# Patient Record
Sex: FEMALE | Race: WHITE | NOT HISPANIC OR LATINO | Employment: OTHER | URBAN - METROPOLITAN AREA
[De-identification: names, ages, dates, MRNs, and addresses within clinical notes are randomized per-mention and may not be internally consistent; named-entity substitution may affect disease eponyms.]

---

## 2017-07-25 ENCOUNTER — ALLSCRIPTS OFFICE VISIT (OUTPATIENT)
Dept: OTHER | Facility: OTHER | Age: 62
End: 2017-07-25

## 2017-07-25 DIAGNOSIS — G89.4 CHRONIC PAIN SYNDROME: ICD-10-CM

## 2017-07-25 DIAGNOSIS — M79.605 PAIN OF LEFT LEG: ICD-10-CM

## 2017-07-25 DIAGNOSIS — M79.604 PAIN OF RIGHT LEG: ICD-10-CM

## 2017-07-25 DIAGNOSIS — M25.562 PAIN IN LEFT KNEE: ICD-10-CM

## 2017-07-25 DIAGNOSIS — F11.20 UNCOMPLICATED OPIOID DEPENDENCE (HCC): ICD-10-CM

## 2017-07-25 DIAGNOSIS — R26.9 ABNORMALITY OF GAIT AND MOBILITY: ICD-10-CM

## 2017-07-25 DIAGNOSIS — G63 POLYNEUROPATHY IN DISEASES CLASSIFIED ELSEWHERE (HCC): ICD-10-CM

## 2017-08-08 ENCOUNTER — GENERIC CONVERSION - ENCOUNTER (OUTPATIENT)
Dept: OTHER | Facility: OTHER | Age: 62
End: 2017-08-08

## 2017-08-11 ENCOUNTER — GENERIC CONVERSION - ENCOUNTER (OUTPATIENT)
Dept: OTHER | Facility: OTHER | Age: 62
End: 2017-08-11

## 2017-08-17 ENCOUNTER — GENERIC CONVERSION - ENCOUNTER (OUTPATIENT)
Dept: OTHER | Facility: OTHER | Age: 62
End: 2017-08-17

## 2017-09-19 ENCOUNTER — GENERIC CONVERSION - ENCOUNTER (OUTPATIENT)
Dept: OTHER | Facility: OTHER | Age: 62
End: 2017-09-19

## 2017-10-18 ENCOUNTER — GENERIC CONVERSION - ENCOUNTER (OUTPATIENT)
Dept: OTHER | Facility: OTHER | Age: 62
End: 2017-10-18

## 2017-10-18 ENCOUNTER — HOSPITAL ENCOUNTER (EMERGENCY)
Facility: HOSPITAL | Age: 62
Discharge: HOME | End: 2017-10-18
Attending: EMERGENCY MEDICINE | Admitting: EMERGENCY MEDICINE
Payer: MEDICARE

## 2017-10-18 VITALS
DIASTOLIC BLOOD PRESSURE: 71 MMHG | RESPIRATION RATE: 18 BRPM | WEIGHT: 218 LBS | OXYGEN SATURATION: 100 % | HEART RATE: 57 BPM | TEMPERATURE: 97.8 F | SYSTOLIC BLOOD PRESSURE: 174 MMHG

## 2017-10-18 DIAGNOSIS — M79.606 CHRONIC LEG PAIN: Primary | ICD-10-CM

## 2017-10-18 DIAGNOSIS — G89.29 CHRONIC LEG PAIN: Primary | ICD-10-CM

## 2017-10-18 PROCEDURE — 99283 EMERGENCY DEPT VISIT LOW MDM: CPT

## 2017-10-18 PROCEDURE — 96372 THER/PROPH/DIAG INJ SC/IM: CPT

## 2017-10-18 RX ORDER — BUDESONIDE AND FORMOTEROL FUMARATE DIHYDRATE 160; 4.5 UG/1; UG/1
2 AEROSOL RESPIRATORY (INHALATION) 2 TIMES DAILY
COMMUNITY
End: 2018-04-10

## 2017-10-18 RX ORDER — ALBUTEROL SULFATE 2.5 MG/3ML
2.5 SOLUTION RESPIRATORY (INHALATION) EVERY 6 HOURS PRN
COMMUNITY
End: 2018-05-16

## 2017-10-18 RX ORDER — ALBUTEROL SULFATE 0.63 MG/3ML
1 SOLUTION RESPIRATORY (INHALATION) EVERY 6 HOURS PRN
COMMUNITY
End: 2018-05-16

## 2017-10-18 RX ORDER — KETOROLAC TROMETHAMINE 30 MG/ML
30 INJECTION, SOLUTION INTRAMUSCULAR; INTRAVENOUS ONCE
Status: COMPLETED | OUTPATIENT
Start: 2017-10-18 | End: 2017-10-18

## 2017-10-18 RX ORDER — ATORVASTATIN CALCIUM 80 MG/1
80 TABLET, FILM COATED ORAL
COMMUNITY

## 2017-10-18 RX ORDER — LIDOCAINE 50 MG/G
2 PATCH TOPICAL ONCE
Status: DISCONTINUED | OUTPATIENT
Start: 2017-10-18 | End: 2017-10-18 | Stop reason: HOSPADM

## 2017-10-18 RX ORDER — CITALOPRAM 40 MG/1
40 TABLET ORAL DAILY
COMMUNITY
End: 2018-04-10

## 2017-10-18 RX ORDER — HYDROCORTISONE 0.5 %
CREAM (GRAM) TOPICAL
Qty: 30 G | Refills: 0 | Status: SHIPPED | OUTPATIENT
Start: 2017-10-18 | End: 2018-04-10

## 2017-10-18 RX ORDER — ASPIRIN 81 MG/1
81 TABLET ORAL DAILY
COMMUNITY
End: 2018-04-23 | Stop reason: HOSPADM

## 2017-10-18 RX ORDER — NAPROXEN 500 MG/1
500 TABLET ORAL 2 TIMES DAILY WITH MEALS
Qty: 30 TABLET | Refills: 0 | Status: SHIPPED | OUTPATIENT
Start: 2017-10-18 | End: 2018-04-10

## 2017-10-18 RX ADMIN — LIDOCAINE 2 PATCH: 50 PATCH CUTANEOUS at 02:45

## 2017-10-18 RX ADMIN — KETOROLAC TROMETHAMINE 30 MG: 30 INJECTION, SOLUTION INTRAMUSCULAR at 02:46

## 2017-10-18 NOTE — ED ATTENDING ATTESTATION
Lynn Marie DO, saw and evaluated the patient  I have discussed the patient with the resident/non-physician practitioner and agree with the resident's/non-physician practitioner's findings, Plan of Care, and MDM as documented in the resident's/non-physician practitioner's note, except where noted  All available labs and Radiology studies were reviewed  At this point I agree with the current assessment done in the Emergency Department  I have conducted an independent evaluation of this patient a history and physical is as follows:    64 yo female with known chronic b/l LE pain presents w/ c/o acute exacerbation of her chronic b/l leg pain for past 1 week  She says the pain begins behind her ankles and radiates up calves to her buttocks, feels like a sharp/stabbing pressure and has no alleviating or exacerbating factors  It waxes/wanes in intensity but is always there despite fentanyl patch, percocet, a prednisone taper staretd by PCP at visit 2 days ago for same and cymbalta startedby pain mgt this summer  Per pt cymbalta seemed to be working until 1 week ago when it flared again  Pt  Was seen at [facility] as well as her PCP in the last week for same complaints - had US neg for DVT and started on steroid taper by PCP  She is upset about being unable to get in to see pain mgmt until next week  Had a lumbar MRI in June for same which showed no evidence of cauda equina  She denies weakenss/numbness/tingling, bowel/bladder incontinence or saddle anesthesia  Have educated pt on inability to give narcotics for chronic pain  She is frustrated but understands need to call pain mgt to get sooner appt, need to continue all other meds incluing cymbalta and pred taper  Will give IM toradol here and lidoderm patches to post calves      Final diagnoses:   Chronic leg pain     Critical Care Time  CritCare Time

## 2017-10-18 NOTE — ED PROVIDER NOTES
History  Chief Complaint   Patient presents with    Leg Pain     Pt states "I have pain in both legs from ankle to butt  I was at [facility] over the weekend and they ruled out blood clots  I saw my PCP and he thinks I have arthritis, but I dont see my pain management dr until next  PCP started me on prednisone which has taking the swelling down, but hasnt touched the pain"     HPI  64 yo female pmh chronic leg pain presents w/ c/o chronic b/l leg pain symptoms  Pt  Says the pain begins behind her ankles and radiates up to her buttocks  Pain is constant feels like a sharp/stabbing pressure and has no a/e factors  It waxes/wanes in intensity  Pt  Says this feels the same as her chronic leg pain  She is on fentanyl patch, percocet, a prednisone taper, cymbalta  Pt  Was seen at [facility] as well as her PCP in the last week for same complaints  She is unable to get in to see pain mgmt until next week  She had a venous duplex b/l LE several days ago at [facility] that was negative for DVTs  Pt  Also had a recent back MRI which showed no evidence of cauda equina  Pt  Denies weakenss/numbness/tingling  No bowel/bladder incontinence  No saddle anesthesia  No f/ch/n/v/d/cp/sob  Imp/plan: 64 yo female presents w/ chronic b/l leg pain  She is on narcotics for her pain  We will give IM toradol, place lidoderm patch b/l legs, and refer the patient to her pain mgmt doc  We will write for bengay and naprosyn  Pt  Had recent chemistry at [facility] which showed normal GF and creatinine of 1 06  We will d/c  Return precuations discussed  Prior to Admission Medications   Prescriptions Last Dose Informant Patient Reported? Taking?    Cranberry 450 MG CAPS   Yes Yes   Sig: Take by mouth   albuterol (2 5 mg/3 mL) 0 083 % nebulizer solution   Yes Yes   Sig: Take 2 5 mg by nebulization every 6 (six) hours as needed for wheezing   albuterol (ACCUNEB) 0 63 MG/3ML nebulizer solution   Yes Yes   Sig: Take 1 ampule by nebulization every 6 (six) hours as needed for wheezing   aspirin (ECOTRIN LOW STRENGTH) 81 mg EC tablet   Yes Yes   Sig: Take 81 mg by mouth daily   atorvastatin (LIPITOR) 80 mg tablet   Yes Yes   Sig: Take 80 mg by mouth daily   budesonide-formoterol (SYMBICORT) 160-4 5 mcg/act inhaler   Yes Yes   Sig: Inhale 2 puffs 2 (two) times a day   citalopram (CeleXA) 40 mg tablet   Yes Yes   Sig: Take 40 mg by mouth daily   cyanocobalamin 1000 MCG tablet   Yes Yes   Sig: Take 100 mcg by mouth daily      Facility-Administered Medications: None       Past Medical History:   Diagnosis Date    Back complaints     Diabetes mellitus ([facility] [address] )     Gastroparesis     Hyperlipidemia     Hypertension     Sleep apnea        History reviewed  No pertinent surgical history  History reviewed  No pertinent family history  I have reviewed and agree with the history as documented  Social History   Substance Use Topics    Smoking status: Never Smoker    Smokeless tobacco: Never Used    Alcohol use No        Review of Systems   Constitutional: Negative for activity change, appetite change, chills and fever  HENT: Negative for sore throat, trouble swallowing and voice change  Eyes: Negative for photophobia  Respiratory: Negative for cough and shortness of breath  Cardiovascular: Negative for chest pain, palpitations and leg swelling  Gastrointestinal: Negative for abdominal pain, diarrhea, nausea and vomiting  Endocrine: Negative for polyuria  Genitourinary: Negative for dysuria, vaginal discharge and vaginal pain  Musculoskeletal: Positive for arthralgias and myalgias  Negative for back pain, gait problem, joint swelling, neck pain and neck stiffness  Skin: Negative for rash and wound  Allergic/Immunologic: Negative  Neurological: Negative for dizziness, tremors, seizures, syncope, facial asymmetry, speech difficulty, weakness, light-headedness, numbness and headaches  Hematological: Negative for adenopathy     Psychiatric/Behavioral: Negative for agitation  Physical Exam  ED Triage Vitals [10/18/17 0213]   Temperature Pulse Respirations Blood Pressure SpO2   97 8 °F (36 6 °C) 57 18 (!) 174/71 100 %      Temp src Heart Rate Source Patient Position - Orthostatic VS BP Location FiO2 (%)   -- -- -- -- --      Pain Score       Worst Possible Pain           Physical Exam   Constitutional: She is oriented to person, place, and time  She appears well-developed and well-nourished  No distress  HENT:   Head: Normocephalic and atraumatic  Right Ear: No hemotympanum  Left Ear: No hemotympanum  Nose: Nose normal  No nasal septal hematoma  Mouth/Throat: Uvula is midline, oropharynx is clear and moist and mucous membranes are normal  She does not have dentures  No oropharyngeal exudate  Eyes: Conjunctivae are normal  Right eye exhibits no nystagmus  Left eye exhibits no nystagmus  Neck: Trachea normal, normal range of motion, full passive range of motion without pain and phonation normal  Neck supple  No tracheal tenderness present  No tracheal deviation present  No c-spine tenderness   Cardiovascular: Normal rate, regular rhythm, normal heart sounds and intact distal pulses  Exam reveals no friction rub  No murmur heard  Pulmonary/Chest: Effort normal and breath sounds normal  She has no wheezes  She exhibits no tenderness  Abdominal: Soft  Bowel sounds are normal  There is no tenderness  There is no rebound and no guarding  Musculoskeletal: Normal range of motion  She exhibits no edema, tenderness or deformity  2+ DP/PT pulses     5/5 strength b/l    Negative SLR b/l  Normal gait    No varus/valgus stress b/l knees    Negative lachmann b/l   Neurological: She is alert and oriented to person, place, and time  She has normal strength  No cranial nerve deficit or sensory deficit  GCS eye subscore is 4  GCS verbal subscore is 5  GCS motor subscore is 6     Reflex Scores:       Patellar reflexes are 2+ on the right side and 2+ on the left side  Achilles reflexes are 2+ on the right side and 2+ on the left side  Skin: Skin is warm and dry  Capillary refill takes less than 2 seconds  No rash noted  She is not diaphoretic  No erythema  Psychiatric: She has a normal mood and affect  Nursing note and vitals reviewed  ED Medications  Medications   ketorolac (TORADOL) 30 mg/mL injection 30 mg (30 mg Intramuscular Given 10/18/17 0246)       Diagnostic Studies  Labs Reviewed - No data to display    No orders to display       Procedures  Procedures      Phone Consults  ED Phone Contact    ED Course  ED Course                                MDM  CritCare Time    Disposition  Final diagnoses:   Chronic leg pain     ED Disposition     ED Disposition Condition Comment    Discharge  Zohra Barriga discharge to home/self care      Condition at discharge: Good        Follow-up Information     Follow up With Specialties Details Why Contact Info Additional [address], DO Internal Medicine In 2 days  [address] [facility]  Charles Rader   [address]  [id number] [phone]       pain management  In 2 days       [address] [facility] Emergency Medicine  If symptoms worsen [address] [facility]  [phone] [place] [facility], [address], [place], [address]        Discharge Medication List as of 10/18/2017  2:42 AM      START taking these medications    Details   Menthol-Methyl Salicylate (TANK DORADO GREASELESS) 10-15 % greaseless cream Apply topically daily at bedtime, Starting Wed 10/18/2017, Print      naproxen (NAPROSYN) 500 mg tablet Take 1 tablet by mouth 2 (two) times a day with meals for 30 doses, Starting Wed 10/18/2017, Until Thu 11/2/2017, Print         CONTINUE these medications which have NOT CHANGED    Details   albuterol (2 5 mg/3 mL) 0 083 % nebulizer solution Take 2 5 mg by nebulization every 6 (six) hours as needed for wheezing, Historical Med albuterol (ACCUNEB) 0 63 MG/3ML nebulizer solution Take 1 ampule by nebulization every 6 (six) hours as needed for wheezing, Historical Med      aspirin (ECOTRIN LOW STRENGTH) 81 mg EC tablet Take 81 mg by mouth daily, Historical Med      atorvastatin (LIPITOR) 80 mg tablet Take 80 mg by mouth daily, Historical Med      budesonide-formoterol (SYMBICORT) 160-4 5 mcg/act inhaler Inhale 2 puffs 2 (two) times a day, Historical Med      citalopram (CeleXA) 40 mg tablet Take 40 mg by mouth daily, Historical Med      Cranberry 450 MG CAPS Take by mouth, Historical Med      cyanocobalamin 1000 MCG tablet Take 100 mcg by mouth daily, Historical Med           No discharge procedures on file  ED Provider  Attending physically available and evaluated Zohra Barriga I managed the patient along with the ED Attending      Electronically Signed by       Pura Valadez MD  Resident  10/18/17 2769

## 2017-10-18 NOTE — DISCHARGE INSTRUCTIONS
Leg Pain   WHAT YOU NEED TO KNOW:   Leg pain may be caused by a variety of health conditions  Your tests did not show any broken bones or blood clots  DISCHARGE INSTRUCTIONS:   Return to the emergency department if:   · You have a fever  · Your leg starts to swell  · Your leg pain gets worse  · You have numbness or tingling in your leg or toes  · You cannot put any weight on or move your leg  Contact your healthcare provider if:   · Your pain does not decrease, even after treatment  · You have questions or concerns about your condition or care  Medicines:   · NSAIDs , such as ibuprofen, help decrease swelling, pain, and fever  This medicine is available with or without a doctor's order  NSAIDs can cause stomach bleeding or kidney problems in certain people  If you take blood thinner medicine, always ask your healthcare provider if NSAIDs are safe for you  Always read the medicine label and follow directions  · Take your medicine as directed  Contact your healthcare provider if you think your medicine is not helping or if you have side effects  Tell him of her if you are allergic to any medicine  Keep a list of the medicines, vitamins, and herbs you take  Include the amounts, and when and why you take them  Bring the list or the pill bottles to follow-up visits  Carry your medicine list with you in case of an emergency  Follow up with your healthcare provider as directed: You may need more tests to find the cause of your leg pain  You may need to see an orthopedic specialist or a physical therapist  Write down your questions so you remember to ask them during your visits  Manage your leg pain:   · Rest  your injured leg so that it can heal  You may need an immobilizer, brace, or splint to limit the movement of your leg  You may need to avoid putting any weight on your leg for at least 48 hours  Return to normal activities as directed      · Ice  the injury for 20 minutes every 4 hours for up to 24 hours, or as directed  Use an ice pack, or put crushed ice in a plastic bag  Cover it with a towel to protect your skin  Ice helps prevent tissue damage and decreases swelling and pain  · Elevate  your injured leg above the level of your heart as often as you can  This will help decrease swelling and pain  If possible, prop your leg on pillows or blankets to keep the area elevated comfortably  · Use assistive devices as directed  You may need to use a cane or crutches  Assistive devices help decrease pain and pressure on your leg when you walk  Ask your healthcare provider for more information about assistive devices and how to use them correctly  · Maintain a healthy weight  Extra body weight can cause pressure and pain in your hip, knee, and ankle joints  Ask your healthcare provider how much you should weigh  Ask him to help you create a weight loss plan if you are overweight  © 2017 [address] [employer] Information is for End User's use only and may not be sold, redistributed or otherwise used for commercial purposes  All illustrations and images included in CareNotes® are the copyrighted property of [employer]  or [employer]  The above information is an  only  It is not intended as medical advice for individual conditions or treatments  Talk to your doctor, nurse or pharmacist before following any medical regimen to see if it is safe and effective for you

## 2017-10-27 ENCOUNTER — GENERIC CONVERSION - ENCOUNTER (OUTPATIENT)
Dept: OTHER | Facility: OTHER | Age: 62
End: 2017-10-27

## 2017-11-17 ENCOUNTER — ALLSCRIPTS OFFICE VISIT (OUTPATIENT)
Dept: OTHER | Facility: OTHER | Age: 62
End: 2017-11-17

## 2017-11-17 ENCOUNTER — APPOINTMENT (OUTPATIENT)
Dept: RADIOLOGY | Facility: CLINIC | Age: 62
End: 2017-11-17
Payer: MEDICARE

## 2017-11-17 DIAGNOSIS — M79.605 PAIN OF LEFT LEG: ICD-10-CM

## 2017-11-17 DIAGNOSIS — M54.6 PAIN IN THORACIC SPINE: ICD-10-CM

## 2017-11-17 DIAGNOSIS — M79.604 PAIN OF RIGHT LEG: ICD-10-CM

## 2017-11-17 DIAGNOSIS — G89.4 CHRONIC PAIN SYNDROME: ICD-10-CM

## 2017-11-17 DIAGNOSIS — M54.6 ACUTE BILATERAL THORACIC BACK PAIN: ICD-10-CM

## 2017-11-17 PROCEDURE — 72070 X-RAY EXAM THORAC SPINE 2VWS: CPT

## 2017-11-21 ENCOUNTER — GENERIC CONVERSION - ENCOUNTER (OUTPATIENT)
Dept: OTHER | Facility: OTHER | Age: 62
End: 2017-11-21

## 2017-11-21 NOTE — PROGRESS NOTES
Assessment  1  Chronic pain of both lower extremities (729 5,338 29) (M79 604,M79 605,G89 29)   2  Chronic pain of left knee (215 90,489 39) (M25 562,G89 29)   3  Polyneuropathy associated with underlying disease (357 4) (G63)   4  Gait disorder (781 2) (R26 9)   5  Pain syndrome, chronic (338 4) (G89 4)   6  Chronic myofascial pain (729 1,338 29) (M79 1,G89 29)   7  Chronic bilateral thoracic back pain (724 1,338 29) (M54 6,G89 29)    Plan   Chronic bilateral thoracic back pain    · XR SPINE THORACOLUMBAR 2 VIEW; Status:Canceled;    Perform:[facility]; VQA:[id number]; Ordered;bilateral thoracic back pain; Ordered By:Lars Sebastian;  Chronic bilateral thoracic back pain, Chronic pain of both lower extremities, Painsyndrome, chronic    · XR SPINE THORACIC 2 VIEW; Status:Active; Requested for:17Nov2017;    Perform:[facility]; JFK:[id number]; Ordered; For:Chronic bilateral thoracic back pain, Chronic pain of both lower extremities, Pain syndrome, chronic; Ordered By:Lars Sebastian;  Chronic myofascial pain    · TiZANidine HCl - 2 MG Oral Tablet; Take 1 PO QID PRN for pain   Rx By: Melody Jimenez; Dispense: 30 Days ; #:120 Tablet; Refill: 1;Chronic myofascial pain; KE = N; Verified Transmission to [address] [place] [address]; Last Updated By: System, [employer]; 11/17/2017 2:22:33 PM  Chronic pain of both lower extremities, Chronic pain of left knee, Polyneuropathyassociated with underlying disease    · DULoxetine HCl - 60 MG Oral Capsule Delayed Release Particles; Take 2 POQD   Rx By: Melody Jimenez; Dispense: 30 Days ; #:60 Capsule Delayed Release Particles;  Refill: 1;Chronic pain of both lower extremities, Chronic pain of left knee, Polyneuropathy associated with underlying disease; KE = N; Verified Transmission to [address] [place] [address]; Last Updated By: System, [employer]; 11/17/2017 2:22:33 PM  Chronic pain of left knee, Pain syndrome, chronic, Polyneuropathy associated withunderlying disease    · DULoxetine HCl - 30 MG Oral Capsule Delayed Release Particles   Rx By: Kerry Albarran; Dispense: 30 Days ; #:30 Capsule Delayed Release Particles; Refill: 0;Chronic pain of left knee, Pain syndrome, chronic, Polyneuropathy associated with underlying disease; KE = N; Sent To: [id number]  Unlinked    · Methocarbamol 750 MG Oral Tablet   Dispense: 0 Days ; #: Sufficient TABS; Refill: 0; KE = N; Record; Last Updated By: Kerry Albarran; 11/17/2017 2:21:13 PM  * MRI THORACIC SPINE WO CONTRAST; Status:Hold For - Scheduling; Requested for:17Nov2017;  Perform:[facility]; CKO:[id number]; Last Updated By:Zaira Valero; 11/17/2017 3:12:31 PM;Ordered; For:Chronic bilateral thoracic back pain, Chronic pain of both lower extremities; Ordered By:Lars Sebastian;  * MRI LUMBAR SPINE WO CONTRAST; Status:Hold For - Scheduling; Requested for:17Nov2017;  Perform:[facility]; ZPQ:[id number]; Last Updated By:Zaira Valero; 11/17/2017 3:12:32 PM;Ordered; For:Chronic bilateral thoracic back pain; Ordered By:Lars Sebastian; Follow-up visit in 2 months Evaluation and Treatment  Follow-up  Status: Hold For - Scheduling  Requested for: [id number] Ordered; For: Pain syndrome, chronic;  Ordered By: Kerry Albarran  Performed:   Due: 39XCE0367   Discussion/Summary    While the patient was in the office today, I did have a thorough conversation with the patient regarding her medication regimen treatment plan  I explained to the patient that at this point because her low back and lower extremity radicular symptoms are her biggest issues, I feel it is necessary to proceed with updated imaging of her thoracic spine with an x-ray and MRI and her lumbar spine with an updated MRI   I explained to the patient that once we receive the results of the MRIs, our office will give her a call to review results and discuss the next step in her treatment plan which may include a surgical consultation with Dr Lynda Rodrigez verses an office visit with Dr Henson Civil to discuss proceeding with the spinal cord stimulator trial  The patient was agreeable and verbalized an understanding   had a very thorough conversation with the patient regarding the spinal cord stimulator trial and possible permanent implantation  I explained that there is pre-authorization process, including the need to proceed with an education class and psychological evaluation, both of which must be completed, before we can consider proceeding with the spinal cord stimulator trial pre-authorization process with the insurance company  I spent a significant amount of time reviewing the basic theory of spinal cord stimulation as well as the hope that it would provide at least moderate improvement and improve the patient's pain and overall functions with regards to perfomring activities of daily living and leisure activities with as little pain as possible  I feel that with regards to the stimulator process, I answered the patient's questions to the best of my abilities and until the patient was thoroughly satisfied  While the patient was in the office today, a stimulator booklet was sent home for the patient and family to review  I encouraged that before any decisions regarding the stimulator were made that they also proceed with the stimulator education class and proceed from there with regards to proceeding with the psychological evaluation  I advised the patient that if they are interested in proceeding with the spinal cord stimulator trial they need to then proceed with the psychological evaluation so we can try to expedite the insurance pre-authorization process, which can take at least 4-6 weeks to obtain approval  The patient verbalized an understanding    regards to her medication regimen, I explained to the patient at this point time since she is not sure how helpful the fentanyl patch is, I would thoroughly encouraged her to discuss the fentanyl patch with her primary care provider and asked them to decrease her down to 25 mcg and then titrate her off of the fentanyl patch from there  I did explain to her that it would be reasonable in place of the patch, for now, to try 3-4 Percocet as needed a day and still overall that would be less medicine  The patient was agreeable and will discuss this with her primary care provider  From our standpoint, I continue to feel that there is a significant neuropathic component and I would like to increase the Cymbalta to 120 mg a day for the next 2 months and see how she does  I advised the patient that if they experience any side effects or issues with the changes in their medication regiment, they should give our office a call to discuss  I also advised the patient not to drive or operate machinery until they see how the changes in the medication regimen affects them  The patient was agreeable and verbalized an understanding  patient is to schedule a follow-up office visit in 2 months at that point time we will regroup with regards to the medication regimen and treatment plan  The patient was agreeable and verbalized an understanding  The patient has the current Goals: To current leaf find a medication regimen and treatment plan the provides at least moderate and stable relief of her pain symptoms and to proceed with the spinal cord stimulator pre authorization process and possible consultation with Dr Angela Friedman  The patent has the current Barriers: Chronic pain syndrome, gait dysfunction, and opioid dependence  Patient is able to Self-Care  The treatment plan was reviewed with the patient/guardian  The patient/guardian understands and agrees with the treatment plan   The patient was counseled regarding instructions for management,-- prognosis,-- patient and family education,-- impressions,-- risks and benefits of treatment options-- and-- importance of compliance with treatment  total time of encounter was 30 minutes  Chief Complaint    1   Pain  Chronic low back, leg and left knee pain, worsened  Diffuse neck and thoracic pain/spasms, worsened  History of Present Illness  The patient presents today for a follow-up office visit  She is currently being treated for her chronic pain symptoms, with the worst of her pain being her low back and lower extremity radicular symptoms and her chronic knee pain, all of which seems to have significantly worsened since her last office visit  The patient denies any recent trauma or injury as well as any signs or symptoms of cauda equina syndrome and is unsure what is causing her pain to get worse as it could be related to the weather changes  The patient is just significantly uncomfortable and reports that her medication regimen is providing minimal relief, although she does feel that initially the Cymbalta and then increase the Cymbalta was helpful, but does not seem to be as helpful now  The patient also reports she continues with the fentanyl patch in the p r n  Percocet as prescribed by her primary care provider, however, she ultimately feels that the fentanyl patch is no longer providing any relief and is wondering if she should even continue to stay on this medication  She does feel the Percocet helps when she takes a, however, she only tries to take it at nighttime because she is on the patch  The patient is very frustrated with the significant lack of overall relief and presents today to discuss her medication regimen and any other treatment plan options we could recommend  OLIVIA PONCE presents with complaints of constant episodes of severe bilateral neck, bilateral upper back, bilateral lower back, bilateral shoulder, bilateral buttock, bilateral hip, bilateral thigh, bilateral knee, bilateral lower leg and bilateral ankle pain, described as sharp and cramping  On a scale of 1 to 10, the patient rates the pain as 10  Symptoms are worsening        Review of Systems   Constitutional: no fever,-- no recent weight gain-- and-- no recent weight loss  Eyes: no double vision-- and-- no blurry vision  Cardiovascular: no chest pain,-- no palpitations-- and-- no lower extremity edema  Respiratory: no complaints of shortness of breath-- and-- no wheezing  Musculoskeletal: difficulty walking,-- joint stiffness-- and-- decreased range of motion, but-- no joint swelling,-- no limb swelling-- and-- no pain in extremity  Neurological: memory loss, but-- no dizziness,-- no difficulty swallowing,-- no loss of consciousness-- and-- no seizures  Gastrointestinal: nausea,-- vomiting,-- constipation-- and-- diarrhea  Genitourinary: no difficulty initiating urine stream,-- no genital pain-- and-- no frequent urination  Integumentary: no complaints of skin rash  Psychiatric: no depression  Endocrine: no excessive thirst,-- no adrenal disease,-- no hypothyroidism-- and-- no hyperthyroidism  Hematologic/Lymphatic: no tendency for easy bruising-- and-- no tendency for easy bleeding  Active Problems  1  Chronic pain of both lower extremities (729 5,338 29) (M79 604,M79 605,G89 29)   2  Chronic pain of left knee (116 70,156 28) (M25 562,G89 29)   3  Encounter for long-term use of opiate analgesic (V58 69) (Z79 891)   4  Gait disorder (781 2) (R26 9)   5  Pain syndrome, chronic (338 4) (G89 4)   6  Polyneuropathy associated with underlying disease (357 4) (G63)   7  Uncomplicated opioid dependence (304 00) (F11 20)    Past Medical History  1  History of angina (V12 59) (Z86 79)   2  History of anxiety disorder (V11 8) (Z86 59)   3  History of asthma (V12 69) (Z87 09)   4  History of depression (V11 8) (Z86 59)   5  History of diabetes mellitus (V12 29) (Z86 39)   6  History of gastroesophageal reflux (GERD) (V12 79) (Z87 19)   7  History of hypercholesterolemia (V12 29) (Z86 39)   8  History of hypertension (V12 59) (Z86 79)   9   History of kidney disease (V13 09) (A66 088)    The active problems and past medical history were reviewed and updated today  Surgical History  1  History of  Section   2  History of Knee Replacement   3  History of Neck Surgery   4  History of Rotator Cuff Repair    The surgical history was reviewed and updated today  Family History  Family History    1  Family history of diabetes mellitus (V18 0) (Z83 3)   2  Family history of heart disease (V17 49) (Z82 49)   3  Family history of hypertension (V17 49) (Z82 49)   4  Family history of neuropathy (V17 2) (Z82 0)    The family history was reviewed and updated today  Social History     ·    · No alcohol use   · Non-smoker (V49 89) (Z78 9)  The social history was reviewed and updated today  The social history was reviewed and is unchanged  Current Meds   1  Albuterol Sulfate (2 5 MG/3ML) 0 083% Inhalation Nebulization Solution Recorded   2  Aspir-81 81 MG Oral Tablet Delayed Release Recorded   3  Atorvastatin Calcium 80 MG Oral Tablet Recorded   4  Citalopram Hydrobromide 40 MG Oral Tablet Recorded   5  Cranberry CAPS Recorded   6  DULoxetine HCl - 30 MG Oral Capsule Delayed Release Particles; Take 1 PO QD with 60 mg dose to equal 90 mg per day; Therapy: [id number] to (Evaluate:2017)  Requested for: [id number]; Last Rx:[id number] Ordered   7  DULoxetine HCl - 60 MG Oral Capsule Delayed Release Particles; take 1 capsule by mouth daily; Therapy: [id number] to (Evaluate:2017)  Requested for: 2017; Last Rx:2017 Ordered   8  FentaNYL 50 MCG/HR Transdermal Patch 72 Hour; Therapy: (Recorded:[id number]) to Recorded   9  Flonase Allergy Relief 50 MCG/ACT Nasal Suspension Recorded   10  Furosemide 20 MG Oral Tablet Recorded   11  Linzess 145 MCG Oral Capsule Recorded   12  Losartan Potassium 50 MG Oral Tablet Recorded   13  Methocarbamol 750 MG Oral Tablet; Therapy: (Recorded:[id number]) to Recorded   14  NovoLOG 100 UNIT/ML Subcutaneous Solution Recorded   15  Pantoprazole Sodium 40 MG Oral Tablet Delayed Release Recorded   16  Percocet 5-325 MG Oral Tablet; Therapy: (Recorded:[id number]) to Recorded   17  Pramipexole Dihydrochloride 1 MG Oral Tablet Recorded   18  Stool Softener 100 MG Oral Tablet Recorded   19  Vitamin B12 TABS Recorded   20  Zofran TABS Recorded    The medication list was reviewed and updated today  Allergies    1  Azithromycin TABS   2  Gabapentin CAPS   3  Methotrexate TABS   4  Sucralfate TABS   5  Sulfamethoxazole-Trimethoprim TABS   6  Topiramate TABS    Physical Exam   Constitutional  General appearance: Well developed, well nourished, alert, in no distress, non-toxic and no overt pain behavior  Eyes  Sclera: anicteric  HEENT  Hearing grossly intact  Pulmonary  Respiratory effort: Even and unlabored  Cardiovascular  Examination of extremities: No edema or pitting edema present  Abdomen  Abdomen: Abnormal   The abdomen was rounded-- and-- obese  Psychiatric  Mood and affect: Mood and affect appropriate  Neurologic Motor Tone:   Cranial nerves: Cranial nerves II-XII grossly intact  -- Antalgic, painful, but steady gait with the use of a single cane  Musculoskeletal       Results/Data  Results Free Text Form [facility]:   Results    Other  Percocet- Last night 10/16/2017        Future Appointments    Date/Time Provider Specialty Site   01/18/2018 01:00 PM ROMAINE Vazquez [facility] 15       Signatures   Electronically signed by : Divine Rebollar ; Nov 20 2017  6:44AM EST                       (Author)    Electronically signed by : Joann Tanner DO; Nov 20 2017 10:35AM EST

## 2017-11-22 ENCOUNTER — GENERIC CONVERSION - ENCOUNTER (OUTPATIENT)
Dept: OTHER | Facility: OTHER | Age: 62
End: 2017-11-22

## 2017-11-27 ENCOUNTER — GENERIC CONVERSION - ENCOUNTER (OUTPATIENT)
Dept: OTHER | Facility: OTHER | Age: 62
End: 2017-11-27

## 2017-12-10 ENCOUNTER — HOSPITAL ENCOUNTER (OUTPATIENT)
Dept: MRI IMAGING | Facility: HOSPITAL | Age: 62
Discharge: HOME | End: 2017-12-10
Payer: MEDICARE

## 2017-12-10 DIAGNOSIS — M54.6 PAIN IN THORACIC SPINE: ICD-10-CM

## 2017-12-10 DIAGNOSIS — M79.605 PAIN OF LEFT LEG: ICD-10-CM

## 2017-12-10 DIAGNOSIS — M79.604 PAIN OF RIGHT LEG: ICD-10-CM

## 2017-12-10 PROCEDURE — 72146 MRI CHEST SPINE W/O DYE: CPT

## 2017-12-10 PROCEDURE — 72148 MRI LUMBAR SPINE W/O DYE: CPT

## 2017-12-12 ENCOUNTER — GENERIC CONVERSION - ENCOUNTER (OUTPATIENT)
Dept: OTHER | Facility: OTHER | Age: 62
End: 2017-12-12

## 2017-12-22 ENCOUNTER — ALLSCRIPTS OFFICE VISIT (OUTPATIENT)
Dept: OTHER | Facility: OTHER | Age: 62
End: 2017-12-22

## 2017-12-23 NOTE — PROGRESS NOTES
Assessment   1  Chronic pain of both lower extremities (729 5,338 29) (M79 604,M79 605,G89 29)   2  Pain syndrome, chronic (338 4) (G89 4)   3  Polyneuropathy associated with underlying disease (357 4) (G63)    Plan   Chronic pain of both lower extremities, Chronic pain of left knee, Polyneuropathy    associated with underlying disease    · DULoxetine HCl - 60 MG Oral Capsule Delayed Release Particles; Take 2 PO    QD   Rx By: [facility]; Dispense: 30 Days ; #:60 Capsule Delayed Release Particles; Refill: 1;For: Chronic pain of both lower extremities, Chronic pain of left knee, Polyneuropathy associated with underlying disease; KE = N; Verified Transmission to [address] [place] [address]; Last Updated By: System, [employer]; 12/22/2017 2:30:30 PM    Discussion/Summary      I believe that the patient may be a good candidate for spinal cord stimulation given the recalcitrant nature of her pain, with apparently no clear operative indications  had a long and detailed conversation with Tessa Irene and her  regarding the nature of spinal cord stimulator, the nature of the trial and reasonable expected outcomes  I did provide information for home review  She also understands that there would be a three to five day trial process and the lead would be placed under fluoroscopic guidance  During that time, I would track the medication usage and sleep to help make decisions regarding permanent implantation, if we got to that point  I am also setting her up for an education seminar regarding spinal cord stimulation  risks and benefits of the procedures were all discussed with the patient  I did obtain authorization from medical records to begin the insurance authorization process  also understands that she will need to undergo psychological testing, which is standard care prior to any implant, to rule out any significant psychological co-morbidities that would interfere with her benefiting from the technology      did renew her duloxetine after explaining the risks of the medication  document utilizing voice recognition software and errors may have occurred  The patient has the current Goals: Decreased pain medications increased function decreased pain  The patent has the current Barriers: Chronic pain syndrome  Patient is able to Self-Care  Educational resources provided: Information on spinal cord stimulation  Possible side effects of new medications were reviewed with the patient/guardian today  The treatment plan was reviewed with the patient/guardian  The patient/guardian understands and agrees with the treatment plan    The patient and patient's family was counseled regarding diagnostic results,-- instructions for management,-- risk factor reductions,-- prognosis,-- patient and family education,-- risks and benefits of treatment options-- and-- importance of compliance with treatment  total time of encounter was 25 minutes-- and-- 20 minutes was spent counseling  Self Referrals: Yes Psychiatrist      Chief Complaint   1  Pain    History of Present Illness   Last visit 11/17/17 unchanged since last visit   is here discuss spinal cord stimulation   personally reviewed the patient's past medical history, surgical history, allergies, current medications social history, family history and review of systems  intake form reviewed with the patient  OLIVIA PONCE presents with complaints of occasional episodes of bilateral knee, bilateral lower leg, bilateral ankle and bilateral foot pain, described as sharp, burning and cramping, radiating to the bilateral lower extremity  Symptoms are unchanged  Review of Systems        Constitutional: no fever,-- no recent weight gain-- and-- no recent weight loss  Eyes: no double vision-- and-- no blurry vision  Cardiovascular: no chest pain,-- no palpitations-- and-- no lower extremity edema  Respiratory: shortness of breath, but-- no wheezing  Musculoskeletal: difficulty walking,-- muscle weakness,-- joint stiffness,-- limb swelling -- and-- pain in extremity , but-- no joint swelling-- and-- no decreased range of motion  Neurological: memory loss, but-- no dizziness,-- no difficulty swallowing,-- no loss of consciousness-- and-- no seizures  Gastrointestinal: nausea-- and-- constipation, but-- no vomiting-- and-- no diarrhea  Genitourinary: no difficulty initiating urine stream,-- no genital pain-- and-- no frequent urination  Integumentary: no complaints of skin rash  Psychiatric: no depression  Endocrine: no excessive thirst,-- no adrenal disease,-- no hypothyroidism-- and-- no hyperthyroidism  Hematologic/Lymphatic: no tendency for easy bruising-- and-- no tendency for easy bleeding  ROS reviewed  Active Problems   1  Chronic bilateral thoracic back pain (724 1,338 29) (M54 6,G89 29)   2  Chronic myofascial pain (729 1,338 29) (M79 1,G89 29)   3  Chronic pain of both lower extremities (729 5,338 29) (M79 604,M79 605,G89 29)   4  Chronic pain of left knee (282 10,452 73) (M25 562,G89 29)   5  Encounter for long-term use of opiate analgesic (V58 69) (Z79 891)   6  Gait disorder (781 2) (R26 9)   7  Pain syndrome, chronic (338 4) (G89 4)   8  Polyneuropathy associated with underlying disease (357 4) (G63)   9  Uncomplicated opioid dependence (304 00) (F11 20)    Past Medical History   1  History of angina (V12 59) (Z86 79)   2  History of anxiety disorder (V11 8) (Z86 59)   3  History of asthma (V12 69) (Z87 09)   4  History of depression (V11 8) (Z86 59)   5  History of diabetes mellitus (V12 29) (Z86 39)   6  History of gastroesophageal reflux (GERD) (V12 79) (Z87 19)   7  History of hypercholesterolemia (V12 29) (Z86 39)   8  History of hypertension (V12 59) (Z86 79)   9  History of kidney disease (V13 09) (R24 160)     The active problems and past medical history were reviewed and updated today  Surgical History   1  History of  Section   2  History of Knee Replacement   3  History of Neck Surgery   4  History of Rotator Cuff Repair     The surgical history was reviewed and updated today  Family History   Family History    1  Family history of diabetes mellitus (V18 0) (Z83 3)   2  Family history of heart disease (V17 49) (Z82 49)   3  Family history of hypertension (V17 49) (Z82 49)   4  Family history of neuropathy (V17 2) (Z82 0)     The family history was reviewed and updated today  Social History    ·    · No alcohol use   · Non-smoker (V49 89) (Z78 9)  The social history was reviewed and updated today  Current Meds    1  Albuterol Sulfate (2 5 MG/3ML) 0 083% Inhalation Nebulization Solution Recorded   2  Aspir-81 81 MG Oral Tablet Delayed Release Recorded   3  Atorvastatin Calcium 80 MG Oral Tablet Recorded   4  Calcium CAPS; Therapy: (Rob Mc) to Recorded   5  Citalopram Hydrobromide 40 MG Oral Tablet Recorded   6  Cranberry CAPS Recorded   7  DULoxetine HCl - 60 MG Oral Capsule Delayed Release Particles; Take 2 PO QD; Therapy: 35WVE0616 to (Evaluate:54Fbz3013)  Requested for: 85XTN9598; Last     Rx:21Kex9034 Ordered   8  FentaNYL 50 MCG/HR Transdermal Patch 72 Hour; Therapy: (Recorded:96Poo8498) to Recorded   9  Furosemide 20 MG Oral Tablet Recorded   10  Linzess 145 MCG Oral Capsule Recorded   11  Losartan Potassium 50 MG Oral Tablet Recorded   12  NovoLOG 100 UNIT/ML Subcutaneous Solution Recorded   13  Oxybutynin Chloride TABS; Therapy: (Rob Mc) to Recorded   14  Pantoprazole Sodium 40 MG Oral Tablet Delayed Release Recorded   15  Percocet 5-325 MG Oral Tablet; Therapy: (Recorded:51Ahh9419) to Recorded   16  Pramipexole Dihydrochloride 1 MG Oral Tablet Recorded   17  Stool Softener 100 MG Oral Tablet Recorded   18  TiZANidine HCl - 2 MG Oral Tablet; Take 1 PO QID PRN for pain;       Therapy: 14YVN4317 to (PYRGLLXN:92IXH5477)  Requested for: 89USY4406; Last      Rx:17Nov2017 Ordered   19  Vitamin B12 TABS Recorded   20  Zofran TABS Recorded     The medication list was reviewed and updated today  Allergies   1  Azithromycin TABS   2  Gabapentin CAPS   3  Methotrexate TABS   4  Sucralfate TABS   5  Sulfamethoxazole-Trimethoprim TABS   6  Topiramate TABS    Vitals   Vital Signs    Recorded: 66Qdh4326 02:13PM   Temperature 98 1 F   Heart Rate 68   Systolic 673   Diastolic 80   Height 5 ft 3 in   Weight 221 lb    BMI Calculated 39 15   BSA Calculated 2 02   Pain Scale 2     Physical Exam        Constitutional      General appearance: Abnormal   overweight  Eyes      Sclera: anicteric      HEENT      Hearing grossly intact  Neck      Neck: Supple, symmetric, trachea midline, no masses  Pulmonary      Respiratory effort: Even and unlabored  Cardiovascular      Examination of extremities: No edema or pitting edema present  Skin      Skin and subcutaneous tissue: Normal without rashes or lesions, well hydrated  Psychiatric      Mood and affect: Mood and affect appropriate  Neurologic      Cranial nerves: Cranial nerves II-XII grossly intact  Musculoskeletal      Gait and station: Abnormal   Gait evaluation demonstrated slow, deliberate sit to stand transfer  Lumbar/Sacral Spine examination demonstrates  Difficulty going from sitting to standing sitting position healed scarring of the left knee and anterior left shin, deep tendon reflexes absent left patella and Achilles 1+ right patella and Achilles she does have weakness of the left knee extension and flexion decreased sensation left stocking-glove distribution  Results/Data   Results Free Text Form [facility]:    Results     I personally reviewed the films/images in the office today   * MRI THORACIC SPINE WO CONTRAST [id number] 01:36PM Yue Santiago Order Number: [id number]       - Patient Instructions: To schedule this appointment, please contact Central Scheduling at [phone]  Test Name Result Flag Reference   MRI THORACIC SPINE WO CONTRAST (Report)     MRI THORACIC SPINE WITHOUT CONTRAST           INDICATION: Left leg pain  COMPARISON: None  TECHNIQUE: Sagittal T1, sagittal T2, sagittal inversion recovery, axial T2, axial 2D MERGE  IMAGE QUALITY: Diagnostic  FINDINGS:           ALIGNMENT: Normal alignment of the thoracic spine  No compression fracture  No subluxation  No scoliosis  MARROW SIGNAL: Mild T12 anterior wedge compression deformity, chronic in the absence marrow edema  Incomplete evaluation of a C7 anterior wedge compression deformity, mild  Superior endplate Schmorl's node at T4  Scattered vertebral body hemangiomas, most prominent at T8            THORACIC CORD: Normal signal within the thoracic cord  PARAVERTEBRAL SOFT TISSUES: Normal                THORACIC DEGENERATIVE CHANGE: No disc herniation, canal stenosis or foraminal narrowing  No degenerative changes  IMPRESSION:           Chronic T12 anterior wedge compression deformity noted, mild  No significant central canal or foraminal encroachment throughout the thoracic spine  Workstation performed: [id number]           Signed by:      Chidi Clifford MD      12/11/17      * MRI LUMBAR SPINE WO CONTRAST [id number] 01:36PM [facility] Order Number: [id number]       - Patient Instructions: To schedule this appointment, please contact Central Scheduling at [phone]  Test Name Result Flag Reference   MRI LUMBAR SPINE WO CONTRAST (Report)     MRI LUMBAR SPINE WITHOUT CONTRAST           INDICATION: Back pain  COMPARISON: Plain film of the thoracic spine dated 11/12/2017  TECHNIQUE: Sagittal T1, sagittal T2, sagittal inversion recovery, axial T1 and axial T2, coronal T2  IMAGE QUALITY: Diagnostic           FINDINGS:           ALIGNMENT: Normal alignment of the lumbar spine  No compression fracture  No spondylolysis or spondylolisthesis  No scoliosis  MARROW SIGNAL: Scattered endplate Schmorl's nodes  Mild T12 vertebral compression deformity noted anteriorly, chronic in the absence of STIR signal abnormality  Partial sacralization of the right L5 vertebral body  DISTAL CORD AND CONUS: Normal size and signal within the distal cord and conus  The conus ends at the L1 level  PARASPINAL SOFT TISSUES: Paraspinal soft tissues are unremarkable  SACRUM: Normal signal within the sacrum  No evidence of insufficiency or stress fracture  LOWER THORACIC DISC SPACES: Loss of disc height at T12-L1 with minimal inferior endplate retropulsion of fracture fragments  No significant central canal or foraminal stenosis  LUMBAR DISC SPACES:              L1-L2: Normal            L2-L3: Mild disc bulge resulting in minimal central canal encroachment with the foramina remaining intact  L3-L4: Minimal disc bulge resulting in minimal central canal encroachment  The foramina remain intact  L4-L5: Right foraminal disc herniation, protrusion type resulting in mild to moderate right foraminal encroachment and possible impingement of the exiting right L4 nerve root  The left neural foramen is mildly narrowed secondary to facet arthropathy  There is also prominent right-sided facet arthropathy at this level  The central canal remains patent  L5-S1: Facet hypertrophy noted bilaterally without significant central canal or foraminal stenosis  IMPRESSION:      1  Chronic mild T12 anterior vertebral body wedge compression deformity  Vertebral body height is otherwise preserved        2  Mild to moderate spondylotic changes of the lower lumbar spine as detailed above with narrowing of the right L4-5 neural foramen and possible impingement of the exiting right L4 nerve root                  Workstation performed: [id number]           Signed by:      Segundo Olivas MD      12/11/17      Future Appointments      Date/Time Provider Specialty Site   01/18/2018 01:00 PM [facility] [address]     Signatures    Electronically signed by : Dax Baeza DO; Dec 22 2017  2:48PM EST                       (Author)

## 2017-12-27 ENCOUNTER — GENERIC CONVERSION - ENCOUNTER (OUTPATIENT)
Dept: OTHER | Facility: OTHER | Age: 62
End: 2017-12-27

## 2018-01-11 ENCOUNTER — GENERIC CONVERSION - ENCOUNTER (OUTPATIENT)
Dept: OTHER | Facility: OTHER | Age: 63
End: 2018-01-11

## 2018-01-12 NOTE — MISCELLANEOUS
Message   Recorded as Task   Date: 10/18/2017 08:25 AM, Created By: Marely Dunn   Task Name: Miscellaneous   Assigned To: [facility] [place] [facility],Team   Regarding Patient: OLIVIA PONCE, Status: Active   Comment:    Margo Hatfield - 18 Oct 2017 8:25 AM     TASK CREATED  pt calling and said that she was in er last night and said that she would like to come in to see Saadia Delgado today because of her pain in legs  please call pt back at [phone]   Joel Sanchez - 18 Oct 2017 9:44 AM     TASK EDITED  s/w pt, c/oIncreased pain in waist and down posterior legs, b/l to feet since sunday  Pt c/o difficulty walking  States that over the weekend, pt went to 2 urgent care centers and [facility] - Ruled out blood clots  Fu w/ PCP who advised pain is r/t arthritis in back  Pt states she went to [facility] ED last night - advised to fu w/ [facility]  Pt confirmed:  duloxetine 60 mg 1 tab po hs per [facility]  Fentanyl 50 mcg q 3 days per pcp  robaxin 750 mg 1 tab po qid per PCP  Percocet 5/325 ~ 1 tab per day per PCP  Pt states that this regimen was working well, w/ no se's until last weekend  No pt has no relief  Is requesting to see DG at any office today  Advised pt, will d/w DG and cb to advise  Pt verbalized understanding and appreciation  Lars Sebastian - 18 Oct 2017 9:49 AM     TASK REPLIED TO: Previously Assigned To Lars Sebastian  We can try a prednisone taper if no one has tried that at this point  I also would like her to increase her Cymbalta to 90 mg per day  I will escribed an order for the 30 mg dose to be added to her 60 mg dose to equal 90 a day to see if that is more helpful as well  Let me know how she would like to proceed  Thank you  Chai Mendez - 18 Oct 2017 10:34 AM     TASK EDITED  Late entry: Message left w/ ans svc 10/18/2017 @ 0110  Pt calling  Dr Axel Franco?: Yes  Msg: "In severea pain both legs"    (Message is not Delivered)   Chia Mendez - 18 Oct 2017 10:38 AM     TASK EDITED  s/w pt, advised of above   Per pt, her pcp started a prednisone taper on Monday w/ no relief - swelling in legs has gone down, no pain relief  Blood sugars are very high  Advised pt, increase iin cymbalta may cause drowsiness, do not drive or operate machinery until you are familiar with how this med will affect you  C/b if se's or questions arise  Will d/w DG - no relief w/ prednisone and cb  pt verbalized understanding and appreciation  Chai Mendez - 18 Oct 2017 10:40 AM     TASK EDITED   is aware of emergency call w/ no Lars [place] - 18 Oct 2017 10:50 AM     TASK REPLIED TO: Previously Assigned To Lars Sebastian  I escribed the Cymbalta to her pharmacy so she is to take a 30 mg and 60 mg to equal 90 mg daily  We will not prescribed the prednisone since she is on one without relief and her blood sugars are elevated  She can continue the rest of her meds as prescribed by her PCP  Chai Mendez - 18 Oct 2017 11:07 AM     TASK EDITED  s/w pt, advised of above  pt verbalized understanding and appreciation  Will cb if questions / concerns arise  Active Problems    1  Chronic pain of both lower extremities (729 5,338 29) (M79 604,M79 605,G89 29)   2  Chronic pain of left knee (258 58,008 36) (M25 562,G89 29)   3  Encounter for long-term use of opiate analgesic (V58 69) (Z79 891)   4  Gait disorder (781 2) (R26 9)   5  Pain syndrome, chronic (338 4) (G89 4)   6  Polyneuropathy associated with underlying disease (357 4) (G63)   7  Uncomplicated opioid dependence (304 00) (F11 20)    Current Meds   1  Albuterol Sulfate (2 5 MG/3ML) 0 083% Inhalation Nebulization Solution Recorded   2  Aspir-81 81 MG Oral Tablet Delayed Release Recorded   3  Atorvastatin Calcium 80 MG Oral Tablet Recorded   4  Citalopram Hydrobromide 40 MG Oral Tablet Recorded   5  Cranberry CAPS Recorded   6  DULoxetine HCl - 30 MG Oral Capsule Delayed Release Particles;  Take 1 PO QD with 60   mg dose to equal 90 mg per day; Therapy: 61HPN8251 to (Evaluate:17Nov2017)  Requested for: 87JNB1812; Last   Rx:18Oct2017 Ordered   7  DULoxetine HCl - 60 MG Oral Capsule Delayed Release Particles; take 1 capsule by   mouth daily; Therapy: 93PUW0595 to (Evaluate:15Nov2017)  Requested for: 17Aug2017; Last   Rx:17Aug2017 Ordered   8  FentaNYL 50 MCG/HR Transdermal Patch 72 Hour; Therapy: (Recorded:52Aud2151) to Recorded   9  Flonase Allergy Relief 50 MCG/ACT Nasal Suspension (Fluticasone Propionate)   Recorded   10  Furosemide 20 MG Oral Tablet Recorded   11  Linzess 145 MCG Oral Capsule Recorded   12  Losartan Potassium 50 MG Oral Tablet Recorded   13  Methocarbamol 750 MG Oral Tablet; Therapy: (Recorded:71Ucu2747) to Recorded   14  NovoLOG 100 UNIT/ML Subcutaneous Solution Recorded   15  Pantoprazole Sodium 40 MG Oral Tablet Delayed Release Recorded   16  Percocet 5-325 MG Oral Tablet (Oxycodone-Acetaminophen); Therapy: (Recorded:98Nhj8264) to Recorded   17  Pramipexole Dihydrochloride 1 MG Oral Tablet Recorded   18  Stool Softener 100 MG Oral Tablet Recorded   19  Vitamin B12 TABS Recorded   20  Zofran TABS (Ondansetron HCl) Recorded    Allergies    1  Azithromycin TABS   2  Gabapentin CAPS   3  Methotrexate TABS   4  Sucralfate TABS   5  Sulfamethoxazole-Trimethoprim TABS   6   Topiramate TABS    Signatures   Electronically signed by : Michelle Peña, ; Oct 18 2017 11:08AM EST                       (Author)

## 2018-01-13 NOTE — MISCELLANEOUS
Message   Recorded as Task   Date: 11/22/2017 02:48 PM, Created By: Yudith Mark   Task Name: Miscellaneous   Assigned To: [facility] [place] [facility],Team   Regarding Patient: OLIVIA PONCE, Status: Active   Comment:    Yudith Mark - 22 Nov 2017 2:48 PM     TASK CREATED  Pt called stating that she had an x-ray last week and was wondering if we got the results back yet  Pt would like a call back at [phone]  Chai Mendez - 22 Nov 2017 3:46 PM     TASK EDITED  *** FYI ***  s/w pt, [facility]  Advised pt, no report is available at this time  Will fu and cb when info is available - anticipate a cb on Monday  Pt verbalized understandign and appreciation  Pleasant Corti in radiology  confirmed x-ray was collected - not read at this time  Will fu  Lars Sebastian - 22 Nov 2017 3:47 PM     TASK REPLIED TO: Previously Assigned To Lars Sebastian  Provider aware and agree  Thank you  Active Problems    1  Chronic bilateral thoracic back pain (724 1,338 29) (M54 6,G89 29)   2  Chronic myofascial pain (729 1,338 29) (M79 1,G89 29)   3  Chronic pain of both lower extremities (729 5,338 29) (M79 604,M79 605,G89 29)   4  Chronic pain of left knee (239 87,002 93) (M25 562,G89 29)   5  Encounter for long-term use of opiate analgesic (V58 69) (Z79 891)   6  Gait disorder (781 2) (R26 9)   7  Pain syndrome, chronic (338 4) (G89 4)   8  Polyneuropathy associated with underlying disease (357 4) (G63)   9  Uncomplicated opioid dependence (304 00) (F11 20)    Current Meds   1  Albuterol Sulfate (2 5 MG/3ML) 0 083% Inhalation Nebulization Solution Recorded   2  Aspir-81 81 MG Oral Tablet Delayed Release Recorded   3  Atorvastatin Calcium 80 MG Oral Tablet Recorded   4  Citalopram Hydrobromide 40 MG Oral Tablet Recorded   5  Cranberry CAPS Recorded   6  DULoxetine HCl - 60 MG Oral Capsule Delayed Release Particles; Take 2 PO QD;    Therapy: 70DDI1377 to (OHYMIGTX:04EGU7573)  Requested for: 70TSH7510; Last   Rx:17Nov2017 Ordered   7  FentaNYL 50 MCG/HR Transdermal Patch 72 Hour; Therapy: (Recorded:12Oon2888) to Recorded   8  Flonase Allergy Relief 50 MCG/ACT Nasal Suspension (Fluticasone Propionate)   Recorded   9  Furosemide 20 MG Oral Tablet Recorded   10  Linzess 145 MCG Oral Capsule Recorded   11  Losartan Potassium 50 MG Oral Tablet Recorded   12  NovoLOG 100 UNIT/ML Subcutaneous Solution Recorded   13  Pantoprazole Sodium 40 MG Oral Tablet Delayed Release Recorded   14  Percocet 5-325 MG Oral Tablet (Oxycodone-Acetaminophen); Therapy: (Recorded:47Mvx6158) to Recorded   15  Pramipexole Dihydrochloride 1 MG Oral Tablet Recorded   16  Stool Softener 100 MG Oral Tablet Recorded   17  TiZANidine HCl - 2 MG Oral Tablet; Take 1 PO QID PRN for pain; Therapy: 99FAF5330 to (Evaluate:82Fds5852)  Requested for: 82IBQ9144; Last    Rx:17Nov2017 Ordered   18  Vitamin B12 TABS Recorded   19  Zofran TABS (Ondansetron HCl) Recorded    Allergies    1  Azithromycin TABS   2  Gabapentin CAPS   3  Methotrexate TABS   4  Sucralfate TABS   5  Sulfamethoxazole-Trimethoprim TABS   6   Topiramate TABS    Signatures   Electronically signed by : Nataliia Heaton, ; Nov 22 2017  3:50PM EST                       (Author)

## 2018-01-14 VITALS
BODY MASS INDEX: 38.27 KG/M2 | SYSTOLIC BLOOD PRESSURE: 136 MMHG | HEART RATE: 80 BPM | DIASTOLIC BLOOD PRESSURE: 78 MMHG | WEIGHT: 216 LBS | HEIGHT: 63 IN | TEMPERATURE: 98 F

## 2018-01-15 NOTE — MISCELLANEOUS
Message   Recorded as Task   Date: 08/08/2017 10:32 AM, Created By: Elia Dubin   Task Name: Miscellaneous   Assigned To: [facility] [place] [facility]   Regarding Patient: OLIVIA PONCE, Status: Active   CommentHilliard Perks - 08 Aug 2017 10:32 AM     TASK CREATED  Lars had put pt on cymbalta 30 mg and wanted her to let you know how it was working  she says its ok she doesnt know if you would want to up the dosage  any changes to her meds please call her at [phone]   Chai Mendez - 08 Aug 2017 1:24 PM     TASK EDITED  s/w pt, confirmed cymbalta 30 mg 1 tab po qhs x 10 days  per pt, + relief w/ no se's  Questioned increase to 60 mg  Advised pt, will d/w DG and cb to advise  Pt verbalized understanding and appreciation  Lars Sebastian - 08 Aug 2017 3:29 PM     TASK REPLIED TO: Previously Assigned To Lars Sebastian  For the next week I want her to continue with Cymbalta 30 mg, 1 PO HS  Then after 1 week increase it to 2 PO HS  How is she on her supply on Cymbalta? She should call us back if she has any side effects  Chai Mendez - 08 Aug 2017 3:35 PM     TASK EDITED  s/w pt, advised of above  pt verbalized understanding  Will cb when she is ~ 1 week of mediation on hand  Sooner if se's or questions arise  Active Problems    1  Chronic pain of both lower extremities (729 5,338 29) (M79 604,M79 605,G89 29)   2  Chronic pain of left knee (099 55,796 47) (M25 562,G89 29)   3  Encounter for long-term use of opiate analgesic (V58 69) (Z79 891)   4  Gait disorder (781 2) (R26 9)   5  Pain syndrome, chronic (338 4) (G89 4)   6  Polyneuropathy associated with underlying disease (357 4) (G63)   7  Uncomplicated opioid dependence (304 00) (F11 20)    Current Meds   1  Albuterol Sulfate (2 5 MG/3ML) 0 083% Inhalation Nebulization Solution Recorded   2  Aspir-81 81 MG Oral Tablet Delayed Release Recorded   3  Atorvastatin Calcium 80 MG Oral Tablet Recorded   4   Citalopram Hydrobromide 40 MG Oral Tablet Recorded 5  Cranberry CAPS Recorded   6  DULoxetine HCl - 30 MG Oral Capsule Delayed Release Particles; Take 1 pill daily at   bed time x 10 days, then call with an update to discuss further titrating to 60 mg slowly; Last Rx:[id number] Ordered   7  FentaNYL 50 MCG/HR Transdermal Patch 72 Hour; Therapy: (Recorded:25Jul2017) to Recorded   8  Flonase Allergy Relief 50 MCG/ACT Nasal Suspension (Fluticasone Propionate)   Recorded   9  Furosemide 20 MG Oral Tablet Recorded   10  Linzess 145 MCG Oral Capsule Recorded   11  Losartan Potassium 50 MG Oral Tablet Recorded   12  Methocarbamol 750 MG Oral Tablet; Therapy: (Recorded:65Zwq0099) to Recorded   13  NovoLOG 100 UNIT/ML Subcutaneous Solution Recorded   14  Pantoprazole Sodium 40 MG Oral Tablet Delayed Release Recorded   15  Percocet 5-325 MG Oral Tablet (Oxycodone-Acetaminophen); Therapy: (Recorded:85Xnv5093) to Recorded   16  Pramipexole Dihydrochloride 1 MG Oral Tablet Recorded   17  Stool Softener 100 MG Oral Tablet Recorded   18  Vitamin B12 TABS Recorded   19  Zofran TABS (Ondansetron HCl) Recorded    Allergies    1  Azithromycin TABS   2  Gabapentin CAPS   3  Methotrexate TABS   4  Sucralfate TABS   5  Sulfamethoxazole-Trimethoprim TABS   6   Topiramate TABS    Signatures   Electronically signed by : Lidia Bray, ; Aug  8 2017  3:36PM EST                       (Author)

## 2018-01-18 ENCOUNTER — ALLSCRIPTS OFFICE VISIT (OUTPATIENT)
Dept: OTHER | Facility: OTHER | Age: 63
End: 2018-01-18

## 2018-01-18 NOTE — MISCELLANEOUS
Message   Recorded as Task   Date: 11/27/2017 09:48 AM, Created By: Digna Thompson   Task Name: Miscellaneous   Assigned To: [facility] [place] [facility]   Regarding Patient: OLIVIA PONCE, Status: Active   Comment:    Digna Thompson - 27 Nov 2017 9:48 AM     TASK CREATED  pt waiting for x ray results so she can schedule an mri cb# [phone]   Chai Mendez - 27 Nov 2017 12:08 PM     TASK REASSIGNED: Previously Assigned To YANELY goode,Team   Lars Sebastian - 27 Nov 2017 12:22 PM     TASK REPLIED TO: Previously Assigned To Lars Sebastian  Please advise her that her thoracic spine x-rays showed mod deg changes/OA without any fractues  She should proceed with the thoracic spine MRI as we discussed at her OV  We will call once we have those results  Thank you  Chai Mendez - 27 Nov 2017 2:38 PM     TASK EDITED  s/w pt, advised of above  pt verbalized understanding  Will schedule MRI and await results  Pt states she will cb if questions or concerns arise  Active Problems    1  Chronic bilateral thoracic back pain (724 1,338 29) (M54 6,G89 29)   2  Chronic myofascial pain (729 1,338 29) (M79 1,G89 29)   3  Chronic pain of both lower extremities (729 5,338 29) (M79 604,M79 605,G89 29)   4  Chronic pain of left knee (231 67,965 51) (M25 562,G89 29)   5  Encounter for long-term use of opiate analgesic (V58 69) (Z79 891)   6  Gait disorder (781 2) (R26 9)   7  Pain syndrome, chronic (338 4) (G89 4)   8  Polyneuropathy associated with underlying disease (357 4) (G63)   9  Uncomplicated opioid dependence (304 00) (F11 20)    Current Meds   1  Albuterol Sulfate (2 5 MG/3ML) 0 083% Inhalation Nebulization Solution Recorded   2  Aspir-81 81 MG Oral Tablet Delayed Release Recorded   3  Atorvastatin Calcium 80 MG Oral Tablet Recorded   4  Citalopram Hydrobromide 40 MG Oral Tablet Recorded   5  Cranberry CAPS Recorded   6  DULoxetine HCl - 60 MG Oral Capsule Delayed Release Particles; Take 2 PO QD;    Therapy: 78OCV6179 to (Evaluate:16Jan2018)  Requested for: 76PZR5552; Last   Rx:17Nov2017 Ordered   7  FentaNYL 50 MCG/HR Transdermal Patch 72 Hour; Therapy: (Recorded:09Oxg8876) to Recorded   8  Flonase Allergy Relief 50 MCG/ACT Nasal Suspension (Fluticasone Propionate)   Recorded   9  Furosemide 20 MG Oral Tablet Recorded   10  Linzess 145 MCG Oral Capsule Recorded   11  Losartan Potassium 50 MG Oral Tablet Recorded   12  NovoLOG 100 UNIT/ML Subcutaneous Solution Recorded   13  Pantoprazole Sodium 40 MG Oral Tablet Delayed Release Recorded   14  Percocet 5-325 MG Oral Tablet (Oxycodone-Acetaminophen); Therapy: (Recorded:18Vhx6744) to Recorded   15  Pramipexole Dihydrochloride 1 MG Oral Tablet Recorded   16  Stool Softener 100 MG Oral Tablet Recorded   17  TiZANidine HCl - 2 MG Oral Tablet; Take 1 PO QID PRN for pain; Therapy: 86ASL0918 to (Evaluate:16Jan2018)  Requested for: 49JGZ7758; Last    Rx:17Nov2017 Ordered   18  Vitamin B12 TABS Recorded   19  Zofran TABS (Ondansetron HCl) Recorded    Allergies    1  Azithromycin TABS   2  Gabapentin CAPS   3  Methotrexate TABS   4  Sucralfate TABS   5  Sulfamethoxazole-Trimethoprim TABS   6   Topiramate TABS    Signatures   Electronically signed by : Carmen Saldaña, ; Nov 27 2017  2:38PM EST                       (Author)

## 2018-01-20 NOTE — PROGRESS NOTES
Assessment   1  Chronic bilateral thoracic back pain (724 1,338 29) (M54 6,G89 29)   2  Chronic pain of both lower extremities (729 5,338 29) (M79 604,M79 605,G89 29)   3  Chronic pain of left knee (203 36,319 88) (M25 562,G89 29)   4  Chronic myofascial pain (729 1,338 29) (M79 1,G89 29)   5  Polyneuropathy associated with underlying disease (357 4) (G63)   6  Pain syndrome, chronic (338 4) (G89 4)    Plan    Chronic myofascial pain    · TiZANidine HCl - 2 MG Oral Tablet; Take 1 PO QID PRN for pain   Rx By: Isaac Quezada; Dispense: 90 Days ; #:360 Tablet; Refill: 0;For: Chronic myofascial pain; KE = N; Verified Transmission to [facility]; Last Updated By: System, [employer]; 1/18/2018 1:54:27 PM  Chronic pain of both lower extremities, Chronic pain of left knee, Polyneuropathy    associated with underlying disease    · DULoxetine HCl - 60 MG Oral Capsule Delayed Release Particles; Take 2 PO    QD   Rx By: Isaac Quezada; Dispense: 90 Days ; #:180 Capsule Delayed Release Particles; Refill: 0;For: Chronic pain of both lower extremities, Chronic pain of left knee, Polyneuropathy associated with underlying disease; KE = N; Verified Transmission to [facility]; Last Updated By: System, [employer]; 1/18/2018 1:54:27 PM  Unlinked    · Methocarbamol 750 MG Oral Tablet   Dispense: 0 Days ; #: Sufficient TABS; Refill: 0; KE = N; Record; Last Updated By: Isaac Quezada; 1/18/2018 1:53:54 PM      Follow-up visit in 3 months Evaluation and Treatment  Follow-up  Status: Hold For - Scheduling  Requested for: 03ATM7738     Ordered; For: Pain syndrome, chronic;  Ordered By: Isaac Quezada  Performed:   Due: 99UOS6328       Discussion/Summary      While the patient and her  were in the office today, I did have a thorough conversation with him regarding her medication regimen and treatment plan   I explained the patient that if she would experience any significant arm or chest pain and have a significantly high blood pressure, I urged her to go to the nearest emergency room immediately and/or see if she can be immediately seen by her primary care provider  I did review that significantly high blood pressure and pain could signify the beginnings of a CVA or MI  The patient was agreeable and verbalized an understanding  regards to the spinal cord stimulator trial, I explained to the patient as soon as we receive authorization or do not, our office will be in touch with her and we will proceed from there as appropriate  The patient was agreeable and verbalized an understanding  regards to her medication regimen, I explained to the patient and her  that she is on many medications that can make her tired, however, she is taking 2 muscle relaxers, this could definitely explain and excessive tiredness  At this point I wanted her to go home and make sure that she is no longer taking the methocarbamol and she would just be taking the tizanidine as prescribed  For now, we will continue the Cymbalta as prescribed and see how she does  The patient can continue her fentanyl patch and Percocet as prescribed by her primary care provider  The patient was agreeable and verbalized an understanding  patient is to schedule a follow-up office visit in 3 months and at that point time we will regroup with regards to their medication regimen and treatment plan  The patient was agreeable and verbalized an understanding  The patient has the current Goals: To continue with at least a current level of pain relief and hopefully proceed with the spinal cord stimulator trial in the near future  The patent has the current Barriers: Chronic pain syndrome and opioid dependence  Gait dysfunction  Patient is able to Self-Care  The treatment plan was reviewed with the patient/guardian   The patient/guardian understands and agrees with the treatment plan    The patient and patient's family was counseled regarding instructions for management,-- risk factor reductions,-- prognosis,-- patient and family education,-- impressions,-- risks and benefits of treatment options-- and-- importance of compliance with treatment  total time of encounter was 25 minutes  Chief Complaint   1  Pain  Chronic low back, leg and left knee pain, improved/stable  Diffuse neck and thoracic pain/spasms, improved/stable  History of Present Illness   The patient presents today for a follow-up office visit  She is currently being treated for her chronic pain symptoms, with the worst of her pain being her low back and lower extremity radicular symptoms and her chronic knee pain, all of which seems to have slightly improved and remained stable since her last office visit  The patient feels that it must be related to the increase in the Cymbalta as she is now taking 120 mg a day  The patient reports that her overall medication regimen is providing approximately 75% relief of her pain symptoms, without any significant side effects or issues except for the fact that she does feel more lethargic than normal, however, there is some confusion as to whether not she is also taking methocarbamol and tizanidine at the same time  The patient was on clear that she should stop the methocarbamol and start the tizanidine we prescribed as we discussed  The patient also reports there has been 1 instance where her blood pressure was high, 160/120 and she did have right arm pain and headache, however, once the pain went away her blood pressure went back down to normal  She reports that she did not follow up with the emergency room or her primary care provider because the pain subsided  At this point time the patient is anxious see awaiting approval to go ahead with the spinal cord stimulator trial as since her last office visit she has completed all the pre authorization requirements and is just waiting to hear back from her insurance   The patient and her  present today to discuss her medication regimen and treatment plan  OLIVIA PONCE presents with complaints of occasional episodes of mild bilateral upper back, bilateral buttock, bilateral knee, bilateral lower leg, bilateral ankle and bilateral foot pain, described as cramping, radiating to the lower back, bilateral buttock and bilateral lower extremity  On a scale of 1 to 10, the patient rates the pain as 2  Symptoms are improving  Review of Systems        Constitutional: no fever,-- no recent weight gain-- and-- no recent weight loss  Eyes: no double vision-- and-- no blurry vision  Cardiovascular: no chest pain,-- no palpitations-- and-- no lower extremity edema  Respiratory: no complaints of shortness of breath-- and-- no wheezing  Musculoskeletal: difficulty walking,-- muscle weakness,-- joint stiffness,-- limb swelling -- and-- decreased range of motion, but-- no joint swelling-- and-- no pain in extremity  Neurological: memory loss, but-- no dizziness,-- no difficulty swallowing,-- no loss of consciousness-- and-- no seizures  Gastrointestinal: nausea-- and-- constipation, but-- no vomiting-- and-- no diarrhea  Genitourinary: no difficulty initiating urine stream,-- no genital pain-- and-- no frequent urination  Integumentary: no complaints of skin rash  Psychiatric: no depression  Endocrine: no excessive thirst,-- no adrenal disease,-- no hypothyroidism-- and-- no hyperthyroidism  Hematologic/Lymphatic: no tendency for easy bruising-- and-- no tendency for easy bleeding  Active Problems   1  Chronic bilateral thoracic back pain (724 1,338 29) (M54 6,G89 29)   2  Chronic myofascial pain (729 1,338 29) (M79 1,G89 29)   3  Chronic pain of both lower extremities (729 5,338 29) (M79 604,M79 605,G89 29)   4  Chronic pain of left knee (946 81,520 66) (M25 562,G89 29)   5   Encounter for long-term use of opiate analgesic (V58 69) (Z79 891)   6  Gait disorder (781 2) (R26 9)   7  Pain syndrome, chronic (338 4) (G89 4)   8  Polyneuropathy associated with underlying disease (357 4) (G63)   9  Uncomplicated opioid dependence (304 00) (F11 20)    Past Medical History   1  History of angina (V12 59) (Z86 79)   2  History of anxiety disorder (V11 8) (Z86 59)   3  History of asthma (V12 69) (Z87 09)   4  History of depression (V11 8) (Z86 59)   5  History of diabetes mellitus (V12 29) (Z86 39)   6  History of gastroesophageal reflux (GERD) (V12 79) (Z87 19)   7  History of hypercholesterolemia (V12 29) (Z86 39)   8  History of hypertension (V12 59) (Z86 79)   9  History of kidney disease (V13 09) (B29 176)     The active problems and past medical history were reviewed and updated today  Surgical History   1  History of  Section   2  History of Knee Replacement   3  History of Neck Surgery   4  History of Rotator Cuff Repair     The surgical history was reviewed and updated today  Family History   Family History    1  Family history of diabetes mellitus (V18 0) (Z83 3)   2  Family history of heart disease (V17 49) (Z82 49)   3  Family history of hypertension (V17 49) (Z82 49)   4  Family history of neuropathy (V17 2) (Z82 0)     The family history was reviewed and updated today  Social History    ·    · No alcohol use   · Non-smoker (V49 89) (Z78 9)  The social history was reviewed and updated today  The social history was reviewed and is unchanged  Current Meds    1  Albuterol Sulfate (2 5 MG/3ML) 0 083% Inhalation Nebulization Solution Recorded   2  Aspir-81 81 MG Oral Tablet Delayed Release Recorded   3  Atorvastatin Calcium 80 MG Oral Tablet Recorded   4  Calcium CAPS; Therapy: (Antonio Hamilton) to Recorded   5  Citalopram Hydrobromide 40 MG Oral Tablet Recorded   6  Cranberry CAPS Recorded   7  DULoxetine HCl - 60 MG Oral Capsule Delayed Release Particles; Take 2 PO QD;      Therapy: 99HTY7625 to (Evaluate:17Tyf2154)  Requested for: 81Ecq1355; Last     Rx:50Bxs1135 Ordered   8  FentaNYL 50 MCG/HR Transdermal Patch 72 Hour; Therapy: (Recorded:63Xmd8719) to Recorded   9  Furosemide 20 MG Oral Tablet Recorded   10  Linzess 145 MCG Oral Capsule Recorded   11  Losartan Potassium 50 MG Oral Tablet Recorded   12  Methocarbamol 750 MG Oral Tablet; Therapy: (Recorded:18Jan2018) to Recorded   13  NovoLOG 100 UNIT/ML Subcutaneous Solution Recorded   14  Oxybutynin Chloride TABS; Therapy: (Deya Kiana) to Recorded   15  Pantoprazole Sodium 40 MG Oral Tablet Delayed Release Recorded   16  Percocet 5-325 MG Oral Tablet; Therapy: (Recorded:72Wul9259) to Recorded   17  Pramipexole Dihydrochloride 1 MG Oral Tablet Recorded   18  Stool Softener 100 MG Oral Tablet Recorded   19  TiZANidine HCl - 2 MG Oral Tablet; Take 1 PO QID PRN for pain; Therapy: 34YKV0056 to (Evaluate:16Jan2018)  Requested for: 16BCG4173; Last      Rx:17Nov2017 Ordered   20  Vitamin B12 TABS Recorded   21  Zofran TABS Recorded     The medication list was reviewed and updated today  Allergies   1  Azithromycin TABS   2  Gabapentin CAPS   3  Methotrexate TABS   4  Sucralfate TABS   5  Sulfamethoxazole-Trimethoprim TABS   6  Topiramate TABS    Vitals   Vital Signs    Recorded: 66YQY2277 01:26PM   Temperature 97 8 F   Heart Rate 72   Systolic 580   Diastolic 78   Height 5 ft 3 in   Weight 221 lb    BMI Calculated 39 15   BSA Calculated 2 02   Pain Scale 2     Physical Exam        Constitutional      General appearance: Well developed, well nourished, alert, in no distress, non-toxic and no overt pain behavior  Eyes      Sclera: anicteric      HEENT      Hearing grossly intact  Pulmonary      Respiratory effort: Even and unlabored  Cardiovascular      Examination of extremities: No edema or pitting edema present  Abdomen      Abdomen: Abnormal   The abdomen was rounded-- and-- obese  Psychiatric      Mood and affect: Mood and affect appropriate  Neurologic Motor Tone:       Cranial nerves: Cranial nerves II-XII grossly intact  -- Antalgic, painful, but steady gait with the use of a single cane        Musculoskeletal       Signatures    Electronically signed by : ROMAINE Ricketts; Jan 19 2018  6:47AM EST                       (Author)     Electronically signed by : Preeti Tong DO; Jan 19 2018  9:03AM EST

## 2018-01-22 VITALS
HEIGHT: 63 IN | TEMPERATURE: 98.3 F | BODY MASS INDEX: 38.27 KG/M2 | DIASTOLIC BLOOD PRESSURE: 76 MMHG | SYSTOLIC BLOOD PRESSURE: 150 MMHG | WEIGHT: 216 LBS | HEART RATE: 74 BPM

## 2018-01-22 VITALS
WEIGHT: 221 LBS | HEIGHT: 63 IN | HEART RATE: 72 BPM | DIASTOLIC BLOOD PRESSURE: 78 MMHG | SYSTOLIC BLOOD PRESSURE: 138 MMHG | TEMPERATURE: 97.8 F | BODY MASS INDEX: 39.16 KG/M2

## 2018-01-23 VITALS
WEIGHT: 221 LBS | HEIGHT: 63 IN | BODY MASS INDEX: 39.16 KG/M2 | SYSTOLIC BLOOD PRESSURE: 138 MMHG | DIASTOLIC BLOOD PRESSURE: 80 MMHG | HEART RATE: 68 BPM | TEMPERATURE: 98.1 F

## 2018-01-23 NOTE — MISCELLANEOUS
Message   Recorded as Task   Date: 12/12/2017 06:38 AM, Created By: Brenton Metz   Task Name: Follow Up   Assigned To: [facility] [place] [facility]   Regarding Patient: OLIVIA PONCE, Status: Active   CommentScarmen [facility] - 12 Dec 2017 6:38 AM     TASK CREATED  Please call the patient and advise her that her thoracic spine MRI was generally normal with mild/normal deg changes, but no disc herniations or stenosis noted  Her lumbar spine showed mild to mod deg changes/OA, with a disc herniation to the Right at L4-5 causing right L4 nerve root compression  At this point one option would be to schedule an LESI directed towards the right with Dr Henrietta Simon to see if that helps or she can schedule an OV with Dr Henrietta Simon to be evaluated as a SCStim candidate and meet Dr Henrietta Simon  Thank you  Chai Mendez - 12 Dec 2017 10:03 AM     TASK EDITED  s/w pt, advised of above  Pt verbalized understanding  States that she is in the process of scs  States she has had the mri and psych eval  is coming in soon for the education  Pt states she has not met SL  **Scheduled 30 min ov w/ SL on 12/22 to discuss  Advised pt, will make DG and SL aware and cb if there is any change in the plan  pt verbalized understanding and appreciation  Lars Sebastian - 12 Dec 2017 11:15 AM     TASK REPLIED TO: Previously Assigned To Lars Sebastian  Provider aware and agree  THank you  Active Problems    1  Chronic bilateral thoracic back pain (724 1,338 29) (M54 6,G89 29)   2  Chronic myofascial pain (729 1,338 29) (M79 1,G89 29)   3  Chronic pain of both lower extremities (729 5,338 29) (M79 604,M79 605,G89 29)   4  Chronic pain of left knee (283 82,748 26) (M25 562,G89 29)   5  Encounter for long-term use of opiate analgesic (V58 69) (Z79 891)   6  Gait disorder (781 2) (R26 9)   7  Pain syndrome, chronic (338 4) (G89 4)   8  Polyneuropathy associated with underlying disease (357 4) (G63)   9   Uncomplicated opioid dependence (304 00) (F11 20)    Current Meds   1  Albuterol Sulfate (2 5 MG/3ML) 0 083% Inhalation Nebulization Solution Recorded   2  Aspir-81 81 MG Oral Tablet Delayed Release Recorded   3  Atorvastatin Calcium 80 MG Oral Tablet Recorded   4  Citalopram Hydrobromide 40 MG Oral Tablet Recorded   5  Cranberry CAPS Recorded   6  DULoxetine HCl - 60 MG Oral Capsule Delayed Release Particles; Take 2 PO QD; Therapy: 82MNO9055 to (Evaluate:85Rgx4421)  Requested for: 43PLM3353; Last   Rx:81Fxn6639 Ordered   7  FentaNYL 50 MCG/HR Transdermal Patch 72 Hour; Therapy: (Recorded:20Dpj4203) to Recorded   8  Flonase Allergy Relief 50 MCG/ACT Nasal Suspension (Fluticasone Propionate)   Recorded   9  Furosemide 20 MG Oral Tablet Recorded   10  Linzess 145 MCG Oral Capsule Recorded   11  LORazepam 1 MG Oral Tablet; Take 1 po 1 hour prior to procedure, then take one upon    arrival at office  May repeat; Therapy: 02SOB0283 to (Evaluate:53Gyz5661); Last Rx:40Jqd3589 Ordered   12  Losartan Potassium 50 MG Oral Tablet Recorded   13  NovoLOG 100 UNIT/ML Subcutaneous Solution Recorded   14  Pantoprazole Sodium 40 MG Oral Tablet Delayed Release Recorded   15  Percocet 5-325 MG Oral Tablet (Oxycodone-Acetaminophen); Therapy: (Recorded:37God8146) to Recorded   16  Pramipexole Dihydrochloride 1 MG Oral Tablet Recorded   17  Stool Softener 100 MG Oral Tablet Recorded   18  TiZANidine HCl - 2 MG Oral Tablet; Take 1 PO QID PRN for pain; Therapy: 61ZNA5026 to (Evaluate:16Jan2018)  Requested for: 48TSM0509; Last    Rx:42Die0550 Ordered   19  Vitamin B12 TABS Recorded   20  Zofran TABS (Ondansetron HCl) Recorded    Allergies    1  Azithromycin TABS   2  Gabapentin CAPS   3  Methotrexate TABS   4  Sucralfate TABS   5  Sulfamethoxazole-Trimethoprim TABS   6   Topiramate TABS    Signatures   Electronically signed by : Uzma Inman, ; Dec 12 2017 11:16AM EST                       (Author)

## 2018-01-23 NOTE — MISCELLANEOUS
Message   Recorded as Task   Date: 12/08/2017 11:23 AM, Created By: Tammie Ballard   Task Name: Miscellaneous   Assigned To: [facility] stim,Team   Regarding Patient: OLIVIA PONCE, Status: In Progress   CommentGlstas Daily - 08 Dec 2017 11:23 AM     TASK CREATED  patient is calling stating she is having an mri and is asking for something to relax her prior to  cb- [id number] or    Pham Allen - 08 Dec 2017 11:26 AM     TASK EDITED  DG to advise  thanks   Lars Sebastian - 08 Dec 2017 11:29 AM     TASK REPLIED TO: Previously Assigned To Lars Sebastian  Can call in Lorazepam 1 mg, 1 PO 1 hour or 45 mins prior to the procedure and then immediately repeat upon arrival for the MRI  However, the patient will need someone to drive her to and from the MRI with this medication for safety reasons  Thank you  Madiha Cherry - 08 Dec 2017 1:02 PM     TASK EDITED  Called in rx above to [facility]  Called pt  to inform her of instructions  Pt  verbalized understanding of instructions  **FYI**  Pt  also needs to reschedule her STIM class  Pt  has psych eval and will have her psychiatrist fax over her notes  Aidee Peralta - 11 Dec 2017 8:25 AM     TASK EDITED  Spoke with pt and r/s education to 12/14/@ 1400 in the [place] office, address provided to pt  Active Problems    1  Chronic bilateral thoracic back pain (724 1,338 29) (M54 6,G89 29)   2  Chronic myofascial pain (729 1,338 29) (M79 1,G89 29)   3  Chronic pain of both lower extremities (729 5,338 29) (M79 604,M79 605,G89 29)   4  Chronic pain of left knee (770 32,810 15) (M25 562,G89 29)   5  Encounter for long-term use of opiate analgesic (V58 69) (Z79 891)   6  Gait disorder (781 2) (R26 9)   7  Pain syndrome, chronic (338 4) (G89 4)   8  Polyneuropathy associated with underlying disease (357 4) (G63)   9  Uncomplicated opioid dependence (304 00) (F11 20)    Current Meds   1   Albuterol Sulfate (2 5 MG/3ML) 0 083% Inhalation Nebulization Solution Recorded   2  Aspir-81 81 MG Oral Tablet Delayed Release Recorded   3  Atorvastatin Calcium 80 MG Oral Tablet Recorded   4  Calcium CAPS; Therapy: (Rachelle Damion) to Recorded   5  Citalopram Hydrobromide 40 MG Oral Tablet Recorded   6  Cranberry CAPS Recorded   7  DULoxetine HCl - 60 MG Oral Capsule Delayed Release Particles; Take 2 PO QD; Therapy: [id number] to (Evaluate:[id number])  Requested for: 48Bks4112; Last   Rx:[id number] Ordered   8  FentaNYL 50 MCG/HR Transdermal Patch 72 Hour; Therapy: (Recorded:83Mox2107) to Recorded   9  Furosemide 20 MG Oral Tablet Recorded   10  Linzess 145 MCG Oral Capsule Recorded   11  Losartan Potassium 50 MG Oral Tablet Recorded   12  NovoLOG 100 UNIT/ML Subcutaneous Solution Recorded   13  Oxybutynin Chloride TABS; Therapy: (Rachelle Damion) to Recorded   14  Pantoprazole Sodium 40 MG Oral Tablet Delayed Release Recorded   15  Percocet 5-325 MG Oral Tablet (Oxycodone-Acetaminophen); Therapy: (Recorded:86Jzd1843) to Recorded   16  Pramipexole Dihydrochloride 1 MG Oral Tablet Recorded   17  Stool Softener 100 MG Oral Tablet Recorded   18  TiZANidine HCl - 2 MG Oral Tablet; Take 1 PO QID PRN for pain; Therapy: [id number] to (Evaluate:80Iqf1213)  Requested for: 97PAK3003; Last    Rx:[id number] Ordered   19  Vitamin B12 TABS Recorded   20  Zofran TABS (Ondansetron HCl) Recorded    Allergies    1  Azithromycin TABS   2  Gabapentin CAPS   3  Methotrexate TABS   4  Sucralfate TABS   5  Sulfamethoxazole-Trimethoprim TABS   6   Topiramate TABS    Signatures   Electronically signed by : Yazmin Olvera, ; Dec 27 2017  2:19PM EST                       (Author)

## 2018-01-24 ENCOUNTER — DOCUMENTATION (OUTPATIENT)
Dept: PAIN MEDICINE | Facility: CLINIC | Age: 63
End: 2018-01-24

## 2018-01-24 NOTE — PROGRESS NOTES
Pt is scheduled for [employer] SCS Trial as follows:  2/19/18 @0815 for consents  2/26/18 arriving @ 1000 for 1100 trial, [facility] procedure suite  3/2/18 @ 1330 w/ Lars for lead removal  ASA by Dr Keyla Adam  No PCN allergy  Email sent to [facility] to inform them of trial dates

## 2018-01-30 ENCOUNTER — TELEPHONE (OUTPATIENT)
Dept: PAIN MEDICINE | Facility: CLINIC | Age: 63
End: 2018-01-30

## 2018-01-30 DIAGNOSIS — G89.4 CHRONIC PAIN SYNDROME: ICD-10-CM

## 2018-01-30 DIAGNOSIS — M79.605 BILATERAL LEG PAIN: Primary | ICD-10-CM

## 2018-01-30 DIAGNOSIS — M79.604 BILATERAL LEG PAIN: Primary | ICD-10-CM

## 2018-01-30 DIAGNOSIS — Z98.890 STATUS POST SURGERY: ICD-10-CM

## 2018-01-30 RX ORDER — CEPHALEXIN 500 MG/1
500 CAPSULE ORAL EVERY 6 HOURS SCHEDULED
Qty: 28 CAPSULE | Refills: 0 | Status: CANCELLED | OUTPATIENT
Start: 2018-02-26 | End: 2018-03-05

## 2018-01-30 NOTE — TELEPHONE ENCOUNTER
Request to hold ASA has been faxed to Dr Devin Aceves for 2/19/18 SOVS sent via email to [facility], copies sent for scanning  Email sent to [facility] to inform them of trial dates

## 2018-02-14 ENCOUNTER — OFFICE VISIT (OUTPATIENT)
Dept: PAIN MEDICINE | Facility: CLINIC | Age: 63
End: 2018-02-14
Payer: MEDICARE

## 2018-02-14 VITALS
WEIGHT: 218 LBS | DIASTOLIC BLOOD PRESSURE: 64 MMHG | TEMPERATURE: 98.3 F | BODY MASS INDEX: 38.62 KG/M2 | SYSTOLIC BLOOD PRESSURE: 118 MMHG | HEIGHT: 63 IN

## 2018-02-14 DIAGNOSIS — G89.4 CHRONIC PAIN SYNDROME: Primary | ICD-10-CM

## 2018-02-14 DIAGNOSIS — M54.16 LUMBAR RADICULOPATHY: ICD-10-CM

## 2018-02-14 PROBLEM — G89.29 CHRONIC PAIN OF BOTH LOWER EXTREMITIES: Status: ACTIVE | Noted: 2017-07-25

## 2018-02-14 PROBLEM — G89.29 CHRONIC BILATERAL THORACIC BACK PAIN: Status: ACTIVE | Noted: 2017-11-17

## 2018-02-14 PROBLEM — M25.562 CHRONIC PAIN OF LEFT KNEE: Status: ACTIVE | Noted: 2017-07-25

## 2018-02-14 PROBLEM — R26.9 GAIT DISORDER: Status: ACTIVE | Noted: 2017-07-25

## 2018-02-14 PROBLEM — M54.6 CHRONIC BILATERAL THORACIC BACK PAIN: Status: ACTIVE | Noted: 2017-11-17

## 2018-02-14 PROBLEM — G89.29 CHRONIC PAIN OF LEFT KNEE: Status: ACTIVE | Noted: 2017-07-25

## 2018-02-14 PROBLEM — M79.605 CHRONIC PAIN OF BOTH LOWER EXTREMITIES: Status: ACTIVE | Noted: 2017-07-25

## 2018-02-14 PROBLEM — M79.18 CHRONIC MYOFASCIAL PAIN: Status: ACTIVE | Noted: 2017-11-17

## 2018-02-14 PROBLEM — G89.29 CHRONIC MYOFASCIAL PAIN: Status: ACTIVE | Noted: 2017-11-17

## 2018-02-14 PROBLEM — G89.29 CHRONIC PAIN OF RIGHT KNEE: Status: ACTIVE | Noted: 2017-06-06

## 2018-02-14 PROBLEM — M79.605 LEFT LEG PAIN: Status: ACTIVE | Noted: 2017-02-22

## 2018-02-14 PROBLEM — M25.561 CHRONIC PAIN OF RIGHT KNEE: Status: ACTIVE | Noted: 2017-06-06

## 2018-02-14 PROBLEM — I10 ESSENTIAL HYPERTENSION: Status: ACTIVE | Noted: 2017-06-29

## 2018-02-14 PROBLEM — L03.116 LEFT LEG CELLULITIS: Status: ACTIVE | Noted: 2017-02-28

## 2018-02-14 PROBLEM — G57.11 MERALGIA PARAESTHETICA, RIGHT: Status: ACTIVE | Noted: 2017-04-21

## 2018-02-14 PROBLEM — R26.81 GAIT INSTABILITY: Status: ACTIVE | Noted: 2017-07-21

## 2018-02-14 PROBLEM — M79.604 CHRONIC PAIN OF BOTH LOWER EXTREMITIES: Status: ACTIVE | Noted: 2017-07-25

## 2018-02-14 PROCEDURE — 99214 OFFICE O/P EST MOD 30 MIN: CPT | Performed by: ANESTHESIOLOGY

## 2018-02-14 RX ORDER — OXYCODONE HYDROCHLORIDE AND ACETAMINOPHEN 5; 325 MG/1; MG/1
TABLET ORAL
COMMUNITY
End: 2018-08-08 | Stop reason: SDUPTHER

## 2018-02-14 RX ORDER — FENTANYL 50 UG/H
1 PATCH TRANSDERMAL
COMMUNITY
End: 2018-08-08

## 2018-02-14 RX ORDER — LORAZEPAM 1 MG/1
TABLET ORAL
Refills: 0 | COMMUNITY
Start: 2017-12-08 | End: 2018-04-10

## 2018-02-14 RX ORDER — ASPIRIN 81 MG
100 TABLET, DELAYED RELEASE (ENTERIC COATED) ORAL
COMMUNITY
End: 2021-08-04 | Stop reason: CLARIF

## 2018-02-14 RX ORDER — TIZANIDINE 2 MG/1
TABLET ORAL
COMMUNITY
Start: 2017-11-17 | End: 2018-02-28

## 2018-02-14 RX ORDER — LOSARTAN POTASSIUM 50 MG/1
TABLET ORAL
COMMUNITY
End: 2018-04-10

## 2018-02-14 RX ORDER — PANTOPRAZOLE SODIUM 40 MG/1
40 TABLET, DELAYED RELEASE ORAL 2 TIMES DAILY
COMMUNITY
End: 2020-08-17 | Stop reason: SDUPTHER

## 2018-02-14 RX ORDER — METHOCARBAMOL 750 MG/1
TABLET, FILM COATED ORAL
Refills: 5 | COMMUNITY
Start: 2017-12-23 | End: 2018-03-15 | Stop reason: ALTCHOICE

## 2018-02-14 RX ORDER — LEVOTHYROXINE SODIUM 0.07 MG/1
75 TABLET ORAL
COMMUNITY
Start: 2018-01-09 | End: 2021-04-30

## 2018-02-14 RX ORDER — ONDANSETRON HYDROCHLORIDE 8 MG/1
8 TABLET, FILM COATED ORAL EVERY 8 HOURS PRN
COMMUNITY

## 2018-02-14 RX ORDER — MELATONIN
2000 2 TIMES DAILY
Refills: 5 | Status: ON HOLD | COMMUNITY
Start: 2017-11-27 | End: 2021-05-08

## 2018-02-14 RX ORDER — RANITIDINE 150 MG/1
150 TABLET ORAL
COMMUNITY
Start: 2018-01-09 | End: 2020-02-25 | Stop reason: CLARIF

## 2018-02-14 RX ORDER — FUROSEMIDE 20 MG/1
60 TABLET ORAL 2 TIMES DAILY
Status: ON HOLD | COMMUNITY
End: 2021-05-08

## 2018-02-14 RX ORDER — PRAMIPEXOLE DIHYDROCHLORIDE 1 MG/1
1 TABLET ORAL 2 TIMES DAILY
COMMUNITY

## 2018-02-14 RX ORDER — LOSARTAN POTASSIUM 100 MG/1
50 TABLET ORAL EVERY EVENING
COMMUNITY
Start: 2018-01-15 | End: 2022-01-11

## 2018-02-14 RX ORDER — DULOXETIN HYDROCHLORIDE 60 MG/1
CAPSULE, DELAYED RELEASE ORAL DAILY
COMMUNITY
Start: 2017-08-17 | End: 2018-04-13 | Stop reason: SDUPTHER

## 2018-02-14 RX ORDER — LINACLOTIDE 145 UG/1
145 CAPSULE, GELATIN COATED ORAL DAILY
COMMUNITY
End: 2018-07-06

## 2018-02-14 NOTE — H&P
Assessment:  1  Chronic pain syndrome    2  Lumbar radiculopathy        Plan:  I discussed with the patient that at this point we will proceed with the spinal cord stimulator trial as scheduled  While the patient was in the office today we did thoroughly review the spinal cord stimulator trial process and discussed what to expect before, during, and after the trial  The pre-procedure instructions were thoroughly reviewed and a copy was sent home with the patient  The patient was given prescriptions for pre-procedure blood work today with the instructions to have them completed at least one week prior to the trial  The patient was given ample time to ask questions in regards to the spinal cord stimulator trial and the patient's questions were answered until they were thoroughly satisfied  Complete risks and benefits including bleeding, infection, tissue reaction, nerve injury and allergic reaction were discussed  The approach was demonstrated using models  Verbal and written consent was obtained  My impressions and treatment recommendations were discussed in detail with the patient who verbalized understanding and had no further questions  Discharge instructions were provided  I personally saw and examined the patient and I agree with the above discussed plan of care  No orders of the defined types were placed in this encounter      New Medications Ordered This Visit   Medications    cholecalciferol (VITAMIN D3) 1,000 units tablet     Sig: Take 2,000 Units by mouth daily     Refill:  5    DULoxetine (CYMBALTA) 60 mg delayed release capsule     Sig: Take by mouth daily    fentaNYL (DURAGESIC) 50 mcg/hr     Sig: Place on the skin    furosemide (LASIX) 20 mg tablet     Sig: Take by mouth    NOVOLOG 100 UNIT/ML injection     Sig: INJECT 120 UNITS DAILY VIA INSULIN PUMP E 11 65     Refill:  3    levothyroxine 75 mcg tablet     Sig: Take by mouth    Linaclotide (LINZESS) 145 MCG CAPS     Sig: Take by mouth    LORazepam (ATIVAN) 1 mg tablet     Sig: TAKE ONE TABLET BY MOUTH 45 MINUTES TO 1 HOUR PRIOR TO PROCEDURE, THEN REPEAT UPON ARRIVALTO MRI     Refill:  0    losartan (COZAAR) 100 MG tablet     Sig: Take by mouth    losartan (COZAAR) 50 mg tablet     Sig: Take by mouth    methocarbamol (ROBAXIN) 750 mg tablet     Sig: TAKE ONE TABLET BY MOUTH FOUR TIMES A DAY AS NEEDED FOR MUSCLE SPASMS     Refill:  5    oxybutynin (DITROPAN) 5 mg tablet     Sig: Take by mouth    oxyCODONE-acetaminophen (PERCOCET) 5-325 mg per tablet     Sig: Take by mouth    pantoprazole (PROTONIX) 40 mg tablet     Sig: Take by mouth    pramipexole (MIRAPEX) 1 mg tablet     Sig: Take by mouth    ranitidine (ZANTAC) 150 mg tablet     Sig: Take by mouth    tiotropium (SPIRIVA HANDIHALER) 18 mcg inhalation capsule     Sig: Place 18 mcg into inhaler and inhale    Docusate Sodium 100 MG capsule     Sig: Take by mouth    tiZANidine (ZANAFLEX) 2 mg tablet     Sig: Take by mouth    Cyanocobalamin (VITAMIN B-12 PO)     Sig: Take by mouth    ondansetron (ZOFRAN) 8 mg tablet     Sig: Take by mouth    Cranberry 450 MG TABS     Si mg once daily in the AM     glucagon (GLUCAGEN) 1 mg injection     Sig: Inject under the skin as needed if passed out from low sugar       History of Present Illness:    Susie Ruiz is a 65 y o  female who prevents after education psychological exam to review spinal cord stimulator trial, the risks and any questions she may have prior to undergoing a spinal cord stimulator trial   I have personally reviewed and/or updated the patient's past medical history, past surgical history, family history, social history, current medications, allergies, and vital signs today  Review of Systems:    Review of Systems   Constitutional: Negative for fever and unexpected weight change  HENT: Negative for trouble swallowing  Eyes: Negative for visual disturbance     Respiratory: Negative for shortness of breath and wheezing  Cardiovascular: Negative for chest pain and palpitations  Gastrointestinal: Positive for constipation and nausea  Negative for diarrhea and vomiting  Endocrine: Negative for cold intolerance, heat intolerance and polydipsia  Genitourinary: Negative for difficulty urinating and frequency  Musculoskeletal: Positive for gait problem and joint swelling (joint stiffness )  Negative for arthralgias and myalgias  Skin: Negative for rash  Neurological: Positive for weakness  Negative for dizziness, seizures, syncope and headaches  Hematological: Does not bruise/bleed easily  Psychiatric/Behavioral: Negative for dysphoric mood  All other systems reviewed and are negative  Patient Active Problem List   Diagnosis    Anemia of chronic disease    Anxiety and depression    B-complex deficiency    Chronic bilateral thoracic back pain    Chronic myofascial pain    Chronic pain of both lower extremities    Chronic pain of left knee    Chronic pain of right knee    Essential hypertension    Gait disorder    Gait instability    Gastroparesis    GERD (gastroesophageal reflux disease)    Sarcoidosis    Insomnia due to medical condition    Insulin pump status    Chronic pain disorder    Diabetes mellitus ([facility] )    Irritable colon    Left leg cellulitis    Left leg pain    Lymphadenopathy    Malaise and fatigue    Meralgia paraesthetica, right       Past Medical History:   Diagnosis Date    Back complaints     Diabetes mellitus ([facility] )     Gastroparesis     Hyperlipidemia     Hypertension     Sleep apnea        No past surgical history on file  No family history on file  Social History     Occupational History    Not on file       Social History Main Topics    Smoking status: Never Smoker    Smokeless tobacco: Never Used    Alcohol use No    Drug use: No    Sexual activity: Not on file       Current Outpatient Prescriptions on File Prior to Visit   Medication Sig  aspirin (ECOTRIN LOW STRENGTH) 81 mg EC tablet Take 81 mg by mouth daily    atorvastatin (LIPITOR) 80 mg tablet Take 80 mg by mouth daily    Cranberry 450 MG CAPS Take by mouth    albuterol (2 5 mg/3 mL) 0 083 % nebulizer solution Take 2 5 mg by nebulization every 6 (six) hours as needed for wheezing    albuterol (ACCUNEB) 0 63 MG/3ML nebulizer solution Take 1 ampule by nebulization every 6 (six) hours as needed for wheezing    budesonide-formoterol (SYMBICORT) 160-4 5 mcg/act inhaler Inhale 2 puffs 2 (two) times a day    citalopram (CeleXA) 40 mg tablet Take 40 mg by mouth daily    cyanocobalamin 1000 MCG tablet Take 100 mcg by mouth daily    Menthol-Methyl Salicylate (TANK DORADO GREASELESS) 10-15 % greaseless cream Apply topically daily at bedtime    naproxen (NAPROSYN) 500 mg tablet Take 1 tablet by mouth 2 (two) times a day with meals for 30 doses     No current facility-administered medications on file prior to visit  Allergies   Allergen Reactions    Azithromycin     Bactrim [Sulfamethoxazole-Trimethoprim]     Methotrexate     Neurontin [Gabapentin]     Sucralfate     Topamax [Topiramate]        Physical Exam:    /64   Temp 98 3 °F (36 8 °C) (Oral)   Ht 5' 3" (1 6 m)   Wt 98 9 kg (218 lb)   BMI 38 62 kg/m²      Constitutional: normal, well developed, well nourished, alert, in no distress and non-toxic and no overt pain behavior    Morbidly obese  Eyes: anicteric  HEENT: grossly intact  Neck: supple, symmetric, trachea midline and no masses   Pulmonary:even and unlabored  Cardiovascular:No edema or pitting edema present  Skin:Normal without rashes or lesions and well hydrated  Psychiatric:Mood and affect appropriate  Neurologic:Cranial Nerves II-XII grossly intact  Musculoskeletal:normal and antalgic

## 2018-02-14 NOTE — PROGRESS NOTES
Assessment:  1  Chronic pain syndrome    2  Lumbar radiculopathy        Plan:  I discussed with the patient that at this point we will proceed with the spinal cord stimulator trial as scheduled  While the patient was in the office today we did thoroughly review the spinal cord stimulator trial process and discussed what to expect before, during, and after the trial  The pre-procedure instructions were thoroughly reviewed and a copy was sent home with the patient  The patient was given prescriptions for pre-procedure blood work today with the instructions to have them completed at least one week prior to the trial  The patient was given ample time to ask questions in regards to the spinal cord stimulator trial and the patient's questions were answered until they were thoroughly satisfied  Complete risks and benefits including bleeding, infection, tissue reaction, nerve injury and allergic reaction were discussed  The approach was demonstrated using models  Verbal and written consent was obtained  My impressions and treatment recommendations were discussed in detail with the patient who verbalized understanding and had no further questions  Discharge instructions were provided  I personally saw and examined the patient and I agree with the above discussed plan of care  No orders of the defined types were placed in this encounter      New Medications Ordered This Visit   Medications    cholecalciferol (VITAMIN D3) 1,000 units tablet     Sig: Take 2,000 Units by mouth daily     Refill:  5    DULoxetine (CYMBALTA) 60 mg delayed release capsule     Sig: Take by mouth daily    fentaNYL (DURAGESIC) 50 mcg/hr     Sig: Place on the skin    furosemide (LASIX) 20 mg tablet     Sig: Take by mouth    NOVOLOG 100 UNIT/ML injection     Sig: INJECT 120 UNITS DAILY VIA INSULIN PUMP E 11 65     Refill:  3    levothyroxine 75 mcg tablet     Sig: Take by mouth    Linaclotide (LINZESS) 145 MCG CAPS     Sig: Take by mouth    LORazepam (ATIVAN) 1 mg tablet     Sig: TAKE ONE TABLET BY MOUTH 45 MINUTES TO 1 HOUR PRIOR TO PROCEDURE, THEN REPEAT UPON ARRIVALTO MRI     Refill:  0    losartan (COZAAR) 100 MG tablet     Sig: Take by mouth    losartan (COZAAR) 50 mg tablet     Sig: Take by mouth    methocarbamol (ROBAXIN) 750 mg tablet     Sig: TAKE ONE TABLET BY MOUTH FOUR TIMES A DAY AS NEEDED FOR MUSCLE SPASMS     Refill:  5    oxybutynin (DITROPAN) 5 mg tablet     Sig: Take by mouth    oxyCODONE-acetaminophen (PERCOCET) 5-325 mg per tablet     Sig: Take by mouth    pantoprazole (PROTONIX) 40 mg tablet     Sig: Take by mouth    pramipexole (MIRAPEX) 1 mg tablet     Sig: Take by mouth    ranitidine (ZANTAC) 150 mg tablet     Sig: Take by mouth    tiotropium (SPIRIVA HANDIHALER) 18 mcg inhalation capsule     Sig: Place 18 mcg into inhaler and inhale    Docusate Sodium 100 MG capsule     Sig: Take by mouth    tiZANidine (ZANAFLEX) 2 mg tablet     Sig: Take by mouth    Cyanocobalamin (VITAMIN B-12 PO)     Sig: Take by mouth    ondansetron (ZOFRAN) 8 mg tablet     Sig: Take by mouth    Cranberry 450 MG TABS     Si mg once daily in the AM     glucagon (GLUCAGEN) 1 mg injection     Sig: Inject under the skin as needed if passed out from low sugar       History of Present Illness:    Nila Putnam is a 65 y o  female who prevents after education psychological exam to review spinal cord stimulator trial, the risks and any questions she may have prior to undergoing a spinal cord stimulator trial   I have personally reviewed and/or updated the patient's past medical history, past surgical history, family history, social history, current medications, allergies, and vital signs today  Review of Systems:    Review of Systems   Constitutional: Negative for fever and unexpected weight change  HENT: Negative for trouble swallowing  Eyes: Negative for visual disturbance     Respiratory: Negative for shortness of breath and wheezing  Cardiovascular: Negative for chest pain and palpitations  Gastrointestinal: Positive for constipation and nausea  Negative for diarrhea and vomiting  Endocrine: Negative for cold intolerance, heat intolerance and polydipsia  Genitourinary: Negative for difficulty urinating and frequency  Musculoskeletal: Positive for gait problem and joint swelling (joint stiffness )  Negative for arthralgias and myalgias  Skin: Negative for rash  Neurological: Positive for weakness  Negative for dizziness, seizures, syncope and headaches  Hematological: Does not bruise/bleed easily  Psychiatric/Behavioral: Negative for dysphoric mood  All other systems reviewed and are negative  Patient Active Problem List   Diagnosis    Anemia of chronic disease    Anxiety and depression    B-complex deficiency    Chronic bilateral thoracic back pain    Chronic myofascial pain    Chronic pain of both lower extremities    Chronic pain of left knee    Chronic pain of right knee    Essential hypertension    Gait disorder    Gait instability    Gastroparesis    GERD (gastroesophageal reflux disease)    Sarcoidosis    Insomnia due to medical condition    Insulin pump status    Chronic pain disorder    Diabetes mellitus ([facility] )    Irritable colon    Left leg cellulitis    Left leg pain    Lymphadenopathy    Malaise and fatigue    Meralgia paraesthetica, right       Past Medical History:   Diagnosis Date    Back complaints     Diabetes mellitus ([facility] )     Gastroparesis     Hyperlipidemia     Hypertension     Sleep apnea        No past surgical history on file  No family history on file  Social History     Occupational History    Not on file       Social History Main Topics    Smoking status: Never Smoker    Smokeless tobacco: Never Used    Alcohol use No    Drug use: No    Sexual activity: Not on file       Current Outpatient Prescriptions on File Prior to Visit   Medication Sig  aspirin (ECOTRIN LOW STRENGTH) 81 mg EC tablet Take 81 mg by mouth daily    atorvastatin (LIPITOR) 80 mg tablet Take 80 mg by mouth daily    Cranberry 450 MG CAPS Take by mouth    albuterol (2 5 mg/3 mL) 0 083 % nebulizer solution Take 2 5 mg by nebulization every 6 (six) hours as needed for wheezing    albuterol (ACCUNEB) 0 63 MG/3ML nebulizer solution Take 1 ampule by nebulization every 6 (six) hours as needed for wheezing    budesonide-formoterol (SYMBICORT) 160-4 5 mcg/act inhaler Inhale 2 puffs 2 (two) times a day    citalopram (CeleXA) 40 mg tablet Take 40 mg by mouth daily    cyanocobalamin 1000 MCG tablet Take 100 mcg by mouth daily    Menthol-Methyl Salicylate (TANK DORADO GREASELESS) 10-15 % greaseless cream Apply topically daily at bedtime    naproxen (NAPROSYN) 500 mg tablet Take 1 tablet by mouth 2 (two) times a day with meals for 30 doses     No current facility-administered medications on file prior to visit  Allergies   Allergen Reactions    Azithromycin     Bactrim [Sulfamethoxazole-Trimethoprim]     Methotrexate     Neurontin [Gabapentin]     Sucralfate     Topamax [Topiramate]        Physical Exam:    /64   Temp 98 3 °F (36 8 °C) (Oral)   Ht 5' 3" (1 6 m)   Wt 98 9 kg (218 lb)   BMI 38 62 kg/m²     Constitutional: normal, well developed, well nourished, alert, in no distress and non-toxic and no overt pain behavior    Morbidly obese  Eyes: anicteric  HEENT: grossly intact  Neck: supple, symmetric, trachea midline and no masses   Pulmonary:even and unlabored  Cardiovascular:No edema or pitting edema present  Skin:Normal without rashes or lesions and well hydrated  Psychiatric:Mood and affect appropriate  Neurologic:Cranial Nerves II-XII grossly intact  Musculoskeletal:normal and antalgic

## 2018-02-26 ENCOUNTER — APPOINTMENT (OUTPATIENT)
Dept: RADIOLOGY | Facility: CLINIC | Age: 63
End: 2018-02-26
Payer: MEDICARE

## 2018-02-26 ENCOUNTER — TELEPHONE (OUTPATIENT)
Dept: RADIOLOGY | Facility: CLINIC | Age: 63
End: 2018-02-26

## 2018-02-26 ENCOUNTER — HOSPITAL ENCOUNTER (OUTPATIENT)
Dept: RADIOLOGY | Facility: CLINIC | Age: 63
Discharge: HOME | End: 2018-02-26
Admitting: ANESTHESIOLOGY
Payer: MEDICARE

## 2018-02-26 VITALS
TEMPERATURE: 98.5 F | HEART RATE: 78 BPM | OXYGEN SATURATION: 99 % | DIASTOLIC BLOOD PRESSURE: 85 MMHG | SYSTOLIC BLOOD PRESSURE: 156 MMHG | RESPIRATION RATE: 20 BRPM

## 2018-02-26 DIAGNOSIS — M79.605 LOWER EXTREMITY PAIN, BILATERAL: ICD-10-CM

## 2018-02-26 DIAGNOSIS — M79.604 LOWER EXTREMITY PAIN, BILATERAL: ICD-10-CM

## 2018-02-26 DIAGNOSIS — G89.4 CHRONIC PAIN SYNDROME: Primary | ICD-10-CM

## 2018-02-26 DIAGNOSIS — G89.4 CHRONIC PAIN SYNDROME: ICD-10-CM

## 2018-02-26 PROCEDURE — C1713 ANCHOR/SCREW BN/BN,TIS/BN: HCPCS

## 2018-02-26 PROCEDURE — 96374 THER/PROPH/DIAG INJ IV PUSH: CPT

## 2018-02-26 PROCEDURE — 72072 X-RAY EXAM THORAC SPINE 3VWS: CPT

## 2018-02-26 PROCEDURE — 95972 ALYS CPLX SP/PN NPGT W/PRGRM: CPT | Performed by: ANESTHESIOLOGY

## 2018-02-26 PROCEDURE — 63650 IMPLANT NEUROELECTRODES: CPT | Performed by: ANESTHESIOLOGY

## 2018-02-26 PROCEDURE — C1897 LEAD, NEUROSTIM TEST KIT: HCPCS

## 2018-02-26 RX ORDER — CEPHALEXIN 250 MG/1
500 CAPSULE ORAL EVERY 6 HOURS SCHEDULED
Status: SHIPPED | OUTPATIENT
Start: 2018-02-26 | End: 2018-03-05

## 2018-02-26 RX ORDER — TIZANIDINE 2 MG/1
TABLET ORAL
COMMUNITY
Start: 2018-02-22 | End: 2018-03-15 | Stop reason: ALTCHOICE

## 2018-02-26 RX ADMIN — CEFAZOLIN SODIUM 2000 MG: 2 SOLUTION INTRAVENOUS at 10:45

## 2018-02-26 RX ADMIN — LIDOCAINE HYDROCHLORIDE 15 ML: 10; .005 INJECTION, SOLUTION EPIDURAL; INFILTRATION; INTRACAUDAL; PERINEURAL at 11:36

## 2018-02-26 NOTE — DISCHARGE INSTRUCTIONS
[facility]: SPINAL CORD STIMULATION/PERIPHERAL NERVE STIMULATION TRIAL/ PERMANENT IMPLANT DISCHARGE INSTRUCTIONS    ACTIVITY RESTRICTIONS DURING TRIAL PERIOD  · Do not drive with the SCS "on"  · Do not bend or twist at the waist   · Do not lift anything that weighs over 5 pounds  · When you lie down, "log roll" (keep spine aligned) to change positions  Do not sleep on your stomach  · Limit stair climbing and strenuous activity  CARE OF THE INJECTION SITE  · CHECK THE DRESSING DAILY  It should intact  · Notify [facility] if you have any of the following: redness, drainage, swelling, chills or fever over 100°F   · Do not change dressing, only reinforce edges if necessary  · Keep the dressing dry  Do not shower, (sponge baths only) until evaluated in office  SPECIAL INSTRUCTIONS  · Take your temperature daily  · Follow-up for lead removal scheduled for ***  · The  box is expensive  DO NOT drop or get wet  · Do not pull on the cable or leads  Do not disconnect the cable and lead  · Do activities that normally cause you to have pain and try different  settings  · Obtain post procedure x-ray shortly after procedure if ordered by physician  MEDICATIONS  · Continue to take all routine medications  · Our office may have instructed you to hold some medications  · You may resume ___Do not resume Aspirin until after the SCS lead is removed on 3/2/18 to arrive at 1:15____________________________________________  · Take antiobiotic as prescribed:_Keflex 500 mg tablets  Take 2 capsules every 6 hours_________________________________  If you have any problems specifically related to your procedure, please call our office at [phone]  Problems not related to your procedure should be directed at your primary care physician  Contact the company representative with any questions regarding settings or operation of the external  box

## 2018-02-26 NOTE — MISCELLANEOUS
Message   Recorded as Task   Date: 12/08/2017 01:45 PM, Created By: Justyna Cabello   Task Name: Miscellaneous   Assigned To: [facility] stim,Team   Regarding Patient: OLIVIA PONCE, Status: In Progress   Comment:    Lili Kaiser - 08 Dec 2017 1:45 PM     TASK CREATED  Pt called stating that she needs a refill on Duloxetine  States that she currently takes 60 mg  and 30 mg  to equal 90  Wants to know if she can get rx for 90 mg, instead of needing two different prescriptions  Pt currently has enough to last until Tuesday  Pt uses [facility] pharmacy (on file)  Pt can be reached at [phone]  Pham Allen - 08 Dec 2017 2:58 PM     TASK EDITED  Looks like last ordered 11/17 c/ 1 refill  DG to advise  Lars Durand - 08 Dec 2017 3:24 PM     TASK REPLIED TO: Previously Assigned To Lars Sebastian  Please advise her that at her last OV we discussed increasing her cymbalta to 120 mg, which would be for 2, 60 mg pills per day as the highest dose it comes in is 60 mg  I did he scribe a script with a refill to make sure she has enough to get her to her OV  Thank you  Pham Allen - 08 Dec 2017 3:30 PM     TASK EDITED  Pt  is appreciative and aware  She also was inquirying to see if she could use her psychiatrist instead of one that is on the list for the SCS trial  I did not know the answer to that  Does she have to use one that is on the list? please call pt  thanks   Aidee Peralta - 11 Dec 2017 8:24 AM     TASK EDITED  Spoke with pt and advised her that yes she could use her own psych just make sure to give them the script for eval    Margo Hatfield - 27 Dec 2017 9:50 AM     TASK EDITED  pt is calling and saying on her script for the psych eval the insurance is wrong on it  please call pt back at [phone]   Aidee Peralta - 27 Dec 2017 2:21 PM     TASK EDITED  Attempt to reach pt, LM for return call  Active Problems    1  Chronic bilateral thoracic back pain (724 1,338 29) (M54 6,G89 29)   2   Chronic myofascial pain (729 1,338 29) (M79 1,G89 29)   3  Chronic pain of both lower extremities (729 5,338 29) (M79 604,M79 605,G89 29)   4  Chronic pain of left knee (780 43,601 46) (M25 562,G89 29)   5  Encounter for long-term use of opiate analgesic (V58 69) (Z79 891)   6  Gait disorder (781 2) (R26 9)   7  Pain syndrome, chronic (338 4) (G89 4)   8  Polyneuropathy associated with underlying disease (357 4) (G63)   9  Uncomplicated opioid dependence (304 00) (F11 20)    Current Meds   1  Albuterol Sulfate (2 5 MG/3ML) 0 083% Inhalation Nebulization Solution Recorded   2  Aspir-81 81 MG Oral Tablet Delayed Release Recorded   3  Atorvastatin Calcium 80 MG Oral Tablet Recorded   4  Calcium CAPS; Therapy: (Antonio Hamilton) to Recorded   5  Citalopram Hydrobromide 40 MG Oral Tablet Recorded   6  Cranberry CAPS Recorded   7  DULoxetine HCl - 60 MG Oral Capsule Delayed Release Particles; Take 2 PO QD; Therapy: [id number] to (Evaluate:[id number])  Requested for: [id number]; Last   Rx:[id number] Ordered   8  FentaNYL 50 MCG/HR Transdermal Patch 72 Hour; Therapy: (Recorded:36Vvv4868) to Recorded   9  Furosemide 20 MG Oral Tablet Recorded   10  Linzess 145 MCG Oral Capsule Recorded   11  Losartan Potassium 50 MG Oral Tablet Recorded   12  NovoLOG 100 UNIT/ML Subcutaneous Solution Recorded   13  Oxybutynin Chloride TABS; Therapy: (Antonio Hamilton) to Recorded   14  Pantoprazole Sodium 40 MG Oral Tablet Delayed Release Recorded   15  Percocet 5-325 MG Oral Tablet (Oxycodone-Acetaminophen); Therapy: (Recorded:[id number]) to Recorded   16  Pramipexole Dihydrochloride 1 MG Oral Tablet Recorded   17  Stool Softener 100 MG Oral Tablet Recorded   18  TiZANidine HCl - 2 MG Oral Tablet; Take 1 PO QID PRN for pain; Therapy: [id number] to (Evaluate:61Onr9216)  Requested for: [id number]; Last    Rx:[id number] Ordered   19  Vitamin B12 TABS Recorded   20  Zofran TABS (Ondansetron HCl) Recorded    Allergies    1   Azithromycin TABS   2  Gabapentin CAPS   3  Methotrexate TABS   4  Sucralfate TABS   5  Sulfamethoxazole-Trimethoprim TABS   6   Topiramate TABS    Signatures   Electronically signed by : García Seay, ; Jan 11 2018 11:40AM EST                       (Author)

## 2018-02-26 NOTE — H&P
Assessment:  1  Chronic pain syndrome    2  Lumbar radiculopathy          Plan:  I discussed with the patient that at this point we will proceed with the spinal cord stimulator trial as scheduled  While the patient was in the office today we did thoroughly review the spinal cord stimulator trial process and discussed what to expect before, during, and after the trial  The pre-procedure instructions were thoroughly reviewed and a copy was sent home with the patient  The patient was given prescriptions for pre-procedure blood work today with the instructions to have them completed at least one week prior to the trial  The patient was given ample time to ask questions in regards to the spinal cord stimulator trial and the patient's questions were answered until they were thoroughly satisfied  Complete risks and benefits including bleeding, infection, tissue reaction, nerve injury and allergic reaction were discussed  The approach was demonstrated using models  Verbal and written consent was obtained      My impressions and treatment recommendations were discussed in detail with the patient who verbalized understanding and had no further questions  Discharge instructions were provided   I personally saw and examined the patient and I agree with the above discussed plan of care      No orders of the defined types were placed in this encounter           New Medications Ordered This Visit   Medications    cholecalciferol (VITAMIN D3) 1,000 units tablet       Sig: Take 2,000 Units by mouth daily       Refill:  5    DULoxetine (CYMBALTA) 60 mg delayed release capsule       Sig: Take by mouth daily    fentaNYL (DURAGESIC) 50 mcg/hr       Sig: Place on the skin    furosemide (LASIX) 20 mg tablet       Sig: Take by mouth    NOVOLOG 100 UNIT/ML injection       Sig: INJECT 120 UNITS DAILY VIA INSULIN PUMP E 11 65       Refill:  3    levothyroxine 75 mcg tablet       Sig: Take by mouth    Linaclotide (LINZESS) 145 MCG CAPS     Sig: Take by mouth    LORazepam (ATIVAN) 1 mg tablet       Sig: TAKE ONE TABLET BY MOUTH 45 MINUTES TO 1 HOUR PRIOR TO PROCEDURE, THEN REPEAT UPON ARRIVALTO MRI       Refill:  0    losartan (COZAAR) 100 MG tablet       Sig: Take by mouth    losartan (COZAAR) 50 mg tablet       Sig: Take by mouth    methocarbamol (ROBAXIN) 750 mg tablet       Sig: TAKE ONE TABLET BY MOUTH FOUR TIMES A DAY AS NEEDED FOR MUSCLE SPASMS       Refill:  5    oxybutynin (DITROPAN) 5 mg tablet       Sig: Take by mouth    oxyCODONE-acetaminophen (PERCOCET) 5-325 mg per tablet       Sig: Take by mouth    pantoprazole (PROTONIX) 40 mg tablet       Sig: Take by mouth    pramipexole (MIRAPEX) 1 mg tablet       Sig: Take by mouth    ranitidine (ZANTAC) 150 mg tablet       Sig: Take by mouth    tiotropium (SPIRIVA HANDIHALER) 18 mcg inhalation capsule       Sig: Place 18 mcg into inhaler and inhale    Docusate Sodium 100 MG capsule       Sig: Take by mouth    tiZANidine (ZANAFLEX) 2 mg tablet       Sig: Take by mouth    Cyanocobalamin (VITAMIN B-12 PO)       Sig: Take by mouth    ondansetron (ZOFRAN) 8 mg tablet       Sig: Take by mouth    Cranberry 450 MG TABS       Si mg once daily in the AM     glucagon (GLUCAGEN) 1 mg injection       Sig: Inject under the skin as needed if passed out from low sugar         History of Present Illness:    Young Starch is a 65 y o  female who prevents after education psychological exam to review spinal cord stimulator trial, the risks and any questions she may have prior to undergoing a spinal cord stimulator trial   I have personally reviewed and/or updated the patient's past medical history, past surgical history, family history, social history, current medications, allergies, and vital signs today       Review of Systems:     Review of Systems   Constitutional: Negative for fever and unexpected weight change  HENT: Negative for trouble swallowing      Eyes: Negative for visual disturbance  Respiratory: Negative for shortness of breath and wheezing  Cardiovascular: Negative for chest pain and palpitations  Gastrointestinal: Positive for constipation and nausea  Negative for diarrhea and vomiting  Endocrine: Negative for cold intolerance, heat intolerance and polydipsia  Genitourinary: Negative for difficulty urinating and frequency  Musculoskeletal: Positive for gait problem and joint swelling (joint stiffness )  Negative for arthralgias and myalgias  Skin: Negative for rash  Neurological: Positive for weakness  Negative for dizziness, seizures, syncope and headaches  Hematological: Does not bruise/bleed easily  Psychiatric/Behavioral: Negative for dysphoric mood     All other systems reviewed and are negative             Patient Active Problem List   Diagnosis    Anemia of chronic disease    Anxiety and depression    B-complex deficiency    Chronic bilateral thoracic back pain    Chronic myofascial pain    Chronic pain of both lower extremities    Chronic pain of left knee    Chronic pain of right knee    Essential hypertension    Gait disorder    Gait instability    Gastroparesis    GERD (gastroesophageal reflux disease)    Sarcoidosis    Insomnia due to medical condition    Insulin pump status    Chronic pain disorder    Diabetes mellitus (HCC)    Irritable colon    Left leg cellulitis    Left leg pain    Lymphadenopathy    Malaise and fatigue    Meralgia paraesthetica, right              Past Medical History:   Diagnosis Date    Back complaints      Diabetes mellitus (HCC)      Gastroparesis      Hyperlipidemia      Hypertension      Sleep apnea           No past surgical history on file      No family history on file      Social History      Occupational History    Not on file            Social History Main Topics    Smoking status: Never Smoker    Smokeless tobacco: Never Used    Alcohol use No    Drug use: No    Sexual activity: Not on file              Current Outpatient Prescriptions on File Prior to Visit   Medication Sig    aspirin (ECOTRIN LOW STRENGTH) 81 mg EC tablet Take 81 mg by mouth daily    atorvastatin (LIPITOR) 80 mg tablet Take 80 mg by mouth daily    Cranberry 450 MG CAPS Take by mouth    albuterol (2 5 mg/3 mL) 0 083 % nebulizer solution Take 2 5 mg by nebulization every 6 (six) hours as needed for wheezing    albuterol (ACCUNEB) 0 63 MG/3ML nebulizer solution Take 1 ampule by nebulization every 6 (six) hours as needed for wheezing    budesonide-formoterol (SYMBICORT) 160-4 5 mcg/act inhaler Inhale 2 puffs 2 (two) times a day    citalopram (CeleXA) 40 mg tablet Take 40 mg by mouth daily    cyanocobalamin 1000 MCG tablet Take 100 mcg by mouth daily    Menthol-Methyl Salicylate (TANK DORADO GREASELESS) 10-15 % greaseless cream Apply topically daily at bedtime    naproxen (NAPROSYN) 500 mg tablet Take 1 tablet by mouth 2 (two) times a day with meals for 30 doses      No current facility-administered medications on file prior to visit                Allergies   Allergen Reactions    Azithromycin      Bactrim [Sulfamethoxazole-Trimethoprim]      Methotrexate      Neurontin [Gabapentin]      Sucralfate      Topamax [Topiramate]           Physical Exam:     /64   Temp 98 3 °F (36 8 °C) (Oral)   Ht 5' 3" (1 6 m)   Wt 98 9 kg (218 lb)   BMI 38 62 kg/m²       Constitutional: normal, well developed, well nourished, alert, in no distress and non-toxic and no overt pain behavior    Morbidly obese  Eyes: anicteric  HEENT: grossly intact  Neck: supple, symmetric, trachea midline and no masses   Pulmonary:even and unlabored  Cardiovascular:No edema or pitting edema present  Skin:Normal without rashes or lesions and well hydrated  Psychiatric:Mood and affect appropriate  Neurologic:Cranial Nerves II-XII grossly intact      ASA:III

## 2018-02-27 NOTE — TELEPHONE ENCOUNTER
S/w the patient this am and she stated she had a fever last night of 100 3  She did start her Antibx  She did not check her temp  This am as of yet  She woke up with a lot of pain in her left thigh  It is pretty crampy  She is going to try to f/u morris/ Vadim Betts in regards to that  She does feel overall that she is getting adequate pain relief  Her dressing is intact, no drainage note  She will notify us if she continues with a fever and SL to advise otherwise   Thanks

## 2018-02-27 NOTE — TELEPHONE ENCOUNTER
S/w pt, states she had a slight fever this morning, 99 8  No fever or chills since  Pt continues w/ abx  Advised pt to cb if any s/s infection present; redness, drainage, swelling, warmth, tender to touch at the procedure site or fever 100+  Continue w/ abx as prescribed  C/b w/ questions or concerns  Pt verbalized understanding and appreciation

## 2018-03-02 ENCOUNTER — OFFICE VISIT (OUTPATIENT)
Dept: PAIN MEDICINE | Facility: CLINIC | Age: 63
End: 2018-03-02

## 2018-03-02 DIAGNOSIS — M79.18 CHRONIC MYOFASCIAL PAIN: ICD-10-CM

## 2018-03-02 DIAGNOSIS — G89.29 CHRONIC BILATERAL THORACIC BACK PAIN: ICD-10-CM

## 2018-03-02 DIAGNOSIS — M79.604 CHRONIC PAIN OF BOTH LOWER EXTREMITIES: Primary | ICD-10-CM

## 2018-03-02 DIAGNOSIS — G89.4 PAIN SYNDROME, CHRONIC: ICD-10-CM

## 2018-03-02 DIAGNOSIS — G89.29 CHRONIC MYOFASCIAL PAIN: ICD-10-CM

## 2018-03-02 DIAGNOSIS — M79.604 PAIN IN BOTH LOWER EXTREMITIES: ICD-10-CM

## 2018-03-02 DIAGNOSIS — G89.29 CHRONIC PAIN OF BOTH LOWER EXTREMITIES: Primary | ICD-10-CM

## 2018-03-02 DIAGNOSIS — M79.605 PAIN IN BOTH LOWER EXTREMITIES: ICD-10-CM

## 2018-03-02 DIAGNOSIS — M54.6 CHRONIC BILATERAL THORACIC BACK PAIN: ICD-10-CM

## 2018-03-02 DIAGNOSIS — M79.605 CHRONIC PAIN OF BOTH LOWER EXTREMITIES: Primary | ICD-10-CM

## 2018-03-02 PROBLEM — J45.41 MODERATE PERSISTENT ASTHMA WITH ACUTE EXACERBATION: Status: ACTIVE | Noted: 2017-01-06

## 2018-03-02 PROBLEM — E11.29 MICROALBUMINURIA DUE TO TYPE 2 DIABETES MELLITUS (HCC): Status: ACTIVE | Noted: 2017-03-24

## 2018-03-02 PROBLEM — Z78.0 POST-MENOPAUSAL: Status: ACTIVE | Noted: 2017-10-11

## 2018-03-02 PROBLEM — E21.0 PRIMARY HYPERPARATHYROIDISM (HCC): Status: ACTIVE | Noted: 2017-10-11

## 2018-03-02 PROBLEM — R80.9 MICROALBUMINURIA DUE TO TYPE 2 DIABETES MELLITUS (HCC): Status: ACTIVE | Noted: 2017-03-24

## 2018-03-02 PROBLEM — E55.9 VITAMIN D DEFICIENCY: Status: ACTIVE | Noted: 2017-11-03

## 2018-03-02 PROBLEM — F11.20 UNCOMPLICATED OPIOID DEPENDENCE (HCC): Status: ACTIVE | Noted: 2017-07-25

## 2018-03-02 PROBLEM — E78.2 MIXED HYPERLIPIDEMIA: Status: ACTIVE | Noted: 2017-06-29

## 2018-03-02 PROBLEM — Z79.4 TYPE 2 DIABETES MELLITUS WITHOUT COMPLICATION, WITH LONG-TERM CURRENT USE OF INSULIN (HCC): Status: ACTIVE | Noted: 2018-01-11

## 2018-03-02 PROBLEM — M79.10 MYALGIA: Status: ACTIVE | Noted: 2017-09-08

## 2018-03-02 PROBLEM — K59.02 OUTLET DYSFUNCTION CONSTIPATION: Status: ACTIVE | Noted: 2017-09-08

## 2018-03-02 PROBLEM — G63 POLYNEUROPATHY ASSOCIATED WITH UNDERLYING DISEASE (HCC): Status: ACTIVE | Noted: 2017-07-25

## 2018-03-02 PROBLEM — E11.9 TYPE 2 DIABETES MELLITUS WITHOUT COMPLICATION, WITH LONG-TERM CURRENT USE OF INSULIN (HCC): Status: ACTIVE | Noted: 2018-01-11

## 2018-03-02 PROCEDURE — 99024 POSTOP FOLLOW-UP VISIT: CPT | Performed by: NURSE PRACTITIONER

## 2018-03-05 ENCOUNTER — TELEPHONE (OUTPATIENT)
Dept: PAIN MEDICINE | Facility: CLINIC | Age: 63
End: 2018-03-05
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
## 2018-03-05 NOTE — TELEPHONE ENCOUNTER
Can you please call the patient to give her DR Bullock's information as we are referring her to him to discuss the permanent SCStim implant  Thank you

## 2018-03-09 ENCOUNTER — OFFICE VISIT (OUTPATIENT)
Dept: NEUROSURGERY | Facility: MEDICAL CENTER | Age: 63
End: 2018-03-09
Payer: MEDICARE

## 2018-03-09 VITALS
DIASTOLIC BLOOD PRESSURE: 107 MMHG | HEIGHT: 63 IN | HEART RATE: 85 BPM | TEMPERATURE: 98.8 F | BODY MASS INDEX: 39.12 KG/M2 | RESPIRATION RATE: 20 BRPM | WEIGHT: 220.8 LBS | SYSTOLIC BLOOD PRESSURE: 199 MMHG

## 2018-03-09 DIAGNOSIS — G89.29 CHRONIC BILATERAL THORACIC BACK PAIN: ICD-10-CM

## 2018-03-09 DIAGNOSIS — G89.29 CHRONIC PAIN OF BOTH LOWER EXTREMITIES: ICD-10-CM

## 2018-03-09 DIAGNOSIS — G89.29 CHRONIC MYOFASCIAL PAIN: ICD-10-CM

## 2018-03-09 DIAGNOSIS — M54.6 CHRONIC BILATERAL THORACIC BACK PAIN: ICD-10-CM

## 2018-03-09 DIAGNOSIS — G89.4 PAIN SYNDROME, CHRONIC: ICD-10-CM

## 2018-03-09 DIAGNOSIS — M79.18 CHRONIC MYOFASCIAL PAIN: ICD-10-CM

## 2018-03-09 DIAGNOSIS — M79.604 CHRONIC PAIN OF BOTH LOWER EXTREMITIES: ICD-10-CM

## 2018-03-09 DIAGNOSIS — G63 POLYNEUROPATHY ASSOCIATED WITH UNDERLYING DISEASE (HCC): Primary | ICD-10-CM

## 2018-03-09 DIAGNOSIS — M79.605 CHRONIC PAIN OF BOTH LOWER EXTREMITIES: ICD-10-CM

## 2018-03-09 PROBLEM — G90.522 COMPLEX REGIONAL PAIN SYNDROME TYPE 1 OF LEFT LOWER EXTREMITY: Status: ACTIVE | Noted: 2018-03-09

## 2018-03-09 PROCEDURE — 99205 OFFICE O/P NEW HI 60 MIN: CPT | Performed by: NEUROLOGICAL SURGERY

## 2018-03-09 RX ORDER — CHLORHEXIDINE GLUCONATE 0.12 MG/ML
15 RINSE ORAL EVERY 12 HOURS SCHEDULED
Status: CANCELLED | OUTPATIENT
Start: 2018-03-09

## 2018-03-09 NOTE — PROGRESS NOTES
Office Note - Neurosurgery   Nikocucoefren Lafleur 65 y o  female MRN: [id number]      Assessment:    Patient is stable  Symptoms, as detailed in HPI, continue to significantly impact of patient's quality of life in daily activities  After carefully considering presentation, investigations, functional status and co-morbidities, the risk/benefit profile of surgical intervention is favorable  73-year-old woman with chronic lower back and bilateral leg pain which is likely multifactorial, but there is clearly an element of CRPS in the left leg following multiple knee operations and revisions in addition to likely peripheral neuropathy related to her diabetes  Over the years she has tried a number of nonsurgical pain management strategies with limited improvement in her symptoms  She recently underwent spinal cord stimulator trial and had significant improvement, per the patient 99%, in her leg pain and lower back pain  She is a candidate for placement of thoracic spinal cord stimulator electrode via laminotomy and insertion of left buttock IPG  The goal of surgery is to improve chronic pain but not necessarily completely resolved pain  Weakness and numbness are unlikely to improve  The risks of surgery were reviewed in detail  1   Risk of general anesthetic with possible cardiac and respiratory complication  Risk of infection and bleeding/transfusion  2   Risk of neurological injury with new pain, weakness or numbness, paralysis, difficulties with bowel bladder function  Risk of CSF leak  3   Risk of device malfunction, and failure of treatment  Need for revision surgery  Patient provided verbal consent to surgical procedure and signed consent form: Yes    History, physical examination and diagnostic tests were reviewed and questions answered  Diagnosis, care plan and treatment options were discussed   The patient and spouse/SO understand instructions and will follow up as directed  Plan:    Follow-up:   Surgery    Problem List Items Addressed This Visit        Nervous and Auditory    Polyneuropathy associated with underlying disease ([facility] [address] ) - Primary    Relevant Orders    Case request operating room: Insertion of thoracic spinal cord stimulator electrode via laminotomy and placement of left buttock IPG (Completed)    Ambulatory referral to Family Practice    Ambulatory referral to Endocrinology       Other    Chronic bilateral thoracic back pain    Chronic myofascial pain    Chronic pain of both lower extremities    Relevant Orders    Case request operating room: Insertion of thoracic spinal cord stimulator electrode via laminotomy and placement of left buttock IPG (Completed)    Ambulatory referral to Family Practice    Ambulatory referral to Endocrinology    Pain syndrome, chronic    Relevant Orders    Case request operating room: Insertion of thoracic spinal cord stimulator electrode via laminotomy and placement of left buttock IPG (Completed)    Ambulatory referral to Family Practice    Ambulatory referral to Endocrinology          Subjective/Objective     Chief Complaint     lower back pain and bilateral leg pain  HPI      61-year-old woman with history of chronic lower back pain and bilateral leg pain  Past medical history significant for insulin-dependent diabetes and peripheral neuropathy  She also had numerous left knee surgeries and revisions including requirement for skin grafts and muscle grafts  She describes chronic lower back pain and bilateral leg pain from midthigh below both knees and into the feet  More so on the left than on the right  On the left leg she has skin changes with some welling, redness and sensitivity to light touch  She feels that her legs are somewhat weak  She denies any difficulties with bowel bladder function or changing perineal sensation        Over the years she has tried numerous injections and pain medications without significant improvement in her symptoms  She underwent [employer] spinal cord stimulator trial and had 99% improvement in her lower back and bilateral leg pain  She no longer took Percocet for that weak but remained on her fentanyl patch and muscle relaxers  Both she and her  felt that she could stand and walk more comfortably for longer  Her symptoms recurred once the stimulator was removed  She presents today to discuss implantation of a permanent  system  ROS    Review of Systems   HENT: Negative  Eyes: Negative  Respiratory: Negative  Cardiovascular: Negative  Gastrointestinal: Positive for constipation  Endocrine: Negative  Genitourinary: Positive for frequency and urgency  Leakage   Musculoskeletal: Positive for back pain (across lower back radiates into bilateral buttocks, hips, and legs)  Skin: Negative  Allergic/Immunologic: Negative  Neurological: Positive for weakness (bilateral legs) and numbness (bilateral legs)  Negative for dizziness, seizures, syncope and headaches  Hematological: Negative  Psychiatric/Behavioral: Positive for sleep disturbance (due to pain)  Negative for confusion  Family History    Family History   Problem Relation Age of Onset    Diabetes Family     Heart disease Family     Hypertension Family     Neuropathy Family     No Known Problems Mother     Heart disease Father     Diabetes Father     No Known Problems Maternal Grandmother     No Known Problems Maternal Grandfather     No Known Problems Paternal Grandmother     No Known Problems Paternal Grandfather        Social History    Social History     Social History    Marital status: /Civil Union     Spouse name: N/A    Number of children: N/A    Years of education: N/A     Occupational History    Not on file       Social History Main Topics    Smoking status: Never Smoker    Smokeless tobacco: Never Used    Alcohol use No    Drug use: No    Sexual activity: Not on file     Other Topics Concern    Not on file     Social History Narrative    No narrative on file       Past Medical History    Past Medical History:   Diagnosis Date    Anxiety     Asthma     Back complaints     Chronic bilateral thoracic back pain     Chronic myofascial pain     Chronic pain of both lower extremities     Chronic pain of left knee     Chronic pain syndrome     Depression     Diabetes mellitus ([facility] [address] )     Gait disorder     Gastroparesis     History of angina     Hyperlipidemia     Hypertension     Kidney disease     [facility]     Sleep apnea        Surgical History    Past Surgical History:   Procedure Laterality Date     SECTION      NECK SURGERY      REPLACEMENT TOTAL KNEE      ROTATOR CUFF REPAIR         Medications      Current Outpatient Prescriptions:     albuterol (ACCUNEB) 0 63 MG/3ML nebulizer solution, Take 1 ampule by nebulization every 6 (six) hours as needed for wheezing, Disp: , Rfl:     aspirin (ECOTRIN LOW STRENGTH) 81 mg EC tablet, Take 81 mg by mouth daily, Disp: , Rfl:     atorvastatin (LIPITOR) 80 mg tablet, Take 80 mg by mouth daily, Disp: , Rfl:     cholecalciferol (VITAMIN D3) 1,000 units tablet, Take 2,000 Units by mouth daily, Disp: , Rfl: 5    Cranberry 450 MG CAPS, Take by mouth, Disp: , Rfl:     Cyanocobalamin (VITAMIN B-12 PO), Take by mouth, Disp: , Rfl:     Docusate Sodium 100 MG capsule, Take by mouth, Disp: , Rfl:     DULoxetine (CYMBALTA) 60 mg delayed release capsule, Take by mouth daily, Disp: , Rfl:     fentaNYL (DURAGESIC) 50 mcg/hr, Place on the skin, Disp: , Rfl:     furosemide (LASIX) 20 mg tablet, Take by mouth, Disp: , Rfl:     glucagon (GLUCAGEN) 1 mg injection, Inject under the skin as needed if passed out from low sugar, Disp: , Rfl:     levothyroxine 75 mcg tablet, Take by mouth, Disp: , Rfl:     Linaclotide (LINZESS) 145 MCG CAPS, Take by mouth, Disp: , Rfl:     losartan (COZAAR) 50 mg tablet, Take by mouth, Disp: , Rfl:     methocarbamol (ROBAXIN) 750 mg tablet, TAKE ONE TABLET BY MOUTH FOUR TIMES A DAY AS NEEDED FOR MUSCLE SPASMS, Disp: , Rfl: 5    NOVOLOG 100 UNIT/ML injection, INJECT 120 UNITS DAILY VIA INSULIN PUMP E 11 65, Disp: , Rfl: 3    ondansetron (ZOFRAN) 8 mg tablet, Take by mouth, Disp: , Rfl:     oxybutynin (DITROPAN) 5 mg tablet, Take by mouth, Disp: , Rfl:     oxyCODONE-acetaminophen (PERCOCET) 5-325 mg per tablet, Take by mouth, Disp: , Rfl:     pantoprazole (PROTONIX) 40 mg tablet, Take by mouth, Disp: , Rfl:     pramipexole (MIRAPEX) 1 mg tablet, Take by mouth, Disp: , Rfl:     ranitidine (ZANTAC) 150 mg tablet, Take by mouth, Disp: , Rfl:     tiZANidine (ZANAFLEX) 2 mg tablet, Take one tablet 4 times a day as needed for pain, Disp: , Rfl:     albuterol (2 5 mg/3 mL) 0 083 % nebulizer solution, Take 2 5 mg by nebulization every 6 (six) hours as needed for wheezing, Disp: , Rfl:     budesonide-formoterol (SYMBICORT) 160-4 5 mcg/act inhaler, Inhale 2 puffs 2 (two) times a day, Disp: , Rfl:     citalopram (CeleXA) 40 mg tablet, Take 40 mg by mouth daily, Disp: , Rfl:     Cranberry 450 MG TABS, 4200 mg once daily in the AM , Disp: , Rfl:     cyanocobalamin 1000 MCG tablet, Take 100 mcg by mouth daily, Disp: , Rfl:     LORazepam (ATIVAN) 1 mg tablet, TAKE ONE TABLET BY MOUTH 45 MINUTES TO 1 HOUR PRIOR TO PROCEDURE, THEN REPEAT UPON ARRIVALTO MRI, Disp: , Rfl: 0    losartan (COZAAR) 100 MG tablet, Take by mouth, Disp: , Rfl:     Menthol-Methyl Salicylate (TANK DORADO GREASELESS) 10-15 % greaseless cream, Apply topically daily at bedtime, Disp: 30 g, Rfl: 0    naproxen (NAPROSYN) 500 mg tablet, Take 1 tablet by mouth 2 (two) times a day with meals for 30 doses, Disp: 30 tablet, Rfl: 0    tiotropium (SPIRIVA HANDIHALER) 18 mcg inhalation capsule, Place 18 mcg into inhaler and inhale, Disp: , Rfl:     Allergies    Allergies Allergen Reactions    Azithromycin     Bactrim [Sulfamethoxazole-Trimethoprim]     Methotrexate     Neurontin [Gabapentin]     Sucralfate     Topamax [Topiramate]        The following portions of the patient's history were reviewed and updated as appropriate: allergies, current medications, past family history, past medical history, past social history, past surgical history and problem list     Investigations    I personally reviewed the MRI results with the patient:     MRI of the thoracic spine without contrast dated December 10th, 2017  Mi wedge compression deformity at T12  Degenerative changes throughout the thoracic spine with minimal stenosis  Spinal cord is normal in signal     Physical Exam    Vitals:  Blood pressure (!) 199/107, pulse 85, temperature 98 8 °F (37 1 °C), resp  rate 20, height 5' 3" (1 6 m), weight 100 kg (220 lb 12 8 oz)  ,Body mass index is 39 11 kg/m²  Physical Exam   Constitutional: She is oriented to person, place, and time  She appears well-developed and well-nourished  Cardiovascular: Normal rate, regular rhythm and normal heart sounds  Pulmonary/Chest: Effort normal and breath sounds normal    Musculoskeletal:        Thoracic back: Normal         Lumbar back: She exhibits decreased range of motion  Neurological: She is oriented to person, place, and time  Gait normal    Reflex Scores:       Patellar reflexes are 1+ on the right side and 1+ on the left side  Achilles reflexes are 0 on the right side and 0 on the left side  Skin:     Reddish shiny skin over left shin and foot with loss of hair  Some discoloration of right ankle  Neurologic Exam     Mental Status   Oriented to person, place, and time  Motor Exam   Muscle bulk: normal  5/5 power in lower extremities  Sensory Exam     Diminished light touch and pinprick sensation in a stocking distribution more so on the left than on the right    Dysesthesias to light touch over left shin with temporal in special summation       Gait, Coordination, and Reflexes     Gait  Gait: normal    Reflexes   Right patellar: 1+  Left patellar: 1+  Right achilles: 0  Left achilles: 0  Right ankle clonus: absent  Left ankle clonus: absent

## 2018-03-09 NOTE — LETTER
March 9, 2018     [address]  [address] [facility]  [address] [address] [address]    Patient: Dave Lafleur   YOB: 1955   Date of Visit: 3/9/2018       Dear Dr Brittany Christine:    Thank you for referring Lacie Baeza to me for evaluation  Below are my notes for this consultation  If you have questions, please do not hesitate to call me  I look forward to following your patient along with you  Sincerely,        Dorcas Jara MD        CC: Les Cisneros MD  3/9/2018 12:42 PM  Sign at close encounter  Office Note - Neurosurgery   Dave Lafleur 65 y o  female MRN: [id number]      Assessment:    Patient is stable  Symptoms, as detailed in HPI, continue to significantly impact of patient's quality of life in daily activities  After carefully considering presentation, investigations, functional status and co-morbidities, the risk/benefit profile of surgical intervention is favorable  81-year-old woman with chronic lower back and bilateral leg pain which is likely multifactorial, but there is clearly an element of CRPS in the left leg following multiple knee operations and revisions in addition to likely peripheral neuropathy related to her diabetes  Over the years she has tried a number of nonsurgical pain management strategies with limited improvement in her symptoms  She recently underwent spinal cord stimulator trial and had significant improvement, per the patient 99%, in her leg pain and lower back pain  She is a candidate for placement of thoracic spinal cord stimulator electrode via laminotomy and insertion of left buttock IPG  The goal of surgery is to improve chronic pain but not necessarily completely resolved pain  Weakness and numbness are unlikely to improve  The risks of surgery were reviewed in detail  1   Risk of general anesthetic with possible cardiac and respiratory complication  Risk of infection and bleeding/transfusion    2   Risk of neurological injury with new pain, weakness or numbness, paralysis, difficulties with bowel bladder function  Risk of CSF leak  3   Risk of device malfunction, and failure of treatment  Need for revision surgery  Patient provided verbal consent to surgical procedure and signed consent form: Yes    History, physical examination and diagnostic tests were reviewed and questions answered  Diagnosis, care plan and treatment options were discussed  The patient and spouse/SO understand instructions and will follow up as directed  Plan:    Follow-up:   Surgery    Problem List Items Addressed This Visit        Nervous and Auditory    Polyneuropathy associated with underlying disease ([facility] [address] ) - Primary    Relevant Orders    Case request operating room: Insertion of thoracic spinal cord stimulator electrode via laminotomy and placement of left buttock IPG (Completed)    Ambulatory referral to Family Practice    Ambulatory referral to Endocrinology       Other    Chronic bilateral thoracic back pain    Chronic myofascial pain    Chronic pain of both lower extremities    Relevant Orders    Case request operating room: Insertion of thoracic spinal cord stimulator electrode via laminotomy and placement of left buttock IPG (Completed)    Ambulatory referral to Family Practice    Ambulatory referral to Endocrinology    Pain syndrome, chronic    Relevant Orders    Case request operating room: Insertion of thoracic spinal cord stimulator electrode via laminotomy and placement of left buttock IPG (Completed)    Ambulatory referral to Family Practice    Ambulatory referral to Endocrinology          Subjective/Objective     Chief Complaint     lower back pain and bilateral leg pain  HPI      32-year-old woman with history of chronic lower back pain and bilateral leg pain  Past medical history significant for insulin-dependent diabetes and peripheral neuropathy    She also had numerous left knee surgeries and revisions including requirement for skin grafts and muscle grafts  She describes chronic lower back pain and bilateral leg pain from midthigh below both knees and into the feet  More so on the left than on the right  On the left leg she has skin changes with some welling, redness and sensitivity to light touch  She feels that her legs are somewhat weak  She denies any difficulties with bowel bladder function or changing perineal sensation  Over the years she has tried numerous injections and pain medications without significant improvement in her symptoms  She underwent [employer] spinal cord stimulator trial and had 99% improvement in her lower back and bilateral leg pain  She no longer took Percocet for that weak but remained on her fentanyl patch and muscle relaxers  Both she and her  felt that she could stand and walk more comfortably for longer  Her symptoms recurred once the stimulator was removed  She presents today to discuss implantation of a permanent  system  ROS    Review of Systems   HENT: Negative  Eyes: Negative  Respiratory: Negative  Cardiovascular: Negative  Gastrointestinal: Positive for constipation  Endocrine: Negative  Genitourinary: Positive for frequency and urgency  Leakage   Musculoskeletal: Positive for back pain (across lower back radiates into bilateral buttocks, hips, and legs)  Skin: Negative  Allergic/Immunologic: Negative  Neurological: Positive for weakness (bilateral legs) and numbness (bilateral legs)  Negative for dizziness, seizures, syncope and headaches  Hematological: Negative  Psychiatric/Behavioral: Positive for sleep disturbance (due to pain)  Negative for confusion         Family History    Family History   Problem Relation Age of Onset    Diabetes Family     Heart disease Family     Hypertension Family     Neuropathy Family     No Known Problems Mother     Heart disease Father     Diabetes Father     No Known Problems Maternal Grandmother     No Known Problems Maternal Grandfather     No Known Problems Paternal Grandmother     No Known Problems Paternal Grandfather        Social History    Social History     Social History    Marital status: /Civil Union     Spouse name: N/A    Number of children: N/A    Years of education: N/A     Occupational History    Not on file       Social History Main Topics    Smoking status: Never Smoker    Smokeless tobacco: Never Used    Alcohol use No    Drug use: No    Sexual activity: Not on file     Other Topics Concern    Not on file     Social History Narrative    No narrative on file       Past Medical History    Past Medical History:   Diagnosis Date    Anxiety     Asthma     Back complaints     Chronic bilateral thoracic back pain     Chronic myofascial pain     Chronic pain of both lower extremities     Chronic pain of left knee     Chronic pain syndrome     Depression     Diabetes mellitus ([facility] [address] )     Gait disorder     Gastroparesis     History of angina     Hyperlipidemia     Hypertension     Kidney disease     Polyneuropathy associated with underlying disease ([facility] )     Sleep apnea        Surgical History    Past Surgical History:   Procedure Laterality Date     SECTION      NECK SURGERY      REPLACEMENT TOTAL KNEE      ROTATOR CUFF REPAIR         Medications      Current Outpatient Prescriptions:     albuterol (ACCUNEB) 0 63 MG/3ML nebulizer solution, Take 1 ampule by nebulization every 6 (six) hours as needed for wheezing, Disp: , Rfl:     aspirin (ECOTRIN LOW STRENGTH) 81 mg EC tablet, Take 81 mg by mouth daily, Disp: , Rfl:     atorvastatin (LIPITOR) 80 mg tablet, Take 80 mg by mouth daily, Disp: , Rfl:     cholecalciferol (VITAMIN D3) 1,000 units tablet, Take 2,000 Units by mouth daily, Disp: , Rfl: 5    Cranberry 450 MG CAPS, Take by mouth, Disp: , Rfl:     Cyanocobalamin (VITAMIN B-12 PO), Take by mouth, Disp: , Rfl:    Docusate Sodium 100 MG capsule, Take by mouth, Disp: , Rfl:     DULoxetine (CYMBALTA) 60 mg delayed release capsule, Take by mouth daily, Disp: , Rfl:     fentaNYL (DURAGESIC) 50 mcg/hr, Place on the skin, Disp: , Rfl:     furosemide (LASIX) 20 mg tablet, Take by mouth, Disp: , Rfl:     glucagon (GLUCAGEN) 1 mg injection, Inject under the skin as needed if passed out from low sugar, Disp: , Rfl:     levothyroxine 75 mcg tablet, Take by mouth, Disp: , Rfl:     Linaclotide (LINZESS) 145 MCG CAPS, Take by mouth, Disp: , Rfl:     losartan (COZAAR) 50 mg tablet, Take by mouth, Disp: , Rfl:     methocarbamol (ROBAXIN) 750 mg tablet, TAKE ONE TABLET BY MOUTH FOUR TIMES A DAY AS NEEDED FOR MUSCLE SPASMS, Disp: , Rfl: 5    NOVOLOG 100 UNIT/ML injection, INJECT 120 UNITS DAILY VIA INSULIN PUMP E 11 65, Disp: , Rfl: 3    ondansetron (ZOFRAN) 8 mg tablet, Take by mouth, Disp: , Rfl:     oxybutynin (DITROPAN) 5 mg tablet, Take by mouth, Disp: , Rfl:     oxyCODONE-acetaminophen (PERCOCET) 5-325 mg per tablet, Take by mouth, Disp: , Rfl:     pantoprazole (PROTONIX) 40 mg tablet, Take by mouth, Disp: , Rfl:     pramipexole (MIRAPEX) 1 mg tablet, Take by mouth, Disp: , Rfl:     ranitidine (ZANTAC) 150 mg tablet, Take by mouth, Disp: , Rfl:     tiZANidine (ZANAFLEX) 2 mg tablet, Take one tablet 4 times a day as needed for pain, Disp: , Rfl:     albuterol (2 5 mg/3 mL) 0 083 % nebulizer solution, Take 2 5 mg by nebulization every 6 (six) hours as needed for wheezing, Disp: , Rfl:     budesonide-formoterol (SYMBICORT) 160-4 5 mcg/act inhaler, Inhale 2 puffs 2 (two) times a day, Disp: , Rfl:     citalopram (CeleXA) 40 mg tablet, Take 40 mg by mouth daily, Disp: , Rfl:     Cranberry 450 MG TABS, 4200 mg once daily in the AM , Disp: , Rfl:     cyanocobalamin 1000 MCG tablet, Take 100 mcg by mouth daily, Disp: , Rfl:     LORazepam (ATIVAN) 1 mg tablet, TAKE ONE TABLET BY MOUTH 45 MINUTES TO 1 HOUR PRIOR TO PROCEDURE, THEN REPEAT UPON ARRIVALTO MRI, Disp: , Rfl: 0    losartan (COZAAR) 100 MG tablet, Take by mouth, Disp: , Rfl:     Menthol-Methyl Salicylate (TANK DORADO GREASELESS) 10-15 % greaseless cream, Apply topically daily at bedtime, Disp: 30 g, Rfl: 0    naproxen (NAPROSYN) 500 mg tablet, Take 1 tablet by mouth 2 (two) times a day with meals for 30 doses, Disp: 30 tablet, Rfl: 0    tiotropium (SPIRIVA HANDIHALER) 18 mcg inhalation capsule, Place 18 mcg into inhaler and inhale, Disp: , Rfl:     Allergies    Allergies   Allergen Reactions    Azithromycin     Bactrim [Sulfamethoxazole-Trimethoprim]     Methotrexate     Neurontin [Gabapentin]     Sucralfate     Topamax [Topiramate]        The following portions of the patient's history were reviewed and updated as appropriate: allergies, current medications, past family history, past medical history, past social history, past surgical history and problem list     Investigations    I personally reviewed the MRI results with the patient:     MRI of the thoracic spine without contrast dated December 10th, 2017  Mi wedge compression deformity at T12  Degenerative changes throughout the thoracic spine with minimal stenosis  Spinal cord is normal in signal     Physical Exam    Vitals:  Blood pressure (!) 199/107, pulse 85, temperature 98 8 °F (37 1 °C), resp  rate 20, height 5' 3" (1 6 m), weight 100 kg (220 lb 12 8 oz)  ,Body mass index is 39 11 kg/m²  Physical Exam   Constitutional: She is oriented to person, place, and time  She appears well-developed and well-nourished  Cardiovascular: Normal rate, regular rhythm and normal heart sounds  Pulmonary/Chest: Effort normal and breath sounds normal    Musculoskeletal:        Thoracic back: Normal         Lumbar back: She exhibits decreased range of motion  Neurological: She is oriented to person, place, and time  Gait normal    Reflex Scores:       Patellar reflexes are 1+ on the right side and 1+ on the left side  Achilles reflexes are 0 on the right side and 0 on the left side  Skin:     Reddish shiny skin over left shin and foot with loss of hair  Some discoloration of right ankle  Neurologic Exam     Mental Status   Oriented to person, place, and time  Motor Exam   Muscle bulk: normal  5/5 power in lower extremities  Sensory Exam     Diminished light touch and pinprick sensation in a stocking distribution more so on the left than on the right  Dysesthesias to light touch over left shin with temporal in special summation       Gait, Coordination, and Reflexes     Gait  Gait: normal    Reflexes   Right patellar: 1+  Left patellar: 1+  Right achilles: 0  Left achilles: 0  Right ankle clonus: absent  Left ankle clonus: absent

## 2018-03-15 ENCOUNTER — TELEPHONE (OUTPATIENT)
Dept: PAIN MEDICINE | Facility: MEDICAL CENTER | Age: 63
End: 2018-03-15

## 2018-03-15 DIAGNOSIS — R25.2 SPASM: Primary | ICD-10-CM

## 2018-03-15 RX ORDER — BACLOFEN 10 MG/1
10 TABLET ORAL 3 TIMES DAILY
Qty: 90 TABLET | Refills: 0 | Status: SHIPPED | OUTPATIENT
Start: 2018-03-15 | End: 2018-04-19

## 2018-03-15 NOTE — TELEPHONE ENCOUNTER
Pt called stating that she is in a lot of pain and would like to speak with a nurse  Call was disconnected  Please call pt at [phone]

## 2018-03-15 NOTE — TELEPHONE ENCOUNTER
Pt states she has increased low - mid back pain s/p scs trial  Pt c/o strong spasms, and she cannot move  Pt states the spasms come on every few minutes  Confirmed methocarbimol 750 mg qid w/ no relief  Reviewed pain medications: Fentanyl: 50 mcg q 72h, percocet 5/325 mg 1 tab po qd - helps to "take the edge off" but does not want to "over do it"  Denied OTC pain medications; NSAIDS / tylenol  Pt states her greatest complaint is spasm  Advised pt, will d/w Dr Recardo Simmonds and cb to advise  Ok to use rest, ice / heat prn

## 2018-03-15 NOTE — TELEPHONE ENCOUNTER
Baclofen order sent to Dr Maximilian Thomas for review and approval      S/w pt, advised of above  Do not drive or operate machinery until you are familiar with this medication  Cb if se's or questions arise  Pt verbalized understanding and appreciation

## 2018-03-19 NOTE — TELEPHONE ENCOUNTER
Pt left message on [facility] voicemail stating that she was returning our call  She can be reached at [phone]

## 2018-03-19 NOTE — TELEPHONE ENCOUNTER
Pt called stating that she cannot handle taking the Baclofen  It knocked her out and she slept all weekend  Asking if there is something else she could try instead  Pt can be reached at [phone]

## 2018-03-20 NOTE — TELEPHONE ENCOUNTER
S/w pt, states she used baclofen over the weekend, 1 pill po tid as prescribed w/ + relief of spasm, and severe drowsiness, "couldn't function"  Pt states she resumed to methocarbimol which is not as effective for spasms  Pt questioned baclofen at hs and methocarbimol during the day  Advised pt, will d/w Dr Kvng Burgos and cb to advise

## 2018-03-20 NOTE — TELEPHONE ENCOUNTER
S/w pt, advised of above  Do not drive or operate machinery until you are familiar with how this change will affect you  Pt verbalized understandign and appreciation

## 2018-04-03 ENCOUNTER — APPOINTMENT (OUTPATIENT)
Dept: LAB | Facility: HOSPITAL | Age: 63
End: 2018-04-03
Attending: NEUROLOGICAL SURGERY
Payer: MEDICARE

## 2018-04-03 ENCOUNTER — HOSPITAL ENCOUNTER (OUTPATIENT)
Dept: NON INVASIVE DIAGNOSTICS | Facility: HOSPITAL | Age: 63
Discharge: HOME | End: 2018-04-03
Attending: NEUROLOGICAL SURGERY
Payer: MEDICARE

## 2018-04-03 ENCOUNTER — HOSPITAL ENCOUNTER (OUTPATIENT)
Dept: RADIOLOGY | Facility: HOSPITAL | Age: 63
Discharge: HOME | End: 2018-04-03
Attending: NEUROLOGICAL SURGERY
Payer: MEDICARE

## 2018-04-03 DIAGNOSIS — G89.4 CHRONIC PAIN SYNDROME: ICD-10-CM

## 2018-04-03 DIAGNOSIS — M79.605 CHRONIC PAIN OF LOWER EXTREMITY, BILATERAL: ICD-10-CM

## 2018-04-03 DIAGNOSIS — Z01.818 PRE-PROCEDURAL EXAMINATION: ICD-10-CM

## 2018-04-03 DIAGNOSIS — G89.29 CHRONIC PAIN OF LOWER EXTREMITY, BILATERAL: ICD-10-CM

## 2018-04-03 DIAGNOSIS — M79.604 CHRONIC PAIN OF LOWER EXTREMITY, BILATERAL: ICD-10-CM

## 2018-04-03 DIAGNOSIS — G63 POLYNEUROPATHY IN OTHER DISEASES CLASSIFIED ELSEWHERE (HCC): ICD-10-CM

## 2018-04-03 LAB
ALBUMIN SERPL BCP-MCNC: 3.5 G/DL (ref 3.5–5)
ALP SERPL-CCNC: 133 U/L (ref 46–116)
ALT SERPL W P-5'-P-CCNC: 27 U/L (ref 12–78)
ANION GAP SERPL CALCULATED.3IONS-SCNC: 8 MMOL/L (ref 4–13)
APTT PPP: 31 SECONDS (ref 23–35)
AST SERPL W P-5'-P-CCNC: 23 U/L (ref 5–45)
BASOPHILS # BLD AUTO: 0.03 THOUSANDS/ΜL (ref 0–0.1)
BASOPHILS NFR BLD AUTO: 1 % (ref 0–1)
BILIRUB SERPL-MCNC: 0.42 MG/DL (ref 0.2–1)
BUN SERPL-MCNC: 15 MG/DL (ref 5–25)
CALCIUM SERPL-MCNC: 9.9 MG/DL (ref 8.3–10.1)
CHLORIDE SERPL-SCNC: 102 MMOL/L (ref 100–108)
CO2 SERPL-SCNC: 30 MMOL/L (ref 21–32)
CREAT SERPL-MCNC: 0.96 MG/DL (ref 0.6–1.3)
EOSINOPHIL # BLD AUTO: 0.15 THOUSAND/ΜL (ref 0–0.61)
EOSINOPHIL NFR BLD AUTO: 3 % (ref 0–6)
ERYTHROCYTE [DISTWIDTH] IN BLOOD BY AUTOMATED COUNT: 16 % (ref 11.6–15.1)
GFR SERPL CREATININE-BSD FRML MDRD: 64 ML/MIN/1.73SQ M
GLUCOSE SERPL-MCNC: 243 MG/DL (ref 65–140)
HCT VFR BLD AUTO: 34.4 % (ref 34.8–46.1)
HGB BLD-MCNC: 10.6 G/DL (ref 11.5–15.4)
INR PPP: 1.06 (ref 0.86–1.16)
LYMPHOCYTES # BLD AUTO: 1.25 THOUSANDS/ΜL (ref 0.6–4.47)
LYMPHOCYTES NFR BLD AUTO: 23 % (ref 14–44)
MCH RBC QN AUTO: 26.7 PG (ref 26.8–34.3)
MCHC RBC AUTO-ENTMCNC: 30.8 G/DL (ref 31.4–37.4)
MCV RBC AUTO: 87 FL (ref 82–98)
MONOCYTES # BLD AUTO: 0.48 THOUSAND/ΜL (ref 0.17–1.22)
MONOCYTES NFR BLD AUTO: 9 % (ref 4–12)
NEUTROPHILS # BLD AUTO: 3.59 THOUSANDS/ΜL (ref 1.85–7.62)
NEUTS SEG NFR BLD AUTO: 64 % (ref 43–75)
NRBC BLD AUTO-RTO: 0 /100 WBCS
PLATELET # BLD AUTO: 199 THOUSANDS/UL (ref 149–390)
PMV BLD AUTO: 9.5 FL (ref 8.9–12.7)
POTASSIUM SERPL-SCNC: 4.5 MMOL/L (ref 3.5–5.3)
PROT SERPL-MCNC: 7.4 G/DL (ref 6.4–8.2)
PROTHROMBIN TIME: 13.8 SECONDS (ref 12.1–14.4)
RBC # BLD AUTO: 3.97 MILLION/UL (ref 3.81–5.12)
SODIUM SERPL-SCNC: 140 MMOL/L (ref 136–145)
WBC # BLD AUTO: 5.5 THOUSAND/UL (ref 4.31–10.16)

## 2018-04-03 PROCEDURE — 86901 BLOOD TYPING SEROLOGIC RH(D): CPT | Performed by: NEUROLOGICAL SURGERY

## 2018-04-03 PROCEDURE — 71046 X-RAY EXAM CHEST 2 VIEWS: CPT

## 2018-04-03 PROCEDURE — 36415 COLL VENOUS BLD VENIPUNCTURE: CPT

## 2018-04-03 PROCEDURE — 85025 COMPLETE CBC W/AUTO DIFF WBC: CPT

## 2018-04-03 PROCEDURE — 81003 URINALYSIS AUTO W/O SCOPE: CPT | Performed by: NEUROLOGICAL SURGERY

## 2018-04-03 PROCEDURE — 93005 ELECTROCARDIOGRAM TRACING: CPT | Performed by: NEUROLOGICAL SURGERY

## 2018-04-03 PROCEDURE — 85610 PROTHROMBIN TIME: CPT

## 2018-04-03 PROCEDURE — 83036 HEMOGLOBIN GLYCOSYLATED A1C: CPT | Performed by: NEUROLOGICAL SURGERY

## 2018-04-03 PROCEDURE — 86900 BLOOD TYPING SEROLOGIC ABO: CPT | Performed by: NEUROLOGICAL SURGERY

## 2018-04-03 PROCEDURE — 86850 RBC ANTIBODY SCREEN: CPT | Performed by: NEUROLOGICAL SURGERY

## 2018-04-03 PROCEDURE — 80053 COMPREHEN METABOLIC PANEL: CPT

## 2018-04-03 PROCEDURE — 85730 THROMBOPLASTIN TIME PARTIAL: CPT

## 2018-04-04 LAB
ATRIAL RATE: 72 BPM
P AXIS: 56 DEGREES
PR INTERVAL: 218 MS
QRS AXIS: -71 DEGREES
QRSD INTERVAL: 148 MS
QT INTERVAL: 422 MS
QTC INTERVAL: 462 MS
T WAVE AXIS: 3 DEGREES
VENTRICULAR RATE: 72 BPM

## 2018-04-04 PROCEDURE — 93010 ELECTROCARDIOGRAM REPORT: CPT | Performed by: INTERNAL MEDICINE

## 2018-04-10 RX ORDER — TIZANIDINE HYDROCHLORIDE 2 MG/1
2 CAPSULE, GELATIN COATED ORAL 3 TIMES DAILY
COMMUNITY
End: 2018-04-13 | Stop reason: SDUPTHER

## 2018-04-10 RX ORDER — METHOCARBAMOL 750 MG/1
500 TABLET, FILM COATED ORAL EVERY 6 HOURS PRN
COMMUNITY
End: 2018-04-13

## 2018-04-10 NOTE — PRE-PROCEDURE INSTRUCTIONS
Pre-Surgery Instructions:   Medication Instructions    albuterol (2 5 mg/3 mL) 0 083 % nebulizer solution epic    albuterol (ACCUNEB) 0 63 MG/3ML nebulizer solution epic    aspirin (ECOTRIN LOW STRENGTH) 81 mg EC tablet Patient was instructed by Physician and understands   atorvastatin (LIPITOR) 80 mg tablet epic    baclofen 10 mg tablet epic    cholecalciferol (VITAMIN D3) 1,000 units tablet Instructed patient per Anesthesia Guidelines   Cranberry 450 MG CAPS Instructed patient per Anesthesia Guidelines   Cyanocobalamin (VITAMIN B-12 PO) epic    Docusate Sodium 100 MG capsule epic    DULoxetine (CYMBALTA) 60 mg delayed release capsule epic    fentaNYL (DURAGESIC) 50 mcg/hr epic    furosemide (LASIX) 20 mg tablet epic    glucagon (GLUCAGEN) 1 mg injection epic    levothyroxine 75 mcg tablet epic    Linaclotide (LINZESS) 145 MCG CAPS epic    losartan (COZAAR) 100 MG tablet epic    methocarbamol (ROBAXIN) 750 mg tablet epic    NOVOLOG 100 UNIT/ML injection Patient was instructed to contact Physician for medication instruction   ondansetron (ZOFRAN) 8 mg tablet epic    oxybutynin (DITROPAN) 5 mg tablet epic    oxyCODONE-acetaminophen (PERCOCET) 5-325 mg per tablet epic    pantoprazole (PROTONIX) 40 mg tablet epic    pramipexole (MIRAPEX) 1 mg tablet epic    ranitidine (ZANTAC) 150 mg tablet epic    TiZANidine (ZANAFLEX) 2 MG capsule epic   Antagonists Med Class     Continue to take this medication on your normal schedule  If this is an oral medication and you take it in the morning, then you may take this medicine with a sip of water  ACE/ARB Med Class     Do not take this medication the day before and the morning of the day of surgery/procedure  Diuretic Med Class     Continue this medication up to the evening before surgery/procedure, but do not take the morning of the day of surgery  Acetaminophen Med Class     Continue to take this medication on your normal schedule    If this is an oral medication and you take it in the morning, then you may take this medicine with a sip of water  Alpha-2 adrenergic agonist Med Class     Continue to take this medication on your normal schedule  If this is an oral medication and you take it in the morning, then you may take this medicine with a sip of water  Antidepressant Med Class     Continue to take this medication on your normal schedule  If this is an oral medication and you take it in the morning, then you may take this medicine with a sip of water  Antiparkinsons Med Class     Continue to take this medication on your normal schedule  If this is an oral medication and you take it in the morning, then you may take this medicine with a sip of water  ASA Med Class: Aspirin     Should be discontinued at least one week prior to planned operation, unless specifically stated otherwise by surgical service  Your Surgeon may have patient stop taking aspirin up to a week before surgery if having intracranial, middle ear, posterior eye, spine surgery or prostate surgery  [Patients taking aspirin for coronary stents should be reviewed by an anesthesiologist in the optimization clinic  Please do not discontinue aspirin in patients with coronary stents unless given specific permission to do so by the cardiologist who prescribed medication ]   If your surgeon approves please continue to take this medication on your normal schedule  You may take this medication on the morning of your surgery with a sip of water  H2 Blocker Med Class     Continue to take this medication on your normal schedule  If this is an oral medication and you take it in the morning, then you may take this medicine with a sip of water  Herbal Med Class     Stop taking this herbal medications at least one week prior to surgery/procedure  Inhalational Med Class     Continue to take these inhaler medications on your normal schedule up to and including the day of surgery     Insulin Med Class     Day of Procedure     Do not take ANY diabetic medication the day of your procedure unless otherwise instructed by the Physician who manages your diabetic medications  Continue to check your blood sugars      If you have an insulin pump then consult with your Endocrinologist for instructions  If you cannot see your Endocrinologist then set your insulin pump on day of procedure to your basal rate only  Please bring your insulin pump supplies to the hospital if being admitted      Day Prior to Procedure     1  Procedure that does not require a bowel prep     Pre-Mixed Insulin (Intermediate Acting:  Humalog 75/25, Humulin 70/30, Novolog 70/30, Regular Insulin)              o Take ½ your regular dose the evening prior to procedure  Rapid/Fast Acting Insulin/Long Acting Insulin (Humalog U200, NovoLog, Apidra, Lantus, Levemir, Tresiba, Toujeo)              o Take your FULL regular dose the day before procedure      Oral Diabetic Medications including Glipizide/Glimepiride/Glucotrol(sulfonylurea) and SGLT2 Inhibitors canagliflozin/emagliflozin (Invokana, Jardiance)              o Take your regular dose the evening prior to procedure with dinner      2    Colonoscopy or Procedure that requires a bowel prep (clear liquid diet day prior)     Pre-Mixed Insulin (Intermediate Acting:  Humalog 75/25, Humulin 70/30, Novolog 70/30, Regular Insulin)              o Take ½ your regular dose the evening prior to procedure      Rapid/Fast Acting Insulin (Humalog U200, NovoLog, Apidra)              o Take ½ your regular dose the evening prior to procedure      Long Acting Insulin (Lantus, Levemir, Centeno Patee)              o Take your FULL regular dose the day before procedure      Oral Glipizide/Glimepiride/Glucotrol  (sulfonylurea)              o Take ½ your regular dose the evening prior to procedure      Oral Diabetic Medications NOT Glipizide/Glimepiride/Glucotrol              o Take your regular dose the evening prior to procedure with dinner      Opioid Med Class     Continue to take this medication on your normal schedule  If this is an oral medication and you take it in the morning, then you may take this medicine with a sip of water  Statin Med Class     Continue to take this medication on your normal schedule  If this is an oral medication and you take it in the morning, then you may take this medicine with a sip of water  Stool Softener Med Class     Continue to take this medication on your normal schedule  If this is an oral medication and you take it in the morning, then you may take this medicine with a sip of water  Thyroxine Med Class     Continue to take this medication on your normal schedule  If this is an oral medication and you take it in the morning, then you may take this medicine with a sip of water  Urinary antispasmodics Med Class     Continue this medication up to the evening before surgery/procedure, but do not take the morning of the day of surgery    Pre procedure instructions given,verbalizes understanding

## 2018-04-12 RX ORDER — PREDNISONE 20 MG/1
TABLET ORAL
COMMUNITY
Start: 2018-03-03 | End: 2018-04-13

## 2018-04-12 RX ORDER — HYDROCODONE BITARTRATE AND ACETAMINOPHEN 10; 325 MG/1; MG/1
TABLET ORAL
Refills: 0 | COMMUNITY
Start: 2018-03-22 | End: 2018-07-06

## 2018-04-13 ENCOUNTER — OFFICE VISIT (OUTPATIENT)
Dept: PAIN MEDICINE | Facility: CLINIC | Age: 63
End: 2018-04-13
Payer: MEDICARE

## 2018-04-13 VITALS
BODY MASS INDEX: 40.22 KG/M2 | HEIGHT: 63 IN | DIASTOLIC BLOOD PRESSURE: 74 MMHG | HEART RATE: 78 BPM | WEIGHT: 227 LBS | TEMPERATURE: 98.1 F | SYSTOLIC BLOOD PRESSURE: 130 MMHG

## 2018-04-13 DIAGNOSIS — M79.18 MYOFASCIAL PAIN SYNDROME: ICD-10-CM

## 2018-04-13 DIAGNOSIS — G89.4 PAIN SYNDROME, CHRONIC: ICD-10-CM

## 2018-04-13 DIAGNOSIS — G90.522 COMPLEX REGIONAL PAIN SYNDROME TYPE 1 OF LEFT LOWER EXTREMITY: ICD-10-CM

## 2018-04-13 DIAGNOSIS — M54.6 CHRONIC BILATERAL THORACIC BACK PAIN: Primary | ICD-10-CM

## 2018-04-13 DIAGNOSIS — R26.81 GAIT INSTABILITY: ICD-10-CM

## 2018-04-13 DIAGNOSIS — M25.561 CHRONIC PAIN OF RIGHT KNEE: ICD-10-CM

## 2018-04-13 DIAGNOSIS — R26.9 GAIT DISORDER: ICD-10-CM

## 2018-04-13 DIAGNOSIS — M79.604 PAIN IN BOTH LOWER EXTREMITIES: ICD-10-CM

## 2018-04-13 DIAGNOSIS — G89.29 CHRONIC PAIN OF LEFT KNEE: ICD-10-CM

## 2018-04-13 DIAGNOSIS — G89.29 CHRONIC PAIN OF RIGHT KNEE: ICD-10-CM

## 2018-04-13 DIAGNOSIS — G89.29 CHRONIC BILATERAL THORACIC BACK PAIN: Primary | ICD-10-CM

## 2018-04-13 DIAGNOSIS — M25.562 CHRONIC PAIN OF LEFT KNEE: ICD-10-CM

## 2018-04-13 DIAGNOSIS — M79.605 PAIN IN BOTH LOWER EXTREMITIES: ICD-10-CM

## 2018-04-13 DIAGNOSIS — G63 POLYNEUROPATHY ASSOCIATED WITH UNDERLYING DISEASE (HCC): ICD-10-CM

## 2018-04-13 PROBLEM — E03.9 PRIMARY HYPOTHYROIDISM: Status: ACTIVE | Noted: 2017-06-29

## 2018-04-13 PROCEDURE — 99213 OFFICE O/P EST LOW 20 MIN: CPT | Performed by: NURSE PRACTITIONER

## 2018-04-13 RX ORDER — DULOXETIN HYDROCHLORIDE 60 MG/1
CAPSULE, DELAYED RELEASE ORAL
Qty: 180 CAPSULE | Refills: 0 | Status: SHIPPED | OUTPATIENT
Start: 2018-04-13 | End: 2018-07-06 | Stop reason: SDUPTHER

## 2018-04-13 RX ORDER — TIZANIDINE HYDROCHLORIDE 2 MG/1
2 CAPSULE, GELATIN COATED ORAL 4 TIMES DAILY
Qty: 360 CAPSULE | Refills: 0 | Status: SHIPPED | OUTPATIENT
Start: 2018-04-13 | End: 2018-05-30

## 2018-04-13 NOTE — PROGRESS NOTES
Assessment:  1  Chronic bilateral thoracic back pain    2  Complex regional pain syndrome type 1 of left lower extremity    3  Chronic pain of left knee    4  Chronic pain of right knee    5  Gait disorder    6  Gait instability    7  Pain in both lower extremities    8  Pain syndrome, chronic    9  Polyneuropathy associated with underlying disease ([address] )    10  Myofascial pain syndrome        Plan:  While the patient was in the office today, I discussed with the patient that at this point time she is to proceed with the spinal cord stimulator implantation as scheduled later on this month with Dr Claris Ormond  I did thoroughly review with the patient that the permanent implantation is definitely different experience when compared to the trial and that she needs to remember it is a surgery and that there will be increased pain temporarily  I also reminded her that the spinal cord stimulator is an evolving process and that it may take the next year to year and a half to feel like she is maximizing it to its fullest potential of managing her pain, not curing it  The patient was agreeable and verbalized an understanding  At this point time we will continue with the tizanidine and Cymbalta as prescribed, however, our future goal to try to decrease her medications as much as possible  She can continue to take her fentanyl patch and Percocet as prescribed by her primary care provider  The patient was agreeable and verbalized an understanding  The patient will follow-up in 12 weeks for medication prescription refill and reevaluation  The patient was advised to contact the office should their symptoms worsen in the interim  The patient was agreeable and verbalized an understanding  History of Present Illness:     The patient is a 65 y o  female last seen on 1/18/18 who presents for a follow up office visit in regards to chronic pain secondary to complex regional pain syndrome the lower extremities and diabetic neuropathy  The patient currently reports that although her pain symptoms have worsened since her spinal cord stimulator trial, she knows that probably will not improve for another few months  She is scheduled for her permanent spinal cord stimulator implantation on April 23, 2018 with Dr David Forde  She is looking for to proceeding with the stimulator implantation and just having better days overall  Current pain medications includes:  Tizanidine 2 mg q i d     The and Cymbalta 120 mg daily patient reports that this regimen is providing minimal pain relief  The patient is reporting no side effects from this pain medication regimen  I have personally reviewed and/or updated the patient's past medical history, past surgical history, family history, social history, current medications, allergies, and vital signs today  Review of Systems:    Review of Systems   Respiratory: Negative for shortness of breath  Cardiovascular: Negative for chest pain  Gastrointestinal: Positive for constipation  Negative for diarrhea, nausea and vomiting  Musculoskeletal: Positive for gait problem and joint swelling (joint stiffness)  Negative for arthralgias and myalgias  Skin: Negative for rash  Neurological: Negative for dizziness, seizures and weakness  All other systems reviewed and are negative          Past Medical History:   Diagnosis Date    Anxiety     Asthma     controlled    Back complaints     [facility] )     nose    Chronic bilateral thoracic back pain     Chronic myofascial pain     Chronic pain of both lower extremities     Chronic pain of left knee     Chronic pain syndrome     CPAP (continuous positive airway pressure) dependence     Depression     Diabetes mellitus (HCC)     Gait disorder     Gastroparesis     History of angina     Hyperlipidemia     Hypertension     Insulin pump in place     Kidney disease     Polyneuropathy associated with underlying disease ([facility] )  Sleep apnea        Past Surgical History:   Procedure Laterality Date    BREAST SURGERY      BREAST SURGERY      reduction     SECTION      HERNIA REPAIR      HYSTERECTOMY      JOINT REPLACEMENT Left     NECK SURGERY      REPLACEMENT TOTAL KNEE      ROTATOR CUFF REPAIR      TONSILLECTOMY      WISDOM TOOTH EXTRACTION         Family History   Problem Relation Age of Onset    Diabetes Family     Heart disease Family     Hypertension Family     Neuropathy Family     No Known Problems Mother     Heart disease Father     Diabetes Father     No Known Problems Maternal Grandmother     No Known Problems Maternal Grandfather     No Known Problems Paternal Grandmother     No Known Problems Paternal Grandfather        Social History     Occupational History    Not on file       Social History Main Topics    Smoking status: Never Smoker    Smokeless tobacco: Never Used    Alcohol use No    Drug use: No    Sexual activity: Yes         Current Outpatient Prescriptions:     albuterol (2 5 mg/3 mL) 0 083 % nebulizer solution, Take 2 5 mg by nebulization every 6 (six) hours as needed for wheezing, Disp: , Rfl:     albuterol (ACCUNEB) 0 63 MG/3ML nebulizer solution, Take 1 ampule by nebulization every 6 (six) hours as needed for wheezing, Disp: , Rfl:     aspirin (ECOTRIN LOW STRENGTH) 81 mg EC tablet, Take 81 mg by mouth daily, Disp: , Rfl:     atorvastatin (LIPITOR) 80 mg tablet, Take 80 mg by mouth daily, Disp: , Rfl:     baclofen 10 mg tablet, Take 1 tablet (10 mg total) by mouth 3 (three) times a day for 90 days, Disp: 90 tablet, Rfl: 0    cholecalciferol (VITAMIN D3) 1,000 units tablet, Take 2,000 Units by mouth daily, Disp: , Rfl: 5    Cranberry 450 MG CAPS, Take by mouth, Disp: , Rfl:     Cyanocobalamin (VITAMIN B-12 PO), Take by mouth, Disp: , Rfl:     Docusate Sodium 100 MG capsule, Take by mouth, Disp: , Rfl:     DULoxetine (CYMBALTA) 60 mg delayed release capsule, Take by mouth daily, Disp: , Rfl:     fentaNYL (DURAGESIC) 50 mcg/hr, Place on the skin, Disp: , Rfl:     furosemide (LASIX) 20 mg tablet, Take by mouth, Disp: , Rfl:     glucagon (GLUCAGEN) 1 mg injection, Inject under the skin as needed if passed out from low sugar, Disp: , Rfl:     HYDROcodone-acetaminophen (NORCO)  mg per tablet, TAKE ONE TABLET BY MOUTH EVERY 6 HOURS AS NEEDED FOR MODERATE PAIN  MAX DAILY AMOUNT 4 TABLETS, Disp: , Rfl: 0    levothyroxine 75 mcg tablet, Take by mouth, Disp: , Rfl:     Linaclotide (LINZESS) 145 MCG CAPS, Take by mouth, Disp: , Rfl:     losartan (COZAAR) 100 MG tablet, Take by mouth, Disp: , Rfl:     methocarbamol (ROBAXIN) 750 mg tablet, Take 500 mg by mouth every 6 (six) hours as needed for muscle spasms, Disp: , Rfl:     NOVOLOG 100 UNIT/ML injection, INJECT 120 UNITS DAILY VIA INSULIN PUMP E 11 65, Disp: , Rfl: 3    ondansetron (ZOFRAN) 8 mg tablet, Take by mouth, Disp: , Rfl:     oxybutynin (DITROPAN) 5 mg tablet, Take by mouth, Disp: , Rfl:     oxyCODONE-acetaminophen (PERCOCET) 5-325 mg per tablet, Take by mouth, Disp: , Rfl:     pantoprazole (PROTONIX) 40 mg tablet, Take by mouth, Disp: , Rfl:     pramipexole (MIRAPEX) 1 mg tablet, Take by mouth, Disp: , Rfl:     predniSONE 20 mg tablet, , Disp: , Rfl:     ranitidine (ZANTAC) 150 mg tablet, Take by mouth, Disp: , Rfl:     TiZANidine (ZANAFLEX) 2 MG capsule, Take 2 mg by mouth 3 (three) times a day, Disp: , Rfl:     Allergies   Allergen Reactions    Bactrim [Sulfamethoxazole-Trimethoprim] Hives    Sucralfate      Facial swelling    Topamax [Topiramate]      disorentation    Azithromycin      itching    Methotrexate      nausea    Neurontin [Gabapentin]      Severe sedation/hypotension       Physical Exam:    There were no vitals taken for this visit  Constitutional:normal, well developed, well nourished, alert, in no distress and non-toxic and no overt pain behavior   and overweight  Eyes:anicteric  HEENT:grossly intact  Neck:supple, symmetric, trachea midline and no masses   Pulmonary:even and unlabored  Cardiovascular:No edema or pitting edema present  Skin:Normal without rashes or lesions and well hydrated  Psychiatric:Mood and affect appropriate  Neurologic:Cranial Nerves II-XII grossly intact  Musculoskeletal:Antalgic, but steady gait with the assist of 1 or a walker  Imaging  No orders to display         No orders of the defined types were placed in this encounter

## 2018-04-18 ENCOUNTER — TELEPHONE (OUTPATIENT)
Dept: PAIN MEDICINE | Facility: MEDICAL CENTER | Age: 63
End: 2018-04-18

## 2018-04-18 NOTE — TELEPHONE ENCOUNTER
Pt called stating that she would like to speak with a nurse regarding Baclofen  States that it is knocking her out and she cannot function properly  Asking if she can stop taking that and go back to taking the Methocarbamol  Pt can be reached at [phone]

## 2018-04-18 NOTE — TELEPHONE ENCOUNTER
S/w pt, started baclofen on 3/15 for increased spasms after scs trial  Per pt, severe drowsiness w/ baclofen  Questioning if she can resume methocarbimol 1 tab po qhs  Pt states she has #26 on hand  SCS perm surgery is 4/23  Advised pt per DG - ok to resume methocarbimol as discussed  Cb if questions or concerns arise  Pt verbalized understanding and appreciation

## 2018-04-20 ENCOUNTER — DOCUMENTATION (OUTPATIENT)
Dept: NEUROSURGERY | Facility: CLINIC | Age: 63
End: 2018-04-20

## 2018-04-22 ENCOUNTER — ANESTHESIA EVENT (OUTPATIENT)
Dept: PERIOP | Facility: HOSPITAL | Age: 63
End: 2018-04-22
Payer: MEDICARE

## 2018-04-22 PROBLEM — M79.604 CHRONIC PAIN OF BOTH LOWER EXTREMITIES: Chronic | Status: ACTIVE | Noted: 2017-07-25

## 2018-04-22 PROBLEM — G89.4 PAIN SYNDROME, CHRONIC: Chronic | Status: ACTIVE | Noted: 2017-02-21

## 2018-04-22 PROBLEM — G63 POLYNEUROPATHY ASSOCIATED WITH UNDERLYING DISEASE (HCC): Chronic | Status: ACTIVE | Noted: 2017-07-25

## 2018-04-22 PROBLEM — G89.29 CHRONIC PAIN OF BOTH LOWER EXTREMITIES: Chronic | Status: ACTIVE | Noted: 2017-07-25

## 2018-04-22 PROBLEM — G90.522 COMPLEX REGIONAL PAIN SYNDROME TYPE 1 OF LEFT LOWER EXTREMITY: Chronic | Status: ACTIVE | Noted: 2018-03-09

## 2018-04-22 PROBLEM — M79.605 CHRONIC PAIN OF BOTH LOWER EXTREMITIES: Chronic | Status: ACTIVE | Noted: 2017-07-25

## 2018-04-23 ENCOUNTER — APPOINTMENT (OUTPATIENT)
Dept: RADIOLOGY | Facility: HOSPITAL | Age: 63
End: 2018-04-23
Payer: MEDICARE

## 2018-04-23 ENCOUNTER — HOSPITAL ENCOUNTER (OUTPATIENT)
Facility: HOSPITAL | Age: 63
Setting detail: OUTPATIENT SURGERY
Discharge: HOME | End: 2018-04-23
Attending: NEUROLOGICAL SURGERY | Admitting: NEUROLOGICAL SURGERY
Payer: MEDICARE

## 2018-04-23 ENCOUNTER — ANESTHESIA (OUTPATIENT)
Dept: PERIOP | Facility: HOSPITAL | Age: 63
End: 2018-04-23
Payer: MEDICARE

## 2018-04-23 VITALS
OXYGEN SATURATION: 94 % | TEMPERATURE: 99 F | HEIGHT: 63 IN | SYSTOLIC BLOOD PRESSURE: 132 MMHG | DIASTOLIC BLOOD PRESSURE: 58 MMHG | BODY MASS INDEX: 38.98 KG/M2 | HEART RATE: 72 BPM | WEIGHT: 220 LBS | RESPIRATION RATE: 18 BRPM

## 2018-04-23 DIAGNOSIS — M79.605 CHRONIC PAIN OF BOTH LOWER EXTREMITIES: Primary | Chronic | ICD-10-CM

## 2018-04-23 DIAGNOSIS — G89.29 CHRONIC PAIN OF BOTH LOWER EXTREMITIES: Primary | Chronic | ICD-10-CM

## 2018-04-23 DIAGNOSIS — M79.604 CHRONIC PAIN OF BOTH LOWER EXTREMITIES: Primary | Chronic | ICD-10-CM

## 2018-04-23 LAB
ABO GROUP BLD: NORMAL
BLD GP AB SCN SERPL QL: NEGATIVE
GLUCOSE SERPL-MCNC: 198 MG/DL (ref 65–140)
GLUCOSE SERPL-MCNC: 203 MG/DL (ref 65–140)
RH BLD: POSITIVE
SPECIMEN EXPIRATION DATE: NORMAL

## 2018-04-23 PROCEDURE — 63685 INS/RPLC SPI NPG/RCVR POCKET: CPT | Performed by: PHYSICIAN ASSISTANT

## 2018-04-23 PROCEDURE — 82803 BLOOD GASES ANY COMBINATION: CPT

## 2018-04-23 PROCEDURE — 82947 ASSAY GLUCOSE BLOOD QUANT: CPT

## 2018-04-23 PROCEDURE — 72080 X-RAY EXAM THORACOLMB 2/> VW: CPT

## 2018-04-23 PROCEDURE — 85014 HEMATOCRIT: CPT

## 2018-04-23 PROCEDURE — 63655 IMPLANT NEUROELECTRODES: CPT | Performed by: NEUROLOGICAL SURGERY

## 2018-04-23 PROCEDURE — 84132 ASSAY OF SERUM POTASSIUM: CPT

## 2018-04-23 PROCEDURE — C1778 LEAD, NEUROSTIMULATOR: HCPCS | Performed by: NEUROLOGICAL SURGERY

## 2018-04-23 PROCEDURE — 86901 BLOOD TYPING SEROLOGIC RH(D): CPT | Performed by: NEUROLOGICAL SURGERY

## 2018-04-23 PROCEDURE — 63655 IMPLANT NEUROELECTRODES: CPT | Performed by: PHYSICIAN ASSISTANT

## 2018-04-23 PROCEDURE — 86900 BLOOD TYPING SEROLOGIC ABO: CPT | Performed by: NEUROLOGICAL SURGERY

## 2018-04-23 PROCEDURE — 82948 REAGENT STRIP/BLOOD GLUCOSE: CPT

## 2018-04-23 PROCEDURE — C1767 GENERATOR, NEURO NON-RECHARG: HCPCS | Performed by: NEUROLOGICAL SURGERY

## 2018-04-23 PROCEDURE — 82330 ASSAY OF CALCIUM: CPT

## 2018-04-23 PROCEDURE — C1787 PATIENT PROGR, NEUROSTIM: HCPCS | Performed by: NEUROLOGICAL SURGERY

## 2018-04-23 PROCEDURE — 84295 ASSAY OF SERUM SODIUM: CPT

## 2018-04-23 PROCEDURE — 86850 RBC ANTIBODY SCREEN: CPT | Performed by: NEUROLOGICAL SURGERY

## 2018-04-23 PROCEDURE — 63685 INS/RPLC SPI NPG/RCVR POCKET: CPT | Performed by: NEUROLOGICAL SURGERY

## 2018-04-23 DEVICE — IPG PROCLAIM XR5: Type: IMPLANTABLE DEVICE | Status: FUNCTIONAL

## 2018-04-23 DEVICE — IMPLANTABLE DEVICE: Type: IMPLANTABLE DEVICE | Site: SPINE THORACIC | Status: FUNCTIONAL

## 2018-04-23 RX ORDER — CEPHALEXIN 500 MG/1
500 CAPSULE ORAL EVERY 6 HOURS SCHEDULED
Qty: 20 CAPSULE | Refills: 0 | Status: SHIPPED | OUTPATIENT
Start: 2018-04-23 | End: 2018-04-28

## 2018-04-23 RX ORDER — SCOLOPAMINE TRANSDERMAL SYSTEM 1 MG/1
1 PATCH, EXTENDED RELEASE TRANSDERMAL ONCE
Status: COMPLETED | OUTPATIENT
Start: 2018-04-23 | End: 2018-04-23

## 2018-04-23 RX ORDER — SODIUM CHLORIDE 9 MG/ML
INJECTION, SOLUTION INTRAVENOUS CONTINUOUS PRN
Status: DISCONTINUED | OUTPATIENT
Start: 2018-04-23 | End: 2018-04-23 | Stop reason: SURG

## 2018-04-23 RX ORDER — FENTANYL CITRATE/PF 50 MCG/ML
50 SYRINGE (ML) INJECTION
Status: COMPLETED | OUTPATIENT
Start: 2018-04-23 | End: 2018-04-23

## 2018-04-23 RX ORDER — SODIUM CHLORIDE, SODIUM LACTATE, POTASSIUM CHLORIDE, CALCIUM CHLORIDE 600; 310; 30; 20 MG/100ML; MG/100ML; MG/100ML; MG/100ML
50 INJECTION, SOLUTION INTRAVENOUS CONTINUOUS
Status: DISCONTINUED | OUTPATIENT
Start: 2018-04-23 | End: 2018-04-23 | Stop reason: HOSPADM

## 2018-04-23 RX ORDER — BUPIVACAINE HYDROCHLORIDE AND EPINEPHRINE 5; 5 MG/ML; UG/ML
INJECTION, SOLUTION EPIDURAL; INTRACAUDAL; PERINEURAL AS NEEDED
Status: DISCONTINUED | OUTPATIENT
Start: 2018-04-23 | End: 2018-04-23 | Stop reason: HOSPADM

## 2018-04-23 RX ORDER — LIDOCAINE HYDROCHLORIDE 10 MG/ML
INJECTION, SOLUTION INFILTRATION; PERINEURAL AS NEEDED
Status: DISCONTINUED | OUTPATIENT
Start: 2018-04-23 | End: 2018-04-23 | Stop reason: SURG

## 2018-04-23 RX ORDER — SODIUM CHLORIDE, SODIUM LACTATE, POTASSIUM CHLORIDE, CALCIUM CHLORIDE 600; 310; 30; 20 MG/100ML; MG/100ML; MG/100ML; MG/100ML
20 INJECTION, SOLUTION INTRAVENOUS CONTINUOUS
Status: DISCONTINUED | OUTPATIENT
Start: 2018-04-23 | End: 2018-04-23 | Stop reason: HOSPADM

## 2018-04-23 RX ORDER — LABETALOL HYDROCHLORIDE 5 MG/ML
INJECTION, SOLUTION INTRAVENOUS AS NEEDED
Status: DISCONTINUED | OUTPATIENT
Start: 2018-04-23 | End: 2018-04-23 | Stop reason: SURG

## 2018-04-23 RX ORDER — PROPOFOL 10 MG/ML
INJECTION, EMULSION INTRAVENOUS AS NEEDED
Status: DISCONTINUED | OUTPATIENT
Start: 2018-04-23 | End: 2018-04-23 | Stop reason: SURG

## 2018-04-23 RX ORDER — OXYCODONE HYDROCHLORIDE AND ACETAMINOPHEN 5; 325 MG/1; MG/1
2 TABLET ORAL EVERY 4 HOURS PRN
Status: DISCONTINUED | OUTPATIENT
Start: 2018-04-23 | End: 2018-04-23 | Stop reason: HOSPADM

## 2018-04-23 RX ORDER — CHLORHEXIDINE GLUCONATE 0.12 MG/ML
15 RINSE ORAL EVERY 12 HOURS SCHEDULED
Status: DISCONTINUED | OUTPATIENT
Start: 2018-04-23 | End: 2018-04-23 | Stop reason: HOSPADM

## 2018-04-23 RX ORDER — ONDANSETRON 2 MG/ML
4 INJECTION INTRAMUSCULAR; INTRAVENOUS ONCE AS NEEDED
Status: DISCONTINUED | OUTPATIENT
Start: 2018-04-23 | End: 2018-04-23 | Stop reason: HOSPADM

## 2018-04-23 RX ORDER — MORPHINE SULFATE 2 MG/ML
2 INJECTION, SOLUTION INTRAMUSCULAR; INTRAVENOUS
Status: DISCONTINUED | OUTPATIENT
Start: 2018-04-23 | End: 2018-04-23 | Stop reason: HOSPADM

## 2018-04-23 RX ORDER — EPHEDRINE SULFATE 50 MG/ML
INJECTION, SOLUTION INTRAVENOUS AS NEEDED
Status: DISCONTINUED | OUTPATIENT
Start: 2018-04-23 | End: 2018-04-23 | Stop reason: SURG

## 2018-04-23 RX ORDER — FENTANYL CITRATE 50 UG/ML
INJECTION, SOLUTION INTRAMUSCULAR; INTRAVENOUS AS NEEDED
Status: DISCONTINUED | OUTPATIENT
Start: 2018-04-23 | End: 2018-04-23 | Stop reason: SURG

## 2018-04-23 RX ORDER — PROMETHAZINE HYDROCHLORIDE 25 MG/ML
12.5 INJECTION, SOLUTION INTRAMUSCULAR; INTRAVENOUS ONCE AS NEEDED
Status: DISCONTINUED | OUTPATIENT
Start: 2018-04-23 | End: 2018-04-23 | Stop reason: HOSPADM

## 2018-04-23 RX ORDER — PROPOFOL 10 MG/ML
INJECTION, EMULSION INTRAVENOUS CONTINUOUS PRN
Status: DISCONTINUED | OUTPATIENT
Start: 2018-04-23 | End: 2018-04-23 | Stop reason: SURG

## 2018-04-23 RX ORDER — SCOLOPAMINE TRANSDERMAL SYSTEM 1 MG/1
1 PATCH, EXTENDED RELEASE TRANSDERMAL
Status: DISCONTINUED | OUTPATIENT
Start: 2018-04-23 | End: 2018-04-23

## 2018-04-23 RX ORDER — MIDAZOLAM HYDROCHLORIDE 1 MG/ML
INJECTION INTRAMUSCULAR; INTRAVENOUS AS NEEDED
Status: DISCONTINUED | OUTPATIENT
Start: 2018-04-23 | End: 2018-04-23 | Stop reason: SURG

## 2018-04-23 RX ORDER — ALBUTEROL SULFATE 2.5 MG/3ML
2.5 SOLUTION RESPIRATORY (INHALATION) ONCE AS NEEDED
Status: DISCONTINUED | OUTPATIENT
Start: 2018-04-23 | End: 2018-04-23 | Stop reason: HOSPADM

## 2018-04-23 RX ORDER — MAGNESIUM HYDROXIDE 1200 MG/15ML
LIQUID ORAL AS NEEDED
Status: DISCONTINUED | OUTPATIENT
Start: 2018-04-23 | End: 2018-04-23 | Stop reason: HOSPADM

## 2018-04-23 RX ORDER — ONDANSETRON 2 MG/ML
4 INJECTION INTRAMUSCULAR; INTRAVENOUS EVERY 6 HOURS PRN
Status: DISCONTINUED | OUTPATIENT
Start: 2018-04-23 | End: 2018-04-23 | Stop reason: HOSPADM

## 2018-04-23 RX ORDER — SODIUM CHLORIDE 9 MG/ML
100 INJECTION, SOLUTION INTRAVENOUS CONTINUOUS
Status: DISCONTINUED | OUTPATIENT
Start: 2018-04-23 | End: 2018-04-23 | Stop reason: HOSPADM

## 2018-04-23 RX ORDER — ALBUMIN, HUMAN INJ 5% 5 %
SOLUTION INTRAVENOUS CONTINUOUS PRN
Status: DISCONTINUED | OUTPATIENT
Start: 2018-04-23 | End: 2018-04-23 | Stop reason: SURG

## 2018-04-23 RX ORDER — METOCLOPRAMIDE HYDROCHLORIDE 5 MG/ML
10 INJECTION INTRAMUSCULAR; INTRAVENOUS ONCE AS NEEDED
Status: DISCONTINUED | OUTPATIENT
Start: 2018-04-23 | End: 2018-04-23 | Stop reason: HOSPADM

## 2018-04-23 RX ORDER — ONDANSETRON 2 MG/ML
INJECTION INTRAMUSCULAR; INTRAVENOUS AS NEEDED
Status: DISCONTINUED | OUTPATIENT
Start: 2018-04-23 | End: 2018-04-23 | Stop reason: SURG

## 2018-04-23 RX ORDER — METHOCARBAMOL 500 MG/1
500 TABLET, FILM COATED ORAL EVERY 6 HOURS PRN
Status: DISCONTINUED | OUTPATIENT
Start: 2018-04-23 | End: 2018-04-23 | Stop reason: HOSPADM

## 2018-04-23 RX ORDER — LIDOCAINE HYDROCHLORIDE AND EPINEPHRINE 10; 10 MG/ML; UG/ML
INJECTION, SOLUTION INFILTRATION; PERINEURAL AS NEEDED
Status: DISCONTINUED | OUTPATIENT
Start: 2018-04-23 | End: 2018-04-23 | Stop reason: HOSPADM

## 2018-04-23 RX ORDER — SUCCINYLCHOLINE CHLORIDE 20 MG/ML
INJECTION INTRAMUSCULAR; INTRAVENOUS AS NEEDED
Status: DISCONTINUED | OUTPATIENT
Start: 2018-04-23 | End: 2018-04-23 | Stop reason: SURG

## 2018-04-23 RX ADMIN — FENTANYL CITRATE 50 MCG: 50 INJECTION, SOLUTION INTRAMUSCULAR; INTRAVENOUS at 10:38

## 2018-04-23 RX ADMIN — EPHEDRINE SULFATE 5 MG: 50 INJECTION, SOLUTION INTRAMUSCULAR; INTRAVENOUS; SUBCUTANEOUS at 08:17

## 2018-04-23 RX ADMIN — EPHEDRINE SULFATE 15 MG: 50 INJECTION, SOLUTION INTRAMUSCULAR; INTRAVENOUS; SUBCUTANEOUS at 08:20

## 2018-04-23 RX ADMIN — VASOPRESSIN 0.5 UNITS: 20 INJECTION, SOLUTION INTRAMUSCULAR; SUBCUTANEOUS at 08:58

## 2018-04-23 RX ADMIN — EPHEDRINE SULFATE 5 MG: 50 INJECTION, SOLUTION INTRAMUSCULAR; INTRAVENOUS; SUBCUTANEOUS at 08:00

## 2018-04-23 RX ADMIN — PROPOFOL 100 MCG/KG/MIN: 10 INJECTION, EMULSION INTRAVENOUS at 08:00

## 2018-04-23 RX ADMIN — ONDANSETRON 4 MG: 2 INJECTION INTRAMUSCULAR; INTRAVENOUS at 09:45

## 2018-04-23 RX ADMIN — MIDAZOLAM 2 MG: 1 INJECTION INTRAMUSCULAR; INTRAVENOUS at 07:26

## 2018-04-23 RX ADMIN — VASOPRESSIN 0.5 UNITS: 20 INJECTION, SOLUTION INTRAMUSCULAR; SUBCUTANEOUS at 09:00

## 2018-04-23 RX ADMIN — ALBUMIN HUMAN: 0.05 INJECTION, SOLUTION INTRAVENOUS at 08:27

## 2018-04-23 RX ADMIN — SCOPALAMINE 1 PATCH: 1 PATCH, EXTENDED RELEASE TRANSDERMAL at 07:31

## 2018-04-23 RX ADMIN — FENTANYL CITRATE 50 MCG: 50 INJECTION, SOLUTION INTRAMUSCULAR; INTRAVENOUS at 10:29

## 2018-04-23 RX ADMIN — CHLORHEXIDINE GLUCONATE 15 ML: 1.2 RINSE ORAL at 06:16

## 2018-04-23 RX ADMIN — PROPOFOL 200 MG: 10 INJECTION, EMULSION INTRAVENOUS at 07:40

## 2018-04-23 RX ADMIN — SODIUM CHLORIDE: 0.9 INJECTION, SOLUTION INTRAVENOUS at 07:45

## 2018-04-23 RX ADMIN — EPHEDRINE SULFATE 5 MG: 50 INJECTION, SOLUTION INTRAMUSCULAR; INTRAVENOUS; SUBCUTANEOUS at 08:35

## 2018-04-23 RX ADMIN — SUCCINYLCHOLINE CHLORIDE 100 MG: 20 INJECTION, SOLUTION INTRAMUSCULAR; INTRAVENOUS at 07:40

## 2018-04-23 RX ADMIN — ALBUMIN HUMAN: 0.05 INJECTION, SOLUTION INTRAVENOUS at 09:20

## 2018-04-23 RX ADMIN — OXYCODONE HYDROCHLORIDE AND ACETAMINOPHEN 2 TABLET: 5; 325 TABLET ORAL at 13:06

## 2018-04-23 RX ADMIN — Medication 0.1 MCG/KG/MIN: at 08:00

## 2018-04-23 RX ADMIN — LABETALOL HYDROCHLORIDE 5 MG: 5 INJECTION, SOLUTION INTRAVENOUS at 07:44

## 2018-04-23 RX ADMIN — LIDOCAINE HYDROCHLORIDE 100 MG: 10 INJECTION, SOLUTION INFILTRATION; PERINEURAL at 07:40

## 2018-04-23 RX ADMIN — EPHEDRINE SULFATE 10 MG: 50 INJECTION, SOLUTION INTRAMUSCULAR; INTRAVENOUS; SUBCUTANEOUS at 08:33

## 2018-04-23 RX ADMIN — EPHEDRINE SULFATE 10 MG: 50 INJECTION, SOLUTION INTRAMUSCULAR; INTRAVENOUS; SUBCUTANEOUS at 08:25

## 2018-04-23 RX ADMIN — VASOPRESSIN 0.5 UNITS: 20 INJECTION, SOLUTION INTRAMUSCULAR; SUBCUTANEOUS at 08:52

## 2018-04-23 RX ADMIN — FENTANYL CITRATE 50 MCG: 50 INJECTION, SOLUTION INTRAMUSCULAR; INTRAVENOUS at 10:49

## 2018-04-23 RX ADMIN — SODIUM CHLORIDE, SODIUM LACTATE, POTASSIUM CHLORIDE, AND CALCIUM CHLORIDE: .6; .31; .03; .02 INJECTION, SOLUTION INTRAVENOUS at 06:41

## 2018-04-23 RX ADMIN — VASOPRESSIN 2 UNITS: 20 INJECTION, SOLUTION INTRAMUSCULAR; SUBCUTANEOUS at 09:16

## 2018-04-23 RX ADMIN — FENTANYL CITRATE 50 MCG: 50 INJECTION, SOLUTION INTRAMUSCULAR; INTRAVENOUS at 11:00

## 2018-04-23 RX ADMIN — Medication 2000 MG: at 08:07

## 2018-04-23 RX ADMIN — FENTANYL CITRATE 50 MCG: 50 INJECTION, SOLUTION INTRAMUSCULAR; INTRAVENOUS at 07:40

## 2018-04-23 RX ADMIN — Medication 0.2 MCG/KG/HR: at 08:00

## 2018-04-23 RX ADMIN — FENTANYL CITRATE 50 MCG: 50 INJECTION, SOLUTION INTRAMUSCULAR; INTRAVENOUS at 09:45

## 2018-04-23 NOTE — INTERVAL H&P NOTE
H&P reviewed  After examining the patient I find no changes in the patients condition since the H&P had been written  Patient personally seen and examined  No significant change in symptoms or physical examination compared to my previous  office note  Risks, benefits alternatives to insertion of thoracic spinal cord stimulator electrode via laminotomy and placement of left buttock IPG have been reviewed  All questions have been answered  Postoperative instructions have been reviewed  Patient would like to proceed with surgery    Postoperative pain medication to be managed by pain specialist

## 2018-04-23 NOTE — ANESTHESIA POSTPROCEDURE EVALUATION
Post-Op Assessment Note      CV Status:  Stable    Mental Status:  Alert and awake    Hydration Status:  Stable    PONV Controlled:  Controlled    Airway Patency:  Patent    Post Op Vitals Reviewed: Yes          Staff: AnesthesiologistTEJ           BP (P) 127/53 (04/23/18 1120)    Temp (P) 98 1 °F (36 7 °C) (04/23/18 1120)    Pulse (P) 83 (04/23/18 1120)   Resp (P) 18 (04/23/18 1120)    SpO2 (P) 100 % (04/23/18 1120)

## 2018-04-23 NOTE — DISCHARGE INSTRUCTIONS
Spinal Cord Stimulator Discharge Instructions    Activity:    1  Do not lift more than 20 pounds for 2 weeks  2  Avoid bending, lifting and twisting for 2 weeks  Surgical incision care:    1  Keep dressings in place for 3 days  2  Keep incisions dry for 3 days  3  May allow clean water to flow over incisions after 3 days  4  Do not immerse the incisions in water for 4 weeks  5  After 3 days, incisions may be left open to air, but should remain clean  6  Do not apply any creams or ointments to the incision, unless otherwise instructed by [facility]  7  Contact office if increasing redness, drainage, pain or swelling around the incisions  8  Complete upright xray of thoracic and lumbar spine prior to 6 week post operative appointment  Postoperative medication:    1  [employer] will not provide pain medication for the first 2 weeks after surgery as coordinated with your pain specialist    2  If [employer] is providing pain medication, please contact office for questions regarding dosage and modifications  3  If [facility] is not providing pain medication postoperatively, then contact pain specialist for additional instructions and prescriptions  4  No antiplatelet or anticoagulation medication for 2 weeks after surgery, unless otherwise instructed  Please contact [facility] if you have any questions about the effects of any of your medications on blood clotting  5  Do not operate heavy machinery or vehicles while taking sedating medications  *Note that it may take some time for the stimulator to improve chronic pain symptoms  Adjustment of the stimulator program can begin 2 weeks after placement  Please contact the stimulator representative if you have any questions regarding programing

## 2018-04-23 NOTE — POST OP PROGRESS NOTES
PostOperative Progress Note - Ino Antoine 65 y o  female MRN: [id number]  Unit/Bed#: OR POOL Encounter: [id number]    Assessment: 1  Pain syndrome, chronic [G89 4]  2  Polyneuropathy associated with underlying disease ([address] ) Mike Butler  3  Chronic pain of both lower extremities [M79 604, M79 605, G89 29]  4  Lumbar radiculopathy      Subjective/Objective   Chief Complaint: Ino Antoine is S/P  insertion of thoracic spinal cord stimulator electrode via T9-T10 laminotomy and placement of left buttock IPG   Patient denies any post operative pain at this time  Denies nausea, vomiting, SOB, and chest pain  /70   Pulse 73   Temp 97 6 °F (36 4 °C)   Resp 20   Ht 5' 3" (1 6 m)   Wt 99 8 kg (220 lb)   SpO2 96%   BMI 38 97 kg/m² ,Body mass index is 38 97 kg/m²  Intake/Output Summary (Last 24 hours) at 04/23/18 1030  Last data filed at 04/23/18 0943   Gross per 24 hour   Intake              500 ml   Output              600 ml   Net             -100 ml       Invasive Devices     Peripheral Intravenous Line            Peripheral IV 04/23/18 Left Wrist less than 1 day    Peripheral IV 04/23/18 Right Forearm less than 1 day                Physical Exam:   General: in no apparent distress  Alert and oriented  Sitting upright in bed  alert   Neuro:  Patient is able to move both legs without difficulty and lower extremity sensation is intact bilaterally      Pieter Underwood PA-C

## 2018-04-23 NOTE — POST OP PROGRESS NOTES
Brief progress note    Patient complaining of pulling pain across the mid back area as expected  Waiting for pain medication at this time  She also complains of slight nausea but no vomiting  She did tolerate eating lunch and has voided  She denies leg pain, fever, chills, shortness of breath, and chest pain  On examination patient is alert and oriented, in no acute distress  Vital signs are stable  Chest:  Clear to auscultation throughout  Heart:  Regular rate and rhythm  Neuro:  Sensation to lower extremities intact bilaterally  Patient is able to move legs without difficulty or pain  Assessment  1   Status post insertion of thoracic spinal cord stimulator electrode via T9-T10 laminotomy and placement of left buttock IPG  2  Chronic pain syndrome  3  Lumbar radiculopathy  4  Chronic pain of both lower extremities  5  Polyneuropathy    Plan:   Will discharge to home  Follow-up in office in 2 weeks  Discharge instructions given both verbally and written    Bella Gonzalez PA-C

## 2018-04-23 NOTE — PERIOPERATIVE NURSING NOTE
[employer] representatves at bedside, device turned on and set     Ipod and white box given to  by reps

## 2018-04-23 NOTE — PERIOPERATIVE NURSING NOTE
Colletta Skipper and SRNA into see patient, Fentanyl patch not present where it was previously placed

## 2018-04-23 NOTE — OP NOTE
OPERATIVE REPORT  PATIENT NAME: Deneen Martinez    :  1955  MRN: [id number]  Pt Location: [facility] OR ROOM 17    SURGERY DATE: 2018    Surgeon(s) and Role:     * Dora Lanza MD - Primary     * Bella Gonzalez PA-C - Assisting    No qualified resident was available  JULIO was present for the procedure, and provided essential assistance with proper exposure, retraction, hemostasis, placement of electrode and IPG, and wound closure, which was necessary secondary to the complex nature of this case  Preop Diagnosis:  Pain syndrome, chronic [G89 4]  Polyneuropathy associated with underlying disease (HCC) [G63]  Chronic pain of both lower extremities [M79 604, M79 605, G89 29]  Lumbar radiculopathy    Post-Op Diagnosis Codes:     * Pain syndrome, chronic [G89 4]     * Polyneuropathy associated with underlying disease (HCC) [G63]     * Chronic pain of both lower extremities [M79 604, M79 605, G89 29]  Lumbar radiculopathy     Procedures:  1  Insertion of thoracic spinal cord stimulator electrode via T9-T10 laminotomy and placement of left buttock IPG ([facility] [id number]; [employer] [id number])    Specimen(s):  * No specimens in log *    Estimated Blood Loss:   600 mL    Drains:       Anesthesia Type:   General    Operative Indications:  Pain syndrome, chronic [G89 4]  Polyneuropathy associated with underlying disease (HCC) [G63]  Chronic pain of both lower extremities [M79 604, M79 605, G89 29]  Lumbar radiculopathy    Operative Findings:  Placement of tripole thoracic spinal cord stimulator with intraoperative electrophysiological bilateral lower extremity coverage  Complications:   None    Procedure and Technique:  Clinical Note:    The goals and alternatives to the procedure described above were discussed with patient  Surgery is intended to decompress neural structures and hopefully improve radicular pain  Weakness, numbness and back pain are less likely to improve      The risks of surgery were described in detail  1  Risk of general anesthetic, with possible cardiac and respiratory complication  There is risk of infection and bleeding  2  Risk of neurological injury with new pain, weakness or numbness or difficulties with bowel and bladder function  The risk of CSF leak was described  3  Risk of system malfunction and failure of treatment  Need for revision surgery  Once all questions were answered to their satisfaction, they asked to proceed with surgery  Operating Room Note    The patient was brought to the operating room and placed under general anesthetic  Once all appropriate lines were in position, the patient was carefully turned in the prone position onto a Alonso frame  Care was taken to ensure that all pressure points were padded and the neck was in a neutral position  AP and lateral fluoroscopy were used to identify midline of the thoracic spine and T9-T10 level  An incision was also planned over the left buttock  The skin was then prepped and draped in usual sterile fashion One percent lidocaine with 100,000 of epinephrine was used to infiltrate the soft tissue  A full surgical timeout was undertaken identifying the site, side and level of surgery  The patient received preoperative antibiotics  10 blade was used to incise the skin at the thoracic incision  Monopolar cautery was used to dissect down along the spinous processes and lamina  Bipolar cautery and Aquamantys were used for hemostasis  The surgical site was irrigated with 50% dilute peroxide solution  The T 90° level was confirmed with fluoroscopy  The interspinous ligament was removed as was the base of the spinous processes  The underlying ligamentum flavum and lamina was undermined with curved curette and removed with Kerrison punch  The underlying epidural fat was identified and a Anupama  was used to dissect along the dorsal aspect of the dura      A 5 column lead could not be easily passed in the epidural space  A 3 column lead was then advanced under fluoroscopy to cover the T8-T9 interspace  Electrophysiological monitoring remained stable  The lead was repositioned as appropriate to identify physiological midline  The lead was then was then secured in position to the spinous process with a silk suture, and a strain relief loop was created by securing the lead within the paraspinal musculature and fascia  The surgical site was irrigated copiously with irrigation  FloSeal was used for hemostasis  Ten blade was used to incise the skin over the planned buttock incision  Monopolar cautery was used to dissect through the subcutaneous tissue above the fascia  Blunt and sharp dissection was used for further creation of a pocket  The electrode was then passed from the thoracic incision with a tunneler to the buttock incision  The distal portion of the electrode was connected to the generator  Excess lead and generator was then placed in the pocket and secured in position with silk suture  The system was then interrogated and was noted to be working within normal limits and impedances  Both incisions were irrigated with antibiotic irrigation  Bipolar cautery was used for further hemostasis  At the thoracic incision, Vicryl suture was used to approximate the fascia  Inverted Vicryl suture was used to approximate the subcutaneous tissues at both incisions  Running 4 0 Monocryl was then used to close both incisions  0 5% Marcaine with 1/100,000 of epinephrine was used to infiltrate the soft tissue  A Miplex was applied to both incisions  The count was correct at the end of the case and there were no complications  Electrophysiological monitoring remained stable  The patient was carefully transferred onto the operating gurney and extubated  The patient was transferred to the PACU where they were noted to be hemodynamically stable and neurologically unchanged   The results of surgery were discussed with patient and spouse  In the postoperative period, the patient underwent electronic analysis and complex programming of the spinal cord stimulator system with the spinal cord stimulator representatives      I was present for the entire procedure    Patient Disposition:  PACU     SIGNATURE: Gracie Sneed MD  DATE: April 23, 2018  TIME: 10:08 AM

## 2018-04-23 NOTE — ANESTHESIA PREPROCEDURE EVALUATION
Review of Systems/Medical History  Patient summary reviewed  Chart reviewed  History of anesthetic complications PONV    Cardiovascular  EKG reviewed, Hyperlipidemia, Hypertension ,   Comment: Sinus rhythm pacs  with 1st degree A-V block with junctional escape complexes  Left anterior fascicular block  Right bundle branch block  Abnormal ECG  When compared with ECG of 03-APR-2018 14:23, (unconfirmed)  pacs are now present  Confirmed by ANDRZEJ Ryder , Ellery Galeazzi ([phone]) on 4/4/2018 11:01:06 AM    Specimen Collected: 04/03/18 14:24  Last Resulted: 04/04/18 11:01      ,  Pulmonary  Asthma: , Sleep apnea ,        GI/Hepatic    GERD ,             Endo/Other  Diabetes , History of thyroid disease , hypothyroidism,      GYN       Hematology  Anemia ,     Musculoskeletal       Neurology   Psychology   Anxiety, Depression ,              Physical Exam    Airway    Mallampati score: II  TM Distance: >3 FB  Neck ROM: full     Dental   No notable dental hx     Cardiovascular  Cardiovascular exam normal    Pulmonary  Pulmonary exam normal Breath sounds clear to auscultation,     Other Findings        Anesthesia Plan  ASA Score- 3     Anesthesia Type-   Additional Monitors:   Airway Plan: ETT  Plan Factors- Patient instructed to abstain from smoking on day of procedure       Induction- intravenous  Postoperative Plan- Plan for postoperative opioid use  Planned trial extubation    Informed Consent- Anesthetic plan and risks discussed with patient  I personally reviewed this patient with the CRNA  Discussed and agreed on the Anesthesia Plan with the CRNA             Lab Results   Component Value Date    WBC 5 50 04/03/2018    HGB 10 6 (L) 04/03/2018    HCT 34 4 (L) 04/03/2018    MCV 87 04/03/2018     04/03/2018     Lab Results   Component Value Date    GLUCOSE 243 (H) 04/03/2018    CALCIUM 9 9 04/03/2018     04/03/2018    K 4 5 04/03/2018    CO2 30 04/03/2018     04/03/2018    BUN 15 04/03/2018 CREATININE 0 96 04/03/2018     Lab Results   Component Value Date    INR 1 06 04/03/2018    PROTIME 13 8 04/03/2018     Lab Results   Component Value Date    PTT 31 04/03/2018     Type and Screen:  B      I, Dr Giselle Bernard, the attending physician, have personally seen and evaluated the patient prior to anesthetic care  I have reviewed the pre-anesthetic record, and other medical records if appropriate to the anesthetic care  If a CRNA is involved in the case, I have reviewed the CRNA assessment, if present, and agree  The patient is in a suitable condition to proceed with my formulated anesthetic plan

## 2018-04-24 ENCOUNTER — TELEPHONE (OUTPATIENT)
Dept: PAIN MEDICINE | Facility: MEDICAL CENTER | Age: 63
End: 2018-04-24

## 2018-04-24 ENCOUNTER — TELEPHONE (OUTPATIENT)
Dept: NEUROSURGERY | Facility: CLINIC | Age: 63
End: 2018-04-24

## 2018-04-24 LAB
BASE EXCESS BLDA CALC-SCNC: -2 MMOL/L (ref -2–3)
CA-I BLD-SCNC: 1.35 MMOL/L (ref 1.12–1.32)
GLUCOSE SERPL-MCNC: 197 MG/DL (ref 65–140)
HCO3 BLDA-SCNC: 25.3 MMOL/L (ref 24–30)
HCT VFR BLD CALC: 26 % (ref 34.8–46.1)
HGB BLDA-MCNC: 8.8 G/DL (ref 11.5–15.4)
PCO2 BLD: 27 MMOL/L (ref 21–32)
PCO2 BLD: 53.5 MM HG (ref 42–50)
PH BLD: 7.28 [PH] (ref 7.3–7.4)
PO2 BLD: 50 MM HG (ref 35–45)
POTASSIUM BLD-SCNC: 4.3 MMOL/L (ref 3.5–5.3)
SAO2 % BLD FROM PO2: 79 % (ref 95–98)
SODIUM BLD-SCNC: 141 MMOL/L (ref 136–145)
SPECIMEN SOURCE: ABNORMAL

## 2018-04-24 NOTE — TELEPHONE ENCOUNTER
[facility]- patient called requesting to get a c/b from a nurse  Stated she had surgery yesterday and have some questions   C/b [phone]

## 2018-04-24 NOTE — TELEPHONE ENCOUNTER
S/w pt, states she is having increased pain s/t scs surgery w/ Dr Jessy Manning yesterday  Pt questioned if she can take advil inbetween her percocet  Advised pt to fu w/ dr Jessy Manning re: advil  Pt noted that she was sitting with her legs elevated yesterday and noted fluid draining from her leg  Advised pt to make Dr Jessy Manning aware  Pt verbalized understanding and appreciation

## 2018-04-24 NOTE — TELEPHONE ENCOUNTER
Pt called to inquire if she could take Aleve or Advil in between her Percocet  Advised her not to take these medications for the first two weeks post-op and to discuss at her 2 week f/u  She stated an understanding  She also mentioned weeping from her legs, but has a call to her PCP regadring this  She was encouraged to call back with any future concerns

## 2018-04-26 ENCOUNTER — TELEPHONE (OUTPATIENT)
Dept: NEUROSURGERY | Facility: CLINIC | Age: 63
End: 2018-04-26

## 2018-04-26 NOTE — TELEPHONE ENCOUNTER
Pt is s/p SCS placement on 4/23  She reports she is taking Percocet, but it is not effective  Reviewing her meds, she is also using 50 mcg Fentanyl Patch prescribed by [facility]  Suggested she call them to do any medication adjustments  She was in agreement

## 2018-04-27 ENCOUNTER — TELEPHONE (OUTPATIENT)
Dept: NEUROSURGERY | Facility: CLINIC | Age: 63
End: 2018-04-27

## 2018-04-27 NOTE — TELEPHONE ENCOUNTER
Patient LM on nurse line requesting a call back regarding her post-op pain  Attempted to reach patient with no answer  LMOM for patient to call back at her convenience

## 2018-04-27 NOTE — TELEPHONE ENCOUNTER
Patient returned call to the nurse line  Wanted to know if it was normal to be experiencing pain this far out from surgery  Explained that given her surgery was done on 4/23/2018 it is normal for her to still be experiencing some post surgical pain  Explained that in some cases it can take up to 2 weeks to begin feeling relief depending on her healing process  Noted that she call yesterday in regards to pain that she was advised to call [facility] if she felt her pain meds needed to be adjusted  Patient was appreciative of the reassurance  Will call back with any other questions or concerns

## 2018-05-07 ENCOUNTER — CLINICAL SUPPORT (OUTPATIENT)
Dept: NEUROSURGERY | Facility: CLINIC | Age: 63
End: 2018-05-07

## 2018-05-07 VITALS
TEMPERATURE: 98.9 F | HEIGHT: 63 IN | WEIGHT: 220 LBS | BODY MASS INDEX: 38.98 KG/M2 | SYSTOLIC BLOOD PRESSURE: 132 MMHG | RESPIRATION RATE: 16 BRPM | DIASTOLIC BLOOD PRESSURE: 78 MMHG

## 2018-05-07 DIAGNOSIS — Z98.890 STATUS POST SURGERY: Primary | ICD-10-CM

## 2018-05-07 PROCEDURE — 99024 POSTOP FOLLOW-UP VISIT: CPT

## 2018-05-07 NOTE — PROGRESS NOTES
Post-Op Visit-Neurosurgery    Valeriy Davidson 65 y o  female MRN: [id number]     Vitals:    05/07/18 0958   BP: 132/78   Resp: 16   Temp: 98 9 °F (37 2 °C)   Weight: 99 8 kg (220 lb)   Height: 5' 3" (1 6 m)       Chief Complaint  Patient presents post: Insertion of thoracic spinal cord stimulator electrode via T9-T10 laminotomy and placement of left buttock IPG  History of Present Illness  Patient presents for 2 week POV for incision check  She reports that she is doing well overall and denies any incisional issues or fevers  Pain in her legs had initially resolved almost completely after surgery, but patient reports that she "might have over-did it" this weekend and has some pain in her b/l legs  She is going to meet with Sy Velasco from [address] [place] [address] at today's visit for programming  Patient states that her doctor changed her norco back to percocet because it was not helping with her pain  Assessment  Wound Exam: Incisions well approximated  Mild erythema surrounding left buttock incision  No edema or drainage present  Location: Midline back and left buttock  Complications: None  Incisions LINDSEY  Discussion/Summary  Reviewed incision care with patient including daily observation for s/s infection including: increased erythema, edema, drainage, dehiscence of incision or fever >101  Advised to continue cleansing area with mild soap and water and pat dry  Not to apply any lotions, creams, or ointments, & not to submerge in any water  She is to maintain lifting restrictions and limit bending/twisting at the waist until cleared by the surgeon  Verified date/time/location of upcoming POV and reminded her to complete x-rays prior to his visit on 6/5/18  She is to call the office with any further questions or concerns, or if any incisional issues or fevers would arise

## 2018-05-16 ENCOUNTER — OFFICE VISIT (OUTPATIENT)
Dept: NEUROSURGERY | Facility: CLINIC | Age: 63
End: 2018-05-16

## 2018-05-16 VITALS
HEIGHT: 63 IN | SYSTOLIC BLOOD PRESSURE: 142 MMHG | DIASTOLIC BLOOD PRESSURE: 70 MMHG | HEART RATE: 78 BPM | WEIGHT: 226 LBS | BODY MASS INDEX: 40.04 KG/M2 | TEMPERATURE: 98.9 F

## 2018-05-16 DIAGNOSIS — L53.9 ERYTHEMA: ICD-10-CM

## 2018-05-16 DIAGNOSIS — Z48.89 AFTERCARE FOLLOWING SURGERY: Primary | ICD-10-CM

## 2018-05-16 PROCEDURE — 99024 POSTOP FOLLOW-UP VISIT: CPT | Performed by: PHYSICIAN ASSISTANT

## 2018-05-16 RX ORDER — CEPHALEXIN 500 MG/1
500 CAPSULE ORAL EVERY 6 HOURS SCHEDULED
Qty: 28 CAPSULE | Refills: 0 | Status: SHIPPED | OUTPATIENT
Start: 2018-05-16 | End: 2018-05-24 | Stop reason: SDUPTHER

## 2018-05-16 NOTE — LETTER
May 17, 2018     Ajay Kirby DO  [address] [address]    Patient: Aditya Rand   YOB: 1955   Date of Visit: 5/16/2018       Dear Dr Nicole Henry:    Thank you for referring Delaine Bosworth to me for evaluation  Below are my notes for this consultation  If you have questions, please do not hesitate to call me  I look forward to following your patient along with you  Sincerely,        Tanya Aguilar PA-C        CC: DO Lars Sheehan CRNP Napolean Lively, PA-C  5/17/2018  3:30 AM  Sign at close encounter  Assessment/Plan:       Diagnoses and all orders for this visit:    Aftercare following surgery  -     cephalexin (KEFLEX) 500 mg capsule; Take 1 capsule (500 mg total) by mouth every 6 (six) hours for 7 days    Erythema  -     cephalexin (KEFLEX) 500 mg capsule; Take 1 capsule (500 mg total) by mouth every 6 (six) hours for 7 days          Discussion / Summary: This is a 65 y o  female who presents for incision check pursuant to patient phone call to office  She is s/p insertion of thoracic spinal cord stimulator electrode via T9-T10 laminotomy and placement of left buttock IPG ([facility]) -- preformed by Dr Toño Steele on 4/23/18  Patient reports  noticed brownish yellowish drainage of left buttock incision last night when applying routine dressing to left buttock incision at bedtime  She reports her cloths tends to rub on her left buttock incision  There appears to be some excoriation of lateral aspect of left buttock incision and erythema of incision which appears consistent with likely rubbing of incision  There is granulation tissue present lateral pole of left buttock incision  The dressing (5 cm x 3 5 cm) she had over lateral aspect of left buttock incision which she reports was placed last night had brownish serosanguinous dried discharge on about half of the dressing    No drainage elicited from left buttock incision at time of appointment  Discussed patient's case and reviewed incision picture with Dr David Forde  No overt presence of incision infection  As precaution recommend course of Keflex  Recommend she maintain optional blood sugar control  Explained poor sugar control can lean to poor healing of incision and increase risk for development of infection  Incision care discussed with patient  She is to keep her incisions clean and dry  Patient may shower with soap and water  Patient is to pat incision dry with clean towel after showering  Patient is to refrain from placing ointments, oils, or lotions on surgical incisions  Suggest cover left buttock incision with non-adhesive Telfa Dressing or bandaid and change at least daily  The was provided with Telfa dressing  Recommend she avoid wearing cloths that would rub on surgical incision  Patient is to refrain from submerging surgical incision (ie  no baths, no swimming) until re-evaluated and cleared otherwise by neurosurgeon  No driving if taking narcotic pain medication or muscle relaxer    Patient is to refrain from strenuous activity, refrain from bending / twisting back,  and limit lifting, pulling, pushing to no more than 10 lbs  Patient reports she will be meeting with [facility] representative later this week for additional SCS programming  Patient to continue follow up with her established pain management specialist for continued pain management care  Patient advised to notify our office or present to ER if experience incision concern (swelling, opening / gapping, increased redness or drainage), fevers/chills, worsening pain, sensory / motor change, or other neurological change  Patient expressed understanding and agreement       ________________________________________________________________________________  Subjective:      Patient ID: Anjali Guerrero is a 65 y o  female who presents for incision check       She is s/p insertion of thoracic spinal cord stimulator electrode via T9-T10 laminotomy and placement of left buttock IPG ([facility]) -- preformed by Dr Andres Flores on 4/23/18  She has history of chronic pain syndrome, CRPS, chronic low back pain, bilateral leg pain  HPI  She was last seen in office for routine post-op incision check on 5/7/18  She presents today for incision check pursuant to patient phone call to office  Patient reports  noticed brownish yellowish drainage of left buttock incision last night when applying routine dressing to left buttock incision at bedtime to avoid rubbing on bedsheets  She reports her cloths tends to rub on her left buttock incision  She reports tenderness of left buttock incision  No issues with thoracic incision  She denies fevers or chills  She reports spinal cord stim has provide approximately 20% relief her chronic leg pain  She has not noticed any significant relief her chronic low back pain with use of the spinal cord stim  She reports she is scheduled for additional spinal cord stim programming this week with a Sp[facility] Cord [facility] representative  She denies numbness, tingling, weakness of her lower extremities  Ambulates independent  Reports history of left knee replacement  She denies bladder or bowel dysfunction  She reports taking Cymbalta 60 mg 2 q h s , fentanyl patch 50 micrograms/hour change Q 3 days, tizanidine 2 mg 1 tablet q i d , Norco 10/325mg 1 tablet Q 6 hr for moderate pain or Percocet 5/325mg 1 tablet q day p r n  Severe pain  Reports her blood sugars average about 150 although notes was 136 this AM and was 300 yesterday  The following portions of the patient's history were reviewed and updated as appropriate: allergies, current medications, past family history, past medical history, past social history and past surgical history  Review of Systems   Constitutional: Negative for chills and fever     Gastrointestinal:        Denies bowel dysfunction   Genitourinary: Negative for difficulty urinating  Musculoskeletal:        Chronic back and leg pains   Neurological: Negative for weakness and numbness  Objective:      /70 (BP Location: Right arm, Patient Position: Sitting, Cuff Size: Standard)   Pulse 78   Temp 98 9 °F (37 2 °C) (Tympanic)   Ht 5' 3" (1 6 m)   Wt 103 kg (226 lb)   BMI 40 03 kg/m²           Physical Exam      PE:   General:  AAO x 3  GCS 15     Speech clear and fluent   NAD  Follows commands briskly  Neurological exam reveals:  CN's II-XII  intact  Sensation:  Sensation to pinprick diminished anteromedial left shin - chronic baseline (extensive scars left lower leg)  Sensation to pinprick intact otherwise of b/l lower extremities  Sensation to pinprick intact b/l upper extremities and torso  Musculoskeletal exam reveals: Motor:  Shoulder abduction 5/5 bilaterally  Elbow flexion/extension 5/5 bilaterally  Wrist flexion/extension 5/5 bilaterally  Finger  / abduction 5/5 bilaterally  Hip flexion/abduction/adduction 5/5 bilaterally  Knee flexion/extension 5/5 bilaterally  Ankle DF/PF 5/5 bilaterally  Great toe DF 5/5 bilaterally  Ambulates independent  Incisions:  Thoracic incision is clean, dry, and intact -- no erythema, no ecchymosis, no dehiscence, no drainage, no swelling, no warmth, no induration  Left buttock incision -- see picture below  There is erythema of incision  There is moist granulation tissue present lateral third of incision  She has tenderness on palpation around this region  No drainage elicited from incision on palpation around incision region  No fluctuance  No dehiscence  No warmth  No induration     There is presence of Monocryl suture along the incision line    Left buttock incision:

## 2018-05-16 NOTE — PROGRESS NOTES
Assessment/Plan:       Diagnoses and all orders for this visit:    Aftercare following surgery  -     cephalexin (KEFLEX) 500 mg capsule; Take 1 capsule (500 mg total) by mouth every 6 (six) hours for 7 days    Erythema  -     cephalexin (KEFLEX) 500 mg capsule; Take 1 capsule (500 mg total) by mouth every 6 (six) hours for 7 days          Discussion / Summary: This is a 65 y o  female who presents for incision check pursuant to patient phone call to office  She is s/p insertion of thoracic spinal cord stimulator electrode via T9-T10 laminotomy and placement of left buttock IPG ([facility]) -- preformed by Dr Ventura Holguin on 4/23/18  Patient reports  noticed brownish yellowish drainage of left buttock incision last night when applying routine dressing to left buttock incision at bedtime  She reports her cloths tends to rub on her left buttock incision  There appears to be some excoriation of lateral aspect of left buttock incision and erythema of incision which appears consistent with likely rubbing of incision  There is granulation tissue present lateral pole of left buttock incision  The dressing (5 cm x 3 5 cm) she had over lateral aspect of left buttock incision which she reports was placed last night had brownish serosanguinous dried discharge on about half of the dressing  No drainage elicited from left buttock incision at time of appointment  Discussed patient's case and reviewed incision picture with Dr Ventura Holguin  No overt presence of incision infection  As precaution recommend course of Keflex  Recommend she maintain optional blood sugar control  Explained poor sugar control can lean to poor healing of incision and increase risk for development of infection  Incision care discussed with patient  She is to keep her incisions clean and dry  Patient may shower with soap and water  Patient is to pat incision dry with clean towel after showering     Patient is to refrain from placing ointments, oils, or lotions on surgical incisions  Suggest cover left buttock incision with non-adhesive Telfa Dressing or bandaid and change at least daily  The was provided with Telfa dressing  Recommend she avoid wearing cloths that would rub on surgical incision  Patient is to refrain from submerging surgical incision (ie  no baths, no swimming) until re-evaluated and cleared otherwise by neurosurgeon  No driving if taking narcotic pain medication or muscle relaxer    Patient is to refrain from strenuous activity, refrain from bending / twisting back,  and limit lifting, pulling, pushing to no more than 10 lbs  Patient reports she will be meeting with [facility] representative later this week for additional SCS programming  Patient to continue follow up with her established pain management specialist for continued pain management care  Patient advised to notify our office or present to ER if experience incision concern (swelling, opening / gapping, increased redness or drainage), fevers/chills, worsening pain, sensory / motor change, or other neurological change  Patient expressed understanding and agreement       ________________________________________________________________________________  Subjective:      Patient ID: Trupti Hicks is a 65 y o  female who presents for incision check  She is s/p insertion of thoracic spinal cord stimulator electrode via T9-T10 laminotomy and placement of left buttock IPG ([facility]) -- preformed by Dr Ventura Holguin on 4/23/18  She has history of chronic pain syndrome, CRPS, chronic low back pain, bilateral leg pain  HPI  She was last seen in office for routine post-op incision check on 5/7/18  She presents today for incision check pursuant to patient phone call to office   Patient reports  noticed brownish yellowish drainage of left buttock incision last night when applying routine dressing to left buttock incision at bedtime to avoid rubbing on bedsheets  She reports her cloths tends to rub on her left buttock incision  She reports tenderness of left buttock incision  No issues with thoracic incision  She denies fevers or chills  She reports spinal cord stim has provide approximately 20% relief her chronic leg pain  She has not noticed any significant relief her chronic low back pain with use of the spinal cord stim  She reports she is scheduled for additional spinal cord stim programming this week with a Sp[facility] Cord Stimulator representative  She denies numbness, tingling, weakness of her lower extremities  Ambulates independent  Reports history of left knee replacement  She denies bladder or bowel dysfunction  She reports taking Cymbalta 60 mg 2 q h s , fentanyl patch 50 micrograms/hour change Q 3 days, tizanidine 2 mg 1 tablet q i d , Norco 10/325mg 1 tablet Q 6 hr for moderate pain or Percocet 5/325mg 1 tablet q day p r n  Severe pain  Reports her blood sugars average about 150 although notes was 136 this AM and was 300 yesterday  The following portions of the patient's history were reviewed and updated as appropriate: allergies, current medications, past family history, past medical history, past social history and past surgical history  Review of Systems   Constitutional: Negative for chills and fever  Gastrointestinal:        Denies bowel dysfunction   Genitourinary: Negative for difficulty urinating  Musculoskeletal:        Chronic back and leg pains   Neurological: Negative for weakness and numbness  Objective:      /70 (BP Location: Right arm, Patient Position: Sitting, Cuff Size: Standard)   Pulse 78   Temp 98 9 °F (37 2 °C) (Tympanic)   Ht 5' 3" (1 6 m)   Wt 103 kg (226 lb)   BMI 40 03 kg/m²          Physical Exam      PE:   General:  AAO x 3  GCS 15     Speech clear and fluent   NAD  Follows commands briskly        Neurological exam reveals:  CN's II-XII intact  Sensation:  Sensation to pinprick diminished anteromedial left shin - chronic baseline (extensive scars left lower leg)  Sensation to pinprick intact otherwise of b/l lower extremities  Sensation to pinprick intact b/l upper extremities and torso  Musculoskeletal exam reveals: Motor:  Shoulder abduction 5/5 bilaterally  Elbow flexion/extension 5/5 bilaterally  Wrist flexion/extension 5/5 bilaterally  Finger  / abduction 5/5 bilaterally  Hip flexion/abduction/adduction 5/5 bilaterally  Knee flexion/extension 5/5 bilaterally  Ankle DF/PF 5/5 bilaterally  Great toe DF 5/5 bilaterally  Ambulates independent  Incisions:  Thoracic incision is clean, dry, and intact -- no erythema, no ecchymosis, no dehiscence, no drainage, no swelling, no warmth, no induration  Left buttock incision -- see picture below  There is erythema of incision  There is moist granulation tissue present lateral third of incision  She has tenderness on palpation around this region  No drainage elicited from incision on palpation around incision region  No fluctuance  No dehiscence  No warmth  No induration     There is presence of Monocryl suture along the incision line    Left buttock incision:

## 2018-05-16 NOTE — PATIENT INSTRUCTIONS
Recommend maintain optional blood sugar control  Keep incisions clean and dry  May shower with soap and water  Pat incision dry with clean towel after showering  Refrain from placing ointments, oils, or lotions on surgical incisions  Suggest cover left buttock incision with non-adhesive Telfa Dressing or bandaid and change at least daily  Avoid wearing cloths that would rub on surgical incisions  Do not submerge surgical incisions (ie  no baths, no swimming) until re-evaluated and cleared otherwise by neurosurgeon  No driving if taking narcotic pain medication or muscle relaxer    Refrain from strenuous activity, refrain from bending / twisting back,  and limit lifting, pulling, pushing to no more than 10 lbs  Notify our office or present to ER if experience incision concern (swelling, opening / gapping, increased redness or drainage), fevers/chills, worsening pain, sensory / motor change, or other neurological change

## 2018-05-24 ENCOUNTER — OFFICE VISIT (OUTPATIENT)
Dept: NEUROSURGERY | Facility: CLINIC | Age: 63
End: 2018-05-24

## 2018-05-24 VITALS
HEART RATE: 64 BPM | TEMPERATURE: 98.8 F | SYSTOLIC BLOOD PRESSURE: 130 MMHG | HEIGHT: 63 IN | DIASTOLIC BLOOD PRESSURE: 70 MMHG | BODY MASS INDEX: 39.44 KG/M2 | WEIGHT: 222.6 LBS

## 2018-05-24 DIAGNOSIS — L53.9 ERYTHEMA: ICD-10-CM

## 2018-05-24 DIAGNOSIS — Z48.89 AFTERCARE FOLLOWING SURGERY: Primary | ICD-10-CM

## 2018-05-24 PROCEDURE — 99024 POSTOP FOLLOW-UP VISIT: CPT | Performed by: PHYSICIAN ASSISTANT

## 2018-05-24 RX ORDER — CEPHALEXIN 500 MG/1
500 CAPSULE ORAL EVERY 6 HOURS SCHEDULED
Qty: 28 CAPSULE | Refills: 0 | Status: SHIPPED | OUTPATIENT
Start: 2018-05-24 | End: 2018-05-31

## 2018-05-24 NOTE — LETTER
May 24, 2018     Ely Her DO  [address]  [address] [address]    Patient: Sofiya Medina   YOB: 1955   Date of Visit: 5/24/2018       Dear Dr Ana Rosa Hairr:    Thank you for referring Ольга Torres to me for evaluation  Below are my notes for this consultation  If you have questions, please do not hesitate to call me  I look forward to following your patient along with you  Sincerely,        Sonia Rios PA-C        CC: DO Sonia Fraga PA-C  5/24/2018  9:41 PM  Sign at close encounter  Assessment/Plan:       Diagnoses and all orders for this visit:    Aftercare following surgery  -     cephalexin (KEFLEX) 500 mg capsule; Take 1 capsule (500 mg total) by mouth every 6 (six) hours for 7 days    Erythema  -     cephalexin (KEFLEX) 500 mg capsule; Take 1 capsule (500 mg total) by mouth every 6 (six) hours for 7 days        Discussion / Summary: This is a 65 y o  female who presents for left buttock incision follow up incision check  She is s/p insertion of thoracic SCS electrode via T9-T10 laminectomy and placement of left buttock IPG on 4/23/18  Her left buttock incision is dry  There is scabbing present of left buttock incision  Erythema of left buttock incision has diminished in interval since office visit approximately 1 week ago  Discussed patient's case and reviewed incision picture with Dr Ozzy Chan  She is provided refill for Keflex which she is to complete after she finishes the course of Keflex as prescribed at last visit  Follow up incision check in 1 week is advised for continued monitoring of incision  She is encouraged to maintain optimal blood sugar control to help optimize healing  Incision care was reviewed with Ms Barriga  She is to keep her incision clean and dry  Patient may shower with soap and water  Patient is to pat incision dry with clean towel after showering     Patient is to refrain from submerging surgical incision (ie  no baths, no swimming) until re-evaluated and cleared otherwise by neurosurgeon at post-op visit  Patient is to refrain from placing ointments, oils, or lotions on surgical incisions  She may leave incision open to air  Recommend she not wear any tight fitting cloths or cloths that would rub on surgical incisions  Advised no driving if taking narcotic pain medication or muscle relaxer  She is to refrain from strenuous activity or bending / twisting back  Recommend she limit lifting, pulling, pushing to no more than 10 lbs until patient is cleared by neurosurgeon at post-op visit  Patient reports she met with SCS representative today for SCS programming  Patient encouraged to contact the SCS representative if she desires additional SCS programming in future  Patient advised to notify our office or present to Emergency Room if experience incision concern (swelling, opening / gapping, increased redness, or drainge), fevers/chills, worsening pain, sensory / motor change, or other neurological change  Patient expressed understanding and agreement       __________________________________________________________________________________  Subjective:      Patient ID: Deneen Martinez is a 65 y o  female who presents for left buttock incision follow up incision check  She is s/p insertion of thoracic SCS electrode via T9-T10 laminectomy and placement of left buttock IPG on 4/23/18  HPI    Ms Barriga is accompanied with her mother at office visit today  Patient reports taking Keflex as prescribed at last office visit  She believes she has about 3 tabs of Keflex left to finish prescribed course of treatment  She denies fevers or chills  She reported a small amount of brownish drainage from left buttock incision this past Sunday but denies any further drainage from left buttock incision since Sunday     Redness of left buttock incision has diminished in interval since last visit     Ms Ryan Esteves reports her SCS is on and operational   Patient reports she met with the SCS representative today for additional SCS programming  She denies low back pain currently although mentions she experienced low back pain about 2 days ago  She reports waxing / waning pains in her distal legs  She initially reported no pain in legs today but later in exam did mention some cramping feeling of legs  She denies numbness, tinging, or weakness of her lower extremities  She ambulates independent  She has history of left knee replacement  She denies bladder or bowel dysfunction  She reports taking Cymbalta 60mg 2 tab po q HS,  Fentanyl patch 50mcg/hr changed q 3 days, Tizanidine 2mg 1 tab QID, Norco 10/325mg 1 tab q 6 hrs for moderate pain or Percocet 5/325mg 1 tab daily prn severe pain  Ms Ryan Esteves reports blood sugar has been ranging between   She reports follow up with Endocrinology next week  The following portions of the patient's history were reviewed and updated as appropriate: allergies, past family history, past medical history, past social history and past surgical history  Review of Systems   Constitutional: Negative for fever  Respiratory: Negative for shortness of breath  Gastrointestinal:        Denies bowel dysfunction    Genitourinary: Negative for difficulty urinating  Musculoskeletal: Negative for gait problem  See HPI   Neurological: Negative for weakness and numbness  Psychiatric/Behavioral: Negative for agitation  Objective:      /70 (BP Location: Left arm, Patient Position: Sitting, Cuff Size: Standard)   Pulse 64   Temp 98 8 °F (37 1 °C) (Tympanic)   Ht 5' 3" (1 6 m)   Wt 101 kg (222 lb 9 6 oz)   BMI 39 43 kg/m²           Physical Exam      PE:   General:  AAO x 3  GCS 15     Speech clear and fluent  NAD  Follows commands briskly  Neurological exam reveals:  CN's II-XII grossly intact      Sensation:  Sensation  to light touch intact b/l UEs, torso, and b/l LEs  Musculoskeletal exam reveals: Motor Exam:  Shoulder abduction 5/5 bilaterally  Elbow flexion/extension 5/5 bilaterally  Wrist flexion/extension 5/5 bilaterally  Finger  5/5 bilaterally  Hip flexion/abduction/adduction 5/5 bilaterally  Knee flexion/extension 5/5 bilaterally  Ankle DF/PF 5/5 bilaterally  Great toe DF 5/5 bilaterally  Gait: Independent  NA/NS  Incisions:    Thoracic incision is clean,dry, and intact -- no erythema, no ecchymosis, no dehiscence, no drainage, no swelling, no warmth, no induration  Left buttock incision -- see picture below  Erythema has diminished since last visit  There is dry scab present lateral aspect of incision  She has tenderness on palpation near lateral aspect of incision  No drainage elicited from incision on palpation  No fluctuance  No dehiscence  No warmth  No induration

## 2018-05-24 NOTE — PROGRESS NOTES
Assessment/Plan:       Diagnoses and all orders for this visit:    Aftercare following surgery  -     cephalexin (KEFLEX) 500 mg capsule; Take 1 capsule (500 mg total) by mouth every 6 (six) hours for 7 days    Erythema  -     cephalexin (KEFLEX) 500 mg capsule; Take 1 capsule (500 mg total) by mouth every 6 (six) hours for 7 days        Discussion / Summary: This is a 65 y o  female who presents for left buttock incision follow up incision check  She is s/p insertion of thoracic SCS electrode via T9-T10 laminectomy and placement of left buttock IPG on 4/23/18  Her left buttock incision is dry  There is scabbing present of left buttock incision  Erythema of left buttock incision has diminished in interval since office visit approximately 1 week ago  Discussed patient's case and reviewed incision picture with Dr Elana Cain  She is provided refill for Keflex which she is to complete after she finishes the course of Keflex as prescribed at last visit  Follow up incision check in 1 week is advised for continued monitoring of incision  She is encouraged to maintain optimal blood sugar control to help optimize healing  Incision care was reviewed with Ms Barriga  She is to keep her incision clean and dry  Patient may shower with soap and water  Patient is to pat incision dry with clean towel after showering  Patient is to refrain from submerging surgical incision (ie  no baths, no swimming) until re-evaluated and cleared otherwise by neurosurgeon at post-op visit  Patient is to refrain from placing ointments, oils, or lotions on surgical incisions  She may leave incision open to air  Recommend she not wear any tight fitting cloths or cloths that would rub on surgical incisions  Advised no driving if taking narcotic pain medication or muscle relaxer  She is to refrain from strenuous activity or bending / twisting back    Recommend she limit lifting, pulling, pushing to no more than 10 lbs until patient is cleared by neurosurgeon at post-op visit  Patient reports she met with SCS representative today for SCS programming  Patient encouraged to contact the SCS representative if she desires additional SCS programming in future  Patient advised to notify our office or present to Emergency Room if experience incision concern (swelling, opening / gapping, increased redness, or drainge), fevers/chills, worsening pain, sensory / motor change, or other neurological change  Patient expressed understanding and agreement       __________________________________________________________________________________  Subjective:      Patient ID: Lakshmi Diane is a 65 y o  female who presents for left buttock incision follow up incision check  She is s/p insertion of thoracic SCS electrode via T9-T10 laminectomy and placement of left buttock IPG on 4/23/18  HPI    Ms Barriga is accompanied with her mother at office visit today  Patient reports taking Keflex as prescribed at last office visit  She believes she has about 3 tabs of Keflex left to finish prescribed course of treatment  She denies fevers or chills  She reported a small amount of brownish drainage from left buttock incision this past Sunday but denies any further drainage from left buttock incision since Sunday  Redness of left buttock incision has diminished in interval since last visit  Ms Carson Gonzalez reports her SCS is on and operational   Patient reports she met with the SCS representative today for additional SCS programming  She denies low back pain currently although mentions she experienced low back pain about 2 days ago  She reports waxing / waning pains in her distal legs  She initially reported no pain in legs today but later in exam did mention some cramping feeling of legs  She denies numbness, tinging, or weakness of her lower extremities  She ambulates independent  She has history of left knee replacement    She denies bladder or bowel dysfunction  She reports taking Cymbalta 60mg 2 tab po q HS,  Fentanyl patch 50mcg/hr changed q 3 days, Tizanidine 2mg 1 tab QID, Norco 10/325mg 1 tab q 6 hrs for moderate pain or Percocet 5/325mg 1 tab daily prn severe pain  Ms Evangelista Ayon reports blood sugar has been ranging between   She reports follow up with Endocrinology next week  The following portions of the patient's history were reviewed and updated as appropriate: allergies, past family history, past medical history, past social history and past surgical history  Review of Systems   Constitutional: Negative for fever  Respiratory: Negative for shortness of breath  Gastrointestinal:        Denies bowel dysfunction    Genitourinary: Negative for difficulty urinating  Musculoskeletal: Negative for gait problem  See HPI   Neurological: Negative for weakness and numbness  Psychiatric/Behavioral: Negative for agitation  Objective:      /70 (BP Location: Left arm, Patient Position: Sitting, Cuff Size: Standard)   Pulse 64   Temp 98 8 °F (37 1 °C) (Tympanic)   Ht 5' 3" (1 6 m)   Wt 101 kg (222 lb 9 6 oz)   BMI 39 43 kg/m²          Physical Exam      PE:   General:  AAO x 3  GCS 15     Speech clear and fluent  NAD  Follows commands briskly  Neurological exam reveals:  CN's II-XII grossly intact  Sensation:  Sensation  to light touch intact b/l UEs, torso, and b/l LEs  Musculoskeletal exam reveals: Motor Exam:  Shoulder abduction 5/5 bilaterally  Elbow flexion/extension 5/5 bilaterally  Wrist flexion/extension 5/5 bilaterally  Finger  5/5 bilaterally  Hip flexion/abduction/adduction 5/5 bilaterally  Knee flexion/extension 5/5 bilaterally  Ankle DF/PF 5/5 bilaterally  Great toe DF 5/5 bilaterally  Gait: Independent  NA/NS        Incisions:    Thoracic incision is clean,dry, and intact -- no erythema, no ecchymosis, no dehiscence, no drainage, no swelling, no warmth, no induration  Left buttock incision -- see picture below  Erythema has diminished since last visit  There is dry scab present lateral aspect of incision  She has tenderness on palpation near lateral aspect of incision  No drainage elicited from incision on palpation  No fluctuance  No dehiscence  No warmth  No induration

## 2018-05-24 NOTE — PATIENT INSTRUCTIONS
Recommend you maintain optimal blood sugar control  Keep incisions clean and dry  May shower with soap and water  Pat incisions dry with clean towel after showering  Refrain from submerging surgical incisions (ie  no baths, no swimming, no hot tubs) until re-evaluated and cleared otherwise by neurosurgeon at post-op visit  Refrain from placing ointments, oils, or lotions on surgical incisions  May leave incisions open to air  Do not wear any tight fitting cloths or cloths that would rub on surgical incisions  No driving if taking narcotic pain medication or muscle relaxer  Refrain from strenuous activity  Refrain from bending / twisting back  Limit lifting, pulling, pushing to no more than 10 lbs until cleared by neurosurgeon at post-op visit  Contact your Spinal Cord Stimulator Representative if you desire any additional SCS programming in future  Call our office or present to Emergency Room if experience incision concern (swelling, opening / gapping, increased redness, or drainge), fevers/chills, worsening pain, sensory / motor change, or other neurological change

## 2018-05-30 ENCOUNTER — TELEPHONE (OUTPATIENT)
Dept: PAIN MEDICINE | Facility: CLINIC | Age: 63
End: 2018-05-30

## 2018-05-30 DIAGNOSIS — M79.18 MYOFASCIAL PAIN SYNDROME: ICD-10-CM

## 2018-05-30 RX ORDER — TIZANIDINE 4 MG/1
4 TABLET ORAL 3 TIMES DAILY
Qty: 90 TABLET | Refills: 1 | Status: SHIPPED | OUTPATIENT
Start: 2018-05-30 | End: 2018-07-06 | Stop reason: SDUPTHER

## 2018-05-30 NOTE — TELEPHONE ENCOUNTER
Continue Cymbalta as prescribed  Increase the Tizanidine to 4 mg TID  I did e-scribed a new script with a refill to get her to her next OV with me  She should call us if there are any issues  Thank you

## 2018-05-30 NOTE — TELEPHONE ENCOUNTER
Pt called requesting to speak to a nurse  Pt wants to have her medication changed   Please call [phone]

## 2018-05-30 NOTE — TELEPHONE ENCOUNTER
s/w pt, states she has increased spasms in b/l lower legs x this week  Pt states the spasms are increasing in strength and are ongoing  Pt states she cannot tolerate sitting or standing  Per pt, continues w/ Cymbalta 60 mg bid and tizanidine 2mg qid w/ insufficient relief, no se's  Pt confirmed that she had tried baclofen for spasms in the past - could not tolerate drowsiness  Pt states she is continuing to work with [address] cannot be increased any more at this point s/t swelling s/p permanant scs on 4/23  Advised pt, will d/w DG and cb to advise  Pt verbalized understanding and appreciation

## 2018-05-30 NOTE — TELEPHONE ENCOUNTER
s/w pt, advised of above  Advised pt, ok to take tizanidine 2 mg pills, 2 pills po tid because pt stated that she has a large amt of tizanidine 2 mg on hand  Advised pt, do not drive or operate machinery until you are familiar with how this medication will affect you  Fu as scheduled on 7/6/2018 at 1000 w/ DG  Pt verbalized understanding and appreciation  Will cb if questions or concerns arise

## 2018-05-31 ENCOUNTER — OFFICE VISIT (OUTPATIENT)
Dept: NEUROSURGERY | Facility: CLINIC | Age: 63
End: 2018-05-31

## 2018-05-31 VITALS
HEIGHT: 63 IN | BODY MASS INDEX: 39.74 KG/M2 | HEART RATE: 81 BPM | SYSTOLIC BLOOD PRESSURE: 122 MMHG | TEMPERATURE: 99.4 F | WEIGHT: 224.3 LBS | DIASTOLIC BLOOD PRESSURE: 70 MMHG

## 2018-05-31 DIAGNOSIS — Z48.89 AFTERCARE FOLLOWING SURGERY: Primary | ICD-10-CM

## 2018-05-31 PROCEDURE — 99024 POSTOP FOLLOW-UP VISIT: CPT | Performed by: PHYSICIAN ASSISTANT

## 2018-05-31 RX ORDER — BUPROPION HYDROCHLORIDE 150 MG/1
150 TABLET ORAL DAILY
COMMUNITY
End: 2018-07-06

## 2018-05-31 RX ORDER — TROSPIUM CHLORIDE 20 MG/1
40 TABLET, FILM COATED ORAL 2 TIMES DAILY
COMMUNITY
End: 2018-07-06

## 2018-05-31 RX ORDER — DOXYCYCLINE 100 MG/1
100 TABLET ORAL 2 TIMES DAILY
COMMUNITY
End: 2018-07-06

## 2018-05-31 NOTE — LETTER
May 31, 2018     Mik Ibrahim DO  [address]  [address] [place] [address] [address] [address]    Patient: Beryle Pon   YOB: 1955   Date of Visit: 5/31/2018       Dear Dr Tita Carlisle:    Thank you for referring Leslie Walsh to me for evaluation  Below are my notes for this consultation  If you have questions, please do not hesitate to call me  I look forward to following your patient along with you  Sincerely,        Susan Stewart PA-C        CC: DO Susan Santillan PA-C  5/31/2018 11:48 AM  Sign at close encounter  Assessment/Plan:    Aftercare following surgery        Discussion / Summary: This is a 65 y o  female who presents for 1 week follow up incision check of left buttock incision  She is s/p insertion of thoracic spinal cord stim electrode via T9-T10 laminectomy and placement of left buttock IPG on 4/23/2018  Patient's case and left buttock incision were discussed with Dr Deepika Ross  He advised no further antibiotics necessary from a neurosurgical standpoint      (Of note -- she is currently on course of Doxycycline for cellulitis of distal left lower leg (distal shin)-- following with PCP who has ordered CT of left leg)  Surgical incision care and post-op restrictions were reviewed with Ms Barriga  She is to keep incisions clean and dry  Patient may shower with soap and water  Patient is to pat incision dry with clean towel after showering  She may leave incisions open to air  Advised not to submerge surgical incisions (ie  no baths, no swimming) until re-evaluated and cleared otherwise by neurosurgeon at next post-op visit  Advised do not place ointments, oils, or lotions on surgical incisions  No driving if taking narcotic pain medication or muscle relaxer  She is to refrain from strenuous activity  Recommend she refrain from bending / twisting back    Recommend she limit lifting, pulling, and pushing to no more than 10 lbs until cleared by neurosurgeon at next post-op visit  She is to follow up as scheduled on 6/8/18 with Dr Santhosh Buck  Patient advised to notify our office or present to ER if experience incision concern (redness, swelling, drainage, opening/gapping), fevers/chills, worsening pain, sensory / motor change, or other neurological change  Patient expressed understanding and agreement       _____________________________________________________________________________________________  Subjective:      Patient ID: Stefan Rahman is a 65 y o  female who presents for 1 week follow up incision check of left buttock incision  She is s/p insertion of thoracic spinal cord stim electrode via T9-T10 laminectomy and placement of left buttock IPG on 4/23/2018 (Dr Santhosh Buck)  HPI     Patient denies any issues with her left buttock or thoracic surgical incisions  She denies drainage, pain, or swelling of surgical incisions  She has not noticed redness of surgical incisions  She denies fevers or chills  She reports chronic low back pain and pain in legs distal to knees  She reports no appreciable relief of chronic pain with use of SCS at this juncture  She reports she has been working with the [employer] weekly for programming adjustments of her SCS  She denies numbness, tinging, or weakness of her lower extremities  She ambulates independent  She has history of left knee surgeries  Patient reports this past weekend she noticed an opening in skin with clear/yellowish drainage of her left lower shin  She presented to [facility] ER [facility] for evaluation of such -- diagnosed with cellulitis of left lower extremity and was changed from keflex to doxycycline 100mg PO BID x 7 days  She followed up with her Primary Care Provider (Dr Sai Browne) whom ordered CT of LLE -- which patient report she will be going for today at [facility]      The following portions of the patient's history were reviewed and updated as appropriate: allergies, current medications, past family history, past medical history, past social history and past surgical history  Review of Systems   Constitutional: Negative  Respiratory: Negative for shortness of breath  Musculoskeletal: Positive for back pain  Skin:        See HPI   Neurological: Negative for weakness and numbness  Psychiatric/Behavioral: Negative for agitation  Objective:      /70 (BP Location: Right arm, Patient Position: Sitting, Cuff Size: Standard)   Pulse 81   Temp 99 4 °F (37 4 °C) (Tympanic)   Ht 5' 3" (1 6 m)   Wt 102 kg (224 lb 4 8 oz)   BMI 39 73 kg/m²           Physical Exam      PE:   General:  AAO x 3  GCS 15     Speech clear and fluent  NAD  Follows commands briskly  Neurological exam reveals:  CN's II-XII grossly intact  Sensation:  Sensation  to light touch intact bilateral UE's, torso, and b/l LEs       Musculoskeletal exam reveals: Motor:  Hip flexion/abduction/adduction 5/5 bilaterally  Knee flexion/extension 5/5 bilaterally  Ankle DF/PF 5/5 bilaterally  Ambulates independent  No LOB  Incisions:    Thoracic incision is clean, dry, and intact -- no erythema, no ecchymosis, no dehiscence, no drainage, no swelling, no warmth, no induration  Left buttock incision -- see picture below  There is dry scab present of lateral aspect of incision with some underlying firmness  No tenderness on palpation of incisional region  No drainage present and no drainage elicited from incision on palpation  No fluctuance  No dehiscence  No warmth

## 2018-05-31 NOTE — PROGRESS NOTES
Assessment/Plan:    Aftercare following surgery        Discussion / Summary: This is a 65 y o  female who presents for 1 week follow up incision check of left buttock incision  She is s/p insertion of thoracic spinal cord stim electrode via T9-T10 laminectomy and placement of left buttock IPG on 4/23/2018  Patient's case and left buttock incision were discussed with Dr Elana Cain  He advised no further antibiotics necessary from a neurosurgical standpoint      (Of note -- she is currently on course of Doxycycline for cellulitis of distal left lower leg (distal shin)-- following with PCP who has ordered CT of left leg)  Surgical incision care and post-op restrictions were reviewed with Ms Barriga  She is to keep incisions clean and dry  Patient may shower with soap and water  Patient is to pat incision dry with clean towel after showering  She may leave incisions open to air  Advised not to submerge surgical incisions (ie  no baths, no swimming) until re-evaluated and cleared otherwise by neurosurgeon at next post-op visit  Advised do not place ointments, oils, or lotions on surgical incisions  No driving if taking narcotic pain medication or muscle relaxer  She is to refrain from strenuous activity  Recommend she refrain from bending / twisting back  Recommend she limit lifting, pulling, and pushing to no more than 10 lbs until cleared by neurosurgeon at next post-op visit  She is to follow up as scheduled on 6/8/18 with Dr Elana Cain  Patient advised to notify our office or present to ER if experience incision concern (redness, swelling, drainage, opening/gapping), fevers/chills, worsening pain, sensory / motor change, or other neurological change      Patient expressed understanding and agreement       _____________________________________________________________________________________________  Subjective:      Patient ID: Sienna Gutiérrez is a 65 y o  female who presents for 1 week follow up incision check of left buttock incision  She is s/p insertion of thoracic spinal cord stim electrode via T9-T10 laminectomy and placement of left buttock IPG on 4/23/2018 (Dr Sonny Pedraza)  HPI     Patient denies any issues with her left buttock or thoracic surgical incisions  She denies drainage, pain, or swelling of surgical incisions  She has not noticed redness of surgical incisions  She denies fevers or chills  She reports chronic low back pain and pain in legs distal to knees  She reports no appreciable relief of chronic pain with use of SCS at this juncture  She reports she has been working with the [employer] weekly for programming adjustments of her SCS  She denies numbness, tinging, or weakness of her lower extremities  She ambulates independent  She has history of left knee surgeries  Patient reports this past weekend she noticed an opening in skin with clear/yellowish drainage of her left lower shin  She presented to [facility] ER 5/28/18 for evaluation of such -- diagnosed with cellulitis of left lower extremity and was changed from keflex to doxycycline 100mg PO BID x 7 days  She followed up with her Primary Care Provider (Dr Krzysztof Palmer) whom ordered CT of LLE -- which patient report she will be going for today at [facility]  The following portions of the patient's history were reviewed and updated as appropriate: allergies, current medications, past family history, past medical history, past social history and past surgical history  Review of Systems   Constitutional: Negative  Respiratory: Negative for shortness of breath  Musculoskeletal: Positive for back pain  Skin:        See HPI   Neurological: Negative for weakness and numbness  Psychiatric/Behavioral: Negative for agitation           Objective:      /70 (BP Location: Right arm, Patient Position: Sitting, Cuff Size: Standard)   Pulse 81   Temp 99 4 °F (37 4 °C) (Tympanic)   Ht 5' 3" (1 6 m)   Wt 102 kg (224 lb 4 8 oz)   BMI 39 73 kg/m²          Physical Exam      PE:   General:  AAO x 3  GCS 15     Speech clear and fluent  NAD  Follows commands briskly  Neurological exam reveals:  CN's II-XII grossly intact  Sensation:  Sensation  to light touch intact bilateral UE's, torso, and b/l LEs       Musculoskeletal exam reveals: Motor:  Hip flexion/abduction/adduction 5/5 bilaterally  Knee flexion/extension 5/5 bilaterally  Ankle DF/PF 5/5 bilaterally  Ambulates independent  No LOB  Incisions:    Thoracic incision is clean, dry, and intact -- no erythema, no ecchymosis, no dehiscence, no drainage, no swelling, no warmth, no induration  Left buttock incision -- see picture below  There is dry scab present of lateral aspect of incision with some underlying firmness  No tenderness on palpation of incisional region  No drainage present and no drainage elicited from incision on palpation  No fluctuance  No dehiscence  No warmth

## 2018-05-31 NOTE — PATIENT INSTRUCTIONS
Keep surgical incisions clean and dry  May shower with soap and water  Pat incisions dry with clean towel after showering  May leave incisions open to air  Do not submerge surgical incisions (ie  no baths, no swimming) until re-evaluated and cleared otherwise by neurosurgeon at next post-op visit  Do not place ointments, oils, or lotions on surgical incisions  No driving if taking narcotic pain medication or muscle relaxer  Refrain from strenuous activity  Avoid bending / twisting back  Limit lifting, pulling, and pushing to no more than 10 lbs until cleared by neurosurgeon  Patient advised to notify our office or present to Emergency Room if experience incision concern (redness, swelling, drainage, opening/gapping), fevers/chills, worsening pain, sensory / motor change, or other neurological change

## 2018-06-05 ENCOUNTER — APPOINTMENT (OUTPATIENT)
Dept: RADIOLOGY | Facility: MEDICAL CENTER | Age: 63
End: 2018-06-05
Payer: MEDICARE

## 2018-06-05 DIAGNOSIS — Z98.890 STATUS POST SURGERY: ICD-10-CM

## 2018-06-05 PROCEDURE — 72070 X-RAY EXAM THORAC SPINE 2VWS: CPT

## 2018-06-08 ENCOUNTER — OFFICE VISIT (OUTPATIENT)
Dept: NEUROSURGERY | Facility: MEDICAL CENTER | Age: 63
End: 2018-06-08
Payer: MEDICARE

## 2018-06-08 VITALS
WEIGHT: 223.8 LBS | HEIGHT: 63 IN | RESPIRATION RATE: 16 BRPM | TEMPERATURE: 98.5 F | DIASTOLIC BLOOD PRESSURE: 72 MMHG | HEART RATE: 82 BPM | BODY MASS INDEX: 39.65 KG/M2 | SYSTOLIC BLOOD PRESSURE: 138 MMHG

## 2018-06-08 DIAGNOSIS — Z96.89 S/P INSERTION OF SPINAL CORD STIMULATOR: Primary | ICD-10-CM

## 2018-06-08 DIAGNOSIS — Z45.42 ENCOUNTER FOR FITTING AND ADJUSTMENT OF SPINAL CORD STIMULATOR: ICD-10-CM

## 2018-06-08 PROCEDURE — 95972 ALYS CPLX SP/PN NPGT W/PRGRM: CPT | Performed by: NEUROLOGICAL SURGERY

## 2018-06-08 PROCEDURE — 99024 POSTOP FOLLOW-UP VISIT: CPT | Performed by: NEUROLOGICAL SURGERY

## 2018-06-08 NOTE — PROGRESS NOTES
Office Note - Neurosurgery   Valeriy Davidson 65 y o  female MRN: [id number]      Assessment:    Patient is gradually improving  81-year-old woman post insertion of [employer] spinal cord stimulator system  Her incisions are well-healed  She has noticed improvement in her leg pain but less so in her lower back  While in the office today, she underwent complex reprogramming of the spinal cord stimulator system to obtain better coverage of her lower back and leg pain  This was undertaken under my direct supervision by the spinal cord stimulator representative  I encouraged her to gradually increase her activity level  I will see her again in 6 months time for ongoing clinical follow-up  She will contact my office should any concerns arise in the interim  History, physical examination and diagnostic tests were reviewed and questions answered  Diagnosis, care plan and treatment options were discussed  The patient understand instructions and will follow up as directed  Plan:    Follow-up: in 6 month(s)    Problem List Items Addressed This Visit        Other    Encounter for fitting and adjustment of spinal cord stimulator    S/P insertion of spinal cord stimulator - Primary          Subjective/Objective     Chief Complaint      Lower back pain  HPI      81-year-old woman post insertion of Faith Burst DR   Spinal cord stimulator system  She has had approximately 65% improvement in her bilateral leg pain since placement  She does not have good coverage of her lower back yet  She also helps to have further improvement in the muscle spasms in both calves  She denies any new pain, weakness or numbness in her legs or difficulties with bowel bladder function  She has noticed some open wounds with serosanguineous drainage from her left shin area  She was placed on antibiotics by her PCP and apparently CT scan of the left lower extremity was unremarkable  Overall she seems pleased with her progress  She did have a somewhat prolonged course of postoperative antibiotics for concerns over superficial infection over left buttock incision  However she denies any drainage from the left buttock incision redness and improving   Sensitivity  ROS    Review of Systems   HENT: Negative  Eyes: Negative  Respiratory: Negative  Cardiovascular: Negative  Gastrointestinal: Negative  Endocrine: Negative  Genitourinary: Negative  Musculoskeletal: Positive for back pain (across lower back, bilateral legs spasms)  Skin: Negative  Allergic/Immunologic: Negative  Neurological: Positive for weakness (bilateral legs) and numbness (little bit of tingling in bilateral legs)  Negative for dizziness, seizures, syncope and headaches  Hematological: Negative  Psychiatric/Behavioral: Negative  Family History    Family History   Problem Relation Age of Onset    Diabetes Family     Heart disease Family     Hypertension Family     Neuropathy Family     No Known Problems Mother     Heart disease Father     Diabetes Father     No Known Problems Maternal Grandmother     No Known Problems Maternal Grandfather     No Known Problems Paternal Grandmother     No Known Problems Paternal Grandfather        Social History    Social History     Social History    Marital status: /Civil Union     Spouse name: N/A    Number of children: N/A    Years of education: N/A     Occupational History    Not on file       Social History Main Topics    Smoking status: Never Smoker    Smokeless tobacco: Never Used    Alcohol use No    Drug use: No    Sexual activity: Yes     Other Topics Concern    Not on file     Social History Narrative    No narrative on file       Past Medical History    Past Medical History:   Diagnosis Date    Anxiety     Asthma     controlled    Back complaints     Cancer ([facility] [address] [facility]     nose    Cellulitis of left lower leg     Chronic bilateral thoracic back pain     Chronic myofascial pain     Chronic pain of both lower extremities     Chronic pain of left knee     Chronic pain syndrome     CPAP (continuous positive airway pressure) dependence     Depression     Diabetes mellitus ([facility] [address] [facility]     Gait disorder     Gastroparesis     History of angina     Hyperlipidemia     Hypertension     Insulin pump in place     Kidney disease     Polyneuropathy associated with underlying disease ([facility] [address] )     S/P insertion of spinal cord stimulator 2018    Sleep apnea        Surgical History    Past Surgical History:   Procedure Laterality Date    BREAST SURGERY      BREAST SURGERY      reduction     SECTION      HERNIA REPAIR      HYSTERECTOMY      JOINT REPLACEMENT Left     NECK SURGERY      MD SURG IMPLNT [address] Left 2018    Procedure:  Insertion of thoracic spinal cord stimulator electrode via laminotomy and placement of left buttock IPG;  Surgeon: Tricia Randhawa MD;  [facility];  Service: Neurosurgery    REPLACEMENT TOTAL KNEE      ROTATOR CUFF REPAIR      TONSILLECTOMY      WISDOM TOOTH EXTRACTION         Medications      Current Outpatient Prescriptions:     atorvastatin (LIPITOR) 80 mg tablet, Take 80 mg by mouth daily, Disp: , Rfl:     buPROPion (WELLBUTRIN XL) 150 mg 24 hr tablet, Take 150 mg by mouth daily, Disp: , Rfl:     cholecalciferol (VITAMIN D3) 1,000 units tablet, Take 2,000 Units by mouth daily, Disp: , Rfl: 5    Cranberry 450 MG CAPS, Take by mouth, Disp: , Rfl:     Cyanocobalamin (VITAMIN B-12 PO), Take by mouth, Disp: , Rfl:     Docusate Sodium 100 MG capsule, Take by mouth, Disp: , Rfl:     doxycycline (ADOXA) 100 MG tablet, Take 100 mg by mouth 2 (two) times a day, Disp: , Rfl:     DULoxetine (CYMBALTA) 60 mg delayed release capsule, 2 PO HS (Patient taking differently: 60 mg 2 (two) times a day 2 PO HS ), Disp: 180 capsule, Rfl: 0    fentaNYL (DURAGESIC) 50 mcg/hr, Place 1 patch on the skin every 3 (three) days  , Disp: , Rfl:     furosemide (LASIX) 20 mg tablet, Take by mouth daily  , Disp: , Rfl:     glucagon (GLUCAGEN) 1 mg injection, Inject under the skin as needed if passed out from low sugar, Disp: , Rfl:     HYDROcodone-acetaminophen (NORCO)  mg per tablet, TAKE ONE TABLET BY MOUTH EVERY 6 HOURS AS NEEDED FOR MODERATE PAIN   MAX DAILY AMOUNT 4 TABLETS, Disp: , Rfl: 0    levothyroxine 75 mcg tablet, Take by mouth daily in the early morning  , Disp: , Rfl:     Linaclotide (LINZESS) 145 MCG CAPS, Take 145 mcg by mouth daily  , Disp: , Rfl:     losartan (COZAAR) 100 MG tablet, Take 100 mg by mouth daily  , Disp: , Rfl:     NOVOLOG 100 UNIT/ML injection, INJECT 120 UNITS DAILY VIA INSULIN PUMP E 11 65, Disp: , Rfl: 3    ondansetron (ZOFRAN) 8 mg tablet, Take by mouth, Disp: , Rfl:     oxybutynin (DITROPAN) 5 mg tablet, Take 10 mg by mouth daily  , Disp: , Rfl:     oxyCODONE-acetaminophen (PERCOCET) 5-325 mg per tablet, Take by mouth For Severe Pain , Disp: , Rfl:     pantoprazole (PROTONIX) 40 mg tablet, Take 40 mg by mouth 2 (two) times a day  , Disp: , Rfl:     pramipexole (MIRAPEX) 1 mg tablet, Take 1 mg by mouth 2 (two) times a day  , Disp: , Rfl:     ranitidine (ZANTAC) 150 mg tablet, Take 150 mg by mouth daily  , Disp: , Rfl:     tiZANidine (ZANAFLEX) 4 mg tablet, Take 1 tablet (4 mg total) by mouth 3 (three) times a day, Disp: 90 tablet, Rfl: 1    trospium chloride (SANCTURA) 20 mg tablet, Take 40 mg by mouth 2 (two) times a day, Disp: , Rfl:     Allergies    Allergies   Allergen Reactions    Bactrim [Sulfamethoxazole-Trimethoprim] Hives    Sucralfate      Facial swelling    Topamax [Topiramate]      disorentation    Azithromycin      itching    Baclofen      "That knocks me out "    Methotrexate      nausea    Neurontin [Gabapentin]      Other reaction(s): Hypertension, Other (See Comments)  Altered mental status  Severe sedation/hypotension       The following portions of the patient's history were reviewed and updated as appropriate: allergies, current medications, past family history, past medical history, past social history, past surgical history and problem list     Investigations    I personally reviewed the XRAY results with the patient:     Plain film of thoracic spine dated June 5th, 2018  Stable appearance of spinal cord stimulator electrode compared to intraoperative imaging  Physical Exam    Vitals:  Blood pressure 138/72, pulse 82, temperature 98 5 °F (36 9 °C), resp  rate 16, height 5' 3" (1 6 m), weight 102 kg (223 lb 12 8 oz)  ,Body mass index is 39 64 kg/m²  Physical Exam   Constitutional: She appears well-developed and well-nourished  Musculoskeletal:   Thoracic incision well healed  Skin:   Chronic skin changes in left lower extremity  Left buttock incision well healed, small area of scabbing  Psychiatric: She has a normal mood and affect  Her behavior is normal  Judgment and thought content normal    Vitals reviewed  Neurologic Exam     Mental Status   Walks with a mildly wide based gait

## 2018-06-08 NOTE — LETTER
June 8, 2018     Ioana Navarro DO  [address] [facility]  [address]  [id number]    Patient: Anjali Guerrero   YOB: 1955   Date of Visit: 6/8/2018       Dear Dr Melo Smaller: Thank you for referring Luciano Shantanu to me for evaluation  Below are my notes for this consultation  If you have questions, please do not hesitate to call me  I look forward to following your patient along with you  Sincerely,        [place] Dubin, MD        CC: Nerissa Mcgrath, Ramon Stahl MD  6/8/2018  2:02 PM  Sign at close encounter  Office Note - Neurosurgery   Anjali Guerrero 65 y o  female MRN: [id number]      Assessment:    Patient is gradually improving  71-year-old woman post insertion of [employer] spinal cord stimulator system  Her incisions are well-healed  She has noticed improvement in her leg pain but less so in her lower back  While in the office today, she underwent complex reprogramming of the spinal cord stimulator system to obtain better coverage of her lower back and leg pain  This was undertaken under my direct supervision by the spinal cord stimulator representative  I encouraged her to gradually increase her activity level  I will see her again in 6 months time for ongoing clinical follow-up  She will contact my office should any concerns arise in the interim  History, physical examination and diagnostic tests were reviewed and questions answered  Diagnosis, care plan and treatment options were discussed  The patient understand instructions and will follow up as directed  Plan:    Follow-up: in 6 month(s)    Problem List Items Addressed This Visit        Other    Encounter for fitting and adjustment of spinal cord stimulator    S/P insertion of spinal cord stimulator - Primary          Subjective/Objective     Chief Complaint      Lower back pain  HPI      71-year-old woman post insertion of Faith Burst DR   Spinal cord stimulator system    She has had approximately 65% improvement in her bilateral leg pain since placement  She does not have good coverage of her lower back yet  She also helps to have further improvement in the muscle spasms in both calves  She denies any new pain, weakness or numbness in her legs or difficulties with bowel bladder function  She has noticed some open wounds with serosanguineous drainage from her left shin area  She was placed on antibiotics by her PCP and apparently CT scan of the left lower extremity was unremarkable  Overall she seems pleased with her progress  She did have a somewhat prolonged course of postoperative antibiotics for concerns over superficial infection over left buttock incision  However she denies any drainage from the left buttock incision redness and improving   Sensitivity  ROS    Review of Systems   HENT: Negative  Eyes: Negative  Respiratory: Negative  Cardiovascular: Negative  Gastrointestinal: Negative  Endocrine: Negative  Genitourinary: Negative  Musculoskeletal: Positive for back pain (across lower back, bilateral legs spasms)  Skin: Negative  Allergic/Immunologic: Negative  Neurological: Positive for weakness (bilateral legs) and numbness (little bit of tingling in bilateral legs)  Negative for dizziness, seizures, syncope and headaches  Hematological: Negative  Psychiatric/Behavioral: Negative          Family History    Family History   Problem Relation Age of Onset    Diabetes Family     Heart disease Family     Hypertension Family     Neuropathy Family     No Known Problems Mother     Heart disease Father     Diabetes Father     No Known Problems Maternal Grandmother     No Known Problems Maternal Grandfather     No Known Problems Paternal Grandmother     No Known Problems Paternal Grandfather        Social History    Social History     Social History    Marital status: /Civil Union     Spouse name: N/A    Number of children: N/A    Years of education: N/A     Occupational History    Not on file  Social History Main Topics    Smoking status: Never Smoker    Smokeless tobacco: Never Used    Alcohol use No    Drug use: No    Sexual activity: Yes     Other Topics Concern    Not on file     Social History Narrative    No narrative on file       Past Medical History    Past Medical History:   Diagnosis Date    Anxiety     Asthma     controlled    Back complaints     Cancer ([facility] )     nose    Cellulitis of left lower leg     Chronic bilateral thoracic back pain     Chronic myofascial pain     Chronic pain of both lower extremities     Chronic pain of left knee     Chronic pain syndrome     CPAP (continuous positive airway pressure) dependence     Depression     Diabetes mellitus ([facility]     Gait disorder     Gastroparesis     History of angina     Hyperlipidemia     Hypertension     Insulin pump in place     Kidney disease     Polyneuropathy associated with underlying disease ([place] [address] )     S/P insertion of spinal cord stimulator 2018    Sleep apnea        Surgical History    Past Surgical History:   Procedure Laterality Date    BREAST SURGERY      BREAST SURGERY      reduction     SECTION      HERNIA REPAIR      HYSTERECTOMY      JOINT REPLACEMENT Left     NECK SURGERY      [facility] SURG IMPLNT [address] Left 2018    Procedure:  Insertion of thoracic spinal cord stimulator electrode via laminotomy and placement of left buttock IPG;  Surgeon: Laura Wilkerson MD;  [facility];  Service: Neurosurgery    REPLACEMENT TOTAL KNEE      ROTATOR CUFF REPAIR      TONSILLECTOMY      WISDOM TOOTH EXTRACTION         Medications      Current Outpatient Prescriptions:     atorvastatin (LIPITOR) 80 mg tablet, Take 80 mg by mouth daily, Disp: , Rfl:     buPROPion (WELLBUTRIN XL) 150 mg 24 hr tablet, Take 150 mg by mouth daily, Disp: , Rfl:     cholecalciferol (VITAMIN D3) 1,000 units tablet, Take 2,000 Units by mouth daily, Disp: , Rfl: 5    Cranberry 450 MG CAPS, Take by mouth, Disp: , Rfl:     Cyanocobalamin (VITAMIN B-12 PO), Take by mouth, Disp: , Rfl:     Docusate Sodium 100 MG capsule, Take by mouth, Disp: , Rfl:     doxycycline (ADOXA) 100 MG tablet, Take 100 mg by mouth 2 (two) times a day, Disp: , Rfl:     DULoxetine (CYMBALTA) 60 mg delayed release capsule, 2 PO HS (Patient taking differently: 60 mg 2 (two) times a day 2 PO HS ), Disp: 180 capsule, Rfl: 0    fentaNYL (DURAGESIC) 50 mcg/hr, Place 1 patch on the skin every 3 (three) days  , Disp: , Rfl:     furosemide (LASIX) 20 mg tablet, Take by mouth daily  , Disp: , Rfl:     glucagon (GLUCAGEN) 1 mg injection, Inject under the skin as needed if passed out from low sugar, Disp: , Rfl:     HYDROcodone-acetaminophen (NORCO)  mg per tablet, TAKE ONE TABLET BY MOUTH EVERY 6 HOURS AS NEEDED FOR MODERATE PAIN   MAX DAILY AMOUNT 4 TABLETS, Disp: , Rfl: 0    levothyroxine 75 mcg tablet, Take by mouth daily in the early morning  , Disp: , Rfl:     Linaclotide (LINZESS) 145 MCG CAPS, Take 145 mcg by mouth daily  , Disp: , Rfl:     losartan (COZAAR) 100 MG tablet, Take 100 mg by mouth daily  , Disp: , Rfl:     NOVOLOG 100 UNIT/ML injection, INJECT 120 UNITS DAILY VIA INSULIN PUMP E 11 65, Disp: , Rfl: 3    ondansetron (ZOFRAN) 8 mg tablet, Take by mouth, Disp: , Rfl:     oxybutynin (DITROPAN) 5 mg tablet, Take 10 mg by mouth daily  , Disp: , Rfl:     oxyCODONE-acetaminophen (PERCOCET) 5-325 mg per tablet, Take by mouth For Severe Pain , Disp: , Rfl:     pantoprazole (PROTONIX) 40 mg tablet, Take 40 mg by mouth 2 (two) times a day  , Disp: , Rfl:     pramipexole (MIRAPEX) 1 mg tablet, Take 1 mg by mouth 2 (two) times a day  , Disp: , Rfl:     ranitidine (ZANTAC) 150 mg tablet, Take 150 mg by mouth daily  , Disp: , Rfl:     tiZANidine (ZANAFLEX) 4 mg tablet, Take 1 tablet (4 mg total) by mouth 3 (three) times a day, Disp: 90 tablet, Rfl: 1   trospium chloride (SANCTURA) 20 mg tablet, Take 40 mg by mouth 2 (two) times a day, Disp: , Rfl:     Allergies    Allergies   Allergen Reactions    Bactrim [Sulfamethoxazole-Trimethoprim] Hives    Sucralfate      Facial swelling    Topamax [Topiramate]      disorentation    Azithromycin      itching    Baclofen      "That knocks me out "    Methotrexate      nausea    Neurontin [Gabapentin]      Other reaction(s): Hypertension, Other (See Comments)  Altered mental status  Severe sedation/hypotension       The following portions of the patient's history were reviewed and updated as appropriate: allergies, current medications, past family history, past medical history, past social history, past surgical history and problem list     Investigations    I personally reviewed the XRAY results with the patient:     Plain film of thoracic spine dated June 5th, 2018  Stable appearance of spinal cord stimulator electrode compared to intraoperative imaging  Physical Exam    Vitals:  Blood pressure 138/72, pulse 82, temperature 98 5 °F (36 9 °C), resp  rate 16, height 5' 3" (1 6 m), weight 102 kg (223 lb 12 8 oz)  ,Body mass index is 39 64 kg/m²  Physical Exam   Constitutional: She appears well-developed and well-nourished  Musculoskeletal:   Thoracic incision well healed  Skin:   Chronic skin changes in left lower extremity  Left buttock incision well healed, small area of scabbing  Psychiatric: She has a normal mood and affect  Her behavior is normal  Judgment and thought content normal    Vitals reviewed  Neurologic Exam     Mental Status   Walks with a mildly wide based gait

## 2018-06-12 ENCOUNTER — TELEPHONE (OUTPATIENT)
Dept: PAIN MEDICINE | Facility: MEDICAL CENTER | Age: 63
End: 2018-06-12

## 2018-06-12 NOTE — TELEPHONE ENCOUNTER
Pt called stating that she was seen in the ER last night and they instructed her to follow up with her pain management doctor today  Pt states she is having a lot of pain in her leg and is not sure what to do  Pt is not scheduled to see Lars until 7/6  She is asking for a call back at [phone]

## 2018-06-12 NOTE — TELEPHONE ENCOUNTER
s/w pt, states she went to [facility] ED last night for increased pain in low back and b/l legs, L greater than R  Pt states she was given something via IV and robaxin while she was there and advised to fu w/ pain management  Per pt, she had a fabulous week last week - little to no pain  Saw dr Kathryn Salguero on Friday for fu  Pt stated taht her pain started on Sunday w/ no obvious cause  S/w Rangel Fish yesterday re: programming options w/ no relief  Pt states she is using advil for her pain and continues w/ robaxin for spasm as prescribed by this office  Advised pt, will d/w DG and cb to advise  Pt verbalized understanding and appreciation

## 2018-06-12 NOTE — TELEPHONE ENCOUNTER
She may need to call Yoel Rivera and meet to see if there are any re-programming options and remind her that the SCStim is an evolving process and she is still going to have good days and bad days and hopefully as she heals and learns how to use the stim, the relief will be more constant and at least manageable  At this point she should continue her medications as prescribed and concentrate on utilizing the SCStim  Thank you

## 2018-06-25 ENCOUNTER — TELEPHONE (OUTPATIENT)
Dept: NEUROSURGERY | Facility: CLINIC | Age: 63
End: 2018-06-25

## 2018-06-25 NOTE — TELEPHONE ENCOUNTER
Pt reports her battery for the SCS is sticking out at the one corner and report this was discussed at a f/u with Dr Von Osgood  She would like to come in to discuss a revision    Appt offered and accepted

## 2018-06-28 ENCOUNTER — TELEPHONE (OUTPATIENT)
Dept: OBGYN CLINIC | Facility: HOSPITAL | Age: 63
End: 2018-06-28

## 2018-06-28 NOTE — TELEPHONE ENCOUNTER
S/w pt, states she is taking tizanidine 2 pills tid w/ severe drowsiness  Pt states she cut back to 1 pill po tid which is not helping with her pain  Pt states she saw Kathy of [facility] today for scs adjustments  Aslo went to urgent care this am and was given a rx for valium  Pt questioned if that is ok to take  Advised pt, valium will not interefere with an opioid contract as we are not prescribing controlled substances  Ok to continue tizanidine as prescribed, 1 pill po tid  Would not recommend valium w/ tizanidine due to drowsiness - s/w pcp  Advised pt, allow tonight / time for scs adjustments w/ Kathy and cb w/ an update tomorrow  Pt verbalized understanding and Confirmed 7/6/2018 ov w/ DG       Await pt's cb on 6/29/2018

## 2018-06-28 NOTE — TELEPHONE ENCOUNTER
Call from patient   Call back # [phone]  Kash Mcclure     Patient is calling stating she is in pain, would like a call back  She is experiencing leg pain

## 2018-07-02 NOTE — TELEPHONE ENCOUNTER
S/w pt, states she has + improvement of her pain symptoms w/ scs adjustment and medication as prescribed  Pt states she is taking tizanidine, 1 pill po tid w/ + relief and no se's  Pt states she did not / will not take valium as prescribed by urgent care  Advised pt to cb if questions or concerns arise   Pt verbalized understanding and confirmed 7/6/2018 ov

## 2018-07-06 ENCOUNTER — OFFICE VISIT (OUTPATIENT)
Dept: PAIN MEDICINE | Facility: CLINIC | Age: 63
End: 2018-07-06
Payer: MEDICARE

## 2018-07-06 VITALS
SYSTOLIC BLOOD PRESSURE: 130 MMHG | HEART RATE: 80 BPM | DIASTOLIC BLOOD PRESSURE: 74 MMHG | BODY MASS INDEX: 39.51 KG/M2 | HEIGHT: 63 IN | WEIGHT: 223 LBS | TEMPERATURE: 98.5 F

## 2018-07-06 DIAGNOSIS — R26.81 GAIT INSTABILITY: ICD-10-CM

## 2018-07-06 DIAGNOSIS — G89.29 CHRONIC PAIN OF RIGHT KNEE: ICD-10-CM

## 2018-07-06 DIAGNOSIS — G89.29 CHRONIC PAIN OF BOTH LOWER EXTREMITIES: Primary | Chronic | ICD-10-CM

## 2018-07-06 DIAGNOSIS — M79.18 MYOFASCIAL PAIN SYNDROME: ICD-10-CM

## 2018-07-06 DIAGNOSIS — G89.4 PAIN SYNDROME, CHRONIC: Chronic | ICD-10-CM

## 2018-07-06 DIAGNOSIS — G89.29 CHRONIC SCAPULAR PAIN: ICD-10-CM

## 2018-07-06 DIAGNOSIS — M25.561 CHRONIC PAIN OF RIGHT KNEE: ICD-10-CM

## 2018-07-06 DIAGNOSIS — M79.604 PAIN IN BOTH LOWER EXTREMITIES: ICD-10-CM

## 2018-07-06 DIAGNOSIS — M54.41 CHRONIC BILATERAL LOW BACK PAIN WITH BILATERAL SCIATICA: ICD-10-CM

## 2018-07-06 DIAGNOSIS — M79.604 CHRONIC PAIN OF BOTH LOWER EXTREMITIES: Primary | Chronic | ICD-10-CM

## 2018-07-06 DIAGNOSIS — G90.522 COMPLEX REGIONAL PAIN SYNDROME TYPE 1 OF LEFT LOWER EXTREMITY: Chronic | ICD-10-CM

## 2018-07-06 DIAGNOSIS — G89.29 CHRONIC PAIN OF LEFT KNEE: ICD-10-CM

## 2018-07-06 DIAGNOSIS — G89.29 CHRONIC BILATERAL LOW BACK PAIN WITH BILATERAL SCIATICA: ICD-10-CM

## 2018-07-06 DIAGNOSIS — M54.42 CHRONIC BILATERAL LOW BACK PAIN WITH BILATERAL SCIATICA: ICD-10-CM

## 2018-07-06 DIAGNOSIS — M54.6 CHRONIC BILATERAL THORACIC BACK PAIN: ICD-10-CM

## 2018-07-06 DIAGNOSIS — G89.29 CHRONIC BILATERAL THORACIC BACK PAIN: ICD-10-CM

## 2018-07-06 DIAGNOSIS — M79.605 PAIN IN BOTH LOWER EXTREMITIES: ICD-10-CM

## 2018-07-06 DIAGNOSIS — R26.9 GAIT DISORDER: ICD-10-CM

## 2018-07-06 DIAGNOSIS — M89.8X1 CHRONIC SCAPULAR PAIN: ICD-10-CM

## 2018-07-06 DIAGNOSIS — M79.605 CHRONIC PAIN OF BOTH LOWER EXTREMITIES: Primary | Chronic | ICD-10-CM

## 2018-07-06 DIAGNOSIS — M25.562 CHRONIC PAIN OF LEFT KNEE: ICD-10-CM

## 2018-07-06 DIAGNOSIS — M47.817 LUMBOSACRAL SPONDYLOSIS WITHOUT MYELOPATHY: ICD-10-CM

## 2018-07-06 DIAGNOSIS — G63 POLYNEUROPATHY ASSOCIATED WITH UNDERLYING DISEASE (HCC): ICD-10-CM

## 2018-07-06 PROBLEM — I25.10 CORONARY ARTERY DISEASE INVOLVING NATIVE CORONARY ARTERY: Status: ACTIVE | Noted: 2018-07-03

## 2018-07-06 PROBLEM — M54.50 LOW BACK PAIN: Status: ACTIVE | Noted: 2018-07-06

## 2018-07-06 PROBLEM — G45.9 TIA (TRANSIENT ISCHEMIC ATTACK): Status: ACTIVE | Noted: 2018-07-03

## 2018-07-06 PROBLEM — D50.9 MICROCYTIC ANEMIA: Status: ACTIVE | Noted: 2018-07-04

## 2018-07-06 PROBLEM — M81.0 AGE RELATED OSTEOPOROSIS: Status: ACTIVE | Noted: 2018-07-06

## 2018-07-06 PROBLEM — I50.32 CHRONIC DIASTOLIC CHF (CONGESTIVE HEART FAILURE) (HCC): Status: ACTIVE | Noted: 2018-07-03

## 2018-07-06 PROBLEM — S22.009A CLOSED FRACTURE OF THORACIC VERTEBRA (HCC): Status: ACTIVE | Noted: 2018-07-06

## 2018-07-06 PROBLEM — I82.409 DEEP VENOUS THROMBOSIS (HCC): Status: ACTIVE | Noted: 2018-07-06

## 2018-07-06 PROBLEM — M17.10 OSTEOARTHRITIS OF KNEE: Status: ACTIVE | Noted: 2018-07-06

## 2018-07-06 PROBLEM — M54.16 LUMBAR RADICULOPATHY: Status: ACTIVE | Noted: 2018-04-16

## 2018-07-06 PROBLEM — M19.91 LOCALIZED, PRIMARY OSTEOARTHRITIS: Status: ACTIVE | Noted: 2018-07-06

## 2018-07-06 PROBLEM — M54.14 THORACIC RADICULOPATHY: Status: ACTIVE | Noted: 2018-04-16

## 2018-07-06 PROBLEM — M17.9 OSTEOARTHRITIS OF KNEE: Status: ACTIVE | Noted: 2018-07-06

## 2018-07-06 PROCEDURE — 99214 OFFICE O/P EST MOD 30 MIN: CPT | Performed by: NURSE PRACTITIONER

## 2018-07-06 RX ORDER — DULOXETIN HYDROCHLORIDE 60 MG/1
CAPSULE, DELAYED RELEASE ORAL
Qty: 180 CAPSULE | Refills: 0 | Status: SHIPPED | OUTPATIENT
Start: 2018-07-06 | End: 2018-09-14 | Stop reason: SDUPTHER

## 2018-07-06 RX ORDER — TIZANIDINE 4 MG/1
4 TABLET ORAL 3 TIMES DAILY
Qty: 270 TABLET | Refills: 0 | Status: SHIPPED | OUTPATIENT
Start: 2018-07-06 | End: 2018-09-14 | Stop reason: SDUPTHER

## 2018-07-06 RX ORDER — DOXYCYCLINE 100 MG/1
100 TABLET ORAL 2 TIMES DAILY
COMMUNITY
End: 2018-09-14

## 2018-07-06 RX ORDER — ASPIRIN 81 MG/1
81 TABLET ORAL DAILY
COMMUNITY
End: 2018-08-13 | Stop reason: CLARIF

## 2018-07-06 RX ORDER — METHOCARBAMOL 750 MG/1
500 TABLET, FILM COATED ORAL EVERY 6 HOURS PRN
COMMUNITY
End: 2018-09-14 | Stop reason: SDUPTHER

## 2018-07-06 NOTE — PROGRESS NOTES
Assessment:  1  Chronic pain of both lower extremities    2  Chronic bilateral thoracic back pain    3  Complex regional pain syndrome type 1 of left lower extremity    4  Gait disorder    5  Gait instability    6  Chronic bilateral low back pain with bilateral sciatica    7  Pain in both lower extremities    8  Pain syndrome, chronic    9  Lumbosacral spondylosis without myelopathy    10  Chronic pain of left knee    11  Chronic pain of right knee    12  Polyneuropathy associated with underlying disease ([address] )    13  Myofascial pain syndrome    14  Chronic scapular pain        Plan:  While the patient was in the office today, I did have a thorough conversation with the patient regarding her medication regimen and treatment plan  I explained to the patient that unfortunately the overall goal of the spinal cord stimulator is not to erraticate all of her pain and that it is a tool to try to provide more manageable and consistent relief, however, there are still going to be days were pain may be severe and on manageable, no matter what we do with the stimulator  However, explained her that hopefully over time and with continued reprogramming we will get the stimulator to a point where manages her pain relatively consistently  I explained to the patient that our goal is to manage her pain at a 50% improvement, at least 50% of the time, on the least amount of medications, with the least amount of side effects  At this point, we will continue to work with the [facility] team for stimulator reprogramming and try to utilize the stimulator to its maximum potential for relief in the future  The patient was agreeable and verbalized an understanding           With regards to her medication regimen, I explained to the patient that at this point time I agree that it is in her best interest to titrate down on her opioid medications, however, I did explain to her that has to be done slowly and steadily over time as I do feel that she is experiencing hyper analgesia  I explained to the patient that before our office could even consider taking over prescribing her medications, we would have to proceed with a baseline drug screen today and once we have the results of that drug screen we could then discuss it at her next office visit in 3 weeks as she has enough medications for at least that long  However, I made it very clear that if I do agree to take over prescribing her opioid medications in the future, because of the dosage she is on, we will have no choice but to decrease the medications and a slow and steady fashion  For now she is to continue taking her fentanyl patch and Percocet as prescribed by her primary care provider  The patient was agreeable and verbalized an understanding  [place] Prescription Drug Monitoring Program report was reviewed and was appropriate      While the patient was in the office today, I discussed with the patient that as per our medication management office protocol, we do not prescribe any opioid medications before we obtain a baseline drug screen from every patient  The patient was agreeable and a baseline drug screen was collected today  We will discuss the official results at the patient's next office visit  A urine drug screen was collected at today's office visit as part of our medication management protocol  The point of care testing results were appropriate for what was being prescribed  The specimen will be sent for confirmatory testing  The drug screen is medically necessary because the patient is either dependent on opioid medication or is being considered for opioid medication therapy and the results could impact ongoing or future treatment  The drug screen is to evaluate for the presences or absence of prescribed, non-prescribed, and/or illicit drugs/substances  There are risks associated with opioid medications, including dependence, addiction and tolerance   The patient understands and agrees to use these medications only as prescribed  Potential side effects of the medications include, but are not limited to, constipation, drowsiness, addiction, impaired judgment and risk of fatal overdose if not taken as prescribed  The patient was warned against driving while taking sedation medications  Sharing medications is a felony  At this point in time, the patient is showing no signs of addiction, abuse, diversion or suicidal ideation  The patient will follow-up in 3 weeks for medication prescription refill and reevaluation  The patient was advised to contact the office should their symptoms worsen in the interim  The patient was agreeable and verbalized an understanding  DIRE SCORE: 15: The patient may be a candidate for long-term opioid analgesia  History of Present Illness: The patient is a 65 y o  female last seen on 4/13/18 who presents for a follow up office visit in regards to chronic pain secondary to complex regional pain syndrome, polyneuropathy, and lumbar spondylosis with stenosis  The patient currently reports that since her last office visit she feels that her leg pain and neuropathic symptoms have definitely improved as a result of the spinal cord stimulator, however, she reports that she is still noting pain and seemed to be under the impression that the stimulator should be providing relief all the time and that she should not have days were her pain is severe or on manageable  The patient reports that she has discussed with her primary care provider that she would like to start trying to titrate down on the fentanyl patch and Percocet, however, she reports that, according to her, he stated that he would just continue to prescribe the way it is for now and if she wanted to try to come off of the medication, she should follow up with our office and discuss that with us    The patient states that she is eager to at least titrate off of the fentanyl patch, if not down and off of all opioid medications in the future if possible  The patient presents today to discuss her medication regimen and treatment plan  Current pain medications includes:  Cymbalta 60 mg 2 pills at bedtime and tizanidine 4 mg t i d  However, the patient is also taking or being prescribed the fentanyl patch 50 mcg, applying 1 patch every 72 hours and p r n  Percocet 5/325 every 8 hours as needed for breakthrough pain by her primary care provider  The patient reports that this regimen is providing 80% pain relief  The patient is reporting no side effects from this pain medication regimen  I have personally reviewed and/or updated the patient's past medical history, past surgical history, family history, social history, current medications, allergies, and vital signs today  Review of Systems:    Review of Systems   Respiratory: Positive for shortness of breath  Cardiovascular: Negative for chest pain  Gastrointestinal: Negative for constipation, diarrhea, nausea and vomiting  Musculoskeletal: Positive for gait problem and joint swelling (joint stiffness)  Negative for arthralgias and myalgias  Skin: Negative for rash  Neurological: Positive for weakness  Negative for dizziness and seizures  All other systems reviewed and are negative          Past Medical History:   Diagnosis Date    Anxiety     Asthma     controlled    Back complaints     Cancer ([facility] [address] )     nose    Cellulitis of left lower leg     Chronic bilateral thoracic back pain     Chronic myofascial pain     Chronic pain of both lower extremities     Chronic pain of left knee     Chronic pain syndrome     CPAP (continuous positive airway pressure) dependence     Depression     Diabetes mellitus (HCC)     Gait disorder     Gastroparesis     History of angina     Hyperlipidemia     Hypertension     Insulin pump in place     Kidney disease     Polyneuropathy associated with underlying disease ([facility] [address] )     S/P insertion of spinal cord stimulator 2018    Sleep apnea        Past Surgical History:   Procedure Laterality Date    BREAST SURGERY      BREAST SURGERY      reduction     SECTION      HERNIA REPAIR      HYSTERECTOMY      JOINT REPLACEMENT Left     NECK SURGERY      OH SURG IMPLNT [facility] Left 2018    Procedure: Insertion of thoracic spinal cord stimulator electrode via laminotomy and placement of left buttock IPG;  Surgeon: Bryson Hernandez MD;  Location: [facility];  Service: Neurosurgery    REPLACEMENT TOTAL KNEE      ROTATOR CUFF REPAIR      TONSILLECTOMY      WISDOM TOOTH EXTRACTION         Family History   Problem Relation Age of Onset    Diabetes Family     Heart disease Family     Hypertension Family     Neuropathy Family     No Known Problems Mother     Heart disease Father     Diabetes Father     No Known Problems Maternal Grandmother     No Known Problems Maternal Grandfather     No Known Problems Paternal Grandmother     No Known Problems Paternal Grandfather        Social History     Occupational History    Not on file       Social History Main Topics    Smoking status: Never Smoker    Smokeless tobacco: Never Used    Alcohol use No    Drug use: No    Sexual activity: Yes         Current Outpatient Prescriptions:     atorvastatin (LIPITOR) 80 mg tablet, Take 80 mg by mouth daily, Disp: , Rfl:     buPROPion (WELLBUTRIN XL) 150 mg 24 hr tablet, Take 150 mg by mouth daily, Disp: , Rfl:     cholecalciferol (VITAMIN D3) 1,000 units tablet, Take 2,000 Units by mouth daily, Disp: , Rfl: 5    Cranberry 450 MG CAPS, Take by mouth, Disp: , Rfl:     Cyanocobalamin (VITAMIN B-12 PO), Take by mouth, Disp: , Rfl:     Docusate Sodium 100 MG capsule, Take by mouth, Disp: , Rfl:     doxycycline (ADOXA) 100 MG tablet, Take 100 mg by mouth 2 (two) times a day, Disp: , Rfl:     DULoxetine (CYMBALTA) 60 mg delayed release capsule, 2 PO HS (Patient taking differently: 60 mg 2 (two) times a day 2 PO HS ), Disp: 180 capsule, Rfl: 0    fentaNYL (DURAGESIC) 50 mcg/hr, Place 1 patch on the skin every 3 (three) days  , Disp: , Rfl:     furosemide (LASIX) 20 mg tablet, Take by mouth daily  , Disp: , Rfl:     glucagon (GLUCAGEN) 1 mg injection, Inject under the skin as needed if passed out from low sugar, Disp: , Rfl:     HYDROcodone-acetaminophen (NORCO)  mg per tablet, TAKE ONE TABLET BY MOUTH EVERY 6 HOURS AS NEEDED FOR MODERATE PAIN   MAX DAILY AMOUNT 4 TABLETS, Disp: , Rfl: 0    levothyroxine 75 mcg tablet, Take by mouth daily in the early morning  , Disp: , Rfl:     Linaclotide (LINZESS) 145 MCG CAPS, Take 145 mcg by mouth daily  , Disp: , Rfl:     losartan (COZAAR) 100 MG tablet, Take 100 mg by mouth daily  , Disp: , Rfl:     NOVOLOG 100 UNIT/ML injection, INJECT 120 UNITS DAILY VIA INSULIN PUMP E 11 65, Disp: , Rfl: 3    ondansetron (ZOFRAN) 8 mg tablet, Take by mouth, Disp: , Rfl:     oxybutynin (DITROPAN) 5 mg tablet, Take 10 mg by mouth daily  , Disp: , Rfl:     oxyCODONE-acetaminophen (PERCOCET) 5-325 mg per tablet, Take by mouth For Severe Pain , Disp: , Rfl:     pantoprazole (PROTONIX) 40 mg tablet, Take 40 mg by mouth 2 (two) times a day  , Disp: , Rfl:     pramipexole (MIRAPEX) 1 mg tablet, Take 1 mg by mouth 2 (two) times a day  , Disp: , Rfl:     ranitidine (ZANTAC) 150 mg tablet, Take 150 mg by mouth daily  , Disp: , Rfl:     tiZANidine (ZANAFLEX) 4 mg tablet, Take 1 tablet (4 mg total) by mouth 3 (three) times a day, Disp: 90 tablet, Rfl: 1    trospium chloride (SANCTURA) 20 mg tablet, Take 40 mg by mouth 2 (two) times a day, Disp: , Rfl:     Allergies   Allergen Reactions    Bactrim [Sulfamethoxazole-Trimethoprim] Hives    Sucralfate      Facial swelling    Topamax [Topiramate]      disorentation    Azithromycin      itching    Baclofen      "That knocks me out "    Methotrexate      nausea    Neurontin [Gabapentin]      Other reaction(s): Hypertension, Other (See Comments)  Altered mental status  Severe sedation/hypotension       Physical Exam:    There were no vitals taken for this visit  Constitutional:normal, well developed, well nourished, alert, in no distress and non-toxic and no overt pain behavior  and overweight  Eyes:anicteric  HEENT:grossly intact  Neck:supple, symmetric, trachea midline and no masses   Pulmonary:even and unlabored  Cardiovascular:No edema or pitting edema present  Skin:Normal without rashes or lesions and well hydrated  Psychiatric:Mood and affect appropriate  Neurologic:Cranial Nerves II-XII grossly intact  Musculoskeletal:Slightly antalgic, but steady gait without the use of any assistive devices  Imaging  No orders to display         No orders of the defined types were placed in this encounter

## 2018-07-11 ENCOUNTER — TELEPHONE (OUTPATIENT)
Dept: PAIN MEDICINE | Facility: MEDICAL CENTER | Age: 63
End: 2018-07-11

## 2018-07-13 NOTE — TELEPHONE ENCOUNTER
sw pt, states she is having difficulty contacting dr Nikki William  Provided phone #, [phone]  Pt verbalized understanding and appreciation

## 2018-07-30 ENCOUNTER — TELEPHONE (OUTPATIENT)
Dept: PAIN MEDICINE | Facility: MEDICAL CENTER | Age: 63
End: 2018-07-30

## 2018-07-30 NOTE — TELEPHONE ENCOUNTER
Pt called to cx appt for today with DG 2 10:45a   Pt states she is in a lot of pain and has to drive 40 mins to appt and just cannot do that today   Pt will c/b to r/s

## 2018-08-01 ENCOUNTER — TELEPHONE (OUTPATIENT)
Dept: NEUROLOGY | Facility: CLINIC | Age: 63
End: 2018-08-01

## 2018-08-01 ENCOUNTER — OFFICE VISIT (OUTPATIENT)
Dept: NEUROSURGERY | Facility: CLINIC | Age: 63
End: 2018-08-01
Payer: MEDICARE

## 2018-08-01 VITALS
BODY MASS INDEX: 39.23 KG/M2 | RESPIRATION RATE: 20 BRPM | HEIGHT: 63 IN | WEIGHT: 221.4 LBS | TEMPERATURE: 98.8 F | SYSTOLIC BLOOD PRESSURE: 176 MMHG | HEART RATE: 100 BPM | DIASTOLIC BLOOD PRESSURE: 97 MMHG

## 2018-08-01 DIAGNOSIS — G45.9 TIA ON MEDICATION: ICD-10-CM

## 2018-08-01 DIAGNOSIS — Z45.42 ENCOUNTER FOR FITTING AND ADJUSTMENT OF SPINAL CORD STIMULATOR: Primary | ICD-10-CM

## 2018-08-01 DIAGNOSIS — T85.192A MALFUNCTION OF SPINAL CORD STIMULATOR, INITIAL ENCOUNTER (HCC): ICD-10-CM

## 2018-08-01 PROCEDURE — 95972 ALYS CPLX SP/PN NPGT W/PRGRM: CPT | Performed by: NEUROLOGICAL SURGERY

## 2018-08-01 PROCEDURE — 99215 OFFICE O/P EST HI 40 MIN: CPT | Performed by: NEUROLOGICAL SURGERY

## 2018-08-01 RX ORDER — CHLORHEXIDINE GLUCONATE 0.12 MG/ML
15 RINSE ORAL ONCE
Status: CANCELLED | OUTPATIENT
Start: 2018-08-01 | End: 2018-08-01

## 2018-08-01 RX ORDER — CHLORHEXIDINE GLUCONATE 0.12 MG/ML
15 RINSE ORAL EVERY 12 HOURS SCHEDULED
Status: CANCELLED | OUTPATIENT
Start: 2018-08-01 | End: 2019-08-01

## 2018-08-01 NOTE — LETTER
August 1, 2018     David Hedrick DO  [address] [facility]  [address]  [id number]    Patient: Leon Jenkins   YOB: 1955   Date of Visit: 8/1/2018       Dear Dr Diaz Dire: Thank you for referring Fred Reno to me for evaluation  Below are my notes for this consultation  If you have questions, please do not hesitate to call me  I look forward to following your patient along with you  Sincerely,        Xin Melgar MD        CC: No Recipients  Xin Melgar MD  8/1/2018  4:38 PM  Sign at close encounter  Office Note - Neurosurgery   Leon Jenkins 59 y o  female MRN: [id number]      Assessment:    Patient is stable  29-year-old woman postInsertion of thoracic spinal cord stimulator system  While in the office today, she underwent complex reprogramming of the spinal cord stimulator system to better cover her right hip and leg pain  This was undertaken under my direct supervision by the [employer] representative  She has localized tenderness over the right IPG site  There is some prominence to the outer aspect of the incision and it seems as if her IPG has flipped into a horizontal position  We discussed the risks, benefits and alternatives to reopening of left buttock incision for repositioning of the IPG  The goal of surgery would be to reduce the tenderness around the incision and prevent wound breakdown  The risks include infection and damaging the system resulting in failure of treatment  The surgery would be undertaken under IV anesthesia  Of note, apparently the patient recently presented the hospital with TIA  She will be referred to stroke neurology for further assessment as it does not sound as if there has been any change to her antiplatelet regimen following these events  It is likely that the TIA would delay the surgery for approximately 1-2 months      Expected postoperative course, including activity restrictions, expected pain and postoperative medication were reviewed  Patient provided verbal consent to surgical procedure and signed consent form: Yes    History, physical examination and diagnostic tests were reviewed and questions answered  Diagnosis, care plan and treatment options were discussed  The patient and mother understand instructions and will follow up as directed  Plan:    Follow-up:   Surgery    Problem List Items Addressed This Visit        Cardiovascular and Mediastinum    TIA on medication    Relevant Orders    Ambulatory referral to Neurology       Other    Encounter for fitting and adjustment of spinal cord stimulator - Primary    Relevant Orders    Case request operating room: Reopening of left buttock incision for repositioning of implantable pulse generator (Completed)      Other Visit Diagnoses     Malfunction of spinal cord stimulator, initial encounter [facility])        Relevant Orders    Case request operating room: Reopening of left buttock incision for repositioning of implantable pulse generator (Completed)          Subjective/Objective     Chief Complaint    Left buttock incision pain  HPI    62-year-old woman approximately 2 months post insertion of [employer] spinal cord stimulator system  She has improvement in pain in the lower back and left leg but hopes to have further improvement in the right leg and will undergo complex reprogramming of the system during this visit  The swelling in her left buttock has improved significantly and there is no drainage or tenderness  However she has noticed in recent weeks that the outer aspect of the incision is quite prominent and that the battery is palpable underneath this  This is different than her last visit  She denies any fever, chills or sweats  This area is quite tender and she is unable to lie on her back secondary to this pain  She wonders if the IPG can be repositioned  ROS    Review of Systems   Constitutional: Positive for fatigue  HENT: Negative  Eyes: Negative  Respiratory: Negative  Cardiovascular: Negative  Gastrointestinal: Negative  Endocrine: Negative  Genitourinary: Negative  Musculoskeletal: Positive for back pain (right side lower back radiates into rigth hip down right leg, pain due to battery sticking out)  Skin: Negative  Allergic/Immunologic: Negative  Neurological: Positive for weakness (right leg)  Negative for dizziness, seizures, syncope, numbness and headaches  Hematological: Does not bruise/bleed easily (patient on ASA 81)  Psychiatric/Behavioral: Positive for sleep disturbance (pain due to battery )  Negative for confusion  Family History    Family History   Problem Relation Age of Onset    Diabetes Family     Heart disease Family     Hypertension Family     Neuropathy Family     No Known Problems Mother     Heart disease Father     Diabetes Father     No Known Problems Maternal Grandmother     No Known Problems Maternal Grandfather     No Known Problems Paternal Grandmother     No Known Problems Paternal Grandfather        Social History    Social History     Social History    Marital status: /Civil Union     Spouse name: N/A    Number of children: N/A    Years of education: N/A     Occupational History    Not on file       Social History Main Topics    Smoking status: Never Smoker    Smokeless tobacco: Never Used    Alcohol use No    Drug use: No    Sexual activity: Yes     Other Topics Concern    Not on file     Social History Narrative    No narrative on file       Past Medical History    Past Medical History:   Diagnosis Date    Anxiety     Asthma     controlled    Back complaints     Cancer ([facility] [address] )     nose    Cellulitis of left lower leg     Chronic bilateral thoracic back pain     Chronic myofascial pain     Chronic pain of both lower extremities     Chronic pain of left knee     Chronic pain syndrome     CPAP (continuous positive airway pressure) dependence     Depression     Diabetes mellitus ([facility] [address] )     Gait disorder     Gastroparesis     History of angina     Hyperlipidemia     Hypertension     Insulin pump in place     Kidney disease     Polyneuropathy associated with underlying disease ([facility] [address] )     S/P insertion of spinal cord stimulator 2018    Sleep apnea     TIA (transient ischemic attack)        Surgical History    Past Surgical History:   Procedure Laterality Date    BREAST SURGERY      BREAST SURGERY      reduction     SECTION      HERNIA REPAIR      HYSTERECTOMY      JOINT REPLACEMENT Left     NECK SURGERY      [place] SURG IMPLNT [address] Left 2018    Procedure:  Insertion of thoracic spinal cord stimulator electrode via laminotomy and placement of left buttock IPG;  Surgeon: Tricia Randhawa MD;  [facility];  Service: Neurosurgery    REPLACEMENT TOTAL KNEE      ROTATOR CUFF REPAIR      TONSILLECTOMY      WISDOM TOOTH EXTRACTION         Medications      Current Outpatient Prescriptions:     aspirin (ECOTRIN LOW STRENGTH) 81 mg EC tablet, Take 81 mg by mouth daily, Disp: , Rfl:     atorvastatin (LIPITOR) 80 mg tablet, Take 80 mg by mouth daily, Disp: , Rfl:     cholecalciferol (VITAMIN D3) 1,000 units tablet, Take 2,000 Units by mouth daily, Disp: , Rfl: 5    Cranberry 450 MG CAPS, Take by mouth, Disp: , Rfl:     Cyanocobalamin (VITAMIN B-12 PO), Take by mouth, Disp: , Rfl:     Docusate Sodium 100 MG capsule, Take by mouth, Disp: , Rfl:     doxycycline (ADOXA) 100 MG tablet, Take 100 mg by mouth 2 (two) times a day, Disp: , Rfl:     DULoxetine (CYMBALTA) 60 mg delayed release capsule, Take 2 PO QD, Disp: 180 capsule, Rfl: 0    fentaNYL (DURAGESIC) 50 mcg/hr, Place 1 patch on the skin every 3 (three) days  , Disp: , Rfl:     furosemide (LASIX) 20 mg tablet, Take by mouth daily  , Disp: , Rfl:     glucagon (GLUCAGEN) 1 mg injection, Inject under the skin as needed if passed out from low sugar, Disp: , Rfl:     levothyroxine 75 mcg tablet, Take by mouth daily in the early morning  , Disp: , Rfl:     Linaclotide (LINZESS) 145 MCG CAPS, Take by mouth, Disp: , Rfl:     losartan (COZAAR) 100 MG tablet, Take 100 mg by mouth daily  , Disp: , Rfl:     magnesium oxide (MAG-OX) 400 mg, Take 400 mg by mouth 2 (two) times a day, Disp: , Rfl:     methocarbamol (ROBAXIN) 750 mg tablet, Take 500 mg by mouth every 6 (six) hours as needed for muscle spasms, Disp: , Rfl:     NOVOLOG 100 UNIT/ML injection, INJECT 120 UNITS DAILY VIA INSULIN PUMP E 11 65, Disp: , Rfl: 3    ondansetron (ZOFRAN) 8 mg tablet, Take by mouth, Disp: , Rfl:     oxybutynin (DITROPAN) 5 mg tablet, Take 10 mg by mouth daily  , Disp: , Rfl:     oxyCODONE-acetaminophen (PERCOCET) 5-325 mg per tablet, Take by mouth For Severe Pain , Disp: , Rfl:     pantoprazole (PROTONIX) 40 mg tablet, Take 40 mg by mouth 2 (two) times a day  , Disp: , Rfl:     pramipexole (MIRAPEX) 1 mg tablet, Take 1 mg by mouth 2 (two) times a day  , Disp: , Rfl:     ranitidine (ZANTAC) 150 mg tablet, Take 150 mg by mouth daily  , Disp: , Rfl:     tiZANidine (ZANAFLEX) 4 mg tablet, Take 1 tablet (4 mg total) by mouth 3 (three) times a day, Disp: 270 tablet, Rfl: 0    Allergies    Allergies   Allergen Reactions    Bactrim [Sulfamethoxazole-Trimethoprim] Hives    Sucralfate      Facial swelling    Topamax [Topiramate]      disorentation    Azithromycin      itching    Baclofen      "That knocks me out "    Bupropion Other (See Comments)     Too tired    Methotrexate      nausea    Neurontin [Gabapentin]      Other reaction(s): Hypertension, Other (See Comments)  Altered mental status  Severe sedation/hypotension       The following portions of the patient's history were reviewed and updated as appropriate: allergies, current medications, past family history, past medical history, past social history, past surgical history and problem list     Physical Exam    Vitals:  Blood pressure (!) 176/97, pulse 100, temperature 98 8 °F (37 1 °C), resp  rate 20, height 5' 3" (1 6 m), weight 100 kg (221 lb 6 4 oz)  ,Body mass index is 39 22 kg/m²  Physical Exam   Constitutional: She appears well-nourished  No distress  Cardiovascular: Normal rate and regular rhythm  Pulmonary/Chest: Effort normal and breath sounds normal    Neurological:   Full power in lower extremities  Walks with a normal gait  Skin:   Left buttock incision well healed  Battery is palpable underneath the lateral aspect of the left incision  The battery seems to have flipped into a horizontal position       Neurologic Exam

## 2018-08-01 NOTE — TELEPHONE ENCOUNTER
Scheduled appt 8/13/18 with Dr Roderick Mcneil in [facility], due to ASAP appt per Dr Ozzy Chan  Mailed NP paperwork to pt's home

## 2018-08-01 NOTE — PROGRESS NOTES
Office Note - Neurosurgery   Jenny Geller 59 y o  female MRN: [id number]      Assessment:    Patient is stable  21-year-old woman postInsertion of thoracic spinal cord stimulator system  While in the office today, she underwent complex reprogramming of the spinal cord stimulator system to better cover her right hip and leg pain  This was undertaken under my direct supervision by the [employer] representative  She has localized tenderness over the right IPG site  There is some prominence to the outer aspect of the incision and it seems as if her IPG has flipped into a horizontal position  We discussed the risks, benefits and alternatives to reopening of left buttock incision for repositioning of the IPG  The goal of surgery would be to reduce the tenderness around the incision and prevent wound breakdown  The risks include infection and damaging the system resulting in failure of treatment  The surgery would be undertaken under IV anesthesia  Of note, apparently the patient recently presented the hospital with TIA  She will be referred to stroke neurology for further assessment as it does not sound as if there has been any change to her antiplatelet regimen following these events  It is likely that the TIA would delay the surgery for approximately 1-2 months  Expected postoperative course, including activity restrictions, expected pain and postoperative medication were reviewed  Patient provided verbal consent to surgical procedure and signed consent form: Yes    History, physical examination and diagnostic tests were reviewed and questions answered  Diagnosis, care plan and treatment options were discussed  The patient and mother understand instructions and will follow up as directed      Plan:    Follow-up:   Surgery    Problem List Items Addressed This Visit        Cardiovascular and Mediastinum    TIA on medication    Relevant Orders    Ambulatory referral to Neurology       Other Encounter for fitting and adjustment of spinal cord stimulator - Primary    Relevant Orders    Case request operating room: Reopening of left buttock incision for repositioning of implantable pulse generator (Completed)      Other Visit Diagnoses     Malfunction of spinal cord stimulator, initial encounter [facility])        Relevant Orders    Case request operating room: Reopening of left buttock incision for repositioning of implantable pulse generator (Completed)          Subjective/Objective     Chief Complaint    Left buttock incision pain  HPI    58-year-old woman approximately 2 months post insertion of [employer] spinal cord stimulator system  She has improvement in pain in the lower back and left leg but hopes to have further improvement in the right leg and will undergo complex reprogramming of the system during this visit  The swelling in her left buttock has improved significantly and there is no drainage or tenderness  However she has noticed in recent weeks that the outer aspect of the incision is quite prominent and that the battery is palpable underneath this  This is different than her last visit  She denies any fever, chills or sweats  This area is quite tender and she is unable to lie on her back secondary to this pain  She wonders if the IPG can be repositioned  ROS    Review of Systems   Constitutional: Positive for fatigue  HENT: Negative  Eyes: Negative  Respiratory: Negative  Cardiovascular: Negative  Gastrointestinal: Negative  Endocrine: Negative  Genitourinary: Negative  Musculoskeletal: Positive for back pain (right side lower back radiates into rigth hip down right leg, pain due to battery sticking out)  Skin: Negative  Allergic/Immunologic: Negative  Neurological: Positive for weakness (right leg)  Negative for dizziness, seizures, syncope, numbness and headaches  Hematological: Does not bruise/bleed easily (patient on ASA 81)  Psychiatric/Behavioral: Positive for sleep disturbance (pain due to battery )  Negative for confusion  Family History    Family History   Problem Relation Age of Onset    Diabetes Family     Heart disease Family     Hypertension Family     Neuropathy Family     No Known Problems Mother     Heart disease Father     Diabetes Father     No Known Problems Maternal Grandmother     No Known Problems Maternal Grandfather     No Known Problems Paternal Grandmother     No Known Problems Paternal Grandfather        Social History    Social History     Social History    Marital status: /Civil Union     Spouse name: N/A    Number of children: N/A    Years of education: N/A     Occupational History    Not on file       Social History Main Topics    Smoking status: Never Smoker    Smokeless tobacco: Never Used    Alcohol use No    Drug use: No    Sexual activity: Yes     Other Topics Concern    Not on file     Social History Narrative    No narrative on file       Past Medical History    Past Medical History:   Diagnosis Date    Anxiety     Asthma     controlled    Back complaints     Cancer ([place] [address] )     nose    Cellulitis of left lower leg     Chronic bilateral thoracic back pain     Chronic myofascial pain     Chronic pain of both lower extremities     Chronic pain of left knee     Chronic pain syndrome     CPAP (continuous positive airway pressure) dependence     Depression     Diabetes mellitus ([facility] )     Gait disorder     Gastroparesis     History of angina     Hyperlipidemia     Hypertension     Insulin pump in place     Kidney disease     Polyneuropathy associated with underlying disease ([facility] )     S/P insertion of spinal cord stimulator 2018    Sleep apnea     TIA (transient ischemic attack)        Surgical History    Past Surgical History:   Procedure Laterality Date    BREAST SURGERY      BREAST SURGERY      reduction     SECTION      HERNIA REPAIR  HYSTERECTOMY      JOINT REPLACEMENT Left     NECK SURGERY      TX SURG IMPLNT [address] Left 4/23/2018    Procedure:  Insertion of thoracic spinal cord stimulator electrode via laminotomy and placement of left buttock IPG;  Surgeon: Gerber Beltran MD;  [facility]: [facility];  Service: Neurosurgery    REPLACEMENT TOTAL KNEE      ROTATOR CUFF REPAIR      TONSILLECTOMY      WISDOM TOOTH EXTRACTION         Medications      Current Outpatient Prescriptions:     aspirin (ECOTRIN LOW STRENGTH) 81 mg EC tablet, Take 81 mg by mouth daily, Disp: , Rfl:     atorvastatin (LIPITOR) 80 mg tablet, Take 80 mg by mouth daily, Disp: , Rfl:     cholecalciferol (VITAMIN D3) 1,000 units tablet, Take 2,000 Units by mouth daily, Disp: , Rfl: 5    Cranberry 450 MG CAPS, Take by mouth, Disp: , Rfl:     Cyanocobalamin (VITAMIN B-12 PO), Take by mouth, Disp: , Rfl:     Docusate Sodium 100 MG capsule, Take by mouth, Disp: , Rfl:     doxycycline (ADOXA) 100 MG tablet, Take 100 mg by mouth 2 (two) times a day, Disp: , Rfl:     DULoxetine (CYMBALTA) 60 mg delayed release capsule, Take 2 PO QD, Disp: 180 capsule, Rfl: 0    fentaNYL (DURAGESIC) 50 mcg/hr, Place 1 patch on the skin every 3 (three) days  , Disp: , Rfl:     furosemide (LASIX) 20 mg tablet, Take by mouth daily  , Disp: , Rfl:     glucagon (GLUCAGEN) 1 mg injection, Inject under the skin as needed if passed out from low sugar, Disp: , Rfl:     levothyroxine 75 mcg tablet, Take by mouth daily in the early morning  , Disp: , Rfl:     Linaclotide (LINZESS) 145 MCG CAPS, Take by mouth, Disp: , Rfl:     losartan (COZAAR) 100 MG tablet, Take 100 mg by mouth daily  , Disp: , Rfl:     magnesium oxide (MAG-OX) 400 mg, Take 400 mg by mouth 2 (two) times a day, Disp: , Rfl:     methocarbamol (ROBAXIN) 750 mg tablet, Take 500 mg by mouth every 6 (six) hours as needed for muscle spasms, Disp: , Rfl:     NOVOLOG 100 UNIT/ML injection, INJECT 120 UNITS DAILY VIA INSULIN PUMP E 11 65, Disp: , Rfl: 3    ondansetron (ZOFRAN) 8 mg tablet, Take by mouth, Disp: , Rfl:     oxybutynin (DITROPAN) 5 mg tablet, Take 10 mg by mouth daily  , Disp: , Rfl:     oxyCODONE-acetaminophen (PERCOCET) 5-325 mg per tablet, Take by mouth For Severe Pain , Disp: , Rfl:     pantoprazole (PROTONIX) 40 mg tablet, Take 40 mg by mouth 2 (two) times a day  , Disp: , Rfl:     pramipexole (MIRAPEX) 1 mg tablet, Take 1 mg by mouth 2 (two) times a day  , Disp: , Rfl:     ranitidine (ZANTAC) 150 mg tablet, Take 150 mg by mouth daily  , Disp: , Rfl:     tiZANidine (ZANAFLEX) 4 mg tablet, Take 1 tablet (4 mg total) by mouth 3 (three) times a day, Disp: 270 tablet, Rfl: 0    Allergies    Allergies   Allergen Reactions    Bactrim [Sulfamethoxazole-Trimethoprim] Hives    Sucralfate      Facial swelling    Topamax [Topiramate]      disorentation    Azithromycin      itching    Baclofen      "That knocks me out "    Bupropion Other (See Comments)     Too tired    Methotrexate      nausea    Neurontin [Gabapentin]      Other reaction(s): Hypertension, Other (See Comments)  Altered mental status  Severe sedation/hypotension       The following portions of the patient's history were reviewed and updated as appropriate: allergies, current medications, past family history, past medical history, past social history, past surgical history and problem list     Physical Exam    Vitals:  Blood pressure (!) 176/97, pulse 100, temperature 98 8 °F (37 1 °C), resp  rate 20, height 5' 3" (1 6 m), weight 100 kg (221 lb 6 4 oz)  ,Body mass index is 39 22 kg/m²  Physical Exam   Constitutional: She appears well-nourished  No distress  Cardiovascular: Normal rate and regular rhythm  Pulmonary/Chest: Effort normal and breath sounds normal    Neurological:   Full power in lower extremities  Walks with a normal gait  Skin:   Left buttock incision well healed    Battery is palpable underneath the lateral aspect of the left incision  The battery seems to have flipped into a horizontal position       Neurologic Exam

## 2018-08-02 PROBLEM — T85.192A MALFUNCTION OF SPINAL CORD STIMULATOR (HCC): Status: ACTIVE | Noted: 2018-08-02

## 2018-08-08 ENCOUNTER — OFFICE VISIT (OUTPATIENT)
Dept: PAIN MEDICINE | Facility: CLINIC | Age: 63
End: 2018-08-08
Payer: MEDICARE

## 2018-08-08 VITALS
HEIGHT: 63 IN | DIASTOLIC BLOOD PRESSURE: 68 MMHG | SYSTOLIC BLOOD PRESSURE: 138 MMHG | WEIGHT: 220 LBS | BODY MASS INDEX: 38.98 KG/M2 | HEART RATE: 60 BPM

## 2018-08-08 DIAGNOSIS — M79.18 CHRONIC MYOFASCIAL PAIN: ICD-10-CM

## 2018-08-08 DIAGNOSIS — M54.41 CHRONIC BILATERAL LOW BACK PAIN WITH BILATERAL SCIATICA: ICD-10-CM

## 2018-08-08 DIAGNOSIS — M79.605 CHRONIC PAIN OF BOTH LOWER EXTREMITIES: Chronic | ICD-10-CM

## 2018-08-08 DIAGNOSIS — M25.561 CHRONIC PAIN OF RIGHT KNEE: ICD-10-CM

## 2018-08-08 DIAGNOSIS — M54.6 CHRONIC BILATERAL THORACIC BACK PAIN: Primary | ICD-10-CM

## 2018-08-08 DIAGNOSIS — G89.29 CHRONIC BILATERAL LOW BACK PAIN WITH BILATERAL SCIATICA: ICD-10-CM

## 2018-08-08 DIAGNOSIS — G90.522 COMPLEX REGIONAL PAIN SYNDROME TYPE 1 OF LEFT LOWER EXTREMITY: Chronic | ICD-10-CM

## 2018-08-08 DIAGNOSIS — G89.29 CHRONIC SCAPULAR PAIN: ICD-10-CM

## 2018-08-08 DIAGNOSIS — G89.29 CHRONIC PAIN OF LEFT KNEE: ICD-10-CM

## 2018-08-08 DIAGNOSIS — R26.81 GAIT INSTABILITY: ICD-10-CM

## 2018-08-08 DIAGNOSIS — M89.8X1 CHRONIC SCAPULAR PAIN: ICD-10-CM

## 2018-08-08 DIAGNOSIS — G89.29 CHRONIC PAIN OF RIGHT KNEE: ICD-10-CM

## 2018-08-08 DIAGNOSIS — M25.562 CHRONIC PAIN OF LEFT KNEE: ICD-10-CM

## 2018-08-08 DIAGNOSIS — G89.29 CHRONIC BILATERAL THORACIC BACK PAIN: Primary | ICD-10-CM

## 2018-08-08 DIAGNOSIS — G89.4 PAIN SYNDROME, CHRONIC: Chronic | ICD-10-CM

## 2018-08-08 DIAGNOSIS — M54.42 CHRONIC BILATERAL LOW BACK PAIN WITH BILATERAL SCIATICA: ICD-10-CM

## 2018-08-08 DIAGNOSIS — G89.29 CHRONIC PAIN OF BOTH LOWER EXTREMITIES: Chronic | ICD-10-CM

## 2018-08-08 DIAGNOSIS — M79.604 CHRONIC PAIN OF BOTH LOWER EXTREMITIES: Chronic | ICD-10-CM

## 2018-08-08 DIAGNOSIS — G89.29 CHRONIC MYOFASCIAL PAIN: ICD-10-CM

## 2018-08-08 PROCEDURE — 99214 OFFICE O/P EST MOD 30 MIN: CPT | Performed by: NURSE PRACTITIONER

## 2018-08-08 RX ORDER — FENTANYL 37.5 UG/H
PATCH, EXTENDED RELEASE TRANSDERMAL
Qty: 10 PATCH | Refills: 0 | Status: SHIPPED | OUTPATIENT
Start: 2018-08-08 | End: 2018-10-25 | Stop reason: SDUPTHER

## 2018-08-08 RX ORDER — OXYCODONE HYDROCHLORIDE AND ACETAMINOPHEN 5; 325 MG/1; MG/1
TABLET ORAL
Qty: 60 TABLET | Refills: 0 | Status: SHIPPED | OUTPATIENT
Start: 2018-08-08 | End: 2018-09-14 | Stop reason: SDUPTHER

## 2018-08-08 RX ORDER — AMITRIPTYLINE HYDROCHLORIDE 10 MG/1
10 TABLET, FILM COATED ORAL
COMMUNITY
Start: 2018-08-01 | End: 2018-12-06 | Stop reason: DRUGHIGH

## 2018-08-08 RX ORDER — DILTIAZEM HYDROCHLORIDE 60 MG/1
2 TABLET, FILM COATED ORAL 2 TIMES DAILY
COMMUNITY
Start: 2018-08-01 | End: 2021-04-30

## 2018-08-08 NOTE — PROGRESS NOTES
Assessment:  1  Chronic bilateral thoracic back pain    2  Chronic scapular pain    3  Complex regional pain syndrome type 1 of left lower extremity    4  Chronic pain of both lower extremities    5  Chronic pain of left knee    6  Chronic pain of right knee    7  Chronic myofascial pain    8  Gait instability    9  Chronic bilateral low back pain with bilateral sciatica    10  Pain syndrome, chronic        Plan:  While the patient was in the office today, I did have a thorough conversation with the patient and explained to her that since her baseline drug screen was appropriate and she is willing to lower office to titrate down on her opioid medications to start off with, I would feel comfortable taking over prescribing her pain medications as long as the understanding is that we will continue to slowly and steadily titrate her down on the opioid medications as far as possible, if not eventually off of them altogether  The patient was agreeable and verbalized an understanding  While the patient was in the office today an opioid contract was thoroughly reviewed and signed by the patient  The patient was given adequate time to ask questions in regards to the contract and a signed copy was sent home for her records  I discussed with the patient that at this point time she is to finish out the fentanyl 50 mcg patches that she has and we are going to decrease the fentanyl down to the 37 5 mcg dosage at her next refill and I did send a prescription with a do not fill date for the fentanyl patch as of August 27, 2018  We will continue with the p r n  Percocet as prescribed, however, explained to her that with the decreasing the fentanyl patch if she does find she needs to use the Percocet twice a day that is quite reasonable because we are taking away 12 5 mcg of fentanyl  She is to continue with her Cymbalta and tizanidine as prescribed and did not need any refills today   I advised the patient that if they experience any side effects or issues with the changes in their medication regiment, they should give our office a call to discuss  I also advised the patient not to drive or operate machinery until they see how the changes in the medication regimen affects them  The patient was agreeable and verbalized an understanding  She is to continue to use her spinal cord stimulator to help with the back and lower extremity pain symptoms  At this point she is scheduled for an IPG site revision because the battery has flipped they are going to move it to put into a more secure area in October at this point time  However, the patient understands that if her appointment or surgery is moved up before her next office visit with our office she is to call our office so we can come up with a postoperative pain management plan  The patient was agreeable and verbalized an understanding  [place] Prescription Drug Monitoring Program report was reviewed and was appropriate      The patient's opioid scripts were sent to their pharmacy electronically and was given a 2 month supply of prescriptions with a Do Not Fill date(s) of September 5, 2018  There are risks associated with opioid medications, including dependence, addiction and tolerance  The patient understands and agrees to use these medications only as prescribed  Potential side effects of the medications include, but are not limited to, constipation, drowsiness, addiction, impaired judgment and risk of fatal overdose if not taken as prescribed  The patient was warned against driving while taking sedation medications  Sharing medications is a felony  At this point in time, the patient is showing no signs of addiction, abuse, diversion or suicidal ideation  The patient will follow-up in 6 weeks for medication prescription refill and reevaluation  The patient was advised to contact the office should their symptoms worsen in the interim   The patient was agreeable and verbalized an understanding  History of Present Illness: The patient is a 59 y o  female last seen on 7/6/18 who presents for a follow up office visit in regards to chronic pain secondary to chronic regional pain syndrome as well as thoracic and lumbar spondylosis with stenosis  The patient currently reports that since her last office visit with her medication regimen and spinal cord stimulator she is noting moderate stable relief and presents today to discuss the results of her baseline drug screen and for the consideration of our office to take over prescribing her opioid medications so that way we can help to start titrating her down and weaning her off of opioid medications  The patient last filled her fentanyl patches on July 30, 2018 and her p r n  Percocet on July 2, 2018 and she tries to only take the Percocet 1 pill a day and very occasionally 2 a day  Current pain medications includes:  Fentanyl patch 50 mcg applying 1 patch every 72 hr and Percocet 5/3251 p o  b i d  p r n  for pain  The patient reports that this regimen is providing 50% pain relief  The patient is reporting sleepiness, dizziness, constipation from this pain medication regimen  Pain Contract Signed: 8/8/18  Last Urine Drug Screen: 7/6/18    I have personally reviewed and/or updated the patient's past medical history, past surgical history, family history, social history, current medications, allergies, and vital signs today  Review of Systems:    Review of Systems   Respiratory: Positive for shortness of breath  Cardiovascular: Positive for chest pain  Gastrointestinal: Positive for constipation and nausea  Negative for diarrhea and vomiting  Musculoskeletal: Positive for gait problem and joint swelling (joint stiffness)  Negative for arthralgias and myalgias  Skin: Positive for rash  Neurological: Positive for dizziness and weakness  Negative for seizures     All other systems reviewed and are negative  Past Medical History:   Diagnosis Date    Anxiety     Asthma     controlled    Back complaints     Cancer [facility] )     nose    Cellulitis of left lower leg     Chronic bilateral thoracic back pain     Chronic myofascial pain     Chronic pain of both lower extremities     Chronic pain of left knee     Chronic pain syndrome     CPAP (continuous positive airway pressure) dependence     Depression     [facility] )     Gait disorder     Gastroparesis     History of angina     Hyperlipidemia     Hypertension     Insulin pump in place     Kidney disease     Polyneuropathy associated with underlying disease ([facility] )     S/P insertion of spinal cord stimulator 2018    Sleep apnea     TIA (transient ischemic attack)        Past Surgical History:   Procedure Laterality Date    BREAST SURGERY      BREAST SURGERY      reduction     SECTION      HERNIA REPAIR      HYSTERECTOMY      JOINT REPLACEMENT Left     NECK SURGERY      [place] SURG IMPLNT Nanda Echevarria Left 2018    Procedure: Insertion of thoracic spinal cord stimulator electrode via laminotomy and placement of left buttock IPG;  Surgeon: Gerber Beltran MD;  Location: [facility];  Service: Neurosurgery    REPLACEMENT TOTAL KNEE      ROTATOR CUFF REPAIR      TONSILLECTOMY      WISDOM TOOTH EXTRACTION         Family History   Problem Relation Age of Onset    Diabetes Family     Heart disease Family     Hypertension Family     Neuropathy Family     No Known Problems Mother     Heart disease Father     Diabetes Father     No Known Problems Maternal Grandmother     No Known Problems Maternal Grandfather     No Known Problems Paternal Grandmother     No Known Problems Paternal Grandfather        Social History     Occupational History    Not on file       Social History Main Topics    Smoking status: Never Smoker    Smokeless tobacco: Never Used    Alcohol use No    Drug use: No    Sexual activity: Yes         Current Outpatient Prescriptions:     aspirin (ECOTRIN LOW STRENGTH) 81 mg EC tablet, Take 81 mg by mouth daily, Disp: , Rfl:     atorvastatin (LIPITOR) 80 mg tablet, Take 80 mg by mouth daily, Disp: , Rfl:     cholecalciferol (VITAMIN D3) 1,000 units tablet, Take 2,000 Units by mouth daily, Disp: , Rfl: 5    Cranberry 450 MG CAPS, Take by mouth, Disp: , Rfl:     Cyanocobalamin (VITAMIN B-12 PO), Take by mouth, Disp: , Rfl:     Docusate Sodium 100 MG capsule, Take by mouth, Disp: , Rfl:     doxycycline (ADOXA) 100 MG tablet, Take 100 mg by mouth 2 (two) times a day, Disp: , Rfl:     DULoxetine (CYMBALTA) 60 mg delayed release capsule, Take 2 PO QD, Disp: 180 capsule, Rfl: 0    fentaNYL (DURAGESIC) 50 mcg/hr, Place 1 patch on the skin every 3 (three) days  , Disp: , Rfl:     furosemide (LASIX) 20 mg tablet, Take by mouth daily  , Disp: , Rfl:     glucagon (GLUCAGEN) 1 mg injection, Inject under the skin as needed if passed out from low sugar, Disp: , Rfl:     levothyroxine 75 mcg tablet, Take by mouth daily in the early morning  , Disp: , Rfl:     Linaclotide (LINZESS) 145 MCG CAPS, Take by mouth, Disp: , Rfl:     losartan (COZAAR) 100 MG tablet, Take 100 mg by mouth daily  , Disp: , Rfl:     magnesium oxide (MAG-OX) 400 mg, Take 400 mg by mouth 2 (two) times a day, Disp: , Rfl:     methocarbamol (ROBAXIN) 750 mg tablet, Take 500 mg by mouth every 6 (six) hours as needed for muscle spasms, Disp: , Rfl:     NOVOLOG 100 UNIT/ML injection, INJECT 120 UNITS DAILY VIA INSULIN PUMP E 11 65, Disp: , Rfl: 3    ondansetron (ZOFRAN) 8 mg tablet, Take by mouth, Disp: , Rfl:     oxybutynin (DITROPAN) 5 mg tablet, Take 10 mg by mouth daily  , Disp: , Rfl:     oxyCODONE-acetaminophen (PERCOCET) 5-325 mg per tablet, Take by mouth For Severe Pain , Disp: , Rfl:     pantoprazole (PROTONIX) 40 mg tablet, Take 40 mg by mouth 2 (two) times a day  , Disp: , Rfl:     pramipexole (MIRAPEX) 1 mg tablet, Take 1 mg by mouth 2 (two) times a day  , Disp: , Rfl:     ranitidine (ZANTAC) 150 mg tablet, Take 150 mg by mouth daily  , Disp: , Rfl:     tiZANidine (ZANAFLEX) 4 mg tablet, Take 1 tablet (4 mg total) by mouth 3 (three) times a day, Disp: 270 tablet, Rfl: 0    Allergies   Allergen Reactions    Bactrim [Sulfamethoxazole-Trimethoprim] Hives    Sucralfate      Facial swelling    Topamax [Topiramate]      disorentation    Azithromycin      itching    Baclofen      "That knocks me out "    Bupropion Other (See Comments)     Too tired    Methotrexate      nausea    Neurontin [Gabapentin]      Other reaction(s): Hypertension, Other (See Comments)  Altered mental status  Severe sedation/hypotension       Physical Exam:    There were no vitals taken for this visit  Constitutional:normal, well developed, well nourished, alert, in no distress and non-toxic and no overt pain behavior  and overweight  Eyes:anicteric  HEENT:grossly intact  Neck:supple, symmetric, trachea midline and no masses   Pulmonary:even and unlabored  Cardiovascular:No edema or pitting edema present  Skin:Normal without rashes or lesions and well hydrated  Psychiatric:Mood and affect appropriate  Neurologic:Cranial Nerves II-XII grossly intact  Musculoskeletal:Antalgic, painful, but steady gait with the use of a rolling walker  Imaging  No orders to display         No orders of the defined types were placed in this encounter

## 2018-08-13 ENCOUNTER — OFFICE VISIT (OUTPATIENT)
Dept: NEUROLOGY | Facility: CLINIC | Age: 63
End: 2018-08-13
Payer: MEDICARE

## 2018-08-13 VITALS
WEIGHT: 219 LBS | SYSTOLIC BLOOD PRESSURE: 150 MMHG | DIASTOLIC BLOOD PRESSURE: 88 MMHG | HEART RATE: 93 BPM | BODY MASS INDEX: 38.79 KG/M2

## 2018-08-13 DIAGNOSIS — I10 ESSENTIAL HYPERTENSION: Primary | ICD-10-CM

## 2018-08-13 DIAGNOSIS — G45.9 TIA ON MEDICATION: ICD-10-CM

## 2018-08-13 PROCEDURE — 99205 OFFICE O/P NEW HI 60 MIN: CPT | Performed by: PSYCHIATRY & NEUROLOGY

## 2018-08-13 RX ORDER — CLOPIDOGREL BISULFATE 75 MG/1
75 TABLET ORAL DAILY
Qty: 30 TABLET | Refills: 4 | Status: SHIPPED | OUTPATIENT
Start: 2018-08-13 | End: 2020-11-17

## 2018-08-13 NOTE — PROGRESS NOTES
Patient ID: Zenia Aguilar is a 59 y o  female  Assessment/Plan: This is a 58 y/o Female who is here as a new patient for evaluation of TIA  She had her episode at [facility], and I reviewed the hospital records on Care everywhere  Neurologic examination did not show any evidence of any motor/sensory deficits  She has not had any more episodes of stroke  She does have vascular risk factors, and she is wanting to get clearance for battery replacement of her nerve stimulator  Problem List Items Addressed This Visit     Essential hypertension - Primary    TIA on medication    Relevant Medications    clopidogrel (PLAVIX) 75 mg tablet         PLAN:  -For secondary stroke prevention, will continue with combination of plavix and atorvastatin today, discontinue aspirin today, plavix sent to pharmacy  -patient can get the battery replaced after 8 weeks since the TIA episode, so I advise after sept 24th should be ok  Continue  with BP control, goal < 130/80, her BP is elevated today but she did not take any medications yet  Continue to monitor DM and appropriate Euglycemic control  Defer to PCP regarding DM and BP management  Patient not smoking  I would like to follow up in 3 months, I would be happy to see her sooner if the need arises  Patient/Guardian was advised to the call the office if they have any questions and concerns in the meantime  Patient/Guardian does understand that if they have any new stroke like symptoms such as facial droop on one side, weakness/paralysis on either side, speech trouble, numbness on one side, balance issues, any vision changes, or any new headache, to call 9-1-1 immediately or to proceed to the nearest ER immediately  Subjective:    HPI    This is a 58 y/o Female who is here with past medical history of CAD, CHF, type 2 DM, Chronic pain syndrome s/p lumbar nerve stimulator, hypertension, HLD, STU with CPAP    She is here to be cleared for change in battery for her nerve stimulator  She presented to [facility] with compliant of difficulty speaking on 7/24/18  She says that she woke up she was trying to get attention and nothing would come out of her mouth, and then 5 minutes later, it resolved and then it came back again and then she went to the hospital  She had CTA head and neck was negative for any acute findings - no stenosis identified  No medication changes were done  Patient has been taking baby aspirin for several years and does not know why she is taking it  Her medications were not switched while she was in the hospital   She was not admitted there, discharged after workup  Patient is here today for evaluation  Patient wanting clearance for battery replacement for nerve stimulator  She had the stimulator placed in June 2018  She is scheduled to get it done in October of this year  No new stroke like symptoms  The following portions of the patient's history were reviewed and updated as appropriate:   She  has a past medical history of Anxiety; Asthma; Back complaints; Cancer ([address] ); Cellulitis of left lower leg; Chronic bilateral thoracic back pain; Chronic myofascial pain; Chronic pain of both lower extremities; Chronic pain of left knee; Chronic pain syndrome; CPAP (continuous positive airway pressure) dependence; Depression; Diabetes mellitus ([address] ); Gait disorder; Gastroparesis; History of angina; Hyperlipidemia; Hypertension; Insulin pump in place; Kidney disease; Polyneuropathy associated with underlying disease ([address] ); S/P insertion of spinal cord stimulator (6/8/2018); Sleep apnea; and TIA (transient ischemic attack)    She   Patient Active Problem List    Diagnosis Date Noted    Malfunction of spinal cord stimulator ([address] ) 08/02/2018    Closed fracture of thoracic vertebra ([address] ) 07/06/2018    Deep venous thrombosis ([address] ) 07/06/2018    Localized, primary osteoarthritis 07/06/2018    Low back pain 07/06/2018    Lumbosacral spondylosis without myelopathy 07/06/2018    Osteoarthritis of knee 07/06/2018    Age related osteoporosis 07/06/2018    Chronic scapular pain 07/06/2018    Microcytic anemia 07/04/2018    Chronic diastolic CHF (congestive heart failure) ([address] ) 07/03/2018    Coronary artery disease involving native coronary artery 07/03/2018    TIA on medication 07/03/2018    Encounter for fitting and adjustment of spinal cord stimulator 06/08/2018    S/P insertion of spinal cord stimulator 06/08/2018    Lumbar radiculopathy 04/16/2018    Thoracic radiculopathy 04/16/2018    Complex regional pain syndrome type 1 of left lower extremity 03/09/2018    Type 2 diabetes mellitus without complication, with long-term current use of insulin ([address] ) 01/11/2018    Chronic bilateral thoracic back pain 11/17/2017    Chronic myofascial pain 11/17/2017    Vitamin D deficiency 11/03/2017    Post-menopausal 10/11/2017    Primary hyperparathyroidism ([address] ) 10/11/2017    Myalgia 09/08/2017    Outlet dysfunction constipation 09/08/2017    Chronic pain of both lower extremities 07/25/2017    Chronic pain of left knee 07/25/2017    Gait disorder 07/25/2017    Polyneuropathy associated with underlying disease ([address] ) 65/72/0425    Uncomplicated opioid dependence ([address] ) 07/25/2017    Gait instability 07/21/2017    Essential hypertension 06/29/2017    Mixed hyperlipidemia 06/29/2017    Primary hypothyroidism 06/29/2017    Chronic pain of right knee 06/06/2017    Meralgia paraesthetica, right 04/21/2017    Microalbuminuria due to type 2 diabetes mellitus ([address] ) 03/24/2017    Left leg cellulitis 02/28/2017    Pain in both lower extremities 02/22/2017    Pain syndrome, chronic 02/21/2017    Moderate persistent asthma with acute exacerbation 01/06/2017    Morbid obesity with BMI of 40 0-44 9, adult ([address] ) 09/29/2016    Obstructive sleep apnea 08/16/2016    Anemia of chronic disease 07/27/2016    Anxiety and depression 07/27/2016   Deana Camacho and fatigue 2016    Lymphadenopathy 2016    Normocytic anemia 10/04/2015    Insomnia due to medical condition 2015    Type 2 diabetes mellitus with microalbuminuria, with long-term current use of insulin ([facility] ) 2015    Gastroparesis 2015    Moderate persistent asthma without complication     GERD (gastroesophageal reflux disease) 2015    Mixed urge and stress incontinence 2014    Other B-complex deficiencies 2013    Insulin pump status 2012    Sarcoidosis 2009    Irritable colon 2009     She  has a past surgical history that includes  section; Replacement total knee; Neck surgery; Rotator cuff repair; Joint replacement (Left); Hernia repair; Hysterectomy; Tonsillectomy; [place] tooth extraction; Breast surgery; Breast surgery; and pr surg implnt neuroelect,epidural (Left, 2018)  Her family history includes Diabetes in her family and father; Heart disease in her family and father; Hypertension in her family; Neuropathy in her family; No Known Problems in her maternal grandfather, maternal grandmother, mother, paternal grandfather, and paternal grandmother  She  reports that she has never smoked  She has never used smokeless tobacco  She reports that she does not drink alcohol or use drugs    Current Outpatient Prescriptions   Medication Sig Dispense Refill    amitriptyline (ELAVIL) 10 mg tablet       atorvastatin (LIPITOR) 80 mg tablet Take 80 mg by mouth daily      cholecalciferol (VITAMIN D3) 1,000 units tablet Take 2,000 Units by mouth daily  5    Cranberry 450 MG CAPS Take by mouth      Cyanocobalamin (VITAMIN B-12 PO) Take by mouth      Docusate Sodium 100 MG capsule Take by mouth      doxycycline (ADOXA) 100 MG tablet Take 100 mg by mouth 2 (two) times a day      DULoxetine (CYMBALTA) 60 mg delayed release capsule Take 2 PO  capsule 0    FentaNYL 37 5 MCG/HR PT72 Apply 1 patch TD every 72 hours as directed  DO NOT FILL UNTIL: 8/27/18 10 patch 0    furosemide (LASIX) 20 mg tablet Take by mouth daily        glucagon (GLUCAGEN) 1 mg injection Inject under the skin as needed if passed out from low sugar      levothyroxine 75 mcg tablet Take by mouth daily in the early morning        Linaclotide (LINZESS) 145 MCG CAPS Take by mouth      losartan (COZAAR) 100 MG tablet Take 100 mg by mouth daily        magnesium oxide (MAG-OX) 400 mg Take 400 mg by mouth 2 (two) times a day      methocarbamol (ROBAXIN) 750 mg tablet Take 500 mg by mouth every 6 (six) hours as needed for muscle spasms      NOVOLOG 100 UNIT/ML injection INJECT 120 UNITS DAILY VIA INSULIN PUMP E 11 65  3    ondansetron (ZOFRAN) 8 mg tablet Take by mouth      oxybutynin (DITROPAN) 5 mg tablet Take 10 mg by mouth daily        oxyCODONE-acetaminophen (PERCOCET) 5-325 mg per tablet Take 1 PO BID PRN for break through pain  60 tablet 0    pantoprazole (PROTONIX) 40 mg tablet Take 40 mg by mouth 2 (two) times a day        pramipexole (MIRAPEX) 1 mg tablet Take 1 mg by mouth 2 (two) times a day        ranitidine (ZANTAC) 150 mg tablet Take 150 mg by mouth daily        SYMBICORT 80-4 5 MCG/ACT inhaler       tiZANidine (ZANAFLEX) 4 mg tablet Take 1 tablet (4 mg total) by mouth 3 (three) times a day 270 tablet 0    VENTOLIN  (90 Base) MCG/ACT inhaler       clopidogrel (PLAVIX) 75 mg tablet Take 1 tablet (75 mg total) by mouth daily 30 tablet 4     No current facility-administered medications for this visit        Current Outpatient Prescriptions on File Prior to Visit   Medication Sig    amitriptyline (ELAVIL) 10 mg tablet     atorvastatin (LIPITOR) 80 mg tablet Take 80 mg by mouth daily    cholecalciferol (VITAMIN D3) 1,000 units tablet Take 2,000 Units by mouth daily    Cranberry 450 MG CAPS Take by mouth    Cyanocobalamin (VITAMIN B-12 PO) Take by mouth    Docusate Sodium 100 MG capsule Take by mouth    doxycycline (ADOXA) 100 MG tablet Take 100 mg by mouth 2 (two) times a day    DULoxetine (CYMBALTA) 60 mg delayed release capsule Take 2 PO QD    FentaNYL 37 5 MCG/HR PT72 Apply 1 patch TD every 72 hours as directed  DO NOT FILL UNTIL: 8/27/18    furosemide (LASIX) 20 mg tablet Take by mouth daily      glucagon (GLUCAGEN) 1 mg injection Inject under the skin as needed if passed out from low sugar    levothyroxine 75 mcg tablet Take by mouth daily in the early morning      Linaclotide (LINZESS) 145 MCG CAPS Take by mouth    losartan (COZAAR) 100 MG tablet Take 100 mg by mouth daily      magnesium oxide (MAG-OX) 400 mg Take 400 mg by mouth 2 (two) times a day    methocarbamol (ROBAXIN) 750 mg tablet Take 500 mg by mouth every 6 (six) hours as needed for muscle spasms    NOVOLOG 100 UNIT/ML injection INJECT 120 UNITS DAILY VIA INSULIN PUMP E 11 65    ondansetron (ZOFRAN) 8 mg tablet Take by mouth    oxybutynin (DITROPAN) 5 mg tablet Take 10 mg by mouth daily      oxyCODONE-acetaminophen (PERCOCET) 5-325 mg per tablet Take 1 PO BID PRN for break through pain   pantoprazole (PROTONIX) 40 mg tablet Take 40 mg by mouth 2 (two) times a day      pramipexole (MIRAPEX) 1 mg tablet Take 1 mg by mouth 2 (two) times a day      ranitidine (ZANTAC) 150 mg tablet Take 150 mg by mouth daily      SYMBICORT 80-4 5 MCG/ACT inhaler     tiZANidine (ZANAFLEX) 4 mg tablet Take 1 tablet (4 mg total) by mouth 3 (three) times a day    VENTOLIN  (90 Base) MCG/ACT inhaler     [DISCONTINUED] aspirin (ECOTRIN LOW STRENGTH) 81 mg EC tablet Take 81 mg by mouth daily     No current facility-administered medications on file prior to visit  She is allergic to bactrim [sulfamethoxazole-trimethoprim]; sucralfate; topamax [topiramate]; azithromycin; baclofen; bupropion; methotrexate; and neurontin [gabapentin]            Objective:    Blood pressure 150/88, pulse 93, weight 99 3 kg (219 lb)      Physical Exam  General: Patient is not in any acute distress  HEENT: normocephalic, atraumatic  Neck: supple  Heart: regular heart rate and rhythm, no murmurs, rubs and or gallops  Chest: Clear to auscultation bilaterally  Abdomen: soft and non-tender  Skin: no lesions  Musculosketal: no bony abnormalities  Neurologic Examination:   Mental status: alert, awake, and following commands  Speech: Speech is fluent without any dysarthria, no aphasia noted  Cranial Nerves: Pupils equal round reactive to light and extraocular movements intact  Visual fields full to confrontation  Facial sensation intact to soft touch in V1, V2 and V3 distributions  Face symmetric  Tongue midline, able to move side to side  Palate elevation symmetric uvula midline, no deviation noted  Shoulder shrug strong  Motor: Strength 5/5 in all 4 extremities  No drift  Normal rapid alternating movements  Normal tone  Sensory: Sensation intact to soft touch, vibration and pinprick in all 4 extremities  Cerebellar: Finger-to-nose intact, normal heel to shin  Reflexes: 2+ in all 4 extremities, downgoing toes bilaterally  Gait: Normal gait, tandem walking, normal arm to swing  Neurological Exam      ROS:  I reviewed the ROS   Review of Systems   Constitutional: Negative  Negative for appetite change and fever  HENT: Negative  Negative for hearing loss, tinnitus, trouble swallowing and voice change  Eyes: Negative  Negative for photophobia and pain  Respiratory: Negative  Negative for shortness of breath  Cardiovascular: Negative  Negative for palpitations  Gastrointestinal: Negative  Negative for nausea and vomiting  Endocrine: Negative  Negative for cold intolerance and heat intolerance  Genitourinary: Negative  Negative for dysuria, frequency and urgency  Musculoskeletal: Negative  Negative for myalgias and neck pain  Skin: Negative  Negative for rash  Neurological: Negative    Negative for dizziness, tremors, seizures, syncope, facial asymmetry, speech difficulty, weakness, light-headedness, numbness and headaches  Hematological: Negative  Does not bruise/bleed easily  Psychiatric/Behavioral: Negative  Negative for confusion, hallucinations and sleep disturbance

## 2018-09-10 ENCOUNTER — HOSPITAL ENCOUNTER (OUTPATIENT)
Dept: NON INVASIVE DIAGNOSTICS | Facility: HOSPITAL | Age: 63
Discharge: HOME | End: 2018-09-10
Attending: NEUROLOGICAL SURGERY
Payer: MEDICARE

## 2018-09-10 ENCOUNTER — APPOINTMENT (OUTPATIENT)
Dept: LAB | Facility: HOSPITAL | Age: 63
End: 2018-09-10
Attending: NEUROLOGICAL SURGERY
Payer: MEDICARE

## 2018-09-10 DIAGNOSIS — Z45.42 ENCOUNTER FOR FITTING AND ADJUSTMENT OF SPINAL CORD STIMULATOR: ICD-10-CM

## 2018-09-10 DIAGNOSIS — T85.192A MALFUNCTION OF SPINAL CORD STIMULATOR, INITIAL ENCOUNTER (HCC): ICD-10-CM

## 2018-09-10 DIAGNOSIS — Z01.818 PRE-PROCEDURAL EXAMINATION: ICD-10-CM

## 2018-09-10 LAB
ALBUMIN SERPL BCP-MCNC: 3.5 G/DL (ref 3.5–5)
ALP SERPL-CCNC: 143 U/L (ref 46–116)
ALT SERPL W P-5'-P-CCNC: 38 U/L (ref 12–78)
ANION GAP SERPL CALCULATED.3IONS-SCNC: 8 MMOL/L (ref 4–13)
APTT PPP: 33 SECONDS (ref 24–36)
AST SERPL W P-5'-P-CCNC: 25 U/L (ref 5–45)
BASOPHILS # BLD AUTO: 0.02 THOUSANDS/ΜL (ref 0–0.1)
BASOPHILS NFR BLD AUTO: 0 % (ref 0–1)
BILIRUB SERPL-MCNC: 0.53 MG/DL (ref 0.2–1)
BUN SERPL-MCNC: 16 MG/DL (ref 5–25)
CALCIUM SERPL-MCNC: 10.1 MG/DL (ref 8.3–10.1)
CHLORIDE SERPL-SCNC: 106 MMOL/L (ref 100–108)
CO2 SERPL-SCNC: 27 MMOL/L (ref 21–32)
CREAT SERPL-MCNC: 1.1 MG/DL (ref 0.6–1.3)
EOSINOPHIL # BLD AUTO: 0.13 THOUSAND/ΜL (ref 0–0.61)
EOSINOPHIL NFR BLD AUTO: 2 % (ref 0–6)
ERYTHROCYTE [DISTWIDTH] IN BLOOD BY AUTOMATED COUNT: 17.9 % (ref 11.6–15.1)
GFR SERPL CREATININE-BSD FRML MDRD: 54 ML/MIN/1.73SQ M
GLUCOSE SERPL-MCNC: 246 MG/DL (ref 65–140)
HCT VFR BLD AUTO: 32.3 % (ref 34.8–46.1)
HGB BLD-MCNC: 9.2 G/DL (ref 11.5–15.4)
IMM GRANULOCYTES # BLD AUTO: 0.04 THOUSAND/UL (ref 0–0.2)
IMM GRANULOCYTES NFR BLD AUTO: 1 % (ref 0–2)
INR PPP: 1.11 (ref 0.86–1.17)
LYMPHOCYTES # BLD AUTO: 1.15 THOUSANDS/ΜL (ref 0.6–4.47)
LYMPHOCYTES NFR BLD AUTO: 21 % (ref 14–44)
MCH RBC QN AUTO: 23.1 PG (ref 26.8–34.3)
MCHC RBC AUTO-ENTMCNC: 28.5 G/DL (ref 31.4–37.4)
MCV RBC AUTO: 81 FL (ref 82–98)
MONOCYTES # BLD AUTO: 0.52 THOUSAND/ΜL (ref 0.17–1.22)
MONOCYTES NFR BLD AUTO: 10 % (ref 4–12)
NEUTROPHILS # BLD AUTO: 3.6 THOUSANDS/ΜL (ref 1.85–7.62)
NEUTS SEG NFR BLD AUTO: 66 % (ref 43–75)
NRBC BLD AUTO-RTO: 0 /100 WBCS
PLATELET # BLD AUTO: 218 THOUSANDS/UL (ref 149–390)
PMV BLD AUTO: 9.4 FL (ref 8.9–12.7)
POTASSIUM SERPL-SCNC: 4.3 MMOL/L (ref 3.5–5.3)
PROT SERPL-MCNC: 7.6 G/DL (ref 6.4–8.2)
PROTHROMBIN TIME: 14.4 SECONDS (ref 11.8–14.2)
RBC # BLD AUTO: 3.98 MILLION/UL (ref 3.81–5.12)
SODIUM SERPL-SCNC: 141 MMOL/L (ref 136–145)
WBC # BLD AUTO: 5.46 THOUSAND/UL (ref 4.31–10.16)

## 2018-09-10 PROCEDURE — 93005 ELECTROCARDIOGRAM TRACING: CPT | Performed by: NEUROLOGICAL SURGERY

## 2018-09-10 PROCEDURE — 85610 PROTHROMBIN TIME: CPT

## 2018-09-10 PROCEDURE — 85025 COMPLETE CBC W/AUTO DIFF WBC: CPT

## 2018-09-10 PROCEDURE — 85730 THROMBOPLASTIN TIME PARTIAL: CPT

## 2018-09-10 PROCEDURE — 36415 COLL VENOUS BLD VENIPUNCTURE: CPT

## 2018-09-10 PROCEDURE — 83036 HEMOGLOBIN GLYCOSYLATED A1C: CPT

## 2018-09-10 PROCEDURE — 80053 COMPREHEN METABOLIC PANEL: CPT

## 2018-09-11 LAB
EST. AVERAGE GLUCOSE BLD GHB EST-MCNC: 209 MG/DL
HBA1C MFR BLD: 8.9 % (ref 4.2–6.3)

## 2018-09-12 LAB
ATRIAL RATE: 75 BPM
P AXIS: 60 DEGREES
PR INTERVAL: 236 MS
QRS AXIS: -71 DEGREES
QRSD INTERVAL: 152 MS
QT INTERVAL: 418 MS
QTC INTERVAL: 466 MS
T WAVE AXIS: 12 DEGREES
VENTRICULAR RATE: 75 BPM

## 2018-09-12 PROCEDURE — 93010 ELECTROCARDIOGRAM REPORT: CPT | Performed by: NEUROLOGICAL SURGERY

## 2018-09-14 ENCOUNTER — OFFICE VISIT (OUTPATIENT)
Dept: PAIN MEDICINE | Facility: CLINIC | Age: 63
End: 2018-09-14
Payer: MEDICARE

## 2018-09-14 VITALS
WEIGHT: 228 LBS | BODY MASS INDEX: 40.4 KG/M2 | HEART RATE: 80 BPM | SYSTOLIC BLOOD PRESSURE: 142 MMHG | DIASTOLIC BLOOD PRESSURE: 88 MMHG | HEIGHT: 63 IN

## 2018-09-14 DIAGNOSIS — G63 POLYNEUROPATHY ASSOCIATED WITH UNDERLYING DISEASE (HCC): Chronic | ICD-10-CM

## 2018-09-14 DIAGNOSIS — G89.29 CHRONIC BILATERAL THORACIC BACK PAIN: Primary | ICD-10-CM

## 2018-09-14 DIAGNOSIS — M79.604 CHRONIC PAIN OF BOTH LOWER EXTREMITIES: Chronic | ICD-10-CM

## 2018-09-14 DIAGNOSIS — G89.4 PAIN SYNDROME, CHRONIC: Chronic | ICD-10-CM

## 2018-09-14 DIAGNOSIS — M54.42 CHRONIC BILATERAL LOW BACK PAIN WITH BILATERAL SCIATICA: ICD-10-CM

## 2018-09-14 DIAGNOSIS — Z96.89 S/P INSERTION OF SPINAL CORD STIMULATOR: ICD-10-CM

## 2018-09-14 DIAGNOSIS — M79.18 MYOFASCIAL PAIN SYNDROME: ICD-10-CM

## 2018-09-14 DIAGNOSIS — M25.561 CHRONIC PAIN OF RIGHT KNEE: ICD-10-CM

## 2018-09-14 DIAGNOSIS — M25.562 CHRONIC PAIN OF LEFT KNEE: ICD-10-CM

## 2018-09-14 DIAGNOSIS — G89.29 CHRONIC SCAPULAR PAIN: ICD-10-CM

## 2018-09-14 DIAGNOSIS — M89.8X1 CHRONIC SCAPULAR PAIN: ICD-10-CM

## 2018-09-14 DIAGNOSIS — G89.29 CHRONIC PAIN OF RIGHT KNEE: ICD-10-CM

## 2018-09-14 DIAGNOSIS — G89.29 CHRONIC BILATERAL LOW BACK PAIN WITH BILATERAL SCIATICA: ICD-10-CM

## 2018-09-14 DIAGNOSIS — R26.81 GAIT INSTABILITY: ICD-10-CM

## 2018-09-14 DIAGNOSIS — M54.16 LUMBAR RADICULOPATHY: ICD-10-CM

## 2018-09-14 DIAGNOSIS — M47.817 LUMBOSACRAL SPONDYLOSIS WITHOUT MYELOPATHY: ICD-10-CM

## 2018-09-14 DIAGNOSIS — G89.29 CHRONIC PAIN OF BOTH LOWER EXTREMITIES: Chronic | ICD-10-CM

## 2018-09-14 DIAGNOSIS — M54.6 CHRONIC BILATERAL THORACIC BACK PAIN: Primary | ICD-10-CM

## 2018-09-14 DIAGNOSIS — G90.522 COMPLEX REGIONAL PAIN SYNDROME TYPE 1 OF LEFT LOWER EXTREMITY: Chronic | ICD-10-CM

## 2018-09-14 DIAGNOSIS — G89.29 CHRONIC PAIN OF LEFT KNEE: ICD-10-CM

## 2018-09-14 DIAGNOSIS — M79.18 CHRONIC MYOFASCIAL PAIN: ICD-10-CM

## 2018-09-14 DIAGNOSIS — G89.29 CHRONIC MYOFASCIAL PAIN: ICD-10-CM

## 2018-09-14 DIAGNOSIS — M79.605 CHRONIC PAIN OF BOTH LOWER EXTREMITIES: Chronic | ICD-10-CM

## 2018-09-14 DIAGNOSIS — M54.14 THORACIC RADICULOPATHY: ICD-10-CM

## 2018-09-14 DIAGNOSIS — M54.41 CHRONIC BILATERAL LOW BACK PAIN WITH BILATERAL SCIATICA: ICD-10-CM

## 2018-09-14 PROCEDURE — 99214 OFFICE O/P EST MOD 30 MIN: CPT | Performed by: NURSE PRACTITIONER

## 2018-09-14 PROCEDURE — 95971 ALYS SMPL SP/PN NPGT W/PRGRM: CPT | Performed by: NURSE PRACTITIONER

## 2018-09-14 RX ORDER — DULOXETIN HYDROCHLORIDE 60 MG/1
CAPSULE, DELAYED RELEASE ORAL
Qty: 180 CAPSULE | Refills: 0 | Status: SHIPPED | OUTPATIENT
Start: 2018-09-14 | End: 2018-11-19 | Stop reason: SDUPTHER

## 2018-09-14 RX ORDER — OXYCODONE HYDROCHLORIDE AND ACETAMINOPHEN 5; 325 MG/1; MG/1
TABLET ORAL
Qty: 55 TABLET | Refills: 0 | Status: SHIPPED | OUTPATIENT
Start: 2018-09-14 | End: 2018-11-19 | Stop reason: SDUPTHER

## 2018-09-14 RX ORDER — METHOCARBAMOL 750 MG/1
TABLET, FILM COATED ORAL
Qty: 90 TABLET | Refills: 0 | Status: SHIPPED | OUTPATIENT
Start: 2018-09-14 | End: 2018-11-19

## 2018-09-14 RX ORDER — TIZANIDINE 4 MG/1
4 TABLET ORAL 3 TIMES DAILY
Qty: 270 TABLET | Refills: 0 | Status: SHIPPED | OUTPATIENT
Start: 2018-09-14 | End: 2018-11-19 | Stop reason: SDUPTHER

## 2018-09-14 RX ORDER — OXYCODONE HYDROCHLORIDE AND ACETAMINOPHEN 5; 325 MG/1; MG/1
TABLET ORAL
Qty: 75 TABLET | Refills: 0 | Status: SHIPPED | OUTPATIENT
Start: 2018-09-14 | End: 2018-09-14 | Stop reason: SDUPTHER

## 2018-09-14 RX ORDER — OXYCODONE HYDROCHLORIDE AND ACETAMINOPHEN 5; 325 MG/1; MG/1
TABLET ORAL
Qty: 60 TABLET | Refills: 0 | Status: SHIPPED | OUTPATIENT
Start: 2018-09-14 | End: 2018-09-14 | Stop reason: SDUPTHER

## 2018-09-14 NOTE — PRE-PROCEDURE INSTRUCTIONS
Pre-Surgery Instructions:   Medication Instructions    amitriptyline (ELAVIL) 10 mg tablet Instructed patient per Anesthesia Guidelines   atorvastatin (LIPITOR) 80 mg tablet Instructed patient per Anesthesia Guidelines   cholecalciferol (VITAMIN D3) 1,000 units tablet Instructed patient per Anesthesia Guidelines   clopidogrel (PLAVIX) 75 mg tablet Patient was instructed by Physician and understands   CRANBERRY PO Instructed patient per Anesthesia Guidelines   Cyanocobalamin (VITAMIN B-12 PO) Instructed patient per Anesthesia Guidelines   Docusate Sodium 100 MG capsule Instructed patient per Anesthesia Guidelines   DULoxetine (CYMBALTA) 60 mg delayed release capsule Instructed patient per Anesthesia Guidelines   FentaNYL 37 5 MCG/HR PT72 Instructed patient per Anesthesia Guidelines   furosemide (LASIX) 20 mg tablet Instructed patient per Anesthesia Guidelines   glucagon (GLUCAGEN) 1 mg injection Instructed patient per Anesthesia Guidelines   levothyroxine 75 mcg tablet Instructed patient per Anesthesia Guidelines   Linaclotide (LINZESS) 145 MCG CAPS Instructed patient per Anesthesia Guidelines   losartan (COZAAR) 100 MG tablet Instructed patient per Anesthesia Guidelines   methocarbamol (ROBAXIN) 750 mg tablet Instructed patient per Anesthesia Guidelines   NOVOLOG 100 UNIT/ML injection Patient was instructed to contact Physician for medication instruction   ondansetron (ZOFRAN) 8 mg tablet Instructed patient per Anesthesia Guidelines   OXYBUTYNIN CHLORIDE PO Instructed patient per Anesthesia Guidelines   oxyCODONE-acetaminophen (PERCOCET) 5-325 mg per tablet Instructed patient per Anesthesia Guidelines   pantoprazole (PROTONIX) 40 mg tablet Instructed patient per Anesthesia Guidelines   pramipexole (MIRAPEX) 1 mg tablet Instructed patient per Anesthesia Guidelines   ranitidine (ZANTAC) 150 mg tablet Instructed patient per Anesthesia Guidelines      SYMBICORT 80-4 5 MCG/ACT inhaler Instructed patient per Anesthesia Guidelines   tiZANidine (ZANAFLEX) 4 mg tablet Instructed patient per Anesthesia Guidelines   VENTOLIN  (90 Base) MCG/ACT inhaler Instructed patient per Anesthesia Guidelines      Pre op and showering instructions reviewed-Patient has hibiclens-instructed to bring

## 2018-09-14 NOTE — PROGRESS NOTES
Assessment:  1  Chronic bilateral thoracic back pain    2  Chronic pain of both lower extremities    3  Chronic myofascial pain    4  Chronic scapular pain    5  Complex regional pain syndrome type 1 of left lower extremity    6  Gait instability    7  Chronic bilateral low back pain with bilateral sciatica    8  Pain syndrome, chronic    9  S/P insertion of spinal cord stimulator    10  Lumbar radiculopathy    11  Polyneuropathy associated with underlying disease ([address] )    12  Thoracic radiculopathy    13  Lumbosacral spondylosis without myelopathy    14  Myofascial pain syndrome    15  Chronic pain of left knee    16  Chronic pain of right knee        Plan:  While the patient was in the office today, I did have a thorough conversation with the patient regarding her medication regimen treatment plan  I explained to the patient at this point time with her lower extremity edema and color changes, I feel would be in her best interest to follow up with her primary care provider as she may have the beginning stages of CHF and her Lasix medication may need to be adjusted, but ultimately she needs further evaluation from her family physician  The patient was agreeable and verbalized an understanding  With regards to her spinal cord stimulator, I advised the patient that at this point we will proceed with the spinal cord stimulator reprogramming today as hopefully we can regain some relief and better control of the lower extremity radicular symptoms and burning  However, it is not common for the upper extremities to be affected by a Thoracic lead stimulator and if it is it is only typically if the stimulation is too strong  While the patient was in the office today, simple spinal cord stimulator reprogramming was completed by the [address] [place] [address] representative  under my direct supervision  Programs, leads, battery, and overall functioning of the stimulator device was evaluated during the re-programming session today   We will continue to evaluate the patient's progress with the stimulation changes made today a follow up with re-programming needs/sessions in the future as needed  With regards to her medication regimen, I explained to the patient at this point time she should finish out her 50 mcg fentanyl patches and then start to 37 5 mcg dosage  She is to call at least 1 week before she is out of the fentanyl patches and then we can send a refill at that time  For now she should continue with the p r n  Percocet as prescribed, however, for after the IPG site revision, she can increase the Percocet to 3 times a day as needed for 2 weeks and then is to go back down to the b i d  dosing  I did temporarily increase the current prescription to 75 pills to accommodate for the increased  She is to continue the tizanidine, methocarbamol, and Cymbalta as prescribed  The patient was agreeable and verbalized an understanding  [place] Prescription Drug Monitoring Program report was reviewed and was appropriate      The patient did not have their opioid medications available for confirmation or counting while in the office today  I reviewed with the patient that as per the opioid contract, they are to bring in the last filled prescription for all of their opioid medications, with what they have left to every office visit  I advised the patient that if they would continue to not bring in their prescriptions as discussed, we may not be able to continue prescribing opioid medications in the future  The patient was agreeable and verbalized an understanding  The patient's opioid scripts were sent to their pharmacy electronically and was given a 3 month supply of prescriptions with a Do Not Fill date(s) of October 12, 2018 and November 9, 2018  Please note that I did not send any prescription for Fentanyl today as she is to call when she only has a week left          There are risks associated with opioid medications, including dependence, addiction and tolerance  The patient understands and agrees to use these medications only as prescribed  Potential side effects of the medications include, but are not limited to, constipation, drowsiness, addiction, impaired judgment and risk of fatal overdose if not taken as prescribed  The patient was warned against driving while taking sedation medications  Sharing medications is a felony  At this point in time, the patient is showing no signs of addiction, abuse, diversion or suicidal ideation  The patient will follow-up in 10 weeks for medication prescription refill and reevaluation  The patient was advised to contact the office should their symptoms worsen in the interim  The patient was agreeable and verbalized an understanding  History of Present Illness: The patient is a 59 y o  female last seen on 8/8/18 who presents for a follow up office visit in regards to chronic pain secondary to lumbar spondylosis with chronic regional pain syndrome of the lower extremities and peripheral neuropathy  The patient currently reports that since her last office visit her pain symptoms have worsened, as she states that when she has her stimulator on she has numbness in her hands  However, she currently has her stimulator off and still has the numbness and vibration is in her hands  She also reports she is having increased swelling of her lower extremities and has gained 5 lb in the last week or less  The patient reports that she is scheduled for the 1st week in October to have her IPG site revised with Dr Kathryn Salguero  She is hopeful that with some stimulator reprogramming today she will note some relief as she has not yet started the decreasing the fentanyl patch as she has 5 more patches of the 50 mcg dosage but does have a full 10 patches of the 37 5 mcg dosage at home    She also reports that she has approximately 30 pills of Percocet left at home as she only uses them as needed, but did not bring her medications today  The patient presents today to discuss her medication regimen treatment plan  Current pain medications includes:   Fentanyl patch 50 mcg applying 1 patch every 72 hours, Percocet 5/3251 p o  b i d  p r n  for pain, methocarbamol 750 mg at bedtime, tizanidine 4 mg t i d  during the day, and Cymbalta 60 mg b i d  The patient reports that this regimen is providing 20-30% pain relief  The patient is reporting no side effects from this pain medication regimen  Pain Contract Signed: 8/8/18  Last Urine Drug Screen: 7/6/18    I have personally reviewed and/or updated the patient's past medical history, past surgical history, family history, social history, current medications, allergies, and vital signs today  Review of Systems:    Review of Systems   Respiratory: Positive for shortness of breath  Cardiovascular: Negative for chest pain  Gastrointestinal: Positive for constipation, diarrhea and nausea  Negative for vomiting  Musculoskeletal: Positive for joint swelling (joint stiffness)  Negative for arthralgias, gait problem and myalgias  Skin: Negative for rash  Neurological: Positive for dizziness and weakness  Negative for seizures  All other systems reviewed and are negative          Past Medical History:   Diagnosis Date    Anxiety     Asthma     controlled    Back complaints     [facility]     nose    Cellulitis of left lower leg     Chronic bilateral thoracic back pain     Chronic myofascial pain     Chronic pain of both lower extremities     Chronic pain of left knee     Chronic pain syndrome     CPAP (continuous positive airway pressure) dependence     Depression     Diabetes mellitus (HCC)     Gait disorder     Gastroparesis     History of angina     Hyperlipidemia     Hypertension     Insulin pump in place     Kidney disease     Polyneuropathy associated with underlying disease [facility] )     S/P insertion of spinal cord stimulator 2018    Sleep apnea     TIA (transient ischemic attack)        Past Surgical History:   Procedure Laterality Date    BREAST SURGERY      BREAST SURGERY      reduction     SECTION      HERNIA REPAIR      HYSTERECTOMY      JOINT REPLACEMENT Left     NECK SURGERY      ND SURG IMPLNT [address] Left 2018    Procedure: Insertion of thoracic spinal cord stimulator electrode via laminotomy and placement of left buttock IPG;  Surgeon: Kari Hollis MD;  [facility];  Service: Neurosurgery    REPLACEMENT TOTAL KNEE      ROTATOR CUFF REPAIR      TONSILLECTOMY      WISDOM TOOTH EXTRACTION         Family History   Problem Relation Age of Onset    Diabetes Family     Heart disease Family     Hypertension Family     Neuropathy Family     No Known Problems Mother     Heart disease Father     Diabetes Father     No Known Problems Maternal Grandmother     No Known Problems Maternal Grandfather     No Known Problems Paternal Grandmother     No Known Problems Paternal Grandfather        Social History     Occupational History    Not on file       Social History Main Topics    Smoking status: Never Smoker    Smokeless tobacco: Never Used    Alcohol use No    Drug use: No    Sexual activity: Yes         Current Outpatient Prescriptions:     amitriptyline (ELAVIL) 10 mg tablet, , Disp: , Rfl:     atorvastatin (LIPITOR) 80 mg tablet, Take 80 mg by mouth daily, Disp: , Rfl:     cholecalciferol (VITAMIN D3) 1,000 units tablet, Take 2,000 Units by mouth daily, Disp: , Rfl: 5    clopidogrel (PLAVIX) 75 mg tablet, Take 1 tablet (75 mg total) by mouth daily, Disp: 30 tablet, Rfl: 4    Cranberry 450 MG CAPS, Take by mouth, Disp: , Rfl:     Cyanocobalamin (VITAMIN B-12 PO), Take by mouth, Disp: , Rfl:     Docusate Sodium 100 MG capsule, Take by mouth, Disp: , Rfl:     doxycycline (ADOXA) 100 MG tablet, Take 100 mg by mouth 2 (two) times a day, Disp: , Rfl:    DULoxetine (CYMBALTA) 60 mg delayed release capsule, Take 2 PO QD, Disp: 180 capsule, Rfl: 0    FentaNYL 37 5 MCG/HR PT72, Apply 1 patch TD every 72 hours as directed   DO NOT FILL UNTIL: 8/27/18, Disp: 10 patch, Rfl: 0    furosemide (LASIX) 20 mg tablet, Take by mouth daily  , Disp: , Rfl:     glucagon (GLUCAGEN) 1 mg injection, Inject under the skin as needed if passed out from low sugar, Disp: , Rfl:     levothyroxine 75 mcg tablet, Take by mouth daily in the early morning  , Disp: , Rfl:     Linaclotide (LINZESS) 145 MCG CAPS, Take by mouth, Disp: , Rfl:     losartan (COZAAR) 100 MG tablet, Take 100 mg by mouth daily  , Disp: , Rfl:     magnesium oxide (MAG-OX) 400 mg, Take 400 mg by mouth 2 (two) times a day, Disp: , Rfl:     methocarbamol (ROBAXIN) 750 mg tablet, Take 500 mg by mouth every 6 (six) hours as needed for muscle spasms, Disp: , Rfl:     NOVOLOG 100 UNIT/ML injection, INJECT 120 UNITS DAILY VIA INSULIN PUMP E 11 65, Disp: , Rfl: 3    ondansetron (ZOFRAN) 8 mg tablet, Take by mouth, Disp: , Rfl:     oxybutynin (DITROPAN) 5 mg tablet, Take 10 mg by mouth daily  , Disp: , Rfl:     oxyCODONE-acetaminophen (PERCOCET) 5-325 mg per tablet, Take 1 PO BID PRN for break through pain , Disp: 60 tablet, Rfl: 0    pantoprazole (PROTONIX) 40 mg tablet, Take 40 mg by mouth 2 (two) times a day  , Disp: , Rfl:     pramipexole (MIRAPEX) 1 mg tablet, Take 1 mg by mouth 2 (two) times a day  , Disp: , Rfl:     ranitidine (ZANTAC) 150 mg tablet, Take 150 mg by mouth daily  , Disp: , Rfl:     SYMBICORT 80-4 5 MCG/ACT inhaler, , Disp: , Rfl:     tiZANidine (ZANAFLEX) 4 mg tablet, Take 1 tablet (4 mg total) by mouth 3 (three) times a day, Disp: 270 tablet, Rfl: 0    VENTOLIN  (90 Base) MCG/ACT inhaler, , Disp: , Rfl:     Allergies   Allergen Reactions    Bactrim [Sulfamethoxazole-Trimethoprim] Hives    Sucralfate      Facial swelling    Topamax [Topiramate]      disorentation    Azithromycin itching    Baclofen      "That knocks me out "    Bupropion Other (See Comments)     Too tired    Methotrexate      nausea    Neurontin [Gabapentin]      Other reaction(s): Hypertension, Other (See Comments)  Altered mental status  Severe sedation/hypotension       Physical Exam:    There were no vitals taken for this visit  Constitutional:normal, well developed, well nourished, alert, in no distress and non-toxic and no overt pain behavior  and overweight  Eyes:anicteric  HEENT:grossly intact  Neck:supple, symmetric, trachea midline and no masses   Pulmonary:even and unlabored  Cardiovascular:Plus one nonpitting and painful edema of the bilateral lower extremities  Skin: The bilateral lower extremities her ashy and dark in color with the edema is noted  Psychiatric:Mood and affect appropriate  Neurologic:Cranial Nerves II-XII grossly intact  Musculoskeletal:Slightly antalgic, but steady gait without the use of any assistive devices  Imaging  No orders to display         No orders of the defined types were placed in this encounter

## 2018-09-19 ENCOUNTER — TELEPHONE (OUTPATIENT)
Dept: NEUROLOGY | Facility: CLINIC | Age: 63
End: 2018-09-19

## 2018-09-19 NOTE — TELEPHONE ENCOUNTER
Received a fax from [facility]  Pt is scheduled for endoscopy on 10/16/18  Pt is currently taking plavix  They are asking for a 1-3 day hold for this procedure  Form emailed to you, please print and have dr Virginie Garcia complete and sign and please fax back

## 2018-10-02 ENCOUNTER — DOCUMENTATION (OUTPATIENT)
Dept: NEUROSURGERY | Facility: CLINIC | Age: 63
End: 2018-10-02

## 2018-10-02 ENCOUNTER — TELEPHONE (OUTPATIENT)
Dept: NEUROSURGERY | Facility: CLINIC | Age: 63
End: 2018-10-02

## 2018-10-02 ENCOUNTER — ANESTHESIA EVENT (OUTPATIENT)
Dept: PERIOP | Facility: HOSPITAL | Age: 63
End: 2018-10-02
Payer: MEDICARE

## 2018-10-02 NOTE — TELEPHONE ENCOUNTER
Spoke with Carline Muniz, that is scheduled for surgery tomorrow, 10/03/18   Verified allergies and went over pre-op instructions, including bathing and NPO status  Patient currently denies taking any blood thinning medications  Answered any questions she may have at this time  Patient was appreciative for the call

## 2018-10-03 ENCOUNTER — HOSPITAL ENCOUNTER (OUTPATIENT)
Facility: HOSPITAL | Age: 63
Setting detail: OUTPATIENT SURGERY
Discharge: HOME | End: 2018-10-03
Attending: NEUROLOGICAL SURGERY | Admitting: NEUROLOGICAL SURGERY
Payer: MEDICARE

## 2018-10-03 ENCOUNTER — ANESTHESIA (OUTPATIENT)
Dept: PERIOP | Facility: HOSPITAL | Age: 63
End: 2018-10-03
Payer: MEDICARE

## 2018-10-03 VITALS
RESPIRATION RATE: 16 BRPM | TEMPERATURE: 97.3 F | WEIGHT: 219 LBS | SYSTOLIC BLOOD PRESSURE: 146 MMHG | HEIGHT: 63 IN | HEART RATE: 82 BPM | BODY MASS INDEX: 38.8 KG/M2 | DIASTOLIC BLOOD PRESSURE: 76 MMHG | OXYGEN SATURATION: 96 %

## 2018-10-03 DIAGNOSIS — M79.18 CHRONIC MYOFASCIAL PAIN: ICD-10-CM

## 2018-10-03 DIAGNOSIS — G89.29 CHRONIC MYOFASCIAL PAIN: ICD-10-CM

## 2018-10-03 DIAGNOSIS — T85.192D MALFUNCTION OF SPINAL CORD STIMULATOR, SUBSEQUENT ENCOUNTER: Primary | ICD-10-CM

## 2018-10-03 DIAGNOSIS — Z45.42 ENCOUNTER FOR FITTING AND ADJUSTMENT OF SPINAL CORD STIMULATOR: ICD-10-CM

## 2018-10-03 LAB
GLUCOSE SERPL-MCNC: 191 MG/DL (ref 65–140)
GLUCOSE SERPL-MCNC: 202 MG/DL (ref 65–140)

## 2018-10-03 PROCEDURE — 82948 REAGENT STRIP/BLOOD GLUCOSE: CPT

## 2018-10-03 PROCEDURE — 63688 REV/RMV IMP SP NPG/R DTCH CN: CPT | Performed by: NEUROLOGICAL SURGERY

## 2018-10-03 RX ORDER — ONDANSETRON 2 MG/ML
4 INJECTION INTRAMUSCULAR; INTRAVENOUS EVERY 6 HOURS PRN
Status: DISCONTINUED | OUTPATIENT
Start: 2018-10-03 | End: 2018-10-03 | Stop reason: HOSPADM

## 2018-10-03 RX ORDER — OXYCODONE HYDROCHLORIDE AND ACETAMINOPHEN 5; 325 MG/1; MG/1
1 TABLET ORAL EVERY 4 HOURS PRN
Status: DISCONTINUED | OUTPATIENT
Start: 2018-10-03 | End: 2018-10-03 | Stop reason: HOSPADM

## 2018-10-03 RX ORDER — SODIUM CHLORIDE 9 MG/ML
100 INJECTION, SOLUTION INTRAVENOUS CONTINUOUS
Status: DISCONTINUED | OUTPATIENT
Start: 2018-10-03 | End: 2018-10-03 | Stop reason: HOSPADM

## 2018-10-03 RX ORDER — CEPHALEXIN 500 MG/1
500 CAPSULE ORAL EVERY 6 HOURS SCHEDULED
Qty: 20 CAPSULE | Refills: 0 | Status: SHIPPED | OUTPATIENT
Start: 2018-10-03 | End: 2018-10-08

## 2018-10-03 RX ORDER — CHLORHEXIDINE GLUCONATE 0.12 MG/ML
15 RINSE ORAL ONCE
Status: COMPLETED | OUTPATIENT
Start: 2018-10-03 | End: 2018-10-03

## 2018-10-03 RX ORDER — SODIUM CHLORIDE 9 MG/ML
INJECTION, SOLUTION INTRAVENOUS CONTINUOUS PRN
Status: DISCONTINUED | OUTPATIENT
Start: 2018-10-03 | End: 2018-10-03 | Stop reason: SURG

## 2018-10-03 RX ORDER — ONDANSETRON 2 MG/ML
4 INJECTION INTRAMUSCULAR; INTRAVENOUS ONCE AS NEEDED
Status: DISCONTINUED | OUTPATIENT
Start: 2018-10-03 | End: 2018-10-03 | Stop reason: HOSPADM

## 2018-10-03 RX ORDER — CHLORHEXIDINE GLUCONATE 0.12 MG/ML
15 RINSE ORAL EVERY 12 HOURS SCHEDULED
Status: DISCONTINUED | OUTPATIENT
Start: 2018-10-03 | End: 2018-10-03 | Stop reason: HOSPADM

## 2018-10-03 RX ORDER — FENTANYL CITRATE 50 UG/ML
INJECTION, SOLUTION INTRAMUSCULAR; INTRAVENOUS AS NEEDED
Status: DISCONTINUED | OUTPATIENT
Start: 2018-10-03 | End: 2018-10-03 | Stop reason: SURG

## 2018-10-03 RX ORDER — LIDOCAINE HYDROCHLORIDE 10 MG/ML
INJECTION, SOLUTION EPIDURAL; INFILTRATION; INTRACAUDAL; PERINEURAL AS NEEDED
Status: DISCONTINUED | OUTPATIENT
Start: 2018-10-03 | End: 2018-10-03 | Stop reason: SURG

## 2018-10-03 RX ORDER — MIDAZOLAM HYDROCHLORIDE 1 MG/ML
INJECTION INTRAMUSCULAR; INTRAVENOUS AS NEEDED
Status: DISCONTINUED | OUTPATIENT
Start: 2018-10-03 | End: 2018-10-03 | Stop reason: SURG

## 2018-10-03 RX ORDER — LIDOCAINE HYDROCHLORIDE AND EPINEPHRINE 10; 10 MG/ML; UG/ML
INJECTION, SOLUTION INFILTRATION; PERINEURAL AS NEEDED
Status: DISCONTINUED | OUTPATIENT
Start: 2018-10-03 | End: 2018-10-03 | Stop reason: HOSPADM

## 2018-10-03 RX ORDER — ONDANSETRON 2 MG/ML
INJECTION INTRAMUSCULAR; INTRAVENOUS AS NEEDED
Status: DISCONTINUED | OUTPATIENT
Start: 2018-10-03 | End: 2018-10-03 | Stop reason: SURG

## 2018-10-03 RX ORDER — PROPOFOL 10 MG/ML
INJECTION, EMULSION INTRAVENOUS AS NEEDED
Status: DISCONTINUED | OUTPATIENT
Start: 2018-10-03 | End: 2018-10-03 | Stop reason: SURG

## 2018-10-03 RX ORDER — BUPIVACAINE HYDROCHLORIDE AND EPINEPHRINE 5; 5 MG/ML; UG/ML
INJECTION, SOLUTION PERINEURAL AS NEEDED
Status: DISCONTINUED | OUTPATIENT
Start: 2018-10-03 | End: 2018-10-03 | Stop reason: HOSPADM

## 2018-10-03 RX ORDER — MORPHINE SULFATE 4 MG/ML
1 INJECTION, SOLUTION INTRAMUSCULAR; INTRAVENOUS
Status: DISCONTINUED | OUTPATIENT
Start: 2018-10-03 | End: 2018-10-03 | Stop reason: HOSPADM

## 2018-10-03 RX ADMIN — CEFAZOLIN SODIUM 2000 MG: 2 SOLUTION INTRAVENOUS at 07:50

## 2018-10-03 RX ADMIN — PROPOFOL 20 MG: 10 INJECTION, EMULSION INTRAVENOUS at 08:03

## 2018-10-03 RX ADMIN — ONDANSETRON 4 MG: 2 INJECTION INTRAMUSCULAR; INTRAVENOUS at 08:25

## 2018-10-03 RX ADMIN — FENTANYL CITRATE 25 MCG: 50 INJECTION INTRAMUSCULAR; INTRAVENOUS at 07:57

## 2018-10-03 RX ADMIN — PROPOFOL 20 MG: 10 INJECTION, EMULSION INTRAVENOUS at 08:02

## 2018-10-03 RX ADMIN — SODIUM CHLORIDE: 0.9 INJECTION, SOLUTION INTRAVENOUS at 07:35

## 2018-10-03 RX ADMIN — OXYCODONE AND ACETAMINOPHEN 1 TABLET: 5; 325 TABLET ORAL at 09:13

## 2018-10-03 RX ADMIN — LIDOCAINE HYDROCHLORIDE 50 MG: 10 INJECTION, SOLUTION EPIDURAL; INFILTRATION; INTRACAUDAL; PERINEURAL at 07:51

## 2018-10-03 RX ADMIN — FENTANYL CITRATE 25 MCG: 50 INJECTION INTRAMUSCULAR; INTRAVENOUS at 08:02

## 2018-10-03 RX ADMIN — MIDAZOLAM HYDROCHLORIDE 2 MG: 1 INJECTION, SOLUTION INTRAMUSCULAR; INTRAVENOUS at 07:45

## 2018-10-03 RX ADMIN — PROPOFOL 20 MG: 10 INJECTION, EMULSION INTRAVENOUS at 08:11

## 2018-10-03 RX ADMIN — PROPOFOL 20 MG: 10 INJECTION, EMULSION INTRAVENOUS at 08:10

## 2018-10-03 RX ADMIN — PROPOFOL 20 MG: 10 INJECTION, EMULSION INTRAVENOUS at 08:07

## 2018-10-03 RX ADMIN — CHLORHEXIDINE GLUCONATE 15 ML: 1.2 RINSE ORAL at 07:18

## 2018-10-03 RX ADMIN — PROPOFOL 20 MG: 10 INJECTION, EMULSION INTRAVENOUS at 07:59

## 2018-10-03 RX ADMIN — FENTANYL CITRATE 50 MCG: 50 INJECTION INTRAMUSCULAR; INTRAVENOUS at 07:49

## 2018-10-03 RX ADMIN — PROPOFOL 20 MG: 10 INJECTION, EMULSION INTRAVENOUS at 07:55

## 2018-10-03 RX ADMIN — PROPOFOL 20 MG: 10 INJECTION, EMULSION INTRAVENOUS at 08:16

## 2018-10-03 RX ADMIN — PROPOFOL 40 MG: 10 INJECTION, EMULSION INTRAVENOUS at 07:51

## 2018-10-03 RX ADMIN — PROPOFOL 20 MG: 10 INJECTION, EMULSION INTRAVENOUS at 08:18

## 2018-10-03 RX ADMIN — PROPOFOL 20 MG: 10 INJECTION, EMULSION INTRAVENOUS at 08:12

## 2018-10-03 RX ADMIN — PROPOFOL 20 MG: 10 INJECTION, EMULSION INTRAVENOUS at 08:13

## 2018-10-03 NOTE — ANESTHESIA POSTPROCEDURE EVALUATION
Post-Op Assessment Note      CV Status:  Stable    Mental Status:  Alert and awake    Hydration Status:  Euvolemic and stable    PONV Controlled:  Controlled    Airway Patency:  Patent    Post Op Vitals Reviewed: Yes          Staff: Anesthesiologist           BP  168/75   Temp   97 0   Pulse  81   Resp   12   SpO2 96

## 2018-10-03 NOTE — ANESTHESIA PREPROCEDURE EVALUATION
Review of Systems/Medical History  Patient summary reviewed    History of anesthetic complications PONV    Cardiovascular  Hyperlipidemia, Hypertension controlled,    Pulmonary  Asthma , well controlled/ stable , Sleep apnea ,        GI/Hepatic    GERD well controlled,             Endo/Other  Diabetes well controlled type 2 Insulin, History of thyroid disease , hypothyroidism,      GYN       Hematology  Anemia ,     Musculoskeletal    Arthritis     Neurology    TIA,    Psychology   Anxiety, Depression ,              Physical Exam    Airway    Mallampati score: II  TM Distance: >3 FB  Neck ROM: full     Dental   No notable dental hx     Cardiovascular  Rhythm: regular, Rate: normal,     Pulmonary  Breath sounds clear to auscultation,     Other Findings        Anesthesia Plan  ASA Score- 3     Anesthesia Type- IV sedation with anesthesia with ASA Monitors  Additional Monitors:   Airway Plan:         Plan Factors-  Patient did not smoke on day of surgery  Induction- intravenous  Postoperative Plan-     Informed Consent- Anesthetic plan and risks discussed with patient  I personally reviewed this patient with the CRNA  Discussed and agreed on the Anesthesia Plan with the CRNA  Alyssa Mathis

## 2018-10-03 NOTE — OP NOTE
OPERATIVE REPORT  PATIENT NAME: Len Boss    :  1955  MRN: [id number]  Pt Location: [facility] OR ROOM 01    SURGERY DATE: 10/3/2018    Surgeon(s) and Role:     * Karlene Han MD - Primary  Court JULIO Butler student - Assist    No qualified resident was available  JULIO was present for the procedure, and provided essential assistance with proper exposure, retraction, hemostasis and wound closure, which was necessary secondary to the complex nature of this case  Preop Diagnosis:  Malfunction of spinal cord stimulator, initial encounter ([facility] ) [T85 192A]  Encounter for fitting and adjustment of spinal cord stimulator [Z45 42]    Post-Op Diagnosis Codes:     * Malfunction of spinal cord stimulator, initial encounter ([facility] ) [T85 192A]     * Encounter for fitting and adjustment of spinal cord stimulator [Z45 42]    Procedure:  1  Reopening of left buttock incision for repositioning of [employer] implantable pulse generator for spinal cord stimulator  Specimen(s):  * No specimens in log *    Estimated Blood Loss:   Minimal    Drains:       Anesthesia Type:   IV Sedation with Anesthesia    Operative Indications:  Malfunction of spinal cord stimulator, initial encounter ([facility] ) [T85 192A]  Encounter for fitting and adjustment of spinal cord stimulator [Z45 42]    Operative Findings:  IPG displaced superiorly  Repositioned deep to previous pocket  Complications:   None    Procedure and Technique:  Clinical Note:    The goals and alternatives to the procedure described above were discussed with patient  Surgery is intended to reposition the IPG so it is in a more comfortable position  The risks of surgery were described in detail  1  Risk of IV anesthetic  There is risk of infection and bleeding  2  Risk of system malfunction and failure of treatment  Need for revision surgery  Once all questions were answered to their satisfaction, they asked to proceed with surgery      Operating Room Note    The patient was brought to the operating room and placed under IV anesthetic  Once all appropriate lines were in position, the patient was carefully turned onto her right side with left side up onto the operating room gurney  Care was taken to ensure that all pressure points were padded and the neck was in a neutral position  The left buttock incision was identified and marked  A full surgical timeout was undertaken identifying the site, side and level of surgery  The patient received preoperative antibiotics  One percent lidocaine with 100,000 of epinephrine was used to infiltrate the soft tissue  Ten blade was used to incise the skin over the planned buttock incision  Blunt and sharp dissection was used to expose the IPG which had migrated superiorly and laterally  The IPG was removed from the pocket and the silk sutures were removed as well  A new pocket was developed underneath the previous pocket both superiorly and inferiorly  The surgical site was irrigated with antibiotic irrigation and 50% dilute hydrogen peroxide  Excess lead and generator was then placed in the pocket and secured in position with silk suture  The system was then interrogated and was noted to be working within normal limits and impedances  The incision was irrigated with antibiotic irrigation  Bipolar cautery was used for hemostasis  Inverted Vicryl suture was used to approximate the subcutaneous tissues including the previous pocket  Running 4 0 Monocryl was then used to approximate the skin edges  0 5% Marcaine with 1/100,000 of epinephrine was used to infiltrate the soft tissue  The IPG was noted to be lying much flatter and there was no further prominence underneath the incision  A Miplex was applied to the incision  The count was correct at the end of the case and there were no complications   The patient remained on the operating gurney and was transferred to the PACU where they were noted to be hemodynamically stable and neurologically unchanged  The results of surgery were discussed with patient and family  In the postoperative period, the patient underwent electronic analysis and complex programming of the spinal cord stimulator system with the spinal cord stimulator representatives      I was present for the entire procedure    Patient Disposition:  PACU     SIGNATURE: Vanna Hammonds MD  DATE: October 3, 2018  TIME: 8:46 AM

## 2018-10-03 NOTE — INTERVAL H&P NOTE
H&P reviewed  After examining the patient I find no changes in the patients condition since the H&P had been written  Patient personally seen and examined  Neurological examination unchanged compared to last office/progress note, with the following exceptions:    None  Patient continues to have tenderness over left IPG site  Patient has obtain medical clearance and clearance from neurologist to proceed with surgery  Post operative instructions and medications have been reviewed with the patient and family  Assessment and Plan:    All questions have been answered to the patient and family satisfaction  Plan to proceed with reopening of left buttock incision for repositioning of IPG  They are in agreement with proceeding

## 2018-10-03 NOTE — DISCHARGE INSTRUCTIONS
Spinal Cord Stimulator Discharge Instructions    Activity:    1  Do not lift more than 20 pounds for 2 weeks  2  Avoid bending, lifting and twisting for 2 weeks  Surgical incision care:    1  Keep dressings in place for 3 days  2  Keep incisions dry for 3 days  3  May allow clean water to flow over incisions after 3 days  4  Do not immerse the incisions in water for 4 weeks  5  After 3 days, incisions may be left open to air, but should remain clean  6  Do not apply any creams or ointments to the incision, unless otherwise instructed by [facility]  7  Contact office if increasing redness, drainage, pain or swelling around the incisions  8  Complete upright xray of thoracic and lumbar spine prior to 6 week post operative appointment  Postoperative medication:    1  [employer] will provide pain medication for the first 2 weeks after surgery as coordinated with your pain specialist    2  If [employer] is providing pain medication, please contact office for questions regarding dosage and modifications  3  If [facility] is not providing pain medication postoperatively, then contact pain specialist for additional instructions and prescriptions  4  Do not operate heavy machinery or vehicles while taking sedating medications  *Please contact the stimulator representative if you have any questions regarding programing

## 2018-10-04 ENCOUNTER — TELEPHONE (OUTPATIENT)
Dept: NEUROSURGERY | Facility: CLINIC | Age: 63
End: 2018-10-04

## 2018-10-04 NOTE — TELEPHONE ENCOUNTER
Pt is s/p IPG removal with new pocket and replacement of IPG  She reports she is not having pain but a burning sensation that worsens depending on position, for example if she leans against it  Explained that she did just have surgery yesterday,and her old pocket was opened for battery removal and new pocket create  She was advised to be cautious of what is causing increased discomfort, and she should call the office or present to the ED should the symptom worsen    She was in agreement

## 2018-10-05 NOTE — TELEPHONE ENCOUNTER
Reviewed response with pt who stated an understanding  She is aware to contact the office or present to the ED with s/s of infection which she currently denies

## 2018-10-05 NOTE — TELEPHONE ENCOUNTER
Pt called back today reporting inability to sleep because of the burning sensation  Please advise if concerning or suggestions    Thank You

## 2018-10-05 NOTE — TELEPHONE ENCOUNTER
No additional suggestions for the burning pain  She can discuss this with her pain specialist   As long as there are no signs or symptoms of infection, including drainage swelling or redness, then expect this to improve over time

## 2018-10-11 ENCOUNTER — TELEPHONE (OUTPATIENT)
Dept: OBGYN CLINIC | Facility: HOSPITAL | Age: 63
End: 2018-10-11

## 2018-10-11 NOTE — TELEPHONE ENCOUNTER
S/w pt, c/o increased cramps and spasms since her surgery last tuesday  Pt confirmed:  Tizanidine 4mg, 1 tab po tid #270  / 0 on 9/14 w/ no relief  methocarbimol 750 mg 1 tab po qhs #90 / 0 on 9/14 - waking with cramps / spasms during the night  Pt states she takes this at bedtime because it makes her go to sleep  Cymbalta 60 mg 2 tabs po hs # 180 / 0 on 9/14 w/ no se's  Pt stated that she turned her stimulator down today as per ann abdi w/ no improvement  Advised pt, will d/w DG and cb to advise  Pt verbalized understanding and appreciaiton       Next ov 11/19

## 2018-10-11 NOTE — TELEPHONE ENCOUNTER
Patient is calling requesting what you would suggest for leg cramps and spasms in both legs  Patient wanted to see if the doctor could suggest anything instead of coming in to the office       Grazyna Moncada pt

## 2018-10-11 NOTE — TELEPHONE ENCOUNTER
Advise her to give the stim changes and the cramps more time because she is on a maximum amount of muscle relaxers and it may just take time, rest, heat, ice, and her continuance of her current medication regimen  She is to call us next week if her symptoms haven't improved  Thank you

## 2018-10-18 ENCOUNTER — CLINICAL SUPPORT (OUTPATIENT)
Dept: NEUROSURGERY | Facility: CLINIC | Age: 63
End: 2018-10-18

## 2018-10-18 VITALS — SYSTOLIC BLOOD PRESSURE: 170 MMHG | TEMPERATURE: 98.9 F | DIASTOLIC BLOOD PRESSURE: 78 MMHG

## 2018-10-18 DIAGNOSIS — Z98.890 POST-OPERATIVE STATE: Primary | ICD-10-CM

## 2018-10-18 PROCEDURE — 99024 POSTOP FOLLOW-UP VISIT: CPT

## 2018-10-18 NOTE — PROGRESS NOTES
Post-Op Visit-Neurosurgery    Ke Nunes 59 y o  female MRN: [id number]    Chief Complaint  Patient presents post: BUTTOCK RE-OPENING INCISION FOR REPOSITIONING OF IMPLANTABLE PULSE GENERATOR (Left Spine Thoracic)     History of Present Illness  Patient presents for 2 week POV for incision check  Arrived unaccompanied and ambulated well without assistive devise  Reports 8/10 anterior legs and is currently taking percocet as prescribed by pain management  Denies incisional discomfort however said she has a burning sensation at the battery pack site which has improved since the surgery  Requesting clearance to use the hot tub while on vacation in 3 weeks  Assessment  Wound Exam: Bandage noted to be in place this was removed to visualize the surgical site  Incision is clean dry and in tact, well approximated without, heat, swelling, or drainage  Mild redness noted at the suture line which appears to be associated with dissolvable sutures  Procedure  Staple/suture observation   location: L upper buttock   Procedure Note: dissolvable sutures observed  Complications: None  Discussion/Summary  Cleaned incisions with NSS and gauze and covered loosely for comfort  Advised she may leave it LINDSEY at this time however if she would like can cover loosely with gauze for comfort  Advised that usually post op patients are not to submerge in water for at least 4 weeks after the surgery  Explained that she should be ok to use the hot tub in three weeks however suggested she reconsider given the possible bacteria growth in the water  Reviewed incision care with patient including daily observation for s/s infection including: increased erythema, edema, drainage, dehiscence of incision or fever >101  Should these be observed, she understands that she is to call and/or return immediately for reassessment    Advised patient to continue cleansing area with mild soap and water and pat dry  Not to apply any lotions, creams, or ointments, & not to submerge in any water for two more weeks  She is to maintain activity restrictions until cleared by the surgeon  Verified date/time/location of upcoming POV and reminded her to complete x-rays prior to her visit on 12/14/2018 (appt delayed due to patients vacation)   She is to call the office with any further questions or concerns, or if any incisional issues or fevers would arise

## 2018-10-25 ENCOUNTER — TELEPHONE (OUTPATIENT)
Dept: PAIN MEDICINE | Facility: MEDICAL CENTER | Age: 63
End: 2018-10-25

## 2018-10-25 DIAGNOSIS — M25.562 CHRONIC PAIN OF LEFT KNEE: ICD-10-CM

## 2018-10-25 DIAGNOSIS — G89.29 CHRONIC SCAPULAR PAIN: ICD-10-CM

## 2018-10-25 DIAGNOSIS — M89.8X1 CHRONIC SCAPULAR PAIN: ICD-10-CM

## 2018-10-25 DIAGNOSIS — G90.522 COMPLEX REGIONAL PAIN SYNDROME TYPE 1 OF LEFT LOWER EXTREMITY: Chronic | ICD-10-CM

## 2018-10-25 DIAGNOSIS — M54.6 CHRONIC BILATERAL THORACIC BACK PAIN: ICD-10-CM

## 2018-10-25 DIAGNOSIS — G89.29 CHRONIC PAIN OF RIGHT KNEE: ICD-10-CM

## 2018-10-25 DIAGNOSIS — M54.41 CHRONIC BILATERAL LOW BACK PAIN WITH BILATERAL SCIATICA: ICD-10-CM

## 2018-10-25 DIAGNOSIS — M54.42 CHRONIC BILATERAL LOW BACK PAIN WITH BILATERAL SCIATICA: ICD-10-CM

## 2018-10-25 DIAGNOSIS — G89.29 CHRONIC PAIN OF LEFT KNEE: ICD-10-CM

## 2018-10-25 DIAGNOSIS — G89.29 CHRONIC BILATERAL THORACIC BACK PAIN: ICD-10-CM

## 2018-10-25 DIAGNOSIS — G89.4 PAIN SYNDROME, CHRONIC: Chronic | ICD-10-CM

## 2018-10-25 DIAGNOSIS — M25.561 CHRONIC PAIN OF RIGHT KNEE: ICD-10-CM

## 2018-10-25 DIAGNOSIS — G89.29 CHRONIC BILATERAL LOW BACK PAIN WITH BILATERAL SCIATICA: ICD-10-CM

## 2018-10-25 RX ORDER — FENTANYL 37.5 UG/H
PATCH, EXTENDED RELEASE TRANSDERMAL
Qty: 10 PATCH | Refills: 0 | Status: SHIPPED | OUTPATIENT
Start: 2018-10-25 | End: 2018-11-19 | Stop reason: SDUPTHER

## 2018-10-25 NOTE — TELEPHONE ENCOUNTER
Patient left message at [facility] on 10/25/18 at 1109 am, has questions on medications    Call back number for patient is [phone]

## 2018-10-25 NOTE — TELEPHONE ENCOUNTER
S/w pt, states she has 2 days of fentanyl left on hand  Pt states she does constant pain, but notes "some relief" w/ 37 5 mcg q 48h / no se's  Pt stated that she does not want to resume 50 mcg patches  Confirmed w/ [facility], no rx's on file  Advised pt, will d/w DG  Anticipate a rx will be sent to the pharmacy for pu later today  If a prior Dean Emmanuel is required, please let this office know and be aware, a determination may take up to 72 hrs  Pt verbalized understanding and appreciation

## 2018-10-25 NOTE — TELEPHONE ENCOUNTER
I escribed a script  She should continue her meds as ordered and we will follow up at her OV as scheduled next month  Thank you

## 2018-10-29 ENCOUNTER — TRANSCRIBE ORDERS (OUTPATIENT)
Dept: SLEEP CENTER | Facility: CLINIC | Age: 63
End: 2018-10-29

## 2018-10-29 DIAGNOSIS — G47.19 EXCESSIVE DAYTIME SLEEPINESS: Primary | ICD-10-CM

## 2018-10-29 DIAGNOSIS — G47.11 IDIOPATHIC HYPERSOMNIA: ICD-10-CM

## 2018-10-30 ENCOUNTER — TELEPHONE (OUTPATIENT)
Dept: SLEEP CENTER | Facility: CLINIC | Age: 63
End: 2018-10-30

## 2018-10-30 NOTE — TELEPHONE ENCOUNTER
I spoke with pt, advised that since she is not a patient of our practice I am not able to give her any recommendations  She did schedule CON to see Dr Lisbeth Stratton on 12/6 at 56 in the [place] office  Directions given  Pt concerned that she is falling asleep at any time and she cannot go back to her pulmonologist as he is not taking good care of her  I advised pt to follow up with her PCP until she is seen by our office

## 2018-10-30 NOTE — TELEPHONE ENCOUNTER
----- Message from Ricardo Wilcox sent at 10/30/2018 10:50 AM EDT -----  Regarding: questions  Contact: [phone]  Would like to speak to a nurse about her sleep problems and what she needs to do

## 2018-11-06 ENCOUNTER — TELEPHONE (OUTPATIENT)
Dept: SLEEP CENTER | Facility: CLINIC | Age: 63
End: 2018-11-06

## 2018-11-06 NOTE — TELEPHONE ENCOUNTER
Patient called asking if she should have a sleep study done first prior to coming to her 12/06 appt with Dr Lorenzo Arteaga  Per conversation with Renée Zuniga RN it is at her provider's discretion if she needs to have a study done first prior to her appt  Patient was advised and states will comply with plan

## 2018-11-19 ENCOUNTER — OFFICE VISIT (OUTPATIENT)
Dept: PAIN MEDICINE | Facility: CLINIC | Age: 63
End: 2018-11-19
Payer: MEDICARE

## 2018-11-19 VITALS — WEIGHT: 221 LBS | BODY MASS INDEX: 39.16 KG/M2 | HEIGHT: 63 IN

## 2018-11-19 DIAGNOSIS — G63 POLYNEUROPATHY ASSOCIATED WITH UNDERLYING DISEASE (HCC): Chronic | ICD-10-CM

## 2018-11-19 DIAGNOSIS — M54.6 CHRONIC BILATERAL THORACIC BACK PAIN: ICD-10-CM

## 2018-11-19 DIAGNOSIS — M54.41 CHRONIC BILATERAL LOW BACK PAIN WITH BILATERAL SCIATICA: ICD-10-CM

## 2018-11-19 DIAGNOSIS — M54.42 CHRONIC BILATERAL LOW BACK PAIN WITH BILATERAL SCIATICA: ICD-10-CM

## 2018-11-19 DIAGNOSIS — M25.562 CHRONIC PAIN OF LEFT KNEE: ICD-10-CM

## 2018-11-19 DIAGNOSIS — G90.522 COMPLEX REGIONAL PAIN SYNDROME TYPE 1 OF LEFT LOWER EXTREMITY: Chronic | ICD-10-CM

## 2018-11-19 DIAGNOSIS — M25.561 CHRONIC PAIN OF RIGHT KNEE: ICD-10-CM

## 2018-11-19 DIAGNOSIS — M89.8X1 CHRONIC SCAPULAR PAIN: ICD-10-CM

## 2018-11-19 DIAGNOSIS — G89.29 CHRONIC PAIN OF LEFT KNEE: ICD-10-CM

## 2018-11-19 DIAGNOSIS — M79.604 CHRONIC PAIN OF BOTH LOWER EXTREMITIES: Chronic | ICD-10-CM

## 2018-11-19 DIAGNOSIS — F11.20 UNCOMPLICATED OPIOID DEPENDENCE (HCC): Primary | ICD-10-CM

## 2018-11-19 DIAGNOSIS — G89.4 PAIN SYNDROME, CHRONIC: Chronic | ICD-10-CM

## 2018-11-19 DIAGNOSIS — G89.29 CHRONIC BILATERAL LOW BACK PAIN WITH BILATERAL SCIATICA: ICD-10-CM

## 2018-11-19 DIAGNOSIS — G89.29 CHRONIC PAIN OF RIGHT KNEE: ICD-10-CM

## 2018-11-19 DIAGNOSIS — G89.29 CHRONIC BILATERAL THORACIC BACK PAIN: ICD-10-CM

## 2018-11-19 DIAGNOSIS — G89.29 CHRONIC PAIN OF BOTH LOWER EXTREMITIES: Chronic | ICD-10-CM

## 2018-11-19 DIAGNOSIS — M79.18 MYOFASCIAL PAIN SYNDROME: ICD-10-CM

## 2018-11-19 DIAGNOSIS — M79.605 CHRONIC PAIN OF BOTH LOWER EXTREMITIES: Chronic | ICD-10-CM

## 2018-11-19 DIAGNOSIS — G89.29 CHRONIC SCAPULAR PAIN: ICD-10-CM

## 2018-11-19 PROBLEM — R06.00 DYSPNEA ON EXERTION: Status: ACTIVE | Noted: 2018-09-17

## 2018-11-19 PROBLEM — R06.09 DYSPNEA ON EXERTION: Status: ACTIVE | Noted: 2018-09-17

## 2018-11-19 PROBLEM — I45.4 BBB (BUNDLE BRANCH BLOCK): Status: ACTIVE | Noted: 2018-09-18

## 2018-11-19 PROBLEM — G47.10 HYPERSOMNIA: Status: ACTIVE | Noted: 2018-10-19

## 2018-11-19 PROCEDURE — 95971 ALYS SMPL SP/PN NPGT W/PRGRM: CPT | Performed by: NURSE PRACTITIONER

## 2018-11-19 PROCEDURE — 99214 OFFICE O/P EST MOD 30 MIN: CPT | Performed by: NURSE PRACTITIONER

## 2018-11-19 RX ORDER — OXYCODONE HYDROCHLORIDE AND ACETAMINOPHEN 5; 325 MG/1; MG/1
TABLET ORAL
Qty: 55 TABLET | Refills: 0 | Status: SHIPPED | OUTPATIENT
Start: 2018-11-19 | End: 2018-11-20 | Stop reason: SDUPTHER

## 2018-11-19 RX ORDER — DULOXETIN HYDROCHLORIDE 60 MG/1
CAPSULE, DELAYED RELEASE ORAL
Qty: 180 CAPSULE | Refills: 0 | Status: SHIPPED | OUTPATIENT
Start: 2018-11-19 | End: 2019-01-16 | Stop reason: SDUPTHER

## 2018-11-19 RX ORDER — TIZANIDINE 4 MG/1
TABLET ORAL
Qty: 450 TABLET | Refills: 0 | Status: SHIPPED | OUTPATIENT
Start: 2018-11-19 | End: 2019-01-16 | Stop reason: SDUPTHER

## 2018-11-19 RX ORDER — FENTANYL 37.5 UG/H
PATCH, EXTENDED RELEASE TRANSDERMAL
Qty: 10 PATCH | Refills: 0 | Status: SHIPPED | OUTPATIENT
Start: 2018-11-19 | End: 2018-11-20 | Stop reason: SDUPTHER

## 2018-11-19 RX ORDER — NALOXONE HYDROCHLORIDE 4 MG/.1ML
SPRAY NASAL
Qty: 2 EACH | Refills: 0 | Status: SHIPPED | OUTPATIENT
Start: 2018-11-19 | End: 2020-11-17

## 2018-11-19 NOTE — PROGRESS NOTES
Assessment:  1  Uncomplicated opioid dependence ([facility] [address] )    2  Chronic bilateral thoracic back pain    3  Chronic scapular pain    4  Complex regional pain syndrome type 1 of left lower extremity    5  Chronic pain of left knee    6  Chronic pain of right knee    7  Chronic bilateral low back pain with bilateral sciatica    8  Pain syndrome, chronic    9  Polyneuropathy associated with underlying disease ([facility] [address] )    10  Myofascial pain syndrome    11  Chronic pain of both lower extremities        Plan:  While the patient was in the office today, I did have a thorough conversation with the patient regarding her medication regimen and treatment plan  I explained the patient that at this point I feel is in her best interest to proceed with the spinal cord stimulator reprogramming today with the [address] [place] [address] team with the hope that they will be able to recapture relief of her leg pain and continue to help manage her pain so we can continue to slowly and steadily titrate her down on her opioid medications  While the patient was in the office today, simple spinal cord stimulator reprogramming was completed by the [address] [place] [address] representative  under my direct supervision  Programs, leads, battery, and overall functioning of the stimulator device was evaluated during the re-programming session today  We will continue to evaluate the patient's progress with the stimulation changes made today a follow up with re-programming needs/sessions in the future as needed  With regards to her medication regimen, I explained to the patient that at this point since we just decrease her fentanyl patch, for in the next 2 months we will continue with her current medication regimen as prescribed  However, at her next office visit we would be looking to further titrate down on the fentanyl patch to the 25 mcg dosage  In the meantime we will continue the fentanyl patch and p r n  Percocet as prescribed    However, to try to help with the myofascial pain since the patient does not feel the methocarbamol is providing any relief we will have her discontinue the methocarbamol and increase her tizanidine to t i d  dosing during the day and 1-2 pills at bedtime as needed  I advised the patient that if they experience any side effects or issues with the changes in their medication regiment, they should give our office a call to discuss  I also advised the patient not to drive or operate machinery until they see how the changes in the medication regimen affects them  The patient was agreeable and verbalized an understanding  [place] Prescription Drug Monitoring Program report was reviewed and was appropriate      The patient did not have their opioid medications available for confirmation or counting while in the office today  I reviewed with the patient that as per the opioid contract, they are to bring in the last filled prescription for all of their opioid medications, with what they have left to every office visit  I advised the patient that if they would continue to not bring in their prescriptions as discussed, we may not be able to continue prescribing opioid medications in the future  The patient was agreeable and verbalized an understanding  The patient's opioid scripts were sent to their pharmacy electronically and was given a 2 month supply of prescriptions with a Do Not Fill date(s) of November 23, 2018 and December 21, 2018 for the fentanyl patches and December 7, 2018 and January 4, 2019 for the Percocet  There are risks associated with opioid medications, including dependence, addiction and tolerance  The patient understands and agrees to use these medications only as prescribed  Potential side effects of the medications include, but are not limited to, constipation, drowsiness, addiction, impaired judgment and risk of fatal overdose if not taken as prescribed   The patient was warned against driving while taking sedation medications  Sharing medications is a felony  At this point in time, the patient is showing no signs of addiction, abuse, diversion or suicidal ideation  An oral drug screen swab was collected at today's office visit  The swab will be sent for confirmatory testing  The drug screen is medically necessary because the patient is either dependent on opioid medication or is being considered for opioid medication therapy and the results could impact ongoing or future treatment  The drug screen is to evaluate for the presences or absence of prescribed, non-prescribed, and/or illicit drugs/substances  While the patient was in the office today, I discussed with the patient that at this point they have been identified as a patient who is at significant risk for respiratory depression and/or even death because they are being, appropriately prescribed,  a dosage of opioid pain medication that is equal to or greater than 40 milligram equivalents of Morphine Sulfate  I, again, reviewed the dangers of being on an opioid and at this point since we feel it is appropriate that they continue the opioid medication at this level, for now, we will continue the dosage as prescribed  However, we feel that it is best and safe practice to prescribe narcan nasal spray to be used if the patient is experiencing respiratory depression as a result of suspected intentional or unintentional opioid over dose  I reviewed with the patient how to use the nasal spray and to make sure that they educated a family member on how to use it and that no matter what emergency personnel must be notified as the Narcan nasal spray is only meant to give the patient more time to get to the nearest emergency room for a more thorough evaluation  The patient was agreeable and verbalized an understanding   A signed copy of the patient education sheet reviewing the risks and reasons for prescribing this medication is available in the patient's chart and a copy was also sent home with the patient  The patient will follow-up in 8 weeks for medication prescription refill and reevaluation  The patient was advised to contact the office should their symptoms worsen in the interim  The patient was agreeable and verbalized an understanding  History of Present Illness: The patient is a 59 y o  female last seen on 9/14/18 who presents for a follow up office visit in regards to chronic pain secondary to complex regional pain syndrome of the lower extremities and polyneuropathy  The patient currently reports that this point she does feel the stimulator is helpful, however, she is having some worsening right posterior knee and leg pain and is hoping that with some stimulator reprogramming today that pain can be better captured as she does feel her pain has slightly worsened since her last office visit as we did decrease her fentanyl patch down to the 37 5 mcg dosage  However, she feels that overall the pain is stable it is the worsening spasms that are her biggest issue and does not feel that the methocarbamol is helping at bedtime any more but does feel the tizanidine during the day is more helpful  The patient presents today to discuss her medication regimen and treatment plan  Current pain medications includes:  Fentanyl patch 37 5 mcg changing 1 patch transdermally every 72 hours, methocarbamol 750 mg at bedtime, tizanidine 4 mg t i d  during the day, Cymbalta 60 mg b i d , and Percocet 5/3251 p  o  b i d  p r n  for pain dispense number 55 pills for 30 days  The patient reports that this regimen is providing moderate pain relief  The patient is reporting no side effects from this pain medication regimen      Pain Contract Signed: July 2018  Last Urine Drug Screen: 11/19/18    I have personally reviewed and/or updated the patient's past medical history, past surgical history, family history, social history, current medications, allergies, and vital signs today        Review of Systems:    Review of Systems   Respiratory: Positive for shortness of breath  Cardiovascular: Negative for chest pain  Gastrointestinal: Positive for constipation  Negative for diarrhea, nausea and vomiting  Musculoskeletal: Positive for gait problem  Negative for arthralgias, joint swelling (joint stiffness) and myalgias  Skin: Negative for rash  Neurological: Positive for dizziness and weakness  Negative for seizures  All other systems reviewed and are negative  Past Medical History:   Diagnosis Date    Anxiety     Asthma     controlled    Back complaints     Cancer ([facility] )     nose    Cellulitis of left lower leg     Chronic bilateral thoracic back pain     Chronic myofascial pain     Chronic pain of both lower extremities     Chronic pain of left knee     Chronic pain syndrome     CPAP (continuous positive airway pressure) dependence     Depression     Diabetes mellitus ([facility] )     Disease of thyroid gland     Gait disorder     Gastroparesis     GERD (gastroesophageal reflux disease)     History of angina     History of transfusion     Many years ago    Hyperlipidemia     Hypertension     Insulin pump in place     Kidney disease     Polyneuropathy associated with underlying disease ([facility] )     PONV (postoperative nausea and vomiting)     S/P insertion of spinal cord stimulator 2018    Sarcoidosis     Sleep apnea     TIA (transient ischemic attack)        Past Surgical History:   Procedure Laterality Date    BREAST SURGERY      BREAST SURGERY      reduction     SECTION      HERNIA REPAIR      HYSTERECTOMY      JOINT REPLACEMENT Left     NECK SURGERY      AR SURG IMPLNT [address] Left 2018    Procedure:  Insertion of thoracic spinal cord stimulator electrode via laminotomy and placement of left buttock IPG;  Surgeon: Marybeth Black MD;  [facility];  Service: Neurosurgery    REPLACEMENT TOTAL KNEE      ROTATOR CUFF REPAIR      SPINAL STIMULATOR PLACEMENT Left 10/3/2018    Procedure: BUTTOCK RE-OPENING INCISION FOR REPOSITIONING OF IMPLANTABLE PULSE GENERATOR;  Surgeon: Chito Martin MD;  [facility];  Service: Neurosurgery    TONSILLECTOMY      WISDOM TOOTH EXTRACTION         Family History   Problem Relation Age of Onset    Diabetes Family     Heart disease Family     Hypertension Family     Neuropathy Family     No Known Problems Mother     Heart disease Father     Diabetes Father     No Known Problems Maternal Grandmother     No Known Problems Maternal Grandfather     No Known Problems Paternal Grandmother     No Known Problems Paternal Grandfather        Social History     Occupational History    Not on file       Social History Main Topics    Smoking status: Never Smoker    Smokeless tobacco: Never Used    Alcohol use No    Drug use: No    Sexual activity: Yes         Current Outpatient Prescriptions:     amitriptyline (ELAVIL) 10 mg tablet, Take 10 mg by mouth daily at bedtime  , Disp: , Rfl:     atorvastatin (LIPITOR) 80 mg tablet, Take 80 mg by mouth daily at bedtime  , Disp: , Rfl:     cholecalciferol (VITAMIN D3) 1,000 units tablet, Take 2,000 Units by mouth daily with lunch  , Disp: , Rfl: 5    clopidogrel (PLAVIX) 75 mg tablet, Take 1 tablet (75 mg total) by mouth daily (Patient taking differently: Take 75 mg by mouth daily at bedtime  ), Disp: 30 tablet, Rfl: 4    CRANBERRY PO, Take 1 capsule by mouth daily with lunch  , Disp: , Rfl:     Cyanocobalamin (VITAMIN B-12 PO), Take 1 capsule by mouth daily with lunch  , Disp: , Rfl:     Docusate Sodium 100 MG capsule, Take 100 mg by mouth daily with lunch  , Disp: , Rfl:     DULoxetine (CYMBALTA) 60 mg delayed release capsule, Take 2 PO QD (Patient taking differently: Take 120 mg by mouth daily at bedtime Takes 120 mg at Bedtime ), Disp: 180 capsule, Rfl: 0    FentaNYL 37 5 MCG/HR PT72, Apply 1 patch TD every 72 hours as directed , Disp: 10 patch, Rfl: 0    furosemide (LASIX) 20 mg tablet, Take 20 mg by mouth daily in the early morning  , Disp: , Rfl:     glucagon (GLUCAGEN) 1 mg injection, Inject under the skin as needed if passed out from low sugar, Disp: , Rfl:     levothyroxine 75 mcg tablet, Take 75 mcg by mouth daily in the early morning  , Disp: , Rfl:     Linaclotide (LINZESS) 145 MCG CAPS, Take 1 capsule by mouth daily in the early morning  , Disp: , Rfl:     losartan (COZAAR) 100 MG tablet, Take 100 mg by mouth daily in the early morning  , Disp: , Rfl:     methocarbamol (ROBAXIN) 750 mg tablet, Take 1 PO HS  (Patient taking differently: Take 750 mg by mouth daily at bedtime Take 1 PO HS  ), Disp: 90 tablet, Rfl: 0    NOVOLOG 100 UNIT/ML injection, INJECT 120 UNITS DAILY VIA INSULIN PUMP E 11 65, Disp: , Rfl: 3    ondansetron (ZOFRAN) 8 mg tablet, Take 8 mg by mouth every 8 (eight) hours as needed  , Disp: , Rfl:     OXYBUTYNIN CHLORIDE PO, Take 10 mg by mouth daily in the early morning  , Disp: , Rfl:     oxyCODONE-acetaminophen (PERCOCET) 5-325 mg per tablet, Take 1 PO BID PRN FOR PAIN  DO NOT FILL UNTIL: 11/9/18 (Patient taking differently: Take 1 tablet by mouth every 6 (six) hours as needed Take 1 PO BID PRN FOR PAIN   DO NOT FILL UNTIL: 11/9/18 ), Disp: 55 tablet, Rfl: 0    pantoprazole (PROTONIX) 40 mg tablet, Take 40 mg by mouth 2 (two) times a day  , Disp: , Rfl:     pramipexole (MIRAPEX) 1 mg tablet, Take 1 mg by mouth 2 (two) times a day  , Disp: , Rfl:     ranitidine (ZANTAC) 150 mg tablet, Take 150 mg by mouth daily at bedtime  , Disp: , Rfl:     SYMBICORT 80-4 5 MCG/ACT inhaler, Inhale 2 puffs 2 (two) times a day  , Disp: , Rfl:     tiZANidine (ZANAFLEX) 4 mg tablet, Take 1 tablet (4 mg total) by mouth 3 (three) times a day, Disp: 270 tablet, Rfl: 0    VENTOLIN  (90 Base) MCG/ACT inhaler, 2 puffs every 4 (four) hours as needed  , Disp: , Rfl:     Allergies   Allergen Reactions    Bactrim [Sulfamethoxazole-Trimethoprim] Hives    Sucralfate      Facial swelling    Topamax [Topiramate]      disorentation    Azithromycin Itching    Baclofen      "That knocks me out "    Bupropion Fatigue    Methotrexate Nausea Only    Neurontin [Gabapentin] Hallucinations and Hypertension       Physical Exam:    There were no vitals taken for this visit  Constitutional:normal, well developed, well nourished, alert, in no distress and non-toxic and no overt pain behavior  and overweight  Eyes:anicteric  HEENT:grossly intact  Neck:supple, symmetric, trachea midline and no masses   Pulmonary:even and unlabored  Cardiovascular:No edema or pitting edema present  Skin:Normal without rashes or lesions and well hydrated  Psychiatric:Mood and affect appropriate  Neurologic:Cranial Nerves II-XII grossly intact  Musculoskeletal:Slightly antalgic, but steady gait with the use of a single cane  Imaging  No orders to display         No orders of the defined types were placed in this encounter

## 2018-11-20 RX ORDER — OXYCODONE HYDROCHLORIDE AND ACETAMINOPHEN 5; 325 MG/1; MG/1
TABLET ORAL
Qty: 55 TABLET | Refills: 0 | Status: SHIPPED | OUTPATIENT
Start: 2018-11-20 | End: 2019-01-16 | Stop reason: SDUPTHER

## 2018-11-20 RX ORDER — FENTANYL 37.5 UG/H
PATCH, EXTENDED RELEASE TRANSDERMAL
Qty: 10 PATCH | Refills: 0 | Status: SHIPPED | OUTPATIENT
Start: 2018-11-20 | End: 2019-01-16 | Stop reason: SDUPTHER

## 2018-11-28 ENCOUNTER — DOCUMENTATION (OUTPATIENT)
Dept: PULMONOLOGY | Facility: CLINIC | Age: 63
End: 2018-11-28

## 2018-12-06 ENCOUNTER — OFFICE VISIT (OUTPATIENT)
Dept: SLEEP CENTER | Facility: CLINIC | Age: 63
End: 2018-12-06
Payer: MEDICARE

## 2018-12-06 VITALS
HEIGHT: 63 IN | DIASTOLIC BLOOD PRESSURE: 72 MMHG | WEIGHT: 221 LBS | SYSTOLIC BLOOD PRESSURE: 140 MMHG | BODY MASS INDEX: 39.16 KG/M2

## 2018-12-06 DIAGNOSIS — G47.01 INSOMNIA DUE TO MEDICAL CONDITION: Primary | ICD-10-CM

## 2018-12-06 DIAGNOSIS — G47.33 OSA (OBSTRUCTIVE SLEEP APNEA): ICD-10-CM

## 2018-12-06 PROCEDURE — 99204 OFFICE O/P NEW MOD 45 MIN: CPT | Performed by: INTERNAL MEDICINE

## 2018-12-06 RX ORDER — ARMODAFINIL 150 MG/1
TABLET ORAL
Qty: 30 TABLET | Refills: 2 | Status: SHIPPED | OUTPATIENT
Start: 2018-12-06 | End: 2019-01-16

## 2018-12-06 RX ORDER — AMITRIPTYLINE HYDROCHLORIDE 50 MG/1
TABLET, FILM COATED ORAL
Qty: 60 TABLET | Refills: 2 | Status: SHIPPED | OUTPATIENT
Start: 2018-12-06 | End: 2021-05-11 | Stop reason: HOSPADM

## 2018-12-06 NOTE — PROGRESS NOTES
Consultation - 9 [facility] : 1955  MRN: [id number]      Assessment:  The patient has severe obstructive sleep apnea, but cannot benefit from her CPAP, since she has severe sleep initiation and sleep maintenance insomnia, due to chronic pain  She is not meeting compliance criteria for her insurance  She uses her CPAP nightly, but sleeps for too short of a period  Plan:  I will work on finding an appropriate hypnotic to improve her sleep quality and to allow her to benefit from CPAP  She is extremely anxious about her severe daytime sleepiness, including while driving  I will start her on armodafinil until her sleep quality improves  I underscored the importance of compliance with CPAP  Follow up: Three months    History of Present Illness:   59 y  o female with a history of severe obstructive sleep apnea (AHI = 30 9) diagnosed in 2016  She underwent subsequent CPAP titration to a best pressure of 11 cm  Her sleepiness has worsened over the last year in association with worsening pain  She has a nerve stimulator placed, which is not yet programmed  She has been on a variety of medications including OxyContin, fentanyl patch and gabapentin  She cannot sleep due to the chronic pain  Review of her compliance data demonstrates that she is using her CPAP nightly, but cannot sleep more than two or 3 hr  As a result, she is experiencing excessive daytime sleepiness, feeling drowsy sometimes while walking and speaking with someone in conversation  She is extremely concerned about driving, which she needs to do to take her grandchild from one place to another  She tried melatonin to help improve her sleep quality, but it was ineffective  She was placed on amitriptyline 10 mg, which does not seem to be effective  She is also on duloxetine for unclear reason      Review of Systems:        Genitourinary need to urinate more than twice a night   Cardiology ankle/leg swelling Gastrointestinal frequent heartburn/acid reflux   Neurology frequent headaches, awaken with headache, need to move extremities, muscle weakness, numbness/tingling of an extremity, forgetfulness, poor concentration or confusion, , difficulty with memory, loss of consciousness/blacking out and balance problems   Constitutional fatigue, excessive sweating at night and weight change   Integumentary rash or dry skin and itching   Psychiatry anxiety and depression   Musculoskeletal joint pain, muscle aches, back pain, legs twitching/jerking and leg cramps   Pulmonary shortness of breath with activity, chest tightness, wheezing, frequent cough, snoring and difficulty breathing when lying flat    ENT throat clearing and ringing in ears   Endocrine none   Hematological none           Historical Information    Past Medical History:  Asthma, GERD, chronic back pain, severe STU (AHI = 30 9), periodic limb movement disorder which improved with CPAP, hypertension, hypothyroidism    Past Surgical History:  The patient has had several surgeries on her left knee, which remains a source of chronic pain    Family History: non-contributory      Sleep Schedule: unremarkable    Snoring:    Yes      Witnessed Apnea:    Yes    Medications/Allergies:    Current Outpatient Prescriptions:     atorvastatin (LIPITOR) 80 mg tablet, Take 80 mg by mouth daily at bedtime  , Disp: , Rfl:     cholecalciferol (VITAMIN D3) 1,000 units tablet, Take 2,000 Units by mouth daily with lunch  , Disp: , Rfl: 5    clopidogrel (PLAVIX) 75 mg tablet, Take 1 tablet (75 mg total) by mouth daily (Patient taking differently: Take 75 mg by mouth daily at bedtime  ), Disp: 30 tablet, Rfl: 4    CRANBERRY PO, Take 1 capsule by mouth daily with lunch  , Disp: , Rfl:     Cyanocobalamin (VITAMIN B-12 PO), Take 1 capsule by mouth daily with lunch  , Disp: , Rfl:     Docusate Sodium 100 MG capsule, Take 100 mg by mouth daily with lunch  , Disp: , Rfl:     DULoxetine (CYMBALTA) 60 mg delayed release capsule, Take 2 PO QD, Disp: 180 capsule, Rfl: 0    FentaNYL 37 5 MCG/HR PT72, Apply 1 patch TD every 72 hours as directed  DO NOT FILL UNTIL: 12/21/18, Disp: 10 patch, Rfl: 0    furosemide (LASIX) 20 mg tablet, Take 20 mg by mouth daily in the early morning  , Disp: , Rfl:     levothyroxine 75 mcg tablet, Take 75 mcg by mouth daily in the early morning  , Disp: , Rfl:     Linaclotide (LINZESS) 145 MCG CAPS, Take 1 capsule by mouth daily in the early morning  , Disp: , Rfl:     losartan (COZAAR) 100 MG tablet, Take 100 mg by mouth daily in the early morning  , Disp: , Rfl:     magnesium citrate solution, Take 296 mL by mouth once, Disp: , Rfl:     methylnaltrexone (RELISTOR) 12 mg/0 6 mL subcutaneous injection, Inject under the skin once, Disp: , Rfl:     Naloxone HCl (NARCAN) 4 MG/0 1ML LIQD, Sprays into 1 nostril for suspected opioid overdose  May repeat , Disp: 2 each, Rfl: 0    NOVOLOG 100 UNIT/ML injection, INJECT 120 UNITS DAILY VIA INSULIN PUMP E 11 65, Disp: , Rfl: 3    ondansetron (ZOFRAN) 8 mg tablet, Take 8 mg by mouth every 8 (eight) hours as needed  , Disp: , Rfl:     OXYBUTYNIN CHLORIDE PO, Take 10 mg by mouth daily in the early morning  , Disp: , Rfl:     oxyCODONE-acetaminophen (PERCOCET) 5-325 mg per tablet, Take 1 PO BID PRN FOR PAIN  DO NOT FILL UNTIL: 1/4/19, Disp: 55 tablet, Rfl: 0    pantoprazole (PROTONIX) 40 mg tablet, Take 40 mg by mouth 2 (two) times a day  , Disp: , Rfl:     pramipexole (MIRAPEX) 1 mg tablet, Take 1 mg by mouth 2 (two) times a day  , Disp: , Rfl:     ranitidine (ZANTAC) 150 mg tablet, Take 150 mg by mouth daily at bedtime  , Disp: , Rfl:     SYMBICORT 80-4 5 MCG/ACT inhaler, Inhale 2 puffs 2 (two) times a day  , Disp: , Rfl:     tiZANidine (ZANAFLEX) 4 mg tablet, Take 1 PO TID during the day and 1-2 PO HS for spasms  , Disp: 450 tablet, Rfl: 0    VENTOLIN  (90 Base) MCG/ACT inhaler, 2 puffs every 4 (four) hours as needed  , Disp: , Rfl:     amitriptyline (ELAVIL) 50 mg tablet, Take one tablet p o  at bedtime for two weeks  If insomnia persists, increase to two tablets at bedtime, Disp: 60 tablet, Rfl: 2    Armodafinil 150 MG tablet, 1/2 to 1 p o  Q a m , Disp: 30 tablet, Rfl: 2    glucagon (GLUCAGEN) 1 mg injection, Inject under the skin as needed if passed out from low sugar, Disp: , Rfl:         No notes on file                  Objective:    Vital Signs:   Vitals:    12/06/18 1000   BP: 140/72   Weight: 100 kg (221 lb)   Height: 5' 3" (1 6 m)     Neck Circumference: 45      [place] Sleepiness Scale: Total score: 21    Physical Exam:    General: Alert, appropriate, cooperative, overweight    Head: NC/AT, mild retrognathia    Nose: No septal deviation, nares partially obstructed, mucosa normal    Throat: Airway lumen narrowed, tongue base thickened, no tonsils visualized    Neck: Normal, no thyromegaly or lymphadenopathy, no JVD    Heart: RR, normal S1 and S2, no murmurs    Chest: Clear bilaterally    Extremity: No clubbing, cyanosis, no edema    Skin: Warm, dry    Neuro: No motor abnormalities, cranial nerves appear intact    Sleep Study Results:   Severe STU with AHI = 30 9  There is a mild degree of hypoxia  PAP Pressure: CPAP: 11 cm  DME Provider: [employer]    Counseling / Coordination of Care  Total clinic time spent today 35 minutes  A description of the counseling / coordination of care: We discussed the pathophysiology of obstructive sleep apnea as well as the possible treatment options  We also discussed the rationale for positive airway pressure therapy              Board Certified Sleep Specialist

## 2018-12-07 ENCOUNTER — TELEPHONE (OUTPATIENT)
Dept: SLEEP CENTER | Facility: CLINIC | Age: 63
End: 2018-12-07

## 2018-12-10 NOTE — TELEPHONE ENCOUNTER
Received notification that Armodafinil 150 mg is approved until 12/7/2020  I spoke with [facility] and script did go thru, they have the med coming in today      Pt aware

## 2019-01-03 NOTE — TELEPHONE ENCOUNTER
Notified by [facility] [employer]  Armodafinil not a formulary medication in 2019   New pre authorization started refrence # [id number]

## 2019-01-04 NOTE — TELEPHONE ENCOUNTER
[employer] denied Armodafinil it is a non - formulary drug  Per [employer] Modafinil is preferred   Yariel Andrews please advise

## 2019-01-06 DIAGNOSIS — G47.10 HYPERSOMNIA: Primary | ICD-10-CM

## 2019-01-06 RX ORDER — MODAFINIL 200 MG/1
TABLET ORAL
Qty: 30 TABLET | Refills: 1 | Status: SHIPPED | OUTPATIENT
Start: 2019-01-06 | End: 2019-02-27 | Stop reason: SDUPTHER

## 2019-01-07 ENCOUNTER — TELEPHONE (OUTPATIENT)
Dept: SLEEP CENTER | Facility: CLINIC | Age: 64
End: 2019-01-07

## 2019-01-07 NOTE — TELEPHONE ENCOUNTER
Received notification:  Modafinil Approved, Coverage Starts on: 1/7/2019 12:00:00 AM, Coverage Ends on: 1/6/2021 12:00:00 AM      [facility] pharmacy aware

## 2019-01-16 ENCOUNTER — OFFICE VISIT (OUTPATIENT)
Dept: PAIN MEDICINE | Facility: CLINIC | Age: 64
End: 2019-01-16
Payer: MEDICARE

## 2019-01-16 VITALS
SYSTOLIC BLOOD PRESSURE: 130 MMHG | HEART RATE: 83 BPM | HEIGHT: 63 IN | DIASTOLIC BLOOD PRESSURE: 78 MMHG | WEIGHT: 220 LBS | BODY MASS INDEX: 38.98 KG/M2

## 2019-01-16 DIAGNOSIS — M79.604 CHRONIC PAIN OF BOTH LOWER EXTREMITIES: Chronic | ICD-10-CM

## 2019-01-16 DIAGNOSIS — G89.29 CHRONIC BILATERAL THORACIC BACK PAIN: ICD-10-CM

## 2019-01-16 DIAGNOSIS — M25.561 CHRONIC PAIN OF RIGHT KNEE: ICD-10-CM

## 2019-01-16 DIAGNOSIS — R26.81 GAIT INSTABILITY: ICD-10-CM

## 2019-01-16 DIAGNOSIS — M79.605 PAIN IN BOTH LOWER EXTREMITIES: ICD-10-CM

## 2019-01-16 DIAGNOSIS — M54.6 CHRONIC BILATERAL THORACIC BACK PAIN: ICD-10-CM

## 2019-01-16 DIAGNOSIS — G89.29 CHRONIC SCAPULAR PAIN: ICD-10-CM

## 2019-01-16 DIAGNOSIS — M79.605 CHRONIC PAIN OF BOTH LOWER EXTREMITIES: Chronic | ICD-10-CM

## 2019-01-16 DIAGNOSIS — G89.4 PAIN SYNDROME, CHRONIC: Chronic | ICD-10-CM

## 2019-01-16 DIAGNOSIS — G89.29 CHRONIC PAIN OF LEFT KNEE: ICD-10-CM

## 2019-01-16 DIAGNOSIS — M25.562 CHRONIC PAIN OF LEFT KNEE: ICD-10-CM

## 2019-01-16 DIAGNOSIS — G57.11 MERALGIA PARAESTHETICA, RIGHT: ICD-10-CM

## 2019-01-16 DIAGNOSIS — M54.42 CHRONIC BILATERAL LOW BACK PAIN WITH BILATERAL SCIATICA: ICD-10-CM

## 2019-01-16 DIAGNOSIS — M79.604 PAIN IN BOTH LOWER EXTREMITIES: ICD-10-CM

## 2019-01-16 DIAGNOSIS — M89.8X1 CHRONIC SCAPULAR PAIN: ICD-10-CM

## 2019-01-16 DIAGNOSIS — G90.522 COMPLEX REGIONAL PAIN SYNDROME TYPE 1 OF LEFT LOWER EXTREMITY: Primary | Chronic | ICD-10-CM

## 2019-01-16 DIAGNOSIS — G89.29 CHRONIC PAIN OF BOTH LOWER EXTREMITIES: Chronic | ICD-10-CM

## 2019-01-16 DIAGNOSIS — G89.29 CHRONIC BILATERAL LOW BACK PAIN WITH BILATERAL SCIATICA: ICD-10-CM

## 2019-01-16 DIAGNOSIS — G89.29 CHRONIC PAIN OF RIGHT KNEE: ICD-10-CM

## 2019-01-16 DIAGNOSIS — M79.18 MYOFASCIAL PAIN SYNDROME: ICD-10-CM

## 2019-01-16 DIAGNOSIS — F11.20 UNCOMPLICATED OPIOID DEPENDENCE (HCC): ICD-10-CM

## 2019-01-16 DIAGNOSIS — R26.9 GAIT DISORDER: ICD-10-CM

## 2019-01-16 DIAGNOSIS — M54.41 CHRONIC BILATERAL LOW BACK PAIN WITH BILATERAL SCIATICA: ICD-10-CM

## 2019-01-16 DIAGNOSIS — G63 POLYNEUROPATHY ASSOCIATED WITH UNDERLYING DISEASE (HCC): Chronic | ICD-10-CM

## 2019-01-16 PROBLEM — S52.501A CLOSED FRACTURE OF RIGHT DISTAL RADIUS: Status: ACTIVE | Noted: 2019-01-09

## 2019-01-16 PROBLEM — E11.65 UNCONTROLLED TYPE 2 DIABETES MELLITUS WITH HYPERGLYCEMIA (HCC): Status: ACTIVE | Noted: 2018-12-31

## 2019-01-16 PROCEDURE — 99214 OFFICE O/P EST MOD 30 MIN: CPT | Performed by: NURSE PRACTITIONER

## 2019-01-16 RX ORDER — TIZANIDINE 4 MG/1
TABLET ORAL
Qty: 450 TABLET | Refills: 0 | Status: SHIPPED | OUTPATIENT
Start: 2019-01-16 | End: 2019-06-27 | Stop reason: SDUPTHER

## 2019-01-16 RX ORDER — OXYCODONE HYDROCHLORIDE AND ACETAMINOPHEN 5; 325 MG/1; MG/1
TABLET ORAL
Qty: 55 TABLET | Refills: 0 | Status: SHIPPED | OUTPATIENT
Start: 2019-01-16 | End: 2019-01-17 | Stop reason: SDUPTHER

## 2019-01-16 RX ORDER — FENTANYL 37.5 UG/H
PATCH, EXTENDED RELEASE TRANSDERMAL
Qty: 10 PATCH | Refills: 0 | Status: SHIPPED | OUTPATIENT
Start: 2019-01-16 | End: 2019-01-17 | Stop reason: SDUPTHER

## 2019-01-16 RX ORDER — DULOXETIN HYDROCHLORIDE 60 MG/1
CAPSULE, DELAYED RELEASE ORAL
Qty: 180 CAPSULE | Refills: 0 | Status: SHIPPED | OUTPATIENT
Start: 2019-01-16 | End: 2019-11-25 | Stop reason: SDUPTHER

## 2019-01-16 NOTE — PROGRESS NOTES
Assessment:  1  Complex regional pain syndrome type 1 of left lower extremity    2  Chronic bilateral low back pain with bilateral sciatica    3  Meralgia paraesthetica, right    4  Chronic bilateral thoracic back pain    5  Chronic pain of both lower extremities    6  Chronic pain of left knee    7  Chronic pain of right knee    8  Chronic scapular pain    9  Gait disorder    10  Gait instability    11  Pain in both lower extremities    12  Pain syndrome, chronic    13  Uncomplicated opioid dependence ([facility] [address] )    14  Myofascial pain syndrome    15  Polyneuropathy associated with underlying disease ([facility] )        Plan:  While the patient was in the office today, I did have a thorough conversation with the patient regarding her medication regimen and treatment plan  I explained to the patient that at this point time we would like her to continue to use the spinal cord stimulator and follow-up with the [address] [place] [address] team for any reprogramming needs in the future  I did advise the patient that I would make sure that they were at her next office visit in 3 months  The patient was agreeable and verbalized an understanding  With regards to her medication regimen, I explained to the patient that at this point since we have recently decrease her medications and she is stable, for the next 2 months we will continue the medication regimen as prescribed  However, for the 3rd month we are going to decrease the fentanyl patch down to the 25 mcg dosage and temporarily increase the Percocet to t i d  dosing dispense number 80 pills for 30 days  We will continue the Cymbalta and tizanidine as prescribed  Then at her next office visit our plan would be to continue to slowly titrate the p r n  Percocet back down to the b i d  dosing over time  I advised the patient that if they experience any side effects or issues with the changes in their medication regiment, they should give our office a call to discuss   I also advised the patient not to drive or operate machinery until they see how the changes in the medication regimen affects them  The patient was agreeable and verbalized an understanding  [place] Prescription Drug Monitoring Program report was reviewed and was appropriate     The patient was selected for a random pill count at todays office visit  The medication was available for the count and the amount present was found to be appropriate according to the date(s) filled and the medication prescription instructions noted on the prescription container  The medication was returned to the patient and the documentation form can be found in the patient's chart  The patient's opioid scripts were sent to their pharmacy electronically and was given a 3 month supply of prescriptions with a Do Not Fill date(s) of February 1, 2019, March 1, 2019, and March 29, 2019 for the [facility] and February 13, 2019 and March 13, 2019 for the fentanyl patches  There are risks associated with opioid medications, including dependence, addiction and tolerance  The patient understands and agrees to use these medications only as prescribed  Potential side effects of the medications include, but are not limited to, constipation, drowsiness, addiction, impaired judgment and risk of fatal overdose if not taken as prescribed  The patient was warned against driving while taking sedation medications  Sharing medications is a felony  At this point in time, the patient is showing no signs of addiction, abuse, diversion or suicidal ideation  An oral drug screen swab was collected at today's office visit  The swab will be sent for confirmatory testing  The drug screen is medically necessary because the patient is either dependent on opioid medication or is being considered for opioid medication therapy and the results could impact ongoing or future treatment   The drug screen is to evaluate for the presences or absence of prescribed, non-prescribed, and/or illicit drugs/substances  The patient will follow-up in 12 weeks for medication prescription refill and reevaluation  The patient was advised to contact the office should their symptoms worsen in the interim  The patient was agreeable and verbalized an understanding  History of Present Illness: The patient is a 59 y o  female last seen on 11/19/18 who presents for a follow up office visit in regards to chronic pain secondary to chronic regional pain syndrome of the left lower extremity as well as bilateral lower extremity neuropathy  The patient currently reports that since her last office visit she does feel that with the changes to her spinal cord stimulator and reprogramming there has been some improvement in her pain symptoms, despite the fact that we have continued to slowly and steadily titrate down on her opioid medications  It is still the patient's goal to try to eventually titrate completely off of opioid medications if possible  However, she understands that has to be a slow and steady process and feels that she is currently relatively comfortable and is insure she is ready right now to decrease the medication but would like to discuss  Current pain medications includes:  Fentanyl patches 37 5 mcg applying 1 patch transdermally every 72 hr, Percocet 5/3251 p o  b i d  p r n  for pain dispense number 55 pills for 30 days, Cymbalta 120 mg daily, and tizanidine 4 mg t i d  during the day and 2 pills at bedtime  The patient reports that this regimen is providing 90% pain relief  The patient is reporting no side effects from this pain medication regimen  Pain Contract Signed: 7/20/18  Last Urine Drug Screen: 1/16/19    I have personally reviewed and/or updated the patient's past medical history, past surgical history, family history, social history, current medications, allergies, and vital signs today         Review of Systems:    Review of Systems   Respiratory: Positive for shortness of breath  Cardiovascular: Negative for chest pain  Gastrointestinal: Positive for constipation and nausea  Negative for diarrhea and vomiting  Musculoskeletal: Positive for gait problem and joint swelling (joint stiffness)  Negative for arthralgias and myalgias  Skin: Negative for rash  Neurological: Positive for dizziness and weakness  Negative for seizures  All other systems reviewed and are negative  Past Medical History:   Diagnosis Date    Anxiety     Asthma     controlled    Back complaints     Cancer ([place] [address] )     nose    Cellulitis of left lower leg     Chronic bilateral thoracic back pain     Chronic myofascial pain     Chronic pain of both lower extremities     Chronic pain of left knee     Chronic pain syndrome     CPAP (continuous positive airway pressure) dependence     Depression     Diabetes mellitus ([facility] )     Disease of thyroid gland     Gait disorder     Gastroparesis     GERD (gastroesophageal reflux disease)     History of angina     History of transfusion     Many years ago    Hyperlipidemia     Hypertension     Insulin pump in place     Kidney disease     Polyneuropathy associated with underlying disease ([place] [address] )     PONV (postoperative nausea and vomiting)     S/P insertion of spinal cord stimulator 2018    Sarcoidosis     Sleep apnea     TIA (transient ischemic attack)        Past Surgical History:   Procedure Laterality Date    BREAST SURGERY      BREAST SURGERY      reduction     SECTION      HERNIA REPAIR      HYSTERECTOMY      JOINT REPLACEMENT Left     NECK SURGERY      RI SURG IMPLNT [address] Left 2018    Procedure:  Insertion of thoracic spinal cord stimulator electrode via laminotomy and placement of left buttock IPG;  Surgeon: Ania Vincent MD;  [facility];  Service: Neurosurgery    REPLACEMENT TOTAL KNEE      ROTATOR CUFF REPAIR      SPINAL STIMULATOR PLACEMENT Left 10/3/2018    Procedure: BUTTOCK RE-OPENING INCISION FOR REPOSITIONING OF IMPLANTABLE PULSE GENERATOR;  Surgeon: Ania Vincent MD;  [facility];  Service: Neurosurgery    TONSILLECTOMY      WISDOM TOOTH EXTRACTION         Family History   Problem Relation Age of Onset    Diabetes Family     Heart disease Family     Hypertension Family     Neuropathy Family     No Known Problems Mother     Heart disease Father     Diabetes Father     No Known Problems Maternal Grandmother     No Known Problems Maternal Grandfather     No Known Problems Paternal Grandmother     No Known Problems Paternal Grandfather        Social History     Occupational History    Not on file  Social History Main Topics    Smoking status: Never Smoker    Smokeless tobacco: Never Used    Alcohol use No    Drug use: No    Sexual activity: Yes         Current Outpatient Prescriptions:     albuterol (ACCUNEB) 0 63 MG/3ML nebulizer solution, Inhale 0 63 mg every 6 (six) hours as needed, Disp: , Rfl:     amitriptyline (ELAVIL) 50 mg tablet, Take one tablet p o  at bedtime for two weeks    If insomnia persists, increase to two tablets at bedtime, Disp: 60 tablet, Rfl: 2    atorvastatin (LIPITOR) 80 mg tablet, Take 80 mg by mouth daily at bedtime  , Disp: , Rfl:     cholecalciferol (VITAMIN D3) 1,000 units tablet, Take 2,000 Units by mouth daily with lunch  , Disp: , Rfl: 5    clopidogrel (PLAVIX) 75 mg tablet, Take 1 tablet (75 mg total) by mouth daily (Patient taking differently: Take 75 mg by mouth daily at bedtime  ), Disp: 30 tablet, Rfl: 4    CRANBERRY PO, Take 1 capsule by mouth daily with lunch  , Disp: , Rfl:     Cyanocobalamin (VITAMIN B-12 PO), Take 1 capsule by mouth daily with lunch  , Disp: , Rfl:     Docusate Sodium 100 MG capsule, Take 100 mg by mouth daily with lunch  , Disp: , Rfl:     DULoxetine (CYMBALTA) 60 mg delayed release capsule, Take 2 PO QD, Disp: 180 capsule, Rfl: 0    FentaNYL 37 5 MCG/HR PT72, Apply 1 patch TD every 72 hours as directed , Disp: 10 patch, Rfl: 0    fluticasone-salmeterol (ADVAIR) 100-50 mcg/dose inhaler, Inhale 2 puffs, Disp: , Rfl:     furosemide (LASIX) 20 mg tablet, Take 20 mg by mouth daily in the early morning  , Disp: , Rfl:     glucagon (GLUCAGEN) 1 mg injection, Inject under the skin as needed if passed out from low sugar, Disp: , Rfl:     glucagon 1 MG injection, Glucagon Emergency Kit (human-recomb) 1 mg solution for injection, Disp: , Rfl:     glucose blood test strip, Testing six times daily  E11 65 on insulin pump, Disp: , Rfl:     hyoscyamine (ANASPAZ,LEVSIN) 0 125 MG tablet, , Disp: , Rfl:     levothyroxine 75 mcg tablet, Take 75 mcg by mouth daily in the early morning  , Disp: , Rfl:     Linaclotide (LINZESS) 145 MCG CAPS, Take 1 capsule by mouth daily in the early morning  , Disp: , Rfl:     losartan (COZAAR) 100 MG tablet, Take 100 mg by mouth daily in the early morning  , Disp: , Rfl:     magnesium citrate solution, Take 296 mL by mouth once, Disp: , Rfl:     magnesium oxide (MAG-OX) 400 mg tablet, Take 400 mg by mouth, Disp: , Rfl:     modafinil (PROVIGIL) 200 MG tablet, Take 1/2 to 1 tablet every morning as needed  , Disp: 30 tablet, Rfl: 1    NOVOLOG 100 UNIT/ML injection, INJECT 120 UNITS DAILY VIA INSULIN PUMP E 11 65, Disp: , Rfl: 3    ondansetron (ZOFRAN) 8 mg tablet, Take 8 mg by mouth every 8 (eight) hours as needed  , Disp: , Rfl:     OXYBUTYNIN CHLORIDE PO, Take 10 mg by mouth daily in the early morning  , Disp: , Rfl:     oxyCODONE-acetaminophen (PERCOCET) 5-325 mg per tablet, Take 1 PO BID PRN FOR PAIN   DO NOT FILL UNTIL: 2/1/19, Disp: 55 tablet, Rfl: 0    pantoprazole (PROTONIX) 40 mg tablet, Take 40 mg by mouth 2 (two) times a day  , Disp: , Rfl:     pramipexole (MIRAPEX) 1 mg tablet, Take 1 mg by mouth 2 (two) times a day  , Disp: , Rfl:     ranitidine (ZANTAC) 150 mg tablet, Take 150 mg by mouth daily at bedtime  , Disp: , Rfl:   SYMBICORT 80-4 5 MCG/ACT inhaler, Inhale 2 puffs 2 (two) times a day  , Disp: , Rfl:     tiZANidine (ZANAFLEX) 4 mg tablet, Take 1 PO TID during the day and 1-2 PO HS for spasms  , Disp: 450 tablet, Rfl: 0    VENTOLIN  (90 Base) MCG/ACT inhaler, 2 puffs every 4 (four) hours as needed  , Disp: , Rfl:     Naloxone HCl (NARCAN) 4 MG/0 1ML LIQD, Sprays into 1 nostril for suspected opioid overdose  May repeat , Disp: 2 each, Rfl: 0    Allergies   Allergen Reactions    Bactrim [Sulfamethoxazole-Trimethoprim] Hives    Sucralfate      Facial swelling    Topamax [Topiramate]      disorentation    Azithromycin Itching    Lyrica [Pregabalin] Confusion    Baclofen      "That knocks me out "    Bupropion Fatigue    Methotrexate Nausea Only    Neurontin [Gabapentin] Hallucinations and Hypertension       Physical Exam:    /78   Pulse 83   Ht 5' 3" (1 6 m)   Wt 99 8 kg (220 lb)   BMI 38 97 kg/m²     Constitutional:normal, well developed, well nourished, alert, in no distress and non-toxic and no overt pain behavior  and overweight  Eyes:anicteric  HEENT:grossly intact  Neck:supple, symmetric, trachea midline and no masses   Pulmonary:even and unlabored  Cardiovascular:No edema or pitting edema present  Skin:Normal without rashes or lesions and well hydrated  Psychiatric:Mood and affect appropriate  Neurologic:Cranial Nerves II-XII grossly intact  Musculoskeletal:Slightly antalgic, but steady gait with the use of a single cane  Imaging  No orders to display         No orders of the defined types were placed in this encounter

## 2019-01-17 RX ORDER — FENTANYL 37.5 UG/H
PATCH, EXTENDED RELEASE TRANSDERMAL
Qty: 10 PATCH | Refills: 0 | Status: SHIPPED | OUTPATIENT
Start: 2019-01-17 | End: 2019-02-25 | Stop reason: SDUPTHER

## 2019-01-17 RX ORDER — OXYCODONE HYDROCHLORIDE AND ACETAMINOPHEN 5; 325 MG/1; MG/1
TABLET ORAL
Qty: 50 TABLET | Refills: 0 | Status: SHIPPED | OUTPATIENT
Start: 2019-01-17 | End: 2019-01-17 | Stop reason: SDUPTHER

## 2019-01-17 RX ORDER — FENTANYL 25 UG/H
PATCH TRANSDERMAL
Qty: 10 PATCH | Refills: 0 | Status: SHIPPED | OUTPATIENT
Start: 2019-01-17 | End: 2019-02-25 | Stop reason: SDUPTHER

## 2019-01-17 RX ORDER — OXYCODONE HYDROCHLORIDE AND ACETAMINOPHEN 5; 325 MG/1; MG/1
TABLET ORAL
Qty: 85 TABLET | Refills: 0 | Status: SHIPPED | OUTPATIENT
Start: 2019-01-17 | End: 2019-06-27 | Stop reason: SDUPTHER

## 2019-01-25 ENCOUNTER — OFFICE VISIT (OUTPATIENT)
Dept: NEUROSURGERY | Facility: MEDICAL CENTER | Age: 64
End: 2019-01-25
Payer: MEDICARE

## 2019-01-25 VITALS
HEIGHT: 63 IN | BODY MASS INDEX: 38.55 KG/M2 | WEIGHT: 217.6 LBS | TEMPERATURE: 100.1 F | HEART RATE: 89 BPM | SYSTOLIC BLOOD PRESSURE: 186 MMHG | RESPIRATION RATE: 18 BRPM | DIASTOLIC BLOOD PRESSURE: 94 MMHG

## 2019-01-25 DIAGNOSIS — Z96.89 S/P INSERTION OF SPINAL CORD STIMULATOR: Primary | ICD-10-CM

## 2019-01-25 PROBLEM — T85.192A MALFUNCTION OF SPINAL CORD STIMULATOR (HCC): Status: RESOLVED | Noted: 2018-08-02 | Resolved: 2019-01-25

## 2019-01-25 PROCEDURE — 99212 OFFICE O/P EST SF 10 MIN: CPT | Performed by: NEUROLOGICAL SURGERY

## 2019-01-25 NOTE — PROGRESS NOTES
Office Note - Neurosurgery   Bryan Taylor 59 y o  female MRN: [id number]      Assessment:    Patient is gradually improving  72-year-old woman post insertion of Abbott spinal cord stimulator system and revision of left buttock IPG positioning  She denies any significant pain over the IPG  Her incision is well healed  She will continue to work with the [employer] with respect to programming of the spinal cord stimulator system to improved coverage of the lower extremities  She will follow up through this office on a p r n  Basis  History, physical examination and diagnostic tests were reviewed and questions answered  Diagnosis, care plan and treatment options were discussed  The patient understand instructions and will follow up as directed  Plan:    Follow-up: prn    Problem List Items Addressed This Visit        Other    S/P insertion of spinal cord stimulator - Primary          Subjective/Objective     Chief Complaint    None  HPI    Patient notes complete resolution of the tenderness over the left IPG site after repositioning in the OR  She continues to have good coverage of the lower back and right lower extremity but hopes to have improved coverage in the left leg  ROS  LINWOOD personally reviewed  Review of Systems   Constitutional: Positive for fatigue  HENT: Negative  Eyes: Negative  Respiratory: Negative  Cardiovascular: Negative  Gastrointestinal: Negative  Endocrine: Negative  Genitourinary: Negative  Musculoskeletal: Negative for back pain (left leg )  Skin: Negative  Allergic/Immunologic: Negative  Neurological: Positive for numbness (left leg )  Negative for dizziness, seizures, syncope, weakness and headaches  Hematological: Bruises/bleeds easily (patient on ASA 81)  Psychiatric/Behavioral: Negative for confusion and sleep disturbance         Family History    Family History   Problem Relation Age of Onset    Diabetes Family     Heart disease Family     Hypertension Family     Neuropathy Family     No Known Problems Mother     Heart disease Father     Diabetes Father     No Known Problems Maternal Grandmother     No Known Problems Maternal Grandfather     No Known Problems Paternal Grandmother     No Known Problems Paternal Grandfather        Social History    Social History     Social History    Marital status: /Civil Union     Spouse name: N/A    Number of children: N/A    Years of education: N/A     Occupational History    Not on file       Social History Main Topics    Smoking status: Never Smoker    Smokeless tobacco: Never Used    Alcohol use No    Drug use: No    Sexual activity: Yes     Other Topics Concern    Not on file     Social History Narrative    No narrative on file       Past Medical History    Past Medical History:   Diagnosis Date    Anxiety     Asthma     controlled    Back complaints     Cancer ([place] [address] )     nose    Cellulitis of left lower leg     Chronic bilateral thoracic back pain     Chronic myofascial pain     Chronic pain of both lower extremities     Chronic pain of left knee     Chronic pain syndrome     CPAP (continuous positive airway pressure) dependence     Depression     Diabetes mellitus ([facility] )     Disease of thyroid gland     Gait disorder     Gastroparesis     GERD (gastroesophageal reflux disease)     History of angina     History of transfusion     Many years ago    Hyperlipidemia     Hypertension     Insulin pump in place     Kidney disease     Polyneuropathy associated with underlying disease ([facility] )     PONV (postoperative nausea and vomiting)     S/P insertion of spinal cord stimulator 2018    Sarcoidosis     Sleep apnea     TIA (transient ischemic attack)        Surgical History    Past Surgical History:   Procedure Laterality Date    BREAST SURGERY      BREAST SURGERY      reduction     SECTION      HERNIA REPAIR      HYSTERECTOMY  JOINT REPLACEMENT Left     NECK SURGERY      CT SURG IMPLNT [address] Left 4/23/2018    Procedure: Insertion of thoracic spinal cord stimulator electrode via laminotomy and placement of left buttock IPG;  Surgeon: Baldo Galvan MD;  [facility];  Service: Neurosurgery    REPLACEMENT TOTAL KNEE      ROTATOR CUFF REPAIR      SPINAL STIMULATOR PLACEMENT Left 10/3/2018    Procedure: BUTTOCK RE-OPENING INCISION FOR REPOSITIONING OF IMPLANTABLE PULSE GENERATOR;  Surgeon: Baldo Galvan MD;  [facility];  Service: Neurosurgery    TONSILLECTOMY      WISDOM TOOTH EXTRACTION         Medications      Current Outpatient Prescriptions:     albuterol (ACCUNEB) 0 63 MG/3ML nebulizer solution, Inhale 0 63 mg every 6 (six) hours as needed, Disp: , Rfl:     amitriptyline (ELAVIL) 50 mg tablet, Take one tablet p o  at bedtime for two weeks  If insomnia persists, increase to two tablets at bedtime, Disp: 60 tablet, Rfl: 2    atorvastatin (LIPITOR) 80 mg tablet, Take 80 mg by mouth daily at bedtime  , Disp: , Rfl:     cholecalciferol (VITAMIN D3) 1,000 units tablet, Take 2,000 Units by mouth daily with lunch  , Disp: , Rfl: 5    clopidogrel (PLAVIX) 75 mg tablet, Take 1 tablet (75 mg total) by mouth daily (Patient taking differently: Take 75 mg by mouth daily at bedtime  ), Disp: 30 tablet, Rfl: 4    CRANBERRY PO, Take 1 capsule by mouth daily with lunch  , Disp: , Rfl:     Cyanocobalamin (VITAMIN B-12 PO), Take 1 capsule by mouth daily with lunch  , Disp: , Rfl:     Docusate Sodium 100 MG capsule, Take 100 mg by mouth daily with lunch  , Disp: , Rfl:     DULoxetine (CYMBALTA) 60 mg delayed release capsule, Take 2 PO QD, Disp: 180 capsule, Rfl: 0    fentaNYL (DURAGESIC) 25 mcg/hr, Apply 1 patch TD every 72 hours  DO NOT FILL UNTIL: 3/13/19, Disp: 10 patch, Rfl: 0    FentaNYL 37 5 MCG/HR PT72, Apply 1 patch TD every 72 hours as directed   DO NOT FILL UNTIL: 2/13/19, Disp: 10 patch, Rfl: 0    fluticasone-salmeterol (ADVAIR) 100-50 mcg/dose inhaler, Inhale 2 puffs, Disp: , Rfl:     furosemide (LASIX) 20 mg tablet, Take 20 mg by mouth daily in the early morning  , Disp: , Rfl:     glucagon 1 MG injection, Glucagon Emergency Kit (human-recomb) 1 mg solution for injection, Disp: , Rfl:     glucose blood test strip, Testing six times daily  E11 65 on insulin pump, Disp: , Rfl:     levothyroxine 75 mcg tablet, Take 75 mcg by mouth daily in the early morning  , Disp: , Rfl:     Linaclotide (LINZESS) 145 MCG CAPS, Take 1 capsule by mouth daily in the early morning  , Disp: , Rfl:     losartan (COZAAR) 100 MG tablet, Take 100 mg by mouth daily in the early morning  , Disp: , Rfl:     magnesium oxide (MAG-OX) 400 mg tablet, Take 400 mg by mouth, Disp: , Rfl:     modafinil (PROVIGIL) 200 MG tablet, Take 1/2 to 1 tablet every morning as needed  , Disp: 30 tablet, Rfl: 1    Naloxone HCl (NARCAN) 4 MG/0 1ML LIQD, Sprays into 1 nostril for suspected opioid overdose  May repeat , Disp: 2 each, Rfl: 0    NOVOLOG 100 UNIT/ML injection, INJECT 120 UNITS DAILY VIA INSULIN PUMP E 11 65, Disp: , Rfl: 3    ondansetron (ZOFRAN) 8 mg tablet, Take 8 mg by mouth every 8 (eight) hours as needed  , Disp: , Rfl:     OXYBUTYNIN CHLORIDE PO, Take 10 mg by mouth daily in the early morning  , Disp: , Rfl:     oxyCODONE-acetaminophen (PERCOCET) 5-325 mg per tablet, Take 1 PO TID PRN FOR PAIN  DO NOT FILL UNTIL: 3/29/19, Disp: 85 tablet, Rfl: 0    pantoprazole (PROTONIX) 40 mg tablet, Take 40 mg by mouth 2 (two) times a day  , Disp: , Rfl:     pramipexole (MIRAPEX) 1 mg tablet, Take 1 mg by mouth 2 (two) times a day  , Disp: , Rfl:     ranitidine (ZANTAC) 150 mg tablet, Take 150 mg by mouth daily at bedtime  , Disp: , Rfl:     SYMBICORT 80-4 5 MCG/ACT inhaler, Inhale 2 puffs 2 (two) times a day  , Disp: , Rfl:     tiZANidine (ZANAFLEX) 4 mg tablet, Take 1 PO TID during the day and 1-2 PO HS for spasms  , Disp: 450 tablet, Rfl: 0    VENTOLIN  (90 Base) MCG/ACT inhaler, 2 puffs every 4 (four) hours as needed  , Disp: , Rfl:     Allergies    Allergies   Allergen Reactions    Bactrim [Sulfamethoxazole-Trimethoprim] Hives    Sucralfate      Facial swelling    Topamax [Topiramate]      disorentation    Azithromycin Itching    Lyrica [Pregabalin] Confusion    Baclofen      "That knocks me out "    Bupropion Fatigue    Methotrexate Nausea Only    Neurontin [Gabapentin] Hallucinations and Hypertension       The following portions of the patient's history were reviewed and updated as appropriate: allergies, current medications, past family history, past medical history, past social history, past surgical history and problem list     Physical Exam    Vitals:  Blood pressure (!) 186/94, pulse 89, temperature 100 1 °F (37 8 °C), resp  rate 18, height 5' 3" (1 6 m), weight 98 7 kg (217 lb 9 6 oz)  ,Body mass index is 38 55 kg/m²  Physical Exam   Constitutional: She appears well-developed and well-nourished  No distress  Skin: Skin is warm and dry  Left buttock incision well healed  No tenderness to palpation issue  No redness or swelling  Psychiatric: She has a normal mood and affect  Her behavior is normal    Vitals reviewed      Neurologic Exam

## 2019-01-25 NOTE — LETTER
January 25, 2019     Eugenedoyle Martínez DO  [address] [facility] [facility]  [place] [address] [address]  [id number]    Patient: Mary Rinaldi   YOB: 1955   Date of Visit: 1/25/2019       Dear Dr Zonia Penn: Thank you for referring Leon Winston to me for evaluation  Below are my notes for this consultation  If you have questions, please do not hesitate to call me  I look forward to following your patient along with you  Sincerely,        Kilo Manriquez MD        CC: No Recipients  Kilo Manriquez MD  1/25/2019 11:13 AM  Sign at close encounter  Office Note - Neurosurgery   Mary Rinaldi 59 y o  female MRN: [id number]      Assessment:    Patient is gradually improving  18-year-old woman post insertion of Abbott spinal cord stimulator system and revision of left buttock IPG positioning  She denies any significant pain over the IPG  Her incision is well healed  She will continue to work with the [employer] with respect to programming of the spinal cord stimulator system to improved coverage of the lower extremities  She will follow up through this office on a p r n  Basis  History, physical examination and diagnostic tests were reviewed and questions answered  Diagnosis, care plan and treatment options were discussed  The patient understand instructions and will follow up as directed  Plan:    Follow-up: prn    Problem List Items Addressed This Visit        Other    S/P insertion of spinal cord stimulator - Primary          Subjective/Objective     Chief Complaint    None  HPI    Patient notes complete resolution of the tenderness over the left IPG site after repositioning in the OR  She continues to have good coverage of the lower back and right lower extremity but hopes to have improved coverage in the left leg  LINWOOD PALUMBO personally reviewed  Review of Systems   Constitutional: Positive for fatigue  HENT: Negative  Eyes: Negative  Respiratory: Negative  Cardiovascular: Negative  Gastrointestinal: Negative  Endocrine: Negative  Genitourinary: Negative  Musculoskeletal: Negative for back pain (left leg )  Skin: Negative  Allergic/Immunologic: Negative  Neurological: Positive for numbness (left leg )  Negative for dizziness, seizures, syncope, weakness and headaches  Hematological: Bruises/bleeds easily (patient on ASA 81)  Psychiatric/Behavioral: Negative for confusion and sleep disturbance  Family History    Family History   Problem Relation Age of Onset    Diabetes Family     Heart disease Family     Hypertension Family     Neuropathy Family     No Known Problems Mother     Heart disease Father     Diabetes Father     No Known Problems Maternal Grandmother     No Known Problems Maternal Grandfather     No Known Problems Paternal Grandmother     No Known Problems Paternal Grandfather        Social History    Social History     Social History    Marital status: /Civil Union     Spouse name: N/A    Number of children: N/A    Years of education: N/A     Occupational History    Not on file       Social History Main Topics    Smoking status: Never Smoker    Smokeless tobacco: Never Used    Alcohol use No    Drug use: No    Sexual activity: Yes     Other Topics Concern    Not on file     Social History Narrative    No narrative on file       Past Medical History    Past Medical History:   Diagnosis Date    Anxiety     Asthma     controlled    Back complaints     Cancer ([facility] )     nose    Cellulitis of left lower leg     Chronic bilateral thoracic back pain     Chronic myofascial pain     Chronic pain of both lower extremities     Chronic pain of left knee     Chronic pain syndrome     CPAP (continuous positive airway pressure) dependence     Depression     [facility] mellitus [facility] )     Disease of thyroid gland     Gait disorder     Gastroparesis     GERD (gastroesophageal reflux disease)     History of angina     History of transfusion     Many years ago    Hyperlipidemia     Hypertension     Insulin pump in place     Kidney disease     Polyneuropathy associated with underlying disease ([facility] )     PONV (postoperative nausea and vomiting)     S/P insertion of spinal cord stimulator 2018    Sarcoidosis     Sleep apnea     TIA (transient ischemic attack)        Surgical History    Past Surgical History:   Procedure Laterality Date    BREAST SURGERY      BREAST SURGERY      reduction     SECTION      HERNIA REPAIR      HYSTERECTOMY      JOINT REPLACEMENT Left     NECK SURGERY      ND SURG IMPLNT [address] Left 2018    Procedure: Insertion of thoracic spinal cord stimulator electrode via laminotomy and placement of left buttock IPG;  Surgeon: Ino Stuart MD;  [facility];  Service: Neurosurgery    REPLACEMENT TOTAL KNEE      ROTATOR CUFF REPAIR      SPINAL STIMULATOR PLACEMENT Left 10/3/2018    Procedure: BUTTOCK RE-OPENING INCISION FOR REPOSITIONING OF IMPLANTABLE PULSE GENERATOR;  Surgeon: Ino Stuart MD;  [facility];  Service: Neurosurgery    TONSILLECTOMY      WISDOM TOOTH EXTRACTION         Medications      Current Outpatient Prescriptions:     albuterol (ACCUNEB) 0 63 MG/3ML nebulizer solution, Inhale 0 63 mg every 6 (six) hours as needed, Disp: , Rfl:     amitriptyline (ELAVIL) 50 mg tablet, Take one tablet p o  at bedtime for two weeks    If insomnia persists, increase to two tablets at bedtime, Disp: 60 tablet, Rfl: 2    atorvastatin (LIPITOR) 80 mg tablet, Take 80 mg by mouth daily at bedtime  , Disp: , Rfl:     cholecalciferol (VITAMIN D3) 1,000 units tablet, Take 2,000 Units by mouth daily with lunch  , Disp: , Rfl: 5    clopidogrel (PLAVIX) 75 mg tablet, Take 1 tablet (75 mg total) by mouth daily (Patient taking differently: Take 75 mg by mouth daily at bedtime  ), Disp: 30 tablet, Rfl: 4    CRANBERRY PO, Take 1 capsule by mouth daily with lunch  , Disp: , Rfl:     Cyanocobalamin (VITAMIN B-12 PO), Take 1 capsule by mouth daily with lunch  , Disp: , Rfl:     Docusate Sodium 100 MG capsule, Take 100 mg by mouth daily with lunch  , Disp: , Rfl:     DULoxetine (CYMBALTA) 60 mg delayed release capsule, Take 2 PO QD, Disp: 180 capsule, Rfl: 0    fentaNYL (DURAGESIC) 25 mcg/hr, Apply 1 patch TD every 72 hours  DO NOT FILL UNTIL: 3/13/19, Disp: 10 patch, Rfl: 0    FentaNYL 37 5 MCG/HR PT72, Apply 1 patch TD every 72 hours as directed  DO NOT FILL UNTIL: 2/13/19, Disp: 10 patch, Rfl: 0    fluticasone-salmeterol (ADVAIR) 100-50 mcg/dose inhaler, Inhale 2 puffs, Disp: , Rfl:     furosemide (LASIX) 20 mg tablet, Take 20 mg by mouth daily in the early morning  , Disp: , Rfl:     glucagon 1 MG injection, Glucagon Emergency Kit (human-recomb) 1 mg solution for injection, Disp: , Rfl:     glucose blood test strip, Testing six times daily  E11 65 on insulin pump, Disp: , Rfl:     levothyroxine 75 mcg tablet, Take 75 mcg by mouth daily in the early morning  , Disp: , Rfl:     Linaclotide (LINZESS) 145 MCG CAPS, Take 1 capsule by mouth daily in the early morning  , Disp: , Rfl:     losartan (COZAAR) 100 MG tablet, Take 100 mg by mouth daily in the early morning  , Disp: , Rfl:     magnesium oxide (MAG-OX) 400 mg tablet, Take 400 mg by mouth, Disp: , Rfl:     modafinil (PROVIGIL) 200 MG tablet, Take 1/2 to 1 tablet every morning as needed  , Disp: 30 tablet, Rfl: 1    Naloxone HCl (NARCAN) 4 MG/0 1ML LIQD, Sprays into 1 nostril for suspected opioid overdose   May repeat , Disp: 2 each, Rfl: 0    NOVOLOG 100 UNIT/ML injection, INJECT 120 UNITS DAILY VIA INSULIN PUMP E 11 65, Disp: , Rfl: 3    ondansetron (ZOFRAN) 8 mg tablet, Take 8 mg by mouth every 8 (eight) hours as needed  , Disp: , Rfl:     OXYBUTYNIN CHLORIDE PO, Take 10 mg by mouth daily in the early morning  , Disp: , Rfl:     oxyCODONE-acetaminophen (PERCOCET) 5-325 mg per tablet, Take 1 PO TID PRN FOR PAIN  DO NOT FILL UNTIL: 3/29/19, Disp: 85 tablet, Rfl: 0    pantoprazole (PROTONIX) 40 mg tablet, Take 40 mg by mouth 2 (two) times a day  , Disp: , Rfl:     pramipexole (MIRAPEX) 1 mg tablet, Take 1 mg by mouth 2 (two) times a day  , Disp: , Rfl:     ranitidine (ZANTAC) 150 mg tablet, Take 150 mg by mouth daily at bedtime  , Disp: , Rfl:     SYMBICORT 80-4 5 MCG/ACT inhaler, Inhale 2 puffs 2 (two) times a day  , Disp: , Rfl:     tiZANidine (ZANAFLEX) 4 mg tablet, Take 1 PO TID during the day and 1-2 PO HS for spasms  , Disp: 450 tablet, Rfl: 0    VENTOLIN  (90 Base) MCG/ACT inhaler, 2 puffs every 4 (four) hours as needed  , Disp: , Rfl:     Allergies    Allergies   Allergen Reactions    Bactrim [Sulfamethoxazole-Trimethoprim] Hives    Sucralfate      Facial swelling    Topamax [Topiramate]      disorentation    Azithromycin Itching    Lyrica [Pregabalin] Confusion    Baclofen      "That knocks me out "    Bupropion Fatigue    Methotrexate Nausea Only    Neurontin [Gabapentin] Hallucinations and Hypertension       The following portions of the patient's history were reviewed and updated as appropriate: allergies, current medications, past family history, past medical history, past social history, past surgical history and problem list     Physical Exam    Vitals:  Blood pressure (!) 186/94, pulse 89, temperature 100 1 °F (37 8 °C), resp  rate 18, height 5' 3" (1 6 m), weight 98 7 kg (217 lb 9 6 oz)  ,Body mass index is 38 55 kg/m²  Physical Exam   Constitutional: She appears well-developed and well-nourished  No distress  Skin: Skin is warm and dry  Left buttock incision well healed  No tenderness to palpation issue  No redness or swelling  Psychiatric: She has a normal mood and affect  Her behavior is normal    Vitals reviewed      Neurologic Exam

## 2019-01-28 ENCOUNTER — TELEPHONE (OUTPATIENT)
Dept: NEUROLOGY | Facility: CLINIC | Age: 64
End: 2019-01-28

## 2019-01-28 NOTE — TELEPHONE ENCOUNTER
Patient called stating she is scheduling an EGD with [facility]  She states they are asking for clearance to hold her plavix before they will schedule her but she is hoping to schedule ASAP  She states she had same procedure in October 2018 (form available in media 9/19/18)    Call placed to [facility] and spoke with Raul Gayle who states she will fax us the request for anticoagulation hold form at this time and will call patient back to schedule  Patient last seen in office 8/2018  Please advise if you are willing to complete form which designates hold time forPlavix or if patient needs to be seen in office      Roland Lemus requests call back either way at P: [phone]

## 2019-01-28 NOTE — TELEPHONE ENCOUNTER
Sachin Durand called back with the fax number for the letter, asking if it can be held 5 days prior to the procedure  Ok to generate this letter? Fax: [facility]

## 2019-01-28 NOTE — TELEPHONE ENCOUNTER
Sedrick Aguero from [facility] gastro called and states she is not able to fax any form bec their database does not show Dr Lexie Gitelman or Dr Divya Robles  She is requesting a letter that states that pt can hold plavix prior to EGD  She is requesting this letter be faxed to [phone]

## 2019-01-28 NOTE — TELEPHONE ENCOUNTER
That's fine, I can fill it out  When we get the form it should just be scanned to me as I am not in the office and that way I can get it back to them

## 2019-02-22 ENCOUNTER — TELEPHONE (OUTPATIENT)
Dept: OBGYN CLINIC | Facility: MEDICAL CENTER | Age: 64
End: 2019-02-22

## 2019-02-22 DIAGNOSIS — G89.29 CHRONIC BILATERAL THORACIC BACK PAIN: ICD-10-CM

## 2019-02-22 DIAGNOSIS — M54.42 CHRONIC BILATERAL LOW BACK PAIN WITH BILATERAL SCIATICA: ICD-10-CM

## 2019-02-22 DIAGNOSIS — G89.29 CHRONIC SCAPULAR PAIN: ICD-10-CM

## 2019-02-22 DIAGNOSIS — M89.8X1 CHRONIC SCAPULAR PAIN: ICD-10-CM

## 2019-02-22 DIAGNOSIS — G90.522 COMPLEX REGIONAL PAIN SYNDROME TYPE 1 OF LEFT LOWER EXTREMITY: Chronic | ICD-10-CM

## 2019-02-22 DIAGNOSIS — G89.29 CHRONIC BILATERAL LOW BACK PAIN WITH BILATERAL SCIATICA: ICD-10-CM

## 2019-02-22 DIAGNOSIS — M25.561 CHRONIC PAIN OF RIGHT KNEE: ICD-10-CM

## 2019-02-22 DIAGNOSIS — M25.562 CHRONIC PAIN OF LEFT KNEE: ICD-10-CM

## 2019-02-22 DIAGNOSIS — G89.4 PAIN SYNDROME, CHRONIC: Chronic | ICD-10-CM

## 2019-02-22 DIAGNOSIS — M54.6 CHRONIC BILATERAL THORACIC BACK PAIN: ICD-10-CM

## 2019-02-22 DIAGNOSIS — G89.29 CHRONIC PAIN OF LEFT KNEE: ICD-10-CM

## 2019-02-22 DIAGNOSIS — G89.29 CHRONIC PAIN OF RIGHT KNEE: ICD-10-CM

## 2019-02-22 DIAGNOSIS — M54.41 CHRONIC BILATERAL LOW BACK PAIN WITH BILATERAL SCIATICA: ICD-10-CM

## 2019-02-22 NOTE — TELEPHONE ENCOUNTER
S/w pt, questioned if she would like to tx the rest of her rx's to [facility]  Pt stated that with the good rx card, fentanyl is $443 at [facility] vs $140 at [facility]  Per pt, lindsey has her rx for fentanyl ready for pu  Pt stated that she has enough fentanyl for next week  Advised pt, it would be beneficial to review all of her medications through [facility] and cb on monday with a list of what she would like to send to [facility] so that all rx's to be cancelled and re sent can be addressed together / minimize confusion  Pt verbalized understanding and appreciation  Will cb on monday with an update

## 2019-02-22 NOTE — TELEPHONE ENCOUNTER
Pt called stating she would like her fentanyl patches to be sent to [facility] in [place] because they are cheaper [facility] for her     [facility] [phone]    Pt can be reached at [phone]

## 2019-02-25 ENCOUNTER — TELEPHONE (OUTPATIENT)
Dept: NEUROLOGY | Facility: CLINIC | Age: 64
End: 2019-02-25

## 2019-02-25 ENCOUNTER — TELEPHONE (OUTPATIENT)
Dept: PAIN MEDICINE | Facility: CLINIC | Age: 64
End: 2019-02-25

## 2019-02-25 RX ORDER — FENTANYL 37.5 UG/H
PATCH, EXTENDED RELEASE TRANSDERMAL
Qty: 10 PATCH | Refills: 0 | Status: SHIPPED | OUTPATIENT
Start: 2019-02-25 | End: 2019-02-25

## 2019-02-25 RX ORDER — FENTANYL 25 UG/H
PATCH TRANSDERMAL
Qty: 10 PATCH | Refills: 0 | Status: SHIPPED | OUTPATIENT
Start: 2019-02-25 | End: 2019-04-10

## 2019-02-25 NOTE — TELEPHONE ENCOUNTER
lacie from [facility] called and states that pt's egd is now scheduled for 2/27  pt is on plavix  this would only be held for 4 days instead of 5   is this ok?  see encounter form 1/28/18  she will send us a letter  she is faxing to back line      [phone] option 3 ask for lacie

## 2019-02-25 NOTE — TELEPHONE ENCOUNTER
S/w [facility] pharmacy  Cancelled rx for fentanyl 37 5 mcg ready now and furture dated rx for fentanyl 25 mcg  Pharmacist confirmed       Pt aware

## 2019-02-25 NOTE — TELEPHONE ENCOUNTER
So there is a script at [facility] for the Fentanyl patch 37 5 dosage to fill now, but you will have to call [facility] to have them cancel script there so they can fill the script at [facility]      There was also a script for the Fentanyl patch 25 mcg patches sent to [facility] with a DNFill Date of: 3/25/19  Thank you

## 2019-02-25 NOTE — TELEPHONE ENCOUNTER
Signed form received form dr Dustin Peralta   Form faxed to [phone]  [facility] for Patricio Jacobs making her aware that signed form was faxed to her

## 2019-02-25 NOTE — TELEPHONE ENCOUNTER
S/w pt, confirmed fentanyl is the only medication she would like to [facility] to [facility]  Per pt, duloxetine and tizanidine are both cheaper through [facility]  Advised pt, will d/w DG and cb to advise  Pt verbalized understanding and appreciation

## 2019-02-25 NOTE — TELEPHONE ENCOUNTER
S/w pt, advised rx has been sent to [facility]  This office will cx rx's at [facility]  Pt verbalized understanding and appreciation

## 2019-02-27 DIAGNOSIS — G47.10 HYPERSOMNIA: ICD-10-CM

## 2019-02-27 RX ORDER — MODAFINIL 200 MG/1
TABLET ORAL
Qty: 90 TABLET | Refills: 0 | Status: SHIPPED | OUTPATIENT
Start: 2019-02-27 | End: 2019-02-28 | Stop reason: SDUPTHER

## 2019-02-28 DIAGNOSIS — G47.10 HYPERSOMNIA: ICD-10-CM

## 2019-02-28 RX ORDER — MODAFINIL 200 MG/1
TABLET ORAL
Qty: 90 TABLET | Refills: 0 | Status: SHIPPED | OUTPATIENT
Start: 2019-02-28 | End: 2019-06-07 | Stop reason: SDUPTHER

## 2019-02-28 NOTE — TELEPHONE ENCOUNTER
Patient reported that [facility] [place]   her refill of modafinil 200 mg tablets dispense 90  She didn't know why  Advised I would call [facility]  Bennie reports that patient canceled her RX  I advised the patient that [facility] reports she [place] her [place]ill  Patient now requesting refill go [facility] pharmacy on [facility] pharmacist inactivated Modafinil order so patient can have it filled at [facility]

## 2019-03-06 ENCOUNTER — OFFICE VISIT (OUTPATIENT)
Dept: SLEEP CENTER | Facility: CLINIC | Age: 64
End: 2019-03-06
Payer: MEDICARE

## 2019-03-06 VITALS
WEIGHT: 222 LBS | DIASTOLIC BLOOD PRESSURE: 74 MMHG | HEIGHT: 63 IN | SYSTOLIC BLOOD PRESSURE: 168 MMHG | BODY MASS INDEX: 39.34 KG/M2

## 2019-03-06 DIAGNOSIS — G47.33 OSA (OBSTRUCTIVE SLEEP APNEA): Primary | ICD-10-CM

## 2019-03-06 PROCEDURE — 99214 OFFICE O/P EST MOD 30 MIN: CPT | Performed by: INTERNAL MEDICINE

## 2019-03-06 NOTE — PROGRESS NOTES
Progress Note - Sleep Center   Michelle Holt FVO:[birth year] MRN: [id number]      Reason for Visit:  59 y  o female here for PAP compliance check    Assessment:  Having difficulty with new PAP device  Sleep quality is unimproved  Compliance data show utilization for less than 70% of nights, for greater than or equal to 4 hours per night  Try Wisp    Plan:  Improve compliance  Continue modafinil  Continue amitriptyline for insomnia    Follow up: One year    History of Present Illness:  History of STU on PAP therapy  Difficulty with compliance        Review of Systems      Genitourinary need to urinate more than twice a night   Cardiology ankle/leg swelling   Gastrointestinal frequent heartburn/acid reflux   Neurology need to move extremities, muscle weakness, numbness/tingling of an extremity, forgetfulness, difficulty with memory, loss of consciousness/blacking out and balance problems   Constitutional claustrophobia and fatigue   Integumentary rash or dry skin and itching   Psychiatry anxiety, depression, aggressiveness or irritability and mood change   Musculoskeletal joint pain, muscle aches, legs twitching/jerking and leg cramps   Pulmonary shortness of breath with activity, wheezing and snoring   ENT throat clearing   Endocrine excessive thirst   Hematological none           I have reviewed and updated the review of systems as necessary    Historical Information    Past Medical History:   Past Medical History:   Diagnosis Date    Anxiety     Asthma     controlled    Back complaints     [facility]     nose    Cellulitis of left lower leg     Chronic bilateral thoracic back pain     Chronic myofascial pain     Chronic pain of both lower extremities     Chronic pain of left knee     Chronic pain syndrome     CPAP (continuous positive airway pressure) dependence     Depression     Diabetes mellitus ([facility]     Disease of thyroid gland     Gait disorder     Gastroparesis     GERD (gastroesophageal reflux disease)     History of angina     History of transfusion     Many years ago    Hyperlipidemia     Hypertension     Insulin pump in place     Kidney disease     Polyneuropathy associated with underlying disease ([facility]     PONV (postoperative nausea and vomiting)     S/P insertion of spinal cord stimulator 2018    Sarcoidosis     Sleep apnea     TIA (transient ischemic attack)          Past Surgical History:   Past Surgical History:   Procedure Laterality Date    BREAST SURGERY      BREAST SURGERY      reduction     SECTION      HERNIA REPAIR      HYSTERECTOMY      JOINT REPLACEMENT Left     NECK SURGERY      MI SURG IMPLNT Ygnacio Riedel Left 2018    Procedure: Insertion of thoracic spinal cord stimulator electrode via laminotomy and placement of left buttock IPG;  Surgeon: Jaime Andersen MD;  [facility];  Service: Neurosurgery    REPLACEMENT TOTAL KNEE      ROTATOR CUFF REPAIR      SPINAL STIMULATOR PLACEMENT Left 10/3/2018    Procedure: BUTTOCK RE-OPENING INCISION FOR REPOSITIONING OF IMPLANTABLE PULSE GENERATOR;  Surgeon: Jaime Andersen MD;  [facility];  Service: Neurosurgery    TONSILLECTOMY      WISDOM TOOTH EXTRACTION               Family History:   Family History   Problem Relation Age of Onset    Diabetes Family     Heart disease Family     Hypertension Family     Neuropathy Family     No Known Problems Mother     Heart disease Father     Diabetes Father     No Known Problems Maternal Grandmother     No Known Problems Maternal Grandfather     No Known Problems Paternal Grandmother     No Known Problems Paternal Grandfather        Medications/Allergies:      Current Outpatient Medications:     albuterol (ACCUNEB) 0 63 MG/3ML nebulizer solution, Inhale 0 63 mg every 6 (six) hours as needed, Disp: , Rfl:     amitriptyline (ELAVIL) 50 mg tablet, Take one tablet p o  at bedtime for two weeks    If insomnia persists, increase to two tablets at bedtime, Disp: 60 tablet, Rfl: 2    atorvastatin (LIPITOR) 80 mg tablet, Take 80 mg by mouth daily at bedtime  , Disp: , Rfl:     cholecalciferol (VITAMIN D3) 1,000 units tablet, Take 2,000 Units by mouth daily with lunch  , Disp: , Rfl: 5    clopidogrel (PLAVIX) 75 mg tablet, Take 1 tablet (75 mg total) by mouth daily (Patient taking differently: Take 75 mg by mouth daily at bedtime  ), Disp: 30 tablet, Rfl: 4    CRANBERRY PO, Take 1 capsule by mouth daily with lunch  , Disp: , Rfl:     Cyanocobalamin (VITAMIN B-12 PO), Take 1 capsule by mouth daily with lunch  , Disp: , Rfl:     Docusate Sodium 100 MG capsule, Take 100 mg by mouth daily with lunch  , Disp: , Rfl:     DULoxetine (CYMBALTA) 60 mg delayed release capsule, Take 2 PO QD, Disp: 180 capsule, Rfl: 0    fentaNYL (DURAGESIC) 25 mcg/hr, Apply 1 patch TD every 72 hours  DO NOT FILL UNTIL: 3/25/19, Disp: 10 patch, Rfl: 0    fluticasone-salmeterol (ADVAIR) 100-50 mcg/dose inhaler, Inhale 2 puffs, Disp: , Rfl:     furosemide (LASIX) 20 mg tablet, Take 20 mg by mouth daily in the early morning  , Disp: , Rfl:     glucagon 1 MG injection, Glucagon Emergency Kit (human-recomb) 1 mg solution for injection, Disp: , Rfl:     glucose blood test strip, Testing six times daily  E11 65 on insulin pump, Disp: , Rfl:     levothyroxine 75 mcg tablet, Take 75 mcg by mouth daily in the early morning  , Disp: , Rfl:     Linaclotide (LINZESS) 145 MCG CAPS, Take 1 capsule by mouth daily in the early morning  , Disp: , Rfl:     losartan (COZAAR) 100 MG tablet, Take 100 mg by mouth daily in the early morning  , Disp: , Rfl:     magnesium oxide (MAG-OX) 400 mg tablet, Take 400 mg by mouth, Disp: , Rfl:     modafinil (PROVIGIL) 200 MG tablet, Take 1/2 to 1 tablet every morning as needed  , Disp: 90 tablet, Rfl: 0    Naloxone HCl (NARCAN) 4 MG/0 1ML LIQD, Sprays into 1 nostril for suspected opioid overdose   May repeat , Disp: 2 each, Rfl: 0    NOVOLOG 100 UNIT/ML injection, INJECT 120 UNITS DAILY VIA INSULIN PUMP E 11 65, Disp: , Rfl: 3    ondansetron (ZOFRAN) 8 mg tablet, Take 8 mg by mouth every 8 (eight) hours as needed  , Disp: , Rfl:     OXYBUTYNIN CHLORIDE PO, Take 10 mg by mouth daily in the early morning  , Disp: , Rfl:     oxyCODONE-acetaminophen (PERCOCET) 5-325 mg per tablet, Take 1 PO TID PRN FOR PAIN  DO NOT FILL UNTIL: 3/29/19, Disp: 85 tablet, Rfl: 0    pantoprazole (PROTONIX) 40 mg tablet, Take 40 mg by mouth 2 (two) times a day  , Disp: , Rfl:     pramipexole (MIRAPEX) 1 mg tablet, Take 1 mg by mouth 2 (two) times a day  , Disp: , Rfl:     ranitidine (ZANTAC) 150 mg tablet, Take 150 mg by mouth daily at bedtime  , Disp: , Rfl:     SYMBICORT 80-4 5 MCG/ACT inhaler, Inhale 2 puffs 2 (two) times a day  , Disp: , Rfl:     tiZANidine (ZANAFLEX) 4 mg tablet, Take 1 PO TID during the day and 1-2 PO HS for spasms  , Disp: 450 tablet, Rfl: 0    VENTOLIN  (90 Base) MCG/ACT inhaler, 2 puffs every 4 (four) hours as needed  , Disp: , Rfl:       Objective    Vital Signs:   Vitals:    03/06/19 1400   BP: 168/74     [place] Sleepiness Scale: Total score: 17        Physical Exam:    General: Alert, appropriate, cooperative, overweight    Head: NC/AT    Skin: Warm, dry    Neuro: No motor abnormalities, cranial nerves appear intact    Extremity: No clubbing, cyanosis    PAP setting:   APAP 9 to 11 cm  DME Provider: [employer]  Test results:  AHI = 30 9    Counseling / Coordination of Care  Total clinic time spent today 25 minutes  A description of the counseling / coordination of care: Discussed means to improve compliance  ANDRZEJ Weinstein    Board Certified Sleep Specialist

## 2019-03-14 ENCOUNTER — TELEPHONE (OUTPATIENT)
Dept: SLEEP CENTER | Facility: CLINIC | Age: 64
End: 2019-03-14

## 2019-03-14 DIAGNOSIS — G47.33 OSA (OBSTRUCTIVE SLEEP APNEA): Primary | ICD-10-CM

## 2019-03-14 NOTE — TELEPHONE ENCOUNTER
PT called in, she wants supplies for her Wisp  I will request Rx and then send to resupply at [facility] to have them contact her

## 2019-04-10 ENCOUNTER — OFFICE VISIT (OUTPATIENT)
Dept: PAIN MEDICINE | Facility: CLINIC | Age: 64
End: 2019-04-10
Payer: MEDICARE

## 2019-04-10 ENCOUNTER — TELEPHONE (OUTPATIENT)
Dept: PAIN MEDICINE | Facility: CLINIC | Age: 64
End: 2019-04-10

## 2019-04-10 VITALS
HEIGHT: 63 IN | SYSTOLIC BLOOD PRESSURE: 148 MMHG | WEIGHT: 219 LBS | HEART RATE: 88 BPM | DIASTOLIC BLOOD PRESSURE: 76 MMHG | BODY MASS INDEX: 38.8 KG/M2

## 2019-04-10 DIAGNOSIS — G89.29 CHRONIC MYOFASCIAL PAIN: ICD-10-CM

## 2019-04-10 DIAGNOSIS — M79.605 CHRONIC PAIN OF BOTH LOWER EXTREMITIES: Chronic | ICD-10-CM

## 2019-04-10 DIAGNOSIS — M79.604 CHRONIC PAIN OF BOTH LOWER EXTREMITIES: Chronic | ICD-10-CM

## 2019-04-10 DIAGNOSIS — G89.29 CHRONIC PAIN OF LEFT KNEE: ICD-10-CM

## 2019-04-10 DIAGNOSIS — M54.42 CHRONIC BILATERAL LOW BACK PAIN WITH BILATERAL SCIATICA: ICD-10-CM

## 2019-04-10 DIAGNOSIS — M25.561 CHRONIC PAIN OF RIGHT KNEE: ICD-10-CM

## 2019-04-10 DIAGNOSIS — G89.29 CHRONIC PAIN OF RIGHT KNEE: ICD-10-CM

## 2019-04-10 DIAGNOSIS — G89.29 CHRONIC BILATERAL LOW BACK PAIN WITH BILATERAL SCIATICA: ICD-10-CM

## 2019-04-10 DIAGNOSIS — M54.41 CHRONIC BILATERAL LOW BACK PAIN WITH BILATERAL SCIATICA: ICD-10-CM

## 2019-04-10 DIAGNOSIS — M79.18 MYOFASCIAL PAIN SYNDROME: ICD-10-CM

## 2019-04-10 DIAGNOSIS — G89.29 CHRONIC BILATERAL THORACIC BACK PAIN: ICD-10-CM

## 2019-04-10 DIAGNOSIS — M54.6 CHRONIC BILATERAL THORACIC BACK PAIN: ICD-10-CM

## 2019-04-10 DIAGNOSIS — M25.562 CHRONIC PAIN OF LEFT KNEE: ICD-10-CM

## 2019-04-10 DIAGNOSIS — M79.18 CHRONIC MYOFASCIAL PAIN: ICD-10-CM

## 2019-04-10 DIAGNOSIS — G63 POLYNEUROPATHY ASSOCIATED WITH UNDERLYING DISEASE (HCC): Chronic | ICD-10-CM

## 2019-04-10 DIAGNOSIS — M89.8X1 CHRONIC SCAPULAR PAIN: ICD-10-CM

## 2019-04-10 DIAGNOSIS — G89.29 CHRONIC PAIN OF BOTH LOWER EXTREMITIES: Chronic | ICD-10-CM

## 2019-04-10 DIAGNOSIS — G89.29 CHRONIC SCAPULAR PAIN: ICD-10-CM

## 2019-04-10 DIAGNOSIS — R26.81 GAIT INSTABILITY: ICD-10-CM

## 2019-04-10 DIAGNOSIS — G90.522 COMPLEX REGIONAL PAIN SYNDROME TYPE 1 OF LEFT LOWER EXTREMITY: Primary | Chronic | ICD-10-CM

## 2019-04-10 DIAGNOSIS — G89.4 PAIN SYNDROME, CHRONIC: Chronic | ICD-10-CM

## 2019-04-10 DIAGNOSIS — G90.522 COMPLEX REGIONAL PAIN SYNDROME TYPE 1 OF LEFT LOWER EXTREMITY: Chronic | ICD-10-CM

## 2019-04-10 DIAGNOSIS — Z96.89 S/P INSERTION OF SPINAL CORD STIMULATOR: ICD-10-CM

## 2019-04-10 PROCEDURE — 99214 OFFICE O/P EST MOD 30 MIN: CPT | Performed by: NURSE PRACTITIONER

## 2019-04-10 RX ORDER — FENTANYL 37.5 UG/H
PATCH, EXTENDED RELEASE TRANSDERMAL
Qty: 10 PATCH | Refills: 0 | Status: SHIPPED | OUTPATIENT
Start: 2019-04-10 | End: 2019-04-11 | Stop reason: SDUPTHER

## 2019-04-10 RX ORDER — PREDNISONE 10 MG/1
TABLET ORAL
Qty: 21 TABLET | Refills: 0 | Status: SHIPPED | OUTPATIENT
Start: 2019-04-10 | End: 2019-04-10 | Stop reason: SDUPTHER

## 2019-04-10 RX ORDER — PREDNISONE 10 MG/1
TABLET ORAL
Qty: 21 TABLET | Refills: 0 | Status: SHIPPED | OUTPATIENT
Start: 2019-04-10 | End: 2019-04-27 | Stop reason: ALTCHOICE

## 2019-04-11 RX ORDER — FENTANYL 37.5 UG/H
PATCH, EXTENDED RELEASE TRANSDERMAL
Qty: 10 PATCH | Refills: 0 | Status: SHIPPED | OUTPATIENT
Start: 2019-04-11 | End: 2019-06-27 | Stop reason: SDUPTHER

## 2019-04-11 RX ORDER — FENTANYL 37.5 UG/H
PATCH, EXTENDED RELEASE TRANSDERMAL
Qty: 10 PATCH | Refills: 0 | Status: SHIPPED | OUTPATIENT
Start: 2019-04-11 | End: 2019-04-11 | Stop reason: SDUPTHER

## 2019-04-27 ENCOUNTER — OFFICE VISIT (OUTPATIENT)
Dept: URGENT CARE | Facility: CLINIC | Age: 64
End: 2019-04-27
Payer: MEDICARE

## 2019-04-27 VITALS
BODY MASS INDEX: 37.21 KG/M2 | WEIGHT: 210 LBS | TEMPERATURE: 98.4 F | SYSTOLIC BLOOD PRESSURE: 176 MMHG | OXYGEN SATURATION: 98 % | HEART RATE: 87 BPM | HEIGHT: 63 IN | DIASTOLIC BLOOD PRESSURE: 75 MMHG | RESPIRATION RATE: 18 BRPM

## 2019-04-27 DIAGNOSIS — R30.0 DYSURIA: Primary | ICD-10-CM

## 2019-04-27 LAB
SL AMB  POCT GLUCOSE, UA: NORMAL
SL AMB LEUKOCYTE ESTERASE,UA: NORMAL
SL AMB POCT BILIRUBIN,UA: NORMAL
SL AMB POCT BLOOD,UA: NORMAL
SL AMB POCT CLARITY,UA: CLEAR
SL AMB POCT COLOR,UA: NORMAL
SL AMB POCT KETONES,UA: NORMAL
SL AMB POCT NITRITE,UA: NORMAL
SL AMB POCT PH,UA: 6.5
SL AMB POCT SPECIFIC GRAVITY,UA: 1.01
SL AMB POCT URINE PROTEIN: NORMAL
SL AMB POCT UROBILINOGEN: NORMAL

## 2019-04-27 PROCEDURE — 99213 OFFICE O/P EST LOW 20 MIN: CPT | Performed by: EMERGENCY MEDICINE

## 2019-04-27 PROCEDURE — 87086 URINE CULTURE/COLONY COUNT: CPT | Performed by: EMERGENCY MEDICINE

## 2019-04-27 PROCEDURE — G0463 HOSPITAL OUTPT CLINIC VISIT: HCPCS | Performed by: EMERGENCY MEDICINE

## 2019-04-28 LAB — BACTERIA UR CULT: NORMAL

## 2019-05-21 ENCOUNTER — TELEPHONE (OUTPATIENT)
Dept: PAIN MEDICINE | Facility: MEDICAL CENTER | Age: 64
End: 2019-05-21

## 2019-05-25 ENCOUNTER — TELEPHONE (OUTPATIENT)
Dept: PAIN MEDICINE | Facility: CLINIC | Age: 64
End: 2019-05-25

## 2019-06-05 ENCOUNTER — TELEPHONE (OUTPATIENT)
Dept: PAIN MEDICINE | Facility: CLINIC | Age: 64
End: 2019-06-05

## 2019-06-07 DIAGNOSIS — G47.10 HYPERSOMNIA: ICD-10-CM

## 2019-06-10 RX ORDER — MODAFINIL 200 MG/1
TABLET ORAL
Qty: 30 TABLET | Refills: 0 | Status: SHIPPED | OUTPATIENT
Start: 2019-06-10 | End: 2019-07-03 | Stop reason: SDUPTHER

## 2019-06-26 ENCOUNTER — TELEPHONE (OUTPATIENT)
Dept: PAIN MEDICINE | Facility: MEDICAL CENTER | Age: 64
End: 2019-06-26

## 2019-06-26 NOTE — TELEPHONE ENCOUNTER
Pt called stating she has increased pain  [facility]  Pt would like to see Dr sooner than the 7/2/2019 appt that is scheduled      If possible to get her in sooner, please call @ [phone]

## 2019-06-27 ENCOUNTER — OFFICE VISIT (OUTPATIENT)
Dept: PAIN MEDICINE | Facility: CLINIC | Age: 64
End: 2019-06-27
Payer: MEDICARE

## 2019-06-27 VITALS
HEART RATE: 89 BPM | BODY MASS INDEX: 37.2 KG/M2 | SYSTOLIC BLOOD PRESSURE: 148 MMHG | HEIGHT: 63 IN | DIASTOLIC BLOOD PRESSURE: 90 MMHG

## 2019-06-27 DIAGNOSIS — M79.605 PAIN IN BOTH LOWER EXTREMITIES: ICD-10-CM

## 2019-06-27 DIAGNOSIS — M79.18 CHRONIC MYOFASCIAL PAIN: ICD-10-CM

## 2019-06-27 DIAGNOSIS — M54.41 CHRONIC BILATERAL LOW BACK PAIN WITH BILATERAL SCIATICA: ICD-10-CM

## 2019-06-27 DIAGNOSIS — M79.604 CHRONIC PAIN OF BOTH LOWER EXTREMITIES: Chronic | ICD-10-CM

## 2019-06-27 DIAGNOSIS — M54.16 LUMBAR RADICULOPATHY: ICD-10-CM

## 2019-06-27 DIAGNOSIS — G89.29 CHRONIC PAIN OF RIGHT KNEE: ICD-10-CM

## 2019-06-27 DIAGNOSIS — M54.6 CHRONIC BILATERAL THORACIC BACK PAIN: ICD-10-CM

## 2019-06-27 DIAGNOSIS — M25.561 CHRONIC PAIN OF RIGHT KNEE: ICD-10-CM

## 2019-06-27 DIAGNOSIS — M79.604 PAIN IN BOTH LOWER EXTREMITIES: ICD-10-CM

## 2019-06-27 DIAGNOSIS — M47.817 LUMBOSACRAL SPONDYLOSIS WITHOUT MYELOPATHY: ICD-10-CM

## 2019-06-27 DIAGNOSIS — M89.8X1 CHRONIC SCAPULAR PAIN: ICD-10-CM

## 2019-06-27 DIAGNOSIS — G89.29 CHRONIC SCAPULAR PAIN: ICD-10-CM

## 2019-06-27 DIAGNOSIS — G89.29 CHRONIC BILATERAL THORACIC BACK PAIN: ICD-10-CM

## 2019-06-27 DIAGNOSIS — G89.29 CHRONIC PAIN OF LEFT KNEE: ICD-10-CM

## 2019-06-27 DIAGNOSIS — G57.11 MERALGIA PARAESTHETICA, RIGHT: ICD-10-CM

## 2019-06-27 DIAGNOSIS — G89.29 CHRONIC PAIN OF BOTH LOWER EXTREMITIES: Chronic | ICD-10-CM

## 2019-06-27 DIAGNOSIS — M79.18 MYOFASCIAL PAIN SYNDROME: ICD-10-CM

## 2019-06-27 DIAGNOSIS — G89.4 PAIN SYNDROME, CHRONIC: Primary | Chronic | ICD-10-CM

## 2019-06-27 DIAGNOSIS — G89.29 CHRONIC MYOFASCIAL PAIN: ICD-10-CM

## 2019-06-27 DIAGNOSIS — M25.562 CHRONIC PAIN OF LEFT KNEE: ICD-10-CM

## 2019-06-27 DIAGNOSIS — M79.605 CHRONIC PAIN OF BOTH LOWER EXTREMITIES: Chronic | ICD-10-CM

## 2019-06-27 DIAGNOSIS — G90.522 COMPLEX REGIONAL PAIN SYNDROME TYPE 1 OF LEFT LOWER EXTREMITY: Chronic | ICD-10-CM

## 2019-06-27 DIAGNOSIS — G89.29 CHRONIC BILATERAL LOW BACK PAIN WITH BILATERAL SCIATICA: ICD-10-CM

## 2019-06-27 DIAGNOSIS — M54.42 CHRONIC BILATERAL LOW BACK PAIN WITH BILATERAL SCIATICA: ICD-10-CM

## 2019-06-27 PROCEDURE — 99214 OFFICE O/P EST MOD 30 MIN: CPT | Performed by: NURSE PRACTITIONER

## 2019-06-27 RX ORDER — TIZANIDINE 4 MG/1
TABLET ORAL
Qty: 450 TABLET | Refills: 0 | Status: SHIPPED | OUTPATIENT
Start: 2019-06-27 | End: 2020-02-17 | Stop reason: SDUPTHER

## 2019-06-27 RX ORDER — OXYCODONE HYDROCHLORIDE AND ACETAMINOPHEN 5; 325 MG/1; MG/1
TABLET ORAL
Qty: 120 TABLET | Refills: 0 | Status: SHIPPED | OUTPATIENT
Start: 2019-06-27 | End: 2020-02-17 | Stop reason: SDUPTHER

## 2019-06-27 RX ORDER — FENTANYL 37.5 UG/H
PATCH, EXTENDED RELEASE TRANSDERMAL
Qty: 10 PATCH | Refills: 0 | Status: SHIPPED | OUTPATIENT
Start: 2019-06-27 | End: 2019-06-28 | Stop reason: SDUPTHER

## 2019-06-27 RX ORDER — PREDNISONE 1 MG/1
TABLET ORAL
Qty: 21 TABLET | Refills: 0 | Status: SHIPPED | OUTPATIENT
Start: 2019-06-27 | End: 2019-11-25

## 2019-06-28 RX ORDER — FENTANYL 37.5 UG/H
PATCH, EXTENDED RELEASE TRANSDERMAL
Qty: 10 PATCH | Refills: 0 | Status: SHIPPED | OUTPATIENT
Start: 2019-06-28 | End: 2019-06-28 | Stop reason: SDUPTHER

## 2019-06-28 RX ORDER — FENTANYL 37.5 UG/H
PATCH, EXTENDED RELEASE TRANSDERMAL
Qty: 10 PATCH | Refills: 0 | Status: SHIPPED | OUTPATIENT
Start: 2019-06-28 | End: 2019-07-08

## 2019-07-03 DIAGNOSIS — G47.10 HYPERSOMNIA: ICD-10-CM

## 2019-07-07 RX ORDER — MODAFINIL 200 MG/1
TABLET ORAL
Qty: 30 TABLET | Refills: 0 | Status: SHIPPED | OUTPATIENT
Start: 2019-07-07 | End: 2019-07-10 | Stop reason: SDUPTHER

## 2019-07-08 ENCOUNTER — TELEPHONE (OUTPATIENT)
Dept: PAIN MEDICINE | Facility: MEDICAL CENTER | Age: 64
End: 2019-07-08

## 2019-07-08 DIAGNOSIS — G89.4 PAIN SYNDROME, CHRONIC: Primary | Chronic | ICD-10-CM

## 2019-07-08 RX ORDER — FENTANYL 25 UG/H
PATCH TRANSDERMAL
Qty: 10 PATCH | Refills: 0 | Status: SHIPPED | OUTPATIENT
Start: 2019-07-08 | End: 2019-07-08 | Stop reason: SDUPTHER

## 2019-07-08 RX ORDER — FENTANYL 25 UG/H
PATCH TRANSDERMAL
Qty: 10 PATCH | Refills: 0 | Status: SHIPPED | OUTPATIENT
Start: 2019-07-08 | End: 2019-09-19

## 2019-07-08 NOTE — TELEPHONE ENCOUNTER
Please call and cancel all of the Fentanyl 37 5 mcg patches as I sent a script for Fentanyl 25 mcg Q72 to get her to her next OV  Thank you

## 2019-07-08 NOTE — TELEPHONE ENCOUNTER
Pt called stating that the fentanyl 37 5 Mcg  Co-pay is $166 and the fentanyl 25 mcg is coming at $15  Pt would like to know if she can switch back to the @5 mcg because they are cheaper and more affordable       Pt can be reached at [phone]

## 2019-07-10 DIAGNOSIS — G47.10 HYPERSOMNIA: ICD-10-CM

## 2019-07-10 RX ORDER — MODAFINIL 200 MG/1
TABLET ORAL
Qty: 90 TABLET | Refills: 1 | Status: SHIPPED | OUTPATIENT
Start: 2019-07-10 | End: 2019-08-15 | Stop reason: SDUPTHER

## 2019-07-15 ENCOUNTER — TELEPHONE (OUTPATIENT)
Dept: PAIN MEDICINE | Facility: CLINIC | Age: 64
End: 2019-07-15

## 2019-07-15 NOTE — TELEPHONE ENCOUNTER
JAY patient states that she has been off prednisone for 2 weeks and the pain in her lower back has returned  Patient states that both legs are hurting too  Patient has a SCS, ask patient if the SCS is still turned on  Patient said the SCS is still on  Ask patient if she has contacted [facility] ([address]) [place] to discuss the current settings  Patient said she has that on her "to do list"  Encourage her to call [address]  Patient said she is currently going to another doctor's appointment  Advised patient that this RN will make DG aware that patient is requesting another course of prednisone, but normally they do not like to continue with prednisone because it can weaken the immune system  Please advise

## 2019-07-15 NOTE — TELEPHONE ENCOUNTER
Correct, it is too soon for Prednisone  She needs to make it a priority to f/u with [facility] and try to use the stim to manage her pain

## 2019-07-15 NOTE — TELEPHONE ENCOUNTER
Patient is calling to report that she has been off of prednisone for 2 weeks now and the pain in her lower back has returned  She is wondering if DG would put her back on this Rx?

## 2019-07-30 ENCOUNTER — TELEPHONE (OUTPATIENT)
Dept: PAIN MEDICINE | Facility: CLINIC | Age: 64
End: 2019-07-30

## 2019-07-30 NOTE — TELEPHONE ENCOUNTER
S/w patient, has been admitted into the hospital 7/21/19 [facility], "they are giving her opioids that DG does not have her taking"      Please call pt [phone]

## 2019-07-30 NOTE — TELEPHONE ENCOUNTER
S/w pt, stated that she is in the hospital for poss pacemaker placement  Pt is concerned because she is getting fentanyl  Concerned re: opioid contract  Advised pt, while she is in the hospital, this office cannot order or manage her medications  Ok to take medications as ordered by the doctors in the hospital  Fu w/ SPA after dc to review medication changes moving forward  Pt verbalized understanding and appreciation

## 2019-08-15 DIAGNOSIS — G47.10 HYPERSOMNIA: ICD-10-CM

## 2019-08-15 RX ORDER — MODAFINIL 200 MG/1
TABLET ORAL
Qty: 90 TABLET | Refills: 1 | Status: SHIPPED | OUTPATIENT
Start: 2019-08-15 | End: 2020-02-18 | Stop reason: SDUPTHER

## 2019-08-15 NOTE — TELEPHONE ENCOUNTER
Tessa Maria Victoria reports that she only has 4 tablets  of her Modafinil tablets left and her pharmacy told her she needs a new prior authorization  Will start in Covermymeds

## 2019-08-15 NOTE — TELEPHONE ENCOUNTER
After reviewing chart patient has approval for Modafinil 200 mg through 1/6/2021    Notified the patient

## 2019-08-27 DIAGNOSIS — M79.18 MYOFASCIAL PAIN SYNDROME: ICD-10-CM

## 2019-08-27 RX ORDER — TIZANIDINE 4 MG/1
TABLET ORAL
Qty: 450 TABLET | Refills: 0 | OUTPATIENT
Start: 2019-08-27

## 2019-08-29 ENCOUNTER — TELEPHONE (OUTPATIENT)
Dept: PAIN MEDICINE | Facility: CLINIC | Age: 64
End: 2019-08-29

## 2019-08-29 NOTE — TELEPHONE ENCOUNTER
Pt called and requested to speak w/ DG regarding her low back pain  An orthopedic doctor at [facility] recommended she should get an injection  Pt said she had a CT scan done and she has the disc to show DG   Call back# [phone]

## 2019-08-30 NOTE — TELEPHONE ENCOUNTER
I would recommend she meet with [facility] for re-programming to see if they can provide more improvement and just continue her medications as prescribed  Thank you

## 2019-08-30 NOTE — TELEPHONE ENCOUNTER
S/w pt, advised of above  Per pt, she met with [facility] last week  Per ortho, no further input until the ct is complete  Advised pt, will make dg aware  Anticipate that he will agree with the above  Fu as scheduled on 9/19, cb if there is any change or question  Pt verbalized understanding and appreciation

## 2019-08-30 NOTE — TELEPHONE ENCOUNTER
S/w pt, stated that she went into the hospital with stomach pain, dc w/ pacemaker  Pain presented x 3 weeks ago in Low back at the belt line on the R side and pain in b/l anterior LE pain which she is managing w/ her stimulator  Pt stated that she saw an orthopedic dr Aliyah No ordered a CT - scheduled for tuesday  Pt states she is leaving for [place] next week  Questioning if there is anything this office can do to help her with her pain  Advised pt, will d/w DG and cb to advise

## 2019-09-19 ENCOUNTER — OFFICE VISIT (OUTPATIENT)
Dept: PAIN MEDICINE | Facility: CLINIC | Age: 64
End: 2019-09-19
Payer: MEDICARE

## 2019-09-19 VITALS
HEIGHT: 63 IN | BODY MASS INDEX: 38.27 KG/M2 | WEIGHT: 216 LBS | HEART RATE: 88 BPM | SYSTOLIC BLOOD PRESSURE: 120 MMHG | DIASTOLIC BLOOD PRESSURE: 72 MMHG

## 2019-09-19 DIAGNOSIS — Z96.89 S/P INSERTION OF SPINAL CORD STIMULATOR: ICD-10-CM

## 2019-09-19 DIAGNOSIS — G89.29 CHRONIC BILATERAL LOW BACK PAIN WITH BILATERAL SCIATICA: ICD-10-CM

## 2019-09-19 DIAGNOSIS — M54.41 CHRONIC BILATERAL LOW BACK PAIN WITH BILATERAL SCIATICA: ICD-10-CM

## 2019-09-19 DIAGNOSIS — G89.4 PAIN SYNDROME, CHRONIC: Primary | Chronic | ICD-10-CM

## 2019-09-19 DIAGNOSIS — M79.604 CHRONIC PAIN OF BOTH LOWER EXTREMITIES: Chronic | ICD-10-CM

## 2019-09-19 DIAGNOSIS — G63 POLYNEUROPATHY ASSOCIATED WITH UNDERLYING DISEASE (HCC): Chronic | ICD-10-CM

## 2019-09-19 DIAGNOSIS — M54.14 THORACIC RADICULOPATHY: ICD-10-CM

## 2019-09-19 DIAGNOSIS — G89.29 CHRONIC PAIN OF BOTH LOWER EXTREMITIES: Chronic | ICD-10-CM

## 2019-09-19 DIAGNOSIS — G90.522 COMPLEX REGIONAL PAIN SYNDROME TYPE 1 OF LEFT LOWER EXTREMITY: Chronic | ICD-10-CM

## 2019-09-19 DIAGNOSIS — M47.817 LUMBOSACRAL SPONDYLOSIS WITHOUT MYELOPATHY: ICD-10-CM

## 2019-09-19 DIAGNOSIS — M79.605 CHRONIC PAIN OF BOTH LOWER EXTREMITIES: Chronic | ICD-10-CM

## 2019-09-19 DIAGNOSIS — M79.18 MYOFASCIAL PAIN SYNDROME: ICD-10-CM

## 2019-09-19 DIAGNOSIS — G89.29 CHRONIC BILATERAL THORACIC BACK PAIN: ICD-10-CM

## 2019-09-19 DIAGNOSIS — M54.42 CHRONIC BILATERAL LOW BACK PAIN WITH BILATERAL SCIATICA: ICD-10-CM

## 2019-09-19 DIAGNOSIS — M54.6 CHRONIC BILATERAL THORACIC BACK PAIN: ICD-10-CM

## 2019-09-19 DIAGNOSIS — M54.16 LUMBAR RADICULOPATHY: ICD-10-CM

## 2019-09-19 PROCEDURE — 99214 OFFICE O/P EST MOD 30 MIN: CPT | Performed by: NURSE PRACTITIONER

## 2019-09-19 RX ORDER — FENTANYL 12 UG/H
PATCH TRANSDERMAL
Qty: 10 PATCH | Refills: 0 | Status: SHIPPED | OUTPATIENT
Start: 2019-09-19 | End: 2019-09-20 | Stop reason: SDUPTHER

## 2019-09-19 NOTE — PROGRESS NOTES
Assessment:  1  Pain syndrome, chronic    2  Complex regional pain syndrome type 1 of left lower extremity    3  Chronic bilateral low back pain with bilateral sciatica    4  Chronic bilateral thoracic back pain    5  Chronic pain of both lower extremities    6  Lumbar radiculopathy    7  Thoracic radiculopathy    8  Lumbosacral spondylosis without myelopathy    9  S/P insertion of spinal cord stimulator    10  Polyneuropathy associated with underlying disease ([address] )    11  Myofascial pain syndrome        Plan:  While the patient was in the office today, I did have a thorough conversation with the patient regarding her chronic pain syndrome, symptoms, and medication regimen  I explained to the patient that she should continue to use her spinal cord stimulator and follow-up with the [address] [place] [address] team as needed for any reprogramming needs in the future  The patient was agreeable and verbalized an understanding  With regards to her medication regimen, I explained to the patient that if she feels she is ready to decrease the fentanyl patch again, I feel it is reasonable appropriate to decrease her dosage down to the 12 mcg dosage and continue the Cymbalta, fentanyl patch, tizanidine and p r n  Percocet as prescribed  I discussed with the patient that it would be reasonable and understanding if she needed to rely on the Percocet a little more frequently as we are decreasing the patch by 50%, however, we did not send a prescription for the Percocet at today's visit as she did have a decent amount on hand, but I did explain to the patient that if between now and her next office visit she would need a refill the Percocet before her office visit, she should call our office  However, the patient want to try to see if she could still make what she has last until her next visit  It is her long-term goal to try to at least get off the fentanyl patch if possible    I advised the patient that if they experience any side effects or issues with the changes in their medication regiment, they should give our office a call to discuss  I also advised the patient not to drive or operate machinery until they see how the changes in the medication regimen affects them  The patient was agreeable and verbalized an understanding  [place] Prescription Drug Monitoring Program report was reviewed and was appropriate     The patient was not picked for a pill count today, but she did bring her medications as required  The patient's opioid scripts were sent to their pharmacy electronically and was given a 2 month supply of prescriptions with a Do Not Fill date(s) of October 17, 2019  There are risks associated with opioid medications, including dependence, addiction and tolerance  The patient understands and agrees to use these medications only as prescribed  Potential side effects of the medications include, but are not limited to, constipation, drowsiness, addiction, impaired judgment and risk of fatal overdose if not taken as prescribed  The patient was warned against driving while taking sedation medications  Sharing medications is a felony  At this point in time, the patient is showing no signs of addiction, abuse, diversion or suicidal ideation  An oral drug screen swab was collected at today's office visit  The swab will be sent for confirmatory testing  The drug screen is medically necessary because the patient is either dependent on opioid medication or is being considered for opioid medication therapy and the results could impact ongoing or future treatment  The drug screen is to evaluate for the presences or absence of prescribed, non-prescribed, and/or illicit drugs/substances  The patient will follow-up in 9 weeks for medication prescription refill and reevaluation  The patient was advised to contact the office should their symptoms worsen in the interim   The patient was agreeable and verbalized an understanding  History of Present Illness: The patient is a 62 y o  female last seen on 6/27/19 who presents for a follow up office visit in regards to chronic pain syndrome secondary to complex regional pain syndrome of the left lower extremity as well as lumbar spondylosis and stenosis with radiculopathy  The patient currently reports that since her last office visit her pain symptoms have slightly worsened, as she was hospitalized in July and she went to the hospital for severe gastrointestinal pain and nausea and after a few days in patient she was about to be discharged when she had a cardiac event where her pulse bottomed out into the 20s and her blood pressure was significantly elevated and eventually needed to have a pacemaker placed emergently  The patient reports she actually feels much better since she has the pacemaker and is not as exhausted as she feels that that could of been some of her issue along  However, she feels her back pain has been worse since her hospitalization but does feel that it is improving and she continues to try to titrate down on her medications and would like to discuss the possibility of decreasing the fentanyl patch today as she continues to use p r n  Percocet on a sparing as-needed basis as the last prescription she filled was in June  The patient presents today to discuss her medication regimen and treatment plan  Current pain medications includes:  Cymbalta 120 mg daily, fentanyl patch 25 mcg applying 1 patch transdermally every 72 hours, Percocet 5/3251 p o  q i d  p r n  for pain, and tizanidine 4 mg t i d  during the day and 1 and half pills as needed at bedtime  The patient reports that this regimen is providing 80% pain relief  The patient is reporting no side effects from this pain medication regimen      Pain Contract Signed: 7/20/18  Last Urine Drug Screen: 6/27/19    I have personally reviewed and/or updated the patient's past medical history, past surgical history, family history, social history, current medications, allergies, and vital signs today  Review of Systems:    Review of Systems   Respiratory: Negative for shortness of breath  Cardiovascular: Negative for chest pain  Gastrointestinal: Positive for constipation  Negative for diarrhea, nausea and vomiting  Musculoskeletal: Positive for gait problem (Difficulty walking ) and joint swelling (joint stiffness)  Negative for arthralgias and myalgias  Skin: Negative for rash  Neurological: Positive for weakness  Negative for dizziness and seizures  All other systems reviewed and are negative  Past Medical History:   Diagnosis Date    Anxiety     Asthma     controlled    Back complaints     Cancer ([address] )     nose    Cellulitis of left lower leg     Chronic bilateral thoracic back pain     Chronic myofascial pain     Chronic pain of both lower extremities     Chronic pain of left knee     Chronic pain syndrome     CPAP (continuous positive airway pressure) dependence     Depression     Diabetes mellitus ([address] )     Disease of thyroid gland     Gait disorder     Gastroparesis     GERD (gastroesophageal reflux disease)     History of angina     History of transfusion     Many years ago    Hyperlipidemia     Hypertension     Insulin pump in place     Kidney disease     Polyneuropathy associated with underlying disease ([place] [id number] )     PONV (postoperative nausea and vomiting)     S/P insertion of spinal cord stimulator 2018    Sarcoidosis     Sleep apnea     TIA (transient ischemic attack)        Past Surgical History:   Procedure Laterality Date    BREAST SURGERY      BREAST SURGERY      reduction    CARDIAC PACEMAKER PLACEMENT       SECTION      HERNIA REPAIR      HYSTERECTOMY      JOINT REPLACEMENT Left     NECK SURGERY      AL SURG IMPLNT Clista Anu Left 2018    Procedure:  Insertion of thoracic spinal cord stimulator electrode via laminotomy and placement of left buttock IPG;  Surgeon: Marshall Peterson MD;  [facility]: [facility];  Service: Neurosurgery    REPLACEMENT TOTAL KNEE      ROTATOR CUFF REPAIR      SPINAL STIMULATOR PLACEMENT Left 10/3/2018    Procedure: BUTTOCK RE-OPENING INCISION FOR REPOSITIONING OF IMPLANTABLE PULSE GENERATOR;  Surgeon: Marshall Peterson MD;  [facility];  Service: Neurosurgery    TONSILLECTOMY      WISDOM TOOTH EXTRACTION         Family History   Problem Relation Age of Onset    Diabetes Family     Heart disease Family     Hypertension Family     Neuropathy Family     No Known Problems Mother     Heart disease Father     Diabetes Father     No Known Problems Maternal Grandmother     No Known Problems Maternal Grandfather     No Known Problems Paternal Grandmother     No Known Problems Paternal Grandfather        Social History     Occupational History    Not on file   Tobacco Use    Smoking status: Never Smoker    Smokeless tobacco: Never Used   Substance and Sexual Activity    Alcohol use: No    Drug use: No    Sexual activity: Yes         Current Outpatient Medications:     amitriptyline (ELAVIL) 50 mg tablet, Take one tablet p o  at bedtime for two weeks    If insomnia persists, increase to two tablets at bedtime, Disp: 60 tablet, Rfl: 2    atorvastatin (LIPITOR) 80 mg tablet, Take 80 mg by mouth daily at bedtime  , Disp: , Rfl:     cholecalciferol (VITAMIN D3) 1,000 units tablet, Take 2,000 Units by mouth daily with lunch  , Disp: , Rfl: 5    clopidogrel (PLAVIX) 75 mg tablet, Take 1 tablet (75 mg total) by mouth daily (Patient taking differently: Take 75 mg by mouth daily at bedtime  ), Disp: 30 tablet, Rfl: 4    CRANBERRY PO, Take 1 capsule by mouth daily with lunch  , Disp: , Rfl:     Cyanocobalamin (VITAMIN B-12 PO), Take 1 capsule by mouth daily with lunch  , Disp: , Rfl:     Docusate Sodium 100 MG capsule, Take 100 mg by mouth daily with lunch  , Disp: , Rfl:    DULoxetine (CYMBALTA) 60 mg delayed release capsule, Take 2 PO QD, Disp: 180 capsule, Rfl: 0    fluticasone-salmeterol (ADVAIR) 100-50 mcg/dose inhaler, Inhale 2 puffs, Disp: , Rfl:     furosemide (LASIX) 20 mg tablet, Take 20 mg by mouth daily in the early morning  , Disp: , Rfl:     glucagon 1 MG injection, Glucagon Emergency Kit (human-recomb) 1 mg solution for injection, Disp: , Rfl:     glucose blood test strip, Testing six times daily  E11 65 on insulin pump, Disp: , Rfl:     levothyroxine 75 mcg tablet, Take 75 mcg by mouth daily in the early morning  , Disp: , Rfl:     Linaclotide (LINZESS) 145 MCG CAPS, Take 1 capsule by mouth daily in the early morning  , Disp: , Rfl:     losartan (COZAAR) 100 MG tablet, Take 100 mg by mouth daily in the early morning  , Disp: , Rfl:     magnesium oxide (MAG-OX) 400 mg tablet, Take 400 mg by mouth, Disp: , Rfl:     modafinil (PROVIGIL) 200 MG tablet, Take 1/2 to 1 tablet every morning as needed  , Disp: 90 tablet, Rfl: 1    Naloxone HCl (NARCAN) 4 MG/0 1ML LIQD, Sprays into 1 nostril for suspected opioid overdose   May repeat , Disp: 2 each, Rfl: 0    NOVOLOG 100 UNIT/ML injection, INJECT 120 UNITS DAILY VIA INSULIN PUMP E 11 65, Disp: , Rfl: 3    ondansetron (ZOFRAN) 8 mg tablet, Take 8 mg by mouth every 8 (eight) hours as needed  , Disp: , Rfl:     OXYBUTYNIN CHLORIDE PO, Take 10 mg by mouth daily in the early morning  , Disp: , Rfl:     oxyCODONE-acetaminophen (PERCOCET) 5-325 mg per tablet, Take 1 PO QID PRN FOR PAIN , Disp: 120 tablet, Rfl: 0    pantoprazole (PROTONIX) 40 mg tablet, Take 40 mg by mouth 2 (two) times a day  , Disp: , Rfl:     pramipexole (MIRAPEX) 1 mg tablet, Take 1 mg by mouth 2 (two) times a day  , Disp: , Rfl:     predniSONE 5 mg tablet, Take 2 PO TID on the first day and then decrease by 1 pill daily until it is gone , Disp: 21 tablet, Rfl: 0    ranitidine (ZANTAC) 150 mg tablet, Take 150 mg by mouth daily at bedtime  , Disp: , Rfl:     SYMBICORT 80-4 5 MCG/ACT inhaler, Inhale 2 puffs 2 (two) times a day  , Disp: , Rfl:     tiZANidine (ZANAFLEX) 4 mg tablet, Take 1 PO TID during the day and 1-2 PO HS for spasms  , Disp: 450 tablet, Rfl: 0    VENTOLIN  (90 Base) MCG/ACT inhaler, 2 puffs every 4 (four) hours as needed  , Disp: , Rfl:     fentaNYL (DURAGESIC) 12 mcg/hr TD 72 hr patch, Apply 1 patch TD every 72 hours for pain , Disp: 10 patch, Rfl: 0    Allergies   Allergen Reactions    Bactrim [Sulfamethoxazole-Trimethoprim] Hives    Sucralfate Hives     Facial swelling    Topamax [Topiramate]      disorentation    Azithromycin Itching    Pregabalin Confusion and Other (See Comments)     altered mental status    Norvasc [Amlodipine] Swelling    Baclofen      "That knocks me out "    Bupropion Fatigue    Methotrexate Nausea Only    Neurontin [Gabapentin] Hallucinations and Hypertension       Physical Exam:    /72   Pulse 88   Ht 5' 3" (1 6 m)   Wt 98 kg (216 lb)   BMI 38 26 kg/m²     Constitutional:normal, well developed, well nourished, alert, in no distress and non-toxic and no overt pain behavior  and overweight  Eyes:anicteric  HEENT:grossly intact  Neck:supple, symmetric, trachea midline and no masses   Pulmonary:even and unlabored  Cardiovascular:No edema or pitting edema present  Skin:Normal without rashes or lesions and well hydrated  Psychiatric:Mood and affect appropriate  Neurologic:Cranial Nerves II-XII grossly intact  Musculoskeletal:The patient's gait is slightly antalgic, painful, but steady with the use of a single cane  Imaging  No orders to display         No orders of the defined types were placed in this encounter

## 2019-09-19 NOTE — PATIENT INSTRUCTIONS
Fentanyl Fill Dates: Now & 10/17/19    Opioid Pain Management   AMBULATORY CARE:   An opioid  is a type of medicine used to treat pain  Examples of opioids are oxycodone, morphine, fentanyl, or codeine  Take opioid medicines as directed, for the condition it is prescribed:  Common problems that may occur when you do not take opioid medicines as directed include the following:  · Health problems  may occur  You may have trouble breathing  You may also develop liver or kidney damage, or stomach bleeding  Any of these health problems can become life-threatening  · Opioid dependence  means your body needs the opioid medicine to keep it from going through withdrawal      · Opioid tolerance  means the opioid does not control pain as well as it used to  You need higher doses of the opioid to get pain relief  · Opioid addiction  means you are not able to control the use of the opioid medicine  You use it when you do not have pain  You crave the opioid medicine  Call 911 or have someone call 911 for any of the following:   · You are breathing slower than normal, or you have trouble breathing  · You cannot be awakened  · You have a seizure  Seek care immediately if:   · Your heart is beating slower than usual     · Your heart feels like it is jumping or fluttering  · You are so sleepy that you cannot stay awake  · You have severe muscle pain or weakness  · You see or hear things that are not real   Contact your healthcare provider if:   · You are too dizzy to stand up  · Your pain gets worse or you have new pain  · You cannot do your usual activities because of side effects from the opioid  · You are constipated or have abdominal pain  · You have questions or concerns about your condition or care  Opioid safety measures:   · Take your medicine as directed  Ask if you need more information on how to take your medicine correctly  Follow up with your healthcare provider regularly   You may need to have your dose adjusted  Do not use opioid medicine if you are pregnant or breastfeeding  · Give your healthcare provider a list of all your medicines  Include any over-the-counter medicines, vitamins, and herbs  It can be dangerous to take opioids with certain other medicines, such as antihistamines  · Keep opioid medicine in a safe place  Store your opioid medicine in a locked cabinet to keep it away from children and others  · Do not drink alcohol while you use opioids  Alcohol use with an opioid medicine can make you sleepy and slow your breathing rate  You may stop breathing completely  · Do not drive or operate heavy machinery after you take opioid medicine  Opioid medicine can make you drowsy and make it hard to concentrate  You may injure yourself or others if you drive or operate heavy machinery while taking your medicine  · Drink fluids and eat high-fiber foods  Fluids and fiber will help prevent constipation  Ask your healthcare provider what fluids are right for you and how much you should drink  Also ask for a list of foods that contain fiber  Follow up with your healthcare provider as directed: You may need to have your dose adjusted  You may be referred to a pain specialist  Write down your questions so you remember to ask them during your visits  © 2017 [address]  Information is for End User's use only and may not be sold, redistributed or otherwise used for commercial purposes  All illustrations and images included in CareNotes® are the copyrighted property of [employer]  or [employer]  The above information is an  only  It is not intended as medical advice for individual conditions or treatments  Talk to your doctor, nurse or pharmacist before following any medical regimen to see if it is safe and effective for you

## 2019-09-20 RX ORDER — FENTANYL 12 UG/H
PATCH TRANSDERMAL
Qty: 10 PATCH | Refills: 0 | Status: SHIPPED | OUTPATIENT
Start: 2019-09-20 | End: 2019-10-14 | Stop reason: SDUPTHER

## 2019-10-14 DIAGNOSIS — M54.42 CHRONIC BILATERAL LOW BACK PAIN WITH BILATERAL SCIATICA: ICD-10-CM

## 2019-10-14 DIAGNOSIS — G89.4 PAIN SYNDROME, CHRONIC: Chronic | ICD-10-CM

## 2019-10-14 DIAGNOSIS — M79.605 CHRONIC PAIN OF BOTH LOWER EXTREMITIES: Chronic | ICD-10-CM

## 2019-10-14 DIAGNOSIS — M54.41 CHRONIC BILATERAL LOW BACK PAIN WITH BILATERAL SCIATICA: ICD-10-CM

## 2019-10-14 DIAGNOSIS — M79.604 CHRONIC PAIN OF BOTH LOWER EXTREMITIES: Chronic | ICD-10-CM

## 2019-10-14 DIAGNOSIS — G89.29 CHRONIC PAIN OF BOTH LOWER EXTREMITIES: Chronic | ICD-10-CM

## 2019-10-14 DIAGNOSIS — G89.29 CHRONIC BILATERAL LOW BACK PAIN WITH BILATERAL SCIATICA: ICD-10-CM

## 2019-10-14 DIAGNOSIS — M54.6 CHRONIC BILATERAL THORACIC BACK PAIN: ICD-10-CM

## 2019-10-14 DIAGNOSIS — G89.29 CHRONIC BILATERAL THORACIC BACK PAIN: ICD-10-CM

## 2019-10-14 RX ORDER — FENTANYL 12 UG/H
PATCH TRANSDERMAL
Qty: 10 PATCH | Refills: 0 | Status: SHIPPED | OUTPATIENT
Start: 2019-10-14 | End: 2019-11-25 | Stop reason: SDUPTHER

## 2019-11-05 ENCOUNTER — TELEPHONE (OUTPATIENT)
Dept: NEUROSURGERY | Facility: CLINIC | Age: 64
End: 2019-11-05

## 2019-11-05 NOTE — TELEPHONE ENCOUNTER
Patient Arlette Davis requesting call back regarding symptoms  Message was choppy but sounded as if she has been having increased falls  Will call patient back to obtain more info

## 2019-11-05 NOTE — TELEPHONE ENCOUNTER
Patient returned my call and stated that she was instructed to follow up with Neurology and asked if that's who we were  I informed her that we are neurosx not neurology and provided her with the telephone # for neurology  She was appreciative

## 2019-11-25 ENCOUNTER — OFFICE VISIT (OUTPATIENT)
Dept: PAIN MEDICINE | Facility: CLINIC | Age: 64
End: 2019-11-25
Payer: MEDICARE

## 2019-11-25 VITALS
HEIGHT: 63 IN | DIASTOLIC BLOOD PRESSURE: 84 MMHG | WEIGHT: 220 LBS | SYSTOLIC BLOOD PRESSURE: 140 MMHG | BODY MASS INDEX: 38.98 KG/M2 | HEART RATE: 91 BPM

## 2019-11-25 DIAGNOSIS — G90.522 COMPLEX REGIONAL PAIN SYNDROME TYPE 1 OF LEFT LOWER EXTREMITY: Chronic | ICD-10-CM

## 2019-11-25 DIAGNOSIS — M54.16 LUMBAR RADICULOPATHY: ICD-10-CM

## 2019-11-25 DIAGNOSIS — M79.18 CHRONIC MYOFASCIAL PAIN: ICD-10-CM

## 2019-11-25 DIAGNOSIS — G89.29 CHRONIC PAIN OF BOTH LOWER EXTREMITIES: Chronic | ICD-10-CM

## 2019-11-25 DIAGNOSIS — M47.817 LUMBOSACRAL SPONDYLOSIS WITHOUT MYELOPATHY: ICD-10-CM

## 2019-11-25 DIAGNOSIS — M79.605 CHRONIC PAIN OF BOTH LOWER EXTREMITIES: Chronic | ICD-10-CM

## 2019-11-25 DIAGNOSIS — G89.29 CHRONIC MYOFASCIAL PAIN: ICD-10-CM

## 2019-11-25 DIAGNOSIS — G89.4 PAIN SYNDROME, CHRONIC: Primary | Chronic | ICD-10-CM

## 2019-11-25 DIAGNOSIS — M54.42 CHRONIC BILATERAL LOW BACK PAIN WITH BILATERAL SCIATICA: ICD-10-CM

## 2019-11-25 DIAGNOSIS — M54.14 THORACIC RADICULOPATHY: ICD-10-CM

## 2019-11-25 DIAGNOSIS — M79.604 CHRONIC PAIN OF BOTH LOWER EXTREMITIES: Chronic | ICD-10-CM

## 2019-11-25 DIAGNOSIS — M54.6 CHRONIC BILATERAL THORACIC BACK PAIN: ICD-10-CM

## 2019-11-25 DIAGNOSIS — G63 POLYNEUROPATHY ASSOCIATED WITH UNDERLYING DISEASE (HCC): Chronic | ICD-10-CM

## 2019-11-25 DIAGNOSIS — M79.18 MYOFASCIAL PAIN SYNDROME: ICD-10-CM

## 2019-11-25 DIAGNOSIS — M54.41 CHRONIC BILATERAL LOW BACK PAIN WITH BILATERAL SCIATICA: ICD-10-CM

## 2019-11-25 DIAGNOSIS — G89.29 CHRONIC BILATERAL LOW BACK PAIN WITH BILATERAL SCIATICA: ICD-10-CM

## 2019-11-25 DIAGNOSIS — G89.29 CHRONIC BILATERAL THORACIC BACK PAIN: ICD-10-CM

## 2019-11-25 PROCEDURE — 99214 OFFICE O/P EST MOD 30 MIN: CPT | Performed by: NURSE PRACTITIONER

## 2019-11-25 RX ORDER — FENTANYL 12 UG/H
PATCH TRANSDERMAL
Qty: 10 PATCH | Refills: 0 | Status: SHIPPED | OUTPATIENT
Start: 2019-11-25 | End: 2019-11-26 | Stop reason: SDUPTHER

## 2019-11-25 RX ORDER — DULOXETIN HYDROCHLORIDE 60 MG/1
CAPSULE, DELAYED RELEASE ORAL
Qty: 180 CAPSULE | Refills: 0 | Status: SHIPPED | OUTPATIENT
Start: 2019-11-25 | End: 2020-02-17 | Stop reason: SDUPTHER

## 2019-11-25 RX ORDER — TROSPIUM CHLORIDE 20 MG/1
20 TABLET, FILM COATED ORAL 2 TIMES DAILY
COMMUNITY
Start: 2019-09-30 | End: 2020-02-20 | Stop reason: ALTCHOICE

## 2019-11-25 NOTE — PROGRESS NOTES
Assessment:  1  Pain syndrome, chronic    2  Chronic bilateral thoracic back pain    3  Chronic bilateral low back pain with bilateral sciatica    4  Chronic pain of both lower extremities    5  Thoracic radiculopathy    6  Lumbar radiculopathy    7  Complex regional pain syndrome type 1 of left lower extremity    8  Chronic myofascial pain    9  Lumbosacral spondylosis without myelopathy    10  Polyneuropathy associated with underlying disease ([address] )    11  Myofascial pain syndrome        Plan:    While the patient was in the office today, I did have a thorough conversation with the patient regarding her chronic pain syndrome, symptoms, and medication regimen  I did explain to the patient that it is quite reasonable to expect that her pain symptoms could have fluctuated as we did decrease the fentanyl patch by 50% and may take the next few months for by to get use to the decreased medication level  I explained the patient at this point since she is still noting moderate overall relief, I feel would be in her best interest for now to continue the fentanyl patch, tizanidine, and p r n  Percocet as prescribed  However, please note that I did not send a prescription for the p r n  Percocet because of what she had on hand and we were confident that most likely it would last her until her next office visit in 3 months  Our goal at her next office visit would be to discuss the possibility of titrating her off of the fentanyl patch altogether if possible  The patient was agreeable and verbalized an understanding  [place] Prescription Drug Monitoring Program report was reviewed and was appropriate     The patient was not picked for a pill count today, but she did bring her medications as required  The patient's opioid scripts were sent to their pharmacy electronically and was given a 3 month supply of prescriptions with a Do Not Fill date(s) of December 23, 2019 and January 20, 2020          There are risks associated with opioid medications, including dependence, addiction and tolerance  The patient understands and agrees to use these medications only as prescribed  Potential side effects of the medications include, but are not limited to, constipation, drowsiness, addiction, impaired judgment and risk of fatal overdose if not taken as prescribed  The patient was warned against driving while taking sedation medications  Sharing medications is a felony  At this point in time, the patient is showing no signs of addiction, abuse, diversion or suicidal ideation  The patient will follow-up in 12 weeks for medication prescription refill and reevaluation  The patient was advised to contact the office should their symptoms worsen in the interim  The patient was agreeable and verbalized an understanding  History of Present Illness: The patient is a 62 y o  female last seen on 9/19/19 who presents for a follow up office visit in regards to Chronic pain syndrome secondary to  Complex regional pain syndrome of the left lower extremity with lumbar spondylosis with lumbar and thoracic radiculopathy and peripheral polyneuropathy  The patient currently reports  That since her last office visit she does feel her pain symptoms have slightly worsened as she continues to try to work with her spinal cord stimulator and has followed up with the [facility] medical team since her last office visit and feels that the reprogramming seems to help her pain for a few days and then it goes back to the baseline  However, she does feel that her current pain flare-up could be related to the fact that we did just decrease her fentanyl patch from the 25 mcg patch down to the 12 mcg patch  Overall she still feels she is able to manage her pain with her current medication regimen and is still using the p r n   Percocet on a very sparing as-needed basis as evidence by the fact that she still has at least 3/4 of her prescription from a script she filled in August   She presents today to discuss her medication regimen and treatment plan as it is still her goal to try to get off of the fentanyl patch altogether  Current pain medications includes:    Fentanyl patch 12 mcg applying 1 patch transdermally every 72 hours tizanidine 4 mg q i d , and Percocet 5/325 Q i d  P r n  For pain  The patient reports that this regimen is providing 50% pain relief  The patient is reporting no side effects from this pain medication regimen  Pain Contract Signed: 7/20/18  Last Urine Drug Screen: 6/27/19    I have personally reviewed and/or updated the patient's past medical history, past surgical history, family history, social history, current medications, allergies, and vital signs today  Review of Systems:    Review of Systems   Respiratory: Negative for shortness of breath  Cardiovascular: Negative for chest pain  Gastrointestinal: Positive for constipation, diarrhea, nausea and vomiting  Musculoskeletal: Positive for gait problem  Negative for arthralgias, joint swelling (joint stiffness) and myalgias  Skin: Negative for rash  Neurological: Negative for dizziness, seizures and weakness  All other systems reviewed and are negative          Past Medical History:   Diagnosis Date    Anxiety     Asthma     controlled    Back complaints     Cancer ([facility] )     nose    Cellulitis of left lower leg     Chronic bilateral thoracic back pain     Chronic myofascial pain     Chronic pain of both lower extremities     Chronic pain of left knee     Chronic pain syndrome     CPAP (continuous positive airway pressure) dependence     Depression     Diabetes mellitus ([address]     Disease of thyroid gland     Gait disorder     Gastroparesis     GERD (gastroesophageal reflux disease)     History of angina     History of transfusion     Many years ago    Hyperlipidemia     Hypertension     Insulin pump in place     Kidney disease     Polyneuropathy associated with underlying disease [facility]     PONV (postoperative nausea and vomiting)     S/P insertion of spinal cord stimulator 2018    Sarcoidosis     Sleep apnea     TIA (transient ischemic attack)        Past Surgical History:   Procedure Laterality Date    BREAST SURGERY      BREAST SURGERY      reduction    CARDIAC PACEMAKER PLACEMENT       SECTION      HERNIA REPAIR      HYSTERECTOMY      JOINT REPLACEMENT Left     NECK SURGERY      [place] SURG IMPLNT [address] Left 2018    Procedure: Insertion of thoracic spinal cord stimulator electrode via laminotomy and placement of left buttock IPG;  Surgeon: Tung Manning MD;  [facility];  Service: Neurosurgery    REPLACEMENT TOTAL KNEE      ROTATOR CUFF REPAIR      SPINAL STIMULATOR PLACEMENT Left 10/3/2018    Procedure: BUTTOCK RE-OPENING INCISION FOR REPOSITIONING OF IMPLANTABLE PULSE GENERATOR;  Surgeon: Tung Manning MD;  [facility];  Service: Neurosurgery    TONSILLECTOMY      WISDOM TOOTH EXTRACTION         Family History   Problem Relation Age of Onset    Diabetes Family     Heart disease Family     Hypertension Family     Neuropathy Family     No Known Problems Mother     Heart disease Father     Diabetes Father     No Known Problems Maternal Grandmother     No Known Problems Maternal Grandfather     No Known Problems Paternal Grandmother     No Known Problems Paternal Grandfather        Social History     Occupational History    Not on file   Tobacco Use    Smoking status: Never Smoker    Smokeless tobacco: Never Used   Substance and Sexual Activity    Alcohol use: No    Drug use: No    Sexual activity: Yes         Current Outpatient Medications:     amitriptyline (ELAVIL) 50 mg tablet, Take one tablet p o  at bedtime for two weeks    If insomnia persists, increase to two tablets at bedtime, Disp: 60 tablet, Rfl: 2    atorvastatin (LIPITOR) 80 mg tablet, Take 80 mg by mouth daily at bedtime  , Disp: , Rfl:     cholecalciferol (VITAMIN D3) 1,000 units tablet, Take 2,000 Units by mouth daily with lunch  , Disp: , Rfl: 5    clopidogrel (PLAVIX) 75 mg tablet, Take 1 tablet (75 mg total) by mouth daily (Patient taking differently: Take 75 mg by mouth daily at bedtime  ), Disp: 30 tablet, Rfl: 4    CRANBERRY PO, Take 1 capsule by mouth daily with lunch  , Disp: , Rfl:     Cyanocobalamin (VITAMIN B-12 PO), Take 1 capsule by mouth daily with lunch  , Disp: , Rfl:     DULoxetine (CYMBALTA) 60 mg delayed release capsule, Take 2 PO QD, Disp: 180 capsule, Rfl: 0    fentaNYL (DURAGESIC) 12 mcg/hr TD 72 hr patch, Apply 1 patch TD every 72 hours for pain , Disp: 10 patch, Rfl: 0    fluticasone-salmeterol (ADVAIR) 100-50 mcg/dose inhaler, Inhale 2 puffs, Disp: , Rfl:     furosemide (LASIX) 20 mg tablet, Take 20 mg by mouth daily in the early morning  , Disp: , Rfl:     glucagon 1 MG injection, Glucagon Emergency Kit (human-recomb) 1 mg solution for injection, Disp: , Rfl:     levothyroxine 75 mcg tablet, Take 75 mcg by mouth daily in the early morning  , Disp: , Rfl:     Linaclotide (LINZESS) 145 MCG CAPS, Take 1 capsule by mouth daily in the early morning  , Disp: , Rfl:     magnesium oxide (MAG-OX) 400 mg tablet, Take 400 mg by mouth, Disp: , Rfl:     modafinil (PROVIGIL) 200 MG tablet, Take 1/2 to 1 tablet every morning as needed  , Disp: 90 tablet, Rfl: 1    NOVOLOG 100 UNIT/ML injection, INJECT 120 UNITS DAILY VIA INSULIN PUMP E 11 65, Disp: , Rfl: 3    ondansetron (ZOFRAN) 8 mg tablet, Take 8 mg by mouth every 8 (eight) hours as needed  , Disp: , Rfl:     OXYBUTYNIN CHLORIDE PO, Take 10 mg by mouth daily in the early morning  , Disp: , Rfl:     oxyCODONE-acetaminophen (PERCOCET) 5-325 mg per tablet, Take 1 PO QID PRN FOR PAIN , Disp: 120 tablet, Rfl: 0    pantoprazole (PROTONIX) 40 mg tablet, Take 40 mg by mouth 2 (two) times a day  , Disp: , Rfl:     pramipexole (MIRAPEX) 1 mg tablet, Take 1 mg by mouth 2 (two) times a day  , Disp: , Rfl:     ranitidine (ZANTAC) 150 mg tablet, Take 150 mg by mouth daily at bedtime  , Disp: , Rfl:     SYMBICORT 80-4 5 MCG/ACT inhaler, Inhale 2 puffs 2 (two) times a day  , Disp: , Rfl:     tiZANidine (ZANAFLEX) 4 mg tablet, Take 1 PO TID during the day and 1-2 PO HS for spasms  , Disp: 450 tablet, Rfl: 0    trospium chloride (SANCTURA) 20 mg tablet, Take 20 mg by mouth 2 (two) times a day, Disp: , Rfl:     VENTOLIN  (90 Base) MCG/ACT inhaler, 2 puffs every 4 (four) hours as needed  , Disp: , Rfl:     Docusate Sodium 100 MG capsule, Take 100 mg by mouth daily with lunch  , Disp: , Rfl:     glucose blood test strip, Testing six times daily  E11 65 on insulin pump, Disp: , Rfl:     losartan (COZAAR) 100 MG tablet, Take 100 mg by mouth daily in the early morning  , Disp: , Rfl:     Naloxone HCl (NARCAN) 4 MG/0 1ML LIQD, Sprays into 1 nostril for suspected opioid overdose  May repeat , Disp: 2 each, Rfl: 0    Allergies   Allergen Reactions    Bactrim [Sulfamethoxazole-Trimethoprim] Hives    Sucralfate Hives     Facial swelling    Topamax [Topiramate]      disorentation    Azithromycin Itching    Pregabalin Confusion and Other (See Comments)     altered mental status    Norvasc [Amlodipine] Swelling    Baclofen      "That knocks me out "    Bupropion Fatigue    Methotrexate Nausea Only    Neurontin [Gabapentin] Hallucinations and Hypertension       Physical Exam:    /84 (BP Location: Left arm, Patient Position: Sitting, Cuff Size: Standard)   Pulse 91   Ht 5' 3" (1 6 m)   Wt 99 8 kg (220 lb)   BMI 38 97 kg/m²     Constitutional:normal, well developed, well nourished, alert, in no distress and non-toxic and no overt pain behavior   and overweight  Eyes:anicteric  HEENT:grossly intact  Neck:supple, symmetric, trachea midline and no masses   Pulmonary:even and unlabored  Cardiovascular:No edema or pitting edema present  Skin:Normal without rashes or lesions and well hydrated  Psychiatric:Mood and affect appropriate  Neurologic:Cranial Nerves II-XII grossly intact  Musculoskeletal: the patient's gait is slightly antalgic, painful, but steady with the use of a single cane  Imaging  No orders to display         No orders of the defined types were placed in this encounter

## 2019-11-26 RX ORDER — FENTANYL 12 UG/H
PATCH TRANSDERMAL
Qty: 10 PATCH | Refills: 0 | Status: SHIPPED | OUTPATIENT
Start: 2019-11-26 | End: 2019-11-26 | Stop reason: SDUPTHER

## 2019-11-26 RX ORDER — FENTANYL 12 UG/H
PATCH TRANSDERMAL
Qty: 10 PATCH | Refills: 0 | Status: SHIPPED | OUTPATIENT
Start: 2019-11-26 | End: 2020-02-17

## 2020-01-03 ENCOUNTER — TELEPHONE (OUTPATIENT)
Dept: PAIN MEDICINE | Facility: CLINIC | Age: 65
End: 2020-01-03

## 2020-01-03 NOTE — TELEPHONE ENCOUNTER
Pt left message on VM stating that she would like to talk to someone about her spasms in her legs       Call back number is [phone]

## 2020-01-03 NOTE — TELEPHONE ENCOUNTER
S/w pt, c/o increased spasms in her legs in the evening - worst from 4 - 10ish  Pt stated that this may be r/t physical therapy  Questioning an increase in tizanidine  Reviewed pt's rx, questioned 1 pill po tid during the day, 1 - 2 pill at hs  Pt stated that she is taking much less than that - 1 am, 2 at hs  Advised pt, ok to take q 4 hrs for example: 7 am, 11 am, 3 pm, 7 pm, 11 pm totaling 5 pills / day or if she does not need the AM dose, ok to take 2 pills at hs  Confirmed pt's rx is for #450 which is 5 pills / day for 90 days  Advised pt, this medication causes drowsiness  Do not drive or operate machinery until you are familiar with how this may affect you  Pt verbalized understanding  Will try this rx and cb if questions / concerns arise

## 2020-02-12 ENCOUNTER — TELEPHONE (OUTPATIENT)
Dept: PAIN MEDICINE | Facility: CLINIC | Age: 65
End: 2020-02-12

## 2020-02-17 ENCOUNTER — OFFICE VISIT (OUTPATIENT)
Dept: PAIN MEDICINE | Facility: CLINIC | Age: 65
End: 2020-02-17
Payer: MEDICARE

## 2020-02-17 VITALS
BODY MASS INDEX: 38.97 KG/M2 | DIASTOLIC BLOOD PRESSURE: 84 MMHG | SYSTOLIC BLOOD PRESSURE: 140 MMHG | HEIGHT: 63 IN | HEART RATE: 71 BPM

## 2020-02-17 DIAGNOSIS — G89.29 CHRONIC MYOFASCIAL PAIN: ICD-10-CM

## 2020-02-17 DIAGNOSIS — G57.11 MERALGIA PARAESTHETICA, RIGHT: ICD-10-CM

## 2020-02-17 DIAGNOSIS — M54.42 CHRONIC BILATERAL LOW BACK PAIN WITH BILATERAL SCIATICA: ICD-10-CM

## 2020-02-17 DIAGNOSIS — M54.41 CHRONIC BILATERAL LOW BACK PAIN WITH BILATERAL SCIATICA: ICD-10-CM

## 2020-02-17 DIAGNOSIS — G89.29 CHRONIC BILATERAL THORACIC BACK PAIN: ICD-10-CM

## 2020-02-17 DIAGNOSIS — M25.562 CHRONIC PAIN OF LEFT KNEE: ICD-10-CM

## 2020-02-17 DIAGNOSIS — G89.4 PAIN SYNDROME, CHRONIC: Primary | Chronic | ICD-10-CM

## 2020-02-17 DIAGNOSIS — G89.29 CHRONIC PAIN OF BOTH LOWER EXTREMITIES: Chronic | ICD-10-CM

## 2020-02-17 DIAGNOSIS — G90.522 COMPLEX REGIONAL PAIN SYNDROME TYPE 1 OF LEFT LOWER EXTREMITY: Chronic | ICD-10-CM

## 2020-02-17 DIAGNOSIS — M79.604 CHRONIC PAIN OF BOTH LOWER EXTREMITIES: Chronic | ICD-10-CM

## 2020-02-17 DIAGNOSIS — G89.29 CHRONIC PAIN OF RIGHT KNEE: ICD-10-CM

## 2020-02-17 DIAGNOSIS — M79.18 MYOFASCIAL PAIN SYNDROME: ICD-10-CM

## 2020-02-17 DIAGNOSIS — G63 POLYNEUROPATHY ASSOCIATED WITH UNDERLYING DISEASE (HCC): Chronic | ICD-10-CM

## 2020-02-17 DIAGNOSIS — Z79.891 ENCOUNTER FOR LONG-TERM OPIATE ANALGESIC USE: ICD-10-CM

## 2020-02-17 DIAGNOSIS — M54.16 LUMBAR RADICULOPATHY: ICD-10-CM

## 2020-02-17 DIAGNOSIS — M25.561 CHRONIC PAIN OF RIGHT KNEE: ICD-10-CM

## 2020-02-17 DIAGNOSIS — M47.817 LUMBOSACRAL SPONDYLOSIS WITHOUT MYELOPATHY: ICD-10-CM

## 2020-02-17 DIAGNOSIS — G89.29 CHRONIC SCAPULAR PAIN: ICD-10-CM

## 2020-02-17 DIAGNOSIS — F11.20 UNCOMPLICATED OPIOID DEPENDENCE (HCC): ICD-10-CM

## 2020-02-17 DIAGNOSIS — G89.29 CHRONIC BILATERAL LOW BACK PAIN WITH BILATERAL SCIATICA: ICD-10-CM

## 2020-02-17 DIAGNOSIS — M54.6 CHRONIC BILATERAL THORACIC BACK PAIN: ICD-10-CM

## 2020-02-17 DIAGNOSIS — M79.18 CHRONIC MYOFASCIAL PAIN: ICD-10-CM

## 2020-02-17 DIAGNOSIS — G89.29 CHRONIC PAIN OF LEFT KNEE: ICD-10-CM

## 2020-02-17 DIAGNOSIS — M79.605 CHRONIC PAIN OF BOTH LOWER EXTREMITIES: Chronic | ICD-10-CM

## 2020-02-17 DIAGNOSIS — M54.14 THORACIC RADICULOPATHY: ICD-10-CM

## 2020-02-17 DIAGNOSIS — M89.8X1 CHRONIC SCAPULAR PAIN: ICD-10-CM

## 2020-02-17 PROCEDURE — 99214 OFFICE O/P EST MOD 30 MIN: CPT | Performed by: NURSE PRACTITIONER

## 2020-02-17 RX ORDER — TIZANIDINE 4 MG/1
TABLET ORAL
Qty: 450 TABLET | Refills: 0 | Status: SHIPPED | OUTPATIENT
Start: 2020-02-17 | End: 2020-06-23

## 2020-02-17 RX ORDER — OXYCODONE HYDROCHLORIDE AND ACETAMINOPHEN 5; 325 MG/1; MG/1
TABLET ORAL
Qty: 75 TABLET | Refills: 0 | Status: SHIPPED | OUTPATIENT
Start: 2020-02-17 | End: 2020-02-18 | Stop reason: SDUPTHER

## 2020-02-17 RX ORDER — DULOXETIN HYDROCHLORIDE 60 MG/1
CAPSULE, DELAYED RELEASE ORAL
Qty: 180 CAPSULE | Refills: 0 | Status: SHIPPED | OUTPATIENT
Start: 2020-02-17 | End: 2020-05-12 | Stop reason: SDUPTHER

## 2020-02-17 NOTE — PATIENT INSTRUCTIONS
Percocet Fill Dates: Today, 3/16/20, 4/13/20    Opioid Pain Management   AMBULATORY CARE:   An opioid  is a type of medicine used to treat pain  Examples of opioids are oxycodone, morphine, fentanyl, or codeine  Take opioid medicines as directed, for the condition it is prescribed:  Common problems that may occur when you do not take opioid medicines as directed include the following:  · Health problems  may occur  You may have trouble breathing  You may also develop liver or kidney damage, or stomach bleeding  Any of these health problems can become life-threatening  · Opioid dependence  means your body needs the opioid medicine to keep it from going through withdrawal      · Opioid tolerance  means the opioid does not control pain as well as it used to  You need higher doses of the opioid to get pain relief  · Opioid addiction  means you are not able to control the use of the opioid medicine  You use it when you do not have pain  You crave the opioid medicine  Call 911 or have someone call 911 for any of the following:   · You are breathing slower than normal, or you have trouble breathing  · You cannot be awakened  · You have a seizure  Seek care immediately if:   · Your heart is beating slower than usual     · Your heart feels like it is jumping or fluttering  · You are so sleepy that you cannot stay awake  · You have severe muscle pain or weakness  · You see or hear things that are not real   Contact your healthcare provider if:   · You are too dizzy to stand up  · Your pain gets worse or you have new pain  · You cannot do your usual activities because of side effects from the opioid  · You are constipated or have abdominal pain  · You have questions or concerns about your condition or care  Opioid safety measures:   · Take your medicine as directed  Ask if you need more information on how to take your medicine correctly   Follow up with your healthcare provider regularly  You may need to have your dose adjusted  Do not use opioid medicine if you are pregnant or breastfeeding  · Give your healthcare provider a list of all your medicines  Include any over-the-counter medicines, vitamins, and herbs  It can be dangerous to take opioids with certain other medicines, such as antihistamines  · Keep opioid medicine in a safe place  Store your opioid medicine in a locked cabinet to keep it away from children and others  · Do not drink alcohol while you use opioids  Alcohol use with an opioid medicine can make you sleepy and slow your breathing rate  You may stop breathing completely  · Do not drive or operate heavy machinery after you take opioid medicine  Opioid medicine can make you drowsy and make it hard to concentrate  You may injure yourself or others if you drive or operate heavy machinery while taking your medicine  · Drink fluids and eat high-fiber foods  Fluids and fiber will help prevent constipation  Ask your healthcare provider what fluids are right for you and how much you should drink  Also ask for a list of foods that contain fiber  Follow up with your healthcare provider as directed: You may need to have your dose adjusted  You may be referred to a pain specialist  Write down your questions so you remember to ask them during your visits  © 2017 [address] [employer] Information is for End User's use only and may not be sold, redistributed or otherwise used for commercial purposes  All illustrations and images included in CareNotes® are the copyrighted property of [employer]  or [employer]  The above information is an  only  It is not intended as medical advice for individual conditions or treatments  Talk to your doctor, nurse or pharmacist before following any medical regimen to see if it is safe and effective for you

## 2020-02-17 NOTE — PROGRESS NOTES
Assessment:  1  Pain syndrome, chronic    2  Chronic bilateral low back pain with bilateral sciatica    3  Chronic bilateral thoracic back pain    4  Chronic scapular pain    5  Chronic pain of both lower extremities    6  Chronic pain of left knee    7  Chronic pain of right knee    8  Chronic myofascial pain    9  Lumbar radiculopathy    10  Thoracic radiculopathy    11  Complex regional pain syndrome type 1 of left lower extremity    12  Meralgia paraesthetica, right    13  Lumbosacral spondylosis without myelopathy    14  Myofascial pain syndrome    15  Polyneuropathy associated with underlying disease ([facility] )    16  Uncomplicated opioid dependence ([facility] )    17  Encounter for long-term opiate analgesic use        Plan:  While the patient was in the office today, I did have a thorough conversation with the patient regarding their chronic pain syndrome, symptoms, medication regimen, and treatment plan  I explained to the patient and her mother that I feel it is important that she continue to utilize the spinal cord stimulator and continue to try to learn how to increase or decrease the stimulation according to her pain levels  I encouraged the patient to follow up with the [address] [place] [id number] represent it is in the next 2-3 weeks for reprogramming and also to encourage her to ask them to teacher more about how to adjust the stimulator settings  I explained to the patient that the whole point of the stimulator is to give her control over trying to manage her pain and allow her to continue to titrate down on her medications  The patient was agreeable and verbalized an understanding  At this point time, with regards to her medication regimen, I discussed with the patient and her mother that I feel if she would like to try to discontinue the fentanyl patch as she is now on the lowest dosage, we can do so at this time  However, temporarily I will increase the Percocet 5/325 to t i d  Dosing p r n   For pain dispense number 75 pills with the instructions to take 3 pills daily for 2 weeks and then for the 2nd 2 weeks 2 pills daily  For the 2nd month we will continue the b i d  Dosing and supply 60 pills for the month, however, for the 3rd month we will decrease the total amount down to 45 pills per 30 days as our goal to see what is the least amount of opioids we can manage her pain on  For now, we will continue the Cymbalta and tizanidine as prescribed  I advised the patient that if they experience any side effects or issues with the changes in their medication regiment, they should give our office a call to discuss  I also advised the patient not to drive or operate machinery until they see how the changes in the medication regimen affects them  The patient was agreeable and verbalized an understanding  [place] Prescription Drug Monitoring Program report was reviewed and was appropriate     The patient was selected for a random pill count at todays office visit  The medication was available for the count and the amount present was found to be appropriate according to the date(s) filled and the medication prescription instructions noted on the prescription container  The medication was returned to the patient and the documentation form can be found in the patient's chart  The patient's opioid scripts were sent to their pharmacy electronically and was given a 3 month supply of prescriptions with a Do Not Fill date(s) of March 16, 2020 and April 13, 2020  There are risks associated with opioid medications, including dependence, addiction and tolerance  The patient understands and agrees to use these medications only as prescribed  Potential side effects of the medications include, but are not limited to, constipation, drowsiness, addiction, impaired judgment and risk of fatal overdose if not taken as prescribed  The patient was warned against driving while taking sedation medications  Sharing medications is a felony  At this point in time, the patient is showing no signs of addiction, abuse, diversion or suicidal ideation  An oral drug screen swab was collected at today's office visit  The swab will be sent for confirmatory testing  The drug screen is medically necessary because the patient is either dependent on opioid medication or is being considered for opioid medication therapy and the results could impact ongoing or future treatment  The drug screen is to evaluate for the presences or absence of prescribed, non-prescribed, and/or illicit drugs/substances  The patient will follow-up in 12 weeks for medication prescription refill and reevaluation  The patient was advised to contact the office should their symptoms worsen in the interim  The patient was agreeable and verbalized an understanding  History of Present Illness: The patient is a 62 y o  female last seen on 11/25/19 who presents for a follow up office visit in regards to chronic pain syndrome secondary to complex regional pain syndrome the left lower extremity, lumbar spondylosis, with radiculopathy, diffuse joint pain, and diffuse myofascial/fibromyalgia pain  The patient currently reports that overall she does feel her pain is slightly worse as we are trying to titrate her down on her opioid medications and continue to utilize her spinal cord stimulator to better control her baseline pain, allowing her to use the least amount of opioid medications possible  The patient reports that she would like to discuss the possibility of discontinuing the fentanyl patch and then trying to manage on Lower amounts of Percocet, if not eventually also getting off the Percocet if possible  She presents today to discuss her medication regimen and treatment plan  The patient was agreeable and verbalized an understanding           Current pain medications includes:  Fentanyl patch 12 micro g applying 1 patch transdermally Q 72 hours, Cymbalta 120 mg daily, tizanidine 20 mg daily, and Percocet 5/3251 p o  B i d  P r n  for pain dispense number 45 pills per 30 days  The patient reports that this regimen is providing 30% pain relief  The patient is reporting no side effects from this pain medication regimen  Pain Contract Signed: 4/10/19  Last Urine Drug Screen: 2/17/2020    I have personally reviewed and/or updated the patient's past medical history, past surgical history, family history, social history, current medications, allergies, and vital signs today  Review of Systems:    Review of Systems   Respiratory: Negative for shortness of breath  Cardiovascular: Negative for chest pain  Gastrointestinal: Positive for constipation, diarrhea and nausea  Negative for vomiting  Musculoskeletal: Positive for gait problem and joint swelling (joint stiffness)  Negative for arthralgias and myalgias  Skin: Negative for rash  Neurological: Positive for dizziness and weakness  Negative for seizures  All other systems reviewed and are negative          Past Medical History:   Diagnosis Date    Anxiety     Asthma     controlled    Back complaints     Cancer ([facility] )     nose    Cellulitis of left lower leg     Chronic bilateral thoracic back pain     Chronic myofascial pain     Chronic pain of both lower extremities     Chronic pain of left knee     Chronic pain syndrome     CPAP (continuous positive airway pressure) dependence     Depression     [facility]     Disease of thyroid gland     Gait disorder     Gastroparesis     GERD (gastroesophageal reflux disease)     History of angina     History of transfusion     Many years ago    Hyperlipidemia     Hypertension     Insulin pump in place     Kidney disease     Polyneuropathy associated with underlying disease ([facility] )     PONV (postoperative nausea and vomiting)     S/P insertion of spinal cord stimulator 6/8/2018    Sarcoidosis     Sleep apnea     TIA (transient ischemic attack)        Past Surgical History:   Procedure Laterality Date    BREAST SURGERY      BREAST SURGERY      reduction    CARDIAC PACEMAKER PLACEMENT       SECTION      HERNIA REPAIR      HYSTERECTOMY      JOINT REPLACEMENT Left     NECK SURGERY      PA SURG OPALNT Tip Peña Left 2018    Procedure: Insertion of thoracic spinal cord stimulator electrode via laminotomy and placement of left buttock IPG;  Surgeon: Nicola Ann MD;  [facility];  Service: Neurosurgery    REPLACEMENT TOTAL KNEE      ROTATOR CUFF REPAIR      SPINAL STIMULATOR PLACEMENT Left 10/3/2018    Procedure: BUTTOCK RE-OPENING INCISION FOR REPOSITIONING OF IMPLANTABLE PULSE GENERATOR;  Surgeon: Nicola Ann MD;  [facility];  Service: Neurosurgery    TONSILLECTOMY      WISDOM TOOTH EXTRACTION         Family History   Problem Relation Age of Onset    Diabetes Family     Heart disease Family     Hypertension Family     Neuropathy Family     No Known Problems Mother     Heart disease Father     Diabetes Father     No Known Problems Maternal Grandmother     No Known Problems Maternal Grandfather     No Known Problems Paternal Grandmother     No Known Problems Paternal Grandfather        Social History     Occupational History    Not on file   Tobacco Use    Smoking status: Never Smoker    Smokeless tobacco: Never Used   Substance and Sexual Activity    Alcohol use: No    Drug use: No    Sexual activity: Yes         Current Outpatient Medications:     amitriptyline (ELAVIL) 50 mg tablet, Take one tablet p o  at bedtime for two weeks    If insomnia persists, increase to two tablets at bedtime, Disp: 60 tablet, Rfl: 2    atorvastatin (LIPITOR) 80 mg tablet, Take 80 mg by mouth daily at bedtime  , Disp: , Rfl:     cholecalciferol (VITAMIN D3) 1,000 units tablet, Take 2,000 Units by mouth daily with lunch  , Disp: , Rfl: 5    clopidogrel (PLAVIX) 75 mg tablet, Take 1 tablet (75 mg total) by mouth daily (Patient taking differently: Take 75 mg by mouth daily at bedtime  ), Disp: 30 tablet, Rfl: 4    CRANBERRY PO, Take 1 capsule by mouth daily with lunch  , Disp: , Rfl:     Cyanocobalamin (VITAMIN B-12 PO), Take 1 capsule by mouth daily with lunch  , Disp: , Rfl:     Docusate Sodium 100 MG capsule, Take 100 mg by mouth daily with lunch  , Disp: , Rfl:     DULoxetine (CYMBALTA) 60 mg delayed release capsule, Take 2 PO QD, Disp: 180 capsule, Rfl: 0    fluticasone-salmeterol (ADVAIR) 100-50 mcg/dose inhaler, Inhale 2 puffs, Disp: , Rfl:     furosemide (LASIX) 20 mg tablet, Take 20 mg by mouth daily in the early morning  , Disp: , Rfl:     glucagon 1 MG injection, Glucagon Emergency Kit (human-recomb) 1 mg solution for injection, Disp: , Rfl:     levothyroxine 75 mcg tablet, Take 75 mcg by mouth daily in the early morning  , Disp: , Rfl:     Linaclotide (LINZESS) 145 MCG CAPS, Take 1 capsule by mouth daily in the early morning  , Disp: , Rfl:     losartan (COZAAR) 100 MG tablet, Take 100 mg by mouth daily in the early morning  , Disp: , Rfl:     magnesium oxide (MAG-OX) 400 mg tablet, Take 400 mg by mouth, Disp: , Rfl:     modafinil (PROVIGIL) 200 MG tablet, Take 1/2 to 1 tablet every morning as needed  , Disp: 90 tablet, Rfl: 1    NOVOLOG 100 UNIT/ML injection, INJECT 120 UNITS DAILY VIA INSULIN PUMP E 11 65, Disp: , Rfl: 3    ondansetron (ZOFRAN) 8 mg tablet, Take 8 mg by mouth every 8 (eight) hours as needed  , Disp: , Rfl:     OXYBUTYNIN CHLORIDE PO, Take 10 mg by mouth daily in the early morning  , Disp: , Rfl:     oxyCODONE-acetaminophen (Percocet) 5-325 mg per tablet, Take 1 PO TID for pain x 2 weeks, then 1 PO BID PRN , Disp: 75 tablet, Rfl: 0    pantoprazole (PROTONIX) 40 mg tablet, Take 40 mg by mouth 2 (two) times a day  , Disp: , Rfl:     pramipexole (MIRAPEX) 1 mg tablet, Take 1 mg by mouth 2 (two) times a day  , Disp: , Rfl:     ranitidine (ZANTAC) 150 mg tablet, Take 150 mg by mouth daily at bedtime  , Disp: , Rfl:     SYMBICORT 80-4 5 MCG/ACT inhaler, Inhale 2 puffs 2 (two) times a day  , Disp: , Rfl:     tiZANidine (ZANAFLEX) 4 mg tablet, Take 1 PO TID during the day and 1-2 PO HS for spasms  , Disp: 450 tablet, Rfl: 0    trospium chloride (SANCTURA) 20 mg tablet, Take 20 mg by mouth 2 (two) times a day, Disp: , Rfl:     VENTOLIN  (90 Base) MCG/ACT inhaler, 2 puffs every 4 (four) hours as needed  , Disp: , Rfl:     glucose blood test strip, Testing six times daily  E11 65 on insulin pump, Disp: , Rfl:     Naloxone HCl (NARCAN) 4 MG/0 1ML LIQD, Sprays into 1 nostril for suspected opioid overdose  May repeat , Disp: 2 each, Rfl: 0    Allergies   Allergen Reactions    Bactrim [Sulfamethoxazole-Trimethoprim] Hives    Sucralfate Hives     Facial swelling    Topamax [Topiramate]      disorentation    Azithromycin Itching    Pregabalin Confusion and Other (See Comments)     altered mental status    Norvasc [Amlodipine] Swelling    Baclofen      "That knocks me out "    Bupropion Fatigue    Methotrexate Nausea Only    Neurontin [Gabapentin] Hallucinations and Hypertension       Physical Exam:    /84 (BP Location: Left arm, Patient Position: Sitting, Cuff Size: Standard)   Pulse 71   Ht 5' 3" (1 6 m)   BMI 38 97 kg/m²     Constitutional:normal, well developed, well nourished, alert, in no distress and non-toxic and no overt pain behavior  and overweight  Eyes:anicteric  HEENT:grossly intact  Neck:supple, symmetric, trachea midline and no masses   Pulmonary:even and unlabored  Cardiovascular:No edema or pitting edema present  Skin:Normal without rashes or lesions and well hydrated  Psychiatric:Mood and affect appropriate  Neurologic:Cranial Nerves II-XII grossly intact  Musculoskeletal:The patient's gait is slightly antalgic, painful, but steady with the use of a single cane        Imaging  No orders to display         Orders Placed This Encounter   Procedures    MM OF_Alprazolam Definitive    MM OF_Amphetamine Definitive Test    MM OF_Atomoxetine Definitive Test    MM OF_Buprenorphine Definitive Test    MM OF_Clonazepam Definitive    MM OF_Cocaine Definitive Test    MM OF_Codeine Definitive    MM OF_Diazepam Definitive    MM OF_Fentanyl Definitive Test    MM OF_Heroin Definitive Test    MM OF_Hydrocodone Definitive    MM OF_Hydromorphone Definitive    MM OF_Lorazepam Definitive    MM OF_MDMA Definitive Test    MM OF_Meperidine Definitive Test    MM OF_Methadone Definitive Test    MM OF_Methylphenidate Definitive Test    MM OF_Morphine Definitive    MM OF_Oxazepam Definitive    MM OF_Oxycodone Definitive Test - Oral Fluid    MM OF_Oxymorphone Definitive Test    MM OF_Phencyclidine Definitive Test    MM OF_Temazepam Definitive    MM OF_THC Definitive Test    MM OF_Tramadol Definitive Test    MM ALL_Prescribed Meds and Special Instructions

## 2020-02-18 DIAGNOSIS — G47.10 HYPERSOMNIA: ICD-10-CM

## 2020-02-18 RX ORDER — OXYCODONE HYDROCHLORIDE AND ACETAMINOPHEN 5; 325 MG/1; MG/1
TABLET ORAL
Qty: 60 TABLET | Refills: 0 | Status: SHIPPED | OUTPATIENT
Start: 2020-02-18 | End: 2020-02-18 | Stop reason: SDUPTHER

## 2020-02-18 RX ORDER — OXYCODONE HYDROCHLORIDE AND ACETAMINOPHEN 5; 325 MG/1; MG/1
TABLET ORAL
Qty: 45 TABLET | Refills: 0 | Status: SHIPPED | OUTPATIENT
Start: 2020-02-18 | End: 2020-05-12 | Stop reason: SDUPTHER

## 2020-02-18 NOTE — PROGRESS NOTES
2/20/2020    [facility]  1955  [id number]        Assessment  -Urinary incontinence    Discussion/Plan  Bree Antunez is a 62 y o  female who presents in consultation     -Urine dip in office today shows no evidence of microscopic hematuria or infection  However, we will send for formal urinalysis with microscopic with reflex  Will call patient with any significant findings  I recommend discontinuing trospium and she states medication is ineffective, and trialing Myrbetriq  There is mention of this medication in Care everywhere, but unsure of reason for discontinuation  Reviewed administration instructions and potential side effects  Patient is requesting only 1 week of medication be sent to her pharmacy and she typically develops adverse effects from medication  If she does not develop any side effects she will call our office for a refill  We also discussed dietary modifications and encouraged patient to avoid bladder irritants  She will return in 6 weeks to re-evaluate her urinary pattern  If symptoms persist we will consider pelvic floor physical therapy and discussed tertiary management   -All questions answered, patients agree with plan     History of Present Illness  62 y o  female who presents in consultation for urinary incontinence  She reports worsening episodes of urinary urgency, frequency, and urge incontinence over the last 3 months  Patient denies any previous urologic history, however after looking into Care everywhere, patient is following with Urogynecology at [facility]  She states she has never tried any other medications, however she is currently on trospium and previous notes mention oxybutynin and Myrbetriq  Patient cannot recall that history  She states she wears 1 sanitary pad daily for protection  Her primary symptoms occur during the daytime  She denies any episodes of stress incontinence  No prior urologic history, surgical intervention, or instrumentation    Patient does states she drinks iced tea and primarily hydrate with water  No gross hematuria or dysuria  Review of Systems  Review of Systems   Constitutional: Negative  HENT: Negative  Respiratory: Negative  Cardiovascular: Negative  Gastrointestinal: Negative  Genitourinary: Positive for frequency and urgency  Negative for decreased urine volume, difficulty urinating, dysuria, flank pain and hematuria  Musculoskeletal: Negative  Skin: Negative  Neurological: Negative  Psychiatric/Behavioral: Negative  Past Medical History  Past Medical History:   Diagnosis Date    Anxiety     Asthma     controlled    Back complaints     Cancer ([place] [address] )     nose    Cellulitis of left lower leg     Chronic bilateral thoracic back pain     Chronic myofascial pain     Chronic pain of both lower extremities     Chronic pain of left knee     Chronic pain syndrome     CPAP (continuous positive airway pressure) dependence     Depression     Diabetes mellitus ([place] [address] )     Disease of thyroid gland     Gait disorder     Gastroparesis     GERD (gastroesophageal reflux disease)     History of angina     History of transfusion     Many years ago    Hyperlipidemia     Hypertension     Insulin pump in place     Kidney disease     Polyneuropathy associated with underlying disease ([place] [address] )     PONV (postoperative nausea and vomiting)     S/P insertion of spinal cord stimulator 2018    Sarcoidosis     Sleep apnea     TIA (transient ischemic attack)        Past Social History  Past Surgical History:   Procedure Laterality Date    BREAST SURGERY      BREAST SURGERY      reduction    CARDIAC PACEMAKER PLACEMENT       SECTION      HERNIA REPAIR      HYSTERECTOMY      JOINT REPLACEMENT Left     NECK SURGERY      FL SURG IMPLNT [address] Left 2018    Procedure:  Insertion of thoracic spinal cord stimulator electrode via laminotomy and placement of left buttock IPG;  Surgeon: Delia Andrade MD;  [facility];  Service: Neurosurgery    REPLACEMENT TOTAL KNEE      ROTATOR CUFF REPAIR      SPINAL STIMULATOR PLACEMENT Left 10/3/2018    Procedure: BUTTOCK RE-OPENING INCISION FOR REPOSITIONING OF IMPLANTABLE PULSE GENERATOR;  Surgeon: Delia Andrade MD;  [facility];  Service: Neurosurgery    TONSILLECTOMY      WISDOM TOOTH EXTRACTION         Past Family History  Family History   Problem Relation Age of Onset    Diabetes Family     Heart disease Family     Hypertension Family     Neuropathy Family     No Known Problems Mother     Heart disease Father     Diabetes Father     No Known Problems Maternal Grandmother     No Known Problems Maternal Grandfather     No Known Problems Paternal Grandmother     No Known Problems Paternal Grandfather        Past Social history  Social History     Socioeconomic History    Marital status: /Civil Union     Spouse name: Not on file    Number of children: Not on file    Years of education: Not on file    Highest education level: Not on file   Occupational History    Not on file   Social Needs    Financial resource strain: Not on file    Food insecurity:     Worry: Not on file     Inability: Not on file    Transportation needs:     Medical: Not on file     Non-medical: Not on file   Tobacco Use    Smoking status: Never Smoker    Smokeless tobacco: Never Used   Substance and Sexual Activity    Alcohol use: No    Drug use: No    Sexual activity: Yes   Lifestyle    Physical activity:     Days per week: Not on file     Minutes per session: Not on file    Stress: Not on file   Relationships    Social connections:     Talks on phone: Not on file     Gets together: Not on file     Attends Sikhism service: Not on file     Active member of club or organization: Not on file     Attends meetings of clubs or organizations: Not on file     Relationship status: Not on file    Intimate partner violence:     Fear of current or ex partner: Not on file     Emotionally abused: Not on file     Physically abused: Not on file     Forced sexual activity: Not on file   Other Topics Concern    Not on file   Social History Narrative    Not on file       Current Medications  Current Outpatient Medications   Medication Sig Dispense Refill    amitriptyline (ELAVIL) 50 mg tablet Take one tablet p o  at bedtime for two weeks  If insomnia persists, increase to two tablets at bedtime 60 tablet 2    atorvastatin (LIPITOR) 80 mg tablet Take 80 mg by mouth daily at bedtime        cholecalciferol (VITAMIN D3) 1,000 units tablet Take 2,000 Units by mouth daily with lunch    5    clopidogrel (PLAVIX) 75 mg tablet Take 1 tablet (75 mg total) by mouth daily (Patient taking differently: Take 75 mg by mouth daily at bedtime  ) 30 tablet 4    CRANBERRY PO Take 1 capsule by mouth daily with lunch        Cyanocobalamin (VITAMIN B-12 PO) Take 1 capsule by mouth daily with lunch        Docusate Sodium 100 MG capsule Take 100 mg by mouth daily with lunch        DULoxetine (CYMBALTA) 60 mg delayed release capsule Take 2 PO  capsule 0    fluticasone-salmeterol (ADVAIR) 100-50 mcg/dose inhaler Inhale 2 puffs      furosemide (LASIX) 20 mg tablet Take 20 mg by mouth daily in the early morning        glucagon 1 MG injection Glucagon Emergency Kit (human-recomb) 1 mg solution for injection      glucose blood test strip Testing six times daily  E11 65 on insulin pump      levothyroxine 75 mcg tablet Take 75 mcg by mouth daily in the early morning        Linaclotide (LINZESS) 145 MCG CAPS Take 1 capsule by mouth daily in the early morning        losartan (COZAAR) 100 MG tablet Take 100 mg by mouth daily in the early morning        magnesium oxide (MAG-OX) 400 mg tablet Take 400 mg by mouth      modafinil (PROVIGIL) 200 MG tablet Take 1/2 to 1 tablet every morning as needed   90 tablet 1    Naloxone HCl (NARCAN) 4 MG/0 1ML LIQD Sprays into 1 nostril for suspected opioid overdose  May repeat  2 each 0    NOVOLOG 100 UNIT/ML injection INJECT 120 UNITS DAILY VIA INSULIN PUMP E 11 65  3    ondansetron (ZOFRAN) 8 mg tablet Take 8 mg by mouth every 8 (eight) hours as needed        OXYBUTYNIN CHLORIDE PO Take 10 mg by mouth daily in the early morning        oxyCODONE-acetaminophen (Percocet) 5-325 mg per tablet Take 1 PO BID PRN for pain  DO NOT FILL UNTIL: 4/13/20 45 tablet 0    pantoprazole (PROTONIX) 40 mg tablet Take 40 mg by mouth 2 (two) times a day        pramipexole (MIRAPEX) 1 mg tablet Take 1 mg by mouth 2 (two) times a day        ranitidine (ZANTAC) 150 mg tablet Take 150 mg by mouth daily at bedtime        SYMBICORT 80-4 5 MCG/ACT inhaler Inhale 2 puffs 2 (two) times a day        tiZANidine (ZANAFLEX) 4 mg tablet Take 1 PO TID during the day and 1-2 PO HS for spasms  450 tablet 0    trospium chloride (SANCTURA) 20 mg tablet Take 20 mg by mouth 2 (two) times a day      VENTOLIN  (90 Base) MCG/ACT inhaler 2 puffs every 4 (four) hours as needed         No current facility-administered medications for this visit  Allergies  Allergies   Allergen Reactions    Bactrim [Sulfamethoxazole-Trimethoprim] Hives    Sucralfate Hives     Facial swelling    Topamax [Topiramate]      disorentation    Azithromycin Itching    Pregabalin Confusion and Other (See Comments)     altered mental status    Norvasc [Amlodipine] Swelling    Baclofen      "That knocks me out "    Bupropion Fatigue    Methotrexate Nausea Only    Neurontin [Gabapentin] Hallucinations and Hypertension       Past medical history, social history, family history, medications and allergies were reviewed  Vitals  There were no vitals filed for this visit  Physical Exam  Physical Exam   Constitutional: She is oriented to person, place, and time  She appears well-developed and well-nourished  HENT:   Head: Normocephalic     Eyes: Pupils are equal, round, and reactive to light  Neck: Normal range of motion  Cardiovascular: Normal rate and regular rhythm  Pulmonary/Chest: Effort normal    Abdominal: Soft  Normal appearance  She exhibits no distension  There is no tenderness  There is no CVA tenderness  Genitourinary:   Genitourinary Comments: No suprapubic discomfort or distention   Musculoskeletal: Normal range of motion  Ambulates with cane   Neurological: She is alert and oriented to person, place, and time  Skin: Skin is warm and dry  Psychiatric: She has a normal mood and affect  Her behavior is normal  Judgment and thought content normal        Results    I have personally reviewed all pertinent lab results and reviewed with patient  Lab Results   Component Value Date    GLUCOSE 197 (H) 04/23/2018    CALCIUM 10 1 09/10/2018    K 4 3 09/10/2018    CO2 27 09/10/2018     09/10/2018    BUN 16 09/10/2018    CREATININE 1 10 09/10/2018     Lab Results   Component Value Date    WBC 5 46 09/10/2018    HGB 9 2 (L) 09/10/2018    HCT 32 3 (L) 09/10/2018    MCV 81 (L) 09/10/2018     09/10/2018     No results found for this or any previous visit (from the past 1 hour(s))

## 2020-02-18 NOTE — TELEPHONE ENCOUNTER
----- Message from Gee Barriga sent at 2/17/2020  9:19 PM EST -----  Regarding: Visit Follow-Up Question  Contact: [phone]  Please send a script for modafanil 200mg to [facility] on [address] [facility] , in [place]    Any questions, please call [phone]  Thank you

## 2020-02-18 NOTE — TELEPHONE ENCOUNTER
Left message for the patient to call back and schedule follow up appointment      Patient last seen on 3/6/2020

## 2020-02-20 ENCOUNTER — OFFICE VISIT (OUTPATIENT)
Dept: UROLOGY | Facility: AMBULATORY SURGERY CENTER | Age: 65
End: 2020-02-20
Payer: MEDICARE

## 2020-02-20 VITALS
SYSTOLIC BLOOD PRESSURE: 186 MMHG | HEIGHT: 63 IN | BODY MASS INDEX: 39.41 KG/M2 | DIASTOLIC BLOOD PRESSURE: 84 MMHG | HEART RATE: 91 BPM | WEIGHT: 222.4 LBS

## 2020-02-20 DIAGNOSIS — M79.18 MYOFASCIAL PAIN SYNDROME: ICD-10-CM

## 2020-02-20 DIAGNOSIS — N32.81 OAB (OVERACTIVE BLADDER): ICD-10-CM

## 2020-02-20 DIAGNOSIS — R32 URINARY INCONTINENCE, UNSPECIFIED TYPE: Primary | ICD-10-CM

## 2020-02-20 LAB
BACTERIA UR QL AUTO: ABNORMAL /HPF
BILIRUB UR QL STRIP: NEGATIVE
CLARITY UR: CLEAR
COLOR UR: ABNORMAL
GLUCOSE UR STRIP-MCNC: ABNORMAL MG/DL
HGB UR QL STRIP.AUTO: NEGATIVE
KETONES UR STRIP-MCNC: NEGATIVE MG/DL
LEUKOCYTE ESTERASE UR QL STRIP: ABNORMAL
NITRITE UR QL STRIP: NEGATIVE
NON-SQ EPI CELLS URNS QL MICRO: ABNORMAL /HPF
PH UR STRIP.AUTO: 6 [PH]
PROT UR STRIP-MCNC: NEGATIVE MG/DL
RBC #/AREA URNS AUTO: ABNORMAL /HPF
SL AMB  POCT GLUCOSE, UA: 2000
SL AMB LEUKOCYTE ESTERASE,UA: NORMAL
SL AMB POCT BILIRUBIN,UA: NORMAL
SL AMB POCT BLOOD,UA: NORMAL
SL AMB POCT CLARITY,UA: CLEAR
SL AMB POCT COLOR,UA: NORMAL
SL AMB POCT KETONES,UA: NORMAL
SL AMB POCT NITRITE,UA: NORMAL
SL AMB POCT PH,UA: 6
SL AMB POCT SPECIFIC GRAVITY,UA: 1.01
SL AMB POCT URINE PROTEIN: NORMAL
SL AMB POCT UROBILINOGEN: 0.2
SP GR UR STRIP.AUTO: 1.03 (ref 1–1.03)
UROBILINOGEN UR QL STRIP.AUTO: 1 E.U./DL
WBC #/AREA URNS AUTO: ABNORMAL /HPF

## 2020-02-20 PROCEDURE — 99204 OFFICE O/P NEW MOD 45 MIN: CPT | Performed by: NURSE PRACTITIONER

## 2020-02-20 PROCEDURE — 81001 URINALYSIS AUTO W/SCOPE: CPT | Performed by: NURSE PRACTITIONER

## 2020-02-20 PROCEDURE — 81002 URINALYSIS NONAUTO W/O SCOPE: CPT | Performed by: NURSE PRACTITIONER

## 2020-02-20 PROCEDURE — 87086 URINE CULTURE/COLONY COUNT: CPT | Performed by: NURSE PRACTITIONER

## 2020-02-20 RX ORDER — FAMOTIDINE 20 MG/1
TABLET, FILM COATED ORAL
COMMUNITY
Start: 2019-12-17 | End: 2021-05-05 | Stop reason: HOSPADM

## 2020-02-20 RX ORDER — RABEPRAZOLE SODIUM 20 MG/1
TABLET, DELAYED RELEASE ORAL
COMMUNITY
Start: 2019-12-17 | End: 2020-02-25 | Stop reason: CLARIF

## 2020-02-20 RX ORDER — HYDRALAZINE HYDROCHLORIDE 10 MG/1
TABLET, FILM COATED ORAL
Status: ON HOLD | COMMUNITY
Start: 2020-01-03 | End: 2021-05-08

## 2020-02-20 RX ORDER — MODAFINIL 200 MG/1
TABLET ORAL
Qty: 30 TABLET | Refills: 0 | Status: SHIPPED | OUTPATIENT
Start: 2020-02-20 | End: 2020-02-28 | Stop reason: ALTCHOICE

## 2020-02-20 NOTE — PATIENT INSTRUCTIONS
BLADDER HEALTH    WHAT IS CONSIDERED NORMAL?  The average bladder can hold about 2 cups of urine before it needs to be emptied   The normal range of voiding urine is 6 to 8 times during a 24 hour period  As we get older, our bladder capacity can get smaller and we may need to pass urine more frequently but usually not more than every 2 hours   Urine should flow easily without discomfort in a good, steady stream until the bladder is empty  No pushing or straining is necessary to empty the bladder   An urge is a signal that you feel as the bladder stretches to fill with urine  Urges can be felt even if the bladder is not full  Urges are not commands to go to the toilet, merely a signal and can be controlled  WHAT ARE GOOD BLADDER HABITS?  Take your time when emptying your bladder  Dont strain or push to empty your bladder  Make sure you empty your bladder completely each time you pass urine  Do not rush the process   Consistently ignoring the urge to go (waiting more than 4 hours between toileting) or urinating too infrequently may be convenient but not healthy for your bladder   Avoid going to the toilet just in case or more often than every 2 hours  It is usually not necessary to go when you feel the first urge  Try to go only when your bladder is full  Urgency and frequency of urination can be improved by retraining the bladder and spacing your fluid intake throughout the day  Practice good toilet habits  Dont let your bladder control your life  TIPS TO MAINTAIN GOOD BLADDER HABITS   Maintain a good fluid intake  Depending on your body size and environment, drink 6 -8 cups (8 oz each) of fluid per day unless otherwise advised by your doctor  Not enough fluid creates a foul odor and dark color of the urine     Limit the amount of caffeine (coffee, cola, chocolate or tea) and citrus foods that you consume as these foods can be associated with increased sensation of urinary urgency and frequency   Limit the amount of alcohol you drink  Alcohol increases urine production and makes it difficult for the brain to coordinate bladder control   Avoid constipation by maintaining a balanced diet of dietary fiber   Cigarette smoking is also irritating to the bladder surface and is associated with bladder cancer  In addition, the coughing associated with smoking may lead to increased incontinent episodes because of the increased pressure  HOW DIET CAN AFFECT YOUR BLADDER  Although there is no particular "diet" that can cure bladder control, there are certain dietary suggestions you can use to help control the problem  There are 2 points to consider when evaluating how your habits and diet may affect your bladder:    1  Foods and fluids can irritate the bladder  Some foods and beverages are thought to contribute to bladder leakage and irritability  However their effect on the bladder is not completely understood and you may want to see if eliminating one or all of these items improves your bladder control  If you are unable to give them up completely, it is recommended that you use the following items in moderation:   Acidic beverages and foods (orange juice, grapefruit juice, lemonade etc)   Alcoholic beverages   Vinegar   Coffee (regular and decaf)   Tea (regular and decaf)   Caffeinated beverages   Carbonated beverages          2  Drinking enough and the right kinds of fluids  Many people with bladder control issues decrease their intake of liquids in hope that they will need to urinate less frequently or have less urinary leakage   You should not restrict fluids to control your bladder  While a decrease in liquid intake does result in a decrease in the volume of urine, the smaller amount of urine may be more highly concentrated         Highly concentrated, dark yellow urine is irritating to the bladder surface and may actually cause you to go to the bathroom more frequently   It also encourages the growth of bacteria, which may lead to infections resulting in incontinence   Substitutions for Bladder Irritants: water is always the best beverage choice    Grape and apple juice are thirst quenchers are good selections and are not as irritating to the bladder   o Low acid fruits:  Pears, apricots, papaya, watermelon  o For coffee drinkers: KAVA®, Postum®, Cynthia®, Kaffree Eleanor®  o For tea drinkers:  non-citrus or herbal and sun brewed tea

## 2020-02-21 ENCOUNTER — TELEPHONE (OUTPATIENT)
Dept: UROLOGY | Facility: HOSPITAL | Age: 65
End: 2020-02-21

## 2020-02-21 DIAGNOSIS — N32.81 OAB (OVERACTIVE BLADDER): Primary | ICD-10-CM

## 2020-02-21 RX ORDER — TOLTERODINE 4 MG/1
4 CAPSULE, EXTENDED RELEASE ORAL DAILY
Qty: 7 CAPSULE | Refills: 11 | Status: SHIPPED | OUTPATIENT
Start: 2020-02-21 | End: 2020-02-24

## 2020-02-21 NOTE — TELEPHONE ENCOUNTER
----- Message from Gee Barriga sent at 2/20/2020  8:48 PM EST -----  Regarding: Visit Follow-Up Question  Contact: [phone]  The medication you prescribed for me at my recent visit of 2/20 is way to expensive  The cost of the 7 pills you prescribed for me costs $98   A month supply would be about $400 using the good rx and my insurance is even more  There is no way I can afford this medication  Perhaps there is another medication I could try? Please let me know  Thank you

## 2020-02-21 NOTE — TELEPHONE ENCOUNTER
Called and LM for Guadalupe that a new presection was sent for 7 days she is to call if she wants a refill

## 2020-02-21 NOTE — TELEPHONE ENCOUNTER
Will send script for Detrol to her pharmacy  Only 7 tabs sent per her request at office visit  Patient should call if she would like refill  Advise patient that it can take up to 6 weeks for medication to become effective

## 2020-02-21 NOTE — TELEPHONE ENCOUNTER
Patient was seen yesterday  Trospium was discontinued as ineffective  She cannot afford the Myrbetrii prescribed  Please advise on a new medication

## 2020-02-22 LAB — BACTERIA UR CULT: NORMAL

## 2020-02-24 DIAGNOSIS — N32.81 OAB (OVERACTIVE BLADDER): Primary | ICD-10-CM

## 2020-02-24 RX ORDER — FESOTERODINE FUMARATE 4 MG/1
4 TABLET, EXTENDED RELEASE ORAL DAILY
Qty: 30 EACH | Refills: 6 | Status: SHIPPED | OUTPATIENT
Start: 2020-02-24 | End: 2020-06-23

## 2020-02-24 NOTE — TELEPHONE ENCOUNTER
Will route to provider to review and advise on alternative  At this point, patient has called and stated that both Myrbetriq and Detrol are cost prohibitive

## 2020-02-24 NOTE — TELEPHONE ENCOUNTER
Patient called back to say the medication Detrol was not affordable    She would like to know if there is something else less costly [phone]

## 2020-02-24 NOTE — TELEPHONE ENCOUNTER
I have sent a prescription of Yfn Thornton to the patient's preferred pharmacy  If this is also cost prohibitive, I would recommend having the patient call her insurance company to determine which medications of the anticholinergics are most cost effective per her coverage plan

## 2020-02-25 ENCOUNTER — CONSULT (OUTPATIENT)
Dept: GASTROENTEROLOGY | Facility: CLINIC | Age: 65
End: 2020-02-25
Payer: MEDICARE

## 2020-02-25 ENCOUNTER — OFFICE VISIT (OUTPATIENT)
Dept: SLEEP CENTER | Facility: CLINIC | Age: 65
End: 2020-02-25
Payer: MEDICARE

## 2020-02-25 VITALS
HEIGHT: 63 IN | WEIGHT: 220 LBS | BODY MASS INDEX: 38.98 KG/M2 | DIASTOLIC BLOOD PRESSURE: 80 MMHG | HEART RATE: 99 BPM | SYSTOLIC BLOOD PRESSURE: 138 MMHG | TEMPERATURE: 97.7 F

## 2020-02-25 VITALS
BODY MASS INDEX: 38.98 KG/M2 | HEIGHT: 63 IN | SYSTOLIC BLOOD PRESSURE: 134 MMHG | HEART RATE: 88 BPM | DIASTOLIC BLOOD PRESSURE: 68 MMHG | WEIGHT: 220 LBS

## 2020-02-25 DIAGNOSIS — G47.10 HYPERSOMNIA: ICD-10-CM

## 2020-02-25 DIAGNOSIS — G47.33 OSA (OBSTRUCTIVE SLEEP APNEA): Primary | ICD-10-CM

## 2020-02-25 DIAGNOSIS — K21.9 GASTROESOPHAGEAL REFLUX DISEASE, ESOPHAGITIS PRESENCE NOT SPECIFIED: ICD-10-CM

## 2020-02-25 DIAGNOSIS — K31.84 GASTROPARESIS: Primary | ICD-10-CM

## 2020-02-25 LAB
AMPHET SAL QL CFM: NEGATIVE NG/ML
BUPRENORPHINE SAL QL SCN: NEGATIVE NG/ML
CARBOXYTHC SAL QL CFM: NEGATIVE NG/ML
CCP IGG SERPL-ACNC: NEGATIVE
COCAINE SAL QL CFM: NEGATIVE NG/ML
CODEINE SAL QL CFM: NEGATIVE NG/ML
EDDP SAL QL CFM: NEGATIVE NG/ML
HYDROCODONE SAL QL CFM: NEGATIVE NG/ML
HYDROCODONE SAL QL CFM: NEGATIVE NG/ML
HYDROMORPHONE SAL QL CFM: NEGATIVE NG/ML
LEUKEMIA MARKERS BLD-IMP: NEGATIVE NG/ML
M TB TUBERC IGNF/MITOGEN IGNF CONTROL: NEGATIVE NG/ML
METHADONE SAL QL CFM: NEGATIVE NG/ML
MORPHINE SAL QL CFM: NEGATIVE NG/ML
MORPHINE SAL QL CFM: NEGATIVE NG/ML
NOROXYCODONE SAL CFM-MCNC: 2.85 NG/ML
OXYCODONE SAL CFM-MCNC: 16.22 NG/ML
OXYMORPHONE SAL QL CFM: NEGATIVE NG/ML
OXYMORPHONE SAL QL CFM: NEGATIVE NG/ML
PCP SAL QL CFM: NEGATIVE NG/ML
RESULT ALL_PRESCRIBED MEDS AND SPECIAL INSTRUCTIONS: NORMAL
SL AMB 6-MAM (HEROIN METABOLITE) QUANTIFICATION: NEGATIVE NG/ML
SL AMB ALPRAZOLAM QUANTIFICATION: NEGATIVE NG/ML
SL AMB ATOMOXETINE METABOLITE QUANTIFICATION: NEGATIVE
SL AMB ATOMOXETINE QUANTIFICATION: NEGATIVE
SL AMB CLONAZEPAM QUANTIFICATION: NEGATIVE NG/ML
SL AMB DIAZEPAM QUANTIFICATION: NEGATIVE NG/ML
SL AMB FENTANYL QUANTIFICATION: ABNORMAL NG/ML
SL AMB MDMA QUANTIFICATION: NEGATIVE NG/ML
SL AMB N-DESMETHYL-TRAMADOL QUANTIFICATION SALIVA: NEGATIVE NG/ML
SL AMB NORBUPRENORPHINE QUANTIFICATION: NEGATIVE NG/ML
SL AMB NORDIAZEPAM QUANTIFICATION: NEGATIVE NG/ML
SL AMB NORFENTANYL QUANTIFICATION: ABNORMAL NG/ML
SL AMB NORHYDROCODONE QUANTIFICATION: NEGATIVE NG/ML
SL AMB NORHYDROCODONE QUANTIFICATION: NEGATIVE NG/ML
SL AMB NORMEPERIDINE QUANTIFICATION: NEGATIVE NG/ML
SL AMB O-DESMETHYL-TRAMADOL QUANTIFICATION: NEGATIVE NG/ML
SL AMB OXAZEPAM QUANTIFICATION: NEGATIVE NG/ML
SL AMB RITALINIC ACID QUANTIFICATION: NEGATIVE
SL AMB TEMAZEPAM QUANTIFICATION: NEGATIVE NG/ML
SL AMB TEMAZEPAM QUANTIFICATION: NEGATIVE NG/ML
SL AMB TRAMADOL QUANTIFICATION: NEGATIVE NG/ML
SQUAMOUS #/AREA URNS HPF: NEGATIVE NG/ML

## 2020-02-25 PROCEDURE — 99204 OFFICE O/P NEW MOD 45 MIN: CPT | Performed by: INTERNAL MEDICINE

## 2020-02-25 PROCEDURE — 99214 OFFICE O/P EST MOD 30 MIN: CPT | Performed by: INTERNAL MEDICINE

## 2020-02-25 NOTE — PROGRESS NOTES
[address] [facility] - Outpatient Consultation  Simone Hammond 62 y o  female MRN: [id number]  Encounter: [id number]          ASSESSMENT AND PLAN:  Ms Ashleigh Johnson was seen today for gastroparesis, GERD and IBS  Diagnoses and all orders for this visit:    Gastroparesis: Ms Ashleigh Johnson reports she was evaluated for gastroparesis at [facility]  These records are not currently available We will request her prior records from [facility], as she has been followed there for several years, and from [facility], if needed  Gastroesophageal reflux disease, esophagitis presence not specified: She complains of frequent epigastric burning pain and daily nausea  She is taking pantoprazole 40 mg bid and ranitidine  I advised that she discontinue rantidine due to FDA regulations  She will continue pantoprazole 40 mg bid and we will reevaluate after we receive her records from [facility]  Alternating diarrhea and constipation: She complains of alternating constipation and diarrhea with urgency, with associated abdominal pain  Her biggest concern is diarrhea as the urgency interferes with daily activities  I recommended she discontinue Linzess due to her diarrhea and urgency  She states she had a colonoscopy last year at [facility]  We will request her prior records from [facility], [facility] and Dr Jagdish Bruno and re-evaluate her treatment plan once we have these  Follow-up in 1 month   ______________________________________________________________________    HPI:  Simone Hammond is a 62 y o  female with chronic diastolic CHF, BBB, TIA currently on Plavix, DM2 on insulin pump for 15 yrs, microalbuminuria due to DM2, essential HTN, mixed HLD,  primary hyperparathyroidism, hypothyroidism, STU, asthma, sarcoidosis, lymphadenopathy, chronic pain syndrome on percocet and hx of DVT and closed fracture of thoracic vertebra and s/p spinal cord stimulator in the office today for evaluation and management of IBS, GERD and gastroparesis       She has been seen at [facility] for several years but would like to establish care with a new gastroenterologist  She complains of abdominal pain and constipation for several days, followed by diarrhea up to 11 times per day  She wakes up nauseous every day  She also complains of epigastric burning pain  She is currently taking Linzess, pantoprazole 40 mg bid and ranitidine  She was prescribed a "powder," possibly cholestyramine, which she does not take  She denies other new medications  Her biggest concern is diarrhea as the urgency interferes with daily activities  She states she had a colonoscopy at [facility] last year  She also had GES at [facility]\""  The records are not currently available  She does not smoke  She denies FHx of colon cancer  REVIEW OF SYSTEMS:    CONSTITUTIONAL: Denies any fever, chills, rigors, and weight loss  HEENT: No earache or tinnitus  Denies hearing loss or visual disturbances  CARDIOVASCULAR: No chest pain or palpitations  RESPIRATORY: Denies any cough, hemoptysis, shortness of breath or dyspnea on exertion  GASTROINTESTINAL: As noted in the History of Present Illness  GENITOURINARY: No problems with urination  Denies any hematuria or dysuria  NEUROLOGIC: No dizziness or vertigo, denies headaches  MUSCULOSKELETAL: Denies any muscle or joint pain  SKIN: Denies skin rashes or itching  ENDOCRINE: Denies excessive thirst  Denies intolerance to heat or cold  PSYCHOSOCIAL: Denies depression or anxiety  Denies any recent memory loss         Historical Information   Past Medical History:   Diagnosis Date    Anxiety     Asthma     controlled    Back complaints     Cancer ([facility] )     nose    Cellulitis of left lower leg     Chronic bilateral thoracic back pain     Chronic myofascial pain     Chronic pain of both lower extremities     Chronic pain of left knee     Chronic pain syndrome     CPAP (continuous positive airway pressure) dependence     Depression     Diabetes mellitus ([facility] )     Disease of thyroid gland     Gait disorder     Gastroparesis     GERD (gastroesophageal reflux disease)     History of angina     History of transfusion     Many years ago    Hyperlipidemia     Hypertension     Insulin pump in place     Kidney disease     Polyneuropathy associated with underlying disease ([address] )     PONV (postoperative nausea and vomiting)     S/P insertion of spinal cord stimulator 2018    Sarcoidosis     Sleep apnea     TIA (transient ischemic attack)      Past Surgical History:   Procedure Laterality Date    BREAST SURGERY      BREAST SURGERY      reduction    CARDIAC PACEMAKER PLACEMENT       SECTION      HERNIA REPAIR      HYSTERECTOMY      JOINT REPLACEMENT Left     NECK SURGERY      [place] SURG IMPLNT [address] Left 2018    Procedure:  Insertion of thoracic spinal cord stimulator electrode via laminotomy and placement of left buttock IPG;  Surgeon: Lolly Canales MD;  [facility];  Service: Neurosurgery    REPLACEMENT TOTAL KNEE      ROTATOR CUFF REPAIR      SPINAL STIMULATOR PLACEMENT Left 10/3/2018    Procedure: BUTTOCK RE-OPENING INCISION FOR REPOSITIONING OF IMPLANTABLE PULSE GENERATOR;  Surgeon: Lolly Canales MD;  [facility];  Service: Neurosurgery    TONSILLECTOMY      WISDOM TOOTH EXTRACTION       Social History   Social History     Substance and Sexual Activity   Alcohol Use No     Social History     Substance and Sexual Activity   Drug Use No     Social History     Tobacco Use   Smoking Status Never Smoker   Smokeless Tobacco Never Used     Family History   Problem Relation Age of Onset    Diabetes Family     Heart disease Family     Hypertension Family     Neuropathy Family     No Known Problems Mother     Heart disease Father     Diabetes Father     No Known Problems Maternal Grandmother     No Known Problems Maternal Grandfather     No Known Problems Paternal Grandmother     No Known Problems Paternal Grandfather        Meds/Allergies       Current Outpatient Medications:     atorvastatin (LIPITOR) 80 mg tablet    cholecalciferol (VITAMIN D3) 1,000 units tablet    clopidogrel (PLAVIX) 75 mg tablet    CRANBERRY PO    Cyanocobalamin (VITAMIN B-12 PO)    Docusate Sodium 100 MG capsule    DULoxetine (CYMBALTA) 60 mg delayed release capsule    famotidine (PEPCID) 20 mg tablet    Fesoterodine Fumarate ER 4 MG TB24    fluticasone-salmeterol (ADVAIR) 100-50 mcg/dose inhaler    furosemide (LASIX) 20 mg tablet    glucagon 1 MG injection    glucose blood test strip    hydrALAZINE (APRESOLINE) 10 mg tablet    levothyroxine 75 mcg tablet    losartan (COZAAR) 100 MG tablet    magnesium oxide (MAG-OX) 400 mg tablet    modafinil (PROVIGIL) 200 MG tablet    Naloxone HCl (NARCAN) 4 MG/0 1ML LIQD    NOVOLOG 100 UNIT/ML injection    ondansetron (ZOFRAN) 8 mg tablet    oxyCODONE-acetaminophen (Percocet) 5-325 mg per tablet    pantoprazole (PROTONIX) 40 mg tablet    pramipexole (MIRAPEX) 1 mg tablet    SYMBICORT 80-4 5 MCG/ACT inhaler    tiZANidine (ZANAFLEX) 4 mg tablet    VENTOLIN  (90 Base) MCG/ACT inhaler    amitriptyline (ELAVIL) 50 mg tablet    Allergies   Allergen Reactions    Bactrim [Sulfamethoxazole-Trimethoprim] Hives    Sucralfate Hives     Facial swelling    Topamax [Topiramate]      disorentation    Azithromycin Itching    Pregabalin Confusion and Other (See Comments)     altered mental status    Norvasc [Amlodipine] Swelling    Baclofen      "That knocks me out "    Bupropion Fatigue    Methotrexate Nausea Only    Neurontin [Gabapentin] Hallucinations and Hypertension           Objective     Blood pressure 138/80, pulse 99, temperature 97 7 °F (36 5 °C), temperature source Tympanic, height 5' 3" (1 6 m), weight 99 8 kg (220 lb)  Body mass index is 38 97 kg/m²          PHYSICAL EXAM:      General Appearance:   Alert, cooperative, no distress   HEENT:   Normocephalic, atraumatic, anicteric      Neck:  Supple, symmetrical, trachea midline   Lungs:   Clear to auscultation bilaterally; no rales, rhonchi or wheezing; respirations unlabored    Heart[de-identified]   Regular rate and rhythm; no murmur, rub, or gallop  Abdomen:   Soft, non-tender, non-distended; normal bowel sounds; no masses, no organomegaly    Genitalia:   Deferred    Rectal:   Deferred    Extremities:  No cyanosis, clubbing or edema    Pulses:  2+ and symmetric    Skin:  No jaundice, rashes, or lesions    Lymph nodes:  No palpable cervical lymphadenopathy        Lab Results:     On 2/13/2020, her Hgb and Hct were a little low at 11 1 and 33/6, respectively  Her sed rate was elevated at 42 and CRP was elevated at 26 4  No visits with results within 1 Day(s) from this visit  Latest known visit with results is:   Office Visit on 02/20/2020   Component Date Value    LEUKOCYTE ESTERASE,UA 02/20/2020 -     NITRITE,UA 02/20/2020 -     SL AMB POCT UROBILINOGEN 02/20/2020 0 2     POCT URINE PROTEIN 02/20/2020 -      PH,UA 02/20/2020 6 0     BLOOD,UA 02/20/2020 -     SPECIFIC GRAVITY,UA 02/20/2020 1 015     KETONES,UA 02/20/2020 -     BILIRUBIN,UA 02/20/2020 -     GLUCOSE, UA 02/20/2020 2,000      COLOR,UA 02/20/2020 alee     CLARITY,UA 02/20/2020 clear     Clarity, UA 02/20/2020 Clear     Color, UA 02/20/2020 Dk Yellow     Specific Gravity, UA 02/20/2020 1 032*    pH, UA 02/20/2020 6 0     Glucose, UA 02/20/2020 >=1000 (1%)*    Ketones, UA 02/20/2020 Negative     Blood, UA 02/20/2020 Negative     Protein, UA 02/20/2020 Negative     Nitrite, UA 02/20/2020 Negative     Bilirubin, UA 02/20/2020 Negative     Urobilinogen, UA 02/20/2020 1 0     Leukocytes, UA 02/20/2020 Elevated glucose may cause decreased leukocyte values   See urine microscopic for [facility] result/*    WBC, UA 02/20/2020 4-10*    RBC, UA 02/20/2020 None Seen     Bacteria, UA 02/20/2020 Occasional     Epithelial Cells 02/20/2020 Moderate*    Urine Culture 02/20/2020 50,000-59,000 cfu/ml           Radiology Results:   No results found  Attestation:   By signing my name below, Brandon Garcia, attest that this documentation has been prepared under the direction and in the presence of VINH Jimenez  Electronically Signed: Michelle Bai  2/25/2020       I, Erik Nicolas, personally performed the services described in this documentation  All medical record entries made by the lioibe were at my direction and in my presence  I have reviewed the chart and discharge instructions and agree that the record reflects my personal performance and is accurate and complete   VINH Jimenez  2/25/2020

## 2020-02-25 NOTE — PATIENT INSTRUCTIONS
Stop taking ranitidine (Zantac) and Linzess  Release of Health information signed by pt and faxed to [facility] so we may obtain her records

## 2020-02-27 ENCOUNTER — TELEPHONE (OUTPATIENT)
Dept: SLEEP CENTER | Facility: CLINIC | Age: 65
End: 2020-02-27

## 2020-02-27 NOTE — TELEPHONE ENCOUNTER
Patient called, states Dr Mark Anthony Hansen recommend [facility] and was advised to  samples  States she called her insurance company and they do not cover [facility]  States she looked at [facility] and it will cost over $600      Asking if there is something else/    Please advise

## 2020-02-28 ENCOUNTER — TELEPHONE (OUTPATIENT)
Dept: PAIN MEDICINE | Facility: CLINIC | Age: 65
End: 2020-02-28

## 2020-02-28 DIAGNOSIS — G47.10 HYPERSOMNIA: Primary | ICD-10-CM

## 2020-02-28 RX ORDER — ARMODAFINIL 150 MG/1
150 TABLET ORAL DAILY
Qty: 30 TABLET | Refills: 2 | Status: SHIPPED | OUTPATIENT
Start: 2020-02-28 | End: 2020-03-24 | Stop reason: SDUPTHER

## 2020-02-28 NOTE — TELEPHONE ENCOUNTER
S/w pt, confirmed pain since yesterday  Pt stated that she has adjusted her scs herself  Feels like the scs may be making her pain worse  Advised pt, since she feels the stimulator is possibly increasing her pain - would recommend contacting camryn  Pt verbaized understanding   Will fu w abbott now

## 2020-02-28 NOTE — TELEPHONE ENCOUNTER
Patient called stating that she is experiencing severe bilateral leg pain & leg muscle spasms which began yesterday  Patient currently has a spinal stimulator & its not helping her  She states she feels its in fact aggravating her condition  She would like to speak to Jillian Roy or his nurses about her status   Please advise, tio    Call back# [phone]

## 2020-03-02 ENCOUNTER — TELEPHONE (OUTPATIENT)
Dept: GASTROENTEROLOGY | Facility: AMBULARY SURGERY CENTER | Age: 65
End: 2020-03-02

## 2020-03-02 NOTE — TELEPHONE ENCOUNTER
----- Message from Sherren Rolling, MA sent at 3/2/2020  8:48 AM EST -----  Regarding: FW: Non-Urgent Medical Question  Contact: [phone]      ----- Message -----  From: Argelia Madera  Sent: 3/1/2020  10:24 PM EST  To: [facility]  Subject: Non-Urgent Medical Question                      During my recent visit with you it was suggested I discontinue Linzess  I am hoping you were able to obtain my medical records and come up with another  medication  I am having alot of bloating accompanied with pain and nausea, constipation  Is there anythiing you can advise for me  My next scheduled appt is not until April      Ty

## 2020-03-02 NOTE — TELEPHONE ENCOUNTER
Dr Bakari Patel patient- Hx IBS, gerd, gastroparesis, constipation, diarrhea, abdominal pain, chronic pain -percocet    Patient c/o bloating, abdominal pain, nausea and constipation  Patient states she is constipated/ bloated since discontinuing Linzess after 2/25/20 office visit  Took dulcolax 2 doses and fleet enema on Saturday- effective for BM  Nausea after eating- Zofran effective for relief  Recommended she start miralax 1-2 cap daily to maintain normal bowel regimen /continue on docusate 100 mg daily as prescribed  Gas x for increased gas/ bloating  Increase fluids/exercise  Zofran as needed for nausea  Call back if miralax not effective to discuss alternative

## 2020-03-03 ENCOUNTER — TELEPHONE (OUTPATIENT)
Dept: SLEEP CENTER | Facility: CLINIC | Age: 65
End: 2020-03-03

## 2020-03-03 NOTE — TELEPHONE ENCOUNTER
S/w pt, stated that she followed up with scs rep and she is having + improvement in her pain symptoms  Advised pt to cb if questions / concerns arise  Pt verbalized understanding and appreciation

## 2020-03-04 NOTE — TELEPHONE ENCOUNTER
Received Armodafinil 150 mg tablets  approval from [facility]   Good through 12/31/2020  Pharmacy will notify the patient

## 2020-03-17 RX ORDER — OXYBUTYNIN CHLORIDE 10 MG/1
10 TABLET, EXTENDED RELEASE ORAL
Qty: 30 TABLET | Refills: 3 | Status: SHIPPED | OUTPATIENT
Start: 2020-03-17 | End: 2020-07-02 | Stop reason: DRUGHIGH

## 2020-03-17 NOTE — TELEPHONE ENCOUNTER
Oxybutynin sent to her [facility]  Unable to cancel Jonathan Public in patient's medical record at this time

## 2020-03-17 NOTE — TELEPHONE ENCOUNTER
Patient is requesting medication oxybutynin to be called into pharmacy  Patient states she still has frequency and burning  Patient states Tabatha Minor is also to expensive    Patient goes to [facility] in [place] on [address]

## 2020-03-24 DIAGNOSIS — G47.10 HYPERSOMNIA: ICD-10-CM

## 2020-03-24 RX ORDER — ARMODAFINIL 150 MG/1
150 TABLET ORAL DAILY
Qty: 90 TABLET | Refills: 0 | Status: SHIPPED | OUTPATIENT
Start: 2020-03-24 | End: 2020-06-24

## 2020-03-30 ENCOUNTER — TELEPHONE (OUTPATIENT)
Dept: UROLOGY | Facility: AMBULATORY SURGERY CENTER | Age: 65
End: 2020-03-30

## 2020-03-30 ENCOUNTER — APPOINTMENT (OUTPATIENT)
Dept: LAB | Facility: CLINIC | Age: 65
End: 2020-03-30
Payer: MEDICARE

## 2020-03-30 DIAGNOSIS — R35.0 URINARY FREQUENCY: ICD-10-CM

## 2020-03-30 DIAGNOSIS — R35.0 URINARY FREQUENCY: Primary | ICD-10-CM

## 2020-03-30 LAB
BACTERIA UR QL AUTO: ABNORMAL /HPF
BILIRUB UR QL STRIP: NEGATIVE
CLARITY UR: ABNORMAL
COLOR UR: YELLOW
GLUCOSE UR STRIP-MCNC: ABNORMAL MG/DL
HGB UR QL STRIP.AUTO: NEGATIVE
HYALINE CASTS #/AREA URNS LPF: ABNORMAL /LPF
KETONES UR STRIP-MCNC: NEGATIVE MG/DL
LEUKOCYTE ESTERASE UR QL STRIP: ABNORMAL
NITRITE UR QL STRIP: POSITIVE
NON-SQ EPI CELLS URNS QL MICRO: ABNORMAL /HPF
PH UR STRIP.AUTO: 6.5 [PH]
PROT UR STRIP-MCNC: NEGATIVE MG/DL
RBC #/AREA URNS AUTO: ABNORMAL /HPF
SP GR UR STRIP.AUTO: 1.02 (ref 1–1.03)
UROBILINOGEN UR QL STRIP.AUTO: 0.2 E.U./DL
WBC #/AREA URNS AUTO: ABNORMAL /HPF

## 2020-03-30 PROCEDURE — 87077 CULTURE AEROBIC IDENTIFY: CPT

## 2020-03-30 PROCEDURE — 87086 URINE CULTURE/COLONY COUNT: CPT

## 2020-03-30 PROCEDURE — 81001 URINALYSIS AUTO W/SCOPE: CPT

## 2020-03-30 PROCEDURE — 87181 SC STD AGAR DILUTION PER AGT: CPT

## 2020-03-30 PROCEDURE — 87186 SC STD MICRODIL/AGAR DIL: CPT

## 2020-03-30 NOTE — TELEPHONE ENCOUNTER
Patient called our office b/c Lab is stating they do not have orders   Order are in system told patient I can fax to lab if they are giving her a problem    Gave direct line patient will call back with problems

## 2020-03-30 NOTE — TELEPHONE ENCOUNTER
Last urine culture from 2/20/2020 was negative for infection  Patient can drop off another urine specimen to rule out infection  We will discuss her symptoms at appointment tomorrow  Order in Yosvany

## 2020-03-30 NOTE — TELEPHONE ENCOUNTER
Called patient and advised patient of Kevin Cam recommendations  Patient will go to [facility] lab located on [facility] to leave specimen  I  Also checked list of labs to see which labs were still open  Patient verbalized understanding on what she was going to do  Order is in

## 2020-03-30 NOTE — TELEPHONE ENCOUNTER
Patient of [place] office, history of urinary incontinence  Patient was last seen 2/20/2020 by Amari Cosme  She had been taking Trospium, but it was inffective and therefor discontinued  Patient was ordered to try Myrbetriq but that was cost prohibitive  She was then ordered Detrol and then Willie Cower  All canceled due to cost  Finally patient was ordered for oxybutynin  Patient contacted office via my chart message, with complaints of worsening incontinence and "very nasty odor" to urine  Call placed to patient to further discuss her symptoms, assess if she is hydrating well with water, avoiding bladder irritants, taking medication as prescribed  Left message per communication consent form to call office back  Office number provided on voicemail  -- Message from Gee Barriga sent at 3/29/2020  8:03 PM EDT -----  Regarding: Visit Follow-Up Question  Contact: [phone]  since my last visit, my urinary incontinence has   worsen  I am urinating about 14 times a day and very seldom do I make it to the bathroom  I have worn briefs but they only become saturated  Additionally, my urine has a very nasty odor  Please advise what I should do  Thank you

## 2020-03-30 NOTE — TELEPHONE ENCOUNTER
Patient called into office to return call, left 3 voicemails  Call returned to patient, she has been experiencing worsening urgency, frequency and leaking  Patient states that she is wearing briefs and going through approximately three per day  She denies fever, chills, burning with urination, hematuria  Patient states she is drinking about 48 ounces of water per day  She also states that she does drink a lot of iced tea  I advised patient to increase water intake, avoid bladder irritants  Patient states that she continues to take the oxybutynin as ordered  Will route to provider to advise  Patient's appointment that was originally scheduled for 4/3 was rescheduled to a virtual visit for tomorrow, 3/31  If provider has any recommendations today for patient, will follow up with her via telephone today  Patient verbalized understanding and agrees with plan

## 2020-03-31 ENCOUNTER — TELEMEDICINE (OUTPATIENT)
Dept: UROLOGY | Facility: AMBULATORY SURGERY CENTER | Age: 65
End: 2020-03-31
Payer: MEDICARE

## 2020-03-31 DIAGNOSIS — N39.41 URGE INCONTINENCE OF URINE: ICD-10-CM

## 2020-03-31 DIAGNOSIS — N32.81 OAB (OVERACTIVE BLADDER): ICD-10-CM

## 2020-03-31 DIAGNOSIS — N39.0 URINARY TRACT INFECTION WITHOUT HEMATURIA, SITE UNSPECIFIED: Primary | ICD-10-CM

## 2020-03-31 PROCEDURE — 99214 OFFICE O/P EST MOD 30 MIN: CPT | Performed by: NURSE PRACTITIONER

## 2020-03-31 RX ORDER — CEPHALEXIN 500 MG/1
500 CAPSULE ORAL EVERY 12 HOURS SCHEDULED
Qty: 10 CAPSULE | Refills: 0 | Status: SHIPPED | OUTPATIENT
Start: 2020-03-31 | End: 2020-04-02 | Stop reason: ALTCHOICE

## 2020-03-31 NOTE — PROGRESS NOTES
Virtual Regular Visit    Problem List Items Addressed This Visit     None      Visit Diagnoses     Urinary tract infection without hematuria, site unspecified    -  Primary    Relevant Medications    cephalexin (KEFLEX) 500 mg capsule    OAB (overactive bladder)        Urge incontinence of urine                   Reason for visit is  Urinary tract infection  Overactive bladder  Urinary incontinence    Encounter provider ROMAINE Davis    Provider located at [facility] [address] [facility]      Recent Visits  Date Type Provider Dept   03/30/20 Telephone Jemal Sheldon, [address] [facility]   03/30/20 Telephone Kathy Floyd RN [facility]   Showing recent visits within past 7 days and meeting all other requirements     Today's Visits  Date Type Provider Dept   03/31/20 1590 ROMAINE Monterroso [facility]   Showing today's visits and meeting all other requirements     Future Appointments  No visits were found meeting these conditions  Showing future appointments within next 150 days and meeting all other requirements        The patient was identified by name and date of birth  Esha Bowser was informed that this is a telemedicine visit and that the visit is being conducted through [employer] and patient was informed that this is not a secure, HIPAA-complaint platform  she agrees to proceed     My office door was closed  No one else was in the room  She acknowledged consent and understanding of privacy and security of the video platform  The patient has agreed to participate and understands they can discontinue the visit at any time  Patient is aware this is a billable service  Subjective  Esha Bowser is a 62 y o  female with a history of UTI, OAB, and urinary incontinence    At last office visit, patient was seen in consultation for persistent urinary frequency, urgency, and incontinence  She had been prescribed Myrbetriq, but due to insurance coverage this was switched to oxybutynin  Patient had previously tried trospium, without any improvement  She has been taking for 10 days, but has not noticed any change  Patient called office with reports of foul smelling urine and worsening incontinence  She denies any gross hematuria or dysuria  Preliminary urine culture from 3/30/2020 was positive for infection  She admits to only drinking one glass of milk today  Plan  We reviewed the results of her recent urine culture which was positive for infection  However, final sensitivity remains pending  Due to report of symptoms and results prescription for Keflex was electronically sent to her pharmacy  Patient is noted to have an extensive allergy list   Advised patient that someone from office will call if she requires a different antibiotic  Recommend continuing oxybutynin 10mg daily  Informed patient that it can take up to 6 weeks to see positive effects of medication  He current UTI is also contributing to her worsening urinary symptoms  Encouraged patient to increase her water intake  She will follow up in 3 months for reassessment  If symptoms persist, we will discuss tertiary management and referral to pelvic floor physical therapy  All questions answered, patient verbalized understanding               Past Medical History:   Diagnosis Date    Anxiety     Asthma     controlled    Back complaints     Cancer ([facility] )     nose    Cellulitis of left lower leg     Chronic bilateral thoracic back pain     Chronic myofascial pain     Chronic pain of both lower extremities     Chronic pain of left knee     Chronic pain syndrome     CPAP (continuous positive airway pressure) dependence     Depression     Diabetes mellitus ([facility] )     Disease of thyroid gland     Gait disorder     Gastroparesis     GERD (gastroesophageal reflux disease)     History of angina     History of transfusion     Many years ago    Hyperlipidemia     Hypertension     Insulin pump in place     Kidney disease     Polyneuropathy associated with underlying disease ([facility] )     PONV (postoperative nausea and vomiting)     S/P insertion of spinal cord stimulator 2018    Sarcoidosis     Sleep apnea     TIA (transient ischemic attack)        Past Surgical History:   Procedure Laterality Date    BREAST SURGERY      BREAST SURGERY      reduction    CARDIAC PACEMAKER PLACEMENT       SECTION      HERNIA REPAIR      HYSTERECTOMY      JOINT REPLACEMENT Left     NECK SURGERY      [facility]    Procedure: Insertion of thoracic spinal cord stimulator electrode via laminotomy and placement of left buttock IPG;  Surgeon: Merlyn Olszewski, MD;  [facility];  Service: Neurosurgery    REPLACEMENT TOTAL KNEE      ROTATOR CUFF REPAIR      SPINAL STIMULATOR PLACEMENT Left 10/3/2018    Procedure: BUTTOCK RE-OPENING INCISION FOR REPOSITIONING OF IMPLANTABLE PULSE GENERATOR;  Surgeon: Merlyn Olszewski, MD;  Location[facility];  Service: Neurosurgery    TONSILLECTOMY      WISDOM TOOTH EXTRACTION         Current Outpatient Medications   Medication Sig Dispense Refill    amitriptyline (ELAVIL) 50 mg tablet Take one tablet p o  at bedtime for two weeks    If insomnia persists, increase to two tablets at bedtime 60 tablet 2    Armodafinil 150 MG tablet Take 1 tablet (150 mg total) by mouth daily 90 tablet 0    atorvastatin (LIPITOR) 80 mg tablet Take 80 mg by mouth daily at bedtime        cephalexin (KEFLEX) 500 mg capsule Take 1 capsule (500 mg total) by mouth every 12 (twelve) hours for 5 days 10 capsule 0    cholecalciferol (VITAMIN D3) 1,000 units tablet Take 2,000 Units by mouth daily with lunch    5    clopidogrel (PLAVIX) 75 mg tablet Take 1 tablet (75 mg total) by mouth daily (Patient taking differently: Take 75 mg by mouth daily at bedtime  ) 30 tablet 4    CRANBERRY PO Take 1 capsule by mouth daily with lunch        Cyanocobalamin (VITAMIN B-12 PO) Take 1 capsule by mouth daily with lunch        Docusate Sodium 100 MG capsule Take 100 mg by mouth daily with lunch        DULoxetine (CYMBALTA) 60 mg delayed release capsule Take 2 PO  capsule 0    famotidine (PEPCID) 20 mg tablet       Fesoterodine Fumarate ER 4 MG TB24 Take 1 tablet (4 mg total) by mouth daily 30 each 6    fluticasone-salmeterol (ADVAIR) 100-50 mcg/dose inhaler Inhale 2 puffs      furosemide (LASIX) 20 mg tablet Take 20 mg by mouth daily in the early morning        glucagon 1 MG injection Glucagon Emergency Kit (human-recomb) 1 mg solution for injection      glucose blood test strip Testing six times daily  E11 65 on insulin pump      hydrALAZINE (APRESOLINE) 10 mg tablet       levothyroxine 75 mcg tablet Take 75 mcg by mouth daily in the early morning        losartan (COZAAR) 100 MG tablet Take 100 mg by mouth daily in the early morning        magnesium oxide (MAG-OX) 400 mg tablet Take 400 mg by mouth      Naloxone HCl (NARCAN) 4 MG/0 1ML LIQD Sprays into 1 nostril for suspected opioid overdose  May repeat  2 each 0    NOVOLOG 100 UNIT/ML injection INJECT 120 UNITS DAILY VIA INSULIN PUMP E 11 65  3    ondansetron (ZOFRAN) 8 mg tablet Take 8 mg by mouth every 8 (eight) hours as needed        oxybutynin (DITROPAN-XL) 10 MG 24 hr tablet Take 1 tablet (10 mg total) by mouth daily at bedtime 30 tablet 3    oxyCODONE-acetaminophen (Percocet) 5-325 mg per tablet Take 1 PO BID PRN for pain  DO NOT FILL UNTIL: 4/13/20 45 tablet 0    pantoprazole (PROTONIX) 40 mg tablet Take 40 mg by mouth 2 (two) times a day        pramipexole (MIRAPEX) 1 mg tablet Take 1 mg by mouth 2 (two) times a day        SYMBICORT 80-4 5 MCG/ACT inhaler Inhale 2 puffs 2 (two) times a day        tiZANidine (ZANAFLEX) 4 mg tablet Take 1 PO TID during the day and 1-2 PO HS for spasms   450 tablet 0  VENTOLIN  (90 Base) MCG/ACT inhaler 2 puffs every 4 (four) hours as needed         No current facility-administered medications for this visit  Allergies   Allergen Reactions    Bactrim [Sulfamethoxazole-Trimethoprim] Hives    Sucralfate Hives     Facial swelling    Topamax [Topiramate]      disorentation    Azithromycin Itching    Pregabalin Confusion and Other (See Comments)     altered mental status    Norvasc [Amlodipine] Swelling    Baclofen      "That knocks me out "    Bupropion Fatigue    Methotrexate Nausea Only    Neurontin [Gabapentin] Hallucinations and Hypertension       Review of Systems   Constitutional: Negative  HENT: Negative  Respiratory: Negative  Cardiovascular: Negative  Gastrointestinal: Negative  Genitourinary: Positive for frequency and urgency  Negative for decreased urine volume, difficulty urinating, dysuria, flank pain and hematuria  Musculoskeletal: Negative  Skin: Negative  Neurological: Negative  Psychiatric/Behavioral: Negative  Physical Exam was not performed as this was a virtual visit    I spent 12 minutes with the patient during this visit

## 2020-04-02 ENCOUNTER — TELEPHONE (OUTPATIENT)
Dept: UROLOGY | Facility: AMBULATORY SURGERY CENTER | Age: 65
End: 2020-04-02

## 2020-04-02 DIAGNOSIS — N39.0 URINARY TRACT INFECTION WITHOUT HEMATURIA, SITE UNSPECIFIED: Primary | ICD-10-CM

## 2020-04-02 RX ORDER — CIPROFLOXACIN 500 MG/1
500 TABLET, FILM COATED ORAL EVERY 12 HOURS SCHEDULED
Qty: 10 TABLET | Refills: 0 | Status: SHIPPED | OUTPATIENT
Start: 2020-04-02 | End: 2020-04-03 | Stop reason: SINTOL

## 2020-04-03 LAB
BACTERIA UR CULT: ABNORMAL
BACTERIA UR CULT: ABNORMAL

## 2020-04-03 RX ORDER — NITROFURANTOIN 25; 75 MG/1; MG/1
100 CAPSULE ORAL 2 TIMES DAILY
Qty: 14 CAPSULE | Refills: 0 | Status: SHIPPED | OUTPATIENT
Start: 2020-04-03 | End: 2020-04-10

## 2020-04-10 ENCOUNTER — TELEPHONE (OUTPATIENT)
Dept: GASTROENTEROLOGY | Facility: AMBULARY SURGERY CENTER | Age: 65
End: 2020-04-10

## 2020-04-10 ENCOUNTER — TELEPHONE (OUTPATIENT)
Dept: NEUROLOGY | Facility: CLINIC | Age: 65
End: 2020-04-10

## 2020-04-10 DIAGNOSIS — K59.03 DRUG-INDUCED CONSTIPATION: Primary | ICD-10-CM

## 2020-04-17 ENCOUNTER — TELEMEDICINE (OUTPATIENT)
Dept: NEUROLOGY | Facility: CLINIC | Age: 65
End: 2020-04-17
Payer: MEDICARE

## 2020-04-17 DIAGNOSIS — I10 ESSENTIAL HYPERTENSION: ICD-10-CM

## 2020-04-17 DIAGNOSIS — I65.23 BILATERAL CAROTID ARTERY STENOSIS: ICD-10-CM

## 2020-04-17 DIAGNOSIS — M79.601 RIGHT ARM PAIN: ICD-10-CM

## 2020-04-17 DIAGNOSIS — R80.9 TYPE 2 DIABETES MELLITUS WITH MICROALBUMINURIA, WITH LONG-TERM CURRENT USE OF INSULIN (HCC): ICD-10-CM

## 2020-04-17 DIAGNOSIS — G47.33 OBSTRUCTIVE SLEEP APNEA: ICD-10-CM

## 2020-04-17 DIAGNOSIS — Z86.73 HISTORY OF TIA (TRANSIENT ISCHEMIC ATTACK): Primary | ICD-10-CM

## 2020-04-17 DIAGNOSIS — E11.29 TYPE 2 DIABETES MELLITUS WITH MICROALBUMINURIA, WITH LONG-TERM CURRENT USE OF INSULIN (HCC): ICD-10-CM

## 2020-04-17 DIAGNOSIS — Z79.4 TYPE 2 DIABETES MELLITUS WITH MICROALBUMINURIA, WITH LONG-TERM CURRENT USE OF INSULIN (HCC): ICD-10-CM

## 2020-04-17 DIAGNOSIS — E78.5 HYPERLIPIDEMIA ASSOCIATED WITH TYPE 2 DIABETES MELLITUS (HCC): ICD-10-CM

## 2020-04-17 DIAGNOSIS — E11.69 HYPERLIPIDEMIA ASSOCIATED WITH TYPE 2 DIABETES MELLITUS (HCC): ICD-10-CM

## 2020-04-17 DIAGNOSIS — E78.2 MIXED HYPERLIPIDEMIA: ICD-10-CM

## 2020-04-17 DIAGNOSIS — R23.1 PALLOR: ICD-10-CM

## 2020-04-17 PROCEDURE — 99215 OFFICE O/P EST HI 40 MIN: CPT | Performed by: PSYCHIATRY & NEUROLOGY

## 2020-04-17 RX ORDER — RABEPRAZOLE SODIUM 20 MG/1
TABLET, DELAYED RELEASE ORAL
COMMUNITY
Start: 2020-03-28 | End: 2020-06-29 | Stop reason: CLARIF

## 2020-04-24 NOTE — TELEPHONE ENCOUNTER
Medication: lisinopril (ZESTRIL) 5 MG tablet      Last refill: 1/16/20   Quantity: 90   Refills: 1/16/20    LOV: 2/28/20  Next OV: 7/16/20           Rx'd Nuvigil 150 mg daily

## 2020-04-27 ENCOUNTER — TELEMEDICINE (OUTPATIENT)
Dept: GASTROENTEROLOGY | Facility: CLINIC | Age: 65
End: 2020-04-27
Payer: MEDICARE

## 2020-04-27 VITALS — WEIGHT: 220 LBS | HEIGHT: 63 IN | BODY MASS INDEX: 38.98 KG/M2

## 2020-04-27 DIAGNOSIS — K31.84 GASTROPARESIS: ICD-10-CM

## 2020-04-27 DIAGNOSIS — K58.1 IRRITABLE BOWEL SYNDROME WITH CONSTIPATION: Primary | ICD-10-CM

## 2020-04-27 DIAGNOSIS — K21.9 GASTROESOPHAGEAL REFLUX DISEASE, ESOPHAGITIS PRESENCE NOT SPECIFIED: ICD-10-CM

## 2020-04-27 DIAGNOSIS — Z12.11 COLON CANCER SCREENING: ICD-10-CM

## 2020-04-27 PROCEDURE — 99214 OFFICE O/P EST MOD 30 MIN: CPT | Performed by: INTERNAL MEDICINE

## 2020-04-27 RX ORDER — FERROUS SULFATE TAB EC 324 MG (65 MG FE EQUIVALENT) 324 (65 FE) MG
65 TABLET DELAYED RESPONSE ORAL
COMMUNITY
End: 2022-07-12

## 2020-04-28 ENCOUNTER — TELEPHONE (OUTPATIENT)
Dept: GASTROENTEROLOGY | Facility: AMBULARY SURGERY CENTER | Age: 65
End: 2020-04-28

## 2020-04-29 DIAGNOSIS — K31.84 GASTROPARESIS: Primary | ICD-10-CM

## 2020-04-29 DIAGNOSIS — K59.03 DRUG-INDUCED CONSTIPATION: ICD-10-CM

## 2020-04-29 DIAGNOSIS — K59.01 CONSTIPATION DUE TO SLOW TRANSIT: ICD-10-CM

## 2020-05-05 ENCOUNTER — TELEPHONE (OUTPATIENT)
Dept: GASTROENTEROLOGY | Facility: CLINIC | Age: 65
End: 2020-05-05

## 2020-05-12 ENCOUNTER — TELEMEDICINE (OUTPATIENT)
Dept: PAIN MEDICINE | Facility: CLINIC | Age: 65
End: 2020-05-12
Payer: MEDICARE

## 2020-05-12 DIAGNOSIS — M54.41 CHRONIC BILATERAL LOW BACK PAIN WITH BILATERAL SCIATICA: ICD-10-CM

## 2020-05-12 DIAGNOSIS — G89.29 CHRONIC PAIN OF LEFT KNEE: ICD-10-CM

## 2020-05-12 DIAGNOSIS — M25.561 CHRONIC PAIN OF RIGHT KNEE: ICD-10-CM

## 2020-05-12 DIAGNOSIS — G89.29 CHRONIC PAIN OF RIGHT KNEE: ICD-10-CM

## 2020-05-12 DIAGNOSIS — G89.4 PAIN SYNDROME, CHRONIC: Chronic | ICD-10-CM

## 2020-05-12 DIAGNOSIS — G89.29 CHRONIC SCAPULAR PAIN: ICD-10-CM

## 2020-05-12 DIAGNOSIS — G63 POLYNEUROPATHY ASSOCIATED WITH UNDERLYING DISEASE (HCC): Chronic | ICD-10-CM

## 2020-05-12 DIAGNOSIS — M79.604 CHRONIC PAIN OF BOTH LOWER EXTREMITIES: Chronic | ICD-10-CM

## 2020-05-12 DIAGNOSIS — G89.29 CHRONIC BILATERAL LOW BACK PAIN WITH BILATERAL SCIATICA: ICD-10-CM

## 2020-05-12 DIAGNOSIS — G89.29 CHRONIC BILATERAL THORACIC BACK PAIN: ICD-10-CM

## 2020-05-12 DIAGNOSIS — M25.562 CHRONIC PAIN OF LEFT KNEE: ICD-10-CM

## 2020-05-12 DIAGNOSIS — M79.18 MYOFASCIAL PAIN SYNDROME: ICD-10-CM

## 2020-05-12 DIAGNOSIS — M79.605 CHRONIC PAIN OF BOTH LOWER EXTREMITIES: Chronic | ICD-10-CM

## 2020-05-12 DIAGNOSIS — M89.8X1 CHRONIC SCAPULAR PAIN: ICD-10-CM

## 2020-05-12 DIAGNOSIS — M54.6 CHRONIC BILATERAL THORACIC BACK PAIN: ICD-10-CM

## 2020-05-12 DIAGNOSIS — M54.42 CHRONIC BILATERAL LOW BACK PAIN WITH BILATERAL SCIATICA: ICD-10-CM

## 2020-05-12 DIAGNOSIS — G90.522 COMPLEX REGIONAL PAIN SYNDROME TYPE 1 OF LEFT LOWER EXTREMITY: Chronic | ICD-10-CM

## 2020-05-12 DIAGNOSIS — G89.29 CHRONIC PAIN OF BOTH LOWER EXTREMITIES: Chronic | ICD-10-CM

## 2020-05-12 PROCEDURE — 99443 PR PHYS/QHP TELEPHONE EVALUATION 21-30 MIN: CPT | Performed by: ANESTHESIOLOGY

## 2020-05-12 RX ORDER — DULOXETIN HYDROCHLORIDE 60 MG/1
CAPSULE, DELAYED RELEASE ORAL
Qty: 180 CAPSULE | Refills: 1 | Status: SHIPPED | OUTPATIENT
Start: 2020-05-12 | End: 2021-03-09

## 2020-05-12 RX ORDER — OXYCODONE HYDROCHLORIDE AND ACETAMINOPHEN 5; 325 MG/1; MG/1
TABLET ORAL
Qty: 45 TABLET | Refills: 0 | Status: SHIPPED | OUTPATIENT
Start: 2020-05-12 | End: 2020-05-12 | Stop reason: SDUPTHER

## 2020-05-12 RX ORDER — OXYCODONE HYDROCHLORIDE AND ACETAMINOPHEN 5; 325 MG/1; MG/1
TABLET ORAL
Qty: 45 TABLET | Refills: 0 | Status: SHIPPED | OUTPATIENT
Start: 2020-05-12 | End: 2020-06-23 | Stop reason: SDUPTHER

## 2020-05-13 ENCOUNTER — HOSPITAL ENCOUNTER (OUTPATIENT)
Dept: NON INVASIVE DIAGNOSTICS | Facility: CLINIC | Age: 65
Discharge: HOME | End: 2020-05-13
Payer: MEDICARE

## 2020-05-13 DIAGNOSIS — R23.1 PALLOR: ICD-10-CM

## 2020-05-13 DIAGNOSIS — M79.601 RIGHT ARM PAIN: ICD-10-CM

## 2020-05-13 DIAGNOSIS — I65.23 BILATERAL CAROTID ARTERY STENOSIS: ICD-10-CM

## 2020-05-13 PROCEDURE — 93880 EXTRACRANIAL BILAT STUDY: CPT

## 2020-05-13 PROCEDURE — 93880 EXTRACRANIAL BILAT STUDY: CPT | Performed by: SURGERY

## 2020-05-13 PROCEDURE — 93931 UPPER EXTREMITY STUDY: CPT

## 2020-05-13 PROCEDURE — 93931 UPPER EXTREMITY STUDY: CPT | Performed by: SURGERY

## 2020-05-14 ENCOUNTER — TELEPHONE (OUTPATIENT)
Dept: GASTROENTEROLOGY | Facility: CLINIC | Age: 65
End: 2020-05-14

## 2020-05-15 ENCOUNTER — TELEPHONE (OUTPATIENT)
Dept: NEUROLOGY | Facility: CLINIC | Age: 65
End: 2020-05-15

## 2020-06-12 ENCOUNTER — TELEPHONE (OUTPATIENT)
Dept: GASTROENTEROLOGY | Facility: AMBULARY SURGERY CENTER | Age: 65
End: 2020-06-12

## 2020-06-12 ENCOUNTER — APPOINTMENT (EMERGENCY)
Dept: CT IMAGING | Facility: HOSPITAL | Age: 65
End: 2020-06-12
Payer: MEDICARE

## 2020-06-12 ENCOUNTER — HOSPITAL ENCOUNTER (EMERGENCY)
Facility: HOSPITAL | Age: 65
Discharge: HOME | End: 2020-06-12
Attending: EMERGENCY MEDICINE | Admitting: EMERGENCY MEDICINE
Payer: MEDICARE

## 2020-06-12 VITALS
TEMPERATURE: 98.8 F | OXYGEN SATURATION: 96 % | BODY MASS INDEX: 39.17 KG/M2 | HEART RATE: 82 BPM | DIASTOLIC BLOOD PRESSURE: 75 MMHG | RESPIRATION RATE: 16 BRPM | WEIGHT: 221.12 LBS | SYSTOLIC BLOOD PRESSURE: 156 MMHG

## 2020-06-12 DIAGNOSIS — K64.9 HEMORRHOIDS: ICD-10-CM

## 2020-06-12 DIAGNOSIS — R10.30 LOWER ABDOMINAL PAIN: ICD-10-CM

## 2020-06-12 DIAGNOSIS — E83.52 HYPERCALCEMIA: ICD-10-CM

## 2020-06-12 DIAGNOSIS — K62.5 RECTAL BLEEDING: Primary | ICD-10-CM

## 2020-06-12 DIAGNOSIS — K62.5 RECTAL BLEED: Primary | ICD-10-CM

## 2020-06-12 LAB
ALBUMIN SERPL BCP-MCNC: 3.8 G/DL (ref 3.5–5)
ALP SERPL-CCNC: 118 U/L (ref 46–116)
ALT SERPL W P-5'-P-CCNC: 24 U/L (ref 12–78)
ANION GAP SERPL CALCULATED.3IONS-SCNC: 2 MMOL/L (ref 4–13)
APTT PPP: 28 SECONDS (ref 23–37)
AST SERPL W P-5'-P-CCNC: 21 U/L (ref 5–45)
BASOPHILS # BLD AUTO: 0.03 THOUSANDS/ΜL (ref 0–0.1)
BASOPHILS NFR BLD AUTO: 1 % (ref 0–1)
BILIRUB SERPL-MCNC: 0.45 MG/DL (ref 0.2–1)
BUN SERPL-MCNC: 22 MG/DL (ref 5–25)
CALCIUM SERPL-MCNC: 10.6 MG/DL (ref 8.3–10.1)
CHLORIDE SERPL-SCNC: 105 MMOL/L (ref 100–108)
CO2 SERPL-SCNC: 31 MMOL/L (ref 21–32)
CREAT SERPL-MCNC: 1.26 MG/DL (ref 0.6–1.3)
EOSINOPHIL # BLD AUTO: 0.06 THOUSAND/ΜL (ref 0–0.61)
EOSINOPHIL NFR BLD AUTO: 1 % (ref 0–6)
ERYTHROCYTE [DISTWIDTH] IN BLOOD BY AUTOMATED COUNT: 13.8 % (ref 11.6–15.1)
GFR SERPL CREATININE-BSD FRML MDRD: 45 ML/MIN/1.73SQ M
GLUCOSE SERPL-MCNC: 218 MG/DL (ref 65–140)
HCT VFR BLD AUTO: 36.5 % (ref 34.8–46.1)
HGB BLD-MCNC: 11.2 G/DL (ref 11.5–15.4)
IMM GRANULOCYTES # BLD AUTO: 0.05 THOUSAND/UL (ref 0–0.2)
IMM GRANULOCYTES NFR BLD AUTO: 1 % (ref 0–2)
INR PPP: 1.02 (ref 0.84–1.19)
LIPASE SERPL-CCNC: 103 U/L (ref 73–393)
LYMPHOCYTES # BLD AUTO: 1.03 THOUSANDS/ΜL (ref 0.6–4.47)
LYMPHOCYTES NFR BLD AUTO: 20 % (ref 14–44)
MCH RBC QN AUTO: 28.3 PG (ref 26.8–34.3)
MCHC RBC AUTO-ENTMCNC: 30.7 G/DL (ref 31.4–37.4)
MCV RBC AUTO: 92 FL (ref 82–98)
MONOCYTES # BLD AUTO: 0.46 THOUSAND/ΜL (ref 0.17–1.22)
MONOCYTES NFR BLD AUTO: 9 % (ref 4–12)
NEUTROPHILS # BLD AUTO: 3.55 THOUSANDS/ΜL (ref 1.85–7.62)
NEUTS SEG NFR BLD AUTO: 68 % (ref 43–75)
NRBC BLD AUTO-RTO: 0 /100 WBCS
PLATELET # BLD AUTO: 206 THOUSANDS/UL (ref 149–390)
PMV BLD AUTO: 9.1 FL (ref 8.9–12.7)
POTASSIUM SERPL-SCNC: 4.7 MMOL/L (ref 3.5–5.3)
PROT SERPL-MCNC: 7.8 G/DL (ref 6.4–8.2)
PROTHROMBIN TIME: 13.5 SECONDS (ref 11.6–14.5)
RBC # BLD AUTO: 3.96 MILLION/UL (ref 3.81–5.12)
SODIUM SERPL-SCNC: 138 MMOL/L (ref 136–145)
WBC # BLD AUTO: 5.18 THOUSAND/UL (ref 4.31–10.16)

## 2020-06-12 PROCEDURE — 80053 COMPREHEN METABOLIC PANEL: CPT | Performed by: EMERGENCY MEDICINE

## 2020-06-12 PROCEDURE — 99285 EMERGENCY DEPT VISIT HI MDM: CPT

## 2020-06-12 PROCEDURE — 74177 CT ABD & PELVIS W/CONTRAST: CPT

## 2020-06-12 PROCEDURE — 99285 EMERGENCY DEPT VISIT HI MDM: CPT | Performed by: EMERGENCY MEDICINE

## 2020-06-12 PROCEDURE — 85730 THROMBOPLASTIN TIME PARTIAL: CPT | Performed by: EMERGENCY MEDICINE

## 2020-06-12 PROCEDURE — 85610 PROTHROMBIN TIME: CPT | Performed by: EMERGENCY MEDICINE

## 2020-06-12 PROCEDURE — 36415 COLL VENOUS BLD VENIPUNCTURE: CPT | Performed by: EMERGENCY MEDICINE

## 2020-06-12 PROCEDURE — 96361 HYDRATE IV INFUSION ADD-ON: CPT

## 2020-06-12 PROCEDURE — 83690 ASSAY OF LIPASE: CPT | Performed by: EMERGENCY MEDICINE

## 2020-06-12 PROCEDURE — 96374 THER/PROPH/DIAG INJ IV PUSH: CPT

## 2020-06-12 PROCEDURE — 85025 COMPLETE CBC W/AUTO DIFF WBC: CPT | Performed by: EMERGENCY MEDICINE

## 2020-06-12 RX ORDER — TROSPIUM CHLORIDE 20 MG/1
20 TABLET, FILM COATED ORAL 2 TIMES DAILY
COMMUNITY
End: 2020-07-02 | Stop reason: ALTCHOICE

## 2020-06-12 RX ORDER — MORPHINE SULFATE 4 MG/ML
4 INJECTION, SOLUTION INTRAMUSCULAR; INTRAVENOUS ONCE
Status: COMPLETED | OUTPATIENT
Start: 2020-06-12 | End: 2020-06-12

## 2020-06-12 RX ADMIN — IOHEXOL 100 ML: 350 INJECTION, SOLUTION INTRAVENOUS at 21:26

## 2020-06-12 RX ADMIN — MORPHINE SULFATE 4 MG: 4 INJECTION INTRAVENOUS at 20:36

## 2020-06-12 RX ADMIN — SODIUM CHLORIDE 1000 ML: 0.9 INJECTION, SOLUTION INTRAVENOUS at 20:36

## 2020-06-12 RX ADMIN — SODIUM CHLORIDE 500 ML: 0.9 INJECTION, SOLUTION INTRAVENOUS at 21:42

## 2020-06-23 ENCOUNTER — OFFICE VISIT (OUTPATIENT)
Dept: PAIN MEDICINE | Facility: CLINIC | Age: 65
End: 2020-06-23
Payer: MEDICARE

## 2020-06-23 VITALS
HEART RATE: 94 BPM | HEIGHT: 63 IN | DIASTOLIC BLOOD PRESSURE: 92 MMHG | SYSTOLIC BLOOD PRESSURE: 140 MMHG | WEIGHT: 222 LBS | BODY MASS INDEX: 39.34 KG/M2 | TEMPERATURE: 98 F

## 2020-06-23 DIAGNOSIS — M54.41 CHRONIC BILATERAL LOW BACK PAIN WITH BILATERAL SCIATICA: ICD-10-CM

## 2020-06-23 DIAGNOSIS — M47.817 LUMBOSACRAL SPONDYLOSIS WITHOUT MYELOPATHY: ICD-10-CM

## 2020-06-23 DIAGNOSIS — M79.604 CHRONIC PAIN OF BOTH LOWER EXTREMITIES: Chronic | ICD-10-CM

## 2020-06-23 DIAGNOSIS — G89.29 CHRONIC PAIN OF RIGHT KNEE: ICD-10-CM

## 2020-06-23 DIAGNOSIS — M25.562 CHRONIC PAIN OF LEFT KNEE: ICD-10-CM

## 2020-06-23 DIAGNOSIS — M54.42 CHRONIC BILATERAL LOW BACK PAIN WITH BILATERAL SCIATICA: ICD-10-CM

## 2020-06-23 DIAGNOSIS — M89.8X1 CHRONIC SCAPULAR PAIN: ICD-10-CM

## 2020-06-23 DIAGNOSIS — M25.561 CHRONIC PAIN OF RIGHT KNEE: ICD-10-CM

## 2020-06-23 DIAGNOSIS — G89.29 CHRONIC MYOFASCIAL PAIN: ICD-10-CM

## 2020-06-23 DIAGNOSIS — G89.4 PAIN SYNDROME, CHRONIC: Primary | Chronic | ICD-10-CM

## 2020-06-23 DIAGNOSIS — M79.605 CHRONIC PAIN OF BOTH LOWER EXTREMITIES: Chronic | ICD-10-CM

## 2020-06-23 DIAGNOSIS — M54.14 THORACIC RADICULOPATHY: ICD-10-CM

## 2020-06-23 DIAGNOSIS — M79.18 CHRONIC MYOFASCIAL PAIN: ICD-10-CM

## 2020-06-23 DIAGNOSIS — G90.522 COMPLEX REGIONAL PAIN SYNDROME TYPE 1 OF LEFT LOWER EXTREMITY: Chronic | ICD-10-CM

## 2020-06-23 DIAGNOSIS — M54.6 CHRONIC BILATERAL THORACIC BACK PAIN: ICD-10-CM

## 2020-06-23 DIAGNOSIS — G89.29 CHRONIC BILATERAL LOW BACK PAIN WITH BILATERAL SCIATICA: ICD-10-CM

## 2020-06-23 DIAGNOSIS — G89.29 CHRONIC PAIN OF LEFT KNEE: ICD-10-CM

## 2020-06-23 DIAGNOSIS — M54.16 LUMBAR RADICULOPATHY: ICD-10-CM

## 2020-06-23 DIAGNOSIS — R26.9 GAIT DISORDER: ICD-10-CM

## 2020-06-23 DIAGNOSIS — G89.29 CHRONIC PAIN OF BOTH LOWER EXTREMITIES: Chronic | ICD-10-CM

## 2020-06-23 DIAGNOSIS — G89.29 CHRONIC SCAPULAR PAIN: ICD-10-CM

## 2020-06-23 DIAGNOSIS — G89.29 CHRONIC BILATERAL THORACIC BACK PAIN: ICD-10-CM

## 2020-06-23 PROCEDURE — 99214 OFFICE O/P EST MOD 30 MIN: CPT | Performed by: NURSE PRACTITIONER

## 2020-06-23 RX ORDER — OXYCODONE HYDROCHLORIDE AND ACETAMINOPHEN 5; 325 MG/1; MG/1
TABLET ORAL
Qty: 45 TABLET | Refills: 0 | Status: SHIPPED | OUTPATIENT
Start: 2020-06-23 | End: 2020-06-24 | Stop reason: SDUPTHER

## 2020-06-23 RX ORDER — METHOCARBAMOL 750 MG/1
TABLET, FILM COATED ORAL
Qty: 120 TABLET | Refills: 0 | Status: SHIPPED | OUTPATIENT
Start: 2020-06-23 | End: 2020-07-07

## 2020-06-24 ENCOUNTER — OFFICE VISIT (OUTPATIENT)
Dept: SLEEP CENTER | Facility: CLINIC | Age: 65
End: 2020-06-24
Payer: MEDICARE

## 2020-06-24 VITALS
DIASTOLIC BLOOD PRESSURE: 70 MMHG | BODY MASS INDEX: 39.34 KG/M2 | SYSTOLIC BLOOD PRESSURE: 116 MMHG | WEIGHT: 222 LBS | HEART RATE: 88 BPM | HEIGHT: 63 IN

## 2020-06-24 DIAGNOSIS — G47.10 HYPERSOMNIA: ICD-10-CM

## 2020-06-24 PROCEDURE — 99213 OFFICE O/P EST LOW 20 MIN: CPT | Performed by: INTERNAL MEDICINE

## 2020-06-24 RX ORDER — ARMODAFINIL 150 MG/1
150 TABLET ORAL DAILY
Qty: 30 TABLET | Refills: 0 | Status: SHIPPED | OUTPATIENT
Start: 2020-06-24 | End: 2020-07-20 | Stop reason: SDUPTHER

## 2020-06-24 RX ORDER — OXYCODONE HYDROCHLORIDE AND ACETAMINOPHEN 5; 325 MG/1; MG/1
TABLET ORAL
Qty: 45 TABLET | Refills: 0 | Status: SHIPPED | OUTPATIENT
Start: 2020-06-24 | End: 2020-11-12 | Stop reason: ALTCHOICE

## 2020-06-29 ENCOUNTER — OFFICE VISIT (OUTPATIENT)
Dept: GASTROENTEROLOGY | Facility: CLINIC | Age: 65
End: 2020-06-29
Payer: MEDICARE

## 2020-06-29 VITALS
SYSTOLIC BLOOD PRESSURE: 130 MMHG | BODY MASS INDEX: 39.87 KG/M2 | HEART RATE: 68 BPM | HEIGHT: 63 IN | WEIGHT: 225 LBS | DIASTOLIC BLOOD PRESSURE: 80 MMHG | TEMPERATURE: 97.1 F

## 2020-06-29 DIAGNOSIS — Z12.11 COLON CANCER SCREENING: ICD-10-CM

## 2020-06-29 DIAGNOSIS — E66.01 MORBID OBESITY WITH BMI OF 40.0-44.9, ADULT (HCC): ICD-10-CM

## 2020-06-29 DIAGNOSIS — K21.9 GASTROESOPHAGEAL REFLUX DISEASE, ESOPHAGITIS PRESENCE NOT SPECIFIED: ICD-10-CM

## 2020-06-29 DIAGNOSIS — K31.84 GASTROPARESIS: ICD-10-CM

## 2020-06-29 DIAGNOSIS — K58.2 IRRITABLE BOWEL SYNDROME WITH BOTH CONSTIPATION AND DIARRHEA: Primary | ICD-10-CM

## 2020-06-29 DIAGNOSIS — R13.10 DYSPHAGIA, UNSPECIFIED TYPE: ICD-10-CM

## 2020-06-29 DIAGNOSIS — Z11.59 NEED FOR HEPATITIS C SCREENING TEST: ICD-10-CM

## 2020-06-29 PROCEDURE — 99214 OFFICE O/P EST MOD 30 MIN: CPT | Performed by: INTERNAL MEDICINE

## 2020-06-29 RX ORDER — FENOFIBRATE 48 MG/1
48 TABLET, COATED ORAL DAILY
COMMUNITY
Start: 2020-06-24 | End: 2020-06-29 | Stop reason: SDUPTHER

## 2020-06-29 RX ORDER — FENOFIBRATE 48 MG/1
TABLET, COATED ORAL
Status: ON HOLD | COMMUNITY
Start: 2020-06-24 | End: 2021-05-08

## 2020-06-29 RX ORDER — POLYETHYLENE GLYCOL 3350 17 G/17G
17 POWDER, FOR SOLUTION ORAL DAILY
Qty: 30 EACH | Refills: 5 | Status: SHIPPED | OUTPATIENT
Start: 2020-06-29 | End: 2021-03-09

## 2020-07-02 ENCOUNTER — TELEMEDICINE (OUTPATIENT)
Dept: UROLOGY | Facility: AMBULATORY SURGERY CENTER | Age: 65
End: 2020-07-02
Payer: MEDICARE

## 2020-07-02 ENCOUNTER — TELEPHONE (OUTPATIENT)
Dept: PAIN MEDICINE | Facility: CLINIC | Age: 65
End: 2020-07-02

## 2020-07-02 DIAGNOSIS — M79.10 MYALGIA: ICD-10-CM

## 2020-07-02 DIAGNOSIS — M79.18 CHRONIC MYOFASCIAL PAIN: ICD-10-CM

## 2020-07-02 DIAGNOSIS — N32.81 OAB (OVERACTIVE BLADDER): Primary | ICD-10-CM

## 2020-07-02 DIAGNOSIS — N39.0 RECURRENT UTI: ICD-10-CM

## 2020-07-02 DIAGNOSIS — G89.4 PAIN SYNDROME, CHRONIC: Primary | Chronic | ICD-10-CM

## 2020-07-02 DIAGNOSIS — G89.29 CHRONIC MYOFASCIAL PAIN: ICD-10-CM

## 2020-07-02 PROCEDURE — 99213 OFFICE O/P EST LOW 20 MIN: CPT | Performed by: NURSE PRACTITIONER

## 2020-07-02 RX ORDER — OXYBUTYNIN CHLORIDE 15 MG/1
15 TABLET, EXTENDED RELEASE ORAL DAILY
Qty: 30 TABLET | Refills: 3 | Status: SHIPPED | OUTPATIENT
Start: 2020-07-02 | End: 2022-01-11

## 2020-07-02 NOTE — TELEPHONE ENCOUNTER
S/w pt, confirmed methocarbamol qid since 6/23 w/ no improvement  Pt stated that she wakes up achey and cant move  Pt denies any se's  Advised pt, will d/w DG and cb to advise

## 2020-07-02 NOTE — PROGRESS NOTES
Virtual Regular Visit      Assessment/Plan:    Problem List Items Addressed This Visit     None      Visit Diagnoses     OAB (overactive bladder)    -  Primary    Relevant Medications    oxybutynin (DITROPAN XL) 15 MG 24 hr tablet    Recurrent UTI                   Reason for visit is   Chief Complaint   Patient presents with    Virtual Regular Visit        Encounter provider ROMAINE Bateman    Provider located at [place] [address]  [address] [place] [address]      Recent Visits  No visits were found meeting these conditions  Showing recent visits within past 7 days and meeting all other requirements     Today's Visits  Date Type Provider Dept   07/02/20 9564 ROMAINE Monterroso [facility]   Showing today's visits and meeting all other requirements     Future Appointments  No visits were found meeting these conditions  Showing future appointments within next 150 days and meeting all other requirements        The patient was identified by name and date of birth  Candy Hutchins was informed that this is a telemedicine visit and that the visit is being conducted through [employer] and patient was informed that this is not a secure, HIPAA-complaint platform  She agrees to proceed     My office door was closed  No one else was in the room  She acknowledged consent and understanding of privacy and security of the video platform  The patient has agreed to participate and understands they can discontinue the visit at any time  Patient is aware this is a billable service  Subjective  Candy Hutchins is a 62 y o  female with a history of OAB, and recurrent UTIs  Patient known to our office  She reports increased episodes of daytime urinary frequency  Patient had initially been prescribed Myrbetriq but due to insurance coverage this was switched to oxybutynin  She has also tried trospium without any improvement     She continues to take oxybutynin 10 mg daily  Patient denies any gross hematuria or dysuria  Her last urinary tract infection was in March 2020 for E coli ESBL organism  Plan  1  OAB- due to her worsening episodes of urinary frequency, we discussed increasing oxybutynin to 15 mg daily  Prescription was electronically sent to her pharmacy  If medication is ineffective, we will consider trialing a different anticholinergic or adding Myrbetriq for dual medication therapy  Previously, her insurance did not cover Myrbetriq, but she has since tried alternatives listed on her insurance formulary  Review dietary and behavioral modifications  2  Recurrent UTIs- patient is asymptomatic at this time, and last urinary tract infection was in March  We will continue to treat on as-needed basis    Follow-up in 4 months to evaluate her urinary pattern    All questions answered, patient verbalized understanding, and instructed to call with any issues    Past Medical History:   Diagnosis Date    Anxiety     Asthma     controlled    Back complaints     Cancer ([facility] [address] )     nose    Cellulitis of left lower leg     Chronic bilateral thoracic back pain     Chronic myofascial pain     Chronic pain of both lower extremities     Chronic pain of left knee     Chronic pain syndrome     CPAP (continuous positive airway pressure) dependence     Depression     Diabetes mellitus ([address] )     Disease of thyroid gland     Gait disorder     Gastroparesis     GERD (gastroesophageal reflux disease)     History of angina     History of transfusion     Many years ago    Hyperlipidemia     Hypertension     Insulin pump in place     Kidney disease     Polyneuropathy associated with underlying disease ([address] )     PONV (postoperative nausea and vomiting)     S/P insertion of spinal cord stimulator 6/8/2018    Sarcoidosis     Sleep apnea     TIA (transient ischemic attack)        Past Surgical History:   Procedure Laterality Date    BREAST SURGERY      BREAST SURGERY      reduction    CARDIAC PACEMAKER PLACEMENT       SECTION      HERNIA REPAIR      HYSTERECTOMY      JOINT REPLACEMENT Left     NECK SURGERY      [facility] [address] Left 2018    Procedure: Insertion of thoracic spinal cord stimulator electrode via laminotomy and placement of left buttock IPG;  Surgeon: Che Gutierrez MD;  [facility];  Service: Neurosurgery    REPLACEMENT TOTAL KNEE      ROTATOR CUFF REPAIR      SPINAL STIMULATOR PLACEMENT Left 10/3/2018    Procedure: BUTTOCK RE-OPENING INCISION FOR REPOSITIONING OF IMPLANTABLE PULSE GENERATOR;  Surgeon: Che Gutierrez MD;  [facility];  Service: Neurosurgery    TONSILLECTOMY      WISDOM TOOTH EXTRACTION         Current Outpatient Medications   Medication Sig Dispense Refill    amitriptyline (ELAVIL) 50 mg tablet Take one tablet p o  at bedtime for two weeks    If insomnia persists, increase to two tablets at bedtime 60 tablet 2    Armodafinil 150 MG tablet Take 1 tablet (150 mg total) by mouth daily 30 tablet 0    atorvastatin (LIPITOR) 80 mg tablet Take 80 mg by mouth daily at bedtime        cholecalciferol (VITAMIN D3) 1,000 units tablet Take 2,000 Units by mouth daily with lunch    5    clopidogrel (PLAVIX) 75 mg tablet Take 1 tablet (75 mg total) by mouth daily (Patient taking differently: Take 75 mg by mouth daily at bedtime  ) 30 tablet 4    CRANBERRY PO Take 1 capsule by mouth daily with lunch        Cyanocobalamin (VITAMIN B-12 PO) Take 1 capsule by mouth daily with lunch        Docusate Sodium 100 MG capsule Take 100 mg by mouth daily with lunch        DULoxetine (CYMBALTA) 60 mg delayed release capsule Take 2 PO  capsule 1    famotidine (PEPCID) 20 mg tablet       fenofibrate (TRICOR) 48 mg tablet       ferrous sulfate 324 (65 Fe) mg Take 65 mg by mouth      fluticasone-salmeterol (ADVAIR) 100-50 mcg/dose inhaler Inhale 2 puffs      furosemide (LASIX) 20 mg tablet Take 60 mg by mouth 2 (two) times a day       glucagon 1 MG injection Glucagon Emergency Kit (human-recomb) 1 mg solution for injection      glucose blood test strip Testing six times daily  E11 65 on insulin pump      hydrALAZINE (APRESOLINE) 10 mg tablet       insulin aspart (NovoLOG) 100 units/mL injection Inject  130 units daily via insulin pump  E11 65      levothyroxine 75 mcg tablet Take 75 mcg by mouth daily in the early morning        losartan (COZAAR) 100 MG tablet Take 100 mg by mouth daily in the early morning        lubiprostone (AMITIZA) 24 mcg capsule Take 1 capsule (24 mcg total) by mouth 2 (two) times a day with meals 48 capsule 0    magnesium oxide (MAG-OX) 400 mg tablet Take 500 mg by mouth       methocarbamol (ROBAXIN) 750 mg tablet Take 1 PO QID PRN for pain  120 tablet 0    Naloxone HCl (NARCAN) 4 MG/0 1ML LIQD Sprays into 1 nostril for suspected opioid overdose  May repeat  2 each 0    NOVOLOG 100 UNIT/ML injection INJECT 120 UNITS DAILY VIA INSULIN PUMP E 11 65  3    ondansetron (ZOFRAN) 8 mg tablet Take 8 mg by mouth every 8 (eight) hours as needed        oxybutynin (DITROPAN XL) 15 MG 24 hr tablet Take 1 tablet (15 mg total) by mouth daily 30 tablet 3    oxyCODONE-acetaminophen (Percocet) 5-325 mg per tablet Take 1 PO BID PRN for pain  DO NOT FILL UNTIL: 8/7/20 45 tablet 0    pantoprazole (PROTONIX) 40 mg tablet Take 40 mg by mouth 2 (two) times a day        polyethylene glycol (MIRALAX) 17 g packet Take 17 g by mouth daily 30 each 5    pramipexole (MIRAPEX) 1 mg tablet Take 1 mg by mouth 2 (two) times a day        SYMBICORT 80-4 5 MCG/ACT inhaler Inhale 2 puffs 2 (two) times a day        VENTOLIN  (90 Base) MCG/ACT inhaler 2 puffs every 4 (four) hours as needed         No current facility-administered medications for this visit           Allergies   Allergen Reactions    Bactrim [Sulfamethoxazole-Trimethoprim] Hives    Sucralfate Hives     Facial swelling    Topamax [Topiramate]      disorentation    Azithromycin Itching    Pregabalin Confusion and Other (See Comments)     altered mental status    Ciprofloxacin Drowsiness     Other reaction(s): Other (See Comments)  "drowsiness"    Norvasc [Amlodipine] Swelling    Baclofen      "That knocks me out "    Bupropion Fatigue    Methotrexate Nausea Only    Neurontin [Gabapentin] Hallucinations and Hypertension       Review of Systems   Constitutional: Negative  HENT: Negative  Respiratory: Negative  Cardiovascular: Negative  Gastrointestinal: Negative  Genitourinary: Positive for frequency  Negative for decreased urine volume, difficulty urinating, dysuria, flank pain, hematuria and urgency  Musculoskeletal: Negative  Skin: Negative  Neurological: Negative  Psychiatric/Behavioral: Negative  Video Exam    There were no vitals filed for this visit  Physical Exam   Constitutional: She is oriented to person, place, and time  She appears well-developed and well-nourished  Pulmonary/Chest: Effort normal    Neurological: She is alert and oriented to person, place, and time  Psychiatric: She has a normal mood and affect  Her behavior is normal  Judgment and thought content normal         As a result of this visit, I have not referred the patient for further respiratory evaluation  I spent 15 minutes with patient today in which greater than 50% of the time was spent in counseling/coordination of care regarding plan of care      Denise Jensen acknowledges that she has consented to an online visit or consultation  She understands that the online visit is based solely on information provided by her, and that, in the absence of a face-to-face physical evaluation by the physician, the diagnosis she receives is both limited and provisional in terms of accuracy and completeness   This is not intended to replace a full medical face-to-face evaluation by the physician  Lesley Cronin understands and accepts these terms

## 2020-07-02 NOTE — TELEPHONE ENCOUNTER
Patient   [address] [facility]  D    Patient is calling in stating that methocarbamol (ROBAXIN) 750 mg, is not working for her  She is still having pain and spasms  She would like should different please  Please send script to pharmacy on file

## 2020-07-02 NOTE — TELEPHONE ENCOUNTER
Advise her to give the methocarbamol a few more days and if by Monday she doesn't feel it is helping, to call our office back  Thank you

## 2020-07-06 NOTE — TELEPHONE ENCOUNTER
S/w pt, confirmed robaxin 1 am, 1 pm 2 hs with no relief  Pt stated that her pain is actually worse  Advised pt, will d/w DG and cb to advise  Pt verbalized understanding and appreciation

## 2020-07-06 NOTE — TELEPHONE ENCOUNTER
Pt called the Robaxin is still nor working for her  She states she even feels worse since she has been taking it  Please call pt back sometime today      Pt can be reached at [phone]

## 2020-07-06 NOTE — TELEPHONE ENCOUNTER
Pt called and stated the new medication is just putting her to sleep  And not giving her any relief,   Please advise      Pt can be reached [phone]

## 2020-07-07 RX ORDER — METAXALONE 800 MG/1
TABLET ORAL
Qty: 90 TABLET | Refills: 1 | Status: SHIPPED | OUTPATIENT
Start: 2020-07-07 | End: 2020-11-12 | Stop reason: ALTCHOICE

## 2020-07-07 NOTE — TELEPHONE ENCOUNTER
S/w pt, advised of above  Pt verbalized understanding and appreciation  Will cb if questions / concerns arise

## 2020-07-07 NOTE — TELEPHONE ENCOUNTER
At this point have her stop the methocarbamol and we can try metaxalone in its place  I sent a script for Metaxalone 800 mg, 1 PO TID PRN for pain and spasms to her local pharmacy  She is not to drive or operate machinery until she sees how it affects her and should call our office with any side effects or issues  Thank you

## 2020-07-13 ENCOUNTER — TELEPHONE (OUTPATIENT)
Dept: GASTROENTEROLOGY | Facility: AMBULARY SURGERY CENTER | Age: 65
End: 2020-07-13

## 2020-07-13 NOTE — TELEPHONE ENCOUNTER
Patients GI provider:  Dr Sameer Bright    Number to return call: [phone]    Reason for call: Pt calling requesting to speak with a nurse regarding hep C    Scheduled procedure/appointment date if applicable: Apt/procedure 8/14/20

## 2020-07-13 NOTE — TELEPHONE ENCOUNTER
Patient is aware of hepatitis c screening blood work  She will complete at [facility]

## 2020-07-17 ENCOUNTER — TRANSCRIBE ORDERS (OUTPATIENT)
Dept: ADMINISTRATIVE | Facility: HOSPITAL | Age: 65
End: 2020-07-17

## 2020-07-17 ENCOUNTER — APPOINTMENT (OUTPATIENT)
Dept: LAB | Facility: MEDICAL CENTER | Age: 65
End: 2020-07-17
Attending: INTERNAL MEDICINE
Payer: MEDICARE

## 2020-07-17 DIAGNOSIS — M25.50 ARTHRALGIA, UNSPECIFIED JOINT: Primary | ICD-10-CM

## 2020-07-17 DIAGNOSIS — Z11.59 NEED FOR HEPATITIS C SCREENING TEST: ICD-10-CM

## 2020-07-17 DIAGNOSIS — M25.50 ARTHRALGIA, UNSPECIFIED JOINT: ICD-10-CM

## 2020-07-17 LAB
ALBUMIN SERPL BCP-MCNC: 3.9 G/DL (ref 3.5–5)
ALP SERPL-CCNC: 87 U/L (ref 46–116)
ALT SERPL W P-5'-P-CCNC: 22 U/L (ref 12–78)
AST SERPL W P-5'-P-CCNC: 19 U/L (ref 5–45)
BILIRUB DIRECT SERPL-MCNC: 0.16 MG/DL (ref 0–0.2)
BILIRUB SERPL-MCNC: 0.53 MG/DL (ref 0.2–1)
C3 SERPL-MCNC: 144 MG/DL (ref 90–180)
C4 SERPL-MCNC: 28 MG/DL (ref 10–40)
CK MB SERPL-MCNC: 20.5 NG/ML (ref 0–5)
CK MB SERPL-MCNC: 5.1 % (ref 0–2.5)
CK SERPL-CCNC: 401 U/L (ref 26–192)
CRP SERPL QL: 16.1 MG/L
ERYTHROCYTE [SEDIMENTATION RATE] IN BLOOD: 31 MM/HOUR (ref 0–20)
PROT SERPL-MCNC: 7.3 G/DL (ref 6.4–8.2)

## 2020-07-17 PROCEDURE — 80076 HEPATIC FUNCTION PANEL: CPT

## 2020-07-17 PROCEDURE — 86200 CCP ANTIBODY: CPT

## 2020-07-17 PROCEDURE — 86803 HEPATITIS C AB TEST: CPT

## 2020-07-17 PROCEDURE — 82553 CREATINE MB FRACTION: CPT

## 2020-07-17 PROCEDURE — 86160 COMPLEMENT ANTIGEN: CPT

## 2020-07-17 PROCEDURE — 36415 COLL VENOUS BLD VENIPUNCTURE: CPT

## 2020-07-17 PROCEDURE — 84165 PROTEIN E-PHORESIS SERUM: CPT

## 2020-07-17 PROCEDURE — 82085 ASSAY OF ALDOLASE: CPT

## 2020-07-17 PROCEDURE — 86038 ANTINUCLEAR ANTIBODIES: CPT

## 2020-07-17 PROCEDURE — 82550 ASSAY OF CK (CPK): CPT

## 2020-07-17 PROCEDURE — 86140 C-REACTIVE PROTEIN: CPT

## 2020-07-17 PROCEDURE — 84165 PROTEIN E-PHORESIS SERUM: CPT | Performed by: PATHOLOGY

## 2020-07-17 PROCEDURE — 86430 RHEUMATOID FACTOR TEST QUAL: CPT

## 2020-07-17 PROCEDURE — 85652 RBC SED RATE AUTOMATED: CPT

## 2020-07-18 LAB — HCV AB SER QL: NORMAL

## 2020-07-20 DIAGNOSIS — G47.10 HYPERSOMNIA: ICD-10-CM

## 2020-07-20 LAB
ALBUMIN SERPL ELPH-MCNC: 4.29 G/DL (ref 3.5–5)
ALBUMIN SERPL ELPH-MCNC: 59.6 % (ref 52–65)
ALDOLASE SERPL-CCNC: 7.7 U/L (ref 3.3–10.3)
ALPHA1 GLOB SERPL ELPH-MCNC: 0.34 G/DL (ref 0.1–0.4)
ALPHA1 GLOB SERPL ELPH-MCNC: 4.7 % (ref 2.5–5)
ALPHA2 GLOB SERPL ELPH-MCNC: 0.71 G/DL (ref 0.4–1.2)
ALPHA2 GLOB SERPL ELPH-MCNC: 9.9 % (ref 7–13)
BETA GLOB ABNORMAL SERPL ELPH-MCNC: 0.49 G/DL (ref 0.4–0.8)
BETA1 GLOB SERPL ELPH-MCNC: 6.8 % (ref 5–13)
BETA2 GLOB SERPL ELPH-MCNC: 4 % (ref 2–8)
BETA2+GAMMA GLOB SERPL ELPH-MCNC: 0.29 G/DL (ref 0.2–0.5)
CCP IGA+IGG SERPL IA-ACNC: 23 UNITS (ref 0–19)
GAMMA GLOB ABNORMAL SERPL ELPH-MCNC: 1.08 G/DL (ref 0.5–1.6)
GAMMA GLOB SERPL ELPH-MCNC: 15 % (ref 12–22)
IGG/ALB SER: 1.48 {RATIO} (ref 1.1–1.8)
PROT PATTERN SERPL ELPH-IMP: NORMAL
PROT SERPL-MCNC: 7.2 G/DL (ref 6.4–8.2)
RHEUMATOID FACT SER QL LA: NEGATIVE
RYE IGE QN: NEGATIVE

## 2020-07-21 RX ORDER — ARMODAFINIL 150 MG/1
150 TABLET ORAL DAILY
Qty: 90 TABLET | Refills: 1 | Status: SHIPPED | OUTPATIENT
Start: 2020-07-21 | End: 2021-02-09

## 2020-07-28 ENCOUNTER — TELEPHONE (OUTPATIENT)
Dept: GASTROENTEROLOGY | Facility: CLINIC | Age: 65
End: 2020-07-28

## 2020-07-28 DIAGNOSIS — K58.2 IRRITABLE BOWEL SYNDROME WITH BOTH CONSTIPATION AND DIARRHEA: Primary | ICD-10-CM

## 2020-07-28 DIAGNOSIS — R19.7 DIARRHEA OF PRESUMED INFECTIOUS ORIGIN: Primary | ICD-10-CM

## 2020-07-28 RX ORDER — DICYCLOMINE HYDROCHLORIDE 10 MG/1
10 CAPSULE ORAL 4 TIMES DAILY PRN
Qty: 60 CAPSULE | Refills: 2 | Status: SHIPPED | OUTPATIENT
Start: 2020-07-28

## 2020-07-28 NOTE — TELEPHONE ENCOUNTER
I called Keya Ruiz - she complains of frequent semi-solid stools and sharp crampy abdominal pain for the past 24 hours  She had 14 stools yesterday and 5 today  The abdominal pain is generalized  She denies fever, chills, nausea, vomiting, diarrhea, constipation, blood in the stool  I sent Rx for Bentyl - she can take this before meals  She should continue to monitor her BMs and increase hydration  I do not feel stool studies are necessary at this time, but if she would develop diarrhea we can order them  I encouraged her to contact our office with an update later this week

## 2020-07-31 NOTE — TELEPHONE ENCOUNTER
Dr Marte Captain patient hx IBS C & D, GERD, gastroparesis, morbid obesity, dysphagia     Patient calls today stating that she had diarrhea 5 times today  She states she found "two inch segments of tapeworm in the stool that she collected in a jar"  Denies fever, chills, N/V, pain  Please advise

## 2020-08-02 ENCOUNTER — APPOINTMENT (OUTPATIENT)
Dept: LAB | Facility: HOSPITAL | Age: 65
End: 2020-08-02
Payer: MEDICARE

## 2020-08-02 DIAGNOSIS — R19.7 DIARRHEA OF PRESUMED INFECTIOUS ORIGIN: ICD-10-CM

## 2020-08-02 PROCEDURE — 87209 SMEAR COMPLEX STAIN: CPT

## 2020-08-02 PROCEDURE — 87177 OVA AND PARASITES SMEARS: CPT

## 2020-08-02 PROCEDURE — 87505 NFCT AGENT DETECTION GI: CPT | Performed by: PHYSICIAN ASSISTANT

## 2020-08-03 ENCOUNTER — TELEPHONE (OUTPATIENT)
Dept: PAIN MEDICINE | Facility: MEDICAL CENTER | Age: 65
End: 2020-08-03

## 2020-08-03 LAB
C DIFF TOX GENS STL QL NAA+PROBE: NEGATIVE
CAMPYLOBACTER DNA SPEC NAA+PROBE: NORMAL
SALMONELLA DNA SPEC QL NAA+PROBE: NORMAL
SHIGA TOXIN STX GENE SPEC NAA+PROBE: NORMAL
SHIGELLA DNA SPEC QL NAA+PROBE: NORMAL

## 2020-08-03 NOTE — TELEPHONE ENCOUNTER
Pt called stating that she just finished up testing and would like DG to take a look   Pt states it was an EGD or EMG she wasn't sure     Pt can be reached at [phone]

## 2020-08-03 NOTE — TELEPHONE ENCOUNTER
sw pt, stated that she had an emg done at [facility] and was advised to contact pain management to fu  Pt could not recall the dr who reviewed the results or the dr who ordered the emg  Advised pt, will make DG aware and cb to advise  Pt verbalized understanding and appreciation  Note 8/25/2020 fu ov

## 2020-08-03 NOTE — TELEPHONE ENCOUNTER
I would have her make sure it was an EMG and then get the results faxed to us for review and we will go from there  Thank you

## 2020-08-06 ENCOUNTER — TELEPHONE (OUTPATIENT)
Dept: GASTROENTEROLOGY | Facility: AMBULARY SURGERY CENTER | Age: 65
End: 2020-08-06

## 2020-08-06 LAB — O+P STL CONC: NORMAL

## 2020-08-06 NOTE — TELEPHONE ENCOUNTER
Yes, so the EMG states that she has peripheral neuropathy, most likely relate to her CRPS and diabetes  At this point, there is not much else we can do, but what we are already doing  Thank you

## 2020-08-06 NOTE — TELEPHONE ENCOUNTER
S/w pt, advised of above  Pt verbalized understanding and appreciation   Will fu w/ [facility] rep re: adjustment

## 2020-08-07 ENCOUNTER — TELEPHONE (OUTPATIENT)
Dept: GASTROENTEROLOGY | Facility: CLINIC | Age: 65
End: 2020-08-07

## 2020-08-07 NOTE — TELEPHONE ENCOUNTER
Patient aware ok to hold plavix 5 days prior to procedure  Laure Madera from [address] office will fax office note regarding med hold

## 2020-08-07 NOTE — TELEPHONE ENCOUNTER
----- Message from Gabriela Diaz PA-C sent at 8/6/2020  4:57 PM EDT -----  Please let pt know: Her stool tested negative for eggs and parasites  If she is still seeing worms in her stool, we should order repeat parasite testing

## 2020-08-10 ENCOUNTER — TELEPHONE (OUTPATIENT)
Dept: GASTROENTEROLOGY | Facility: AMBULARY SURGERY CENTER | Age: 65
End: 2020-08-10

## 2020-08-10 DIAGNOSIS — R19.7 DIARRHEA, UNSPECIFIED TYPE: Primary | ICD-10-CM

## 2020-08-10 NOTE — TELEPHONE ENCOUNTER
Left detailed message with provider recommendations-O&P stool testing ordered  Follow up with infectious disease if seeing worms in stool/ O&P negative

## 2020-08-10 NOTE — TELEPHONE ENCOUNTER
Patients GI provider:  Dr Franck Rose    Number to return call: [phone]    Reason for call: Pt calling requesting to speak with Dr Alton Velasco nurse regarding a script for stool sample       Scheduled procedure/appointment date if applicable: Apt/procedure 8/14/20

## 2020-08-10 NOTE — TELEPHONE ENCOUNTER
Please see attached encounter    Patient called back to request retesting her stool because she can see worms in her stool  8/2/20 O&P, enteric bacterial panel and c diff were negative  Reports months of alternating from 1-7 formed to watery BMs/ no blood or mucous daily  She is taking Amitiza 24 mcg daily  Recommended holding dose if diarrhea present  Please advise

## 2020-08-10 NOTE — TELEPHONE ENCOUNTER
If it was negative 8 days ago, it is unlikely to be positive now but I have re-entered an ova and parasite test  If she is seeing worms in her stool, I would consider referral to infectious disease if repeat O&P is negative

## 2020-08-13 ENCOUNTER — ANESTHESIA EVENT (OUTPATIENT)
Dept: GASTROENTEROLOGY | Facility: HOSPITAL | Age: 65
End: 2020-08-13

## 2020-08-14 ENCOUNTER — ANESTHESIA (OUTPATIENT)
Dept: GASTROENTEROLOGY | Facility: HOSPITAL | Age: 65
End: 2020-08-14

## 2020-08-14 ENCOUNTER — TELEPHONE (OUTPATIENT)
Dept: GASTROENTEROLOGY | Facility: AMBULARY SURGERY CENTER | Age: 65
End: 2020-08-14

## 2020-08-14 ENCOUNTER — HOSPITAL ENCOUNTER (OUTPATIENT)
Dept: GASTROENTEROLOGY | Facility: HOSPITAL | Age: 65
Setting detail: OUTPATIENT SURGERY
Discharge: HOME | End: 2020-08-14
Attending: INTERNAL MEDICINE
Payer: MEDICARE

## 2020-08-14 VITALS
WEIGHT: 217 LBS | RESPIRATION RATE: 98 BRPM | SYSTOLIC BLOOD PRESSURE: 125 MMHG | HEIGHT: 63 IN | DIASTOLIC BLOOD PRESSURE: 50 MMHG | BODY MASS INDEX: 38.45 KG/M2 | HEART RATE: 75 BPM | TEMPERATURE: 99 F | OXYGEN SATURATION: 98 %

## 2020-08-14 DIAGNOSIS — R13.10 DYSPHAGIA, UNSPECIFIED TYPE: ICD-10-CM

## 2020-08-14 PROCEDURE — 88305 TISSUE EXAM BY PATHOLOGIST: CPT | Performed by: PATHOLOGY

## 2020-08-14 PROCEDURE — 43239 EGD BIOPSY SINGLE/MULTIPLE: CPT | Performed by: INTERNAL MEDICINE

## 2020-08-14 RX ORDER — PROPOFOL 10 MG/ML
INJECTION, EMULSION INTRAVENOUS CONTINUOUS PRN
Status: DISCONTINUED | OUTPATIENT
Start: 2020-08-14 | End: 2020-08-14

## 2020-08-14 RX ORDER — LIDOCAINE HYDROCHLORIDE 10 MG/ML
INJECTION, SOLUTION EPIDURAL; INFILTRATION; INTRACAUDAL; PERINEURAL AS NEEDED
Status: DISCONTINUED | OUTPATIENT
Start: 2020-08-14 | End: 2020-08-14

## 2020-08-14 RX ORDER — PROPOFOL 10 MG/ML
INJECTION, EMULSION INTRAVENOUS AS NEEDED
Status: DISCONTINUED | OUTPATIENT
Start: 2020-08-14 | End: 2020-08-14

## 2020-08-14 RX ORDER — SODIUM CHLORIDE 9 MG/ML
INJECTION, SOLUTION INTRAVENOUS CONTINUOUS PRN
Status: DISCONTINUED | OUTPATIENT
Start: 2020-08-14 | End: 2020-08-14

## 2020-08-14 RX ADMIN — PROPOFOL 40 MG: 10 INJECTION, EMULSION INTRAVENOUS at 11:13

## 2020-08-14 RX ADMIN — PROPOFOL 20 MG: 10 INJECTION, EMULSION INTRAVENOUS at 11:21

## 2020-08-14 RX ADMIN — SODIUM CHLORIDE: 0.9 INJECTION, SOLUTION INTRAVENOUS at 11:00

## 2020-08-14 RX ADMIN — LIDOCAINE HYDROCHLORIDE 50 MG: 10 INJECTION, SOLUTION EPIDURAL; INFILTRATION; INTRACAUDAL; PERINEURAL at 11:10

## 2020-08-14 RX ADMIN — PROPOFOL 120 MG: 10 INJECTION, EMULSION INTRAVENOUS at 11:10

## 2020-08-14 RX ADMIN — PROPOFOL 150 MCG/KG/MIN: 10 INJECTION, EMULSION INTRAVENOUS at 11:11

## 2020-08-14 NOTE — ANESTHESIA PREPROCEDURE EVALUATION
Procedure:  EGD    Relevant Problems   CARDIO   (+) BBB (bundle branch block)   (+) Chronic scapular pain   (+) Coronary artery disease involving native coronary artery   (+) Deep venous thrombosis (HCC)   (+) Dyspnea on exertion   (+) Essential hypertension   (+) Mixed hyperlipidemia      ENDO   (+) Primary hyperparathyroidism (HCC)   (+) Primary hypothyroidism   (+) Type 2 diabetes mellitus with microalbuminuria, with long-term current use of insulin (HCC)   (+) Type 2 diabetes mellitus without complication, with long-term current use of insulin (HCC)      GI/HEPATIC   (+) GERD (gastroesophageal reflux disease)      /RENAL   (+) Microalbuminuria due to type 2 diabetes mellitus (HCC)      HEMATOLOGY   (+) Anemia of chronic disease   (+) Microcytic anemia   (+) Normocytic anemia      MUSCULOSKELETAL   (+) Chronic bilateral thoracic back pain   (+) Localized, primary osteoarthritis   (+) Low back pain   (+) Lumbosacral spondylosis without myelopathy   (+) Osteoarthritis of knee      NEURO/PSYCH   (+) Anxiety and depression   (+) Chronic bilateral thoracic back pain   (+) Complex regional pain syndrome type 1 of left lower extremity   (+) History of TIA (transient ischemic attack)   (+) Pain syndrome, chronic      PULMONARY   (+) Dyspnea on exertion   (+) Moderate persistent asthma with acute exacerbation   (+) Moderate persistent asthma without complication   (+) Obstructive sleep apnea      Recent Results (from the past 22315 hour(s))   EKG 12 lead   Result Value    Ventricular Rate 75    Atrial Rate 75    DE Interval 236    QRSD Interval 152    QT Interval 418    QTC Interval 466    P Axis 60    QRS Axis -71    T Wave Axis 12    Narrative    Sinus rhythm with 1st degree A-V block  Left axis deviation  Right bundle branch block  Abnormal ECG  When compared with ECG of 03-APR-2018 14:24,  Sinus rhythm is no longer with junctional escape complexes  Confirmed by Edmond Boyer ([id number]) on 9/12/2018 2:36:56 PM     No orders to display     Recent Results (from the past 672 hour(s))   Hepatitis C antibody    Collection Time: 07/17/20  1:16 PM   Result Value Ref Range    Hepatitis C Ab Non-reactive Non-reactive   CK (with reflex to MB)    Collection Time: 07/17/20  1:16 PM   Result Value Ref Range    Total  (H) 26 - 192 U/L   Aldolase    Collection Time: 07/17/20  1:16 PM   Result Value Ref Range    Aldolase 7 7 3 3 - 10 3 U/L   TAM Screen w/ Reflex to Titer/Pattern    Collection Time: 07/17/20  1:16 PM   Result Value Ref Range    TAM Negative Negative   C3 complement    Collection Time: 07/17/20  1:16 PM   Result Value Ref Range    C3 Complement 144 0 90 0 - 180 0 mg/dL   C4 complement    Collection Time: 07/17/20  1:16 PM   Result Value Ref Range    C4, COMPLEMENT 28 0 10 0 - 02 4 mg/dL   Cyclic citrul peptide antibody, IgG    Collection Time: 07/17/20  1:16 PM   Result Value Ref Range    Cyclic Citrullin Peptide Ab 23 (H) 0 - 19 units   RF Screen w/ Reflex to Titer    Collection Time: 07/17/20  1:16 PM   Result Value Ref Range    Rheumatoid Factor Negative Negative   C-reactive protein    Collection Time: 07/17/20  1:16 PM   Result Value Ref Range    CRP 16 1 (H) <3 0 mg/L   Sedimentation rate, automated    Collection Time: 07/17/20  1:16 PM   Result Value Ref Range    Sed Rate 31 (H) 0 - 20 mm/hour   Protein electrophoresis, serum    Collection Time: 07/17/20  1:16 PM   Result Value Ref Range    A/G Ratio 1 48 1 10 - 1 80    Albumin Electrophoresis 59 6 52 0 - 65 0 %    Albumin CONC 4 29 3 50 - 5 00 g/dl    Alpha 1 4 7 2 5 - 5 0 %    ALPHA 1 CONC 0 34 0 10 - 0 40 g/dL    Alpha 2 9 9 7 0 - 13 0 %    ALPHA 2 CONC 0 71 0 40 - 1 20 g/dL    Beta-1 6 8 5 0 - 13 0 %    BETA 1 CONC 0 49 0 40 - 0 80 g/dL    Beta-2 4 0 2 0 - 8 0 %    BETA 2 CONC 0 29 0 20 - 0 50 g/dL    Gamma Globulin 15 0 12 0 - 22 0 %    GAMMA CONC 1 08 0 50 - 1 60 g/dL    Total Protein 7 2 6 4 - 8 2 g/dL    SPEP Interpretation       No monoclonal bands noted  Reviewed by: Vasu Donaldson MD ([id number]) **Electronic Signature**   Hepatic function panel    Collection Time: 07/17/20  1:16 PM   Result Value Ref Range    Total Bilirubin 0 53 0 20 - 1 00 mg/dL    Bilirubin, Direct 0 16 0 00 - 0 20 mg/dL    Alkaline Phosphatase 87 46 - 116 U/L    AST 19 5 - 45 U/L    ALT 22 12 - 78 U/L    Total Protein 7 3 6 4 - 8 2 g/dL    Albumin 3 9 3 5 - 5 0 g/dL   CKMB    Collection Time: 07/17/20  1:16 PM   Result Value Ref Range    CK-MB Index 5 1 (H) 0 0 - 2 5 %    CK-MB 20 5 (H) 0 0 - 5 0 ng/mL   Clostridium difficile toxin by PCR with EIA    Collection Time: 08/02/20 12:13 PM    Specimen: Per Rectum; Stool   Result Value Ref Range    C difficile toxin by PCR Negative Negative   Stool Enteric Bacterial Panel by PCR    Collection Time: 08/02/20 12:13 PM    Specimen: Stool   Result Value Ref Range    Salmonella sp PCR None Detected None Detected    Shigella sp/Enteroinvasive E  coli (EIEC) PCR None Detected None Detected    Campylobacter sp (jejuni and coli) PCR None Detected None Detected    Shiga toxin 1/Shiga toxin 2 genes PCR None Detected None Detected   Ova and parasite examination    Collection Time: 08/02/20 12:13 PM    Specimen: Stool   Result Value Ref Range    Ova + Parasite Exam       No ova, cysts, or parasites seen     One negative specimen does not rule out the possibility of a  parasitic infection  No complications documented  Physical Exam    Airway    Mallampati score: III  TM Distance: >3 FB  Neck ROM: full     Dental   No notable dental hx     Cardiovascular  Rhythm: regular, Rate: normal, No murmur,     Pulmonary  Breath sounds clear to auscultation,     Other Findings        Anesthesia Plan  ASA Score- 3     Anesthesia Type- IV sedation with anesthesia with ASA Monitors           Additional Monitors:   Airway Plan:           Plan Factors-    Induction-     Postoperative Plan-     Informed Consent- Anesthetic plan and risks discussed with patient and spouse  I personally reviewed this patient with the CRNA  Discussed and agreed on the Anesthesia Plan with the CRNA  Manoj Dillard

## 2020-08-14 NOTE — H&P
History and Physical -  [facility]  Tameka Velazquez 60 y o  female MRN: [id number]                  HPI: Tameka Velazquez is a 60y o  year old female who presents for EGD for evaluation of uncontrolled GERD symptoms on twice daily PPI  Last EGD 2020 by Dr Katlyn Pena which showed mild presbyesophagus and bile in the stomach otherwise normal   She also reports dysphagia to solids and liquids as well as abdominal pain and bloating  REVIEW OF SYSTEMS: Per the HPI, and otherwise unremarkable  Historical Information   Past Medical History:   Diagnosis Date    Anxiety     Asthma     controlled    Back complaints     Cancer ([facility] )     nose    Cellulitis of left lower leg     Chronic bilateral thoracic back pain     Chronic myofascial pain     Chronic pain of both lower extremities     Chronic pain of left knee     Chronic pain syndrome     CPAP (continuous positive airway pressure) dependence     Depression     Diabetes mellitus ([facility] )     Disease of thyroid gland     Gait disorder     Gastroparesis     GERD (gastroesophageal reflux disease)     History of angina     History of transfusion     Many years ago    Hyperlipidemia     Hypertension     Insulin pump in place     Kidney disease     Polyneuropathy associated with underlying disease ([place] [id number] )     PONV (postoperative nausea and vomiting)     S/P insertion of spinal cord stimulator 2018    Sarcoidosis     Sleep apnea     TIA (transient ischemic attack)      Past Surgical History:   Procedure Laterality Date    BREAST SURGERY      BREAST SURGERY      reduction    CARDIAC PACEMAKER PLACEMENT       SECTION      HERNIA REPAIR      HYSTERECTOMY      JOINT REPLACEMENT Left     NECK SURGERY      [place] SURG IMPLNT [facility] R [facility] Left 2018    Procedure:  Insertion of thoracic spinal cord stimulator electrode via laminotomy and placement of left buttock IPG;  Surgeon: Oscar Cope MD;  [facility]: [facility]; Service: Neurosurgery    REPLACEMENT TOTAL KNEE      ROTATOR CUFF REPAIR      SPINAL STIMULATOR PLACEMENT Left 10/3/2018    Procedure: BUTTOCK RE-OPENING INCISION FOR REPOSITIONING OF IMPLANTABLE PULSE GENERATOR;  Surgeon: Ania Vincent MD;  Location[facility];  Service: Neurosurgery    TONSILLECTOMY      WISDOM TOOTH EXTRACTION       Social History   Social History     Substance and Sexual Activity   Alcohol Use No     Social History     Substance and Sexual Activity   Drug Use No     Social History     Tobacco Use   Smoking Status Never Smoker   Smokeless Tobacco Never Used     Family History   Problem Relation Age of Onset    Diabetes Family     Heart disease Family     Hypertension Family     Neuropathy Family     No Known Problems Mother     Heart disease Father     Diabetes Father     No Known Problems Maternal Grandmother     No Known Problems Maternal Grandfather     No Known Problems Paternal Grandmother     No Known Problems Paternal Grandfather        Meds/Allergies     (Not in a hospital admission)      Allergies   Allergen Reactions    Bactrim [Sulfamethoxazole-Trimethoprim] Hives    Sucralfate Hives     Facial swelling    Topamax [Topiramate]      disorentation    Azithromycin Itching    Pregabalin Confusion and Other (See Comments)     altered mental status    Ciprofloxacin Drowsiness     Other reaction(s): Other (See Comments)  "drowsiness"    Norvasc [Amlodipine] Swelling    Baclofen      "That knocks me out "    Bupropion Fatigue    Methotrexate Nausea Only    Neurontin [Gabapentin] Hallucinations and Hypertension       Objective     Blood pressure 147/64, pulse 86, temperature 99 °F (37 2 °C), temperature source Tympanic, resp  rate 18, height 5' 3" (1 6 m), weight 98 4 kg (217 lb), SpO2 96 %        PHYSICAL EXAM    Gen: NAD  CV: RRR  CHEST: Clear  ABD: soft, NT/ND  EXT: no edema      ASSESSMENT/PLAN:  This is a 60y o  year old female here for EGD for evaluation of recalcitrant GERD      PLAN:   Procedure:  EGD

## 2020-08-14 NOTE — TELEPHONE ENCOUNTER
Patients GI provider:  Dr Lyla Villalobos    Number to return call: (  [phone]    Reason for call: Pt calling with questions about her PPI, had egd done today    Scheduled procedure/appointment date if applicable: Apt/procedure NA

## 2020-08-14 NOTE — ANESTHESIA POSTPROCEDURE EVALUATION
Post-Op Assessment Note    CV Status:  Stable    Pain management: adequate     Mental Status:  Alert and awake   Hydration Status:  Euvolemic   PONV Controlled:  Controlled   Airway Patency:  Patent      Post Op Vitals Reviewed: Yes      Staff: Anesthesiologist, CRNA         No complications documented      /64 (08/14/20 1131)    Temp      Pulse 83 (08/14/20 1131)   Resp 18 (08/14/20 1131)    SpO2 98 % (08/14/20 1131)

## 2020-08-17 DIAGNOSIS — K21.9 GASTROESOPHAGEAL REFLUX DISEASE, ESOPHAGITIS PRESENCE NOT SPECIFIED: Primary | ICD-10-CM

## 2020-08-17 RX ORDER — PANTOPRAZOLE SODIUM 40 MG/1
40 TABLET, DELAYED RELEASE ORAL 2 TIMES DAILY
Qty: 60 TABLET | Refills: 3 | Status: SHIPPED | OUTPATIENT
Start: 2020-08-17 | End: 2020-09-17 | Stop reason: CLARIF

## 2020-08-17 NOTE — TELEPHONE ENCOUNTER
Dr Chinyere Edwards patient had EGD 8/14/20-biopsies rule out eosinophilic esophagitis/h pylori/celiac disease , gastroparesis  Continue on PPI twice daily  Taking Famotidine / no script sent for PPI

## 2020-08-17 NOTE — TELEPHONE ENCOUNTER
According to Dr Barbara Morrow note she was taking PPI BID but hadn't been filled since 2018  Given she is symptomatic would restart BID  I sent this to her pharmacy  Bx from EGD still pending     Tia Ruffin

## 2020-08-25 ENCOUNTER — OFFICE VISIT (OUTPATIENT)
Dept: PAIN MEDICINE | Facility: CLINIC | Age: 65
End: 2020-08-25
Payer: MEDICARE

## 2020-08-25 VITALS
SYSTOLIC BLOOD PRESSURE: 136 MMHG | WEIGHT: 222 LBS | TEMPERATURE: 98.7 F | BODY MASS INDEX: 39.34 KG/M2 | HEIGHT: 63 IN | DIASTOLIC BLOOD PRESSURE: 68 MMHG | HEART RATE: 60 BPM

## 2020-08-25 DIAGNOSIS — G89.29 CHRONIC MYOFASCIAL PAIN: ICD-10-CM

## 2020-08-25 DIAGNOSIS — Z96.89 S/P INSERTION OF SPINAL CORD STIMULATOR: ICD-10-CM

## 2020-08-25 DIAGNOSIS — M54.16 LUMBAR RADICULOPATHY: ICD-10-CM

## 2020-08-25 DIAGNOSIS — M54.42 CHRONIC BILATERAL LOW BACK PAIN WITH BILATERAL SCIATICA: ICD-10-CM

## 2020-08-25 DIAGNOSIS — G89.29 CHRONIC PAIN OF BOTH LOWER EXTREMITIES: Chronic | ICD-10-CM

## 2020-08-25 DIAGNOSIS — M54.14 THORACIC RADICULOPATHY: ICD-10-CM

## 2020-08-25 DIAGNOSIS — G89.29 CHRONIC SCAPULAR PAIN: ICD-10-CM

## 2020-08-25 DIAGNOSIS — G90.522 COMPLEX REGIONAL PAIN SYNDROME TYPE 1 OF LEFT LOWER EXTREMITY: Chronic | ICD-10-CM

## 2020-08-25 DIAGNOSIS — M89.8X1 CHRONIC SCAPULAR PAIN: ICD-10-CM

## 2020-08-25 DIAGNOSIS — G89.29 CHRONIC BILATERAL THORACIC BACK PAIN: ICD-10-CM

## 2020-08-25 DIAGNOSIS — M54.41 CHRONIC BILATERAL LOW BACK PAIN WITH BILATERAL SCIATICA: ICD-10-CM

## 2020-08-25 DIAGNOSIS — R26.81 GAIT INSTABILITY: ICD-10-CM

## 2020-08-25 DIAGNOSIS — M54.6 CHRONIC BILATERAL THORACIC BACK PAIN: ICD-10-CM

## 2020-08-25 DIAGNOSIS — G89.29 CHRONIC BILATERAL LOW BACK PAIN WITH BILATERAL SCIATICA: ICD-10-CM

## 2020-08-25 DIAGNOSIS — M25.562 CHRONIC PAIN OF LEFT KNEE: ICD-10-CM

## 2020-08-25 DIAGNOSIS — M79.18 CHRONIC MYOFASCIAL PAIN: ICD-10-CM

## 2020-08-25 DIAGNOSIS — M79.604 CHRONIC PAIN OF BOTH LOWER EXTREMITIES: Chronic | ICD-10-CM

## 2020-08-25 DIAGNOSIS — G89.4 PAIN SYNDROME, CHRONIC: Primary | Chronic | ICD-10-CM

## 2020-08-25 DIAGNOSIS — G89.29 CHRONIC PAIN OF RIGHT KNEE: ICD-10-CM

## 2020-08-25 DIAGNOSIS — G89.29 CHRONIC PAIN OF LEFT KNEE: ICD-10-CM

## 2020-08-25 DIAGNOSIS — M47.817 LUMBOSACRAL SPONDYLOSIS WITHOUT MYELOPATHY: ICD-10-CM

## 2020-08-25 DIAGNOSIS — R26.9 GAIT DISORDER: ICD-10-CM

## 2020-08-25 DIAGNOSIS — M25.561 CHRONIC PAIN OF RIGHT KNEE: ICD-10-CM

## 2020-08-25 DIAGNOSIS — M79.605 CHRONIC PAIN OF BOTH LOWER EXTREMITIES: Chronic | ICD-10-CM

## 2020-08-25 PROCEDURE — 99213 OFFICE O/P EST LOW 20 MIN: CPT | Performed by: NURSE PRACTITIONER

## 2020-08-25 RX ORDER — HYDROXYCHLOROQUINE SULFATE 200 MG/1
400 TABLET, FILM COATED ORAL DAILY
COMMUNITY
Start: 2020-07-17 | End: 2020-11-12 | Stop reason: ALTCHOICE

## 2020-08-25 RX ORDER — TIZANIDINE HYDROCHLORIDE 4 MG/1
4 CAPSULE, GELATIN COATED ORAL 3 TIMES DAILY
COMMUNITY
End: 2020-11-12 | Stop reason: SDUPTHER

## 2020-08-25 RX ORDER — RABEPRAZOLE SODIUM 20 MG/1
TABLET, DELAYED RELEASE ORAL
COMMUNITY
Start: 2020-08-07 | End: 2020-09-17 | Stop reason: CLARIF

## 2020-08-25 RX ORDER — PREDNISONE 10 MG/1
TABLET ORAL
COMMUNITY
Start: 2020-08-17 | End: 2020-11-12 | Stop reason: ALTCHOICE

## 2020-08-25 NOTE — PROGRESS NOTES
Assessment:  1  Pain syndrome, chronic    2  Chronic bilateral low back pain with bilateral sciatica    3  Chronic pain of both lower extremities    4  Chronic bilateral thoracic back pain    5  Chronic scapular pain    6  Chronic pain of left knee    7  Chronic pain of right knee    8  Lumbar radiculopathy    9  Thoracic radiculopathy    10  Complex regional pain syndrome type 1 of left lower extremity    11  Chronic myofascial pain    12  Gait disorder    13  Gait instability    14  Lumbosacral spondylosis without myelopathy    15  S/P insertion of spinal cord stimulator        Plan:  While the patient was in the office today, I did have a thorough conversation with the patient regarding their chronic pain syndrome, symptoms, medication regimen, and treatment plan  I encouraged the patient continue to use her spinal cord stimulator and to proceed with stimulator reprogramming today with the [address] [place] [address] team   The patient was agreeable and verbalized an understanding  With regards to the medical marijuana, explained the patient that once she starts the medical marijuana products, she is to discontinue the Percocet and see how she does  At her next office visit we would have to make the decision as to whether not to continue the Percocet and abstain from the medical marijuana or continue the medical marijuana and release her from the opioid contract  However, the patient did not feel that she needed a refill the Percocet today with what she had on hand and we will see how she does over the next few months trying the medical marijuana products  In the meantime she should continue the tizanidine and Cymbalta as prescribed and did not need refills today  The patient was not picked for a pill count today, but she did bring her medications as required  There are risks associated with opioid medications, including dependence, addiction and tolerance   The patient understands and agrees to use these medications only as prescribed  Potential side effects of the medications include, but are not limited to, constipation, drowsiness, addiction, impaired judgment and risk of fatal overdose if not taken as prescribed  The patient was warned against driving while taking sedation medications  Sharing medications is a felony  At this point in time, the patient is showing no signs of addiction, abuse, diversion or suicidal ideation  The patient will follow-up in 12 weeks for medication prescription refill and reevaluation  The patient was advised to contact the office should their symptoms worsen in the interim  The patient was agreeable and verbalized an understanding  History of Present Illness: The patient is a 60 y o  female last seen on 6/23/2020 who presents for a follow up office visit in regards to chronic pain syndrome secondary to complex regional pain syndrome of the left lower extremity with thoracic radiculopathy, gait dysfunction, as well as chronic bilateral knee pain and low back pain with radiculopathy and neuropathy  The patient currently reports that since her last office visit she feels her pain symptoms have somewhat improved as she did just complete a titrating dose of oral prednisone which was helpful as well  Since her last office visit she discontinued the previously prescribed metaxalone as she did not find it helpful and is back on tizanidine and continues to typically only use the Percocet at bedtime  She does report that since her last office visit she did proceed with a consultation for the medical marijuana Program and has been deemed a candidate  She reports that she just received her card, however, has not yet scheduled an appointment with a dispensary  She does have a full prescription of Percocet on hand and reports having another full prescription at home    She is eager to try the medical marijuana to see if it is helpful, especially with sleep at nighttime and her pain 1st thing in the morning  Current pain medications includes:  Percocet 5/325, 1 p o  B i d  P r n  for pain dispense number 45 pills per 30 days, tizanidine 4 mg q i d  P r n  for pain and spasm, and Cymbalta 60 mg b i d  The patient reports that this regimen is providing 30% pain relief  The patient is reporting no side effects from this pain medication regimen  Pain Contract Signed: 6/23/2020  Last Urine Drug Screen: 2/17/2020    I have personally reviewed and/or updated the patient's past medical history, past surgical history, family history, social history, current medications, allergies, and vital signs today  Review of Systems:    Review of Systems   Respiratory: Negative for shortness of breath  Cardiovascular: Negative for chest pain  Gastrointestinal: Negative for constipation, diarrhea, nausea and vomiting  Musculoskeletal: Positive for gait problem and joint swelling (joint stiffness)  Negative for arthralgias and myalgias  Skin: Negative for rash  Neurological: Positive for weakness  Negative for dizziness and seizures  All other systems reviewed and are negative          Past Medical History:   Diagnosis Date    Anxiety     Asthma     controlled    Back complaints     Cancer ([facility] )     nose    Cellulitis of left lower leg     Chronic bilateral thoracic back pain     Chronic myofascial pain     Chronic pain of both lower extremities     Chronic pain of left knee     Chronic pain syndrome     CPAP (continuous positive airway pressure) dependence     Depression     Diabetes mellitus ([facility] )     Disease of thyroid gland     Gait disorder     Gastroparesis     GERD (gastroesophageal reflux disease)     History of angina     History of transfusion     Many years ago    Hyperlipidemia     Hypertension     Insulin pump in place     Kidney disease     Polyneuropathy associated with underlying disease ([facility] )     PONV (postoperative nausea and vomiting)     S/P insertion of spinal cord stimulator 2018    Sarcoidosis     Sleep apnea     TIA (transient ischemic attack)        Past Surgical History:   Procedure Laterality Date    BREAST SURGERY      BREAST SURGERY      reduction    CARDIAC PACEMAKER PLACEMENT       SECTION      HERNIA REPAIR      HYSTERECTOMY      JOINT REPLACEMENT Left     NECK SURGERY      [place] SURG IMPLNT [place] Left 2018    Procedure: Insertion of thoracic spinal cord stimulator electrode via laminotomy and placement of left buttock IPG;  Surgeon: Janette Martel MD;  [facility];  Service: Neurosurgery    REPLACEMENT TOTAL KNEE      ROTATOR CUFF REPAIR      SPINAL STIMULATOR PLACEMENT Left 10/3/2018    Procedure: BUTTOCK RE-OPENING INCISION FOR REPOSITIONING OF IMPLANTABLE PULSE GENERATOR;  Surgeon: Janette Martel MD;  [facility];  Service: Neurosurgery    TONSILLECTOMY      WISDOM TOOTH EXTRACTION         Family History   Problem Relation Age of Onset    Diabetes Family     Heart disease Family     Hypertension Family     Neuropathy Family     No Known Problems Mother     Heart disease Father     Diabetes Father     No Known Problems Maternal Grandmother     No Known Problems Maternal Grandfather     No Known Problems Paternal Grandmother     No Known Problems Paternal Grandfather        Social History     Occupational History    Not on file   Tobacco Use    Smoking status: Never Smoker    Smokeless tobacco: Never Used   Substance and Sexual Activity    Alcohol use: No    Drug use: No    Sexual activity: Yes         Current Outpatient Medications:     amitriptyline (ELAVIL) 50 mg tablet, Take one tablet p o  at bedtime for two weeks    If insomnia persists, increase to two tablets at bedtime, Disp: 60 tablet, Rfl: 2    atorvastatin (LIPITOR) 80 mg tablet, Take 80 mg by mouth daily at bedtime  , Disp: , Rfl:     cholecalciferol (VITAMIN D3) 1,000 units tablet, Take 2,000 Units by mouth daily with lunch  , Disp: , Rfl: 5    clopidogrel (PLAVIX) 75 mg tablet, Take 1 tablet (75 mg total) by mouth daily (Patient taking differently: Take 75 mg by mouth daily at bedtime  ), Disp: 30 tablet, Rfl: 4    CRANBERRY PO, Take 1 capsule by mouth daily with lunch  , Disp: , Rfl:     Cyanocobalamin (VITAMIN B-12 PO), Take 1 capsule by mouth daily with lunch  , Disp: , Rfl:     dicyclomine (BENTYL) 10 mg capsule, Take 1 capsule (10 mg total) by mouth 4 (four) times a day as needed (abdominal pain), Disp: 60 capsule, Rfl: 2    Docusate Sodium 100 MG capsule, Take 100 mg by mouth daily with lunch  , Disp: , Rfl:     DULoxetine (CYMBALTA) 60 mg delayed release capsule, Take 2 PO QD, Disp: 180 capsule, Rfl: 1    famotidine (PEPCID) 20 mg tablet, , Disp: , Rfl:     fenofibrate (TRICOR) 48 mg tablet, , Disp: , Rfl:     ferrous sulfate 324 (65 Fe) mg, Take 65 mg by mouth, Disp: , Rfl:     fluticasone-salmeterol (ADVAIR) 100-50 mcg/dose inhaler, Inhale 2 puffs, Disp: , Rfl:     furosemide (LASIX) 20 mg tablet, Take 60 mg by mouth 2 (two) times a day , Disp: , Rfl:     glucagon 1 MG injection, Glucagon Emergency Kit (human-recomb) 1 mg solution for injection, Disp: , Rfl:     hydrALAZINE (APRESOLINE) 10 mg tablet, , Disp: , Rfl:     hydroxychloroquine (PLAQUENIL) 200 mg tablet, Take 400 mg by mouth daily, Disp: , Rfl:     insulin aspart (NovoLOG) 100 units/mL injection, Inject  130 units daily via insulin pump   E11 65, Disp: , Rfl:     levothyroxine 75 mcg tablet, Take 75 mcg by mouth daily in the early morning  , Disp: , Rfl:     losartan (COZAAR) 100 MG tablet, Take 100 mg by mouth daily in the early morning  , Disp: , Rfl:     lubiprostone (AMITIZA) 24 mcg capsule, Take 1 capsule (24 mcg total) by mouth 2 (two) times a day with meals, Disp: 48 capsule, Rfl: 0    magnesium oxide (MAG-OX) 400 mg tablet, Take 500 mg by mouth , Disp: , Rfl:     NOVOLOG 100 UNIT/ML injection, INJECT 120 UNITS DAILY VIA INSULIN PUMP E 11 65, Disp: , Rfl: 3    ondansetron (ZOFRAN) 8 mg tablet, Take 8 mg by mouth every 8 (eight) hours as needed  , Disp: , Rfl:     oxybutynin (DITROPAN XL) 15 MG 24 hr tablet, Take 1 tablet (15 mg total) by mouth daily, Disp: 30 tablet, Rfl: 3    oxyCODONE-acetaminophen (Percocet) 5-325 mg per tablet, Take 1 PO BID PRN for pain  DO NOT FILL UNTIL: 8/7/20, Disp: 45 tablet, Rfl: 0    pantoprazole (PROTONIX) 40 mg tablet, Take 1 tablet (40 mg total) by mouth 2 (two) times a day, Disp: 60 tablet, Rfl: 3    polyethylene glycol (MIRALAX) 17 g packet, Take 17 g by mouth daily, Disp: 30 each, Rfl: 5    pramipexole (MIRAPEX) 1 mg tablet, Take 1 mg by mouth 2 (two) times a day  , Disp: , Rfl:     predniSONE 10 mg tablet, TAKE 5 TABLETS FOR 3 DAYS, 4 TABLETS FOR 3 DAYS, 3 TABLETS FOR 3 DAYS, 2 TABLETS FOR 3 DAYS THEN 1 TABLET FOR 3 DAYS, Disp: , Rfl:     RABEprazole (ACIPHEX) 20 MG tablet, , Disp: , Rfl:     SYMBICORT 80-4 5 MCG/ACT inhaler, Inhale 2 puffs 2 (two) times a day  , Disp: , Rfl:     TiZANidine (ZANAFLEX) 4 MG capsule, Take 4 mg by mouth 3 (three) times a day, Disp: , Rfl:     VENTOLIN  (90 Base) MCG/ACT inhaler, 2 puffs every 4 (four) hours as needed  , Disp: , Rfl:     Armodafinil 150 MG tablet, Take 1 tablet (150 mg total) by mouth daily, Disp: 90 tablet, Rfl: 1    glucose blood test strip, Testing six times daily  E11 65 on insulin pump, Disp: , Rfl:     metaxalone (SKELAXIN) 800 mg tablet, Take 1 PO TID PRN for pain and spasms  (Patient not taking: Reported on 8/25/2020), Disp: 90 tablet, Rfl: 1    Naloxone HCl (NARCAN) 4 MG/0 1ML LIQD, Sprays into 1 nostril for suspected opioid overdose   May repeat , Disp: 2 each, Rfl: 0    Allergies   Allergen Reactions    Bactrim [Sulfamethoxazole-Trimethoprim] Hives    Sucralfate Hives     Facial swelling    Topamax [Topiramate]      disorentation    Azithromycin Itching    Pregabalin Confusion and Other (See Comments) altered mental status    Ciprofloxacin Drowsiness     Other reaction(s): Other (See Comments)  "drowsiness"    Norvasc [Amlodipine] Swelling    Baclofen      "That knocks me out "    Bupropion Fatigue    Methotrexate Nausea Only    Neurontin [Gabapentin] Hallucinations and Hypertension       Physical Exam:    /68 (BP Location: Left arm, Patient Position: Sitting, Cuff Size: Standard)   Pulse 60   Temp 98 7 °F (37 1 °C)   Ht 5' 3" (1 6 m)   Wt 101 kg (222 lb)   BMI 39 33 kg/m²     Constitutional:normal, well developed, well nourished, alert, in no distress and non-toxic and no overt pain behavior  and overweight  Eyes:anicteric  HEENT:grossly intact  Neck:supple, symmetric, trachea midline and no masses   Pulmonary:even and unlabored  Cardiovascular:No edema or pitting edema present  Skin:Normal without rashes or lesions and well hydrated  Psychiatric:Mood and affect appropriate  Neurologic:Cranial Nerves II-XII grossly intact  Musculoskeletal:The patient's gait is slightly antalgic, painful, but steady with the use of a single cane  Imaging  No orders to display         No orders of the defined types were placed in this encounter

## 2020-08-25 NOTE — PATIENT INSTRUCTIONS
Opioid Pain Management   AMBULATORY CARE:   An opioid  is a type of medicine used to treat pain  Examples of opioids are oxycodone, morphine, fentanyl, or codeine  Take opioid medicines as directed, for the condition it is prescribed:  Common problems that may occur when you do not take opioid medicines as directed include the following:  · Health problems  may occur  You may have trouble breathing  You may also develop liver or kidney damage, or stomach bleeding  Any of these health problems can become life-threatening  · Opioid dependence  means your body needs the opioid medicine to keep it from going through withdrawal      · Opioid tolerance  means the opioid does not control pain as well as it used to  You need higher doses of the opioid to get pain relief  · Opioid addiction  means you are not able to control the use of the opioid medicine  You use it when you do not have pain  You crave the opioid medicine  Call 911 or have someone call 911 for any of the following:   · You are breathing slower than normal, or you have trouble breathing  · You cannot be awakened  · You have a seizure  Seek care immediately if:   · Your heart is beating slower than usual     · Your heart feels like it is jumping or fluttering  · You are so sleepy that you cannot stay awake  · You have severe muscle pain or weakness  · You see or hear things that are not real   Contact your healthcare provider if:   · You are too dizzy to stand up  · Your pain gets worse or you have new pain  · You cannot do your usual activities because of side effects from the opioid  · You are constipated or have abdominal pain  · You have questions or concerns about your condition or care  Opioid safety measures:   · Take your medicine as directed  Ask if you need more information on how to take your medicine correctly  Follow up with your healthcare provider regularly  You may need to have your dose adjusted   Do not use opioid medicine if you are pregnant or breastfeeding  · Give your healthcare provider a list of all your medicines  Include any over-the-counter medicines, vitamins, and herbs  It can be dangerous to take opioids with certain other medicines, such as antihistamines  · Keep opioid medicine in a safe place  Store your opioid medicine in a locked cabinet to keep it away from children and others  · Do not drink alcohol while you use opioids  Alcohol use with an opioid medicine can make you sleepy and slow your breathing rate  You may stop breathing completely  · Do not drive or operate heavy machinery after you take opioid medicine  Opioid medicine can make you drowsy and make it hard to concentrate  You may injure yourself or others if you drive or operate heavy machinery while taking your medicine  · Drink fluids and eat high-fiber foods  Fluids and fiber will help prevent constipation  Ask your healthcare provider what fluids are right for you and how much you should drink  Also ask for a list of foods that contain fiber  Follow up with your healthcare provider as directed: You may need to have your dose adjusted  You may be referred to a pain specialist  Write down your questions so you remember to ask them during your visits  © 2017 [employer] Information is for End User's use only and may not be sold, redistributed or otherwise used for commercial purposes  All illustrations and images included in CareNotes® are the copyrighted property of [employer]  or [employer]  The above information is an  only  It is not intended as medical advice for individual conditions or treatments  Talk to your doctor, nurse or pharmacist before following any medical regimen to see if it is safe and effective for you

## 2020-09-01 ENCOUNTER — TELEPHONE (OUTPATIENT)
Dept: GASTROENTEROLOGY | Facility: AMBULARY SURGERY CENTER | Age: 65
End: 2020-09-01

## 2020-09-01 NOTE — TELEPHONE ENCOUNTER
Dr Sy Conway patient Hx- asthma, IBS, anxiety, diabetes, gastroparesis      Patient called yesterday to report symptoms  She was transferred to voice mail/ left message  She was contacted regarding a scheduled office visit tomorrow and is calling to question why  Seen and treated at [facility] express care- cough  Reports persistent cough since 8/14/20 EGD  Biopsies consistent with esophagitis- PPI twice daily/ Protonix sent to pharmacy  Discuss further at office visit

## 2020-09-02 ENCOUNTER — OFFICE VISIT (OUTPATIENT)
Dept: GASTROENTEROLOGY | Facility: CLINIC | Age: 65
End: 2020-09-02
Payer: MEDICARE

## 2020-09-02 VITALS
BODY MASS INDEX: 39.27 KG/M2 | TEMPERATURE: 98.7 F | DIASTOLIC BLOOD PRESSURE: 58 MMHG | WEIGHT: 221.6 LBS | HEART RATE: 75 BPM | SYSTOLIC BLOOD PRESSURE: 128 MMHG | HEIGHT: 63 IN

## 2020-09-02 DIAGNOSIS — K58.1 IRRITABLE BOWEL SYNDROME WITH CONSTIPATION: ICD-10-CM

## 2020-09-02 DIAGNOSIS — K31.84 GASTROPARESIS: ICD-10-CM

## 2020-09-02 DIAGNOSIS — R05.9 COUGH: Primary | ICD-10-CM

## 2020-09-02 DIAGNOSIS — K21.00 GASTROESOPHAGEAL REFLUX DISEASE WITH ESOPHAGITIS: ICD-10-CM

## 2020-09-02 PROCEDURE — 99214 OFFICE O/P EST MOD 30 MIN: CPT | Performed by: PHYSICIAN ASSISTANT

## 2020-09-02 NOTE — LETTER
September 2, 2020     Yazmin Pinto DO  [address] [facility]  [address]  [id number]    Patient: Ke Nunes   YOB: 1955   Date of Visit: 9/2/2020       Dear Dr Akash Jay: Thank you for referring Ramon Ramírez to me for evaluation  Below are my notes for this consultation  If you have questions, please do not hesitate to call me  I look forward to following your patient along with you  Sincerely,        Orly Alvarado PA-C        CC: No Recipients  Orly Alvarado PA-C  9/2/2020  1:48 PM  Sign when Signing Visit  [facility] - Outpatient Follow-up Note  Ke Nunes 60 y o  female MRN: [id number]  Encounter: [id number]          ASSESSMENT AND PLAN:      1  Cough  She reports frequent, dry non-productive cough for the past 3-4 weeks  She has no associated symptoms  She had 2 normal chest x-rays  She was seen at [place] [address] ED and diagnosed with bronchitis  She has been using inhalers, nebulizers, and taking steroids without improvement  She is on maximum acid suppressive therapy  It is doubtful her cough is acid reflux related  Given her underlying sarcoidosis and asthma, I am concerned about primary lung issue  She will be seeing her PCP on Friday  If no other cause is identified, I explained we can do 24 esophageal pH testing to determine if she is getting adequate acid suppression  2  Gastroesophageal reflux disease with esophagitis  Recent EGD showed normal esophagus but biopsies confirmed esophagitis  Continue Protonix 40 mg twice daily and Pepcid at bedtime  Continue dietary and lifestyle modification to prevent reflux  3  Gastroparesis  Stable  Continue small frequent meals and low fat diet  4  Irritable bowel syndrome with constipation  Stable  Continue Amitiza twice daily and MiraLAX as needed      Follow-up in a few months  ______________________________________________________________________    SUBJECTIVE:  13-year-old female with obesity, sarcoidosis, asthma, hypothroidism, CHF, diabetes mellitus, GERD, gastroparesis, IBS presenting for evaluation of cough  She had an EGD 8/14 for evaluation of GERD which showed possible non-obstructing Schatzki's ring in the distal esophagus, retained liquid in the stomach, otherwise normal appearing stomach and duodenum  Biopsies consistent with esophagitis but no evidence of H pylori or celiac disease  For approximately the past 1 month, she complains of intractable, dry non productive cough  She coughs when she tries to speak, when she is eating, or drinking  She is couging frequently during the office visit today  Since her EGD, she has been taking max acid suppression with Protonix 40 mg twice daily and Pepcid 20 mg daily  She denies ear pain/pressure, nasal drainage, sinus pressure, sore throat  She denies fever, chills, shortness of breath, and chest pain  She denies heartburn, nausea, vomiting, abdominal pain  She has some difficulty swallowing solid food if not cut into small pieces but this has been going on for many years  She is having regular bowel movements and takes Amitiza and MiraLAX  REVIEW OF SYSTEMS IS OTHERWISE NEGATIVE        Historical Information   Past Medical History:   Diagnosis Date    Anxiety     Asthma     controlled    Back complaints     Cancer ([facility] )     nose    Cellulitis of left lower leg     Chronic bilateral thoracic back pain     Chronic myofascial pain     Chronic pain of both lower extremities     Chronic pain of left knee     Chronic pain syndrome     CPAP (continuous positive airway pressure) dependence     Depression     Diabetes mellitus ([facility]     Disease of thyroid gland     Gait disorder     Gastroparesis     GERD (gastroesophageal reflux disease)     History of angina     History of transfusion     Many years ago    Hyperlipidemia     Hypertension     Insulin pump in place     Kidney disease     Polyneuropathy associated with underlying disease ([address] )     PONV (postoperative nausea and vomiting)     S/P insertion of spinal cord stimulator 2018    Sarcoidosis     Sleep apnea     TIA (transient ischemic attack)      Past Surgical History:   Procedure Laterality Date    BREAST SURGERY      BREAST SURGERY      reduction    CARDIAC PACEMAKER PLACEMENT       SECTION      COLONOSCOPY      HERNIA REPAIR      HYSTERECTOMY      JOINT REPLACEMENT Left     NECK SURGERY      [place] SURG IMPLNT [address] Left 2018    Procedure:  Insertion of thoracic spinal cord stimulator electrode via laminotomy and placement of left buttock IPG;  Surgeon: Jeanne Manning MD;  [facility];  Service: Neurosurgery    REPLACEMENT TOTAL KNEE      ROTATOR CUFF REPAIR      SPINAL STIMULATOR PLACEMENT Left 10/3/2018    Procedure: BUTTOCK RE-OPENING INCISION FOR REPOSITIONING OF IMPLANTABLE PULSE GENERATOR;  Surgeon: Jeanne Manning MD;  [facility];  Service: Neurosurgery    TONSILLECTOMY      UPPER GASTROINTESTINAL ENDOSCOPY      WISDOM TOOTH EXTRACTION       Social History   Social History     Substance and Sexual Activity   Alcohol Use No     Social History     Substance and Sexual Activity   Drug Use No     Social History     Tobacco Use   Smoking Status Never Smoker   Smokeless Tobacco Never Used     Family History   Problem Relation Age of Onset    Diabetes Family     Heart disease Family     Hypertension Family     Neuropathy Family     No Known Problems Mother     Heart disease Father     Diabetes Father     No Known Problems Maternal Grandmother     No Known Problems Maternal Grandfather     No Known Problems Paternal Grandmother     No Known Problems Paternal Grandfather        Meds/Allergies       Current Outpatient Medications:     amitriptyline (ELAVIL) 50 mg tablet    atorvastatin (LIPITOR) 80 mg tablet    cholecalciferol (VITAMIN D3) 1,000 units tablet    clopidogrel (PLAVIX) 75 mg tablet    CRANBERRY PO    Cyanocobalamin (VITAMIN B-12 PO)    Docusate Sodium 100 MG capsule    DULoxetine (CYMBALTA) 60 mg delayed release capsule    famotidine (PEPCID) 20 mg tablet    fenofibrate (TRICOR) 48 mg tablet    ferrous sulfate 324 (65 Fe) mg    fluticasone-salmeterol (ADVAIR) 100-50 mcg/dose inhaler    furosemide (LASIX) 20 mg tablet    glucagon 1 MG injection    glucose blood test strip    hydrALAZINE (APRESOLINE) 10 mg tablet    levothyroxine 75 mcg tablet    losartan (COZAAR) 100 MG tablet    lubiprostone (AMITIZA) 24 mcg capsule    magnesium oxide (MAG-OX) 400 mg tablet    Naloxone HCl (NARCAN) 4 MG/0 1ML LIQD    NOVOLOG 100 UNIT/ML injection    ondansetron (ZOFRAN) 8 mg tablet    oxybutynin (DITROPAN XL) 15 MG 24 hr tablet    oxyCODONE-acetaminophen (Percocet) 5-325 mg per tablet    pantoprazole (PROTONIX) 40 mg tablet    polyethylene glycol (MIRALAX) 17 g packet    pramipexole (MIRAPEX) 1 mg tablet    predniSONE 10 mg tablet    SYMBICORT 80-4 5 MCG/ACT inhaler    TiZANidine (ZANAFLEX) 4 MG capsule    VENTOLIN  (90 Base) MCG/ACT inhaler    Armodafinil 150 MG tablet    dicyclomine (BENTYL) 10 mg capsule    hydroxychloroquine (PLAQUENIL) 200 mg tablet    insulin aspart (NovoLOG) 100 units/mL injection    metaxalone (SKELAXIN) 800 mg tablet    RABEprazole (ACIPHEX) 20 MG tablet    Allergies   Allergen Reactions    Bactrim [Sulfamethoxazole-Trimethoprim] Hives    Sucralfate Hives     Facial swelling    Topamax [Topiramate]      disorentation    Azithromycin Itching    Pregabalin Confusion and Other (See Comments)     altered mental status    Ciprofloxacin Drowsiness     Other reaction(s):  Other (See Comments)  "drowsiness"    Norvasc [Amlodipine] Swelling    Baclofen      "That knocks me out "    Bupropion Fatigue    Methotrexate Nausea Only    Neurontin [Gabapentin] Hallucinations and Hypertension           Objective     Blood pressure 128/58, pulse 75, temperature 98 7 °F (37 1 °C), temperature source Tympanic, height 5' 3" (1 6 m), weight 101 kg (221 lb 9 6 oz)  Body mass index is 39 25 kg/m²  PHYSICAL EXAM:      General Appearance:   Alert, cooperative, no distress   HEENT:   Normocephalic, atraumatic, anicteric      Neck:  Supple, symmetrical, trachea midline   Lungs:   Clear to auscultation bilaterally; no rales, rhonchi or wheezing; respirations unlabored    Heart[de-identified]   Regular rate and rhythm; no murmur, rub, or gallop  Abdomen:   Soft, non-tender, non-distended; normal bowel sounds; no masses, no organomegaly    Genitalia:   Deferred    Rectal:   Deferred    Extremities:  No cyanosis, clubbing or edema    Pulses:  2+ and symmetric    Skin:  No jaundice, rashes, or lesions    Lymph nodes:  No palpable cervical lymphadenopathy        Lab Results:   No visits with results within 1 Day(s) from this visit     Latest known visit with results is:   Hospital Outpatient Visit on 08/14/2020   Component Date Value    Case Report 08/14/2020                      Value:Surgical Pathology Report                         Case: [id number]                                   Authorizing Provider:  Haley Pulido MD            Collected:           08/14/2020 1117              Ordering Location:     [address] [place] [address]      Received:            08/14/2020 [address] Endoscopy                                                           Pathologist:           Abbe Feliciano MD                                                         Specimens:   A) - Duodenum                                                                                       B) - Stomach                                                                                        C) - Esophagus, distal esophagus @35cm                                                              D) - Esophagus, proximal esophagus                                                         Final Diagnosis 08/14/2020                      Value: This result contains rich text formatting which cannot be displayed here   Additional Information 08/14/2020                      Value: This result contains rich text formatting which cannot be displayed here  Carrie Luna Gross Description 08/14/2020                      Value: This result contains rich text formatting which cannot be displayed here  Radiology Results:   No results found  Answers for HPI/ROS submitted by the patient on 9/2/2020   Abdominal pain  Chronicity: recurrent  Onset: more than 1 month ago  Onset quality: sudden  Frequency: every several days  Episode duration: 2 hours  Progression since onset: waxing and waning  Pain location: generalized abdominal region  Pain - numeric: 8/10  Pain quality: cramping  Radiates to: does not radiate  anorexia: No  arthralgias: Yes  belching: Yes  constipation: Yes  diarrhea: Yes  dysuria: No  fever: No  flatus: Yes  frequency: Yes  headaches: Yes  hematochezia: Yes  melena: Yes  myalgias: Yes  nausea:  No  weight loss: No  vomiting: No  Aggravated by: nothing  Relieved by: belching, bowel movements, passing flatus  Diagnostic workup: GI consult, upper endoscopy

## 2020-09-02 NOTE — PROGRESS NOTES
[facility] - Outpatient Follow-up Note  Pete Barraza 60 y o  female MRN: [id number]  Encounter: [id number]          ASSESSMENT AND PLAN:      1  Cough  She reports frequent, dry non-productive cough for the past 3-4 weeks  She has no associated symptoms  She had 2 normal chest x-rays  She was seen at [place] [address] ED and diagnosed with bronchitis  She has been using inhalers, nebulizers, and taking steroids without improvement  She is on maximum acid suppressive therapy  It is doubtful her cough is acid reflux related  Given her underlying sarcoidosis and asthma, I am concerned about primary lung issue  She will be seeing her PCP on Friday  If no other cause is identified, I explained we can do 24 esophageal pH testing to determine if she is getting adequate acid suppression  2  Gastroesophageal reflux disease with esophagitis  Recent EGD showed normal esophagus but biopsies confirmed esophagitis  Continue Protonix 40 mg twice daily and Pepcid at bedtime  Continue dietary and lifestyle modification to prevent reflux  3  Gastroparesis  Stable  Continue small frequent meals and low fat diet  4  Irritable bowel syndrome with constipation  Stable  Continue Amitiza twice daily and MiraLAX as needed  Follow-up in a few months  ______________________________________________________________________    SUBJECTIVE:  28-year-old female with obesity, sarcoidosis, asthma, hypothroidism, CHF, diabetes mellitus, GERD, gastroparesis, IBS presenting for evaluation of cough  She had an EGD 8/14 for evaluation of GERD which showed possible non-obstructing Schatzki's ring in the distal esophagus, retained liquid in the stomach, otherwise normal appearing stomach and duodenum  Biopsies consistent with esophagitis but no evidence of H pylori or celiac disease  For approximately the past 1 month, she complains of intractable, dry non productive cough   She coughs when she tries to speak, when she is eating, or drinking  She is couging frequently during the office visit today  Since her EGD, she has been taking max acid suppression with Protonix 40 mg twice daily and Pepcid 20 mg daily  She denies ear pain/pressure, nasal drainage, sinus pressure, sore throat  She denies fever, chills, shortness of breath, and chest pain  She denies heartburn, nausea, vomiting, abdominal pain  She has some difficulty swallowing solid food if not cut into small pieces but this has been going on for many years  She is having regular bowel movements and takes Amitiza and MiraLAX  REVIEW OF SYSTEMS IS OTHERWISE NEGATIVE  Historical Information   Past Medical History:   Diagnosis Date    Anxiety     Asthma     controlled    Back complaints     Cancer ([facility] )     nose    Cellulitis of left lower leg     Chronic bilateral thoracic back pain     Chronic myofascial pain     Chronic pain of both lower extremities     Chronic pain of left knee     Chronic pain syndrome     CPAP (continuous positive airway pressure) dependence     Depression     Diabetes mellitus ([facility] )     Disease of thyroid gland     Gait disorder     Gastroparesis     GERD (gastroesophageal reflux disease)     History of angina     History of transfusion     Many years ago    Hyperlipidemia     Hypertension     Insulin pump in place     Kidney disease     Polyneuropathy associated with underlying disease ([facility] )     PONV (postoperative nausea and vomiting)     S/P insertion of spinal cord stimulator 2018    Sarcoidosis     Sleep apnea     TIA (transient ischemic attack)      Past Surgical History:   Procedure Laterality Date    BREAST SURGERY      BREAST SURGERY      reduction    CARDIAC PACEMAKER PLACEMENT       SECTION      COLONOSCOPY      HERNIA REPAIR      HYSTERECTOMY      JOINT REPLACEMENT Left     NECK SURGERY      [place] SURG IMPLNT [facility] Left 2018    Procedure:  Insertion of thoracic spinal cord stimulator electrode via laminotomy and placement of left buttock IPG;  Surgeon: Vivi Oakley MD;  [facility];  Service: Neurosurgery    REPLACEMENT TOTAL KNEE      ROTATOR CUFF REPAIR      SPINAL STIMULATOR PLACEMENT Left 10/3/2018    Procedure: BUTTOCK RE-OPENING INCISION FOR REPOSITIONING OF IMPLANTABLE PULSE GENERATOR;  Surgeon: Vivi Oakley MD;  [facility];  Service: Neurosurgery    TONSILLECTOMY      UPPER GASTROINTESTINAL ENDOSCOPY      WISDOM TOOTH EXTRACTION       Social History   Social History     Substance and Sexual Activity   Alcohol Use No     Social History     Substance and Sexual Activity   Drug Use No     Social History     Tobacco Use   Smoking Status Never Smoker   Smokeless Tobacco Never Used     Family History   Problem Relation Age of Onset    Diabetes Family     Heart disease Family     Hypertension Family     Neuropathy Family     No Known Problems Mother     Heart disease Father     Diabetes Father     No Known Problems Maternal Grandmother     No Known Problems Maternal Grandfather     No Known Problems Paternal Grandmother     No Known Problems Paternal Grandfather        Meds/Allergies       Current Outpatient Medications:     amitriptyline (ELAVIL) 50 mg tablet    atorvastatin (LIPITOR) 80 mg tablet    cholecalciferol (VITAMIN D3) 1,000 units tablet    clopidogrel (PLAVIX) 75 mg tablet    CRANBERRY PO    Cyanocobalamin (VITAMIN B-12 PO)    Docusate Sodium 100 MG capsule    DULoxetine (CYMBALTA) 60 mg delayed release capsule    famotidine (PEPCID) 20 mg tablet    fenofibrate (TRICOR) 48 mg tablet    ferrous sulfate 324 (65 Fe) mg    fluticasone-salmeterol (ADVAIR) 100-50 mcg/dose inhaler    furosemide (LASIX) 20 mg tablet    glucagon 1 MG injection    glucose blood test strip    hydrALAZINE (APRESOLINE) 10 mg tablet    levothyroxine 75 mcg tablet    losartan (COZAAR) 100 MG tablet    lubiprostone (AMITIZA) 24 mcg capsule    magnesium oxide (MAG-OX) 400 mg tablet    Naloxone HCl (NARCAN) 4 MG/0 1ML LIQD    NOVOLOG 100 UNIT/ML injection    ondansetron (ZOFRAN) 8 mg tablet    oxybutynin (DITROPAN XL) 15 MG 24 hr tablet    oxyCODONE-acetaminophen (Percocet) 5-325 mg per tablet    pantoprazole (PROTONIX) 40 mg tablet    polyethylene glycol (MIRALAX) 17 g packet    pramipexole (MIRAPEX) 1 mg tablet    predniSONE 10 mg tablet    SYMBICORT 80-4 5 MCG/ACT inhaler    TiZANidine (ZANAFLEX) 4 MG capsule    VENTOLIN  (90 Base) MCG/ACT inhaler    Armodafinil 150 MG tablet    dicyclomine (BENTYL) 10 mg capsule    hydroxychloroquine (PLAQUENIL) 200 mg tablet    insulin aspart (NovoLOG) 100 units/mL injection    metaxalone (SKELAXIN) 800 mg tablet    RABEprazole (ACIPHEX) 20 MG tablet    Allergies   Allergen Reactions    Bactrim [Sulfamethoxazole-Trimethoprim] Hives    Sucralfate Hives     Facial swelling    Topamax [Topiramate]      disorentation    Azithromycin Itching    Pregabalin Confusion and Other (See Comments)     altered mental status    Ciprofloxacin Drowsiness     Other reaction(s): Other (See Comments)  "drowsiness"    Norvasc [Amlodipine] Swelling    Baclofen      "That knocks me out "    Bupropion Fatigue    Methotrexate Nausea Only    Neurontin [Gabapentin] Hallucinations and Hypertension           Objective     Blood pressure 128/58, pulse 75, temperature 98 7 °F (37 1 °C), temperature source Tympanic, height 5' 3" (1 6 m), weight 101 kg (221 lb 9 6 oz)  Body mass index is 39 25 kg/m²  PHYSICAL EXAM:      General Appearance:   Alert, cooperative, no distress   HEENT:   Normocephalic, atraumatic, anicteric      Neck:  Supple, symmetrical, trachea midline   Lungs:   Clear to auscultation bilaterally; no rales, rhonchi or wheezing; respirations unlabored    Heart[de-identified]   Regular rate and rhythm; no murmur, rub, or gallop     Abdomen:   Soft, non-tender, non-distended; normal bowel sounds; no masses, no organomegaly    Genitalia:   Deferred    Rectal:   Deferred    Extremities:  No cyanosis, clubbing or edema    Pulses:  2+ and symmetric    Skin:  No jaundice, rashes, or lesions    Lymph nodes:  No palpable cervical lymphadenopathy        Lab Results:   No visits with results within 1 Day(s) from this visit  Latest known visit with results is:   Hospital Outpatient Visit on 08/14/2020   Component Date Value    Case Report 08/14/2020                      Value:Surgical Pathology Report                         Case: [id number]                                   Authorizing Provider:  Kerry Sanders MD            Collected:           08/14/2020 1117              Ordering Location:     [address] [place] [address]      Received:            08/14/2020 [address] [place] [address] Endoscopy                                                           Pathologist:           Ml Malloy MD                                                         Specimens:   A) - Duodenum                                                                                       B) - Stomach                                                                                        C) - Esophagus, distal esophagus @35cm                                                              D) - Esophagus, proximal esophagus                                                         Final Diagnosis 08/14/2020                      Value: This result contains rich text formatting which cannot be displayed here   Additional Information 08/14/2020                      Value: This result contains rich text formatting which cannot be displayed here  Vicki Dillon Gross Description 08/14/2020                      Value: This result contains rich text formatting which cannot be displayed here  Radiology Results:   No results found    Answers for HPI/ROS submitted by the patient on 9/2/2020   Abdominal pain  Chronicity: recurrent  Onset: more than 1 month ago  Onset quality: sudden  Frequency: every several days  Episode duration: 2 hours  Progression since onset: waxing and waning  Pain location: generalized abdominal region  Pain - numeric: 8/10  Pain quality: cramping  Radiates to: does not radiate  anorexia: No  arthralgias: Yes  belching: Yes  constipation: Yes  diarrhea: Yes  dysuria: No  fever: No  flatus: Yes  frequency: Yes  headaches: Yes  hematochezia: Yes  melena: Yes  myalgias: Yes  nausea:  No  weight loss: No  vomiting: No  Aggravated by: nothing  Relieved by: belching, bowel movements, passing flatus  Diagnostic workup: GI consult, upper endoscopy

## 2020-09-10 ENCOUNTER — TELEPHONE (OUTPATIENT)
Dept: GASTROENTEROLOGY | Facility: AMBULARY SURGERY CENTER | Age: 65
End: 2020-09-10

## 2020-09-10 NOTE — TELEPHONE ENCOUNTER
Patients GI provider:  Dr Be Erazo    Number to return call: [phone]    Reason for call: Dr Bulmaro Wen called requesting to speak with Baby Pro regarding a mutual patient with intractable cough       Scheduled procedure/appointment date if applicable: Apt/procedure 12/8/20

## 2020-09-10 NOTE — TELEPHONE ENCOUNTER
Spoke to Dr Vita Woody the patients pulmonologist, he states the cough is not asthmatic or an allergic cough he believes that the cough is esophageal related  He is requesting a call from Dr Daniel Garcia upon return to office next week

## 2020-09-14 ENCOUNTER — TELEPHONE (OUTPATIENT)
Dept: UROLOGY | Facility: MEDICAL CENTER | Age: 65
End: 2020-09-14

## 2020-09-14 DIAGNOSIS — N32.81 OAB (OVERACTIVE BLADDER): Primary | ICD-10-CM

## 2020-09-14 DIAGNOSIS — N39.0 URINARY TRACT INFECTION WITHOUT HEMATURIA, SITE UNSPECIFIED: ICD-10-CM

## 2020-09-14 DIAGNOSIS — N39.0 FREQUENT UTI: ICD-10-CM

## 2020-09-14 NOTE — TELEPHONE ENCOUNTER
Patient of [facility] office  History of OAB, and recurrent UTIs  Was last seen in July via virtual visit with Uli Kinney  Patient had initially been prescribed Myrbetriq but due to insurance coverage this was switched to oxybutynin  She has also tried trospium without any improvement   She continues to take oxybutynin 10 mg daily  Patient denies any gross hematuria or dysuria  Per last office note "If medication is ineffective, we will consider trialing a different anticholinergic or adding Myrbetriq for dual medication therapy "   Patient does have an upcoming appointment in early November with provider  Returned call to patient, she states she is having increased issues with urge and incontinence  Patient states that when she needs to go she has no control and is incontinent  She also states that she has a cough right now and that she loses urine every time she coughs  She denies any hematuria, dysuria or other UTI symptoms at the time  Advised will route her concerns to provider and call back with plan

## 2020-09-14 NOTE — TELEPHONE ENCOUNTER
Patient of Savi Waldrop (Dr Nabila Brice) seen at [facility] to advise that she is experiencing incontinence and has needed to wear adults diapers      Patient can be reached at [phone]

## 2020-09-14 NOTE — TELEPHONE ENCOUNTER
Recommend obtaining urine testing to rule out infection  If results are negative, we can either try a different anticholinergic or add Myrbetriq to oxybutynin  Unsure of insurance coverage, may need prior auth

## 2020-09-14 NOTE — TELEPHONE ENCOUNTER
Called  Guadalupe justin and LIOR that orders were placed to her to give urine samples  She can go to any [facility]'s facility to she if she as an infection   If the results come back negative and can try adding another medication

## 2020-09-15 ENCOUNTER — TRANSCRIBE ORDERS (OUTPATIENT)
Dept: LAB | Facility: CLINIC | Age: 65
End: 2020-09-15

## 2020-09-15 NOTE — TELEPHONE ENCOUNTER
Spoke to Dr Kendall Jacob  Symptoms seem related to GERD  Office staff- please call pt to get her in for a follow up appt, can overbook if pt want's to be seen soon  If her cough is better with what Dr Kendall Jacob recommended then it doesn't need to be a rush but I am happy to see her if the cough is not getting better        Thanks so much,  Zurdo Salazar

## 2020-09-17 ENCOUNTER — TELEMEDICINE (OUTPATIENT)
Dept: GASTROENTEROLOGY | Facility: CLINIC | Age: 65
End: 2020-09-17
Payer: MEDICARE

## 2020-09-17 ENCOUNTER — APPOINTMENT (OUTPATIENT)
Dept: LAB | Facility: CLINIC | Age: 65
End: 2020-09-17
Payer: MEDICARE

## 2020-09-17 ENCOUNTER — TELEPHONE (OUTPATIENT)
Dept: GASTROENTEROLOGY | Facility: CLINIC | Age: 65
End: 2020-09-17

## 2020-09-17 VITALS — HEIGHT: 63 IN | WEIGHT: 221 LBS | BODY MASS INDEX: 39.16 KG/M2

## 2020-09-17 DIAGNOSIS — K21.00 GASTROESOPHAGEAL REFLUX DISEASE WITH ESOPHAGITIS: Primary | ICD-10-CM

## 2020-09-17 LAB
BACTERIA UR QL AUTO: ABNORMAL /HPF
BILIRUB UR QL STRIP: NEGATIVE
CLARITY UR: CLEAR
COLOR UR: ABNORMAL
GLUCOSE UR STRIP-MCNC: ABNORMAL MG/DL
HGB UR QL STRIP.AUTO: NEGATIVE
HYALINE CASTS #/AREA URNS LPF: ABNORMAL /LPF
KETONES UR STRIP-MCNC: NEGATIVE MG/DL
LEUKOCYTE ESTERASE UR QL STRIP: ABNORMAL
NITRITE UR QL STRIP: NEGATIVE
NON-SQ EPI CELLS URNS QL MICRO: ABNORMAL /HPF
PH UR STRIP.AUTO: 6 [PH]
PROT UR STRIP-MCNC: NEGATIVE MG/DL
RBC #/AREA URNS AUTO: ABNORMAL /HPF
SP GR UR STRIP.AUTO: 1.02 (ref 1–1.03)
UROBILINOGEN UR QL STRIP.AUTO: 0.2 E.U./DL
WBC #/AREA URNS AUTO: ABNORMAL /HPF

## 2020-09-17 PROCEDURE — 99213 OFFICE O/P EST LOW 20 MIN: CPT | Performed by: INTERNAL MEDICINE

## 2020-09-17 PROCEDURE — 81001 URINALYSIS AUTO W/SCOPE: CPT

## 2020-09-17 RX ORDER — ESOMEPRAZOLE MAGNESIUM 40 MG/1
40 CAPSULE, DELAYED RELEASE ORAL
Qty: 180 CAPSULE | Refills: 3 | Status: SHIPPED | OUTPATIENT
Start: 2020-09-17 | End: 2021-08-04 | Stop reason: SDUPTHER

## 2020-09-17 NOTE — TELEPHONE ENCOUNTER
I called and spoke with pt , I mailed out [facility] handout, pt has appt 12-8-20    I called [facility] pt has appt for upper gi series    9-18-20 10 am  Arrive 15 min prior  Nothing to eat/drink after midnight  Patient to go to [address] [place] [address], entrance b, go to 1st floor to radiology dept

## 2020-09-17 NOTE — TELEPHONE ENCOUNTER
----- Message from Isaiah Chatman DO sent at 9/17/2020  4:05 PM EDT -----  -nexium 40 mg bid, I called in to her giant  -please help schedule upper gi series  -follow up 2 months  -please mail her gerd handout

## 2020-09-17 NOTE — PROGRESS NOTES
Virtual Regular Visit      Assessment/Plan:  72-year-old female here for follow-up  1  GERD, dysphagia, excessive coughing  -she is very symptomatic with GERD and cough despite high-dose PPI, I will change her PPI and hope that this improved  -we also will consider esophageal motility in the differential, I did UE biopsies during her EGD which were negative for eosinophilic esophagitis  -will start with upper GI series, and if symptoms continue despite changing her PPI then will also do a 24 hour pH and esophageal manometry to assess for increased acid exposure and or a motility problem  -instructed patient that she can continue to take famotidine at bedtime which was just started by her pulmonologist as well as using viscous lidocaine as needed which was started by her PCP  -will give ease omeprazole 40 mg b i d  a try, will DC pantoprazole b i d  Follow-up in a few months time     Reason for visit is   Chief Complaint   Patient presents with    other     excessive coughing    Virtual Regular Visit      Encounter provider Senait Novoa DO    Provider located at [address] [place] [address] 1  [address] [place] [address]  [phone]    Recent Visits  No visits were found meeting these conditions  Showing recent visits within past 7 days and meeting all other requirements     Today's Visits  Date Type Provider Dept   09/17/20 [address] [facility],[address], DO [facility] [address] [facility]   Showing today's visits and meeting all other requirements     Future Appointments  No visits were found meeting these conditions  Showing future appointments within next 150 days and meeting all other requirements        The patient was identified by name and date of birth  Raúl Askew was informed that this is a telemedicine visit and that the visit is being conducted through [employer]  My office door was closed   No one else was in the room  She acknowledged consent and understanding of privacy and security of the video platform  The patient has agreed to participate and understands they can discontinue the visit at any time  Patient is aware this is a billable service  Subjective  Mary Rinaldi is a 60 y o  female here for follow up  She reports that since her EGD has been suffering from excessive coughing  I spoke to the pt's pulmonogist earlier this week  Currently taking protonix 40 mg twice daily as well as famotidine 40 mg at bedtime  Despite all of these medications she is not better  The cough wakes her up from sleep  Happens all day  Also with a lot of heartburn symptoms as well  Also tried rabeprazole which didn't help her symptoms either  She also complains of intermittent solid food dysphagia  Denies weight loss            Past Medical History:   Diagnosis Date    Anxiety     Asthma     controlled    Back complaints     [facility]     nose    Cellulitis of left lower leg     Chronic bilateral thoracic back pain     Chronic myofascial pain     Chronic pain of both lower extremities     Chronic pain of left knee     Chronic pain syndrome     CPAP (continuous positive airway pressure) dependence     Depression     Diabetes mellitus ([facility] )     Disease of thyroid gland     Gait disorder     Gastroparesis     GERD (gastroesophageal reflux disease)     History of angina     History of transfusion     Many years ago    Hyperlipidemia     Hypertension     Insulin pump in place     Kidney disease     Polyneuropathy associated with underlying disease ([address] )     PONV (postoperative nausea and vomiting)     S/P insertion of spinal cord stimulator 2018    Sarcoidosis     Sleep apnea     TIA (transient ischemic attack)        Past Surgical History:   Procedure Laterality Date    BREAST SURGERY      BREAST SURGERY      reduction    CARDIAC PACEMAKER PLACEMENT       SECTION      COLONOSCOPY      HERNIA REPAIR      HYSTERECTOMY      JOINT REPLACEMENT Left     NECK SURGERY      [place] SURG IMPLNT [address] Left 4/23/2018    Procedure: Insertion of thoracic spinal cord stimulator electrode via laminotomy and placement of left buttock IPG;  Surgeon: Che Gutierrez MD;  [facility];  Service: Neurosurgery    REPLACEMENT TOTAL KNEE      ROTATOR CUFF REPAIR      SPINAL STIMULATOR PLACEMENT Left 10/3/2018    Procedure: BUTTOCK RE-OPENING INCISION FOR REPOSITIONING OF IMPLANTABLE PULSE GENERATOR;  Surgeon: Che Gutierrez MD;  [facility];  Service: Neurosurgery    TONSILLECTOMY      UPPER GASTROINTESTINAL ENDOSCOPY      WISDOM TOOTH EXTRACTION         Current Outpatient Medications   Medication Sig Dispense Refill    amitriptyline (ELAVIL) 50 mg tablet Take one tablet p o  at bedtime for two weeks    If insomnia persists, increase to two tablets at bedtime 60 tablet 2    atorvastatin (LIPITOR) 80 mg tablet Take 80 mg by mouth daily at bedtime        cholecalciferol (VITAMIN D3) 1,000 units tablet Take 2,000 Units by mouth daily with lunch    5    clopidogrel (PLAVIX) 75 mg tablet Take 1 tablet (75 mg total) by mouth daily (Patient taking differently: Take 75 mg by mouth daily at bedtime  ) 30 tablet 4    CRANBERRY PO Take 1 capsule by mouth daily with lunch        Cyanocobalamin (VITAMIN B-12 PO) Take 1 capsule by mouth daily with lunch        dicyclomine (BENTYL) 10 mg capsule Take 1 capsule (10 mg total) by mouth 4 (four) times a day as needed (abdominal pain) 60 capsule 2    Docusate Sodium 100 MG capsule Take 100 mg by mouth daily with lunch        DULoxetine (CYMBALTA) 60 mg delayed release capsule Take 2 PO  capsule 1    famotidine (PEPCID) 20 mg tablet       fenofibrate (TRICOR) 48 mg tablet       ferrous sulfate 324 (65 Fe) mg Take 65 mg by mouth      fluticasone-salmeterol (ADVAIR) 100-50 mcg/dose inhaler Inhale 2 puffs      furosemide (LASIX) 20 mg tablet Take 60 mg by mouth 2 (two) times a day       glucagon 1 MG injection Glucagon Emergency Kit (human-recomb) 1 mg solution for injection      glucose blood test strip Testing six times daily  E11 65 on insulin pump      hydrALAZINE (APRESOLINE) 10 mg tablet       levothyroxine 75 mcg tablet Take 75 mcg by mouth daily in the early morning        losartan (COZAAR) 100 MG tablet Take 100 mg by mouth daily in the early morning        lubiprostone (AMITIZA) 24 mcg capsule Take 1 capsule (24 mcg total) by mouth 2 (two) times a day with meals 48 capsule 0    magnesium oxide (MAG-OX) 400 mg tablet Take 500 mg by mouth       Naloxone HCl (NARCAN) 4 MG/0 1ML LIQD Sprays into 1 nostril for suspected opioid overdose  May repeat  2 each 0    NOVOLOG 100 UNIT/ML injection INJECT 120 UNITS DAILY VIA INSULIN PUMP E 11 65  3    ondansetron (ZOFRAN) 8 mg tablet Take 8 mg by mouth every 8 (eight) hours as needed        oxybutynin (DITROPAN XL) 15 MG 24 hr tablet Take 1 tablet (15 mg total) by mouth daily 30 tablet 3    oxyCODONE-acetaminophen (Percocet) 5-325 mg per tablet Take 1 PO BID PRN for pain   DO NOT FILL UNTIL: 8/7/20 45 tablet 0    pantoprazole (PROTONIX) 40 mg tablet Take 1 tablet (40 mg total) by mouth 2 (two) times a day 60 tablet 3    polyethylene glycol (MIRALAX) 17 g packet Take 17 g by mouth daily 30 each 5    pramipexole (MIRAPEX) 1 mg tablet Take 1 mg by mouth 2 (two) times a day        predniSONE 10 mg tablet TAKE 5 TABLETS FOR 3 DAYS, 4 TABLETS FOR 3 DAYS, 3 TABLETS FOR 3 DAYS, 2 TABLETS FOR 3 DAYS THEN 1 TABLET FOR 3 DAYS      RABEprazole (ACIPHEX) 20 MG tablet       SYMBICORT 80-4 5 MCG/ACT inhaler Inhale 2 puffs 2 (two) times a day        TiZANidine (ZANAFLEX) 4 MG capsule Take 4 mg by mouth 3 (three) times a day      VENTOLIN  (90 Base) MCG/ACT inhaler 2 puffs every 4 (four) hours as needed        Armodafinil 150 MG tablet Take 1 tablet (150 mg total) by mouth daily 90 tablet 1    hydroxychloroquine (PLAQUENIL) 200 mg tablet Take 400 mg by mouth daily      insulin aspart (NovoLOG) 100 units/mL injection Inject  130 units daily via insulin pump  E11 65      metaxalone (SKELAXIN) 800 mg tablet Take 1 PO TID PRN for pain and spasms  (Patient not taking: Reported on 9/17/2020) 90 tablet 1     No current facility-administered medications for this visit  Allergies   Allergen Reactions    Bactrim [Sulfamethoxazole-Trimethoprim] Hives    Sucralfate Hives     Facial swelling    Topamax [Topiramate]      disorentation    Azithromycin Itching    Pregabalin Confusion and Other (See Comments)     altered mental status    Ciprofloxacin Drowsiness     Other reaction(s): Other (See Comments)  "drowsiness"    Norvasc [Amlodipine] Swelling    Baclofen      "That knocks me out "    Bupropion Fatigue    Methotrexate Nausea Only    Neurontin [Gabapentin] Hallucinations and Hypertension       REVIEW OF SYSTEMS:    CONSTITUTIONAL: Denies any fever, chills, rigors, and weight loss  HEENT: No earache or tinnitus  Denies hearing loss or visual disturbances  CARDIOVASCULAR: No chest pain or palpitations  RESPIRATORY: Denies any cough, hemoptysis, shortness of breath or dyspnea on exertion  GASTROINTESTINAL: As noted in the History of Present Illness  GENITOURINARY: No problems with urination  Denies any hematuria or dysuria  NEUROLOGIC: No dizziness or vertigo, denies headaches  MUSCULOSKELETAL: Denies any muscle or joint pain  SKIN: Denies skin rashes or itching  ENDOCRINE: Denies excessive thirst  Denies intolerance to heat or cold  PSYCHOSOCIAL: Denies depression or anxiety  Denies any recent memory loss  PHYSICAL EXAMINATION:    General Appearance:   Alert, cooperative, no distress   HEENT:  Normocephalic, atraumatic, anicteric  Lungs:   Equal chest rise and unlabored breathing, normal effort, no coughing  Cardiovascular:   No visualized JVD  Abdomen:   No abdominal distension  Skin:   No jaundice  Musculoskeletal:   Normal range of motion visualized  Psych:  Normal affect and normal insight  Neuro:  Alert and appropriate  Vitals:    09/17/20 1225   Weight: 100 kg (221 lb)   Height: 5' 3" (1 6 m)     I spent 11 minutes directly with the patient during this visit    VIRTUAL VISIT [address] acknowledges that she has consented to an online visit or consultation  She understands that the online visit is based solely on information provided by her, and that, in the absence of a face-to-face physical evaluation by the physician, the diagnosis she receives is both limited and provisional in terms of accuracy and completeness  This is not intended to replace a full medical face-to-face evaluation by the physician  Kosta Malin understands and accepts these terms

## 2020-09-18 ENCOUNTER — TRANSCRIBE ORDERS (OUTPATIENT)
Dept: RADIOLOGY | Facility: HOSPITAL | Age: 65
End: 2020-09-18

## 2020-09-18 ENCOUNTER — HOSPITAL ENCOUNTER (OUTPATIENT)
Dept: RADIOLOGY | Facility: HOSPITAL | Age: 65
Discharge: HOME | End: 2020-09-18
Attending: INTERNAL MEDICINE
Payer: MEDICARE

## 2020-09-18 DIAGNOSIS — K21.00 GASTROESOPHAGEAL REFLUX DISEASE WITH ESOPHAGITIS: ICD-10-CM

## 2020-09-18 PROCEDURE — 74240 X-RAY XM UPR GI TRC 1CNTRST: CPT

## 2020-09-18 RX ORDER — NITROFURANTOIN 25; 75 MG/1; MG/1
100 CAPSULE ORAL 2 TIMES DAILY
Qty: 14 CAPSULE | Refills: 0 | Status: SHIPPED | OUTPATIENT
Start: 2020-09-18 | End: 2020-09-25

## 2020-09-18 NOTE — TELEPHONE ENCOUNTER
Dr Tomeka Curtis,      I returned pts call, we went over results, patient would like manometry study ordered asap, patient states she has had cough already 1 month and its overwhelming    Please advise Thank You

## 2020-09-18 NOTE — TELEPHONE ENCOUNTER
She should stop her esomeprazole for one week prior to her manometry/24 hour pH study      Thanks,   Janell Ragsdale

## 2020-09-18 NOTE — TELEPHONE ENCOUNTER
Please inform patient results of recent urine culture were positive for infection  Will send prescription for Macrobid to who her pharmacy based on previous culture  Advise patient that someone from office will call if she requires a different antibiotic

## 2020-09-18 NOTE — TELEPHONE ENCOUNTER
Call placed to patient, advised per providerf recent urine culture was positive for infection  Andres Antonio was sent to her pharmacy based on previous culture  Advised patient that someone from office will call if she requires a different antibiotic  Patient verbalized understanding and agrees with plan

## 2020-09-25 ENCOUNTER — TELEPHONE (OUTPATIENT)
Dept: PAIN MEDICINE | Facility: CLINIC | Age: 65
End: 2020-09-25

## 2020-09-25 NOTE — TELEPHONE ENCOUNTER
Pt says she is on medical marijuana for leg pain  Pt says she has percocet and wondering if she can take them together? She wants to take a percocet because the medical marijuana is not helping   Ph# [phone]

## 2020-09-25 NOTE — TELEPHONE ENCOUNTER
Advised pt, per 8/25 ov note and confirmed w/ Dr Melva Lau, no percocet w/ marijuana  Pt verbalized understanding       SL aware

## 2020-10-02 ENCOUNTER — TELEPHONE (OUTPATIENT)
Dept: GASTROENTEROLOGY | Facility: CLINIC | Age: 65
End: 2020-10-02

## 2020-10-02 ENCOUNTER — TELEPHONE (OUTPATIENT)
Dept: PAIN MEDICINE | Facility: CLINIC | Age: 65
End: 2020-10-02

## 2020-10-21 ENCOUNTER — TELEPHONE (OUTPATIENT)
Dept: SLEEP CENTER | Facility: CLINIC | Age: 65
End: 2020-10-21

## 2020-10-26 ENCOUNTER — TELEPHONE (OUTPATIENT)
Dept: GASTROENTEROLOGY | Facility: CLINIC | Age: 65
End: 2020-10-26

## 2020-10-28 DIAGNOSIS — R19.7 ACUTE DIARRHEA: Primary | ICD-10-CM

## 2020-10-29 ENCOUNTER — LAB (OUTPATIENT)
Dept: LAB | Facility: CLINIC | Age: 65
End: 2020-10-29
Payer: MEDICARE

## 2020-10-29 DIAGNOSIS — R19.7 ACUTE DIARRHEA: ICD-10-CM

## 2020-10-29 DIAGNOSIS — R19.7 DIARRHEA, UNSPECIFIED TYPE: ICD-10-CM

## 2020-10-29 PROCEDURE — 87209 SMEAR COMPLEX STAIN: CPT

## 2020-10-29 PROCEDURE — 87493 C DIFF AMPLIFIED PROBE: CPT

## 2020-10-29 PROCEDURE — 87177 OVA AND PARASITES SMEARS: CPT

## 2020-10-30 LAB
C DIFF TOX B TCDB STL QL NAA+PROBE: NEGATIVE
O+P STL CONC: NORMAL

## 2020-11-02 ENCOUNTER — OFFICE VISIT (OUTPATIENT)
Dept: UROLOGY | Facility: AMBULATORY SURGERY CENTER | Age: 65
End: 2020-11-02
Payer: MEDICARE

## 2020-11-02 VITALS
HEART RATE: 82 BPM | DIASTOLIC BLOOD PRESSURE: 78 MMHG | WEIGHT: 217 LBS | TEMPERATURE: 97.3 F | SYSTOLIC BLOOD PRESSURE: 148 MMHG | HEIGHT: 63 IN | BODY MASS INDEX: 38.45 KG/M2

## 2020-11-02 DIAGNOSIS — Z87.440 HISTORY OF UTI: ICD-10-CM

## 2020-11-02 DIAGNOSIS — N32.81 OAB (OVERACTIVE BLADDER): Primary | ICD-10-CM

## 2020-11-02 DIAGNOSIS — N39.46 MIXED STRESS AND URGE URINARY INCONTINENCE: ICD-10-CM

## 2020-11-02 PROCEDURE — 99214 OFFICE O/P EST MOD 30 MIN: CPT | Performed by: NURSE PRACTITIONER

## 2020-11-02 RX ORDER — BENZONATATE 100 MG/1
100 CAPSULE ORAL 3 TIMES DAILY PRN
COMMUNITY
Start: 2020-10-16 | End: 2021-01-28 | Stop reason: HOSPADM

## 2020-11-04 RX ORDER — TIZANIDINE 4 MG/1
TABLET ORAL
Qty: 450 TABLET | OUTPATIENT
Start: 2020-11-04

## 2020-11-12 ENCOUNTER — TELEPHONE (OUTPATIENT)
Dept: PAIN MEDICINE | Facility: CLINIC | Age: 65
End: 2020-11-12

## 2020-11-12 DIAGNOSIS — R25.2 SPASM: Primary | ICD-10-CM

## 2020-11-12 RX ORDER — TIZANIDINE HYDROCHLORIDE 4 MG/1
4 CAPSULE, GELATIN COATED ORAL 3 TIMES DAILY
Qty: 120 CAPSULE | Refills: 1 | Status: SHIPPED | OUTPATIENT
Start: 2020-11-12 | End: 2020-11-17 | Stop reason: SDUPTHER

## 2020-11-17 ENCOUNTER — OFFICE VISIT (OUTPATIENT)
Dept: PAIN MEDICINE | Facility: CLINIC | Age: 65
End: 2020-11-17
Payer: MEDICARE

## 2020-11-17 VITALS
HEART RATE: 63 BPM | SYSTOLIC BLOOD PRESSURE: 128 MMHG | HEIGHT: 63 IN | BODY MASS INDEX: 38.44 KG/M2 | DIASTOLIC BLOOD PRESSURE: 68 MMHG | TEMPERATURE: 98.6 F

## 2020-11-17 DIAGNOSIS — M79.604 CHRONIC PAIN OF BOTH LOWER EXTREMITIES: Chronic | ICD-10-CM

## 2020-11-17 DIAGNOSIS — M54.16 LUMBAR RADICULOPATHY: ICD-10-CM

## 2020-11-17 DIAGNOSIS — G89.29 CHRONIC PAIN OF BOTH LOWER EXTREMITIES: Chronic | ICD-10-CM

## 2020-11-17 DIAGNOSIS — M25.561 CHRONIC PAIN OF RIGHT KNEE: ICD-10-CM

## 2020-11-17 DIAGNOSIS — G89.4 PAIN SYNDROME, CHRONIC: Primary | Chronic | ICD-10-CM

## 2020-11-17 DIAGNOSIS — G63 POLYNEUROPATHY ASSOCIATED WITH UNDERLYING DISEASE (HCC): Chronic | ICD-10-CM

## 2020-11-17 DIAGNOSIS — G89.29 CHRONIC MYOFASCIAL PAIN: ICD-10-CM

## 2020-11-17 DIAGNOSIS — G89.29 CHRONIC BILATERAL LOW BACK PAIN WITHOUT SCIATICA: ICD-10-CM

## 2020-11-17 DIAGNOSIS — R26.9 GAIT DISORDER: ICD-10-CM

## 2020-11-17 DIAGNOSIS — M79.605 CHRONIC PAIN OF BOTH LOWER EXTREMITIES: Chronic | ICD-10-CM

## 2020-11-17 DIAGNOSIS — G89.29 CHRONIC SCAPULAR PAIN: ICD-10-CM

## 2020-11-17 DIAGNOSIS — G89.29 CHRONIC BILATERAL THORACIC BACK PAIN: ICD-10-CM

## 2020-11-17 DIAGNOSIS — M54.50 CHRONIC BILATERAL LOW BACK PAIN WITHOUT SCIATICA: ICD-10-CM

## 2020-11-17 DIAGNOSIS — M54.6 CHRONIC BILATERAL THORACIC BACK PAIN: ICD-10-CM

## 2020-11-17 DIAGNOSIS — M25.562 CHRONIC PAIN OF LEFT KNEE: ICD-10-CM

## 2020-11-17 DIAGNOSIS — M47.817 LUMBOSACRAL SPONDYLOSIS WITHOUT MYELOPATHY: ICD-10-CM

## 2020-11-17 DIAGNOSIS — R26.81 GAIT INSTABILITY: ICD-10-CM

## 2020-11-17 DIAGNOSIS — M54.14 THORACIC RADICULOPATHY: ICD-10-CM

## 2020-11-17 DIAGNOSIS — G89.29 CHRONIC PAIN OF RIGHT KNEE: ICD-10-CM

## 2020-11-17 DIAGNOSIS — M89.8X1 CHRONIC SCAPULAR PAIN: ICD-10-CM

## 2020-11-17 DIAGNOSIS — G90.522 COMPLEX REGIONAL PAIN SYNDROME TYPE 1 OF LEFT LOWER EXTREMITY: Chronic | ICD-10-CM

## 2020-11-17 DIAGNOSIS — G89.29 CHRONIC PAIN OF LEFT KNEE: ICD-10-CM

## 2020-11-17 DIAGNOSIS — M79.18 CHRONIC MYOFASCIAL PAIN: ICD-10-CM

## 2020-11-17 PROBLEM — F11.20 UNCOMPLICATED OPIOID DEPENDENCE (HCC): Status: RESOLVED | Noted: 2017-07-25 | Resolved: 2020-11-17

## 2020-11-17 PROBLEM — G57.11 MERALGIA PARAESTHETICA, RIGHT: Status: RESOLVED | Noted: 2017-04-21 | Resolved: 2020-11-17

## 2020-11-17 PROCEDURE — 99214 OFFICE O/P EST MOD 30 MIN: CPT | Performed by: NURSE PRACTITIONER

## 2020-11-17 RX ORDER — TIZANIDINE HYDROCHLORIDE 4 MG/1
4 CAPSULE, GELATIN COATED ORAL 3 TIMES DAILY
Qty: 120 CAPSULE | Refills: 0 | Status: SHIPPED | OUTPATIENT
Start: 2020-11-17 | End: 2020-11-18 | Stop reason: SDUPTHER

## 2020-11-17 RX ORDER — TORSEMIDE 20 MG/1
20 TABLET ORAL 2 TIMES DAILY
COMMUNITY
End: 2021-05-11 | Stop reason: HOSPADM

## 2020-11-17 RX ORDER — ASPIRIN 81 MG/1
TABLET ORAL EVERY EVENING
COMMUNITY

## 2020-11-17 RX ORDER — DULOXETIN HYDROCHLORIDE 60 MG/1
CAPSULE, DELAYED RELEASE ORAL
Qty: 180 CAPSULE | Refills: 1 | Status: CANCELLED | OUTPATIENT
Start: 2020-11-17

## 2020-11-18 RX ORDER — TIZANIDINE HYDROCHLORIDE 4 MG/1
4 CAPSULE, GELATIN COATED ORAL 3 TIMES DAILY
Qty: 270 CAPSULE | Refills: 0 | Status: SHIPPED | OUTPATIENT
Start: 2020-11-18 | End: 2020-12-01

## 2020-12-01 DIAGNOSIS — G89.29 CHRONIC BILATERAL LOW BACK PAIN WITHOUT SCIATICA: ICD-10-CM

## 2020-12-01 DIAGNOSIS — G89.29 CHRONIC SCAPULAR PAIN: ICD-10-CM

## 2020-12-01 DIAGNOSIS — G89.29 CHRONIC BILATERAL THORACIC BACK PAIN: ICD-10-CM

## 2020-12-01 DIAGNOSIS — M54.6 CHRONIC BILATERAL THORACIC BACK PAIN: ICD-10-CM

## 2020-12-01 DIAGNOSIS — G89.4 PAIN SYNDROME, CHRONIC: Chronic | ICD-10-CM

## 2020-12-01 DIAGNOSIS — M89.8X1 CHRONIC SCAPULAR PAIN: ICD-10-CM

## 2020-12-01 DIAGNOSIS — M79.18 CHRONIC MYOFASCIAL PAIN: ICD-10-CM

## 2020-12-01 DIAGNOSIS — G89.29 CHRONIC MYOFASCIAL PAIN: ICD-10-CM

## 2020-12-01 DIAGNOSIS — M54.50 CHRONIC BILATERAL LOW BACK PAIN WITHOUT SCIATICA: ICD-10-CM

## 2020-12-01 RX ORDER — TIZANIDINE 4 MG/1
TABLET ORAL
Qty: 270 TABLET | Refills: 0 | Status: SHIPPED | OUTPATIENT
Start: 2020-12-01 | End: 2021-03-09 | Stop reason: SDUPTHER

## 2020-12-08 ENCOUNTER — TELEPHONE (OUTPATIENT)
Dept: GASTROENTEROLOGY | Facility: CLINIC | Age: 65
End: 2020-12-08

## 2020-12-29 ENCOUNTER — TELEPHONE (OUTPATIENT)
Dept: PAIN MEDICINE | Facility: CLINIC | Age: 65
End: 2020-12-29

## 2021-01-21 ENCOUNTER — HOSPITAL ENCOUNTER (OUTPATIENT)
Dept: GASTROENTEROLOGY | Facility: HOSPITAL | Age: 66
Discharge: HOME | End: 2021-01-21
Attending: INTERNAL MEDICINE
Payer: MEDICARE

## 2021-01-21 VITALS
SYSTOLIC BLOOD PRESSURE: 130 MMHG | HEART RATE: 80 BPM | RESPIRATION RATE: 16 BRPM | DIASTOLIC BLOOD PRESSURE: 62 MMHG | TEMPERATURE: 98.8 F | OXYGEN SATURATION: 96 %

## 2021-01-21 DIAGNOSIS — K21.00 GASTROESOPHAGEAL REFLUX DISEASE WITH ESOPHAGITIS: ICD-10-CM

## 2021-01-21 PROCEDURE — 91038 ESOPH IMPED FUNCT TEST > 1HR: CPT

## 2021-01-21 PROCEDURE — 91020 GASTRIC MOTILITY STUDIES: CPT

## 2021-01-21 NOTE — PERIOPERATIVE NURSING NOTE
Patient brought in the room and educated on procedure  Lidocaine 2% topical solution inserted via nostrils  Manometry catheter inserted via right nostril and secured  10 liquid swallows, 10 viscous swallow 1 Rapid drink challenge with 200cc water  and 1 rapid swallow performed  Patient Tolerated procedure  Catheter removed intact   PH probe inserted via left nostril and secured  Zephr recorder teachback performed and patient verbalized understanding  Patient instructed to return next day to have probe remove  Discharge instructions given and patient ambulated out of room in stable condition

## 2021-01-22 PROCEDURE — 91010 ESOPHAGUS MOTILITY STUDY: CPT | Performed by: INTERNAL MEDICINE

## 2021-01-22 PROCEDURE — 91038 ESOPH IMPED FUNCT TEST > 1HR: CPT | Performed by: INTERNAL MEDICINE

## 2021-01-24 ENCOUNTER — TELEPHONE (OUTPATIENT)
Dept: OTHER | Facility: OTHER | Age: 66
End: 2021-01-24

## 2021-01-24 ENCOUNTER — TELEPHONE (OUTPATIENT)
Dept: GASTROENTEROLOGY | Facility: CLINIC | Age: 66
End: 2021-01-24

## 2021-01-24 NOTE — TELEPHONE ENCOUNTER
Chief Complaint   Patient presents with   • Follow-Up       HISTORY OF PRESENT ILLNESS: Patient is a 52 y.o. female established patient here today for the following concerns:    1. Positive JETT (antinuclear antibody)  2. Chronic granulomatous disease (HCC)  Polly is here today for follow up.  She has hx of CGD -[id number] on genetic testing.   X-linked carriers -- The X-linked carrier state for [id number] is not entirely silent. In affected women, lyonization (ie, the inactivation of one or the other X chromosome in every cell) leads to two populations of phagocytes: one with normal respiratory burst function and the other with impaired respiratory burst activity [90]. Therefore, X-linked CGD carriers display a characteristic mosaic pattern on respiratory burst testing of individual peripheral blood cells seen microscopically (nitroblue tetrazolium [NBT] test) or by flow cytometry (dihydrorhodamine 123 [DHR] test). (See 'Neutrophil function tests' below.)  As few as 20 percent of cells having normal respiratory burst activity is sufficient to prevent most severe bacterial and fungal infections. Thus, most female carriers of X-linked [id number] CGD mutations are not compromised in their ability to handle infectious challenges. However, carriers with less than 20 percent of normal oxidase activity due to skewed X-chromosome lyonization may present with the phenotype of mild to severe CGD [91-94]. In addition, progressive skewing of X-chromosome inactivation with age in previously healthy carriers of [id number] null mutations can lead to late-onset manifestations of CGD [95]. Females may have other manifestations of heterozygous carriage of X-linked CGD mutations, including discoid lupus erythematosus, aphthous ulcers, chorioretinal lesions, and photosensitivity [96,97].    Update:  Polly reports that she has been having swelling of the face and feet.  She is up 10 lbs from last visit.  She is still working with worker's  Paged by patient  She is having cough and reflux  These are chronic symptoms but unrelenting  She is taking Nexium and ranitidine  PH manometry shows weekly alkaline reflux, impaired impedance and type 2 achalasia  Discussed with Dr Dagmar Collins and our plan was to have the patient be NPO for a few hours so that she is able to clear her esophagus from any previously taken water or food and then maybe take sips of water overnight  If she is unable to keep her secretions down (which is not the case currently) then she will need to come in urgently as she might have a food impaction in that case  Otherwise she might need management of achalasia to correct her symptoms  I discussed the plan with the patient  She is agreeable with the plan  comp on a shoulder injury from work.  She has noted continued photosensitivity and malar rash.  She has also noted a mottling rash on her legs intermittently.  She still gets swelling and stiffness of the hands, without erythema, but has limited her ability to  at times.  She has been referred to rheumatology and has pending consultation.  It has been requested that she get a full lupus comprehensive panel.  Repeat JETT titer was 1:80.       Past Medical, Social, and Family history reviewed and updated in EPIC    Allergies:Iodine and Tape    Current Outpatient Prescriptions   Medication Sig Dispense Refill   • BELSOMRA 15 MG Tab TAKE 1 TABLET BY MOUTH AT BEDTIME AS NEEDED FOR INSOMNIA 30 Tab 2   • diclofenac EC (VOLTAREN) 75 MG Tablet Delayed Response TAKE 1 TABLET BY MOUTH TWICE DAILY 180 Tab 0   • duloxetine (CYMBALTA) 60 MG Cap DR Particles delayed-release capsule Take 1 Cap by mouth every day. 90 Cap 3   • valacyclovir (VALTREX) 1 GM Tab TAKE 1 TABLET BY MOUTH EVERY MORNING 90 Tab 0   • BELVIQ 10 MG Tab Take 1 Tab by mouth 2 Times a Day. 60 Tab 1   • SYNTHROID 125 MCG Tab Take 1 Tab by mouth Every morning on an empty stomach. 90 Tab 3     No current facility-administered medications for this visit.         ROS:  Review of Systems   Constitutional: Negative for fever, chills, weight loss and malaise/fatigue.   HENT: Negative for ear pain, nosebleeds, congestion, sore throat and neck pain.    Eyes: Negative for blurred vision.   Respiratory: Negative for cough, sputum production, shortness of breath and wheezing.    Cardiovascular: Negative for chest pain, palpitations,  and leg swelling.   Gastrointestinal: Negative for heartburn, nausea, vomiting, diarrhea and abdominal pain.   Genitourinary: Negative for dysuria, urgency and frequency.   Musculoskeletal: Negative for myalgias, back pain and joint pain.   Skin: Negative for rash and itching.   Neurological: Negative for dizziness, tingling, tremors, sensory  "change, focal weakness and headaches.   Endo/Heme/Allergies: Does not bruise/bleed easily.   Psychiatric/Behavioral: Negative for depression, anxiety, suicidal ideas, insomnia and memory loss.      Exam:  Blood pressure 138/80, pulse 76, temperature 35.7 °C (96.3 °F), resp. rate 18, height 1.626 m (5' 4.02\"), weight 139.254 kg (307 lb), SpO2 97 %.    General:  Well nourished, well developed in NAD  Head is grossly normal.  Neck: Supple without JVD   Pulmonary:  Normal effort.   Cardiovascular: Regular rate  Extremities: no clubbing, cyanosis, or pedal edema, facial swelling noted  Psych: affect appropriate      Please note that this dictation was created using voice recognition software. I have made every reasonable attempt to correct obvious errors, but I expect that there are errors of grammar and possibly content that I did not discover before finalizing the note.    Assessment/Plan:  1. Positive JETT (antinuclear antibody)  - LUPUS COMPREHENSIVE PANEL    2. Chronic granulomatous disease (HCC)  Discouraged patient from obtaining tatoo as she is prone to skin infections.     Follow up in 1 month.           "

## 2021-01-24 NOTE — TELEPHONE ENCOUNTER
"I have a dry cough and bad acid reflux  She would like some medicine to help with it  I have been on Predisone and that helped, but I'm done with that and have nothing else to take "     "I still haven't heard back yet  Can someone call me?"    Reached back out to Dr Cassia Fuentes  He says he will call family

## 2021-01-25 ENCOUNTER — TELEPHONE (OUTPATIENT)
Dept: GASTROENTEROLOGY | Facility: CLINIC | Age: 66
End: 2021-01-25

## 2021-01-25 ENCOUNTER — TELEMEDICINE (OUTPATIENT)
Dept: GASTROENTEROLOGY | Facility: CLINIC | Age: 66
End: 2021-01-25
Payer: MEDICARE

## 2021-01-25 VITALS — HEIGHT: 63 IN | BODY MASS INDEX: 37.74 KG/M2 | WEIGHT: 213 LBS

## 2021-01-25 DIAGNOSIS — K21.9 GASTROESOPHAGEAL REFLUX DISEASE, UNSPECIFIED WHETHER ESOPHAGITIS PRESENT: ICD-10-CM

## 2021-01-25 DIAGNOSIS — K22.0 ACHALASIA OF ESOPHAGUS: Primary | ICD-10-CM

## 2021-01-25 PROCEDURE — 99214 OFFICE O/P EST MOD 30 MIN: CPT | Performed by: INTERNAL MEDICINE

## 2021-01-25 RX ORDER — FAMOTIDINE 40 MG/1
40 TABLET, FILM COATED ORAL DAILY
Qty: 30 TABLET | Refills: 0 | Status: SHIPPED | OUTPATIENT
Start: 2021-01-25 | End: 2021-08-04 | Stop reason: CLARIF

## 2021-01-25 RX ORDER — FAMOTIDINE 40 MG/1
40 TABLET, FILM COATED ORAL DAILY
Qty: 90 TABLET | Refills: 3 | Status: SHIPPED | OUTPATIENT
Start: 2021-01-25 | End: 2021-03-09

## 2021-01-25 NOTE — PROGRESS NOTES
Virtual Regular Visit      Assessment/Plan: 60year old female here for follow up  She has multiple medical problems and GERD and chronic cough  Symptoms not made better by multiple different medications which prompted upper GI series and manometry/ph  Upper GI series unremarkable but manometry showed increased IRP and Type 11 Achalasia  1  Achalasia of esophagus    -new diagnosis of Type II Achalasia made on manometry; will refer to thoracics     2  Gastroesophageal reflux disease, unspecified whether esophagitis present  -likely due to achalasia;  Continue supportive treatment which hasn't really been helpful        Follow up in 3 months time             Reason for visit is   Chief Complaint   Patient presents with    Virtual Regular Visit     RESULTS    Virtual Regular Visit        Encounter provider Shanice Gunter DO    Provider located at [address] [place] [address] [address] [address]  [phone]      Recent Visits  No visits were found meeting these conditions  Showing recent visits within past 7 days and meeting all other requirements     Today's Visits  Date Type Provider Dept   01/25/21 [address] [place] [facility],[address], DO [facility] [facility]   Showing today's visits and meeting all other requirements     Future Appointments  No visits were found meeting these conditions  Showing future appointments within next 150 days and meeting all other requirements        The patient was identified by name and date of birth  Nunu Certain was informed that this is a telemedicine visit and that the visit is being conducted through telephone  My office door was closed  No one else was in the room  She acknowledged consent and understanding of privacy and security of the video platform  The patient has agreed to participate and understands they can discontinue the visit at any time      Patient is aware this is a billable service  Subjective  Marquis Reyna is a 60 y o  female here for follow up  Still symptomatic despite multiple different medication regimens  Just completed manometry and 24 hour ph         Past Medical History:   Diagnosis Date    Anxiety     Asthma     controlled    Back complaints     Cancer ([facility] )     nose    Cellulitis of left lower leg     Chronic bilateral thoracic back pain     Chronic myofascial pain     Chronic pain of both lower extremities     Chronic pain of left knee     Chronic pain syndrome     CPAP (continuous positive airway pressure) dependence     Depression     Diabetes mellitus ([facility] )     Disease of thyroid gland     Gait disorder     Gastroparesis     GERD (gastroesophageal reflux disease)     History of angina     History of transfusion     Many years ago    Hyperlipidemia     Hypertension     Insulin pump in place     Kidney disease     Polyneuropathy associated with underlying disease ([facility] )     PONV (postoperative nausea and vomiting)     S/P insertion of spinal cord stimulator 2018    Sarcoidosis     Sleep apnea     TIA (transient ischemic attack)        Past Surgical History:   Procedure Laterality Date    BREAST SURGERY      BREAST SURGERY      reduction    CARDIAC PACEMAKER PLACEMENT       SECTION      COLONOSCOPY      HERNIA REPAIR      HYSTERECTOMY      JOINT REPLACEMENT Left     NECK SURGERY      [place] SURG IMPLNT Isaiah Ores Left 2018    Procedure:  Insertion of thoracic spinal cord stimulator electrode via laminotomy and placement of left buttock IPG;  Surgeon: Yash Nichole MD;  [facility];  Service: Neurosurgery    REPLACEMENT TOTAL KNEE      ROTATOR CUFF REPAIR      SPINAL STIMULATOR PLACEMENT Left 10/3/2018    Procedure: BUTTOCK RE-OPENING INCISION FOR REPOSITIONING OF IMPLANTABLE PULSE GENERATOR;  Surgeon: Yash Nichole MD;  [facility];  Service: Neurosurgery    TONSILLECTOMY      UPPER GASTROINTESTINAL ENDOSCOPY      WISDOM TOOTH EXTRACTION         Current Outpatient Medications   Medication Sig Dispense Refill    amitriptyline (ELAVIL) 50 mg tablet Take one tablet p o  at bedtime for two weeks    If insomnia persists, increase to two tablets at bedtime 60 tablet 2    Armodafinil 150 MG tablet Take 1 tablet (150 mg total) by mouth daily 90 tablet 1    aspirin (ECOTRIN LOW STRENGTH) 81 mg EC tablet Take 81 mg by mouth daily      atorvastatin (LIPITOR) 80 mg tablet Take 80 mg by mouth daily at bedtime        benzonatate (TESSALON PERLES) 100 mg capsule Take 100 mg by mouth 3 (three) times a day as needed      calcitriol (ROCALTROL) 0 5 MCG capsule TAKE ONE CAPSULE BY MOUTH TWO TIMES A DAY FOR 7 DAYS      calcium carbonate-vitamin D (OSCAL-D) 500 mg-200 units per tablet TAKE TWO TABLETS BY MOUTH THREE TIMES A DAY WITH MEALS      cholecalciferol (VITAMIN D3) 1,000 units tablet Take 2,000 Units by mouth daily with lunch    5    CRANBERRY PO Take 1 capsule by mouth daily with lunch        Cyanocobalamin (VITAMIN B-12 PO) Take 1 capsule by mouth daily with lunch        cyclobenzaprine (FLEXERIL) 5 mg tablet       dicyclomine (BENTYL) 10 mg capsule Take 1 capsule (10 mg total) by mouth 4 (four) times a day as needed (abdominal pain) 60 capsule 2    Docusate Sodium 100 MG capsule Take 100 mg by mouth daily with lunch        DULoxetine (CYMBALTA) 60 mg delayed release capsule Take 2 PO QD (Patient not taking: Reported on 12/8/2020) 180 capsule 1    esomeprazole (NexIUM) 40 MG capsule Take 1 capsule (40 mg total) by mouth 2 (two) times a day before meals 180 capsule 3    famotidine (PEPCID) 20 mg tablet       fenofibrate (TRICOR) 48 mg tablet       ferrous sulfate 324 (65 Fe) mg Take 65 mg by mouth      fluticasone-salmeterol (ADVAIR) 100-50 mcg/dose inhaler Inhale 2 puffs      furosemide (LASIX) 20 mg tablet Take 60 mg by mouth 2 (two) times a day       glucagon 1 MG injection Glucagon Emergency Kit (human-recomb) 1 mg solution for injection      glucose blood test strip Testing six times daily  E11 65 on insulin pump      hydrALAZINE (APRESOLINE) 10 mg tablet       hydroxychloroquine (PLAQUENIL) 200 mg tablet       levothyroxine 75 mcg tablet Take 75 mcg by mouth daily in the early morning        losartan (COZAAR) 100 MG tablet Take 100 mg by mouth daily in the early morning        lubiprostone (AMITIZA) 24 mcg capsule Take 1 capsule (24 mcg total) by mouth 2 (two) times a day with meals 48 capsule 0    magnesium oxide (MAG-OX) 400 mg tablet Take 500 mg by mouth       NOVOLOG 100 UNIT/ML injection INJECT 120 UNITS DAILY VIA INSULIN PUMP E 11 65  3    ondansetron (ZOFRAN) 8 mg tablet Take 8 mg by mouth every 8 (eight) hours as needed        oxybutynin (DITROPAN XL) 15 MG 24 hr tablet Take 1 tablet (15 mg total) by mouth daily 30 tablet 3    polyethylene glycol (MIRALAX) 17 g packet Take 17 g by mouth daily 30 each 5    pramipexole (MIRAPEX) 1 mg tablet Take 1 mg by mouth 2 (two) times a day        SYMBICORT 80-4 5 MCG/ACT inhaler Inhale 2 puffs 2 (two) times a day        tiZANidine (ZANAFLEX) 4 mg tablet Take 1 PO TID for pain/spasms  270 tablet 0    torsemide (DEMADEX) 20 mg tablet Take 20 mg by mouth daily      VENTOLIN  (90 Base) MCG/ACT inhaler 2 puffs every 4 (four) hours as needed         No current facility-administered medications for this visit  Allergies   Allergen Reactions    Bactrim [Sulfamethoxazole-Trimethoprim] Hives    Sucralfate Hives     Facial swelling    Topamax [Topiramate]      disorentation    Azithromycin Itching    Pregabalin Confusion and Other (See Comments)     altered mental status    Ciprofloxacin Drowsiness     Other reaction(s):  Other (See Comments)  "drowsiness"    Norvasc [Amlodipine] Swelling    Baclofen      "That knocks me out "    Bupropion Fatigue    Methotrexate Nausea Only    Neurontin [Gabapentin] Hallucinations and Hypertension       Review of Systems    Video Exam    Vitals:    01/25/21 1431   Weight: 96 6 kg (213 lb)   Height: 5' 3" (1 6 m)       Physical Exam     I spent 10 minutes directly with the patient during this visit      VIRTUAL VISIT [address] acknowledges that she has consented to an online visit or consultation  She understands that the online visit is based solely on information provided by her, and that, in the absence of a face-to-face physical evaluation by the physician, the diagnosis she receives is both limited and provisional in terms of accuracy and completeness  This is not intended to replace a full medical face-to-face evaluation by the physician  River Leon understands and accepts these terms

## 2021-01-25 NOTE — TELEPHONE ENCOUNTER
----- Message from Burgess Damián DO sent at 1/25/2021  4:12 PM EST -----  Kathy-   Please help get pt an appt with Dr Dariana Phillips for new diagnosis of achalasia  Ashish Dee the patient is very symptomatic        Thanks so much,  Jeannette Petty

## 2021-01-25 NOTE — TELEPHONE ENCOUNTER
I CALLED DR MORRIS'S OFFICE [phone] says office open until 5pm I was sent to on call    I will try again tomorrow

## 2021-01-25 NOTE — TELEPHONE ENCOUNTER
Thanks so much  She is scheduled to see me virtually this afternoon so will discuss new diagnosis of Achalasia with her today

## 2021-01-26 ENCOUNTER — TELEPHONE (OUTPATIENT)
Dept: GYNECOLOGIC ONCOLOGY | Facility: CLINIC | Age: 66
End: 2021-01-26

## 2021-01-26 NOTE — TELEPHONE ENCOUNTER
I called Dr Signa Jeans office spoke with Frank Gorman, who did call intake, and will send task to Thoracic Team who will call patient to make appt  I was advised I couldn't make appt on pts behalf

## 2021-01-26 NOTE — TELEPHONE ENCOUNTER
NEW PATIENT INTAKE   REFERRED TO WHICH SURGEON? Dr Harmony Engle NAME   Dr Kvng Matthew from [address] [place] [address] OFFICE PHONE #   [phone]   REASON FOR REFERRAL/CONSULT     Achalasia of esophagus   DID PATIENT HAVE TESTING COMPLETED? September 2020   FACILITY TESTING PERFORMED  (EX  [facility], [facility], [facility])   [facility]   IS INSURANCE REFERRAL NEEDED?       BEST NUMBER TO REACH PATIENT   [phone]   COMMENTS:

## 2021-01-28 ENCOUNTER — OFFICE VISIT (OUTPATIENT)
Dept: CARDIAC SURGERY | Facility: CLINIC | Age: 66
End: 2021-01-28
Payer: MEDICARE

## 2021-01-28 VITALS
HEART RATE: 88 BPM | DIASTOLIC BLOOD PRESSURE: 76 MMHG | SYSTOLIC BLOOD PRESSURE: 140 MMHG | OXYGEN SATURATION: 95 % | BODY MASS INDEX: 39.1 KG/M2 | WEIGHT: 220.68 LBS | TEMPERATURE: 97.9 F | HEIGHT: 63 IN

## 2021-01-28 DIAGNOSIS — K22.0 ACHALASIA: Primary | ICD-10-CM

## 2021-01-28 DIAGNOSIS — K22.0 ACHALASIA OF ESOPHAGUS: ICD-10-CM

## 2021-01-28 PROBLEM — M79.10 MYALGIA: Status: RESOLVED | Noted: 2017-09-08 | Resolved: 2021-01-28

## 2021-01-28 PROBLEM — L03.116 LEFT LEG CELLULITIS: Status: RESOLVED | Noted: 2017-02-28 | Resolved: 2021-01-28

## 2021-01-28 PROBLEM — S52.501A CLOSED FRACTURE OF RIGHT DISTAL RADIUS: Status: RESOLVED | Noted: 2019-01-09 | Resolved: 2021-01-28

## 2021-01-28 PROBLEM — R26.81 GAIT INSTABILITY: Status: RESOLVED | Noted: 2017-07-21 | Resolved: 2021-01-28

## 2021-01-28 PROBLEM — E11.29 MICROALBUMINURIA DUE TO TYPE 2 DIABETES MELLITUS (HCC): Status: RESOLVED | Noted: 2017-03-24 | Resolved: 2021-01-28

## 2021-01-28 PROBLEM — M79.605 CHRONIC PAIN OF BOTH LOWER EXTREMITIES: Chronic | Status: RESOLVED | Noted: 2017-07-25 | Resolved: 2021-01-28

## 2021-01-28 PROBLEM — G89.29 CHRONIC PAIN OF BOTH LOWER EXTREMITIES: Chronic | Status: RESOLVED | Noted: 2017-07-25 | Resolved: 2021-01-28

## 2021-01-28 PROBLEM — R80.9 MICROALBUMINURIA DUE TO TYPE 2 DIABETES MELLITUS (HCC): Status: RESOLVED | Noted: 2017-03-24 | Resolved: 2021-01-28

## 2021-01-28 PROBLEM — M79.604 CHRONIC PAIN OF BOTH LOWER EXTREMITIES: Chronic | Status: RESOLVED | Noted: 2017-07-25 | Resolved: 2021-01-28

## 2021-01-28 PROBLEM — M54.50 LOW BACK PAIN: Status: RESOLVED | Noted: 2018-07-06 | Resolved: 2021-01-28

## 2021-01-28 PROBLEM — R06.00 DYSPNEA ON EXERTION: Status: RESOLVED | Noted: 2018-09-17 | Resolved: 2021-01-28

## 2021-01-28 PROBLEM — R06.09 DYSPNEA ON EXERTION: Status: RESOLVED | Noted: 2018-09-17 | Resolved: 2021-01-28

## 2021-01-28 PROCEDURE — 99205 OFFICE O/P NEW HI 60 MIN: CPT | Performed by: THORACIC SURGERY (CARDIOTHORACIC VASCULAR SURGERY)

## 2021-01-28 NOTE — ASSESSMENT & PLAN NOTE
Ms Jenny Hernandez presents for evaluation of recently diagnosed type II achalasia by esophageal manometry  She has a chronic cough which is likely a result as stasis of food contents in her esophagus given her esophageal aperistalsis and hypercontractile lower esophageal sphincter  This may explain her symptom correlation of cough with reflux by 24hr pH  Treatment for achalasia involves a heller myotomy  Myotomy could potentially worsen reflux, but her reflux measured by the pH probe may actually just be stasis of food bolus and liquid  As a first step, Dr Gilford Adams is recommending botox injection of LES to see if this helps as well as to help confirm the diagnosis of achalasia  Patient is in agreement, consent obtained  Will proceed 2/3  Will just need EKG prior to procedure  After this, patient will consider referral to bariatric surgery for myotomy in conjunction with weight loss surgery

## 2021-01-28 NOTE — PROGRESS NOTES
Thoracic Consult  Assessment/Plan:    Achalasia  Ms Barriga presents for evaluation of recently diagnosed type II achalasia by esophageal manometry  She has a chronic cough which is likely a result as stasis of food contents in her esophagus given her esophageal aperistalsis and hypercontractile lower esophageal sphincter  This may explain her symptom correlation of cough with reflux by 24hr pH  Treatment for achalasia involves a heller myotomy  Myotomy could potentially worsen reflux, but her reflux measured by the pH probe may actually just be stasis of food bolus and liquid  As a first step, Dr Sawyer Herrera is recommending botox injection of LES to see if this helps as well as to help confirm the diagnosis of achalasia  Patient is in agreement, consent obtained  Will proceed 2/3  Will just need EKG prior to procedure  After this, patient will consider referral to bariatric surgery for myotomy in conjunction with weight loss surgery  Diagnoses and all orders for this visit:    Achalasia  -     EKG 12 lead; Future    Achalasia of esophagus  -     Ambulatory referral to Thoracic Surgery  -     Case request operating room: ESOPHAGOGASTRODUODENOSCOPY (EGD), BOTOX INJECTION OF LES; Standing  -     Case request operating room: ESOPHAGOGASTRODUODENOSCOPY (EGD), BOTOX INJECTION OF LES    Other orders  -     sertraline (ZOLOFT) 50 mg tablet; Take 50 mg by mouth daily  -     Diet NPO; Sips with meds; Standing  -     Void on call to OR; Standing  -     Insert peripheral IV; Standing  -     Place sequential compression device; Standing          Thoracic History   Diagnosis: Achalasia   Procedures/Surgeries:    Pathology:    Adjuvant Therapy:       Subjective:    Patient ID: River Leon is a 60 y o  female  HPI   Ms Willey Goldmann is a 60year old female referred to our office by Dr Lee Connor for evaluation of achalasia and reflux    EGD 8/14/20 revealed Z-line 39cm from incisors, non-obstructing ring at 35cm, possible gastroparesis  Biopsies of the proximal esophagus were consistent with mild acute esophagitis, and chronic inflammation of the distal esophagus  UGI 9/18/20 showed slight decrease in esophageal motility  Manometry and 24h pH testing showed 10/10 swallows with pan-esophageal contraction, mean DCI 1468  Median IRP is 27  There was lack of clearance of liquid for every swallow  Her Demeester score was 0 8; however, she completed the test on PPI  She had 201 episodes of reflux total, and symptom correlation was significant with cough, 85%index, 100% probability  On discussion, she takes Nexium 40mg BID and Pepcid 40mg at night  Since August, after an EGD, she developed a dry choking cough daily, at night, with meals  Before that, she had gone for EGD with dilations for one year prior  She has had heartburn and regurgitation for few years taking medication  She has shortness of breath related to the cough  Otherwise none on exertion  No chest pain, weight loss  No appetite  She reports dysphagia, ok with liquids  She reports chills, no fevers  She has a PMH of TIA 2017, HTN, CHF, HLD, IDDM Type II, STU on CPAP, RA on Plaquenil, Asthma, pulmonary sarcoidosis, skin cancer on nose, chronic pain no chronic opioid use, uses medical marijuana, pacemaker  No history of MI  Lifetime nonsmoker  Ventral hernia repair in 2002, unknown if mesh used  And open hysterectomy         The following portions of the patient's history were reviewed and updated as appropriate: allergies, current medications, past family history, past medical history, past social history, past surgical history and problem list     Past Medical History:   Diagnosis Date    Anxiety     Asthma     controlled    Back complaints     [facility]     nose    Cellulitis of left lower leg     Chronic bilateral thoracic back pain     Chronic myofascial pain     Chronic pain of both lower extremities     Chronic pain of left knee     Chronic pain syndrome     CPAP (continuous positive airway pressure) dependence     Depression     Diabetes mellitus [facility]     Disease of thyroid gland     Gait disorder     Gastroparesis     GERD (gastroesophageal reflux disease)     History of angina     History of transfusion     Many years ago    Hyperlipidemia     Hypertension     Insulin pump in place     Kidney disease     Polyneuropathy associated with underlying disease ([facility]     PONV (postoperative nausea and vomiting)     S/P insertion of spinal cord stimulator 2018    Sarcoidosis     Sleep apnea     TIA (transient ischemic attack)       Past Surgical History:   Procedure Laterality Date    BREAST SURGERY      BREAST SURGERY      reduction    CARDIAC PACEMAKER PLACEMENT       SECTION      COLONOSCOPY      HERNIA REPAIR      HYSTERECTOMY      JOINT REPLACEMENT Left     NECK SURGERY      [place] SURG IMPLNT Sindy Perking Left 2018    Procedure:  Insertion of thoracic spinal cord stimulator electrode via laminotomy and placement of left buttock IPG;  Surgeon: Lolly Canales MD;  [facility];  Service: Neurosurgery    REPLACEMENT TOTAL KNEE      ROTATOR CUFF REPAIR      SPINAL STIMULATOR PLACEMENT Left 10/3/2018    Procedure: BUTTOCK RE-OPENING INCISION FOR REPOSITIONING OF IMPLANTABLE PULSE GENERATOR;  Surgeon: Lolly Canales MD;  [facility];  Service: Neurosurgery    TONSILLECTOMY      UPPER GASTROINTESTINAL ENDOSCOPY      WISDOM TOOTH EXTRACTION        Family History   Problem Relation Age of Onset    Diabetes Family     Heart disease Family     Hypertension Family     Neuropathy Family     No Known Problems Mother     Heart disease Father     Diabetes Father     No Known Problems Maternal Grandmother     No Known Problems Maternal Grandfather     No Known Problems Paternal Grandmother     No Known Problems Paternal Grandfather       Social History     Socioeconomic History    Marital status: /Civil Union     Spouse name: Not on file    Number of children: Not on file    Years of education: Not on file    Highest education level: Not on file   Occupational History    Not on file   Social Needs    Financial resource strain: Not on file    Food insecurity     Worry: Not on file     Inability: Not on file    Transportation needs     Medical: Not on file     Non-medical: Not on file   Tobacco Use    Smoking status: Never Smoker    Smokeless tobacco: Never Used   Substance and Sexual Activity    Alcohol use: No    Drug use: No    Sexual activity: Yes   Lifestyle    Physical activity     Days per week: Not on file     Minutes per session: Not on file    Stress: Not on file   Relationships    Social connections     Talks on phone: Not on file     Gets together: Not on file     Attends Jainism service: Not on file     Active member of club or organization: Not on file     Attends meetings of clubs or organizations: Not on file     Relationship status: Not on file    Intimate partner violence     Fear of current or ex partner: Not on file     Emotionally abused: Not on file     Physically abused: Not on file     Forced sexual activity: Not on file   Other Topics Concern    Not on file   Social History Narrative    Not on file      Current Outpatient Medications   Medication Instructions    amitriptyline (ELAVIL) 50 mg tablet Take one tablet p o  at bedtime for two weeks    If insomnia persists, increase to two tablets at bedtime    Armodafinil 150 mg, Oral, Daily    aspirin (ECOTRIN LOW STRENGTH) 81 mg, Oral, Daily    atorvastatin (LIPITOR) 80 mg, Oral, Daily at bedtime    calcitriol (ROCALTROL) 0 5 MCG capsule TAKE ONE CAPSULE BY MOUTH TWO TIMES A DAY FOR 7 DAYS    calcium carbonate-vitamin D (OSCAL-D) 500 mg-200 units per tablet TAKE TWO TABLETS BY MOUTH THREE TIMES A DAY WITH MEALS    cholecalciferol (VITAMIN D3) 2,000 Units, Oral, Daily with lunch    CRANBERRY PO 1 capsule, Oral, Daily with lunch    Cyanocobalamin (VITAMIN B-12 PO) 1 capsule, Oral, Daily with lunch    cyclobenzaprine (FLEXERIL) 5 mg tablet No dose, route, or frequency recorded   dicyclomine (BENTYL) 10 mg, Oral, 4 times daily PRN    Docusate Sodium 100 mg, Oral, Daily with lunch    DULoxetine (CYMBALTA) 60 mg delayed release capsule Take 2 PO QD    esomeprazole (NEXIUM) 40 mg, Oral, 2 times daily before meals    famotidine (PEPCID) 20 mg tablet No dose, route, or frequency recorded   famotidine (PEPCID) 40 mg, Oral, Daily    famotidine (PEPCID) 40 mg, Oral, Daily    fenofibrate (TRICOR) 48 mg tablet No dose, route, or frequency recorded   ferrous sulfate 65 mg, Oral    fluticasone-salmeterol (ADVAIR) 100-50 mcg/dose inhaler 2 puffs, Inhalation    furosemide (LASIX) 60 mg, Oral, 2 times daily    glucagon 1 MG injection Glucagon Emergency Kit (human-recomb) 1 mg solution for injection    glucose blood test strip Testing six times daily  E11 65 on insulin pump    hydrALAZINE (APRESOLINE) 10 mg tablet No dose, route, or frequency recorded   hydroxychloroquine (PLAQUENIL) 200 mg tablet No dose, route, or frequency recorded   levothyroxine 75 mcg, Oral, Daily (early morning)    losartan (COZAAR) 100 mg, Oral, Daily (early morning)    lubiprostone (AMITIZA) 24 mcg, Oral, 2 times daily with meals    magnesium oxide (MAG-OX) 500 mg, Oral    NOVOLOG 100 UNIT/ML injection INJECT 120 UNITS DAILY VIA INSULIN PUMP E 11 65    ondansetron (ZOFRAN) 8 mg, Oral, Every 8 hours PRN    oxybutynin (DITROPAN XL) 15 mg, Oral, Daily    polyethylene glycol (MIRALAX) 17 g, Oral, Daily    pramipexole (MIRAPEX) 1 mg, Oral, 2 times daily    sertraline (ZOLOFT) 50 mg, Oral, Daily    SYMBICORT 80-4 5 MCG/ACT inhaler 2 puffs, Inhalation, 2 times daily    tiZANidine (ZANAFLEX) 4 mg tablet Take 1 PO TID for pain/spasms      torsemide (DEMADEX) 20 mg, Oral, Daily    VENTOLIN  (90 Base) MCG/ACT inhaler 2 puffs, Every 4 hours PRN     Allergies   Allergen Reactions    Bactrim [Sulfamethoxazole-Trimethoprim] Hives    Sucralfate Hives     Facial swelling    Topamax [Topiramate]      disorentation    Azithromycin Itching    Pregabalin Confusion and Other (See Comments)     altered mental status    Ciprofloxacin Drowsiness     Other reaction(s): Other (See Comments)  "drowsiness"    Norvasc [Amlodipine] Swelling    Baclofen      "That knocks me out "    Bupropion Fatigue    Methotrexate Nausea Only    Neurontin [Gabapentin] Hallucinations and Hypertension         Review of Systems   Constitutional: Positive for appetite change and chills  Negative for activity change, diaphoresis, fatigue, fever and unexpected weight change  HENT: Positive for trouble swallowing  Respiratory: Positive for cough and shortness of breath  Negative for chest tightness  Cardiovascular: Negative for chest pain  Gastrointestinal: Negative for abdominal pain, nausea and vomiting  Endocrine:        +hypothyroid   Musculoskeletal:        +chronic pain and RA   Skin: Negative  Neurological: Positive for weakness (uses walker)  Hematological: Negative for adenopathy  Does not bruise/bleed easily  Psychiatric/Behavioral: Negative  Objective:   Physical Exam  Constitutional:       Appearance: She is obese  Comments: BMI 39   HENT:      Head: Normocephalic and atraumatic  Eyes:      General: No scleral icterus  Conjunctiva/sclera: Conjunctivae normal    Neck:      Musculoskeletal: Neck supple  Cardiovascular:      Rate and Rhythm: Normal rate and regular rhythm  Pulmonary:      Effort: Pulmonary effort is normal  No respiratory distress  Breath sounds: Normal breath sounds  Comments: Coughing with deep inspiration  Abdominal:      General: Bowel sounds are normal  There is no distension  Palpations: Abdomen is soft        Comments: Ventral hernia repair scar epigastric region  Hysterectomy scar   Lymphadenopathy:      Cervical: No cervical adenopathy  Skin:     General: Skin is warm and dry  Neurological:      General: No focal deficit present  Mental Status: She is alert and oriented to person, place, and time  Psychiatric:         Mood and Affect: Mood normal          Behavior: Behavior normal          Thought Content:  Thought content normal          Judgment: Judgment normal      /76   Pulse 88   Temp 97 9 °F (36 6 °C) (Temporal)   Ht 5' 3" (1 6 m)   Wt 100 kg (220 lb 10 9 oz)   SpO2 95%   BMI 39 09 kg/m²     N

## 2021-01-29 ENCOUNTER — HOSPITAL ENCOUNTER (OUTPATIENT)
Dept: NON INVASIVE DIAGNOSTICS | Facility: HOSPITAL | Age: 66
Discharge: HOME | End: 2021-01-29
Payer: MEDICARE

## 2021-01-29 ENCOUNTER — TELEPHONE (OUTPATIENT)
Dept: CARDIAC SURGERY | Facility: CLINIC | Age: 66
End: 2021-01-29

## 2021-01-29 DIAGNOSIS — K22.0 ACHALASIA: ICD-10-CM

## 2021-01-29 LAB
ATRIAL RATE: 94 BPM
P AXIS: 36 DEGREES
PR INTERVAL: 200 MS
QRS AXIS: 92 DEGREES
QRSD INTERVAL: 114 MS
QT INTERVAL: 390 MS
QTC INTERVAL: 487 MS
T WAVE AXIS: 33 DEGREES
VENTRICULAR RATE: 94 BPM

## 2021-01-29 PROCEDURE — 93010 ELECTROCARDIOGRAM REPORT: CPT | Performed by: INTERNAL MEDICINE

## 2021-01-29 PROCEDURE — 93005 ELECTROCARDIOGRAM TRACING: CPT

## 2021-01-29 NOTE — TELEPHONE ENCOUNTER
Spoke with patient ,she is having significant cough with some sputum production: clear/yellow in nature  This is chronic for her and was seen yesterday in our office  She is set up for an EGD / botox next Wednesday and we are unable to move her surgery up  She can try Delsym, although not sure if this will provide any relief  She was instructed to be seen in the ED if she develops worsening shortness of breath over the weekend  She is in agreement with the plan  Dr Cherri Duarte is aware

## 2021-02-08 DIAGNOSIS — G47.10 HYPERSOMNIA: ICD-10-CM

## 2021-02-08 NOTE — TELEPHONE ENCOUNTER
Patient called she is out of medication    She needs RX to go to local pharmacy and to mail order     RXs for the Armodafinil 150 mg set up  Please sign if appropriate

## 2021-02-09 ENCOUNTER — TELEPHONE (OUTPATIENT)
Dept: SLEEP CENTER | Facility: CLINIC | Age: 66
End: 2021-02-09

## 2021-02-09 DIAGNOSIS — G47.10 HYPERSOMNIA: ICD-10-CM

## 2021-02-09 RX ORDER — ARMODAFINIL 150 MG/1
150 TABLET ORAL DAILY
Qty: 30 TABLET | Refills: 0 | Status: SHIPPED | OUTPATIENT
Start: 2021-02-09 | End: 2021-03-01 | Stop reason: SDUPTHER

## 2021-02-09 RX ORDER — ARMODAFINIL 150 MG/1
150 TABLET ORAL DAILY
Qty: 30 TABLET | Refills: 0 | Status: CANCELLED | OUTPATIENT
Start: 2021-02-09

## 2021-02-09 RX ORDER — ARMODAFINIL 150 MG/1
150 TABLET ORAL DAILY
Qty: 90 TABLET | Refills: 1 | Status: CANCELLED | OUTPATIENT
Start: 2021-02-09 | End: 2021-03-11

## 2021-02-09 NOTE — TELEPHONE ENCOUNTER
Waiting for [employer]  forms to be faxed to complete for prior authorization  Armodafinil 150 MG tablet

## 2021-02-10 NOTE — TELEPHONE ENCOUNTER
Spoke with patient   She has [employer] insurance    ID- [id number]  GRP -[id number]  DOROTHY Huggins [facility] [id number]    She will check with the pharmacy to run the RX with the correct insurance and call back if she needs prior authorization

## 2021-02-10 NOTE — TELEPHONE ENCOUNTER
Left message for the patient  [place] from [facility] [place] by this insurance  Call back message left for patient to call back with her correct insurance

## 2021-02-12 NOTE — TELEPHONE ENCOUNTER
Received form [employer] notice of approval from medicare prescription drug for Modafinil though [employer] approval from 1/1/2021 to 12/31/2021    Pharmacy notified

## 2021-02-15 DIAGNOSIS — K22.0 ACHALASIA OF ESOPHAGUS: ICD-10-CM

## 2021-02-15 NOTE — TELEPHONE ENCOUNTER
GI Physician: Dr Christie Christianson for Medication: famotidine    Dose: 40 mg    Quantity: 90 days supply    Pharmacy and Location: [facility]

## 2021-02-16 RX ORDER — FAMOTIDINE 40 MG/1
40 TABLET, FILM COATED ORAL DAILY
Qty: 90 TABLET | Refills: 3 | OUTPATIENT
Start: 2021-02-16

## 2021-02-16 NOTE — TELEPHONE ENCOUNTER
Verbal order was completed as requested  Please sign script  I can not complete in my bin    Thank you

## 2021-02-19 ENCOUNTER — TELEPHONE (OUTPATIENT)
Dept: SURGICAL ONCOLOGY | Facility: CLINIC | Age: 66
End: 2021-02-19

## 2021-02-19 ENCOUNTER — TELEPHONE (OUTPATIENT)
Dept: CARDIAC SURGERY | Facility: CLINIC | Age: 66
End: 2021-02-19

## 2021-02-19 NOTE — TELEPHONE ENCOUNTER
Pt would like to reschedule surgery that had been cx due to hospitalization, please call pt at [phone], Thank you

## 2021-02-19 NOTE — TELEPHONE ENCOUNTER
Patient called in to cancel her apt with Dr Brittaney Mohan  Patient states she needs to be cleared by her doctors first and will call us back at a later date to reschedule

## 2021-02-23 ENCOUNTER — TELEPHONE (OUTPATIENT)
Dept: HEMATOLOGY ONCOLOGY | Facility: CLINIC | Age: 66
End: 2021-02-23

## 2021-03-01 DIAGNOSIS — G47.10 HYPERSOMNIA: ICD-10-CM

## 2021-03-01 NOTE — TELEPHONE ENCOUNTER
----- Message from Gee Barriga sent at 3/1/2021  6:36 AM EST -----  Regarding: Prescription Question  Contact: [phone]  i need a prescription refill for armodafinil 150mg tabs,  please send to [facility] (mail order) for a 90 day supply  thank you

## 2021-03-02 NOTE — TELEPHONE ENCOUNTER
Spoke to Keila Moreno at [facility]  States prescriptions can be sent electronically and also called in by physicligia Hansen, please try to send Rx again electronically  If unable,  Rx can be called in at [phone]

## 2021-03-03 NOTE — TELEPHONE ENCOUNTER
Dr Gordon Veras, it appears that info for [employer] is listed  I am not sure why it will not go through  I will contact patient to have her select an alternative pharmacy  Left message for patient that [employer] does not accept electronic prescriptions  Will need to call back with name of alternative pharmacy to send prescription for armodafinil

## 2021-03-04 RX ORDER — ARMODAFINIL 150 MG/1
150 TABLET ORAL DAILY
Qty: 90 TABLET | Refills: 0 | Status: SHIPPED | OUTPATIENT
Start: 2021-03-04 | End: 2021-06-24 | Stop reason: SDUPTHER

## 2021-03-04 NOTE — TELEPHONE ENCOUNTER
Dr Mayra Canas please refill to [facility] pharmacy     This is correct pharmacy thanks

## 2021-03-09 ENCOUNTER — OFFICE VISIT (OUTPATIENT)
Dept: PAIN MEDICINE | Facility: CLINIC | Age: 66
End: 2021-03-09
Payer: MEDICARE

## 2021-03-09 VITALS
HEIGHT: 63 IN | HEART RATE: 76 BPM | TEMPERATURE: 98.7 F | WEIGHT: 210 LBS | SYSTOLIC BLOOD PRESSURE: 128 MMHG | DIASTOLIC BLOOD PRESSURE: 72 MMHG | BODY MASS INDEX: 37.21 KG/M2

## 2021-03-09 DIAGNOSIS — Z96.89 S/P INSERTION OF SPINAL CORD STIMULATOR: ICD-10-CM

## 2021-03-09 DIAGNOSIS — M79.18 CHRONIC MYOFASCIAL PAIN: ICD-10-CM

## 2021-03-09 DIAGNOSIS — M54.6 CHRONIC BILATERAL THORACIC BACK PAIN: ICD-10-CM

## 2021-03-09 DIAGNOSIS — G89.29 CHRONIC SCAPULAR PAIN: ICD-10-CM

## 2021-03-09 DIAGNOSIS — G89.29 CHRONIC PAIN OF RIGHT KNEE: ICD-10-CM

## 2021-03-09 DIAGNOSIS — M47.817 LUMBOSACRAL SPONDYLOSIS WITHOUT MYELOPATHY: ICD-10-CM

## 2021-03-09 DIAGNOSIS — M89.8X1 CHRONIC SCAPULAR PAIN: ICD-10-CM

## 2021-03-09 DIAGNOSIS — G89.29 CHRONIC PAIN OF LEFT KNEE: ICD-10-CM

## 2021-03-09 DIAGNOSIS — M54.50 CHRONIC BILATERAL LOW BACK PAIN WITHOUT SCIATICA: ICD-10-CM

## 2021-03-09 DIAGNOSIS — G89.29 CHRONIC BILATERAL LOW BACK PAIN WITHOUT SCIATICA: ICD-10-CM

## 2021-03-09 DIAGNOSIS — R26.9 GAIT DISORDER: ICD-10-CM

## 2021-03-09 DIAGNOSIS — G89.29 CHRONIC MYOFASCIAL PAIN: ICD-10-CM

## 2021-03-09 DIAGNOSIS — M25.561 CHRONIC PAIN OF RIGHT KNEE: ICD-10-CM

## 2021-03-09 DIAGNOSIS — G89.4 PAIN SYNDROME, CHRONIC: Primary | Chronic | ICD-10-CM

## 2021-03-09 DIAGNOSIS — G63 POLYNEUROPATHY ASSOCIATED WITH UNDERLYING DISEASE (HCC): Chronic | ICD-10-CM

## 2021-03-09 DIAGNOSIS — G89.29 CHRONIC BILATERAL THORACIC BACK PAIN: ICD-10-CM

## 2021-03-09 DIAGNOSIS — M54.16 LUMBAR RADICULOPATHY: ICD-10-CM

## 2021-03-09 DIAGNOSIS — M25.562 CHRONIC PAIN OF LEFT KNEE: ICD-10-CM

## 2021-03-09 DIAGNOSIS — G90.522 COMPLEX REGIONAL PAIN SYNDROME TYPE 1 OF LEFT LOWER EXTREMITY: Chronic | ICD-10-CM

## 2021-03-09 PROCEDURE — 99214 OFFICE O/P EST MOD 30 MIN: CPT | Performed by: NURSE PRACTITIONER

## 2021-03-09 RX ORDER — TIZANIDINE 4 MG/1
TABLET ORAL
Qty: 270 TABLET | Refills: 0 | Status: SHIPPED | OUTPATIENT
Start: 2021-03-09 | End: 2021-03-09 | Stop reason: SDUPTHER

## 2021-03-09 RX ORDER — TIZANIDINE 4 MG/1
TABLET ORAL
Qty: 270 TABLET | Refills: 1 | Status: SHIPPED | OUTPATIENT
Start: 2021-03-09 | End: 2021-04-12

## 2021-03-09 RX ORDER — DOXYCYCLINE 100 MG/1
TABLET ORAL
COMMUNITY
Start: 2021-01-28 | End: 2021-04-30

## 2021-03-09 RX ORDER — CARVEDILOL 3.12 MG/1
6.25 TABLET ORAL 2 TIMES DAILY WITH MEALS
COMMUNITY
Start: 2021-02-05 | End: 2022-02-05

## 2021-03-09 RX ORDER — AMOXICILLIN 500 MG/1
CAPSULE ORAL
COMMUNITY
Start: 2021-02-05 | End: 2021-04-30

## 2021-03-09 NOTE — PROGRESS NOTES
Assessment:  1  Pain syndrome, chronic    2  Chronic bilateral low back pain without sciatica    3  Chronic bilateral thoracic back pain    4  Chronic scapular pain    5  Chronic myofascial pain    6  Lumbar radiculopathy    7  Complex regional pain syndrome type 1 of left lower extremity    8  Polyneuropathy associated with underlying disease ([address] )    9  Chronic pain of left knee    10  Chronic pain of right knee    11  Gait disorder    12  S/P insertion of spinal cord stimulator    13  Lumbosacral spondylosis without myelopathy        Plan:   While the patient was in the office today, I did have a thorough conversation with the patient and her husbandregarding their chronic pain syndrome, symptoms, medication regimen, and treatment plan  I explained to the patient at this point I am not sure what else can be done for her pain as she has tried and failed almost every other possible medication and treatment plan without any, long-lasting or sustainable relief  Previously the patient was on higher doses of opioids in the past as well as multiple neuropathic medications again with very minimal relief or no significant difference in her pain than what she is currently experiencing right now  I explained to the patient at the very least we could proceed with an updated CT scan of the thoracic and lumbar spine without contrast to evaluate for any other underlying etiology that could be causing her pain or symptoms and to just double-checked that the stimulator lead has not migrated the us decreasing her ability to recapture pain relief as she does feel initially the stimulator was more helpful  I advised the patient that once we have the results of the CT scan, our office will give her a call to review the results and any other treatment plan recommendations, if there are any  The patient was agreeable and verbalized an understanding             With regards to her medication regimen, I explained to the patient that we will continue the tizanidine as prescribed as per her request, however, we have tried and failed every other neuropathic medication, muscle relaxer, and as she is currently on medical marijuana opioids are not appropriate, especially since she has tried and failed even high-dose opioids without any significant or substantial relief, that I do not feel opioids are in her best long-term interest especially with some of her other comorbidities  I encouraged the patient to continue to try the medical marijuana and to go back to the dispensary and let them know what she is experiencing with the medication and to see if there is anything else or other products she can try to see if she can find a regimen that would be helpful  The patient was agreeable and verbalized an understanding  The patient will follow-up in 4 months for medication prescription refill and reevaluation  The patient was advised to contact the office should their symptoms worsen in the interim  The patient was agreeable and verbalized an understanding  History of Present Illness: The patient is a 60 y o  female last seen on 11/17/2020, who presents for a follow up office visit in regards to Chronic pain syndrome secondary to Complex regional pain syndrome of the lower extremity with lumbar spondylosis and radiculopathy as well as diffuse myofascial pain with gait dysfunction  The patient currently reports  that since her last office visit despite continuous attends and reprogramming with the stimulator, medical marijuana and further testing to make sure that there are no blood clots her vascular issues she continues with the worsening leg pain, radicular symptoms, numbness, and weakness  The patient presents today for medication follow-up and stimulator reprogramming  The patient reports that she does not get any relief from any of her medications or tools for her pain      Current pain medications includes:    Tizanidine t i d  4 mg p r n  for pain  The patient reports that this regimen is providing  Minimal to no pain relief  The patient is reporting no side effects from this pain medication regimen  Pain Contract Signed: 6/23/2020  Last Urine Drug Screen: 2/17/2020    I have personally reviewed and/or updated the patient's past medical history, past surgical history, family history, social history, current medications, allergies, and vital signs today  Review of Systems:    Review of Systems   Respiratory: Negative for shortness of breath  Cardiovascular: Negative for chest pain  Gastrointestinal: Negative for constipation, diarrhea, nausea and vomiting  Musculoskeletal: Negative for arthralgias, gait problem, joint swelling and myalgias  Skin: Negative for rash  Neurological: Negative for dizziness, seizures and weakness  All other systems reviewed and are negative          Past Medical History:   Diagnosis Date    Anxiety     Asthma     controlled    Back complaints     [facility]     nose    Cellulitis of left lower leg     Chronic bilateral thoracic back pain     Chronic myofascial pain     Chronic pain of both lower extremities     Chronic pain of left knee     Chronic pain syndrome     CPAP (continuous positive airway pressure) dependence     Depression     Diabetes mellitus ([facility] )     Disease of thyroid gland     Gait disorder     Gastroparesis     GERD (gastroesophageal reflux disease)     History of angina     History of transfusion     Many years ago    Hyperlipidemia     Hypertension     Insulin pump in place     Kidney disease     Polyneuropathy associated with underlying disease ([facility]     PONV (postoperative nausea and vomiting)     S/P insertion of spinal cord stimulator 6/8/2018    Sarcoidosis     Sleep apnea     TIA (transient ischemic attack)        Past Surgical History:   Procedure Laterality Date    BREAST SURGERY      BREAST SURGERY      reduction    CARDIAC PACEMAKER PLACEMENT       SECTION      COLONOSCOPY      HERNIA REPAIR      HYSTERECTOMY      JOINT REPLACEMENT Left     NECK SURGERY      UT SURG IMPLNT Edith Lorenz Left 2018    Procedure: Insertion of thoracic spinal cord stimulator electrode via laminotomy and placement of left buttock IPG;  Surgeon: Trav Nur MD;  [facility];  Service: Neurosurgery    REPLACEMENT TOTAL KNEE      ROTATOR CUFF REPAIR      SPINAL STIMULATOR PLACEMENT Left 10/3/2018    Procedure: BUTTOCK RE-OPENING INCISION FOR REPOSITIONING OF IMPLANTABLE PULSE GENERATOR;  Surgeon: Trav Nur MD;  [facility];  Service: Neurosurgery    TONSILLECTOMY      UPPER GASTROINTESTINAL ENDOSCOPY      WISDOM TOOTH EXTRACTION         Family History   Problem Relation Age of Onset    Diabetes Family     Heart disease Family     Hypertension Family     Neuropathy Family     No Known Problems Mother     Heart disease Father     Diabetes Father     No Known Problems Maternal Grandmother     No Known Problems Maternal Grandfather     No Known Problems Paternal Grandmother     No Known Problems Paternal Grandfather        Social History     Occupational History    Not on file   Tobacco Use    Smoking status: Never Smoker    Smokeless tobacco: Never Used   Substance and Sexual Activity    Alcohol use: No    Drug use: Yes     Types: Marijuana     Comment: Medical Marijuana    Sexual activity: Yes         Current Outpatient Medications:     amitriptyline (ELAVIL) 50 mg tablet, Take one tablet p o  at bedtime for two weeks    If insomnia persists, increase to two tablets at bedtime, Disp: 60 tablet, Rfl: 2    amoxicillin (AMOXIL) 500 mg capsule, TAKE 1 CAPSULE BY MOUTH 3 TIMES A DAY FOR 4 DAYS , Disp: , Rfl:     Armodafinil 150 MG tablet, Take 1 tablet (150 mg total) by mouth daily, Disp: 90 tablet, Rfl: 0    aspirin (ECOTRIN LOW STRENGTH) 81 mg EC tablet, Take 81 mg by mouth daily, Disp: , Rfl:     atorvastatin (LIPITOR) 80 mg tablet, Take 80 mg by mouth daily at bedtime  , Disp: , Rfl:     calcium carbonate-vitamin D (OSCAL-D) 500 mg-200 units per tablet, TAKE TWO TABLETS BY MOUTH THREE TIMES A DAY WITH MEALS, Disp: , Rfl:     carvedilol (COREG) 3 125 mg tablet, Take 3 125 mg by mouth, Disp: , Rfl:     CRANBERRY PO, Take 1 capsule by mouth daily with lunch  , Disp: , Rfl:     Cyanocobalamin (VITAMIN B-12 PO), Take 1 capsule by mouth daily with lunch  , Disp: , Rfl:     dicyclomine (BENTYL) 10 mg capsule, Take 1 capsule (10 mg total) by mouth 4 (four) times a day as needed (abdominal pain), Disp: 60 capsule, Rfl: 2    Docusate Sodium 100 MG capsule, Take 100 mg by mouth daily with lunch  , Disp: , Rfl:     doxycycline (ADOXA) 100 MG tablet, TAKE 1 TABLET BY MOUTH TWICE A DAY FOR 10 DAYS, Disp: , Rfl:     esomeprazole (NexIUM) 40 MG capsule, Take 1 capsule (40 mg total) by mouth 2 (two) times a day before meals, Disp: 180 capsule, Rfl: 3    famotidine (PEPCID) 40 MG tablet, Take 1 tablet (40 mg total) by mouth daily, Disp: 30 tablet, Rfl: 0    ferrous sulfate 324 (65 Fe) mg, Take 65 mg by mouth, Disp: , Rfl:     glucagon 1 MG injection, Glucagon Emergency Kit (human-recomb) 1 mg solution for injection, Disp: , Rfl:     hydroxychloroquine (PLAQUENIL) 200 mg tablet, , Disp: , Rfl:     levothyroxine 75 mcg tablet, Take 75 mcg by mouth daily in the early morning  , Disp: , Rfl:     losartan (COZAAR) 100 MG tablet, Take 50 mg by mouth daily in the early morning , Disp: , Rfl:     lubiprostone (AMITIZA) 24 mcg capsule, Take 1 capsule (24 mcg total) by mouth 2 (two) times a day with meals, Disp: 48 capsule, Rfl: 0    NOVOLOG 100 UNIT/ML injection, INJECT 120 UNITS DAILY VIA INSULIN PUMP E 11 65, Disp: , Rfl: 3    ondansetron (ZOFRAN) 8 mg tablet, Take 8 mg by mouth every 8 (eight) hours as needed  , Disp: , Rfl:     oxybutynin (DITROPAN XL) 15 MG 24 hr tablet, Take 1 tablet (15 mg total) by mouth daily, Disp: 30 tablet, Rfl: 3    Potassium Gluconate 595 MG CAPS, Take by mouth, Disp: , Rfl:     pramipexole (MIRAPEX) 1 mg tablet, Take 1 mg by mouth 2 (two) times a day  , Disp: , Rfl:     sertraline (ZOLOFT) 50 mg tablet, Take 50 mg by mouth daily, Disp: , Rfl:     tiZANidine (ZANAFLEX) 4 mg tablet, Take 1 PO TID for pain/spasms  , Disp: 270 tablet, Rfl: 1    torsemide (DEMADEX) 20 mg tablet, Take 20 mg by mouth daily, Disp: , Rfl:     VENTOLIN  (90 Base) MCG/ACT inhaler, 2 puffs every 4 (four) hours as needed  , Disp: , Rfl:     calcitriol (ROCALTROL) 0 5 MCG capsule, TAKE ONE CAPSULE BY MOUTH TWO TIMES A DAY FOR 7 DAYS, Disp: , Rfl:     cholecalciferol (VITAMIN D3) 1,000 units tablet, Take 2,000 Units by mouth daily with lunch  , Disp: , Rfl: 5    famotidine (PEPCID) 20 mg tablet, , Disp: , Rfl:     fenofibrate (TRICOR) 48 mg tablet, , Disp: , Rfl:     fluticasone-salmeterol (ADVAIR) 100-50 mcg/dose inhaler, Inhale 2 puffs, Disp: , Rfl:     furosemide (LASIX) 20 mg tablet, Take 60 mg by mouth 2 (two) times a day , Disp: , Rfl:     glucose blood test strip, Testing six times daily  E11 65 on insulin pump, Disp: , Rfl:     hydrALAZINE (APRESOLINE) 10 mg tablet, , Disp: , Rfl:     magnesium oxide (MAG-OX) 400 mg tablet, Take 500 mg by mouth , Disp: , Rfl:     SYMBICORT 80-4 5 MCG/ACT inhaler, Inhale 2 puffs 2 (two) times a day  , Disp: , Rfl:     Allergies   Allergen Reactions    Bactrim [Sulfamethoxazole-Trimethoprim] Hives    Sucralfate Hives     Facial swelling    Topamax [Topiramate]      disorentation    Azithromycin Itching    Pregabalin Confusion and Other (See Comments)     altered mental status    Ciprofloxacin Drowsiness     Other reaction(s):  Other (See Comments)  "drowsiness"    Norvasc [Amlodipine] Swelling    Baclofen      "That knocks me out "    Bupropion Fatigue    Methotrexate Nausea Only    Neurontin [Gabapentin] Hallucinations and Hypertension Physical Exam:    /72 (BP Location: Left arm, Patient Position: Sitting, Cuff Size: Standard)   Pulse 76   Temp 98 7 °F (37 1 °C)   Ht 5' 3" (1 6 m)   Wt 95 3 kg (210 lb)   BMI 37 20 kg/m²     Constitutional:normal, well developed, well nourished, alert, in no distress and non-toxic and no overt pain behavior  and overweight  Eyes:anicteric  HEENT:grossly intact  Neck:supple, symmetric, trachea midline and no masses   Pulmonary:even and unlabored  Cardiovascular:No edema or pitting edema present  Skin:Normal without rashes or lesions and well hydrated  Psychiatric:Mood and affect appropriate  Neurologic:Cranial Nerves II-XII grossly intact  Musculoskeletal:The patient's gait is antalgic, painful, but steady with the use of a single cane        Imaging  CT spine thoracic & lumbar wo contrast    (Results Pending)         Orders Placed This Encounter   Procedures    CT spine thoracic & lumbar wo contrast

## 2021-03-16 DIAGNOSIS — K21.9 GASTROESOPHAGEAL REFLUX DISEASE, UNSPECIFIED WHETHER ESOPHAGITIS PRESENT: Primary | ICD-10-CM

## 2021-03-16 NOTE — TELEPHONE ENCOUNTER
Patient called the office with questions about the Pepcid 40 Mg tablet  She states she had received a letter from the Pharmacy that they were not to fill the prescription and the patient was confused as to why since she has been taking it  If someone can please follow up with patient   Thanks

## 2021-03-17 ENCOUNTER — TELEPHONE (OUTPATIENT)
Dept: PAIN MEDICINE | Facility: CLINIC | Age: 66
End: 2021-03-17

## 2021-03-17 RX ORDER — FAMOTIDINE 40 MG/1
40 TABLET, FILM COATED ORAL DAILY
Qty: 90 TABLET | Refills: 3 | OUTPATIENT
Start: 2021-03-17

## 2021-03-17 NOTE — TELEPHONE ENCOUNTER
Patient clarified that pain management dc'd famotidine in error  Patient would like resent to [facility]

## 2021-03-17 NOTE — TELEPHONE ENCOUNTER
Patient would like to speak with nurse regarding famotidine (PEPCID) 40 MG tablet     This medication was prescribed by different physician, patient states we cancelled and we ordered again?      Please advise,    Thank you    [phone]

## 2021-03-17 NOTE — TELEPHONE ENCOUNTER
prescriber response form scanned in media providing consent by pain management to fill tizanidine despite possible increased concentrations while taking famotidine  Pharmacist said they will need script for refill of famotidine  Last ofice visit Jan 25, 90-day supply sent to [facility] however Luz Maria Jacobs discontinued medicine  I asked patient to clarify with pain management Varinder Orozco), if okay continue, we will reorder

## 2021-03-17 NOTE — TELEPHONE ENCOUNTER
S/w pt, stated that Dr Jacqueline Otoole - the ordering doctor for famotidine, questioned why DG cancelled the rx for famatodine on 3/9  Advised pt, it appears that famatodine 40 mg was ordered 2x on 1/25  At the pt's 3/9 fu w/ DG, it appears the pt's medications were reviewed and the duplicate rx was removed - leaving 1 rx for famatodine in the pt's chart  Pt verbalized understanding and appreciation  Will make Dr Jacqueline Otoole aware

## 2021-03-18 ENCOUNTER — HOSPITAL ENCOUNTER (OUTPATIENT)
Dept: CT IMAGING | Facility: HOSPITAL | Age: 66
Discharge: HOME | End: 2021-03-18
Payer: MEDICARE

## 2021-03-18 DIAGNOSIS — M47.817 LUMBOSACRAL SPONDYLOSIS WITHOUT MYELOPATHY: ICD-10-CM

## 2021-03-18 DIAGNOSIS — M54.50 CHRONIC BILATERAL LOW BACK PAIN WITHOUT SCIATICA: ICD-10-CM

## 2021-03-18 DIAGNOSIS — G90.522 COMPLEX REGIONAL PAIN SYNDROME TYPE 1 OF LEFT LOWER EXTREMITY: Chronic | ICD-10-CM

## 2021-03-18 DIAGNOSIS — Z96.89 S/P INSERTION OF SPINAL CORD STIMULATOR: ICD-10-CM

## 2021-03-18 DIAGNOSIS — G89.29 CHRONIC BILATERAL LOW BACK PAIN WITHOUT SCIATICA: ICD-10-CM

## 2021-03-18 DIAGNOSIS — G89.4 PAIN SYNDROME, CHRONIC: Chronic | ICD-10-CM

## 2021-03-18 DIAGNOSIS — G89.29 CHRONIC BILATERAL THORACIC BACK PAIN: ICD-10-CM

## 2021-03-18 DIAGNOSIS — M54.6 CHRONIC BILATERAL THORACIC BACK PAIN: ICD-10-CM

## 2021-03-18 DIAGNOSIS — M54.16 LUMBAR RADICULOPATHY: ICD-10-CM

## 2021-03-18 PROCEDURE — 72131 CT LUMBAR SPINE W/O DYE: CPT

## 2021-03-18 PROCEDURE — G1004 CDSM NDSC: HCPCS

## 2021-03-18 PROCEDURE — 72128 CT CHEST SPINE W/O DYE: CPT

## 2021-03-22 ENCOUNTER — TELEPHONE (OUTPATIENT)
Dept: PAIN MEDICINE | Facility: CLINIC | Age: 66
End: 2021-03-22

## 2021-03-23 ENCOUNTER — IMMUNIZATIONS (OUTPATIENT)
Dept: FAMILY MEDICINE CLINIC | Facility: HOSPITAL | Age: 66
End: 2021-03-23

## 2021-03-23 ENCOUNTER — TELEPHONE (OUTPATIENT)
Dept: PAIN MEDICINE | Facility: CLINIC | Age: 66
End: 2021-03-23

## 2021-03-23 DIAGNOSIS — Z23 ENCOUNTER FOR IMMUNIZATION: Primary | ICD-10-CM

## 2021-03-23 PROCEDURE — 0011A SARS-COV-2 / COVID-19 MRNA VACCINE (MODERNA) 100 MCG: CPT

## 2021-03-23 PROCEDURE — 91301 SARS-COV-2 / COVID-19 MRNA VACCINE (MODERNA) 100 MCG: CPT

## 2021-03-23 NOTE — TELEPHONE ENCOUNTER
Can you please call the patient and advise her that her CT scan of the thoracic and lumbar spine showed mild to mode, but stable deg changes/OA without any sig new or worsening etiology or stenosis  It appears that her SCStim lead is intact and has not migrated or moved  I would recommend f/u with Neurosurgery for re-evaluation, continue to work with [employer] and f/u with [facility] for stim re-programing  Thank you

## 2021-03-23 NOTE — TELEPHONE ENCOUNTER
Provider agree that she may want to follow up with Dr Silas Almanza to make sure that there is not any other cause for her pain or anything else that can be done  Unfortunately, at this point, she may be out of options

## 2021-03-23 NOTE — TELEPHONE ENCOUNTER
S/w pt and advised of DG's notation  Pt verbalized understanding and asked who she is supposed to see at Neurosurgery  Pt states she cannot remember who placed her permanent SCS  Pt was advised that RN will check w/ DG to confirm which neurosurgeon he wants her to see  Pt appreciative  -RN noted Dr Socrates Acosta placed permanent SCS for pt  Should pt return to see Dr Socrates Acosta, and is a new referral needed? Please advise   Thank you -

## 2021-03-24 ENCOUNTER — TELEPHONE (OUTPATIENT)
Dept: SURGICAL ONCOLOGY | Facility: CLINIC | Age: 66
End: 2021-03-24

## 2021-03-24 NOTE — TELEPHONE ENCOUNTER
Patient called to see if she can R/S her procedure on her esophagus to be done by Dr Divya Pires  Please call her at [phone]

## 2021-03-25 ENCOUNTER — TELEPHONE (OUTPATIENT)
Dept: SURGICAL ONCOLOGY | Facility: CLINIC | Age: 66
End: 2021-03-25

## 2021-03-25 NOTE — TELEPHONE ENCOUNTER
Favian Preston called to schedule surgery with Dr Divya Pires  Her best call back number is [phone]

## 2021-04-01 ENCOUNTER — OFFICE VISIT (OUTPATIENT)
Dept: CARDIAC SURGERY | Facility: CLINIC | Age: 66
End: 2021-04-01
Payer: MEDICARE

## 2021-04-01 VITALS
WEIGHT: 203.48 LBS | DIASTOLIC BLOOD PRESSURE: 63 MMHG | BODY MASS INDEX: 36.05 KG/M2 | HEART RATE: 84 BPM | HEIGHT: 63 IN | OXYGEN SATURATION: 96 % | SYSTOLIC BLOOD PRESSURE: 134 MMHG | RESPIRATION RATE: 16 BRPM | TEMPERATURE: 99.4 F

## 2021-04-01 DIAGNOSIS — N28.9 KIDNEY DISEASE DUE TO SECONDARY DIABETES MELLITUS (HCC): ICD-10-CM

## 2021-04-01 DIAGNOSIS — K22.0 ACHALASIA: Primary | ICD-10-CM

## 2021-04-01 DIAGNOSIS — E13.29 KIDNEY DISEASE DUE TO SECONDARY DIABETES MELLITUS (HCC): ICD-10-CM

## 2021-04-01 PROCEDURE — 99214 OFFICE O/P EST MOD 30 MIN: CPT | Performed by: PHYSICIAN ASSISTANT

## 2021-04-01 RX ORDER — ACETAMINOPHEN 325 MG/1
975 TABLET ORAL ONCE
Status: CANCELLED | OUTPATIENT
Start: 2021-04-01 | End: 2021-04-01

## 2021-04-01 RX ORDER — CEFAZOLIN SODIUM 2 G/50ML
2000 SOLUTION INTRAVENOUS ONCE
Status: CANCELLED | OUTPATIENT
Start: 2021-04-01 | End: 2021-04-01

## 2021-04-01 NOTE — ASSESSMENT & PLAN NOTE
We had a discussion with Mrs Barriga regarding her achalasia symptoms  Her treatment options at this point include an EGD with Botox injection or a heller myotomy with partial fundoplication  The patient would like to proceed with surgery and therefore the procedure, possible risks, and post operative course were explained and all questions were answered  She will undergo some blood work prior to the procedure

## 2021-04-01 NOTE — PROGRESS NOTES
Thoracic Follow-Up  Assessment/Plan:    Achalasia  We had a discussion with Mrs Barriga regarding her achalasia symptoms  Her treatment options at this point include an EGD with Botox injection or a heller myotomy with partial fundoplication  The patient would like to proceed with surgery and therefore the procedure, possible risks, and post operative course were explained and all questions were answered  She will undergo some blood work prior to the procedure  Diagnoses and all orders for this visit:    Achalasia  -     PAT Covid Screening; Future  -     Type and screen; Future  -     APTT; Future  -     Protime-INR; Future  -     Case request operating room: LAPAROSCOPIC HELLER MYOTOMY AND PARTIAL FUNDOPLICATION: robotic assisted, ESOPHAGOGASTRODUODENOSCOPY (EGD); Standing  -     Case request operating room: LAPAROSCOPIC HELLER MYOTOMY AND PARTIAL FUNDOPLICATION: robotic assisted, ESOPHAGOGASTRODUODENOSCOPY (EGD)    Kidney disease due to secondary diabetes mellitus ([facility] [address] )   -     APTT; Future  -     Protime-INR; Future    Other orders  -     Diet NPO; Sips with meds; Standing  -     Void on call to OR; Standing  -     Insert peripheral IV; Standing  -     Place sequential compression device; Standing  -     acetaminophen (TYLENOL) tablet 975 mg  -     ceFAZolin (ANCEF) IVPB (premix in dextrose) 2,000 mg 50 mL             Thoracic History       Diagnosis: Achalasia      Patient ID: Inge Parker is a 60 y o  female  HPI      Ms Nighat Major is a 61 yo female who was evaluated in our office as a referral by Dr Clifford Mandujano for achalasia and reflux  EGD from 8/14/20 revealed z line 29 cm from incisors, non-obstructing ring at 35 cm, possible gastroparesis  Biopsies were consistent with mild acute esophagitis and chronic inflammation  UGI from 9/18/20 revealed a slight decrease in esophageal motility  Manometry and 24h pH testing showed 10/10 swallows with pan-esophageal contraction, mean DCI 1468  Median IRP is 27  There was lack of clearance of liquid for every swallow  Her Demeester score was 0 8; however, she completed the test on PPI  She had 201 episodes of reflux total, and symptom correlation was significant with cough, 85%index, 100% probability  Dr Ger Guzman recommended a botox injection of LES as a first step, which was scheduled for 2/3/21  Unfortunately, she was admitted to [facility] on 1/31/21 for acute respiratory failure with hypoxia  She canceled her surgery and stated she needed clearance by her doctors, first  She called at the end of march to reschedule surgery and Dr Ger Guzman thought she would proceed with a heller myotomy, instead of a botox injection first      She returns today after obtaining clearance by her PCP and cardiologist  She was seen by Dr Audrey Reilly at [facility] and has been cleared from a cardiology standpoint  She was cleared by her PCP on 3/22/21 to undergo a surgical procedure  On discussion, she is choking on food and regurgitation of liquids, but not solids, along with early satiety  She has lost approximately 13 lbs since December          The following portions of the patient's history were reviewed and updated as appropriate: allergies, current medications, past family history, past medical history, past social history, past surgical history and problem list       Past Medical History:   Diagnosis Date    Anxiety     Asthma     controlled    Back complaints     [facility]     nose    Cellulitis of left lower leg     Chronic bilateral thoracic back pain     Chronic myofascial pain     Chronic pain of both lower extremities     Chronic pain of left knee     Chronic pain syndrome     CPAP (continuous positive airway pressure) dependence     Depression     Diabetes mellitus ([facility] [address] )     Disease of thyroid gland     Gait disorder     Gastroparesis     GERD (gastroesophageal reflux disease)     History of angina     History of transfusion     Many years ago    Hyperlipidemia     Hypertension     Insulin pump in place     Kidney disease     Polyneuropathy associated with underlying disease ([facility] [address] )     PONV (postoperative nausea and vomiting)     S/P insertion of spinal cord stimulator 2018    Sarcoidosis     Sleep apnea     TIA (transient ischemic attack)      Past Surgical History:   Procedure Laterality Date    BREAST SURGERY      BREAST SURGERY      reduction    CARDIAC PACEMAKER PLACEMENT       SECTION      COLONOSCOPY      HERNIA REPAIR      HYSTERECTOMY      JOINT REPLACEMENT Left     NECK SURGERY      MN SURG IMPLNT [place] Jasen Left 2018    Procedure: Insertion of thoracic spinal cord stimulator electrode via laminotomy and placement of left buttock IPG;  Surgeon: Griselda Blake MD;  [facility];  Service: Neurosurgery    REPLACEMENT TOTAL KNEE      ROTATOR CUFF REPAIR      SPINAL STIMULATOR PLACEMENT Left 10/3/2018    Procedure: BUTTOCK RE-OPENING INCISION FOR REPOSITIONING OF IMPLANTABLE PULSE GENERATOR;  Surgeon: Griselda Blake MD;  [facility];  Service: Neurosurgery    TONSILLECTOMY      UPPER GASTROINTESTINAL ENDOSCOPY      WISDOM TOOTH EXTRACTION       Family History   Problem Relation Age of Onset    Diabetes Family     Heart disease Family     Hypertension Family     Neuropathy Family     No Known Problems Mother     Heart disease Father     Diabetes Father     No Known Problems Maternal Grandmother     No Known Problems Maternal Grandfather     No Known Problems Paternal Grandmother     No Known Problems Paternal Grandfather      Social History     Social History Narrative    Not on file        Review of Systems   Constitutional: Positive for appetite change  HENT: Positive for trouble swallowing and voice change  Negative for sore throat  Respiratory: Positive for choking  Negative for cough and shortness of breath  Cardiovascular: Negative for chest pain     Gastrointestinal: Positive for abdominal pain, nausea and vomiting  Musculoskeletal: Positive for arthralgias and gait problem  Negative for back pain  Neurological: Negative for dizziness and headaches  Hematological: Negative for adenopathy  Psychiatric/Behavioral: Negative for agitation, behavioral problems and confusion  All other systems reviewed and are negative  Objective:   Physical Exam  Vitals signs reviewed  Constitutional:       General: She is not in acute distress  Appearance: Normal appearance  HENT:      Head: Normocephalic and atraumatic  Mouth/Throat:      Comments: Masked   Eyes:      General: No scleral icterus  Extraocular Movements: Extraocular movements intact  Comments: glasses   Cardiovascular:      Rate and Rhythm: Normal rate and regular rhythm  Heart sounds: Normal heart sounds  No murmur  Pulmonary:      Effort: Pulmonary effort is normal  No respiratory distress  Breath sounds: Normal breath sounds  Abdominal:      General: Bowel sounds are normal  There is distension  Palpations: Abdomen is soft  Musculoskeletal:      Comments: Uses a walker for balance and knee pain  Skin:     General: Skin is warm and dry  Neurological:      Mental Status: She is alert and oriented to person, place, and time  Motor: No weakness  Psychiatric:         Mood and Affect: Mood normal          Behavior: Behavior normal          Thought Content:  Thought content normal      /63   Pulse 84   Temp 99 4 °F (37 4 °C) (Tympanic)   Resp 16   Ht 5' 3" (1 6 m)   Wt 92 3 kg (203 lb 7 8 oz)   SpO2 96%   BMI 36 05 kg/m²

## 2021-04-12 ENCOUNTER — TELEPHONE (OUTPATIENT)
Dept: PAIN MEDICINE | Facility: CLINIC | Age: 66
End: 2021-04-12

## 2021-04-12 DIAGNOSIS — G89.29 CHRONIC MYOFASCIAL PAIN: ICD-10-CM

## 2021-04-12 DIAGNOSIS — G89.4 PAIN SYNDROME, CHRONIC: Primary | Chronic | ICD-10-CM

## 2021-04-12 DIAGNOSIS — M79.18 CHRONIC MYOFASCIAL PAIN: ICD-10-CM

## 2021-04-12 RX ORDER — CYCLOBENZAPRINE HCL 10 MG
10 TABLET ORAL 3 TIMES DAILY PRN
Qty: 90 TABLET | Refills: 0 | Status: ON HOLD | OUTPATIENT
Start: 2021-04-12 | End: 2021-05-08

## 2021-04-12 NOTE — TELEPHONE ENCOUNTER
She is to stop the Tizanidine and start Cyclobenzaprine 10 mg TID PRN for pain/spasms  Hopefully that helps  Thank you

## 2021-04-12 NOTE — TELEPHONE ENCOUNTER
Pt states she is in a lot of pain and is having spasms since last week  She wants to know if there is a stronger or different muscle relaxer she can take?  Pls advise

## 2021-04-20 ENCOUNTER — OFFICE VISIT (OUTPATIENT)
Dept: GASTROENTEROLOGY | Facility: CLINIC | Age: 66
End: 2021-04-20
Payer: MEDICARE

## 2021-04-20 VITALS
TEMPERATURE: 98.3 F | WEIGHT: 205 LBS | HEIGHT: 62 IN | SYSTOLIC BLOOD PRESSURE: 148 MMHG | BODY MASS INDEX: 37.73 KG/M2 | DIASTOLIC BLOOD PRESSURE: 80 MMHG

## 2021-04-20 DIAGNOSIS — K58.1 IRRITABLE BOWEL SYNDROME WITH CONSTIPATION: ICD-10-CM

## 2021-04-20 DIAGNOSIS — K21.00 GASTROESOPHAGEAL REFLUX DISEASE WITH ESOPHAGITIS WITHOUT HEMORRHAGE: ICD-10-CM

## 2021-04-20 DIAGNOSIS — K22.0 ACHALASIA: Primary | ICD-10-CM

## 2021-04-20 PROCEDURE — 99214 OFFICE O/P EST MOD 30 MIN: CPT | Performed by: PHYSICIAN ASSISTANT

## 2021-04-20 NOTE — PATIENT INSTRUCTIONS
Continue Amitiza twice daily  On the days you have frequent diarrhea, please hold the 2nd dose  You can resume Amitiza the next day if you are feeling better  You can add MiraLax daily to help prevent constipation, this is safe to take with Amitiza  You can adjust the dose as needed  Do not exceed 32 oz of water daily  Recommend increasing fiber in diet           High Fiber Diet   AMBULATORY CARE:   A high-fiber diet  includes foods that have a high amount of fiber  Fiber is the part of fruits, vegetables, and grains that is not broken down by your body  Fiber keeps your bowel movements regular  Fiber can also help lower your cholesterol level, control blood sugar in people with diabetes, and relieve constipation  Fiber can also help you control your weight because it helps you feel full faster  Most adults should eat 25 to 35 grams of fiber each day  Talk to your dietitian or healthcare provider about the amount of fiber you need  Good sources of fiber:       · Foods with at least 4 grams of fiber per serving:      ? ? to ½ cup of high-fiber cereal (check the nutrition label on the box)    ? ½ cup of blackberries or raspberries    ? 4 dried prunes    ? 1 cooked artichoke    ? ½ cup of cooked legumes, such as lentils, or red, kidney, and rod beans    · Foods with 1 to 3 grams of fiber per serving:      ? 1 slice of whole-wheat, pumpernickel, or rye bread    ? ½ cup of cooked brown rice    ? 4 whole-wheat crackers    ? 1 cup of oatmeal    ? ½ cup of cereal with 1 to 3 grams of fiber per serving (check the nutrition label on the box)    ? 1 small piece of fruit, such as an apple, banana, pear, kiwi, or orange    ? 3 dates    ? ½ cup of canned apricots, fruit cocktail, peaches, or pears    ? ½ cup of raw or cooked vegetables, such as carrots, cauliflower, cabbage, spinach, squash, or corn  Ways that you can increase fiber in your diet:   · Choose brown or wild rice instead of white rice       · Use whole wheat flour in recipes instead of white or all-purpose flour  · Add beans and peas to casseroles or soups  · Choose fresh fruit and vegetables with peels or skins on instead of juices  Other diet guidelines to follow:   · Add fiber to your diet slowly  You may have abdominal discomfort, bloating, and gas if you add fiber to your diet too quickly  · Drink plenty of liquids as you add fiber to your diet  You may have nausea or develop constipation if you do not drink enough water  Ask how much liquid to drink each day and which liquids are best for you  © Copyright [facility] [address] Information is for End User's use only and may not be sold, redistributed or otherwise used for commercial purposes  All illustrations and images included in CareNotes® are the copyrighted property of A D A M , Inc  or [facility] [employer]  The above information is an  only  It is not intended as medical advice for individual conditions or treatments  Talk to your doctor, nurse or pharmacist before following any medical regimen to see if it is safe and effective for you

## 2021-04-20 NOTE — LETTER
April 20, 2021     Bertram Hargrove DO  [address] [facility]  [address]  [id number]    Patient: Etienne Casarez   YOB: 1955   Date of Visit: 4/20/2021       Dear Dr Kranthi Bryan: Thank you for referring Gabriellacharlotte Humphreys to me for evaluation  Below are my notes for this consultation  If you have questions, please do not hesitate to call me  I look forward to following your patient along with you  Sincerely,        Gabriela Diaz PA-C        CC: No Recipients  Gabriela Diaz PA-C  4/20/2021  2:57 PM  Sign when Signing Visit  [facility] - Outpatient Follow-up Note  Etienne Casarez 60 y o  female MRN: [id number]  Encounter: [id number]          ASSESSMENT AND PLAN:      1  Achalasia  She was recently diagnosed with type 2 achalasia  She was seen by thoracic surgery and is scheduled for Heller myotomy with Nissen fundoplication next month  Since she is able to tolerate liquids, I recommended full liquid diet including shakes, smoothies, pudding, cream soups, etc  She can also drink Ensure or boost supplements for nutrition  2  Gastroesophageal reflux disease with esophagitis without hemorrhage  Stable  She had EGD in August 2020 which showed normal esophagus although biopsies were positive for chronic inflammation  She had 24 hour esophageal pH testing done on Nexium which only showed significant episodes of weakly alkaline reflux  Continue Nexium 40 mg twice daily before meals  Continue Pepcid 40 mg daily  Hopefully we will be able to wean her off acid reducing medication in the near future  3  Irritable bowel syndrome with constipation  Stable  Continue Amitiza twice daily  I discussed adding MiraLax daily for prevention of constipation  She can adjust dose as necessary for optimal bowel movements  She should increase water in her diet, although she is on a 32 oz fluid restriction  She should also increase fiber in diet as well   I gave her handout on high-fiber foods  Follow-up in 3 months with Dr Tomeka Curtis  ______________________________________________________________________    SUBJECTIVE:  61-year-old female with history of type 2 diabetes mellitus, congestive heart failure, CAD, asthma, obstructive sleep apnea presenting for follow-up of achalasia, GERD, IBS  She continues to report solid food dysphagia  She is able to tolerate liquids  She reports heartburn after meals  She is taking Nexium 40 mg twice daily on an empty stomach  She also takes Pepcid 40 mg daily  She denies nausea and vomiting  She does have bloating and constipation issues  She takes Amitiza twice daily  She has bowel movements every 3-4 days  Sometimes she has multiple episodes of loose stools  When she has not moved her bowels in a few days, she often uses enemas  REVIEW OF SYSTEMS IS OTHERWISE NEGATIVE        Historical Information   Past Medical History:   Diagnosis Date    Anxiety     Asthma     controlled    Back complaints     Cancer ([facility] )     nose    Cellulitis of left lower leg     Chronic bilateral thoracic back pain     Chronic myofascial pain     Chronic pain of both lower extremities     Chronic pain of left knee     Chronic pain syndrome     CPAP (continuous positive airway pressure) dependence     Depression     [facility]     Disease of thyroid gland     Gait disorder     Gastroparesis     GERD (gastroesophageal reflux disease)     History of angina     History of transfusion     Many years ago    Hyperlipidemia     Hypertension     Insulin pump in place     Kidney disease     [facility] with [facility]     PONV (postoperative nausea and vomiting)     S/P insertion of spinal cord stimulator 6/8/2018    Sarcoidosis     Sleep apnea     TIA (transient ischemic attack)      Past Surgical History:   Procedure Laterality Date    BREAST SURGERY      BREAST SURGERY      reduction    CARDIAC PACEMAKER PLACEMENT       SECTION      COLONOSCOPY      HERNIA REPAIR      HYSTERECTOMY      JOINT REPLACEMENT Left     NECK SURGERY      CA SURG IMPLNT [address] Left 2018    Procedure:  Insertion of thoracic spinal cord stimulator electrode via laminotomy and placement of left buttock IPG;  Surgeon: Dorcas Jara MD;  [facility];  Service: Neurosurgery    REPLACEMENT TOTAL KNEE      ROTATOR CUFF REPAIR      SPINAL STIMULATOR PLACEMENT Left 10/3/2018    Procedure: BUTTOCK RE-OPENING INCISION FOR REPOSITIONING OF IMPLANTABLE PULSE GENERATOR;  Surgeon: Dorcas Jara MD;  [facility];  Service: Neurosurgery    TONSILLECTOMY      UPPER GASTROINTESTINAL ENDOSCOPY      WISDOM TOOTH EXTRACTION       Social History   Social History     Substance and Sexual Activity   Alcohol Use No     Social History     Substance and Sexual Activity   Drug Use Yes    Types: Marijuana    Comment: Medical Marijuana     Social History     Tobacco Use   Smoking Status Never Smoker   Smokeless Tobacco Never Used     Family History   Problem Relation Age of Onset    Diabetes Family     Heart disease Family     Hypertension Family     Neuropathy Family     No Known Problems Mother     Heart disease Father     Diabetes Father     No Known Problems Maternal Grandmother     No Known Problems Maternal Grandfather     No Known Problems Paternal Grandmother     No Known Problems Paternal Grandfather        Meds/Allergies       Current Outpatient Medications:     amitriptyline (ELAVIL) 50 mg tablet    amoxicillin (AMOXIL) 500 mg capsule    Armodafinil 150 MG tablet    aspirin (ECOTRIN LOW STRENGTH) 81 mg EC tablet    atorvastatin (LIPITOR) 80 mg tablet    calcitriol (ROCALTROL) 0 5 MCG capsule    calcium carbonate-vitamin D (OSCAL-D) 500 mg-200 units per tablet    carvedilol (COREG) 3 125 mg tablet    CRANBERRY PO    Cyanocobalamin (VITAMIN B-12 PO)    dicyclomine (BENTYL) 10 mg capsule    Docusate Sodium 100 MG capsule    doxycycline (ADOXA) 100 MG tablet    esomeprazole (NexIUM) 40 MG capsule    famotidine (PEPCID) 20 mg tablet    famotidine (PEPCID) 40 MG tablet    ferrous sulfate 324 (65 Fe) mg    fluticasone-salmeterol (ADVAIR) 100-50 mcg/dose inhaler    glucagon 1 MG injection    glucose blood test strip    hydroxychloroquine (PLAQUENIL) 200 mg tablet    levothyroxine 75 mcg tablet    losartan (COZAAR) 100 MG tablet    lubiprostone (AMITIZA) 24 mcg capsule    NOVOLOG 100 UNIT/ML injection    ondansetron (ZOFRAN) 8 mg tablet    oxybutynin (DITROPAN XL) 15 MG 24 hr tablet    Potassium Gluconate 595 MG CAPS    pramipexole (MIRAPEX) 1 mg tablet    sertraline (ZOLOFT) 50 mg tablet    SYMBICORT 80-4 5 MCG/ACT inhaler    torsemide (DEMADEX) 20 mg tablet    VENTOLIN  (90 Base) MCG/ACT inhaler    cholecalciferol (VITAMIN D3) 1,000 units tablet    cyclobenzaprine (FLEXERIL) 10 mg tablet    fenofibrate (TRICOR) 48 mg tablet    furosemide (LASIX) 20 mg tablet    hydrALAZINE (APRESOLINE) 10 mg tablet    magnesium oxide (MAG-OX) 400 mg tablet    Allergies   Allergen Reactions    Bactrim [Sulfamethoxazole-Trimethoprim] Hives    Sucralfate Hives     Facial swelling    Topamax [Topiramate]      disorentation    Azithromycin Itching    Pregabalin Confusion and Other (See Comments)     altered mental status    Ciprofloxacin Drowsiness     Other reaction(s): Other (See Comments)  "drowsiness"    Norvasc [Amlodipine] Swelling    Baclofen      "That knocks me out "    Bupropion Fatigue    Methotrexate Nausea Only    Neurontin [Gabapentin] Hallucinations and Hypertension           Objective     Blood pressure 148/80, temperature 98 3 °F (36 8 °C), temperature source Tympanic, height 5' 2" (1 575 m), weight 93 kg (205 lb)  Body mass index is 37 49 kg/m²        PHYSICAL EXAM:      General Appearance:   Alert, cooperative, no distress   HEENT:   Normocephalic, atraumatic, anicteric      Neck:  Supple, symmetrical, trachea midline   Lungs:   Clear to auscultation bilaterally; no rales, rhonchi or wheezing; respirations unlabored    Heart[de-identified]   Regular rate and rhythm; no murmur, rub, or gallop  Abdomen:   Soft, non-tender, non-distended; normal bowel sounds; no masses, no organomegaly    Genitalia:   Deferred    Rectal:   Deferred    Extremities:  No cyanosis, clubbing or edema    Pulses:  2+ and symmetric    Skin:  No jaundice, rashes, or lesions    Lymph nodes:  No palpable cervical lymphadenopathy        Lab Results:   No visits with results within 1 Day(s) from this visit  Latest known visit with results is:   Hospital Outpatient Visit on 01/29/2021   Component Date Value    Ventricular Rate 01/29/2021 94     Atrial Rate 01/29/2021 94     RI Interval 01/29/2021 200     QRSD Interval 01/29/2021 114     QT Interval 01/29/2021 390     QTC Interval 01/29/2021 487     P Axis 01/29/2021 36     QRS Axis 01/29/2021 92     T Wave Axis 01/29/2021 33          Radiology Results:   No results found

## 2021-04-20 NOTE — PROGRESS NOTES
[facility] - Outpatient Follow-up Note  Dave Lafleur 60 y o  female MRN: [id number]  Encounter: [id number]          ASSESSMENT AND PLAN:      1  Achalasia  She was recently diagnosed with type 2 achalasia  She was seen by thoracic surgery and is scheduled for Heller myotomy with Nissen fundoplication next month  Since she is able to tolerate liquids, I recommended full liquid diet including shakes, smoothies, pudding, cream soups, etc  She can also drink Ensure or boost supplements for nutrition  2  Gastroesophageal reflux disease with esophagitis without hemorrhage  Stable  She had EGD in August 2020 which showed normal esophagus although biopsies were positive for chronic inflammation  She had 24 hour esophageal pH testing done on Nexium which only showed significant episodes of weakly alkaline reflux  Continue Nexium 40 mg twice daily before meals  Continue Pepcid 40 mg daily  Hopefully we will be able to wean her off acid reducing medication in the near future  3  Irritable bowel syndrome with constipation  Stable  Continue Amitiza twice daily  I discussed adding MiraLax daily for prevention of constipation  She can adjust dose as necessary for optimal bowel movements  She should increase water in her diet, although she is on a 32 oz fluid restriction  She should also increase fiber in diet as well  I gave her handout on high-fiber foods  Follow-up in 3 months with Dr Jase Solis  ______________________________________________________________________    SUBJECTIVE:  61-year-old female with history of type 2 diabetes mellitus, congestive heart failure, CAD, asthma, obstructive sleep apnea presenting for follow-up of achalasia, GERD, IBS  She continues to report solid food dysphagia  She is able to tolerate liquids  She reports heartburn after meals  She is taking Nexium 40 mg twice daily on an empty stomach  She also takes Pepcid 40 mg daily    She denies nausea and vomiting  She does have bloating and constipation issues  She takes Amitiza twice daily  She has bowel movements every 3-4 days  Sometimes she has multiple episodes of loose stools  When she has not moved her bowels in a few days, she often uses enemas  REVIEW OF SYSTEMS IS OTHERWISE NEGATIVE  Historical Information   Past Medical History:   Diagnosis Date    Anxiety     Asthma     controlled    Back complaints     Cancer ([place] [address] )     nose    Cellulitis of left lower leg     Chronic bilateral thoracic back pain     Chronic myofascial pain     Chronic pain of both lower extremities     Chronic pain of left knee     Chronic pain syndrome     CPAP (continuous positive airway pressure) dependence     Depression     Diabetes mellitus ([facility] )     Disease of thyroid gland     Gait disorder     Gastroparesis     GERD (gastroesophageal reflux disease)     History of angina     History of transfusion     Many years ago    Hyperlipidemia     Hypertension     Insulin pump in place     Kidney disease     Polyneuropathy associated with underlying disease ([place] [address] )     PONV (postoperative nausea and vomiting)     S/P insertion of spinal cord stimulator 2018    Sarcoidosis     Sleep apnea     TIA (transient ischemic attack)      Past Surgical History:   Procedure Laterality Date    BREAST SURGERY      BREAST SURGERY      reduction    CARDIAC PACEMAKER PLACEMENT       SECTION      COLONOSCOPY      HERNIA REPAIR      HYSTERECTOMY      JOINT REPLACEMENT Left     NECK SURGERY      AL SURG IMPLNT Clayton [place] Left 2018    Procedure:  Insertion of thoracic spinal cord stimulator electrode via laminotomy and placement of left buttock IPG;  Surgeon: Marleny Noe MD;  [facility];  Service: Neurosurgery    REPLACEMENT TOTAL KNEE      ROTATOR CUFF REPAIR      SPINAL STIMULATOR PLACEMENT Left 10/3/2018    Procedure: BUTTOCK RE-OPENING INCISION FOR REPOSITIONING OF IMPLANTABLE PULSE GENERATOR;  Surgeon: Marybeth Balck MD;  [facility];  Service: Neurosurgery    TONSILLECTOMY      UPPER GASTROINTESTINAL ENDOSCOPY      WISDOM TOOTH EXTRACTION       Social History   Social History     Substance and Sexual Activity   Alcohol Use No     Social History     Substance and Sexual Activity   Drug Use Yes    Types: Marijuana    Comment: Medical Marijuana     Social History     Tobacco Use   Smoking Status Never Smoker   Smokeless Tobacco Never Used     Family History   Problem Relation Age of Onset    Diabetes Family     Heart disease Family     Hypertension Family     Neuropathy Family     No Known Problems Mother     Heart disease Father     Diabetes Father     No Known Problems Maternal Grandmother     No Known Problems Maternal Grandfather     No Known Problems Paternal Grandmother     No Known Problems Paternal Grandfather        Meds/Allergies       Current Outpatient Medications:     amitriptyline (ELAVIL) 50 mg tablet    amoxicillin (AMOXIL) 500 mg capsule    Armodafinil 150 MG tablet    aspirin (ECOTRIN LOW STRENGTH) 81 mg EC tablet    atorvastatin (LIPITOR) 80 mg tablet    calcitriol (ROCALTROL) 0 5 MCG capsule    calcium carbonate-vitamin D (OSCAL-D) 500 mg-200 units per tablet    carvedilol (COREG) 3 125 mg tablet    CRANBERRY PO    Cyanocobalamin (VITAMIN B-12 PO)    dicyclomine (BENTYL) 10 mg capsule    Docusate Sodium 100 MG capsule    doxycycline (ADOXA) 100 MG tablet    esomeprazole (NexIUM) 40 MG capsule    famotidine (PEPCID) 20 mg tablet    famotidine (PEPCID) 40 MG tablet    ferrous sulfate 324 (65 Fe) mg    fluticasone-salmeterol (ADVAIR) 100-50 mcg/dose inhaler    glucagon 1 MG injection    glucose blood test strip    hydroxychloroquine (PLAQUENIL) 200 mg tablet    levothyroxine 75 mcg tablet    losartan (COZAAR) 100 MG tablet    lubiprostone (AMITIZA) 24 mcg capsule    NOVOLOG 100 UNIT/ML injection    ondansetron (ZOFRAN) 8 mg tablet    oxybutynin (DITROPAN XL) 15 MG 24 hr tablet    Potassium Gluconate 595 MG CAPS    pramipexole (MIRAPEX) 1 mg tablet    sertraline (ZOLOFT) 50 mg tablet    SYMBICORT 80-4 5 MCG/ACT inhaler    torsemide (DEMADEX) 20 mg tablet    VENTOLIN  (90 Base) MCG/ACT inhaler    cholecalciferol (VITAMIN D3) 1,000 units tablet    cyclobenzaprine (FLEXERIL) 10 mg tablet    fenofibrate (TRICOR) 48 mg tablet    furosemide (LASIX) 20 mg tablet    hydrALAZINE (APRESOLINE) 10 mg tablet    magnesium oxide (MAG-OX) 400 mg tablet    Allergies   Allergen Reactions    Bactrim [Sulfamethoxazole-Trimethoprim] Hives    Sucralfate Hives     Facial swelling    Topamax [Topiramate]      disorentation    Azithromycin Itching    Pregabalin Confusion and Other (See Comments)     altered mental status    Ciprofloxacin Drowsiness     Other reaction(s): Other (See Comments)  "drowsiness"    Norvasc [Amlodipine] Swelling    Baclofen      "That knocks me out "    Bupropion Fatigue    Methotrexate Nausea Only    Neurontin [Gabapentin] Hallucinations and Hypertension           Objective     Blood pressure 148/80, temperature 98 3 °F (36 8 °C), temperature source Tympanic, height 5' 2" (1 575 m), weight 93 kg (205 lb)  Body mass index is 37 49 kg/m²  PHYSICAL EXAM:      General Appearance:   Alert, cooperative, no distress   HEENT:   Normocephalic, atraumatic, anicteric      Neck:  Supple, symmetrical, trachea midline   Lungs:   Clear to auscultation bilaterally; no rales, rhonchi or wheezing; respirations unlabored    Heart[de-identified]   Regular rate and rhythm; no murmur, rub, or gallop     Abdomen:   Soft, non-tender, non-distended; normal bowel sounds; no masses, no organomegaly    Genitalia:   Deferred    Rectal:   Deferred    Extremities:  No cyanosis, clubbing or edema    Pulses:  2+ and symmetric    Skin:  No jaundice, rashes, or lesions    Lymph nodes:  No palpable cervical lymphadenopathy        Lab Results:   No visits with results within 1 Day(s) from this visit  Latest known visit with results is:   Hospital Outpatient Visit on 01/29/2021   Component Date Value    Ventricular Rate 01/29/2021 94     Atrial Rate 01/29/2021 94     ND Interval 01/29/2021 200     QRSD Interval 01/29/2021 114     QT Interval 01/29/2021 390     QTC Interval 01/29/2021 487     P Axis 01/29/2021 36     QRS Axis 01/29/2021 92     T Wave Axis 01/29/2021 33          Radiology Results:   No results found

## 2021-04-22 ENCOUNTER — IMMUNIZATIONS (OUTPATIENT)
Dept: FAMILY MEDICINE CLINIC | Facility: HOSPITAL | Age: 66
End: 2021-04-22

## 2021-04-22 DIAGNOSIS — Z23 ENCOUNTER FOR IMMUNIZATION: Primary | ICD-10-CM

## 2021-04-22 PROCEDURE — 91301 SARS-COV-2 / COVID-19 MRNA VACCINE (MODERNA) 100 MCG: CPT

## 2021-04-22 PROCEDURE — 0012A SARS-COV-2 / COVID-19 MRNA VACCINE (MODERNA) 100 MCG: CPT

## 2021-04-27 ENCOUNTER — APPOINTMENT (OUTPATIENT)
Dept: LAB | Facility: CLINIC | Age: 66
End: 2021-04-27
Payer: MEDICARE

## 2021-04-27 ENCOUNTER — APPOINTMENT (OUTPATIENT)
Dept: LAB | Facility: HOSPITAL | Age: 66
End: 2021-04-27
Payer: MEDICARE

## 2021-04-27 DIAGNOSIS — N28.9 KIDNEY DISEASE DUE TO SECONDARY DIABETES MELLITUS (HCC): ICD-10-CM

## 2021-04-27 DIAGNOSIS — E13.29 KIDNEY DISEASE DUE TO SECONDARY DIABETES MELLITUS (HCC): ICD-10-CM

## 2021-04-27 DIAGNOSIS — K22.0 ACHALASIA: ICD-10-CM

## 2021-04-27 LAB
ABO GROUP BLD: NORMAL
APTT PPP: 35 SECONDS (ref 23–37)
BLD GP AB SCN SERPL QL: NEGATIVE
INR PPP: 1.15 (ref 0.84–1.19)
PROTHROMBIN TIME: 14.7 SECONDS (ref 11.6–14.5)
RH BLD: POSITIVE
SPECIMEN EXPIRATION DATE: NORMAL

## 2021-04-27 PROCEDURE — 86850 RBC ANTIBODY SCREEN: CPT

## 2021-04-27 PROCEDURE — 85730 THROMBOPLASTIN TIME PARTIAL: CPT

## 2021-04-27 PROCEDURE — U0003 INFECTIOUS AGENT DETECTION BY NUCLEIC ACID (DNA OR RNA); SEVERE ACUTE RESPIRATORY SYNDROME CORONAVIRUS 2 (SARS-COV-2) (CORONAVIRUS DISEASE [COVID-19]), AMPLIFIED PROBE TECHNIQUE, MAKING USE OF HIGH THROUGHPUT TECHNOLOGIES AS DESCRIBED BY CMS-2020-01-R: HCPCS | Performed by: PHYSICIAN ASSISTANT

## 2021-04-27 PROCEDURE — 86901 BLOOD TYPING SEROLOGIC RH(D): CPT

## 2021-04-27 PROCEDURE — 86900 BLOOD TYPING SEROLOGIC ABO: CPT

## 2021-04-27 PROCEDURE — 36415 COLL VENOUS BLD VENIPUNCTURE: CPT

## 2021-04-27 PROCEDURE — 85610 PROTHROMBIN TIME: CPT

## 2021-04-27 PROCEDURE — U0005 INFEC AGEN DETEC AMPLI PROBE: HCPCS | Performed by: PHYSICIAN ASSISTANT

## 2021-04-28 LAB — SARS-COV-2 RNA RESP QL NAA+PROBE: NEGATIVE

## 2021-04-29 ENCOUNTER — TELEMEDICINE (OUTPATIENT)
Dept: CARDIAC SURGERY | Facility: CLINIC | Age: 66
End: 2021-04-29
Payer: MEDICARE

## 2021-04-29 DIAGNOSIS — K22.0 ACHALASIA: Primary | ICD-10-CM

## 2021-04-29 DIAGNOSIS — N18.30 ACUTE RENAL FAILURE SUPERIMPOSED ON STAGE 3 CHRONIC KIDNEY DISEASE, UNSPECIFIED ACUTE RENAL FAILURE TYPE, UNSPECIFIED WHETHER STAGE 3A OR 3B CKD (HCC): Primary | ICD-10-CM

## 2021-04-29 DIAGNOSIS — R79.9 ABNORMAL FINDING OF BLOOD CHEMISTRY, UNSPECIFIED: ICD-10-CM

## 2021-04-29 DIAGNOSIS — N17.9 ACUTE RENAL FAILURE SUPERIMPOSED ON STAGE 3 CHRONIC KIDNEY DISEASE, UNSPECIFIED ACUTE RENAL FAILURE TYPE, UNSPECIFIED WHETHER STAGE 3A OR 3B CKD (HCC): Primary | ICD-10-CM

## 2021-04-29 PROCEDURE — 99213 OFFICE O/P EST LOW 20 MIN: CPT | Performed by: THORACIC SURGERY (CARDIOTHORACIC VASCULAR SURGERY)

## 2021-04-29 NOTE — PROGRESS NOTES
Virtual Regular Visit      Assessment/Plan:    Problem List Items Addressed This Visit        Digestive    Achalasia - Primary     Pete Barraza is a 60 y o  female   Who is known to me  She is being worked up for her achalasia  She will be undergoing a robotic Heller myotomy on May 3rd  Today during the visit, I answered any additional questions that she had  She is scheduled to proceed on May 3rd  Consent was previously obtained at her last visit for a robotic Heller myotomy with Kareem fundoplication  Yair Jacinto MD  Thoracic Surgery  (Available on [employer])  Office: [phone]           Relevant Orders    HEMOGLOBIN A1C W/ EAG ESTIMATION      Other Visit Diagnoses     Abnormal finding of blood chemistry, unspecified         Relevant Orders    HEMOGLOBIN A1C W/ EAG ESTIMATION               Reason for visit is   Chief Complaint   Patient presents with    Virtual Regular Visit        Encounter provider Neil Koehler MD    Provider located at [address] [place] [id number] [phone]      Recent Visits  No visits were found meeting these conditions  Showing recent visits within past 7 days and meeting all other requirements     Today's Visits  Date Type Provider Dept   04/29/21 Jeana Pierre MD [facility]   Showing today's visits and meeting all other requirements     Future Appointments  No visits were found meeting these conditions  Showing future appointments within next 150 days and meeting all other requirements        The patient was identified by name and date of birth  Pete Barraza was informed that this is a telemedicine visit and that the visit is being conducted through [address] [place] [address] and patient was informed that this is not a secure, HIPAA-compliant platform  She agrees to proceed     My office door was closed  No one else was in the room    She acknowledged consent and understanding of privacy and security of the video platform  The patient has agreed to participate and understands they can discontinue the visit at any time  Patient is aware this is a billable service  Subjective  Eliu Luna is a 60 y o  female   Who is known to me  She is being worked up for her achalasia  She will be undergoing a robotic Heller myotomy on May 3rd  She presents to the visit today for an updated H& P  Today during the visit, she states that she is doing well  She feels that her regurgitation and emesis have worsened in the time since I have last seen her  Otherwise, she is stable  She has not had any additional admissions to the hospital for her congestive heart failure  She denies any new symptoms of shortness of breath or chest pain  Hardy CHICAS     Past Medical History:   Diagnosis Date    Anxiety     Asthma     controlled    Back complaints     Cancer ([facility] )     nose    Cellulitis of left lower leg     Chronic bilateral thoracic back pain     Chronic myofascial pain     Chronic pain of both lower extremities     Chronic pain of left knee     Chronic pain syndrome     CPAP (continuous positive airway pressure) dependence     Depression     Diabetes mellitus ([address] )     Disease of thyroid gland     Gait disorder     Gastroparesis     GERD (gastroesophageal reflux disease)     History of angina     History of transfusion     Many years ago    Hyperlipidemia     Hypertension     Insulin pump in place     Kidney disease     Polyneuropathy associated with underlying disease ([address] )     PONV (postoperative nausea and vomiting)     S/P insertion of spinal cord stimulator 2018    Sarcoidosis     Sleep apnea     TIA (transient ischemic attack)        Past Surgical History:   Procedure Laterality Date    BREAST SURGERY      BREAST SURGERY      reduction    CARDIAC PACEMAKER PLACEMENT       SECTION      COLONOSCOPY      HERNIA REPAIR      HYSTERECTOMY      JOINT REPLACEMENT Left     NECK SURGERY      GA SURG IMPLNT Christiano Richards Left 4/23/2018    Procedure: Insertion of thoracic spinal cord stimulator electrode via laminotomy and placement of left buttock IPG;  Surgeon: Nyasia Regalado MD;  [facility];  Service: Neurosurgery    REPLACEMENT TOTAL KNEE      ROTATOR CUFF REPAIR      SPINAL STIMULATOR PLACEMENT Left 10/3/2018    Procedure: BUTTOCK RE-OPENING INCISION FOR REPOSITIONING OF IMPLANTABLE PULSE GENERATOR;  Surgeon: Nyasia Regalado MD;  [facility];  Service: Neurosurgery    TONSILLECTOMY      UPPER GASTROINTESTINAL ENDOSCOPY      WISDOM TOOTH EXTRACTION         Current Outpatient Medications   Medication Sig Dispense Refill    amitriptyline (ELAVIL) 50 mg tablet Take one tablet p o  at bedtime for two weeks  If insomnia persists, increase to two tablets at bedtime 60 tablet 2    amoxicillin (AMOXIL) 500 mg capsule TAKE 1 CAPSULE BY MOUTH 3 TIMES A DAY FOR 4 DAYS        Armodafinil 150 MG tablet Take 1 tablet (150 mg total) by mouth daily 90 tablet 0    aspirin (ECOTRIN LOW STRENGTH) 81 mg EC tablet Take 81 mg by mouth daily      atorvastatin (LIPITOR) 80 mg tablet Take 80 mg by mouth daily at bedtime        calcitriol (ROCALTROL) 0 5 MCG capsule TAKE ONE CAPSULE BY MOUTH TWO TIMES A DAY FOR 7 DAYS      calcium carbonate-vitamin D (OSCAL-D) 500 mg-200 units per tablet TAKE TWO TABLETS BY MOUTH THREE TIMES A DAY WITH MEALS      carvedilol (COREG) 3 125 mg tablet Take 3 125 mg by mouth      cholecalciferol (VITAMIN D3) 1,000 units tablet Take 2,000 Units by mouth daily with lunch    5    CRANBERRY PO Take 1 capsule by mouth daily with lunch        Cyanocobalamin (VITAMIN B-12 PO) Take 1 capsule by mouth daily with lunch        cyclobenzaprine (FLEXERIL) 10 mg tablet Take 1 tablet (10 mg total) by mouth 3 (three) times a day as needed for muscle spasms 90 tablet 0    dicyclomine (BENTYL) 10 mg capsule Take 1 capsule (10 mg total) by mouth 4 (four) times a day as needed (abdominal pain) 60 capsule 2    Docusate Sodium 100 MG capsule Take 100 mg by mouth daily with lunch        doxycycline (ADOXA) 100 MG tablet TAKE 1 TABLET BY MOUTH TWICE A DAY FOR 10 DAYS      esomeprazole (NexIUM) 40 MG capsule Take 1 capsule (40 mg total) by mouth 2 (two) times a day before meals 180 capsule 3    famotidine (PEPCID) 20 mg tablet       famotidine (PEPCID) 40 MG tablet Take 1 tablet (40 mg total) by mouth daily 30 tablet 0    fenofibrate (TRICOR) 48 mg tablet       ferrous sulfate 324 (65 Fe) mg Take 65 mg by mouth      fluticasone-salmeterol (ADVAIR) 100-50 mcg/dose inhaler Inhale 2 puffs      furosemide (LASIX) 20 mg tablet Take 60 mg by mouth 2 (two) times a day       glucagon 1 MG injection Glucagon Emergency Kit (human-recomb) 1 mg solution for injection      glucose blood test strip Testing six times daily  E11 65 on insulin pump      hydrALAZINE (APRESOLINE) 10 mg tablet       hydroxychloroquine (PLAQUENIL) 200 mg tablet       levothyroxine 75 mcg tablet Take 75 mcg by mouth daily in the early morning        losartan (COZAAR) 100 MG tablet Take 50 mg by mouth daily in the early morning       lubiprostone (AMITIZA) 24 mcg capsule Take 1 capsule (24 mcg total) by mouth 2 (two) times a day with meals 48 capsule 0    magnesium oxide (MAG-OX) 400 mg tablet Take 500 mg by mouth       NOVOLOG 100 UNIT/ML injection INJECT 120 UNITS DAILY VIA INSULIN PUMP E 11 65  3    ondansetron (ZOFRAN) 8 mg tablet Take 8 mg by mouth every 8 (eight) hours as needed        oxybutynin (DITROPAN XL) 15 MG 24 hr tablet Take 1 tablet (15 mg total) by mouth daily 30 tablet 3    Potassium Gluconate 595 MG CAPS Take by mouth      pramipexole (MIRAPEX) 1 mg tablet Take 1 mg by mouth 2 (two) times a day        sertraline (ZOLOFT) 50 mg tablet Take 50 mg by mouth daily      SYMBICORT 80-4 5 MCG/ACT inhaler Inhale 2 puffs 2 (two) times a day        torsemide (DEMADEX) 20 mg tablet Take 20 mg by mouth daily      VENTOLIN  (90 Base) MCG/ACT inhaler 2 puffs every 4 (four) hours as needed         No current facility-administered medications for this visit  Allergies   Allergen Reactions    Bactrim [Sulfamethoxazole-Trimethoprim] Hives    Sucralfate Hives     Facial swelling    Topamax [Topiramate]      disorentation    Azithromycin Itching    Pregabalin Confusion and Other (See Comments)     altered mental status    Ciprofloxacin Drowsiness     Other reaction(s): Other (See Comments)  "drowsiness"    Norvasc [Amlodipine] Swelling    Baclofen      "That knocks me out "    Bupropion Fatigue    Methotrexate Nausea Only    Neurontin [Gabapentin] Hallucinations and Hypertension       Review of Systems   Constitutional: Negative for chills, fatigue, fever and unexpected weight change  HENT: Negative  Eyes: Negative  Negative for visual disturbance  Respiratory: Negative for cough, shortness of breath and stridor  Cardiovascular: Negative for chest pain  Gastrointestinal: Positive for nausea and vomiting  Endocrine: Negative  Genitourinary: Negative  Musculoskeletal: Negative  Skin: Negative  Neurological: Negative for dizziness, light-headedness and headaches  Hematological: Negative for adenopathy  Psychiatric/Behavioral: Negative  Video Exam    There were no vitals filed for this visit  Physical Exam  Constitutional:       General: She is not in acute distress  Appearance: She is well-developed  She is not diaphoretic  HENT:      Head: Normocephalic and atraumatic  Nose: Nose normal    Eyes:      General: No scleral icterus  Conjunctiva/sclera: Conjunctivae normal    Neck:      Musculoskeletal: Normal range of motion  Pulmonary:      Effort: Pulmonary effort is normal  No respiratory distress  Breath sounds: No stridor     Skin:     Coloration: Skin is not pale  Findings: No erythema or rash  Neurological:      Mental Status: She is alert and oriented to person, place, and time  Psychiatric:         Behavior: Behavior normal          Thought Content: Thought content normal          Judgment: Judgment normal           I spent 15 minutes directly with the patient during this visit      Cornelmonae Jensen acknowledges that she has consented to an online visit or consultation  She understands that the online visit is based solely on information provided by her, and that, in the absence of a face-to-face physical evaluation by the physician, the diagnosis she receives is both limited and provisional in terms of accuracy and completeness  This is not intended to replace a full medical face-to-face evaluation by the physician  Stephanie Velasquez understands and accepts these terms

## 2021-04-29 NOTE — ASSESSMENT & PLAN NOTE
Nila Putnam is a 60 y o  female   Who is known to me  She is being worked up for her achalasia  She will be undergoing a robotic Heller myotomy on May 3rd  Today during the visit, I answered any additional questions that she had  She is scheduled to proceed on May 3rd  Consent was previously obtained at her last visit for a robotic Heller myotomy with Kareem fundoplication      Breann Winn MD  Thoracic Surgery  (Available on [employer])  Office: [phone]

## 2021-04-29 NOTE — H&P (VIEW-ONLY)
Virtual Regular Visit      Assessment/Plan:    Problem List Items Addressed This Visit        Digestive    Achalasia - Primary     Ancelmo Mahoney is a 60 y o  female   Who is known to me  She is being worked up for her achalasia  She will be undergoing a robotic Heller myotomy on May 3rd  Today during the visit, I answered any additional questions that she had  She is scheduled to proceed on May 3rd  Consent was previously obtained at her last visit for a robotic Heller myotomy with Kareem fundoplication  Compa Hammonds MD  Thoracic Surgery  (Available on [employer] Text)  Office: [phone]           Relevant Orders    HEMOGLOBIN A1C W/ EAG ESTIMATION      Other Visit Diagnoses     Abnormal finding of blood chemistry, unspecified         Relevant Orders    HEMOGLOBIN A1C W/ EAG ESTIMATION               Reason for visit is   Chief Complaint   Patient presents with    Virtual Regular Visit        Encounter provider Jason Ann MD    Provider located at [address] [place] [id number] [phone]      Recent Visits  No visits were found meeting these conditions  Showing recent visits within past 7 days and meeting all other requirements     Today's Visits  Date Type Provider Dept   04/29/21 Jeana Pierre MD [facility]   Showing today's visits and meeting all other requirements     Future Appointments  No visits were found meeting these conditions  Showing future appointments within next 150 days and meeting all other requirements        The patient was identified by name and date of birth  Ancelmo Mahoney was informed that this is a telemedicine visit and that the visit is being conducted through [address] [place] [address] and patient was informed that this is not a secure, HIPAA-compliant platform  She agrees to proceed     My office door was closed  No one else was in the room    She acknowledged consent and understanding of privacy and security of the video platform  The patient has agreed to participate and understands they can discontinue the visit at any time  Patient is aware this is a billable service  Subjective  Nina Jessica is a 60 y o  female   Who is known to me  She is being worked up for her achalasia  She will be undergoing a robotic Heller myotomy on May 3rd  She presents to the visit today for an updated H& P  Today during the visit, she states that she is doing well  She feels that her regurgitation and emesis have worsened in the time since I have last seen her  Otherwise, she is stable  She has not had any additional admissions to the hospital for her congestive heart failure  She denies any new symptoms of shortness of breath or chest pain  [facility]     Past Medical History:   Diagnosis Date    Anxiety     Asthma     controlled    Back complaints     Cancer [facility] [facility]     nose    Cellulitis of left lower leg     Chronic bilateral thoracic back pain     Chronic myofascial pain     Chronic pain of both lower extremities     Chronic pain of left knee     Chronic pain syndrome     CPAP (continuous positive airway pressure) dependence     Depression     Diabetes mellitus ([address] )     Disease of thyroid gland     Gait disorder     Gastroparesis     GERD (gastroesophageal reflux disease)     History of angina     History of transfusion     Many years ago    Hyperlipidemia     Hypertension     Insulin pump in place     Kidney disease     Polyneuropathy associated with underlying disease ([address] )     PONV (postoperative nausea and vomiting)     S/P insertion of spinal cord stimulator 2018    Sarcoidosis     Sleep apnea     TIA (transient ischemic attack)        Past Surgical History:   Procedure Laterality Date    BREAST SURGERY      BREAST SURGERY      reduction    CARDIAC PACEMAKER PLACEMENT       SECTION      COLONOSCOPY      HERNIA REPAIR      HYSTERECTOMY      JOINT REPLACEMENT Left     NECK SURGERY      [place] SURG IM[address]NT [address] Left 4/23/2018    Procedure: Insertion of thoracic spinal cord stimulator electrode via laminotomy and placement of left buttock IPG;  Surgeon: Carmina Madsen MD;  [facility];  Service: Neurosurgery    REPLACEMENT TOTAL KNEE      ROTATOR CUFF REPAIR      SPINAL STIMULATOR PLACEMENT Left 10/3/2018    Procedure: BUTTOCK RE-OPENING INCISION FOR REPOSITIONING OF IMPLANTABLE PULSE GENERATOR;  Surgeon: Carmina Madsen MD;  [facility];  Service: Neurosurgery    TONSILLECTOMY      UPPER GASTROINTESTINAL ENDOSCOPY      WISDOM TOOTH EXTRACTION         Current Outpatient Medications   Medication Sig Dispense Refill    amitriptyline (ELAVIL) 50 mg tablet Take one tablet p o  at bedtime for two weeks  If insomnia persists, increase to two tablets at bedtime 60 tablet 2    amoxicillin (AMOXIL) 500 mg capsule TAKE 1 CAPSULE BY MOUTH 3 TIMES A DAY FOR 4 DAYS        Armodafinil 150 MG tablet Take 1 tablet (150 mg total) by mouth daily 90 tablet 0    aspirin (ECOTRIN LOW STRENGTH) 81 mg EC tablet Take 81 mg by mouth daily      atorvastatin (LIPITOR) 80 mg tablet Take 80 mg by mouth daily at bedtime        calcitriol (ROCALTROL) 0 5 MCG capsule TAKE ONE CAPSULE BY MOUTH TWO TIMES A DAY FOR 7 DAYS      calcium carbonate-vitamin D (OSCAL-D) 500 mg-200 units per tablet TAKE TWO TABLETS BY MOUTH THREE TIMES A DAY WITH MEALS      carvedilol (COREG) 3 125 mg tablet Take 3 125 mg by mouth      cholecalciferol (VITAMIN D3) 1,000 units tablet Take 2,000 Units by mouth daily with lunch    5    CRANBERRY PO Take 1 capsule by mouth daily with lunch        Cyanocobalamin (VITAMIN B-12 PO) Take 1 capsule by mouth daily with lunch        cyclobenzaprine (FLEXERIL) 10 mg tablet Take 1 tablet (10 mg total) by mouth 3 (three) times a day as needed for muscle spasms 90 tablet 0    dicyclomine (BENTYL) 10 mg capsule Take 1 capsule (10 mg total) by mouth 4 (four) times a day as needed (abdominal pain) 60 capsule 2    Docusate Sodium 100 MG capsule Take 100 mg by mouth daily with lunch        doxycycline (ADOXA) 100 MG tablet TAKE 1 TABLET BY MOUTH TWICE A DAY FOR 10 DAYS      esomeprazole (NexIUM) 40 MG capsule Take 1 capsule (40 mg total) by mouth 2 (two) times a day before meals 180 capsule 3    famotidine (PEPCID) 20 mg tablet       famotidine (PEPCID) 40 MG tablet Take 1 tablet (40 mg total) by mouth daily 30 tablet 0    fenofibrate (TRICOR) 48 mg tablet       ferrous sulfate 324 (65 Fe) mg Take 65 mg by mouth      fluticasone-salmeterol (ADVAIR) 100-50 mcg/dose inhaler Inhale 2 puffs      furosemide (LASIX) 20 mg tablet Take 60 mg by mouth 2 (two) times a day       glucagon 1 MG injection Glucagon Emergency Kit (human-recomb) 1 mg solution for injection      glucose blood test strip Testing six times daily  E11 65 on insulin pump      hydrALAZINE (APRESOLINE) 10 mg tablet       hydroxychloroquine (PLAQUENIL) 200 mg tablet       levothyroxine 75 mcg tablet Take 75 mcg by mouth daily in the early morning        losartan (COZAAR) 100 MG tablet Take 50 mg by mouth daily in the early morning       lubiprostone (AMITIZA) 24 mcg capsule Take 1 capsule (24 mcg total) by mouth 2 (two) times a day with meals 48 capsule 0    magnesium oxide (MAG-OX) 400 mg tablet Take 500 mg by mouth       NOVOLOG 100 UNIT/ML injection INJECT 120 UNITS DAILY VIA INSULIN PUMP E 11 65  3    ondansetron (ZOFRAN) 8 mg tablet Take 8 mg by mouth every 8 (eight) hours as needed        oxybutynin (DITROPAN XL) 15 MG 24 hr tablet Take 1 tablet (15 mg total) by mouth daily 30 tablet 3    Potassium Gluconate 595 MG CAPS Take by mouth      pramipexole (MIRAPEX) 1 mg tablet Take 1 mg by mouth 2 (two) times a day        sertraline (ZOLOFT) 50 mg tablet Take 50 mg by mouth daily      SYMBICORT 80-4 5 MCG/ACT inhaler Inhale 2 puffs 2 (two) times a day        torsemide (DEMADEX) 20 mg tablet Take 20 mg by mouth daily      VENTOLIN  (90 Base) MCG/ACT inhaler 2 puffs every 4 (four) hours as needed         No current facility-administered medications for this visit  Allergies   Allergen Reactions    Bactrim [Sulfamethoxazole-Trimethoprim] Hives    Sucralfate Hives     Facial swelling    Topamax [Topiramate]      disorentation    Azithromycin Itching    Pregabalin Confusion and Other (See Comments)     altered mental status    Ciprofloxacin Drowsiness     Other reaction(s): Other (See Comments)  "drowsiness"    Norvasc [Amlodipine] Swelling    Baclofen      "That knocks me out "    Bupropion Fatigue    Methotrexate Nausea Only    Neurontin [Gabapentin] Hallucinations and Hypertension       Review of Systems   Constitutional: Negative for chills, fatigue, fever and unexpected weight change  HENT: Negative  Eyes: Negative  Negative for visual disturbance  Respiratory: Negative for cough, shortness of breath and stridor  Cardiovascular: Negative for chest pain  Gastrointestinal: Positive for nausea and vomiting  Endocrine: Negative  Genitourinary: Negative  Musculoskeletal: Negative  Skin: Negative  Neurological: Negative for dizziness, light-headedness and headaches  Hematological: Negative for adenopathy  Psychiatric/Behavioral: Negative  Video Exam    There were no vitals filed for this visit  Physical Exam  Constitutional:       General: She is not in acute distress  Appearance: She is well-developed  She is not diaphoretic  HENT:      Head: Normocephalic and atraumatic  Nose: Nose normal    Eyes:      General: No scleral icterus  Conjunctiva/sclera: Conjunctivae normal    Neck:      Musculoskeletal: Normal range of motion  Pulmonary:      Effort: Pulmonary effort is normal  No respiratory distress  Breath sounds: No stridor     Skin:     Coloration: Skin is not pale  Findings: No erythema or rash  Neurological:      Mental Status: She is alert and oriented to person, place, and time  Psychiatric:         Behavior: Behavior normal          Thought Content: Thought content normal          Judgment: Judgment normal           I spent 15 minutes directly with the patient during this visit      Denise Jensen acknowledges that she has consented to an online visit or consultation  She understands that the online visit is based solely on information provided by her, and that, in the absence of a face-to-face physical evaluation by the physician, the diagnosis she receives is both limited and provisional in terms of accuracy and completeness  This is not intended to replace a full medical face-to-face evaluation by the physician  Rodrigo Caruso understands and accepts these terms

## 2021-04-30 RX ORDER — MELOXICAM 15 MG/1
15 TABLET ORAL DAILY
COMMUNITY
End: 2021-05-11 | Stop reason: HOSPADM

## 2021-04-30 RX ORDER — SEMAGLUTIDE 1.34 MG/ML
INJECTION, SOLUTION SUBCUTANEOUS
COMMUNITY
End: 2021-08-04 | Stop reason: CLARIF

## 2021-04-30 RX ORDER — LEVOTHYROXINE SODIUM 88 UG/1
88 TABLET ORAL DAILY
COMMUNITY
End: 2022-01-11

## 2021-04-30 RX ORDER — OXYCODONE HYDROCHLORIDE AND ACETAMINOPHEN 5; 325 MG/1; MG/1
1 TABLET ORAL EVERY 4 HOURS PRN
COMMUNITY
End: 2021-08-04 | Stop reason: CLARIF

## 2021-04-30 NOTE — PRE-PROCEDURE INSTRUCTIONS
Pre-Surgery Instructions:   Medication Instructions    amitriptyline (ELAVIL) 50 mg tablet Instructed patient per Anesthesia Guidelines   Armodafinil 150 MG tablet Instructed patient per Anesthesia Guidelines   aspirin (ECOTRIN LOW STRENGTH) 81 mg EC tablet Patient was instructed by Physician and understands  pt reports surgeon told her not to hold aspirin    atorvastatin (LIPITOR) 80 mg tablet Instructed patient per Anesthesia Guidelines   calcium carbonate-vitamin D (OSCAL-D) 500 mg-200 units per tablet Instructed patient per Anesthesia Guidelines   carvedilol (COREG) 3 125 mg tablet Instructed patient per Anesthesia Guidelines   cholecalciferol (VITAMIN D3) 1,000 units tablet Instructed patient per Anesthesia Guidelines   COVID-19 mRNA Virus Vaccine (MODERNA COVID-19 VACCINE IM) Instructed patient per Anesthesia Guidelines   CRANBERRY PO Instructed patient per Anesthesia Guidelines   Cyanocobalamin (VITAMIN B-12 PO) Instructed patient per Anesthesia Guidelines   dicyclomine (BENTYL) 10 mg capsule Instructed patient per Anesthesia Guidelines   Docusate Sodium 100 MG capsule Instructed patient per Anesthesia Guidelines   esomeprazole (NexIUM) 40 MG capsule Instructed patient per Anesthesia Guidelines   famotidine (PEPCID) 40 MG tablet Instructed patient per Anesthesia Guidelines   ferrous sulfate 324 (65 Fe) mg Instructed patient per Anesthesia Guidelines   fluticasone-salmeterol (ADVAIR) 100-50 mcg/dose inhaler Instructed patient per Anesthesia Guidelines   glucagon 1 MG injection Instructed patient per Anesthesia Guidelines   hydroxychloroquine (PLAQUENIL) 200 mg tablet Instructed patient per Anesthesia Guidelines   levothyroxine (Synthroid) 88 mcg tablet Instructed patient per Anesthesia Guidelines   losartan (COZAAR) 100 MG tablet Instructed patient per Anesthesia Guidelines      lubiprostone (AMITIZA) 24 mcg capsule Instructed patient per Anesthesia Guidelines   meloxicam (Mobic) 15 mg tablet Instructed patient per Anesthesia Guidelines   NOVOLOG 100 UNIT/ML injection Instructed patient per Anesthesia Guidelines   oxybutynin (DITROPAN XL) 15 MG 24 hr tablet Instructed patient per Anesthesia Guidelines   Potassium Gluconate 595 MG CAPS Instructed patient per Anesthesia Guidelines   pramipexole (MIRAPEX) 1 mg tablet Instructed patient per Anesthesia Guidelines   Semaglutide,0 25 or 0 5MG/DOS, (Ozempic, 0 25 or 0 5 MG/DOSE,) 2 MG/1 5ML SOPN Instructed patient per Anesthesia Guidelines   sertraline (ZOLOFT) 50 mg tablet Instructed patient per Anesthesia Guidelines   torsemide (DEMADEX) 20 mg tablet Instructed patient per Anesthesia Guidelines   VENTOLIN  (90 Base) MCG/ACT inhaler Instructed patient per Anesthesia Guidelines  Patient  instructed *to take*baby aspirin,levothyroxine,carvedilol,nexium,zoloft,pramipexole with a small amt of water the morning of surgery and wixela IN;if needed glucagon and albuterol IN  Patient  instructed on use of chlorhexidine soap per hospital protocol

## 2021-04-30 NOTE — PRE-PROCEDURE INSTRUCTIONS
Pre-Surgery Instructions:   Medication Instructions    amitriptyline (ELAVIL) 50 mg tablet Instructed patient per Anesthesia Guidelines   Armodafinil 150 MG tablet Instructed patient per Anesthesia Guidelines   aspirin (ECOTRIN LOW STRENGTH) 81 mg EC tablet Patient was instructed by Physician and understands   atorvastatin (LIPITOR) 80 mg tablet Instructed patient per Anesthesia Guidelines   calcium carbonate-vitamin D (OSCAL-D) 500 mg-200 units per tablet Instructed patient per Anesthesia Guidelines   carvedilol (COREG) 3 125 mg tablet Instructed patient per Anesthesia Guidelines   cholecalciferol (VITAMIN D3) 1,000 units tablet Instructed patient per Anesthesia Guidelines   COVID-19 mRNA Virus Vaccine (MODERNA COVID-19 VACCINE IM) Instructed patient per Anesthesia Guidelines   CRANBERRY PO Instructed patient per Anesthesia Guidelines   Cyanocobalamin (VITAMIN B-12 PO) Instructed patient per Anesthesia Guidelines   dicyclomine (BENTYL) 10 mg capsule Instructed patient per Anesthesia Guidelines   Docusate Sodium 100 MG capsule Instructed patient per Anesthesia Guidelines   esomeprazole (NexIUM) 40 MG capsule Instructed patient per Anesthesia Guidelines   famotidine (PEPCID) 40 MG tablet Instructed patient per Anesthesia Guidelines   ferrous sulfate 324 (65 Fe) mg Instructed patient per Anesthesia Guidelines   fluticasone-salmeterol (ADVAIR) 100-50 mcg/dose inhaler Instructed patient per Anesthesia Guidelines   glucagon 1 MG injection Instructed patient per Anesthesia Guidelines   hydroxychloroquine (PLAQUENIL) 200 mg tablet Instructed patient per Anesthesia Guidelines   levothyroxine (Synthroid) 88 mcg tablet Instructed patient per Anesthesia Guidelines   losartan (COZAAR) 100 MG tablet Instructed patient per Anesthesia Guidelines   lubiprostone (AMITIZA) 24 mcg capsule Instructed patient per Anesthesia Guidelines      NOVOLOG 100 UNIT/ML injection Patient was instructed by Physician and understands   oxybutynin (DITROPAN XL) 15 MG 24 hr tablet Instructed patient per Anesthesia Guidelines   oxyCODONE-acetaminophen (PERCOCET) 5-325 mg per tablet Instructed patient per Anesthesia Guidelines   Potassium Gluconate 595 MG CAPS Instructed patient per Anesthesia Guidelines   pramipexole (MIRAPEX) 1 mg tablet Instructed patient per Anesthesia Guidelines   Semaglutide,0 25 or 0 5MG/DOS, (Ozempic, 0 25 or 0 5 MG/DOSE,) 2 MG/1 5ML SOPN Instructed patient per Anesthesia Guidelines   sertraline (ZOLOFT) 50 mg tablet Instructed patient per Anesthesia Guidelines   torsemide (DEMADEX) 20 mg tablet Instructed patient per Anesthesia Guidelines   VENTOLIN  (90 Base) MCG/ACT inhaler Instructed patient per Anesthesia Guidelines  Patient   instructed *to take levothyroxine,carvedilol,nexium,zoloft,pramipexole and baby aspirin**with a small amt of water the morning of surgery and wixela IN; if needed albuterol IN,glucagon, percocet; Patient / instructed on use of chlorhexidine soap per hospital protocol  Emily Foster

## 2021-05-02 ENCOUNTER — ANESTHESIA EVENT (OUTPATIENT)
Dept: PERIOP | Facility: HOSPITAL | Age: 66
DRG: 327 | End: 2021-05-02
Payer: MEDICARE

## 2021-05-03 ENCOUNTER — ANESTHESIA (OUTPATIENT)
Dept: PERIOP | Facility: HOSPITAL | Age: 66
DRG: 327 | End: 2021-05-03
Payer: MEDICARE

## 2021-05-03 ENCOUNTER — APPOINTMENT (INPATIENT)
Dept: RADIOLOGY | Facility: HOSPITAL | Age: 66
DRG: 327 | End: 2021-05-03
Payer: MEDICARE

## 2021-05-03 ENCOUNTER — HOSPITAL ENCOUNTER (INPATIENT)
Facility: HOSPITAL | Age: 66
LOS: 2 days | Discharge: HOME | DRG: 327 | End: 2021-05-05
Attending: THORACIC SURGERY (CARDIOTHORACIC VASCULAR SURGERY) | Admitting: THORACIC SURGERY (CARDIOTHORACIC VASCULAR SURGERY)
Payer: MEDICARE

## 2021-05-03 DIAGNOSIS — K22.0 ACHALASIA: Primary | ICD-10-CM

## 2021-05-03 DIAGNOSIS — Z96.41 INSULIN PUMP STATUS: ICD-10-CM

## 2021-05-03 PROBLEM — Z98.890 PONV (POSTOPERATIVE NAUSEA AND VOMITING): Status: ACTIVE | Noted: 2021-05-03

## 2021-05-03 PROBLEM — R11.2 PONV (POSTOPERATIVE NAUSEA AND VOMITING): Status: ACTIVE | Noted: 2021-05-03

## 2021-05-03 PROBLEM — Z79.899 MEDICAL MARIJUANA USE: Status: ACTIVE | Noted: 2021-05-03

## 2021-05-03 PROBLEM — Z95.0 PACEMAKER: Status: ACTIVE | Noted: 2021-05-03

## 2021-05-03 PROBLEM — E66.9 CLASS 2 OBESITY IN ADULT: Status: ACTIVE | Noted: 2021-05-03

## 2021-05-03 PROBLEM — E66.812 CLASS 2 OBESITY IN ADULT: Status: ACTIVE | Noted: 2021-05-03

## 2021-05-03 LAB
ANION GAP SERPL CALCULATED.3IONS-SCNC: 7 MMOL/L (ref 4–13)
BUN SERPL-MCNC: 35 MG/DL (ref 5–25)
CALCIUM SERPL-MCNC: 8.7 MG/DL (ref 8.3–10.1)
CHLORIDE SERPL-SCNC: 103 MMOL/L (ref 100–108)
CO2 SERPL-SCNC: 28 MMOL/L (ref 21–32)
CREAT SERPL-MCNC: 1.81 MG/DL (ref 0.6–1.3)
ERYTHROCYTE [DISTWIDTH] IN BLOOD BY AUTOMATED COUNT: 14.6 % (ref 11.6–15.1)
GFR SERPL CREATININE-BSD FRML MDRD: 29 ML/MIN/1.73SQ M
GLUCOSE SERPL-MCNC: 180 MG/DL (ref 65–140)
GLUCOSE SERPL-MCNC: 186 MG/DL (ref 65–140)
GLUCOSE SERPL-MCNC: 223 MG/DL (ref 65–140)
GLUCOSE SERPL-MCNC: 229 MG/DL (ref 65–140)
GLUCOSE SERPL-MCNC: 267 MG/DL (ref 65–140)
GLUCOSE SERPL-MCNC: 272 MG/DL (ref 65–140)
GLUCOSE SERPL-MCNC: 282 MG/DL (ref 65–140)
GLUCOSE SERPL-MCNC: 290 MG/DL (ref 65–140)
HCT VFR BLD AUTO: 30.5 % (ref 34.8–46.1)
HGB BLD-MCNC: 9.4 G/DL (ref 11.5–15.4)
MAGNESIUM SERPL-MCNC: 2.4 MG/DL (ref 1.6–2.6)
MCH RBC QN AUTO: 27 PG (ref 26.8–34.3)
MCHC RBC AUTO-ENTMCNC: 30.8 G/DL (ref 31.4–37.4)
MCV RBC AUTO: 88 FL (ref 82–98)
PLATELET # BLD AUTO: 214 THOUSANDS/UL (ref 149–390)
PMV BLD AUTO: 9 FL (ref 8.9–12.7)
POTASSIUM SERPL-SCNC: 4.6 MMOL/L (ref 3.5–5.3)
RBC # BLD AUTO: 3.48 MILLION/UL (ref 3.81–5.12)
SODIUM SERPL-SCNC: 138 MMOL/L (ref 136–145)
WBC # BLD AUTO: 8.39 THOUSAND/UL (ref 4.31–10.16)

## 2021-05-03 PROCEDURE — 0DNW4ZZ RELEASE PERITONEUM, PERCUTANEOUS ENDOSCOPIC APPROACH: ICD-10-PCS | Performed by: THORACIC SURGERY (CARDIOTHORACIC VASCULAR SURGERY)

## 2021-05-03 PROCEDURE — 83735 ASSAY OF MAGNESIUM: CPT | Performed by: PHYSICIAN ASSISTANT

## 2021-05-03 PROCEDURE — NC001 PR NO CHARGE: Performed by: THORACIC SURGERY (CARDIOTHORACIC VASCULAR SURGERY)

## 2021-05-03 PROCEDURE — G9197 ORDER FOR CEPH: HCPCS | Performed by: THORACIC SURGERY (CARDIOTHORACIC VASCULAR SURGERY)

## 2021-05-03 PROCEDURE — 82948 REAGENT STRIP/BLOOD GLUCOSE: CPT

## 2021-05-03 PROCEDURE — 0D844ZZ DIVISION OF ESOPHAGOGASTRIC JUNCTION, PERCUTANEOUS ENDOSCOPIC APPROACH: ICD-10-PCS | Performed by: THORACIC SURGERY (CARDIOTHORACIC VASCULAR SURGERY)

## 2021-05-03 PROCEDURE — 43279 LAP MYOTOMY HELLER: CPT | Performed by: THORACIC SURGERY (CARDIOTHORACIC VASCULAR SURGERY)

## 2021-05-03 PROCEDURE — 71045 X-RAY EXAM CHEST 1 VIEW: CPT

## 2021-05-03 PROCEDURE — 94760 N-INVAS EAR/PLS OXIMETRY 1: CPT

## 2021-05-03 PROCEDURE — 85027 COMPLETE CBC AUTOMATED: CPT | Performed by: PHYSICIAN ASSISTANT

## 2021-05-03 PROCEDURE — 0DV44ZZ RESTRICTION OF ESOPHAGOGASTRIC JUNCTION, PERCUTANEOUS ENDOSCOPIC APPROACH: ICD-10-PCS | Performed by: THORACIC SURGERY (CARDIOTHORACIC VASCULAR SURGERY)

## 2021-05-03 PROCEDURE — 80048 BASIC METABOLIC PNL TOTAL CA: CPT | Performed by: PHYSICIAN ASSISTANT

## 2021-05-03 RX ORDER — PROPOFOL 10 MG/ML
INJECTION, EMULSION INTRAVENOUS AS NEEDED
Status: DISCONTINUED | OUTPATIENT
Start: 2021-05-03 | End: 2021-05-03

## 2021-05-03 RX ORDER — DOCUSATE SODIUM 100 MG/1
100 CAPSULE, LIQUID FILLED ORAL 2 TIMES DAILY
Status: DISCONTINUED | OUTPATIENT
Start: 2021-05-03 | End: 2021-05-05 | Stop reason: HOSPADM

## 2021-05-03 RX ORDER — OXYCODONE HCL 5 MG/5 ML
10 SOLUTION, ORAL ORAL EVERY 4 HOURS PRN
Status: DISCONTINUED | OUTPATIENT
Start: 2021-05-03 | End: 2021-05-05 | Stop reason: HOSPADM

## 2021-05-03 RX ORDER — POLYETHYLENE GLYCOL 3350 17 G/17G
17 POWDER, FOR SOLUTION ORAL DAILY PRN
Status: DISCONTINUED | OUTPATIENT
Start: 2021-05-03 | End: 2021-05-05 | Stop reason: HOSPADM

## 2021-05-03 RX ORDER — FLUTICASONE FUROATE AND VILANTEROL 100; 25 UG/1; UG/1
1 POWDER RESPIRATORY (INHALATION)
Status: DISCONTINUED | OUTPATIENT
Start: 2021-05-04 | End: 2021-05-05 | Stop reason: HOSPADM

## 2021-05-03 RX ORDER — ACETAMINOPHEN 160 MG/5ML
650 SUSPENSION, ORAL (FINAL DOSE FORM) ORAL EVERY 6 HOURS SCHEDULED
Status: DISCONTINUED | OUTPATIENT
Start: 2021-05-03 | End: 2021-05-05 | Stop reason: HOSPADM

## 2021-05-03 RX ORDER — DEXAMETHASONE SODIUM PHOSPHATE 10 MG/ML
INJECTION, SOLUTION INTRAMUSCULAR; INTRAVENOUS AS NEEDED
Status: DISCONTINUED | OUTPATIENT
Start: 2021-05-03 | End: 2021-05-03

## 2021-05-03 RX ORDER — LIDOCAINE HYDROCHLORIDE 10 MG/ML
INJECTION, SOLUTION EPIDURAL; INFILTRATION; INTRACAUDAL; PERINEURAL AS NEEDED
Status: DISCONTINUED | OUTPATIENT
Start: 2021-05-03 | End: 2021-05-03

## 2021-05-03 RX ORDER — OXYCODONE HCL 5 MG/5 ML
5 SOLUTION, ORAL ORAL EVERY 4 HOURS PRN
Status: DISCONTINUED | OUTPATIENT
Start: 2021-05-03 | End: 2021-05-05 | Stop reason: HOSPADM

## 2021-05-03 RX ORDER — ROCURONIUM BROMIDE 10 MG/ML
INJECTION, SOLUTION INTRAVENOUS AS NEEDED
Status: DISCONTINUED | OUTPATIENT
Start: 2021-05-03 | End: 2021-05-03

## 2021-05-03 RX ORDER — CEFAZOLIN SODIUM 2 G/50ML
2000 SOLUTION INTRAVENOUS ONCE
Status: COMPLETED | OUTPATIENT
Start: 2021-05-03 | End: 2021-05-03

## 2021-05-03 RX ORDER — LIDOCAINE HYDROCHLORIDE 10 MG/ML
0.5 INJECTION, SOLUTION EPIDURAL; INFILTRATION; INTRACAUDAL; PERINEURAL ONCE AS NEEDED
Status: DISCONTINUED | OUTPATIENT
Start: 2021-05-03 | End: 2021-05-03 | Stop reason: HOSPADM

## 2021-05-03 RX ORDER — HYDROMORPHONE HCL/PF 1 MG/ML
0.5 SYRINGE (ML) INJECTION
Status: DISCONTINUED | OUTPATIENT
Start: 2021-05-03 | End: 2021-05-03 | Stop reason: HOSPADM

## 2021-05-03 RX ORDER — HYDROXYCHLOROQUINE SULFATE 200 MG/1
400 TABLET, FILM COATED ORAL EVERY EVENING
Status: DISCONTINUED | OUTPATIENT
Start: 2021-05-04 | End: 2021-05-05 | Stop reason: HOSPADM

## 2021-05-03 RX ORDER — MAGNESIUM HYDROXIDE 1200 MG/15ML
LIQUID ORAL AS NEEDED
Status: DISCONTINUED | OUTPATIENT
Start: 2021-05-03 | End: 2021-05-03 | Stop reason: HOSPADM

## 2021-05-03 RX ORDER — AMITRIPTYLINE HYDROCHLORIDE 25 MG/1
25 TABLET, FILM COATED ORAL
Status: DISCONTINUED | OUTPATIENT
Start: 2021-05-04 | End: 2021-05-05 | Stop reason: HOSPADM

## 2021-05-03 RX ORDER — METOPROLOL TARTRATE 5 MG/5ML
5 INJECTION INTRAVENOUS EVERY 6 HOURS SCHEDULED
Status: DISCONTINUED | OUTPATIENT
Start: 2021-05-03 | End: 2021-05-05 | Stop reason: HOSPADM

## 2021-05-03 RX ORDER — LEVOTHYROXINE SODIUM 88 UG/1
88 TABLET ORAL
Status: DISCONTINUED | OUTPATIENT
Start: 2021-05-04 | End: 2021-05-05 | Stop reason: HOSPADM

## 2021-05-03 RX ORDER — SODIUM CHLORIDE, SODIUM LACTATE, POTASSIUM CHLORIDE, CALCIUM CHLORIDE 600; 310; 30; 20 MG/100ML; MG/100ML; MG/100ML; MG/100ML
60 INJECTION, SOLUTION INTRAVENOUS CONTINUOUS
Status: DISCONTINUED | OUTPATIENT
Start: 2021-05-03 | End: 2021-05-04

## 2021-05-03 RX ORDER — ACETAMINOPHEN 325 MG/1
975 TABLET ORAL ONCE
Status: COMPLETED | OUTPATIENT
Start: 2021-05-03 | End: 2021-05-03

## 2021-05-03 RX ORDER — HYDROMORPHONE HCL/PF 1 MG/ML
0.5 SYRINGE (ML) INJECTION EVERY 4 HOURS PRN
Status: DISCONTINUED | OUTPATIENT
Start: 2021-05-03 | End: 2021-05-05 | Stop reason: HOSPADM

## 2021-05-03 RX ORDER — PANTOPRAZOLE SODIUM 40 MG/1
40 INJECTION, POWDER, FOR SOLUTION INTRAVENOUS DAILY
Status: DISCONTINUED | OUTPATIENT
Start: 2021-05-04 | End: 2021-05-05 | Stop reason: SDUPTHER

## 2021-05-03 RX ORDER — SODIUM CHLORIDE, SODIUM LACTATE, POTASSIUM CHLORIDE, CALCIUM CHLORIDE 600; 310; 30; 20 MG/100ML; MG/100ML; MG/100ML; MG/100ML
125 INJECTION, SOLUTION INTRAVENOUS CONTINUOUS
Status: DISCONTINUED | OUTPATIENT
Start: 2021-05-03 | End: 2021-05-03

## 2021-05-03 RX ORDER — EPHEDRINE SULFATE 50 MG/ML
INJECTION INTRAVENOUS AS NEEDED
Status: DISCONTINUED | OUTPATIENT
Start: 2021-05-03 | End: 2021-05-03

## 2021-05-03 RX ORDER — BUPIVACAINE HYDROCHLORIDE 2.5 MG/ML
INJECTION, SOLUTION EPIDURAL; INFILTRATION; INTRACAUDAL AS NEEDED
Status: DISCONTINUED | OUTPATIENT
Start: 2021-05-03 | End: 2021-05-03 | Stop reason: HOSPADM

## 2021-05-03 RX ORDER — SENNOSIDES 8.6 MG
1 TABLET ORAL DAILY
Status: DISCONTINUED | OUTPATIENT
Start: 2021-05-04 | End: 2021-05-05 | Stop reason: HOSPADM

## 2021-05-03 RX ORDER — FENTANYL CITRATE 50 UG/ML
INJECTION, SOLUTION INTRAMUSCULAR; INTRAVENOUS AS NEEDED
Status: DISCONTINUED | OUTPATIENT
Start: 2021-05-03 | End: 2021-05-03

## 2021-05-03 RX ORDER — ONDANSETRON 2 MG/ML
INJECTION INTRAMUSCULAR; INTRAVENOUS AS NEEDED
Status: DISCONTINUED | OUTPATIENT
Start: 2021-05-03 | End: 2021-05-03

## 2021-05-03 RX ORDER — SODIUM CHLORIDE 9 MG/ML
INJECTION, SOLUTION INTRAVENOUS CONTINUOUS PRN
Status: DISCONTINUED | OUTPATIENT
Start: 2021-05-03 | End: 2021-05-03

## 2021-05-03 RX ORDER — ONDANSETRON 2 MG/ML
4 INJECTION INTRAMUSCULAR; INTRAVENOUS EVERY 6 HOURS PRN
Status: DISCONTINUED | OUTPATIENT
Start: 2021-05-03 | End: 2021-05-05 | Stop reason: HOSPADM

## 2021-05-03 RX ORDER — PRAMIPEXOLE DIHYDROCHLORIDE 1 MG/1
1 TABLET ORAL 2 TIMES DAILY
Status: DISCONTINUED | OUTPATIENT
Start: 2021-05-03 | End: 2021-05-05 | Stop reason: HOSPADM

## 2021-05-03 RX ORDER — SODIUM CHLORIDE, SODIUM LACTATE, POTASSIUM CHLORIDE, CALCIUM CHLORIDE 600; 310; 30; 20 MG/100ML; MG/100ML; MG/100ML; MG/100ML
50 INJECTION, SOLUTION INTRAVENOUS CONTINUOUS
Status: DISCONTINUED | OUTPATIENT
Start: 2021-05-03 | End: 2021-05-04

## 2021-05-03 RX ORDER — ASPIRIN 81 MG/1
81 TABLET ORAL EVERY EVENING
Status: DISCONTINUED | OUTPATIENT
Start: 2021-05-03 | End: 2021-05-05 | Stop reason: HOSPADM

## 2021-05-03 RX ORDER — MIDAZOLAM HYDROCHLORIDE 2 MG/2ML
INJECTION, SOLUTION INTRAMUSCULAR; INTRAVENOUS AS NEEDED
Status: DISCONTINUED | OUTPATIENT
Start: 2021-05-03 | End: 2021-05-03

## 2021-05-03 RX ADMIN — PHENYLEPHRINE HYDROCHLORIDE 100 MCG/MIN: 10 INJECTION INTRAVENOUS at 11:39

## 2021-05-03 RX ADMIN — PHENYLEPHRINE HYDROCHLORIDE 100 MCG: 10 INJECTION INTRAVENOUS at 11:39

## 2021-05-03 RX ADMIN — FENTANYL CITRATE 50 MCG: 50 INJECTION INTRAMUSCULAR; INTRAVENOUS at 14:59

## 2021-05-03 RX ADMIN — PROPOFOL 200 MG: 10 INJECTION, EMULSION INTRAVENOUS at 11:20

## 2021-05-03 RX ADMIN — ONDANSETRON 4 MG: 2 INJECTION INTRAMUSCULAR; INTRAVENOUS at 13:52

## 2021-05-03 RX ADMIN — SODIUM CHLORIDE, SODIUM LACTATE, POTASSIUM CHLORIDE, AND CALCIUM CHLORIDE 60 ML/HR: .6; .31; .03; .02 INJECTION, SOLUTION INTRAVENOUS at 15:31

## 2021-05-03 RX ADMIN — OXYCODONE HYDROCHLORIDE 10 MG: 5 SOLUTION ORAL at 23:32

## 2021-05-03 RX ADMIN — PRAMIPEXOLE DIHYDROCHLORIDE 1 MG: 1 TABLET ORAL at 18:44

## 2021-05-03 RX ADMIN — DEXAMETHASONE SODIUM PHOSPHATE 5 MG: 10 INJECTION, SOLUTION INTRAMUSCULAR; INTRAVENOUS at 14:04

## 2021-05-03 RX ADMIN — SODIUM CHLORIDE: 9 INJECTION, SOLUTION INTRAVENOUS at 11:24

## 2021-05-03 RX ADMIN — SUGAMMADEX 200 MG: 100 INJECTION, SOLUTION INTRAVENOUS at 14:54

## 2021-05-03 RX ADMIN — CEFAZOLIN SODIUM 2000 MG: 2 SOLUTION INTRAVENOUS at 11:24

## 2021-05-03 RX ADMIN — HYDROMORPHONE HYDROCHLORIDE 0.5 MG: 1 INJECTION, SOLUTION INTRAMUSCULAR; INTRAVENOUS; SUBCUTANEOUS at 16:02

## 2021-05-03 RX ADMIN — INSULIN LISPRO 2 UNITS: 100 INJECTION, SOLUTION INTRAVENOUS; SUBCUTANEOUS at 23:46

## 2021-05-03 RX ADMIN — INSULIN HUMAN 5 UNITS: 100 INJECTION, SOLUTION PARENTERAL at 15:37

## 2021-05-03 RX ADMIN — INSULIN LISPRO 4 UNITS: 100 INJECTION, SOLUTION INTRAVENOUS; SUBCUTANEOUS at 18:42

## 2021-05-03 RX ADMIN — ROCURONIUM BROMIDE 30 MG: 50 INJECTION, SOLUTION INTRAVENOUS at 12:46

## 2021-05-03 RX ADMIN — FENTANYL CITRATE 50 MCG: 50 INJECTION INTRAMUSCULAR; INTRAVENOUS at 11:20

## 2021-05-03 RX ADMIN — MIDAZOLAM 2 MG: 1 INJECTION INTRAMUSCULAR; INTRAVENOUS at 11:06

## 2021-05-03 RX ADMIN — METOROPROLOL TARTRATE 5 MG: 5 INJECTION, SOLUTION INTRAVENOUS at 18:43

## 2021-05-03 RX ADMIN — ROCURONIUM BROMIDE 50 MG: 50 INJECTION, SOLUTION INTRAVENOUS at 11:21

## 2021-05-03 RX ADMIN — ASPIRIN 81 MG: 81 TABLET, COATED ORAL at 18:43

## 2021-05-03 RX ADMIN — LIDOCAINE HYDROCHLORIDE 50 MG: 10 INJECTION, SOLUTION EPIDURAL; INFILTRATION; INTRACAUDAL; PERINEURAL at 11:20

## 2021-05-03 RX ADMIN — ACETAMINOPHEN 650 MG: 650 SUSPENSION ORAL at 23:32

## 2021-05-03 RX ADMIN — ACETAMINOPHEN 975 MG: 325 TABLET, FILM COATED ORAL at 10:42

## 2021-05-03 RX ADMIN — EPHEDRINE SULFATE 10 MG: 50 INJECTION, SOLUTION INTRAVENOUS at 11:38

## 2021-05-03 RX ADMIN — SODIUM CHLORIDE, SODIUM LACTATE, POTASSIUM CHLORIDE, AND CALCIUM CHLORIDE: .6; .31; .03; .02 INJECTION, SOLUTION INTRAVENOUS at 10:59

## 2021-05-03 RX ADMIN — EPHEDRINE SULFATE 10 MG: 50 INJECTION, SOLUTION INTRAVENOUS at 11:41

## 2021-05-03 RX ADMIN — PHENYLEPHRINE HYDROCHLORIDE 200 MCG: 10 INJECTION INTRAVENOUS at 11:41

## 2021-05-03 RX ADMIN — DOCUSATE SODIUM 100 MG: 100 CAPSULE ORAL at 18:44

## 2021-05-03 RX ADMIN — ACETAMINOPHEN 650 MG: 650 SUSPENSION ORAL at 18:43

## 2021-05-03 RX ADMIN — OXYBUTYNIN 15 MG: 10 TABLET, FILM COATED, EXTENDED RELEASE ORAL at 18:43

## 2021-05-03 RX ADMIN — METOROPROLOL TARTRATE 5 MG: 5 INJECTION, SOLUTION INTRAVENOUS at 23:38

## 2021-05-03 RX ADMIN — SODIUM CHLORIDE 0.5 UNITS/HR: 9 INJECTION, SOLUTION INTRAVENOUS at 11:59

## 2021-05-03 RX ADMIN — HYDROMORPHONE HYDROCHLORIDE 0.5 MG: 1 INJECTION, SOLUTION INTRAMUSCULAR; INTRAVENOUS; SUBCUTANEOUS at 16:15

## 2021-05-03 RX ADMIN — ROCURONIUM BROMIDE 20 MG: 50 INJECTION, SOLUTION INTRAVENOUS at 12:58

## 2021-05-03 NOTE — INTERVAL H&P NOTE
H&P reviewed  After examining the patient I find no changes in the patients condition since the H&P had been written  There were no vitals filed for this visit  Ms  Glenn Baeza is known to me   She is ready for her robotic Heller myotomy and Kareem fundoplication    CV: heart sounds normal, no M/G/R  Lungs: CTA b/l no rales or crackles    Carla Luna MD  Thoracic Surgery  (Available on [employer] Text)  Office: [phone]

## 2021-05-03 NOTE — RESPIRATORY THERAPY NOTE
RT Protocol Note  Nick Singh 60 y o  female MRN: [id number]  Unit/Bed#: [facility] 419-01 Encounter: [id number]    Assessment    Principal Problem:    Achalasia  Active Problems:    PONV (postoperative nausea and vomiting)    Class 2 obesity in adult    Pacemaker    Medical marijuana use      Home Pulmonary Medications:  done  Home Devices/Therapy: BiPAP/CPAP(CPAP HS)    Past Medical History:   Diagnosis Date    Anxiety     Asthma     controlled    Back complaints     [facility]     nose    Cellulitis of left lower leg     "not now"    CHF (congestive heart failure) ([facility] [address] ) 02/2021    Chronic bilateral thoracic back pain     Chronic kidney disease     sees nephrologist regularly" stage 3"    Chronic myofascial pain     Chronic pain of both lower extremities     Chronic pain of left knee     "both knees"    Chronic pain syndrome     bilat legs and knees/neuropathy pain    CPAP (continuous positive airway pressure) dependence     Depression     Diabetes mellitus ([facility] [address] )     Disease of thyroid gland     Does use hearing aid     bilat and will wear DOS    Gait disorder     Gastroparesis     GERD (gastroesophageal reflux disease)     History of angina     History of MRSA infection     History of transfusion     Many years ago    Hyperlipidemia     Hypertension     Hypoglycemic reaction     "occas low blood sugar"    Insulin pump in place     pt reports saw endocrinologist 4/28 and will bring copy of instructions DOS    Kidney disease     Pacemaker 2019    Polyneuropathy associated with underlying disease [facility]     PONV (postoperative nausea and vomiting)     Risk for falls     S/P insertion of spinal cord stimulator 6/8/2018    Sarcoidosis     Shortness of breath     "exertion and not new"    Sleep apnea     TIA (transient ischemic attack)     Use of cane as ambulatory aid     Uses walker     Wears dentures     permanent upper/and some missing teeth/partial lower     Social History Socioeconomic History    Marital status: /Civil Union     Spouse name: None    Number of children: None    Years of education: None    Highest education level: None   Occupational History    None   Social Needs    Financial resource strain: None    Food insecurity     Worry: None     Inability: None    Transportation needs     Medical: None     Non-medical: None   Tobacco Use    Smoking status: Never Smoker    Smokeless tobacco: Never Used   Substance and Sexual Activity    Alcohol use: No    Drug use: Yes     Frequency: 8 0 times per week     Types: Marijuana     Comment: Medical Marijuana/vape pen    Sexual activity: Yes   Lifestyle    Physical activity     Days per week: None     Minutes per session: None    Stress: None   Relationships    Social connections     Talks on phone: None     Gets together: None     Attends Yazidi service: None     Active member of club or organization: None     Attends meetings of clubs or organizations: None     Relationship status: None    Intimate partner violence     Fear of current or ex partner: None     Emotionally abused: None     Physically abused: None     Forced sexual activity: None   Other Topics Concern    None   Social History Narrative    None       Subjective         Objective    Physical Exam:   Assessment Type: (P) Assess only  General Appearance: (P) Alert, Awake  Respiratory Pattern: (P) Normal  Chest Assessment: (P) Chest expansion symmetrical  Bilateral Breath Sounds: (P) Clear, Diminished  O2 Device: (P) nc    Vitals:  Blood pressure 137/63, pulse 67, temperature 98 °F (36 7 °C), temperature source Oral, resp  rate 16, height 5' 2" (1 575 m), weight 89 4 kg (197 lb), SpO2 97 %  Imaging and other studies: I have personally reviewed pertinent reports  O2 Device: (P) nc     Plan       Airway Clearance Plan: Incentive Spirometer     Resp Comments: (P) M D   got back about the CPAP order    Pt did bring her own cpap but he does not want her to use it tonight because of the surgery

## 2021-05-03 NOTE — ANESTHESIA PREPROCEDURE EVALUATION
Procedure:  ESOPHAGOGASTRODUODENOSCOPY (EGD) (N/A Esophagus)  FUNDOPLICATION NISSEN LAPAROSCOPIC W/ ROBOTICSFUNDOPLICATION; Heller myotomy (N/A Abdomen)    Relevant Problems   ANESTHESIA   (+) PONV (postoperative nausea and vomiting)      CARDIO   (+) BBB (bundle branch block)   (+) Chronic scapular pain   (+) Coronary artery disease involving native coronary artery   (+) Deep venous thrombosis (HCC)   (+) Essential hypertension   (+) Mixed hyperlipidemia   (+) Pacemaker      ENDO   (+) Primary hyperparathyroidism (HCC)   (+) Primary hypothyroidism   (+) Type 2 diabetes mellitus with microalbuminuria, with long-term current use of insulin (HCC)   (+) Type 2 diabetes mellitus without complication, with long-term current use of insulin (HCC)      GI/HEPATIC   (+) GERD (gastroesophageal reflux disease)      HEMATOLOGY   (+) Anemia of chronic disease   (+) Microcytic anemia   (+) Normocytic anemia      MUSCULOSKELETAL   (+) Chronic bilateral thoracic back pain   (+) Localized, primary osteoarthritis   (+) Lumbosacral spondylosis without myelopathy   (+) Osteoarthritis of knee      NEURO/PSYCH   (+) Anxiety and depression   (+) Chronic bilateral thoracic back pain   (+) Complex regional pain syndrome type 1 of left lower extremity   (+) History of TIA (transient ischemic attack)   (+) Pain syndrome, chronic      PULMONARY   (+) Moderate persistent asthma with acute exacerbation   (+) Moderate persistent asthma without complication   (+) Obstructive sleep apnea      Other   (+) Achalasia   (+) Chronic myofascial pain   (+) Class 2 obesity in adult   (+) Gait disorder   (+) Gastroparesis   (+) Insulin pump status   (+) Lumbar radiculopathy   (+) Medical marijuana use   (+) Polyneuropathy associated with underlying disease (HCC)   (+) S/P insertion of spinal cord stimulator   (+) Thoracic radiculopathy        Physical Exam    Airway    Mallampati score: II  TM Distance: >3 FB  Neck ROM: full     Dental Cardiovascular      Pulmonary      Other Findings  Some missing teeth      Anesthesia Plan  ASA Score- 3     Anesthesia Type- general with ASA Monitors  Additional Monitors:   Airway Plan: ETT  Plan Factors-    Chart reviewed  EKG reviewed  Existing labs reviewed  Patient summary reviewed  Patient is a current smoker (Vapes medical marijuana)  Patient did not smoke on day of surgery  Induction- intravenous  Postoperative Plan- Plan for postoperative opioid use  Informed Consent- Anesthetic plan and risks discussed with patient  I personally reviewed this patient with the CRNA  Discussed and agreed on the Anesthesia Plan with the CRNA  Timothy Gtz

## 2021-05-03 NOTE — OP NOTE
OPERATIVE REPORT  PATIENT NAME: Candy Hutchins    :  1955  MRN: [id number]  Pt Location:  OR ROOM 14    SURGERY DATE: 5/3/2021    Surgeon(s) and Role:     * Cecil Rendon MD - Primary     * Joey Cox - Assisting     * Nydia Mcmahon MD - Assisting    Preop Diagnosis:  Achalasia [K22 0]    Post-Op Diagnosis Codes:     * Achalasia [K22 0]    Procedure(s) (LRB):  ESOPHAGOGASTRODUODENOSCOPY (EGD) (N/A)  ROBOTIC HELLER MYOTOMY WITH KAREEM FUNDIPLICATION  (N/A)  laparoscopic LYSIS ADHESIONS (N/A)    Specimen(s):  * No specimens in log *    Estimated Blood Loss:   70 mL    Drains:  Urethral Catheter Non-latex 16 Fr  (Active)   Number of days: 0       Anesthesia Type:   General    Operative Indications:  Achalasia [K22 0]    Operative Findings:  Adhesions 2/2 to previous surgery  Taken down  7cm total myotomy with Kareem fundoplication    Complications:   None    Procedure and Technique:  Procedure and Technique:  INDICATION:  Candy Hutchins is a 60 y o  female who presented with dysphagia, vomiting after meals and chronic cough/aspiration  She underwent an EGD and UGI which were consistent with achalasia  Manometry confirmed this  It was recommended that the patient undergo a Heller myotomy for symptom relief  The risks and benefits of the procedure were explained to the patient and he elected to proceed  Consent was obtained after all of his questions were answered  OPERATION IN DETAIL:    The patient was correctly identified by name and medical record number in the holding area and brought to the operative suite, where she was placed supine on the operative table  After satisfactory induction of general endotracheal anesthesia, the patient was appropriately positioned, prepped and draped  A time-out was performed confirming patient and procedure  An 8 mm incision was made at Mccann's point, 2 finger breadths below the left costal margin in the midclavicular line   The Veress needle was inserted through the abdominal wall and into the abdominal cavity using the water drop technique  Next the abdomen was then insufflated without issue  An 8 mm incision was then made and a robotic trocar was placed  The 30 degree robotic camera was inserted and the abdomen was thoroughly inspected  The patient was noted to have a significant amount of abdominal wall adhesions  These were taken down with a combination of sharp and hot dissection  This was approximately a 30 minute adhesiolysis  There was no sign Veress needle or trocar injury  All other ports were placed under direct visualization using the robotic camera  3mL of 0 25% Marcaine was used to anesthetize the peritoneum for all additional port placements  Next a left upper quadrant 8mm lateral robotic port was placed  An robotic 8mm port was placed supraumbilical   An 8mm RUQ robotic port was placed just lateral to the falciform, at the same level as the LUQ port  A RLQ 12mm assistant port was placed between the camera and the RUQ robotic port  Finally a 5 mm incision was made just to the left of the xiphoid process  A Jose Cruz liver retractor was placed through here and secured to the bedside with a sterile painting  The patient was then placed in full reverse [place]  The [employer] robot was docked at this time  A tip-up fenestrated grasper, caudiere grasper and [place] bipolar were inserted and tested  I un-scrubbed at this time and went to the robotic console  The gastric fundus was grasped and gently retracted caudally  The pars flaccida was taken down and then the phrenoesophageal membrane was dissected  The phrenoesophageal fat pad was dissected and removed using the bipolar  The mediastinum was entered bluntly by gentle traction upon the lateral aspects of the hiatus anterior to the esophagus  Once the esophagus was dissected free from the anterior hiatus, attention was turned to the greater curve and mobilization    The fundus was adequately floppy and the greater curve did not need to be mobilized in order to perform a fundoplication  Attention was turned to the myotomy  At this time, an EGD was performed to confirm where the [place] was  The anterior fibers of the esophagus were then taken using a [place] bipolar  Both the longitudinal and circular muscle fibers were taken, exposing intact submucosa  Dissection was carried out approximately 5cm onto the esophagus and 2-3cm distal to the GE junction  After the myotomy was complete, a repeat EGD was performed to confirm that the lower esophageal sphincter was open  The lower esophageal sphincter opened easily upon gentle insufflation, confirming an adequate myotomy  Attention was then turned to creating a Kareem wrap  The fundus was grasped and pulled anteriorly over the gastroesophageal junction  The fundus was secured in place using #2-0 Ethibond sutures  Five total sutures were placed from fundus to laquita to cut edge of myotomy  At this time the procedure was complete  The liver retractor was taken down under direct visualization  All ports were removed under direct visualization  The 12mm incision was closed with a suture passer and 0 vicryl  The remaining port sites were closed with #4-0 Monocryl  The wounds were dressed with Dermabond      I was present for the entire procedure    Patient Disposition:  PACU  and extubated and stable    SIGNATURE: Per Vu MD  DATE: May 3, 2021  TIME: 3:00 PM

## 2021-05-03 NOTE — ANESTHESIA POSTPROCEDURE EVALUATION
Post-Op Assessment Note    CV Status:  Stable  Pain Score: 0    Pain management: adequate     Mental Status:  Alert and awake   Hydration Status:  Euvolemic   PONV Controlled:  Controlled   Airway Patency:  Patent      Post Op Vitals Reviewed: Yes      Staff: CRNA         No complications documented      BP   149/50   Temp 98 5 °F (36 9 °C) (05/03/21 1514)    Pulse  75   Resp   16   SpO2   100%

## 2021-05-03 NOTE — RESPIRATORY THERAPY NOTE
RT Protocol Note  Carline Muniz 60 y o  female MRN: [id number]  Unit/Bed#: [facility] 419-01 Encounter: [id number]    Assessment    Principal Problem:    Achalasia  Active Problems:    PONV (postoperative nausea and vomiting)    Class 2 obesity in adult    Pacemaker    Medical marijuana use      Home Pulmonary Medications:    Home Devices/Therapy: BiPAP/CPAP(CPAP HS)    Past Medical History:   Diagnosis Date    Anxiety     Asthma     controlled    Back complaints     Cancer ([facility] )     nose    Cellulitis of left lower leg     "not now"    CHF [facility]congest[facility] failure) ([facility] ) 02/2021    Chronic bilateral thoracic back pain     Chronic kidney disease     sees nephrologist regularly" stage 3"    Chronic myofascial pain     Chronic pain of both lower extremities     Chronic pain of left knee     "both knees"    Chronic pain syndrome     bilat legs and knees/neuropathy pain    CPAP (continuous positive airway pressure) dependence     Depression     Diabetes mellitus ([facility] )     Disease of thyroid gland     Does use hearing aid     bilat and will wear DOS    Gait disorder     Gastroparesis     GERD (gastroesophageal reflux disease)     History of angina     History of MRSA infection     History of transfusion     Many years ago    Hyperlipidemia     Hypertension     Hypoglycemic reaction     "occas low blood sugar"    Insulin pump in place     pt reports saw endocrinologist 4/28 and will bring copy of instructions DOS    Kidney disease     Pacemaker 2019    Polyneuropathy associated with underlying disease ([facility] )     PONV (postoperative nausea and vomiting)     Risk for falls     S/P insertion of spinal cord stimulator 6/8/2018    Sarcoidosis     Shortness of breath     "exertion and not new"    Sleep apnea     TIA (transient ischemic attack)     Use of cane as ambulatory aid     Uses walker     Wears dentures     permanent upper/and some missing teeth/partial lower     Social History Socioeconomic History    Marital status: /Civil Union     Spouse name: None    Number of children: None    Years of education: None    Highest education level: None   Occupational History    None   Social Needs    Financial resource strain: None    Food insecurity     Worry: None     Inability: None    Transportation needs     Medical: None     Non-medical: None   Tobacco Use    Smoking status: Never Smoker    Smokeless tobacco: Never Used   Substance and Sexual Activity    Alcohol use: No    Drug use: Yes     Frequency: 8 0 times per week     Types: Marijuana     Comment: Medical Marijuana/vape pen    Sexual activity: Yes   Lifestyle    Physical activity     Days per week: None     Minutes per session: None    Stress: None   Relationships    Social connections     Talks on phone: None     Gets together: None     Attends Yazidi service: None     Active member of club or organization: None     Attends meetings of clubs or organizations: None     Relationship status: None    Intimate partner violence     Fear of current or ex partner: None     Emotionally abused: None     Physically abused: None     Forced sexual activity: None   Other Topics Concern    None   Social History Narrative    None       Subjective         Objective    Physical Exam:   Assessment Type: Assess only  General Appearance: Alert, Awake  Respiratory Pattern: Normal  Chest Assessment: Chest expansion symmetrical  Bilateral Breath Sounds: Clear, Diminished    Vitals:  Blood pressure 137/63, pulse 67, temperature 98 °F (36 7 °C), temperature source Oral, resp  rate 16, height 5' 2" (1 575 m), weight 89 4 kg (197 lb), SpO2 97 %  Imaging and other studies: I have personally reviewed pertinent reports  Plan       Airway Clearance Plan: Incentive Spirometer     Resp Comments: Pt assessed at this time S/P EGD  Pt in no acute resp distress, SpO2 WNL on 1L   Pt stated the she wears CPAP HS and has her personal machine from home  Will message MD for order  IS instructed at this time  Pt understands use and frequency  Will continue to monitor per ACP

## 2021-05-04 ENCOUNTER — APPOINTMENT (INPATIENT)
Dept: RADIOLOGY | Facility: HOSPITAL | Age: 66
DRG: 327 | End: 2021-05-04
Payer: MEDICARE

## 2021-05-04 LAB
ANION GAP SERPL CALCULATED.3IONS-SCNC: 6 MMOL/L (ref 4–13)
BUN SERPL-MCNC: 36 MG/DL (ref 5–25)
CALCIUM SERPL-MCNC: 8.5 MG/DL (ref 8.3–10.1)
CHLORIDE SERPL-SCNC: 107 MMOL/L (ref 100–108)
CO2 SERPL-SCNC: 27 MMOL/L (ref 21–32)
CREAT SERPL-MCNC: 1.67 MG/DL (ref 0.6–1.3)
ERYTHROCYTE [DISTWIDTH] IN BLOOD BY AUTOMATED COUNT: 14.5 % (ref 11.6–15.1)
GFR SERPL CREATININE-BSD FRML MDRD: 32 ML/MIN/1.73SQ M
GLUCOSE SERPL-MCNC: 131 MG/DL (ref 65–140)
GLUCOSE SERPL-MCNC: 132 MG/DL (ref 65–140)
GLUCOSE SERPL-MCNC: 155 MG/DL (ref 65–140)
GLUCOSE SERPL-MCNC: 166 MG/DL (ref 65–140)
GLUCOSE SERPL-MCNC: 265 MG/DL (ref 65–140)
HCT VFR BLD AUTO: 27.2 % (ref 34.8–46.1)
HGB BLD-MCNC: 8.1 G/DL (ref 11.5–15.4)
MAGNESIUM SERPL-MCNC: 2.8 MG/DL (ref 1.6–2.6)
MCH RBC QN AUTO: 26.7 PG (ref 26.8–34.3)
MCHC RBC AUTO-ENTMCNC: 29.8 G/DL (ref 31.4–37.4)
MCV RBC AUTO: 90 FL (ref 82–98)
PLATELET # BLD AUTO: 144 THOUSANDS/UL (ref 149–390)
PMV BLD AUTO: 9.1 FL (ref 8.9–12.7)
POTASSIUM SERPL-SCNC: 5 MMOL/L (ref 3.5–5.3)
RBC # BLD AUTO: 3.03 MILLION/UL (ref 3.81–5.12)
SODIUM SERPL-SCNC: 140 MMOL/L (ref 136–145)
WBC # BLD AUTO: 6.28 THOUSAND/UL (ref 4.31–10.16)

## 2021-05-04 PROCEDURE — 74220 X-RAY XM ESOPHAGUS 1CNTRST: CPT

## 2021-05-04 PROCEDURE — 99024 POSTOP FOLLOW-UP VISIT: CPT | Performed by: THORACIC SURGERY (CARDIOTHORACIC VASCULAR SURGERY)

## 2021-05-04 PROCEDURE — C9113 INJ PANTOPRAZOLE SODIUM, VIA: HCPCS | Performed by: PHYSICIAN ASSISTANT

## 2021-05-04 PROCEDURE — 85027 COMPLETE CBC AUTOMATED: CPT | Performed by: THORACIC SURGERY (CARDIOTHORACIC VASCULAR SURGERY)

## 2021-05-04 PROCEDURE — 82948 REAGENT STRIP/BLOOD GLUCOSE: CPT

## 2021-05-04 PROCEDURE — 80048 BASIC METABOLIC PNL TOTAL CA: CPT | Performed by: THORACIC SURGERY (CARDIOTHORACIC VASCULAR SURGERY)

## 2021-05-04 PROCEDURE — 99222 1ST HOSP IP/OBS MODERATE 55: CPT | Performed by: INTERNAL MEDICINE

## 2021-05-04 PROCEDURE — 83735 ASSAY OF MAGNESIUM: CPT | Performed by: THORACIC SURGERY (CARDIOTHORACIC VASCULAR SURGERY)

## 2021-05-04 RX ADMIN — ACETAMINOPHEN 650 MG: 650 SUSPENSION ORAL at 17:15

## 2021-05-04 RX ADMIN — ACETAMINOPHEN 650 MG: 650 SUSPENSION ORAL at 06:46

## 2021-05-04 RX ADMIN — SENNOSIDES 8.6 MG: 8.6 TABLET, FILM COATED ORAL at 09:47

## 2021-05-04 RX ADMIN — HYDROXYCHLOROQUINE SULFATE 400 MG: 200 TABLET, FILM COATED ORAL at 17:17

## 2021-05-04 RX ADMIN — ASPIRIN 81 MG: 81 TABLET, COATED ORAL at 17:13

## 2021-05-04 RX ADMIN — OXYBUTYNIN 15 MG: 10 TABLET, FILM COATED, EXTENDED RELEASE ORAL at 09:47

## 2021-05-04 RX ADMIN — ENOXAPARIN SODIUM 40 MG: 40 INJECTION SUBCUTANEOUS at 09:50

## 2021-05-04 RX ADMIN — METOROPROLOL TARTRATE 5 MG: 5 INJECTION, SOLUTION INTRAVENOUS at 06:47

## 2021-05-04 RX ADMIN — ACETAMINOPHEN 650 MG: 650 SUSPENSION ORAL at 11:55

## 2021-05-04 RX ADMIN — OXYCODONE HYDROCHLORIDE 5 MG: 5 SOLUTION ORAL at 13:50

## 2021-05-04 RX ADMIN — PRAMIPEXOLE DIHYDROCHLORIDE 1 MG: 1 TABLET ORAL at 17:17

## 2021-05-04 RX ADMIN — FUROSEMIDE 60 MG: 40 TABLET ORAL at 17:14

## 2021-05-04 RX ADMIN — SODIUM CHLORIDE, SODIUM LACTATE, POTASSIUM CHLORIDE, AND CALCIUM CHLORIDE 60 ML/HR: .6; .31; .03; .02 INJECTION, SOLUTION INTRAVENOUS at 06:50

## 2021-05-04 RX ADMIN — SERTRALINE 50 MG: 50 TABLET, FILM COATED ORAL at 09:47

## 2021-05-04 RX ADMIN — METOROPROLOL TARTRATE 5 MG: 5 INJECTION, SOLUTION INTRAVENOUS at 17:19

## 2021-05-04 RX ADMIN — METOROPROLOL TARTRATE 5 MG: 5 INJECTION, SOLUTION INTRAVENOUS at 11:55

## 2021-05-04 RX ADMIN — LEVOTHYROXINE SODIUM 88 MCG: 88 TABLET ORAL at 06:46

## 2021-05-04 RX ADMIN — FUROSEMIDE 60 MG: 40 TABLET ORAL at 09:48

## 2021-05-04 RX ADMIN — PANTOPRAZOLE SODIUM 40 MG: 40 INJECTION, POWDER, FOR SOLUTION INTRAVENOUS at 09:49

## 2021-05-04 RX ADMIN — OXYCODONE HYDROCHLORIDE 5 MG: 5 SOLUTION ORAL at 08:37

## 2021-05-04 RX ADMIN — OXYCODONE HYDROCHLORIDE 5 MG: 5 SOLUTION ORAL at 21:55

## 2021-05-04 RX ADMIN — PRAMIPEXOLE DIHYDROCHLORIDE 1 MG: 1 TABLET ORAL at 08:38

## 2021-05-04 RX ADMIN — AMITRIPTYLINE HYDROCHLORIDE 25 MG: 25 TABLET, FILM COATED ORAL at 21:54

## 2021-05-04 RX ADMIN — FLUTICASONE FUROATE AND VILANTEROL TRIFENATATE 1 PUFF: 100; 25 POWDER RESPIRATORY (INHALATION) at 09:50

## 2021-05-04 NOTE — CONSULTS
Endocrinology Consultation - Pepe Silva 60 y o  female MRN: [id number]    Unit/Bed#: [facility] 419-01 Encounter: [id number]      Assessment/Plan   Type 2 diabetes mellitus  Patient diagnosed with type 2 diabetes mellitus in 2005,   Last HBA1C from 1/4/2021 is 6 9     Outpatient -- insulin pump, novolog via  [employer] 630 G    Pump settings   Total units using in 24 hours  =  38 5 units   Basal rate  12:00 am - 6 am  =1 65u/hr                     6:00 am  -  7pm   =1 70u/hr                      7:00pm   -  12am = 1 30u/hr     Bolus/carb ratio - 6 all the time   Insulin sensitivity factor =  15  Target   =   100-120            Ozempic 0 25 mg every Friday  Glucose monitoring through Dexcom at home  Complications-peripheral neuropathy, no retinopathy, CKD stage 3,Gastroperesis ,TIA   Patient is status post status post robotic Heller myotomy with Kareem fundoplication for achalasia with EGD and lysis of adhesions on 05/03  Patient is postop day 1  Diet- on Surgical full liquid diet  Was initially  on insulin infusion, now on insulin sliding scale algorithm 3 q 6 hours  Plan   Will start patient back on her insulin pump with temp basal settings 50 %  Continue full liquid diet as per surgery team   Discontinue Insulin sliding scale    Check finger sticks QID  Monitor closely         Hypothyroidism-continue Synthroid 88 mcg daily,TSH within range     Achalasia -status post robotic Heller myotomy with Kareem fundoplication for achalasia with EGD and lysis of adhesions on 05/03  History of Present Illness   HPI: Pepe Silva is a 60y o  year old female with past medical history of type 2 diabetes mellitus,on insulin pump , hypothyroidism, class 2 obesity(BMI 36) admitted to [address] [place] [address] for elective robotic Heller myotomy and Kareem fundoplication, Achalasia    Patient is status post status post robotic Heller myotomy with Kareem fundoplication for achalasia with EGD and lysis of adhesions on 05/03  Endocrinology service has been consulted for the management of insulin pump  Inpatient consult to Endocrinology     Performed by  Néstor Hassan MD     Authorized by Peter Duffy MD              Review of Systems   Constitutional: Positive for fatigue  HENT: Positive for mouth sores  Negative for congestion and postnasal drip  Eyes: Negative for redness, itching and visual disturbance  Respiratory: Negative for chest tightness and shortness of breath  Cardiovascular: Negative for chest pain and palpitations  Gastrointestinal: Positive for abdominal pain, constipation, nausea and vomiting  Endocrine: Positive for cold intolerance, polydipsia and polyphagia  Genitourinary: Negative for dysuria and vaginal bleeding  Musculoskeletal: Positive for arthralgias  Negative for myalgias  Neurological: Negative for dizziness and light-headedness  Psychiatric/Behavioral: Negative for agitation  The patient is not nervous/anxious          Historical Information   Past Medical History:   Diagnosis Date    Anxiety     Asthma     controlled    Back complaints     [facility] )     nose    Cellulitis of left lower leg     "not now"    CHF [facility]congestive heart failure) ([facility] ) 02/2021    Chronic bilateral thoracic back pain     Chronic kidney disease     sees nephrologist regularly" stage 3"    Chronic myofascial pain     Chronic pain of both lower extremities     Chronic pain of left knee     "both knees"    Chronic pain syndrome     bilat legs and knees/neuropathy pain    CPAP (continuous positive airway pressure) dependence     Depression     [facility] mellitus [facility] )     Disease of thyroid gland     Does use hearing aid     bilat and will wear DOS    Gait disorder     Gastroparesis     GERD (gastroesophageal reflux disease)     History of angina     History of MRSA infection     History of transfusion     Many years ago    Hyperlipidemia     Hypertension     Hypoglycemic reaction     "occas low blood sugar"    Insulin pump in place     pt reports saw endocrinologist  and will bring copy of instructions DOS    Kidney disease     Pacemaker 2019    [facility]neuropathy [facility] with underlying disease [facility] )     PONV (postoperative nausea and vomiting)     Risk for falls     S/P insertion of spinal cord stimulator 2018    Sarcoidosis     Shortness of breath     "exertion and not new"    Sleep apnea     TIA (transient ischemic attack)     Use of cane as ambulatory aid     Uses walker     Wears dentures     permanent upper/and some missing teeth/partial lower     Past Surgical History:   Procedure Laterality Date    ABDOMINAL ADHESION SURGERY N/A 5/3/2021    Procedure: laparoscopic LYSIS ADHESIONS;  Surgeon: Azam Parr MD;  [facility];  Service: Thoracic    BREAST SURGERY      BREAST SURGERY      reduction    CARDIAC PACEMAKER PLACEMENT       SECTION      COLONOSCOPY      DILATION AND CURETTAGE OF UTERUS      "D&E"    HERNIA REPAIR      HYSTERECTOMY      JOINT REPLACEMENT Left     NECK SURGERY      fused 4 discs with 4 screws implanted    NISSEN FUNDOPLICATION LAPAROSCOPIC WITH ROBOTICS N/A 5/3/2021    Procedure: ROBOTIC [address] ;  Surgeon: Azam Parr MD;  [facility];  Service: Thoracic    AK ESOPHAGOGASTRODUODENOSCOPY TRANSORAL DIAGNOSTIC N/A 5/3/2021    Procedure: ESOPHAGOGASTRODUODENOSCOPY (EGD); Surgeon: Azam Parr MD;  [facility];  Service: Thoracic    AK SURG IMPLNT [address] Left 2018    Procedure:  Insertion of thoracic spinal cord stimulator electrode via laminotomy and placement of left buttock IPG;  Surgeon: Chito Martin MD;  [facility];  Service: Neurosurgery    REPLACEMENT TOTAL KNEE      ROTATOR CUFF REPAIR      SPINAL STIMULATOR PLACEMENT Left 10/3/2018    Procedure: BUTTOCK RE-OPENING INCISION FOR REPOSITIONING OF IMPLANTABLE PULSE GENERATOR;  Surgeon: Jennifer Floyd MD;  [facility];  Service: Neurosurgery    TONSILLECTOMY      UPPER GASTROINTESTINAL ENDOSCOPY      WISDOM TOOTH EXTRACTION       Social History   Social History     Substance and Sexual Activity   Alcohol Use No     Social History     Substance and Sexual Activity   Drug Use Yes    Frequency: 8 0 times per week    Types: Marijuana    Comment: Medical Marijuana/vape pen     Social History     Tobacco Use   Smoking Status Never Smoker   Smokeless Tobacco Never Used     E-Cigarette/Vaping    E-Cigarette Use Never User      E-Cigarette/Vaping Substances    Nicotine No     THC No     CBD No     Flavoring No     Other No     Unknown No       Family History: non-contributory    Meds/Allergies   all current active meds have been reviewed  Allergies   Allergen Reactions    Bactrim [Sulfamethoxazole-Trimethoprim] Hives    Sucralfate Hives     Facial swelling    Topamax [Topiramate]      disorentation    Azithromycin Itching    Pregabalin Confusion and Other (See Comments)     altered mental status    Ciprofloxacin Drowsiness     Other reaction(s): Other (See Comments)  "drowsiness"    Norvasc [Amlodipine] Swelling    Baclofen      "That knocks me out "    Bupropion Fatigue    Methotrexate Nausea Only    Neurontin [Gabapentin] Hallucinations and Hypertension       Objective   Vitals: Blood pressure 144/68, pulse 60, temperature 98 3 °F (36 8 °C), resp  rate 20, height 5' 2" (1 575 m), weight 90 4 kg (199 lb 4 7 oz), SpO2 95 %      Intake/Output Summary (Last 24 hours) at 5/4/2021 1215  Last data filed at 5/4/2021 3390  Gross per 24 hour   Intake 2028 ml   Output 870 ml   Net 1158 ml     Invasive Devices     Peripheral Intravenous Line            Peripheral IV 05/03/21 Left Hand 1 day    Peripheral IV 05/03/21 Right Hand 1 day          Drain            Urethral Catheter Non-latex 16 Fr  1 day                Physical Exam  Vitals signs and nursing note reviewed  Constitutional:       General: She is not in acute distress  Appearance: She is well-developed  HENT:      Head: Normocephalic and atraumatic  Mouth/Throat:      Mouth: Mucous membranes are moist    Eyes:      Conjunctiva/sclera: Conjunctivae normal    Neck:      Musculoskeletal: Neck supple  Cardiovascular:      Rate and Rhythm: Normal rate and regular rhythm  Heart sounds: No murmur  Pulmonary:      Effort: Pulmonary effort is normal  No respiratory distress  Breath sounds: Normal breath sounds  Abdominal:      Palpations: Abdomen is soft  Tenderness: There is abdominal tenderness  Musculoskeletal:      Right lower leg: No edema  Left lower leg: No edema  Skin:     General: Skin is warm and dry  Neurological:      General: No focal deficit present  Mental Status: She is alert and oriented to person, place, and time  Mental status is at baseline  Lab Results: I have personally reviewed pertinent reports  Imaging Studies: I have personally reviewed pertinent reports  EKG, Pathology, and Other Studies: I have personally reviewed pertinent reports  Code Status: Level 1 - Full Code  Advance Directive and Living Will:      Power of :    POLST:      Counseling / Coordination of Care  Total floor / unit time spent today 30 minutes  Greater than 50% of total time was spent with the patient and / or family counseling and / or coordination of care   A description of the counseling / coordination of care:

## 2021-05-04 NOTE — PROGRESS NOTES
Progress Note - Thoracic Surgery   Tameka Velazquez 60 y o  female MRN: [id number]  Unit/Bed#: [facility] 419-01 Encounter: [id number]    Assessment:  80-year-old female status post robotic Heller myotomy with Kareem fundoplication for achalasia on 05/03    Plan:  -Diet: NPO  -Gentle IV  -I/Os  -DVT ppx  -Control Pain/Nausea   -OOB as tolerated, PT/OT  -Incentive Spirometry        Subjective/Objective       Subjective:   Patient seen and examined at bedside  Pain controlled  Denies nausea, vomiting, fever, chills  Positive flatus, positive bowel movement          Objective:     Vitals: Blood pressure 137/65, pulse 60, temperature 98 2 °F (36 8 °C), temperature source Oral, resp  rate 16, height 5' 2" (1 575 m), weight 89 4 kg (197 lb), SpO2 96 %  ,Body mass index is 36 03 kg/m²  I/O       05/02 0701 - 05/03 0700 05/03 0701 - 05/04 0700    I V  (mL/kg)  1584 (17 7)    Total Intake(mL/kg)  1584 (17 7)    Urine (mL/kg/hr)  800    Blood  70    Total Output  870    Net  +714                Physical Exam:  General: NAD  Head: normocephalic, atraumatic  CV: Pulse regular  Lungs: no conversational dyspnea  Abdomen: soft, non distended, non tender, no guarding or rebound, incision c/d/i  Extremities: IBRAHIM, motor sensory intact  Neuro: awake, alert, answers questions appropriately      Lab, Imaging and other studies: I have personally reviewed pertinent reports      VTE Pharmacologic Prophylaxis: Enoxaparin (Lovenox)  VTE Mechanical Prophylaxis: sequential compression device

## 2021-05-04 NOTE — PROGRESS NOTES
Progress Note - Thoracic Surgery   Candy Hutchins 60 y o  female MRN: [id number]  Unit/Bed#: [facility] 419-01 Encounter: [id number]    Assessment:  43-year-old female status post robotic Heller myotomy with Kareem fundoplication for achalasia with EGD and lysis of adhesions on 05/03  On 1 L oxygen,     Plan:  -Diet: NPO  -likely upper GI contrast swallow study this am  -F/u labs, consult Nephrology if creatinine rises today  -Dc villareal catheter  -Gentle IVF  -I/Os  -DVT ppx  -Control Pain/Nausea   -OOB as tolerated, PT/OT  -Incentive Spirometry        Subjective/Objective       Subjective:   Patient seen and examined at bedside  Pain controlled  Denies nausea, vomiting, fever, chills  Positive flatus, positive bowel movement          Objective:     Vitals: Blood pressure 130/62, pulse 62, temperature 98 6 °F (37 °C), temperature source Oral, resp  rate 16, height 5' 2" (1 575 m), weight 89 4 kg (197 lb), SpO2 94 %  ,Body mass index is 36 03 kg/m²  I/O       05/02 0701 - 05/03 0700 05/03 0701 - 05/04 0700    I V  (mL/kg)  1584 (17 7)    Total Intake(mL/kg)  1584 (17 7)    Urine (mL/kg/hr)  800    Blood  70    Total Output  870    Net  +714                Physical Exam:   GENERAL APPEARANCE: in no acute distress  NEURO: stable  HEENT: normocephalic, atraumatic  CV: regular rate and rhythm, no tachycardia,   LUNGS: clear to auscultation bilaterally, on 1 L oxygen  GI: soft, nontender, nondistended, incisions clean  : no abnormality detected  MSK: no abnormality detected   SKIN: no abnormality detected        Lab, Imaging and other studies: I have personally reviewed pertinent reports      VTE Pharmacologic Prophylaxis: Enoxaparin (Lovenox)  VTE Mechanical Prophylaxis: sequential compression device

## 2021-05-05 VITALS
WEIGHT: 198.85 LBS | HEIGHT: 62 IN | TEMPERATURE: 98.5 F | DIASTOLIC BLOOD PRESSURE: 69 MMHG | BODY MASS INDEX: 36.59 KG/M2 | SYSTOLIC BLOOD PRESSURE: 139 MMHG | OXYGEN SATURATION: 95 % | HEART RATE: 62 BPM | RESPIRATION RATE: 18 BRPM

## 2021-05-05 LAB
ANION GAP SERPL CALCULATED.3IONS-SCNC: 6 MMOL/L (ref 4–13)
BUN SERPL-MCNC: 29 MG/DL (ref 5–25)
CALCIUM SERPL-MCNC: 7.8 MG/DL (ref 8.3–10.1)
CHLORIDE SERPL-SCNC: 108 MMOL/L (ref 100–108)
CO2 SERPL-SCNC: 27 MMOL/L (ref 21–32)
CREAT SERPL-MCNC: 1.15 MG/DL (ref 0.6–1.3)
GFR SERPL CREATININE-BSD FRML MDRD: 50 ML/MIN/1.73SQ M
GLUCOSE SERPL-MCNC: 58 MG/DL (ref 65–140)
GLUCOSE SERPL-MCNC: 95 MG/DL (ref 65–140)
POTASSIUM SERPL-SCNC: 4.2 MMOL/L (ref 3.5–5.3)
SODIUM SERPL-SCNC: 141 MMOL/L (ref 136–145)

## 2021-05-05 PROCEDURE — 80048 BASIC METABOLIC PNL TOTAL CA: CPT | Performed by: SURGERY

## 2021-05-05 PROCEDURE — 82948 REAGENT STRIP/BLOOD GLUCOSE: CPT

## 2021-05-05 PROCEDURE — 99024 POSTOP FOLLOW-UP VISIT: CPT | Performed by: THORACIC SURGERY (CARDIOTHORACIC VASCULAR SURGERY)

## 2021-05-05 RX ORDER — OXYCODONE HCL 5 MG/5 ML
5 SOLUTION, ORAL ORAL EVERY 4 HOURS PRN
Qty: 250 ML | Refills: 0 | Status: SHIPPED | OUTPATIENT
Start: 2021-05-05 | End: 2021-05-15

## 2021-05-05 RX ORDER — POLYETHYLENE GLYCOL 3350 17 G/17G
17 POWDER, FOR SOLUTION ORAL DAILY PRN
Qty: 15 EACH | Refills: 0 | Status: SHIPPED | OUTPATIENT
Start: 2021-05-05 | End: 2021-11-10

## 2021-05-05 RX ORDER — PANTOPRAZOLE SODIUM 40 MG/1
40 TABLET, DELAYED RELEASE ORAL
Status: DISCONTINUED | OUTPATIENT
Start: 2021-05-05 | End: 2021-05-05 | Stop reason: HOSPADM

## 2021-05-05 RX ORDER — ACETAMINOPHEN 160 MG/5ML
650 SUSPENSION, ORAL (FINAL DOSE FORM) ORAL EVERY 6 HOURS SCHEDULED
Qty: 568.4 ML | Refills: 0 | Status: SHIPPED | OUTPATIENT
Start: 2021-05-05 | End: 2021-05-12

## 2021-05-05 RX ORDER — SENNOSIDES 8.6 MG
8.6 TABLET ORAL DAILY
Qty: 15 TABLET | Refills: 0 | Status: SHIPPED | OUTPATIENT
Start: 2021-05-05 | End: 2021-08-04 | Stop reason: CLARIF

## 2021-05-05 RX ORDER — PANTOPRAZOLE SODIUM 40 MG/1
40 TABLET, DELAYED RELEASE ORAL DAILY
Qty: 15 TABLET | Refills: 0 | Status: SHIPPED | OUTPATIENT
Start: 2021-05-05 | End: 2021-05-25 | Stop reason: SDUPTHER

## 2021-05-05 RX ADMIN — SERTRALINE 50 MG: 50 TABLET, FILM COATED ORAL at 08:11

## 2021-05-05 RX ADMIN — FUROSEMIDE 60 MG: 40 TABLET ORAL at 08:10

## 2021-05-05 RX ADMIN — PANTOPRAZOLE SODIUM 40 MG: 40 TABLET, DELAYED RELEASE ORAL at 08:12

## 2021-05-05 RX ADMIN — OXYBUTYNIN 15 MG: 10 TABLET, FILM COATED, EXTENDED RELEASE ORAL at 08:10

## 2021-05-05 RX ADMIN — ACETAMINOPHEN 650 MG: 650 SUSPENSION ORAL at 05:52

## 2021-05-05 RX ADMIN — SENNOSIDES 8.6 MG: 8.6 TABLET, FILM COATED ORAL at 08:11

## 2021-05-05 RX ADMIN — ACETAMINOPHEN 650 MG: 650 SUSPENSION ORAL at 00:54

## 2021-05-05 RX ADMIN — METOROPROLOL TARTRATE 5 MG: 5 INJECTION, SOLUTION INTRAVENOUS at 00:55

## 2021-05-05 RX ADMIN — METOROPROLOL TARTRATE 5 MG: 5 INJECTION, SOLUTION INTRAVENOUS at 05:53

## 2021-05-05 RX ADMIN — FLUTICASONE FUROATE AND VILANTEROL TRIFENATATE 1 PUFF: 100; 25 POWDER RESPIRATORY (INHALATION) at 08:12

## 2021-05-05 RX ADMIN — ENOXAPARIN SODIUM 40 MG: 40 INJECTION SUBCUTANEOUS at 08:11

## 2021-05-05 RX ADMIN — LEVOTHYROXINE SODIUM 88 MCG: 88 TABLET ORAL at 05:52

## 2021-05-05 RX ADMIN — PRAMIPEXOLE DIHYDROCHLORIDE 1 MG: 1 TABLET ORAL at 08:12

## 2021-05-05 NOTE — DISCHARGE SUMMARY
Discharge Summary - Thoracic Surgery   Josephine Singh 60 y o  female MRN: [id number]  Unit/Bed#: [facility] 419-01 Encounter: [id number]    Admission Date:   Admission Orders (From admission, onward)     Ordered        05/03/21 1521  Inpatient Admission  Once                      Discharge Date: 5/5/21    Admitting Diagnosis: Achalasia [K22 0]    Discharge Diagnosis: Achalasia cardia    Resolved Problems  Date Reviewed: 5/3/2021    None          Attending: Flor Hansen Physician(s): none    Procedures Performed: No orders of the defined types were placed in this encounter  Pathology: n/a    Hospital Course: The patient underwent robotic assisted Heller myotomy with Kareem fundoplication for the treatment of achalasia cardia  Postoperatively, she was admitted to the surgical floor and was noted to be recovering well  On POD1, she underwent upper GI contrast swallow study, which ruled out a leak  She was started on full liquids, which she tolerated well  She was voiding urine well after Little was removed  Creatinine was downtrending and she was seen by Endocrinology for insulin pump recommendations  She was discharged home on POD 2 in good condition with instructions to stay on full liquid diet for 4 to 5 days and then advance to soft diet for 2 to 3 weeks  Condition at Discharge: good   Physical Exam:   GENERAL APPEARANCE: in no acute distress  NEURO: stable  HEENT: normocephalic, atraumatic  CV: regular rate and rhythm, no tachycardia,   LUNGS: clear to auscultation bilaterally  GI: soft, nontender, nondistended, incisions clean  : no abnormality detected  MSK: no abnormality detected   SKIN: no abnormality detected      Discharge instructions/Information to patient and family:   See after visit summary for information provided to patient and family  Provisions for Follow-Up Care:  See after visit summary for information related to follow-up care and any pertinent home health orders  Disposition: See After Visit Summary for discharge disposition information  Planned Readmission: No    Discharge Statement   I spent 30 minutes discharging the patient  This time was spent on the day of discharge  I had direct contact with the patient on the day of discharge  Additional documentation is required if more than 30 minutes were spent on discharge  Discharge Medications:  See after visit summary for reconciled discharge medications provided to patient and family

## 2021-05-05 NOTE — PROGRESS NOTES
Progress Note - Thoracic Surgery   Pepe Silva 60 y o  female MRN: [id number]  Unit/Bed#: [facility] 419-01 Encounter: [id number]    Assessment:  28-year-old female status post robotic Heller myotomy with Kareem fundoplication for achalasia with EGD and lysis of adhesions on 05/03  On room air,     Plan:  -Diet: Full liquids  -F/u labs  -I/Os  -DVT ppx  -Control Pain/Nausea   -OOB as tolerated, PT/OT  -Incentive Spirometry  -Disposition planning        Subjective/Objective       Subjective:   Patient seen and examined at bedside  Pain controlled  Denies nausea, vomiting, fever, chills  Positive flatus, positive bowel movement          Objective:     Vitals: Blood pressure 131/62, pulse 61, temperature 98 1 °F (36 7 °C), resp  rate 16, height 5' 2" (1 575 m), weight 90 2 kg (198 lb 13 7 oz), SpO2 93 %  ,Body mass index is 36 37 kg/m²  I/O       05/02 0701 - 05/03 0700 05/03 0701 - 05/04 0700    I V  (mL/kg)  1584 (17 7)    Total Intake(mL/kg)  1584 (17 7)    Urine (mL/kg/hr)  800    Blood  70    Total Output  870    Net  +714              Physical Exam:   GENERAL APPEARANCE: in no acute distress  NEURO: stable  HEENT: normocephalic, atraumatic  CV: regular rate and rhythm, no tachycardia,   LUNGS: clear to auscultation bilaterally on room air  GI: soft, nontender, nondistended, incisions clean  : no abnormality detected  MSK: no abnormality detected   SKIN: no abnormality detected        Lab, Imaging and other studies: I have personally reviewed pertinent reports      VTE Pharmacologic Prophylaxis: Enoxaparin (Lovenox)  VTE Mechanical Prophylaxis: sequential compression device

## 2021-05-05 NOTE — PROGRESS NOTES
The pantoprazole has / have been converted to Oral per [facility] IV-to-PO Auto-Conversion Protocol for Adults as approved by the Pharmacy and Therapeutics Committee  The patient met all eligible criteria:  3 Age = 26years old   2) Received at least one dose of the IV form   3) Receiving at least one other scheduled oral/enteral medication   4) Tolerating an oral/enteral diet   and did not have any exclusions:   1) Critical care patient   2) Active GI bleed (IF assessing H2RAs or PPIs)   3) Continuous tube feeding (IF assessing cipro, doxycycline, levofloxacin, minocycline, rifampin, or voriconazole)   4) Receiving PO vancomycin (IF assessing metronidazole)   5) Persistent nausea and/or vomiting   6) Ileus or gastrointestinal obstruction   7) Tee/nasogastric tube set for continuous suction   8) Specific order not to automatically convert to PO (in the order's comments or if discussed in the most recent Infectious Disease or primary team's progress notes)     Thanks  Ash Graham, Pharmacist

## 2021-05-05 NOTE — DISCHARGE INSTRUCTIONS
No alcohol or carbonated drinks for the first 4 weeks  Eat small, frequent meals  We recommend 6 meals a day (every 2-3 hours)  Take small bites, chew food well before swallowing  Avoid foods that cause stomach gas and distention: corn, beans, broccoli, peas, onions, cabbage, cauliflower, and lentils  Avoid breads, tough meats, tomato and citrus based foods, fried, spicy, nuts, seeds, and raw vegetables  Sit upright during meals and for 4 hours following every meal    Continue to take your anti-reflux medications as prescribed  Gently wash incisions with soap and water  Do not apply any creams/lotions/ or ointments  Do not soak incisions  You may shower  Maintain Full liquid diet for 4-5 days following discharge  Then, advance to soft foods as tolerated until your follow up appointment in 2 to 3 weeks  Please keep incision clean and dry  Do not remove glue  Do not soak incision  No swimming or tub baths for at least 3 weeks or until cleared in office for the same  Please take tylenol for pain and oxycodone for severe pain  Do not drive or operate machinery while on narcotics  Please follow up with your surgeon in clinic as per your appointment  Please call the clinic for an appointment if you do not already have one  Please call your surgeon and come to the ER if you have severe pain, fever, bleeding or discharge from the incision or any new or worsening symptoms  Please do not do any strenuous activity, no lifting more than 10 pounds and no overhead lifting or push ups or strenuous activity for at least 4 weeks and until cleared to do so in office  Full Liquid Diet   WHAT YOU NEED TO KNOW:   A full liquid diet includes only liquids and foods that are liquid at room temperature  You may need to follow this diet if you have trouble chewing or swallowing  You may also need to follow this diet if you have a severe intestinal illness or had surgery on your intestine   Your healthcare provider will tell you how long to follow this diet and when to start solid foods  DISCHARGE INSTRUCTIONS:   Liquids and foods to include:   · Fruit juices or pureed fruit without seeds    · Vegetable juices or pureed vegetables in broth     · Broth or strained cream soups    · Refined and strained cooked cereals thinned with milk or half-and-half creamer    · Flavored gelatin without chunks of fruit    · Plain ice cream, milk shakes, sherbet, or popsicles    · Yogurt, pudding, or soft custard    · Milk or liquid nutrition supplements    · Coffee, tea, sodas, water, or sports drinks    · Butter, margarine, or cream    Other things you should know about a full liquid diet:   · You may need nutrition supplements  if you will be on this diet for more than 5 to 7 days  The full liquid diet does not provide all the nutrients, vitamins, minerals, or calories that your body needs  · You may need other diet changes  if you have another medical condition such as diabetes, high blood pressure, or heart disease  For example, you may need to choose sugar-free, low-sodium, or low-fat foods as part of your full liquid diet  · Choose lactose-free liquids if you are lactose intolerant  Examples include acidophilus milk, lactose-free milk, soy milk, or lactose-free liquid nutrition supplements  © Copyright [address] Information is for End User's use only and may not be sold, redistributed or otherwise used for commercial purposes  All illustrations and images included in CareNotes® are the copyrighted property of A D A M , Inc  or [address] [place] [address]  The above information is an  only  It is not intended as medical advice for individual conditions or treatments  Talk to your doctor, nurse or pharmacist before following any medical regimen to see if it is safe and effective for you  Soft Diet   WHAT YOU NEED TO KNOW:   A soft diet is made up of foods that are soft and easy to chew and swallow   These foods may be chopped, ground, mashed, pureed, and moist  You may need to follow this diet if you have had certain types of surgery, such as head, neck, or stomach surgery  You may also need to follow this diet if you have problems with your teeth or mouth that make it hard for you to chew or swallow food  Your dietitian will tell you how to follow this diet and what consistency of liquids you may have  DISCHARGE INSTRUCTIONS:   How to prepare soft food:   · Cut food into small pieces that are ½ inch or smaller in size because they are easier to swallow  · Use chicken broth, beef broth, gravy, or sauces to cook or moisten meats and vegetables  Cook vegetables until they are soft enough to be mashed with a fork  · Use a  to grind or puree foods to make them easier to chew and swallow  · Use fruit juice to blend fruit  · Strain soups that have pieces of meat or vegetables that are larger than ½ inch  Foods you can include:   · Breads, cereals, rice, and pasta:      ? Breads, muffins, pancakes, or waffles moistened with syrup, jelly, margarine or butter    ? Moist dry or cooked cereal    ? Macaroni, pasta, noodles, or rice    ? Saltine crackers moistened in soup or other liquid    · Fruits and vegetables:      ? Applesauce or canned fruit without seeds or skin    ? Cooked fruits or ripe, soft peeled fruits, such as bananas, peaches, or melon    ? Soft, well-cooked vegetables without seeds or skin    · Meat and other protein sources:      ? Poached, scrambled, or cooked eggs    ? Moist, tender meat, fish, or poultry that is ground or chopped into small pieces    ? Soups with small soft pieces of vegetables and meat    ? Tofu or well-cooked, slightly mashed, moist legumes, such as baked beans    · Dairy:      ? Cheese (in sauces or melted in other dishes), cottage cheese, or ricotta cheese    ? Milk or milk drinks, milkshakes    ?  Ice cream, sherbet, or frozen yogurt without fruit or nuts    ? Yogurt (plain or with soft fruits)    · Desserts:      ? Gelatin dessert with soft canned fruit, pudding, or custard    ? Fruit cobbler with soft breading or crumb mixture (no seeds or nuts), or fruit pie with soft bottom crust only    ? Soft, moist cake or cookie that has been moistened in milk, coffee, or other liquid    Foods to avoid:  Avoid any foods that are hard for you to chew or swallow, such as the following:  · Starches:      ? Dry bread, toast, crackers, and cereal    ? Cereal, cake, and breads with coconut, dried fruit, nuts, and other seeds    ? Corn, potato, and tortilla chips and taco shells    ? Breads with tough crusts, such as bagels, [place] bread, and sourdough bread    ? Popcorn    · Vegetables:      ? Corn and peas    ? Raw, hard vegetables that cannot be mashed easily, such as carrots, broccoli, cauliflower, and celery    ? Crisp fried vegetables, such as potatoes    · Fruits:      ? Raw, crisp fruits, such as apples and pears and dried fruit    ? Stringy fruits, such as pineapple and marco a    ? Cooked fruit with skin and seeds    · Dairy, meats, and protein foods:      ? Yogurt or ice cream with coconut, nuts, and granola    ? Dry meats (beef jerky) and tough meats (such as gore, sausage, hot dogs, and bratwurst)    ? Casseroles with large chunks of meat    ? Peanut butter (creamy and crunchy)    © Copyright [employer] Information is for End User's use only and may not be sold, redistributed or otherwise used for commercial purposes  All illustrations and images included in CareNotes® are the copyrighted property of A D A M [employer] Inc  or Sid Prater  The above information is an  only  It is not intended as medical advice for individual conditions or treatments  Talk to your doctor, nurse or pharmacist before following any medical regimen to see if it is safe and effective for you

## 2021-05-05 NOTE — PLAN OF CARE
Problem: Prexisting or High Potential for Compromised Skin Integrity  Goal: Skin integrity is maintained or improved  Description: INTERVENTIONS:  - Identify patients at risk for skin breakdown  - Assess and monitor skin integrity  - Assess and monitor nutrition and hydration status  - Monitor labs   - Assess for incontinence   - Turn and reposition patient  - Assist with mobility/ambulation  - Relieve pressure over bony prominences  - Avoid friction and shearing  - Provide appropriate hygiene as needed including keeping skin clean and dry  - Evaluate need for skin moisturizer/barrier cream  - Collaborate with interdisciplinary team   - Patient/family teaching  - Consider wound care consult   Outcome: Adequate for Discharge     Problem: Potential for Falls  Goal: Patient will remain free of falls  Description: INTERVENTIONS:  - Assess patient frequently for physical needs  -  Identify cognitive and physical deficits and behaviors that affect risk of falls  -  [place] fall precautions as indicated by assessment   - Educate patient/family on patient safety including physical limitations  - Instruct patient to call for assistance with activity based on assessment  - Modify environment to reduce risk of injury  - Consider OT/PT consult to assist with strengthening/mobility  Outcome: Adequate for Discharge     Problem: Nutrition/Hydration-ADULT  Goal: Nutrient/Hydration intake appropriate for improving, restoring or maintaining nutritional needs  Description: Monitor and assess patient's nutrition/hydration status for malnutrition  Collaborate with interdisciplinary team and initiate plan and interventions as ordered  Monitor patient's weight and dietary intake as ordered or per policy  Utilize nutrition screening tool and intervene as necessary  Determine patient's food preferences and provide high-protein, high-caloric foods as appropriate       INTERVENTIONS:  - Monitor oral intake, urinary output, labs, and treatment plans  - Assess nutrition and hydration status and recommend course of action  - Evaluate amount of meals eaten  - Assist patient with eating if necessary   - Allow adequate time for meals  - Recommend/ encourage appropriate diets, oral nutritional supplements, and vitamin/mineral supplements  - Order, calculate, and assess calorie counts as needed  - Recommend, monitor, and adjust tube feedings and TPN/PPN based on assessed needs  - Assess need for intravenous fluids  - Provide specific nutrition/hydration education as appropriate  - Include patient/family/caregiver in decisions related to nutrition  Outcome: Adequate for Discharge

## 2021-05-05 NOTE — QUICK NOTE
The patient is here with Chronic diastolic CHF in the setting of hx of CHF evidenced by documentation in 4/22/2021 office visit with EF 55% treated with home meds of torsemide, metoprolol, atorvastatin, I/O, and daily weights

## 2021-05-08 ENCOUNTER — TELEPHONE (OUTPATIENT)
Dept: OTHER | Facility: OTHER | Age: 66
End: 2021-05-08

## 2021-05-08 ENCOUNTER — HOSPITAL ENCOUNTER (INPATIENT)
Facility: HOSPITAL | Age: 66
LOS: 1 days | Discharge: HOME | DRG: 683 | End: 2021-05-09
Attending: EMERGENCY MEDICINE
Payer: MEDICARE

## 2021-05-08 DIAGNOSIS — N17.9 AKI (ACUTE KIDNEY INJURY) (HCC): ICD-10-CM

## 2021-05-08 DIAGNOSIS — R10.84 GENERALIZED ABDOMINAL PAIN: ICD-10-CM

## 2021-05-08 DIAGNOSIS — R33.9 URINARY RETENTION: Primary | ICD-10-CM

## 2021-05-08 PROBLEM — R33.8 ACUTE URINARY RETENTION: Status: ACTIVE | Noted: 2021-05-08

## 2021-05-08 LAB
AMORPH URATE CRY URNS QL MICRO: ABNORMAL /HPF
ANION GAP SERPL CALCULATED.3IONS-SCNC: 10 MMOL/L (ref 4–13)
BACTERIA UR QL AUTO: ABNORMAL /HPF
BASOPHILS # BLD AUTO: 0.03 THOUSANDS/ΜL (ref 0–0.1)
BASOPHILS NFR BLD AUTO: 0 % (ref 0–1)
BILIRUB UR QL STRIP: ABNORMAL
BUN SERPL-MCNC: 41 MG/DL (ref 5–25)
CALCIUM SERPL-MCNC: 8.1 MG/DL (ref 8.3–10.1)
CHLORIDE SERPL-SCNC: 100 MMOL/L (ref 100–108)
CLARITY UR: ABNORMAL
CO2 SERPL-SCNC: 27 MMOL/L (ref 21–32)
COLOR UR: ABNORMAL
CREAT SERPL-MCNC: 2.55 MG/DL (ref 0.6–1.3)
EOSINOPHIL # BLD AUTO: 0.07 THOUSAND/ΜL (ref 0–0.61)
EOSINOPHIL NFR BLD AUTO: 1 % (ref 0–6)
ERYTHROCYTE [DISTWIDTH] IN BLOOD BY AUTOMATED COUNT: 15 % (ref 11.6–15.1)
GFR SERPL CREATININE-BSD FRML MDRD: 19 ML/MIN/1.73SQ M
GLUCOSE SERPL-MCNC: 100 MG/DL (ref 65–140)
GLUCOSE SERPL-MCNC: 135 MG/DL (ref 65–140)
GLUCOSE UR STRIP-MCNC: NEGATIVE MG/DL
HCT VFR BLD AUTO: 26 % (ref 34.8–46.1)
HGB BLD-MCNC: 8 G/DL (ref 11.5–15.4)
HGB UR QL STRIP.AUTO: NEGATIVE
HYALINE CASTS #/AREA URNS LPF: ABNORMAL /LPF
IMM GRANULOCYTES # BLD AUTO: 0.07 THOUSAND/UL (ref 0–0.2)
IMM GRANULOCYTES NFR BLD AUTO: 1 % (ref 0–2)
KETONES UR STRIP-MCNC: NEGATIVE MG/DL
LEUKOCYTE ESTERASE UR QL STRIP: ABNORMAL
LYMPHOCYTES # BLD AUTO: 0.62 THOUSANDS/ΜL (ref 0.6–4.47)
LYMPHOCYTES NFR BLD AUTO: 8 % (ref 14–44)
MCH RBC QN AUTO: 27.5 PG (ref 26.8–34.3)
MCHC RBC AUTO-ENTMCNC: 30.8 G/DL (ref 31.4–37.4)
MCV RBC AUTO: 89 FL (ref 82–98)
MONOCYTES # BLD AUTO: 0.58 THOUSAND/ΜL (ref 0.17–1.22)
MONOCYTES NFR BLD AUTO: 8 % (ref 4–12)
NEUTROPHILS # BLD AUTO: 5.97 THOUSANDS/ΜL (ref 1.85–7.62)
NEUTS SEG NFR BLD AUTO: 82 % (ref 43–75)
NITRITE UR QL STRIP: NEGATIVE
NON-SQ EPI CELLS URNS QL MICRO: ABNORMAL /HPF
NRBC BLD AUTO-RTO: 0 /100 WBCS
PH UR STRIP.AUTO: 5 [PH] (ref 4.5–8)
PLATELET # BLD AUTO: 100 THOUSANDS/UL (ref 149–390)
PMV BLD AUTO: 10.3 FL (ref 8.9–12.7)
POTASSIUM SERPL-SCNC: 4 MMOL/L (ref 3.5–5.3)
PROT UR STRIP-MCNC: ABNORMAL MG/DL
RBC # BLD AUTO: 2.91 MILLION/UL (ref 3.81–5.12)
RBC #/AREA URNS AUTO: ABNORMAL /HPF
SODIUM SERPL-SCNC: 137 MMOL/L (ref 136–145)
SP GR UR STRIP.AUTO: 1.02 (ref 1–1.03)
UROBILINOGEN UR QL STRIP.AUTO: 0.2 E.U./DL
WBC # BLD AUTO: 7.34 THOUSAND/UL (ref 4.31–10.16)
WBC #/AREA URNS AUTO: ABNORMAL /HPF

## 2021-05-08 PROCEDURE — 51798 US URINE CAPACITY MEASURE: CPT

## 2021-05-08 PROCEDURE — 85025 COMPLETE CBC W/AUTO DIFF WBC: CPT | Performed by: EMERGENCY MEDICINE

## 2021-05-08 PROCEDURE — 36415 COLL VENOUS BLD VENIPUNCTURE: CPT | Performed by: EMERGENCY MEDICINE

## 2021-05-08 PROCEDURE — 99223 1ST HOSP IP/OBS HIGH 75: CPT | Performed by: INTERNAL MEDICINE

## 2021-05-08 PROCEDURE — 80048 BASIC METABOLIC PNL TOTAL CA: CPT | Performed by: EMERGENCY MEDICINE

## 2021-05-08 PROCEDURE — 99284 EMERGENCY DEPT VISIT MOD MDM: CPT | Performed by: EMERGENCY MEDICINE

## 2021-05-08 PROCEDURE — 81001 URINALYSIS AUTO W/SCOPE: CPT

## 2021-05-08 PROCEDURE — 99223 1ST HOSP IP/OBS HIGH 75: CPT | Performed by: STUDENT IN AN ORGANIZED HEALTH CARE EDUCATION/TRAINING PROGRAM

## 2021-05-08 PROCEDURE — 82948 REAGENT STRIP/BLOOD GLUCOSE: CPT

## 2021-05-08 PROCEDURE — 99284 EMERGENCY DEPT VISIT MOD MDM: CPT

## 2021-05-08 PROCEDURE — 1124F ACP DISCUSS-NO DSCNMKR DOCD: CPT | Performed by: EMERGENCY MEDICINE

## 2021-05-08 RX ORDER — FAMOTIDINE 20 MG/1
10 TABLET, FILM COATED ORAL
Status: DISCONTINUED | OUTPATIENT
Start: 2021-05-08 | End: 2021-05-11 | Stop reason: HOSPADM

## 2021-05-08 RX ORDER — FAMOTIDINE 20 MG/1
40 TABLET, FILM COATED ORAL
Status: DISCONTINUED | OUTPATIENT
Start: 2021-05-08 | End: 2021-05-08 | Stop reason: DRUGHIGH

## 2021-05-08 RX ORDER — MODAFINIL 100 MG/1
100 TABLET ORAL DAILY
Status: DISCONTINUED | OUTPATIENT
Start: 2021-05-09 | End: 2021-05-11 | Stop reason: HOSPADM

## 2021-05-08 RX ORDER — ALBUTEROL SULFATE 90 UG/1
2 AEROSOL, METERED RESPIRATORY (INHALATION) EVERY 4 HOURS PRN
Status: DISCONTINUED | OUTPATIENT
Start: 2021-05-08 | End: 2021-05-11 | Stop reason: HOSPADM

## 2021-05-08 RX ORDER — ONDANSETRON 2 MG/ML
4 INJECTION INTRAMUSCULAR; INTRAVENOUS EVERY 6 HOURS PRN
Status: DISCONTINUED | OUTPATIENT
Start: 2021-05-08 | End: 2021-05-11 | Stop reason: HOSPADM

## 2021-05-08 RX ORDER — DOCUSATE SODIUM 100 MG/1
100 CAPSULE, LIQUID FILLED ORAL DAILY
Status: DISCONTINUED | OUTPATIENT
Start: 2021-05-09 | End: 2021-05-10

## 2021-05-08 RX ORDER — FLUTICASONE FUROATE AND VILANTEROL 100; 25 UG/1; UG/1
1 POWDER RESPIRATORY (INHALATION)
Status: DISCONTINUED | OUTPATIENT
Start: 2021-05-09 | End: 2021-05-11 | Stop reason: HOSPADM

## 2021-05-08 RX ORDER — PRAMIPEXOLE DIHYDROCHLORIDE 0.5 MG/1
0.5 TABLET ORAL 2 TIMES DAILY
Status: DISCONTINUED | OUTPATIENT
Start: 2021-05-08 | End: 2021-05-11 | Stop reason: HOSPADM

## 2021-05-08 RX ORDER — ASPIRIN 81 MG/1
81 TABLET ORAL EVERY EVENING
Status: DISCONTINUED | OUTPATIENT
Start: 2021-05-08 | End: 2021-05-11 | Stop reason: HOSPADM

## 2021-05-08 RX ORDER — HYDROXYCHLOROQUINE SULFATE 200 MG/1
400 TABLET, FILM COATED ORAL EVERY EVENING
Status: DISCONTINUED | OUTPATIENT
Start: 2021-05-08 | End: 2021-05-11 | Stop reason: HOSPADM

## 2021-05-08 RX ORDER — OXYCODONE HCL 5 MG/5 ML
5 SOLUTION, ORAL ORAL EVERY 4 HOURS PRN
Status: DISCONTINUED | OUTPATIENT
Start: 2021-05-08 | End: 2021-05-11 | Stop reason: HOSPADM

## 2021-05-08 RX ORDER — CARVEDILOL 3.12 MG/1
6.25 TABLET ORAL 2 TIMES DAILY WITH MEALS
Status: DISCONTINUED | OUTPATIENT
Start: 2021-05-08 | End: 2021-05-11 | Stop reason: HOSPADM

## 2021-05-08 RX ORDER — ATORVASTATIN CALCIUM 40 MG/1
80 TABLET, FILM COATED ORAL
Status: DISCONTINUED | OUTPATIENT
Start: 2021-05-08 | End: 2021-05-11 | Stop reason: HOSPADM

## 2021-05-08 RX ORDER — AMITRIPTYLINE HYDROCHLORIDE 25 MG/1
12.5 TABLET, FILM COATED ORAL
Status: DISCONTINUED | OUTPATIENT
Start: 2021-05-08 | End: 2021-05-11 | Stop reason: HOSPADM

## 2021-05-08 RX ORDER — FERROUS SULFATE 325(65) MG
325 TABLET ORAL
Status: DISCONTINUED | OUTPATIENT
Start: 2021-05-09 | End: 2021-05-11 | Stop reason: HOSPADM

## 2021-05-08 RX ORDER — B-COMPLEX WITH VITAMIN C
1 TABLET ORAL
Status: DISCONTINUED | OUTPATIENT
Start: 2021-05-09 | End: 2021-05-11 | Stop reason: HOSPADM

## 2021-05-08 RX ORDER — HEPARIN SODIUM 5000 [USP'U]/ML
5000 INJECTION, SOLUTION INTRAVENOUS; SUBCUTANEOUS EVERY 8 HOURS SCHEDULED
Status: DISCONTINUED | OUTPATIENT
Start: 2021-05-08 | End: 2021-05-11

## 2021-05-08 RX ORDER — SODIUM CHLORIDE 9 MG/ML
75 INJECTION, SOLUTION INTRAVENOUS CONTINUOUS
Status: DISCONTINUED | OUTPATIENT
Start: 2021-05-08 | End: 2021-05-10

## 2021-05-08 RX ORDER — PANTOPRAZOLE SODIUM 40 MG/1
40 TABLET, DELAYED RELEASE ORAL DAILY
Status: DISCONTINUED | OUTPATIENT
Start: 2021-05-09 | End: 2021-05-11 | Stop reason: HOSPADM

## 2021-05-08 RX ORDER — POLYETHYLENE GLYCOL 3350 17 G/17G
17 POWDER, FOR SOLUTION ORAL DAILY PRN
Status: DISCONTINUED | OUTPATIENT
Start: 2021-05-08 | End: 2021-05-11 | Stop reason: HOSPADM

## 2021-05-08 RX ADMIN — ATORVASTATIN CALCIUM 80 MG: 40 TABLET, FILM COATED ORAL at 22:03

## 2021-05-08 RX ADMIN — FAMOTIDINE 10 MG: 20 TABLET ORAL at 22:09

## 2021-05-08 RX ADMIN — HYDROXYCHLOROQUINE SULFATE 400 MG: 200 TABLET, FILM COATED ORAL at 18:06

## 2021-05-08 RX ADMIN — PRAMIPEXOLE DIHYDROCHLORIDE 0.5 MG: 0.5 TABLET ORAL at 18:05

## 2021-05-08 RX ADMIN — SODIUM CHLORIDE 1000 ML: 0.9 INJECTION, SOLUTION INTRAVENOUS at 14:49

## 2021-05-08 RX ADMIN — HEPARIN SODIUM 5000 UNITS: 5000 INJECTION INTRAVENOUS; SUBCUTANEOUS at 22:03

## 2021-05-08 RX ADMIN — AMITRIPTYLINE HYDROCHLORIDE 12.5 MG: 25 TABLET, FILM COATED ORAL at 22:03

## 2021-05-08 RX ADMIN — SODIUM CHLORIDE 75 ML/HR: 0.9 INJECTION, SOLUTION INTRAVENOUS at 18:06

## 2021-05-08 RX ADMIN — ASPIRIN 81 MG: 81 TABLET ORAL at 18:06

## 2021-05-08 NOTE — CONSULTS
[address] Barriga 60 y o  female MRN: [id number]  Unit/Bed#: E2 -01 Encounter: [id number]    ASSESSMENT and PLAN:  1  Acute kidney injury (POA):    · Admission creatinine of 2 55 on May 8, 2021  · Etiology suspected to be multifactorial from acute urinary retention, possible prerenal azotemia, complicated by losartan and meloxicam use  · At this time, would continue with intravenous fluids  · Hold Losartan, Torsemide  · Avoid NSAIDs  · Continue to monitor PVR  2  Chronic kidney disease, stage III:    · Baseline creatinine around 1 3 to 1 6 based on Care Everywhere  · No prior renal follow-up  3  Urinary retention:    · Currently with spontaneous voiding  · Monitor PVR with urinary retention protocol  4  Hypertension:    · BP is controlled  · Hold losartan and torsemide  5  Achalasia s/p myotomy and fundoplication: on clear liquid diet  SUMMARY OF RECOMMENDATIONS:  · Urinary retention protocol  · Continue NS at 75 cc/hr  · Continue to hold Losartan and Torsemide  · Avoid NSAIDs  The above plan was discussed with SLIM  Previous records were personally reviewed by me to obtain a baseline creatinine  HISTORY OF PRESENT ILLNESS:  Requesting Physician: Kaden Baltazar MD  Reason for Consult: JULIANNE Camarillo is a 60 y o  female who was admitted to [facility] on 5/8/21 after presenting with low urine output  A renal consultation is requested today for assistance in the management of JULIANNE  Diane Garcia has a history of HTN, DM, CKD, achalasia s/p myotomy and fundoplication on 3/5/78  Her baseline creatinine is around 1 3 to maybe around 1 6  She was recently admitted to [facility] between May 3 and May 5, 2021 for elective surgery (myotomy and fundoplication)  During that admission, her creatinine during the 1st hospital day was 1 81 and was down to 1 15 at the time of discharge    She had a Little catheter during the hospital stay which was removed at the time of discharge  About 2 days after discharge, she noted a decrease in her urine output  Due to such, she came to [facility] and blood work revealed a creatinine of 2 55  She was felt to have acute urinary retention  Fortunately, she voided spontaneously without requiring a Little catheter  We are now being consulted for assistance with management of JULIANNE  She denied any fever, chills, chest pain, shortness of breath, nausea, vomiting, abdominal pain, diarrhea  She was maintained on a liquid diet after surgery      PAST MEDICAL HISTORY:  Past Medical History:   Diagnosis Date    Anxiety     Asthma     controlled    Back complaints     Cancer ([address] )     nose    Cellulitis of left lower leg     "not now"    CHF (congestive heart failure) [facility] [address] ) 02/2021    Chronic bilateral thoracic back pain     Chronic kidney disease     sees nephrologist regularly" stage 3"    Chronic myofascial pain     Chronic pain of both lower extremities     Chronic pain of left knee     "both knees"    Chronic pain syndrome     bilat legs and knees/neuropathy pain    CPAP (continuous positive airway pressure) dependence     Depression     Diabetes mellitus ([address] )     Disease of thyroid gland     Does use hearing aid     bilat and will wear DOS    Gait disorder     Gastroparesis     GERD (gastroesophageal reflux disease)     History of angina     History of MRSA infection     History of transfusion     Many years ago    Hyperlipidemia     Hypertension     Hypoglycemic reaction     "occas low blood sugar"    Insulin pump in place     pt reports saw endocrinologist 4/28 and will bring copy of instructions DOS    Kidney disease     Pacemaker 2019    Polyneuropathy associated with underlying disease ([facility] [address] )     PONV (postoperative nausea and vomiting)     Risk for falls     S/P insertion of spinal cord stimulator 6/8/2018    Sarcoidosis     Shortness of breath "exertion and not new"    Sleep apnea     TIA (transient ischemic attack)     Use of cane as ambulatory aid     Uses walker     Wears dentures     permanent upper/and some missing teeth/partial lower     PAST SURGICAL HISTORY:  Past Surgical History:   Procedure Laterality Date    ABDOMINAL ADHESION SURGERY N/A 5/3/2021    Procedure: laparoscopic LYSIS ADHESIONS;  Surgeon: Jeramy Sapp MD;  [facility];  Service: Thoracic    BREAST SURGERY      BREAST SURGERY      reduction    CARDIAC PACEMAKER PLACEMENT       SECTION      COLONOSCOPY      DILATION AND CURETTAGE OF UTERUS      "D&E"    HERNIA REPAIR      HYSTERECTOMY      JOINT REPLACEMENT Left     NECK SURGERY      fused 4 discs with 4 screws implanted    NISSEN FUNDOPLICATION LAPAROSCOPIC WITH ROBOTICS N/A 5/3/2021    Procedure: ROBOTIC HELLER MYOTOMY WITH [address] ;  Surgeon: Jeramy Sapp MD;  [facility];  Service: Thoracic    AL ESOPHAGOGASTRODUODENOSCOPY TRANSORAL DIAGNOSTIC N/A 5/3/2021    Procedure: ESOPHAGOGASTRODUODENOSCOPY (EGD); Surgeon: Jeramy Sapp MD;  [facility];  Service: Thoracic    AL SURG IMPLNT [address] Left 2018    Procedure:  Insertion of thoracic spinal cord stimulator electrode via laminotomy and placement of left buttock IPG;  Surgeon: Dorcas Jara MD;  [facility];  Service: Neurosurgery    REPLACEMENT TOTAL KNEE      ROTATOR CUFF REPAIR      SPINAL STIMULATOR PLACEMENT Left 10/3/2018    Procedure: BUTTOCK RE-OPENING INCISION FOR REPOSITIONING OF IMPLANTABLE PULSE GENERATOR;  Surgeon: Dorcas Jara MD;  [facility];  Service: Neurosurgery    TONSILLECTOMY      UPPER GASTROINTESTINAL ENDOSCOPY      WISDOM TOOTH EXTRACTION       ALLERGIES:  Allergies   Allergen Reactions    Bactrim [Sulfamethoxazole-Trimethoprim] Hives    Sucralfate Hives     Facial swelling    Topamax [Topiramate]      disorentation    Azithromycin Itching    Pregabalin Confusion and Other (See Comments)     altered mental status    Ciprofloxacin Drowsiness     Other reaction(s):  Other (See Comments)  "drowsiness"    Norvasc [Amlodipine] Swelling    Baclofen      "That knocks me out "    Bupropion Fatigue    Methotrexate Nausea Only    Neurontin [Gabapentin] Hallucinations and Hypertension     SOCIAL HISTORY:  Social History     Substance and Sexual Activity   Alcohol Use Never    Frequency: Never     Social History     Substance and Sexual Activity   Drug Use Yes    Frequency: 8 0 times per week    Types: Marijuana    Comment: Medical Marijuana/vape pen     Social History     Tobacco Use   Smoking Status Never Smoker   Smokeless Tobacco Never Used     FAMILY HISTORY:  Family History   Problem Relation Age of Onset    Diabetes Family     Heart disease Family     Hypertension Family     Neuropathy Family     No Known Problems Mother     Heart disease Father     Diabetes Father     No Known Problems Maternal Grandmother     No Known Problems Maternal Grandfather     No Known Problems Paternal Grandmother     No Known Problems Paternal Grandfather      MEDICATIONS:    Current Facility-Administered Medications:     albuterol (PROVENTIL HFA,VENTOLIN HFA) inhaler 2 puff, 2 puff, Inhalation, Q4H PRN, Wyatt Goldmann, MD    amitriptyline (ELAVIL) tablet 12 5 mg, 12 5 mg, Oral, HS, Wyatt Goldmann, MD    aspirin (ECOTRIN LOW STRENGTH) EC tablet 81 mg, 81 mg, Oral, QPM, Wyatt Goldmann, MD, 81 mg at 05/08/21 1806    atorvastatin (LIPITOR) tablet 80 mg, 80 mg, Oral, HS, Wyatt Goldmann, MD [place]  [START ON 5/9/2021] calcium carbonate-vitamin D (OSCAL-D) 500 mg-200 units per tablet 1 tablet, 1 tablet, Oral, Daily With Breakfast, Wyatt Goldmann, MD    carvedilol (COREG) tablet 6 25 mg, 6 25 mg, Oral, BID With Meals, Wyatt Goldmann, MD [place]  [START ON 5/9/2021] docusate sodium (COLACE) capsule 100 mg, 100 mg, Oral, Daily, Wyatt Goldmann, MD    famotidine (PEPCID) tablet 10 mg, 10 mg, Oral, HS, Ila Umanzor MD  [facility]  [START ON 5/9/2021] ferrous sulfate tablet 325 mg, 325 mg, Oral, Daily With Breakfast, Ila Umanzor MD  [facility]  [START ON 5/9/2021] fluticasone-vilanterol (BREO ELLIPTA) 100-25 mcg/inh inhaler 1 puff, 1 puff, Inhalation, Daily, Ila Umanzor MD    heparin (porcine) subcutaneous injection 5,000 Units, 5,000 Units, Subcutaneous, Q8H [facility] **AND** Platelet count, , , Once, Ila Umanzor MD  [facility]  hydroxychloroquine (PLAQUENIL) tablet 400 mg, 400 mg, Oral, QPM, Ila Umanzor MD, 400 mg at 05/08/21 1806    insulin lispro (HumaLOG) 100 units/mL subcutaneous injection 1-5 Units, 1-5 Units, Subcutaneous, TID AC **AND** Fingerstick Glucose (POCT), , , TID AC, Ila Umanzor MD    insulin lispro (HumaLOG) FOR PUMP REFILLS 1,000 Units, 10 mL, Subcutaneous Insulin Pump, Daily PRN, Ila Umanzor MD  [facility]  [START ON 5/9/2021] modafinil (PROVIGIL) tablet 100 mg, 100 mg, Oral, Daily, Ila Umanzor MD    ondansetron [facility]) injection 4 mg, 4 mg, Intravenous, Q6H PRN, Ila Umanzor MD    oxyCODONE (ROXICODONE) oral solution 5 mg, 5 mg, Oral, Q4H PRN, Ila Umanzor MD  [facility]  [START ON 5/9/2021] pantoprazole (PROTONIX) EC tablet 40 mg, 40 mg, Oral, Daily, Ila Umanzor MD    PATIENT MAINTAINED INSULIN PUMP 1 each, 1 each, Subcutaneous, Q8H, Ila Umanzor MD    polyethylene glycol (MIRALAX) packet 17 g, 17 g, Oral, Daily PRN, Ila Umanzor MD    pramipexole (MIRAPEX) tablet 0 5 mg, 0 5 mg, Oral, BID, Ila Umanzor MD, 0 5 mg at 05/08/21 1805    [START ON 5/9/2021] sertraline (ZOLOFT) tablet 50 mg, 50 mg, Oral, Daily, Ila Umanzor MD    sodium chloride 0 9 % infusion, 75 mL/hr, Intravenous, Continuous, Ila Umanzor MD, Last Rate: 75 mL/hr at 05/08/21 1806, 75 mL/hr at 05/08/21 1806    REVIEW OF SYSTEMS:  All the systems were reviewed and were negative except as documented on the HPI      PHYSICAL EXAM:  Current Weight: Weight - Scale: 90 4 kg (199 lb 4 7 oz)  First Weight: Weight - Scale: 90 4 kg (199 lb 4 7 oz)  Vitals:    05/08/21 1107 05/08/21 1138 05/08/21 1307 05/08/21 1441   BP: 127/56 125/58 125/60 128/58   BP Location: Left arm Left arm Left arm Left arm   Pulse: 72 65 64 65   Resp: 18 18 18 16   Temp: 97 8 °F (36 6 °C)      TempSrc: Oral      SpO2: 100% 100% 100% 99%   Weight: 90 4 kg (199 lb 4 7 oz)          Intake/Output Summary (Last 24 hours) at 5/8/2021 1816  Last data filed at 5/8/2021 1756  Gross per 24 hour   Intake --   Output 150 ml   Net -150 ml     Physical Exam  General: conscious, coherent, cooperative, not in distress  Skin: warm, dry, good turgor  Eyes: pink conjunctivae, no scleral icterus  ENT: moist lips and mucous membranes  Neck: supple, no JVD  Chest/Lungs: clear breath sounds  CVS: distinct heart sounds, normal rate, regular rhythm, no rub  Abdomen: soft, non-tender, non-distended, normoactive bowel sounds  Extremities: no edema  : no villareal catheter  Neuro: awake, alert, oriented to time, place and person  Psych: appropriate affect  Lab Results:   Results from last 7 days   Lab Units 05/08/21  1159 05/05/21  0435 05/04/21  0526 05/03/21  1534   WBC Thousand/uL 7 34  --  6 28 8 39   HEMOGLOBIN g/dL 8 0*  --  8 1* 9 4*   HEMATOCRIT % 26 0*  --  27 2* 30 5*   PLATELETS Thousands/uL 100*  --  144* 214   POTASSIUM mmol/L 4 0 4 2 5 0 4 6   CHLORIDE mmol/L 100 108 107 103   CO2 mmol/L 27 27 27 28   BUN mg/dL 41* 29* 36* 35*   CREATININE mg/dL 2 55* 1 15 1 67* 1 81*   CALCIUM mg/dL 8 1* 7 8* 8 5 8 7   MAGNESIUM mg/dL  --   --  2 8* 2 4     Other Studies: none

## 2021-05-08 NOTE — ASSESSMENT & PLAN NOTE
Continue pepcid and protonix  Recent surgery for achalasia  Clear liquid diet, can advance to soft tomorrow

## 2021-05-08 NOTE — ASSESSMENT & PLAN NOTE
Presented with acute urinary retention  On multiple medications that can cause urinary retention  Recent surgery with addition of opiods  UA shows mod bacteria, but no symptoms of UTI, fevers, dysuria  Decrease amitripyline to 12 5, hold flexeril, decrease pramipexole  Consult nephrology, appreciate assistance with meds and IVfs  Urinary retention protocol  Straight cath if needed

## 2021-05-08 NOTE — ASSESSMENT & PLAN NOTE
Lab Results   Component Value Date    HGBA1C 6 9 (H) 01/04/2021     Accuchecks, SSI  Use insulin pump      No results for input(s): POCGLU in the last 72 hours      Blood Sugar Average: Last 72 hrs:

## 2021-05-08 NOTE — ED NOTES
Discussed need for villareal catheter insertion with provider, due to patient being able to spontaneously urinate, villareal catheter is not indicated at this time  Will hold        Mushtaq Fernandez RN  05/08/21 4756

## 2021-05-08 NOTE — H&P
[facility]  H&P- Kimberley Sheikh 1955, 60 y o  female MRN: [id number]  Unit/Bed#: E2 -01 Encounter: [id number]  Primary Care Provider: Delmy Aguilar DO   Date and time admitted to hospital: 5/8/2021 11:04 AM    * Acute urinary retention  Assessment & Plan  Presented with acute urinary retention  On multiple medications that can cause urinary retention  Recent surgery with addition of opiods  UA shows mod bacteria, but no symptoms of UTI, fevers, dysuria  Decrease amitripyline to 12 5, hold flexeril, decrease pramipexole  Consult nephrology, appreciate assistance with meds and IVfs  Urinary retention protocol  Straight cath if needed    JULIANNE (acute kidney injury) ([facility] [address] )  Assessment & Plan  Creatinine of 2 55 on presentation  Likely obstructive etiology given unable to urinate due to medications  Kidney US pending  As above    Moderate persistent asthma with acute exacerbation  Assessment & Plan  Continue advair    Type 2 diabetes mellitus with microalbuminuria, with long-term current use of insulin ([facility])  Assessment & Plan  Lab Results   Component Value Date    HGBA1C 6 9 (H) 01/04/2021     Accuchecks, SSI  Use insulin pump      No results for input(s): POCGLU in the last 72 hours  Blood Sugar Average: Last 72 hrs:      GERD (gastroesophageal reflux disease)  Assessment & Plan  Continue pepcid and protonix  Recent surgery for achalasia  Clear liquid diet, can advance to soft tomorrow    Essential hypertension  Assessment & Plan  Continue coreg, BP currently WNL    VTE Prophylaxis: Heparin  / sequential compression device   Code Status: Full Code  POLST: There is no POLST form on file for this patient (pre-hospital)  Discussion with family: patient    Anticipated Length of Stay:  Patient will be admitted on an Inpatient basis with an anticipated length of stay o 2 midnights     Justification for Hospital Stay: JULIANNE, urinary retention    Total Time for Visit, including Counseling / Coordination of Care: 45 minutes  Greater than 50% of this total time spent on direct patient counseling and coordination of care  Chief Complaint:   Unable to urinate    History of Present Illness:    Eliu Luna is a 60 y o  female who presents with acute urinary retention  Past medical history of recent achalasia status post myotomy with fundoplication on 74/27, diabetes on insulin pump, hypertension, sarcoid, and CKD  Presented with acute urinary retention  Has had difficulty urinating since yesterday  On multiple medications that can cause acute urinary retention  Recently discharged on 05/03 from [facility] after myotomy for achlasia and prescribed opioids  Discussed decreasing some of them and patient agreeable  Denies any fevers, chills, dysuria, nausea, vomiting, CP or SOB  Review of Systems:    Review of Systems   Constitutional: Negative for activity change, appetite change, chills and fever  HENT: Negative for congestion, facial swelling, sinus pain and sore throat  Respiratory: Negative for cough, shortness of breath and wheezing  Cardiovascular: Negative for chest pain, palpitations and leg swelling  Gastrointestinal: Negative for abdominal distention, abdominal pain, constipation, diarrhea, nausea and vomiting  Endocrine: Negative for cold intolerance, heat intolerance, polydipsia, polyphagia and polyuria  Genitourinary: Positive for decreased urine volume, flank pain and pelvic pain  Negative for dysuria and urgency  Skin: Negative for color change and pallor  Neurological: Negative for dizziness, syncope, weakness, light-headedness and headaches  Psychiatric/Behavioral: Negative for agitation, confusion and dysphoric mood         Past Medical and Surgical History:     Past Medical History:   Diagnosis Date    Anxiety     Asthma     controlled    Back complaints     Cancer ([facility] [address] )     nose    Cellulitis of left lower leg     "not now"    CHF [facility]congestive heart failure[facility] 2021    Chronic bilateral thoracic back pain     Chronic kidney disease     sees nephrologist regularly" stage 3"    Chronic myofascial pain     Chronic pain of both lower extremities     Chronic pain of left knee     "both knees"    Chronic pain syndrome     bilat legs and knees/neuropathy pain    CPAP (continuous positive airway pressure) dependence     Depression     Diabetes mellitus ([address] )     Disease of thyroid gland     Does use hearing aid     bilat and will wear DOS    Gait disorder     Gastroparesis     GERD (gastroesophageal reflux disease)     History of angina     History of MRSA infection     History of transfusion     Many years ago    Hyperlipidemia     Hypertension     Hypoglycemic reaction     "occas low blood sugar"    Insulin pump in place     pt reports saw endocrinologist  and will bring copy of instructions DOS    Kidney disease     Pacemaker     Polyneuropathy associated with underlying disease ([address] )     PONV (postoperative nausea and vomiting)     Risk for falls     S/P insertion of spinal cord stimulator 2018    Sarcoidosis     Shortness of breath     "exertion and not new"    Sleep apnea     TIA (transient ischemic attack)     Use of cane as ambulatory aid     Uses walker     Wears dentures     permanent upper/and some missing teeth/partial lower       Past Surgical History:   Procedure Laterality Date    ABDOMINAL ADHESION SURGERY N/A 5/3/2021    Procedure: laparoscopic LYSIS ADHESIONS;  Surgeon: Anders Magana MD;  [facility];  Service: Thoracic    BREAST SURGERY      BREAST SURGERY      reduction    CARDIAC PACEMAKER PLACEMENT       SECTION      COLONOSCOPY      DILATION AND CURETTAGE OF UTERUS      "D&E"    HERNIA REPAIR      HYSTERECTOMY      JOINT REPLACEMENT Left     NECK SURGERY      fused 4 discs with 4 screws implanted    NISSEN FUNDOPLICATION LAPAROSCOPIC WITH ROBOTICS N/A 5/3/2021    Procedure: ROBOTIC [address] ;  Surgeon: Oseas Wetzel MD;  [facility];  Service: Thoracic    SC ESOPHAGOGASTRODUODENOSCOPY TRANSORAL DIAGNOSTIC N/A 5/3/2021    Procedure: ESOPHAGOGASTRODUODENOSCOPY (EGD); Surgeon: Oseas Wetzel MD;  [facility];  Service: Thoracic    SC SURG IMPLNT [address] Left 4/23/2018    Procedure: Insertion of thoracic spinal cord stimulator electrode via laminotomy and placement of left buttock IPG;  Surgeon: Blanka Green MD;  [facility];  Service: Neurosurgery    REPLACEMENT TOTAL KNEE      ROTATOR CUFF REPAIR      SPINAL STIMULATOR PLACEMENT Left 10/3/2018    Procedure: BUTTOCK RE-OPENING INCISION FOR REPOSITIONING OF IMPLANTABLE PULSE GENERATOR;  Surgeon: Blanka Green MD;  [facility];  Service: Neurosurgery    TONSILLECTOMY      UPPER GASTROINTESTINAL ENDOSCOPY      WISDOM TOOTH EXTRACTION         Meds/Allergies:    Prior to Admission medications    Medication Sig Start Date End Date Taking? Authorizing Provider   acetaminophen (TYLENOL) 160 mg/5 mL suspension Take 20 3 mL (650 mg total) by mouth every 6 (six) hours for 7 days 5/5/21 5/12/21 Yes Alexis Kimball MD   amitriptyline (ELAVIL) 50 mg tablet Take one tablet p o  at bedtime for two weeks  If insomnia persists, increase to two tablets at bedtime  Patient taking differently: 25 mg Take one tablet p o  at bedtime for two weeks    If insomnia persists, increase to two tablets at bedtime 12/6/18  Yes Jacinda Lau MD   Armodafinil 150 MG tablet Take 1 tablet (150 mg total) by mouth daily 3/4/21 6/2/21 Yes Jacinda Lau MD   aspirin (ECOTRIN LOW STRENGTH) 81 mg EC tablet Take by mouth every evening    Yes Historical Provider, MD   atorvastatin (LIPITOR) 80 mg tablet Take 80 mg by mouth daily at bedtime     Yes Historical Provider, MD   calcium carbonate-vitamin D (OSCAL-D) 500 mg-200 units per tablet TAKE TWO TABLETS BY MOUTH THREE TIMES A DAY WITH MEALS 11/30/20  Yes Historical Provider, MD   carvedilol (COREG) 3 125 mg tablet Take 6 25 mg by mouth 2 (two) times a day with meals  2/5/21 2/5/22 Yes Historical Provider, MD   CRANBERRY PO Take 1 capsule by mouth every evening    Yes Historical Provider, MD   Cyanocobalamin (VITAMIN B-12 PO) Take 1 capsule by mouth every evening    Yes Historical Provider, MD   Docusate Sodium 100 MG capsule Take 100 mg by mouth daily with lunch     Yes Historical Provider, MD   esomeprazole (NexIUM) 40 MG capsule Take 1 capsule (40 mg total) by mouth 2 (two) times a day before meals 9/17/20  Yes Karon LemonDO   famotidine (PEPCID) 40 MG tablet Take 1 tablet (40 mg total) by mouth daily  Patient taking differently: Take 40 mg by mouth daily at bedtime  1/25/21  Yes Karon Cordero DO   ferrous sulfate 324 (65 Fe) mg Take 65 mg by mouth 2 (two) times a day before meals    Yes Historical Provider, MD   fluticasone-salmeterol (ADVAIR) 100-50 mcg/dose inhaler Inhale 2 puffs 2 (two) times a day    Yes Historical Provider, MD   hydroxychloroquine (PLAQUENIL) 200 mg tablet 400 mg every evening  12/7/20  Yes Historical Provider, MD   levothyroxine (Synthroid) 88 mcg tablet Take 88 mcg by mouth daily   Yes Historical Provider, MD   losartan (COZAAR) 100 MG tablet Take 50 mg by mouth every evening  1/15/18  Yes Historical Provider, MD   lubiprostone (AMITIZA) 24 mcg capsule Take 1 capsule (24 mcg total) by mouth 2 (two) times a day with meals 5/12/20  Yes Glenn Elliott PA-C   meloxicam (Mobic) 15 mg tablet Take 15 mg by mouth daily   Yes Historical Provider, MD   NOVOLOG 100 UNIT/ML injection Pt reports unsure of total dose a day/did see her endocrinologist ROSA Langford  Pt has pump on upper left abdomen cut basaL RATE IN HALF 1/2/18  Yes Historical Provider, MD   ondansetron (ZOFRAN) 8 mg tablet Take 8 mg by mouth every 8 (eight) hours as needed     Yes Historical Provider, MD   oxybutynin (DITROPAN XL) 15 MG 24 hr tablet Take 1 tablet (15 mg total) by mouth daily 7/2/20  Yes ROMAINE Willson   oxyCODONE (ROXICODONE) 5 mg/5 mL solution Take 5 mL (5 mg total) by mouth every 4 (four) hours as needed for severe pain for up to 10 daysMax Daily Amount: 30 mg 5/5/21 5/15/21 Yes Ramón Ying PA-C   pantoprazole (PROTONIX) 40 mg tablet Take 1 tablet (40 mg total) by mouth daily 5/5/21  Yes Tello Stockton MD   polyethylene glycol (MIRALAX) 17 g packet Take 17 g by mouth daily as needed (constipation) for up to 15 days 5/5/21 5/20/21 Yes Tello Stockton MD   Potassium Gluconate 595 MG CAPS Take by mouth 2 (two) times a day    Yes Historical Provider, MD   pramipexole (MIRAPEX) 1 mg tablet Take 1 mg by mouth 2 (two) times a day     Yes Historical Provider, MD   Semaglutide,0 25 or 0 5MG/DOS, (Ozempic, 0 25 or 0 5 MG/DOSE,) 2 MG/1 5ML SOPN Inject under the skin ozempic weekly on Fridays   Yes Historical Provider, MD   senna (SENOKOT) 8 6 mg Take 1 tablet (8 6 mg total) by mouth daily 5/5/21  Yes Tello Stockton MD   sertraline (ZOLOFT) 50 mg tablet Take 50 mg by mouth daily   Yes Historical Provider, MD   torsemide (DEMADEX) 20 mg tablet Take 20 mg by mouth 2 (two) times a day    Yes Historical Provider, MD   COVIDGarret19 mRNA Virus Vaccine (MODERNA COVID-19 VACCINE IM) Inject into a muscle 2nd dose received 4/22/21    Historical Provider, MD   dicyclomine (BENTYL) 10 mg capsule Take 1 capsule (10 mg total) by mouth 4 (four) times a day as needed (abdominal pain) 7/28/20   Juliana Manuel PA-C   glucagon 1 MG injection Glucagon Emergency Kit (human-recomb) 1 mg solution for injection    Historical Provider, MD   glucose blood test strip Testing six times daily  E11 65 on insulin pump 9/17/18   Historical Provider, MD   oxyCODONE-acetaminophen (PERCOCET) 5-325 mg per tablet Take 1 tablet by mouth every 4 (four) hours as needed for moderate pain    Historical Provider, MD RIVAS  (90 Base) MCG/ACT inhaler 2 puffs every 4 (four) hours as needed   8/1/18   Historical Provider, MD   cholecalciferol (VITAMIN D3) 1,000 units tablet Take 2,000 Units by mouth 2 (two) times a day  11/27/17 5/8/21  Historical Provider, MD   cyclobenzaprine (FLEXERIL) 10 mg tablet Take 1 tablet (10 mg total) by mouth 3 (three) times a day as needed for muscle spasms 4/12/21 5/8/21  ROMAINE Shaffer   fenofibrate (TRICOR) 48 mg tablet  6/24/20 5/8/21  Historical Provider, MD   furosemide (LASIX) 20 mg tablet Take 60 mg by mouth 2 (two) times a day   5/8/21  Historical Provider, MD   hydrALAZINE (APRESOLINE) 10 mg tablet  1/3/20 5/8/21  Historical Provider, MD   magnesium oxide (MAG-OX) 400 mg tablet Take 500 mg by mouth   5/8/21  Historical Provider, MD     I have reviewed home medications with patient personally  Allergies: Allergies   Allergen Reactions    Bactrim [Sulfamethoxazole-Trimethoprim] Hives    Sucralfate Hives     Facial swelling    Topamax [Topiramate]      disorentation    Azithromycin Itching    Pregabalin Confusion and Other (See Comments)     altered mental status    Ciprofloxacin Drowsiness     Other reaction(s):  Other (See Comments)  "drowsiness"    Norvasc [Amlodipine] Swelling    Baclofen      "That knocks me out "    Bupropion Fatigue    Methotrexate Nausea Only    Neurontin [Gabapentin] Hallucinations and Hypertension       Social History:     Marital Status: /Civil Union   Occupation: Retired  Patient Pre-hospital Living Situation: Home with [facility]  Patient Pre-hospital Level of Mobility: Ambulatory  Patient Pre-hospital Diet Restrictions: Liquid diet  Substance Use History:   Social History     Substance and Sexual Activity   Alcohol Use Never    Frequency: Never     Social History     Tobacco Use   Smoking Status Never Smoker   Smokeless Tobacco Never Used     Social History     Substance and Sexual Activity   Drug Use Yes    Frequency: 8 0 times per week    Types: Marijuana    Comment: Medical Marijuana/vape pen       Family History:    Family History   Problem Relation Age of Onset    Diabetes Family     Heart disease Family     Hypertension Family     Neuropathy Family     No Known Problems Mother     Heart disease Father     Diabetes Father     No Known Problems Maternal Grandmother     No Known Problems Maternal Grandfather     No Known Problems Paternal Grandmother     No Known Problems Paternal Grandfather        Physical Exam:     Vitals:   Blood Pressure: 128/58 (05/08/21 1441)  Pulse: 65 (05/08/21 1441)  Temperature: 97 8 °F (36 6 °C) (05/08/21 1107)  Temp Source: Oral (05/08/21 1107)  Respirations: 16 (05/08/21 1441)  Weight - Scale: 90 4 kg (199 lb 4 7 oz) (05/08/21 1107)  SpO2: 99 % (05/08/21 1441)    Physical Exam  Vitals signs and nursing note reviewed  Constitutional:       Appearance: She is obese  HENT:      Head: Normocephalic and atraumatic  Eyes:      Conjunctiva/sclera: Conjunctivae normal       Pupils: Pupils are equal, round, and reactive to light  Cardiovascular:      Rate and Rhythm: Normal rate and regular rhythm  Heart sounds: Normal heart sounds  Pulmonary:      Breath sounds: Normal breath sounds  No wheezing or rhonchi  Abdominal:      General: Bowel sounds are normal  There is no distension  Palpations: Abdomen is soft  Tenderness: There is no abdominal tenderness  Musculoskeletal:         General: No swelling  Right lower leg: No edema  Left lower leg: No edema  Skin:     General: Skin is warm and dry  Neurological:      General: No focal deficit present  Mental Status: She is alert  Mental status is at baseline  Additional Data:     Lab Results: I have personally reviewed pertinent reports        Results from last 7 days   Lab Units 05/08/21  1159   WBC Thousand/uL 7 34   HEMOGLOBIN g/dL 8 0*   HEMATOCRIT % 26 0*   PLATELETS Thousands/uL 100*   NEUTROS PCT % 82*   LYMPHS PCT % 8*   MONOS PCT % 8 EOS PCT % 1     Results from last 7 days   Lab Units 05/08/21  1159   SODIUM mmol/L 137   POTASSIUM mmol/L 4 0   CHLORIDE mmol/L 100   CO2 mmol/L 27   BUN mg/dL 41*   CREATININE mg/dL 2 55*   ANION GAP mmol/L 10   CALCIUM mg/dL 8 1*   GLUCOSE RANDOM mg/dL 135         Results from last 7 days   Lab Units 05/05/21  0546 05/04/21  2033 05/04/21  1730 05/04/21  1218 05/04/21  0610 05/03/21  2345 05/03/21  1753 05/03/21  1524 05/03/21  1405 05/03/21  1254 05/03/21  1157 05/03/21  1023   POC GLUCOSE mg/dl 95 166* 265* 155* 132 223* 290* 267* 272* 229* 180* 186*               Imaging: I have personally reviewed pertinent reports  No orders to display       EKG, Pathology, and Other Studies Reviewed on Admission:   · EKG: None    Allscripts / Epic Records Reviewed: Yes     ** Please Note: This note has been constructed using a voice recognition system   **

## 2021-05-08 NOTE — PLAN OF CARE
Problem: PAIN - ADULT  Goal: Verbalizes/displays adequate comfort level or baseline comfort level  Description: Interventions:  - Encourage patient to monitor pain and request assistance  - Assess pain using appropriate pain scale  - Administer analgesics based on type and severity of pain and evaluate response  - Implement non-pharmacological measures as appropriate and evaluate response  - Consider cultural and social influences on pain and pain management  - Notify physician/advanced practitioner if interventions unsuccessful or patient reports new pain  Outcome: Progressing     Problem: INFECTION - ADULT  Goal: Absence or prevention of progression during hospitalization  Description: INTERVENTIONS:  - Assess and monitor for signs and symptoms of infection  - Monitor lab/diagnostic results  - Monitor all insertion sites, i e  indwelling lines, tubes, and drains  - Monitor endotracheal if appropriate and nasal secretions for changes in amount and color  - [place] appropriate cooling/warming therapies per order  - Administer medications as ordered  - Instruct and encourage patient and family to use good hand hygiene technique  - Identify and instruct in appropriate isolation precautions for identified infection/condition  Outcome: Progressing  Goal: Absence of fever/infection during neutropenic period  Description: INTERVENTIONS:  - Monitor WBC    Outcome: Progressing     Problem: SAFETY ADULT  Goal: Patient will remain free of falls  Description: INTERVENTIONS:  - Assess patient frequently for physical needs  -  Identify cognitive and physical deficits and behaviors that affect risk of falls    -  [place] fall precautions as indicated by assessment   - Educate patient/family on patient safety including physical limitations  - Instruct patient to call for assistance with activity based on assessment  - Modify environment to reduce risk of injury  - Consider OT/PT consult to assist with strengthening/mobility  Outcome: Progressing  Goal: Maintain or return to baseline ADL function  Description: INTERVENTIONS:  -  Assess patient's ability to carry out ADLs; assess patient's baseline for ADL function and identify physical deficits which impact ability to perform ADLs (bathing, care of mouth/teeth, toileting, grooming, dressing, etc )  - Assess/evaluate cause of self-care deficits   - Assess range of motion  - Assess patient's mobility; develop plan if impaired  - Assess patient's need for assistive devices and provide as appropriate  - Encourage maximum independence but intervene and supervise when necessary  - Involve family in performance of ADLs  - Assess for home care needs following discharge   - Consider OT consult to assist with ADL evaluation and planning for discharge  - Provide patient education as appropriate  Outcome: Progressing  Goal: Maintain or return mobility status to optimal level  Description: INTERVENTIONS:  - Assess patient's baseline mobility status (ambulation, transfers, stairs, etc )    - Identify cognitive and physical deficits and behaviors that affect mobility  - Identify mobility aids required to assist with transfers and/or ambulation (gait belt, sit-to-stand, lift, walker, cane, etc )  - [place] fall precautions as indicated by assessment  - Record patient progress and toleration of activity level on Mobility SBAR; progress patient to next Phase/Stage  - Instruct patient to call for assistance with activity based on assessment  - Consider rehabilitation consult to assist with strengthening/weightbearing, etc   Outcome: Progressing     Problem: DISCHARGE PLANNING  Goal: Discharge to home or other facility with appropriate resources  Description: INTERVENTIONS:  - Identify barriers to discharge w/patient and caregiver  - Arrange for needed discharge resources and transportation as appropriate  - Identify discharge learning needs (meds, wound care, etc )  - Arrange for interpretive services to assist at discharge as needed  - Refer to Case Management Department for coordinating discharge planning if the patient needs post-hospital services based on physician/advanced practitioner order or complex needs related to functional status, cognitive ability, or social support system  Outcome: Progressing     Problem: Knowledge Deficit  Goal: Patient/family/caregiver demonstrates understanding of disease process, treatment plan, medications, and discharge instructions  Description: Complete learning assessment and assess knowledge base  Interventions:  - Provide teaching at level of understanding  - Provide teaching via preferred learning methods  Outcome: Progressing     Problem: Nutrition/Hydration-ADULT  Goal: Nutrient/Hydration intake appropriate for improving, restoring or maintaining nutritional needs  Description: Monitor and assess patient's nutrition/hydration status for malnutrition  Collaborate with interdisciplinary team and initiate plan and interventions as ordered  Monitor patient's weight and dietary intake as ordered or per policy  Utilize nutrition screening tool and intervene as necessary  Determine patient's food preferences and provide high-protein, high-caloric foods as appropriate       INTERVENTIONS:  - Monitor oral intake, urinary output, labs, and treatment plans  - Assess nutrition and hydration status and recommend course of action  - Evaluate amount of meals eaten  - Assist patient with eating if necessary   - Allow adequate time for meals  - Recommend/ encourage appropriate diets, oral nutritional supplements, and vitamin/mineral supplements  - Order, calculate, and assess calorie counts as needed  - Recommend, monitor, and adjust tube feedings and TPN/PPN based on assessed needs  - Assess need for intravenous fluids  - Provide specific nutrition/hydration education as appropriate  - Include patient/family/caregiver in decisions related to nutrition  Outcome: Progressing     Problem: Potential for Falls  Goal: Patient will remain free of falls  Description: INTERVENTIONS:  - Assess patient frequently for physical needs  -  Identify cognitive and physical deficits and behaviors that affect risk of falls    -  [place] fall precautions as indicated by assessment   - Educate patient/family on patient safety including physical limitations  - Instruct patient to call for assistance with activity based on assessment  - Modify environment to reduce risk of injury  - Consider OT/PT consult to assist with strengthening/mobility  Outcome: Progressing     Problem: COPING  Goal: Pt/Family able to verbalize concerns and demonstrate effective coping strategies  Description: INTERVENTIONS:  - Assist patient/family to identify coping skills, available support systems and cultural and spiritual values  - Provide emotional support, including active listening and acknowledgement of concerns of patient and caregivers  - Reduce environmental stimuli, as able  - Provide patient education  - Assess for spiritual pain/suffering and initiate spiritual care, including notification of Pastoral Care or jean based community as needed  - Assess effectiveness of coping strategies  Outcome: Progressing

## 2021-05-08 NOTE — ED NOTES
Report called to Fouzia Joseph RN  Aware that patient has own insulin pump and glucometer  Insulin pump has been removed and will await order from [facility] that patient is able to maintain own insulin  Patient is also aware of plan and in agreement        Caroline Jones RN  05/08/21 [id number]

## 2021-05-08 NOTE — ED PROVIDER NOTES
History  Chief Complaint   Patient presents with    Difficulty Urinating     Pt reports recent procedure to open up her espophagus  Urinary catheter placed for procedure  Catheter removed after procedure with no difficulties  Pt reports that since last night she has not been able to urinate  Pt was able to urinate small amt this afternoon but feels fullness and unrelieved pressure  No fevers     This is a 18-year-old female with a history of achalasia status post myotomy with Kareem fundoplication with thoracic surgery on 5/3/21, diabetes, hypertension, hyperlipidemia, chronic kidney disease, CHF who presents with difficulty urinating  Patient states that she last fully emptied her bladder yesterday morning  States that she urinated a little bit last night and this morning  However, does not feel that she fully emptied her bladder  Patient states that she feels the urge to urinate, but is unable to urinate  No issues with urinary retention in the past   Denies fever/chills, nausea/vomiting, lightheadedness/dizziness, numbness/weakness, headache, change in vision, URI symptoms, neck pain, chest pain, palpitations, shortness of breath, cough, back pain, flank pain, diarrhea, hematochezia, melena, dysuria, hematuria, abnormal vaginal discharge/bleeding  12:44 PM patient able to urinate and does feel better  Will hold off on villareal at this time  Postvoid residual was about 150 cc  However, the patient does have an JULIANNE  Will give IV fluids and admit  Prior to Admission Medications   Prescriptions Last Dose Informant Patient Reported? Taking?    Armodafinil 150 MG tablet  Self No No   Sig: Take 1 tablet (150 mg total) by mouth daily   COVID-19 mRNA Virus Vaccine (MODERNA COVID-19 VACCINE IM)   Yes No   Sig: Inject into a muscle 2nd dose received 4/22/21   CRANBERRY PO  Self Yes No   Sig: Take 1 capsule by mouth every evening    Cyanocobalamin (VITAMIN B-12 PO)  Self Yes No   Sig: Take 1 capsule by mouth every evening    Docusate Sodium 100 MG capsule  Self Yes No   Sig: Take 100 mg by mouth daily with lunch     NOVOLOG 100 UNIT/ML injection  Self Yes No   Sig: Pt reports unsure of total dose a day/did see her endocrinologist Dr Jaylene Huston,  [place]  Pt has pump on upper left abdomen cut basaL RATE IN HALF   Potassium Gluconate 595 MG CAPS  Self Yes No   Sig: Take by mouth 2 (two) times a day    Semaglutide,0 25 or 0 5MG/DOS, (Ozempic, 0 25 or 0 5 MG/DOSE,) 2 MG/1 5ML SOPN   Yes No   Sig: Inject under the skin ozempic weekly on    VENTOLIN  (90 Base) MCG/ACT inhaler  Self Yes No   Si puffs every 4 (four) hours as needed     acetaminophen (TYLENOL) 160 mg/5 mL suspension   No No   Sig: Take 20 3 mL (650 mg total) by mouth every 6 (six) hours for 7 days   amitriptyline (ELAVIL) 50 mg tablet  Self No No   Sig: Take one tablet p o  at bedtime for two weeks  If insomnia persists, increase to two tablets at bedtime   Patient taking differently: 25 mg Take one tablet p o  at bedtime for two weeks    If insomnia persists, increase to two tablets at bedtime   aspirin (ECOTRIN LOW STRENGTH) 81 mg EC tablet  Self Yes No   Sig: Take by mouth every evening    atorvastatin (LIPITOR) 80 mg tablet  Self Yes No   Sig: Take 80 mg by mouth daily at bedtime     calcium carbonate-vitamin D (OSCAL-D) 500 mg-200 units per tablet  Self Yes No   Sig: TAKE TWO TABLETS BY MOUTH THREE TIMES A DAY WITH MEALS   carvedilol (COREG) 3 125 mg tablet  Self Yes No   Sig: Take 6 25 mg by mouth 2 (two) times a day with meals    cholecalciferol (VITAMIN D3) 1,000 units tablet  Self Yes No   Sig: Take 2,000 Units by mouth 2 (two) times a day    cyclobenzaprine (FLEXERIL) 10 mg tablet  Self No No   Sig: Take 1 tablet (10 mg total) by mouth 3 (three) times a day as needed for muscle spasms   dicyclomine (BENTYL) 10 mg capsule  Self No No   Sig: Take 1 capsule (10 mg total) by mouth 4 (four) times a day as needed (abdominal pain)   esomeprazole (NexIUM) 40 MG capsule  Self No No   Sig: Take 1 capsule (40 mg total) by mouth 2 (two) times a day before meals   famotidine (PEPCID) 40 MG tablet  Self No No   Sig: Take 1 tablet (40 mg total) by mouth daily   Patient taking differently: Take 40 mg by mouth daily at bedtime    fenofibrate (TRICOR) 48 mg tablet  Self Yes No   ferrous sulfate 324 (65 Fe) mg  Self Yes No   Sig: Take 65 mg by mouth 2 (two) times a day before meals    fluticasone-salmeterol (ADVAIR) 100-50 mcg/dose inhaler  Self Yes No   Sig: Inhale 2 puffs 2 (two) times a day    furosemide (LASIX) 20 mg tablet  Self Yes No   Sig: Take 60 mg by mouth 2 (two) times a day    glucagon 1 MG injection  Self Yes No   Sig: Glucagon Emergency Kit (human-recomb) 1 mg solution for injection   glucose blood test strip  Self Yes No   Sig: Testing six times daily  E11 65 on insulin pump   hydrALAZINE (APRESOLINE) 10 mg tablet  Self Yes No   hydroxychloroquine (PLAQUENIL) 200 mg tablet  Self Yes No   Si mg every evening    levothyroxine (Synthroid) 88 mcg tablet   Yes No   Sig: Take 88 mcg by mouth daily   losartan (COZAAR) 100 MG tablet  Self Yes No   Sig: Take 50 mg by mouth every evening    lubiprostone (AMITIZA) 24 mcg capsule  Self No No   Sig: Take 1 capsule (24 mcg total) by mouth 2 (two) times a day with meals   magnesium oxide (MAG-OX) 400 mg tablet  Self Yes No   Sig: Take 500 mg by mouth    meloxicam (Mobic) 15 mg tablet   Yes No   Sig: Take 15 mg by mouth daily   ondansetron (ZOFRAN) 8 mg tablet  Self Yes No   Sig: Take 8 mg by mouth every 8 (eight) hours as needed     oxyCODONE (ROXICODONE) 5 mg/5 mL solution   No No   Sig: Take 5 mL (5 mg total) by mouth every 4 (four) hours as needed for severe pain for up to 10 daysMax Daily Amount: 30 mg   oxyCODONE-acetaminophen (PERCOCET) 5-325 mg per tablet   Yes No   Sig: Take 1 tablet by mouth every 4 (four) hours as needed for moderate pain   oxybutynin (DITROPAN XL) 15 MG 24 hr tablet  Self No No   Sig: Take 1 tablet (15 mg total) by mouth daily   pantoprazole (PROTONIX) 40 mg tablet   No No   Sig: Take 1 tablet (40 mg total) by mouth daily   polyethylene glycol (MIRALAX) 17 g packet   No No   Sig: Take 17 g by mouth daily as needed (constipation) for up to 15 days   pramipexole (MIRAPEX) 1 mg tablet  Self Yes No   Sig: Take 1 mg by mouth 2 (two) times a day     senna (SENOKOT) 8 6 mg   No No   Sig: Take 1 tablet (8 6 mg total) by mouth daily   sertraline (ZOLOFT) 50 mg tablet  Self Yes No   Sig: Take 50 mg by mouth daily   torsemide (DEMADEX) 20 mg tablet  Self Yes No   Sig: Take 20 mg by mouth 2 (two) times a day       Facility-Administered Medications: None       Past Medical History:   Diagnosis Date    Anxiety     Asthma     controlled    Back complaints     Cancer ([facility] )     nose    Cellulitis of left lower leg     "not now"    CHF (congestive heart failure) (HCC) 02/2021    Chronic bilateral thoracic back pain     Chronic kidney disease     sees nephrologist regularly" stage 3"    Chronic myofascial pain     Chronic pain of both lower extremities     Chronic pain of left knee     "both knees"    Chronic pain syndrome     bilat legs and knees/neuropathy pain    CPAP (continuous positive airway pressure) dependence     Depression     Diabetes mellitus (HCC)     Disease of thyroid gland     Does use hearing aid     bilat and will wear DOS    Gait disorder     Gastroparesis     GERD (gastroesophageal reflux disease)     History of angina     History of MRSA infection     History of transfusion     Many years ago    Hyperlipidemia     Hypertension     Hypoglycemic reaction     "occas low blood sugar"    Insulin pump in place     pt reports saw endocrinologist 4/28 and will bring copy of instructions DOS    Kidney disease     Pacemaker 2019    Polyneuropathy associated with underlying disease ([facility] )     PONV (postoperative nausea and vomiting)     Risk for falls     S/P insertion of spinal cord stimulator 2018    Sarcoidosis     Shortness of breath     "exertion and not new"    Sleep apnea     TIA (transient ischemic attack)     Use of cane as ambulatory aid     Uses walker     Wears dentures     permanent upper/and some missing teeth/partial lower       Past Surgical History:   Procedure Laterality Date    ABDOMINAL ADHESION SURGERY N/A 5/3/2021    Procedure: laparoscopic LYSIS ADHESIONS;  Surgeon: Dhaval Daniels MD;  [facility];  Service: Thoracic    BREAST SURGERY      BREAST SURGERY      reduction    CARDIAC PACEMAKER PLACEMENT       SECTION      COLONOSCOPY      DILATION AND CURETTAGE OF UTERUS      "D&E"    HERNIA REPAIR      HYSTERECTOMY      JOINT REPLACEMENT Left     NECK SURGERY      fused 4 discs with 4 screws implanted    NISSEN FUNDOPLICATION LAPAROSCOPIC WITH ROBOTICS N/A 5/3/2021    Procedure: ROBOTIC HELLER MYOTOMY WITH [address] ;  Surgeon: Dhaval Daniels MD;  [facility];  Service: Thoracic    OK ESOPHAGOGASTRODUODENOSCOPY TRANSORAL DIAGNOSTIC N/A 5/3/2021    Procedure: ESOPHAGOGASTRODUODENOSCOPY (EGD); Surgeon: Dhaval Daniels MD;  [facility];  Service: Thoracic    OK SURG IMPLNT [address] Left 2018    Procedure:  Insertion of thoracic spinal cord stimulator electrode via laminotomy and placement of left buttock IPG;  Surgeon: Patrick Stevens MD;  [facility];  Service: Neurosurgery    REPLACEMENT TOTAL KNEE      ROTATOR CUFF REPAIR      SPINAL STIMULATOR PLACEMENT Left 10/3/2018    Procedure: BUTTOCK RE-OPENING INCISION FOR REPOSITIONING OF IMPLANTABLE PULSE GENERATOR;  Surgeon: Patrick Stevens MD;  [facility];  Service: Neurosurgery    TONSILLECTOMY      UPPER GASTROINTESTINAL ENDOSCOPY      WISDOM TOOTH EXTRACTION         Family History   Problem Relation Age of Onset    Diabetes Family     Heart disease Family     Hypertension Family     Neuropathy Family     No Known Problems Mother     Heart disease Father     Diabetes Father     No Known Problems Maternal Grandmother     No Known Problems Maternal Grandfather     No Known Problems Paternal Grandmother     No Known Problems Paternal Grandfather      I have reviewed and agree with the history as documented  E-Cigarette/Vaping    E-Cigarette Use Never User      E-Cigarette/Vaping Substances    Nicotine No     THC No     CBD No     Flavoring No     Other No     Unknown No      Social History     Tobacco Use    Smoking status: Never Smoker    Smokeless tobacco: Never Used   Substance Use Topics    Alcohol use: Never     Frequency: Never    Drug use: Yes     Frequency: 8 0 times per week     Types: Marijuana     Comment: Medical Marijuana/vape pen       Review of Systems   Constitutional: Negative for chills and fever  HENT: Negative for rhinorrhea, sore throat and trouble swallowing  Eyes: Negative for photophobia and visual disturbance  Respiratory: Negative for cough, chest tightness and shortness of breath  Cardiovascular: Negative for chest pain, palpitations and leg swelling  Gastrointestinal: Positive for abdominal pain  Negative for blood in stool, diarrhea, nausea and vomiting  Endocrine: Negative for polyuria  Genitourinary: Positive for difficulty urinating  Negative for dysuria, flank pain, hematuria, vaginal bleeding and vaginal discharge  Musculoskeletal: Negative for back pain and neck pain  Skin: Negative for color change and rash  Allergic/Immunologic: Negative for immunocompromised state  Neurological: Negative for dizziness, weakness, light-headedness, numbness and headaches  All other systems reviewed and are negative  Physical Exam  Physical Exam  Constitutional:       General: She is not in acute distress  Appearance: Normal appearance  She is well-developed  HENT:      Mouth/Throat:      Pharynx: Uvula midline     Eyes: Conjunctiva/sclera: Conjunctivae normal       Pupils: Pupils are equal, round, and reactive to light  Neck:      Thyroid: No thyroid mass or thyromegaly  Trachea: Trachea normal    Cardiovascular:      Rate and Rhythm: Normal rate and regular rhythm  Pulses: Normal pulses  Heart sounds: Normal heart sounds  No murmur  Pulmonary:      Effort: Pulmonary effort is normal       Breath sounds: Normal breath sounds  Abdominal:      General: Bowel sounds are normal       Palpations: Abdomen is soft  Tenderness: There is abdominal tenderness in the suprapubic area  There is no guarding or rebound  Comments: Laparoscopic surgical sites appear clean, dry, intact  Palpable bladder  Skin:     General: Skin is warm and dry  Neurological:      Mental Status: She is alert  Psychiatric:         Speech: Speech normal          Behavior: Behavior normal  Behavior is cooperative  Thought Content:  Thought content normal          Vital Signs  ED Triage Vitals [05/08/21 1107]   Temperature Pulse Respirations Blood Pressure SpO2   97 8 °F (36 6 °C) 72 18 127/56 100 %      Temp Source Heart Rate Source Patient Position - Orthostatic VS BP Location FiO2 (%)   Oral Monitor Sitting Left arm --      Pain Score       4           Vitals:    05/08/21 1107 05/08/21 1138 05/08/21 1307   BP: 127/56 125/58 125/60   Pulse: 72 65 64   Patient Position - Orthostatic VS: Sitting Lying Sitting         Visual Acuity      ED Medications  Medications   sodium chloride 0 9 % bolus 1,000 mL (has no administration in time range)       Diagnostic Studies  Results Reviewed     Procedure Component Value Units Date/Time    Urine Microscopic [[id number]]  (Abnormal) Collected: 05/08/21 1202    Lab Status: Final result Specimen: Urine, Clean Catch Updated: 05/08/21 1305     RBC, UA None Seen /hpf      WBC, UA 4-10 /hpf      Epithelial Cells Moderate /hpf      Bacteria, UA Innumerable /hpf      Hyaline Casts, UA 2-4 /lpf AMORPH URATES Occasional /hpf     CBC and differential [[id number]]  (Abnormal) Collected: 05/08/21 1159    Lab Status: Final result Specimen: Blood from Arm, Left Updated: 05/08/21 1218     WBC 7 34 Thousand/uL      RBC 2 91 Million/uL      Hemoglobin 8 0 g/dL      Hematocrit 26 0 %      MCV 89 fL      MCH 27 5 pg      MCHC 30 8 g/dL      RDW 15 0 %      MPV 10 3 fL      Platelets 909 Thousands/uL      nRBC 0 /100 WBCs      Neutrophils Relative 82 %      Immat GRANS % 1 %      Lymphocytes Relative 8 %      Monocytes Relative 8 %      Eosinophils Relative 1 %      Basophils Relative 0 %      Neutrophils Absolute 5 97 Thousands/µL      Immature Grans Absolute 0 07 Thousand/uL      Lymphocytes Absolute 0 62 Thousands/µL      Monocytes Absolute 0 58 Thousand/µL      Eosinophils Absolute 0 07 Thousand/µL      Basophils Absolute 0 03 Thousands/µL     Basic metabolic panel [[id number]]  (Abnormal) Collected: 05/08/21 1159    Lab Status: Final result Specimen: Blood from Arm, Left Updated: 05/08/21 1213     Sodium 137 mmol/L      Potassium 4 0 mmol/L      Chloride 100 mmol/L      CO2 27 mmol/L      ANION GAP 10 mmol/L      BUN 41 mg/dL      Creatinine 2 55 mg/dL      Glucose 135 mg/dL      Calcium 8 1 mg/dL      eGFR 19 ml/min/1 73sq m     Narrative:      [place] guidelines for Chronic Kidney Disease (CKD):     Stage 1 with normal or high GFR (GFR > 90 mL/min/1 73 square meters)    Stage 2 Mild CKD (GFR = 60-89 mL/min/1 73 square meters)    Stage 3A Moderate CKD (GFR = 45-59 mL/min/1 73 square meters)    Stage 3B Moderate CKD (GFR = 30-44 mL/min/1 73 square meters)    Stage 4 Severe CKD (GFR = 15-29 mL/min/1 73 square meters)    Stage 5 End Stage CKD (GFR <15 mL/min/1 73 square meters)  Note: GFR calculation is accurate only with a steady state creatinine    Urine Macroscopic, POC [[id number]]  (Abnormal) Collected: 05/08/21 1202    Lab Status: Final result Specimen: Urine Updated: 05/08/21 1203     Color, UA Manju     Clarity, UA Cloudy     pH, UA 5 0     Leukocytes, UA Small     Nitrite, UA Negative     Protein, UA 30 (1+) mg/dl      Glucose, UA Negative mg/dl      Ketones, UA Negative mg/dl      Urobilinogen, UA 0 2 E U /dl      Bilirubin, UA Interference- unable to analyze     Blood, UA Negative     Specific Gravity, UA 1 025    Narrative:      CLINITEK RESULT                 No orders to display              Procedures  Procedures         ED Course  ED Course as of May 08 1328   Sat May 08, 2021   1219 Stable anemia   Hemoglobin(!): 8 0                                           MDM  Number of Diagnoses or Management Options  Diagnosis management comments: Bladder scan with Little placement  Urinalysis and basic labs  Follow up with Urology  Disposition  Final diagnoses:   Urinary retention   JULIANNE (acute kidney injury) ([facility] [address] )     Time reflects when diagnosis was documented in both MDM as applicable and the Disposition within this note     Time User Action Codes Description Comment    5/8/2021  1:27 PM Deepa Cardona Add [R33 9] Urinary retention     5/8/2021  1:27 PM Mey CHEEK Add [N17 9] JULIANNE (acute kidney injury) [facility])       ED Disposition     ED Disposition Condition Date/Time Comment    Admit Stable Sat May 8, 2021  1:27 PM         Follow-up Information    None         Patient's Medications   Discharge Prescriptions    No medications on file     No discharge procedures on file      PDMP Review       Value Time User    PDMP Reviewed  Yes 8/25/2020  9:04 AM Divine Packer           ED Provider  Electronically Signed by           Antoni Casillas MD  05/08/21 9797

## 2021-05-08 NOTE — ASSESSMENT & PLAN NOTE
Creatinine of 2 55 on presentation  Likely obstructive etiology given unable to urinate due to medications  Kidney US pending  Hold torsemide  As above

## 2021-05-09 ENCOUNTER — APPOINTMENT (INPATIENT)
Dept: ULTRASOUND IMAGING | Facility: HOSPITAL | Age: 66
DRG: 683 | End: 2021-05-09
Payer: MEDICARE

## 2021-05-09 LAB
ANION GAP SERPL CALCULATED.3IONS-SCNC: 13 MMOL/L (ref 4–13)
BUN SERPL-MCNC: 43 MG/DL (ref 5–25)
CALCIUM SERPL-MCNC: 8.3 MG/DL (ref 8.3–10.1)
CHLORIDE SERPL-SCNC: 103 MMOL/L (ref 100–108)
CO2 SERPL-SCNC: 25 MMOL/L (ref 21–32)
CREAT SERPL-MCNC: 2.1 MG/DL (ref 0.6–1.3)
ERYTHROCYTE [DISTWIDTH] IN BLOOD BY AUTOMATED COUNT: 15.1 % (ref 11.6–15.1)
GFR SERPL CREATININE-BSD FRML MDRD: 24 ML/MIN/1.73SQ M
GLUCOSE SERPL-MCNC: 105 MG/DL (ref 65–140)
GLUCOSE SERPL-MCNC: 110 MG/DL (ref 65–140)
GLUCOSE SERPL-MCNC: 122 MG/DL (ref 65–140)
GLUCOSE SERPL-MCNC: 73 MG/DL (ref 65–140)
HCT VFR BLD AUTO: 23.9 % (ref 34.8–46.1)
HCT VFR BLD AUTO: 24.3 % (ref 34.8–46.1)
HGB BLD-MCNC: 7.2 G/DL (ref 11.5–15.4)
HGB BLD-MCNC: 7.3 G/DL (ref 11.5–15.4)
MCH RBC QN AUTO: 26.8 PG (ref 26.8–34.3)
MCHC RBC AUTO-ENTMCNC: 30 G/DL (ref 31.4–37.4)
MCV RBC AUTO: 89 FL (ref 82–98)
PLATELET # BLD AUTO: 128 THOUSANDS/UL (ref 149–390)
PMV BLD AUTO: 10.9 FL (ref 8.9–12.7)
POTASSIUM SERPL-SCNC: 4.4 MMOL/L (ref 3.5–5.3)
RBC # BLD AUTO: 2.72 MILLION/UL (ref 3.81–5.12)
SODIUM SERPL-SCNC: 141 MMOL/L (ref 136–145)
WBC # BLD AUTO: 6.59 THOUSAND/UL (ref 4.31–10.16)

## 2021-05-09 PROCEDURE — 85018 HEMOGLOBIN: CPT | Performed by: PHYSICIAN ASSISTANT

## 2021-05-09 PROCEDURE — 85027 COMPLETE CBC AUTOMATED: CPT | Performed by: STUDENT IN AN ORGANIZED HEALTH CARE EDUCATION/TRAINING PROGRAM

## 2021-05-09 PROCEDURE — 82948 REAGENT STRIP/BLOOD GLUCOSE: CPT

## 2021-05-09 PROCEDURE — 85014 HEMATOCRIT: CPT | Performed by: PHYSICIAN ASSISTANT

## 2021-05-09 PROCEDURE — 99232 SBSQ HOSP IP/OBS MODERATE 35: CPT | Performed by: INTERNAL MEDICINE

## 2021-05-09 PROCEDURE — 80048 BASIC METABOLIC PNL TOTAL CA: CPT | Performed by: STUDENT IN AN ORGANIZED HEALTH CARE EDUCATION/TRAINING PROGRAM

## 2021-05-09 PROCEDURE — 76770 US EXAM ABDO BACK WALL COMP: CPT

## 2021-05-09 PROCEDURE — 99232 SBSQ HOSP IP/OBS MODERATE 35: CPT | Performed by: PHYSICIAN ASSISTANT

## 2021-05-09 RX ORDER — DEXTROSE MONOHYDRATE 25 G/50ML
25 INJECTION, SOLUTION INTRAVENOUS ONCE
Status: COMPLETED | OUTPATIENT
Start: 2021-05-09 | End: 2021-05-09

## 2021-05-09 RX ADMIN — Medication 1 TABLET: at 08:08

## 2021-05-09 RX ADMIN — CARVEDILOL 6.25 MG: 3.12 TABLET, FILM COATED ORAL at 08:08

## 2021-05-09 RX ADMIN — MODAFINIL 100 MG: 100 TABLET ORAL at 08:16

## 2021-05-09 RX ADMIN — HEPARIN SODIUM 5000 UNITS: 5000 INJECTION INTRAVENOUS; SUBCUTANEOUS at 21:24

## 2021-05-09 RX ADMIN — AMITRIPTYLINE HYDROCHLORIDE 12.5 MG: 25 TABLET, FILM COATED ORAL at 21:24

## 2021-05-09 RX ADMIN — HEPARIN SODIUM 5000 UNITS: 5000 INJECTION INTRAVENOUS; SUBCUTANEOUS at 05:44

## 2021-05-09 RX ADMIN — CARVEDILOL 6.25 MG: 3.12 TABLET, FILM COATED ORAL at 15:58

## 2021-05-09 RX ADMIN — HYDROXYCHLOROQUINE SULFATE 400 MG: 200 TABLET, FILM COATED ORAL at 17:56

## 2021-05-09 RX ADMIN — HEPARIN SODIUM 5000 UNITS: 5000 INJECTION INTRAVENOUS; SUBCUTANEOUS at 13:47

## 2021-05-09 RX ADMIN — PRAMIPEXOLE DIHYDROCHLORIDE 0.5 MG: 0.5 TABLET ORAL at 17:57

## 2021-05-09 RX ADMIN — PRAMIPEXOLE DIHYDROCHLORIDE 0.5 MG: 0.5 TABLET ORAL at 08:09

## 2021-05-09 RX ADMIN — SODIUM CHLORIDE 75 ML/HR: 0.9 INJECTION, SOLUTION INTRAVENOUS at 15:17

## 2021-05-09 RX ADMIN — PANTOPRAZOLE SODIUM 40 MG: 40 TABLET, DELAYED RELEASE ORAL at 06:05

## 2021-05-09 RX ADMIN — SERTRALINE HYDROCHLORIDE 50 MG: 50 TABLET ORAL at 08:08

## 2021-05-09 RX ADMIN — OXYCODONE HYDROCHLORIDE 5 MG: 5 SOLUTION ORAL at 19:35

## 2021-05-09 RX ADMIN — FAMOTIDINE 10 MG: 20 TABLET ORAL at 21:24

## 2021-05-09 RX ADMIN — ASPIRIN 81 MG: 81 TABLET ORAL at 17:56

## 2021-05-09 RX ADMIN — ATORVASTATIN CALCIUM 80 MG: 40 TABLET, FILM COATED ORAL at 21:24

## 2021-05-09 RX ADMIN — DOCUSATE SODIUM 100 MG: 100 CAPSULE ORAL at 08:08

## 2021-05-09 RX ADMIN — FERROUS SULFATE TAB 325 MG (65 MG ELEMENTAL FE) 325 MG: 325 (65 FE) TAB at 08:08

## 2021-05-09 RX ADMIN — DEXTROSE MONOHYDRATE 25 ML: 25 INJECTION, SOLUTION INTRAVENOUS at 06:01

## 2021-05-09 RX ADMIN — SODIUM CHLORIDE 75 ML/HR: 0.9 INJECTION, SOLUTION INTRAVENOUS at 02:44

## 2021-05-09 RX ADMIN — POLYETHYLENE GLYCOL 3350 17 G: 17 POWDER, FOR SOLUTION ORAL at 15:19

## 2021-05-09 RX ADMIN — FLUTICASONE FUROATE AND VILANTEROL TRIFENATATE 1 PUFF: 100; 25 POWDER RESPIRATORY (INHALATION) at 08:19

## 2021-05-09 NOTE — ASSESSMENT & PLAN NOTE
Creatinine of 2 55 on presentation  Likely obstructive etiology given unable to urinate due to medications  Creatinine improved to 2 10 with IV fluids  Kidney US pending  Continue IV fluids  Hold torsemide  As above

## 2021-05-09 NOTE — ASSESSMENT & PLAN NOTE
Presented with acute urinary retention  On multiple medications that can cause urinary retention  Recent surgery with addition of opiods  UA shows mod bacteria, but no symptoms of UTI, fevers, dysuria  Decreased amitripyline to 12 5, hold flexeril, decreased pramipexole  Nephrology consulted, appreciate assistance with meds and IVfs  Patient is voiding today, discussed with nursing the importance of bladder scans  Urinary retention protocol  Straight cath if needed

## 2021-05-09 NOTE — PROGRESS NOTES
[address] [facility]  Progress Note - Lenmargarita Boss 1955, 60 y o  female MRN: [id number]  Unit/Bed#: E2 [place]-[facility] Encounter: [id number]  Primary Care Provider: Nicole Cornejo DO   Date and time admitted to hospital: 5/8/2021 11:04 AM    * Acute urinary retention  Assessment & Plan  Presented with acute urinary retention  On multiple medications that can cause urinary retention  Recent surgery with addition of opiods  UA shows mod bacteria, but no symptoms of UTI, fevers, dysuria  Decreased amitripyline to 12 5, hold flexeril, decreased pramipexole  Nephrology consulted, appreciate assistance with meds and IVfs  Patient is voiding today, discussed with nursing the importance of bladder scans  Urinary retention protocol  Straight cath if needed    JULIANNE (acute kidney injury) ([facility] [address] )  Assessment & Plan  Creatinine of 2 55 on presentation  Likely obstructive etiology given unable to urinate due to medications  Creatinine improved to 2 10 with IV fluids  Kidney US pending  Continue IV fluids  Hold torsemide  As above    Anemia of chronic disease  Assessment & Plan  Hemoglobin of 8 0 on admission, noted to be 9 4 on 5/3  Hgb of 7 3 today, patient with recent surgery  Repeat H&H at 1300  Transfuse if hemoglobin is less than 7 g/dL    Moderate persistent asthma with acute exacerbation  Assessment & Plan  Continue advair    Achalasia  Assessment & Plan  S/p myotomy with fundoplication on 2/6/82  Patient on clear liquid diet on admission  Per patient, she is to start soft diet today- will change       Type 2 diabetes mellitus with microalbuminuria, with long-term current use of insulin [facility])  Assessment & Plan  Lab Results   Component Value Date    HGBA1C 6 9 (H) 01/04/2021     Accuchecks, SSI  Use insulin pump      Recent Labs     05/08/21  1711 05/09/21  0637 05/09/21  1142   POCGLU 100 105 122       Blood Sugar Average: Last 72 hrs:  (P) 109    Essential hypertension  Assessment & Plan  Continue coreg, BP currently WNL  Hold losartan and torsemide in the setting of JULIANNE    GERD (gastroesophageal reflux disease)  Assessment & Plan  Continue pepcid and protonix  Recent surgery for achalasia  Clear liquid diet on admission, advanced to soft today  VTE Pharmacologic Prophylaxis:   Pharmacologic: Heparin  Mechanical VTE Prophylaxis in Place: Yes    Patient Centered Rounds: I have performed bedside rounds with nursing staff today  Discussions with Specialists or Other Care Team Provider: nursing, nephrology    Education and Discussions with Family / Patient: patient    Time Spent for Care: 20 minutes  More than 50% of total time spent on counseling and coordination of care as described above  Current Length of Stay: 1 day(s)    Current Patient Status: Inpatient   Certification Statement: The patient will continue to require additional inpatient hospital stay due to continued need for IV fluids, monitoring of labs, monitoring urinary output    Discharge Plan: TBD    Code Status: Level 1 - Full Code      Subjective: The patient was seen and examined  The patient states she has been voiding today  She admits to abdominal pain at surgical sites from surgery on 5/3/21  Objective:     Vitals:   Temp (24hrs), Av 5 °F (36 4 °C), Min:97 °F (36 1 °C), Max:97 8 °F (36 6 °C)    Temp:  [97 °F (36 1 °C)-97 8 °F (36 6 °C)] 97 8 °F (36 6 °C)  HR:  [63-66] 66  Resp:  [16-18] 18  BP: (121-140)/(50-67) 140/67  SpO2:  [95 %-100 %] 100 %  Body mass index is 36 45 kg/m²  Input and Output Summary (last 24 hours): Intake/Output Summary (Last 24 hours) at 2021 1311  Last data filed at 2021 1240  Gross per 24 hour   Intake 220 ml   Output 1100 ml   Net -880 ml       Physical Exam:     Physical Exam  Vitals signs and nursing note reviewed  Constitutional:       General: She is awake  Appearance: She is morbidly obese  Cardiovascular:      Rate and Rhythm: Normal rate and regular rhythm  Pulmonary:      Effort: Pulmonary effort is normal       Breath sounds: Normal breath sounds  No wheezing, rhonchi or rales  Abdominal:      General: Bowel sounds are normal       Palpations: Abdomen is soft  Tenderness: There is abdominal tenderness (at surgical sites)  There is no guarding or rebound  Skin:     General: Skin is warm and dry  Neurological:      General: No focal deficit present  Mental Status: She is alert and oriented to person, place, and time  Psychiatric:         Attention and Perception: Attention normal          Mood and Affect: Mood normal          Speech: Speech normal          Behavior: Behavior is cooperative  Additional Data:     Labs:    Results from last 7 days   Lab Units 05/09/21  0509 05/08/21  1159   WBC Thousand/uL 6 59 7 34   HEMOGLOBIN g/dL 7 3* 8 0*   HEMATOCRIT % 24 3* 26 0*   PLATELETS Thousands/uL 128* 100*   NEUTROS PCT %  --  82*   LYMPHS PCT %  --  8*   MONOS PCT %  --  8   EOS PCT %  --  1     Results from last 7 days   Lab Units 05/09/21  0509   SODIUM mmol/L 141   POTASSIUM mmol/L 4 4   CHLORIDE mmol/L 103   CO2 mmol/L 25   BUN mg/dL 43*   CREATININE mg/dL 2 10*   ANION GAP mmol/L 13   CALCIUM mg/dL 8 3   GLUCOSE RANDOM mg/dL 73         Results from last 7 days   Lab Units 05/09/21  1142 05/09/21  0637 05/08/21  1711 05/05/21  0546 05/04/21  2033 05/04/21  1730 05/04/21  1218 05/04/21  0610 05/03/21  2345 05/03/21  1753 05/03/21  1524 05/03/21  1405   POC GLUCOSE mg/dl 122 105 100 95 166* 265* 155* 132 223* 290* 267* 272*                   * I Have Reviewed All Lab Data Listed Above  * Additional Pertinent Lab Tests Reviewed:  All Labs Within Last 24 Hours Reviewed    Imaging:    Imaging Reports Reviewed Today Include: none  Imaging Personally Reviewed by Myself Includes:  none    Recent Cultures (last 7 days):           Last 24 Hours Medication List:   Current Facility-Administered Medications   Medication Dose Route Frequency Provider Last Rate    albuterol  2 puff Inhalation Q4H PRN Edwin Conley MD      amitriptyline  12 5 mg Oral HS Edwin Conley MD      aspirin  81 mg Oral QPM Edwin Conley MD      atorvastatin  80 mg Oral HS Edwin Conley MD      calcium carbonate-vitamin D  1 tablet Oral Daily With Breakfast Edwin Conley MD      carvedilol  6 25 mg Oral BID With Meals Edwin Conley MD      docusate sodium  100 mg Oral Daily Edwin Conley MD      famotidine  10 mg Oral HS Edwin Conley MD      ferrous sulfate  325 mg Oral Daily With Breakfast Edwin Conley MD      fluticasone-vilanterol  1 puff Inhalation Daily Edwin Conley MD      heparin (porcine)  5,000 Units Subcutaneous [facility] Edwin Conley MD      hydroxychloroquine  400 mg Oral QPM Edwin Conley MD      insulin lispro  1-5 Units Subcutaneous TID [facility] Edwin Conley MD      insulin lispro  10 mL Subcutaneous Insulin Pump Daily PRN Edwin Conley MD      modafinil  100 mg Oral Daily Edwin Conley MD      ondansetron  4 mg Intravenous Q6H PRN Edwin Conley MD      oxyCODONE  5 mg Oral Q4H PRN Edwin Conley MD      pantoprazole  40 mg Oral Daily Edwin Conley MD      patient maintained insulin pump  1 each Subcutaneous Q8H Edwin Conley MD      polyethylene glycol  17 g Oral Daily PRN Edwin Conley MD      pramipexole  0 5 mg Oral BID Edwin Conley MD      sertraline  50 mg Oral Daily Edwin Conley MD      sodium chloride  75 mL/hr Intravenous Continuous Edwin Conley MD 75 mL/hr (05/09/21 0244)        Today, Patient Was Seen By: Henrietta Gutiérrez PA-C    ** Please Note: Dictation voice to text software may have been used in the creation of this document   **

## 2021-05-09 NOTE — PLAN OF CARE
Problem: PAIN - ADULT  Goal: Verbalizes/displays adequate comfort level or baseline comfort level  Description: Interventions:  - Encourage patient to monitor pain and request assistance  - Assess pain using appropriate pain scale  - Administer analgesics based on type and severity of pain and evaluate response  - Implement non-pharmacological measures as appropriate and evaluate response  - Consider cultural and social influences on pain and pain management  - Notify physician/advanced practitioner if interventions unsuccessful or patient reports new pain  5/9/2021 0753 by Elias Gimenez RN  Outcome: Progressing  5/8/2021 1812 by Elias Gimenez RN  Outcome: Progressing     Problem: INFECTION - ADULT  Goal: Absence or prevention of progression during hospitalization  Description: INTERVENTIONS:  - Assess and monitor for signs and symptoms of infection  - Monitor lab/diagnostic results  - Monitor all insertion sites, i e  indwelling lines, tubes, and drains  - Monitor endotracheal if appropriate and nasal secretions for changes in amount and color  - [place] appropriate cooling/warming therapies per order  - Administer medications as ordered  - Instruct and encourage patient and family to use good hand hygiene technique  - Identify and instruct in appropriate isolation precautions for identified infection/condition  5/9/2021 0753 by Elias Gimenez RN  Outcome: Progressing  5/8/2021 1812 by Elias Gimenez RN  Outcome: Progressing  Goal: Absence of fever/infection during neutropenic period  Description: INTERVENTIONS:  - Monitor WBC    5/9/2021 0753 by Elias Gimenez RN  Outcome: Progressing  5/8/2021 1812 by Elias Gimenez RN  Outcome: Progressing     Problem: SAFETY ADULT  Goal: Patient will remain free of falls  Description: INTERVENTIONS:  - Assess patient frequently for physical needs  -  Identify cognitive and physical deficits and behaviors that affect risk of falls    -  [place] fall precautions as indicated by assessment   - Educate patient/family on patient safety including physical limitations  - Instruct patient to call for assistance with activity based on assessment  - Modify environment to reduce risk of injury  - Consider OT/PT consult to assist with strengthening/mobility  5/9/2021 0753 by Chandana Ricardo RN  Outcome: Progressing  5/8/2021 1812 by Chandana Ricardo RN  Outcome: Progressing  Goal: Maintain or return to baseline ADL function  Description: INTERVENTIONS:  -  Assess patient's ability to carry out ADLs; assess patient's baseline for ADL function and identify physical deficits which impact ability to perform ADLs (bathing, care of mouth/teeth, toileting, grooming, dressing, etc )  - Assess/evaluate cause of self-care deficits   - Assess range of motion  - Assess patient's mobility; develop plan if impaired  - Assess patient's need for assistive devices and provide as appropriate  - Encourage maximum independence but intervene and supervise when necessary  - Involve family in performance of ADLs  - Assess for home care needs following discharge   - Consider OT consult to assist with ADL evaluation and planning for discharge  - Provide patient education as appropriate  5/9/2021 0753 by Chandana Ricardo RN  Outcome: Progressing  5/8/2021 1812 by Chandana Ricardo RN  Outcome: Progressing  Goal: Maintain or return mobility status to optimal level  Description: INTERVENTIONS:  - Assess patient's baseline mobility status (ambulation, transfers, stairs, etc )    - Identify cognitive and physical deficits and behaviors that affect mobility  - Identify mobility aids required to assist with transfers and/or ambulation (gait belt, sit-to-stand, lift, walker, cane, etc )  - [place] fall precautions as indicated by assessment  - Record patient progress and toleration of activity level on Mobility SBAR; progress patient to next Phase/Stage  - Instruct patient to call for assistance with activity based on assessment  - Consider rehabilitation consult to assist with strengthening/weightbearing, etc   5/9/2021 0753 by Pratibha Curtis RN  Outcome: Progressing  5/8/2021 1812 by Pratibha Curtis RN  Outcome: Progressing     Problem: DISCHARGE PLANNING  Goal: Discharge to home or other facility with appropriate resources  Description: INTERVENTIONS:  - Identify barriers to discharge w/patient and caregiver  - Arrange for needed discharge resources and transportation as appropriate  - Identify discharge learning needs (meds, wound care, etc )  - Arrange for interpretive services to assist at discharge as needed  - Refer to Case Management Department for coordinating discharge planning if the patient needs post-hospital services based on physician/advanced practitioner order or complex needs related to functional status, cognitive ability, or social support system  5/9/2021 0753 by Pratibha Curtis RN  Outcome: Progressing  5/8/2021 1812 by Pratibha Curtis RN  Outcome: Progressing     Problem: Knowledge Deficit  Goal: Patient/family/caregiver demonstrates understanding of disease process, treatment plan, medications, and discharge instructions  Description: Complete learning assessment and assess knowledge base  Interventions:  - Provide teaching at level of understanding  - Provide teaching via preferred learning methods  5/9/2021 0753 by Pratibha Curtis RN  Outcome: Progressing  5/8/2021 1812 by Pratibha Curtis RN  Outcome: Progressing     Problem: Nutrition/Hydration-ADULT  Goal: Nutrient/Hydration intake appropriate for improving, restoring or maintaining nutritional needs  Description: Monitor and assess patient's nutrition/hydration status for malnutrition  Collaborate with interdisciplinary team and initiate plan and interventions as ordered  Monitor patient's weight and dietary intake as ordered or per policy  Utilize nutrition screening tool and intervene as necessary   Determine patient's food preferences and provide high-protein, high-caloric foods as appropriate  INTERVENTIONS:  - Monitor oral intake, urinary output, labs, and treatment plans  - Assess nutrition and hydration status and recommend course of action  - Evaluate amount of meals eaten  - Assist patient with eating if necessary   - Allow adequate time for meals  - Recommend/ encourage appropriate diets, oral nutritional supplements, and vitamin/mineral supplements  - Order, calculate, and assess calorie counts as needed  - Recommend, monitor, and adjust tube feedings and TPN/PPN based on assessed needs  - Assess need for intravenous fluids  - Provide specific nutrition/hydration education as appropriate  - Include patient/family/caregiver in decisions related to nutrition  5/9/2021 0753 by Pratibha Curtis RN  Outcome: Progressing  5/8/2021 1812 by Pratibha Curtis RN  Outcome: Progressing     Problem: Potential for Falls  Goal: Patient will remain free of falls  Description: INTERVENTIONS:  - Assess patient frequently for physical needs  -  Identify cognitive and physical deficits and behaviors that affect risk of falls    -  [place] fall precautions as indicated by assessment   - Educate patient/family on patient safety including physical limitations  - Instruct patient to call for assistance with activity based on assessment  - Modify environment to reduce risk of injury  - Consider OT/PT consult to assist with strengthening/mobility  5/9/2021 0753 by Pratibha Curtis RN  Outcome: Progressing  5/8/2021 1812 by Pratibha Curtis RN  Outcome: Progressing     Problem: COPING  Goal: Pt/Family able to verbalize concerns and demonstrate effective coping strategies  Description: INTERVENTIONS:  - Assist patient/family to identify coping skills, available support systems and cultural and spiritual values  - Provide emotional support, including active listening and acknowledgement of concerns of patient and caregivers  - Reduce environmental stimuli, as able  - Provide patient education  - Assess for spiritual pain/suffering and initiate spiritual care, including notification of Pastoral Care or jean based community as needed  - Assess effectiveness of coping strategies  5/9/2021 0753 by Emile Tan RN  Outcome: Progressing  5/8/2021 1812 by Emile Tan RN  Outcome: Progressing Finasteride Counseling:  I discussed with the patient the risks of use of finasteride including but not limited to decreased libido, decreased ejaculate volume, gynecomastia, and depression. Women should not handle medication.  All of the patient's questions and concerns were addressed. Finasteride Male Counseling: Finasteride Counseling:  I discussed with the patient the risks of use of finasteride including but not limited to decreased libido, decreased ejaculate volume, gynecomastia, and depression. Women should not handle medication.  All of the patient's questions and concerns were addressed.

## 2021-05-09 NOTE — ASSESSMENT & PLAN NOTE
Lab Results   Component Value Date    HGBA1C 6 9 (H) 01/04/2021     Accuchecks, SSI  Use insulin pump      Recent Labs     05/08/21  1711 05/09/21  0637 05/09/21  1142   POCGLU 100 105 122       Blood Sugar Average: Last 72 hrs:  (P) 109

## 2021-05-09 NOTE — ASSESSMENT & PLAN NOTE
S/p myotomy with fundoplication on 6/6/34  Patient on clear liquid diet on admission  Per patient, she is to start soft diet today- will change

## 2021-05-09 NOTE — PROGRESS NOTES
Pt has insulin pump that she maintains independently  Pt stated her BS is low at 51  Pt is on clear liquid diet  Juice was administered and RN contacted provider for IV dextrose order

## 2021-05-09 NOTE — PROGRESS NOTES
Progress Note - Nephrology   Kimberley Sheikh 60 y o  female MRN: [id number]  Unit/Bed#: E2 -01 Encounter: [id number]    26-year-old female with a history of hypertension, Chronic Kidney Disease III, diabetes, achalasia status post myotomy and fundoplication on 73/62/7361 who presents to [facility] see on 05/08/2021 with complaints of low urinary output  Nephrology consulted managing for acute kidney injury    ASSESSMENT and PLAN:  Acute kidney injury (POA):  Etiology: Suspect urinary retention, prerenal azotemia component along with loss of autoregulatory system with the use of losartan and meloxicam  Assessment:   After review of medical records through [address] [place] [address] it appears that the patient has a baseline Creatinine of 1 3-1 6   Patient was admitted with a creatinine of 2 55 on 05/08/2021   Patient's creatinine today is at 2 10   Currently on IV fluids normal saline at 75 cc an hour   I/O -650 mL-Unknown accuracy   No Little catheter   Losartan and torsemide-remains on hold   Avoid NSAIDs-avoid meloxicam   Last  mL   Acid base and lytes stable    Clinically, patient is not uremic and there is no acute indication for renal replacement therapy (dialysis)  Workup:   Urinalysis with micro reports:  Small leukocytes, 1+ protein, 4-10 WBCs, 2-4 hyaline casts   Renal ultrasound reports:  Obtained this morning-results pending  Plan:   Avoid nephrotoxins, adjust meds to appropriate GFR   Optimize hemodynamic status to avoid delay in renal recovery   Continue IV fluids for now at 75 cc an hour   Await renal ultrasound report   Renal function slowly improving will continue to trend   Continue urinary retention protocol   Check BMP in a m      Chronic kidney disease IIIA :    Etiology:  Suspect secondary to hypertension, diabetes, possible arterial nephrosclerosis history of TIA  Assessment and Plan:   After review of medical records through [facility] and Care everywhere it appears that the patient has a baseline Creatinine of 1 3-1 6 dating back to 2018   No prior renal follow-up   Will need follow-up on discharge    Blood pressure/Hypertension:  Assessment and Plan:   Home medications include: Losartan 50 mg daily, torsemide 20 mg 2 times daily,   Current medications include:  Coreg 6 25 mg 2 times daily   Continue to hold losartan and torsemide at this time   BP acceptable   Maximize hemodynamics to maintain MAP >65   Avoid hypotension or fluctuations in blood pressure   Will continue to trend    H&H/anemia:  Likely Chronic Kidney Disease and recent surgery  Assessment and Plan:   Current hemoglobin 7 3   Per primary team   Transfuse if hemoglobin less than 7 0    Electrolytes/acid-base:  Assessment and Plan:  · Within normal limits  · Will continue to trend and treat accordingly    Other medical Issues:  Achalasia: Status post myotomy and fundoplication  · Currently on a clear liquid diet  · per primary team      SUBJECTIVE:  Patient seen and examined at bedside  Patient overall feeling okay having some abdominal bloating and gas  Denies chest pain or shortness of breath    Reports making urine without problems    OBJECTIVE:  Current Weight: Weight - Scale: 90 4 kg (199 lb 4 7 oz)  Vitals:    05/08/21 1441 05/08/21 1650 05/08/21 2357 05/09/21 0700   BP: 128/58 136/50 121/64 140/67   BP Location: Left arm Left arm Right arm Left arm   Pulse: 65 63 64 66   Resp: 16 16 18 18   Temp:  (!) 97 °F (36 1 °C) 97 8 °F (36 6 °C) 97 8 °F (36 6 °C)   TempSrc:  Temporal Temporal Temporal   SpO2: 99% 100% 95% 100%   Weight:           Intake/Output Summary (Last 24 hours) at 5/9/2021 1055  Last data filed at 5/9/2021 1049  Gross per 24 hour   Intake --   Output 850 ml   Net -850 ml     General:  No acute distress, cooperative, lying in bed  Skin:  Warm and dry without rash  ENMT:  Mucous membranes moist, sclera anicteric  Neck:  Supple without JVD  Respiratory:  Essentially clear on auscultation without crackles, rhonchi, wheezes  Cardiac:  Regular rate and rhythm without rub, or murmur  GI:  Soft, nontender, no distention, active bowel sounds  Extremities:  No significant edema noted bilaterally  Neuro:  Alert oriented and awake  Psych:  Appropriate affect    Medications:    Current Facility-Administered Medications:     albuterol (PROVENTIL HFA,VENTOLIN HFA) inhaler 2 puff, 2 puff, Inhalation, Q4H PRN, Johan Sandoval MD    amitriptyline (ELAVIL) tablet 12 5 mg, 12 5 mg, Oral, HS, Johan Sandoval MD, 12 5 mg at 05/08/21 2203    aspirin (ECOTRIN LOW STRENGTH) EC tablet 81 mg, 81 mg, Oral, QPM, Johan Sandoval MD, 81 mg at 05/08/21 1806    atorvastatin (LIPITOR) tablet 80 mg, 80 mg, Oral, HS, Johan Sandoval MD, 80 mg at 05/08/21 2203    calcium carbonate-vitamin D (OSCAL-D) 500 mg-200 units per tablet 1 tablet, 1 tablet, Oral, Daily With Breakfast, Johan Sandoval MD, 1 tablet at 05/09/21 0808    carvedilol (COREG) tablet 6 25 mg, 6 25 mg, Oral, BID With Meals, Johan Sandoval MD, 6 25 mg at 05/09/21 0808    docusate sodium (COLACE) capsule 100 mg, 100 mg, Oral, Daily, Johan Sandoval MD, 100 mg at 05/09/21 0808    famotidine (PEPCID) tablet 10 mg, 10 mg, Oral, HS, Johan Sandoval MD, 10 mg at 05/08/21 2209    ferrous sulfate tablet 325 mg, 325 mg, Oral, Daily With Breakfast, Johan Sandoval MD, 325 mg at 05/09/21 0808    fluticasone-vilanterol (BREO ELLIPTA) 100-25 mcg/inh inhaler 1 puff, 1 puff, Inhalation, Daily, Johan Sandoval MD, 1 puff at 05/09/21 0819    heparin (porcine) subcutaneous injection 5,000 Units, 5,000 Units, Subcutaneous, Q8H [address], 5,000 Units at 05/09/21 0544 **AND** Platelet count, , , Once, MD Quiana Anderson  hydroxychloroquine (PLAQUENIL) tablet 400 mg, 400 mg, Oral, QPM, Johan Sandoval MD, 400 mg at 05/08/21 1806    insulin lispro (HumaLOG) 100 units/mL subcutaneous injection 1-5 Units, 1-5 Units, Subcutaneous, TID AC **AND** Fingerstick Glucose (POCT), , , TID AC, MD Quiana Anderson insulin lispro (HumaLOG) FOR PUMP REFILLS 1,000 Units, 10 mL, Subcutaneous Insulin Pump, Daily PRN, Devora Colón MD    modafinil (PROVIGIL) tablet 100 mg, 100 mg, Oral, Daily, Devora Colón MD, 100 mg at 05/09/21 0816    ondansetron (ZOFRAN) injection 4 mg, 4 mg, Intravenous, Q6H PRN, Devora Colón MD    oxyCODONE (ROXICODONE) oral solution 5 mg, 5 mg, Oral, Q4H PRN, Devora Colón MD    pantoprazole (PROTONIX) EC tablet 40 mg, 40 mg, Oral, Daily, Devora Colón MD, 40 mg at 05/09/21 0605    PATIENT MAINTAINED INSULIN PUMP 1 each, 1 each, Subcutaneous, Q8H, Devora Colón MD, 1 each at 05/09/21 3324    polyethylene glycol (MIRALAX) packet 17 g, 17 g, Oral, Daily PRN, Devora Colón MD    pramipexole (MIRAPEX) tablet 0 5 mg, 0 5 mg, Oral, BID, Devora Colón MD, 0 5 mg at 05/09/21 0809    sertraline (ZOLOFT) tablet 50 mg, 50 mg, Oral, Daily, Devora Colón MD, 50 mg at 05/09/21 0808    sodium chloride 0 9 % infusion, 75 mL/hr, Intravenous, Continuous, Devora Colón MD, Last Rate: 75 mL/hr at 05/09/21 0244, 75 mL/hr at 05/09/21 0244    Laboratory Results:  Results from last 7 days   Lab Units 05/09/21  0509 05/08/21  1159 05/05/21  0435 05/04/21  0526 05/03/21  1534   WBC Thousand/uL 6 59 7 34  --  6 28 8 39   HEMOGLOBIN g/dL 7 3* 8 0*  --  8 1* 9 4*   HEMATOCRIT % 24 3* 26 0*  --  27 2* 30 5*   PLATELETS Thousands/uL 128* 100*  --  144* 214   SODIUM mmol/L 141 137 141 140 138   POTASSIUM mmol/L 4 4 4 0 4 2 5 0 4 6   CHLORIDE mmol/L 103 100 108 107 103   CO2 mmol/L 25 27 27 27 28   BUN mg/dL 43* 41* 29* 36* 35*   CREATININE mg/dL 2 10* 2 55* 1 15 1 67* 1 81*   CALCIUM mg/dL 8 3 8 1* 7 8* 8 5 8 7   MAGNESIUM mg/dL  --   --   --  2 8* 2 4

## 2021-05-09 NOTE — ASSESSMENT & PLAN NOTE
Hemoglobin of 8 0 on admission, noted to be 9 4 on 5/3  Hgb of 7 3 today, patient with recent surgery     Repeat H&H at 1300  Transfuse if hemoglobin is less than 7 g/dL

## 2021-05-09 NOTE — ASSESSMENT & PLAN NOTE
Continue pepcid and protonix  Recent surgery for achalasia  Clear liquid diet on admission, advanced to soft today

## 2021-05-10 LAB
ANION GAP SERPL CALCULATED.3IONS-SCNC: 13 MMOL/L (ref 4–13)
BASOPHILS # BLD AUTO: 0.02 THOUSANDS/ΜL (ref 0–0.1)
BASOPHILS NFR BLD AUTO: 0 % (ref 0–1)
BUN SERPL-MCNC: 40 MG/DL (ref 5–25)
CALCIUM SERPL-MCNC: 8.3 MG/DL (ref 8.3–10.1)
CHLORIDE SERPL-SCNC: 102 MMOL/L (ref 100–108)
CO2 SERPL-SCNC: 23 MMOL/L (ref 21–32)
CREAT SERPL-MCNC: 1.63 MG/DL (ref 0.6–1.3)
EOSINOPHIL # BLD AUTO: 0.08 THOUSAND/ΜL (ref 0–0.61)
EOSINOPHIL NFR BLD AUTO: 1 % (ref 0–6)
ERYTHROCYTE [DISTWIDTH] IN BLOOD BY AUTOMATED COUNT: 14.8 % (ref 11.6–15.1)
GFR SERPL CREATININE-BSD FRML MDRD: 33 ML/MIN/1.73SQ M
GLUCOSE SERPL-MCNC: 101 MG/DL (ref 65–140)
GLUCOSE SERPL-MCNC: 121 MG/DL (ref 65–140)
GLUCOSE SERPL-MCNC: 123 MG/DL (ref 65–140)
GLUCOSE SERPL-MCNC: 67 MG/DL (ref 65–140)
GLUCOSE SERPL-MCNC: 95 MG/DL (ref 65–140)
GLUCOSE SERPL-MCNC: 99 MG/DL (ref 65–140)
HCT VFR BLD AUTO: 26.1 % (ref 34.8–46.1)
HGB BLD-MCNC: 7.8 G/DL (ref 11.5–15.4)
IMM GRANULOCYTES # BLD AUTO: 0.05 THOUSAND/UL (ref 0–0.2)
IMM GRANULOCYTES NFR BLD AUTO: 1 % (ref 0–2)
LYMPHOCYTES # BLD AUTO: 0.88 THOUSANDS/ΜL (ref 0.6–4.47)
LYMPHOCYTES NFR BLD AUTO: 16 % (ref 14–44)
MCH RBC QN AUTO: 27.4 PG (ref 26.8–34.3)
MCHC RBC AUTO-ENTMCNC: 29.9 G/DL (ref 31.4–37.4)
MCV RBC AUTO: 92 FL (ref 82–98)
MONOCYTES # BLD AUTO: 0.6 THOUSAND/ΜL (ref 0.17–1.22)
MONOCYTES NFR BLD AUTO: 11 % (ref 4–12)
NEUTROPHILS # BLD AUTO: 3.9 THOUSANDS/ΜL (ref 1.85–7.62)
NEUTS SEG NFR BLD AUTO: 71 % (ref 43–75)
NRBC BLD AUTO-RTO: 0 /100 WBCS
PLATELET # BLD AUTO: 146 THOUSANDS/UL (ref 149–390)
PMV BLD AUTO: 10.9 FL (ref 8.9–12.7)
POTASSIUM SERPL-SCNC: 4.3 MMOL/L (ref 3.5–5.3)
RBC # BLD AUTO: 2.85 MILLION/UL (ref 3.81–5.12)
SODIUM SERPL-SCNC: 138 MMOL/L (ref 136–145)
WBC # BLD AUTO: 5.53 THOUSAND/UL (ref 4.31–10.16)

## 2021-05-10 PROCEDURE — 82948 REAGENT STRIP/BLOOD GLUCOSE: CPT

## 2021-05-10 PROCEDURE — 99222 1ST HOSP IP/OBS MODERATE 55: CPT | Performed by: INTERNAL MEDICINE

## 2021-05-10 PROCEDURE — 80048 BASIC METABOLIC PNL TOTAL CA: CPT | Performed by: NURSE PRACTITIONER

## 2021-05-10 PROCEDURE — 99232 SBSQ HOSP IP/OBS MODERATE 35: CPT | Performed by: INTERNAL MEDICINE

## 2021-05-10 PROCEDURE — 99232 SBSQ HOSP IP/OBS MODERATE 35: CPT | Performed by: PHYSICIAN ASSISTANT

## 2021-05-10 PROCEDURE — 85025 COMPLETE CBC W/AUTO DIFF WBC: CPT | Performed by: PHYSICIAN ASSISTANT

## 2021-05-10 RX ORDER — DICYCLOMINE HYDROCHLORIDE 10 MG/1
10 CAPSULE ORAL 4 TIMES DAILY PRN
Status: DISCONTINUED | OUTPATIENT
Start: 2021-05-10 | End: 2021-05-11 | Stop reason: HOSPADM

## 2021-05-10 RX ORDER — PANTOPRAZOLE SODIUM 40 MG/1
40 TABLET, DELAYED RELEASE ORAL
Status: DISCONTINUED | OUTPATIENT
Start: 2021-05-11 | End: 2021-05-10 | Stop reason: SDUPTHER

## 2021-05-10 RX ORDER — ACETAMINOPHEN 325 MG/1
650 TABLET ORAL EVERY 6 HOURS PRN
Status: DISCONTINUED | OUTPATIENT
Start: 2021-05-10 | End: 2021-05-11 | Stop reason: HOSPADM

## 2021-05-10 RX ORDER — POTASSIUM CHLORIDE 20MEQ/15ML
20 LIQUID (ML) ORAL DAILY
Status: DISCONTINUED | OUTPATIENT
Start: 2021-05-11 | End: 2021-05-11 | Stop reason: HOSPADM

## 2021-05-10 RX ORDER — LOSARTAN POTASSIUM 50 MG/1
50 TABLET ORAL EVERY EVENING
Status: DISCONTINUED | OUTPATIENT
Start: 2021-05-10 | End: 2021-05-11 | Stop reason: HOSPADM

## 2021-05-10 RX ORDER — LEVOTHYROXINE SODIUM 88 UG/1
88 TABLET ORAL
Status: DISCONTINUED | OUTPATIENT
Start: 2021-05-11 | End: 2021-05-11 | Stop reason: HOSPADM

## 2021-05-10 RX ORDER — DOCUSATE SODIUM 100 MG/1
100 CAPSULE, LIQUID FILLED ORAL 2 TIMES DAILY
Status: DISCONTINUED | OUTPATIENT
Start: 2021-05-10 | End: 2021-05-11 | Stop reason: HOSPADM

## 2021-05-10 RX ADMIN — LOSARTAN POTASSIUM 50 MG: 50 TABLET, FILM COATED ORAL at 21:54

## 2021-05-10 RX ADMIN — VITAM B12 100 MCG: 100 TAB at 21:55

## 2021-05-10 RX ADMIN — PRAMIPEXOLE DIHYDROCHLORIDE 0.5 MG: 0.5 TABLET ORAL at 17:19

## 2021-05-10 RX ADMIN — PANTOPRAZOLE SODIUM 40 MG: 40 TABLET, DELAYED RELEASE ORAL at 05:17

## 2021-05-10 RX ADMIN — ASPIRIN 81 MG: 81 TABLET ORAL at 17:18

## 2021-05-10 RX ADMIN — OXYCODONE HYDROCHLORIDE 5 MG: 5 SOLUTION ORAL at 21:51

## 2021-05-10 RX ADMIN — Medication 1 TABLET: at 08:22

## 2021-05-10 RX ADMIN — ONDANSETRON 4 MG: 2 INJECTION INTRAMUSCULAR; INTRAVENOUS at 11:23

## 2021-05-10 RX ADMIN — CARVEDILOL 6.25 MG: 3.12 TABLET, FILM COATED ORAL at 08:22

## 2021-05-10 RX ADMIN — DOCUSATE SODIUM 100 MG: 100 CAPSULE ORAL at 08:22

## 2021-05-10 RX ADMIN — FLUTICASONE FUROATE AND VILANTEROL TRIFENATATE 1 PUFF: 100; 25 POWDER RESPIRATORY (INHALATION) at 08:23

## 2021-05-10 RX ADMIN — ONDANSETRON 4 MG: 2 INJECTION INTRAMUSCULAR; INTRAVENOUS at 21:59

## 2021-05-10 RX ADMIN — HEPARIN SODIUM 5000 UNITS: 5000 INJECTION INTRAVENOUS; SUBCUTANEOUS at 21:53

## 2021-05-10 RX ADMIN — POLYETHYLENE GLYCOL 3350 17 G: 17 POWDER, FOR SOLUTION ORAL at 11:23

## 2021-05-10 RX ADMIN — PRAMIPEXOLE DIHYDROCHLORIDE 0.5 MG: 0.5 TABLET ORAL at 08:24

## 2021-05-10 RX ADMIN — MODAFINIL 100 MG: 100 TABLET ORAL at 08:29

## 2021-05-10 RX ADMIN — SERTRALINE HYDROCHLORIDE 50 MG: 50 TABLET ORAL at 08:22

## 2021-05-10 RX ADMIN — FERROUS SULFATE TAB 325 MG (65 MG ELEMENTAL FE) 325 MG: 325 (65 FE) TAB at 08:22

## 2021-05-10 RX ADMIN — CARVEDILOL 6.25 MG: 3.12 TABLET, FILM COATED ORAL at 17:25

## 2021-05-10 RX ADMIN — DOCUSATE SODIUM 100 MG: 100 CAPSULE ORAL at 17:18

## 2021-05-10 RX ADMIN — HEPARIN SODIUM 5000 UNITS: 5000 INJECTION INTRAVENOUS; SUBCUTANEOUS at 05:17

## 2021-05-10 RX ADMIN — FAMOTIDINE 10 MG: 20 TABLET ORAL at 21:54

## 2021-05-10 RX ADMIN — HYDROXYCHLOROQUINE SULFATE 400 MG: 200 TABLET, FILM COATED ORAL at 17:19

## 2021-05-10 RX ADMIN — HEPARIN SODIUM 5000 UNITS: 5000 INJECTION INTRAVENOUS; SUBCUTANEOUS at 14:58

## 2021-05-10 RX ADMIN — AMITRIPTYLINE HYDROCHLORIDE 12.5 MG: 25 TABLET, FILM COATED ORAL at 21:55

## 2021-05-10 RX ADMIN — ATORVASTATIN CALCIUM 80 MG: 40 TABLET, FILM COATED ORAL at 21:53

## 2021-05-10 NOTE — CONSULTS
Consultation -  [facility]  Neeraj Brand 60 y o  female MRN: [id number]  Unit/Bed#: E2 -01 Encounter: [id number]        Inpatient consult to gastroenterology  Consult performed by: Demarco Wang PA-C  Consult ordered by: Jillian Ribeiro PA-C          Reason for Consult / Principal Problem:     1  Generalized abdominal pain    ASSESSMENT AND PLAN:      58-year-old female with a history of achalasia status post myotomy with Kareem fundoplication with thoracic surgery on 5/3/21, diabetes, hypertension, hyperlipidemia, chronic kidney disease, CHF who presented with difficulty urinating 5/8  GI has now been consulted secondary to generalized abdominal pain and abdominal distention  1  Abdominal pain  2  Abdominal distention  The patient is s/p EGD, heller myotomy with kareem fundoplication, and lysis of adhesions 5/3/21 for achalasia  Since surgery she has been having urinary difficulty but she has been voiding in small frequent amounts, PVRs minimal  She also has constipation and starting today generalized abdominal pain  She has known IBS-C and states her pain is the same as the pain she gets with her IBS  No alarm symptoms  She did have a bowel movement yesterday after administration of an enema,  - start colace BID   - start miralax 17g daily titrated up to 17g BID if needed  - OOB and ambulating  - proper hydration  - I do not believe her pain is secondary to post op complications but rather IBS-C however can obtain barium swallow and consider surgical consult given she is only POD 7    ______________________________________________________________________    HPI: 58-year-old female with a history of achalasia, diabetes, hypertension, hyperlipidemia, chronic kidney disease, and CHF who presented with difficulty urinating 5/8  GI has now been consulted secondary to generalized abdominal pain and abdominal distention  The pain began today and is generalized   Is the same pain she gets with her IBS-C and she is currently constipated  She is currently on Amitiza  She states that she will have a bowel movement approximately every 4-7 days  The patient states that she has also been having trouble emptying her bladder  She is going in small frequent amounts  Postvoid residuals have been minimal   She has not required straight catheterization or Little catheterization  This began post op as the patient is s/p EGD, heller myotomy with nina fundoplication, and lysis of adhesions 5/3/21  REVIEW OF SYSTEMS:    CONSTITUTIONAL: Denies any fever, chills, rigors, and weight loss  HEENT: No earache or tinnitus  Denies hearing loss or visual disturbances  CARDIOVASCULAR: No chest pain or palpitations  RESPIRATORY: Denies any cough, hemoptysis, shortness of breath or dyspnea on exertion  GASTROINTESTINAL: As noted in the History of Present Illness  GENITOURINARY: No problems with urination  Denies any hematuria or dysuria  NEUROLOGIC: No dizziness or vertigo, denies headaches  MUSCULOSKELETAL: Denies any muscle or joint pain  SKIN: Denies skin rashes or itching  ENDOCRINE: Denies excessive thirst  Denies intolerance to heat or cold  PSYCHOSOCIAL: Denies depression or anxiety  Denies any recent memory loss         Historical Information   Past Medical History:   Diagnosis Date    Anxiety     Asthma     controlled    Back complaints     [facility]     nose    Cellulitis of left lower leg     "not now"    CHF (congestive heart failure) [facility] ) 02/2021    Chronic bilateral thoracic back pain     Chronic kidney disease     sees nephrologist regularly" stage 3"    Chronic myofascial pain     Chronic pain of both lower extremities     Chronic pain of left knee     "both knees"    Chronic pain syndrome     bilat legs and knees/neuropathy pain    CPAP (continuous positive airway pressure) dependence     Depression     Diabetes mellitus ([facility] )     Disease of thyroid gland     Does use hearing aid     bilat and will wear DOS    Gait disorder     Gastroparesis     GERD (gastroesophageal reflux disease)     History of angina     History of MRSA infection     History of transfusion     Many years ago    Hyperlipidemia     Hypertension     Hypoglycemic reaction     "occas low blood sugar"    Insulin pump in place     pt reports saw endocrinologist  and will bring copy of instructions DOS    Kidney disease     Pacemaker     Polyneuropathy associated with underlying disease ([facility] )     PONV (postoperative nausea and vomiting)     Risk for falls     S/P insertion of spinal cord stimulator 2018    Sarcoidosis     Shortness of breath     "exertion and not new"    Sleep apnea     TIA (transient ischemic attack)     Use of cane as ambulatory aid     Uses walker     Wears dentures     permanent upper/and some missing teeth/partial lower     Past Surgical History:   Procedure Laterality Date    ABDOMINAL ADHESION SURGERY N/A 5/3/2021    Procedure: laparoscopic LYSIS ADHESIONS;  Surgeon: Jason Ann MD;  [facility];  Service: Thoracic    BREAST SURGERY      BREAST SURGERY      reduction    CARDIAC PACEMAKER PLACEMENT       SECTION      COLONOSCOPY      DILATION AND CURETTAGE OF UTERUS      "D&E"    HERNIA REPAIR      HYSTERECTOMY      JOINT REPLACEMENT Left     NECK SURGERY      fused 4 discs with 4 screws implanted    NISSEN FUNDOPLICATION LAPAROSCOPIC WITH ROBOTICS N/A 5/3/2021    Procedure: ROBOTIC [address] ;  Surgeon: Jason Ann MD;  [facility];  Service: Thoracic    [place] ESOPHAGOGASTRODUODENOSCOPY TRANSORAL DIAGNOSTIC N/A 5/3/2021    Procedure: ESOPHAGOGASTRODUODENOSCOPY (EGD); Surgeon: Jason Ann MD;  [facility];  Service: Thoracic    [place] SURG IMPLNT [address] Left 2018    Procedure:  Insertion of thoracic spinal cord stimulator electrode via laminotomy and placement of left buttock IPG;  Surgeon: Tk Valle MD;  [facility];  Service: Neurosurgery    REPLACEMENT TOTAL KNEE      ROTATOR CUFF REPAIR      SPINAL STIMULATOR PLACEMENT Left 10/3/2018    Procedure: BUTTOCK RE-OPENING INCISION FOR REPOSITIONING OF IMPLANTABLE PULSE GENERATOR;  Surgeon: Tk Valle MD;  [facility];  Service: Neurosurgery    TONSILLECTOMY      UPPER GASTROINTESTINAL ENDOSCOPY      WISDOM TOOTH EXTRACTION       Social History   Social History     Substance and Sexual Activity   Alcohol Use Never    Frequency: Never     Social History     Substance and Sexual Activity   Drug Use Yes    Frequency: 8 0 times per week    Types: Marijuana    Comment: Medical Marijuana/vape pen     Social History     Tobacco Use   Smoking Status Never Smoker   Smokeless Tobacco Never Used     Family History   Problem Relation Age of Onset    Diabetes Family     Heart disease Family     Hypertension Family     Neuropathy Family     No Known Problems Mother     Heart disease Father     Diabetes Father     No Known Problems Maternal Grandmother     No Known Problems Maternal Grandfather     No Known Problems Paternal Grandmother     No Known Problems Paternal Grandfather        Meds/Allergies     Medications Prior to Admission   Medication    acetaminophen (TYLENOL) 160 mg/5 mL suspension    amitriptyline (ELAVIL) 50 mg tablet    Armodafinil 150 MG tablet    aspirin (ECOTRIN LOW STRENGTH) 81 mg EC tablet    atorvastatin (LIPITOR) 80 mg tablet    calcium carbonate-vitamin D (OSCAL-D) 500 mg-200 units per tablet    carvedilol (COREG) 3 125 mg tablet    CRANBERRY PO    Cyanocobalamin (VITAMIN B-12 PO)    Docusate Sodium 100 MG capsule    esomeprazole (NexIUM) 40 MG capsule    famotidine (PEPCID) 40 MG tablet    ferrous sulfate 324 (65 Fe) mg    fluticasone-salmeterol (ADVAIR) 100-50 mcg/dose inhaler    hydroxychloroquine (PLAQUENIL) 200 mg tablet    levothyroxine (Synthroid) 88 mcg tablet    losartan (COZAAR) 100 MG tablet    lubiprostone (AMITIZA) 24 mcg capsule    meloxicam (Mobic) 15 mg tablet    NOVOLOG 100 UNIT/ML injection    ondansetron (ZOFRAN) 8 mg tablet    oxybutynin (DITROPAN XL) 15 MG 24 hr tablet    oxyCODONE (ROXICODONE) 5 mg/5 mL solution    pantoprazole (PROTONIX) 40 mg tablet    polyethylene glycol (MIRALAX) 17 g packet    Potassium Gluconate 595 MG CAPS    pramipexole (MIRAPEX) 1 mg tablet    Semaglutide,0 25 or 0 5MG/DOS, (Ozempic, 0 25 or 0 5 MG/DOSE,) 2 MG/1 5ML SOPN    senna (SENOKOT) 8 6 mg    sertraline (ZOLOFT) 50 mg tablet    torsemide (DEMADEX) 20 mg tablet    COVID-19 mRNA Virus Vaccine (MODERNA COVID-19 VACCINE IM)    dicyclomine (BENTYL) 10 mg capsule    glucagon 1 MG injection    glucose blood test strip    oxyCODONE-acetaminophen (PERCOCET) 5-325 mg per tablet    VENTOLIN  (90 Base) MCG/ACT inhaler     Current Facility-Administered Medications   Medication Dose Route Frequency    acetaminophen (TYLENOL) tablet 650 mg  650 mg Oral Q6H PRN    albuterol (PROVENTIL HFA,VENTOLIN HFA) inhaler 2 puff  2 puff Inhalation Q4H PRN    amitriptyline (ELAVIL) tablet 12 5 mg  12 5 mg Oral HS    aspirin (ECOTRIN LOW STRENGTH) EC tablet 81 mg  81 mg Oral QPM    atorvastatin (LIPITOR) tablet 80 mg  80 mg Oral HS    calcium carbonate-vitamin D (OSCAL-D) 500 mg-200 units per tablet 1 tablet  1 tablet Oral Daily With Breakfast    carvedilol (COREG) tablet 6 25 mg  6 25 mg Oral BID With Meals    docusate sodium (COLACE) capsule 100 mg  100 mg Oral BID    famotidine (PEPCID) tablet 10 mg  10 mg Oral HS    ferrous sulfate tablet 325 mg  325 mg Oral Daily With Breakfast    fluticasone-vilanterol (BREO ELLIPTA) 100-25 mcg/inh inhaler 1 puff  1 puff Inhalation Daily    heparin (porcine) subcutaneous injection 5,000 Units  5,000 Units Subcutaneous Q8H [address]    hydroxychloroquine (PLAQUENIL) tablet 400 mg  400 mg Oral QPM    insulin lispro (HumaLOG) 100 units/mL subcutaneous injection 1-5 Units  1-5 Units Subcutaneous TID AC    insulin lispro (HumaLOG) FOR PUMP REFILLS 1,000 Units  10 mL Subcutaneous Insulin Pump Daily PRN    modafinil (PROVIGIL) tablet 100 mg  100 mg Oral Daily    ondansetron (ZOFRAN) injection 4 mg  4 mg Intravenous Q6H PRN    oxyCODONE (ROXICODONE) oral solution 5 mg  5 mg Oral Q4H PRN    pantoprazole (PROTONIX) EC tablet 40 mg  40 mg Oral Daily    PATIENT MAINTAINED INSULIN PUMP 1 each  1 each Subcutaneous Q8H    polyethylene glycol (MIRALAX) packet 17 g  17 g Oral Daily PRN    pramipexole (MIRAPEX) tablet 0 5 mg  0 5 mg Oral BID    sertraline (ZOLOFT) tablet 50 mg  50 mg Oral Daily       Allergies   Allergen Reactions    Bactrim [Sulfamethoxazole-Trimethoprim] Hives    Sucralfate Hives     Facial swelling    Topamax [Topiramate]      disorentation    Azithromycin Itching    Pregabalin Confusion and Other (See Comments)     altered mental status    Ciprofloxacin Drowsiness     Other reaction(s): Other (See Comments)  "drowsiness"    Norvasc [Amlodipine] Swelling    Baclofen      "That knocks me out "    Bupropion Fatigue    Methotrexate Nausea Only    Neurontin [Gabapentin] Hallucinations and Hypertension           Objective     Blood pressure 147/87, pulse 61, temperature (!) 97 1 °F (36 2 °C), temperature source Temporal, resp  rate 18, weight 90 4 kg (199 lb 4 7 oz), SpO2 100 %  Body mass index is 36 45 kg/m²        Intake/Output Summary (Last 24 hours) at 5/10/2021 1417  Last data filed at 5/10/2021 1047  Gross per 24 hour   Intake 2910 ml   Output 750 ml   Net 2160 ml         PHYSICAL EXAM:      General Appearance:   Alert, cooperative, no distress   HEENT:   Normocephalic, atraumatic, anicteric      Neck:  Supple, symmetrical, trachea midline   Lungs:   Clear to auscultation bilaterally; no rales, rhonchi or wheezing; respirations unlabored    Heart[de-identified]   Regular rate and rhythm; no murmur, rub, or gallop     Abdomen:   Soft, non-tender, non-distended; normal bowel sounds; no masses, no organomegaly    Genitalia:   Deferred    Rectal:   Deferred    Extremities:  No cyanosis, clubbing or edema    Pulses:  2+ and symmetric all extremities    Skin:  No jaundice, rashes, or lesions    Lymph nodes:  No palpable cervical lymphadenopathy        Lab Results:   Admission on 05/08/2021   Component Date Value    WBC 05/08/2021 7 34     RBC 05/08/2021 2 91*    Hemoglobin 05/08/2021 8 0*    Hematocrit 05/08/2021 26 0*    MCV 05/08/2021 89     MCH 05/08/2021 27 5     MCHC 05/08/2021 30 8*    RDW 05/08/2021 15 0     MPV 05/08/2021 10 3     Platelets 03/12/3078 100*    nRBC 05/08/2021 0     Neutrophils Relative 05/08/2021 82*    Immat GRANS % 05/08/2021 1     Lymphocytes Relative 05/08/2021 8*    Monocytes Relative 05/08/2021 8     Eosinophils Relative 05/08/2021 1     Basophils Relative 05/08/2021 0     Neutrophils Absolute 05/08/2021 5 97     Immature Grans Absolute 05/08/2021 0 07     Lymphocytes Absolute 05/08/2021 0 62     Monocytes Absolute 05/08/2021 0 58     Eosinophils Absolute 05/08/2021 0 07     Basophils Absolute 05/08/2021 0 03     Sodium 05/08/2021 137     Potassium 05/08/2021 4 0     Chloride 05/08/2021 100     CO2 05/08/2021 27     ANION GAP 05/08/2021 10     BUN 05/08/2021 41*    Creatinine 05/08/2021 2 55*    Glucose 05/08/2021 135     Calcium 05/08/2021 8 1*    eGFR 05/08/2021 19     Color, UA 05/08/2021 Manju     Clarity, UA 05/08/2021 Cloudy     pH, UA 05/08/2021 5 0     Leukocytes, UA 05/08/2021 Small*    Nitrite, UA 05/08/2021 Negative     Protein, UA 05/08/2021 30 (1+)*    Glucose, UA 05/08/2021 Negative     Ketones, UA 05/08/2021 Negative     Urobilinogen, UA 05/08/2021 0 2     Bilirubin, UA 05/08/2021 Interference- unable to analyze*    Blood, UA 05/08/2021 Negative     Specific Gravity, UA 05/08/2021 1 025     RBC, UA 05/08/2021 None Seen  WBC, UA 05/08/2021 4-10*    Epithelial Cells 05/08/2021 Moderate*    Bacteria, UA 05/08/2021 Innumerable*    Hyaline Casts, UA 05/08/2021 2-4*    AMORPH URATES 05/08/2021 Occasional     POC Glucose 05/08/2021 100     Sodium 05/09/2021 141     Potassium 05/09/2021 4 4     Chloride 05/09/2021 103     CO2 05/09/2021 25     ANION GAP 05/09/2021 13     BUN 05/09/2021 43*    Creatinine 05/09/2021 2 10*    Glucose 05/09/2021 73     Calcium 05/09/2021 8 3     eGFR 05/09/2021 24     WBC 05/09/2021 6 59     RBC 05/09/2021 2 72*    Hemoglobin 05/09/2021 7 3*    Hematocrit 05/09/2021 24 3*    MCV 05/09/2021 89     MCH 05/09/2021 26 8     MCHC 05/09/2021 30 0*    RDW 05/09/2021 15 1     Platelets 31/00/2724 128*    MPV 05/09/2021 10 9     POC Glucose 05/09/2021 105     Hemoglobin 05/09/2021 7 2*    Hematocrit 05/09/2021 23 9*    POC Glucose 05/09/2021 122     POC Glucose 05/09/2021 110     Sodium 05/10/2021 138     Potassium 05/10/2021 4 3     Chloride 05/10/2021 102     CO2 05/10/2021 23     ANION GAP 05/10/2021 13     BUN 05/10/2021 40*    Creatinine 05/10/2021 1 63*    Glucose 05/10/2021 95     Calcium 05/10/2021 8 3     eGFR 05/10/2021 33     WBC 05/10/2021 5 53     RBC 05/10/2021 2 85*    Hemoglobin 05/10/2021 7 8*    Hematocrit 05/10/2021 26 1*    MCV 05/10/2021 92     MCH 05/10/2021 27 4     MCHC 05/10/2021 29 9*    RDW 05/10/2021 14 8     MPV 05/10/2021 10 9     Platelets 98/46/8055 146*    nRBC 05/10/2021 0     Neutrophils Relative 05/10/2021 71     Immat GRANS % 05/10/2021 1     Lymphocytes Relative 05/10/2021 16     Monocytes Relative 05/10/2021 11     Eosinophils Relative 05/10/2021 1     Basophils Relative 05/10/2021 0     Neutrophils Absolute 05/10/2021 3 90     Immature Grans Absolute 05/10/2021 0 05     Lymphocytes Absolute 05/10/2021 0 88     Monocytes Absolute 05/10/2021 0 60     Eosinophils Absolute 05/10/2021 0 08     Basophils Absolute 05/10/2021 0 02     POC Glucose 05/10/2021 101     POC Glucose 05/10/2021 99     POC Glucose 05/10/2021 121        Imaging Studies: I have personally reviewed pertinent imaging studies

## 2021-05-10 NOTE — PROGRESS NOTES
NEPHROLOGY PROGRESS NOTE   Rizwana Flores 60 y o  female MRN: [id number]  Unit/Bed#: E2 -01 Encounter: [id number]  Reason for Consult: JULIANNE (POA)    PLAN:   -acute kidney injury on CKD 3, creatinine currently at baseline  Will discontinue IV fluids  Arb and diuretic remain on hold  Continue to check bladder scan PVR is with urinary retention protocol   -acute urinary retention, suspected medication related, UA negative for signs of infection, amitriptyline and pramipexole decreased  Flexeril remains on hold  Continue to monitor bladder scans  -hypertension, blood pressure overall acceptable  Arb and diuretic remain on hold  Continues on Coreg for now   -achalasia, status post myotomy and fundoplication, further care per primary team   Advancing diet   -anemia, expected acute blood loss status post surgery 5/3  Continue to monitor and trend H&H  Continues on oral iron  ASSESSMENT/PLAN:  JULIANNE (POA on CKD III: multifactorial with acute urinary retention, pre-renal azotemia as well as medication related on ARB, NSAID and diuretic    -presents with creatinine of 2 5   -baseline creatinine 1 3-1 6 since 2018    -follows with [facility]    -renal function improved with fluids  -holding ARB, diuretic, and further NSAID's    -checking bladder scan's PVR  -UA: small leuks, +1 protein, 4-10 WBC's, 24 hyalaline casts  -renal US: normal    -continue to avoid nephrotoxins, hypotension, or IV contrast    -I/O  Urinary retention:  Suspected medication related  -urinalysis negative for signs of infection   -amitriptyline decreased, Flexeril on hold, pramipexole decreased  -monitoring bladder scans PVR with urinary retention protocol  Hypertension: overall acceptable    -goal SBP <140/90 mmHg    -losartan and torsemide remain on hold  -OP medications: Losartan 50 mg po daily, torsemide 20 mg BID  -currently on Coreg 6 25 mg BID     -avoid hypotension or high fluctuations in BP    -recommend holding parameters on ant-hypertensive's for SBP <130 mmHg  Achalasia S/P myotomy and fundoplication:   -advancing diet  -further care per primary team      Anemia:  With recent surgery on 05/03   -continues on oral iron    -continue to monitor and transfuse as needed  Other: diabetes, GERD, asthma  Disposition:  Okay to discharge from Renal once medically cleared  SUBJECTIVE:  The patient is sitting up in her bed  She denies any chest pain or shortness of breath  She denies nausea, vomiting, diarrhea  She denies any issues with urination  She states that she is tolerating her current diet  She still does not have much of an appetite but is drinking fluids      OBJECTIVE:  Current Weight: Weight - Scale: 90 4 kg (199 lb 4 7 oz)  Vitals:    05/09/21 0700 05/09/21 1500 05/09/21 2300 05/10/21 0700   BP: 140/67 141/61 135/68 146/70   BP Location: Left arm Right arm  Right arm   Pulse: 66 62 60 58   Resp: 18 18 20 18   Temp: 97 8 °F (36 6 °C) (!) 97 °F (36 1 °C) (!) 97 °F (36 1 °C) (!) 97 1 °F (36 2 °C)   TempSrc: Temporal Temporal Temporal Temporal   SpO2: 100% 100% 98% 100%   Weight:           Intake/Output Summary (Last 24 hours) at 5/10/2021 4737  Last data filed at 5/9/2021 1613  Gross per 24 hour   Intake 100 ml   Output 750 ml   Net -650 ml     General: NAD  Skin: warm, dry, intact, no rash, bilateral lower extremity PVD discoloration  HEENT: Moist mucous membranes, sclera anicteric, normocephalic, atraumatic  Neck: No apparent JVD appreciated  Chest: lung sounds clear B/L, on O2   CVS:Regular rate and rhythm, no murmer   Abdomen: Soft, round, non-tender, +BS  Extremities:  Trace B/L LE edema present  Neuro: alert and oriented  Psych: appropriate mood and affect     Medications:    Current Facility-Administered Medications:     albuterol (PROVENTIL HFA,VENTOLIN HFA) inhaler 2 puff, 2 puff, Inhalation, Q4H PRN, Anca Stauffer MD    amitriptyline (ELAVIL) tablet 12 5 mg, 12 5 mg, Oral, HS, Ivana Aguila MD, 12 5 mg at 05/09/21 2124    aspirin (ECOTRIN LOW STRENGTH) EC tablet 81 mg, 81 mg, Oral, QPM, Ivana Aguila MD, 81 mg at 05/09/21 1756    atorvastatin (LIPITOR) tablet 80 mg, 80 mg, Oral, HS, Ivana Aguila MD, 80 mg at 05/09/21 2124    calcium carbonate-vitamin D (OSCAL-D) 500 mg-200 units per tablet 1 tablet, 1 tablet, Oral, Daily With Breakfast, Ivana Aguila MD, 1 tablet at 05/09/21 0808    carvedilol (COREG) tablet 6 25 mg, 6 25 mg, Oral, BID With Meals, Ivana Aguila MD, 6 25 mg at 05/09/21 1558    docusate sodium (COLACE) capsule 100 mg, 100 mg, Oral, Daily, Ivana Aguila MD, 100 mg at 05/09/21 0808    famotidine (PEPCID) tablet 10 mg, 10 mg, Oral, HS, Ivana Aguila MD, 10 mg at 05/09/21 2124    ferrous sulfate tablet 325 mg, 325 mg, Oral, Daily With Breakfast, Ivana Aguila MD, 325 mg at 05/09/21 0808    fluticasone-vilanterol (BREO ELLIPTA) 100-25 mcg/inh inhaler 1 puff, 1 puff, Inhalation, Daily, Ivana Aguila MD, 1 puff at 05/09/21 0819    heparin (porcine) subcutaneous injection 5,000 Units, 5,000 Units, Subcutaneous, Q8H [address], 5,000 Units at 05/10/21 0517 **AND** Platelet count, , , Once, MD Mack Nagel Cofeng  hydroxychloroquine (PLAQUENIL) tablet 400 mg, 400 mg, Oral, QPM, Ivana Aguila MD, 400 mg at 05/09/21 1756    insulin lispro (HumaLOG) 100 units/mL subcutaneous injection 1-5 Units, 1-5 Units, Subcutaneous, TID AC **AND** Fingerstick Glucose (POCT), , , TID AC, Ivana Aguila MD    insulin lispro (HumaLOG) FOR PUMP REFILLS 1,000 Units, 10 mL, Subcutaneous Insulin Pump, Daily PRN, Ivana Aguila MD    modafinil (PROVIGIL) tablet 100 mg, 100 mg, Oral, Daily, Ivana Aguila MD, 100 mg at 05/09/21 0816    ondansetron (ZOFRAN) injection 4 mg, 4 mg, Intravenous, Q6H PRN, Ivana Aguila MD    oxyCODONE (ROXICODONE) oral solution 5 mg, 5 mg, Oral, Q4H PRN, Ivana Aguila MD, 5 mg at 05/09/21 1935    pantoprazole (PROTONIX) EC tablet 40 mg, 40 mg, Oral, Daily, Ivana Aguila MD, 40 mg at 05/10/21 6934    PATIENT MAINTAINED INSULIN PUMP 1 each, 1 each, Subcutaneous, Q8H, Edmar Beck MD, 1 each at 05/09/21 0821    polyethylene glycol (MIRALAX) packet 17 g, 17 g, Oral, Daily PRN, Edmar Beck MD, 17 g at 05/09/21 1519    pramipexole (MIRAPEX) tablet 0 5 mg, 0 5 mg, Oral, BID, Edmar Beck MD, 0 5 mg at 05/09/21 1757    sertraline (ZOLOFT) tablet 50 mg, 50 mg, Oral, Daily, Edmar Beck MD, 50 mg at 05/09/21 0808    sodium chloride 0 9 % infusion, 75 mL/hr, Intravenous, Continuous, Edmar Beck MD, Last Rate: 75 mL/hr at 05/09/21 1517, 75 mL/hr at 05/09/21 1517    Laboratory Results:  Results from last 7 days   Lab Units 05/10/21  0440 05/09/21  1254 05/09/21  0509 05/08/21  1159  05/04/21  0526 05/03/21  1534   WBC Thousand/uL 5 53  --  6 59 7 34  --  6 28 8 39   HEMOGLOBIN g/dL 7 8* 7 2* 7 3* 8 0*  --  8 1* 9 4*   HEMATOCRIT % 26 1* 23 9* 24 3* 26 0*  --  27 2* 30 5*   PLATELETS Thousands/uL 146*  --  128* 100*  --  144* 214   SODIUM mmol/L 138  --  141 137   < > 140 138   POTASSIUM mmol/L 4 3  --  4 4 4 0   < > 5 0 4 6   CHLORIDE mmol/L 102  --  103 100   < > 107 103   CO2 mmol/L 23  --  25 27   < > 27 28   BUN mg/dL 40*  --  43* 41*   < > 36* 35*   CREATININE mg/dL 1 63*  --  2 10* 2 55*   < > 1 67* 1 81*   CALCIUM mg/dL 8 3  --  8 3 8 1*   < > 8 5 8 7   MAGNESIUM mg/dL  --   --   --   --   --  2 8* 2 4    < > = values in this interval not displayed

## 2021-05-10 NOTE — ASSESSMENT & PLAN NOTE
S/p myotomy with fundoplication on 6/8/76  Patient on clear liquid diet on admission, advanced to soft diet yesterday with reports of increased abdominal pain, distention, constipation  GI consult is pending

## 2021-05-10 NOTE — ASSESSMENT & PLAN NOTE
Lab Results   Component Value Date    HGBA1C 6 9 (H) 01/04/2021     Accuchecks, SSI  Use insulin pump      Recent Labs     05/09/21  1142 05/09/21  1554 05/10/21  0456 05/10/21  0557   POCGLU 122 110 101 99       Blood Sugar Average: Last 72 hrs:  (P) 746 [id number]

## 2021-05-10 NOTE — ASSESSMENT & PLAN NOTE
Creatinine of 2 55 on presentation, today's creatinine has improved to 1 63  Likely obstructive etiology given unable to urinate due to medications  Improved with IVF hydration and adjustment of medications, as noted above  Kidney US - unremarkable  Holding torsemide, losartan  Appreciate nephrology input

## 2021-05-10 NOTE — ASSESSMENT & PLAN NOTE
Continue pepcid and protonix  Recent surgery for achalasia  Clear liquid diet on admission, advanced to soft yesterday  Patient reports minimal food intake and ongoing abdominal pain and distention, constipation  Will consult GI given recent surgery

## 2021-05-10 NOTE — PROGRESS NOTES
[facility]  Progress Note - Nila Putnam 1955, 60 y o  female MRN: [id number]  Unit/Bed#: E2 -01 Encounter: [id number]  Primary Care Provider: Sophie Baron DO   Date and time admitted to hospital: 5/8/2021 11:04 AM    * Acute urinary retention  Assessment & Plan  Presented with acute urinary retention  On multiple medications that can cause urinary retention  Recent surgery with addition of opiods  UA shows mod bacteria, but no symptoms of UTI, fevers, dysuria  No leukocytosis or fever  Decreased amitripyline to 12 5, holding flexeril, decreased pramipexole  Nephrology consulted, appreciate assistance with meds and IVfs  Will STOP IV fluids today and recheck BMP tomorrow  PVR is appropriate - recorded at <100mL today  Continue Urinary retention protocol      JULIANNE (acute kidney injury) ([facility] [address] )  Assessment & Plan  Creatinine of 2 55 on presentation, today's creatinine has improved to 1 63  Likely obstructive etiology given unable to urinate due to medications  Improved with IVF hydration and adjustment of medications, as noted above  Kidney US - unremarkable  Holding torsemide, losartan  Appreciate nephrology input  Achalasia  Assessment & Plan  S/p myotomy with fundoplication on 8/4/78  Patient on clear liquid diet on admission, advanced to soft diet yesterday with reports of increased abdominal pain, distention, constipation  GI consult is pending  GERD (gastroesophageal reflux disease)  Assessment & Plan  Continue pepcid and protonix  Recent surgery for achalasia  Clear liquid diet on admission, advanced to soft yesterday  Patient reports minimal food intake and ongoing abdominal pain and distention, constipation  Will consult GI given recent surgery      Moderate persistent asthma with acute exacerbation  Assessment & Plan  Continue advair    Type 2 diabetes mellitus with microalbuminuria, with long-term current use of insulin [facility])  Assessment & Plan  Lab Results   Component Value Date    HGBA1C 6 9 (H) 2021     Accuchecks, SSI  Use insulin pump      Recent Labs     21  1142 21  1554 05/10/21  0456 05/10/21  0557   POCGLU 122 110 101 99       Blood Sugar Average: Last 72 hrs:  (P) 709 [id number]    Essential hypertension  Assessment & Plan  Continue coreg, BP currently WNL  Holding losartan and torsemide in the setting of JULIANNE    Anemia of chronic disease  Assessment & Plan  Hemoglobin of 8 0 on admission, noted to be 9 4 on 5/3  Hgb of 7 3 yesterday, today 7 8, stable, possibly dilutional   Transfuse if hemoglobin is less than 7 g/dL        VTE Pharmacologic Prophylaxis:   Pharmacologic: Heparin  Mechanical VTE Prophylaxis in Place: Yes    Patient Centered Rounds: I have performed bedside rounds with nursing staff today  Time Spent for Care: 30 minutes  More than 50% of total time spent on counseling and coordination of care as described above  Current Length of Stay: 2 day(s)    Current Patient Status: Inpatient   Certification Statement: The patient will continue to require additional inpatient hospital stay due to need for close monitoring of labs, GI consult  Discharge Plan / Estimated Discharge Date: TBD      Code Status: Level 1 - Full Code      Subjective:   Patient notes increasing abdominal pain, distention, constipation  Limited PO intake  Notes recent myotomy for achalasia  Enema yesterday not effective at relieving symptoms  Objective:     Vitals:   Temp (24hrs), Av °F (36 1 °C), Min:97 °F (36 1 °C), Max:97 1 °F (36 2 °C)    Temp:  [97 °F (36 1 °C)-97 1 °F (36 2 °C)] 97 1 °F (36 2 °C)  HR:  [58-62] 61  Resp:  [18-20] 18  BP: (135-147)/(61-87) 147/87  SpO2:  [98 %-100 %] 100 %  Body mass index is 36 45 kg/m²  Input and Output Summary (last 24 hours):        Intake/Output Summary (Last 24 hours) at 5/10/2021 1149  Last data filed at 5/10/2021 1047  Gross per 24 hour   Intake 3010 ml   Output 1000 ml   Net 2010 ml       Physical Exam:     Physical Exam  Vitals signs and nursing note reviewed  Constitutional:       Appearance: Normal appearance  She is ill-appearing  HENT:      Head: Normocephalic  Mouth/Throat:      Mouth: Mucous membranes are dry  Cardiovascular:      Rate and Rhythm: Normal rate and regular rhythm  Pulmonary:      Effort: Pulmonary effort is normal       Breath sounds: Normal breath sounds  Abdominal:      General: There is distension  Palpations: Abdomen is soft  Tenderness: There is abdominal tenderness (generalized)  Comments: Bowel sounds hypoactive, but present  Musculoskeletal:      Right lower leg: No edema  Left lower leg: No edema  Skin:     General: Skin is warm and dry  Coloration: Skin is pale  Additional Data:     Labs:    Results from last 7 days   Lab Units 05/10/21  0440   WBC Thousand/uL 5 53   HEMOGLOBIN g/dL 7 8*   HEMATOCRIT % 26 1*   PLATELETS Thousands/uL 146*   NEUTROS PCT % 71   LYMPHS PCT % 16   MONOS PCT % 11   EOS PCT % 1     Results from last 7 days   Lab Units 05/10/21  0440   POTASSIUM mmol/L 4 3   CHLORIDE mmol/L 102   CO2 mmol/L 23   BUN mg/dL 40*   CREATININE mg/dL 1 63*   CALCIUM mg/dL 8 3           * I Have Reviewed All Lab Data Listed Above  * Additional Pertinent Lab Tests Reviewed: All Labs Within Last 24 Hours Reviewed    Imaging:    Imaging Reports Reviewed Today Include: no new imaging to review          Recent Cultures (last 7 days):           Last 24 Hours Medication List:   Current Facility-Administered Medications   Medication Dose Route Frequency Provider Last Rate    albuterol  2 puff Inhalation Q4H PRN Tiffanie Waldrop MD      amitriptyline  12 5 mg Oral HS Tiffanie Waldrop MD      aspirin  81 mg Oral QPM Tiffanie Waldrop MD      atorvastatin  80 mg Oral HS Tiffanie Waldrop MD      calcium carbonate-vitamin D  1 tablet Oral Daily With Breakfast Tiffanie Waldrop MD      carvedilol  6 25 mg Oral BID With Meals Davon Nguyễn MD      docusate sodium  100 mg Oral BID Alicia Lama PA-C      famotidine  10 mg Oral HS Davon Nguyễn MD      ferrous sulfate  325 mg Oral Daily With Breakfast Davon Nguyễn MD      fluticasone-vilanterol  1 puff Inhalation Daily Davon Nguyễn MD      heparin (porcine)  5,000 Units Subcutaneous [facility] Davon Nguyễn MD      hydroxychloroquine  400 mg Oral QPM Davon Nguyễn MD      insulin lispro  1-5 Units Subcutaneous TID [facility] Davon Nguyễn MD      insulin lispro  10 mL Subcutaneous Insulin Pump Daily PRN Davon Nguyễn MD      modafinil  100 mg Oral Daily Davon Nguyễn MD      ondansetron  4 mg Intravenous Q6H PRN Davon Nguyễn MD      oxyCODONE  5 mg Oral Q4H PRN Alicia Lama PA-C      pantoprazole  40 mg Oral Daily Davon Nguyễn MD      patient maintained insulin pump  1 each Subcutaneous Q8H Davon Nguyễn MD      polyethylene glycol  17 g Oral Daily PRN Davon Nguyễn MD      pramipexole  0 5 mg Oral BID Davon Nguyễn MD      sertraline  50 mg Oral Daily Davon Nguyễn MD          Today, Patient Was Seen By: Alicia Lama PA-C    ** Please Note: Dragon 360 Dictation voice to text software may have been used in the creation of this document   **

## 2021-05-10 NOTE — ASSESSMENT & PLAN NOTE
Hemoglobin of 8 0 on admission, noted to be 9 4 on 5/3  Hgb of 7 3 yesterday, today 7 8, stable, possibly dilutional   Transfuse if hemoglobin is less than 7 g/dL

## 2021-05-10 NOTE — ASSESSMENT & PLAN NOTE
Presented with acute urinary retention  On multiple medications that can cause urinary retention  Recent surgery with addition of opiods  UA shows mod bacteria, but no symptoms of UTI, fevers, dysuria  No leukocytosis or fever  Decreased amitripyline to 12 5, holding flexeril, decreased pramipexole  Nephrology consulted, appreciate assistance with meds and IVfs  Will STOP IV fluids today and recheck BMP tomorrow  PVR is appropriate - recorded at <100mL today    Continue Urinary retention protocol

## 2021-05-10 NOTE — PLAN OF CARE
Problem: PAIN - ADULT  Goal: Verbalizes/displays adequate comfort level or baseline comfort level  Description: Interventions:  - Encourage patient to monitor pain and request assistance  - Assess pain using appropriate pain scale  - Administer analgesics based on type and severity of pain and evaluate response  - Implement non-pharmacological measures as appropriate and evaluate response  - Consider cultural and social influences on pain and pain management  - Notify physician/advanced practitioner if interventions unsuccessful or patient reports new pain  Outcome: Progressing     Problem: INFECTION - ADULT  Goal: Absence or prevention of progression during hospitalization  Description: INTERVENTIONS:  - Assess and monitor for signs and symptoms of infection  - Monitor lab/diagnostic results  - Monitor all insertion sites, i e  indwelling lines, tubes, and drains  - Monitor endotracheal if appropriate and nasal secretions for changes in amount and color  - [place] appropriate cooling/warming therapies per order  - Administer medications as ordered  - Instruct and encourage patient and family to use good hand hygiene technique  - Identify and instruct in appropriate isolation precautions for identified infection/condition  Outcome: Progressing     Problem: INFECTION - ADULT  Goal: Absence of fever/infection during neutropenic period  Description: INTERVENTIONS:  - Monitor WBC    Outcome: Progressing     Problem: SAFETY ADULT  Goal: Patient will remain free of falls  Description: INTERVENTIONS:  - Assess patient frequently for physical needs  -  Identify cognitive and physical deficits and behaviors that affect risk of falls    -  [place] fall precautions as indicated by assessment   - Educate patient/family on patient safety including physical limitations  - Instruct patient to call for assistance with activity based on assessment  - Modify environment to reduce risk of injury  - Consider OT/PT consult to assist with strengthening/mobility  Outcome: Progressing     Problem: SAFETY ADULT  Goal: Maintain or return to baseline ADL function  Description: INTERVENTIONS:  -  Assess patient's ability to carry out ADLs; assess patient's baseline for ADL function and identify physical deficits which impact ability to perform ADLs (bathing, care of mouth/teeth, toileting, grooming, dressing, etc )  - Assess/evaluate cause of self-care deficits   - Assess range of motion  - Assess patient's mobility; develop plan if impaired  - Assess patient's need for assistive devices and provide as appropriate  - Encourage maximum independence but intervene and supervise when necessary  - Involve family in performance of ADLs  - Assess for home care needs following discharge   - Consider OT consult to assist with ADL evaluation and planning for discharge  - Provide patient education as appropriate  Outcome: Progressing     Problem: SAFETY ADULT  Goal: Maintain or return mobility status to optimal level  Description: INTERVENTIONS:  - Assess patient's baseline mobility status (ambulation, transfers, stairs, etc )    - Identify cognitive and physical deficits and behaviors that affect mobility  - Identify mobility aids required to assist with transfers and/or ambulation (gait belt, sit-to-stand, lift, walker, cane, etc )  - [place] fall precautions as indicated by assessment  - Record patient progress and toleration of activity level on Mobility SBAR; progress patient to next Phase/Stage  - Instruct patient to call for assistance with activity based on assessment  - Consider rehabilitation consult to assist with strengthening/weightbearing, etc   Outcome: Progressing     Problem: Knowledge Deficit  Goal: Patient/family/caregiver demonstrates understanding of disease process, treatment plan, medications, and discharge instructions  Description: Complete learning assessment and assess knowledge base    Interventions:  - Provide teaching at level of understanding  - Provide teaching via preferred learning methods  Outcome: Progressing     Problem: Nutrition/Hydration-ADULT  Goal: Nutrient/Hydration intake appropriate for improving, restoring or maintaining nutritional needs  Description: Monitor and assess patient's nutrition/hydration status for malnutrition  Collaborate with interdisciplinary team and initiate plan and interventions as ordered  Monitor patient's weight and dietary intake as ordered or per policy  Utilize nutrition screening tool and intervene as necessary  Determine patient's food preferences and provide high-protein, high-caloric foods as appropriate  INTERVENTIONS:  - Monitor oral intake, urinary output, labs, and treatment plans  - Assess nutrition and hydration status and recommend course of action  - Evaluate amount of meals eaten  - Assist patient with eating if necessary   - Allow adequate time for meals  - Recommend/ encourage appropriate diets, oral nutritional supplements, and vitamin/mineral supplements  - Order, calculate, and assess calorie counts as needed  - Recommend, monitor, and adjust tube feedings and TPN/PPN based on assessed needs  - Assess need for intravenous fluids  - Provide specific nutrition/hydration education as appropriate  - Include patient/family/caregiver in decisions related to nutrition  Outcome: Progressing     Problem: Potential for Falls  Goal: Patient will remain free of falls  Description: INTERVENTIONS:  - Assess patient frequently for physical needs  -  Identify cognitive and physical deficits and behaviors that affect risk of falls    -  [place] fall precautions as indicated by assessment   - Educate patient/family on patient safety including physical limitations  - Instruct patient to call for assistance with activity based on assessment  - Modify environment to reduce risk of injury  - Consider OT/PT consult to assist with strengthening/mobility  Outcome: Progressing     Problem: COPING  Goal: Pt/Family able to verbalize concerns and demonstrate effective coping strategies  Description: INTERVENTIONS:  - Assist patient/family to identify coping skills, available support systems and cultural and spiritual values  - Provide emotional support, including active listening and acknowledgement of concerns of patient and caregivers  - Reduce environmental stimuli, as able  - Provide patient education  - Assess for spiritual pain/suffering and initiate spiritual care, including notification of Pastoral Care or jean based community as needed  - Assess effectiveness of coping strategies  Outcome: Progressing

## 2021-05-11 ENCOUNTER — TELEPHONE (OUTPATIENT)
Dept: OTHER | Facility: OTHER | Age: 66
End: 2021-05-11

## 2021-05-11 ENCOUNTER — APPOINTMENT (INPATIENT)
Dept: RADIOLOGY | Facility: HOSPITAL | Age: 66
DRG: 683 | End: 2021-05-11
Payer: MEDICARE

## 2021-05-11 VITALS
SYSTOLIC BLOOD PRESSURE: 126 MMHG | BODY MASS INDEX: 36.67 KG/M2 | TEMPERATURE: 98.4 F | HEART RATE: 59 BPM | WEIGHT: 199.3 LBS | DIASTOLIC BLOOD PRESSURE: 67 MMHG | HEIGHT: 62 IN | OXYGEN SATURATION: 99 % | RESPIRATION RATE: 18 BRPM

## 2021-05-11 PROBLEM — R10.13 EPIGASTRIC PAIN: Status: ACTIVE | Noted: 2021-05-11

## 2021-05-11 LAB
ANION GAP SERPL CALCULATED.3IONS-SCNC: 10 MMOL/L (ref 4–13)
BASOPHILS # BLD AUTO: 0.02 THOUSANDS/ΜL (ref 0–0.1)
BASOPHILS NFR BLD AUTO: 0 % (ref 0–1)
BUN SERPL-MCNC: 35 MG/DL (ref 5–25)
CALCIUM SERPL-MCNC: 8.3 MG/DL (ref 8.3–10.1)
CHLORIDE SERPL-SCNC: 102 MMOL/L (ref 100–108)
CO2 SERPL-SCNC: 25 MMOL/L (ref 21–32)
CREAT SERPL-MCNC: 1.52 MG/DL (ref 0.6–1.3)
EOSINOPHIL # BLD AUTO: 0.07 THOUSAND/ΜL (ref 0–0.61)
EOSINOPHIL NFR BLD AUTO: 1 % (ref 0–6)
ERYTHROCYTE [DISTWIDTH] IN BLOOD BY AUTOMATED COUNT: 15 % (ref 11.6–15.1)
FERRITIN SERPL-MCNC: 364 NG/ML (ref 8–388)
GFR SERPL CREATININE-BSD FRML MDRD: 36 ML/MIN/1.73SQ M
GLUCOSE SERPL-MCNC: 110 MG/DL (ref 65–140)
GLUCOSE SERPL-MCNC: 119 MG/DL (ref 65–140)
GLUCOSE SERPL-MCNC: 144 MG/DL (ref 65–140)
GLUCOSE SERPL-MCNC: 180 MG/DL (ref 65–140)
GLUCOSE SERPL-MCNC: 61 MG/DL (ref 65–140)
GLUCOSE SERPL-MCNC: 63 MG/DL (ref 65–140)
GLUCOSE SERPL-MCNC: 81 MG/DL (ref 65–140)
HCT VFR BLD AUTO: 24.1 % (ref 34.8–46.1)
HGB BLD-MCNC: 7.2 G/DL (ref 11.5–15.4)
IMM GRANULOCYTES # BLD AUTO: 0.05 THOUSAND/UL (ref 0–0.2)
IMM GRANULOCYTES NFR BLD AUTO: 1 % (ref 0–2)
IRON SATN MFR SERPL: 11 %
IRON SERPL-MCNC: 21 UG/DL (ref 50–170)
LYMPHOCYTES # BLD AUTO: 0.65 THOUSANDS/ΜL (ref 0.6–4.47)
LYMPHOCYTES NFR BLD AUTO: 12 % (ref 14–44)
MCH RBC QN AUTO: 27 PG (ref 26.8–34.3)
MCHC RBC AUTO-ENTMCNC: 29.9 G/DL (ref 31.4–37.4)
MCV RBC AUTO: 90 FL (ref 82–98)
MONOCYTES # BLD AUTO: 0.68 THOUSAND/ΜL (ref 0.17–1.22)
MONOCYTES NFR BLD AUTO: 13 % (ref 4–12)
NEUTROPHILS # BLD AUTO: 3.76 THOUSANDS/ΜL (ref 1.85–7.62)
NEUTS SEG NFR BLD AUTO: 73 % (ref 43–75)
NRBC BLD AUTO-RTO: 0 /100 WBCS
PLATELET # BLD AUTO: 167 THOUSANDS/UL (ref 149–390)
PMV BLD AUTO: 10.1 FL (ref 8.9–12.7)
POTASSIUM SERPL-SCNC: 4.4 MMOL/L (ref 3.5–5.3)
RBC # BLD AUTO: 2.67 MILLION/UL (ref 3.81–5.12)
SODIUM SERPL-SCNC: 137 MMOL/L (ref 136–145)
TIBC SERPL-MCNC: 197 UG/DL (ref 250–450)
WBC # BLD AUTO: 5.23 THOUSAND/UL (ref 4.31–10.16)

## 2021-05-11 PROCEDURE — 83550 IRON BINDING TEST: CPT | Performed by: PHYSICIAN ASSISTANT

## 2021-05-11 PROCEDURE — 82948 REAGENT STRIP/BLOOD GLUCOSE: CPT

## 2021-05-11 PROCEDURE — 99231 SBSQ HOSP IP/OBS SF/LOW 25: CPT | Performed by: INTERNAL MEDICINE

## 2021-05-11 PROCEDURE — 99239 HOSP IP/OBS DSCHRG MGMT >30: CPT | Performed by: PHYSICIAN ASSISTANT

## 2021-05-11 PROCEDURE — 80048 BASIC METABOLIC PNL TOTAL CA: CPT | Performed by: PHYSICIAN ASSISTANT

## 2021-05-11 PROCEDURE — 99232 SBSQ HOSP IP/OBS MODERATE 35: CPT | Performed by: INTERNAL MEDICINE

## 2021-05-11 PROCEDURE — 85025 COMPLETE CBC W/AUTO DIFF WBC: CPT | Performed by: PHYSICIAN ASSISTANT

## 2021-05-11 PROCEDURE — 99232 SBSQ HOSP IP/OBS MODERATE 35: CPT | Performed by: PHYSICIAN ASSISTANT

## 2021-05-11 PROCEDURE — 83540 ASSAY OF IRON: CPT | Performed by: PHYSICIAN ASSISTANT

## 2021-05-11 PROCEDURE — 74220 X-RAY XM ESOPHAGUS 1CNTRST: CPT

## 2021-05-11 PROCEDURE — 82728 ASSAY OF FERRITIN: CPT | Performed by: PHYSICIAN ASSISTANT

## 2021-05-11 PROCEDURE — 74022 RADEX COMPL AQT ABD SERIES: CPT

## 2021-05-11 RX ORDER — DEXTROSE MONOHYDRATE 25 G/50ML
INJECTION, SOLUTION INTRAVENOUS
Status: COMPLETED
Start: 2021-05-11 | End: 2021-05-11

## 2021-05-11 RX ORDER — PRAMIPEXOLE DIHYDROCHLORIDE 0.5 MG/1
0.5 TABLET ORAL 2 TIMES DAILY
Qty: 60 TABLET | Refills: 0 | Status: SHIPPED | OUTPATIENT
Start: 2021-05-12 | End: 2022-07-08

## 2021-05-11 RX ORDER — AMITRIPTYLINE HYDROCHLORIDE 25 MG/1
12.5 TABLET, FILM COATED ORAL
Qty: 30 TABLET | Refills: 0 | Status: SHIPPED | OUTPATIENT
Start: 2021-05-11

## 2021-05-11 RX ADMIN — VITAM B12 100 MCG: 100 TAB at 17:00

## 2021-05-11 RX ADMIN — SERTRALINE HYDROCHLORIDE 50 MG: 50 TABLET ORAL at 09:54

## 2021-05-11 RX ADMIN — OXYBUTYNIN 15 MG: 10 TABLET, FILM COATED, EXTENDED RELEASE ORAL at 09:54

## 2021-05-11 RX ADMIN — DOCUSATE SODIUM 100 MG: 100 CAPSULE ORAL at 17:00

## 2021-05-11 RX ADMIN — PRAMIPEXOLE DIHYDROCHLORIDE 0.5 MG: 0.5 TABLET ORAL at 17:01

## 2021-05-11 RX ADMIN — MODAFINIL 100 MG: 100 TABLET ORAL at 10:03

## 2021-05-11 RX ADMIN — ASPIRIN 81 MG: 81 TABLET ORAL at 17:00

## 2021-05-11 RX ADMIN — Medication 1 TABLET: at 09:53

## 2021-05-11 RX ADMIN — CARVEDILOL 6.25 MG: 3.12 TABLET, FILM COATED ORAL at 09:54

## 2021-05-11 RX ADMIN — HYDROXYCHLOROQUINE SULFATE 400 MG: 200 TABLET, FILM COATED ORAL at 17:00

## 2021-05-11 RX ADMIN — DICYCLOMINE HYDROCHLORIDE 10 MG: 10 CAPSULE ORAL at 02:06

## 2021-05-11 RX ADMIN — DOCUSATE SODIUM 100 MG: 100 CAPSULE ORAL at 09:54

## 2021-05-11 RX ADMIN — DEXTROSE MONOHYDRATE 50 ML: 500 INJECTION PARENTERAL at 08:18

## 2021-05-11 RX ADMIN — LOSARTAN POTASSIUM 50 MG: 50 TABLET, FILM COATED ORAL at 17:00

## 2021-05-11 RX ADMIN — FLUTICASONE FUROATE AND VILANTEROL TRIFENATATE 1 PUFF: 100; 25 POWDER RESPIRATORY (INHALATION) at 09:54

## 2021-05-11 RX ADMIN — POTASSIUM CHLORIDE 20 MEQ: 20 SOLUTION ORAL at 09:55

## 2021-05-11 RX ADMIN — CARVEDILOL 6.25 MG: 3.12 TABLET, FILM COATED ORAL at 16:59

## 2021-05-11 RX ADMIN — LEVOTHYROXINE SODIUM 88 MCG: 88 TABLET ORAL at 05:32

## 2021-05-11 RX ADMIN — PANTOPRAZOLE SODIUM 40 MG: 40 TABLET, DELAYED RELEASE ORAL at 05:32

## 2021-05-11 RX ADMIN — HEPARIN SODIUM 5000 UNITS: 5000 INJECTION INTRAVENOUS; SUBCUTANEOUS at 05:33

## 2021-05-11 RX ADMIN — PRAMIPEXOLE DIHYDROCHLORIDE 0.5 MG: 0.5 TABLET ORAL at 09:55

## 2021-05-11 RX ADMIN — IOHEXOL 25 ML: 350 INJECTION, SOLUTION INTRAVENOUS at 11:10

## 2021-05-11 RX ADMIN — FERROUS SULFATE TAB 325 MG (65 MG ELEMENTAL FE) 325 MG: 325 (65 FE) TAB at 09:54

## 2021-05-11 NOTE — ASSESSMENT & PLAN NOTE
Hemoglobin of 8 0 on admission, noted to be 9 4 on 5/3  Hgb of 7 8 yesterday  Now 7 2 today  Suspect this is dilutional in the setting of IVF hydration for JULIANNE, which has now been discontinued  iron panel - showing iron saturation 11, TIBC 197, iron 21, ferritin 364   Takes PO iron supplements  Continue to follow hemoglobin closely after discharge

## 2021-05-11 NOTE — PROGRESS NOTES
[address] [facility]  Progress Note - Lesley Cronin 1955, 60 y o  female MRN: [id number]  Unit/Bed#: E2 -01 Encounter: [id number]  Primary Care Provider: Mazin Portillo DO   Date and time admitted to hospital: 5/8/2021 11:04 AM    * Acute urinary retention  Assessment & Plan  Presented with acute urinary retention, now resolved  On multiple medications that can cause urinary retention  Recent surgery with addition of opiods  UA shows mod bacteria, but no symptoms of UTI, fevers, dysuria  No leukocytosis or fever  Decreased amitripyline to 12 5, holding flexeril, decreased pramipexole  Nephrology consulted, appreciate assistance with meds and IVfs  IV fluids discontinued on 05/10/2021  PVR is appropriate - recorded at <100mL on 05/10/2021  Continue Urinary retention protocol      Epigastric pain  Assessment & Plan  Ongoing epigastric pain unrelieved with enema x2, although patient notes that she feels she is still constipated  Will obtain obstruction series today  Appreciate GI consult, for esophagram today given recent myotomy for achalasia    JULIANNE (acute kidney injury) ([facility] [address] )  Assessment & Plan  Creatinine of 2 55 on presentation   today's creatinine continues to improve, creatinine is 1 52 today  Likely obstructive etiology given reports of urinary retention  Improved with IVF hydration and adjustment of medications, as noted above  Kidney US - unremarkable  Holding torsemide, losartan  Appreciate nephrology input  Achalasia  Assessment & Plan  S/p myotomy with fundoplication on 6/7/13  Patient on clear liquid diet on admission, advanced to soft diet on 05/10/2021 with reports of increased abdominal pain, distention, constipation  This is ongoing today as well  GI consult appreciated    Will obtain esophagram today    GERD (gastroesophageal reflux disease)  Assessment & Plan  Continue pepcid and protonix  Recent surgery for achalasia  Clear liquid diet on admission, advanced to soft 05/10/2021  Patient reports minimal food intake and ongoing abdominal pain and distention, constipation  Will obtain obstruction series  Appreciate GI consult - for esophagram today  Moderate persistent asthma with acute exacerbation  Assessment & Plan  Continue advair    Type 2 diabetes mellitus with microalbuminuria, with long-term current use of insulin [facility])  Assessment & Plan  Lab Results   Component Value Date    HGBA1C 6 9 (H) 2021     Accuchecks, SSI  Use insulin pump      Recent Labs     21  0416 21  0532 21  0707 21  0815   POCGLU 61* 110 81 63*       Blood Sugar Average: Last 72 hrs:  (P) 79 [id number]    Essential hypertension  Assessment & Plan  Continue coreg, BP currently WNL  Holding losartan and torsemide in the setting of JULIANNE    Anemia of chronic disease  Assessment & Plan  Hemoglobin of 8 0 on admission, noted to be 9 4 on 5/3  Hgb of 7 8 yesterday  Now 7 2 today  Will heme test stool  Continue to follow hemoglobin closely and plan to transfuse if hemoglobin is less than 7 g/dL        VTE Pharmacologic Prophylaxis:   Pharmacologic: holding heparin for now given anemia  Mechanical VTE Prophylaxis in Place: Yes    Patient Centered Rounds: I have performed bedside rounds with nursing staff today  Time Spent for Care: 30 minutes  More than 50% of total time spent on counseling and coordination of care as described above  Current Length of Stay: 3 day(s)    Current Patient Status: Inpatient   Certification Statement: The patient will continue to require additional inpatient hospital stay due to close monitoring of labs, specialist consult, further imaging studies  Discharge Plan / Estimated Discharge Date: TBD      Code Status: Level 1 - Full Code      Subjective:   Patient still complaining of epigastric pain and distention      Objective:     Vitals:   Temp (24hrs), Av 7 °F (36 5 °C), Min:97 7 °F (36 5 °C), Max:97 7 °F (36 5 °C)    Temp:  [97 7 °F (36 5 °C)] 97 7 °F (36 5 °C)  HR:  [59-63] 59  Resp:  [16-18] 16  BP: (121-138)/(58-69) 121/69  SpO2:  [96 %-100 %] 100 %  Body mass index is 36 45 kg/m²  Input and Output Summary (last 24 hours): Intake/Output Summary (Last 24 hours) at 5/11/2021 1039  Last data filed at 5/11/2021 0958  Gross per 24 hour   Intake --   Output 800 ml   Net -800 ml       Physical Exam:     Physical Exam  Vitals signs and nursing note reviewed  HENT:      Head: Normocephalic  Mouth/Throat:      Mouth: Mucous membranes are dry  Cardiovascular:      Rate and Rhythm: Normal rate and regular rhythm  Heart sounds: No murmur  Pulmonary:      Effort: Pulmonary effort is normal       Breath sounds: Normal breath sounds  Abdominal:      General: There is distension  Tenderness: There is abdominal tenderness (Epigastric)  Musculoskeletal:      Right lower leg: No edema  Left lower leg: No edema  Skin:     General: Skin is warm and dry  Coloration: Skin is pale  Neurological:      Mental Status: She is oriented to person, place, and time  Additional Data:     Labs:    Results from last 7 days   Lab Units 05/11/21  0845   WBC Thousand/uL 5 23   HEMOGLOBIN g/dL 7 2*   HEMATOCRIT % 24 1*   PLATELETS Thousands/uL 167   NEUTROS PCT % 73   LYMPHS PCT % 12*   MONOS PCT % 13*   EOS PCT % 1     Results from last 7 days   Lab Units 05/11/21  0845   POTASSIUM mmol/L 4 4   CHLORIDE mmol/L 102   CO2 mmol/L 25   BUN mg/dL 35*   CREATININE mg/dL 1 52*   CALCIUM mg/dL 8 3           * I Have Reviewed All Lab Data Listed Above  * Additional Pertinent Lab Tests Reviewed: All Labs Within Last 24 Hours Reviewed    Imaging:    Imaging Reports Reviewed Today Include:  No new imaging studies          Recent Cultures (last 7 days):           Last 24 Hours Medication List:   Current Facility-Administered Medications   Medication Dose Route Frequency Provider Last Rate    acetaminophen  650 mg Oral Q6H PRN Bakari Mcmillan PA-C      albuterol  2 puff Inhalation Q4H PRN Anca Stauffer MD      amitriptyline  12 5 mg Oral HS Anca Stauffer MD      aspirin  81 mg Oral QPM Anca Stauffer MD      atorvastatin  80 mg Oral HS Anca Stauffer MD      calcium carbonate-vitamin D  1 tablet Oral Daily With Breakfast Anca Stauffer MD      carvedilol  6 25 mg Oral BID With Meals Anca Stauffer MD      cyanocobalamin  100 mcg Oral QPM Cecelia Wang MD      dicyclomine  10 mg Oral 4x Daily PRN Cecelia Wang MD      docusate sodium  100 mg Oral BID Bakari Mcmillan PA-C      famotidine  10 mg Oral HS Anca Stauffer MD      ferrous sulfate  325 mg Oral Daily With Breakfast Anca Stauffer MD      fluticasone-vilanterol  1 puff Inhalation Daily Anca Stauffer MD      heparin (porcine)  5,000 Units Subcutaneous [address] Stauffer MD      hydroxychloroquine  400 mg Oral QPM Anca Stauffer MD      insulin lispro  1-5 Units Subcutaneous [facility] Anca Stauffer MD      insulin lispro  10 mL Subcutaneous Insulin Pump Daily PRN Anca Stauffer MD      levothyroxine  88 mcg Oral Early Morning Cecelia Wang MD      losartan  50 mg Oral QPM Cecelia Wang MD      modafinil  100 mg Oral Daily Anca Stauffer MD      ondansetron  4 mg Intravenous Q6H PRN Anca Stauffer MD      oxybutynin  15 mg Oral Daily Cecelia Wang MD      oxyCODONE  5 mg Oral Q4H PRN Bakari Mcmillan PA-C      pantoprazole  40 mg Oral Daily Anca Stauffer MD      patient maintained insulin pump  1 each Subcutaneous Q8H Anca Stauffer MD      polyethylene glycol  17 g Oral Daily PRN Anca Stauffer MD      potassium chloride  20 mEq Oral Daily Cecelia Wang MD      pramipexole  0 5 mg Oral BID Anca Stauffer MD      sertraline  50 mg Oral Daily Anca Stauffer MD          Today, Patient Was Seen By: Bakari Mcmillan PA-C    ** Please Note: Dragon 360 Dictation voice to text software may have been used in the creation of this document   **

## 2021-05-11 NOTE — ASSESSMENT & PLAN NOTE
Lab Results   Component Value Date    HGBA1C 6 9 (H) 01/04/2021     Accuchecks, SSI  Use insulin pump      Recent Labs     05/11/21  0707 05/11/21  0815 05/11/21  1200 05/11/21  1615   POCGLU 81 63* 144* 119       Blood Sugar Average: Last 72 hrs:  (P) 813 [id number]

## 2021-05-11 NOTE — ASSESSMENT & PLAN NOTE
Presented with acute urinary retention, now resolved  On multiple medications that can cause urinary retention  Recent surgery with addition of opiods  UA shows mod bacteria, but no symptoms of UTI, fevers, dysuria  No leukocytosis or fever  Decreased amitripyline to 12 5, holding flexeril, decreased pramipexole  Nephrology consulted, appreciate assistance with meds and IVfs     IV fluids discontinued on 05/10/2021  PVR is appropriate - recorded at <100mL on 05/10/2021

## 2021-05-11 NOTE — ASSESSMENT & PLAN NOTE
Continue coreg, BP currently WNL  Held losartan initially, now restarted  Holding torsemide in the setting of JULIANNE

## 2021-05-11 NOTE — DISCHARGE SUMMARY
[address] [facility]  Discharge- Nina Jessica 1955, 60 y o  female MRN: [id number]  Unit/Bed#: E2 -01 Encounter: [id number]  Primary Care Provider: Coleen Lindsay DO   Date and time admitted to hospital: 5/8/2021 11:04 AM    * Acute urinary retention  Assessment & Plan  Presented with acute urinary retention, now resolved  On multiple medications that can cause urinary retention  Recent surgery with addition of opiods  UA shows mod bacteria, but no symptoms of UTI, fevers, dysuria  No leukocytosis or fever  Decreased amitripyline to 12 5, holding flexeril, decreased pramipexole  Nephrology consulted, appreciate assistance with meds and IVfs  IV fluids discontinued on 05/10/2021  PVR is appropriate - recorded at <100mL on 05/10/2021        Epigastric pain  Assessment & Plan  Ongoing epigastric pain unrelieved with enema x2, although patient notes that she feels she is still constipated  obstruction series - negative  Esophagram unremarkable  JULIANNE (acute kidney injury) ([facility] [address] )  Assessment & Plan  Creatinine of 2 55 on presentation   today's creatinine continues to improve, creatinine is 1 52 today  Baseline creatinine is between 1 2 - 1 6  Likely obstructive etiology given reports of urinary retention  Improved with IVF hydration and adjustment of medications, as noted above  Kidney US - unremarkable  Will continue to hold torsemide for now given limited PO intake  Losartan restarted  Appreciate nephrology input  Achalasia  Assessment & Plan  S/p myotomy with fundoplication on 0/6/81  Patient on clear liquid diet on admission, advanced to soft diet on 05/10/2021 with reports of increased abdominal pain, distention, constipation  Slowly improving  GI consult appreciated  Esophagram WNL    Stable for outpatient follow up per GI     GERD (gastroesophageal reflux disease)  Assessment & Plan  Continue pepcid and protonix  Recent surgery for achalasia  Clear liquid diet on admission, advanced to soft 05/10/2021  Patient reports minimal food intake and ongoing abdominal pain and distention, constipation  Slowly improving  obstruction series negative  Appreciate GI consult - esophagram unremarkable  Moderate persistent asthma with acute exacerbation  Assessment & Plan  Continue advair    Type 2 diabetes mellitus with microalbuminuria, with long-term current use of insulin [facility])  Assessment & Plan  Lab Results   Component Value Date    HGBA1C 6 9 (H) 01/04/2021     Accuchecks, SSI  Use insulin pump      Recent Labs     05/11/21  0707 05/11/21  0815 05/11/21  1200 05/11/21  1615   POCGLU 81 63* 144* 119       Blood Sugar Average: Last 72 hrs:  (P) 051 [id number]    Essential hypertension  Assessment & Plan  Continue coreg, BP currently WNL  Held losartan initially, now restarted  Holding torsemide in the setting of JULIANNE    Anemia of chronic disease  Assessment & Plan  Hemoglobin of 8 0 on admission, noted to be 9 4 on 5/3  Hgb of 7 8 yesterday  Now 7 2 today  Suspect this is dilutional in the setting of IVF hydration for JULIANNE, which has now been discontinued  iron panel - showing iron saturation 11, TIBC 197, iron 21, ferritin 364   Takes PO iron supplements  Continue to follow hemoglobin closely after discharge  Discharging Physician / Practitioner: Lourdes Arreola PA-C  PCP: Sirisha Notice,   Admission Date:   Admission Orders (From admission, onward)     Ordered        05/08/21 1338  Inpatient Admission  Once         05/08/21 1327  Inpatient Admission  Once                   Discharge Date: 05/11/21    Resolved Problems  Date Reviewed: 5/11/2021    None          Consultations During Hospital Stay:  · Gastroenterology  · nephrology    Procedures Performed:   none    Significant Findings / Test Results:   Xr Abdomen Obstruction Series  Result Date: 5/11/2021  Impression: Nonobstructive bowel gas pattern   Workstation performed: [id number]     Fl Esophagram Complete  Result Date: 5/11/2021  Impression: Unremarkable esophagram  Workstation performed: [id number]     Us Kidney And Bladder  Result Date: 5/9/2021  Impression: Normal  Workstation performed: [id number]       Incidental Findings:   · none    Test Results Pending at Discharge (will require follow up):   · none     Outpatient Tests Requested:  · BMP  · CBC    Complications:  None      Reason for Admission: urinary retention  Hospital Course:   Tameka Velazquez is a 60 y o  female patient who originally presented to the hospital on 5/8/2021 due to Difficulty Urinating (Pt reports recent procedure to open up her espophagus  Urinary catheter placed for procedure  Catheter removed after procedure with no difficulties  Pt reports that since last night she has not been able to urinate  Pt was able to urinate small amt this afternoon but feels fullness and unrelieved pressure  No fevers)    Patient was admitted and urinary retention protocol was followed  Medications were adjusted to include: Decreased amitripyline to 12 5, holding flexeril, decreased pramipexole  Post void residuals were appropriate prior to discharge  Patient also had an acute kidney injury identified during this admission, likely due to urinary retention  Seen in consultation by nephrology  IV fluids were administered  Creatinine improved back to baseline  Patient also had a drop in her hemoglobin followed  This was likely a dilutional effect  Her baseline hemoglobin is in the 9 range, and hemoglobin dropped to as low as 7 2  She had no increase in her chronic anemia symptoms and there was no acute blood loss reported  She will need close follow up of her CBC prior to visit with her PCP  Please see above list of diagnoses and related plan for additional information  Condition at Discharge: good     Discharge Day Visit / Exam:   Subjective:  Patient is feeling better this afternoon  Belching more frequently today    Vitals: Blood Pressure: 126/67 (05/11/21 1659)  Pulse: 59 (05/11/21 1659)  Temperature: 98 4 °F (36 9 °C) (05/11/21 1432)  Temp Source: Temporal (05/11/21 1432)  Respirations: 18 (05/11/21 1432)  Height: 5' 2" (157 5 cm) (05/11/21 0939)  Weight - Scale: 90 4 kg (199 lb 4 7 oz) (05/11/21 0939)  SpO2: 99 % (05/11/21 1432)  Exam:   Physical Exam  Vitals signs and nursing note reviewed  Constitutional:       Appearance: She is ill-appearing (chronically ill appearing )  HENT:      Head: Normocephalic  Cardiovascular:      Rate and Rhythm: Normal rate and regular rhythm  Heart sounds: No murmur  Pulmonary:      Effort: Pulmonary effort is normal  No respiratory distress  Breath sounds: No wheezing  Abdominal:      General: There is distension (improved)  Tenderness: There is abdominal tenderness  Musculoskeletal:      Right lower leg: No edema  Left lower leg: No edema  Skin:     General: Skin is warm and dry  Coloration: Skin is pale  Neurological:      Mental Status: She is oriented to person, place, and time  Discharge instructions/Information to patient and family:   See after visit summary for information provided to patient and family  Provisions for Follow-Up Care:  See after visit summary for information related to follow-up care and any pertinent home health orders  Disposition:   Home      Planned Readmission: no     Discharge Statement:  I spent 45 minutes discharging the patient  This time was spent on the day of discharge  I had direct contact with the patient on the day of discharge  Greater than 50% of the total time was spent examining patient, answering all patient questions, arranging and discussing plan of care with patient as well as directly providing post-discharge instructions  Additional time then spent on discharge activities  Discharge Medications:  See after visit summary for reconciled discharge medications provided to patient and family        ** Please Note: This note has been constructed using a voice recognition system **

## 2021-05-11 NOTE — PROGRESS NOTES
NEPHROLOGY PROGRESS NOTE   Stephanie Velasquez 60 y o  female MRN: [id number]  Unit/Bed#: E2 -01 Encounter: [id number]  Reason for Consult: JULIANNE (POA) on CKD III    PLAN:   -JULIANNE on CKD III, improved to baseline  AM labs pending  Will need follow-up with her nephrologist at discharge   -hypertension, blood pressure acceptable  Losartan resumed last evening  Torsemide remains on hold  -urinary retention, suspected secondary to medications, UA negative for signs of infection  Monitoring bladder scans, so far insignificant   -achalasia, status post myotomy and fundoplication, advancing diet   -anemia, continues on oral iron  Will continue to transfuse as needed for hemoglobin less than 7 0     ASSESSMENT/PLAN:  JULIANNE (POA) on CKD III: multifactorial with acute urinary retention, pre-renal azotemia as well as medication related on ARB, NSAID and diuretic    -presents with creatinine of 2 5   -baseline creatinine 1 3-1 6 since 2018    -follows with [facility]    -renal function improved with fluids, now monitoring off of IV fluids  -holding diuretic, and further NSAID's    -ARB resumed last evening    -am labs pending    -bladder scans and significant   -UA: small leuks, +1 protein, 4-10 WBC's, 24 hyalaline casts  -renal US: normal    -continue to avoid nephrotoxins, hypotension, or IV contrast    -I/O       Urinary retention:  Suspected medication related  -urinalysis negative for signs of infection   -amitriptyline decreased, Flexeril on hold, pramipexole decreased  -monitoring bladder scans PVR with urinary retention protocol       Hypertension: overall acceptable    -goal SBP <140/90 mmHg    -torsemide remains on hold    -losartan resumed last evening per primary care team    -OP medications: Losartan 50 mg po daily, torsemide 20 mg BID  -currently on Coreg 6 25 mg BID  -avoid hypotension or high fluctuations in BP    -recommend holding parameters on ant-hypertensive's for SBP <130 mmHg     Achalasia S/P myotomy and fundoplication:   -advancing diet  -further care per primary team       Anemia:  With recent surgery on 05/03   -continues on oral iron    -continue to monitor and transfuse as needed       Other: diabetes, GERD, asthma        Disposition:  Okay to discharge from Renal once medically cleared  SUBJECTIVE:  The patient is resting in her bed  She was hypoglycemic this morning  She is receiving dextrose  She reports having poor appetite  She is having abdominal pain and intermittent nausea  She denies vomiting  She denies issues with urination      OBJECTIVE:  Current Weight: Weight - Scale: 90 4 kg (199 lb 4 7 oz)  Vitals:    05/10/21 1545 05/10/21 1725 05/10/21 1930 05/10/21 2300   BP: 136/63 124/58 138/59 121/58   BP Location: Right arm  Right arm Right arm   Pulse: 63 61 60 60   Resp: 18   18   Temp: 97 7 °F (36 5 °C)   97 7 °F (36 5 °C)   TempSrc: Temporal   Temporal   SpO2: 100%   96%   Weight:           Intake/Output Summary (Last 24 hours) at 5/11/2021 0754  Last data filed at 5/11/2021 0601  Gross per 24 hour   Intake 2910 ml   Output 950 ml   Net 1960 ml     General: NAD  Skin: warm, dry, intact, no rash, PVD  HEENT: Moist mucous membranes, sclera anicteric, normocephalic, atraumatic  Neck: No apparent JVD appreciated  Chest: lung sounds clear B/L, on RA   CVS:Regular rate and rhythm, no murmer   Abdomen: Soft, round, tender, +BS  Extremities:  Trace B/L LE edema present  Neuro: alert and oriented  Psych: appropriate mood and affect     Medications:    Current Facility-Administered Medications:     acetaminophen (TYLENOL) tablet 650 mg, 650 mg, Oral, Q6H PRN, Dannielle Villegas PA-C    albuterol (PROVENTIL HFA,VENTOLIN HFA) inhaler 2 puff, 2 puff, Inhalation, Q4H PRN, Sadi Duong MD    amitriptyline (ELAVIL) tablet 12 5 mg, 12 5 mg, Oral, HS, Sadi Duong MD, 12 5 mg at 05/10/21 2155    aspirin (ECOTRIN LOW STRENGTH) EC tablet 81 mg, 81 mg, Oral, QPM, Sadi Duong MD, 13 mg at 05/10/21 1718    atorvastatin (LIPITOR) tablet 80 mg, 80 mg, Oral, HS, Johan Sandoval MD, 80 mg at 05/10/21 2153    calcium carbonate-vitamin D (OSCAL-D) 500 mg-200 units per tablet 1 tablet, 1 tablet, Oral, Daily With Breakfast, Johan Sandoval MD, 1 tablet at 05/10/21 0045    carvedilol (COREG) tablet 6 25 mg, 6 25 mg, Oral, BID With Meals, Johan Sandoval MD, 6 25 mg at 05/10/21 1725    cyanocobalamin (VITAMIN B-12) tablet 100 mcg, 100 mcg, Oral, QPM, Pool Alvarez MD, 100 mcg at 05/10/21 2155    dicyclomine (BENTYL) capsule 10 mg, 10 mg, Oral, 4x Daily PRN, oPol Alvarez MD, 10 mg at 05/11/21 0206    docusate sodium (COLACE) capsule 100 mg, 100 mg, Oral, BID, Dannielle Villegas PA-C, 100 mg at 05/10/21 1718    famotidine (PEPCID) tablet 10 mg, 10 mg, Oral, HS, Johan Sandoval MD, 10 mg at 05/10/21 2154    ferrous sulfate tablet 325 mg, 325 mg, Oral, Daily With Breakfast, Johan Sandoval MD, 325 mg at 05/10/21 6949    fluticasone-vilanterol (BREO ELLIPTA) 100-25 mcg/inh inhaler 1 puff, 1 puff, Inhalation, Daily, Johan Sandoval MD, 1 puff at 05/10/21 0823    heparin (porcine) subcutaneous injection 5,000 Units, 5,000 Units, Subcutaneous, Q8H [facility], 5,000 Units at 05/11/21 0563 **AND** Platelet count, , , Once, MD Quiana Anderson  hydroxychloroquine (PLAQUENIL) tablet 400 mg, 400 mg, Oral, QPM, Johan Sandoval MD, 400 mg at 05/10/21 1719    insulin lispro (HumaLOG) 100 units/mL subcutaneous injection 1-5 Units, 1-5 Units, Subcutaneous, TID AC **AND** Fingerstick Glucose (POCT), , , TID AC, Johan Sandoval MD    insulin lispro (HumaLOG) FOR PUMP REFILLS 1,000 Units, 10 mL, Subcutaneous Insulin Pump, Daily PRN, Johan Sandoval MD    levothyroxine tablet 88 mcg, 88 mcg, Oral, Early Morning, Pool Alvarez MD, 88 mcg at 05/11/21 0532    losartan (COZAAR) tablet 50 mg, 50 mg, Oral, QPM, Pool Alvarez MD, 50 mg at 05/10/21 2154    modafinil (PROVIGIL) tablet 100 mg, 100 mg, Oral, Daily, Johan Sandoval MD, 100 mg at 05/10/21 3601    ondansetron (ZOFRAN) injection 4 mg, 4 mg, Intravenous, Q6H PRN, Cipriano Anaya MD, 4 mg at 05/10/21 2159    oxybutynin (DITROPAN-XL) 24 hr tablet 15 mg, 15 mg, Oral, Daily, Klaudia Pimentel MD    oxyCODONE (ROXICODONE) oral solution 5 mg, 5 mg, Oral, Q4H PRN, Jose Guillaume PA-C, 5 mg at 05/10/21 2151    pantoprazole (PROTONIX) EC tablet 40 mg, 40 mg, Oral, Daily, Cipriano Anaya MD, 40 mg at 05/11/21 0532    PATIENT MAINTAINED INSULIN PUMP 1 each, 1 each, Subcutaneous, Q8H, Cipriano Anaya MD, 1 each at 05/11/21 0030    polyethylene glycol (MIRALAX) packet 17 g, 17 g, Oral, Daily PRN, Cipriano Anaya MD, 17 g at 05/10/21 1123    potassium chloride oral solution 20 mEq, 20 mEq, Oral, Daily, Klaudia Pimentel MD    pramipexole (MIRAPEX) tablet 0 5 mg, 0 5 mg, Oral, BID, Cipriano Anaya MD, 0 5 mg at 05/10/21 1719    sertraline (ZOLOFT) tablet 50 mg, 50 mg, Oral, Daily, Cipriano Anaya MD, 50 mg at 05/10/21 5248    Laboratory Results:  Results from last 7 days   Lab Units 05/10/21  0440 05/09/21  1254 05/09/21  0509 05/08/21  1159   WBC Thousand/uL 5 53  --  6 59 7 34   HEMOGLOBIN g/dL 7 8* 7 2* 7 3* 8 0*   HEMATOCRIT % 26 1* 23 9* 24 3* 26 0*   PLATELETS Thousands/uL 146*  --  128* 100*   SODIUM mmol/L 138  --  141 137   POTASSIUM mmol/L 4 3  --  4 4 4 0   CHLORIDE mmol/L 102  --  103 100   CO2 mmol/L 23  --  25 27   BUN mg/dL 40*  --  43* 41*   CREATININE mg/dL 1 63*  --  2 10* 2 55*   CALCIUM mg/dL 8 3  --  8 3 8 1*

## 2021-05-11 NOTE — PROGRESS NOTES
Progress Note- Nila Putnam 60 y o  female MRN: [id number]    Unit/Bed#: E2 -01 Encounter: [id number]      Assessment and Plan:    80-year-old female with a history of achalasia, diabetes, hypertension, hyperlipidemia, chronic kidney disease, CHF  For her achalasia she is s/p EGD, heller myotomy with nina fundoplication, and lysis of adhesions 5/3/21 for achalasia  She presented to the ER with complaints or urinary retention and then yesterday developed abdominal pain for which GI was consulted       1  Abdominal pain  2  Abdominal distention  Since surgery she has been having urinary difficulty but she has been voiding in small frequent amounts and PVRs are minimal  She also has constipation and starting yesterday generalized abdominal pain  She has known IBS-C and states her pain is the same as the pain she gets with her IBS  No alarm symptoms  Given her surgery barium swallow was obtained and within normal limits  Obstruction series also obtained and without concern    - continue colace BID   - continue miralax 17g daily, titrated up to 17g BID if needed  - OOB and ambulating  - proper hydration  - outpatient follow up with thoracic     GI to sign off, outpatient follow up will be arranged     ______________________________________________________________________    Subjective:     Obstruction series with a nonobstructive bowel gas pattern    Barium swallow unremarkable       Medication Administration - last 24 hours from 05/10/2021 1342 to 05/11/2021 1342       Date/Time Order Dose Route Action Action by     05/10/2021 2159 ondansetron (ZOFRAN) injection 4 mg 4 mg Intravenous Given Kathi Jolanta, RN     05/11/2021 0533 heparin (porcine) subcutaneous injection 5,000 Units 5,000 Units Subcutaneous Given Kathi Jolanta, RN     05/10/2021 2153 heparin (porcine) subcutaneous injection 5,000 Units 5,000 Units Subcutaneous Given Kathi Jolanta, RN     05/10/2021 145 heparin (porcine) subcutaneous injection 5,000 Units 5,000 Units Subcutaneous [facility]     05/11/2021 1200 insulin lispro (HumaLOG) 100 units/mL subcutaneous injection 1-5 Units 1 Units Subcutaneous Not Given Evert Gauthier, BAUDILIO     05/11/2021 0736 insulin lispro (HumaLOG) 100 units/mL subcutaneous injection 1-5 Units 1 Units Subcutaneous Not Given Evert Gauthier, BAUDILIO     05/10/2021 1659 insulin lispro (HumaLOG) 100 units/mL subcutaneous injection 1-5 Units 1 Units Subcutaneous Not [facility]     05/10/2021 2155 amitriptyline (ELAVIL) tablet 12 5 mg 12 5 mg Oral Given Jackelin Daniels RN     05/11/2021 1003 modafinil (PROVIGIL) tablet 100 mg 100 mg Oral Given Evert Gauthier, BAUDILIO     05/10/2021 1718 aspirin (ECOTRIN LOW STRENGTH) EC tablet 81 mg 81 mg Oral Given Estephanie Diaz     05/10/2021 2153 atorvastatin (LIPITOR) tablet 80 mg 80 mg Oral Given Jackelin Daniels RN     05/11/2021 0953 calcium carbonate-vitamin D (OSCAL-D) 500 mg-200 units per tablet 1 tablet 1 tablet Oral Given Evetr Gauthier, BAUDILIO     05/11/2021 0954 carvedilol (COREG) tablet 6 25 mg 6 25 mg Oral Given Wendyharper Gauthier, RN     05/10/2021 1725 carvedilol (COREG) tablet 6 25 mg 6 25 mg Oral Given Estephanie Diaz     05/11/2021 0954 ferrous sulfate tablet 325 mg 325 mg Oral [facility] BAUDILIO     05/11/2021 0954 fluticasone-vilanterol (BREO ELLIPTA) 100-25 mcg/inh inhaler 1 puff 1 puff Inhalation Given Evert Gauthier RN     05/10/2021 1719 hydroxychloroquine (PLAQUENIL) tablet 400 mg 400 mg Oral Given Estephanie Diaz     05/10/2021 2151 oxyCODONE (ROXICODONE) oral solution 5 mg 5 mg Oral Given Jackelin Daniels RN     05/11/2021 0532 pantoprazole (PROTONIX) EC tablet 40 mg 40 mg Oral Given Jackelin Daniels, BAUDILIO     05/11/2021 0955 pramipexole (MIRAPEX) tablet 0 5 mg 0 5 mg Oral Given Evert Gauthier RN     05/10/2021 1719 pramipexole (MIRAPEX) tablet 0 5 mg 0 5 mg Oral Given Estephanie Diaz     05/11/2021 0940 sertraline (ZOLOFT) tablet 50 mg 50 mg Oral Given [employer], RN     05/11/2021 0944 PATIENT MAINTAINED INSULIN PUMP 1 each 1 each Subcutaneous Not Given [employer], RN     05/11/2021 0030 PATIENT MAINTAINED INSULIN PUMP 1 each 1 each Subcutaneous Given Sabra Lee RN     05/10/2021 1718 PATIENT MAINTAINED INSULIN PUMP 1 each 1 each Subcutaneous Given Mary Willis     05/10/2021 2154 famotidine (PEPCID) tablet 10 mg 10 mg Oral Given Sabra Lee RN     05/11/2021 0954 docusate sodium (COLACE) capsule 100 mg 100 mg Oral Given Evert Gauthier RN     05/10/2021 1718 docusate sodium (COLACE) capsule 100 mg 100 mg Oral Given Wale Pena     05/11/2021 0206 dicyclomine (BENTYL) capsule 10 mg 10 mg Oral Given Sabra Lee RN     05/10/2021 2154 losartan (COZAAR) tablet 50 mg 50 mg Oral Given aSbra Lee RN     05/11/2021 0532 levothyroxine tablet 88 mcg 88 mcg Oral Given Sabra Lee RN     05/11/2021 0954 oxybutynin (DITROPAN-XL) 24 hr tablet 15 mg 15 mg Oral Given Evert Gauthier RN     05/11/2021 0955 potassium chloride oral solution 20 mEq 20 mEq Oral Given Evert Gauthier RN     05/10/2021 2155 cyanocobalamin (VITAMIN B-12) tablet 100 mcg 100 mcg Oral Given Sabra Lee RN     05/11/2021 0818 dextrose 50 % IV solution **ADS Override Pull** 50 mL  Given Evert Gauthier RN     05/11/2021 1110 iohexol (OMNIPAQUE) 350 MG/ML injection (SINGLE-DOSE) 25 mL 25 mL Oral Given Cristian Romo     05/11/2021 1110 barium sulfate (LIQUID E-Z-PAQUE) oral suspension 25 mL 25 mL Oral Given Cristian Romo          Objective:     Vitals: Blood pressure 121/69, pulse 59, temperature 97 7 °F (36 5 °C), temperature source Temporal, resp  rate 16, height 5' 2" (1 575 m), weight 90 4 kg (199 lb 4 7 oz), SpO2 100 %  ,Body mass index is 36 45 kg/m²        Intake/Output Summary (Last 24 hours) at 5/11/2021 1342  Last data filed at 5/11/2021 0958  Gross per 24 hour   Intake --   Output 650 ml   Net -650 ml       Physical Exam:   General Appearance: Awake and alert, in no acute distress  Abdomen: Soft, non-tender, non-distended; bowel sounds normal; no masses or no organomegaly    Invasive Devices     Peripheral Intravenous Line            Peripheral IV 05/08/21 Left Antecubital 2 days                Lab Results:  Admission on 05/08/2021   Component Date Value    WBC 05/08/2021 7 34     RBC 05/08/2021 2 91*    Hemoglobin 05/08/2021 8 0*    Hematocrit 05/08/2021 26 0*    MCV 05/08/2021 89     MCH 05/08/2021 27 5     MCHC 05/08/2021 30 8*    RDW 05/08/2021 15 0     MPV 05/08/2021 10 3     Platelets 46/49/4173 100*    nRBC 05/08/2021 0     Neutrophils Relative 05/08/2021 82*    Immat GRANS % 05/08/2021 1     Lymphocytes Relative 05/08/2021 8*    Monocytes Relative 05/08/2021 8     Eosinophils Relative 05/08/2021 1     Basophils Relative 05/08/2021 0     Neutrophils Absolute 05/08/2021 5 97     Immature Grans Absolute 05/08/2021 0 07     Lymphocytes Absolute 05/08/2021 0 62     Monocytes Absolute 05/08/2021 0 58     Eosinophils Absolute 05/08/2021 0 07     Basophils Absolute 05/08/2021 0 03     Sodium 05/08/2021 137     Potassium 05/08/2021 4 0     Chloride 05/08/2021 100     CO2 05/08/2021 27     ANION GAP 05/08/2021 10     BUN 05/08/2021 41*    Creatinine 05/08/2021 2 55*    Glucose 05/08/2021 135     Calcium 05/08/2021 8 1*    eGFR 05/08/2021 19     Color, UA 05/08/2021 Manju     Clarity, UA 05/08/2021 Cloudy     pH, UA 05/08/2021 5 0     Leukocytes, UA 05/08/2021 Small*    Nitrite, UA 05/08/2021 Negative     Protein, UA 05/08/2021 30 (1+)*    Glucose, UA 05/08/2021 Negative     Ketones, UA 05/08/2021 Negative     Urobilinogen, UA 05/08/2021 0 2     Bilirubin, UA 05/08/2021 Interference- unable to analyze*    Blood, UA 05/08/2021 Negative     Specific Gravity, UA 05/08/2021 1 025     RBC, UA 05/08/2021 None Seen     WBC, UA 05/08/2021 4-10*    Epithelial Cells 05/08/2021 Moderate*    Bacteria, UA 05/08/2021 Innumerable*    Hyaline Casts, UA 05/08/2021 2-4*    AMORPH URATES 05/08/2021 Occasional     POC Glucose 05/08/2021 100     Sodium 05/09/2021 141     Potassium 05/09/2021 4 4     Chloride 05/09/2021 103     CO2 05/09/2021 25     ANION GAP 05/09/2021 13     BUN 05/09/2021 43*    Creatinine 05/09/2021 2 10*    Glucose 05/09/2021 73     Calcium 05/09/2021 8 3     eGFR 05/09/2021 24     WBC 05/09/2021 6 59     RBC 05/09/2021 2 72*    Hemoglobin 05/09/2021 7 3*    Hematocrit 05/09/2021 24 3*    MCV 05/09/2021 89     MCH 05/09/2021 26 8     MCHC 05/09/2021 30 0*    RDW 05/09/2021 15 1     Platelets 93/92/3737 128*    MPV 05/09/2021 10 9     POC Glucose 05/09/2021 105     Hemoglobin 05/09/2021 7 2*    Hematocrit 05/09/2021 23 9*    POC Glucose 05/09/2021 122     POC Glucose 05/09/2021 110     Sodium 05/10/2021 138     Potassium 05/10/2021 4 3     Chloride 05/10/2021 102     CO2 05/10/2021 23     ANION GAP 05/10/2021 13     BUN 05/10/2021 40*    Creatinine 05/10/2021 1 63*    Glucose 05/10/2021 95     Calcium 05/10/2021 8 3     eGFR 05/10/2021 33     WBC 05/10/2021 5 53     RBC 05/10/2021 2 85*    Hemoglobin 05/10/2021 7 8*    Hematocrit 05/10/2021 26 1*    MCV 05/10/2021 92     MCH 05/10/2021 27 4     MCHC 05/10/2021 29 9*    RDW 05/10/2021 14 8     MPV 05/10/2021 10 9     Platelets 03/96/8443 146*    nRBC 05/10/2021 0     Neutrophils Relative 05/10/2021 71     Immat GRANS % 05/10/2021 1     Lymphocytes Relative 05/10/2021 16     Monocytes Relative 05/10/2021 11     Eosinophils Relative 05/10/2021 1     Basophils Relative 05/10/2021 0     Neutrophils Absolute 05/10/2021 3 90     Immature Grans Absolute 05/10/2021 0 05     Lymphocytes Absolute 05/10/2021 0 88     Monocytes Absolute 05/10/2021 0 60     Eosinophils Absolute 05/10/2021 0 08     Basophils Absolute 05/10/2021 0 02     POC Glucose 05/10/2021 101     POC Glucose 05/10/2021 99     POC Glucose 05/10/2021 121     POC Glucose 05/10/2021 67     POC Glucose 05/10/2021 123     POC Glucose 05/11/2021 61*    POC Glucose 05/11/2021 110     POC Glucose 05/11/2021 81     POC Glucose 05/11/2021 63*    WBC 05/11/2021 5 23     RBC 05/11/2021 2 67*    Hemoglobin 05/11/2021 7 2*    Hematocrit 05/11/2021 24 1*    MCV 05/11/2021 90     MCH 05/11/2021 27 0     MCHC 05/11/2021 29 9*    RDW 05/11/2021 15 0     MPV 05/11/2021 10 1     Platelets 53/75/3246 167     nRBC 05/11/2021 0     Neutrophils Relative 05/11/2021 73     Immat GRANS % 05/11/2021 1     Lymphocytes Relative 05/11/2021 12*    Monocytes Relative 05/11/2021 13*    Eosinophils Relative 05/11/2021 1     Basophils Relative 05/11/2021 0     Neutrophils Absolute 05/11/2021 3 76     Immature Grans Absolute 05/11/2021 0 05     Lymphocytes Absolute 05/11/2021 0 65     Monocytes Absolute 05/11/2021 0 68     Eosinophils Absolute 05/11/2021 0 07     Basophils Absolute 05/11/2021 0 02     Sodium 05/11/2021 137     Potassium 05/11/2021 4 4     Chloride 05/11/2021 102     CO2 05/11/2021 25     ANION GAP 05/11/2021 10     BUN 05/11/2021 35*    Creatinine 05/11/2021 1 52*    Glucose 05/11/2021 180*    Calcium 05/11/2021 8 3     eGFR 05/11/2021 36     POC Glucose 05/11/2021 144*       Imaging Studies: I have personally reviewed pertinent imaging studies

## 2021-05-11 NOTE — ASSESSMENT & PLAN NOTE
Ongoing epigastric pain unrelieved with enema x2, although patient notes that she feels she is still constipated  obstruction series - negative  Esophagram unremarkable

## 2021-05-11 NOTE — ASSESSMENT & PLAN NOTE
Continue pepcid and protonix  Recent surgery for achalasia  Clear liquid diet on admission, advanced to soft 05/10/2021  Patient reports minimal food intake and ongoing abdominal pain and distention, constipation  Will obtain obstruction series  Appreciate GI consult - for esophagram today

## 2021-05-11 NOTE — ASSESSMENT & PLAN NOTE
Creatinine of 2 55 on presentation   today's creatinine continues to improve, creatinine is 1 52 today  Baseline creatinine is between 1 2 - 1 6  Likely obstructive etiology given reports of urinary retention  Improved with IVF hydration and adjustment of medications, as noted above  Kidney US - unremarkable  Will continue to hold torsemide for now given limited PO intake  Losartan restarted  Appreciate nephrology input

## 2021-05-11 NOTE — ASSESSMENT & PLAN NOTE
Lab Results   Component Value Date    HGBA1C 6 9 (H) 01/04/2021     Accuchecks, SSI  Use insulin pump      Recent Labs     05/11/21  0416 05/11/21  0532 05/11/21  0707 05/11/21  0815   POCGLU 61* 110 81 63*       Blood Sugar Average: Last 72 hrs:  (P) 30 [id number]

## 2021-05-11 NOTE — ASSESSMENT & PLAN NOTE
Ongoing epigastric pain unrelieved with enema x2, although patient notes that she feels she is still constipated  Will obtain obstruction series today    Appreciate GI consult, for esophagram today given recent myotomy for achalasia

## 2021-05-11 NOTE — ASSESSMENT & PLAN NOTE
Continue pepcid and protonix  Recent surgery for achalasia  Clear liquid diet on admission, advanced to soft 05/10/2021  Patient reports minimal food intake and ongoing abdominal pain and distention, constipation  Slowly improving  obstruction series negative  Appreciate GI consult - esophagram unremarkable

## 2021-05-11 NOTE — ASSESSMENT & PLAN NOTE
Hemoglobin of 8 0 on admission, noted to be 9 4 on 5/3  Hgb of 7 8 yesterday  Now 7 2 today  Possibly dilutional in the setting of IVF hydration for JULIANNE, which has now been discontinued  Will heme test stool  Check iron panel    Continue to follow hemoglobin closely and plan to transfuse if hemoglobin is less than 7 g/dL

## 2021-05-11 NOTE — PLAN OF CARE
Problem: PAIN - ADULT  Goal: Verbalizes/displays adequate comfort level or baseline comfort level  Description: Interventions:  - Encourage patient to monitor pain and request assistance  - Assess pain using appropriate pain scale  - Administer analgesics based on type and severity of pain and evaluate response  - Implement non-pharmacological measures as appropriate and evaluate response  - Consider cultural and social influences on pain and pain management  - Notify physician/advanced practitioner if interventions unsuccessful or patient reports new pain  Outcome: Progressing     Problem: INFECTION - ADULT  Goal: Absence or prevention of progression during hospitalization  Description: INTERVENTIONS:  - Assess and monitor for signs and symptoms of infection  - Monitor lab/diagnostic results  - Monitor all insertion sites, i e  indwelling lines, tubes, and drains  - Monitor endotracheal if appropriate and nasal secretions for changes in amount and color  - [place] appropriate cooling/warming therapies per order  - Administer medications as ordered  - Instruct and encourage patient and family to use good hand hygiene technique  - Identify and instruct in appropriate isolation precautions for identified infection/condition  Outcome: Progressing  Goal: Absence of fever/infection during neutropenic period  Description: INTERVENTIONS:  - Monitor WBC    Outcome: Progressing     Problem: SAFETY ADULT  Goal: Patient will remain free of falls  Description: INTERVENTIONS:  - Assess patient frequently for physical needs  -  Identify cognitive and physical deficits and behaviors that affect risk of falls    -  [place] fall precautions as indicated by assessment   - Educate patient/family on patient safety including physical limitations  - Instruct patient to call for assistance with activity based on assessment  - Modify environment to reduce risk of injury  - Consider OT/PT consult to assist with strengthening/mobility  Outcome: Progressing  Goal: Maintain or return to baseline ADL function  Description: INTERVENTIONS:  -  Assess patient's ability to carry out ADLs; assess patient's baseline for ADL function and identify physical deficits which impact ability to perform ADLs (bathing, care of mouth/teeth, toileting, grooming, dressing, etc )  - Assess/evaluate cause of self-care deficits   - Assess range of motion  - Assess patient's mobility; develop plan if impaired  - Assess patient's need for assistive devices and provide as appropriate  - Encourage maximum independence but intervene and supervise when necessary  - Involve family in performance of ADLs  - Assess for home care needs following discharge   - Consider OT consult to assist with ADL evaluation and planning for discharge  - Provide patient education as appropriate  Outcome: Progressing  Goal: Maintain or return mobility status to optimal level  Description: INTERVENTIONS:  - Assess patient's baseline mobility status (ambulation, transfers, stairs, etc )    - Identify cognitive and physical deficits and behaviors that affect mobility  - Identify mobility aids required to assist with transfers and/or ambulation (gait belt, sit-to-stand, lift, walker, cane, etc )  - [place] fall precautions as indicated by assessment  - Record patient progress and toleration of activity level on Mobility SBAR; progress patient to next Phase/Stage  - Instruct patient to call for assistance with activity based on assessment  - Consider rehabilitation consult to assist with strengthening/weightbearing, etc   Outcome: Progressing     Problem: DISCHARGE PLANNING  Goal: Discharge to home or other facility with appropriate resources  Description: INTERVENTIONS:  - Identify barriers to discharge w/patient and caregiver  - Arrange for needed discharge resources and transportation as appropriate  - Identify discharge learning needs (meds, wound care, etc )  - Arrange for interpretive services to assist at discharge as needed  - Refer to Case Management Department for coordinating discharge planning if the patient needs post-hospital services based on physician/advanced practitioner order or complex needs related to functional status, cognitive ability, or social support system  Outcome: Progressing     Problem: Knowledge Deficit  Goal: Patient/family/caregiver demonstrates understanding of disease process, treatment plan, medications, and discharge instructions  Description: Complete learning assessment and assess knowledge base  Interventions:  - Provide teaching at level of understanding  - Provide teaching via preferred learning methods  Outcome: Progressing     Problem: Nutrition/Hydration-ADULT  Goal: Nutrient/Hydration intake appropriate for improving, restoring or maintaining nutritional needs  Description: Monitor and assess patient's nutrition/hydration status for malnutrition  Collaborate with interdisciplinary team and initiate plan and interventions as ordered  Monitor patient's weight and dietary intake as ordered or per policy  Utilize nutrition screening tool and intervene as necessary  Determine patient's food preferences and provide high-protein, high-caloric foods as appropriate       INTERVENTIONS:  - Monitor oral intake, urinary output, labs, and treatment plans  - Assess nutrition and hydration status and recommend course of action  - Evaluate amount of meals eaten  - Assist patient with eating if necessary   - Allow adequate time for meals  - Recommend/ encourage appropriate diets, oral nutritional supplements, and vitamin/mineral supplements  - Order, calculate, and assess calorie counts as needed  - Recommend, monitor, and adjust tube feedings and TPN/PPN based on assessed needs  - Assess need for intravenous fluids  - Provide specific nutrition/hydration education as appropriate  - Include patient/family/caregiver in decisions related to nutrition  Outcome: Progressing     Problem: Potential for Falls  Goal: Patient will remain free of falls  Description: INTERVENTIONS:  - Assess patient frequently for physical needs  -  Identify cognitive and physical deficits and behaviors that affect risk of falls    -  [place] fall precautions as indicated by assessment   - Educate patient/family on patient safety including physical limitations  - Instruct patient to call for assistance with activity based on assessment  - Modify environment to reduce risk of injury  - Consider OT/PT consult to assist with strengthening/mobility  Outcome: Progressing     Problem: COPING  Goal: Pt/Family able to verbalize concerns and demonstrate effective coping strategies  Description: INTERVENTIONS:  - Assist patient/family to identify coping skills, available support systems and cultural and spiritual values  - Provide emotional support, including active listening and acknowledgement of concerns of patient and caregivers  - Reduce environmental stimuli, as able  - Provide patient education  - Assess for spiritual pain/suffering and initiate spiritual care, including notification of Pastoral Care or jean based community as needed  - Assess effectiveness of coping strategies  Outcome: Progressing

## 2021-05-11 NOTE — ASSESSMENT & PLAN NOTE
S/p myotomy with fundoplication on 3/1/37  Patient on clear liquid diet on admission, advanced to soft diet on 05/10/2021 with reports of increased abdominal pain, distention, constipation  This is ongoing today as well  GI consult appreciated    Will obtain esophagram today

## 2021-05-11 NOTE — ASSESSMENT & PLAN NOTE
S/p myotomy with fundoplication on 1/9/02  Patient on clear liquid diet on admission, advanced to soft diet on 05/10/2021 with reports of increased abdominal pain, distention, constipation  Slowly improving  GI consult appreciated  Esophagram WNL  Stable for outpatient follow up per GI

## 2021-05-11 NOTE — ASSESSMENT & PLAN NOTE
Presented with acute urinary retention, now resolved  On multiple medications that can cause urinary retention  Recent surgery with addition of opiods  UA shows mod bacteria, but no symptoms of UTI, fevers, dysuria  No leukocytosis or fever  Decreased amitripyline to 12 5, holding flexeril, decreased pramipexole  Nephrology consulted, appreciate assistance with meds and IVfs     IV fluids discontinued on 05/10/2021  PVR is appropriate - recorded at <100mL on 05/10/2021  Continue Urinary retention protocol

## 2021-05-11 NOTE — ASSESSMENT & PLAN NOTE
Creatinine of 2 55 on presentation   today's creatinine continues to improve, creatinine is 1 52 today  Likely obstructive etiology given reports of urinary retention  Improved with IVF hydration and adjustment of medications, as noted above  Kidney US - unremarkable  Holding torsemide, losartan  Appreciate nephrology input

## 2021-05-12 ENCOUNTER — TELEPHONE (OUTPATIENT)
Dept: CARDIAC SURGERY | Facility: CLINIC | Age: 66
End: 2021-05-12

## 2021-05-12 ENCOUNTER — TELEPHONE (OUTPATIENT)
Dept: HEMATOLOGY ONCOLOGY | Facility: CLINIC | Age: 66
End: 2021-05-12

## 2021-05-12 DIAGNOSIS — L08.9 SKIN INFECTION: Primary | ICD-10-CM

## 2021-05-12 RX ORDER — CEPHALEXIN 500 MG/1
500 CAPSULE ORAL EVERY 6 HOURS SCHEDULED
Qty: 28 CAPSULE | Refills: 0 | Status: SHIPPED | OUTPATIENT
Start: 2021-05-12 | End: 2021-05-19

## 2021-05-12 NOTE — TELEPHONE ENCOUNTER
The patient phoned and indicated that her surgical site from surgery last Monday with Dr Korey Cleveland is draining  She would like a call back regarding this  The best call back number is [phone]

## 2021-05-12 NOTE — TELEPHONE ENCOUNTER
Reviewed picture and incision looks infected with circumferential erythema, and purulent drainage  She has no fevers, chills  It is tender to touch  I will place her on 7 days of Keflex  Otherwise she is doing well, no dysphagia tolerating soft diet

## 2021-05-13 ENCOUNTER — TELEPHONE (OUTPATIENT)
Dept: SURGICAL ONCOLOGY | Facility: CLINIC | Age: 66
End: 2021-05-13

## 2021-05-13 NOTE — TELEPHONE ENCOUNTER
She started antibiotic last night  She should give it a couple of days before the drainage improves  Patient aware

## 2021-05-13 NOTE — TELEPHONE ENCOUNTER
Pt called reporting drainage from incision site that is pinkish in color  States there is warmth and redness at site  She was put on an antibiotic recently, and believes that drainage has increased today  Pt had surgery on 5/3 with Dr Jaswinder Goodwin   Pt's call back number is [phone]

## 2021-05-17 ENCOUNTER — APPOINTMENT (OUTPATIENT)
Dept: LAB | Facility: CLINIC | Age: 66
End: 2021-05-17
Payer: MEDICARE

## 2021-05-17 ENCOUNTER — TELEPHONE (OUTPATIENT)
Dept: PAIN MEDICINE | Facility: CLINIC | Age: 66
End: 2021-05-17

## 2021-05-17 DIAGNOSIS — R33.9 URINARY RETENTION: ICD-10-CM

## 2021-05-17 DIAGNOSIS — M79.18 CHRONIC MYOFASCIAL PAIN: Primary | ICD-10-CM

## 2021-05-17 DIAGNOSIS — K22.0 ACHALASIA: ICD-10-CM

## 2021-05-17 DIAGNOSIS — G89.29 CHRONIC MYOFASCIAL PAIN: Primary | ICD-10-CM

## 2021-05-17 DIAGNOSIS — R79.9 ABNORMAL FINDING OF BLOOD CHEMISTRY, UNSPECIFIED: ICD-10-CM

## 2021-05-17 LAB
ANION GAP SERPL CALCULATED.3IONS-SCNC: 5 MMOL/L (ref 4–13)
BASOPHILS # BLD AUTO: 0.02 THOUSANDS/ΜL (ref 0–0.1)
BASOPHILS NFR BLD AUTO: 0 % (ref 0–1)
BUN SERPL-MCNC: 11 MG/DL (ref 5–25)
CALCIUM SERPL-MCNC: 8.5 MG/DL (ref 8.3–10.1)
CHLORIDE SERPL-SCNC: 108 MMOL/L (ref 100–108)
CO2 SERPL-SCNC: 25 MMOL/L (ref 21–32)
CREAT SERPL-MCNC: 0.82 MG/DL (ref 0.6–1.3)
EOSINOPHIL # BLD AUTO: 0.15 THOUSAND/ΜL (ref 0–0.61)
EOSINOPHIL NFR BLD AUTO: 3 % (ref 0–6)
ERYTHROCYTE [DISTWIDTH] IN BLOOD BY AUTOMATED COUNT: 15.2 % (ref 11.6–15.1)
EST. AVERAGE GLUCOSE BLD GHB EST-MCNC: 120 MG/DL
GFR SERPL CREATININE-BSD FRML MDRD: 76 ML/MIN/1.73SQ M
GLUCOSE P FAST SERPL-MCNC: 190 MG/DL (ref 65–99)
HBA1C MFR BLD: 5.8 %
HCT VFR BLD AUTO: 28.6 % (ref 34.8–46.1)
HGB BLD-MCNC: 8.4 G/DL (ref 11.5–15.4)
IMM GRANULOCYTES # BLD AUTO: 0.06 THOUSAND/UL (ref 0–0.2)
IMM GRANULOCYTES NFR BLD AUTO: 1 % (ref 0–2)
LYMPHOCYTES # BLD AUTO: 0.83 THOUSANDS/ΜL (ref 0.6–4.47)
LYMPHOCYTES NFR BLD AUTO: 18 % (ref 14–44)
MCH RBC QN AUTO: 26.7 PG (ref 26.8–34.3)
MCHC RBC AUTO-ENTMCNC: 29.4 G/DL (ref 31.4–37.4)
MCV RBC AUTO: 91 FL (ref 82–98)
MONOCYTES # BLD AUTO: 0.45 THOUSAND/ΜL (ref 0.17–1.22)
MONOCYTES NFR BLD AUTO: 10 % (ref 4–12)
NEUTROPHILS # BLD AUTO: 3.21 THOUSANDS/ΜL (ref 1.85–7.62)
NEUTS SEG NFR BLD AUTO: 68 % (ref 43–75)
NRBC BLD AUTO-RTO: 0 /100 WBCS
PLATELET # BLD AUTO: 379 THOUSANDS/UL (ref 149–390)
PMV BLD AUTO: 9.4 FL (ref 8.9–12.7)
POTASSIUM SERPL-SCNC: 4.9 MMOL/L (ref 3.5–5.3)
RBC # BLD AUTO: 3.15 MILLION/UL (ref 3.81–5.12)
SODIUM SERPL-SCNC: 138 MMOL/L (ref 136–145)
WBC # BLD AUTO: 4.72 THOUSAND/UL (ref 4.31–10.16)

## 2021-05-17 PROCEDURE — 83036 HEMOGLOBIN GLYCOSYLATED A1C: CPT

## 2021-05-17 PROCEDURE — 36415 COLL VENOUS BLD VENIPUNCTURE: CPT

## 2021-05-17 PROCEDURE — 85025 COMPLETE CBC W/AUTO DIFF WBC: CPT

## 2021-05-17 PROCEDURE — 80048 BASIC METABOLIC PNL TOTAL CA: CPT

## 2021-05-17 RX ORDER — CYCLOBENZAPRINE HCL 10 MG
10 TABLET ORAL 3 TIMES DAILY PRN
Qty: 90 TABLET | Refills: 1 | Status: SHIPPED | OUTPATIENT
Start: 2021-05-17 | End: 2021-06-30 | Stop reason: SDUPTHER

## 2021-05-17 NOTE — TELEPHONE ENCOUNTER
Pt states she needs a refill of Cyclobenzaprine  She is out of it  Pls send to [facility] on [address]

## 2021-05-17 NOTE — TELEPHONE ENCOUNTER
Can you please call the patient and advise her that I sent a script for the Cyclobenzaprine 10 mg TID PRN, #90 with a refill to her pharmacy  Thank you  The patient was agreeable and verbalized an understanding

## 2021-05-20 ENCOUNTER — OFFICE VISIT (OUTPATIENT)
Dept: CARDIAC SURGERY | Facility: CLINIC | Age: 66
End: 2021-05-20

## 2021-05-20 VITALS
HEIGHT: 62 IN | DIASTOLIC BLOOD PRESSURE: 70 MMHG | WEIGHT: 200.18 LBS | HEART RATE: 83 BPM | RESPIRATION RATE: 16 BRPM | BODY MASS INDEX: 36.84 KG/M2 | TEMPERATURE: 98.5 F | SYSTOLIC BLOOD PRESSURE: 156 MMHG | OXYGEN SATURATION: 99 %

## 2021-05-20 DIAGNOSIS — K22.0 ACHALASIA: Primary | ICD-10-CM

## 2021-05-20 PROCEDURE — 99024 POSTOP FOLLOW-UP VISIT: CPT | Performed by: PHYSICIAN ASSISTANT

## 2021-05-20 NOTE — ASSESSMENT & PLAN NOTE
Ms Padilla Current presents for a post-operative visit following robotic Heller myotomy and Kareem fundoplication 8/1/07  She is eating soft foods without dysphagia  She does have some trouble swallowing pills  She is experiencing nausea and regurgitation in the morning for which she takes Zofran which mildly helps  This should get better in time  She can advance diet slowly to regular foods, trial and error  She should continue her antacid medication  She will return to the office in 4 weeks for another post-operative visit to monitor her progress

## 2021-05-20 NOTE — PROGRESS NOTES
Thoracic Follow-Up  Assessment/Plan:    Achalasia  Ms Barriga presents for a post-operative visit following robotic Heller myotomy and Kareem fundoplication 6/7/88  She is eating soft foods without dysphagia  She does have some trouble swallowing pills  She is experiencing nausea and regurgitation in the morning for which she takes Zofran which mildly helps  This should get better in time  She can advance diet slowly to regular foods, trial and error  She should continue her antacid medication  She will return to the office in 4 weeks for another post-operative visit to monitor her progress  Diagnoses and all orders for this visit:    Achalasia          Thoracic History   Diagnosis: Achalasia  Procedure: Robotic heller myotomy, Kareem fundoplication, laparoscopic lysis of adhesions 5/3/21     Subjective:    Patient ID: Neeraj Brand is a 60 y o  female  HPI    Ms Glenn Baeza is a 60year old female who underwent a robotic heller myotomy and Kareem fundoplication 3/6/36 for achalasia  Her hospital course was uncomplicated and she was discharged on POD#2  She was admitted to the hospital 5/8-5/11 with urinary retention and JULIANNE  She followed up with her Nephrologist 5/13  She called our office 5/12 with concerns of infected incision, placed on 7 days of Keflex  Today, she is on a soft diet  Things taste strange and have a weird texture  She denies dysphagia  She feels nausea and regurgitation daily, no overt vomiting  She has been taking Zofran which helps take the edge off  She has reflux and heartburn  Her incision is draining clear fluid still  She has two days left of Keflex  She takes Percocet once a day and Tylenol twice per day       The following portions of the patient's history were reviewed and updated as appropriate: allergies, current medications, past family history, past medical history, past social history, past surgical history and problem list     Review of Systems   Constitutional: Positive for appetite change  Negative for chills and fever  HENT: Negative for trouble swallowing  Respiratory: Negative  Cardiovascular: Negative  Gastrointestinal: Positive for abdominal pain (right sided abdominal pain)  Negative for nausea and vomiting         +regurgitation   Genitourinary: Negative for difficulty urinating  Musculoskeletal: Negative  Skin: Positive for wound (LUQ incision)  Neurological: Positive for weakness (walker)  Hematological: Negative  Objective:   Physical Exam  Constitutional:       Appearance: Normal appearance  HENT:      Head: Normocephalic and atraumatic  Eyes:      General: No scleral icterus  Conjunctiva/sclera: Conjunctivae normal    Neck:      Musculoskeletal: Neck supple  Cardiovascular:      Rate and Rhythm: Normal rate and regular rhythm  Pulmonary:      Effort: Pulmonary effort is normal  No respiratory distress  Abdominal:      General: There is no distension  Palpations: Abdomen is soft  Comments: LUQ incision mildly erythematous with fibrous healing tissue, scant serous drainage  Musculoskeletal:      Comments: Rolling walker   Lymphadenopathy:      Cervical: No cervical adenopathy  Skin:     General: Skin is warm and dry  Neurological:      General: No focal deficit present  Mental Status: She is alert and oriented to person, place, and time  Psychiatric:         Mood and Affect: Mood normal      /70   Pulse 83   Temp 98 5 °F (36 9 °C) (Temporal)   Resp 16   Ht 5' 2" (1 575 m)   Wt 90 8 kg (200 lb 2 8 oz)   SpO2 99%   BMI 36 61 kg/m²       Fl Esophagram Complete    Result Date: 5/11/2021  Narrative BARIUM SWALLOW-ESOPHAGRAM INDICATION:   post op pain  COMPARISON:  5/4/2020 IMAGES:  26 FLUOROSCOPY TIME:   0 8min  TECHNIQUE: The patient was given effervescent granules Omnipaque 350 and barium by mouth and images of the esophagus were obtained  FINDINGS: The esophagus is normal in caliber    Esophageal motility is normal and emptying of contrast from the esophagus is prompt  No mucosal lesion, ulceration or evidence of fold thickening is seen  Gastroesophageal reflux was not observed  There is no hiatal hernia  Impression Unremarkable esophagram  Workstation performed: [id number]     Fl Esophagram Complete    Result Date: 5/4/2021  Narrative BARIUM SWALLOW-ESOPHAGRAM INDICATION:   routine study s/p Heller myotomy - rule out leak  COMPARISON:  9/18/2020 IMAGES: FLUOROSCOPY TIME:   1 minute  TECHNIQUE: The patient was given thin barium by mouth and images of the esophagus were obtained  FINDINGS: Cervical plate and screw hardware noted  Thoracic spinal cord stimulator leads noted  Pacemaker leads noted  The esophagus is normal in caliber  Esophageal motility is normal and emptying of contrast from the esophagus is prompt  No mucosal lesion, ulceration or evidence of fold thickening is seen  No extraluminal contrast noted  Gastroesophageal reflux was not observed  There is no hiatal hernia  Impression No evidence of esophageal leak   Workstation performed: [id number]

## 2021-05-25 ENCOUNTER — TELEPHONE (OUTPATIENT)
Dept: CARDIAC SURGERY | Facility: CLINIC | Age: 66
End: 2021-05-25

## 2021-05-25 ENCOUNTER — TELEPHONE (OUTPATIENT)
Dept: SURGICAL ONCOLOGY | Facility: CLINIC | Age: 66
End: 2021-05-25

## 2021-05-25 DIAGNOSIS — K22.0 ACHALASIA: ICD-10-CM

## 2021-05-25 RX ORDER — PANTOPRAZOLE SODIUM 40 MG/1
40 TABLET, DELAYED RELEASE ORAL DAILY
Qty: 30 TABLET | Refills: 2 | Status: SHIPPED | OUTPATIENT
Start: 2021-05-25 | End: 2021-05-25 | Stop reason: SDUPTHER

## 2021-05-25 NOTE — TELEPHONE ENCOUNTER
I spoke to Waleska Hammonds this morning  She experienced an episode of vomiting "green chunks" last night  She didn't eat any green foods prior to this  She had no blood in her vomit, fevers, or ongoing vomiting  I recommended she start a bland diet for the next few days and try to drink plenty of liquids  She will call us if there is no improvement or if there is worsening  She already takes Nexium so she does not need to take Protonix too

## 2021-05-26 NOTE — TELEPHONE ENCOUNTER
Patient returned my call  She was just calling to schedule her 4 week follow up with Dr Vipul Cannon  Apt has been made

## 2021-06-17 ENCOUNTER — OFFICE VISIT (OUTPATIENT)
Dept: CARDIAC SURGERY | Facility: CLINIC | Age: 66
End: 2021-06-17

## 2021-06-17 VITALS
WEIGHT: 185.19 LBS | DIASTOLIC BLOOD PRESSURE: 78 MMHG | BODY MASS INDEX: 34.08 KG/M2 | HEART RATE: 71 BPM | RESPIRATION RATE: 16 BRPM | TEMPERATURE: 97.4 F | OXYGEN SATURATION: 100 % | HEIGHT: 62 IN | SYSTOLIC BLOOD PRESSURE: 150 MMHG

## 2021-06-17 DIAGNOSIS — K22.0 ACHALASIA: Primary | ICD-10-CM

## 2021-06-17 PROCEDURE — 99024 POSTOP FOLLOW-UP VISIT: CPT | Performed by: PHYSICIAN ASSISTANT

## 2021-06-17 NOTE — PROGRESS NOTES
Thoracic Follow-Up  Assessment/Plan:    Achalasia  We had a discussion with Erica Rasheed in the office today regarding her symptoms  The food consistencies that are getting stuck for her are the hardest consistencies to get down  These foods usually are not tolerated until 8-12 weeks after surgery, she needs to wait until all of the swelling resolves  The tasting issue is likely secondary to anesthesia and could take months to return to normal  She is to keep the incision open to air as much as possible, and keep a band aid over it when it rubs on her pants  We will have her return to our office in 6 months  If she has further issues in the interim, she should call our office  She is in agreement with the plan  Diagnoses and all orders for this visit:    Achalasia             Thoracic History       Diagnosis: Achalasia  Procedure: Robotic heller myotomy, Kareem fundoplication, laparoscopic lysis of adhesions 5/3/21   Patient ID: Rizwana Flores is a 60 y o  female  HPI      Ms Barriga underwent a robotic heller myotomy with Kareem fundoplication and lysis of adhesions on 5/3/21 for achalasia  She was last seen on 5/20/21 at which point she was having some nausea, regurgitation, and heartburn  She was taking Percocet and and Tylenol  She was also finishing a course of Keflex for a post op incisional infection  She returns today, 6 weeks out from surgery  On discussion, she has lost 7 lbs since May, secondary to a decreased appetite  She is still on a soft diet  She feels some foods and pills are getting stuck  She feels some foods taste weird and certain textures don't go down correctly  She still has dysphagia with chicken, hamburger, some breads  She has no problems with soft foods: pudding, jello, soups  She is having regurgitation with some of her pills, but no liquid or solid regurgitation  She does not have a cough and no recent vomiting       The following portions of the patient's history were reviewed and updated as appropriate: allergies, current medications, past family history, past medical history, past social history, past surgical history and problem list     Review of Systems      Objective:   Physical Exam  Vitals reviewed  Constitutional:       General: She is not in acute distress  Appearance: Normal appearance  HENT:      Head: Normocephalic and atraumatic  Mouth/Throat:      Comments: Masked   Eyes:      General: No scleral icterus  Extraocular Movements: Extraocular movements intact  Comments: glasses   Cardiovascular:      Rate and Rhythm: Normal rate and regular rhythm  Heart sounds: Murmur (III/VI systolic murmur) heard  Pulmonary:      Effort: Pulmonary effort is normal  No respiratory distress  Breath sounds: Normal breath sounds  Abdominal:      General: Bowel sounds are normal  There is distension  Palpations: Abdomen is soft  Comments: Laparoscopic incisions healed well, except left lower quadrant with some erythema and eschar  No active drainage  Musculoskeletal:         General: Swelling (trace b/l LE edema  ) present  Right lower leg: Edema present  Left lower leg: Edema present  Comments: Uses a walker for balance and knee pain  Skin:     General: Skin is warm and dry  Neurological:      Mental Status: She is alert and oriented to person, place, and time  Motor: No weakness  Gait: Gait abnormal    Psychiatric:         Mood and Affect: Mood normal          Behavior: Behavior normal          Thought Content:  Thought content normal      /78   Pulse 71   Temp (!) 97 4 °F (36 3 °C) (Temporal)   Resp 16   Ht 5' 2" (1 575 m)   Wt 84 kg (185 lb 3 oz)   SpO2 100%   BMI 33 87 kg/m²

## 2021-06-17 NOTE — ASSESSMENT & PLAN NOTE
We had a discussion with Jean Claude Fajardo in the office today regarding her symptoms  The food consistencies that are getting stuck for her are the hardest consistencies to get down  These foods usually are not tolerated until 8-12 weeks after surgery, she needs to wait until all of the swelling resolves  The tasting issue is likely secondary to anesthesia and could take months to return to normal  She is to keep the incision open to air as much as possible, and keep a band aid over it when it rubs on her pants  We will have her return to our office in 6 months  If she has further issues in the interim, she should call our office  She is in agreement with the plan

## 2021-06-23 ENCOUNTER — TELEPHONE (OUTPATIENT)
Dept: GASTROENTEROLOGY | Facility: CLINIC | Age: 66
End: 2021-06-23

## 2021-06-23 NOTE — TELEPHONE ENCOUNTER
Patient of Dr Mark Jones, last seen 4/20/21 by Jada Núñez PA-C    History of achalasia, GERD, IBS    Called and spoke with patient to discuss  She states that probably the past 6 weeks she has been having constipation  She has been on Stutsman about 1 year and it had been working well for her, but the past several weeks she has been having 1 bowel movement every 4-5 days  She is taking 24 mcg amitiza BID, miralax BID, stool softeners, and is requiring an enema at least once a week  She has taken Linzess in the past and states it did not work for her  She does not want to continue taking amitiza since it is very expensive and it is not really working for her   Please advise

## 2021-06-23 NOTE — TELEPHONE ENCOUNTER
I sent motegrity to her pharmacy but will likely require prior auth  She can increase Miralax to TID until this goes through  She also has bentyl listed on her med list which I would minimize as this can lend to constipation    Kirsten Luke

## 2021-06-23 NOTE — TELEPHONE ENCOUNTER
----- Message from Shellie Pal MA sent at 6/21/2021  6:56 AM EDT -----  Regarding: FW: Non-Urgent Medical Question  Contact: [phone]    ----- Message -----  From: Stephanie Velasquez  Sent: 6/20/2021  10:18 PM EDT  To: , #  Subject: Non-Urgent Medical Question                      Just an FYI about how I have been feeling  Bowel movements are every 4-5 days  Meanwhile, my stomach gets extremely hard and bloated  I take stool softeners daily, miralax twice a day,  and amitza which is a very costly medication all on a daily basis  Lately, by the 4 or 5 day I am so uncomfortable I will use a glycerin suppositorie  Nope, no relief  So my last resort, is the dreaded enema  I don't feel I should be using this so frequently  Please advise me as to what I should be doing  I look forward to hearing from you  thank you

## 2021-06-24 ENCOUNTER — TELEPHONE (OUTPATIENT)
Dept: GASTROENTEROLOGY | Facility: CLINIC | Age: 66
End: 2021-06-24

## 2021-06-24 ENCOUNTER — OFFICE VISIT (OUTPATIENT)
Dept: SLEEP CENTER | Facility: CLINIC | Age: 66
End: 2021-06-24
Payer: MEDICARE

## 2021-06-24 VITALS
SYSTOLIC BLOOD PRESSURE: 150 MMHG | WEIGHT: 184 LBS | HEIGHT: 62 IN | DIASTOLIC BLOOD PRESSURE: 80 MMHG | BODY MASS INDEX: 33.86 KG/M2

## 2021-06-24 DIAGNOSIS — G47.10 HYPERSOMNIA: ICD-10-CM

## 2021-06-24 PROCEDURE — 99213 OFFICE O/P EST LOW 20 MIN: CPT | Performed by: INTERNAL MEDICINE

## 2021-06-24 RX ORDER — ARMODAFINIL 150 MG/1
150 TABLET ORAL DAILY
Qty: 90 TABLET | Refills: 1 | Status: SHIPPED | OUTPATIENT
Start: 2021-06-24 | End: 2021-06-24

## 2021-06-24 RX ORDER — ARMODAFINIL 250 MG/1
250 TABLET ORAL DAILY
Qty: 90 TABLET | Refills: 1 | Status: SHIPPED | OUTPATIENT
Start: 2021-06-24 | End: 2021-12-27 | Stop reason: SDUPTHER

## 2021-06-24 NOTE — TELEPHONE ENCOUNTER
I called the pt  She says she was in the office this am and was given samples of motegrity by the office and she was happy with our care  All questions answered  Thanks all    Charli Card

## 2021-06-24 NOTE — PROGRESS NOTES
Progress Note - Sleep Center   Kosta Malin TPA:[birth year] MRN: [id number]      Reason for Visit:  60 y  o female here for annual follow-up    Assessment:  Doing well on current therapy of APAP 9 to 11 cm for  Severe STU ( AHI = 30 9)  the patient remains drowsy, despite using armodafinil 150 mg by mouth every morning  Plan:   increase dose of armodafinil from 150 to 250 mg daily    Follow up: One year    History of Present Illness:  History of STU on PAP therapy  Fully compliant and deriving benefit      Review of Systems      Genitourinary need to urinate more than twice a night   Cardiology ankle/leg swelling   Gastrointestinal frequent heartburn/acid reflux   Neurology need to move extremities, muscle weakness, numbness/tingling of an extremity, forgetfulness, poor concentration or confusion, , difficulty with memory and balance problems   Constitutional claustrophobia, fatigue and weight change   Integumentary rash or dry skin and itching   Psychiatry anxiety, depression, aggressiveness or irritability and mood change   Musculoskeletal joint pain, muscle aches, back pain, legs twitching/jerking and leg cramps   Pulmonary none   ENT none   Endocrine none   Hematological none         I have reviewed and updated the review of systems as necessary      Historical Information    Past Medical History:   Past Medical History:   Diagnosis Date    Anxiety     Asthma     controlled    Back complaints     Cancer ([facility] [address] )     nose    Cellulitis of left lower leg     "not now"    CHF (congestive heart failure) ([facility] [address] ) 02/2021    Chronic bilateral thoracic back pain     Chronic kidney disease     sees nephrologist regularly" stage 3"    Chronic myofascial pain     Chronic pain of both lower extremities     Chronic pain of left knee     "both knees"    Chronic pain syndrome     bilat legs and knees/neuropathy pain    CPAP (continuous positive airway pressure) dependence     Depression     Diabetes mellitus ([facility] [address] )  Disease of thyroid gland     Does use hearing aid     bilat and will wear DOS    Gait disorder     Gastroparesis     GERD (gastroesophageal reflux disease)     History of angina     History of MRSA infection     History of transfusion     Many years ago    Hyperlipidemia     Hypertension     Hypoglycemic reaction     "occas low blood sugar"    Insulin pump in place     pt reports saw endocrinologist  and will bring copy of instructions DOS    Kidney disease     Pacemaker     Polyneuropathy associated with underlying disease ([address] )     PONV (postoperative nausea and vomiting)     Risk for falls     S/P insertion of spinal cord stimulator 2018    Sarcoidosis     Shortness of breath     "exertion and not new"    Sleep apnea     TIA (transient ischemic attack)     Use of cane as ambulatory aid     Uses walker     Wears dentures     permanent upper/and some missing teeth/partial lower         Past Surgical History:   Past Surgical History:   Procedure Laterality Date    ABDOMINAL ADHESION SURGERY N/A 5/3/2021    Procedure: laparoscopic LYSIS ADHESIONS;  Surgeon: Dhaval Daniels MD;  [facility];  Service: Thoracic    BREAST SURGERY      BREAST SURGERY      reduction    CARDIAC PACEMAKER PLACEMENT       SECTION      COLONOSCOPY      DILATION AND CURETTAGE OF UTERUS      "D&E"    HERNIA REPAIR      HYSTERECTOMY      JOINT REPLACEMENT Left     NECK SURGERY      fused 4 discs with 4 screws implanted    NISSEN FUNDOPLICATION LAPAROSCOPIC WITH ROBOTICS N/A 5/3/2021    Procedure: ROBOTIC HELLER [address] ;  Surgeon: Dhaval Daniels MD;  [facility];  Service: Thoracic    DC ESOPHAGOGASTRODUODENOSCOPY TRANSORAL DIAGNOSTIC N/A 5/3/2021    Procedure: ESOPHAGOGASTRODUODENOSCOPY (EGD);   Surgeon: Dhaval Daniels MD;  [facility];  Service: Thoracic    DC SURG IMPLNT [address] Left 2018 Procedure: Insertion of thoracic spinal cord stimulator electrode via laminotomy and placement of left buttock IPG;  Surgeon: Can Rojas MD;  [facility];  Service: Neurosurgery    REPLACEMENT TOTAL KNEE      ROTATOR CUFF REPAIR      SPINAL STIMULATOR PLACEMENT Left 10/3/2018    Procedure: BUTTOCK RE-OPENING INCISION FOR REPOSITIONING OF IMPLANTABLE PULSE GENERATOR;  Surgeon: Can Rojas MD;  [facility];  Service: Neurosurgery    TONSILLECTOMY      UPPER GASTROINTESTINAL ENDOSCOPY      WISDOM TOOTH EXTRACTION         Social History:   Social History     Socioeconomic History    Marital status: /Civil Union     Spouse name: None    Number of children: None    Years of education: None    Highest education level: None   Occupational History    None   Tobacco Use    Smoking status: Never Smoker    Smokeless tobacco: Never Used   Vaping Use    Vaping Use: Never used   Substance and Sexual Activity    Alcohol use: Never    Drug use: Yes     Frequency: 8 0 times per week     Types: Marijuana     Comment: Medical Marijuana/vape pen    Sexual activity: Yes   Other Topics Concern    None   Social History Narrative    None     Social Determinants of Health     Financial Resource Strain:     Difficulty of Paying Living Expenses:    Food Insecurity:     Worried About Running Out of Food in the Last Year:     Ran Out of Food in the Last Year:    Transportation Needs:     Lack of Transportation (Medical):      Lack of Transportation (Non-Medical):    Physical Activity:     Days of Exercise per Week:     Minutes of Exercise per Session:    Stress:     Feeling of Stress :    Social Connections:     Frequency of Communication with Friends and Family:     Frequency of Social Gatherings with Friends and Family:     Attends [employer] Services:     Active Member of Clubs or Organizations:     Attends Club or Organization Meetings:     Marital Status:    Intimate Partner Violence:     Fear of Current or Ex-Partner:     Emotionally Abused:     Physically Abused:     Sexually Abused:        Family History:   Family History   Problem Relation Age of Onset    Diabetes Family     Heart disease Family     Hypertension Family     Neuropathy Family     No Known Problems Mother     Heart disease Father     Diabetes Father     No Known Problems Maternal Grandmother     No Known Problems Maternal Grandfather     No Known Problems Paternal Grandmother     No Known Problems Paternal Grandfather        Medications/Allergies:      Current Outpatient Medications:     amitriptyline (ELAVIL) 25 mg tablet, Take 0 5 tablets (12 5 mg total) by mouth daily at bedtime, Disp: 30 tablet, Rfl: 0    aspirin (ECOTRIN LOW STRENGTH) 81 mg EC tablet, Take by mouth every evening , Disp: , Rfl:     atorvastatin (LIPITOR) 80 mg tablet, Take 80 mg by mouth daily at bedtime  , Disp: , Rfl:     calcium carbonate-vitamin D (OSCAL-D) 500 mg-200 units per tablet, TAKE TWO TABLETS BY MOUTH THREE TIMES A DAY WITH MEALS, Disp: , Rfl:     carvedilol (COREG) 3 125 mg tablet, Take 6 25 mg by mouth 2 (two) times a day with meals , Disp: , Rfl:     CRANBERRY PO, Take 1 capsule by mouth every evening , Disp: , Rfl:     Cyanocobalamin (VITAMIN B-12 PO), Take 1 capsule by mouth every evening , Disp: , Rfl:     cyclobenzaprine (FLEXERIL) 10 mg tablet, Take 1 tablet (10 mg total) by mouth 3 (three) times a day as needed for muscle spasms, Disp: 90 tablet, Rfl: 1    dicyclomine (BENTYL) 10 mg capsule, Take 1 capsule (10 mg total) by mouth 4 (four) times a day as needed (abdominal pain), Disp: 60 capsule, Rfl: 2    Docusate Sodium 100 MG capsule, Take 100 mg by mouth daily with lunch  , Disp: , Rfl:     esomeprazole (NexIUM) 40 MG capsule, Take 1 capsule (40 mg total) by mouth 2 (two) times a day before meals, Disp: 180 capsule, Rfl: 3    famotidine (PEPCID) 40 MG tablet, Take 1 tablet (40 mg total) by mouth daily (Patient taking differently: Take 40 mg by mouth daily at bedtime ), Disp: 30 tablet, Rfl: 0    ferrous sulfate 324 (65 Fe) mg, Take 65 mg by mouth 2 (two) times a day before meals , Disp: , Rfl:     fluticasone-salmeterol (ADVAIR) 100-50 mcg/dose inhaler, Inhale 2 puffs 2 (two) times a day , Disp: , Rfl:     glucose blood test strip, Testing six times daily  E11 65 on insulin pump, Disp: , Rfl:     hydroxychloroquine (PLAQUENIL) 200 mg tablet, 400 mg every evening , Disp: , Rfl:     levothyroxine (Synthroid) 88 mcg tablet, Take 88 mcg by mouth daily, Disp: , Rfl:     losartan (COZAAR) 100 MG tablet, Take 50 mg by mouth every evening , Disp: , Rfl:     lubiprostone (AMITIZA) 24 mcg capsule, Take 1 capsule (24 mcg total) by mouth 2 (two) times a day with meals, Disp: 48 capsule, Rfl: 0    NOVOLOG 100 UNIT/ML injection, Pt reports unsure of total dose a day/did see her endocrinologist Dr Glenn Bedolla,  PA Pt has pump on upper left abdomen cut basaL RATE IN HALF, Disp: , Rfl: 3    ondansetron (ZOFRAN) 8 mg tablet, Take 8 mg by mouth every 8 (eight) hours as needed  , Disp: , Rfl:     oxybutynin (DITROPAN XL) 15 MG 24 hr tablet, Take 1 tablet (15 mg total) by mouth daily, Disp: 30 tablet, Rfl: 3    oxyCODONE-acetaminophen (PERCOCET) 5-325 mg per tablet, Take 1 tablet by mouth every 4 (four) hours as needed for moderate pain, Disp: , Rfl:     Potassium Gluconate 595 MG CAPS, Take by mouth 2 (two) times a day , Disp: , Rfl:     pramipexole (MIRAPEX) 0 5 mg tablet, Take 1 tablet (0 5 mg total) by mouth 2 (two) times a day, Disp: 60 tablet, Rfl: 0    pramipexole (MIRAPEX) 1 mg tablet, Take 1 mg by mouth 2 (two) times a day  , Disp: , Rfl:     Prucalopride Succinate (Motegrity) 2 MG TABS, Take 2 mg by mouth daily, Disp: 30 tablet, Rfl: 5    Semaglutide,0 25 or 0 5MG/DOS, (Ozempic, 0 25 or 0 5 MG/DOSE,) 2 MG/1 5ML SOPN, Inject under the skin ozempic weekly on Fridays, Disp: , Rfl:     senna (SENOKOT) 8 6 mg, Take 1 tablet (8 6 mg total) by mouth daily, Disp: 15 tablet, Rfl: 0    sertraline (ZOLOFT) 50 mg tablet, Take 50 mg by mouth daily, Disp: , Rfl:     VENTOLIN  (90 Base) MCG/ACT inhaler, 2 puffs every 4 (four) hours as needed  , Disp: , Rfl:     Armodafinil 150 MG tablet, Take 1 tablet (150 mg total) by mouth daily, Disp: 90 tablet, Rfl: 1    glucagon 1 MG injection, Glucagon Emergency Kit (human-recomb) 1 mg solution for injection (Patient not taking: Reported on 6/24/2021), Disp: , Rfl:     polyethylene glycol (MIRALAX) 17 g packet, Take 17 g by mouth daily as needed (constipation) for up to 15 days, Disp: 15 each, Rfl: 0          Objective      Vital Signs:   Vitals:    06/24/21 1138   BP: 150/80     [place] Sleepiness Scale: Total score: 21        Physical Exam:    General: Alert, appropriate, cooperative, overweight    Head: NC/AT    Skin: Warm, dry    Neuro: No motor abnormalities, cranial nerves appear intact    Extremity: No clubbing, cyanosis      DME Provider: [facility]        Counseling / Coordination of Care   I have spent   Ten minutes with the patient today in which greater than 50% of this time was spent in counseling/coordination of care regarding: equipment and compliance  Board Certified Sleep Specialist    Portions of the record may have been created with voice recognition software  Occasional wrong word or "sound a like" substitutions may have occurred due to the inherent limitations of voice recognition software  Read the chart carefully and recognize, using context, where substitutions have occurred

## 2021-06-24 NOTE — TELEPHONE ENCOUNTER
Patient stopped into the [facility] [place] office looking for Motegrity samples as script costs over $500  Patient was given 5 boxes

## 2021-06-30 ENCOUNTER — OFFICE VISIT (OUTPATIENT)
Dept: PAIN MEDICINE | Facility: CLINIC | Age: 66
End: 2021-06-30
Payer: MEDICARE

## 2021-06-30 VITALS
BODY MASS INDEX: 33.68 KG/M2 | HEART RATE: 72 BPM | HEIGHT: 62 IN | SYSTOLIC BLOOD PRESSURE: 142 MMHG | TEMPERATURE: 98.8 F | WEIGHT: 183 LBS | DIASTOLIC BLOOD PRESSURE: 80 MMHG

## 2021-06-30 DIAGNOSIS — Z96.89 S/P INSERTION OF SPINAL CORD STIMULATOR: ICD-10-CM

## 2021-06-30 DIAGNOSIS — M54.16 LUMBAR RADICULOPATHY: ICD-10-CM

## 2021-06-30 DIAGNOSIS — M79.18 CHRONIC MYOFASCIAL PAIN: ICD-10-CM

## 2021-06-30 DIAGNOSIS — G89.29 CHRONIC PAIN OF LEFT KNEE: ICD-10-CM

## 2021-06-30 DIAGNOSIS — M25.562 CHRONIC PAIN OF LEFT KNEE: ICD-10-CM

## 2021-06-30 DIAGNOSIS — G90.522 COMPLEX REGIONAL PAIN SYNDROME TYPE 1 OF LEFT LOWER EXTREMITY: Chronic | ICD-10-CM

## 2021-06-30 DIAGNOSIS — M25.561 CHRONIC PAIN OF RIGHT KNEE: ICD-10-CM

## 2021-06-30 DIAGNOSIS — G89.29 CHRONIC MYOFASCIAL PAIN: ICD-10-CM

## 2021-06-30 DIAGNOSIS — G89.4 PAIN SYNDROME, CHRONIC: Primary | Chronic | ICD-10-CM

## 2021-06-30 DIAGNOSIS — G89.29 CHRONIC PAIN OF RIGHT KNEE: ICD-10-CM

## 2021-06-30 DIAGNOSIS — G89.29 CHRONIC SCAPULAR PAIN: ICD-10-CM

## 2021-06-30 DIAGNOSIS — G63 POLYNEUROPATHY ASSOCIATED WITH UNDERLYING DISEASE (HCC): Chronic | ICD-10-CM

## 2021-06-30 DIAGNOSIS — M47.817 LUMBOSACRAL SPONDYLOSIS WITHOUT MYELOPATHY: ICD-10-CM

## 2021-06-30 DIAGNOSIS — M89.8X1 CHRONIC SCAPULAR PAIN: ICD-10-CM

## 2021-06-30 DIAGNOSIS — M54.14 THORACIC RADICULOPATHY: ICD-10-CM

## 2021-06-30 PROCEDURE — 99214 OFFICE O/P EST MOD 30 MIN: CPT | Performed by: NURSE PRACTITIONER

## 2021-06-30 RX ORDER — CYCLOBENZAPRINE HCL 10 MG
10 TABLET ORAL 3 TIMES DAILY PRN
Qty: 90 TABLET | Refills: 5 | Status: SHIPPED | OUTPATIENT
Start: 2021-06-30 | End: 2021-08-24 | Stop reason: SDUPTHER

## 2021-06-30 NOTE — PROGRESS NOTES
Assessment:  1  Pain syndrome, chronic    2  Complex regional pain syndrome type 1 of left lower extremity    3  Chronic scapular pain    4  Chronic pain of left knee    5  Chronic pain of right knee    6  Lumbosacral spondylosis without myelopathy    7  Lumbar radiculopathy    8  Thoracic radiculopathy    9  Chronic myofascial pain    10  Polyneuropathy associated with underlying disease ([facility] [address] )    11  S/P insertion of spinal cord stimulator        Plan:   While the patient was in the office today, I did have a thorough conversation with the patient regarding their chronic pain syndrome, symptoms, medication regimen, and treatment plan  I discussed with the patient that at this point time I feel it is in her best interest to continue to use her spinal cord stimulator to help manage her leg pain especially and hopefully with the reprogramming session today they will be able to capture a little bit more relief for her back pain as well  The patient was agreeable and verbalized an understanding  With regards to her medication regimen, I feel it is reasonable for her to continue with her medical marijuana as prescribed by her current physician and the cyclobenzaprine as prescribed by our office  The patient was agreeable and verbalized an understanding  The patient will follow-up in 6 months for medication prescription refill and reevaluation  The patient was advised to contact the office should their symptoms worsen in the interim  The patient was agreeable and verbalized an understanding  History of Present Illness: The patient is a 60 y o  female last seen on 3/9/2021 who presents for a follow up office visit in regards to Chronic pain syndrome secondary to complex regional pain syndrome of the left lower extremity with lumbar spondylosis as well as thoracic and lumbar radiculopathy, chronic joint pain and myofascial pain status post a permanent spinal cord stimulator implantation    The christine/nt currently reports that since her last office visit and the stimulator adjustments, there has been definite improvement in her leg pain especially, however, is having some worsening right-sided low back pain and is hopeful that her reprogramming session today will also be helpful  She also proceeded with an updated CT scan of the thoracic and lumbar spine which showed moderate but stable degenerative changes without any significant new or worsening etiology  It also showed that the thoracic spinal cord stimulator lead appears to be in place without any migration noted  The patient continues with medical marijuana products to manage her pain and the cyclobenzaprine from our office  She presents today for her regular medication follow-up visit  Current pain medications includes: Cyclobenzaprine 10 mg t i d  p r n  for pain and spasms  The patient reports that this regimen is providing  moderate pain relief  The patient is reporting no side effects from this pain medication regimen  Pain Contract Signed: 6/23/2020  Last Urine Drug Screen: 2/17/2020    I have personally reviewed and/or updated the patient's past medical history, past surgical history, family history, social history, current medications, allergies, and vital signs today  Review of Systems:    Review of Systems   Respiratory: Negative for shortness of breath  Cardiovascular: Negative for chest pain  Gastrointestinal: Positive for constipation  Negative for diarrhea, nausea and vomiting  Musculoskeletal: Positive for gait problem  Negative for arthralgias, joint swelling (JOINT STIFFNESS) and myalgias  Skin: Negative for rash  Neurological: Negative for dizziness, seizures and weakness  All other systems reviewed and are negative          Past Medical History:   Diagnosis Date    Anxiety     Asthma     controlled    Back complaints     Cancer ([facility] [address] )     nose    Cellulitis of left lower leg     "not now"    [facility]    Chronic bilateral thoracic back pain     Chronic kidney disease     sees nephrologist regularly" stage 3"    Chronic myofascial pain     Chronic pain of both lower extremities     Chronic pain of left knee     "both knees"    Chronic pain syndrome     bilat legs and knees/neuropathy pain    CPAP (continuous positive airway pressure) dependence     Depression     Diabetes mellitus ([facility] [address] )     Disease of thyroid gland     Does use hearing aid     bilat and will wear DOS    Gait disorder     Gastroparesis     GERD (gastroesophageal reflux disease)     History of angina     History of MRSA infection     History of transfusion     Many years ago    Hyperlipidemia     Hypertension     Hypoglycemic reaction     "occas low blood sugar"    Insulin pump in place     pt reports saw endocrinologist  and will bring copy of instructions DOS    Kidney disease     Pacemaker     [facility]athy [facility] with [facility] disease [facility]     PONV (postoperative nausea and vomiting)     Risk for falls     S/P insertion of spinal cord stimulator 2018    Sarcoidosis     Shortness of breath     "exertion and not new"    Sleep apnea     TIA (transient ischemic attack)     Use of cane as ambulatory aid     Uses walker     Wears dentures     permanent upper/and some missing teeth/partial lower       Past Surgical History:   Procedure Laterality Date    ABDOMINAL ADHESION SURGERY N/A 5/3/2021    Procedure: laparoscopic LYSIS ADHESIONS;  Surgeon: Mylene Grier MD;  [facility];  Service: Thoracic    BREAST SURGERY      BREAST SURGERY      reduction    CARDIAC PACEMAKER PLACEMENT       SECTION      COLONOSCOPY      DILATION AND CURETTAGE OF UTERUS      "D&E"    HERNIA REPAIR      HYSTERECTOMY      JOINT REPLACEMENT Left     NECK SURGERY      fused 4 discs with 4 screws implanted    NISSEN FUNDOPLICATION LAPAROSCOPIC WITH ROBOTICS N/A 5/3/2021    Procedure: ROBOTIC HELLER MYOTOMY WITH Contreras Reading ;  Surgeon: Francis Brewer MD;  [facility];  Service: Thoracic    [place] ESOPHAGOGASTRODUODENOSCOPY TRANSORAL DIAGNOSTIC N/A 5/3/2021    Procedure: ESOPHAGOGASTRODUODENOSCOPY (EGD); Surgeon: Francis Brewer MD;  [facility];  Service: Thoracic    [place] SURG IMPLNT [address] Left 4/23/2018    Procedure:  Insertion of thoracic spinal cord stimulator electrode via laminotomy and placement of left buttock IPG;  Surgeon: Narciso Woody MD;  [facility];  Service: Neurosurgery    REPLACEMENT TOTAL KNEE      ROTATOR CUFF REPAIR      SPINAL STIMULATOR PLACEMENT Left 10/3/2018    Procedure: BUTTOCK RE-OPENING INCISION FOR REPOSITIONING OF IMPLANTABLE PULSE GENERATOR;  Surgeon: Narciso Woody MD;  [facility];  Service: Neurosurgery    TONSILLECTOMY      UPPER GASTROINTESTINAL ENDOSCOPY      WISDOM TOOTH EXTRACTION         Family History   Problem Relation Age of Onset    Diabetes Family     Heart disease Family     Hypertension Family     Neuropathy Family     No Known Problems Mother     Heart disease Father     Diabetes Father     No Known Problems Maternal Grandmother     No Known Problems Maternal Grandfather     No Known Problems Paternal Grandmother     No Known Problems Paternal Grandfather        Social History     Occupational History    Not on file   Tobacco Use    Smoking status: Never Smoker    Smokeless tobacco: Never Used   Vaping Use    Vaping Use: Never used   Substance and Sexual Activity    Alcohol use: Never    Drug use: Yes     Frequency: 8 0 times per week     Types: Marijuana     Comment: Medical Marijuana/vape pen    Sexual activity: Yes         Current Outpatient Medications:     amitriptyline (ELAVIL) 25 mg tablet, Take 0 5 tablets (12 5 mg total) by mouth daily at bedtime, Disp: 30 tablet, Rfl: 0    Armodafinil (Nuvigil) 250 MG tablet, Take 1 tablet (250 mg total) by mouth daily, Disp: 90 tablet, Rfl: 1    aspirin (ECOTRIN LOW STRENGTH) 81 mg EC tablet, Take by mouth every evening , Disp: , Rfl:     atorvastatin (LIPITOR) 80 mg tablet, Take 80 mg by mouth daily at bedtime  , Disp: , Rfl:     calcium carbonate-vitamin D (OSCAL-D) 500 mg-200 units per tablet, TAKE TWO TABLETS BY MOUTH THREE TIMES A DAY WITH MEALS, Disp: , Rfl:     carvedilol (COREG) 3 125 mg tablet, Take 6 25 mg by mouth 2 (two) times a day with meals , Disp: , Rfl:     CRANBERRY PO, Take 1 capsule by mouth every evening , Disp: , Rfl:     Cyanocobalamin (VITAMIN B-12 PO), Take 1 capsule by mouth every evening , Disp: , Rfl:     dicyclomine (BENTYL) 10 mg capsule, Take 1 capsule (10 mg total) by mouth 4 (four) times a day as needed (abdominal pain), Disp: 60 capsule, Rfl: 2    Docusate Sodium 100 MG capsule, Take 100 mg by mouth daily with lunch  , Disp: , Rfl:     esomeprazole (NexIUM) 40 MG capsule, Take 1 capsule (40 mg total) by mouth 2 (two) times a day before meals, Disp: 180 capsule, Rfl: 3    famotidine (PEPCID) 40 MG tablet, Take 1 tablet (40 mg total) by mouth daily (Patient taking differently: Take 40 mg by mouth daily at bedtime ), Disp: 30 tablet, Rfl: 0    ferrous sulfate 324 (65 Fe) mg, Take 65 mg by mouth 2 (two) times a day before meals , Disp: , Rfl:     fluticasone-salmeterol (ADVAIR) 100-50 mcg/dose inhaler, Inhale 2 puffs 2 (two) times a day , Disp: , Rfl:     hydroxychloroquine (PLAQUENIL) 200 mg tablet, 400 mg every evening , Disp: , Rfl:     levothyroxine (Synthroid) 88 mcg tablet, Take 88 mcg by mouth daily, Disp: , Rfl:     losartan (COZAAR) 100 MG tablet, Take 50 mg by mouth every evening , Disp: , Rfl:     lubiprostone (AMITIZA) 24 mcg capsule, Take 1 capsule (24 mcg total) by mouth 2 (two) times a day with meals, Disp: 48 capsule, Rfl: 0    NOVOLOG 100 UNIT/ML injection, Pt reports unsure of total dose a day/did see her endocrinologist  ROSA Howard Pt has pump on upper left abdomen cut basaL RATE IN HALF, Disp: , Rfl: 3    ondansetron (ZOFRAN) 8 mg tablet, Take 8 mg by mouth every 8 (eight) hours as needed  , Disp: , Rfl:     oxybutynin (DITROPAN XL) 15 MG 24 hr tablet, Take 1 tablet (15 mg total) by mouth daily, Disp: 30 tablet, Rfl: 3    oxyCODONE-acetaminophen (PERCOCET) 5-325 mg per tablet, Take 1 tablet by mouth every 4 (four) hours as needed for moderate pain, Disp: , Rfl:     Potassium Gluconate 595 MG CAPS, Take by mouth 2 (two) times a day , Disp: , Rfl:     pramipexole (MIRAPEX) 0 5 mg tablet, Take 1 tablet (0 5 mg total) by mouth 2 (two) times a day, Disp: 60 tablet, Rfl: 0    pramipexole (MIRAPEX) 1 mg tablet, Take 1 mg by mouth 2 (two) times a day  , Disp: , Rfl:     Prucalopride Succinate (Motegrity) 2 MG TABS, Take 2 mg by mouth daily, Disp: 30 tablet, Rfl: 5    Semaglutide,0 25 or 0 5MG/DOS, (Ozempic, 0 25 or 0 5 MG/DOSE,) 2 MG/1 5ML SOPN, Inject under the skin ozempic weekly on Fridays, Disp: , Rfl:     senna (SENOKOT) 8 6 mg, Take 1 tablet (8 6 mg total) by mouth daily, Disp: 15 tablet, Rfl: 0    sertraline (ZOLOFT) 50 mg tablet, Take 50 mg by mouth daily, Disp: , Rfl:     VENTOLIN  (90 Base) MCG/ACT inhaler, 2 puffs every 4 (four) hours as needed  , Disp: , Rfl:     cyclobenzaprine (FLEXERIL) 10 mg tablet, Take 1 tablet (10 mg total) by mouth 3 (three) times a day as needed for muscle spasms, Disp: 90 tablet, Rfl: 5    glucagon 1 MG injection, Glucagon Emergency Kit (human-recomb) 1 mg solution for injection (Patient not taking: Reported on 6/24/2021), Disp: , Rfl:     glucose blood test strip, Testing six times daily  E11 65 on insulin pump, Disp: , Rfl:     polyethylene glycol (MIRALAX) 17 g packet, Take 17 g by mouth daily as needed (constipation) for up to 15 days, Disp: 15 each, Rfl: 0    Allergies   Allergen Reactions    Bactrim [Sulfamethoxazole-Trimethoprim] Hives    Sucralfate Hives     Facial swelling    Topamax [Topiramate]      disorentation    Azithromycin Itching    Pregabalin Confusion and Other (See Comments)     altered mental status    Ciprofloxacin Drowsiness     Other reaction(s): Other (See Comments)  "drowsiness"    Norvasc [Amlodipine] Swelling    Baclofen      "That knocks me out "    Bupropion Fatigue    Methotrexate Nausea Only    Neurontin [Gabapentin] Hallucinations and Hypertension       Physical Exam:    /80 (BP Location: Left arm, Patient Position: Sitting, Cuff Size: Standard)   Pulse 72   Temp 98 8 °F (37 1 °C)   Ht 5' 2" (1 575 m)   Wt 83 kg (183 lb)   BMI 33 47 kg/m²     Constitutional:normal, well developed, well nourished, alert, in no distress and non-toxic and no overt pain behavior  and overweight  Eyes:anicteric  HEENT:grossly intact  Neck:supple, symmetric, trachea midline and no masses   Pulmonary:even and unlabored  Cardiovascular:No edema or pitting edema present  Skin:Normal without rashes or lesions and well hydrated  Psychiatric:Mood and affect appropriate  Neurologic:Cranial Nerves II-XII grossly intact  Musculoskeletal:The patient's gait is slightly antalgic, but steady with the use of a single cane  Imaging  No orders to display         No orders of the defined types were placed in this encounter

## 2021-07-12 ENCOUNTER — TELEPHONE (OUTPATIENT)
Dept: HEMATOLOGY ONCOLOGY | Facility: CLINIC | Age: 66
End: 2021-07-12

## 2021-07-12 NOTE — TELEPHONE ENCOUNTER
Patient states she has not been able to eat or take medication since the surgery 5/3 with Dr Georgina Gayle  Best call back [phone]

## 2021-07-12 NOTE — TELEPHONE ENCOUNTER
I spoke to [address] today at [phone]  She states she can only tolerate yogurt, jello, protein shakes, and pudding  She eats two bites of a sandwich and gets full, and sometimes has a hard time getting it down  She also cuts her pills in half or even thirds and can't swallow them  She is about 9 weeks out from surgery and per Dr Joaquin Herrera last office note, can take 12 weeks to resolve  I will discuss with Dr Latrell Wong and get back to her

## 2021-07-13 ENCOUNTER — TELEPHONE (OUTPATIENT)
Dept: CARDIAC SURGERY | Facility: CLINIC | Age: 66
End: 2021-07-13

## 2021-07-13 DIAGNOSIS — K22.0 ACHALASIA: Primary | ICD-10-CM

## 2021-07-13 NOTE — TELEPHONE ENCOUNTER
Dr Shira Eugene recommending UGI, we will get that scheduled  If it is normal, would recommend seeing Dr Vikram Rojas

## 2021-07-14 ENCOUNTER — HOSPITAL ENCOUNTER (OUTPATIENT)
Dept: RADIOLOGY | Facility: HOSPITAL | Age: 66
Discharge: HOME | End: 2021-07-14
Payer: MEDICARE

## 2021-07-14 DIAGNOSIS — K22.0 ACHALASIA: ICD-10-CM

## 2021-07-14 PROCEDURE — 74240 X-RAY XM UPR GI TRC 1CNTRST: CPT

## 2021-07-15 ENCOUNTER — TELEPHONE (OUTPATIENT)
Dept: GASTROENTEROLOGY | Facility: AMBULARY SURGERY CENTER | Age: 66
End: 2021-07-15

## 2021-07-15 NOTE — TELEPHONE ENCOUNTER
Patients GI provider:  Dr Dasilva Azeri     Number to return call: [phone]    Reason for call: Pt calling requesting to speak with a nurse regarding to a medication (motegrity) that did not work       Scheduled procedure/appointment date if applicable: Apt/procedure

## 2021-07-15 NOTE — TELEPHONE ENCOUNTER
Office visit 4/20/21 Richardean Bamberger   Hx: Achalasia, GERD, IBS-C     Pt stated she has been on motegrity samples for a month and it is not helping with constipation  Complains of no BM for 3-5 days despite miralax 1capful BID and motegrity  To help with constipation will do enema or suppositories  The stool that does pass is "golf ball" like/small/hard and requires straining/sitting on the toilet for 30min  Per pt she was taking amitiza prior which was working and then suddenly stopped  Has failed linzess prior  Advised to avoid straining with BMs and sitting on toilet for an extended perisod of time as this will aggravate hemorrhoids and cause more discomfort   Pt verbalized understanding Please advise

## 2021-07-16 NOTE — TELEPHONE ENCOUNTER
Radha Choi PA-C  You 17 hours ago (3:54 PM)     I recommend picking up magnesium citrate OTC and drinking this tonight or tomorrow (whenever she can  the medication)  Then start taking MiraLAX TID in addition to [address]  She can use an enema if no BM for 3 days  She has a follow-up with Dr Jostin Osman in 3 weeks  Routing comment      Called pt to review providers recommends  Pt Agreeable to plan and will keep f/u appt with Dr Jostin Osman  Advised pt to call oncall provider if mag citrate does not help with current constipation   Pt verbalized understanding no further questions at this time

## 2021-07-18 ENCOUNTER — TELEPHONE (OUTPATIENT)
Dept: OTHER | Facility: OTHER | Age: 66
End: 2021-07-18

## 2021-07-19 ENCOUNTER — TELEPHONE (OUTPATIENT)
Dept: CARDIAC SURGERY | Facility: CLINIC | Age: 66
End: 2021-07-19

## 2021-07-19 NOTE — TELEPHONE ENCOUNTER
Krystiant message     7/19/21 10:18 AM  Kathy Ureña MA routed this conversation to Gastroenterology Nurse   Ad Topete  to Candy Fitzgerald DO          7/19/21 10:14 AM  friday i spoke with one of your nurses  adviced to drink magnesium citrate and muralax  Resulted in no bowel movement  Sunday evening spoke to   on call  Advised to have stool removed, two enemas and drink magnesium citrate  Had bowel movement  Still feeling very bloated and uncomfortable  Should I continue with drinking magnesium citrate?

## 2021-07-19 NOTE — TELEPHONE ENCOUNTER
Called pt regarding mychart message  Per pt after she completed recs from on call provider had BM and feeling like she completely emptied bowel  Currently experiencing a lot of gas/bloating  Explained this is normal after taking multiple bowel stimulants/consitpation medications  Advised to drink plenty of water and take OTC gas-x/simethicone  If this does not help symptoms advised to call office  Otherwise continue taking daily motegrity and miralax TID as previously recommenced until f/u appt  Pt agreeable to plan, no further questions/concners at this time

## 2021-07-19 NOTE — PROGRESS NOTES
Patient paged me she had no BM since Tuesday  She tried mag citrate bottle but no change  I told patient that we need to make sure she does not have stool impaction before we try enema or more laxative  I offered her either to go to ER or urgent care to have RN do rectal exam/stool disimpaction or she could do it herself at home after wearing glove and using lubricant  Once stool impaction is ruled out or stool is disimpacted then she should get enema x2  After that she should take two bottles of mag citrate  Patient will do all of this at home after buying two bottles of mag citrate OTC tonight  I told her to call us back or get medical attention if the above measure does not not result in BM      Lisa Garcia MD  Gastroenterology Fellow  [address]  Date: July 18, 2021

## 2021-07-19 NOTE — TELEPHONE ENCOUNTER
Patient is calling to speak with the on call regarding severe constipation, she stated she has not had any relief in days

## 2021-07-20 ENCOUNTER — TELEPHONE (OUTPATIENT)
Dept: GASTROENTEROLOGY | Facility: AMBULARY SURGERY CENTER | Age: 66
End: 2021-07-20

## 2021-07-20 DIAGNOSIS — R10.13 DYSPEPSIA: Primary | ICD-10-CM

## 2021-07-20 RX ORDER — PANTOPRAZOLE SODIUM 40 MG/1
40 TABLET, DELAYED RELEASE ORAL DAILY
Qty: 90 TABLET | Refills: 3 | Status: SHIPPED | OUTPATIENT
Start: 2021-07-20 | End: 2021-08-04 | Stop reason: CLARIF

## 2021-07-20 NOTE — TELEPHONE ENCOUNTER
I called pt  She reports her cough is gone after her achalasia surgery  Currently reports that she is feeling salgado faster than usual with a lot of upper gi distress  Currently taking pepcid  Barium esophagram:  Unremarkable esophagram    Instructed pt to stop pepcid and will try a trial of protonix and will follow up with me in the office on August 4th for a scheduled follow up  She reports also with constipation  Has tried magnesium citrate which hasn't helped  I would recommend miralax to affect a BM daily to her liking  No need to schedule EGD urgently at this time  Thank you all

## 2021-07-20 NOTE — TELEPHONE ENCOUNTER
Pt of Dr Umberto Wilson last seen in office 4/20/2021  Future appt 8/4/2021  HX: achalasia (surgery repair [id number]), GERD, weakly alkaline reflux, IBS-C, bryannatzki ring    Spoke with pt who complains of dysphagia for about two weeks now  The pt has struggled to swallow medication even if she cuts the medication  She mentions coughing up white mucus  Food and fluids both can give pt trouble  Pt will chew slowly and eat small bites however she will still cough up either immediately or half an hour later  When pt coughs, she also regurgitates and feels a burning sensation when this occurs  Recently pt had achalasia repair procedure which she assumed would fix swallowing problem but it has been over two months since this procedure  A barium swallow was conducted after procedure and speech said results looked fine and to f/u with GI  Pt saw ENT this morning and was also recommended to see GI provider  Pt does not feel like she can wait until august 4th to meet with provider  She has been taking all prescribed meds (nexium, pepcid)  Denies N/V, fever, chills, SOB, hemoptysis

## 2021-07-20 NOTE — TELEPHONE ENCOUNTER
Patients GI provider:  Dr Kenneth Martinez     Number to return call: [phone]    Reason for call: Pt calling stated she can't swallow food requesting to speak with someone for recommendation    Scheduled procedure/appointment date if applicable: Apt/procedure 8/4/21

## 2021-07-20 NOTE — TELEPHONE ENCOUNTER
Looks like she has already followed-up with her surgeons about this who recommended upper GI, that was WNL  Please advise pt that until she sees Dr Vivi Puga, she should trial a full liquid diet similar to the one she was on prior to her surgery: creamy soups, smoothies, puddings, purees, shakes  If she has choking episodes, she should go to ED  I will reach out to Dr Vivi Puga to see if she would want us to try to squeeze the pt in for EGD prior to her appt or not  Also: coughing up "white mucous" is worrisome for some sort of infection so please have pt reach out to PCP regarding this as she may need CXR       Dr Vivi Puga: did you want her to get scheduled for EGD ASAP prior to her appt with you, or did you want to see her in the office first?

## 2021-08-04 ENCOUNTER — OFFICE VISIT (OUTPATIENT)
Dept: GASTROENTEROLOGY | Facility: CLINIC | Age: 66
End: 2021-08-04
Payer: MEDICARE

## 2021-08-04 VITALS
BODY MASS INDEX: 32.94 KG/M2 | HEART RATE: 66 BPM | TEMPERATURE: 98.8 F | WEIGHT: 179 LBS | DIASTOLIC BLOOD PRESSURE: 70 MMHG | SYSTOLIC BLOOD PRESSURE: 112 MMHG | HEIGHT: 62 IN

## 2021-08-04 DIAGNOSIS — K21.00 GASTROESOPHAGEAL REFLUX DISEASE WITH ESOPHAGITIS WITHOUT HEMORRHAGE: ICD-10-CM

## 2021-08-04 DIAGNOSIS — R13.12 OROPHARYNGEAL DYSPHAGIA: ICD-10-CM

## 2021-08-04 DIAGNOSIS — K31.84 GASTROPARESIS: ICD-10-CM

## 2021-08-04 DIAGNOSIS — K22.0 ACHALASIA: ICD-10-CM

## 2021-08-04 DIAGNOSIS — E66.01 MORBID OBESITY WITH BMI OF 40.0-44.9, ADULT (HCC): ICD-10-CM

## 2021-08-04 DIAGNOSIS — K58.1 IRRITABLE BOWEL SYNDROME WITH CONSTIPATION: Primary | ICD-10-CM

## 2021-08-04 DIAGNOSIS — K21.00 GASTROESOPHAGEAL REFLUX DISEASE WITH ESOPHAGITIS: ICD-10-CM

## 2021-08-04 PROCEDURE — 99215 OFFICE O/P EST HI 40 MIN: CPT | Performed by: INTERNAL MEDICINE

## 2021-08-04 RX ORDER — LIDOCAINE 50 MG/G
1 PATCH TOPICAL EVERY 12 HOURS
COMMUNITY
Start: 2021-07-21 | End: 2022-01-11

## 2021-08-04 RX ORDER — POLYETHYLENE GLYCOL 3350 17 G/17G
17 POWDER, FOR SOLUTION ORAL 4 TIMES DAILY
Qty: 120 EACH | Refills: 3 | Status: SHIPPED | OUTPATIENT
Start: 2021-08-04 | End: 2021-11-10

## 2021-08-04 RX ORDER — ESOMEPRAZOLE MAGNESIUM 40 MG/1
40 CAPSULE, DELAYED RELEASE ORAL
Qty: 180 CAPSULE | Refills: 3 | Status: SHIPPED | OUTPATIENT
Start: 2021-08-04

## 2021-08-04 NOTE — PROGRESS NOTES
[facility] - Outpatient Follow-up Note  Beryle Pon 73 y o  female MRN: [id number]  Encounter: [id number]          ASSESSMENT AND PLAN:  73year old female here for follow up  She was recently diagnosed with achalasia and had surgical repair  Her cough has gone away but she still has multiple GI complaints  1  Irritable bowel syndrome with constipation  -gave instruction on MiraLax daily multiple times a day as well as increasing water intake as she has tried multiple different prescription laxatives and none have benefitted her  -Bentyl as needed for the pain component  -amitriptyline at bedtime    2  Gastroesophageal reflux disease with esophagitis without hemorrhage  -symptoms well controlled on PPI 40 mg twice daily    3  Gastroparesis  She carries this diagnosis but I do not see a GES done, will order at next visit if still having symptoms     4  Achalasia  -status post surgical repair    5  Likely oropharyngeal dysphagia  -will do speech evaluation as she already had achalasia treatment but still with symptoms    ______________________________________________________________________    SUBJECTIVE:  Patient here for follow-up  Her chronic cough has improved after surgical repair of achalasia  Her GERD is also in her control with twice daily PPI  Main issue currently is constipation  She is not drinking a lot a water  She has tried multiple prescription strength laxatives and nothing makes her better and she still has hard stools with a bowel movement every 3-5 days  REVIEW OF SYSTEMS IS OTHERWISE NEGATIVE        Historical Information   Past Medical History:   Diagnosis Date    Anxiety     Asthma     controlled    Back complaints     [facility]     nose    Cellulitis of left lower leg     "not now"    CHF (congestive heart failure) ([facility] ) 02/2021    Chronic bilateral thoracic back pain     Chronic kidney disease     sees nephrologist regularly" stage 3"    Chronic myofascial pain     Chronic pain of both lower extremities     Chronic pain of left knee     "both knees"    Chronic pain syndrome     bilat legs and knees/neuropathy pain    CPAP (continuous positive airway pressure) dependence     Depression     Diabetes mellitus ([facility]     Disease of thyroid gland     Does use hearing aid     bilat and will wear DOS    Gait disorder     Gastroparesis     GERD (gastroesophageal reflux disease)     History of angina     History of MRSA infection     History of transfusion     Many years ago    Hyperlipidemia     Hypertension     Hypoglycemic reaction     "occas low blood sugar"    Insulin pump in place     pt reports saw endocrinologist  and will bring copy of instructions DOS    Kidney disease     Pacemaker     Polyneuropathy associated with underlying disease ([facility]     PONV (postoperative nausea and vomiting)     Risk for falls     S/P insertion of spinal cord stimulator 2018    Sarcoidosis     Shortness of breath     "exertion and not new"    Sleep apnea     TIA (transient ischemic attack)     Use of cane as ambulatory aid     Uses walker     Wears dentures     permanent upper/and some missing teeth/partial lower     Past Surgical History:   Procedure Laterality Date    ABDOMINAL ADHESION SURGERY N/A 5/3/2021    Procedure: laparoscopic LYSIS ADHESIONS;  Surgeon: Lavern Johnson MD;  [facility];  Service: Thoracic    BREAST SURGERY      BREAST SURGERY      reduction    CARDIAC PACEMAKER PLACEMENT       SECTION      COLONOSCOPY      DILATION AND CURETTAGE OF UTERUS      "D&E"    HERNIA REPAIR      HYSTERECTOMY      JOINT REPLACEMENT Left     NECK SURGERY      fused 4 discs with 4 screws implanted    NISSEN FUNDOPLICATION LAPAROSCOPIC WITH ROBOTICS N/A 5/3/2021    Procedure: ROBOTIC [address] ;  Surgeon: Lavern Johnson MD;  [facility];  Service: Thoracic  NV ESOPHAGOGASTRODUODENOSCOPY TRANSORAL DIAGNOSTIC N/A 5/3/2021    Procedure: ESOPHAGOGASTRODUODENOSCOPY (EGD); Surgeon: Galilea Raphael MD;  [facility];  Service: Thoracic    NV SURG IMPLNT [address] Left 4/23/2018    Procedure:  Insertion of thoracic spinal cord stimulator electrode via laminotomy and placement of left buttock IPG;  Surgeon: Carol Murphy MD;  [facility];  Service: Neurosurgery    REPLACEMENT TOTAL KNEE      ROTATOR CUFF REPAIR      SPINAL STIMULATOR PLACEMENT Left 10/3/2018    Procedure: BUTTOCK RE-OPENING INCISION FOR REPOSITIONING OF IMPLANTABLE PULSE GENERATOR;  Surgeon: Carol Murphy MD;  [facility];  Service: Neurosurgery    TONSILLECTOMY      UPPER GASTROINTESTINAL ENDOSCOPY      WISDOM TOOTH EXTRACTION       Social History   Social History     Substance and Sexual Activity   Alcohol Use Never     Social History     Substance and Sexual Activity   Drug Use Yes    Frequency: 8 0 times per week    Types: Marijuana    Comment: Medical Marijuana/vape pen     Social History     Tobacco Use   Smoking Status Never Smoker   Smokeless Tobacco Never Used     Family History   Problem Relation Age of Onset    Diabetes Family     Heart disease Family     Hypertension Family     Neuropathy Family     No Known Problems Mother     Heart disease Father     Diabetes Father     No Known Problems Maternal Grandmother     No Known Problems Maternal Grandfather     No Known Problems Paternal Grandmother     No Known Problems Paternal Grandfather        Meds/Allergies       Current Outpatient Medications:     amitriptyline (ELAVIL) 25 mg tablet    Armodafinil (Nuvigil) 250 MG tablet    aspirin (ECOTRIN LOW STRENGTH) 81 mg EC tablet    atorvastatin (LIPITOR) 80 mg tablet    calcium carbonate-vitamin D (OSCAL-D) 500 mg-200 units per tablet    carvedilol (COREG) 3 125 mg tablet    CRANBERRY PO    Cyanocobalamin (VITAMIN B-12 PO)   cyclobenzaprine (FLEXERIL) 10 mg tablet    dicyclomine (BENTYL) 10 mg capsule    Docusate Sodium 100 MG capsule    esomeprazole (NexIUM) 40 MG capsule    famotidine (PEPCID) 40 MG tablet    ferrous sulfate 324 (65 Fe) mg    fluticasone-salmeterol (ADVAIR) 100-50 mcg/dose inhaler    glucagon 1 MG injection    glucose blood test strip    hydroxychloroquine (PLAQUENIL) 200 mg tablet    levothyroxine (Synthroid) 88 mcg tablet    losartan (COZAAR) 100 MG tablet    lubiprostone (AMITIZA) 24 mcg capsule    NOVOLOG 100 UNIT/ML injection    ondansetron (ZOFRAN) 8 mg tablet    oxybutynin (DITROPAN XL) 15 MG 24 hr tablet    oxyCODONE-acetaminophen (PERCOCET) 5-325 mg per tablet    pantoprazole (PROTONIX) 40 mg tablet    polyethylene glycol (MIRALAX) 17 g packet    Potassium Gluconate 595 MG CAPS    pramipexole (MIRAPEX) 0 5 mg tablet    pramipexole (MIRAPEX) 1 mg tablet    Prucalopride Succinate (Motegrity) 2 MG TABS    Semaglutide,0 25 or 0 5MG/DOS, (Ozempic, 0 25 or 0 5 MG/DOSE,) 2 MG/1 5ML SOPN    senna (SENOKOT) 8 6 mg    sertraline (ZOLOFT) 50 mg tablet    VENTOLIN  (90 Base) MCG/ACT inhaler    Allergies   Allergen Reactions    Bactrim [Sulfamethoxazole-Trimethoprim] Hives    Sucralfate Hives     Facial swelling    Topamax [Topiramate]      disorentation    Azithromycin Itching    Pregabalin Confusion and Other (See Comments)     altered mental status    Ciprofloxacin Drowsiness     Other reaction(s): Other (See Comments)  "drowsiness"    Norvasc [Amlodipine] Swelling    Baclofen      "That knocks me out "    Bupropion Fatigue    Methotrexate Nausea Only    Neurontin [Gabapentin] Hallucinations and Hypertension           Objective     There were no vitals taken for this visit  There is no height or weight on file to calculate BMI        PHYSICAL EXAM:      General Appearance:   Alert, cooperative, no distress   HEENT:   Normocephalic, atraumatic, anicteric      Neck:  Supple, symmetrical, trachea midline   Lungs:   Clear to auscultation bilaterally; no rales, rhonchi or wheezing; respirations unlabored    Heart[de-identified]   Regular rate and rhythm; no murmur, rub, or gallop  Abdomen:   Soft, non-tender, non-distended; normal bowel sounds; no masses, no organomegaly    Genitalia:   Deferred    Rectal:   Deferred    Extremities:  No cyanosis, clubbing or edema    Pulses:  2+ and symmetric    Skin:  No jaundice, rashes, or lesions    Lymph nodes:  No palpable cervical lymphadenopathy        Lab Results:   No visits with results within 1 Day(s) from this visit     Latest known visit with results is:   Appointment on 05/17/2021   Component Date Value    Hemoglobin A1C 05/17/2021 5 8*    EAG 05/17/2021 120     Sodium 05/17/2021 138     Potassium 05/17/2021 4 9     Chloride 05/17/2021 108     CO2 05/17/2021 25     ANION GAP 05/17/2021 5     BUN 05/17/2021 11     Creatinine 05/17/2021 0 82     Glucose, Fasting 05/17/2021 190*    Calcium 05/17/2021 8 5     eGFR 05/17/2021 76     WBC 05/17/2021 4 72     RBC 05/17/2021 3 15*    Hemoglobin 05/17/2021 8 4*    Hematocrit 05/17/2021 28 6*    MCV 05/17/2021 91     MCH 05/17/2021 26 7*    MCHC 05/17/2021 29 4*    RDW 05/17/2021 15 2*    MPV 05/17/2021 9 4     Platelets 98/49/5460 379     nRBC 05/17/2021 0     Neutrophils Relative 05/17/2021 68     Immat GRANS % 05/17/2021 1     Lymphocytes Relative 05/17/2021 18     Monocytes Relative 05/17/2021 10     Eosinophils Relative 05/17/2021 3     Basophils Relative 05/17/2021 0     Neutrophils Absolute 05/17/2021 3 21     Immature Grans Absolute 05/17/2021 0 06     Lymphocytes Absolute 05/17/2021 0 83     Monocytes Absolute 05/17/2021 0 45     Eosinophils Absolute 05/17/2021 0 15     Basophils Absolute 05/17/2021 0 02          Radiology Results:   FL UPPER GI UGI    Result Date: 7/19/2021  Narrative: UPPER GI SERIES  DOUBLE CONTRAST INDICATION:  Status post Heller myotomy COMPARISON: 9/18/2020 IMAGES:  42 FLUOROSCOPY TIME:   2 98 minutes TECHNIQUE:  The patient was given effervescent granules and barium  by mouth and images of the esophagus, stomach, and small bowel were obtained  FINDINGS: The esophagus is normal in caliber  Esophageal motility is normal and emptying of contrast from the esophagus is prompt  There is no mucosal mass, ulceration or fold thickening identified  The stomach is unremarkable in size  The gastric mucosa is normal   No penetrating ulcers or masses  Appropriate appearance of the distal esophagus status post Heller myotomy  Contrast empties promptly into the duodenum  The duodenum is normal in caliber  The ligament of Treitz/duodenojejunal junction lies in a normal position  Gastroesophageal reflux was not observed  There is no hiatal hernia  Impression: Unremarkable upper GI status post Heller myotomy  Barium flows freely from the esophagus into the stomach   Workstation performed: [id number]

## 2021-08-16 ENCOUNTER — HOSPITAL ENCOUNTER (OUTPATIENT)
Dept: RADIOLOGY | Facility: HOSPITAL | Age: 66
Discharge: HOME | End: 2021-08-16
Attending: INTERNAL MEDICINE
Payer: MEDICARE

## 2021-08-16 DIAGNOSIS — R13.12 OROPHARYNGEAL DYSPHAGIA: ICD-10-CM

## 2021-08-16 PROCEDURE — 92611 MOTION FLUOROSCOPY/SWALLOW: CPT

## 2021-08-16 PROCEDURE — 74230 X-RAY XM SWLNG FUNCJ C+: CPT

## 2021-08-16 NOTE — PROCEDURES
Speech Pathology Videofluoroscopic Swallow Study    Patient Name: Nathanael Orozco    UHBXY'I Date: 8/16/2021     Problem List  Active Problems:    * No active hospital problems   *    Past Medical History  Past Medical History:   Diagnosis Date    Anxiety     Asthma     controlled    Back complaints     [facility] )     nose    Cellulitis of left lower leg     "not now"    [facility] 02[facility]    Chronic bilateral thoracic back pain     Chronic kidney disease     sees nephrologist regularly" stage 3"    Chronic myofascial pain     Chronic pain of both lower extremities     Chronic pain of left knee     "both knees"    Chronic pain syndrome     bilat legs and knees/neuropathy pain    CPAP (continuous positive airway pressure) dependence     Depression     Diabetes mellitus [facility]     Disease of thyroid gland     Does use hearing aid     bilat and will wear DOS    Gait disorder     Gastroparesis     GERD (gastroesophageal reflux disease)     History of angina     History of MRSA infection     History of transfusion     Many years ago    Hyperlipidemia     Hypertension     Hypoglycemic reaction     "occas low blood sugar"    Insulin pump in place     pt reports saw endocrinologist 4/28 and will bring copy of instructions DOS    Kidney disease     Pacemaker 2019    Polyneuropathy associated with underlying disease ([facility] )     PONV (postoperative nausea and vomiting)     Risk for falls     S/P insertion of spinal cord stimulator 6/8/2018    Sarcoidosis     Shortness of breath     "exertion and not new"    Sleep apnea     TIA (transient ischemic attack)     Use of cane as ambulatory aid     Uses walker     Wears dentures     permanent upper/and some missing teeth/partial lower       Past Surgical History  Past Surgical History:   Procedure Laterality Date    ABDOMINAL ADHESION SURGERY N/A 5/3/2021    Procedure: laparoscopic LYSIS ADHESIONS;  Surgeon: My Oseguera Kennedi Judge MD;  [facility];  Service: Thoracic    BREAST SURGERY      BREAST SURGERY      reduction    CARDIAC PACEMAKER PLACEMENT       SECTION      COLONOSCOPY      DILATION AND CURETTAGE OF UTERUS      "D&E"    HERNIA REPAIR      HYSTERECTOMY      JOINT REPLACEMENT Left     NECK SURGERY      fused 4 discs with 4 screws implanted    NISSEN FUNDOPLICATION LAPAROSCOPIC WITH ROBOTICS N/A 5/3/2021    Procedure: ROBOTIC [address] ;  Surgeon: Carolyn Alvarado MD;  [facility];  Service: Thoracic    KS ESOPHAGOGASTRODUODENOSCOPY TRANSORAL DIAGNOSTIC N/A 5/3/2021    Procedure: ESOPHAGOGASTRODUODENOSCOPY (EGD); Surgeon: Carolyn Alvarado MD;  [facility];  Service: Thoracic    [place] SURG IMPLNT [address] Left 2018    Procedure: Insertion of thoracic spinal cord stimulator electrode via laminotomy and placement of left buttock IPG;  Surgeon: Richie Tong MD;  [facility];  Service: Neurosurgery    REPLACEMENT TOTAL KNEE      ROTATOR CUFF REPAIR      SPINAL STIMULATOR PLACEMENT Left 10/3/2018    Procedure: BUTTOCK RE-OPENING INCISION FOR REPOSITIONING OF IMPLANTABLE PULSE GENERATOR;  Surgeon: Richie Tong MD;  [facility];  Service: Neurosurgery    TONSILLECTOMY      UPPER GASTROINTESTINAL ENDOSCOPY      WISDOM TOOTH EXTRACTION         General Information;  Pt is a 73 y o  female with a PMH remarkable for GERD, type 2 diabetes, and hx of TIA among multiple other diagnoses (see above)  Current concerns for dysphagia include pt report of food sticking in her throat when she swallows  VBS was recommended to assess oropharyngeal stage swallowing skills at this time  Pt was viewed in lateral position and was given trials of pureed, soft moist (apple sauce, sliced banana), solid food (ham sandwich), and thin liquid  A 13mm pill with thin liquid was also administered        FL UPPER GI UGI 21 IMPRESSION:  Unremarkable upper GI status post Heller myotomy  Barium flows freely from the esophagus into the stomach  Oral stage:  Pt presented with mild oral stage dysphagia  Mastication was grossly effective but slow and prolonged with the solid materials administered today  Bolus formation and transfer were slow, piecemeal transfer often observed with solid foods  Oral control appeared adequate with no gross premature spillage over the base of tongue  Pharyngeal stage:  Pt presented with mild pharyngeal dysphagia  Swallowing initiation was prompt  Hyolaryngeal rise and anterior displacement were mildly reduced  Airway closure/protection appeared complete  Tongue base retraction and pharyngeal constriction appeared reduced, with incomplete contact of BOT to posterior pharyngeal wall  This resulted in chronic vallecular retention which was worse with solid foods  Strategies and Efficacy: Liquid wash was most effective in clearing vallecular retention, chin tuck did not result in improved pharyngeal clearance  Aspiration Response and Efficacy: No aspiration occurred on this test      Esophageal stage:  Esophageal screening was completed  Cricopharyngeal prominence was observed  Brief holdup of barium tablet was seen in the upper esophagus, and prolonged stasis of tablet was seen in distal esophagus, ultimately clearing to the stomach with liquid and puree wash  Retropulsion observed, suspect dysmotility  Noted recent achalasia surgery  Assessment Summary:    Pt presents with mild oropharyngeal dysphagia characterized by prolonged mastication and slow, piecemeal transfer of solids, as well as reduced hyolaryngeal elevation, reduced base of tongue retraction and reduced pharyngeal stripping wave resulting in vallecular retention with all consistencies  Vallecular retention was noted to be worse with solid foods  Liquid wash did assist with clearance, however chin tuck did not improve clearance  Pt reports excessive dryness and multiple medications  Suspect this may be related to her dysphagia  Cricopharyngeal prominence was observed, as well as suspected esophageal dysmotility  Airway protection was complete, and no penetration or aspiration occurred on this study  Note: Images are available for review in PACS as desired  Recommendations:   Recommended Diet:  regular diet and thin liquids, moist foods will be easier to swallow, and recommend added liquid supplements such as boost or ensure due to pt reports of weight loss  Recommended Form of Medications: whole with liquid, however noted reduced clearance of barium tablet to stomach, may need to take multiple drinks/apple sauce with pills  Aspiration precautions and compensatory swallowing strategies: upright posture, slow rate of feeding, small bites/sips, effortful swallow and alternating bites and sips  Consider referral to: Continue GI f/u, medication review although pt reported she has tried to cut back on number of meds in the past, please consider referral to OP ST  SLP Dysphagia therapy recommended: OP ST may be beneficial in further discussion of diet modification and/or exercises to strengthen pharyngeal muscles and base of tongue retraction  Results Reviewed with: patient   Pt/Family Education: initiated  Pt and caregivers would benefit from/require continued education

## 2021-08-23 ENCOUNTER — TELEPHONE (OUTPATIENT)
Dept: PAIN MEDICINE | Facility: CLINIC | Age: 66
End: 2021-08-23

## 2021-08-23 DIAGNOSIS — G89.4 PAIN SYNDROME, CHRONIC: Chronic | ICD-10-CM

## 2021-08-23 DIAGNOSIS — M79.18 CHRONIC MYOFASCIAL PAIN: ICD-10-CM

## 2021-08-23 DIAGNOSIS — G90.522 COMPLEX REGIONAL PAIN SYNDROME TYPE 1 OF LEFT LOWER EXTREMITY: Chronic | ICD-10-CM

## 2021-08-23 DIAGNOSIS — G89.29 CHRONIC SCAPULAR PAIN: ICD-10-CM

## 2021-08-23 DIAGNOSIS — G89.29 CHRONIC MYOFASCIAL PAIN: ICD-10-CM

## 2021-08-23 DIAGNOSIS — G89.29 CHRONIC PAIN OF RIGHT KNEE: ICD-10-CM

## 2021-08-23 DIAGNOSIS — Z96.89 S/P INSERTION OF SPINAL CORD STIMULATOR: ICD-10-CM

## 2021-08-23 DIAGNOSIS — G63 POLYNEUROPATHY ASSOCIATED WITH UNDERLYING DISEASE (HCC): Chronic | ICD-10-CM

## 2021-08-23 DIAGNOSIS — M89.8X1 CHRONIC SCAPULAR PAIN: ICD-10-CM

## 2021-08-23 DIAGNOSIS — M54.16 LUMBAR RADICULOPATHY: ICD-10-CM

## 2021-08-23 DIAGNOSIS — M25.561 CHRONIC PAIN OF RIGHT KNEE: ICD-10-CM

## 2021-08-23 DIAGNOSIS — G89.29 CHRONIC PAIN OF LEFT KNEE: ICD-10-CM

## 2021-08-23 DIAGNOSIS — M54.14 THORACIC RADICULOPATHY: ICD-10-CM

## 2021-08-23 DIAGNOSIS — M47.817 LUMBOSACRAL SPONDYLOSIS WITHOUT MYELOPATHY: ICD-10-CM

## 2021-08-23 DIAGNOSIS — M25.562 CHRONIC PAIN OF LEFT KNEE: ICD-10-CM

## 2021-08-23 NOTE — TELEPHONE ENCOUNTER
Spoke with Pt - Pt stating that muscle spasms are really bad    Pt would like to know if we can increase the dose of medicine     Current med Cyclobenzaprine 10mg    [facility]

## 2021-08-24 RX ORDER — CYCLOBENZAPRINE HCL 10 MG
TABLET ORAL
Qty: 120 TABLET | Refills: 4 | Status: SHIPPED | OUTPATIENT
Start: 2021-08-24 | End: 2021-08-24 | Stop reason: SDUPTHER

## 2021-08-24 RX ORDER — CYCLOBENZAPRINE HCL 10 MG
TABLET ORAL
Qty: 120 TABLET | Refills: 4 | Status: SHIPPED | OUTPATIENT
Start: 2021-08-24 | End: 2021-11-02

## 2021-08-24 NOTE — TELEPHONE ENCOUNTER
Can you please call the patient and advise her that she can increase her Cyclobenzaprine to 10 mg QID  I sent a new script with refills to reflect the change so she will not run out  Thank you

## 2021-08-24 NOTE — TELEPHONE ENCOUNTER
S/w pt, advised of above  Cb if se's or questions arise  Pt requested that the rx [facility] in [facility] due to cost  Advised pt, will make DG aware  Pt verbalized understanding and appreciation

## 2021-09-09 ENCOUNTER — TELEPHONE (OUTPATIENT)
Dept: GASTROENTEROLOGY | Facility: CLINIC | Age: 66
End: 2021-09-09

## 2021-09-16 ENCOUNTER — TELEPHONE (OUTPATIENT)
Dept: PAIN MEDICINE | Facility: CLINIC | Age: 66
End: 2021-09-16

## 2021-09-16 NOTE — TELEPHONE ENCOUNTER
S/w Pt - she will be mailing in results from her CT scan    Pt doesn't want to wait for 11/2/2021 appt - She wants Lars to look it over right away

## 2021-10-01 ENCOUNTER — HOSPITAL ENCOUNTER (OUTPATIENT)
Dept: RADIOLOGY | Facility: CLINIC | Age: 66
Discharge: HOME | End: 2021-10-01
Admitting: ANESTHESIOLOGY
Payer: MEDICARE

## 2021-10-01 ENCOUNTER — TELEPHONE (OUTPATIENT)
Dept: SLEEP CENTER | Facility: CLINIC | Age: 66
End: 2021-10-01

## 2021-10-01 VITALS
TEMPERATURE: 97.5 F | DIASTOLIC BLOOD PRESSURE: 76 MMHG | HEART RATE: 85 BPM | RESPIRATION RATE: 98 BRPM | OXYGEN SATURATION: 98 % | SYSTOLIC BLOOD PRESSURE: 144 MMHG

## 2021-10-01 DIAGNOSIS — M48.062 SPINAL STENOSIS OF LUMBAR REGION WITH NEUROGENIC CLAUDICATION: ICD-10-CM

## 2021-10-01 DIAGNOSIS — G89.29 CHRONIC BILATERAL LOW BACK PAIN WITHOUT SCIATICA: ICD-10-CM

## 2021-10-01 DIAGNOSIS — M54.16 LUMBAR RADICULOPATHY: ICD-10-CM

## 2021-10-01 DIAGNOSIS — M54.50 CHRONIC BILATERAL LOW BACK PAIN WITHOUT SCIATICA: ICD-10-CM

## 2021-10-01 PROCEDURE — 62323 NJX INTERLAMINAR LMBR/SAC: CPT | Performed by: ANESTHESIOLOGY

## 2021-10-01 RX ORDER — METHYLPREDNISOLONE ACETATE 80 MG/ML
80 INJECTION, SUSPENSION INTRA-ARTICULAR; INTRALESIONAL; INTRAMUSCULAR; PARENTERAL; SOFT TISSUE ONCE
Status: COMPLETED | OUTPATIENT
Start: 2021-10-01 | End: 2021-10-01

## 2021-10-01 RX ADMIN — METHYLPREDNISOLONE ACETATE 80 MG: 80 INJECTION, SUSPENSION INTRA-ARTICULAR; INTRALESIONAL; INTRAMUSCULAR; SOFT TISSUE at 09:15

## 2021-10-01 RX ADMIN — IOHEXOL 1 ML: 300 INJECTION, SOLUTION INTRAVENOUS at 09:15

## 2021-10-08 ENCOUNTER — TELEPHONE (OUTPATIENT)
Dept: PAIN MEDICINE | Facility: CLINIC | Age: 66
End: 2021-10-08

## 2021-10-15 ENCOUNTER — HOSPITAL ENCOUNTER (OUTPATIENT)
Dept: RADIOLOGY | Facility: CLINIC | Age: 66
Discharge: HOME | End: 2021-10-15
Attending: ANESTHESIOLOGY | Admitting: ANESTHESIOLOGY
Payer: MEDICARE

## 2021-10-15 VITALS
DIASTOLIC BLOOD PRESSURE: 70 MMHG | TEMPERATURE: 97.6 F | RESPIRATION RATE: 20 BRPM | OXYGEN SATURATION: 99 % | HEART RATE: 73 BPM | SYSTOLIC BLOOD PRESSURE: 153 MMHG

## 2021-10-15 DIAGNOSIS — G89.29 CHRONIC BILATERAL LOW BACK PAIN WITHOUT SCIATICA: ICD-10-CM

## 2021-10-15 DIAGNOSIS — M54.16 LUMBAR RADICULOPATHY: ICD-10-CM

## 2021-10-15 DIAGNOSIS — M48.062 SPINAL STENOSIS OF LUMBAR REGION WITH NEUROGENIC CLAUDICATION: ICD-10-CM

## 2021-10-15 DIAGNOSIS — M54.50 CHRONIC BILATERAL LOW BACK PAIN WITHOUT SCIATICA: ICD-10-CM

## 2021-10-15 PROCEDURE — 64484 NJX AA&/STRD TFRM EPI L/S EA: CPT | Performed by: ANESTHESIOLOGY

## 2021-10-15 PROCEDURE — 64483 NJX AA&/STRD TFRM EPI L/S 1: CPT | Performed by: ANESTHESIOLOGY

## 2021-10-15 RX ORDER — PAPAVERINE HCL 150 MG
20 CAPSULE, EXTENDED RELEASE ORAL ONCE
Status: COMPLETED | OUTPATIENT
Start: 2021-10-15 | End: 2021-10-15

## 2021-10-15 RX ADMIN — IOHEXOL 1 ML: 300 INJECTION, SOLUTION INTRAVENOUS at 11:14

## 2021-10-15 RX ADMIN — DEXAMETHASONE SODIUM PHOSPHATE 20 MG: 10 INJECTION, SOLUTION INTRAMUSCULAR; INTRAVENOUS at 11:14

## 2021-10-15 RX ADMIN — LIDOCAINE HYDROCHLORIDE 4 ML: 20 INJECTION, SOLUTION EPIDURAL; INFILTRATION; INTRACAUDAL at 11:14

## 2021-10-22 ENCOUNTER — TELEPHONE (OUTPATIENT)
Dept: PAIN MEDICINE | Facility: CLINIC | Age: 66
End: 2021-10-22

## 2021-10-22 NOTE — TELEPHONE ENCOUNTER
1st attempt  Lm to cb with % improvement and pain level.     S/p Right L4 & L5 TFESI #2 10/15, F/u 11/2

## 2021-10-25 ENCOUNTER — TELEPHONE (OUTPATIENT)
Dept: PAIN MEDICINE | Facility: CLINIC | Age: 66
End: 2021-10-25

## 2021-11-02 ENCOUNTER — OFFICE VISIT (OUTPATIENT)
Dept: PAIN MEDICINE | Facility: CLINIC | Age: 66
End: 2021-11-02
Payer: MEDICARE

## 2021-11-02 VITALS
DIASTOLIC BLOOD PRESSURE: 76 MMHG | TEMPERATURE: 97.3 F | WEIGHT: 170 LBS | BODY MASS INDEX: 31.28 KG/M2 | HEIGHT: 62 IN | HEART RATE: 74 BPM | SYSTOLIC BLOOD PRESSURE: 134 MMHG

## 2021-11-02 DIAGNOSIS — M54.6 CHRONIC BILATERAL THORACIC BACK PAIN: ICD-10-CM

## 2021-11-02 DIAGNOSIS — M79.18 MYOFASCIAL PAIN SYNDROME: ICD-10-CM

## 2021-11-02 DIAGNOSIS — G63 POLYNEUROPATHY ASSOCIATED WITH UNDERLYING DISEASE (HCC): ICD-10-CM

## 2021-11-02 DIAGNOSIS — M48.062 SPINAL STENOSIS OF LUMBAR REGION WITH NEUROGENIC CLAUDICATION: ICD-10-CM

## 2021-11-02 DIAGNOSIS — M54.16 LUMBAR RADICULOPATHY: ICD-10-CM

## 2021-11-02 DIAGNOSIS — R26.9 GAIT DISORDER: ICD-10-CM

## 2021-11-02 DIAGNOSIS — M54.50 CHRONIC BILATERAL LOW BACK PAIN WITHOUT SCIATICA: ICD-10-CM

## 2021-11-02 DIAGNOSIS — G89.29 CHRONIC BILATERAL LOW BACK PAIN WITHOUT SCIATICA: ICD-10-CM

## 2021-11-02 DIAGNOSIS — G90.522 COMPLEX REGIONAL PAIN SYNDROME TYPE 1 OF LEFT LOWER EXTREMITY: ICD-10-CM

## 2021-11-02 DIAGNOSIS — G89.29 CHRONIC BILATERAL THORACIC BACK PAIN: ICD-10-CM

## 2021-11-02 DIAGNOSIS — M54.14 THORACIC RADICULOPATHY: ICD-10-CM

## 2021-11-02 DIAGNOSIS — G89.4 PAIN SYNDROME, CHRONIC: Primary | ICD-10-CM

## 2021-11-02 PROCEDURE — 99214 OFFICE O/P EST MOD 30 MIN: CPT | Performed by: NURSE PRACTITIONER

## 2021-11-02 RX ORDER — CHLORZOXAZONE 500 MG/1
TABLET ORAL
Qty: 120 TABLET | Refills: 1 | Status: SHIPPED | OUTPATIENT
Start: 2021-11-02 | End: 2022-01-31 | Stop reason: SDUPTHER

## 2021-11-03 PROBLEM — G89.29 CHRONIC BILATERAL LOW BACK PAIN WITHOUT SCIATICA: Status: ACTIVE | Noted: 2018-07-06

## 2021-11-10 ENCOUNTER — OFFICE VISIT (OUTPATIENT)
Dept: GASTROENTEROLOGY | Facility: CLINIC | Age: 66
End: 2021-11-10
Payer: MEDICARE

## 2021-11-10 VITALS
HEART RATE: 80 BPM | HEIGHT: 62 IN | SYSTOLIC BLOOD PRESSURE: 124 MMHG | TEMPERATURE: 97.8 F | OXYGEN SATURATION: 98 % | BODY MASS INDEX: 31.83 KG/M2 | DIASTOLIC BLOOD PRESSURE: 70 MMHG | WEIGHT: 173 LBS

## 2021-11-10 DIAGNOSIS — Z12.11 COLON CANCER SCREENING: ICD-10-CM

## 2021-11-10 DIAGNOSIS — K59.00 CONSTIPATION, UNSPECIFIED CONSTIPATION TYPE: Primary | ICD-10-CM

## 2021-11-10 PROCEDURE — 99213 OFFICE O/P EST LOW 20 MIN: CPT | Performed by: INTERNAL MEDICINE

## 2021-11-10 RX ORDER — POLYETHYLENE GLYCOL 3350 17 G/17G
17 POWDER, FOR SOLUTION ORAL DAILY
Qty: 90 EACH | Refills: 3 | Status: SHIPPED | OUTPATIENT
Start: 2021-11-10

## 2021-11-10 RX ORDER — DOCUSATE SODIUM 100 MG/1
100 CAPSULE, LIQUID FILLED ORAL DAILY
Qty: 90 CAPSULE | Refills: 3 | Status: SHIPPED | OUTPATIENT
Start: 2021-11-10

## 2021-11-22 ENCOUNTER — TELEPHONE (OUTPATIENT)
Dept: PAIN MEDICINE | Facility: CLINIC | Age: 66
End: 2021-11-22

## 2021-11-23 ENCOUNTER — TELEPHONE (OUTPATIENT)
Dept: GASTROENTEROLOGY | Facility: CLINIC | Age: 66
End: 2021-11-23

## 2021-11-23 RX ORDER — PANTOPRAZOLE SODIUM 40 MG/1
40 TABLET, DELAYED RELEASE ORAL DAILY
Qty: 30 TABLET | Refills: 5 | Status: CANCELLED | OUTPATIENT
Start: 2021-11-23

## 2021-12-04 ENCOUNTER — HOSPITAL ENCOUNTER (EMERGENCY)
Facility: HOSPITAL | Age: 66
Discharge: HOME | End: 2021-12-04
Attending: EMERGENCY MEDICINE | Admitting: EMERGENCY MEDICINE
Payer: MEDICARE

## 2021-12-04 VITALS
DIASTOLIC BLOOD PRESSURE: 84 MMHG | RESPIRATION RATE: 20 BRPM | SYSTOLIC BLOOD PRESSURE: 199 MMHG | TEMPERATURE: 98 F | HEART RATE: 68 BPM | OXYGEN SATURATION: 100 %

## 2021-12-04 DIAGNOSIS — M79.671 RIGHT FOOT PAIN: Primary | ICD-10-CM

## 2021-12-04 LAB
APTT PPP: 30 SECONDS (ref 23–37)
BASOPHILS # BLD AUTO: 0.02 THOUSANDS/ΜL (ref 0–0.1)
BASOPHILS NFR BLD AUTO: 1 % (ref 0–1)
D DIMER PPP FEU-MCNC: 0.71 UG/ML FEU
EOSINOPHIL # BLD AUTO: 0.07 THOUSAND/ΜL (ref 0–0.61)
EOSINOPHIL NFR BLD AUTO: 2 % (ref 0–6)
ERYTHROCYTE [DISTWIDTH] IN BLOOD BY AUTOMATED COUNT: 13.1 % (ref 11.6–15.1)
HCT VFR BLD AUTO: 30.9 % (ref 34.8–46.1)
HGB BLD-MCNC: 9.7 G/DL (ref 11.5–15.4)
IMM GRANULOCYTES # BLD AUTO: 0.02 THOUSAND/UL (ref 0–0.2)
IMM GRANULOCYTES NFR BLD AUTO: 1 % (ref 0–2)
INR PPP: 1.15 (ref 0.84–1.19)
LYMPHOCYTES # BLD AUTO: 0.94 THOUSANDS/ΜL (ref 0.6–4.47)
LYMPHOCYTES NFR BLD AUTO: 30 % (ref 14–44)
MCH RBC QN AUTO: 30.6 PG (ref 26.8–34.3)
MCHC RBC AUTO-ENTMCNC: 31.4 G/DL (ref 31.4–37.4)
MCV RBC AUTO: 98 FL (ref 82–98)
MONOCYTES # BLD AUTO: 0.43 THOUSAND/ΜL (ref 0.17–1.22)
MONOCYTES NFR BLD AUTO: 14 % (ref 4–12)
NEUTROPHILS # BLD AUTO: 1.61 THOUSANDS/ΜL (ref 1.85–7.62)
NEUTS SEG NFR BLD AUTO: 52 % (ref 43–75)
NRBC BLD AUTO-RTO: 0 /100 WBCS
PLATELET # BLD AUTO: 164 THOUSANDS/UL (ref 149–390)
PMV BLD AUTO: 8.5 FL (ref 8.9–12.7)
PROTHROMBIN TIME: 14.3 SECONDS (ref 11.6–14.5)
RBC # BLD AUTO: 3.17 MILLION/UL (ref 3.81–5.12)
WBC # BLD AUTO: 3.09 THOUSAND/UL (ref 4.31–10.16)

## 2021-12-04 PROCEDURE — 99284 EMERGENCY DEPT VISIT MOD MDM: CPT | Performed by: EMERGENCY MEDICINE

## 2021-12-04 PROCEDURE — 85730 THROMBOPLASTIN TIME PARTIAL: CPT | Performed by: EMERGENCY MEDICINE

## 2021-12-04 PROCEDURE — 85610 PROTHROMBIN TIME: CPT | Performed by: EMERGENCY MEDICINE

## 2021-12-04 PROCEDURE — 36415 COLL VENOUS BLD VENIPUNCTURE: CPT | Performed by: EMERGENCY MEDICINE

## 2021-12-04 PROCEDURE — 99284 EMERGENCY DEPT VISIT MOD MDM: CPT

## 2021-12-04 PROCEDURE — 85379 FIBRIN DEGRADATION QUANT: CPT | Performed by: EMERGENCY MEDICINE

## 2021-12-04 PROCEDURE — 76882 US LMTD JT/FCL EVL NVASC XTR: CPT | Performed by: EMERGENCY MEDICINE

## 2021-12-04 PROCEDURE — 85025 COMPLETE CBC W/AUTO DIFF WBC: CPT | Performed by: EMERGENCY MEDICINE

## 2021-12-04 RX ORDER — RIVAROXABAN 15 MG-20MG
KIT ORAL
Qty: 51 EACH | Refills: 0 | Status: SHIPPED | OUTPATIENT
Start: 2021-12-04 | End: 2022-03-10 | Stop reason: CLARIF

## 2021-12-05 ENCOUNTER — HOSPITAL ENCOUNTER (OUTPATIENT)
Dept: NON INVASIVE DIAGNOSTICS | Facility: HOSPITAL | Age: 66
Discharge: HOME | End: 2021-12-05
Payer: MEDICARE

## 2021-12-05 DIAGNOSIS — M79.671 RIGHT FOOT PAIN: ICD-10-CM

## 2021-12-05 PROCEDURE — 93971 EXTREMITY STUDY: CPT | Performed by: SURGERY

## 2021-12-05 PROCEDURE — 93971 EXTREMITY STUDY: CPT

## 2021-12-06 ENCOUNTER — TELEPHONE (OUTPATIENT)
Dept: GASTROENTEROLOGY | Facility: CLINIC | Age: 66
End: 2021-12-06

## 2021-12-16 ENCOUNTER — OFFICE VISIT (OUTPATIENT)
Dept: CARDIAC SURGERY | Facility: CLINIC | Age: 66
End: 2021-12-16
Payer: MEDICARE

## 2021-12-16 VITALS
SYSTOLIC BLOOD PRESSURE: 140 MMHG | DIASTOLIC BLOOD PRESSURE: 70 MMHG | HEART RATE: 72 BPM | WEIGHT: 183.2 LBS | OXYGEN SATURATION: 95 % | TEMPERATURE: 98.4 F | HEIGHT: 62 IN | BODY MASS INDEX: 33.71 KG/M2 | RESPIRATION RATE: 16 BRPM

## 2021-12-16 DIAGNOSIS — K22.0 ACHALASIA: Primary | ICD-10-CM

## 2021-12-16 PROCEDURE — 99213 OFFICE O/P EST LOW 20 MIN: CPT | Performed by: THORACIC SURGERY (CARDIOTHORACIC VASCULAR SURGERY)

## 2021-12-27 DIAGNOSIS — G47.10 HYPERSOMNIA: ICD-10-CM

## 2021-12-28 ENCOUNTER — TELEPHONE (OUTPATIENT)
Dept: GASTROENTEROLOGY | Facility: CLINIC | Age: 66
End: 2021-12-28

## 2021-12-30 RX ORDER — ARMODAFINIL 250 MG/1
250 TABLET ORAL DAILY
Qty: 90 TABLET | Refills: 1 | Status: SHIPPED | OUTPATIENT
Start: 2021-12-30 | End: 2022-06-30 | Stop reason: SDUPTHER

## 2022-01-03 ENCOUNTER — HOSPITAL ENCOUNTER (OUTPATIENT)
Dept: RADIOLOGY | Facility: HOSPITAL | Age: 67
Discharge: HOME | End: 2022-01-03
Attending: THORACIC SURGERY (CARDIOTHORACIC VASCULAR SURGERY)
Payer: MEDICARE

## 2022-01-03 DIAGNOSIS — K22.0 ACHALASIA: ICD-10-CM

## 2022-01-03 PROCEDURE — 74240 X-RAY XM UPR GI TRC 1CNTRST: CPT

## 2022-01-05 ENCOUNTER — TELEPHONE (OUTPATIENT)
Dept: CARDIAC SURGERY | Facility: CLINIC | Age: 67
End: 2022-01-05

## 2022-01-05 NOTE — TELEPHONE ENCOUNTER
Patient called asking to speak with Dr Kathlean Holstein about the results of her Upper GI on 1/3/22   Call back number [phone]

## 2022-01-07 ENCOUNTER — TELEPHONE (OUTPATIENT)
Dept: CARDIAC SURGERY | Facility: CLINIC | Age: 67
End: 2022-01-07

## 2022-01-07 ENCOUNTER — TELEPHONE (OUTPATIENT)
Dept: HEMATOLOGY ONCOLOGY | Facility: CLINIC | Age: 67
End: 2022-01-07

## 2022-01-07 NOTE — TELEPHONE ENCOUNTER
Spoke with the patient regarding upper GI   Will plan for EGD and dilation at patient's earliest [address], [place]  Thoracic Surgery  (Available on [facility] Text)  Office: [phone]

## 2022-01-07 NOTE — TELEPHONE ENCOUNTER
Patient is calling in requesting to speak  With nurse for a read of her test results    Best call back number [phone]

## 2022-01-10 ENCOUNTER — OFFICE VISIT (OUTPATIENT)
Dept: CARDIAC SURGERY | Facility: CLINIC | Age: 67
End: 2022-01-10
Payer: MEDICARE

## 2022-01-10 ENCOUNTER — TELEPHONE (OUTPATIENT)
Dept: GASTROENTEROLOGY | Facility: CLINIC | Age: 67
End: 2022-01-10

## 2022-01-10 ENCOUNTER — ANESTHESIA EVENT (OUTPATIENT)
Dept: PERIOP | Facility: HOSPITAL | Age: 67
End: 2022-01-10
Payer: MEDICARE

## 2022-01-10 ENCOUNTER — TELEPHONE (OUTPATIENT)
Dept: GASTROENTEROLOGY | Facility: AMBULARY SURGERY CENTER | Age: 67
End: 2022-01-10

## 2022-01-10 VITALS
HEART RATE: 88 BPM | TEMPERATURE: 97 F | BODY MASS INDEX: 30.36 KG/M2 | RESPIRATION RATE: 18 BRPM | HEIGHT: 62 IN | WEIGHT: 165 LBS | SYSTOLIC BLOOD PRESSURE: 128 MMHG | DIASTOLIC BLOOD PRESSURE: 60 MMHG

## 2022-01-10 DIAGNOSIS — K22.2 ESOPHAGEAL STRICTURE: Primary | ICD-10-CM

## 2022-01-10 DIAGNOSIS — K59.00 CONSTIPATION, UNSPECIFIED CONSTIPATION TYPE: Primary | ICD-10-CM

## 2022-01-10 PROCEDURE — 99213 OFFICE O/P EST LOW 20 MIN: CPT | Performed by: THORACIC SURGERY (CARDIOTHORACIC VASCULAR SURGERY)

## 2022-01-10 RX ORDER — TROSPIUM CHLORIDE 20 MG/1
20 TABLET, FILM COATED ORAL 2 TIMES DAILY
COMMUNITY

## 2022-01-10 RX ORDER — SENNA AND DOCUSATE SODIUM 50; 8.6 MG/1; MG/1
1 TABLET, FILM COATED ORAL DAILY
Qty: 30 TABLET | Refills: 2 | Status: SHIPPED | OUTPATIENT
Start: 2022-01-10 | End: 2022-03-29

## 2022-01-10 RX ORDER — PLECANATIDE 3 MG/1
1 TABLET ORAL DAILY
Qty: 30 TABLET | Refills: 5 | Status: SHIPPED | OUTPATIENT
Start: 2022-01-10 | End: 2022-06-16 | Stop reason: HOSPADM

## 2022-01-10 NOTE — TELEPHONE ENCOUNTER
Patient is aware of attached provider recommendations  **clinical- please initiate prior auth for Trulance    Thank you

## 2022-01-10 NOTE — TELEPHONE ENCOUNTER
Hx constipation, IBS, gerd, gastroparesis, alchalasia    Patient call for constipation and colon prep alternative for 1/20/22 procedure  -patient is aware golytely sent to her pharmacy  Last BM 4 days ago  Takes miralax and colace once daily  Increased miralax to 3 times daily x 4 days, some po fluids, 3 dulcolax and a half bottle of mag citrate yesterday with no improvement  Patient has tried multiple prescription laxatives with no benefit  Discussed with patient the importance of increasing po fluids to 64 oz daily and eating foods to encourage BM since she is taking medications that contribute to constipation  Continue taking miralax and stool softener 2-3 times daily to maintain normal bowel regimen  Finish remainder of mag citrate if needed  Any other recommendations?

## 2022-01-10 NOTE — PROGRESS NOTES
Thoracic Follow-Up  Assessment/Plan:    Esophageal stricture  Urban Camarillo is a 74 yo F who is well known to me  She underwent a robotic Heller myotomy with Kareem fundoplication in May of 6845  Today in the office, we discussed proceeding with an EGD with dilation  We discussed the risks associated with the procedure  She is willing to proceed  Consent was obtained in the office today  She is not taking any anticoagulation and no new labs will be needed prior to the procedure  We will proceed on 01/12/2022  Errol Chu MD  Thoracic Surgery  (Available on [facility] [employer])  Office: [phone]         Diagnoses and all orders for this visit:    Esophageal stricture  -     Case request operating room: ESOPHAGOGASTRODUODENOSCOPY (EGD), dilation; Standing  -     Case request operating room: ESOPHAGOGASTRODUODENOSCOPY (EGD), dilation    Other orders  -     trospium chloride (SANCTURA) 20 mg tablet; Take 20 mg by mouth 2 (two) times a day          Thoracic History   Diagnosis: Achalasia  Procedure: Robotic heller myotomy, Kareem fundoplication, laparoscopic lysis of adhesions 5/3/21       Subjective:    Patient ID: Raúl Askew is a 73 y o  female  HPI  Urban Camarillo is a 74 yo F who is well known to me  She underwent a robotic Heller myotomy with Kareem fundoplication in May of 5840  She initially had symptom relief but then developed a significant amount of reflux  I sent her for an upper GI, which I personally reviewed in PACS  She has a Schatzki's ring in her distal esophagus as well as a small diverticulum  She presents my office today for updated H&P and consent for an EGD with dilation  Her symptoms remain the same  She denies any weight loss  She does report that she has a urinary tract infection currently which she is getting antibiotics for  She is still having significant dysphagia as well as significant regurgitation and reflux  She is unable to eat most foods      The following portions of the patient's history were reviewed and updated as appropriate: allergies, current medications, past family history, past medical history, past social history, past surgical history and problem list     Review of Systems   Constitutional: Negative for chills, fatigue, fever and unexpected weight change  HENT: Positive for trouble swallowing  Eyes: Negative  Negative for visual disturbance  Respiratory: Negative for cough, shortness of breath and stridor  Cardiovascular: Negative for chest pain  Gastrointestinal: Positive for nausea  Endocrine: Negative  Genitourinary: Negative  Musculoskeletal: Negative  Skin: Negative  Neurological: Negative for dizziness, light-headedness and headaches  Hematological: Negative for adenopathy  Psychiatric/Behavioral: Negative  Objective:   Physical Exam  Vitals and nursing note reviewed  Constitutional:       General: She is not in acute distress  Appearance: Normal appearance  She is well-developed  She is not diaphoretic  HENT:      Head: Normocephalic and atraumatic  Nose: Nose normal  No congestion or rhinorrhea  Mouth/Throat:      Mouth: Mucous membranes are moist       Pharynx: Oropharynx is clear  No oropharyngeal exudate  Eyes:      General: No scleral icterus  Pupils: Pupils are equal, round, and reactive to light  Neck:      Trachea: No tracheal deviation  Cardiovascular:      Rate and Rhythm: Normal rate and regular rhythm  Pulses: Normal pulses  Heart sounds: Normal heart sounds  No murmur heard  Pulmonary:      Effort: Pulmonary effort is normal  No respiratory distress  Breath sounds: Normal breath sounds  No stridor  No wheezing or rales  Chest:      Chest wall: No tenderness  Abdominal:      General: Bowel sounds are normal  There is no distension  Palpations: Abdomen is soft  Tenderness: There is no abdominal tenderness  There is no rebound     Musculoskeletal:         General: Normal range of motion  Cervical back: Normal range of motion and neck supple  No muscular tenderness  Lymphadenopathy:      Cervical: No cervical adenopathy  Skin:     General: Skin is warm and dry  Coloration: Skin is not jaundiced or pale  Findings: No erythema or rash  Neurological:      General: No focal deficit present  Mental Status: She is alert and oriented to person, place, and time  Psychiatric:         Mood and Affect: Mood normal          Behavior: Behavior normal          Thought Content: Thought content normal          Judgment: Judgment normal      /60   Pulse 88   Temp (!) 97 °F (36 1 °C)   Resp 18   Ht 5' 2" (1 575 m)   Wt 74 8 kg (165 lb)   BMI 30 18 kg/m²     No Chest XR results available for this patient  No CT Chest results available for this patient  No CT Chest,Abdomen,Pelvis results available for this patient  No NM PET CT results available for this patient  FL esophagram complete    Result Date: 5/11/2021  Narrative BARIUM SWALLOW-ESOPHAGRAM INDICATION:   post op pain  COMPARISON:  5/4/2020 IMAGES:  26 FLUOROSCOPY TIME:   0 8min  TECHNIQUE: The patient was given effervescent granules Omnipaque 350 and barium by mouth and images of the esophagus were obtained  FINDINGS: The esophagus is normal in caliber  Esophageal motility is normal and emptying of contrast from the esophagus is prompt  No mucosal lesion, ulceration or evidence of fold thickening is seen  Gastroesophageal reflux was not observed  There is no hiatal hernia  Impression Unremarkable esophagram  Workstation performed: [id number]     FL esophagram complete    Result Date: 5/4/2021  Narrative BARIUM SWALLOW-ESOPHAGRAM INDICATION:   routine study s/p Heller myotomy - rule out leak  COMPARISON:  9/18/2020 IMAGES: FLUOROSCOPY TIME:   1 minute  TECHNIQUE: The patient was given thin barium by mouth and images of the esophagus were obtained   FINDINGS: Cervical plate and screw hardware noted  Thoracic spinal cord stimulator leads noted  Pacemaker leads noted  The esophagus is normal in caliber  Esophageal motility is normal and emptying of contrast from the esophagus is prompt  No mucosal lesion, ulceration or evidence of fold thickening is seen  No extraluminal contrast noted  Gastroesophageal reflux was not observed  There is no hiatal hernia  Impression No evidence of esophageal leak  Workstation performed: [id number]     FL barium swallow video w speech    Result Date: 8/16/2021  Narrative A video barium swallow study was performed by the Department of Speech Pathology  Please refer to the report for the official interpretation  The images are stored for archival purposes only  Study images were not formally reviewed by the Radiology Department

## 2022-01-10 NOTE — TELEPHONE ENCOUNTER
Looks like pt has tried and failed linzess and amitiza in the past  Will place order for trulance but this may need prior auth  I will send in senna for her to start in the meantime in case the trulance is not covered and needs prior auth

## 2022-01-10 NOTE — ASSESSMENT & PLAN NOTE
Thai Forde is a 72 yo F who is well known to me  She underwent a robotic Heller myotomy with Kareem fundoplication in May of 2771  Today in the office, we discussed proceeding with an EGD with dilation  We discussed the risks associated with the procedure  She is willing to proceed  Consent was obtained in the office today  She is not taking any anticoagulation and no new labs will be needed prior to the procedure  We will proceed on 01/12/2022      Lukas Franks MD  Thoracic Surgery  (Available on [facility] [employer])  Office: [phone]

## 2022-01-10 NOTE — H&P (VIEW-ONLY)
Thoracic Follow-Up  Assessment/Plan:    Esophageal stricture  Waleska Hammonds is a 74 yo F who is well known to me  She underwent a robotic Heller myotomy with Kareem fundoplication in May of 8848  Today in the office, we discussed proceeding with an EGD with dilation  We discussed the risks associated with the procedure  She is willing to proceed  Consent was obtained in the office today  She is not taking any anticoagulation and no new labs will be needed prior to the procedure  We will proceed on 01/12/2022  Grady Conway MD  Thoracic Surgery  (Available on [facility] [employer])  Office: [phone]         Diagnoses and all orders for this visit:    Esophageal stricture  -     Case request operating room: ESOPHAGOGASTRODUODENOSCOPY (EGD), dilation; Standing  -     Case request operating room: ESOPHAGOGASTRODUODENOSCOPY (EGD), dilation    Other orders  -     trospium chloride (SANCTURA) 20 mg tablet; Take 20 mg by mouth 2 (two) times a day          Thoracic History   Diagnosis: Achalasia  Procedure: Robotic heller myotomy, Kareem fundoplication, laparoscopic lysis of adhesions 5/3/21       Subjective:    Patient ID: Bryan Taylor is a 73 y o  female  HPI  Waleska Hammonds is a 74 yo F who is well known to me  She underwent a robotic Heller myotomy with Kareem fundoplication in May of 2422  She initially had symptom relief but then developed a significant amount of reflux  I sent her for an upper GI, which I personally reviewed in PACS  She has a Schatzki's ring in her distal esophagus as well as a small diverticulum  She presents my office today for updated H&P and consent for an EGD with dilation  Her symptoms remain the same  She denies any weight loss  She does report that she has a urinary tract infection currently which she is getting antibiotics for  She is still having significant dysphagia as well as significant regurgitation and reflux  She is unable to eat most foods      The following portions of the patient's history were reviewed and updated as appropriate: allergies, current medications, past family history, past medical history, past social history, past surgical history and problem list     Review of Systems   Constitutional: Negative for chills, fatigue, fever and unexpected weight change  HENT: Positive for trouble swallowing  Eyes: Negative  Negative for visual disturbance  Respiratory: Negative for cough, shortness of breath and stridor  Cardiovascular: Negative for chest pain  Gastrointestinal: Positive for nausea  Endocrine: Negative  Genitourinary: Negative  Musculoskeletal: Negative  Skin: Negative  Neurological: Negative for dizziness, light-headedness and headaches  Hematological: Negative for adenopathy  Psychiatric/Behavioral: Negative  Objective:   Physical Exam  Vitals and nursing note reviewed  Constitutional:       General: She is not in acute distress  Appearance: Normal appearance  She is well-developed  She is not diaphoretic  HENT:      Head: Normocephalic and atraumatic  Nose: Nose normal  No congestion or rhinorrhea  Mouth/Throat:      Mouth: Mucous membranes are moist       Pharynx: Oropharynx is clear  No oropharyngeal exudate  Eyes:      General: No scleral icterus  Pupils: Pupils are equal, round, and reactive to light  Neck:      Trachea: No tracheal deviation  Cardiovascular:      Rate and Rhythm: Normal rate and regular rhythm  Pulses: Normal pulses  Heart sounds: Normal heart sounds  No murmur heard  Pulmonary:      Effort: Pulmonary effort is normal  No respiratory distress  Breath sounds: Normal breath sounds  No stridor  No wheezing or rales  Chest:      Chest wall: No tenderness  Abdominal:      General: Bowel sounds are normal  There is no distension  Palpations: Abdomen is soft  Tenderness: There is no abdominal tenderness  There is no rebound     Musculoskeletal:         General: Normal range of motion  Cervical back: Normal range of motion and neck supple  No muscular tenderness  Lymphadenopathy:      Cervical: No cervical adenopathy  Skin:     General: Skin is warm and dry  Coloration: Skin is not jaundiced or pale  Findings: No erythema or rash  Neurological:      General: No focal deficit present  Mental Status: She is alert and oriented to person, place, and time  Psychiatric:         Mood and Affect: Mood normal          Behavior: Behavior normal          Thought Content: Thought content normal          Judgment: Judgment normal      /60   Pulse 88   Temp (!) 97 °F (36 1 °C)   Resp 18   Ht 5' 2" (1 575 m)   Wt 74 8 kg (165 lb)   BMI 30 18 kg/m²     No Chest XR results available for this patient  No CT Chest results available for this patient  No CT Chest,Abdomen,Pelvis results available for this patient  No NM PET CT results available for this patient  FL esophagram complete    Result Date: 5/11/2021  Narrative BARIUM SWALLOW-ESOPHAGRAM INDICATION:   post op pain  COMPARISON:  5/4/2020 IMAGES:  26 FLUOROSCOPY TIME:   0 8min  TECHNIQUE: The patient was given effervescent granules Omnipaque 350 and barium by mouth and images of the esophagus were obtained  FINDINGS: The esophagus is normal in caliber  Esophageal motility is normal and emptying of contrast from the esophagus is prompt  No mucosal lesion, ulceration or evidence of fold thickening is seen  Gastroesophageal reflux was not observed  There is no hiatal hernia  Impression Unremarkable esophagram  Workstation performed: [id number]     FL esophagram complete    Result Date: 5/4/2021  Narrative BARIUM SWALLOW-ESOPHAGRAM INDICATION:   routine study s/p Heller myotomy - rule out leak  COMPARISON:  9/18/2020 IMAGES: FLUOROSCOPY TIME:   1 minute  TECHNIQUE: The patient was given thin barium by mouth and images of the esophagus were obtained   FINDINGS: Cervical plate and screw hardware noted  Thoracic spinal cord stimulator leads noted  Pacemaker leads noted  The esophagus is normal in caliber  Esophageal motility is normal and emptying of contrast from the esophagus is prompt  No mucosal lesion, ulceration or evidence of fold thickening is seen  No extraluminal contrast noted  Gastroesophageal reflux was not observed  There is no hiatal hernia  Impression No evidence of esophageal leak  Workstation performed: [id number]     FL barium swallow video w speech    Result Date: 8/16/2021  Narrative A video barium swallow study was performed by the Department of Speech Pathology  Please refer to the report for the official interpretation  The images are stored for archival purposes only  Study images were not formally reviewed by the Radiology Department

## 2022-01-10 NOTE — TELEPHONE ENCOUNTER
Our mutual patient is scheduled for procedure: On: 01/20/22      With: Dr Stan Thomson is taking the following blood thinner:   Xarelto     Can this be stopped ___2___ days prior to the procedure?       Physician Approving clearance: ________________________

## 2022-01-10 NOTE — TELEPHONE ENCOUNTER
Patients GI provider:  Dr Amrita oCte     Number to return call: [phone]     Reason for call: Pt calling stated prep is not strong enough and had no BM for the past two days   Would like to know if you can call in a different prep l     Scheduled procedure/appointment date if applicable: Apt/procedure 1/20/22

## 2022-01-11 RX ORDER — LOSARTAN POTASSIUM 50 MG/1
50 TABLET ORAL DAILY
COMMUNITY

## 2022-01-11 RX ORDER — TORSEMIDE 20 MG/1
20 TABLET ORAL DAILY
COMMUNITY

## 2022-01-11 RX ORDER — LEVOTHYROXINE SODIUM 0.1 MG/1
100 TABLET ORAL DAILY
COMMUNITY

## 2022-01-11 NOTE — PRE-PROCEDURE INSTRUCTIONS
Pre-Surgery Instructions:   Medication Instructions    amitriptyline (ELAVIL) 25 mg tablet Take at HS    Armodafinil (Nuvigil) 250 MG tablet Instructed to take per normal schedule except DOS    aspirin (ECOTRIN LOW STRENGTH) 81 mg EC tablet Don't take tomorrow morning DOS    atorvastatin (LIPITOR) 80 mg tablet Take at HS    carvedilol (COREG) 3 125 mg tablet Instructed to take per normal schedule including DOS with sips water    chlorzoxazone (PARAFON FORTE) 500 mg tablet PRN    CRANBERRY PO Don't take tomorrow DOS    Cyanocobalamin (VITAMIN B-12 PO) Don't take tomorrow DOS    dicyclomine (BENTYL) 10 mg capsule Don't take tomorrow DOS    docusate sodium (COLACE) 100 mg capsule Instructed to take per normal schedule including DOS    esomeprazole (NexIUM) 40 MG capsule Instructed to take per normal schedule including DOS with sips water    ferrous sulfate 324 (65 Fe) mg Instructed to take per normal schedule except DOS    fluticasone-salmeterol (ADVAIR) 100-50 mcg/dose inhaler Instructed to take as needed including DOS    levothyroxine 100 mcg tablet Instructed to take per normal schedule including DOS with sips water    losartan (COZAAR) 50 mg tablet Don't take tomorrow DOS    NOVOLOG 100 UNIT/ML injection Bring supply keep basal rate    ondansetron (ZOFRAN) 8 mg tablet Instructed to take as needed including DOS    Plecanatide (Trulance) 3 MG TABS Instructed to take per normal schedule including DOS    polyethylene glycol (MIRALAX) 17 g packet Don't take tomorrow DOS    Potassium Gluconate 595 MG CAPS Don't take tomorrow DOS    pramipexole (MIRAPEX) 0 5 mg tablet Instructed to take per normal schedule including DOS with sips water    pramipexole (MIRAPEX) 1 mg tablet Instructed to take per normal schedule including DOS with sips water    Semaglutide (OZEMPIC, 0 25 OR 0 5 MG/DOSE, SC) Take sundays    senna-docusate sodium (SENOKOT-S) 8 6-50 mg per tablet Instructed to take per normal schedule including DOS    sertraline (ZOLOFT) 50 mg tablet Instructed to take per normal schedule including DOS    torsemide (DEMADEX) 20 mg tablet Don't take tomorrow DOS    trospium chloride (SANCTURA) 20 mg tablet Don't take tomorrow DOS   Have you had / have a sore throat? No  Have you had / have a cough less than 1 week? No  Have you had / have a fever greater than 100 0 - 100  4? No  Are you experiencing any shortness of breath? No  Review with patient via phone medications and showering instructions  Advised don't take NSAID's, ok tylenol products  Advised ASC call with surgery schedule time, nothing eat or drink after midnight  Verbalized understanding

## 2022-01-11 NOTE — TELEPHONE ENCOUNTER
Spoke to pt  She stated she was given Xarelto in the ER but she never picked up  She is not taking the Xarelto

## 2022-01-12 ENCOUNTER — ANESTHESIA (OUTPATIENT)
Dept: PERIOP | Facility: HOSPITAL | Age: 67
End: 2022-01-12
Payer: MEDICARE

## 2022-01-12 ENCOUNTER — HOSPITAL ENCOUNTER (OUTPATIENT)
Facility: HOSPITAL | Age: 67
Setting detail: OUTPATIENT SURGERY
Discharge: HOME | End: 2022-01-12
Attending: THORACIC SURGERY (CARDIOTHORACIC VASCULAR SURGERY) | Admitting: THORACIC SURGERY (CARDIOTHORACIC VASCULAR SURGERY)
Payer: MEDICARE

## 2022-01-12 ENCOUNTER — APPOINTMENT (OUTPATIENT)
Dept: RADIOLOGY | Facility: HOSPITAL | Age: 67
End: 2022-01-12
Payer: MEDICARE

## 2022-01-12 VITALS
SYSTOLIC BLOOD PRESSURE: 121 MMHG | TEMPERATURE: 98.6 F | HEIGHT: 63 IN | WEIGHT: 165 LBS | DIASTOLIC BLOOD PRESSURE: 68 MMHG | OXYGEN SATURATION: 97 % | BODY MASS INDEX: 29.23 KG/M2 | RESPIRATION RATE: 16 BRPM | HEART RATE: 88 BPM

## 2022-01-12 DIAGNOSIS — K22.2 ESOPHAGEAL STRICTURE: Primary | ICD-10-CM

## 2022-01-12 LAB
GLUCOSE SERPL-MCNC: 141 MG/DL (ref 65–140)
GLUCOSE SERPL-MCNC: 146 MG/DL (ref 65–140)

## 2022-01-12 PROCEDURE — 71045 X-RAY EXAM CHEST 1 VIEW: CPT

## 2022-01-12 PROCEDURE — 82948 REAGENT STRIP/BLOOD GLUCOSE: CPT

## 2022-01-12 PROCEDURE — C1769 GUIDE WIRE: HCPCS | Performed by: THORACIC SURGERY (CARDIOTHORACIC VASCULAR SURGERY)

## 2022-01-12 PROCEDURE — 43248 EGD GUIDE WIRE INSERTION: CPT | Performed by: THORACIC SURGERY (CARDIOTHORACIC VASCULAR SURGERY)

## 2022-01-12 RX ORDER — ROCURONIUM BROMIDE 10 MG/ML
INJECTION, SOLUTION INTRAVENOUS AS NEEDED
Status: DISCONTINUED | OUTPATIENT
Start: 2022-01-12 | End: 2022-01-12

## 2022-01-12 RX ORDER — DIPHENHYDRAMINE HYDROCHLORIDE 50 MG/ML
INJECTION INTRAMUSCULAR; INTRAVENOUS AS NEEDED
Status: DISCONTINUED | OUTPATIENT
Start: 2022-01-12 | End: 2022-01-12

## 2022-01-12 RX ORDER — PROPOFOL 10 MG/ML
INJECTION, EMULSION INTRAVENOUS AS NEEDED
Status: DISCONTINUED | OUTPATIENT
Start: 2022-01-12 | End: 2022-01-12

## 2022-01-12 RX ORDER — ONDANSETRON 2 MG/ML
4 INJECTION INTRAMUSCULAR; INTRAVENOUS ONCE AS NEEDED
Status: DISCONTINUED | OUTPATIENT
Start: 2022-01-12 | End: 2022-01-12 | Stop reason: HOSPADM

## 2022-01-12 RX ORDER — FAMOTIDINE 20 MG/1
20 TABLET, FILM COATED ORAL 2 TIMES DAILY
Qty: 180 TABLET | Refills: 0 | Status: SHIPPED | OUTPATIENT
Start: 2022-01-12 | End: 2022-04-05 | Stop reason: SDUPTHER

## 2022-01-12 RX ORDER — EPHEDRINE SULFATE 50 MG/ML
INJECTION INTRAVENOUS AS NEEDED
Status: DISCONTINUED | OUTPATIENT
Start: 2022-01-12 | End: 2022-01-12

## 2022-01-12 RX ORDER — HYDROMORPHONE HCL/PF 1 MG/ML
0.4 SYRINGE (ML) INJECTION
Status: DISCONTINUED | OUTPATIENT
Start: 2022-01-12 | End: 2022-01-12 | Stop reason: HOSPADM

## 2022-01-12 RX ORDER — LIDOCAINE HYDROCHLORIDE 10 MG/ML
INJECTION, SOLUTION EPIDURAL; INFILTRATION; INTRACAUDAL; PERINEURAL AS NEEDED
Status: DISCONTINUED | OUTPATIENT
Start: 2022-01-12 | End: 2022-01-12

## 2022-01-12 RX ORDER — SODIUM CHLORIDE, SODIUM LACTATE, POTASSIUM CHLORIDE, CALCIUM CHLORIDE 600; 310; 30; 20 MG/100ML; MG/100ML; MG/100ML; MG/100ML
INJECTION, SOLUTION INTRAVENOUS CONTINUOUS PRN
Status: DISCONTINUED | OUTPATIENT
Start: 2022-01-12 | End: 2022-01-12

## 2022-01-12 RX ORDER — FENTANYL CITRATE 50 UG/ML
INJECTION, SOLUTION INTRAMUSCULAR; INTRAVENOUS AS NEEDED
Status: DISCONTINUED | OUTPATIENT
Start: 2022-01-12 | End: 2022-01-12

## 2022-01-12 RX ORDER — MIDAZOLAM HYDROCHLORIDE 2 MG/2ML
INJECTION, SOLUTION INTRAMUSCULAR; INTRAVENOUS AS NEEDED
Status: DISCONTINUED | OUTPATIENT
Start: 2022-01-12 | End: 2022-01-12

## 2022-01-12 RX ORDER — FENTANYL CITRATE/PF 50 MCG/ML
25 SYRINGE (ML) INJECTION
Status: DISCONTINUED | OUTPATIENT
Start: 2022-01-12 | End: 2022-01-12 | Stop reason: HOSPADM

## 2022-01-12 RX ORDER — ONDANSETRON 2 MG/ML
INJECTION INTRAMUSCULAR; INTRAVENOUS AS NEEDED
Status: DISCONTINUED | OUTPATIENT
Start: 2022-01-12 | End: 2022-01-12

## 2022-01-12 RX ADMIN — ROCURONIUM BROMIDE 50 MG: 50 INJECTION, SOLUTION INTRAVENOUS at 08:29

## 2022-01-12 RX ADMIN — SUGAMMADEX 100 MG: 100 INJECTION, SOLUTION INTRAVENOUS at 09:01

## 2022-01-12 RX ADMIN — EPHEDRINE SULFATE 5 MG: 50 INJECTION, SOLUTION INTRAVENOUS at 08:38

## 2022-01-12 RX ADMIN — FENTANYL CITRATE 25 MCG: 50 INJECTION INTRAMUSCULAR; INTRAVENOUS at 08:54

## 2022-01-12 RX ADMIN — MIDAZOLAM 2 MG: 1 INJECTION INTRAMUSCULAR; INTRAVENOUS at 08:22

## 2022-01-12 RX ADMIN — EPHEDRINE SULFATE 10 MG: 50 INJECTION, SOLUTION INTRAVENOUS at 08:32

## 2022-01-12 RX ADMIN — SUGAMMADEX 200 MG: 100 INJECTION, SOLUTION INTRAVENOUS at 08:59

## 2022-01-12 RX ADMIN — SODIUM CHLORIDE, SODIUM LACTATE, POTASSIUM CHLORIDE, AND CALCIUM CHLORIDE: .6; .31; .03; .02 INJECTION, SOLUTION INTRAVENOUS at 08:17

## 2022-01-12 RX ADMIN — FENTANYL CITRATE 25 MCG: 50 INJECTION INTRAMUSCULAR; INTRAVENOUS at 08:52

## 2022-01-12 RX ADMIN — ONDANSETRON 4 MG: 2 INJECTION INTRAMUSCULAR; INTRAVENOUS at 08:41

## 2022-01-12 RX ADMIN — DIPHENHYDRAMINE HYDROCHLORIDE 12.5 MG: 50 INJECTION, SOLUTION INTRAMUSCULAR; INTRAVENOUS at 08:41

## 2022-01-12 RX ADMIN — PROPOFOL 120 MG: 10 INJECTION, EMULSION INTRAVENOUS at 08:29

## 2022-01-12 RX ADMIN — FENTANYL CITRATE 50 MCG: 50 INJECTION INTRAMUSCULAR; INTRAVENOUS at 08:29

## 2022-01-12 RX ADMIN — LIDOCAINE HYDROCHLORIDE 50 MG: 10 INJECTION, SOLUTION EPIDURAL; INFILTRATION; INTRACAUDAL; PERINEURAL at 08:29

## 2022-01-12 NOTE — OP NOTE
PERATIVE REPORT  PATIENT NAME: Nina Jessica    :  1955  MRN: [id number]  Pt Location:  OR ROOM 08    SURGERY DATE: 2022    Surgeon(s) and Role:     * Edith Scott MD - Primary     * Christine Garcia MD - Assisting    Preop Diagnosis:  Esophageal stricture [K22 2]    Post-Op Diagnosis Codes:     * Esophageal stricture [K22 2]    Procedure(s) (LRB):  ESOPHAGOGASTRODUODENOSCOPY (EGD), (N/A)  DILATATION ESOPHAGEAL (N/A)    Specimen(s):  * No specimens in log *    Estimated Blood Loss:   Minimal    Drains:  * No LDAs found *    Anesthesia Type:   General    Operative Indications:  Esophageal stricture [K22 2]    Operative Findings:  Stricture just proximal to the GE jxn    Complications:   None    Procedure and Technique:  Nina Jessica was brought to the operating room and placed in the supine position  After institution of adequate general anesthesia, fiberoptic endoscopy is performed  The scope was passed through the esophagus and a small narrowing was identified just proximal to the GEJxn  This was mildly narrowed  The scope was then advanced through the remaining esophagus and stomach and down to the pylorus  The duodenum was then entered without any difficulty  The esophagus contained a small amount of retained liquid and was dilated  The stomach distended easily  The wrap was intact on retroflexion  The pylorus appeared to be functioning normally  The proximal duodenum appeared normal  The scope was brought back into the esophagus and a guidewire was placed through the scope and the scope was removed over the guidewire  Serial dilations up to [address] [place] were performed  Repeat endoscopy demonstrated no bleeding or signs of perforation  The excess air was suctioned from the GI tract and removed  The patient was extubated and brought to the recovery in stable condition having tolerated the procedure well             I was present for the entire procedure    Patient Disposition:  PACU  and hemodynamically stable      SIGNATURE: Sofia Seip, MD  DATE: January 12, 2022  TIME: 9:26 AM

## 2022-01-12 NOTE — ANESTHESIA PREPROCEDURE EVALUATION
Procedure:  ESOPHAGOGASTRODUODENOSCOPY (EGD), (N/A Esophagus)  DILATATION ESOPHAGEAL (N/A Esophagus)    Relevant Problems   ANESTHESIA   (+) PONV (postoperative nausea and vomiting)      CARDIO   (+) BBB (bundle branch block)   (+) Chronic bilateral thoracic back pain   (+) Chronic scapular pain   (+) Coronary artery disease involving native coronary artery   (+) Deep venous thrombosis (HCC)   (+) Essential hypertension   (+) Mixed hyperlipidemia      ENDO   (+) Primary hyperparathyroidism (HCC)   (+) Primary hypothyroidism   (+) Type 2 diabetes mellitus with microalbuminuria, with long-term current use of insulin (HCC)   (+) Type 2 diabetes mellitus without complication, with long-term current use of insulin (HCC)      GI/HEPATIC   (+) GERD (gastroesophageal reflux disease)      /RENAL   (+) JULIANNE (acute kidney injury) (HCC)      HEMATOLOGY   (+) Anemia of chronic disease   (+) Microcytic anemia   (+) Normocytic anemia      MUSCULOSKELETAL   (+) Chronic bilateral low back pain without sciatica   (+) Chronic bilateral thoracic back pain   (+) Localized, primary osteoarthritis   (+) Lumbosacral spondylosis without myelopathy   (+) Osteoarthritis of knee      NEURO/PSYCH   (+) Anxiety and depression   (+) Chronic bilateral thoracic back pain   (+) Complex regional pain syndrome type 1 of left lower extremity   (+) History of TIA (transient ischemic attack)   (+) Pain syndrome, chronic      PULMONARY   (+) Moderate persistent asthma with acute exacerbation   (+) Moderate persistent asthma without complication   (+) Obstructive sleep apnea        Physical Exam    Airway    Mallampati score: II  TM Distance: >3 FB  Neck ROM: full     Dental   No notable dental hx     Cardiovascular      Pulmonary      Other Findings        Anesthesia Plan  ASA Score- 3     Anesthesia Type- general with ASA Monitors  Additional Monitors:   Airway Plan: ETT  Plan Factors-    Chart reviewed  Existing labs reviewed   Patient summary reviewed  Patient is a current smoker (vapes medical marijuana)  Patient did not smoke on day of surgery  Obstructive sleep apnea risk education given perioperatively  Induction- intravenous  Postoperative Plan- Plan for postoperative opioid use  Planned trial extubation    Informed Consent- Anesthetic plan and risks discussed with patient  I personally reviewed this patient with the CRNA  Discussed and agreed on the Anesthesia Plan with the CRNA  Tang Castillo

## 2022-01-12 NOTE — INTERVAL H&P NOTE
H&P reviewed  After examining the patient I find no changes in the patients condition since the H&P had been written  Vitals:    01/12/22 0654   BP: 170/67   Pulse: 71   Resp: 16   Temp: (!) 97 3 °F (36 3 °C)   SpO2: 100%     Safe to proceed   EGD and dilation    Elmer Valdez MD  Thoracic Surgery  (Available on [employer])  Office: [phone]

## 2022-01-12 NOTE — DISCHARGE INSTRUCTIONS
Change insulin pump settings  Call if you cough up more than a tablespoon of blood  Call for temp >101, shortness of breath or chest pain      Upper Endoscopy   WHAT YOU NEED TO KNOW:   An upper endoscopy is also called an upper gastrointestinal (GI) endoscopy, or an esophagogastroduodenoscopy (EGD)  You may feel bloated, gassy, or have some abdominal discomfort after your procedure  Your throat may be sore for 24 to 36 hours  You may burp or pass gas from air that is still inside your body  DISCHARGE INSTRUCTIONS:   Call 911 if:   · You have sudden chest pain or trouble breathing  Seek care immediately if:   · You feel dizzy or faint  · You have trouble swallowing  · You have severe throat pain  · Your bowel movements are very dark or black  · Your abdomen is hard and firm and you have severe pain  · You vomit blood  Contact your healthcare provider if:   · You feel full or bloated and cannot burp or pass gas  · You have not had a bowel movement for 3 days after your procedure  · You have neck pain  · You have a fever or chills  · You have nausea or are vomiting  · You have a rash or hives  · You have questions or concerns about your endoscopy  Relieve a sore throat:  Suck on throat lozenges or crushed ice  Gargle with a small amount of warm salt water  Mix 1 teaspoon of salt and 1 cup of warm water to make salt water  Relieve gas and discomfort from bloating:  Lie on your right side with a heating pad on your abdomen  Take short walks to help pass gas  Eat small meals until bloating is relieved  Rest after your procedure:  Do not drive or make important decisions until the day after your procedure  Return to your normal activity as directed  You can usually return to work the day after your procedure  Follow up with your healthcare provider as directed:  Write down your questions so you remember to ask them during your visits     © Copyright [employer] 2021 Information is for End User's use only and may not be sold, redistributed or otherwise used for commercial purposes  All illustrations and images included in CareNotes® are the copyrighted property of A D A M , Inc  or Sid Prater  The above information is an  only  It is not intended as medical advice for individual conditions or treatments  Talk to your doctor, nurse or pharmacist before following any medical regimen to see if it is safe and effective for you

## 2022-01-12 NOTE — PERIOPERATIVE NURSING NOTE
VSS, pt denies pain or nausea, assessment unchanged, report called, no questions, pt transferred to [facility]

## 2022-01-12 NOTE — ANESTHESIA POSTPROCEDURE EVALUATION
Post-Op Assessment Note    CV Status:  Stable  Pain Score: 0    Pain management: adequate     Mental Status:  Alert and awake   Hydration Status:  Euvolemic   PONV Controlled:  Controlled   Airway Patency:  Patent      Post Op Vitals Reviewed: Yes      Staff: CRNA         No complications documented      /66 (01/12/22 0908)    Temp 98 2 °F (36 8 °C) (01/12/22 0908)    Pulse 67 (01/12/22 0908)   Resp 14 (01/12/22 0908)    SpO2 100 % (01/12/22 0908)

## 2022-01-13 ENCOUNTER — TELEPHONE (OUTPATIENT)
Dept: HEMATOLOGY ONCOLOGY | Facility: CLINIC | Age: 67
End: 2022-01-13

## 2022-01-13 NOTE — TELEPHONE ENCOUNTER
Gricel Ortiz is calling in stating that she had surgery yesterday & needs to schedule a 2 week post op visit with Dr Nataliia Ashley  I looked at Dr Edward Ashraf schedule and her first available is not until the 2nd week of February  Please review & give Gricel Ortiz a call back in regards to a post op appt

## 2022-01-14 NOTE — TELEPHONE ENCOUNTER
The prior Auth was denied on covermymeds saying "We denied this request under Medicare Part D because: The requested drug is not on your plan's formulary (list of covered drugs)  Your Medicare Part D drug plan was asked to cover a drug that is not on the formulary (this is called a formulary exception)  " they want the pt to try Linzess(quantity limit of 30 tablets per 30 days) before trying anything else

## 2022-01-17 ENCOUNTER — NURSE TRIAGE (OUTPATIENT)
Dept: OTHER | Facility: OTHER | Age: 67
End: 2022-01-17

## 2022-01-17 NOTE — TELEPHONE ENCOUNTER
Regarding: "I have questions about my colonoscopy prep "  ----- Message from Angie Flynn RN sent at 1/17/2022 10:29 AM EST -----  Pt has colonoscopy on Wednesday; doing alternate prep  Questioning if she can mix with Sprite instead of water, has done this previously

## 2022-01-17 NOTE — TELEPHONE ENCOUNTER
Reason for Disposition   Caller has URGENT medicine question about med that PCP or specialist prescribed and triager unable to answer question    Answer Assessment - Initial Assessment Questions  1  NAME of MEDICATION: "What medicine are you calling about?"      Golytely-patient wanted to know if she can mix anything with it to make it taste better, mentioned maninder, but was open to other suggestions  Patient also stated that she needed instructions for her prep for her colon on Wednesday      Protocols used: MEDICATION QUESTION CALL-ADULT-[place]

## 2022-01-17 NOTE — TELEPHONE ENCOUNTER
Called pt  to clarify her directions for prep  States had no copy of directions, so I E-mailed them to her

## 2022-01-18 ENCOUNTER — TELEPHONE (OUTPATIENT)
Dept: SLEEP CENTER | Facility: CLINIC | Age: 67
End: 2022-01-18

## 2022-01-18 DIAGNOSIS — G47.33 OSA (OBSTRUCTIVE SLEEP APNEA): Primary | ICD-10-CM

## 2022-01-19 ENCOUNTER — TELEPHONE (OUTPATIENT)
Dept: SLEEP CENTER | Facility: CLINIC | Age: 67
End: 2022-01-19

## 2022-01-19 ENCOUNTER — NURSE TRIAGE (OUTPATIENT)
Dept: OTHER | Facility: OTHER | Age: 67
End: 2022-01-19

## 2022-01-19 ENCOUNTER — TELEPHONE (OUTPATIENT)
Dept: GASTROENTEROLOGY | Facility: HOSPITAL | Age: 67
End: 2022-01-19

## 2022-01-20 ENCOUNTER — HOSPITAL ENCOUNTER (OUTPATIENT)
Dept: GASTROENTEROLOGY | Facility: HOSPITAL | Age: 67
Setting detail: OUTPATIENT SURGERY
Discharge: HOME | End: 2022-01-20
Attending: INTERNAL MEDICINE | Admitting: INTERNAL MEDICINE
Payer: MEDICARE

## 2022-01-20 ENCOUNTER — ANESTHESIA EVENT (OUTPATIENT)
Dept: GASTROENTEROLOGY | Facility: HOSPITAL | Age: 67
End: 2022-01-20

## 2022-01-20 ENCOUNTER — ANESTHESIA (OUTPATIENT)
Dept: GASTROENTEROLOGY | Facility: HOSPITAL | Age: 67
End: 2022-01-20

## 2022-01-20 VITALS
HEART RATE: 66 BPM | RESPIRATION RATE: 18 BRPM | TEMPERATURE: 97.5 F | SYSTOLIC BLOOD PRESSURE: 113 MMHG | OXYGEN SATURATION: 100 % | WEIGHT: 165 LBS | HEIGHT: 63 IN | BODY MASS INDEX: 29.23 KG/M2 | DIASTOLIC BLOOD PRESSURE: 57 MMHG

## 2022-01-20 DIAGNOSIS — K59.00 CONSTIPATION, UNSPECIFIED CONSTIPATION TYPE: ICD-10-CM

## 2022-01-20 LAB — GLUCOSE SERPL-MCNC: 139 MG/DL (ref 65–140)

## 2022-01-20 PROCEDURE — 82948 REAGENT STRIP/BLOOD GLUCOSE: CPT

## 2022-01-20 PROCEDURE — 88305 TISSUE EXAM BY PATHOLOGIST: CPT | Performed by: PATHOLOGY

## 2022-01-20 PROCEDURE — 45385 COLONOSCOPY W/LESION REMOVAL: CPT | Performed by: INTERNAL MEDICINE

## 2022-01-20 RX ORDER — ONDANSETRON 2 MG/ML
4 INJECTION INTRAMUSCULAR; INTRAVENOUS ONCE AS NEEDED
Status: CANCELLED | OUTPATIENT
Start: 2022-01-20

## 2022-01-20 RX ORDER — SODIUM CHLORIDE 9 MG/ML
INJECTION, SOLUTION INTRAVENOUS CONTINUOUS PRN
Status: DISCONTINUED | OUTPATIENT
Start: 2022-01-20 | End: 2022-01-20

## 2022-01-20 RX ORDER — FENTANYL CITRATE/PF 50 MCG/ML
25 SYRINGE (ML) INJECTION
Status: CANCELLED | OUTPATIENT
Start: 2022-01-20

## 2022-01-20 RX ORDER — MEPERIDINE HYDROCHLORIDE 25 MG/ML
12.5 INJECTION INTRAMUSCULAR; INTRAVENOUS; SUBCUTANEOUS
Status: CANCELLED | OUTPATIENT
Start: 2022-01-20

## 2022-01-20 RX ORDER — SODIUM CHLORIDE 9 MG/ML
125 INJECTION, SOLUTION INTRAVENOUS CONTINUOUS
Status: DISCONTINUED | OUTPATIENT
Start: 2022-01-20 | End: 2022-01-24 | Stop reason: HOSPADM

## 2022-01-20 RX ORDER — PROPOFOL 10 MG/ML
INJECTION, EMULSION INTRAVENOUS AS NEEDED
Status: DISCONTINUED | OUTPATIENT
Start: 2022-01-20 | End: 2022-01-20

## 2022-01-20 RX ORDER — ALBUTEROL SULFATE 2.5 MG/3ML
2.5 SOLUTION RESPIRATORY (INHALATION) ONCE AS NEEDED
Status: CANCELLED | OUTPATIENT
Start: 2022-01-20

## 2022-01-20 RX ORDER — HYDROMORPHONE HCL/PF 1 MG/ML
0.5 SYRINGE (ML) INJECTION
Status: CANCELLED | OUTPATIENT
Start: 2022-01-20

## 2022-01-20 RX ORDER — PROMETHAZINE HYDROCHLORIDE 25 MG/ML
12.5 INJECTION, SOLUTION INTRAMUSCULAR; INTRAVENOUS ONCE AS NEEDED
Status: CANCELLED | OUTPATIENT
Start: 2022-01-20

## 2022-01-20 RX ADMIN — PROPOFOL 100 MG: 10 INJECTION, EMULSION INTRAVENOUS at 09:14

## 2022-01-20 RX ADMIN — SODIUM CHLORIDE: 0.9 INJECTION, SOLUTION INTRAVENOUS at 09:10

## 2022-01-20 RX ADMIN — PROPOFOL 100 MG: 10 INJECTION, EMULSION INTRAVENOUS at 09:42

## 2022-01-20 RX ADMIN — PROPOFOL 20 MG: 10 INJECTION, EMULSION INTRAVENOUS at 09:15

## 2022-01-20 RX ADMIN — SODIUM CHLORIDE 125 ML/HR: 0.9 INJECTION, SOLUTION INTRAVENOUS at 08:15

## 2022-01-20 RX ADMIN — PROPOFOL 30 MG: 10 INJECTION, EMULSION INTRAVENOUS at 09:16

## 2022-01-20 RX ADMIN — PROPOFOL 20 MG: 10 INJECTION, EMULSION INTRAVENOUS at 09:38

## 2022-01-20 RX ADMIN — PROPOFOL 30 MG: 10 INJECTION, EMULSION INTRAVENOUS at 09:29

## 2022-01-20 RX ADMIN — PROPOFOL 50 MG: 10 INJECTION, EMULSION INTRAVENOUS at 09:17

## 2022-01-20 RX ADMIN — PROPOFOL 20 MG: 10 INJECTION, EMULSION INTRAVENOUS at 09:22

## 2022-01-20 RX ADMIN — PROPOFOL 50 MG: 10 INJECTION, EMULSION INTRAVENOUS at 09:25

## 2022-01-20 RX ADMIN — PROPOFOL 30 MG: 10 INJECTION, EMULSION INTRAVENOUS at 09:24

## 2022-01-20 RX ADMIN — PROPOFOL 100 MG: 10 INJECTION, EMULSION INTRAVENOUS at 09:18

## 2022-01-20 NOTE — H&P
History and Physical -  [facility]  Stephanie Velasquez 73 y o  female MRN: [id number]                  HPI: Stephanie Velasquez is a 73y o  year old female who presents for colonoscopy for history of colon polyp  Last colonoscopy 2019 was poor preparation  REVIEW OF SYSTEMS: Per the HPI, and otherwise unremarkable      Historical Information   Past Medical History:   Diagnosis Date    Anxiety     Asthma     controlled    Back complaints     Cancer ([facility] )     nose    Cellulitis of left lower leg     "not now"    CHF (congestive heart failure) ([place] [address] ) 02/2021    Chronic bilateral thoracic back pain     Chronic kidney disease     sees nephrologist regularly" stage 3"    Chronic myofascial pain     Chronic pain of both lower extremities     Chronic pain of left knee     "both knees"    Chronic pain syndrome     bilat legs and knees/neuropathy pain    CPAP (continuous positive airway pressure) dependence     no currently uses    Depression     Diabetes mellitus (HCC)     insulin pump    Disease of thyroid gland     Does use hearing aid     bilat and will wear DOS    Gait disorder     Gastroparesis     GERD (gastroesophageal reflux disease)     History of angina     History of MRSA infection     History of transfusion     Many years ago    Hyperlipidemia     Hypertension     Hypoglycemic reaction     "occas low blood sugar"    Insulin pump in place     pt reports saw endocrinologist 4/28 and will bring copy of instructions DOS    Irritable bowel syndrome     Kidney disease     Pacemaker 2019    Polyneuropathy associated with underlying disease ([place] [address] )     PONV (postoperative nausea and vomiting)     Risk for falls     S/P insertion of spinal cord stimulator 6/8/2018    Sarcoidosis     Shortness of breath     "exertion and not new"    Sleep apnea     TIA (transient ischemic attack)     Use of cane as ambulatory aid     Uses walker     Wears dentures     permanent upper/and some missing teeth/partial lower     Past Surgical History:   Procedure Laterality Date    ABDOMINAL ADHESION SURGERY N/A 5/3/2021    Procedure: laparoscopic LYSIS ADHESIONS;  Surgeon: Edith Scott MD;  [facility];  Service: Thoracic    BREAST SURGERY      BREAST SURGERY      reduction    CARDIAC PACEMAKER PLACEMENT      pacemaker     SECTION      COLONOSCOPY      DILATION AND CURETTAGE OF UTERUS      "D&E"    HERNIA REPAIR      HYSTERECTOMY      JOINT REPLACEMENT Left     NECK SURGERY      fused 4 discs with 4 screws implanted    NISSEN FUNDOPLICATION LAPAROSCOPIC WITH ROBOTICS N/A 5/3/2021    Procedure: ROBOTIC [address] ;  Surgeon: Edith Scott MD;  [facility];  Service: Thoracic    VA ESOPHAGOGASTRODUODENOSCOPY TRANSORAL DIAGNOSTIC N/A 5/3/2021    Procedure: ESOPHAGOGASTRODUODENOSCOPY (EGD); Surgeon: Edith Scott MD;  [facility];  Service: Thoracic    VA ESOPHAGOGASTRODUODENOSCOPY TRANSORAL DIAGNOSTIC N/A 2022    Procedure: ESOPHAGOGASTRODUODENOSCOPY (EGD),;  Surgeon: Edith Scott MD;  [facility];  Service: Thoracic    VA ESOPHAGOSCOPY FLEX BALLOON DILAT <30 MM DIAM N/A 2022    Procedure: DILATATION ESOPHAGEAL;  Surgeon: Edith Scott MD;  [facility];  Service: Thoracic    VA SURG IMPLNT Sammy Riedel Left 2018    Procedure:  Insertion of thoracic spinal cord stimulator electrode via laminotomy and placement of left buttock IPG;  Surgeon: Jaime Andersen MD;  [facility];  Service: Neurosurgery    REPLACEMENT TOTAL KNEE      ROTATOR CUFF REPAIR      SPINAL STIMULATOR PLACEMENT Left 10/3/2018    Procedure: BUTTOCK RE-OPENING INCISION FOR REPOSITIONING OF IMPLANTABLE PULSE GENERATOR;  Surgeon: Jaime Andersen MD;  [facility];  Service: Neurosurgery    TONSILLECTOMY      UPPER GASTROINTESTINAL ENDOSCOPY      WISDOM TOOTH EXTRACTION Social History   Social History     Substance and Sexual Activity   Alcohol Use Never     Social History     Substance and Sexual Activity   Drug Use Yes    Frequency: 8 0 times per week    Types: Marijuana    Comment: Medical Marijuana/vape pen     Social History     Tobacco Use   Smoking Status Never Smoker   Smokeless Tobacco Never Used     Family History   Problem Relation Age of Onset    Diabetes Family     Heart disease Family     Hypertension Family     Neuropathy Family     No Known Problems Mother     Heart disease Father     Diabetes Father     No Known Problems Maternal Grandmother     No Known Problems Maternal Grandfather     No Known Problems Paternal Grandmother     No Known Problems Paternal Grandfather     No Known Problems Brother     No Known Problems Daughter     Cancer Son        Meds/Allergies       Current Outpatient Medications:     amitriptyline (ELAVIL) 25 mg tablet    Armodafinil (Nuvigil) 250 MG tablet    aspirin (ECOTRIN LOW STRENGTH) 81 mg EC tablet    atorvastatin (LIPITOR) 80 mg tablet    carvedilol (COREG) 3 125 mg tablet    chlorzoxazone (PARAFON FORTE) 500 mg tablet    CRANBERRY PO    Cyanocobalamin (VITAMIN B-12 PO)    dicyclomine (BENTYL) 10 mg capsule    docusate sodium (COLACE) 100 mg capsule    esomeprazole (NexIUM) 40 MG capsule    famotidine (PEPCID) 20 mg tablet    ferrous sulfate 324 (65 Fe) mg    fluticasone-salmeterol (ADVAIR) 100-50 mcg/dose inhaler    glucagon 1 MG injection    glucose blood test strip    levothyroxine 100 mcg tablet    losartan (COZAAR) 50 mg tablet    NOVOLOG 100 UNIT/ML injection    ondansetron (ZOFRAN) 8 mg tablet    Plecanatide (Trulance) 3 MG TABS    polyethylene glycol (GOLYTELY) 4000 mL solution    polyethylene glycol (MIRALAX) 17 g packet    Potassium Gluconate 595 MG CAPS    pramipexole (MIRAPEX) 0 5 mg tablet    pramipexole (MIRAPEX) 1 mg tablet    rivaroxaban (Xarelto Starter Pack) 15 & 20 MG starter pack    Semaglutide (OZEMPIC, 0 25 OR 0 5 MG/DOSE, SC)    senna-docusate sodium (SENOKOT-S) 8 6-50 mg per tablet    sertraline (ZOLOFT) 50 mg tablet    torsemide (DEMADEX) 20 mg tablet    trospium chloride (SANCTURA) 20 mg tablet    Allergies   Allergen Reactions    Bactrim [Sulfamethoxazole-Trimethoprim] Hives    Sucralfate Hives     Facial swelling    Topamax [Topiramate]      disorentation    Azithromycin Itching    Pregabalin Confusion and Other (See Comments)     altered mental status    Ciprofloxacin Drowsiness     Other reaction(s): Other (See Comments)  "drowsiness"    Norvasc [Amlodipine] Swelling    Baclofen      "That knocks me out "    Bupropion Fatigue    Methotrexate Nausea Only    Neurontin [Gabapentin] Hallucinations and Hypertension       Objective     There were no vitals taken for this visit  PHYSICAL EXAM    Gen: NAD  Head: NCAT  CV: RRR  CHEST: Clear  ABD: soft, NT/ND  EXT: no edema      ASSESSMENT/PLAN:   Candy Hutchins is a 73y o  year old female who presents for colonoscopy for history of colon polyp  Last colonoscopy 2019 was poor preparation  The patient is stable and optimized for the procedure, we reviewed risk and benefits  Risk include but not limited to infection, bleeding, perforation and missing a lesion

## 2022-01-20 NOTE — TELEPHONE ENCOUNTER
Regarding: Started colonoscopy prep, no bowel movement yet  ----- Message from Annamaria Davila sent at 1/19/2022  7:05 PM EST -----  "I started my colonoscopy prep and I have not had a bowel movement yet   I started it at about 5:45 PM  My colonoscopy is tomorrow "

## 2022-01-20 NOTE — ANESTHESIA PREPROCEDURE EVALUATION
Procedure:  COLONOSCOPY    Relevant Problems   ANESTHESIA   (+) PONV (postoperative nausea and vomiting)      CARDIO   (+) BBB (bundle branch block)   (+) Chronic bilateral thoracic back pain   (+) Chronic scapular pain   (+) Coronary artery disease involving native coronary artery   (+) Deep venous thrombosis (HCC)   (+) Essential hypertension   (+) Mixed hyperlipidemia      ENDO   (+) Primary hyperparathyroidism (HCC)   (+) Primary hypothyroidism   (+) Type 2 diabetes mellitus with microalbuminuria, with long-term current use of insulin (HCC)   (+) Type 2 diabetes mellitus without complication, with long-term current use of insulin (HCC)      GI/HEPATIC   (+) GERD (gastroesophageal reflux disease)      /RENAL   (+) JULIANNE (acute kidney injury) (HCC)      HEMATOLOGY   (+) Anemia of chronic disease   (+) Microcytic anemia   (+) Normocytic anemia      MUSCULOSKELETAL   (+) Chronic bilateral low back pain without sciatica   (+) Chronic bilateral thoracic back pain   (+) Localized, primary osteoarthritis   (+) Lumbosacral spondylosis without myelopathy   (+) Osteoarthritis of knee      NEURO/PSYCH   (+) Anxiety and depression   (+) Chronic bilateral thoracic back pain   (+) Complex regional pain syndrome type 1 of left lower extremity   (+) History of TIA (transient ischemic attack)   (+) Pain syndrome, chronic      PULMONARY   (+) Moderate persistent asthma with acute exacerbation   (+) Moderate persistent asthma without complication   (+) Obstructive sleep apnea        Physical Exam    Airway    Mallampati score: II  TM Distance: >3 FB  Neck ROM: full     Dental       Cardiovascular  Cardiovascular exam normal    Pulmonary  Pulmonary exam normal     Other Findings        Anesthesia Plan  ASA Score- 3     Anesthesia Type- IV sedation with anesthesia with ASA Monitors  Additional Monitors:   Airway Plan:           Plan Factors-Exercise tolerance (METS): >4 METS  Chart reviewed  EKG reviewed   Imaging results reviewed  Existing labs reviewed  Patient summary reviewed  Induction- intravenous  Postoperative Plan- Plan for postoperative opioid use  Planned trial extubation    Informed Consent- Anesthetic plan and risks discussed with patient  I personally reviewed this patient with the CRNA  Discussed and agreed on the Anesthesia Plan with the CRNA  Hernandez Bright

## 2022-01-20 NOTE — TELEPHONE ENCOUNTER
Reason for Disposition   [1] Abdominal bloating, cramping, nausea, or vomiting while drinking bowel prep AND [2] CANNOT finish bowel prep AND [3] has tried recommended Care Advice    Answer Assessment - Initial Assessment Questions  1  DATE/TIME: "When did you have your colonoscopy?"       Scheduled for tomorrow morning 1/20/21    2  MAIN CONCERN: "What is your main concern right now?" "What questions do you have?"       Took Dulcolax 5pm, drank half prep so far, still no BM, very bloated and nauseated  Moderate abdominal pain all over  3  ABDOMEN PAIN: "Are you having any abdomen (belly or stomach) pain?" If Yes, ask: "How bad is it?" (e g , Scale 1-10; mild, moderate, severe)  - MILD (1-3): doesn't interfere with normal activities, abdomen soft and not tender to touch      - MODERATE (4-7): interferes with normal activities or awakens from sleep, tender to touch      - SEVERE (8-10): excruciating pain, doubled over, unable to do any normal activities        Yes, moderate    4  OTHER SYMPTOMS: "What other symptoms are you having?" (e g , rectal bleeding, bloating or feeling gassy, passing gas, vomiting, dizziness, fever)  Bloating, nausea    5   PATTERN: "Is the symptom(s) constant or does it come and go?" "Is your symptom(s) getting worse, better, or staying the same?"      Constant    Protocols used: COLONOSCOPY SYMPTOMS AND QUESTIONS-[facility]-

## 2022-01-27 ENCOUNTER — OFFICE VISIT (OUTPATIENT)
Dept: CARDIAC SURGERY | Facility: CLINIC | Age: 67
End: 2022-01-27
Payer: MEDICARE

## 2022-01-27 VITALS
HEIGHT: 63 IN | HEART RATE: 84 BPM | RESPIRATION RATE: 16 BRPM | BODY MASS INDEX: 29.92 KG/M2 | TEMPERATURE: 97.6 F | DIASTOLIC BLOOD PRESSURE: 78 MMHG | WEIGHT: 168.87 LBS | OXYGEN SATURATION: 99 % | SYSTOLIC BLOOD PRESSURE: 140 MMHG

## 2022-01-27 DIAGNOSIS — K22.2 ESOPHAGEAL STRICTURE: Primary | ICD-10-CM

## 2022-01-27 DIAGNOSIS — K21.00 GASTROESOPHAGEAL REFLUX DISEASE WITH ESOPHAGITIS WITHOUT HEMORRHAGE: ICD-10-CM

## 2022-01-27 PROCEDURE — 99213 OFFICE O/P EST LOW 20 MIN: CPT | Performed by: THORACIC SURGERY (CARDIOTHORACIC VASCULAR SURGERY)

## 2022-01-27 NOTE — ASSESSMENT & PLAN NOTE
Ms Day Gaspar presents back to my office today after she underwent an EGD with dilation on 01/12/2022  She reports that she definitely felt improvement after her dilation  Today in the office, we discussed serial dilation  Since the patient did have relief but she is still having some symptoms, I would like to attempt to serial dilate her Schatzki is wearing to see if I can improve her symptoms even further  The patient is in agreement  We will plan to proceed the 1st week of February  Consent was obtained in the office today for an EGD with dilation      Breann Winn MD  Thoracic Surgery  (Available on [employer])  Office: [phone]

## 2022-01-27 NOTE — H&P (VIEW-ONLY)
Thoracic Follow-Up  Assessment/Plan:    Esophageal stricture  Ms Barriga presents back to my office today after she underwent an EGD with dilation on 01/12/2022  She reports that she definitely felt improvement after her dilation  Today in the office, we discussed serial dilation  Since the patient did have relief but she is still having some symptoms, I would like to attempt to serial dilate her Schatzki is wearing to see if I can improve her symptoms even further  The patient is in agreement  We will plan to proceed the 1st week of February  Consent was obtained in the office today for an EGD with dilation  Compa Hammonds MD  Thoracic Surgery  (Available on [employer])  Office: [phone]         Diagnoses and all orders for this visit:    Esophageal stricture  -     Case request operating room: ESOPHAGOGASTRODUODENOSCOPY (EGD), dilation; Standing  -     Case request operating room: ESOPHAGOGASTRODUODENOSCOPY (EGD), dilation    Gastroesophageal reflux disease with esophagitis without hemorrhage          Thoracic History     Diagnosis: Achalasia  Procedure: Robotic heller myotomy, Kareem fundoplication, laparoscopic lysis of adhesions 5/3/21      Procedure: EGD with dilation, 1/12/22     Subjective:    Patient ID: Ancelmo Mahoney is a 73 y o  female  HPI  Ms Barriga presents back to my office today after she underwent an EGD with dilation on 01/12/2022  She reports that she definitely felt improvement after her dilation  She does still have some dysphagia to talk for foods like hamburger and check in  She also reports that some of her pills come back up as regurgitation  But she definitely feels symptom relief I with her reflux  She denies any other significant changes in her health  She reports that she had a sore throat for about 36-48 hours after the last dilation but did just fine  She is also now taking Pepcid in addition to the Nexium that she was already taking    I prescribed this medication after her last dilation  The following portions of the patient's history were reviewed and updated as appropriate: allergies, current medications, past family history, past medical history, past social history, past surgical history and problem list     Review of Systems   Constitutional: Negative for chills, fatigue, fever and unexpected weight change  HENT: Positive for trouble swallowing  Eyes: Negative  Negative for visual disturbance  Respiratory: Negative for cough, shortness of breath and stridor  Cardiovascular: Negative for chest pain  Gastrointestinal: Negative  Negative for nausea and vomiting  Endocrine: Negative  Genitourinary: Negative  Musculoskeletal: Negative  Skin: Negative  Neurological: Negative for dizziness, light-headedness and headaches  Hematological: Negative for adenopathy  Psychiatric/Behavioral: Negative  Objective:   Physical Exam  Vitals and nursing note reviewed  Constitutional:       General: She is not in acute distress  Appearance: Normal appearance  She is well-developed  She is not diaphoretic  HENT:      Head: Normocephalic and atraumatic  Nose: Nose normal  No congestion or rhinorrhea  Mouth/Throat:      Mouth: Mucous membranes are moist       Pharynx: Oropharynx is clear  No oropharyngeal exudate  Eyes:      General: No scleral icterus  Pupils: Pupils are equal, round, and reactive to light  Neck:      Trachea: No tracheal deviation  Cardiovascular:      Rate and Rhythm: Normal rate and regular rhythm  Pulses: Normal pulses  Heart sounds: Normal heart sounds  No murmur heard  Pulmonary:      Effort: Pulmonary effort is normal  No respiratory distress  Breath sounds: Normal breath sounds  No stridor  No wheezing or rales  Chest:      Chest wall: No tenderness  Abdominal:      General: Bowel sounds are normal  There is no distension  Palpations: Abdomen is soft  Tenderness: There is no abdominal tenderness  There is no rebound  Musculoskeletal:         General: Normal range of motion  Cervical back: Normal range of motion and neck supple  Tenderness present  No muscular tenderness  Lymphadenopathy:      Cervical: No cervical adenopathy  Skin:     General: Skin is warm and dry  Coloration: Skin is not jaundiced or pale  Findings: No erythema or rash  Neurological:      General: No focal deficit present  Mental Status: She is alert and oriented to person, place, and time  Psychiatric:         Mood and Affect: Mood normal          Behavior: Behavior normal          Thought Content: Thought content normal          Judgment: Judgment normal      /78 (BP Location: Left arm, Patient Position: Sitting, Cuff Size: Standard)   Pulse 84   Temp 97 6 °F (36 4 °C) (Temporal)   Resp 16   Ht 5' 3" (1 6 m)   Wt 76 6 kg (168 lb 14 oz)   SpO2 99%   BMI 29 91 kg/m²     No Chest XR results available for this patient  No CT Chest results available for this patient  No CT Chest,Abdomen,Pelvis results available for this patient  No NM PET CT results available for this patient  FL esophagram complete    Result Date: 5/11/2021  Narrative BARIUM SWALLOW-ESOPHAGRAM INDICATION:   post op pain  COMPARISON:  5/4/2020 IMAGES:  26 FLUOROSCOPY TIME:   0 8min  TECHNIQUE: The patient was given effervescent granules Omnipaque 350 and barium by mouth and images of the esophagus were obtained  FINDINGS: The esophagus is normal in caliber  Esophageal motility is normal and emptying of contrast from the esophagus is prompt  No mucosal lesion, ulceration or evidence of fold thickening is seen  Gastroesophageal reflux was not observed  There is no hiatal hernia       Impression Unremarkable esophagram  Workstation performed: [id number]     FL esophagram complete    Result Date: 5/4/2021  Narrative BARIUM SWALLOW-ESOPHAGRAM INDICATION:   routine study s/p Radha myotomy - rule out leak  COMPARISON:  9/18/2020 IMAGES: FLUOROSCOPY TIME:   1 minute  TECHNIQUE: The patient was given thin barium by mouth and images of the esophagus were obtained  FINDINGS: Cervical plate and screw hardware noted  Thoracic spinal cord stimulator leads noted  Pacemaker leads noted  The esophagus is normal in caliber  Esophageal motility is normal and emptying of contrast from the esophagus is prompt  No mucosal lesion, ulceration or evidence of fold thickening is seen  No extraluminal contrast noted  Gastroesophageal reflux was not observed  There is no hiatal hernia  Impression No evidence of esophageal leak  Workstation performed: [id number]     FL barium swallow video w speech    Result Date: 8/16/2021  Narrative A video barium swallow study was performed by the Department of Speech Pathology  Please refer to the report for the official interpretation  The images are stored for archival purposes only  Study images were not formally reviewed by the Radiology Department

## 2022-01-27 NOTE — PROGRESS NOTES
Thoracic Follow-Up  Assessment/Plan:    Esophageal stricture  Ms Barriga presents back to my office today after she underwent an EGD with dilation on 01/12/2022  She reports that she definitely felt improvement after her dilation  Today in the office, we discussed serial dilation  Since the patient did have relief but she is still having some symptoms, I would like to attempt to serial dilate her Schatzki is wearing to see if I can improve her symptoms even further  The patient is in agreement  We will plan to proceed the 1st week of February  Consent was obtained in the office today for an EGD with dilation  Joe Card MD  Thoracic Surgery  (Available on [facility] [employer])  Office: [phone]         Diagnoses and all orders for this visit:    Esophageal stricture  -     Case request operating room: ESOPHAGOGASTRODUODENOSCOPY (EGD), dilation; Standing  -     Case request operating room: ESOPHAGOGASTRODUODENOSCOPY (EGD), dilation    Gastroesophageal reflux disease with esophagitis without hemorrhage          Thoracic History     Diagnosis: Achalasia  Procedure: Robotic heller myotomy, Kareem fundoplication, laparoscopic lysis of adhesions 5/3/21      Procedure: EGD with dilation, 1/12/22     Subjective:    Patient ID: Nick Singh is a 73 y o  female  HPI  Ms Barriga presents back to my office today after she underwent an EGD with dilation on 01/12/2022  She reports that she definitely felt improvement after her dilation  She does still have some dysphagia to talk for foods like hamburger and check in  She also reports that some of her pills come back up as regurgitation  But she definitely feels symptom relief I with her reflux  She denies any other significant changes in her health  She reports that she had a sore throat for about 36-48 hours after the last dilation but did just fine  She is also now taking Pepcid in addition to the Nexium that she was already taking    I prescribed this medication after her last dilation  The following portions of the patient's history were reviewed and updated as appropriate: allergies, current medications, past family history, past medical history, past social history, past surgical history and problem list     Review of Systems   Constitutional: Negative for chills, fatigue, fever and unexpected weight change  HENT: Positive for trouble swallowing  Eyes: Negative  Negative for visual disturbance  Respiratory: Negative for cough, shortness of breath and stridor  Cardiovascular: Negative for chest pain  Gastrointestinal: Negative  Negative for nausea and vomiting  Endocrine: Negative  Genitourinary: Negative  Musculoskeletal: Negative  Skin: Negative  Neurological: Negative for dizziness, light-headedness and headaches  Hematological: Negative for adenopathy  Psychiatric/Behavioral: Negative  Objective:   Physical Exam  Vitals and nursing note reviewed  Constitutional:       General: She is not in acute distress  Appearance: Normal appearance  She is well-developed  She is not diaphoretic  HENT:      Head: Normocephalic and atraumatic  Nose: Nose normal  No congestion or rhinorrhea  Mouth/Throat:      Mouth: Mucous membranes are moist       Pharynx: Oropharynx is clear  No oropharyngeal exudate  Eyes:      General: No scleral icterus  Pupils: Pupils are equal, round, and reactive to light  Neck:      Trachea: No tracheal deviation  Cardiovascular:      Rate and Rhythm: Normal rate and regular rhythm  Pulses: Normal pulses  Heart sounds: Normal heart sounds  No murmur heard  Pulmonary:      Effort: Pulmonary effort is normal  No respiratory distress  Breath sounds: Normal breath sounds  No stridor  No wheezing or rales  Chest:      Chest wall: No tenderness  Abdominal:      General: Bowel sounds are normal  There is no distension  Palpations: Abdomen is soft  Tenderness: There is no abdominal tenderness  There is no rebound  Musculoskeletal:         General: Normal range of motion  Cervical back: Normal range of motion and neck supple  Tenderness present  No muscular tenderness  Lymphadenopathy:      Cervical: No cervical adenopathy  Skin:     General: Skin is warm and dry  Coloration: Skin is not jaundiced or pale  Findings: No erythema or rash  Neurological:      General: No focal deficit present  Mental Status: She is alert and oriented to person, place, and time  Psychiatric:         Mood and Affect: Mood normal          Behavior: Behavior normal          Thought Content: Thought content normal          Judgment: Judgment normal      /78 (BP Location: Left arm, Patient Position: Sitting, Cuff Size: Standard)   Pulse 84   Temp 97 6 °F (36 4 °C) (Temporal)   Resp 16   Ht 5' 3" (1 6 m)   Wt 76 6 kg (168 lb 14 oz)   SpO2 99%   BMI 29 91 kg/m²     No Chest XR results available for this patient  No CT Chest results available for this patient  No CT Chest,Abdomen,Pelvis results available for this patient  No NM PET CT results available for this patient  FL esophagram complete    Result Date: 5/11/2021  Narrative BARIUM SWALLOW-ESOPHAGRAM INDICATION:   post op pain  COMPARISON:  5/4/2020 IMAGES:  26 FLUOROSCOPY TIME:   0 8min  TECHNIQUE: The patient was given effervescent granules Omnipaque 350 and barium by mouth and images of the esophagus were obtained  FINDINGS: The esophagus is normal in caliber  Esophageal motility is normal and emptying of contrast from the esophagus is prompt  No mucosal lesion, ulceration or evidence of fold thickening is seen  Gastroesophageal reflux was not observed  There is no hiatal hernia       Impression Unremarkable esophagram  Workstation performed: [id number]     FL esophagram complete    Result Date: 5/4/2021  Narrative BARIUM SWALLOW-ESOPHAGRAM INDICATION:   routine study s/p Radha myotomy - rule out leak  COMPARISON:  9/18/2020 IMAGES: FLUOROSCOPY TIME:   1 minute  TECHNIQUE: The patient was given thin barium by mouth and images of the esophagus were obtained  FINDINGS: Cervical plate and screw hardware noted  Thoracic spinal cord stimulator leads noted  Pacemaker leads noted  The esophagus is normal in caliber  Esophageal motility is normal and emptying of contrast from the esophagus is prompt  No mucosal lesion, ulceration or evidence of fold thickening is seen  No extraluminal contrast noted  Gastroesophageal reflux was not observed  There is no hiatal hernia  Impression No evidence of esophageal leak  Workstation performed: [id number]     FL barium swallow video w speech    Result Date: 8/16/2021  Narrative A video barium swallow study was performed by the Department of Speech Pathology  Please refer to the report for the official interpretation  The images are stored for archival purposes only  Study images were not formally reviewed by the Radiology Department

## 2022-01-27 NOTE — LETTER
January 27, 2022     Yfn Christine DO  [address]  [phone]    Patient: Megan Camarillo   YOB: 1955   Date of Visit: 1/27/2022       Dear Dr Kelsy Alejandro: Thank you for referring Nona Andrews to me for evaluation  Below are my notes for this consultation  If you have questions, please do not hesitate to call me  I look forward to following your patient along with you  Sincerely,        Nacho Grimes MD        CC: DO Nacho Read MD  1/27/2022  9:55 AM  Incomplete  Thoracic Follow-Up  Assessment/Plan:    Esophageal stricture  Ms Barriga presents back to my office today after she underwent an EGD with dilation on 01/12/2022  She reports that she definitely felt improvement after her dilation  Today in the office, we discussed serial dilation  Since the patient did have relief but she is still having some symptoms, I would like to attempt to serial dilate her Schatzki is wearing to see if I can improve her symptoms even further  The patient is in agreement  We will plan to proceed the 1st week of February  Consent was obtained in the office today for an EGD with dilation  Vanessa Adams MD  Thoracic Surgery  (Available on [facility] [employer])  Office: [phone]         Diagnoses and all orders for this visit:    Esophageal stricture  -     Case request operating room: ESOPHAGOGASTRODUODENOSCOPY (EGD), dilation; Standing  -     Case request operating room: ESOPHAGOGASTRODUODENOSCOPY (EGD), dilation    Gastroesophageal reflux disease with esophagitis without hemorrhage          Thoracic History     Diagnosis: Achalasia  Procedure: Robotic heller myotomy, Kareem fundoplication, laparoscopic lysis of adhesions 5/3/21      Procedure: EGD with dilation, 1/12/22     Subjective:    Patient ID: Megan Camarillo is a 73 y o  female  HPI  Ms Barriga presents back to my office today after she underwent an EGD with dilation on 01/12/2022  She reports that she definitely felt improvement after her dilation  She does still have some dysphagia to talk for foods like hamburger and check in  She also reports that some of her pills come back up as regurgitation  But she definitely feels symptom relief I with her reflux  She denies any other significant changes in her health  She reports that she had a sore throat for about 36-48 hours after the last dilation but did just fine  She is also now taking Pepcid in addition to the Nexium that she was already taking  I prescribed this medication after her last dilation  The following portions of the patient's history were reviewed and updated as appropriate: allergies, current medications, past family history, past medical history, past social history, past surgical history and problem list     Review of Systems   Constitutional: Negative for chills, fatigue, fever and unexpected weight change  HENT: Positive for trouble swallowing  Eyes: Negative  Negative for visual disturbance  Respiratory: Negative for cough, shortness of breath and stridor  Cardiovascular: Negative for chest pain  Gastrointestinal: Negative  Negative for nausea and vomiting  Endocrine: Negative  Genitourinary: Negative  Musculoskeletal: Negative  Skin: Negative  Neurological: Negative for dizziness, light-headedness and headaches  Hematological: Negative for adenopathy  Psychiatric/Behavioral: Negative  Objective:   Physical Exam  Vitals and nursing note reviewed  Constitutional:       General: She is not in acute distress  Appearance: Normal appearance  She is well-developed  She is not diaphoretic  HENT:      Head: Normocephalic and atraumatic  Nose: Nose normal  No congestion or rhinorrhea  Mouth/Throat:      Mouth: Mucous membranes are moist       Pharynx: Oropharynx is clear  No oropharyngeal exudate  Eyes:      General: No scleral icterus       Pupils: Pupils are equal, round, and reactive to light  Neck:      Trachea: No tracheal deviation  Cardiovascular:      Rate and Rhythm: Normal rate and regular rhythm  Pulses: Normal pulses  Heart sounds: Normal heart sounds  No murmur heard  Pulmonary:      Effort: Pulmonary effort is normal  No respiratory distress  Breath sounds: Normal breath sounds  No stridor  No wheezing or rales  Chest:      Chest wall: No tenderness  Abdominal:      General: Bowel sounds are normal  There is no distension  Palpations: Abdomen is soft  Tenderness: There is no abdominal tenderness  There is no rebound  Musculoskeletal:         General: Normal range of motion  Cervical back: Normal range of motion and neck supple  Tenderness present  No muscular tenderness  Lymphadenopathy:      Cervical: No cervical adenopathy  Skin:     General: Skin is warm and dry  Coloration: Skin is not jaundiced or pale  Findings: No erythema or rash  Neurological:      General: No focal deficit present  Mental Status: She is alert and oriented to person, place, and time  Psychiatric:         Mood and Affect: Mood normal          Behavior: Behavior normal          Thought Content: Thought content normal          Judgment: Judgment normal      /78 (BP Location: Left arm, Patient Position: Sitting, Cuff Size: Standard)   Pulse 84   Temp 97 6 °F (36 4 °C) (Temporal)   Resp 16   Ht 5' 3" (1 6 m)   Wt 76 6 kg (168 lb 14 oz)   SpO2 99%   BMI 29 91 kg/m²     No Chest XR results available for this patient  No CT Chest results available for this patient  No CT Chest,Abdomen,Pelvis results available for this patient  No NM PET CT results available for this patient  FL esophagram complete    Result Date: 5/11/2021  Narrative BARIUM SWALLOW-ESOPHAGRAM INDICATION:   post op pain   COMPARISON:  5/4/2020 IMAGES:  26 FLUOROSCOPY TIME:   0 8min  TECHNIQUE: The patient was given effervescent granules Omnipaque 350 and barium by mouth and images of the esophagus were obtained  FINDINGS: The esophagus is normal in caliber  Esophageal motility is normal and emptying of contrast from the esophagus is prompt  No mucosal lesion, ulceration or evidence of fold thickening is seen  Gastroesophageal reflux was not observed  There is no hiatal hernia  Impression Unremarkable esophagram  Workstation performed: [id number]     FL esophagram complete    Result Date: 5/4/2021  Narrative BARIUM SWALLOW-ESOPHAGRAM INDICATION:   routine study s/p Heller myotomy - rule out leak  COMPARISON:  9/18/2020 IMAGES: FLUOROSCOPY TIME:   1 minute  TECHNIQUE: The patient was given thin barium by mouth and images of the esophagus were obtained  FINDINGS: Cervical plate and screw hardware noted  Thoracic spinal cord stimulator leads noted  Pacemaker leads noted  The esophagus is normal in caliber  Esophageal motility is normal and emptying of contrast from the esophagus is prompt  No mucosal lesion, ulceration or evidence of fold thickening is seen  No extraluminal contrast noted  Gastroesophageal reflux was not observed  There is no hiatal hernia  Impression No evidence of esophageal leak  Workstation performed: [id number]     FL barium swallow video w speech    Result Date: 8/16/2021  Narrative A video barium swallow study was performed by the Department of Speech Pathology  Please refer to the report for the official interpretation  The images are stored for archival purposes only  Study images were not formally reviewed by the Radiology Department

## 2022-01-31 DIAGNOSIS — M54.6 CHRONIC BILATERAL THORACIC BACK PAIN: ICD-10-CM

## 2022-01-31 DIAGNOSIS — G63 POLYNEUROPATHY ASSOCIATED WITH UNDERLYING DISEASE (HCC): ICD-10-CM

## 2022-01-31 DIAGNOSIS — M79.18 MYOFASCIAL PAIN SYNDROME: ICD-10-CM

## 2022-01-31 DIAGNOSIS — G89.29 CHRONIC BILATERAL THORACIC BACK PAIN: ICD-10-CM

## 2022-01-31 DIAGNOSIS — M54.16 LUMBAR RADICULOPATHY: ICD-10-CM

## 2022-01-31 DIAGNOSIS — M54.14 THORACIC RADICULOPATHY: ICD-10-CM

## 2022-01-31 DIAGNOSIS — G89.4 PAIN SYNDROME, CHRONIC: ICD-10-CM

## 2022-01-31 DIAGNOSIS — G90.522 COMPLEX REGIONAL PAIN SYNDROME TYPE 1 OF LEFT LOWER EXTREMITY: ICD-10-CM

## 2022-01-31 DIAGNOSIS — M48.062 SPINAL STENOSIS OF LUMBAR REGION WITH NEUROGENIC CLAUDICATION: ICD-10-CM

## 2022-01-31 DIAGNOSIS — R26.9 GAIT DISORDER: ICD-10-CM

## 2022-01-31 RX ORDER — CHLORZOXAZONE 500 MG/1
TABLET ORAL
Qty: 120 TABLET | Refills: 1 | Status: SHIPPED | OUTPATIENT
Start: 2022-01-31

## 2022-02-02 ENCOUNTER — TELEPHONE (OUTPATIENT)
Dept: GASTROENTEROLOGY | Facility: CLINIC | Age: 67
End: 2022-02-02

## 2022-02-02 NOTE — TELEPHONE ENCOUNTER
Patient had a question about her colonoscopy results, so I went over everything and she stated understanding

## 2022-02-02 NOTE — TELEPHONE ENCOUNTER
Patients GI provider:  Dr Hernández Chaska    Number to return call: [phone]    Reason for call: Pt calling returning a results call      Scheduled procedure/appointment date if applicable: Appt  1/09/92

## 2022-02-14 ENCOUNTER — ANESTHESIA EVENT (OUTPATIENT)
Dept: PERIOP | Facility: HOSPITAL | Age: 67
End: 2022-02-14
Payer: MEDICARE

## 2022-02-14 NOTE — PRE-PROCEDURE INSTRUCTIONS
Pre-Surgery Instructions:   Medication Instructions    amitriptyline (ELAVIL) 25 mg tablet Takes at HS    Armodafinil (Nuvigil) 250 MG tablet Instructed to take per normal schedule except DOS    aspirin (ECOTRIN LOW STRENGTH) 81 mg EC tablet Instructed to take per normal schedule except DOS    atorvastatin (LIPITOR) 80 mg tablet Takes at HS    chlorzoxazone (PARAFON FORTE) 500 mg tablet Instructed to take as needed including DOS with sips water    CRANBERRY PO Stop as of 2/14    Cyanocobalamin (VITAMIN B-12 PO) Stop as of 2/14    dicyclomine (BENTYL) 10 mg capsule Instructed to take per normal schedule including DOS with sips water    docusate sodium (COLACE) 100 mg capsule Instructed to take per normal schedule except DOS    esomeprazole (NexIUM) 40 MG capsule Instructed to take per normal schedule including DOS with sips water    famotidine (PEPCID) 20 mg tablet Instructed to take per normal schedule including DOS with sips water    ferrous sulfate 324 (65 Fe) mg Stop as of 2/14    fluticasone-salmeterol (ADVAIR) 100-50 mcg/dose inhaler Instructed to take as needed including DOS    glucagon 1 MG injection Instructed to take as needed including DOS    levothyroxine 100 mcg tablet Instructed to take as needed including DOS with sips water    losartan (COZAAR) 50 mg tablet Instructed to take per normal schedule except DOS    NOVOLOG 100 UNIT/ML injection Insulin pump - pt to follow endocrinologist instructions   Bring supplies with DOS    ondansetron (ZOFRAN) 8 mg tablet Instructed to take as needed including DOS    Plecanatide (Trulance) 3 MG TABS Instructed to take as needed including DOS with sips water    Potassium Gluconate 595 MG CAPS Stop as of 2/14    pramipexole (MIRAPEX) 0 5 mg tablet Instructed to take per normal schedule including DOS with sips water    pramipexole (MIRAPEX) 1 mg tablet Instructed to take per normal schedule including DOS with sips water    Semaglutide (OZEMPIC, 0 25 OR 0 5 MG/DOSE, SC) Sundays    senna-docusate sodium (SENOKOT-S) 8 6-50 mg per tablet Instructed to take per normal schedule except DOS    sertraline (ZOLOFT) 50 mg tablet Instructed to take per normal schedule including DOS with sips water    torsemide (DEMADEX) 20 mg tablet Instructed to take per normal schedule except DOS    trospium chloride (SANCTURA) 20 mg tablet Instructed to take per normal schedule except DOS     Spoke with pt via phone, COVID screening negative  Advised hospital location will call with arrival time night before surgery and NPO after midnight night before  Advised one vaccinated visitor is allowed to accompany pt to wait during the procedure  Instructed to leave contacts/jewelery/valuables at home  No smoking 24 hours prior to surgery and definitely not on the morning of  Instructed to avoid all ASA and OTC Vit/Supp 1 week prior to surgery and to avoid NSAIDs 3 days prior to surgery per anesthesia instructions  Tylenol ok to take prn  Reviewed above medication instructions and showering instructions  Patient verbalized understanding of all of the above

## 2022-02-16 ENCOUNTER — ANESTHESIA (OUTPATIENT)
Dept: PERIOP | Facility: HOSPITAL | Age: 67
End: 2022-02-16
Payer: MEDICARE

## 2022-02-16 ENCOUNTER — APPOINTMENT (OUTPATIENT)
Dept: RADIOLOGY | Facility: HOSPITAL | Age: 67
End: 2022-02-16
Payer: MEDICARE

## 2022-02-16 ENCOUNTER — HOSPITAL ENCOUNTER (OUTPATIENT)
Facility: HOSPITAL | Age: 67
Setting detail: OUTPATIENT SURGERY
Discharge: HOME | End: 2022-02-16
Attending: THORACIC SURGERY (CARDIOTHORACIC VASCULAR SURGERY) | Admitting: THORACIC SURGERY (CARDIOTHORACIC VASCULAR SURGERY)
Payer: MEDICARE

## 2022-02-16 VITALS
OXYGEN SATURATION: 96 % | DIASTOLIC BLOOD PRESSURE: 67 MMHG | WEIGHT: 168 LBS | SYSTOLIC BLOOD PRESSURE: 131 MMHG | HEART RATE: 67 BPM | BODY MASS INDEX: 31.72 KG/M2 | TEMPERATURE: 98.1 F | HEIGHT: 61 IN | RESPIRATION RATE: 16 BRPM

## 2022-02-16 LAB
GLUCOSE SERPL-MCNC: 124 MG/DL (ref 65–140)
GLUCOSE SERPL-MCNC: 149 MG/DL (ref 65–140)

## 2022-02-16 PROCEDURE — 71045 X-RAY EXAM CHEST 1 VIEW: CPT

## 2022-02-16 PROCEDURE — 82948 REAGENT STRIP/BLOOD GLUCOSE: CPT

## 2022-02-16 PROCEDURE — C1769 GUIDE WIRE: HCPCS | Performed by: THORACIC SURGERY (CARDIOTHORACIC VASCULAR SURGERY)

## 2022-02-16 PROCEDURE — 43248 EGD GUIDE WIRE INSERTION: CPT | Performed by: THORACIC SURGERY (CARDIOTHORACIC VASCULAR SURGERY)

## 2022-02-16 RX ORDER — HYDROMORPHONE HCL/PF 1 MG/ML
0.2 SYRINGE (ML) INJECTION
Status: DISCONTINUED | OUTPATIENT
Start: 2022-02-16 | End: 2022-02-16 | Stop reason: HOSPADM

## 2022-02-16 RX ORDER — MIDAZOLAM HYDROCHLORIDE 2 MG/2ML
INJECTION, SOLUTION INTRAMUSCULAR; INTRAVENOUS AS NEEDED
Status: DISCONTINUED | OUTPATIENT
Start: 2022-02-16 | End: 2022-02-16

## 2022-02-16 RX ORDER — SUCCINYLCHOLINE/SOD CL,ISO/PF 100 MG/5ML
SYRINGE (ML) INTRAVENOUS AS NEEDED
Status: DISCONTINUED | OUTPATIENT
Start: 2022-02-16 | End: 2022-02-16

## 2022-02-16 RX ORDER — FENTANYL CITRATE/PF 50 MCG/ML
25 SYRINGE (ML) INJECTION
Status: DISCONTINUED | OUTPATIENT
Start: 2022-02-16 | End: 2022-02-16 | Stop reason: HOSPADM

## 2022-02-16 RX ORDER — SODIUM CHLORIDE, SODIUM LACTATE, POTASSIUM CHLORIDE, CALCIUM CHLORIDE 600; 310; 30; 20 MG/100ML; MG/100ML; MG/100ML; MG/100ML
INJECTION, SOLUTION INTRAVENOUS CONTINUOUS PRN
Status: DISCONTINUED | OUTPATIENT
Start: 2022-02-16 | End: 2022-02-16

## 2022-02-16 RX ORDER — DEXAMETHASONE SODIUM PHOSPHATE 10 MG/ML
INJECTION, SOLUTION INTRAMUSCULAR; INTRAVENOUS AS NEEDED
Status: DISCONTINUED | OUTPATIENT
Start: 2022-02-16 | End: 2022-02-16

## 2022-02-16 RX ORDER — PROPOFOL 10 MG/ML
INJECTION, EMULSION INTRAVENOUS AS NEEDED
Status: DISCONTINUED | OUTPATIENT
Start: 2022-02-16 | End: 2022-02-16

## 2022-02-16 RX ORDER — ACETAMINOPHEN 160 MG/5ML
975 SUSPENSION, ORAL (FINAL DOSE FORM) ORAL EVERY 4 HOURS PRN
Status: DISCONTINUED | OUTPATIENT
Start: 2022-02-16 | End: 2022-02-16 | Stop reason: HOSPADM

## 2022-02-16 RX ORDER — FENTANYL CITRATE 50 UG/ML
INJECTION, SOLUTION INTRAMUSCULAR; INTRAVENOUS AS NEEDED
Status: DISCONTINUED | OUTPATIENT
Start: 2022-02-16 | End: 2022-02-16

## 2022-02-16 RX ORDER — MAGNESIUM HYDROXIDE 1200 MG/15ML
LIQUID ORAL AS NEEDED
Status: DISCONTINUED | OUTPATIENT
Start: 2022-02-16 | End: 2022-02-16 | Stop reason: HOSPADM

## 2022-02-16 RX ORDER — ONDANSETRON 2 MG/ML
4 INJECTION INTRAMUSCULAR; INTRAVENOUS ONCE AS NEEDED
Status: DISCONTINUED | OUTPATIENT
Start: 2022-02-16 | End: 2022-02-16 | Stop reason: HOSPADM

## 2022-02-16 RX ORDER — LIDOCAINE HYDROCHLORIDE 20 MG/ML
INJECTION, SOLUTION EPIDURAL; INFILTRATION; INTRACAUDAL; PERINEURAL AS NEEDED
Status: DISCONTINUED | OUTPATIENT
Start: 2022-02-16 | End: 2022-02-16

## 2022-02-16 RX ORDER — OXYCODONE HCL 5 MG/5 ML
5 SOLUTION, ORAL ORAL EVERY 4 HOURS PRN
Status: DISCONTINUED | OUTPATIENT
Start: 2022-02-16 | End: 2022-02-16 | Stop reason: HOSPADM

## 2022-02-16 RX ORDER — ONDANSETRON 2 MG/ML
INJECTION INTRAMUSCULAR; INTRAVENOUS AS NEEDED
Status: DISCONTINUED | OUTPATIENT
Start: 2022-02-16 | End: 2022-02-16

## 2022-02-16 RX ADMIN — PROPOFOL 200 MG: 10 INJECTION, EMULSION INTRAVENOUS at 08:28

## 2022-02-16 RX ADMIN — LIDOCAINE HYDROCHLORIDE 100 MG: 20 INJECTION, SOLUTION EPIDURAL; INFILTRATION; INTRACAUDAL; PERINEURAL at 08:28

## 2022-02-16 RX ADMIN — ONDANSETRON 4 MG: 2 INJECTION INTRAMUSCULAR; INTRAVENOUS at 08:34

## 2022-02-16 RX ADMIN — MIDAZOLAM 2 MG: 1 INJECTION INTRAMUSCULAR; INTRAVENOUS at 08:21

## 2022-02-16 RX ADMIN — DEXAMETHASONE SODIUM PHOSPHATE 4 MG: 10 INJECTION, SOLUTION INTRAMUSCULAR; INTRAVENOUS at 08:34

## 2022-02-16 RX ADMIN — SODIUM CHLORIDE, SODIUM LACTATE, POTASSIUM CHLORIDE, AND CALCIUM CHLORIDE: .6; .31; .03; .02 INJECTION, SOLUTION INTRAVENOUS at 08:21

## 2022-02-16 RX ADMIN — FENTANYL CITRATE 100 MCG: 50 INJECTION INTRAMUSCULAR; INTRAVENOUS at 08:28

## 2022-02-16 RX ADMIN — Medication 100 MG: at 08:29

## 2022-02-16 NOTE — ANESTHESIA POSTPROCEDURE EVALUATION
Post-Op Assessment Note    CV Status:  Stable  Pain Score: 0    Pain management: adequate     Mental Status:  Alert and awake   Hydration Status:  Euvolemic   PONV Controlled:  Controlled   Airway Patency:  Patent      Post Op Vitals Reviewed: Yes      Staff: CRNA         No complications documented      BP   145/59   Temp  97 2   Pulse  60   Resp   8   SpO2   100

## 2022-02-16 NOTE — DISCHARGE INSTRUCTIONS
Esophageal Dilation   WHAT YOU NEED TO KNOW:   Esophageal dilation is a procedure to widen a narrow part of your esophagus  Your healthcare provider will use a dilator (inflatable balloon or another tool that expands) to make the area wider  He or she may also do an endoscopy before or during your esophageal dilation  During an endoscopy, your healthcare provider will use a scope to see inside your esophagus  DISCHARGE INSTRUCTIONS:   Call your local emergency number (911 in the [address] [place] [address],[address]) if:   · You vomit blood  · You have a fast heartbeat, chest pain, or sudden trouble breathing  · Your abdomen suddenly becomes tender and hard  Call your doctor if:   · You are not able to swallow any food  · You have a fever  · You feel very full or bloated  · You have nausea or are vomiting  · You have questions or concerns about your condition or care  Medicines:   · Medicines  may be given to decrease stomach acid that can irritate your esophagus  · Take your medicine as directed  Contact your healthcare provider if you think your medicine is not helping or if you have side effects  Tell him or her if you are allergic to any medicine  Keep a list of the medicines, vitamins, and herbs you take  Include the amounts, and when and why you take them  Bring the list or the pill bottles to follow-up visits  Carry your medicine list with you in case of an emergency  Nutrition:  Your healthcare provider will tell you how long to wait after the procedure before you eat or drink anything  You may need to wait until any numbness in your throat is gone  When it is okay to eat, chew your food well  Eat soft foods if you still have problems swallowing  Soft foods include applesauce, bananas, cooked cereal, cottage cheese, eggs, pudding, and yogurt  Follow up with your healthcare provider as directed:  Write down your questions so you remember to ask them during your visits    © [address] Information is for End User's use only and may not be sold, redistributed or otherwise used for commercial purposes  All illustrations and images included in CareNotes® are the copyrighted property of [employer]  or [employer]  The above information is an  only  It is not intended as medical advice for individual conditions or treatments  Talk to your doctor, nurse or pharmacist before following any medical regimen to see if it is safe and effective for you

## 2022-02-16 NOTE — ANESTHESIA PREPROCEDURE EVALUATION
Procedure:  ESOPHAGOGASTRODUODENOSCOPY (EGD), dilation (N/A Esophagus)  DILATATION ESOPHAGEAL (N/A Esophagus)    Relevant Problems   ANESTHESIA   (+) PONV (postoperative nausea and vomiting)      CARDIO   (+) BBB (bundle branch block)   (+) Chronic bilateral thoracic back pain   (+) Chronic scapular pain   (+) Coronary artery disease involving native coronary artery   (+) Deep venous thrombosis (HCC)   (+) Essential hypertension   (+) Mixed hyperlipidemia      ENDO   (+) Primary hyperparathyroidism ([facility])   (+) Primary hypothyroidism   (+) Type 2 diabetes mellitus with microalbuminuria, with long-term current use of insulin ([facility])   (+) Type 2 diabetes mellitus without complication, with long-term current use of insulin ([facility])      GI/HEPATIC   (+) GERD (gastroesophageal reflux disease)      /RENAL   (+) JULIANNE (acute kidney injury) ([facility])      HEMATOLOGY   (+) Anemia of chronic disease   (+) Microcytic anemia   (+) Normocytic anemia      MUSCULOSKELETAL   (+) Chronic bilateral low back pain without sciatica   (+) Chronic bilateral thoracic back pain   (+) Localized, primary osteoarthritis   (+) Lumbosacral spondylosis without myelopathy   (+) Osteoarthritis of knee      NEURO/PSYCH   (+) Anxiety and depression   (+) Chronic bilateral thoracic back pain   (+) Complex regional pain syndrome type 1 of left lower extremity   (+) History of TIA (transient ischemic attack)   (+) Pain syndrome, chronic      PULMONARY   (+) Moderate persistent asthma with acute exacerbation   (+) Moderate persistent asthma without complication   (+) Obstructive sleep apnea      Other   (+) Achalasia   (+) Chronic diastolic CHF (congestive heart failure) ([facility])   (+) Chronic myofascial pain   (+) Closed fracture of thoracic vertebra ([facility])   (+) Esophageal stricture   (+) Gait disorder   (+) Gastroparesis   (+) Insulin pump status   (+) Irritable bowel syndrome with constipation   (+) Lumbar radiculopathy   (+) Lymphadenopathy   (+) Mixed urge and stress incontinence   (+) Outlet dysfunction constipation   (+) Polyneuropathy associated with underlying disease (HCC)   (+) S/P insertion of spinal cord stimulator   (+) Sarcoidosis   (+) Spinal stenosis of lumbar region with neurogenic claudication   (+) Uncontrolled type 2 diabetes mellitus with hyperglycemia (HCC)      Lab Results   Component Value Date    SODIUM 138 05/17/2021    K 4 9 05/17/2021     05/17/2021    CO2 25 05/17/2021    AGAP 5 05/17/2021    BUN 11 05/17/2021    CREATININE 0 82 05/17/2021    GLUC 180 (H) 05/11/2021    GLUF 190 (H) 05/17/2021    CALCIUM 8 5 05/17/2021    AST 19 07/17/2020    ALT 22 07/17/2020    ALKPHOS 87 07/17/2020    TP 7 2 07/17/2020    TP 7 3 07/17/2020    TBILI 0 53 07/17/2020    EGFR 76 05/17/2021     Lab Results   Component Value Date    WBC 3 09 (L) 12/04/2021    HGB 9 7 (L) 12/04/2021    HCT 30 9 (L) 12/04/2021    MCV 98 12/04/2021     12/04/2021 2/8/22 TTE  Left ventricle is normal in size  There is mild concentric hypertrophy  Systolic function is normal with an ejection fraction of 55-60%  Wall   motion is within normal limits  Right ventricle cavity is top normal  Systolic function is normal  Pacer   wire present in the ventricle  Aortic sclerosis without stenosis  Left Ventricle   Left ventricle is normal in size  There is mild concentric hypertrophy  Systolic function is normal with an ejection fraction of 55-60%  Wall motion is within normal limits  Left atrial pressure is normal      Right Ventricle   Right ventricle cavity is top normal  Systolic function is normal  Pacer/ device wire present in the ventricle  Left Atrium   Left atrium cavity size is normal      Right Atrium   Right atrium cavity is normal  A pacemaker wire is present in the right atrium  IVC/SVC   The inferior vena cava demonstrates a diameter of <=21 mm and collapses >50%; therefore, the right atrial pressure is estimated at 0-5 mmHg       Mitral Valve Mitral valve structure is normal  There is trace regurgitation  There is no evidence of mitral valve stenosis  Tricuspid Valve   Tricuspid valve structure is normal  There is trace regurgitation  There is no evidence of tricuspid valve stenosis  Aortic Valve   Aortic sclerosis without stenosis  There is no regurgitation  Pulmonic Valve   Pulmonic valve structure is not well-visualized  There is no regurgitation or stenosis  Ascending Aorta   The aorta appears normal in size  Pericardium       Physical Exam    Airway    Mallampati score: III  TM Distance: <3 FB  Neck ROM: full     Dental       Cardiovascular      Pulmonary      Other Findings        Anesthesia Plan  ASA Score- 3     Anesthesia Type- general with ASA Monitors  Additional Monitors:   Airway Plan: ETT  Plan Factors-Exercise tolerance (METS): >4 METS  Chart reviewed  EKG reviewed  Imaging results reviewed  Existing labs reviewed  Patient summary reviewed  Patient is not a current smoker  Patient did not smoke on day of surgery  Induction- intravenous and rapid sequence induction  Postoperative Plan-   Planned trial extubation    Informed Consent- Anesthetic plan and risks discussed with patient  I personally reviewed this patient with the CRNA  Discussed and agreed on the Anesthesia Plan with the CRNA  Benny Valero

## 2022-02-16 NOTE — OP NOTE
PERATIVE REPORT  PATIENT NAME: Kaleb Guidry    :  1955  MRN: [id number]  Pt Location:  OR ROOM 08    SURGERY DATE: 2022    Surgeon(s) and Role:     * Florina Ward MD - Primary     * Maximilian Hull MD - Assisting    Preop Diagnosis:  Esophageal stricture [K22 2]    Post-Op Diagnosis Codes:     * Esophageal stricture [K22 2]    Procedure(s) (LRB):  ESOPHAGOGASTRODUODENOSCOPY (EGD) (N/A)  DILATATION ESOPHAGEAL (N/A)    Specimen(s):  * No specimens in log *    Estimated Blood Loss:   Minimal    Drains:  * No LDAs found *    Anesthesia Type:   General    Operative Indications:  Esophageal stricture [K22 2]    Operative Findings:  Mild narrowing at GE Junction    Complications:   None    Procedure and Technique:  Kaleb Guidry was brought to the operating room and placed in the supine position   After institution of adequate general anesthesia, fiberoptic endoscopy is performed  The scope was passed through the esophagus and a small narrowing was identified just proximal to the GEJxn  This was mildly narrowed, about the same as her last dilation  The scope was then advanced through the remaining esophagus and stomach and down to the pylorus  The duodenum was then entered without any difficulty  The stomach distended easily  The wrap was intact on retroflexion   The pylorus appeared to be functioning normally   The proximal duodenum appeared normal  A guidewire was placed through the scope and the scope was removed over the guidewire   Serial dilations up to [address] [place] were performed  Repeat endoscopy demonstrated no bleeding or signs of perforation   The excess air was suctioned from the GI tract and removed        I was present for the entire procedure    Patient Disposition:  PACU  and hemodynamically stable      SIGNATURE: Florina Ward MD  DATE: 2022  TIME: 9:08 AM

## 2022-02-18 ENCOUNTER — TELEPHONE (OUTPATIENT)
Dept: HEMATOLOGY ONCOLOGY | Facility: CLINIC | Age: 67
End: 2022-02-18

## 2022-02-18 ENCOUNTER — TELEPHONE (OUTPATIENT)
Dept: CARDIAC SURGERY | Facility: CLINIC | Age: 67
End: 2022-02-18

## 2022-02-18 NOTE — TELEPHONE ENCOUNTER
Patient calling to schedule follow- up appt  She had surgery 2/16/22 with Dr Britt Mooney [phone]

## 2022-02-18 NOTE — TELEPHONE ENCOUNTER
Pt needed to move her appt to a later time, she forgot she has an appt in the morning with another provider   Moved her appt to 120

## 2022-03-10 ENCOUNTER — OFFICE VISIT (OUTPATIENT)
Dept: GASTROENTEROLOGY | Facility: CLINIC | Age: 67
End: 2022-03-10
Payer: MEDICARE

## 2022-03-10 ENCOUNTER — OFFICE VISIT (OUTPATIENT)
Dept: CARDIAC SURGERY | Facility: CLINIC | Age: 67
End: 2022-03-10
Payer: MEDICARE

## 2022-03-10 VITALS
RESPIRATION RATE: 16 BRPM | SYSTOLIC BLOOD PRESSURE: 122 MMHG | WEIGHT: 175.93 LBS | HEART RATE: 78 BPM | DIASTOLIC BLOOD PRESSURE: 76 MMHG | OXYGEN SATURATION: 95 % | BODY MASS INDEX: 33.22 KG/M2 | TEMPERATURE: 98 F | HEIGHT: 61 IN

## 2022-03-10 VITALS
DIASTOLIC BLOOD PRESSURE: 70 MMHG | SYSTOLIC BLOOD PRESSURE: 140 MMHG | BODY MASS INDEX: 33.23 KG/M2 | WEIGHT: 176 LBS | TEMPERATURE: 99.4 F | HEIGHT: 61 IN

## 2022-03-10 DIAGNOSIS — K22.0 ACHALASIA: ICD-10-CM

## 2022-03-10 DIAGNOSIS — E66.01 MORBID OBESITY WITH BMI OF 40.0-44.9, ADULT (HCC): ICD-10-CM

## 2022-03-10 DIAGNOSIS — K58.1 IRRITABLE BOWEL SYNDROME WITH CONSTIPATION: ICD-10-CM

## 2022-03-10 DIAGNOSIS — K21.00 GASTROESOPHAGEAL REFLUX DISEASE WITH ESOPHAGITIS WITHOUT HEMORRHAGE: Primary | ICD-10-CM

## 2022-03-10 DIAGNOSIS — K22.0 ACHALASIA: Primary | ICD-10-CM

## 2022-03-10 DIAGNOSIS — K31.84 GASTROPARESIS: ICD-10-CM

## 2022-03-10 PROCEDURE — 99214 OFFICE O/P EST MOD 30 MIN: CPT | Performed by: INTERNAL MEDICINE

## 2022-03-10 PROCEDURE — 99212 OFFICE O/P EST SF 10 MIN: CPT | Performed by: THORACIC SURGERY (CARDIOTHORACIC VASCULAR SURGERY)

## 2022-03-10 RX ORDER — BISACODYL 10 MG
10 SUPPOSITORY, RECTAL RECTAL DAILY
Qty: 30 SUPPOSITORY | Refills: 3 | Status: SHIPPED | OUTPATIENT
Start: 2022-03-10

## 2022-03-10 RX ORDER — AMITRIPTYLINE HYDROCHLORIDE 10 MG/1
10 TABLET, FILM COATED ORAL DAILY
COMMUNITY
Start: 2022-03-07

## 2022-03-10 RX ORDER — GABAPENTIN 100 MG/1
100 CAPSULE ORAL 3 TIMES DAILY
COMMUNITY
Start: 2022-02-25

## 2022-03-10 NOTE — LETTER
March 10, 2022     Mark Molina DO  [address] [facility]  [place]   [address]  [id number]    Patient: Rodrigo Caruso   YOB: 1955   Date of Visit: 3/10/2022       Dear Dr Basil Markham: Thank you for referring Errol Doran to me for evaluation  Below are my notes for this consultation  If you have questions, please do not hesitate to call me  I look forward to following your patient along with you  Sincerely,        Per Vu MD        CC: DO Per Whittaker MD  3/10/2022  1:28 PM  Incomplete  Thoracic Follow-Up  Assessment/Plan:    Achalasia  Ms Barriga is a 72-year-old female who is well known to me  She underwent EGD with dilation on 01/12/2022 and some of her symptoms improved after this procedure but not all them  The decision was made to repeat the dilation and attempt to dilate her further on 02/16/2022  She presents today for a postop visit  Today in the office, I am so so happy that she is doing so well  The 2nd dilation seems to have improved her symptoms greatly  She has seen a significant improvement in her quality of life  At this time, I have offered her the opportunity to follow up with me in 6 months or to follow up with me on an as-needed basis  Since she is more than happy to follow up with me on an as-needed basis  She will continue to follow regularly with Dr Anabel Buerger her gastroenterologist   I did explain to her that if her symptoms return, she should not delay and calling me so that we can potentially dilate her sooner and get her moving in the right direction again in the future  I did explain to her that there is also a chance that the symptoms will never return  She understands and all of her questions were answered  She will call me if she needs me      Scot Blancas MD  Thoracic Surgery  (Available on [employer] Text)  Office: [phone]         Diagnoses and all orders for this visit:    Achalasia Thoracic History     Diagnosis: Achalasia  Procedure: Robotic heller myotomy, Akreem fundoplication, laparoscopic lysis of adhesions 5/3/21       Procedure: EGD with dilation, 1/12/22 and repeat on 2/16/22     Subjective:    Patient ID: Carline Muniz is a 73 y o  female  HPI  Ms Barriga is a 71-year-old female who is well known to me  She underwent EGD with dilation on 01/12/2022 and some of her symptoms improved after this procedure but not all them  The decision was made to repeat the dilation and attempt to dilate her further on 02/16/2022  She presents today for a postop visit  Today in the office, she states she is doing incredibly well  She denies any choking or gagging  She has been able to eat without any difficulty  She reports that an occasional pill gets stuck but that this is essentially a non issue  She is very happy with success of the dilations  The following portions of the patient's history were reviewed and updated as appropriate: allergies, current medications, past family history, past medical history, past social history, past surgical history and problem list     Review of Systems   Constitutional: Negative for chills, fatigue, fever and unexpected weight change  HENT: Negative  Negative for trouble swallowing  Eyes: Negative  Negative for visual disturbance  Respiratory: Negative for cough, shortness of breath and stridor  Cardiovascular: Negative for chest pain  Gastrointestinal: Negative  Negative for nausea and vomiting  Endocrine: Negative  Genitourinary: Negative  Musculoskeletal: Negative  Skin: Negative  Neurological: Negative for dizziness, light-headedness and headaches  Hematological: Negative for adenopathy  Psychiatric/Behavioral: Negative  Objective:   Physical Exam  Constitutional:       General: She is not in acute distress  Appearance: Normal appearance  She is well-developed  She is not diaphoretic     HENT:      Head: Normocephalic and atraumatic  Nose: Nose normal    Eyes:      General: No scleral icterus  Conjunctiva/sclera: Conjunctivae normal    Cardiovascular:      Rate and Rhythm: Normal rate and regular rhythm  Pulmonary:      Effort: Pulmonary effort is normal  No respiratory distress  Breath sounds: No stridor  Musculoskeletal:      Cervical back: Normal range of motion  Comments: Walks with a cane   Skin:     Coloration: Skin is not pale  Findings: No erythema or rash  Neurological:      Mental Status: She is alert and oriented to person, place, and time  Psychiatric:         Behavior: Behavior normal          Thought Content: Thought content normal          Judgment: Judgment normal      /76 (BP Location: Left arm, Patient Position: Sitting, Cuff Size: Standard)   Pulse 78   Temp 98 °F (36 7 °C) (Temporal)   Resp 16   Ht 5' 1" (1 549 m)   Wt 79 8 kg (175 lb 14 8 oz)   SpO2 95%   BMI 33 24 kg/m²     No Chest XR results available for this patient  No CT Chest results available for this patient  No CT Chest,Abdomen,Pelvis results available for this patient  No NM PET CT results available for this patient  FL esophagram complete    Result Date: 5/11/2021  Narrative BARIUM SWALLOW-ESOPHAGRAM INDICATION:   post op pain  COMPARISON:  5/4/2020 IMAGES:  26 FLUOROSCOPY TIME:   0 8min  TECHNIQUE: The patient was given effervescent granules Omnipaque 350 and barium by mouth and images of the esophagus were obtained  FINDINGS: The esophagus is normal in caliber  Esophageal motility is normal and emptying of contrast from the esophagus is prompt  No mucosal lesion, ulceration or evidence of fold thickening is seen  Gastroesophageal reflux was not observed  There is no hiatal hernia       Impression Unremarkable esophagram  Workstation performed: [id number]     FL esophagram complete    Result Date: 5/4/2021  Narrative BARIUM SWALLOW-ESOPHAGRAM INDICATION:   routine study s/p Heller myotomy - rule out leak  COMPARISON:  9/18/2020 IMAGES: FLUOROSCOPY TIME:   1 minute  TECHNIQUE: The patient was given thin barium by mouth and images of the esophagus were obtained  FINDINGS: Cervical plate and screw hardware noted  Thoracic spinal cord stimulator leads noted  Pacemaker leads noted  The esophagus is normal in caliber  Esophageal motility is normal and emptying of contrast from the esophagus is prompt  No mucosal lesion, ulceration or evidence of fold thickening is seen  No extraluminal contrast noted  Gastroesophageal reflux was not observed  There is no hiatal hernia  Impression No evidence of esophageal leak  Workstation performed: [id number]     FL barium swallow video w speech    Result Date: 8/16/2021  Narrative A video barium swallow study was performed by the Department of Speech Pathology  Please refer to the report for the official interpretation  The images are stored for archival purposes only  Study images were not formally reviewed by the Radiology Department

## 2022-03-10 NOTE — ASSESSMENT & PLAN NOTE
Ms Tyler Rainey is a 81-year-old female who is well known to me  She underwent EGD with dilation on 01/12/2022 and some of her symptoms improved after this procedure but not all them  The decision was made to repeat the dilation and attempt to dilate her further on 02/16/2022  She presents today for a postop visit  Today in the office, I am so so happy that she is doing so well  The 2nd dilation seems to have improved her symptoms greatly  She has seen a significant improvement in her quality of life  At this time, I have offered her the opportunity to follow up with me in 6 months or to follow up with me on an as-needed basis  Since she is more than happy to follow up with me on an as-needed basis  She will continue to follow regularly with Dr Destiny Alvarez her gastroenterologist   I did explain to her that if her symptoms return, she should not delay and calling me so that we can potentially dilate her sooner and get her moving in the right direction again in the future  I did explain to her that there is also a chance that the symptoms will never return  She understands and all of her questions were answered  She will call me if she needs me      Vanessa Adams MD  Thoracic Surgery  (Available on [employer])  Office: [phone]

## 2022-03-10 NOTE — PROGRESS NOTES
[facility] - Outpatient Follow-up Note  Susie Ruiz 73 y o  female MRN: [id number]  Encounter: [id number]          ASSESSMENT AND PLAN:  73year old female here for follow up  1  Gastroesophageal reflux disease with esophagitis without hemorrhage  -continue nexium 40 mg twice daily     2  Gastroparesis  -doing well currently     3  Irritable bowel syndrome with constipation  -continue to increase water intake- discussed with pt about increasing to 8- 8oz glasses daily   -will write rx for dulcolax suppositories; has failed multiple different regimens  -continue miralax three times daily     4  Achalasia  -s/p surgery and dilation; improved now   No longer coughing    5  Two tubular adenomas removed in 2022  -repeat colonoscopy in 7 years     Follow up in six months time      ___________________________________    SUBJECTIVE:  73year old female here for follow up  Main issue currently is constipation  She is taking a glycerin suppository every three days  Currently drinkin g a lot of water daily  Coughing is much better after Achalasia treatment by Dr Chrissy Galindo  Has also had dilation by Dr Chrissy Galindo as well and states her swallowing is much improved  REVIEW OF SYSTEMS IS OTHERWISE NEGATIVE        Historical Information   Past Medical History:   Diagnosis Date    Anxiety     Asthma     controlled    Back complaints     Cancer [facility] )     nose    Cellulitis of left lower leg     "not now"    CHF (congestive heart failure) ([facility] ) 02/2021    Chronic bilateral thoracic back pain     Chronic kidney disease     sees nephrologist regularly" stage 3"    Chronic myofascial pain     Chronic pain of both lower extremities     Chronic pain of left knee     "both knees"    Chronic pain syndrome     bilat legs and knees/neuropathy pain    CPAP (continuous positive airway pressure) dependence     no currently uses    Depression     Diabetes mellitus ([facility] )     insulin pump    Disease of thyroid gland     Does use hearing aid     bilat and will wear DOS    Gait disorder     Gastroparesis     GERD (gastroesophageal reflux disease)     History of angina     History of MRSA infection     History of transfusion     Many years ago    Hyperlipidemia     Hypertension     Hypoglycemic reaction     "occas low blood sugar"    Insulin pump in place     pt reports saw endocrinologist  and will bring copy of instructions DOS    Irritable bowel syndrome     Kidney disease     Pacemaker     Polyneuropathy associated with underlying disease ([address] )     PONV (postoperative nausea and vomiting)     Risk for falls     S/P insertion of spinal cord stimulator 2018    Sarcoidosis     Shortness of breath     "exertion and not new"    Sleep apnea     TIA (transient ischemic attack)     Use of cane as ambulatory aid     Uses walker     Wears dentures     permanent upper/and some missing teeth/partial lower     Past Surgical History:   Procedure Laterality Date    ABDOMINAL ADHESION SURGERY N/A 5/3/2021    Procedure: laparoscopic LYSIS ADHESIONS;  Surgeon: Sofia Seip, MD;  [facility];  Service: Thoracic    BREAST SURGERY      BREAST SURGERY      reduction    CARDIAC PACEMAKER PLACEMENT      pacemaker     SECTION      COLONOSCOPY      DILATION AND CURETTAGE OF UTERUS      "D&E"    HERNIA REPAIR      HYSTERECTOMY      JOINT REPLACEMENT Left     NECK SURGERY      fused 4 discs with 4 screws implanted    NISSEN FUNDOPLICATION LAPAROSCOPIC WITH ROBOTICS N/A 5/3/2021    Procedure: ROBOTIC HELLER [address] ;  Surgeon: Sofia Seip, MD;  [facility];  Service: Thoracic    CO ESOPHAGOGASTRODUODENOSCOPY TRANSORAL DIAGNOSTIC N/A 5/3/2021    Procedure: ESOPHAGOGASTRODUODENOSCOPY (EGD);   Surgeon: Sofia Seip, MD;  [facility];  Service: Thoracic    CO ESOPHAGOGASTRODUODENOSCOPY TRANSORAL DIAGNOSTIC N/A 1/12/2022    Procedure: ESOPHAGOGASTRODUODENOSCOPY (EGD),;  Surgeon: Edith Scott MD;  [facility];  Service: Thoracic    FL ESOPHAGOGASTRODUODENOSCOPY TRANSORAL DIAGNOSTIC N/A 2/16/2022    Procedure: ESOPHAGOGASTRODUODENOSCOPY (EGD); Surgeon: Edith Scott MD;  [facility];  Service: Thoracic    FL ESOPHAGOSCOPY FLEX BALLOON DILAT <30 MM DIAM N/A 1/12/2022    Procedure: DILATATION ESOPHAGEAL;  Surgeon: Edith Scott MD;  [facility];  Service: Thoracic    FL ESOPHAGOSCOPY FLEX BALLOON DILAT <30 MM DIAM N/A 2/16/2022    Procedure: DILATATION ESOPHAGEAL;  Surgeon: Edith Scott MD;  [facility];  Service: Thoracic    FL SURG IMPLNT [address] Left 4/23/2018    Procedure:  Insertion of thoracic spinal cord stimulator electrode via laminotomy and placement of left buttock IPG;  Surgeon: Jaime Andersen MD;  [facility];  Service: Neurosurgery    REPLACEMENT TOTAL KNEE      ROTATOR CUFF REPAIR      SPINAL STIMULATOR PLACEMENT Left 10/3/2018    Procedure: BUTTOCK RE-OPENING INCISION FOR REPOSITIONING OF IMPLANTABLE PULSE GENERATOR;  Surgeon: Jaime Andersen MD;  [facility];  Service: Neurosurgery    TONSILLECTOMY      UPPER GASTROINTESTINAL ENDOSCOPY      WISDOM TOOTH EXTRACTION       Social History   Social History     Substance and Sexual Activity   Alcohol Use Never     Social History     Substance and Sexual Activity   Drug Use Yes    Frequency: 8 0 times per week    Types: Marijuana    Comment: Medical Marijuana/vape pen     Social History     Tobacco Use   Smoking Status Never Smoker   Smokeless Tobacco Never Used     Family History   Problem Relation Age of Onset    Diabetes Family     Heart disease Family     Hypertension Family     Neuropathy Family     No Known Problems Mother     Heart disease Father     Diabetes Father     No Known Problems Maternal Grandmother     No Known Problems Maternal Grandfather     No Known Problems Paternal Grandmother     No Known Problems Paternal Grandfather     No Known Problems Brother     No Known Problems Daughter     Cancer Son        Meds/Allergies       Current Outpatient Medications:     amitriptyline (ELAVIL) 25 mg tablet    Armodafinil (Nuvigil) 250 MG tablet    aspirin (ECOTRIN LOW STRENGTH) 81 mg EC tablet    atorvastatin (LIPITOR) 80 mg tablet    chlorzoxazone (PARAFON FORTE) 500 mg tablet    CRANBERRY PO    Cyanocobalamin (VITAMIN B-12 PO)    dicyclomine (BENTYL) 10 mg capsule    docusate sodium (COLACE) 100 mg capsule    esomeprazole (NexIUM) 40 MG capsule    famotidine (PEPCID) 20 mg tablet    ferrous sulfate 324 (65 Fe) mg    fluticasone-salmeterol (ADVAIR) 100-50 mcg/dose inhaler    glucagon 1 MG injection    glucose blood test strip    levothyroxine 100 mcg tablet    losartan (COZAAR) 50 mg tablet    NOVOLOG 100 UNIT/ML injection    ondansetron (ZOFRAN) 8 mg tablet    Plecanatide (Trulance) 3 MG TABS    polyethylene glycol (GOLYTELY) 4000 mL solution    polyethylene glycol (MIRALAX) 17 g packet    Potassium Gluconate 595 MG CAPS    pramipexole (MIRAPEX) 0 5 mg tablet    pramipexole (MIRAPEX) 1 mg tablet    Semaglutide (OZEMPIC, 0 25 OR 0 5 MG/DOSE, SC)    senna-docusate sodium (SENOKOT-S) 8 6-50 mg per tablet    sertraline (ZOLOFT) 50 mg tablet    torsemide (DEMADEX) 20 mg tablet    trospium chloride (SANCTURA) 20 mg tablet    Allergies   Allergen Reactions    Bactrim [Sulfamethoxazole-Trimethoprim] Hives    Sucralfate Hives     Facial swelling    Topamax [Topiramate]      disorentation    Azithromycin Itching    Pregabalin Confusion and Other (See Comments)     altered mental status    Ciprofloxacin Drowsiness     Other reaction(s):  Other (See Comments)  "drowsiness"    Norvasc [Amlodipine] Swelling    Baclofen      "That knocks me out "    Bupropion Fatigue    Methotrexate Nausea Only    Neurontin [Gabapentin] Hallucinations and Hypertension     Objective         PHYSICAL EXAM:      General Appearance:   Alert, cooperative, no distress   HEENT:   Normocephalic, atraumatic, anicteric      Neck:  Supple, symmetrical, trachea midline   Lungs:   Clear to auscultation bilaterally; no rales, rhonchi or wheezing; respirations unlabored    Heart[de-identified]   Regular rate and rhythm; no murmur, rub, or gallop  Abdomen:   Soft, non-tender, non-distended; normal bowel sounds; no masses, no organomegaly    Genitalia:   Deferred    Rectal:   Deferred    Extremities:  No cyanosis, clubbing or edema    Pulses:  2+ and symmetric    Skin:  No jaundice, rashes, or lesions    Lymph nodes:  No palpable cervical lymphadenopathy        Lab Results:   No visits with results within 1 Day(s) from this visit  Latest known visit with results is:   Admission on 02/16/2022, Discharged on 02/16/2022   Component Date Value    POC Glucose 02/16/2022 149*    POC Glucose 02/16/2022 124          Radiology Results:   XR chest 1 view    Result Date: 2/18/2022  Narrative: C-ARM - INDICATION:  Esophageal stricture   Procedure guidance  COMPARISON:  Chest radiograph from 5/3/2021  TECHNIQUE: FLUOROSCOPY TIME:   1 minute 49 seconds 8 FLUOROSCOPIC IMAGES FINDINGS: Fluoroscopic guidance provided for procedure guidance  Osseous and soft tissue detail limited by technique  Impression: Fluoroscopic guidance provided for procedure guidance  Please refer to the separate procedure notes for additional details    Workstation performed: [id number] I am

## 2022-03-10 NOTE — PROGRESS NOTES
Thoracic Follow-Up  Assessment/Plan:    Achalasia  Ms Barriga is a 55-year-old female who is well known to me  She underwent EGD with dilation on 01/12/2022 and some of her symptoms improved after this procedure but not all them  The decision was made to repeat the dilation and attempt to dilate her further on 02/16/2022  She presents today for a postop visit  Today in the office, I am so so happy that she is doing so well  The 2nd dilation seems to have improved her symptoms greatly  She has seen a significant improvement in her quality of life  At this time, I have offered her the opportunity to follow up with me in 6 months or to follow up with me on an as-needed basis  Since she is more than happy to follow up with me on an as-needed basis  She will continue to follow regularly with Dr Hernan Meléndez her gastroenterologist   I did explain to her that if her symptoms return, she should not delay and calling me so that we can potentially dilate her sooner and get her moving in the right direction again in the future  I did explain to her that there is also a chance that the symptoms will never return  She understands and all of her questions were answered  She will call me if she needs me  Reginald Laura MD  Thoracic Surgery  (Available on [facility] [employer])  Office: [phone]         Diagnoses and all orders for this visit:    Achalasia          Thoracic History     Diagnosis: Achalasia  Procedure: Robotic heller myotomy, Kareem fundoplication, laparoscopic lysis of adhesions 5/3/21       Procedure: EGD with dilation, 1/12/22 and repeat on 2/16/22     Subjective:    Patient ID: Len Boss is a 73 y o  female  HPI  Ms Barriga is a 55-year-old female who is well known to me  She underwent EGD with dilation on 01/12/2022 and some of her symptoms improved after this procedure but not all them  The decision was made to repeat the dilation and attempt to dilate her further on 02/16/2022    She presents today for a postop visit  Today in the office, she states she is doing incredibly well  She denies any choking or gagging  She has been able to eat without any difficulty  She reports that an occasional pill gets stuck but that this is essentially a non issue  She is very happy with success of the dilations  The following portions of the patient's history were reviewed and updated as appropriate: allergies, current medications, past family history, past medical history, past social history, past surgical history and problem list     Review of Systems   Constitutional: Negative for chills, fatigue, fever and unexpected weight change  HENT: Negative  Negative for trouble swallowing  Eyes: Negative  Negative for visual disturbance  Respiratory: Negative for cough, shortness of breath and stridor  Cardiovascular: Negative for chest pain  Gastrointestinal: Negative  Negative for nausea and vomiting  Endocrine: Negative  Genitourinary: Negative  Musculoskeletal: Negative  Skin: Negative  Neurological: Negative for dizziness, light-headedness and headaches  Hematological: Negative for adenopathy  Psychiatric/Behavioral: Negative  Objective:   Physical Exam  Constitutional:       General: She is not in acute distress  Appearance: Normal appearance  She is well-developed  She is not diaphoretic  HENT:      Head: Normocephalic and atraumatic  Nose: Nose normal    Eyes:      General: No scleral icterus  Conjunctiva/sclera: Conjunctivae normal    Cardiovascular:      Rate and Rhythm: Normal rate and regular rhythm  Pulmonary:      Effort: Pulmonary effort is normal  No respiratory distress  Breath sounds: No stridor  Musculoskeletal:      Cervical back: Normal range of motion  Comments: Walks with a cane   Skin:     Coloration: Skin is not pale  Findings: No erythema or rash     Neurological:      Mental Status: She is alert and oriented to person, place, and time  Psychiatric:         Behavior: Behavior normal          Thought Content: Thought content normal          Judgment: Judgment normal      /76 (BP Location: Left arm, Patient Position: Sitting, Cuff Size: Standard)   Pulse 78   Temp 98 °F (36 7 °C) (Temporal)   Resp 16   Ht 5' 1" (1 549 m)   Wt 79 8 kg (175 lb 14 8 oz)   SpO2 95%   BMI 33 24 kg/m²     No Chest XR results available for this patient  No CT Chest results available for this patient  No CT Chest,Abdomen,Pelvis results available for this patient  No NM PET CT results available for this patient  FL esophagram complete    Result Date: 5/11/2021  Narrative BARIUM SWALLOW-ESOPHAGRAM INDICATION:   post op pain  COMPARISON:  5/4/2020 IMAGES:  26 FLUOROSCOPY TIME:   0 8min  TECHNIQUE: The patient was given effervescent granules Omnipaque 350 and barium by mouth and images of the esophagus were obtained  FINDINGS: The esophagus is normal in caliber  Esophageal motility is normal and emptying of contrast from the esophagus is prompt  No mucosal lesion, ulceration or evidence of fold thickening is seen  Gastroesophageal reflux was not observed  There is no hiatal hernia  Impression Unremarkable esophagram  Workstation performed: [id number]     FL esophagram complete    Result Date: 5/4/2021  Narrative BARIUM SWALLOW-ESOPHAGRAM INDICATION:   routine study s/p Heller myotomy - rule out leak  COMPARISON:  9/18/2020 IMAGES: FLUOROSCOPY TIME:   1 minute  TECHNIQUE: The patient was given thin barium by mouth and images of the esophagus were obtained  FINDINGS: Cervical plate and screw hardware noted  Thoracic spinal cord stimulator leads noted  Pacemaker leads noted  The esophagus is normal in caliber  Esophageal motility is normal and emptying of contrast from the esophagus is prompt  No mucosal lesion, ulceration or evidence of fold thickening is seen  No extraluminal contrast noted   Gastroesophageal reflux was not observed  There is no hiatal hernia  Impression No evidence of esophageal leak  Workstation performed: [id number]     FL barium swallow video w speech    Result Date: 8/16/2021  Narrative A video barium swallow study was performed by the Department of Speech Pathology  Please refer to the report for the official interpretation  The images are stored for archival purposes only  Study images were not formally reviewed by the Radiology Department

## 2022-03-29 DIAGNOSIS — K59.00 CONSTIPATION, UNSPECIFIED CONSTIPATION TYPE: ICD-10-CM

## 2022-03-29 RX ORDER — DOCUSATE SODIUM -SENNOSIDES 50; 8.6 MG/1; MG/1
TABLET, COATED ORAL
Qty: 30 TABLET | Refills: 1 | Status: SHIPPED | OUTPATIENT
Start: 2022-03-29 | End: 2022-04-04 | Stop reason: SDUPTHER

## 2022-04-04 DIAGNOSIS — K59.00 CONSTIPATION, UNSPECIFIED CONSTIPATION TYPE: ICD-10-CM

## 2022-04-04 DIAGNOSIS — K22.2 ESOPHAGEAL STRICTURE: ICD-10-CM

## 2022-04-05 RX ORDER — FAMOTIDINE 20 MG/1
20 TABLET, FILM COATED ORAL 2 TIMES DAILY
Qty: 180 TABLET | Refills: 0 | OUTPATIENT
Start: 2022-04-05 | End: 2022-07-04

## 2022-04-05 RX ORDER — AMOXICILLIN 250 MG
1 CAPSULE ORAL DAILY
Qty: 30 TABLET | Refills: 1 | Status: SHIPPED | OUTPATIENT
Start: 2022-04-05 | End: 2022-05-31 | Stop reason: SDUPTHER

## 2022-04-05 NOTE — TELEPHONE ENCOUNTER
Please clarify if pt is still on pepcid  According to Dr Tani Cai last note, she is to be on nexium BID  If she is still taking pepcid, as well, please let me know and I will fill the script   Thank you

## 2022-04-08 ENCOUNTER — TELEPHONE (OUTPATIENT)
Dept: NEUROSURGERY | Facility: CLINIC | Age: 67
End: 2022-04-08

## 2022-04-08 ENCOUNTER — TELEPHONE (OUTPATIENT)
Dept: PAIN MEDICINE | Facility: CLINIC | Age: 67
End: 2022-04-08

## 2022-04-08 NOTE — TELEPHONE ENCOUNTER
Sw pt, returning call per [facility]  staff  Pt is questioning whom should she contact re: having her scs removed? Pt stated that she needs to have back surgery and she is having difficulty getting mri's etc  Advised pt, she will need to fu w/ her surgeon  Pt stated that she cannot recall the name of her surgeon, only that her surgeon is part of [facility]  Provided pt w/ contact info - advised pt to fu  Pt verbalized understanding and appreciation  Will proceed as directed

## 2022-04-08 NOTE — TELEPHONE ENCOUNTER
Received call from Connor Waters requesting SCS removal  Review of chart shows it has been more than 3 years since she was seen and will need an intake before we can schedule  Will route message to  to assist with this and schedule appropriately

## 2022-04-29 ENCOUNTER — OFFICE VISIT (OUTPATIENT)
Dept: NEUROSURGERY | Facility: CLINIC | Age: 67
End: 2022-04-29
Payer: MEDICARE

## 2022-04-29 VITALS
BODY MASS INDEX: 33.95 KG/M2 | TEMPERATURE: 98.7 F | WEIGHT: 179.8 LBS | DIASTOLIC BLOOD PRESSURE: 77 MMHG | HEART RATE: 81 BPM | HEIGHT: 61 IN | SYSTOLIC BLOOD PRESSURE: 159 MMHG | RESPIRATION RATE: 16 BRPM

## 2022-04-29 DIAGNOSIS — M54.16 LUMBAR RADICULOPATHY: ICD-10-CM

## 2022-04-29 DIAGNOSIS — E11.65 UNCONTROLLED TYPE 2 DIABETES MELLITUS WITH HYPERGLYCEMIA (HCC): ICD-10-CM

## 2022-04-29 DIAGNOSIS — Z96.89 S/P INSERTION OF SPINAL CORD STIMULATOR: Primary | ICD-10-CM

## 2022-04-29 DIAGNOSIS — G89.4 CHRONIC PAIN SYNDROME: ICD-10-CM

## 2022-04-29 PROCEDURE — 99203 OFFICE O/P NEW LOW 30 MIN: CPT | Performed by: NEUROLOGICAL SURGERY

## 2022-04-29 RX ORDER — SOLIFENACIN SUCCINATE 10 MG/1
10 TABLET, FILM COATED ORAL DAILY
COMMUNITY
Start: 2022-04-20

## 2022-04-29 RX ORDER — SUBCUTANEOUS INSULIN PUMP
EACH MISCELLANEOUS
COMMUNITY

## 2022-04-29 NOTE — PROGRESS NOTES
Office Note - Neurosurgery   Geraldo Higgins 73 y o  female MRN: [id number]      Assessment:    Patient is gradually worsening  Pleasant 71-year-old woman, with numerous comorbidities, post insertion of [employer] spinal cord stimulator system for chronic pain syndrome approximately 4 years ago  Stimulator system is still functioning but not effective in managing her pain  She is scheduled to see a another neurosurgeon in approximately 1 weeks time with a myelogram to discuss surgery for stenosis at L4-5 to help manage her pain  She will also meet with the [employer] representative in the office today to see if any additional programming may offer her some benefit  At present, would recommend against removing the spinal cord stimulator system  It is possible that once the stenosis in the lumbar spine is addressed, the system may become more effective in managing her more chronic pain  The indication for removing a spinal cord stimulator system would be infection, discomfort around the IPG or electrode site or the central need for MRI imaging  Unfortunately, her system is not MRI compatible secondary to placement of a tripole electrode  She will continue follow-up with her neurosurgeon and follow up through this office on a p r n  basis  History, physical examination and diagnostic tests were reviewed and questions answered  Diagnosis, care plan and treatment options were discussed  The patient understand instructions and will follow up as directed  Plan:    Follow-up: prn    Problem List Items Addressed This Visit        Endocrine    Uncontrolled type 2 diabetes mellitus with hyperglycemia (HCC)       Nervous and Auditory    Lumbar radiculopathy       Other    S/P insertion of spinal cord stimulator - Primary      Other Visit Diagnoses     Chronic pain syndrome              Subjective/Objective     Chief Complaint    Spinal cord stimulator system not effective in managing pain      HPI    Pleasant 71-year-old woman nearly 4 years post insertion of [employer] spinal cord stimulator system  Unfortunately, the system is not been particularly effective in managing her pain but is still functioning  She is being worked up for lumbar stenosis and recently had a CT myelogram and is scheduled to see her surgeon at [address] [place] [address] next week to discuss surgical options  She describes lower back and bilateral leg pain which is worsening  She presents today to discuss removal of the spinal cord stimulator system since it is no longer effective  In addition, she has a tripole lead in place rendering her system MRI incompatible  LINWOOD PALUMBO personally reviewed and updated  Review of Systems   Constitutional: Positive for fatigue  HENT: Negative  Eyes: Negative  Respiratory: Negative  Cardiovascular: Negative  Gastrointestinal: Positive for constipation  Endocrine: Negative  Genitourinary: Positive for frequency (during the day/nocturia x3) and urgency  Leakage    Possible bladder STIM surgery   Musculoskeletal: Positive for back pain (across lower back radiates into bilateral legs) and gait problem (uses cane for assistance)  Skin: Negative  Allergic/Immunologic: Negative  Neurological: Positive for weakness (bilateral legs), numbness (bilateral legs/feet numbness and tingling) and headaches  Negative for dizziness, seizures and syncope  Hematological: Does not bruise/bleed easily (patient on ASA 81)  Psychiatric/Behavioral: Positive for sleep disturbance (due to pain)         Family History    Family History   Problem Relation Age of Onset    Diabetes Family     Heart disease Family     Hypertension Family     Neuropathy Family     No Known Problems Mother     Heart disease Father     Diabetes Father     No Known Problems Maternal Grandmother     No Known Problems Maternal Grandfather     No Known Problems Paternal Grandmother     No Known Problems Paternal Grandfather     No Known Problems Brother     No Known Problems Daughter     Cancer Son        Social History    Social History     Socioeconomic History    Marital status: /Civil Union     Spouse name: Not on file    Number of children: Not on file    Years of education: Not on file    Highest education level: Not on file   Occupational History    Not on file   Tobacco Use    Smoking status: Never Smoker    Smokeless tobacco: Never Used   Vaping Use    Vaping Use: Never used   Substance and Sexual Activity    Alcohol use: Never    Drug use: Yes     Frequency: 8 0 times per week     Types: Marijuana     Comment: Medical Marijuana/vape pen    Sexual activity: Not on file   Other Topics Concern    Not on file   Social History Narrative    Not on file     Social Determinants of Health     Financial Resource Strain: Not on file   Food Insecurity: Not on file   Transportation Needs: Not on file   Physical Activity: Not on file   Stress: Not on file   Social Connections: Not on file   Intimate Partner Violence: Not on file   Housing Stability: Not on file       Past Medical History    Past Medical History:   Diagnosis Date    Anxiety     Asthma     controlled    Back complaints     Cancer ([place] [address] )     nose    Cellulitis of left lower leg     "not now"    CHF (congestive heart failure) ([place] [address] ) 02/2021    Chronic bilateral thoracic back pain     Chronic kidney disease     sees nephrologist regularly" stage 3"    Chronic myofascial pain     Chronic pain of both lower extremities     Chronic pain of left knee     "both knees"    Chronic pain syndrome     bilat legs and knees/neuropathy pain    CPAP (continuous positive airway pressure) dependence     no currently uses    Depression     Diabetes mellitus ([facility] )     insulin pump    Disease of thyroid gland     Does use hearing aid     bilat and will wear DOS    Gait disorder     Gastroparesis     GERD (gastroesophageal reflux disease)     History of angina     History of MRSA infection     History of transfusion     Many years ago    Hyperlipidemia     Hypertension     Hypoglycemic reaction     "occas low blood sugar"    Insulin pump in place     pt reports saw endocrinologist  and will bring copy of instructions DOS    Irritable bowel syndrome     Kidney disease     Pacemaker     Polyneuropathy associated with underlying disease ([address] )     PONV (postoperative nausea and vomiting)     Risk for falls     S/P insertion of spinal cord stimulator 2018    Sarcoidosis     Shortness of breath     "exertion and not new"    Sleep apnea     TIA (transient ischemic attack)     Use of cane as ambulatory aid     Uses walker     Wears dentures     permanent upper/and some missing teeth/partial lower       Surgical History    Past Surgical History:   Procedure Laterality Date    ABDOMINAL ADHESION SURGERY N/A 5/3/2021    Procedure: laparoscopic LYSIS ADHESIONS;  Surgeon: Behzad Juarez MD;  [facility];  Service: Thoracic    BREAST SURGERY      BREAST SURGERY      reduction    CARDIAC PACEMAKER PLACEMENT      pacemaker     SECTION      COLONOSCOPY      DILATION AND CURETTAGE OF UTERUS      "D&E"    HERNIA REPAIR      HYSTERECTOMY      JOINT REPLACEMENT Left     NECK SURGERY      fused 4 discs with 4 screws implanted    NISSEN FUNDOPLICATION LAPAROSCOPIC WITH ROBOTICS N/A 5/3/2021    Procedure: ROBOTIC [address] ;  Surgeon: Behzad Juarez MD;  [facility];  Service: Thoracic    [place] ESOPHAGOGASTRODUODENOSCOPY TRANSORAL DIAGNOSTIC N/A 5/3/2021    Procedure: ESOPHAGOGASTRODUODENOSCOPY (EGD);   Surgeon: Behzad Juarez MD;  [facility];  Service: Thoracic    [place] ESOPHAGOGASTRODUODENOSCOPY TRANSORAL DIAGNOSTIC N/A 2022    Procedure: ESOPHAGOGASTRODUODENOSCOPY (EGD),;  Surgeon: Behzad Juarez MD;  [facility]; Service: Thoracic    [place] ESOPHAGOGASTRODUODENOSCOPY TRANSORAL DIAGNOSTIC N/A 2/16/2022    Procedure: ESOPHAGOGASTRODUODENOSCOPY (EGD); Surgeon: Lakshmi Santos MD;  [facility];  Service: Thoracic    [place] ESOPHAGOSCOPY FLEX BALLOON DILAT <30 MM DIAM N/A 1/12/2022    Procedure: DILATATION ESOPHAGEAL;  Surgeon: Lakshmi Santos MD;  [facility];  Service: Thoracic    [place] ESOPHAGOSCOPY FLEX BALLOON DILAT <30 MM DIAM N/A 2/16/2022    Procedure: DILATATION ESOPHAGEAL;  Surgeon: Lakshmi Santos MD;  [facility];  Service: Thoracic    [place] SURG IMPLNT Leslye Esparza Left 4/23/2018    Procedure: Insertion of thoracic spinal cord stimulator electrode via laminotomy and placement of left buttock IPG;  Surgeon: July More MD;  [facility];  Service: Neurosurgery    REPLACEMENT TOTAL KNEE      ROTATOR CUFF REPAIR      SPINAL STIMULATOR PLACEMENT Left 10/3/2018    Procedure: BUTTOCK RE-OPENING INCISION FOR REPOSITIONING OF IMPLANTABLE PULSE GENERATOR;  Surgeon: July More MD;  [facility];  Service: Neurosurgery    TONSILLECTOMY      UPPER GASTROINTESTINAL ENDOSCOPY      WISDOM TOOTH EXTRACTION         Medications      Current Outpatient Medications:     amitriptyline (ELAVIL) 10 mg tablet, Take 10 mg by mouth daily, Disp: , Rfl:     Armodafinil (Nuvigil) 250 MG tablet, Take 1 tablet (250 mg total) by mouth daily, Disp: 90 tablet, Rfl: 1    aspirin (ECOTRIN LOW STRENGTH) 81 mg EC tablet, Take by mouth every evening , Disp: , Rfl:     atorvastatin (LIPITOR) 80 mg tablet, Take 80 mg by mouth daily at bedtime  , Disp: , Rfl:     bisacodyl (DULCOLAX) 10 mg suppository, Insert 1 suppository (10 mg total) into the rectum daily, Disp: 30 suppository, Rfl: 3    chlorzoxazone (PARAFON FORTE) 500 mg tablet, Take 1 PO QID PRN for pain and spasms   (Patient taking differently: Take 500 mg by mouth 4 (four) times a day as needed Take 1 PO QID PRN for pain and spasms  ), Disp: 120 tablet, Rfl: 1    CRANBERRY PO, Take 1 capsule by mouth every evening , Disp: , Rfl:     Cyanocobalamin (VITAMIN B-12 PO), Take 1 capsule by mouth every evening , Disp: , Rfl:     dicyclomine (BENTYL) 10 mg capsule, Take 1 capsule (10 mg total) by mouth 4 (four) times a day as needed (abdominal pain), Disp: 60 capsule, Rfl: 2    docusate sodium (COLACE) 100 mg capsule, Take 1 capsule (100 mg total) by mouth daily, Disp: 90 capsule, Rfl: 3    esomeprazole (NexIUM) 40 MG capsule, Take 1 capsule (40 mg total) by mouth 2 (two) times a day before meals, Disp: 180 capsule, Rfl: 3    famotidine (PEPCID) 20 mg tablet, Take 1 tablet (20 mg total) by mouth 2 (two) times a day, Disp: 180 tablet, Rfl: 0    ferrous sulfate 324 (65 Fe) mg, Take 65 mg by mouth 2 (two) times a day before meals , Disp: , Rfl:     fluticasone-salmeterol (ADVAIR) 100-50 mcg/dose inhaler, Inhale 2 puffs as needed  , Disp: , Rfl:     gabapentin (NEURONTIN) 100 mg capsule, Take 100 mg by mouth 3 (three) times a day, Disp: , Rfl:     glucose blood test strip, Testing six times daily  E11 65 on insulin pump, Disp: , Rfl:     Insulin Infusion Pump (MINIMED INSULIN PUMP) PRINCESS, Use, Disp: , Rfl:     levothyroxine 100 mcg tablet, Take 100 mcg by mouth daily, Disp: , Rfl:     losartan (COZAAR) 50 mg tablet, Take 50 mg by mouth daily, Disp: , Rfl:     NOVOLOG 100 UNIT/ML injection, Pt reports unsure of total dose a day/did see her endocrinologist Dr Maureen Saldaña,  [place] Pt has pump on upper left abdomen cut basaL RATE IN HALF, Disp: , Rfl: 3    ondansetron (ZOFRAN) 8 mg tablet, Take 8 mg by mouth every 8 (eight) hours as needed  , Disp: , Rfl:     polyethylene glycol (MIRALAX) 17 g packet, Take 17 g by mouth daily (Patient taking differently: Take 17 g by mouth 3 (three) times a day  ), Disp: 90 each, Rfl: 3    Potassium Gluconate 595 MG CAPS, Take by mouth 2 (two) times a day , Disp: , Rfl:     pramipexole (MIRAPEX) 1 mg tablet, Take 1 mg by mouth 2 (two) times a day  , Disp: , Rfl:     Semaglutide (OZEMPIC, 0 25 OR 0 5 MG/DOSE, SC), Inject 0 25 mg under the skin once a week Sundays, Disp: , Rfl:     senna-docusate sodium (Senexon-S) 8 6-50 mg per tablet, Take 1 tablet by mouth daily, Disp: 30 tablet, Rfl: 1    sertraline (ZOLOFT) 50 mg tablet, Take 50 mg by mouth daily, Disp: , Rfl:     solifenacin (VESICARE) 10 MG tablet, Take 10 mg by mouth daily, Disp: , Rfl:     torsemide (DEMADEX) 20 mg tablet, Take 20 mg by mouth daily, Disp: , Rfl:     amitriptyline (ELAVIL) 25 mg tablet, Take 0 5 tablets (12 5 mg total) by mouth daily at bedtime (Patient not taking: Reported on 4/29/2022 ), Disp: 30 tablet, Rfl: 0    glucagon 1 MG injection, Glucagon Emergency Kit (human-recomb) 1 mg solution for injection, Disp: , Rfl:     Plecanatide (Trulance) 3 MG TABS, Take 1 tablet by mouth daily, Disp: 30 tablet, Rfl: 5    pramipexole (MIRAPEX) 0 5 mg tablet, Take 1 tablet (0 5 mg total) by mouth 2 (two) times a day (Patient not taking: Reported on 4/29/2022 ), Disp: 60 tablet, Rfl: 0    trospium chloride (SANCTURA) 20 mg tablet, Take 20 mg by mouth 2 (two) times a day (Patient not taking: Reported on 4/29/2022 ), Disp: , Rfl:     Allergies    Allergies   Allergen Reactions    Bactrim [Sulfamethoxazole-Trimethoprim] Hives    Sucralfate Hives     Facial swelling    Topamax [Topiramate]      disorentation    Azithromycin Itching    Pregabalin Confusion and Other (See Comments)     altered mental status    Ciprofloxacin Drowsiness     Other reaction(s):  Other (See Comments)  "drowsiness"    Norvasc [Amlodipine] Swelling    Baclofen      "That knocks me out "    Bupropion Fatigue    Methotrexate Nausea Only       The following portions of the patient's history were reviewed and updated as appropriate: allergies, current medications, past family history, past medical history, past social history, past surgical history and problem list     Physical Exam    Vitals:  Blood pressure 159/77, pulse 81, temperature 98 7 °F (37 1 °C), resp  rate 16, height 5' 1" (1 549 m), weight 81 6 kg (179 lb 12 8 oz)  ,Body mass index is 33 97 kg/m²  Physical Exam  Vitals reviewed  Constitutional:       General: She is not in acute distress  Eyes:      Extraocular Movements: Extraocular movements intact  Pulmonary:      Effort: Pulmonary effort is normal  No respiratory distress  Musculoskeletal:         General: Deformity present  Skin:     General: Skin is warm and dry  Comments: Left buttock incision well healed  Neurological:      Mental Status: She is alert and oriented to person, place, and time  Comments: Walks using a cane  Psychiatric:         Mood and Affect: Mood normal          Behavior: Behavior normal        Neurologic Exam     Mental Status   Oriented to person, place, and time

## 2022-05-20 ENCOUNTER — TELEPHONE (OUTPATIENT)
Dept: SLEEP CENTER | Facility: CLINIC | Age: 67
End: 2022-05-20

## 2022-05-20 NOTE — TELEPHONE ENCOUNTER
----- Message from Gee Barriga sent at 5/20/2022  4:28 PM EDT -----  Regarding: Staying awake  I am having issues where I fall asleep anywhere  At the dinner table, visiting with friends - basically anywhere and everywhere  I am consantlyTIRED and extremely fatigued  I lack energy for sure  I have been to my pcp, endocrinologist,cardiologist to hemotologist   No one has anything to offer  I recently had an extensive series of bloodwork  Could my medication armodafanil posssibly be increased? Please give me your thoughts    Ty    Maryann Barriga  [phone]

## 2022-05-24 DIAGNOSIS — G47.419 PRIMARY NARCOLEPSY WITHOUT CATAPLEXY: Primary | ICD-10-CM

## 2022-05-25 NOTE — TELEPHONE ENCOUNTER
[employer] prescription referral form will need to be completed by Dr Grace Gill and signed by patient  [employer] referral form given to Dr Grace Gill to complete  Once completed and signed, will need to fax to [facility] at [phone]  Sent [employer] message to patient  Advised Dr Grace Gill is recommending [employer] and to call office to discuss  Nurse line phone number provided in message

## 2022-05-26 NOTE — TELEPHONE ENCOUNTER
Returned patient's call  She would like to try [employer]  Advised patient she will need to sign referral form  Once signed it will be faxed to [facility]  It is likely this medication will require a prior authorization through her insurance  Patient verbalized understanding  Patient states she would like form mailed to her home  She will sign it and mail it back  States he does not have the capability to email  Form mailed to patient as requested

## 2022-05-31 DIAGNOSIS — K59.00 CONSTIPATION, UNSPECIFIED CONSTIPATION TYPE: ICD-10-CM

## 2022-05-31 RX ORDER — AMOXICILLIN 250 MG
1 CAPSULE ORAL DAILY
Qty: 30 TABLET | Refills: 3 | Status: SHIPPED | OUTPATIENT
Start: 2022-05-31 | End: 2022-10-03

## 2022-06-07 NOTE — TELEPHONE ENCOUNTER
Called patient to follow up on [facility] referral form  Left message advising patient that form was mailed on 5/26/22  Advised to call if she did not received the form  Also advised if she did receive form to mail it back as soon as possible

## 2022-06-16 ENCOUNTER — OFFICE VISIT (OUTPATIENT)
Dept: GASTROENTEROLOGY | Facility: CLINIC | Age: 67
End: 2022-06-16
Payer: MEDICARE

## 2022-06-16 VITALS
DIASTOLIC BLOOD PRESSURE: 68 MMHG | BODY MASS INDEX: 33.61 KG/M2 | TEMPERATURE: 98.6 F | HEART RATE: 80 BPM | HEIGHT: 61 IN | SYSTOLIC BLOOD PRESSURE: 130 MMHG | RESPIRATION RATE: 18 BRPM | OXYGEN SATURATION: 96 % | WEIGHT: 178 LBS

## 2022-06-16 DIAGNOSIS — K58.1 IRRITABLE BOWEL SYNDROME WITH CONSTIPATION: Primary | ICD-10-CM

## 2022-06-16 DIAGNOSIS — K22.2 ESOPHAGEAL STRICTURE: ICD-10-CM

## 2022-06-16 DIAGNOSIS — K22.0 ACHALASIA: ICD-10-CM

## 2022-06-16 DIAGNOSIS — K21.9 GASTROESOPHAGEAL REFLUX DISEASE WITHOUT ESOPHAGITIS: ICD-10-CM

## 2022-06-16 DIAGNOSIS — D12.6 TUBULAR ADENOMA OF COLON: ICD-10-CM

## 2022-06-16 DIAGNOSIS — K31.84 GASTROPARESIS: ICD-10-CM

## 2022-06-16 PROCEDURE — 99214 OFFICE O/P EST MOD 30 MIN: CPT | Performed by: PHYSICIAN ASSISTANT

## 2022-06-16 NOTE — LETTER
June 16, 2022     Gina Chowdhury DO  [address] [facility]  [place]   [address]  [id number]    Patient: Argelia Madera   YOB: 1955   Date of Visit: 6/16/2022       Dear Dr Kyara Hayden: Thank you for referring Nick Albert to me for evaluation  Below are my notes for this consultation  If you have questions, please do not hesitate to call me  I look forward to following your patient along with you  Sincerely,        Dillon Wilkerson PA-C        CC: No Recipients  Dillon Wilkerson PA-C  6/16/2022  2:21 PM  Sign when Signing Visit  [facility] - Outpatient Follow-up Note  Argelia Madera 73 y o  female MRN: [id number]  Encounter: [id number]          ASSESSMENT AND PLAN:      1  Irritable bowel syndrome with constipation  Currently controlled with MiraLax 3 times daily, Colace daily, and enemas 3 times per week  Unfortunately, she reports trying and failing Dulcolax suppositories, Linzess, Amitiza, and Trulance  She looked on [facility] to check cost of Motegrity which is nearly $500  Unfortunately, this is cost prohibitive  I explained she can increase dose of MiraLax, and take 2 doses 3 times daily but monitor for side effect of diarrhea  She will continue Colace and enemas as needed  I encouraged her to increase water intake as well  2  Gastroesophageal reflux disease without esophagitis  No reports of esophagitis on recent endoscopy from February 2022  I advised decreasing consumption of lemon caffeinated tea as she is currently drinking 5 cups per day  She will increase water intake instead  Continue Nexium 40 mg twice daily and Pepcid 20 mg twice daily  3  Esophageal stricture  4  Achalasia  Status post Heller myotomy with nina fundoplication May 6896 followed by EGD with dilatation x 2 in Jan and Feb 2022 for esophageal stricture  She will follow-up with thoracic surgery as she will likely need repeat dilatation due to recurrent dysphagia      5  Gastroparesis  Currently asymptomatic    6  Tubular adenoma of colon  She is due for repeat colonoscopy in 7 years  Follow-up in 1 year  ______________________________________________________________________    SUBJECTIVE:  61-year-old female with type 2 DM, asthma, STU, chronic diastolic CHF, CAD, HTN, chronic pain syndrome status post insertion of spinal cord stimulator, radiculopathy, spinal stenosis presenting for follow-up of numerous GI issues  She has history of achalasia status post Heller myotomy with partial fundoplication  She had esophageal dilatation x 2 earlier this year for esophageal stricture  She has been having recurrent issues with difficulty swallowing  She denies any nausea or vomiting  She continues to struggle with constipation and has about 1 small [place] type 1 BM per week  She is taking MiraLax 3 times a day, Colace, and using enemas about 3 times per week which does allow her to have a bowel movement  She states she has already tried and failed Linzess, Amitiza, and Trulance  She feels bloated frequently and uncomfortable  REVIEW OF SYSTEMS IS OTHERWISE NEGATIVE        Historical Information   Past Medical History:   Diagnosis Date    Anxiety     Asthma     controlled    Back complaints     Cancer ([facility] [address] )     nose    Cellulitis of left lower leg     "not now"    CHF (congestive heart failure) ([facility] [address] ) 02/2021    Chronic bilateral thoracic back pain     Chronic kidney disease     sees nephrologist regularly" stage 3"    Chronic myofascial pain     Chronic pain of both lower extremities     Chronic pain of left knee     "both knees"    Chronic pain syndrome     bilat legs and knees/neuropathy pain    CPAP (continuous positive airway pressure) dependence     no currently uses    Depression     Diabetes mellitus (HCC)     insulin pump    Disease of thyroid gland     Does use hearing aid     bilat and will wear DOS    Gait disorder     Gastroparesis     GERD (gastroesophageal reflux disease)     History of angina     History of MRSA infection     History of transfusion     Many years ago    Hyperlipidemia     Hypertension     Hypoglycemic reaction     "occas low blood sugar"    Insulin pump in place     pt reports saw endocrinologist  and will bring copy of instructions DOS    Irritable bowel syndrome     Kidney disease     Pacemaker     Polyneuropathy associated with underlying disease ([address] )     PONV (postoperative nausea and vomiting)     Risk for falls     S/P insertion of spinal cord stimulator 2018    Sarcoidosis     Shortness of breath     "exertion and not new"    Sleep apnea     TIA (transient ischemic attack)     Use of cane as ambulatory aid     Uses walker     Wears dentures     permanent upper/and some missing teeth/partial lower     Past Surgical History:   Procedure Laterality Date    ABDOMINAL ADHESION SURGERY N/A 5/3/2021    Procedure: laparoscopic LYSIS ADHESIONS;  Surgeon: Lovely Mcburney, MD;  [facility];  Service: Thoracic    BREAST SURGERY      BREAST SURGERY      reduction    CARDIAC PACEMAKER PLACEMENT      pacemaker     SECTION      COLONOSCOPY      DILATION AND CURETTAGE OF UTERUS      "D&E"    HERNIA REPAIR      HYSTERECTOMY      JOINT REPLACEMENT Left     NECK SURGERY      fused 4 discs with 4 screws implanted    NISSEN FUNDOPLICATION LAPAROSCOPIC WITH ROBOTICS N/A 5/3/2021    Procedure: ROBOTIC [address] ;  Surgeon: Lovely Mcburney, MD;  [facility];  Service: Thoracic    ID ESOPHAGOGASTRODUODENOSCOPY TRANSORAL DIAGNOSTIC N/A 5/3/2021    Procedure: ESOPHAGOGASTRODUODENOSCOPY (EGD);   Surgeon: Lovely Mcburney, MD;  [facility];  Service: Thoracic    ID ESOPHAGOGASTRODUODENOSCOPY TRANSORAL DIAGNOSTIC N/A 2022    Procedure: ESOPHAGOGASTRODUODENOSCOPY (EGD),;  Surgeon: Lovely Mcburney, MD;  [facility]; Service: Thoracic    MS ESOPHAGOGASTRODUODENOSCOPY TRANSORAL DIAGNOSTIC N/A 2/16/2022    Procedure: ESOPHAGOGASTRODUODENOSCOPY (EGD); Surgeon: Lovely Mcburney, MD;  [facility];  Service: Thoracic    MS ESOPHAGOSCOPY FLEX BALLOON DILAT <30 MM DIAM N/A 1/12/2022    Procedure: DILATATION ESOPHAGEAL;  Surgeon: Lovely Mcburney, MD;  [facility];  Service: Thoracic    MS ESOPHAGOSCOPY FLEX BALLOON DILAT <30 MM DIAM N/A 2/16/2022    Procedure: DILATATION ESOPHAGEAL;  Surgeon: Lovely Mcburney, MD;  [facility];  Service: Thoracic    MS SURG IMPLNT [address] Left 4/23/2018    Procedure:  Insertion of thoracic spinal cord stimulator electrode via laminotomy and placement of left buttock IPG;  Surgeon: Rekha Floyd MD;  [facility];  Service: Neurosurgery    REPLACEMENT TOTAL KNEE      ROTATOR CUFF REPAIR      SPINAL STIMULATOR PLACEMENT Left 10/3/2018    Procedure: BUTTOCK RE-OPENING INCISION FOR REPOSITIONING OF IMPLANTABLE PULSE GENERATOR;  Surgeon: Rekha Floyd MD;  [facility];  Service: Neurosurgery    TONSILLECTOMY      UPPER GASTROINTESTINAL ENDOSCOPY      WISDOM TOOTH EXTRACTION       Social History   Social History     Substance and Sexual Activity   Alcohol Use Never     Social History     Substance and Sexual Activity   Drug Use Yes    Frequency: 8 0 times per week    Types: Marijuana    Comment: Medical Marijuana/vape pen     Social History     Tobacco Use   Smoking Status Never Smoker   Smokeless Tobacco Never Used     Family History   Problem Relation Age of Onset    Diabetes Family     Heart disease Family     Hypertension Family     Neuropathy Family     No Known Problems Mother     Heart disease Father     Diabetes Father     No Known Problems Maternal Grandmother     No Known Problems Maternal Grandfather     No Known Problems Paternal Grandmother     No Known Problems Paternal Grandfather     No Known Problems Brother     No Known Problems Daughter     Cancer Son        Meds/Allergies       Current Outpatient Medications:     amitriptyline (ELAVIL) 10 mg tablet    amitriptyline (ELAVIL) 25 mg tablet    Armodafinil (Nuvigil) 250 MG tablet    aspirin (ECOTRIN LOW STRENGTH) 81 mg EC tablet    atorvastatin (LIPITOR) 80 mg tablet    bisacodyl (DULCOLAX) 10 mg suppository    chlorzoxazone (PARAFON FORTE) 500 mg tablet    CRANBERRY PO    Cyanocobalamin (VITAMIN B-12 PO)    dicyclomine (BENTYL) 10 mg capsule    docusate sodium (COLACE) 100 mg capsule    esomeprazole (NexIUM) 40 MG capsule    famotidine (PEPCID) 20 mg tablet    fluticasone-salmeterol (ADVAIR) 100-50 mcg/dose inhaler    gabapentin (NEURONTIN) 100 mg capsule    glucose blood test strip    Insulin Infusion Pump (MINIMED INSULIN PUMP) PRINCESS    levothyroxine 100 mcg tablet    losartan (COZAAR) 50 mg tablet    NOVOLOG 100 UNIT/ML injection    ondansetron (ZOFRAN) 8 mg tablet    Pitolisant HCl 4 45 MG TABS    polyethylene glycol (MIRALAX) 17 g packet    Potassium Gluconate 595 MG CAPS    pramipexole (MIRAPEX) 0 5 mg tablet    pramipexole (MIRAPEX) 1 mg tablet    Semaglutide (OZEMPIC, 0 25 OR 0 5 MG/DOSE, SC)    senna-docusate sodium (Senexon-S) 8 6-50 mg per tablet    sertraline (ZOLOFT) 50 mg tablet    solifenacin (VESICARE) 10 MG tablet    torsemide (DEMADEX) 20 mg tablet    ferrous sulfate 324 (65 Fe) mg    glucagon 1 MG injection    trospium chloride (SANCTURA) 20 mg tablet    Allergies   Allergen Reactions    Bactrim [Sulfamethoxazole-Trimethoprim] Hives    Sucralfate Hives     Facial swelling    Topamax [Topiramate]      disorentation    Azithromycin Itching    Pregabalin Confusion and Other (See Comments)     altered mental status    Ciprofloxacin Drowsiness     Other reaction(s):  Other (See Comments)  "drowsiness"    Norvasc [Amlodipine] Swelling    Baclofen      "That knocks me out "    Bupropion Fatigue    Methotrexate Nausea Only           Objective     Blood pressure 130/68, pulse 80, temperature 98 6 °F (37 °C), temperature source Tympanic, resp  rate 18, height 5' 1" (1 549 m), weight 80 7 kg (178 lb), SpO2 96 %  Body mass index is 33 63 kg/m²  PHYSICAL EXAM:      General Appearance:   Alert, cooperative, no distress   HEENT:   Normocephalic, atraumatic, anicteric      Neck:  Supple, symmetrical, trachea midline   Lungs:   Clear to auscultation bilaterally; no rales, rhonchi or wheezing; respirations unlabored    Heart[de-identified]   Regular rate and rhythm; no murmur, rub, or gallop  Abdomen:   Soft, mild tenderness in epigastric region, non-distended; normal bowel sounds; no masses, no organomegaly    Genitalia:   Deferred    Rectal:   Deferred    Extremities:  No cyanosis, clubbing or edema    Pulses:  2+ and symmetric    Skin:  No jaundice, rashes, or lesions    Lymph nodes:  No palpable cervical lymphadenopathy        Lab Results:   No visits with results within 1 Day(s) from this visit  Latest known visit with results is:   Admission on 02/16/2022, Discharged on 02/16/2022   Component Date Value    POC Glucose 02/16/2022 149 (A)    POC Glucose 02/16/2022 124          Radiology Results:   No results found

## 2022-06-16 NOTE — PROGRESS NOTES
[facility] - Outpatient Follow-up Note  Marquis Reyna 73 y o  female MRN: [id number]  Encounter: [id number]          ASSESSMENT AND PLAN:      1  Irritable bowel syndrome with constipation  Currently controlled with MiraLax 3 times daily, Colace daily, and enemas 3 times per week  Unfortunately, she reports trying and failing Dulcolax suppositories, Linzess, Amitiza, and Trulance  She looked on [facility] [employer] to check cost of Motegrity which is nearly $500  Unfortunately, this is cost prohibitive  I explained she can increase dose of MiraLax, and take 2 doses 3 times daily but monitor for side effect of diarrhea  She will continue Colace and enemas as needed  I encouraged her to increase water intake as well  2  Gastroesophageal reflux disease without esophagitis  No reports of esophagitis on recent endoscopy from February 2022  I advised decreasing consumption of lemon caffeinated tea as she is currently drinking 5 cups per day  She will increase water intake instead  Continue Nexium 40 mg twice daily and Pepcid 20 mg twice daily  3  Esophageal stricture  4  Achalasia  Status post Heller myotomy with nina fundoplication May 9576 followed by EGD with dilatation x 2 in Jan and Feb 2022 for esophageal stricture  She will follow-up with thoracic surgery as she will likely need repeat dilatation due to recurrent dysphagia  5  Gastroparesis  Currently asymptomatic    6  Tubular adenoma of colon  She is due for repeat colonoscopy in 7 years  Follow-up in 1 year  ______________________________________________________________________    SUBJECTIVE:  52-year-old female with type 2 DM, asthma, STU, chronic diastolic CHF, CAD, HTN, chronic pain syndrome status post insertion of spinal cord stimulator, radiculopathy, spinal stenosis presenting for follow-up of numerous GI issues  She has history of achalasia status post Heller myotomy with partial fundoplication   She had esophageal dilatation x 2 earlier this year for esophageal stricture  She has been having recurrent issues with difficulty swallowing  She denies any nausea or vomiting  She continues to struggle with constipation and has about 1 small [place] type 1 BM per week  She is taking MiraLax 3 times a day, Colace, and using enemas about 3 times per week which does allow her to have a bowel movement  She states she has already tried and failed Linzess, Amitiza, and Trulance  She feels bloated frequently and uncomfortable  REVIEW OF SYSTEMS IS OTHERWISE NEGATIVE        Historical Information   Past Medical History:   Diagnosis Date    Anxiety     Asthma     controlled    Back complaints     Cancer ([facility] [address] )     nose    Cellulitis of left lower leg     "not now"    CHF (congestive heart failure) ([facility] [address] ) 02/2021    Chronic bilateral thoracic back pain     Chronic kidney disease     sees nephrologist regularly" stage 3"    Chronic myofascial pain     Chronic pain of both lower extremities     Chronic pain of left knee     "both knees"    Chronic pain syndrome     bilat legs and knees/neuropathy pain    CPAP (continuous positive airway pressure) dependence     no currently uses    Depression     Diabetes mellitus (HCC)     insulin pump    Disease of thyroid gland     Does use hearing aid     bilat and will wear DOS    Gait disorder     Gastroparesis     GERD (gastroesophageal reflux disease)     History of angina     History of MRSA infection     History of transfusion     Many years ago    Hyperlipidemia     Hypertension     Hypoglycemic reaction     "occas low blood sugar"    Insulin pump in place     pt reports saw endocrinologist 4/28 and will bring copy of instructions DOS    Irritable bowel syndrome     Kidney disease     Pacemaker 2019    Polyneuropathy associated with underlying disease ([facility] [address] )     PONV (postoperative nausea and vomiting)     Risk for falls     S/P insertion of spinal cord stimulator 6/8/2018    Sarcoidosis     Shortness of breath     "exertion and not new"    Sleep apnea     TIA (transient ischemic attack)     Use of cane as ambulatory aid     Uses walker     Wears dentures     permanent upper/and some missing teeth/partial lower     Past Surgical History:   Procedure Laterality Date    ABDOMINAL ADHESION SURGERY N/A 5/3/2021    Procedure: laparoscopic LYSIS ADHESIONS;  Surgeon: Andreia Estrada MD;  [facility];  Service: Thoracic    BREAST SURGERY      BREAST SURGERY      reduction    CARDIAC PACEMAKER PLACEMENT      pacemaker     SECTION      COLONOSCOPY      DILATION AND CURETTAGE OF UTERUS      "D&E"    HERNIA REPAIR      HYSTERECTOMY      JOINT REPLACEMENT Left     NECK SURGERY      fused 4 discs with 4 screws implanted    NISSEN FUNDOPLICATION LAPAROSCOPIC WITH ROBOTICS N/A 5/3/2021    Procedure: ROBOTIC HELLER MYOTOMY WITH [address] ;  Surgeon: Andreia Estrada MD;  [facility];  Service: Thoracic    KS ESOPHAGOGASTRODUODENOSCOPY TRANSORAL DIAGNOSTIC N/A 5/3/2021    Procedure: ESOPHAGOGASTRODUODENOSCOPY (EGD); Surgeon: Andreia Estrada MD;  [facility];  Service: Thoracic    KS ESOPHAGOGASTRODUODENOSCOPY TRANSORAL DIAGNOSTIC N/A 2022    Procedure: ESOPHAGOGASTRODUODENOSCOPY (EGD),;  Surgeon: Andreia Estrada MD;  [facility];  Service: Thoracic    KS ESOPHAGOGASTRODUODENOSCOPY TRANSORAL DIAGNOSTIC N/A 2022    Procedure: ESOPHAGOGASTRODUODENOSCOPY (EGD);   Surgeon: Andreia Estrada MD;  [facility];  Service: Thoracic    KS ESOPHAGOSCOPY FLEX BALLOON DILAT <30 MM DIAM N/A 2022    Procedure: DILATATION ESOPHAGEAL;  Surgeon: Andreia Estrada MD;  [facility];  Service: Thoracic    KS ESOPHAGOSCOPY FLEX BALLOON DILAT <30 MM DIAM N/A 2022    Procedure: DILATATION ESOPHAGEAL;  Surgeon: Andreia Estrada MD;  [facility];  Service: Thoracic    KS SURG IMPLNT NEUROELECT,EPIDURAL Left 4/23/2018    Procedure:  Insertion of thoracic spinal cord stimulator electrode via laminotomy and placement of left buttock IPG;  Surgeon: Nicola Ann MD;  [facility];  Service: Neurosurgery    REPLACEMENT TOTAL KNEE      ROTATOR CUFF REPAIR      SPINAL STIMULATOR PLACEMENT Left 10/3/2018    Procedure: BUTTOCK RE-OPENING INCISION FOR REPOSITIONING OF IMPLANTABLE PULSE GENERATOR;  Surgeon: Nicola Ann MD;  [facility];  Service: Neurosurgery    TONSILLECTOMY      UPPER GASTROINTESTINAL ENDOSCOPY      WISDOM TOOTH EXTRACTION       Social History   Social History     Substance and Sexual Activity   Alcohol Use Never     Social History     Substance and Sexual Activity   Drug Use Yes    Frequency: 8 0 times per week    Types: Marijuana    Comment: Medical Marijuana/vape pen     Social History     Tobacco Use   Smoking Status Never Smoker   Smokeless Tobacco Never Used     Family History   Problem Relation Age of Onset    Diabetes Family     Heart disease Family     Hypertension Family     Neuropathy Family     No Known Problems Mother     Heart disease Father     Diabetes Father     No Known Problems Maternal Grandmother     No Known Problems Maternal Grandfather     No Known Problems Paternal Grandmother     No Known Problems Paternal Grandfather     No Known Problems Brother     No Known Problems Daughter     Cancer Son        Meds/Allergies       Current Outpatient Medications:     amitriptyline (ELAVIL) 10 mg tablet    amitriptyline (ELAVIL) 25 mg tablet    Armodafinil (Nuvigil) 250 MG tablet    aspirin (ECOTRIN LOW STRENGTH) 81 mg EC tablet    atorvastatin (LIPITOR) 80 mg tablet    bisacodyl (DULCOLAX) 10 mg suppository    chlorzoxazone (PARAFON FORTE) 500 mg tablet    CRANBERRY PO    Cyanocobalamin (VITAMIN B-12 PO)    dicyclomine (BENTYL) 10 mg capsule    docusate sodium (COLACE) 100 mg capsule    esomeprazole (NexIUM) 40 MG capsule    famotidine (PEPCID) 20 mg tablet    fluticasone-salmeterol (ADVAIR) 100-50 mcg/dose inhaler    gabapentin (NEURONTIN) 100 mg capsule    glucose blood test strip    Insulin Infusion Pump (MINIMED INSULIN PUMP) PRINCESS    levothyroxine 100 mcg tablet    losartan (COZAAR) 50 mg tablet    NOVOLOG 100 UNIT/ML injection    ondansetron (ZOFRAN) 8 mg tablet    Pitolisant HCl 4 45 MG TABS    polyethylene glycol (MIRALAX) 17 g packet    Potassium Gluconate 595 MG CAPS    pramipexole (MIRAPEX) 0 5 mg tablet    pramipexole (MIRAPEX) 1 mg tablet    Semaglutide (OZEMPIC, 0 25 OR 0 5 MG/DOSE, SC)    senna-docusate sodium (Senexon-S) 8 6-50 mg per tablet    sertraline (ZOLOFT) 50 mg tablet    solifenacin (VESICARE) 10 MG tablet    torsemide (DEMADEX) 20 mg tablet    ferrous sulfate 324 (65 Fe) mg    glucagon 1 MG injection    trospium chloride (SANCTURA) 20 mg tablet    Allergies   Allergen Reactions    Bactrim [Sulfamethoxazole-Trimethoprim] Hives    Sucralfate Hives     Facial swelling    Topamax [Topiramate]      disorentation    Azithromycin Itching    Pregabalin Confusion and Other (See Comments)     altered mental status    Ciprofloxacin Drowsiness     Other reaction(s): Other (See Comments)  "drowsiness"    Norvasc [Amlodipine] Swelling    Baclofen      "That knocks me out "    Bupropion Fatigue    Methotrexate Nausea Only           Objective     Blood pressure 130/68, pulse 80, temperature 98 6 °F (37 °C), temperature source Tympanic, resp  rate 18, height 5' 1" (1 549 m), weight 80 7 kg (178 lb), SpO2 96 %  Body mass index is 33 63 kg/m²  PHYSICAL EXAM:      General Appearance:   Alert, cooperative, no distress   HEENT:   Normocephalic, atraumatic, anicteric      Neck:  Supple, symmetrical, trachea midline   Lungs:   Clear to auscultation bilaterally; no rales, rhonchi or wheezing; respirations unlabored    Heart[de-identified]   Regular rate and rhythm; no murmur, rub, or gallop     Abdomen:   Soft, mild tenderness in epigastric region, non-distended; normal bowel sounds; no masses, no organomegaly    Genitalia:   Deferred    Rectal:   Deferred    Extremities:  No cyanosis, clubbing or edema    Pulses:  2+ and symmetric    Skin:  No jaundice, rashes, or lesions    Lymph nodes:  No palpable cervical lymphadenopathy        Lab Results:   No visits with results within 1 Day(s) from this visit  Latest known visit with results is:   Admission on 02/16/2022, Discharged on 02/16/2022   Component Date Value    POC Glucose 02/16/2022 149 (A)    POC Glucose 02/16/2022 124          Radiology Results:   No results found

## 2022-06-21 NOTE — TELEPHONE ENCOUNTER
Spoke to patient who states she mailed [employer] form to office within the past week  Awaiting form  Patient would like call back to confirm when we receive the form  Kathie Brice

## 2022-06-30 DIAGNOSIS — G47.10 HYPERSOMNIA: ICD-10-CM

## 2022-06-30 RX ORDER — ARMODAFINIL 250 MG/1
250 TABLET ORAL DAILY
Qty: 30 TABLET | Refills: 0 | Status: SHIPPED | OUTPATIENT
Start: 2022-06-30 | End: 2022-08-10 | Stop reason: SDUPTHER

## 2022-06-30 NOTE — TELEPHONE ENCOUNTER
Patient's last office visit was 6/24/21  She rescheduled her 6/15/22 annual follow to 6/24/22 which was then rescheduled again to 9/9/22 since Dr Otto Cordero was on vacation  Left voice message for patient  Advised that our office has not received the [facility] referral form that she mailed  Advised that I have moved up her follow up with Dr Otto Cordero to 7/12/22  Asked that she call office by the end of today to confirm if she can keep this appointment  Left office phone number in message to call back to confirm appointment change  She can discuss her medications with Dr Otto Cordero at that time and [facility] paperwork can be completed in the office

## 2022-06-30 NOTE — TELEPHONE ENCOUNTER
Patient left voice message confirming she is able to make 7/12/22 follow up appointment with Dr Marsha Burgess  In the meantime she needs a refill of Armodafinil  Refill request addressed in a separate encounter

## 2022-06-30 NOTE — TELEPHONE ENCOUNTER
Patient left message requesting refill of Armodafinil (Nuvigil) 250 MG tablet       States she is just about out of medication  Send to [facility] on [address]  Dr Monica Almaguer, please see script and sign if appropriate  She was to start [facility] but patient did not return [facility] referral form  She will discuss with you at her follow up 7/12/22

## 2022-07-01 ENCOUNTER — TELEPHONE (OUTPATIENT)
Dept: SLEEP CENTER | Facility: CLINIC | Age: 67
End: 2022-07-01

## 2022-07-01 NOTE — TELEPHONE ENCOUNTER
Received approval from [facility] for Armodafinil  Approval valid 1/1/22 through 12/31/22  [facility] made aware

## 2022-07-03 DIAGNOSIS — K22.2 ESOPHAGEAL STRICTURE: ICD-10-CM

## 2022-07-03 RX ORDER — FAMOTIDINE 20 MG/1
TABLET, FILM COATED ORAL
Qty: 180 TABLET | Refills: 0 | Status: SHIPPED | OUTPATIENT
Start: 2022-07-03 | End: 2022-10-06 | Stop reason: SDUPTHER

## 2022-07-07 ENCOUNTER — OFFICE VISIT (OUTPATIENT)
Dept: CARDIAC SURGERY | Facility: CLINIC | Age: 67
End: 2022-07-07
Payer: MEDICARE

## 2022-07-07 VITALS
HEIGHT: 61 IN | HEART RATE: 70 BPM | WEIGHT: 178.57 LBS | SYSTOLIC BLOOD PRESSURE: 132 MMHG | TEMPERATURE: 97.7 F | OXYGEN SATURATION: 92 % | BODY MASS INDEX: 33.71 KG/M2 | DIASTOLIC BLOOD PRESSURE: 74 MMHG | RESPIRATION RATE: 16 BRPM

## 2022-07-07 DIAGNOSIS — G63 POLYNEUROPATHY ASSOCIATED WITH UNDERLYING DISEASE (HCC): ICD-10-CM

## 2022-07-07 DIAGNOSIS — R13.19 ESOPHAGEAL DYSPHAGIA: Primary | ICD-10-CM

## 2022-07-07 PROCEDURE — 1123F ACP DISCUSS/DSCN MKR DOCD: CPT | Performed by: THORACIC SURGERY (CARDIOTHORACIC VASCULAR SURGERY)

## 2022-07-07 PROCEDURE — 99213 OFFICE O/P EST LOW 20 MIN: CPT | Performed by: THORACIC SURGERY (CARDIOTHORACIC VASCULAR SURGERY)

## 2022-07-07 NOTE — PROGRESS NOTES
Thoracic Follow-Up  Assessment/Plan:    Esophageal dysphagia  Ms Barriga is a 29-year-old female who is well known to me  She underwent a robotic Heller myotomy with Kareem fundoplication on 26/52/1767  After her surgery, she did have some dysphagia and required a dilation  Today in the office, we talked about repeat dilation  I do believe that she will be best served with this  we will plan to proceed next week with an EGD with dilation    Joe Card MD  Thoracic Surgery  (Available on [employer])  Office: [phone]         Diagnoses and all orders for this visit:    Esophageal dysphagia  -     Case request operating room: ESOPHAGOGASTRODUODENOSCOPY (EGD); Standing  -     Case request operating room: ESOPHAGOGASTRODUODENOSCOPY (EGD)    Polyneuropathy associated with underlying disease ([facility] [address] )    Other orders  -     Diet NPO; Sips with meds; Standing  -     Void on call to OR; Standing  -     Insert peripheral IV; Standing  -     Place sequential compression device; Standing          Thoracic History       Diagnosis: Achalasia  Procedure: Robotic heller myotomy, Kareem fundoplication, laparoscopic lysis of adhesions 5/3/21       Procedure: EGD with dilation, 1/12/22 and repeat on 2/16/22     Subjective:    Patient ID: Nick Singh is a 73 y o  female  HPI  Ms Barriga is a 29-year-old female who is well known to me  She underwent a robotic Heller myotomy with Kareem fundoplication on 93/13/7359  After her surgery, she did have some dysphagia and required a dilation  Her last dilation was approximately 6 months ago  She has done really well since that time but approximately 2 weeks ago, she started to have some dysphagia again  She ate a cheeseburger and had an episode of emesis after this  Additionally, she has noticed that she has started to have a little bit of a hard time swallowing pills  She has lost approximately 4 lb in the past 2 weeks because of this      The following portions of the patient's history were reviewed and updated as appropriate: allergies, current medications, past family history, past medical history, past social history, past surgical history and problem list     Review of Systems   Constitutional: Negative for chills, fatigue, fever and unexpected weight change  HENT: Positive for trouble swallowing  Eyes: Negative  Negative for visual disturbance  Respiratory: Negative for cough, shortness of breath and stridor  Cardiovascular: Negative for chest pain  Gastrointestinal: Positive for vomiting  Negative for nausea  Endocrine: Negative  Genitourinary: Negative  Musculoskeletal: Negative  Skin: Negative  Neurological: Negative for dizziness, light-headedness and headaches  Hematological: Negative for adenopathy  Psychiatric/Behavioral: Negative  Objective:   Physical Exam  Vitals and nursing note reviewed  Constitutional:       General: She is not in acute distress  Appearance: Normal appearance  She is well-developed  She is not diaphoretic  HENT:      Head: Normocephalic and atraumatic  Nose: Nose normal  No congestion or rhinorrhea  Mouth/Throat:      Mouth: Mucous membranes are moist       Pharynx: Oropharynx is clear  No oropharyngeal exudate  Eyes:      General: No scleral icterus  Pupils: Pupils are equal, round, and reactive to light  Neck:      Trachea: No tracheal deviation  Cardiovascular:      Rate and Rhythm: Normal rate and regular rhythm  Pulses: Normal pulses  Heart sounds: Normal heart sounds  No murmur heard  Pulmonary:      Effort: Pulmonary effort is normal  No respiratory distress  Breath sounds: Normal breath sounds  No stridor  No wheezing or rales  Chest:      Chest wall: No tenderness  Abdominal:      General: Bowel sounds are normal  There is no distension  Palpations: Abdomen is soft  Tenderness: There is no abdominal tenderness  There is no rebound  Musculoskeletal:         General: Normal range of motion  Cervical back: Normal range of motion and neck supple  No muscular tenderness  Lymphadenopathy:      Cervical: No cervical adenopathy  Skin:     General: Skin is warm and dry  Coloration: Skin is not jaundiced or pale  Findings: No erythema or rash  Neurological:      General: No focal deficit present  Mental Status: She is alert and oriented to person, place, and time  Psychiatric:         Mood and Affect: Mood normal          Behavior: Behavior normal          Thought Content: Thought content normal          Judgment: Judgment normal      /74   Pulse 70   Temp 97 7 °F (36 5 °C)   Resp 16   Ht 5' 1" (1 549 m)   Wt 81 kg (178 lb 9 2 oz)   SpO2 92%   BMI 33 74 kg/m²     No Chest XR results available for this patient  No CT Chest results available for this patient  No CT Chest,Abdomen,Pelvis results available for this patient  No NM PET CT results available for this patient  FL barium swallow video w speech    Result Date: 8/16/2021  Narrative A video barium swallow study was performed by the Department of Speech Pathology  Please refer to the report for the official interpretation  The images are stored for archival purposes only  Study images were not formally reviewed by the Radiology Department

## 2022-07-07 NOTE — ASSESSMENT & PLAN NOTE
Ms Venu Hurley is a 58-year-old female who is well known to me  She underwent a robotic Heller myotomy with Kareem fundoplication on 46/19/4760  After her surgery, she did have some dysphagia and required a dilation  Today in the office, we talked about repeat dilation  I do believe that she will be best served with this    we will plan to proceed next week with an EGD with dilation    Tony Hilario MD  Thoracic Surgery  (Available on [facility] Text)  Office: [phone]

## 2022-07-07 NOTE — H&P (VIEW-ONLY)
Thoracic Follow-Up  Assessment/Plan:    Esophageal dysphagia  Ms Barriga is a 73-year-old female who is well known to me  She underwent a robotic Heller myotomy with Kareem fundoplication on 56/42/9161  After her surgery, she did have some dysphagia and required a dilation  Today in the office, we talked about repeat dilation  I do believe that she will be best served with this  we will plan to proceed next week with an EGD with dilation    Scot Blancas MD  Thoracic Surgery  (Available on [employer])  Office: [phone]         Diagnoses and all orders for this visit:    Esophageal dysphagia  -     Case request operating room: ESOPHAGOGASTRODUODENOSCOPY (EGD); Standing  -     Case request operating room: ESOPHAGOGASTRODUODENOSCOPY (EGD)    Polyneuropathy associated with underlying disease ([facility] [address] )    Other orders  -     Diet NPO; Sips with meds; Standing  -     Void on call to OR; Standing  -     Insert peripheral IV; Standing  -     Place sequential compression device; Standing          Thoracic History       Diagnosis: Achalasia  Procedure: Robotic heller myotomy, Kareem fundoplication, laparoscopic lysis of adhesions 5/3/21       Procedure: EGD with dilation, 1/12/22 and repeat on 2/16/22     Subjective:    Patient ID: Rodrigo Caruso is a 73 y o  female  HPI  Ms Barriga is a 73-year-old female who is well known to me  She underwent a robotic Heller myotomy with Kareem fundoplication on 54/83/4737  After her surgery, she did have some dysphagia and required a dilation  Her last dilation was approximately 6 months ago  She has done really well since that time but approximately 2 weeks ago, she started to have some dysphagia again  She ate a cheeseburger and had an episode of emesis after this  Additionally, she has noticed that she has started to have a little bit of a hard time swallowing pills  She has lost approximately 4 lb in the past 2 weeks because of this      The following portions of the patient's history were reviewed and updated as appropriate: allergies, current medications, past family history, past medical history, past social history, past surgical history and problem list     Review of Systems   Constitutional: Negative for chills, fatigue, fever and unexpected weight change  HENT: Positive for trouble swallowing  Eyes: Negative  Negative for visual disturbance  Respiratory: Negative for cough, shortness of breath and stridor  Cardiovascular: Negative for chest pain  Gastrointestinal: Positive for vomiting  Negative for nausea  Endocrine: Negative  Genitourinary: Negative  Musculoskeletal: Negative  Skin: Negative  Neurological: Negative for dizziness, light-headedness and headaches  Hematological: Negative for adenopathy  Psychiatric/Behavioral: Negative  Objective:   Physical Exam  Vitals and nursing note reviewed  Constitutional:       General: She is not in acute distress  Appearance: Normal appearance  She is well-developed  She is not diaphoretic  HENT:      Head: Normocephalic and atraumatic  Nose: Nose normal  No congestion or rhinorrhea  Mouth/Throat:      Mouth: Mucous membranes are moist       Pharynx: Oropharynx is clear  No oropharyngeal exudate  Eyes:      General: No scleral icterus  Pupils: Pupils are equal, round, and reactive to light  Neck:      Trachea: No tracheal deviation  Cardiovascular:      Rate and Rhythm: Normal rate and regular rhythm  Pulses: Normal pulses  Heart sounds: Normal heart sounds  No murmur heard  Pulmonary:      Effort: Pulmonary effort is normal  No respiratory distress  Breath sounds: Normal breath sounds  No stridor  No wheezing or rales  Chest:      Chest wall: No tenderness  Abdominal:      General: Bowel sounds are normal  There is no distension  Palpations: Abdomen is soft  Tenderness: There is no abdominal tenderness  There is no rebound  Musculoskeletal:         General: Normal range of motion  Cervical back: Normal range of motion and neck supple  No muscular tenderness  Lymphadenopathy:      Cervical: No cervical adenopathy  Skin:     General: Skin is warm and dry  Coloration: Skin is not jaundiced or pale  Findings: No erythema or rash  Neurological:      General: No focal deficit present  Mental Status: She is alert and oriented to person, place, and time  Psychiatric:         Mood and Affect: Mood normal          Behavior: Behavior normal          Thought Content: Thought content normal          Judgment: Judgment normal      /74   Pulse 70   Temp 97 7 °F (36 5 °C)   Resp 16   Ht 5' 1" (1 549 m)   Wt 81 kg (178 lb 9 2 oz)   SpO2 92%   BMI 33 74 kg/m²     No Chest XR results available for this patient  No CT Chest results available for this patient  No CT Chest,Abdomen,Pelvis results available for this patient  No NM PET CT results available for this patient  FL barium swallow video w speech    Result Date: 8/16/2021  Narrative A video barium swallow study was performed by the Department of Speech Pathology  Please refer to the report for the official interpretation  The images are stored for archival purposes only  Study images were not formally reviewed by the Radiology Department

## 2022-07-08 ENCOUNTER — PATIENT MESSAGE (OUTPATIENT)
Dept: CARDIAC SURGERY | Facility: CLINIC | Age: 67
End: 2022-07-08

## 2022-07-08 NOTE — PRE-PROCEDURE INSTRUCTIONS
Pre-Surgery Instructions:   Medication Instructions    amitriptyline (ELAVIL) 10 mg tablet Take day of surgery   amitriptyline (ELAVIL) 25 mg tablet Take night before surgery    Armodafinil (Nuvigil) 250 MG tablet Hold day of surgery   aspirin (ECOTRIN LOW STRENGTH) 81 mg EC tablet Last dose 7/7    atorvastatin (LIPITOR) 80 mg tablet Take day of surgery   bisacodyl (DULCOLAX) 10 mg suppository Hold day of surgery   chlorzoxazone (PARAFON FORTE) 500 mg tablet Uses PRN- OK to take day of surgery    CRANBERRY PO Last dose 7/7    Cyanocobalamin (VITAMIN B-12 PO) Last dose 7/7    Darbepoetin Mike (ARANESP, ALBUMIN FREE, IJ) Bi-weekly    dicyclomine (BENTYL) 10 mg capsule Take day of surgery   docusate sodium (COLACE) 100 mg capsule Hold day of surgery   esomeprazole (NexIUM) 40 MG capsule Take day of surgery   famotidine (PEPCID) 20 mg tablet Take day of surgery   fluticasone-salmeterol (ADVAIR) 100-50 mcg/dose inhaler Uses PRN- OK to take day of surgery    gabapentin (NEURONTIN) 100 mg capsule Take day of surgery   levothyroxine 100 mcg tablet Take day of surgery   losartan (COZAAR) 50 mg tablet Hold day of surgery   NOVOLOG 100 UNIT/ML injection Insulin pump - has instructions from endocrine to set to 80% basal rate    ondansetron (ZOFRAN) 8 mg tablet Uses PRN- OK to take day of surgery    polyethylene glycol (MIRALAX) 17 g packet Hold day of surgery   Potassium Gluconate 595 MG CAPS Last dose 7/7    pramipexole (MIRAPEX) 1 mg tablet Take day of surgery   Semaglutide (OZEMPIC, 0 25 OR 0 5 MG/DOSE, SC) Weekly    senna-docusate sodium (Senexon-S) 8 6-50 mg per tablet Hold day of surgery   sertraline (ZOLOFT) 50 mg tablet Take day of surgery   solifenacin (VESICARE) 10 MG tablet Hold day of surgery   torsemide (DEMADEX) 20 mg tablet Hold day of surgery  Spoke with pt via phone, COVID screening negative       Advised hospital location will call with arrival time night before surgery and NPO after midnight night before  Advised one vaccinated visitor is allowed to accompany pt to wait during the procedure  Instructed to leave contacts/jewelery/valuables at home  No smoking 24 hours prior to surgery and definitely not on the morning of  Instructed to bring spinal cord stimulator remote with on day of surgery  Instructed to avoid all ASA and OTC Vit/Supp 1 week prior to surgery and to avoid NSAIDs 3 days prior to surgery per anesthesia instructions  Tylenol ok to take prn  Reviewed above medication instructions and showering instructions  Patient verbalized understanding of all of the above

## 2022-07-11 ENCOUNTER — ANESTHESIA EVENT (OUTPATIENT)
Dept: PERIOP | Facility: HOSPITAL | Age: 67
End: 2022-07-11
Payer: MEDICARE

## 2022-07-11 NOTE — ANESTHESIA PREPROCEDURE EVALUATION
Procedure:  ESOPHAGOGASTRODUODENOSCOPY (EGD) (N/A Esophagus)    #Hx of heller myotomy in 05/2021 c/b post-operative dysphagia - procedure above    #HFrEF c/b diastolic dysfunction with recovery of EF; s/p PPM (100% RV paced); on GDMT with torsemide, coreg  EKG (06/2022): A-sensed, V-paced rhythm with prolonged AV confuction    -Stress MPI (06/2022): No e/o ischemia, EF65% post-stress  2/8/22 TTE  Left ventricle is normal in size  There is mild concentric hypertrophy  Systolic function is normal with an ejection fraction of 55-60%  Wall   motion is within normal limits  Right ventricle cavity is top normal  Systolic function is normal  Pacer   wire present in the ventricle  Aortic sclerosis without stenosis  Left Ventricle   Left ventricle is normal in size  There is mild concentric hypertrophy  Systolic function is normal with an ejection fraction of 55-60%  Wall motion is within normal limits  Left atrial pressure is normal      Right Ventricle   Right ventricle cavity is top normal  Systolic function is normal  Pacer/ device wire present in the ventricle  Left Atrium   Left atrium cavity size is normal      Right Atrium   Right atrium cavity is normal  A pacemaker wire is present in the right atrium  IVC/SVC   The inferior vena cava demonstrates a diameter of <=21 mm and collapses >50%; therefore, the right atrial pressure is estimated at 0-5 mmHg  Mitral Valve   Mitral valve structure is normal  There is trace regurgitation  There is no evidence of mitral valve stenosis  Tricuspid Valve   Tricuspid valve structure is normal  There is trace regurgitation  There is no evidence of tricuspid valve stenosis  Aortic Valve   Aortic sclerosis without stenosis  There is no regurgitation  Pulmonic Valve   Pulmonic valve structure is not well-visualized  There is no regurgitation or stenosis  Ascending Aorta   The aorta appears normal in size       #CKD III   #T2DM,   Lab Results Component Value Date    HGBA1C 5 8 (H) 06/23/2022     ETT Properties Placement Date: 02/16/22  -MP Placement Time: 0830  -MP Mask Ventilation: Mask ventilation not attempted (0)  -MP Preoxygenated: Yes  -MP Technique: Direct laryngoscopy;Stylet  -MP Type: Cuffed;Oral;Straight  -MP Tube Size: 7 mm  -MP Laryngoscope: Mac  -MP Blade Size: 3  -MP Location: Oral  -MP Grade View: 2a  -MP Insertion: Atraumatic; No change in dentition  -MP Insertion attempts: 1  -MP Secured at (cm): 21  -MP Placed By: CRNA  -MP Placed by (Name): Marlenyleonid Aleman  -MP Removal Date: 02/16/22  -MP Removal Time: 0908  -MP         Past Medical History:   Diagnosis Date    Anxiety     Asthma     controlled    Back complaints     Cancer ([facility] [address] )     nose    Cellulitis of left lower leg     "not now"    CHF (congestive heart failure) ([facility] [address] ) 02/2021    Chronic bilateral thoracic back pain     Chronic kidney disease     sees nephrologist regularly" stage 3"    Chronic myofascial pain     Chronic pain of both lower extremities     Chronic pain of left knee     "both knees"    Chronic pain syndrome     bilat legs and knees/neuropathy pain    CPAP (continuous positive airway pressure) dependence     no currently uses    Depression     Diabetes mellitus (HCC)     insulin pump    Disease of thyroid gland     Does use hearing aid     bilat and will wear DOS    Gait disorder     Gastroparesis     GERD (gastroesophageal reflux disease)     History of angina     History of MRSA infection     History of transfusion     Many years ago    Hyperlipidemia     Hypertension     Hypoglycemic reaction     "occas low blood sugar"    Insulin pump in place     pt reports saw endocrinologist 4/28 and will bring copy of instructions DOS    Irritable bowel syndrome     Kidney disease     Pacemaker 2019    Polyneuropathy associated with underlying disease ([facility] [address] )     PONV (postoperative nausea and vomiting)     Risk for falls     S/P insertion of spinal cord stimulator 6/8/2018    Sarcoidosis     Shortness of breath     "exertion and not new"    Sleep apnea     TIA (transient ischemic attack)     Use of cane as ambulatory aid     Uses walker     Wears dentures     permanent upper/and some missing teeth/partial lower          Physical Exam    Airway    Mallampati score: II  TM Distance: >3 FB  Neck ROM: full     Dental       Cardiovascular  Rhythm: regular, Rate: normal,     Pulmonary  Breath sounds clear to auscultation,     Other Findings  Intercisor Distance > 3cm          Anesthesia Plan  ASA Score- 3     Anesthesia Type- general with ASA Monitors  Additional Monitors:   Airway Plan: ETT  Comment: Discussed benefits/risks of general anesthesia including possibility of mouth/throat pain, injury to lips/teeth, nausea/vomiting, and surgical pain along with more rare complications such as stroke, MI, pneumonia, aspiration, and injury to blood vessels  Patient understands and wishes to proceed  All questions answered  Hx of T2DM with insulin pump; will keep low-dose basal infusion as pre-op glucose is 224    Plan Factors-Exercise tolerance (METS): >4 METS  Chart reviewed  EKG reviewed  Existing labs reviewed  Induction- intravenous and rapid sequence induction  Postoperative Plan- Plan for postoperative opioid use  Planned trial extubation    Informed Consent- Anesthetic plan and risks discussed with patient  I personally reviewed this patient with the CRNA  Discussed and agreed on the Anesthesia Plan with the CRNA  Hernandez Bright

## 2022-07-12 ENCOUNTER — APPOINTMENT (OUTPATIENT)
Dept: RADIOLOGY | Facility: HOSPITAL | Age: 67
End: 2022-07-12
Payer: MEDICARE

## 2022-07-12 ENCOUNTER — ANESTHESIA (OUTPATIENT)
Dept: PERIOP | Facility: HOSPITAL | Age: 67
End: 2022-07-12
Payer: MEDICARE

## 2022-07-12 ENCOUNTER — HOSPITAL ENCOUNTER (OUTPATIENT)
Facility: HOSPITAL | Age: 67
Setting detail: OUTPATIENT SURGERY
Discharge: HOME | End: 2022-07-12
Attending: THORACIC SURGERY (CARDIOTHORACIC VASCULAR SURGERY) | Admitting: THORACIC SURGERY (CARDIOTHORACIC VASCULAR SURGERY)
Payer: MEDICARE

## 2022-07-12 VITALS
BODY MASS INDEX: 32.76 KG/M2 | HEART RATE: 60 BPM | SYSTOLIC BLOOD PRESSURE: 150 MMHG | WEIGHT: 178 LBS | DIASTOLIC BLOOD PRESSURE: 79 MMHG | OXYGEN SATURATION: 92 % | RESPIRATION RATE: 16 BRPM | HEIGHT: 62 IN | TEMPERATURE: 98.9 F

## 2022-07-12 LAB
GLUCOSE SERPL-MCNC: 186 MG/DL (ref 65–140)
GLUCOSE SERPL-MCNC: 226 MG/DL (ref 65–140)

## 2022-07-12 PROCEDURE — 82948 REAGENT STRIP/BLOOD GLUCOSE: CPT

## 2022-07-12 PROCEDURE — 43248 EGD GUIDE WIRE INSERTION: CPT | Performed by: THORACIC SURGERY (CARDIOTHORACIC VASCULAR SURGERY)

## 2022-07-12 PROCEDURE — C1769 GUIDE WIRE: HCPCS | Performed by: THORACIC SURGERY (CARDIOTHORACIC VASCULAR SURGERY)

## 2022-07-12 PROCEDURE — 74360 X-RAY GUIDE GI DILATION: CPT | Performed by: THORACIC SURGERY (CARDIOTHORACIC VASCULAR SURGERY)

## 2022-07-12 PROCEDURE — 43245 EGD DILATE STRICTURE: CPT | Performed by: THORACIC SURGERY (CARDIOTHORACIC VASCULAR SURGERY)

## 2022-07-12 PROCEDURE — 71045 X-RAY EXAM CHEST 1 VIEW: CPT

## 2022-07-12 PROCEDURE — C1726 CATH, BAL DIL, NON-VASCULAR: HCPCS | Performed by: THORACIC SURGERY (CARDIOTHORACIC VASCULAR SURGERY)

## 2022-07-12 RX ORDER — FENTANYL CITRATE/PF 50 MCG/ML
25 SYRINGE (ML) INJECTION
Status: DISCONTINUED | OUTPATIENT
Start: 2022-07-12 | End: 2022-07-12 | Stop reason: HOSPADM

## 2022-07-12 RX ORDER — PROPOFOL 10 MG/ML
INJECTION, EMULSION INTRAVENOUS AS NEEDED
Status: DISCONTINUED | OUTPATIENT
Start: 2022-07-12 | End: 2022-07-12

## 2022-07-12 RX ORDER — ONDANSETRON 2 MG/ML
INJECTION INTRAMUSCULAR; INTRAVENOUS AS NEEDED
Status: DISCONTINUED | OUTPATIENT
Start: 2022-07-12 | End: 2022-07-12

## 2022-07-12 RX ORDER — FENTANYL CITRATE 50 UG/ML
INJECTION, SOLUTION INTRAMUSCULAR; INTRAVENOUS AS NEEDED
Status: DISCONTINUED | OUTPATIENT
Start: 2022-07-12 | End: 2022-07-12

## 2022-07-12 RX ORDER — ONDANSETRON 2 MG/ML
4 INJECTION INTRAMUSCULAR; INTRAVENOUS EVERY 6 HOURS PRN
Status: DISCONTINUED | OUTPATIENT
Start: 2022-07-12 | End: 2022-07-12 | Stop reason: HOSPADM

## 2022-07-12 RX ORDER — SUCCINYLCHOLINE/SOD CL,ISO/PF 100 MG/5ML
SYRINGE (ML) INTRAVENOUS AS NEEDED
Status: DISCONTINUED | OUTPATIENT
Start: 2022-07-12 | End: 2022-07-12

## 2022-07-12 RX ORDER — PHENYLEPHRINE HYDROCHLORIDE 10 MG/ML
INJECTION INTRAVENOUS AS NEEDED
Status: DISCONTINUED | OUTPATIENT
Start: 2022-07-12 | End: 2022-07-12

## 2022-07-12 RX ORDER — DEXAMETHASONE SODIUM PHOSPHATE 10 MG/ML
INJECTION, SOLUTION INTRAMUSCULAR; INTRAVENOUS AS NEEDED
Status: DISCONTINUED | OUTPATIENT
Start: 2022-07-12 | End: 2022-07-12

## 2022-07-12 RX ORDER — ACETAMINOPHEN 325 MG/1
650 TABLET ORAL EVERY 4 HOURS PRN
Status: DISCONTINUED | OUTPATIENT
Start: 2022-07-12 | End: 2022-07-12 | Stop reason: HOSPADM

## 2022-07-12 RX ORDER — LIDOCAINE HYDROCHLORIDE 10 MG/ML
INJECTION, SOLUTION EPIDURAL; INFILTRATION; INTRACAUDAL; PERINEURAL AS NEEDED
Status: DISCONTINUED | OUTPATIENT
Start: 2022-07-12 | End: 2022-07-12

## 2022-07-12 RX ORDER — SODIUM CHLORIDE, SODIUM LACTATE, POTASSIUM CHLORIDE, CALCIUM CHLORIDE 600; 310; 30; 20 MG/100ML; MG/100ML; MG/100ML; MG/100ML
100 INJECTION, SOLUTION INTRAVENOUS CONTINUOUS
Status: DISCONTINUED | OUTPATIENT
Start: 2022-07-12 | End: 2022-07-12 | Stop reason: HOSPADM

## 2022-07-12 RX ORDER — SODIUM CHLORIDE, SODIUM LACTATE, POTASSIUM CHLORIDE, CALCIUM CHLORIDE 600; 310; 30; 20 MG/100ML; MG/100ML; MG/100ML; MG/100ML
INJECTION, SOLUTION INTRAVENOUS CONTINUOUS PRN
Status: DISCONTINUED | OUTPATIENT
Start: 2022-07-12 | End: 2022-07-12

## 2022-07-12 RX ADMIN — FENTANYL CITRATE 100 MCG: 50 INJECTION INTRAMUSCULAR; INTRAVENOUS at 07:34

## 2022-07-12 RX ADMIN — Medication 80 MG: at 07:34

## 2022-07-12 RX ADMIN — LIDOCAINE HYDROCHLORIDE 50 MG: 10 INJECTION, SOLUTION EPIDURAL; INFILTRATION; INTRACAUDAL; PERINEURAL at 07:34

## 2022-07-12 RX ADMIN — PROPOFOL 200 MG: 10 INJECTION, EMULSION INTRAVENOUS at 07:34

## 2022-07-12 RX ADMIN — DEXAMETHASONE SODIUM PHOSPHATE 10 MG: 10 INJECTION, SOLUTION INTRAMUSCULAR; INTRAVENOUS at 07:34

## 2022-07-12 RX ADMIN — PHENYLEPHRINE HYDROCHLORIDE 200 MCG: 10 INJECTION INTRAVENOUS at 07:40

## 2022-07-12 RX ADMIN — PROPOFOL 20 MG: 10 INJECTION, EMULSION INTRAVENOUS at 08:10

## 2022-07-12 RX ADMIN — ONDANSETRON 4 MG: 2 INJECTION INTRAMUSCULAR; INTRAVENOUS at 07:52

## 2022-07-12 RX ADMIN — SODIUM CHLORIDE, SODIUM LACTATE, POTASSIUM CHLORIDE, AND CALCIUM CHLORIDE: .6; .31; .03; .02 INJECTION, SOLUTION INTRAVENOUS at 07:16

## 2022-07-12 RX ADMIN — PHENYLEPHRINE HYDROCHLORIDE 200 MCG: 10 INJECTION INTRAVENOUS at 07:50

## 2022-07-12 RX ADMIN — PHENYLEPHRINE HYDROCHLORIDE 200 MCG: 10 INJECTION INTRAVENOUS at 08:00

## 2022-07-12 NOTE — ANESTHESIA POSTPROCEDURE EVALUATION
Post-Op Assessment Note    CV Status:  Stable    Pain management: adequate     Mental Status:  Alert and awake   Hydration Status:  Euvolemic   PONV Controlled:  Controlled   Airway Patency:  Patent  Airway: intubated      Post Op Vitals Reviewed: Yes      Staff: CRNA, Anesthesiologist         No complications documented      /70 (07/12/22 0914)    Temp 98 2 °F (36 8 °C) (07/12/22 0914)    Pulse 64 (07/12/22 0914)   Resp 16 (07/12/22 0914)    SpO2 92 % (07/12/22 0914)

## 2022-07-12 NOTE — INTERVAL H&P NOTE
H&P reviewed  After examining the patient I find no changes in the patients condition since the H&P had been written  Vitals:    07/12/22 0615   BP: 155/89   Pulse: 68   Resp: 18   Temp: 97 8 °F (36 6 °C)   SpO2: 98%     Safe to proceed   EGD with esophageal dilation    Konrad Street MD  Thoracic Surgery  (Available on [employer] Text)  Office: [phone]

## 2022-07-12 NOTE — OP NOTE
OPERATIVE REPORT  PATIENT NAME: Mary Rinaldi    :  1955  MRN: [id number]  Pt Location: BE OR ROOM 15    SURGERY DATE: 2022    Surgeon(s) and Role:     * Lina Hurd MD - Primary     * Brittnee Ann MD - Assisting    Preop Diagnosis:  Esophageal dysphagia [R13 19]    Post-Op Diagnosis Codes:     * Esophageal dysphagia [R13 19]    Procedure(s) (LRB):  ESOPHAGOGASTRODUODENOSCOPY (EGD); PYLORIC DILATION; GE JUNCTION DILATION (N/A)    Specimen(s):  * No specimens in log *    Estimated Blood Loss:   Minimal    Drains:  * No LDAs found *    Anesthesia Type:   General    Operative Indications:  Esophageal dysphagia [R13 19]    Operative Findings:  No significant esophageal narrowing, fundopplcaition in place  Undigested material in stomach and difficulty passing the scope through the pylorus    Complications:   None    Procedure and Technique:  Mary Rinaldi was brought to the operating room and placed in the supine position  After institution of adequate general anesthesia, fiberoptic endoscopy is performed  The scope was passed through the esophagus  This was mildly narrowed at the [employer], around 40cm  The scope was then advanced through the stomach and down to the pylorus  She had a significant amount of undigested food in her stomach  It was off it difficult to pass the scope through the pylorus into the duodenum  With a little bit of insufflation and gentle pressure, the scope was passed through the pylorus  Because of these findings, the decision was made to proceed with a pyloric balloon dilation  The 20 mm CRE esophageal dilation balloon was inserted through the scope  The balloon was passed through the pylorus  This was inflated up to 20 mm and held for 1 minute  It was easy to pass the scope through the pylorus after this dilation was completed  After this was completed, the scope was retracted back to the GE junction and this was marked using fluoroscopy    At this time, a wire was placed into the scope and the scope was removed over the wire  Under fluoroscopic guidance, serial dilations were performed up to [address]  After the dilations were completed, the scope was reinserted  Repeat endoscopy demonstrated no bleeding or signs of perforation  The excess air was suctioned from the GI tract and removed  The patient was extubated and brought to the recovery in stable condition having tolerated the procedure well           I was present for the entire procedure    Patient Disposition:  PACU  and extubated and stable      SIGNATURE: Angelito Gonzalez MD  DATE: July 12, 2022  TIME: 8:25 AM

## 2022-07-13 ENCOUNTER — TELEPHONE (OUTPATIENT)
Dept: GYNECOLOGIC ONCOLOGY | Facility: CLINIC | Age: 67
End: 2022-07-13

## 2022-07-13 NOTE — TELEPHONE ENCOUNTER
Patient had surgery 7/12 with Dr Alida Solares and needs a post op appt scheduled   Patient can be reached at [phone]

## 2022-07-13 NOTE — TELEPHONE ENCOUNTER
Patient called back and I advised her that she does not need an appointment and to call the office with any concerns

## 2022-08-10 DIAGNOSIS — G47.10 HYPERSOMNIA: ICD-10-CM

## 2022-08-10 RX ORDER — ARMODAFINIL 250 MG/1
250 TABLET ORAL DAILY
Qty: 30 TABLET | Refills: 0 | Status: SHIPPED | OUTPATIENT
Start: 2022-08-10 | End: 2022-09-06 | Stop reason: SDUPTHER

## 2022-08-10 NOTE — TELEPHONE ENCOUNTER
Patient called asking for refill on armodafinil  Last seen 6/242021  She has canceled multiple appointments     Next scheduled appointment is 8/18/2022

## 2022-08-11 ENCOUNTER — TELEPHONE (OUTPATIENT)
Dept: SURGICAL ONCOLOGY | Facility: CLINIC | Age: 67
End: 2022-08-11

## 2022-08-11 NOTE — TELEPHONE ENCOUNTER
Sarmad Quezada had surgery with Dr Michoacano Winston about a month ago on her esophagus  The patient stated she is not any better and she is throwing up her medication  Please give her a call back at [phone]

## 2022-08-12 NOTE — TELEPHONE ENCOUNTER
Spoke with patient, breads and pills and salads are getting stuck and she is unable to get them down without a liquid  If she drinks liquid, they do get down  She is having regurgitation of her larger pills  I recommended a thicker liquid to drink when eating salads and breads, as well as applesauce or yogurt to take her larger pills  She is willing to try these modifications before undergoing a repeat dilation  We will speak in one week to see if she is progressing  She is in agreement with the plan

## 2022-08-12 NOTE — TELEPHONE ENCOUNTER
Spoke with patient  She states she did not have much relief from this recent dilation  She cannot swallow pills, salads, breads without vomiting  She is tolerating pudding and jello   Please advise

## 2022-08-18 ENCOUNTER — OFFICE VISIT (OUTPATIENT)
Dept: SLEEP CENTER | Facility: CLINIC | Age: 67
End: 2022-08-18
Payer: MEDICARE

## 2022-08-18 VITALS
HEART RATE: 76 BPM | OXYGEN SATURATION: 98 % | WEIGHT: 178 LBS | DIASTOLIC BLOOD PRESSURE: 70 MMHG | BODY MASS INDEX: 32.76 KG/M2 | HEIGHT: 62 IN | SYSTOLIC BLOOD PRESSURE: 130 MMHG

## 2022-08-18 DIAGNOSIS — G47.33 OSA (OBSTRUCTIVE SLEEP APNEA): Primary | ICD-10-CM

## 2022-08-18 PROCEDURE — 99213 OFFICE O/P EST LOW 20 MIN: CPT | Performed by: INTERNAL MEDICINE

## 2022-08-18 NOTE — PROGRESS NOTES
Progress Note - Sleep Center   Kimberley Sheikh CTE:[birth year] MRN: [id number]      Reason for Visit:  78 y  o female here for annual follow-up    Assessment:  The patient had been doing well on current therapy of APAP 9 to 11 cm for severe STU (AHI = 30 9)  She stopped using her device because of the recall  I encouraged her to restart  Plan:  Continue same  She complains of worsening daytime sleepiness, which could be a result of not using her APAP  I cautioned her about possible cardiac side effects related to the high dose of Nuvigil  For now, she seems to be okay  Follow up: One year    History of Present Illness:  History of STU on PAP therapy  She had been doing well before she stopped using her APAP      Review of Systems      Genitourinary need to urinate more than twice a night   Cardiology ankle/leg swelling   Gastrointestinal frequent heartburn/acid reflux   Neurology need to move extremities, muscle weakness, numbness/tingling of an extremity, forgetfulness, poor concentration or confusion, , difficulty with memory and balance problems   Constitutional claustrophobia and fatigue   Integumentary none   Psychiatry depression, aggressiveness or irritability and mood change   Musculoskeletal joint pain, muscle aches, back pain, legs twitching/jerking and leg cramps   Pulmonary none   ENT none   Endocrine frequent urination   Hematological none         I have reviewed and updated the review of systems as necessary      Historical Information    Past Medical History:   Past Medical History:   Diagnosis Date    Anxiety     Asthma     controlled    Back complaints     Cancer ([facility] [address] )     nose    Cellulitis of left lower leg     "not now"    CHF (congestive heart failure) ([facility] [address] [facility] ) 02/2021    Chronic bilateral thoracic back pain     Chronic kidney disease     sees nephrologist regularly" stage 3"    Chronic myofascial pain     Chronic pain of both lower extremities     Chronic pain of left knee "both knees"    Chronic pain syndrome     bilat legs and knees/neuropathy pain    CPAP (continuous positive airway pressure) dependence     no currently uses    Depression     Diabetes mellitus ([address] )     insulin pump    Disease of thyroid gland     Does use hearing aid     bilat and will wear DOS    Gait disorder     Gastroparesis     GERD (gastroesophageal reflux disease)     History of angina     History of MRSA infection     History of transfusion     Many years ago    Hyperlipidemia     Hypertension     Hypoglycemic reaction     "occas low blood sugar"    Insulin pump in place     pt reports saw endocrinologist  and will bring copy of instructions DOS    Irritable bowel syndrome     Kidney disease     Pacemaker 2019    Polyneuropathy associated with underlying disease ([address] )     PONV (postoperative nausea and vomiting)     Risk for falls     S/P insertion of spinal cord stimulator 2018    Sarcoidosis     Shortness of breath     "exertion and not new"    Sleep apnea     TIA (transient ischemic attack)     Use of cane as ambulatory aid     Uses walker     Wears dentures     permanent upper/and some missing teeth/partial lower         Past Surgical History:   Past Surgical History:   Procedure Laterality Date    ABDOMINAL ADHESION SURGERY N/A 5/3/2021    Procedure: laparoscopic LYSIS ADHESIONS;  Surgeon: Maggie Jeong MD;  [facility];  Service: Thoracic    BREAST SURGERY      BREAST SURGERY      reduction    CARDIAC PACEMAKER PLACEMENT      pacemaker     SECTION      COLONOSCOPY      DILATION AND CURETTAGE OF UTERUS      "D&E"    HERNIA REPAIR      HYSTERECTOMY      JOINT REPLACEMENT Left     NECK SURGERY      fused 4 discs with 4 screws implanted    NISSEN FUNDOPLICATION LAPAROSCOPIC WITH ROBOTICS N/A 5/3/2021    Procedure: ROBOTIC HELLER [address] ;  Surgeon: Maggie Jeong MD;  [facility];  Service: Thoracic    TX ESOPHAGOGASTRODUODENOSCOPY TRANSORAL DIAGNOSTIC N/A 5/3/2021    Procedure: ESOPHAGOGASTRODUODENOSCOPY (EGD); Surgeon: Azam Parr MD;  [facility];  Service: Thoracic    TX ESOPHAGOGASTRODUODENOSCOPY TRANSORAL DIAGNOSTIC N/A 1/12/2022    Procedure: ESOPHAGOGASTRODUODENOSCOPY (EGD),;  Surgeon: Azam Parr MD;  [facility];  Service: Thoracic    TX ESOPHAGOGASTRODUODENOSCOPY TRANSORAL DIAGNOSTIC N/A 2/16/2022    Procedure: ESOPHAGOGASTRODUODENOSCOPY (EGD); Surgeon: Azam Parr MD;  [facility];  Service: Thoracic    TX ESOPHAGOGASTRODUODENOSCOPY TRANSORAL DIAGNOSTIC N/A 7/12/2022    Procedure: ESOPHAGOGASTRODUODENOSCOPY (EGD); PYLORIC DILATION; GE JUNCTION DILATION;  Surgeon: Azam Parr MD;  [facility];  Service: Thoracic    TX ESOPHAGOSCOPY FLEX BALLOON DILAT <30 MM DIAM N/A 1/12/2022    Procedure: DILATATION ESOPHAGEAL;  Surgeon: Azam Parr MD;  [facility];  Service: Thoracic    TX ESOPHAGOSCOPY FLEX BALLOON DILAT <30 MM DIAM N/A 2/16/2022    Procedure: DILATATION ESOPHAGEAL;  Surgeon: Azam Parr MD;  [facility];  Service: Thoracic    TX SURG IMPLNT [address] Left 4/23/2018    Procedure:  Insertion of thoracic spinal cord stimulator electrode via laminotomy and placement of left buttock IPG;  Surgeon: Chito Martin MD;  [facility];  Service: Neurosurgery    REPLACEMENT TOTAL KNEE      ROTATOR CUFF REPAIR      SPINAL STIMULATOR PLACEMENT Left 10/3/2018    Procedure: BUTTOCK RE-OPENING INCISION FOR REPOSITIONING OF IMPLANTABLE PULSE GENERATOR;  Surgeon: Chito Martin MD;  [facility];  Service: Neurosurgery    TONSILLECTOMY      UPPER GASTROINTESTINAL ENDOSCOPY      WISDOM TOOTH EXTRACTION         Social History:   Social History     Socioeconomic History    Marital status: /Civil Union     Spouse name: None    Number of children: None    Years of education: None    Highest education level: None   Occupational History    None   Tobacco Use    Smoking status: Never Smoker    Smokeless tobacco: Never Used   Vaping Use    Vaping Use: Never used   Substance and Sexual Activity    Alcohol use: Never    Drug use: Yes     Frequency: 8 0 times per week     Types: Marijuana     Comment: Medical Marijuana/vape pen    Sexual activity: None   Other Topics Concern    None   Social History Narrative    None     Social Determinants of Health     Financial Resource Strain: Not on file   Food Insecurity: Not on file   Transportation Needs: Not on file   Physical Activity: Not on file   Stress: Not on file   Social Connections: Not on file   Intimate Partner Violence: Not on file   Housing Stability: Not on file       Family History:   Family History   Problem Relation Age of Onset    Diabetes Family     Heart disease Family     Hypertension Family     Neuropathy Family     No Known Problems Mother     Heart disease Father     Diabetes Father     No Known Problems Maternal Grandmother     No Known Problems Maternal Grandfather     No Known Problems Paternal Grandmother     No Known Problems Paternal Grandfather     No Known Problems Brother     No Known Problems Daughter     Cancer Son        Medications/Allergies:      Current Outpatient Medications:     amitriptyline (ELAVIL) 10 mg tablet, Take 10 mg by mouth daily, Disp: , Rfl:     Armodafinil (Nuvigil) 250 MG tablet, Take 1 tablet (250 mg total) by mouth daily, Disp: 30 tablet, Rfl: 0    aspirin (ECOTRIN LOW STRENGTH) 81 mg EC tablet, Take by mouth every evening , Disp: , Rfl:     atorvastatin (LIPITOR) 80 mg tablet, Take 80 mg by mouth daily at bedtime  , Disp: , Rfl:     bisacodyl (DULCOLAX) 10 mg suppository, Insert 1 suppository (10 mg total) into the rectum daily, Disp: 30 suppository, Rfl: 3    chlorzoxazone (PARAFON FORTE) 500 mg tablet, Take 1 PO QID PRN for pain and spasms  (Patient taking differently: Take 500 mg by mouth 3 (three) times a day as needed Take 1 PO QID PRN for pain and spasms ), Disp: 120 tablet, Rfl: 1    CRANBERRY PO, Take 1 capsule by mouth every evening , Disp: , Rfl:     Cyanocobalamin (VITAMIN B-12 PO), Take 1 capsule by mouth every evening , Disp: , Rfl:     Darbepoetin Mike (ARANESP, ALBUMIN FREE, IJ), Inject as directed every 14 (fourteen) days, Disp: , Rfl:     dicyclomine (BENTYL) 10 mg capsule, Take 1 capsule (10 mg total) by mouth 4 (four) times a day as needed (abdominal pain), Disp: 60 capsule, Rfl: 2    docusate sodium (COLACE) 100 mg capsule, Take 1 capsule (100 mg total) by mouth daily, Disp: 90 capsule, Rfl: 3    esomeprazole (NexIUM) 40 MG capsule, Take 1 capsule (40 mg total) by mouth 2 (two) times a day before meals, Disp: 180 capsule, Rfl: 3    famotidine (PEPCID) 20 mg tablet, TAKE ONE TABLET BY MOUTH TWICE A DAY, Disp: 180 tablet, Rfl: 0    fluticasone-salmeterol (ADVAIR) 100-50 mcg/dose inhaler, Inhale 2 puffs as needed  , Disp: , Rfl:     gabapentin (NEURONTIN) 100 mg capsule, Take 100 mg by mouth 3 (three) times a day, Disp: , Rfl:     glucose blood test strip, Testing six times daily  E11 65 on insulin pump, Disp: , Rfl:     Insulin Infusion Pump (MINIMED INSULIN PUMP) PRINCESS, Use, Disp: , Rfl:     levothyroxine 100 mcg tablet, Take 100 mcg by mouth daily, Disp: , Rfl:     losartan (COZAAR) 50 mg tablet, Take 50 mg by mouth daily, Disp: , Rfl:     NOVOLOG 100 UNIT/ML injection, Pt reports unsure of total dose a day/did see her endocrinologist Dr Denise Sharp  PA Pt has pump on upper left abdomen cut basaL RATE IN HALF, Disp: , Rfl: 3    ondansetron (ZOFRAN) 8 mg tablet, Take 8 mg by mouth every 8 (eight) hours as needed  , Disp: , Rfl:     pramipexole (MIRAPEX) 1 mg tablet, Take 1 mg by mouth 2 (two) times a day  , Disp: , Rfl:     Semaglutide (OZEMPIC, 0 25 OR 0 5 MG/DOSE, SC), Inject 0 25 mg under the skin once a week Sundays, Disp: , Rfl:     senna-docusate sodium (Senexon-S) 8 6-50 mg per tablet, Take 1 tablet by mouth daily, Disp: 30 tablet, Rfl: 3    sertraline (ZOLOFT) 50 mg tablet, Take 50 mg by mouth daily, Disp: , Rfl:     torsemide (DEMADEX) 20 mg tablet, Take 20 mg by mouth daily, Disp: , Rfl:     amitriptyline (ELAVIL) 25 mg tablet, Take 0 5 tablets (12 5 mg total) by mouth daily at bedtime, Disp: 30 tablet, Rfl: 0    Pitolisant HCl 4 45 MG TABS, Take 2 tablets by mouth in the morning (Patient not taking: Reported on 8/18/2022), Disp: 60 tablet, Rfl: 2    polyethylene glycol (MIRALAX) 17 g packet, Take 17 g by mouth daily (Patient not taking: Reported on 8/18/2022), Disp: 90 each, Rfl: 3    Potassium Gluconate 595 MG CAPS, Take by mouth 2 (two) times a day  (Patient not taking: Reported on 8/18/2022), Disp: , Rfl:     solifenacin (VESICARE) 10 MG tablet, Take 10 mg by mouth daily (Patient not taking: Reported on 8/18/2022), Disp: , Rfl:     trospium chloride (SANCTURA) 20 mg tablet, Take 20 mg by mouth 2 (two) times a day (Patient not taking: No sig reported), Disp: , Rfl:           Objective      Vital Signs:   Vitals:    08/18/22 1326   BP: 130/70   Pulse: 76   SpO2: 98%     [place] Sleepiness Scale: Total score: 20        Physical Exam:    General: Alert, appropriate, cooperative, overweight    Head: NC/AT    Skin: Warm, dry    Neuro: No motor abnormalities, cranial nerves appear intact    Extremity: No clubbing, cyanosis      DME Provider: [employer]        Counseling / Coordination of Care   I have spent 15 minutes with the patient today in which greater than 50% of this time was spent in counseling/coordination of care regarding: equipment and compliance  Board Certified Sleep Specialist    Portions of the record may have been created with voice recognition software    Occasional wrong word or "sound a like" substitutions may have occurred due to the inherent limitations of voice recognition software  Read the chart carefully and recognize, using context, where substitutions have occurred

## 2022-09-06 DIAGNOSIS — G47.10 HYPERSOMNIA: ICD-10-CM

## 2022-09-06 RX ORDER — ARMODAFINIL 250 MG/1
250 TABLET ORAL DAILY
Qty: 30 TABLET | Refills: 5 | Status: SHIPPED | OUTPATIENT
Start: 2022-09-06

## 2022-10-03 DIAGNOSIS — K59.00 CONSTIPATION, UNSPECIFIED CONSTIPATION TYPE: ICD-10-CM

## 2022-10-03 RX ORDER — DOCUSATE SODIUM -SENNOSIDES 50; 8.6 MG/1; MG/1
TABLET, COATED ORAL
Qty: 30 TABLET | Refills: 3 | Status: SHIPPED | OUTPATIENT
Start: 2022-10-03

## 2022-10-06 DIAGNOSIS — K22.2 ESOPHAGEAL STRICTURE: ICD-10-CM

## 2022-10-07 RX ORDER — FAMOTIDINE 20 MG/1
20 TABLET, FILM COATED ORAL 2 TIMES DAILY
Qty: 180 TABLET | Refills: 0 | Status: SHIPPED | OUTPATIENT
Start: 2022-10-07 | End: 2022-10-12 | Stop reason: SDUPTHER

## 2022-10-12 DIAGNOSIS — K22.2 ESOPHAGEAL STRICTURE: ICD-10-CM

## 2022-10-12 RX ORDER — FAMOTIDINE 20 MG/1
20 TABLET, FILM COATED ORAL 2 TIMES DAILY
Qty: 180 TABLET | Refills: 0 | Status: SHIPPED | OUTPATIENT
Start: 2022-10-12

## 2022-11-15 ENCOUNTER — NURSE TRIAGE (OUTPATIENT)
Dept: OTHER | Facility: OTHER | Age: 67
End: 2022-11-15

## 2022-11-15 NOTE — TELEPHONE ENCOUNTER
Patient called in post visit to [facility] on 11/14 and received prescription for MAB infusion  Unable to get infusion through [facility]  Called [facility] to get infusion  Patient advised to follow up with [facility] PCP to address kidney problems and to get infusion  Patient verbalized understanding  Reason for Disposition  • Nursing judgment    Answer Assessment - Initial Assessment Questions  1  REASON FOR CALL or QUESTION: "What is your reason for calling today?" or "How can I best help you?" or "What question do you have that I can help answer?"      Has prescription order for MAB infusion through [facility] and PCP is [facility]  No available appointments to get infusion      Protocols used: INFORMATION ONLY CALL - NO TRIAGE-ADULT-[place]

## 2022-11-15 NOTE — TELEPHONE ENCOUNTER
Regarding: Infusion Treatment Questions  ----- Message from Beryle Cao sent at 11/15/2022 12:39 PM EST -----  "I recently went to urgent care and they ordered me a script to get infusion treatments done  However, my PCP is through [facility] and they do not have any openings available   Can I get in anywhere through [facility] for my infusions?"

## 2022-11-16 ENCOUNTER — TELEPHONE (OUTPATIENT)
Dept: FAMILY MEDICINE CLINIC | Facility: CLINIC | Age: 67
End: 2022-11-16

## 2022-11-16 ENCOUNTER — TELEMEDICINE (OUTPATIENT)
Dept: FAMILY MEDICINE CLINIC | Facility: CLINIC | Age: 67
End: 2022-11-16

## 2022-11-16 DIAGNOSIS — U07.1 COVID-19 VIRUS INFECTION: Primary | ICD-10-CM

## 2022-11-16 DIAGNOSIS — K59.00 CONSTIPATION, UNSPECIFIED CONSTIPATION TYPE: ICD-10-CM

## 2022-11-16 RX ORDER — ACETAMINOPHEN 325 MG/1
650 TABLET ORAL ONCE AS NEEDED
Status: CANCELLED | OUTPATIENT
Start: 2022-11-17

## 2022-11-16 RX ORDER — ALBUTEROL SULFATE 90 UG/1
3 AEROSOL, METERED RESPIRATORY (INHALATION) ONCE AS NEEDED
Status: CANCELLED | OUTPATIENT
Start: 2022-11-17

## 2022-11-16 RX ORDER — ONDANSETRON 2 MG/ML
4 INJECTION INTRAMUSCULAR; INTRAVENOUS ONCE AS NEEDED
Status: CANCELLED | OUTPATIENT
Start: 2022-11-17

## 2022-11-16 RX ORDER — BEBTELOVIMAB 87.5 MG/ML
175 INJECTION, SOLUTION INTRAVENOUS ONCE
Status: CANCELLED | OUTPATIENT
Start: 2022-11-17

## 2022-11-16 RX ORDER — SODIUM CHLORIDE 9 MG/ML
20 INJECTION, SOLUTION INTRAVENOUS ONCE
Status: CANCELLED | OUTPATIENT
Start: 2022-11-17

## 2022-11-16 RX ORDER — DOCUSATE SODIUM 100 MG/1
CAPSULE, LIQUID FILLED ORAL
Qty: 90 CAPSULE | Refills: 3 | Status: SHIPPED | OUTPATIENT
Start: 2022-11-16

## 2022-11-16 NOTE — ASSESSMENT & PLAN NOTE
Acute symptomatic reviewed labwork from  with positive for COVID 11/14/2022  Patient has had COVID vaccine series  Patient began with symptoms 11/14/2022  Patient denies shortness of breath chest pain and fever  Patient is a candidate for monoclonal antibody infusion because she is not a candidate for paxlovid with 2 many medication interactions  Educated patient side effects, contraindications, EUA, patient is willing and agreeing to treatment educated on procedure arrival and scheduled for 10:00 a m  11/17/2022 with a follow-up scheduled 11/18/2022    Will recommend increase fluids COVID vitamins and report for monoclonal antibody infusion 11/17/2022 at 10:00 a m  educated with worsening of symptoms or report to ED

## 2022-11-16 NOTE — TELEPHONE ENCOUNTER
----- Message from Alec Arora sent at 11/16/2022  8:47 AM EST -----  Received call from patient's PCP [facility] provider) to have pt scheduled for MAB  He stated [facility] clinic is full for this week  Patient cannot take paxlovid due to kidney disease       Symptoms started 11/13  Test + 11/14     Thank you,    Monet Gonzalez

## 2022-11-16 NOTE — PROGRESS NOTES
COVID-19 Outpatient Progress Note    Assessment/Plan:    Problem List Items Addressed This Visit        Other    COVID-19 virus infection - Primary     Acute symptomatic reviewed labwork from [facility] with positive for COVID 11/14/2022  Patient has had COVID vaccine series  Patient began with symptoms 11/14/2022  Patient denies shortness of breath chest pain and fever  Patient is a candidate for monoclonal antibody infusion because she is not a candidate for paxlovid with 2 many medication interactions  Educated patient side effects, contraindications, EUA, patient is willing and agreeing to treatment educated on procedure arrival and scheduled for 10:00 a m  11/17/2022 with a follow-up scheduled 11/18/2022  Will recommend increase fluids COVID vitamins and report for monoclonal antibody infusion 11/17/2022 at 10:00 a m  educated with worsening of symptoms or report to ED           Disposition:     Patient has asymptomatic or mild COVID-19 infection  Based off CDC guidelines, they were recommended to isolate for 5 days  If they are asymptomatic or symptoms are improving with no fevers in the past 24 hours, isolation may be ended followed by 5 days of wearing a mask when around othes to minimize risk of infecting others  If still have a fever or other symptoms have not improved, continue to isolate until they improve  Regardless of when they end isolation, avoid being around people who are more likely to get very sick from COVID-19 until at least day 11  Discussed symptom directed medication options with patient  Discussed vitamin D, vitamin C, and/or zinc supplementation with patient  Patient meets criteria for Bebtelovimab infusion  They were counseled in regards to risks, benefits, and side effects of this infusion      Chela Adan is an investigational medicine used to treat mild-to-moderate symptoms of COVID-19 in adults and children (15years of age and older weighing at least 80 pounds (40 kg)) with positive results of direct SARS-CoV-2 viral testing, and who are at high risk of progression to severe COVID-19, including hospitalization or death, and for whom other COVID-19 treatment options approved or authorized by FDA are not available or clinically appropriate  Bebtelovimab is investigational because it is still being studied  There is limited information about the safety and effectiveness of using bebtelovimab to treat people with mild-to-moderate COVID-19  The FDA has authorized the emergency use of bebtelovimab for the treatment of COVID-19 under an Emergency Use Authorization (EUA)  Clay Stanley is not authorized for use in people who:  - are likely to be infected with a SARS-CoV-2 variant that is not able to be treated by bebtelovimab based on the circulating variants in your area (ask your health care provider about FDA and CDC’s latest information on circulating variants by geographic area), or  - are hospitalized due to COVID-19, or  - require oxygen therapy and/or respiratory support due to COVID-19, or  - require an increase in baseline oxygen flow rate and/or respiratory support due to [address] and are on chronic oxygen therapy and/or respiratory support due to underlying nonCOVID-19 related comorbidity  How will I receive Bebtelovimab? Clay Stanley will be given as an injection through a vein (intravenously or IV) over at least 30 seconds  You will be observed by your healthcare provider for at least 1 hour after you receive bebtelovimab  Possible side effects of Bebtelovimab: Allergic reactions can happen during and after infusion with bebtelovimab  Possible reactions include: fever, chills, nausea, headache, shortness of breath, low or high blood pressure, rapid or slow heart rate, chest discomfort or pain, weakness, confusion, feeling tired, wheezing, swelling of your lips, face, or throat, rash including hives, itching, muscle aches, dizziness, and sweating   These reactions may be severe or life threatening  Worsening symptoms after treatment: You may experience new or worsening symptoms after infusion, including fever, difficulty breathing, rapid or slow heart rate, tiredness, weakness or confusion  If these occur, contact your healthcare provider or seek immediate medical attention as some of these events have required hospitalization  It is unknown if these events are related to treatment or are due to the progression of COVID19  The side effects of getting any medicine by vein may include brief pain, bleeding, bruising of the skin, soreness, swelling, and possible infection at the infusion site  These are not all the possible side effects of bebtelovimab  Not a lot of people have been given bebtelovimab  Serious and unexpected side effects may happen  Bebtelovimab are still being studied so it is possible that all of the risks are not known at this time  It is possible that bebtelovimab could interfere with your body's own ability to fight off a future infection of SARS-CoV-2  Similarly, bebtelovimab may reduce your body’s immune response to a vaccine for SARS-CoV-2  Specific studies have not been conducted to address these possible risks  Talk to your healthcare provider if you have any questions  Emergency Use Authorization:    The [employer] FDA has made bebtelovimab available under an emergency access mechanism called an EUA  The EUA is supported by a [place] of Health and Human Service (HHS) declaration that circumstances exist to justify the emergency use of drugs and biological products during the COVID-19 pandemic  Bebtelovimab have not undergone the same type of review as an FDA-approved or cleared product  The FDA may issue an EUA when certain criteria are met, which includes that there are no adequate, approved, and available alternatives   In addition, the FDA decision is based on the totality of scientific evidence available showing that it is reasonable to believe that the product meets certain criteria for safety, performance, and labeling and may be effective in treatment of patients during the COVID-19 pandemic  All of these criteria must be met to allow for the product to be used in the treatment of patients during the COVID-19 pandemic  The EUA for bebtelovimab together is in effect for the duration of the COVID-19 declaration justifying emergency use of these products, unless terminated or revoked (after which the product may no longer be used)  What if I am pregnant or breastfeeding? There is no experience treating pregnant women or breastfeeding mothers with bebtelovimab  For a mother and unborn baby, the benefit of receiving bebtelovimab may be greater than the risk from the treatment  If you are pregnant or breastfeeding, discuss your options and specific situation with your healthcare provider  How do I report side effects with Bebtelovimab? Contact your healthcare provider if you have any side effects that bother you or do not go away  Report side effects to [email or web address] at www [email or web address], or call [phone]-[phone] or to [address]  as shown below  Email: [email or web address]   Fax number: [phone]   Telephone number: [phone]-[phone] ([phone])    Full fact sheet document for patients can be found at: Suman garcia    The patient consents to proceed with bebtelovimab infusion  I have spent 15 minutes directly with the patient  Greater than 50% of this time was spent in counseling/coordination of care regarding: instructions for management and patient and family education         Encounter provider: ROMAINE Beauchamp     Provider located at: [facility] AT [address] [address] [phone]     Recent Visits  No visits were found meeting these conditions  Showing recent visits within past 7 days and meeting all other requirements  Today's Visits  Date Type Provider Dept   11/16/22 Telemedicine Sarah [address], [address]   Showing today's visits and meeting all other requirements  Future Appointments  No visits were found meeting these conditions  Showing future appointments within next 150 days and meeting all other requirements     This virtual check-in was done via [address] and patient was informed that this is a secure, HIPAA-compliant platform  She agrees to proceed  Patient agrees to participate in a virtual check in via telephone or video visit instead of presenting to the office to address urgent/immediate medical needs  Patient is aware this is a billable service  She acknowledged consent and understanding of privacy and security of the video platform  The patient has agreed to participate and understands they can discontinue the visit at any time  After connecting through [facility], the patient was identified by name and date of birth  Reynaldo Marroquin was informed that this was a telemedicine visit and that the exam was being conducted confidentially over secure lines  My office door was closed  No one else was in the room  Reynaldo Marroquin acknowledged consent and understanding of privacy and security of the telemedicine visit  I informed the patient that I have reviewed her record in Epic and presented the opportunity for her to ask any questions regarding the visit today  The patient agreed to participate  Verification of patient location:  Patient is located in the following state in which I hold an active license: [place]    Subjective:   Reynaldo Marroquin is a 78 y o  female who has been screened for COVID-19  Patient's symptoms include chills, fatigue, nasal congestion, rhinorrhea, cough, diarrhea, myalgias and headache   Patient denies fever, malaise, sore throat, anosmia, loss of taste, shortness of breath, chest tightness, abdominal pain, nausea and vomiting      - Date of symptom onset: 11/14/2022  - Date of positive COVID-19 test: 11/14/2022  Type of test: Rapid antigen  COVID-19 vaccination status: Fully vaccinated with booster    Selvin Viera has been staying home and has isolated themselves in her home  She is taking care to not share personal items and is cleaning all surfaces that are touched often, like counters, tabletops, and doorknobs using household cleaning sprays or wipes  She is wearing a mask when she leaves her room  Lab Results   Component Value Date    SARSCOV2 Negative 04/27/2021    [facility] [facility] [address] [facility] Not Detected 01/31/2021       Review of Systems   Constitutional: Positive for activity change, chills and fatigue  Negative for fever  HENT: Positive for congestion and rhinorrhea  Negative for sore throat  Respiratory: Positive for cough  Negative for chest tightness and shortness of breath  Cardiovascular: Negative for chest pain  Gastrointestinal: Positive for diarrhea  Negative for abdominal pain, constipation, nausea and vomiting  Musculoskeletal: Positive for myalgias  Neurological: Positive for headaches  Current Outpatient Medications on File Prior to Visit   Medication Sig   • Armodafinil (Nuvigil) 250 MG tablet Take 1 tablet (250 mg total) by mouth daily   • famotidine (PEPCID) 20 mg tablet Take 1 tablet (20 mg total) by mouth 2 (two) times a day   • amitriptyline (ELAVIL) 10 mg tablet Take 10 mg by mouth daily   • amitriptyline (ELAVIL) 25 mg tablet Take 0 5 tablets (12 5 mg total) by mouth daily at bedtime   • aspirin (ECOTRIN LOW STRENGTH) 81 mg EC tablet Take by mouth every evening    • atorvastatin (LIPITOR) 80 mg tablet Take 80 mg by mouth daily at bedtime     • bisacodyl (DULCOLAX) 10 mg suppository Insert 1 suppository (10 mg total) into the rectum daily   • chlorzoxazone (PARAFON FORTE) 500 mg tablet Take 1 PO QID PRN for pain and spasms   (Patient taking differently: Take 500 mg by mouth 3 (three) times a day as needed Take 1 PO QID PRN for pain and spasms )   • CRANBERRY PO Take 1 capsule by mouth every evening    • Cyanocobalamin (VITAMIN B-12 PO) Take 1 capsule by mouth every evening    • Darbepoetin Mike (ARANESP, ALBUMIN FREE, IJ) Inject as directed every 14 (fourteen) days   • dicyclomine (BENTYL) 10 mg capsule Take 1 capsule (10 mg total) by mouth 4 (four) times a day as needed (abdominal pain)   • docusate sodium (COLACE) 100 mg capsule Take 1 capsule (100 mg total) by mouth daily   • esomeprazole (NexIUM) 40 MG capsule Take 1 capsule (40 mg total) by mouth 2 (two) times a day before meals   • fluticasone-salmeterol (ADVAIR) 100-50 mcg/dose inhaler Inhale 2 puffs as needed     • gabapentin (NEURONTIN) 100 mg capsule Take 100 mg by mouth 3 (three) times a day   • glucose blood test strip Testing six times daily  E11 65 on insulin pump   • Insulin Infusion Pump (MINIMED INSULIN PUMP) PRINCESS Use   • levothyroxine 100 mcg tablet Take 100 mcg by mouth daily   • losartan (COZAAR) 50 mg tablet Take 50 mg by mouth daily   • NOVOLOG 100 UNIT/ML injection Pt reports unsure of total dose a day/did see her endocrinologist Dr Tobi Weathers,  [place]  Pt has pump on upper left abdomen cut basaL RATE IN HALF   • ondansetron (ZOFRAN) 8 mg tablet Take 8 mg by mouth every 8 (eight) hours as needed     • Pitolisant HCl 4 45 MG TABS Take 2 tablets by mouth in the morning (Patient not taking: Reported on 8/18/2022)   • polyethylene glycol (MIRALAX) 17 g packet Take 17 g by mouth daily (Patient not taking: Reported on 8/18/2022)   • Potassium Gluconate 595 MG CAPS Take by mouth 2 (two) times a day  (Patient not taking: Reported on 8/18/2022)   • pramipexole (MIRAPEX) 1 mg tablet Take 1 mg by mouth 2 (two) times a day     • Semaglutide (OZEMPIC, 0 25 OR 0 5 MG/DOSE, SC) Inject 0 25 mg under the skin once a week Sundays   • Senexon-S 8 6-50 MG per tablet TAKE 1 TABLET BY MOUTH EVERY DAY   • sertraline (ZOLOFT) 50 mg tablet Take 50 mg by mouth daily   • solifenacin (VESICARE) 10 MG tablet Take 10 mg by mouth daily (Patient not taking: Reported on 8/18/2022)   • torsemide (DEMADEX) 20 mg tablet Take 20 mg by mouth daily   • trospium chloride (SANCTURA) 20 mg tablet Take 20 mg by mouth 2 (two) times a day (Patient not taking: No sig reported)       Objective: There were no vitals taken for this visit  Physical Exam  Vitals and nursing note reviewed  Constitutional:       General: She is not in acute distress  Appearance: Normal appearance  She is ill-appearing  She is not toxic-appearing or diaphoretic  HENT:      Head: Normocephalic and atraumatic  Nose: No rhinorrhea  Eyes:      General:         Right eye: No discharge  Left eye: No discharge  Pulmonary:      Effort: Pulmonary effort is normal  No respiratory distress  Musculoskeletal:         General: Normal range of motion  Cervical back: Normal range of motion  Skin:     Coloration: Skin is not jaundiced or pale  Findings: No bruising, erythema, lesion or rash  Neurological:      Mental Status: She is alert and oriented to person, place, and time  Psychiatric:         Mood and Affect: Mood normal          Behavior: Behavior normal          Thought Content:  Thought content normal          Judgment: Judgment normal        [facility]

## 2022-11-17 ENCOUNTER — HOSPITAL ENCOUNTER (OUTPATIENT)
Dept: INFUSION CENTER | Facility: HOSPITAL | Age: 67
Discharge: HOME | End: 2022-11-17

## 2022-11-17 VITALS
TEMPERATURE: 99.4 F | RESPIRATION RATE: 18 BRPM | DIASTOLIC BLOOD PRESSURE: 70 MMHG | OXYGEN SATURATION: 95 % | SYSTOLIC BLOOD PRESSURE: 110 MMHG | HEART RATE: 72 BPM

## 2022-11-17 DIAGNOSIS — U07.1 COVID-19 VIRUS INFECTION: Primary | ICD-10-CM

## 2022-11-17 RX ORDER — BEBTELOVIMAB 87.5 MG/ML
175 INJECTION, SOLUTION INTRAVENOUS ONCE
Status: CANCELLED | OUTPATIENT
Start: 2022-11-17

## 2022-11-17 RX ORDER — SODIUM CHLORIDE 9 MG/ML
20 INJECTION, SOLUTION INTRAVENOUS ONCE
Status: CANCELLED | OUTPATIENT
Start: 2022-11-17

## 2022-11-17 RX ORDER — ONDANSETRON 2 MG/ML
4 INJECTION INTRAMUSCULAR; INTRAVENOUS ONCE AS NEEDED
Status: CANCELLED | OUTPATIENT
Start: 2022-11-17

## 2022-11-17 RX ORDER — ACETAMINOPHEN 325 MG/1
650 TABLET ORAL ONCE AS NEEDED
Status: DISCONTINUED | OUTPATIENT
Start: 2022-11-17 | End: 2022-11-20 | Stop reason: HOSPADM

## 2022-11-17 RX ORDER — SODIUM CHLORIDE 9 MG/ML
20 INJECTION, SOLUTION INTRAVENOUS ONCE
Status: DISCONTINUED | OUTPATIENT
Start: 2022-11-17 | End: 2022-11-20 | Stop reason: HOSPADM

## 2022-11-17 RX ORDER — ALBUTEROL SULFATE 90 UG/1
3 AEROSOL, METERED RESPIRATORY (INHALATION) ONCE AS NEEDED
Status: DISCONTINUED | OUTPATIENT
Start: 2022-11-17 | End: 2022-11-20 | Stop reason: HOSPADM

## 2022-11-17 RX ORDER — BEBTELOVIMAB 87.5 MG/ML
175 INJECTION, SOLUTION INTRAVENOUS ONCE
Status: COMPLETED | OUTPATIENT
Start: 2022-11-17 | End: 2022-11-17

## 2022-11-17 RX ORDER — ONDANSETRON 2 MG/ML
4 INJECTION INTRAMUSCULAR; INTRAVENOUS ONCE AS NEEDED
Status: DISCONTINUED | OUTPATIENT
Start: 2022-11-17 | End: 2022-11-20 | Stop reason: HOSPADM

## 2022-11-17 RX ORDER — ALBUTEROL SULFATE 90 UG/1
3 AEROSOL, METERED RESPIRATORY (INHALATION) ONCE AS NEEDED
Status: CANCELLED | OUTPATIENT
Start: 2022-11-17

## 2022-11-17 RX ORDER — ACETAMINOPHEN 325 MG/1
650 TABLET ORAL ONCE AS NEEDED
Status: CANCELLED | OUTPATIENT
Start: 2022-11-17

## 2022-11-17 RX ADMIN — BEBTELOVIMAB 175 MG: 87.5 INJECTION, SOLUTION INTRAVENOUS at 09:53

## 2022-11-17 NOTE — PROGRESS NOTES
Pt here for Bebtelovimab  EUA reviewed  Pt gives verbal consent to proceed with treatment  IV started with no issue  Vital signs stable  Pt resting comfortably and has no further questions or concerns  Call bell within reach

## 2022-11-18 ENCOUNTER — TELEMEDICINE (OUTPATIENT)
Dept: FAMILY MEDICINE CLINIC | Facility: CLINIC | Age: 67
End: 2022-11-18

## 2022-11-18 DIAGNOSIS — U07.1 COVID-19 VIRUS INFECTION: Primary | ICD-10-CM

## 2022-11-18 NOTE — PROGRESS NOTES
COVID-19 Outpatient Progress Note    Assessment/Plan:    Problem List Items Addressed This Visit        Other    COVID-19 virus infection - Primary     Acute symptomatic positive for covid 11/14/2022  Patient has had covid vaccine  Patient received MAB infusion yesterday and reports no s/e from infusion and improvement in symptoms  Remains with only fatigue cough and slight sore throat  Denies sob/chest pain/fever  Will recommend continue to increase fluids, covid vitamins, and educated on quarantine guidelines and strict masking  Patient has pcp out of network, not willing to change and will f/u with pcp  Disposition:     Patient has asymptomatic or mild COVID-19 infection  Based off CDC guidelines, they were recommended to isolate for 5 days  If they are asymptomatic or symptoms are improving with no fevers in the past 24 hours, isolation may be ended followed by 5 days of wearing a mask when around othes to minimize risk of infecting others  If still have a fever or other symptoms have not improved, continue to isolate until they improve  Regardless of when they end isolation, avoid being around people who are more likely to get very sick from COVID-19 until at least day 11  Discussed symptom directed medication options with patient  Discussed vitamin D, vitamin C, and/or zinc supplementation with patient  I have spent 15 minutes directly with the patient  Greater than 50% of this time was spent in counseling/coordination of care regarding: instructions for management and patient and family education         Encounter provider: ROMAINE Baires     Provider located at: [facility] [address] [id number]     Recent Visits  Date Type Provider Dept   11/16/22 Telemedicine [facility] [address] [facility], [address] [place] [address] [address]   Showing recent visits within past 7 days and meeting all other requirements  Today's Visits  Date Type Provider Dept   11/18/22 Telemedicine Sarah Truckenmiller, CRNP [facility]   Showing today's visits and meeting all other requirements  Future Appointments  No visits were found meeting these conditions  Showing future appointments within next 150 days and meeting all other requirements     This virtual check-in was done via telephone and she agrees to proceed  Patient agrees to participate in a virtual check in via telephone or video visit instead of presenting to the office to address urgent/immediate medical needs  Patient is aware this is a billable service  She acknowledged consent and understanding of privacy and security of the video platform  The patient has agreed to participate and understands they can discontinue the visit at any time  After connecting through Telephone, the patient was identified by name and date of birth  Carline Muniz was informed that this was a telemedicine visit and that the exam was being conducted confidentially over secure lines  My office door was closed  No one else was in the room  Carline Muniz acknowledged consent and understanding of privacy and security of the telemedicine visit  I informed the patient that I have reviewed her record in Epic and presented the opportunity for her to ask any questions regarding the visit today  The patient agreed to participate  It was my intent to perform this visit via video technology but the patient was not able to do a video connection so the visit was completed via audio telephone only  Verification of patient location:  Patient is located in the following state in which I hold an active license: [place]    Subjective:   Carline Muniz is a 78 y o  female who has been screened for COVID-19  Symptom change since last report: improving  Patient's symptoms include fatigue, sore throat and cough   Patient denies fever, chills, malaise, congestion, rhinorrhea, anosmia, loss of taste, shortness of breath, chest tightness, abdominal pain, nausea, vomiting, diarrhea, myalgias and headaches  - Date of symptom onset: 11/14/2022  - Date of positive COVID-19 test: 11/14/2022  Type of test: Rapid antigen  COVID-19 vaccination status: Fully vaccinated with booster    Gurwinder Dawkins has been staying home and has isolated themselves in her home  She is taking care to not share personal items and is cleaning all surfaces that are touched often, like counters, tabletops, and doorknobs using household cleaning sprays or wipes  She is wearing a mask when she leaves her room  Monoclonal Antibody Follow-up Symptom Questionnaire  I feel overall: somewhat better  My breathing is: somewhat better  My fever is: better  My fatigue is: somewhat better    Lab Results   Component Value Date    SARSCOV2 Negative 04/27/2021    [facility] [address] [facility] Not Detected 01/31/2021       Review of Systems   Constitutional: Positive for fatigue  Negative for activity change, appetite change, chills and fever  HENT: Positive for sore throat  Negative for congestion, postnasal drip, rhinorrhea and sneezing  Respiratory: Positive for cough  Negative for chest tightness, shortness of breath and wheezing  Cardiovascular: Negative for chest pain and palpitations  Gastrointestinal: Negative for abdominal pain, constipation, diarrhea, nausea and vomiting  Musculoskeletal: Negative for arthralgias and myalgias  Skin: Negative for color change, pallor and rash  Neurological: Negative for dizziness, weakness, light-headedness and headaches  Hematological: Negative for adenopathy  Psychiatric/Behavioral: Negative for agitation and confusion       Current Outpatient Medications on File Prior to Visit   Medication Sig   • Armodafinil (Nuvigil) 250 MG tablet Take 1 tablet (250 mg total) by mouth daily   • famotidine (PEPCID) 20 mg tablet Take 1 tablet (20 mg total) by mouth 2 (two) times a day   • amitriptyline (ELAVIL) 10 mg tablet Take 10 mg by mouth daily • amitriptyline (ELAVIL) 25 mg tablet Take 0 5 tablets (12 5 mg total) by mouth daily at bedtime   • aspirin (ECOTRIN LOW STRENGTH) 81 mg EC tablet Take by mouth every evening    • atorvastatin (LIPITOR) 80 mg tablet Take 80 mg by mouth daily at bedtime     • bisacodyl (DULCOLAX) 10 mg suppository Insert 1 suppository (10 mg total) into the rectum daily   • chlorzoxazone (PARAFON FORTE) 500 mg tablet Take 1 PO QID PRN for pain and spasms   (Patient taking differently: Take 500 mg by mouth 3 (three) times a day as needed Take 1 PO QID PRN for pain and spasms )   • CRANBERRY PO Take 1 capsule by mouth every evening    • Cyanocobalamin (VITAMIN B-12 PO) Take 1 capsule by mouth every evening    • Darbepoetin Mike (ARANESP, ALBUMIN FREE, IJ) Inject as directed every 14 (fourteen) days   • dicyclomine (BENTYL) 10 mg capsule Take 1 capsule (10 mg total) by mouth 4 (four) times a day as needed (abdominal pain)   • docusate sodium (COLACE) 100 mg capsule TAKE ONE CAPSULE BY MOUTH EVERY DAY   • esomeprazole (NexIUM) 40 MG capsule Take 1 capsule (40 mg total) by mouth 2 (two) times a day before meals   • fluticasone-salmeterol (ADVAIR) 100-50 mcg/dose inhaler Inhale 2 puffs as needed     • gabapentin (NEURONTIN) 100 mg capsule Take 100 mg by mouth 3 (three) times a day   • glucose blood test strip Testing six times daily  E11 65 on insulin pump   • Insulin Infusion Pump (MINIMED INSULIN PUMP) PRINCESS Use   • levothyroxine 100 mcg tablet Take 100 mcg by mouth daily   • losartan (COZAAR) 50 mg tablet Take 50 mg by mouth daily   • NOVOLOG 100 UNIT/ML injection Pt reports unsure of total dose a day/did see her endocrinologist Dr Gracy Williamson,  [place]  Pt has pump on upper left abdomen cut basaL RATE IN HALF   • ondansetron (ZOFRAN) 8 mg tablet Take 8 mg by mouth every 8 (eight) hours as needed     • Pitolisant HCl 4 45 MG TABS Take 2 tablets by mouth in the morning (Patient not taking: Reported on 8/18/2022)   • polyethylene glycol (MIRALAX) 17 g packet Take 17 g by mouth daily (Patient not taking: Reported on 8/18/2022)   • Potassium Gluconate 595 MG CAPS Take by mouth 2 (two) times a day  (Patient not taking: Reported on 8/18/2022)   • pramipexole (MIRAPEX) 1 mg tablet Take 1 mg by mouth 2 (two) times a day     • Semaglutide (OZEMPIC, 0 25 OR 0 5 MG/DOSE, SC) Inject 0 25 mg under the skin once a week Sundays   • Senexon-S 8 6-50 MG per tablet TAKE 1 TABLET BY MOUTH EVERY DAY   • sertraline (ZOLOFT) 50 mg tablet Take 50 mg by mouth daily   • solifenacin (VESICARE) 10 MG tablet Take 10 mg by mouth daily (Patient not taking: Reported on 8/18/2022)   • torsemide (DEMADEX) 20 mg tablet Take 20 mg by mouth daily   • trospium chloride (SANCTURA) 20 mg tablet Take 20 mg by mouth 2 (two) times a day (Patient not taking: No sig reported)       Objective: There were no vitals taken for this visit       Physical Exam  Sarah Peng

## 2022-11-18 NOTE — ASSESSMENT & PLAN NOTE
Acute symptomatic positive for covid 11/14/2022  Patient has had covid vaccine  Patient received MAB infusion yesterday and reports no s/e from infusion and improvement in symptoms  Remains with only fatigue cough and slight sore throat  Denies sob/chest pain/fever  Will recommend continue to increase fluids, covid vitamins, and educated on quarantine guidelines and strict masking  Patient has pcp out of network, not willing to change and will f/u with pcp

## 2022-11-21 ENCOUNTER — TELEPHONE (OUTPATIENT)
Dept: CARDIAC SURGERY | Facility: CLINIC | Age: 67
End: 2022-11-21

## 2022-11-21 ENCOUNTER — PREP FOR PROCEDURE (OUTPATIENT)
Dept: CARDIAC SURGERY | Facility: CLINIC | Age: 67
End: 2022-11-21

## 2022-11-21 DIAGNOSIS — K22.2 ESOPHAGEAL STRICTURE: Primary | ICD-10-CM

## 2022-11-21 NOTE — TELEPHONE ENCOUNTER
Spoke with patient, after speaking with OR, surgical scheduler and Dr Carol Allen will dilate her esophagus and pylorus on 11/29/22  She will see one of the PA's on 11/28/22 for a H and P and sign consent  She tested + for COVID on 11/14/22, and 11/29/22 will just be outside the 2 week window  She has been asymptomatic, from a COVID standpoint

## 2022-11-23 ENCOUNTER — OFFICE VISIT (OUTPATIENT)
Dept: LAB | Facility: HOSPITAL | Age: 67
End: 2022-11-23

## 2022-11-23 ENCOUNTER — TELEPHONE (OUTPATIENT)
Dept: SURGICAL ONCOLOGY | Facility: CLINIC | Age: 67
End: 2022-11-23

## 2022-11-23 DIAGNOSIS — K22.2 ESOPHAGEAL STRICTURE: ICD-10-CM

## 2022-11-23 LAB
ATRIAL RATE: 65 BPM
P AXIS: 68 DEGREES
PR INTERVAL: 220 MS
QRS AXIS: 80 DEGREES
QRSD INTERVAL: 148 MS
QT INTERVAL: 472 MS
QTC INTERVAL: 490 MS
T WAVE AXIS: 269 DEGREES
VENTRICULAR RATE: 65 BPM

## 2022-11-23 NOTE — TELEPHONE ENCOUNTER
Patient called in stating she spoke to someone this morning and she would like to speak to them again

## 2022-11-25 NOTE — PRE-PROCEDURE INSTRUCTIONS
Pre-Surgery Instructions:   Medication Instructions   • amitriptyline (ELAVIL) 10 mg tablet Take night before surgery   • Armodafinil (Nuvigil) 250 MG tablet Hold day of surgery  • aspirin (ECOTRIN LOW STRENGTH) 81 mg EC tablet Instructions provided by MD   • atorvastatin (LIPITOR) 80 mg tablet Take night before surgery   • bisacodyl (DULCOLAX) 10 mg suppository Hold day of surgery  • chlorzoxazone (PARAFON FORTE) 500 mg tablet Hold day of surgery  • CRANBERRY PO Stop taking 7 days prior to surgery  • Cyanocobalamin (VITAMIN B-12 PO) Stop taking 7 days prior to surgery  • Darbepoetin Mike (ARANESP, ALBUMIN FREE, IJ) take normal schedule   • dicyclomine (BENTYL) 10 mg capsule Hold day of surgery  • docusate sodium (COLACE) 100 mg capsule Hold day of surgery  • esomeprazole (NexIUM) 40 MG capsule Take day of surgery  • famotidine (PEPCID) 20 mg tablet Take day of surgery  • gabapentin (NEURONTIN) 100 mg capsule Take day of surgery  • levothyroxine 100 mcg tablet Take day of surgery  • losartan (COZAAR) 50 mg tablet Take night before surgery   • NOVOLOG 100 UNIT/ML injection advised to set to basal rate   • ondansetron (ZOFRAN) 8 mg tablet Uses PRN- OK to take day of surgery   • polyethylene glycol (MIRALAX) 17 g packet Hold day of surgery  • Potassium Gluconate 595 MG CAPS Hold day of surgery  • pramipexole (MIRAPEX) 1 mg tablet Take day of surgery  • Semaglutide (OZEMPIC, 0 25 OR 0 5 MG/DOSE, SC) take normal schedule   • Senexon-S 8 6-50 MG per tablet Hold day of surgery  • sertraline (ZOLOFT) 50 mg tablet Take day of surgery  • solifenacin (VESICARE) 10 MG tablet Hold day of surgery  • torsemide (DEMADEX) 20 mg tablet Hold day of surgery  - Reviewed with patient, in detail, instructions from "My Surgical Experience"  - Instructed to avoid all OTC vitamins/supplements and NSAIDS for one week prior to surgery per anesthesia guidelines  Tylenol ok to take PRN   Pt was advised to call ordering physician on aspirin hold instructions and to call triage line back  Pt verbalized understanding      - Advised patient nothing eat or drink after midnight prior to surgery, except medications that he/she is to take morning DOS with only small sip of water     - Advised patient that Travis Avina will call with surgery arrival time and hospital directions the business day prior to surgery  Patient verbalized understanding of current visitor restrictions/masking guidelines and advised that he/she can confirm these at time of arrival call with Travis Avina      - Patient verbalized understanding and knows to call surgeon's office with any additional questions prior to surgery  Instructed to call surgeon's office in meantime with any new illnesses/exposure, patient verbalized understanding

## 2022-11-28 ENCOUNTER — OFFICE VISIT (OUTPATIENT)
Dept: CARDIAC SURGERY | Facility: CLINIC | Age: 67
End: 2022-11-28

## 2022-11-28 ENCOUNTER — ANESTHESIA EVENT (OUTPATIENT)
Dept: PERIOP | Facility: HOSPITAL | Age: 67
End: 2022-11-28

## 2022-11-28 VITALS
SYSTOLIC BLOOD PRESSURE: 127 MMHG | TEMPERATURE: 98.3 F | RESPIRATION RATE: 17 BRPM | BODY MASS INDEX: 31.93 KG/M2 | HEART RATE: 68 BPM | WEIGHT: 173.5 LBS | HEIGHT: 62 IN | OXYGEN SATURATION: 100 % | DIASTOLIC BLOOD PRESSURE: 76 MMHG

## 2022-11-28 DIAGNOSIS — K22.0 ACHALASIA: Primary | ICD-10-CM

## 2022-11-28 NOTE — PROGRESS NOTES
Assessment/Plan:    Achalasia  Ms Barriga presents for an updated H&P prior to EGD with dilation tomorrow  She has been experiencing progressive dysphagia and regurgitation  We discussed the procedure and she signed consent in the office today  All of her questions were addressed  No blood work needed  Diagnoses and all orders for this visit:    Achalasia          Thoracic History       Diagnosis: Achalasia  Procedure: Robotic heller myotomy, Kareem fundoplication, laparoscopic lysis of adhesions 5/3/21           Subjective:    Patient ID: Mine Mcclain is a 78 y o  female  HPI   Ms Germán Rudd is a 78year old female who underwent a robotic heller myotomy and Kareem fundoplication 6/6/1558  She has required a few EGD with dilations, most recently on 7/12/22  She had contacted the office with increased dysphagia and presents today for an updated H&P  She will be undergoing an EGD with dilation tomorrow with Dr Lashaun Hernandez  On discussion, she reports progressive dysphagia over the last month especially  She has lost 5 pounds in the last month  She is tolerating liquids and soft foods  She cannot eat any meats at this point   She regurgitates her pills and has regurgitation at nearly every meal      The following portions of the patient's history were reviewed and updated as appropriate: allergies, current medications, past family history, past medical history, past social history, past surgical history and problem list   Past Medical History:   Diagnosis Date   • Anxiety    • Asthma     controlled   • Back complaints    • Cancer ([facility] )     nose   • Cellulitis of left lower leg     "not now"   • CHF (congestive heart failure) ([facility] ) 02/2021   • Chronic bilateral thoracic back pain    • Chronic kidney disease     sees nephrologist regularly" stage 3"   • Chronic myofascial pain    • Chronic pain of both lower extremities    • Chronic pain of left knee     "both knees"   • Chronic pain syndrome     bilat legs and knees/neuropathy pain   • CPAP (continuous positive airway pressure) dependence     no currently uses   • Depression    • Diabetes mellitus (HCC)     insulin pump   • Disease of thyroid gland    • Does use hearing aid     bilat and will wear DOS   • D[facility]deep [facility] thrombosis[facility]     left leg   • Gait disorder    • Gastroparesis    • GERD (gastroesophageal reflux disease)    • History of angina    • History of MRSA infection    • History of transfusion     Many years ago   • Hyperlipidemia    • Hypertension    • Hypoglycemic reaction     "occas low blood sugar"   • Insulin pump in place     pt reports saw endocrinologist  and will bring copy of instructions DOS   • Irritable bowel syndrome    • Kidney disease    • Pacemaker    • Polyneuropathy associated with underlying disease ([facility] )    • PONV (postoperative nausea and vomiting)    • Risk for falls    • S/P insertion of spinal cord stimulator 2018   • Sarcoidosis    • Shortness of breath     "exertion and not new"   • Sleep apnea    • TIA (transient ischemic attack)    • Use of cane as ambulatory aid    • Uses walker    • Wears dentures     permanent upper/and some missing teeth/partial lower     Past Surgical History:   Procedure Laterality Date   • ABDOMINAL ADHESION SURGERY N/A 5/3/2021    Procedure: laparoscopic LYSIS ADHESIONS;  Surgeon: Shelia Hester MD;  [facility]: [facility];  Service: Thoracic   • BREAST SURGERY     • BREAST SURGERY      reduction   • CARDIAC PACEMAKER PLACEMENT      pacemaker   •  SECTION     • COLONOSCOPY     • DILATION AND CURETTAGE OF UTERUS      "D&E"   • HERNIA REPAIR     • HYSTERECTOMY     • JOINT REPLACEMENT Left    • NECK SURGERY      fused 4 discs with 4 screws implanted   • NISSEN FUNDOPLICATION LAPAROSCOPIC WITH ROBOTICS N/A 5/3/2021    Procedure: ROBOTIC FIONA [address] ;  Surgeon: Shelia Hester MD;  [facility]: [facility];  Service: Thoracic   • UT ESOPHAGOGASTRODUODENOSCOPY TRANSORAL DIAGNOSTIC N/A 5/3/2021    Procedure: ESOPHAGOGASTRODUODENOSCOPY (EGD); Surgeon: Татьяна Ling MD;  [facility];  Service: Thoracic   • AK ESOPHAGOGASTRODUODENOSCOPY TRANSORAL DIAGNOSTIC N/A 1/12/2022    Procedure: ESOPHAGOGASTRODUODENOSCOPY (EGD),;  Surgeon: Татьяна Ling MD;  [facility];  Service: Thoracic   • AK ESOPHAGOGASTRODUODENOSCOPY TRANSORAL DIAGNOSTIC N/A 2/16/2022    Procedure: ESOPHAGOGASTRODUODENOSCOPY (EGD); Surgeon: Татьяна Ling MD;  [facility];  Service: Thoracic   • AK ESOPHAGOGASTRODUODENOSCOPY TRANSORAL DIAGNOSTIC N/A 7/12/2022    Procedure: ESOPHAGOGASTRODUODENOSCOPY (EGD); PYLORIC DILATION; GE JUNCTION DILATION;  Surgeon: Татьяна Ling MD;  [facility];  Service: Thoracic   • AK ESOPHAGOSCOPY FLEX BALLOON DILAT <30 MM DIAM N/A 1/12/2022    Procedure: DILATATION ESOPHAGEAL;  Surgeon: Татьяна Ling MD;  [facility];  Service: Thoracic   • AK ESOPHAGOSCOPY FLEX BALLOON DILAT <30 MM DIAM N/A 2/16/2022    Procedure: DILATATION ESOPHAGEAL;  Surgeon: Татьяна Ling MD;  [facility];  Service: Thoracic   • AK SURG IMPLNT Jose Robertoquline Souza Left 4/23/2018    Procedure:  Insertion of thoracic spinal cord stimulator electrode via laminotomy and placement of left buttock IPG;  Surgeon: Eileen Darling MD;  [facility];  Service: Neurosurgery   • REPLACEMENT TOTAL KNEE     • ROTATOR CUFF REPAIR     • SPINAL STIMULATOR PLACEMENT Left 10/3/2018    Procedure: BUTTOCK RE-OPENING INCISION FOR REPOSITIONING OF IMPLANTABLE PULSE GENERATOR;  Surgeon: Eileen Darling MD;  [facility];  Service: Neurosurgery   • TONSILLECTOMY     • UPPER GASTROINTESTINAL ENDOSCOPY     • WISDOM TOOTH EXTRACTION       Current Outpatient Medications   Medication Instructions   • amitriptyline (ELAVIL) 12 5 mg, Oral, Daily at bedtime   • amitriptyline (ELAVIL) 10 mg, Oral, Daily   • Armodafinil (NUVIGIL) 250 mg, Oral, Daily   • aspirin (ECOTRIN LOW STRENGTH) 81 mg EC tablet Oral, Every evening   • atorvastatin (LIPITOR) 80 mg, Oral, Daily at bedtime   • bisacodyl (DULCOLAX) 10 mg, Rectal, Daily   • chlorzoxazone (PARAFON FORTE) 500 mg tablet Take 1 PO QID PRN for pain and spasms     • CRANBERRY PO 1 capsule, Oral, Every evening   • Cyanocobalamin (VITAMIN B-12 PO) 1 capsule, Oral, Every evening   • Darbepoetin Mike (ARANESP, ALBUMIN FREE, IJ) Injection, Every 14 days   • dicyclomine (BENTYL) 10 mg, Oral, 4 times daily PRN   • docusate sodium (COLACE) 100 mg capsule TAKE ONE CAPSULE BY MOUTH EVERY DAY   • esomeprazole (NEXIUM) 40 mg, Oral, 2 times daily before meals   • famotidine (PEPCID) 20 mg, Oral, 2 times daily   • fluticasone-salmeterol (ADVAIR) 100-50 mcg/dose inhaler 2 puffs, Inhalation, As needed   • gabapentin (NEURONTIN) 100 mg, Oral, 3 times daily   • glucose blood test strip Testing six times daily  E11 65 on insulin pump   • Insulin Infusion Pump (MINIMED INSULIN PUMP) PRINCESS Does not apply   • levothyroxine 100 mcg, Oral, Daily   • losartan (COZAAR) 50 mg, Oral, Daily at bedtime   • NOVOLOG 100 UNIT/ML injection Pt reports unsure of total dose a day/did see her endocrinologist Dr Alana Jackson,  [place]  Pt has pump on upper left abdomen cut basaL RATE IN HALF   • ondansetron (ZOFRAN) 8 mg, Oral, Every 8 hours PRN   • Pitolisant HCl 4 45 MG TABS 2 tablets, Oral, Daily   • polyethylene glycol (MIRALAX) 17 g, Oral, Daily   • Potassium Gluconate 595 MG CAPS Oral, 2 times daily   • pramipexole (MIRAPEX) 1 mg, Oral, 2 times daily   • Semaglutide (OZEMPIC, 0 25 OR 0 5 MG/DOSE, SC) 0 25 mg, Subcutaneous, Weekly, Sundays    • Senexon-S 8 6-50 MG per tablet TAKE 1 TABLET BY MOUTH EVERY DAY   • sertraline (ZOLOFT) 50 mg, Oral, Daily   • solifenacin (VESICARE) 10 mg, Oral, Daily   • torsemide (DEMADEX) 20 mg, Oral, Daily   • trospium chloride (SANCTURA) 20 mg, 2 times daily     Allergies   Allergen Reactions   • Bactrim [Sulfamethoxazole-Trimethoprim] Hives   • Sucralfate Hives     Facial swelling   • Topamax [Topiramate]      disorentation   • Azithromycin Itching   • Pregabalin Confusion and Other (See Comments)     altered mental status   • Ciprofloxacin Drowsiness     Other reaction(s): Other (See Comments)  "drowsiness"   • Norvasc [Amlodipine] Swelling   • Baclofen      "That knocks me out "   • Bupropion Fatigue   • Methotrexate Nausea Only         Review of Systems   Constitutional: Positive for appetite change  HENT: Positive for trouble swallowing  Gastrointestinal: Positive for vomiting  Regurgitation   Musculoskeletal: Positive for gait problem (uses walker)  Neurological: Positive for weakness (uses walker)  All other systems reviewed and are negative  Objective:   Physical Exam  Vitals reviewed  Constitutional:       General: She is not in acute distress  Appearance: Normal appearance  She is well-developed  She is not diaphoretic  HENT:      Head: Normocephalic and atraumatic  Eyes:      General: No scleral icterus  Extraocular Movements: Extraocular movements intact  Neck:      Trachea: No tracheal deviation  Cardiovascular:      Rate and Rhythm: Normal rate and regular rhythm  Pulses: Normal pulses  Heart sounds: Normal heart sounds  No murmur heard  Pulmonary:      Effort: Pulmonary effort is normal  No respiratory distress  Breath sounds: Normal breath sounds  No wheezing  Abdominal:      General: Bowel sounds are normal  There is no distension  Palpations: Abdomen is soft  Musculoskeletal:         General: Normal range of motion  Cervical back: Normal range of motion and neck supple  Right lower leg: No edema  Left lower leg: No edema  Lymphadenopathy:      Cervical: No cervical adenopathy  Skin:     General: Skin is warm and dry  Findings: No erythema     Neurological:      Mental Status: She is alert and oriented to person, place, and time  Cranial Nerves: No cranial nerve deficit  Psychiatric:         Mood and Affect: Mood normal          Behavior: Behavior normal          Thought Content:  Thought content normal      /76 (BP Location: Right arm, Patient Position: Sitting, Cuff Size: Standard)   Pulse 68   Temp 98 3 °F (36 8 °C) (Temporal)   Resp 17   Ht 5' 2" (1 575 m)   Wt 78 7 kg (173 lb 8 oz)   SpO2 100%   BMI 31 73 kg/m²

## 2022-11-28 NOTE — H&P (VIEW-ONLY)
Assessment/Plan:    Achalasia  Ms Barriga presents for an updated H&P prior to EGD with dilation tomorrow  She has been experiencing progressive dysphagia and regurgitation  We discussed the procedure and she signed consent in the office today  All of her questions were addressed  No blood work needed  Diagnoses and all orders for this visit:    Achalasia          Thoracic History       Diagnosis: Achalasia  Procedure: Robotic heller myotomy, Kareem fundoplication, laparoscopic lysis of adhesions 5/3/21           Subjective:    Patient ID: Tyrone Bradford is a 78 y o  female  HPI   Ms Walter Posada is a 78year old female who underwent a robotic heller myotomy and Kareem fundoplication 1/0/1433  She has required a few EGD with dilations, most recently on 7/12/22  She had contacted the office with increased dysphagia and presents today for an updated H&P  She will be undergoing an EGD with dilation tomorrow with Dr Hima Denny  On discussion, she reports progressive dysphagia over the last month especially  She has lost 5 pounds in the last month  She is tolerating liquids and soft foods  She cannot eat any meats at this point   She regurgitates her pills and has regurgitation at nearly every meal      The following portions of the patient's history were reviewed and updated as appropriate: allergies, current medications, past family history, past medical history, past social history, past surgical history and problem list   Past Medical History:   Diagnosis Date   • Anxiety    • Asthma     controlled   • Back complaints    • Cancer ([facility] [address] )     nose   • Cellulitis of left lower leg     "not now"   • CHF (congestive heart failure) ([facility] [address] ) 02/2021   • Chronic bilateral thoracic back pain    • Chronic kidney disease     sees nephrologist regularly" stage 3"   • Chronic myofascial pain    • Chronic pain of both lower extremities    • Chronic pain of left knee     "both knees"   • Chronic pain syndrome     bilat legs and knees/neuropathy pain   • CPAP (continuous positive airway pressure) dependence     no currently uses   • Depression    • Diabetes mellitus (HCC)     insulin pump   • Disease of thyroid gland    • Does use hearing aid     bilat and will wear DOS   • D[facility] )     left leg   • Gait disorder    • Gastroparesis    • GERD (gastroesophageal reflux disease)    • History of angina    • History of MRSA infection    • History of transfusion     Many years ago   • Hyperlipidemia    • Hypertension    • Hypoglycemic reaction     "occas low blood sugar"   • Insulin pump in place     pt reports saw endocrinologist  and will bring copy of instructions DOS   • Irritable bowel syndrome    • Kidney disease    • Pacemaker    • Polyneuropathy associated with underlying disease ([facility] [address] )    • PONV (postoperative nausea and vomiting)    • Risk for falls    • S/P insertion of spinal cord stimulator 2018   • Sarcoidosis    • Shortness of breath     "exertion and not new"   • Sleep apnea    • TIA (transient ischemic attack)    • Use of cane as ambulatory aid    • Uses walker    • Wears dentures     permanent upper/and some missing teeth/partial lower     Past Surgical History:   Procedure Laterality Date   • ABDOMINAL ADHESION SURGERY N/A 5/3/2021    Procedure: laparoscopic LYSIS ADHESIONS;  Surgeon: Nayely Vail MD;  [facility];  Service: Thoracic   • BREAST SURGERY     • BREAST SURGERY      reduction   • CARDIAC PACEMAKER PLACEMENT      pacemaker   •  SECTION     • COLONOSCOPY     • DILATION AND CURETTAGE OF UTERUS      "D&E"   • HERNIA REPAIR     • HYSTERECTOMY     • JOINT REPLACEMENT Left    • NECK SURGERY      fused 4 discs with 4 screws implanted   • NISSEN FUNDOPLICATION LAPAROSCOPIC WITH ROBOTICS N/A 5/3/2021    Procedure: ROBOTIC FIONA [address] ;  Surgeon: Nayely Vail MD;  [facility];  Service: Thoracic   • VT ESOPHAGOGASTRODUODENOSCOPY TRANSORAL DIAGNOSTIC N/A 5/3/2021    Procedure: ESOPHAGOGASTRODUODENOSCOPY (EGD); Surgeon: Lakshmi Santos MD;  [facility];  Service: Thoracic   • OR ESOPHAGOGASTRODUODENOSCOPY TRANSORAL DIAGNOSTIC N/A 1/12/2022    Procedure: ESOPHAGOGASTRODUODENOSCOPY (EGD),;  Surgeon: Lakshmi Santos MD;  [facility];  Service: Thoracic   • OR ESOPHAGOGASTRODUODENOSCOPY TRANSORAL DIAGNOSTIC N/A 2/16/2022    Procedure: ESOPHAGOGASTRODUODENOSCOPY (EGD); Surgeon: Lakshmi Santos MD;  [facility];  Service: Thoracic   • OR ESOPHAGOGASTRODUODENOSCOPY TRANSORAL DIAGNOSTIC N/A 7/12/2022    Procedure: ESOPHAGOGASTRODUODENOSCOPY (EGD); PYLORIC DILATION; GE JUNCTION DILATION;  Surgeon: Lakshmi Santos MD;  [facility];  Service: Thoracic   • OR ESOPHAGOSCOPY FLEX BALLOON DILAT <30 MM DIAM N/A 1/12/2022    Procedure: DILATATION ESOPHAGEAL;  Surgeon: Lakshmi Santos MD;  [facility];  Service: Thoracic   • OR ESOPHAGOSCOPY FLEX BALLOON DILAT <30 MM DIAM N/A 2/16/2022    Procedure: DILATATION ESOPHAGEAL;  Surgeon: Lakshmi Santos MD;  [facility];  Service: Thoracic   • OR SURG IMPLNT Leslye Esparza Left 4/23/2018    Procedure:  Insertion of thoracic spinal cord stimulator electrode via laminotomy and placement of left buttock IPG;  Surgeon: July More MD;  [facility];  Service: Neurosurgery   • REPLACEMENT TOTAL KNEE     • ROTATOR CUFF REPAIR     • SPINAL STIMULATOR PLACEMENT Left 10/3/2018    Procedure: BUTTOCK RE-OPENING INCISION FOR REPOSITIONING OF IMPLANTABLE PULSE GENERATOR;  Surgeon: July More MD;  [facility];  Service: Neurosurgery   • TONSILLECTOMY     • UPPER GASTROINTESTINAL ENDOSCOPY     • WISDOM TOOTH EXTRACTION       Current Outpatient Medications   Medication Instructions   • amitriptyline (ELAVIL) 12 5 mg, Oral, Daily at bedtime   • amitriptyline (ELAVIL) 10 mg, Oral, Daily   • Armodafinil (NUVIGIL) 250 mg, Oral, Daily   • aspirin (ECOTRIN LOW STRENGTH) 81 mg EC tablet Oral, Every evening   • atorvastatin (LIPITOR) 80 mg, Oral, Daily at bedtime   • bisacodyl (DULCOLAX) 10 mg, Rectal, Daily   • chlorzoxazone (PARAFON FORTE) 500 mg tablet Take 1 PO QID PRN for pain and spasms     • CRANBERRY PO 1 capsule, Oral, Every evening   • Cyanocobalamin (VITAMIN B-12 PO) 1 capsule, Oral, Every evening   • Darbepoetin Mike (ARANESP, ALBUMIN FREE, IJ) Injection, Every 14 days   • dicyclomine (BENTYL) 10 mg, Oral, 4 times daily PRN   • docusate sodium (COLACE) 100 mg capsule TAKE ONE CAPSULE BY MOUTH EVERY DAY   • esomeprazole (NEXIUM) 40 mg, Oral, 2 times daily before meals   • famotidine (PEPCID) 20 mg, Oral, 2 times daily   • fluticasone-salmeterol (ADVAIR) 100-50 mcg/dose inhaler 2 puffs, Inhalation, As needed   • gabapentin (NEURONTIN) 100 mg, Oral, 3 times daily   • glucose blood test strip Testing six times daily  E11 65 on insulin pump   • Insulin Infusion Pump (MINIMED INSULIN PUMP) PRINCESS Does not apply   • levothyroxine 100 mcg, Oral, Daily   • losartan (COZAAR) 50 mg, Oral, Daily at bedtime   • NOVOLOG 100 UNIT/ML injection Pt reports unsure of total dose a day/did see her endocrinologist Dr Cole Or,  [place]  Pt has pump on upper left abdomen cut basaL RATE IN HALF   • ondansetron (ZOFRAN) 8 mg, Oral, Every 8 hours PRN   • Pitolisant HCl 4 45 MG TABS 2 tablets, Oral, Daily   • polyethylene glycol (MIRALAX) 17 g, Oral, Daily   • Potassium Gluconate 595 MG CAPS Oral, 2 times daily   • pramipexole (MIRAPEX) 1 mg, Oral, 2 times daily   • Semaglutide (OZEMPIC, 0 25 OR 0 5 MG/DOSE, SC) 0 25 mg, Subcutaneous, Weekly, Sundays    • Senexon-S 8 6-50 MG per tablet TAKE 1 TABLET BY MOUTH EVERY DAY   • sertraline (ZOLOFT) 50 mg, Oral, Daily   • solifenacin (VESICARE) 10 mg, Oral, Daily   • torsemide (DEMADEX) 20 mg, Oral, Daily   • trospium chloride (SANCTURA) 20 mg, 2 times daily     Allergies   Allergen Reactions   • Bactrim [Sulfamethoxazole-Trimethoprim] Hives   • Sucralfate Hives     Facial swelling   • Topamax [Topiramate]      disorentation   • Azithromycin Itching   • Pregabalin Confusion and Other (See Comments)     altered mental status   • Ciprofloxacin Drowsiness     Other reaction(s): Other (See Comments)  "drowsiness"   • Norvasc [Amlodipine] Swelling   • Baclofen      "That knocks me out "   • Bupropion Fatigue   • Methotrexate Nausea Only         Review of Systems   Constitutional: Positive for appetite change  HENT: Positive for trouble swallowing  Gastrointestinal: Positive for vomiting  Regurgitation   Musculoskeletal: Positive for gait problem (uses walker)  Neurological: Positive for weakness (uses walker)  All other systems reviewed and are negative  Objective:   Physical Exam  Vitals reviewed  Constitutional:       General: She is not in acute distress  Appearance: Normal appearance  She is well-developed  She is not diaphoretic  HENT:      Head: Normocephalic and atraumatic  Eyes:      General: No scleral icterus  Extraocular Movements: Extraocular movements intact  Neck:      Trachea: No tracheal deviation  Cardiovascular:      Rate and Rhythm: Normal rate and regular rhythm  Pulses: Normal pulses  Heart sounds: Normal heart sounds  No murmur heard  Pulmonary:      Effort: Pulmonary effort is normal  No respiratory distress  Breath sounds: Normal breath sounds  No wheezing  Abdominal:      General: Bowel sounds are normal  There is no distension  Palpations: Abdomen is soft  Musculoskeletal:         General: Normal range of motion  Cervical back: Normal range of motion and neck supple  Right lower leg: No edema  Left lower leg: No edema  Lymphadenopathy:      Cervical: No cervical adenopathy  Skin:     General: Skin is warm and dry  Findings: No erythema     Neurological:      Mental Status: She is alert and oriented to person, place, and time  Cranial Nerves: No cranial nerve deficit  Psychiatric:         Mood and Affect: Mood normal          Behavior: Behavior normal          Thought Content:  Thought content normal      /76 (BP Location: Right arm, Patient Position: Sitting, Cuff Size: Standard)   Pulse 68   Temp 98 3 °F (36 8 °C) (Temporal)   Resp 17   Ht 5' 2" (1 575 m)   Wt 78 7 kg (173 lb 8 oz)   SpO2 100%   BMI 31 73 kg/m²

## 2022-11-28 NOTE — ASSESSMENT & PLAN NOTE
Ms Ry Lin presents for an updated H&P prior to EGD with dilation tomorrow  She has been experiencing progressive dysphagia and regurgitation  We discussed the procedure and she signed consent in the office today  All of her questions were addressed  No blood work needed

## 2022-11-29 ENCOUNTER — HOSPITAL ENCOUNTER (OUTPATIENT)
Facility: HOSPITAL | Age: 67
Setting detail: OUTPATIENT SURGERY
Discharge: HOME | End: 2022-11-29
Attending: THORACIC SURGERY (CARDIOTHORACIC VASCULAR SURGERY) | Admitting: THORACIC SURGERY (CARDIOTHORACIC VASCULAR SURGERY)

## 2022-11-29 ENCOUNTER — HOSPITAL ENCOUNTER (OUTPATIENT)
Dept: RADIOLOGY | Facility: HOSPITAL | Age: 67
Setting detail: OUTPATIENT SURGERY
Discharge: HOME | End: 2022-11-29

## 2022-11-29 ENCOUNTER — ANESTHESIA (OUTPATIENT)
Dept: PERIOP | Facility: HOSPITAL | Age: 67
End: 2022-11-29

## 2022-11-29 VITALS
DIASTOLIC BLOOD PRESSURE: 55 MMHG | RESPIRATION RATE: 16 BRPM | SYSTOLIC BLOOD PRESSURE: 138 MMHG | HEIGHT: 62 IN | HEART RATE: 67 BPM | BODY MASS INDEX: 31.47 KG/M2 | WEIGHT: 171 LBS | OXYGEN SATURATION: 94 % | TEMPERATURE: 97 F

## 2022-11-29 DIAGNOSIS — K22.2 ESOPHAGEAL STRICTURE: ICD-10-CM

## 2022-11-29 LAB
GLUCOSE SERPL-MCNC: 111 MG/DL (ref 65–140)
GLUCOSE SERPL-MCNC: 118 MG/DL (ref 65–140)

## 2022-11-29 RX ORDER — MIDAZOLAM HYDROCHLORIDE 2 MG/2ML
INJECTION, SOLUTION INTRAMUSCULAR; INTRAVENOUS AS NEEDED
Status: DISCONTINUED | OUTPATIENT
Start: 2022-11-29 | End: 2022-11-29

## 2022-11-29 RX ORDER — DEXAMETHASONE SODIUM PHOSPHATE 10 MG/ML
INJECTION, SOLUTION INTRAMUSCULAR; INTRAVENOUS AS NEEDED
Status: DISCONTINUED | OUTPATIENT
Start: 2022-11-29 | End: 2022-11-29

## 2022-11-29 RX ORDER — SODIUM CHLORIDE, SODIUM LACTATE, POTASSIUM CHLORIDE, CALCIUM CHLORIDE 600; 310; 30; 20 MG/100ML; MG/100ML; MG/100ML; MG/100ML
50 INJECTION, SOLUTION INTRAVENOUS CONTINUOUS
Status: DISCONTINUED | OUTPATIENT
Start: 2022-11-29 | End: 2022-11-29 | Stop reason: HOSPADM

## 2022-11-29 RX ORDER — ONDANSETRON 2 MG/ML
4 INJECTION INTRAMUSCULAR; INTRAVENOUS ONCE AS NEEDED
Status: DISCONTINUED | OUTPATIENT
Start: 2022-11-29 | End: 2022-11-29 | Stop reason: HOSPADM

## 2022-11-29 RX ORDER — FENTANYL CITRATE/PF 50 MCG/ML
50 SYRINGE (ML) INJECTION
Status: DISCONTINUED | OUTPATIENT
Start: 2022-11-29 | End: 2022-11-29 | Stop reason: HOSPADM

## 2022-11-29 RX ORDER — HYDROMORPHONE HCL/PF 1 MG/ML
0.5 SYRINGE (ML) INJECTION
Status: DISCONTINUED | OUTPATIENT
Start: 2022-11-29 | End: 2022-11-29 | Stop reason: HOSPADM

## 2022-11-29 RX ORDER — DIPHENHYDRAMINE HYDROCHLORIDE 50 MG/ML
12.5 INJECTION INTRAMUSCULAR; INTRAVENOUS ONCE AS NEEDED
Status: DISCONTINUED | OUTPATIENT
Start: 2022-11-29 | End: 2022-11-29 | Stop reason: HOSPADM

## 2022-11-29 RX ORDER — FENTANYL CITRATE 50 UG/ML
INJECTION, SOLUTION INTRAMUSCULAR; INTRAVENOUS AS NEEDED
Status: DISCONTINUED | OUTPATIENT
Start: 2022-11-29 | End: 2022-11-29

## 2022-11-29 RX ORDER — SUCCINYLCHOLINE/SOD CL,ISO/PF 100 MG/5ML
SYRINGE (ML) INTRAVENOUS AS NEEDED
Status: DISCONTINUED | OUTPATIENT
Start: 2022-11-29 | End: 2022-11-29

## 2022-11-29 RX ORDER — METOCLOPRAMIDE HYDROCHLORIDE 5 MG/ML
10 INJECTION INTRAMUSCULAR; INTRAVENOUS ONCE AS NEEDED
Status: DISCONTINUED | OUTPATIENT
Start: 2022-11-29 | End: 2022-11-29 | Stop reason: HOSPADM

## 2022-11-29 RX ORDER — ONDANSETRON 2 MG/ML
INJECTION INTRAMUSCULAR; INTRAVENOUS AS NEEDED
Status: DISCONTINUED | OUTPATIENT
Start: 2022-11-29 | End: 2022-11-29

## 2022-11-29 RX ORDER — MEPERIDINE HYDROCHLORIDE 25 MG/ML
12.5 INJECTION INTRAMUSCULAR; INTRAVENOUS; SUBCUTANEOUS
Status: DISCONTINUED | OUTPATIENT
Start: 2022-11-29 | End: 2022-11-29 | Stop reason: HOSPADM

## 2022-11-29 RX ORDER — LIDOCAINE HYDROCHLORIDE 10 MG/ML
INJECTION, SOLUTION EPIDURAL; INFILTRATION; INTRACAUDAL; PERINEURAL AS NEEDED
Status: DISCONTINUED | OUTPATIENT
Start: 2022-11-29 | End: 2022-11-29

## 2022-11-29 RX ORDER — PROPOFOL 10 MG/ML
INJECTION, EMULSION INTRAVENOUS AS NEEDED
Status: DISCONTINUED | OUTPATIENT
Start: 2022-11-29 | End: 2022-11-29

## 2022-11-29 RX ADMIN — LIDOCAINE HYDROCHLORIDE 50 MG: 10 INJECTION, SOLUTION EPIDURAL; INFILTRATION; INTRACAUDAL; PERINEURAL at 09:07

## 2022-11-29 RX ADMIN — FENTANYL CITRATE 50 MCG: 50 INJECTION INTRAMUSCULAR; INTRAVENOUS at 09:07

## 2022-11-29 RX ADMIN — Medication 100 MG: at 09:08

## 2022-11-29 RX ADMIN — FENTANYL CITRATE 50 MCG: 50 INJECTION INTRAMUSCULAR; INTRAVENOUS at 09:16

## 2022-11-29 RX ADMIN — DEXAMETHASONE SODIUM PHOSPHATE 10 MG: 10 INJECTION, SOLUTION INTRAMUSCULAR; INTRAVENOUS at 09:14

## 2022-11-29 RX ADMIN — SODIUM CHLORIDE, SODIUM LACTATE, POTASSIUM CHLORIDE, AND CALCIUM CHLORIDE 50 ML/HR: 600; 310; 30; 20 INJECTION, SOLUTION INTRAVENOUS at 09:04

## 2022-11-29 RX ADMIN — ONDANSETRON 4 MG: 2 INJECTION INTRAMUSCULAR; INTRAVENOUS at 09:14

## 2022-11-29 RX ADMIN — PROPOFOL 150 MG: 10 INJECTION, EMULSION INTRAVENOUS at 09:07

## 2022-11-29 RX ADMIN — MIDAZOLAM 2 MG: 1 INJECTION INTRAMUSCULAR; INTRAVENOUS at 09:00

## 2022-11-29 NOTE — INTERVAL H&P NOTE
H&P reviewed  After examining the patient I find no changes in the patients condition since the H&P had been written  I spoke with Patient this morning  She is having no shortness breath and no cough  No symptoms of her recent Covid infection  She is having a significant difficulty with swallowing  She’s only able to eat soft foods  She gets food stuck in her esophagus in hast to casual and bring them up  She also has pro blems with pills  We discussed the procedure today  We discussed risks  Benefits and  alternatives  She understands a risk is esophageal perforation  Consent was signed  Will proceed today  Will follow up with my colleague  Dr Judith Dumas as an outpatient

## 2022-11-29 NOTE — OP NOTE
OPERATIVE REPORT  PATIENT NAME: Claudell Baars    :  1955  MRN: [id number]  Pt Location:  OR ROOM 05    SURGERY DATE: 2022    Surgeon(s) and Role:     * Janice Keith MD - Primary     * Varinder Montana MD - Assisting    Preop Diagnosis:  Esophageal stricture [K22 2]    Post-Op Diagnosis Codes:     * Esophageal stricture [K22 2]    Procedure(s) (LRB):  ESOPHAGOGASTRODUODENOSCOPY (EGD) (N/A)  DILATATION ESOPHAGEAL esophageal and pyloric dilation (N/A)   1  EGD with dilation GE junction to [address], balloon dilation of pylorus to 20 mm    Specimen(s):  * No specimens in log *    Estimated Blood Loss:   Minimal    Drains:  * No LDAs found *    Anesthesia Type:   General    Operative Indications:  Esophageal stricture [K252]  36-year-old female with history of achalasia status post robotic Heller myotomy and Kareem fundoplication with dysphagia    Operative Findings:  Mild narrowing of the GE junction and pylorus  GE junction located at 40 cm from the incisors  Complications:   None    Procedure and Technique:  After obtaining informed consent the patient was brought back to the operating room placed supine on the OR table  General anesthesia was induced without incident  A formal time-out was performed at this time verifying patient, date of birth, procedure, fire risk score, ASA score, antibiotic usage, need for blood products, anticipate specimens, beta-blocker usage, imaging and any other questions or concerns  At this point in adult endoscope was inserted into the patient's oropharynx and directed down the esophagus  Here we encountered a slightly tight cricopharyngeus  The remainder of the esophagus appeared normal   The GE junction was located 40 cm from the incisors  We could easily get through here with a an endoscope  There was no significant masses ulcers or recurrent hernia  The stomach appeared normal   We examined the duodenum fully    Again we were able to get through here with the endoscope  No duodenal abnormalities identified    Under direct visualization we placed a 20 mm balloon through the endoscope  This was insufflated to the appropriate pressure and held for 60 seconds under direct visualization  This dilated our pylorus  Repeat endoscopy through the pylorus showed no significant trauma     Next a 0 038 Jagwire was placed down the EGD and directed into the duodenum under direct visualization  We used fluoroscopy to verify placement and maintain positioning  The EGD scope was then removed at this time  We then began serial dilation over a wire using fluoro  This was placed over the wire and passed down the oropharynx into the esophagus under direct visualization  We dilated from 45 Fr to 47 Fr  Afterwards repeat endoscopy showed mild trauma but no significant issues otherwise  The endoscope was removed at this time  Patient tolerated this portion of procedure well without complication         I was present for the entire procedure    Patient Disposition:  PACU         SIGNATURE: Madeleine Williamson MD  DATE: November 29, 2022  TIME: 9:47 AM

## 2022-11-29 NOTE — ANESTHESIA POSTPROCEDURE EVALUATION
Post-Op Assessment Note    CV Status:  Stable  Pain Score: 0    Pain management: adequate     Mental Status:  Sleepy   Hydration Status:  Euvolemic and stable   PONV Controlled:  None   Airway Patency:  Patent      Post Op Vitals Reviewed: Yes            No notable events documented      /65 (11/29/22 1003)    Temp (!) 97 2 °F (36 2 °C) (11/29/22 1003)    Pulse 62 (11/29/22 1003)   Resp 12 (11/29/22 1003)    SpO2 100 % (11/29/22 1003)

## 2022-11-29 NOTE — ANESTHESIA PREPROCEDURE EVALUATION
Procedure:  ESOPHAGOGASTRODUODENOSCOPY (EGD) (Esophagus)  DILATATION ESOPHAGEAL esophageal and pyloric dilation (Esophagus)    Relevant Problems   ANESTHESIA   (+) PONV (postoperative nausea and vomiting)      CARDIO   (+) BBB (bundle branch block)   (+) Chronic bilateral thoracic back pain   (+) Chronic scapular pain   (+) Coronary artery disease involving native coronary artery   (+) Deep venous thrombosis (HCC)   (+) Essential hypertension   (+) Hyperlipidemia associated with type 2 diabetes mellitus (HCC)   (+) Mixed hyperlipidemia      ENDO   (+) Primary hyperparathyroidism (HCC)   (+) Primary hypothyroidism   (+) Type 2 diabetes mellitus with microalbuminuria, with long-term current use of insulin (HCC)   (+) Type 2 diabetes mellitus without complication, with long-term current use of insulin (HCC)      GI/HEPATIC   (+) Esophageal dysphagia   (+) Gastroesophageal reflux disease without esophagitis      /RENAL   (+) JULIANNE (acute kidney injury) (HCC)      HEMATOLOGY   (+) Anemia of chronic disease   (+) Microcytic anemia   (+) Normocytic anemia      MUSCULOSKELETAL   (+) Chronic bilateral low back pain without sciatica   (+) Chronic bilateral thoracic back pain   (+) Localized, primary osteoarthritis   (+) Lumbosacral spondylosis without myelopathy   (+) Osteoarthritis of knee      NEURO/PSYCH   (+) Anxiety and depression   (+) Chronic bilateral low back pain without sciatica   (+) Chronic bilateral thoracic back pain   (+) Complex regional pain syndrome type 1 of left lower extremity   (+) History of TIA (transient ischemic attack)   (+) Pain syndrome, chronic      PULMONARY   (+) Moderate persistent asthma with acute exacerbation   (+) Moderate persistent asthma without complication   (+) Obstructive sleep apnea      ppm       Anesthesia Plan  ASA Score- 3     Anesthesia Type- general with ASA Monitors  Additional Monitors:   Airway Plan: ETT      Comment: General anesthesia, endotracheal tube; standard ASA monitors  Risks and benefits discussed with patient; patient consented and agrees to proceed  I saw and evaluated the patient  If seen with CRNA, we have discussed the anesthetic plan and I am in agreement that the plan is appropriate for the patient          Plan Factors-    Chart reviewed  Existing labs reviewed  Induction- intravenous  Postoperative Plan- Plan for postoperative opioid use  Planned trial extubation    Informed Consent- Anesthetic plan and risks discussed with patient  I personally reviewed this patient with the CRNA  Discussed and agreed on the Anesthesia Plan with the CRNA  Ramiro Salguero

## 2022-11-30 ENCOUNTER — TELEPHONE (OUTPATIENT)
Dept: GYNECOLOGIC ONCOLOGY | Facility: CLINIC | Age: 67
End: 2022-11-30

## 2022-11-30 NOTE — TELEPHONE ENCOUNTER
Patient called into the office to schedule a follow up appointment with Dr Tara Gonzalez following her surgery on 11/29/22  At this time, no following needed  Patient is not presenting any symptoms at this time

## 2022-12-01 ENCOUNTER — TELEPHONE (OUTPATIENT)
Dept: GYNECOLOGIC ONCOLOGY | Facility: CLINIC | Age: 67
End: 2022-12-01

## 2022-12-01 DIAGNOSIS — R13.10 DYSPHAGIA, UNSPECIFIED TYPE: Primary | ICD-10-CM

## 2022-12-01 NOTE — TELEPHONE ENCOUNTER
Patient's barium swallow is scheduled for 12/8 and the FU with Dr Carol Allen is scheduled for 12/28  Patient would like a call from the nurse or PA with any recommendations for what she can do until her appt regarding the vomiting and not being able to eat  Patient can be reached at [phone]

## 2022-12-01 NOTE — TELEPHONE ENCOUNTER
Patient is a post EGD with Dr Toyin Sapp on 11/29  Patient states that all of a sudden she is having issues with being able to keep food down, and having a hard time swallowing medications   Patient would like a phone call back @ [phone]

## 2022-12-01 NOTE — TELEPHONE ENCOUNTER
Called patient she ate a steak sandwich last night for supper and vomited  Advised patient to have a bland liquid diet for a couple days  Progressing very slowly  If no further vomiting she may add some scrambled eggs, pudding, soups  Patient to call for any further problems or if not tolerating liquids

## 2022-12-08 ENCOUNTER — HOSPITAL ENCOUNTER (OUTPATIENT)
Dept: RADIOLOGY | Facility: HOSPITAL | Age: 67
Discharge: HOME | End: 2022-12-08

## 2022-12-08 DIAGNOSIS — R13.10 DYSPHAGIA, UNSPECIFIED TYPE: ICD-10-CM

## 2023-01-06 ENCOUNTER — TELEPHONE (OUTPATIENT)
Dept: GYNECOLOGIC ONCOLOGY | Facility: CLINIC | Age: 68
End: 2023-01-06

## 2023-01-06 NOTE — TELEPHONE ENCOUNTER
Patient called to confirm the time for her appointment for Dr Nataliia Ashley    Confirmed 1/12/23 at 3:00 pm

## 2023-01-11 DIAGNOSIS — K22.2 ESOPHAGEAL STRICTURE: ICD-10-CM

## 2023-01-11 RX ORDER — FAMOTIDINE 20 MG/1
TABLET, FILM COATED ORAL
Qty: 180 TABLET | Refills: 0 | Status: SHIPPED | OUTPATIENT
Start: 2023-01-11

## 2023-01-12 ENCOUNTER — OFFICE VISIT (OUTPATIENT)
Dept: CARDIAC SURGERY | Facility: CLINIC | Age: 68
End: 2023-01-12

## 2023-01-12 VITALS
RESPIRATION RATE: 17 BRPM | OXYGEN SATURATION: 98 % | TEMPERATURE: 98.8 F | BODY MASS INDEX: 31.28 KG/M2 | HEIGHT: 62 IN | SYSTOLIC BLOOD PRESSURE: 158 MMHG | DIASTOLIC BLOOD PRESSURE: 83 MMHG | HEART RATE: 78 BPM

## 2023-01-12 DIAGNOSIS — R13.19 ESOPHAGEAL DYSPHAGIA: Primary | ICD-10-CM

## 2023-01-12 DIAGNOSIS — K22.2 ESOPHAGEAL STRICTURE: ICD-10-CM

## 2023-01-12 NOTE — ASSESSMENT & PLAN NOTE
Dr Kathlean Holstein had a discussion with Ms Barriga regarding her symptoms and recent imaging  She has tertiary contractions in her esophagus and developed dysphagia symptoms, especially with pills two days after her last procedure  Dr Kathlean Holstein is recommending a referral to Dr Abby Saini, as well as a repeat EGD with esophageal dilation  She will perform this next week and consent was signed  All questions were answered and she does not need any lab work prior to procedure  She is in agreement with the plan

## 2023-01-12 NOTE — PROGRESS NOTES
Assessment/Plan:    Esophageal dysphagia  Dr Jaswant Ortega had a discussion with Ms Barriga regarding her symptoms and recent imaging  She has tertiary contractions in her esophagus and developed dysphagia symptoms, especially with pills two days after her last procedure  Dr Jaswant Ortega is recommending a referral to Dr Martha Alford, as well as a repeat EGD with esophageal dilation  She will perform this next week and consent was signed  All questions were answered and she does not need any lab work prior to procedure  She is in agreement with the plan  Diagnoses and all orders for this visit:    Esophageal dysphagia  -     Case request operating room: ESOPHAGOGASTRODUODENOSCOPY (EGD), DILATATION ESOPHAGEAL; Standing  -     Case request operating room: ESOPHAGOGASTRODUODENOSCOPY (EGD), DILATATION ESOPHAGEAL  -     Ambulatory Referral to Gastroenterology; Future    Esophageal stricture  -     Case request operating room: ESOPHAGOGASTRODUODENOSCOPY (EGD), DILATATION ESOPHAGEAL; Standing  -     Case request operating room: ESOPHAGOGASTRODUODENOSCOPY (EGD), DILATATION ESOPHAGEAL  -     Ambulatory Referral to Gastroenterology; Future    Other orders  -     Diet NPO; Sips with meds; Standing  -     Void on call to OR; Standing  -     Insert peripheral IV; Standing  -     Place sequential compression device; Standing          Thoracic History      Diagnosis: Achalasia  Procedure: Robotic heller myotomy, Kareem fundoplication, laparoscopic lysis of adhesions 5/3/21      Cancer Staging   No matching staging information was found for the patient  Oncology History    No history exists  Patient ID: Rizwana Flores is a 78 y o  female  ECOG 0     HPI     Ms Segundo Morel is a 79 yo female who underwent a robotic heller myotomy and Kareem fundoplication for achalasia on 5/3/21  Since then, she has required some esophageal dilations, with most recent one by Dr Krupa Galvez on 11/29/22 up to 54F and balloon dilation of the pylorus to 20 mm   She was having symptoms of dysphagia and regurgitation  She underwent a barium swallow on 12/8/22 secondary to experiencing dysphagia  This revealed tertiary contractions within the mid to distal esophagus  Contrast promptly passes from esophagus to stomach  There is a Schatzki ring again noted in the distal esophagus, similar to 1/22 exam  No evidence of lesion, ulceration and no reflux observed or hiatal hernia  On discussion, she began with symptoms of dysphagia two days after her procedure with Dr Toan Ahuja  She was vomiting her pills up  She can tolerate foods, if they are cut up very small, such as spaghetti, steak, rice, red beets  She did vomit hamburger 2 nights after surgery  Recently, she is vomiting up her pills, even though she is cutting up her pills  She denies fever, chills, odynophagia, weight loss  She does have some abdominal pain and bloating  She takes a baby aspirin daily       The following portions of the patient's history were reviewed and updated as appropriate: allergies, current medications, past family history, past medical history, past social history, past surgical history and problem list       Past Medical History:   Diagnosis Date   • Anxiety    • Asthma     controlled   • Back complaints    • Cancer ([facility] )     nose   • Cellulitis of left lower leg     "not now"   • CHF (congestive heart failure) ([place] [address] ) 02/2021   • Chronic bilateral thoracic back pain    • Chronic kidney disease     sees nephrologist regularly" stage 3"   • Chronic myofascial pain    • Chronic pain of both lower extremities    • Chronic pain of left knee     "both knees"   • Chronic pain syndrome     bilat legs and knees/neuropathy pain   • CPAP (continuous positive airway pressure) dependence     no currently uses   • Depression    • Diabetes mellitus ([facility] )     insulin pump   • Disease of thyroid gland    • Does use hearing aid     bilat and will wear DOS   • DVT (deep venous thrombosis) ([place] [address] ) 2013    left leg   • Gait disorder    • Gastroparesis    • GERD (gastroesophageal reflux disease)    • History of angina    • History of MRSA infection    • History of transfusion     Many years ago   • Hyperlipidemia    • Hypertension    • Hypoglycemic reaction     "occas low blood sugar"   • Insulin pump in place     pt reports saw endocrinologist  and will bring copy of instructions DOS   • Irritable bowel syndrome    • Kidney disease    • Pacemaker    • Polyneuropathy associated with underlying disease ([address] )    • PONV (postoperative nausea and vomiting)    • Risk for falls    • S/P insertion of spinal cord stimulator 2018   • Sarcoidosis    • Shortness of breath     "exertion and not new"   • Sleep apnea    • TIA (transient ischemic attack)    • Use of cane as ambulatory aid    • Uses walker    • Wears dentures     permanent upper/and some missing teeth/partial lower     Past Surgical History:   Procedure Laterality Date   • ABDOMINAL ADHESION SURGERY N/A 5/3/2021    Procedure: laparoscopic LYSIS ADHESIONS;  Surgeon: Carmen Tovar MD;  [facility];  Service: Thoracic   • BREAST SURGERY     • BREAST SURGERY      reduction   • CARDIAC PACEMAKER PLACEMENT      pacemaker   •  SECTION     • COLONOSCOPY     • DILATION AND CURETTAGE OF UTERUS      "D&E"   • HERNIA REPAIR     • HYSTERECTOMY     • JOINT REPLACEMENT Left    • NECK SURGERY      fused 4 discs with 4 screws implanted   • NISSEN FUNDOPLICATION LAPAROSCOPIC WITH ROBOTICS N/A 5/3/2021    Procedure: ROBOTIC HELLER [address] ;  Surgeon: Carmen Tovar MD;  [facility];  Service: Thoracic   • TN ESOPHAGOGASTRODUODENOSCOPY TRANSORAL DIAGNOSTIC N/A 5/3/2021    Procedure: ESOPHAGOGASTRODUODENOSCOPY (EGD);   Surgeon: Carmen Tovar MD;  [facility];  Service: Thoracic   • TN ESOPHAGOGASTRODUODENOSCOPY TRANSORAL DIAGNOSTIC N/A 2022    Procedure: ESOPHAGOGASTRODUODENOSCOPY (EGD),;  Surgeon: Ashlie Bishop MD;  [facility];  Service: Thoracic   • [place] ESOPHAGOGASTRODUODENOSCOPY TRANSORAL DIAGNOSTIC N/A 2/16/2022    Procedure: ESOPHAGOGASTRODUODENOSCOPY (EGD); Surgeon: Ashlie Bishop MD;  [facility];  Service: Thoracic   • [place] ESOPHAGOGASTRODUODENOSCOPY TRANSORAL DIAGNOSTIC N/A 7/12/2022    Procedure: ESOPHAGOGASTRODUODENOSCOPY (EGD); PYLORIC DILATION; GE JUNCTION DILATION;  Surgeon: Ashlie Bishop MD;  [facility];  Service: Thoracic   • [place] ESOPHAGOGASTRODUODENOSCOPY TRANSORAL DIAGNOSTIC N/A 11/29/2022    Procedure: ESOPHAGOGASTRODUODENOSCOPY (EGD); Surgeon: Alem Solares MD;  [facility];  Service: Thoracic   • OH ESOPHAGOSCOPY FLEX BALLOON DILAT <30 MM DIAM N/A 1/12/2022    Procedure: DILATATION ESOPHAGEAL;  Surgeon: Ashlie Bishop MD;  [facility];  Service: Thoracic   • OH ESOPHAGOSCOPY FLEX BALLOON DILAT <30 MM DIAM N/A 2/16/2022    Procedure: DILATATION ESOPHAGEAL;  Surgeon: Ashlie Bishop MD;  [facility];  Service: Thoracic   • OH ESOPHAGOSCOPY FLEX BALLOON DILAT <30 MM DIAM N/A 11/29/2022    Procedure: DILATATION ESOPHAGEAL esophageal and pyloric dilation;  Surgeon: Alem Solares MD;  [facility];  Service: Thoracic   • OH RIBEIRO IMPLTJ NSTIM ELTRDS PLATE/PADDLE EDRL Left 4/23/2018    Procedure:  Insertion of thoracic spinal cord stimulator electrode via laminotomy and placement of left buttock IPG;  Surgeon: Baldo Galvan MD;  [facility];  Service: Neurosurgery   • REPLACEMENT TOTAL KNEE     • ROTATOR CUFF REPAIR     • SPINAL STIMULATOR PLACEMENT Left 10/3/2018    Procedure: BUTTOCK RE-OPENING INCISION FOR REPOSITIONING OF IMPLANTABLE PULSE GENERATOR;  Surgeon: Baldo Galvan MD;  [facility];  Service: Neurosurgery   • TONSILLECTOMY     • UPPER GASTROINTESTINAL ENDOSCOPY     • WISDOM TOOTH EXTRACTION       Family History   Problem Relation Age of Onset   • Diabetes Family    • Heart disease Family    • Hypertension Family    • Neuropathy Family    • No Known Problems Mother    • Heart disease Father    • Diabetes Father    • No Known Problems Maternal Grandmother    • No Known Problems Maternal Grandfather    • No Known Problems Paternal Grandmother    • No Known Problems Paternal Grandfather    • No Known Problems Brother    • No Known Problems Daughter    • Cancer Son      Social History     Socioeconomic History   • Marital status: /Civil Union     Spouse name: Not on file   • Number of children: Not on file   • Years of education: Not on file   • Highest education level: Not on file   Occupational History   • Not on file   Tobacco Use   • Smoking status: Never   • Smokeless tobacco: Never   Vaping Use   • Vaping Use: Some days   • Substances: THC   Substance and Sexual Activity   • Alcohol use: Never   • Drug use: Yes     Frequency: 8 0 times per week     Types: Marijuana     Comment: Medical Marijuana/vape pen   • Sexual activity: Not on file   Other Topics Concern   • Not on file   Social History Narrative   • Not on file     Social Determinants of Health     Financial Resource Strain: Not on file   Food Insecurity: Not on file   Transportation Needs: Not on file   Physical Activity: Not on file   Stress: Not on file   Social Connections: Not on file   Intimate Partner Violence: Not on file   Housing Stability: Not on file     Allergies   Allergen Reactions   • Bactrim [Sulfamethoxazole-Trimethoprim] Hives   • Sucralfate Hives     Facial swelling   • Topamax [Topiramate]      disorentation   • Azithromycin Itching   • Pregabalin Confusion and Other (See Comments)     altered mental status   • Ciprofloxacin Drowsiness     Other reaction(s):  Other (See Comments)  "drowsiness"   • Norvasc [Amlodipine] Swelling   • Baclofen      "That knocks me out "   • Bupropion Fatigue   • Methotrexate Nausea Only     Current Outpatient Medications on File Prior to Visit   Medication Sig Dispense Refill   • amitriptyline (ELAVIL) 10 mg tablet Take 10 mg by mouth daily     • Armodafinil (Nuvigil) 250 MG tablet Take 1 tablet (250 mg total) by mouth daily 30 tablet 5   • aspirin (ECOTRIN LOW STRENGTH) 81 mg EC tablet Take by mouth every evening      • atorvastatin (LIPITOR) 80 mg tablet Take 80 mg by mouth daily at bedtime       • bisacodyl (DULCOLAX) 10 mg suppository Insert 1 suppository (10 mg total) into the rectum daily 30 suppository 3   • chlorzoxazone (PARAFON FORTE) 500 mg tablet Take 1 PO QID PRN for pain and spasms   (Patient taking differently: Take 500 mg by mouth 3 (three) times a day as needed Take 1 PO QID PRN for pain and spasms ) 120 tablet 1   • CRANBERRY PO Take 1 capsule by mouth every evening      • Cyanocobalamin (VITAMIN B-12 PO) Take 1 capsule by mouth every evening      • Darbepoetin Mike (ARANESP, ALBUMIN FREE, IJ) Inject as directed every 14 (fourteen) days     • dicyclomine (BENTYL) 10 mg capsule Take 1 capsule (10 mg total) by mouth 4 (four) times a day as needed (abdominal pain) 60 capsule 2   • docusate sodium (COLACE) 100 mg capsule TAKE ONE CAPSULE BY MOUTH EVERY DAY 90 capsule 3   • esomeprazole (NexIUM) 40 MG capsule Take 1 capsule (40 mg total) by mouth 2 (two) times a day before meals 180 capsule 3   • famotidine (PEPCID) 20 mg tablet TAKE 1 TABLET BY MOUTH TWICE A  tablet 0   • fluticasone-salmeterol (ADVAIR) 100-50 mcg/dose inhaler Inhale 2 puffs as needed     • gabapentin (NEURONTIN) 100 mg capsule Take 100 mg by mouth 3 (three) times a day     • glucose blood test strip Testing six times daily  E11 65 on insulin pump     • Insulin Infusion Pump (MINIMED INSULIN PUMP) PRINCESS Use     • levothyroxine 100 mcg tablet Take 100 mcg by mouth daily     • losartan (COZAAR) 50 mg tablet Take 50 mg by mouth daily at bedtime     • NOVOLOG 100 UNIT/ML injection Pt reports unsure of total dose a day/did see her endocrinologist Dr Minerva Steele  [place]  Pt has pump on upper left abdomen cut basaL RATE IN HALF  3   • ondansetron (ZOFRAN) 8 mg tablet Take 8 mg by mouth every 8 (eight) hours as needed       • polyethylene glycol (MIRALAX) 17 g packet Take 17 g by mouth daily 90 each 3   • Potassium Gluconate 595 MG CAPS Take by mouth 2 (two) times a day     • pramipexole (MIRAPEX) 1 mg tablet Take 1 mg by mouth 2 (two) times a day       • Semaglutide (OZEMPIC, 0 25 OR 0 5 MG/DOSE, SC) Inject 0 25 mg under the skin once a week Sundays     • Senexon-S 8 6-50 MG per tablet TAKE 1 TABLET BY MOUTH EVERY DAY 30 tablet 3   • sertraline (ZOLOFT) 50 mg tablet Take 50 mg by mouth daily     • solifenacin (VESICARE) 10 MG tablet Take 10 mg by mouth daily     • torsemide (DEMADEX) 20 mg tablet Take 20 mg by mouth daily       No current facility-administered medications on file prior to visit  Review of Systems   Constitutional: Negative for activity change, chills, fever and unexpected weight change  HENT: Positive for trouble swallowing  Negative for congestion, postnasal drip, sore throat and voice change  Eyes: Positive for visual disturbance (glasses)  Respiratory: Negative for cough, shortness of breath and wheezing  Cardiovascular: Negative for chest pain and palpitations  Gastrointestinal: Positive for abdominal distention and abdominal pain  Negative for nausea and vomiting  Musculoskeletal: Positive for arthralgias and gait problem  Skin: Negative for rash  Hematological: Negative for adenopathy  Psychiatric/Behavioral: Negative for agitation, behavioral problems and confusion  All other systems reviewed and are negative  Objective:   Physical Exam  Vitals reviewed  Constitutional:       General: She is not in acute distress  Appearance: Normal appearance  She is not toxic-appearing or diaphoretic  HENT:      Head: Normocephalic and atraumatic  Nose: Nose normal    Eyes:      General: No scleral icterus       Extraocular Movements: Extraocular movements intact  Comments: Glasses     Cardiovascular:      Rate and Rhythm: Normal rate and regular rhythm  Pulses: Normal pulses  Heart sounds: Normal heart sounds  No murmur heard  Pulmonary:      Effort: Pulmonary effort is normal  No respiratory distress  Breath sounds: Normal breath sounds  Abdominal:      General: Bowel sounds are normal  There is no distension  Palpations: Abdomen is soft  Musculoskeletal:         General: Normal range of motion  Cervical back: Normal range of motion and neck supple  Skin:     General: Skin is warm and dry  Coloration: Skin is not jaundiced  Neurological:      Mental Status: She is alert and oriented to person, place, and time  Cranial Nerves: No cranial nerve deficit  Gait: Gait abnormal       Comments: Walk with walker    Psychiatric:         Mood and Affect: Mood normal          Behavior: Behavior normal      /83 (BP Location: Left arm, Patient Position: Sitting, Cuff Size: Standard)   Pulse 78   Temp 98 8 °F (37 1 °C) (Temporal)   Resp 17   Ht 5' 2" (1 575 m)   SpO2 98%   BMI 31 28 kg/m²     FL barium swallow ROUTINE esophagus    Result Date: 12/8/2022  Narrative BARIUM SWALLOW-ESOPHAGRAM INDICATION:   R13 10: Dysphagia, unspecified  COMPARISON:  Upper GI exam 1/3/2022, 7/14/2021  Barium swallow 5/11/2021  IMAGES:  141 FLUOROSCOPY TIME:   1 minute  TECHNIQUE: The patient was given effervescent granules and barium by mouth and images of the esophagus were obtained  FINDINGS: Tertiary contractions are noted within the mid to distal esophagus  Contrast promptly passes from esophagus into the stomach  Schatzki ring is again noted within the distal esophagus, similar to upper GI exam of January 2022  The esophagus is otherwise normal in caliber    No evidence of discrete mucosal lesion, ulceration, or fold thickening Gastroesophageal reflux was not directly observed during the exam   There is no hiatal hernia  Impression 1  Tertiary contractions as can be seen in the setting of mild esophageal dysmotility  There is prompt emptying of contrast from esophagus  2   Redemonstration of Schatzki ring within the distal esophagus   Workstation performed: [id number]

## 2023-01-17 ENCOUNTER — ANESTHESIA EVENT (OUTPATIENT)
Dept: PERIOP | Facility: HOSPITAL | Age: 68
End: 2023-01-17

## 2023-01-19 NOTE — PRE-PROCEDURE INSTRUCTIONS
Pre-Surgery Instructions:   Medication Instructions   • amitriptyline (ELAVIL) 10 mg tablet Take day of surgery  • Armodafinil (Nuvigil) 250 MG tablet Hold day of surgery  • aspirin (ECOTRIN LOW STRENGTH) 81 mg EC tablet Last dose 1-19-23   • atorvastatin (LIPITOR) 80 mg tablet Take night before surgery   • bisacodyl (DULCOLAX) 10 mg suppository Hold day of surgery  • chlorzoxazone (PARAFON FORTE) 500 mg tablet Uses PRN- DO NOT take day of surgery   • CRANBERRY PO Stop taking 7 days prior to surgery  • Cyanocobalamin (VITAMIN B-12 PO) Hold day of surgery  • Darbepoetin Mike (ARANESP, ALBUMIN FREE, IJ) q 2 weeks   • dicyclomine (BENTYL) 10 mg capsule Uses PRN- OK to take day of surgery   • docusate sodium (COLACE) 100 mg capsule Hold day of surgery  • esomeprazole (NexIUM) 40 MG capsule Take day of surgery  • famotidine (PEPCID) 20 mg tablet Take day of surgery  • fluticasone-salmeterol (ADVAIR) 100-50 mcg/dose inhaler Take day of surgery  • gabapentin (NEURONTIN) 100 mg capsule Uses PRN- OK to take day of surgery   • Insulin Infusion Pump (MINIMED INSULIN PUMP) PRINCESS per md   • levothyroxine 100 mcg tablet Take day of surgery  • losartan (COZAAR) 50 mg tablet Hold day of surgery  • NOVOLOG 100 UNIT/ML injection per md   • ondansetron (ZOFRAN) 8 mg tablet Uses PRN- OK to take day of surgery   • polyethylene glycol (MIRALAX) 17 g packet Hold day of surgery  • Potassium Gluconate 595 MG CAPS Hold day of surgery  • pramipexole (MIRAPEX) 1 mg tablet Take day of surgery  • Semaglutide (OZEMPIC, 0 25 OR 0 5 MG/DOSE, SC) Q Sunday   • Senexon-S 8 6-50 MG per tablet Take night before surgery   • sertraline (ZOLOFT) 50 mg tablet Take night before surgery   • solifenacin (VESICARE) 10 MG tablet Take night before surgery   • torsemide (DEMADEX) 20 mg tablet Hold day of surgery  Pre op and bathing instructions reviewed  Pt has hibiclens  Pt   Verbalized understanding of current visitor restrictions  Pt  Verbalized an understanding of all instructions reviewed and offers no concerns at this time  Instructed to avoid all ASA/NSAIDs and OTC Vit/Supp from now until after surgery per anesthesia guidelines   Tylenol ok prn   DOS meds with a few sips of H2O

## 2023-01-23 ENCOUNTER — TELEPHONE (OUTPATIENT)
Dept: SLEEP CENTER | Facility: CLINIC | Age: 68
End: 2023-01-23

## 2023-01-23 DIAGNOSIS — G47.10 HYPERSOMNIA: ICD-10-CM

## 2023-01-23 NOTE — ANESTHESIA PREPROCEDURE EVALUATION
Procedure:  ESOPHAGOGASTRODUODENOSCOPY (EGD) (Esophagus)  esophageal dilation with pylorus dilation (Esophagus)    Relevant Problems   ANESTHESIA   (+) PONV (postoperative nausea and vomiting)      CARDIO   (+) BBB (bundle branch block)   (+) Chronic bilateral thoracic back pain   (+) Chronic scapular pain   (+) Coronary artery disease involving native coronary artery   (+) Deep venous thrombosis (HCC)   (+) Essential hypertension   (+) Hyperlipidemia associated with type 2 diabetes mellitus (HCC)   (+) Mixed hyperlipidemia      ENDO   (+) Primary hyperparathyroidism (HCC)   (+) Primary hypothyroidism   (+) Type 2 diabetes mellitus with microalbuminuria, with long-term current use of insulin (HCC)   (+) Type 2 diabetes mellitus without complication, with long-term current use of insulin (HCC)      GI/HEPATIC   (+) Esophageal dysphagia   (+) Gastroesophageal reflux disease without esophagitis      /RENAL   (+) JULIANNE (acute kidney injury) (HCC)      HEMATOLOGY   (+) Anemia of chronic disease   (+) Microcytic anemia   (+) Normocytic anemia      MUSCULOSKELETAL   (+) Chronic bilateral low back pain without sciatica   (+) Chronic bilateral thoracic back pain   (+) Localized, primary osteoarthritis   (+) Lumbosacral spondylosis without myelopathy   (+) Osteoarthritis of knee      NEURO/PSYCH   (+) Anxiety and depression   (+) Chronic bilateral low back pain without sciatica   (+) Chronic bilateral thoracic back pain   (+) Complex regional pain syndrome type 1 of left lower extremity   (+) History of TIA (transient ischemic attack)   (+) Pain syndrome, chronic      PULMONARY   (+) Moderate persistent asthma with acute exacerbation   (+) Moderate persistent asthma without complication   (+) Obstructive sleep apnea      11/23/22 EKG-AV paced    Dual chamber pacemaker    Achalasia    Physical Exam    Airway    Mallampati score: II  TM Distance: >3 FB  Neck ROM: full     Dental   No notable dental hx Cardiovascular  Cardiovascular exam normal    Pulmonary  Pulmonary exam normal     Other Findings        Anesthesia Plan  ASA Score- 3     Anesthesia Type- general with ASA Monitors  Additional Monitors:   Airway Plan: ETT  Plan Factors-Exercise tolerance (METS): >4 METS  Chart reviewed  EKG reviewed  Imaging results reviewed  Existing labs reviewed  Patient summary reviewed  Patient is not a current smoker  Patient not instructed to abstain from smoking on day of procedure  Patient did not smoke on day of surgery  Obstructive sleep apnea risk education given perioperatively  Induction-     Postoperative Plan-     Informed Consent- Anesthetic plan and risks discussed with patient  I personally reviewed this patient with the CRNA  Discussed and agreed on the Anesthesia Plan with the CRNA  Erma Santos

## 2023-01-23 NOTE — TELEPHONE ENCOUNTER
Patient left message on the nurse line requesting refill Rx for Armodafinil (Nuvigil) 250 MG tablet     Per chart review, patient has refills remaining  Call placed to [address] [place] [address], pharmacy staff confirms there are refills available, however the medication is on backorder  Call placed to patient to notify that medication is on backorder

## 2023-01-23 NOTE — TELEPHONE ENCOUNTER
Armodafinial-250mg is on backorder  Patient requesting Rx be filled by [facility]  Dr Jaki Pelaez I teed up the request   Please review and sign if appropriate  Thank you

## 2023-01-24 ENCOUNTER — TELEPHONE (OUTPATIENT)
Dept: GASTROENTEROLOGY | Facility: CLINIC | Age: 68
End: 2023-01-24

## 2023-01-24 ENCOUNTER — TELEPHONE (OUTPATIENT)
Dept: CARDIAC SURGERY | Facility: CLINIC | Age: 68
End: 2023-01-24

## 2023-01-24 ENCOUNTER — APPOINTMENT (OUTPATIENT)
Dept: RADIOLOGY | Facility: HOSPITAL | Age: 68
End: 2023-01-24

## 2023-01-24 ENCOUNTER — ANESTHESIA (OUTPATIENT)
Dept: PERIOP | Facility: HOSPITAL | Age: 68
End: 2023-01-24

## 2023-01-24 ENCOUNTER — HOSPITAL ENCOUNTER (OUTPATIENT)
Facility: HOSPITAL | Age: 68
Setting detail: OUTPATIENT SURGERY
Discharge: HOME | End: 2023-01-24
Attending: THORACIC SURGERY (CARDIOTHORACIC VASCULAR SURGERY) | Admitting: THORACIC SURGERY (CARDIOTHORACIC VASCULAR SURGERY)

## 2023-01-24 VITALS
BODY MASS INDEX: 31.47 KG/M2 | HEIGHT: 62 IN | WEIGHT: 171 LBS | RESPIRATION RATE: 16 BRPM | TEMPERATURE: 97.2 F | HEART RATE: 60 BPM | DIASTOLIC BLOOD PRESSURE: 66 MMHG | OXYGEN SATURATION: 96 % | SYSTOLIC BLOOD PRESSURE: 151 MMHG

## 2023-01-24 LAB
GLUCOSE SERPL-MCNC: 149 MG/DL (ref 65–140)
GLUCOSE SERPL-MCNC: 158 MG/DL (ref 65–140)

## 2023-01-24 RX ORDER — ONDANSETRON 2 MG/ML
INJECTION INTRAMUSCULAR; INTRAVENOUS AS NEEDED
Status: DISCONTINUED | OUTPATIENT
Start: 2023-01-24 | End: 2023-01-24

## 2023-01-24 RX ORDER — IBUPROFEN 600 MG/1
600 TABLET ORAL EVERY 8 HOURS SCHEDULED
Status: DISCONTINUED | OUTPATIENT
Start: 2023-01-24 | End: 2023-01-24 | Stop reason: HOSPADM

## 2023-01-24 RX ORDER — DEXAMETHASONE SODIUM PHOSPHATE 10 MG/ML
INJECTION, SOLUTION INTRAMUSCULAR; INTRAVENOUS AS NEEDED
Status: DISCONTINUED | OUTPATIENT
Start: 2023-01-24 | End: 2023-01-24

## 2023-01-24 RX ORDER — SCOLOPAMINE TRANSDERMAL SYSTEM 1 MG/1
1 PATCH, EXTENDED RELEASE TRANSDERMAL
Status: DISCONTINUED | OUTPATIENT
Start: 2023-01-24 | End: 2023-01-24 | Stop reason: HOSPADM

## 2023-01-24 RX ORDER — FENTANYL CITRATE/PF 50 MCG/ML
25 SYRINGE (ML) INJECTION
Status: DISCONTINUED | OUTPATIENT
Start: 2023-01-24 | End: 2023-01-24 | Stop reason: HOSPADM

## 2023-01-24 RX ORDER — FENTANYL CITRATE 50 UG/ML
INJECTION, SOLUTION INTRAMUSCULAR; INTRAVENOUS AS NEEDED
Status: DISCONTINUED | OUTPATIENT
Start: 2023-01-24 | End: 2023-01-24

## 2023-01-24 RX ORDER — ACETAMINOPHEN 325 MG/1
650 TABLET ORAL EVERY 6 HOURS PRN
Status: DISCONTINUED | OUTPATIENT
Start: 2023-01-24 | End: 2023-01-24 | Stop reason: HOSPADM

## 2023-01-24 RX ORDER — SODIUM CHLORIDE, SODIUM LACTATE, POTASSIUM CHLORIDE, CALCIUM CHLORIDE 600; 310; 30; 20 MG/100ML; MG/100ML; MG/100ML; MG/100ML
INJECTION, SOLUTION INTRAVENOUS CONTINUOUS PRN
Status: DISCONTINUED | OUTPATIENT
Start: 2023-01-24 | End: 2023-01-24

## 2023-01-24 RX ORDER — ONDANSETRON 2 MG/ML
4 INJECTION INTRAMUSCULAR; INTRAVENOUS ONCE AS NEEDED
Status: DISCONTINUED | OUTPATIENT
Start: 2023-01-24 | End: 2023-01-24 | Stop reason: HOSPADM

## 2023-01-24 RX ORDER — LIDOCAINE HYDROCHLORIDE 10 MG/ML
INJECTION, SOLUTION EPIDURAL; INFILTRATION; INTRACAUDAL; PERINEURAL AS NEEDED
Status: DISCONTINUED | OUTPATIENT
Start: 2023-01-24 | End: 2023-01-24

## 2023-01-24 RX ORDER — PROPOFOL 10 MG/ML
INJECTION, EMULSION INTRAVENOUS AS NEEDED
Status: DISCONTINUED | OUTPATIENT
Start: 2023-01-24 | End: 2023-01-24

## 2023-01-24 RX ORDER — SUCCINYLCHOLINE/SOD CL,ISO/PF 100 MG/5ML
SYRINGE (ML) INTRAVENOUS AS NEEDED
Status: DISCONTINUED | OUTPATIENT
Start: 2023-01-24 | End: 2023-01-24

## 2023-01-24 RX ADMIN — SODIUM CHLORIDE, SODIUM LACTATE, POTASSIUM CHLORIDE, AND CALCIUM CHLORIDE: .6; .31; .03; .02 INJECTION, SOLUTION INTRAVENOUS at 07:45

## 2023-01-24 RX ADMIN — FENTANYL CITRATE 50 MCG: 50 INJECTION INTRAMUSCULAR; INTRAVENOUS at 07:52

## 2023-01-24 RX ADMIN — ONDANSETRON 4 MG: 2 INJECTION INTRAMUSCULAR; INTRAVENOUS at 08:47

## 2023-01-24 RX ADMIN — Medication 100 MG: at 08:04

## 2023-01-24 RX ADMIN — PROPOFOL 180 MG: 10 INJECTION, EMULSION INTRAVENOUS at 08:04

## 2023-01-24 RX ADMIN — LIDOCAINE HYDROCHLORIDE 50 MG: 10 INJECTION, SOLUTION EPIDURAL; INFILTRATION; INTRACAUDAL; PERINEURAL at 08:04

## 2023-01-24 RX ADMIN — DEXAMETHASONE SODIUM PHOSPHATE 10 MG: 10 INJECTION, SOLUTION INTRAMUSCULAR; INTRAVENOUS at 08:04

## 2023-01-24 RX ADMIN — SCOPALAMINE 1 PATCH: 1 PATCH, EXTENDED RELEASE TRANSDERMAL at 06:58

## 2023-01-24 NOTE — TELEPHONE ENCOUNTER
Joseph Russell from Thoracic Surgery called in about scheduling pt  She stated that the referred pt to us a while ago and would like for someone to reach out to get pt scheduled  [phone] is pts number

## 2023-01-24 NOTE — TELEPHONE ENCOUNTER
I spoke with Fredi Hauser from GI in regards to pts being scheduled for a consultation with Dr Garza Bending  Referral was placed by Dr Sarah Morris on 1/12  Pt has still not been contacted  Fredi Hauser said she will put a note in to call pt

## 2023-01-24 NOTE — OP NOTE
OPERATIVE REPORT  PATIENT NAME: Princess May    :  1955  MRN: [id number]  Pt Location: [facility] OR ROOM 04    SURGERY DATE: 2023    Surgeon(s) and Role:     * Kenny Faust MD - Primary     * Suman Solano MD - Assisting    Preop Diagnosis:  Esophageal dysphagia [R13 19]  Esophageal stricture [K22 2]    Post-Op Diagnosis Codes:     * Esophageal dysphagia [R13 19]     * Esophageal stricture [K22 2]    Procedure(s):  ESOPHAGOGASTRODUODENOSCOPY (EGD)  esophageal dilation dilated up to 54  with pylorus dilation dilated up to 18    Specimen(s):  * No specimens in log *    Estimated Blood Loss:   Minimal    Drains:  * No LDAs found *    Anesthesia Type:   General    Operative Indications:  Esophageal dysphagia [R13 19]  Esophageal stricture [K22 2]    Operative Findings:  GEJxn was a little tight  Scope passed without difficulty  Dilated up to 54F  Pylorus dilated with balloon up to 31YX    Complications:   None    Procedure and Technique:  Princess May was brought to the operating room and placed in the supine position   After institution of adequate general anesthesia, fiberoptic endoscopy is performed  The scope was passed through the esophagus  This was mildly narrowed at the [employer], around 40cm  The scope was then advanced through the stomach and down to the pylorus  We started with a pyloric balloon dilation  The 20 mm CRE esophageal dilation balloon was inserted through the scope  The balloon was passed through the pylorus  This was inflated up to 18 mm and held for 1 minute  It was easy to pass the scope through the pylorus after this dilation was completed  After this was completed, the scope was retracted back to the GE junction and this was marked using fluoroscopy  At this time, a wire was placed into the scope and the scope was removed over the wire  Under fluoroscopic guidance, serial dilations were performed up to [address]    After the dilations were completed, the scope was reinserted  Repeat endoscopy demonstrated no bleeding or signs of perforation   The excess air was suctioned from the GI tract and removed       The patient was extubated and brought to the recovery in stable condition having tolerated the procedure well       I was present for the entire procedure    Patient Disposition:  PACU  and extubated and stable        SIGNATURE: Wilfredo Hernandez MD  DATE: January 24, 2023  TIME: 8:56 AM

## 2023-01-24 NOTE — ANESTHESIA POSTPROCEDURE EVALUATION
Post-Op Assessment Note    CV Status:  Stable  Pain Score: 0    Pain management: adequate     Mental Status:  Awake   Hydration Status:  Stable   PONV Controlled:  None   Airway Patency:  Patent      Post Op Vitals Reviewed: Yes      Staff: CRNA         No notable events documented      BP  163/66   Temp   97 0   Pulse  60   Resp   14   SpO2   100

## 2023-01-26 DIAGNOSIS — K59.00 CONSTIPATION, UNSPECIFIED CONSTIPATION TYPE: ICD-10-CM

## 2023-01-26 DIAGNOSIS — K22.2 ESOPHAGEAL STRICTURE: ICD-10-CM

## 2023-01-26 RX ORDER — ARMODAFINIL 250 MG/1
250 TABLET ORAL DAILY
Qty: 30 TABLET | Refills: 5 | Status: CANCELLED | OUTPATIENT
Start: 2023-01-26

## 2023-01-26 RX ORDER — DOCUSATE SODIUM -SENNOSIDES 50; 8.6 MG/1; MG/1
TABLET, COATED ORAL
Qty: 30 TABLET | Refills: 3 | Status: SHIPPED | OUTPATIENT
Start: 2023-01-26

## 2023-01-26 RX ORDER — FAMOTIDINE 20 MG/1
TABLET, FILM COATED ORAL
Qty: 180 TABLET | Refills: 0 | Status: SHIPPED | OUTPATIENT
Start: 2023-01-26

## 2023-01-27 DIAGNOSIS — G47.10 HYPERSOMNIA: ICD-10-CM

## 2023-01-27 NOTE — TELEPHONE ENCOUNTER
Patient left message [facility] pharmacy does not have Armodafil  There is a nationwide back order     Patient asking for RX to go to [facility]   Spoke to the pharmacist at [facility] they will need to order the medication after they receive RX

## 2023-01-30 ENCOUNTER — TELEPHONE (OUTPATIENT)
Dept: GASTROENTEROLOGY | Facility: CLINIC | Age: 68
End: 2023-01-30

## 2023-01-30 DIAGNOSIS — K64.9 HEMORRHOIDS, UNSPECIFIED HEMORRHOID TYPE: Primary | ICD-10-CM

## 2023-01-30 RX ORDER — ARMODAFINIL 250 MG/1
250 TABLET ORAL EVERY MORNING
Qty: 30 TABLET | Refills: 5 | Status: SHIPPED | OUTPATIENT
Start: 2023-01-30

## 2023-01-30 NOTE — TELEPHONE ENCOUNTER
----- Message from [address] DO sent at 1/30/2023  9:14 AM EST -----  Please call patient and give her referral to colorectal surgery, it is in the chart

## 2023-01-31 ENCOUNTER — CONSULT (OUTPATIENT)
Dept: GASTROENTEROLOGY | Facility: CLINIC | Age: 68
End: 2023-01-31

## 2023-01-31 VITALS
DIASTOLIC BLOOD PRESSURE: 75 MMHG | BODY MASS INDEX: 31.47 KG/M2 | HEIGHT: 62 IN | HEART RATE: 77 BPM | TEMPERATURE: 99.1 F | WEIGHT: 171 LBS | SYSTOLIC BLOOD PRESSURE: 177 MMHG | OXYGEN SATURATION: 99 %

## 2023-01-31 DIAGNOSIS — R13.19 ESOPHAGEAL DYSPHAGIA: ICD-10-CM

## 2023-01-31 DIAGNOSIS — K59.00 CONSTIPATION, UNSPECIFIED CONSTIPATION TYPE: ICD-10-CM

## 2023-01-31 DIAGNOSIS — K22.0 ACHALASIA: Primary | ICD-10-CM

## 2023-01-31 DIAGNOSIS — K22.2 ESOPHAGEAL STRICTURE: ICD-10-CM

## 2023-01-31 RX ORDER — CEPHALEXIN 500 MG/1
CAPSULE ORAL
COMMUNITY
Start: 2023-01-19

## 2023-01-31 RX ORDER — KETOROLAC TROMETHAMINE 5 MG/ML
SOLUTION OPHTHALMIC
COMMUNITY
Start: 2023-01-26

## 2023-01-31 RX ORDER — GABAPENTIN 300 MG/1
CAPSULE ORAL
COMMUNITY
Start: 2023-01-30

## 2023-01-31 NOTE — PROGRESS NOTES
[facility] - Outpatient Follow-up Note  Viola Wise 78 y o  female MRN: [id number]  Encounter: [id number]          ASSESSMENT AND PLAN:    Viola Wise is a 78 y o  female with multiple chronic medical problems  She appeared chronically ill today and could not remember much of her GI history  Today, I discussed the following with her and her   1   Achalasia and dysphagia  I explained in detail what these diagnoses meant  Her esophagus does not work well and she will always have some degree of dysphagia  Fortunately, she can take enough liquids and calories to maintain her weight  Discussed that she should remain on a mostly soft diet  She should eat slowly  She should continue to take high-dose PPI to prevent regurgitation and reflux  The benefit of repeated dilations is not clear to me  2   Constipation  This is something we can certainly improve on  It is likely contributing to some of her abdominal pain  I want her to start using MiraLAX regularly  She should take at least 17 g twice daily but the dose can be increased  She understands that she can increase or decrease the dose as she sees fit  Like to see her back in 3 months    1  Achalasia    2  Esophageal dysphagia    3  Esophageal stricture    4  Constipation, unspecified constipation type        No orders of the defined types were placed in this encounter     ______________________________________________________________________    SUBJECTIVE:    Viola Wise is a 78 y o  female with history of diabetes, chronic pain, CAD, TIA, and achalasia status post Heller myotomy with Kareem fundoplication in 5/7764 and subsequent dilations via EGD, chronic constipation, and chronic abdominal pain, presenting for follow-up  She was last seen in our clinic in June 2022  The main issues we discussed today were dysphagia and constipation  Dysphagia: She is tolerating liquids and soft foods    She avoids hard foods and has problems with pills  She is maintaining her weight  She is able to stay hydrated  She takes esomeprazole twice daily  She had an EGD on January 24 by Dr Yanelis Gomez, but she says she has felt no difference since the dilation  The scope was passed through the esophagus  This was mildly narrowed at the GE Junction, around 40cm  The scope was then advanced through the stomach and down to the pylorus  We started with a pyloric balloon dilation   The 20 mm CRE esophageal dilation balloon was inserted through the scope   The balloon was passed through the pylorus   This was inflated up to 18 mm and held for 1 minute   It was easy to pass the scope through the pylorus after this dilation was completed   After this was completed, the scope was retracted back to the GE junction and this was marked using fluoroscopy   At this time, a wire was placed into the scope and the scope was removed over the wire   Under fluoroscopic guidance, serial dilations were performed up to [address]   After the dilations were completed, the scope was reinserted   Repeat endoscopy demonstrated no bleeding or signs of perforation  Constipation: This seems to be her main complaint today, she has 1 bowel movement every 2 to 3 days needs to use enemas  She takes Dulcolax daily  She also takes Colace  She does not take MiraLAX  She is not nauseated  Feels that constipation worsens her chronic abdominal pain  REVIEW OF SYSTEMS IS OTHERWISE NEGATIVE        Historical Information   Past Medical History:   Diagnosis Date   • Anxiety    • Asthma     controlled   • Back complaints    • Cancer (HCC)     nose   • Cellulitis of left lower leg     "not now"   • CHF (congestive heart failure) ([facility] ) 02/2021   • Chronic bilateral thoracic back pain    • Chronic kidney disease     sees nephrologist regularly" stage 3"   • Chronic myofascial pain    • Chronic pain of both lower extremities    • Chronic pain of left knee     "both knees"   • Chronic pain syndrome     bilat legs and knees/neuropathy pain   • COVID 2021   • CPAP (continuous positive airway pressure) dependence     no currently uses   • Depression    • Diabetes mellitus (HCC)     insulin pump   • Disease of thyroid gland    • Does use hearing aid     bilat and will wear DOS   • DVT (deep venous thrombosis) ([facility] )     left leg   • Gait disorder    • Gastroparesis    • GERD (gastroesophageal reflux disease)    • History of angina    • History of MRSA infection    • History of transfusion     Many years ago   • Hyperlipidemia    • Hypertension    • Hypoglycemic reaction     "occas low blood sugar"   • Insulin pump in place     pt reports saw endocrinologist  and will bring copy of instructions DOS   • Irritable bowel syndrome    • Kidney disease    • Pacemaker    • Polyneuropathy associated with underlying disease ([facility] )    • PONV (postoperative nausea and vomiting)    • Risk for falls    • S/P insertion of spinal cord stimulator 2018   • Sarcoidosis    • Shortness of breath     "exertion and not new"   • Sleep apnea    • TIA (transient ischemic attack)    • Use of cane as ambulatory aid    • Uses walker    • Wears dentures     permanent upper/and some missing teeth/partial lower     Past Surgical History:   Procedure Laterality Date   • ABDOMINAL ADHESION SURGERY N/A 2021    Procedure: laparoscopic LYSIS ADHESIONS;  Surgeon: Cherri Monge MD;  Location: [facility];  Service: Thoracic   • BREAST SURGERY     • BREAST SURGERY      reduction   • CARDIAC PACEMAKER PLACEMENT      pacemaker   •  SECTION     • COLONOSCOPY     • DILATION AND CURETTAGE OF UTERUS      "D&E"   • HERNIA REPAIR     • HYSTERECTOMY     • JOINT REPLACEMENT Left     LTKR   • NECK SURGERY      fused 4 discs with 4 screws implanted   • NISSEN FUNDOPLICATION LAPAROSCOPIC WITH ROBOTICS N/A 2021    Procedure: ROBOTIC [address] ;  Surgeon: Sirena Howell MD;  Location: [facility];  Service: Thoracic   • NJ ESOPHAGOGASTRODUODENOSCOPY TRANSORAL DIAGNOSTIC N/A 05/03/2021    Procedure: ESOPHAGOGASTRODUODENOSCOPY (EGD); Surgeon: Sirena Howell MD;  Location: [facility];  Service: Thoracic   • NJ ESOPHAGOGASTRODUODENOSCOPY TRANSORAL DIAGNOSTIC N/A 01/12/2022    Procedure: ESOPHAGOGASTRODUODENOSCOPY (EGD),;  Surgeon: Sirena Howell MD;  Location: [facility];  Service: Thoracic   • NJ ESOPHAGOGASTRODUODENOSCOPY TRANSORAL DIAGNOSTIC N/A 02/16/2022    Procedure: ESOPHAGOGASTRODUODENOSCOPY (EGD); Surgeon: Sirena Howell MD;  Location: [facility];  Service: Thoracic   • NJ ESOPHAGOGASTRODUODENOSCOPY TRANSORAL DIAGNOSTIC N/A 07/12/2022    Procedure: ESOPHAGOGASTRODUODENOSCOPY (EGD); PYLORIC DILATION; GE JUNCTION DILATION;  Surgeon: Sirena Howell MD;  Location: [facility];  Service: Thoracic   • NJ ESOPHAGOGASTRODUODENOSCOPY TRANSORAL DIAGNOSTIC N/A 11/29/2022    Procedure: ESOPHAGOGASTRODUODENOSCOPY (EGD); Surgeon: Colletta Gibney, MD;  Location: [facility];  Service: Thoracic   • NJ ESOPHAGOGASTRODUODENOSCOPY TRANSORAL DIAGNOSTIC N/A 1/24/2023    Procedure: ESOPHAGOGASTRODUODENOSCOPY (EGD);   Surgeon: Sirena Howell MD;  Location: [facility];  Service: Thoracic   • NJ ESOPHAGOSCOPY FLEX BALLOON DILAT <30 MM DIAM N/A 01/12/2022    Procedure: DILATATION ESOPHAGEAL;  Surgeon: Sirena Howell MD;  Location: [facility];  Service: Thoracic   • NJ ESOPHAGOSCOPY FLEX BALLOON DILAT <30 MM DIAM N/A 02/16/2022    Procedure: DILATATION ESOPHAGEAL;  Surgeon: Sirena Howell MD;  Location: [facility];  Service: Thoracic   • NJ ESOPHAGOSCOPY FLEX Donovan Tammie <30 MM DIAM N/A 11/29/2022    Procedure: DILATATION ESOPHAGEAL esophageal and pyloric dilation;  Surgeon: Colletta Gibney, MD;  Location: [facility];  Service: Thoracic   • NJ ESOPHAGOSCOPY FLEX BALLOON DILAT <30 MM DIAM N/A 1/24/2023    Procedure: esophageal dilation dilated up to 54  with pylorus dilation dilated up to 18;  Surgeon: Afia Wilkerson MD;  Location: [facility];  Service: Thoracic   • LA RIBEIRO IMPLTJ NSTIM ELTRDS PLATE/PADDLE EDRL Left 04/23/2018    Procedure:  Insertion of thoracic spinal cord stimulator electrode via laminotomy and placement of left buttock IPG;  Surgeon: Jake Lam MD;  Location: [facility];  Service: Neurosurgery   • REPLACEMENT TOTAL KNEE     • ROTATOR CUFF REPAIR     • SPINAL STIMULATOR PLACEMENT Left 10/03/2018    Procedure: BUTTOCK RE-OPENING INCISION FOR REPOSITIONING OF IMPLANTABLE PULSE GENERATOR;  Surgeon: Jake Lam MD;  [facility];  Service: Neurosurgery   • TONSILLECTOMY     • UPPER GASTROINTESTINAL ENDOSCOPY     • WISDOM TOOTH EXTRACTION       Social History   Social History     Substance and Sexual Activity   Alcohol Use Never     Social History     Substance and Sexual Activity   Drug Use Yes   • Frequency: 8 0 times per week   • Types: Marijuana    Comment: Medical Marijuana/vape pen once per week     Social History     Tobacco Use   Smoking Status Never   Smokeless Tobacco Never     Family History   Problem Relation Age of Onset   • Diabetes Family    • Heart disease Family    • Hypertension Family    • Neuropathy Family    • No Known Problems Mother    • Heart disease Father    • Diabetes Father    • No Known Problems Maternal Grandmother    • No Known Problems Maternal Grandfather    • No Known Problems Paternal Grandmother    • No Known Problems Paternal Grandfather    • No Known Problems Brother    • No Known Problems Daughter    • Cancer Son        Meds/Allergies       Current Outpatient Medications:   •  amitriptyline (ELAVIL) 10 mg tablet  •  Armodafinil (Nuvigil) 250 MG tablet  •  aspirin (ECOTRIN LOW STRENGTH) 81 mg EC tablet  •  atorvastatin (LIPITOR) 80 mg tablet  •  bisacodyl (DULCOLAX) 10 mg suppository  •  cephalexin (KEFLEX) 500 mg capsule  •  chlorzoxazone (PARAFON FORTE) 500 mg tablet  • CRANBERRY PO  •  Cyanocobalamin (VITAMIN B-12 PO)  •  Darbepoetin Mike (ARANESP, ALBUMIN FREE, IJ)  •  dicyclomine (BENTYL) 10 mg capsule  •  docusate sodium (COLACE) 100 mg capsule  •  esomeprazole (NexIUM) 40 MG capsule  •  famotidine (PEPCID) 20 mg tablet  •  fluticasone-salmeterol (ADVAIR) 100-50 mcg/dose inhaler  •  gabapentin (NEURONTIN) 100 mg capsule  •  gabapentin (NEURONTIN) 300 mg capsule  •  glucose blood test strip  •  glucose blood test strip  •  Insulin Infusion Pump (MINIMED INSULIN PUMP) PRINCESS  •  ketorolac (ACULAR) 0 5 % ophthalmic solution  •  levothyroxine 100 mcg tablet  •  losartan (COZAAR) 50 mg tablet  •  NOVOLOG 100 UNIT/ML injection  •  ondansetron (ZOFRAN) 8 mg tablet  •  polyethylene glycol (MIRALAX) 17 g packet  •  Potassium Gluconate 595 MG CAPS  •  pramipexole (MIRAPEX) 1 mg tablet  •  Semaglutide (OZEMPIC, 0 25 OR 0 5 MG/DOSE, SC)  •  Senexon-S 8 6-50 MG per tablet  •  sertraline (ZOLOFT) 50 mg tablet  •  solifenacin (VESICARE) 10 MG tablet  •  torsemide (DEMADEX) 20 mg tablet    Allergies   Allergen Reactions   • Bactrim [Sulfamethoxazole-Trimethoprim] Hives   • Sucralfate Hives     Facial swelling   • Topamax [Topiramate]      disorentation   • Azithromycin Itching   • Pregabalin Confusion and Other (See Comments)     altered mental status   • Ciprofloxacin Drowsiness     Other reaction(s): Other (See Comments)  "drowsiness"   • Norvasc [Amlodipine] Swelling   • Baclofen      "That knocks me out "   • Bupropion Fatigue   • Methotrexate Nausea Only           Objective     Blood pressure (!) 177/75, pulse 77, temperature 99 1 °F (37 3 °C), temperature source Tympanic, height 5' 2" (1 575 m), weight 77 6 kg (171 lb), SpO2 99 %  Body mass index is 31 28 kg/m²  PHYSICAL EXAM:      General Appearance:   Alert, cooperative, no distress   HEENT:   Normocephalic, atraumatic, anicteric       Neck:  Supple, symmetrical, trachea midline   Lungs:   Clear to auscultation bilaterally; no rales, rhonchi or wheezing; respirations unlabored    Heart[de-identified]   Regular rate and rhythm; no murmur, rub, or gallop  Abdomen:   Soft, non-tender, non-distended; normal bowel sounds; no masses, no organomegaly    Genitalia:   Deferred    Rectal:   Deferred    Extremities:  No cyanosis, clubbing or edema    Pulses:  2+ and symmetric    Skin:  No jaundice, rashes, or lesions    Lymph nodes:  No palpable cervical lymphadenopathy        Lab Results:   No visits with results within 1 Day(s) from this visit  Latest known visit with results is:   Admission on 01/24/2023, Discharged on 01/24/2023   Component Date Value   • POC Glucose 01/24/2023 149 (H)    • POC Glucose 01/24/2023 158 (H)        Lab Results   Component Value Date    WBC 3 09 (L) 12/04/2021    HGB 9 7 (L) 12/04/2021    HCT 30 9 (L) 12/04/2021    MCV 98 12/04/2021     12/04/2021       Lab Results   Component Value Date    SODIUM 138 05/17/2021    K 4 9 05/17/2021     05/17/2021    CO2 25 05/17/2021    AGAP 5 05/17/2021    BUN 11 05/17/2021    CREATININE 0 82 05/17/2021    GLUC 180 (H) 05/11/2021    GLUF 190 (H) 05/17/2021    CALCIUM 8 5 05/17/2021    AST 19 07/17/2020    ALT 22 07/17/2020    ALKPHOS 87 07/17/2020    TP 7 2 07/17/2020    TP 7 3 07/17/2020    TBILI 0 53 07/17/2020    EGFR 76 05/17/2021       Lab Results   Component Value Date    CRP 16 1 (H) 07/17/2020       No results found for: [id number], TSH    Lab Results   Component Value Date    IRON 21 (L) 05/11/2021    TIBC 197 (L) 05/11/2021    FERRITIN 364 05/11/2021       Radiology Results:   XR chest 1 view    Result Date: 1/26/2023  Narrative: C-ARM - INDICATION: Esophageal stricture  Procedure guidance  COMPARISON:  None TECHNIQUE: FLUOROSCOPY TIME:   2 MINUTES 40 SECONDS 9 FLUOROSCOPIC IMAGES FINDINGS: Fluoroscopic guidance provided for procedure guidance  Osseous and soft tissue detail limited by technique  Impression: Fluoroscopic guidance provided for procedure guidance  Please refer to the separate procedure notes for additional details    Workstation performed: [id number]

## 2023-02-21 ENCOUNTER — HOSPITAL ENCOUNTER (INPATIENT)
Facility: HOSPITAL | Age: 68
LOS: 8 days | Discharge: HOME | End: 2023-03-01
Attending: EMERGENCY MEDICINE | Admitting: HOSPITALIST

## 2023-02-21 ENCOUNTER — APPOINTMENT (EMERGENCY)
Dept: CT IMAGING | Facility: HOSPITAL | Age: 68
End: 2023-02-21

## 2023-02-21 DIAGNOSIS — I50.9 CONGESTIVE HEART FAILURE (CHF) (HCC): ICD-10-CM

## 2023-02-21 DIAGNOSIS — R33.9 URINARY RETENTION: ICD-10-CM

## 2023-02-21 DIAGNOSIS — E11.65 UNCONTROLLED TYPE 2 DIABETES MELLITUS WITH HYPERGLYCEMIA (HCC): Primary | ICD-10-CM

## 2023-02-21 DIAGNOSIS — R53.1 WEAKNESS: ICD-10-CM

## 2023-02-21 DIAGNOSIS — I50.33 ACUTE ON CHRONIC DIASTOLIC (CONGESTIVE) HEART FAILURE (HCC): ICD-10-CM

## 2023-02-21 LAB
2HR DELTA HS TROPONIN: 1 NG/L
ALBUMIN SERPL BCP-MCNC: 3.6 G/DL (ref 3.5–5)
ALP SERPL-CCNC: 192 U/L (ref 34–104)
ALT SERPL W P-5'-P-CCNC: 67 U/L (ref 7–52)
ANION GAP SERPL CALCULATED.3IONS-SCNC: 6 MMOL/L (ref 4–13)
APTT PPP: 32 SECONDS (ref 23–37)
AST SERPL W P-5'-P-CCNC: 38 U/L (ref 13–39)
ATRIAL RATE: 70 BPM
ATRIAL RATE: 71 BPM
ATRIAL RATE: 74 BPM
BACTERIA UR QL AUTO: ABNORMAL /HPF
BASE EX.OXY STD BLDV CALC-SCNC: 89.4 % (ref 60–80)
BASE EXCESS BLDV CALC-SCNC: -5.2 MMOL/L
BASOPHILS # BLD AUTO: 0.03 THOUSANDS/ÂΜL (ref 0–0.1)
BASOPHILS NFR BLD AUTO: 1 % (ref 0–1)
BILIRUB DIRECT SERPL-MCNC: 0.14 MG/DL (ref 0–0.2)
BILIRUB SERPL-MCNC: 0.66 MG/DL (ref 0.2–1)
BILIRUB UR QL STRIP: NEGATIVE
BNP SERPL-MCNC: 704 PG/ML (ref 0–100)
BUN SERPL-MCNC: 26 MG/DL (ref 5–25)
CALCIUM SERPL-MCNC: 8.5 MG/DL (ref 8.4–10.2)
CARDIAC TROPONIN I PNL SERPL HS: 20 NG/L
CARDIAC TROPONIN I PNL SERPL HS: 21 NG/L
CHLORIDE SERPL-SCNC: 110 MMOL/L (ref 96–108)
CLARITY UR: CLEAR
CO2 SERPL-SCNC: 22 MMOL/L (ref 21–32)
COLOR UR: YELLOW
CREAT SERPL-MCNC: 0.96 MG/DL (ref 0.6–1.3)
D DIMER PPP FEU-MCNC: 2.41 UG/ML FEU
EOSINOPHIL # BLD AUTO: 0.03 THOUSAND/ÂΜL (ref 0–0.61)
EOSINOPHIL NFR BLD AUTO: 1 % (ref 0–6)
ERYTHROCYTE [DISTWIDTH] IN BLOOD BY AUTOMATED COUNT: 15.1 % (ref 11.6–15.1)
FLUAV RNA RESP QL NAA+PROBE: NEGATIVE
FLUBV RNA RESP QL NAA+PROBE: NEGATIVE
GFR SERPL CREATININE-BSD FRML MDRD: 61 ML/MIN/1.73SQ M
GLUCOSE SERPL-MCNC: 143 MG/DL (ref 65–140)
GLUCOSE SERPL-MCNC: 194 MG/DL (ref 65–140)
GLUCOSE SERPL-MCNC: 60 MG/DL (ref 65–140)
GLUCOSE UR STRIP-MCNC: NEGATIVE MG/DL
HCO3 BLDV-SCNC: 18.9 MMOL/L (ref 24–30)
HCT VFR BLD AUTO: 25.5 % (ref 34.8–46.1)
HGB BLD-MCNC: 8 G/DL (ref 11.5–15.4)
HGB UR QL STRIP.AUTO: ABNORMAL
HYALINE CASTS #/AREA URNS LPF: ABNORMAL /LPF
IMM GRANULOCYTES # BLD AUTO: 0.12 THOUSAND/UL (ref 0–0.2)
IMM GRANULOCYTES NFR BLD AUTO: 2 % (ref 0–2)
INR PPP: 1.41 (ref 0.84–1.19)
KETONES UR STRIP-MCNC: NEGATIVE MG/DL
LACTATE SERPL-SCNC: 0.9 MMOL/L (ref 0.5–2)
LEUKOCYTE ESTERASE UR QL STRIP: NEGATIVE
LYMPHOCYTES # BLD AUTO: 0.67 THOUSANDS/ÂΜL (ref 0.6–4.47)
LYMPHOCYTES NFR BLD AUTO: 10 % (ref 14–44)
MAGNESIUM SERPL-MCNC: 1.9 MG/DL (ref 1.9–2.7)
MCH RBC QN AUTO: 30.8 PG (ref 26.8–34.3)
MCHC RBC AUTO-ENTMCNC: 31.4 G/DL (ref 31.4–37.4)
MCV RBC AUTO: 98 FL (ref 82–98)
MONOCYTES # BLD AUTO: 0.56 THOUSAND/ÂΜL (ref 0.17–1.22)
MONOCYTES NFR BLD AUTO: 9 % (ref 4–12)
MUCOUS THREADS UR QL AUTO: ABNORMAL
NEUTROPHILS # BLD AUTO: 5.05 THOUSANDS/ÂΜL (ref 1.85–7.62)
NEUTS SEG NFR BLD AUTO: 77 % (ref 43–75)
NITRITE UR QL STRIP: NEGATIVE
NON-SQ EPI CELLS URNS QL MICRO: ABNORMAL /HPF
NRBC BLD AUTO-RTO: 0 /100 WBCS
O2 CT BLDV-SCNC: 11.6 ML/DL
P AXIS: 53 DEGREES
P AXIS: 58 DEGREES
P AXIS: 63 DEGREES
PCO2 BLDV: 31.5 MM HG (ref 42–50)
PH BLDV: 7.4 [PH] (ref 7.3–7.4)
PH UR STRIP.AUTO: 6 [PH] (ref 4.5–8)
PLATELET # BLD AUTO: 169 THOUSANDS/UL (ref 149–390)
PLATELET # BLD AUTO: 180 THOUSANDS/UL (ref 149–390)
PMV BLD AUTO: 10.3 FL (ref 8.9–12.7)
PMV BLD AUTO: 9.5 FL (ref 8.9–12.7)
PO2 BLDV: 62.5 MM HG (ref 35–45)
POTASSIUM SERPL-SCNC: 4.2 MMOL/L (ref 3.5–5.3)
PR INTERVAL: 186 MS
PR INTERVAL: 208 MS
PR INTERVAL: 214 MS
PROT SERPL-MCNC: 6.5 G/DL (ref 6.4–8.4)
PROT UR STRIP-MCNC: ABNORMAL MG/DL
PROTHROMBIN TIME: 17.2 SECONDS (ref 11.6–14.5)
QRS AXIS: 110 DEGREES
QRS AXIS: 110 DEGREES
QRS AXIS: 112 DEGREES
QRSD INTERVAL: 134 MS
QRSD INTERVAL: 140 MS
QRSD INTERVAL: 152 MS
QT INTERVAL: 430 MS
QT INTERVAL: 430 MS
QT INTERVAL: 450 MS
QTC INTERVAL: 467 MS
QTC INTERVAL: 477 MS
QTC INTERVAL: 486 MS
RBC # BLD AUTO: 2.6 MILLION/UL (ref 3.81–5.12)
RBC #/AREA URNS AUTO: ABNORMAL /HPF
RSV RNA RESP QL NAA+PROBE: NEGATIVE
SARS-COV-2 RNA RESP QL NAA+PROBE: NEGATIVE
SODIUM SERPL-SCNC: 138 MMOL/L (ref 135–147)
SP GR UR STRIP.AUTO: 1.02 (ref 1–1.03)
T WAVE AXIS: -28 DEGREES
T WAVE AXIS: -37 DEGREES
T WAVE AXIS: -37 DEGREES
TSH SERPL DL<=0.05 MIU/L-ACNC: 2.16 UIU/ML (ref 0.45–4.5)
UROBILINOGEN UR QL STRIP.AUTO: 0.2 E.U./DL
VENTRICULAR RATE: 70 BPM
VENTRICULAR RATE: 71 BPM
VENTRICULAR RATE: 74 BPM
WBC # BLD AUTO: 6.46 THOUSAND/UL (ref 4.31–10.16)
WBC #/AREA URNS AUTO: ABNORMAL /HPF

## 2023-02-21 RX ORDER — FAMOTIDINE 20 MG/1
20 TABLET, FILM COATED ORAL 2 TIMES DAILY
Status: DISCONTINUED | OUTPATIENT
Start: 2023-02-21 | End: 2023-03-01 | Stop reason: HOSPADM

## 2023-02-21 RX ORDER — OXYBUTYNIN CHLORIDE 10 MG/1
10 TABLET, EXTENDED RELEASE ORAL DAILY
Status: DISCONTINUED | OUTPATIENT
Start: 2023-02-22 | End: 2023-03-01

## 2023-02-21 RX ORDER — CARVEDILOL 3.12 MG/1
3.12 TABLET ORAL 2 TIMES DAILY WITH MEALS
Status: DISCONTINUED | OUTPATIENT
Start: 2023-02-22 | End: 2023-03-01 | Stop reason: HOSPADM

## 2023-02-21 RX ORDER — KETOROLAC TROMETHAMINE 30 MG/ML
15 INJECTION, SOLUTION INTRAMUSCULAR; INTRAVENOUS ONCE
Status: COMPLETED | OUTPATIENT
Start: 2023-02-21 | End: 2023-02-21

## 2023-02-21 RX ORDER — BISACODYL 10 MG
10 SUPPOSITORY, RECTAL RECTAL DAILY
Status: DISCONTINUED | OUTPATIENT
Start: 2023-02-22 | End: 2023-03-01 | Stop reason: HOSPADM

## 2023-02-21 RX ORDER — DOCUSATE SODIUM 100 MG/1
100 CAPSULE, LIQUID FILLED ORAL DAILY
Status: DISCONTINUED | OUTPATIENT
Start: 2023-02-22 | End: 2023-03-01 | Stop reason: HOSPADM

## 2023-02-21 RX ORDER — FUROSEMIDE 10 MG/ML
40 INJECTION INTRAMUSCULAR; INTRAVENOUS
Status: DISCONTINUED | OUTPATIENT
Start: 2023-02-22 | End: 2023-02-23

## 2023-02-21 RX ORDER — ALBUTEROL SULFATE 2.5 MG/3ML
2.5 SOLUTION RESPIRATORY (INHALATION) EVERY 6 HOURS PRN
Status: DISCONTINUED | OUTPATIENT
Start: 2023-02-21 | End: 2023-03-01 | Stop reason: HOSPADM

## 2023-02-21 RX ORDER — ATORVASTATIN CALCIUM 80 MG/1
80 TABLET, FILM COATED ORAL
Status: DISCONTINUED | OUTPATIENT
Start: 2023-02-21 | End: 2023-03-01 | Stop reason: HOSPADM

## 2023-02-21 RX ORDER — LEVOTHYROXINE SODIUM 0.1 MG/1
100 TABLET ORAL
Status: DISCONTINUED | OUTPATIENT
Start: 2023-02-22 | End: 2023-03-01 | Stop reason: HOSPADM

## 2023-02-21 RX ORDER — ENOXAPARIN SODIUM 100 MG/ML
40 INJECTION SUBCUTANEOUS DAILY
Status: DISCONTINUED | OUTPATIENT
Start: 2023-02-22 | End: 2023-02-28

## 2023-02-21 RX ORDER — CALCIUM CARBONATE 200(500)MG
1 TABLET,CHEWABLE ORAL DAILY
COMMUNITY

## 2023-02-21 RX ORDER — ACETAMINOPHEN 325 MG/1
650 TABLET ORAL EVERY 6 HOURS PRN
Status: DISCONTINUED | OUTPATIENT
Start: 2023-02-21 | End: 2023-03-01 | Stop reason: HOSPADM

## 2023-02-21 RX ORDER — POLYETHYLENE GLYCOL 3350 17 G/17G
17 POWDER, FOR SOLUTION ORAL DAILY
Status: DISCONTINUED | OUTPATIENT
Start: 2023-02-22 | End: 2023-03-01 | Stop reason: HOSPADM

## 2023-02-21 RX ORDER — LOSARTAN POTASSIUM 50 MG/1
50 TABLET ORAL
Status: DISCONTINUED | OUTPATIENT
Start: 2023-02-21 | End: 2023-02-27

## 2023-02-21 RX ORDER — SERTRALINE HYDROCHLORIDE 100 MG/1
100 TABLET, FILM COATED ORAL
Status: DISCONTINUED | OUTPATIENT
Start: 2023-02-21 | End: 2023-03-01 | Stop reason: HOSPADM

## 2023-02-21 RX ORDER — ASPIRIN 81 MG/1
81 TABLET ORAL EVERY MORNING
Status: DISCONTINUED | OUTPATIENT
Start: 2023-02-22 | End: 2023-03-01 | Stop reason: HOSPADM

## 2023-02-21 RX ORDER — AMITRIPTYLINE HYDROCHLORIDE 10 MG/1
10 TABLET, FILM COATED ORAL
Status: DISCONTINUED | OUTPATIENT
Start: 2023-02-21 | End: 2023-03-01 | Stop reason: HOSPADM

## 2023-02-21 RX ORDER — FUROSEMIDE 10 MG/ML
40 INJECTION INTRAMUSCULAR; INTRAVENOUS ONCE
Status: COMPLETED | OUTPATIENT
Start: 2023-02-21 | End: 2023-02-21

## 2023-02-21 RX ORDER — PANTOPRAZOLE SODIUM 40 MG/1
40 TABLET, DELAYED RELEASE ORAL
Status: DISCONTINUED | OUTPATIENT
Start: 2023-02-22 | End: 2023-03-01 | Stop reason: HOSPADM

## 2023-02-21 RX ORDER — PRAMIPEXOLE DIHYDROCHLORIDE 1 MG/1
1 TABLET ORAL 2 TIMES DAILY
Status: DISCONTINUED | OUTPATIENT
Start: 2023-02-21 | End: 2023-03-01 | Stop reason: HOSPADM

## 2023-02-21 RX ORDER — ALBUTEROL SULFATE 1.25 MG/3ML
2.5 SOLUTION RESPIRATORY (INHALATION) EVERY 6 HOURS PRN
COMMUNITY

## 2023-02-21 RX ORDER — UREA 10 %
1 LOTION (ML) TOPICAL DAILY
COMMUNITY

## 2023-02-21 RX ORDER — CARVEDILOL 3.12 MG/1
3.12 TABLET ORAL 2 TIMES DAILY WITH MEALS
COMMUNITY

## 2023-02-21 RX ORDER — GABAPENTIN 100 MG/1
100 CAPSULE ORAL 3 TIMES DAILY
Status: DISCONTINUED | OUTPATIENT
Start: 2023-02-21 | End: 2023-03-01 | Stop reason: HOSPADM

## 2023-02-21 RX ORDER — DICYCLOMINE HYDROCHLORIDE 10 MG/1
10 CAPSULE ORAL 4 TIMES DAILY PRN
Status: DISCONTINUED | OUTPATIENT
Start: 2023-02-21 | End: 2023-03-01 | Stop reason: HOSPADM

## 2023-02-21 RX ORDER — ALBUTEROL SULFATE 90 UG/1
2 AEROSOL, METERED RESPIRATORY (INHALATION) EVERY 6 HOURS PRN
COMMUNITY

## 2023-02-21 RX ORDER — MODAFINIL 100 MG/1
100 TABLET ORAL DAILY
Status: DISCONTINUED | OUTPATIENT
Start: 2023-02-22 | End: 2023-03-01 | Stop reason: HOSPADM

## 2023-02-21 RX ADMIN — GABAPENTIN 100 MG: 100 CAPSULE ORAL at 20:52

## 2023-02-21 RX ADMIN — IOHEXOL 100 ML: 350 INJECTION, SOLUTION INTRAVENOUS at 14:05

## 2023-02-21 RX ADMIN — SERTRALINE HYDROCHLORIDE 100 MG: 100 TABLET, FILM COATED ORAL at 22:32

## 2023-02-21 RX ADMIN — FAMOTIDINE 20 MG: 20 TABLET ORAL at 19:56

## 2023-02-21 RX ADMIN — FUROSEMIDE 40 MG: 10 INJECTION, SOLUTION INTRAVENOUS at 16:05

## 2023-02-21 RX ADMIN — PRAMIPEXOLE DIHYDROCHLORIDE 1 MG: 1 TABLET ORAL at 20:49

## 2023-02-21 RX ADMIN — AMITRIPTYLINE HYDROCHLORIDE 10 MG: 10 TABLET, FILM COATED ORAL at 22:32

## 2023-02-21 RX ADMIN — ATORVASTATIN CALCIUM 80 MG: 80 TABLET, FILM COATED ORAL at 22:32

## 2023-02-21 RX ADMIN — ACETAMINOPHEN 325MG 650 MG: 325 TABLET ORAL at 19:56

## 2023-02-21 RX ADMIN — LOSARTAN POTASSIUM 50 MG: 50 TABLET, FILM COATED ORAL at 22:32

## 2023-02-21 RX ADMIN — KETOROLAC TROMETHAMINE 15 MG: 30 INJECTION, SOLUTION INTRAMUSCULAR; INTRAVENOUS at 14:46

## 2023-02-21 RX ADMIN — SODIUM CHLORIDE 1000 ML: 0.9 INJECTION, SOLUTION INTRAVENOUS at 12:28

## 2023-02-21 NOTE — ASSESSMENT & PLAN NOTE
Wt Readings from Last 3 Encounters:   01/31/23 77 6 kg (171 lb)   01/24/23 77 6 kg (171 lb)   11/29/22 (S) 77 6 kg (171 lb)     Patient just got her [facility] yesterday    She was admitted there for pneumonia    Now coming in with apparent acute decompensated CHF  We will diurese with IV Lasix  Check echocardiogram    Normally gets all her care including with cardiology at [facility]

## 2023-02-21 NOTE — ASSESSMENT & PLAN NOTE
Lab Results   Component Value Date    HGBA1C 6 7 (H) 02/15/2023       Recent Labs     02/21/23  1531   POCGLU 60*       Blood Sugar Average: Last 72 hrs:  (P) 60     Okay for patient to use insulin pump

## 2023-02-21 NOTE — H&P
[address]  H&P- Kortney Borjas 1955, 78 y o  female MRN: [id number]  Unit/Bed#: ED-41 Encounter: [id number]  Primary Care Provider: Florence Salazar DO   Date and time admitted to hospital: 2/21/2023 11:27 AM    * Acute CHF (congestive heart failure) [facility])  Assessment & Plan  Wt Readings from Last 3 Encounters:   01/31/23 77 6 kg (171 lb)   01/24/23 77 6 kg (171 lb)   11/29/22 (S) 77 6 kg (171 lb)     Patient just got her [facility] yesterday  She was admitted there for pneumonia    Now coming in with apparent acute decompensated CHF  We will diurese with IV Lasix  Check echocardiogram    Normally gets all her care including with cardiology at [address] [place] [address]        Type 2 diabetes mellitus with microalbuminuria, with long-term current use of insulin [facility])  Assessment & Plan  Lab Results   Component Value Date    HGBA1C 6 7 (H) 02/15/2023       Recent Labs     02/21/23  1531   POCGLU 60*       Blood Sugar Average: Last 72 hrs:  (P) 61     Okay for patient to use insulin pump          Chief Complaint:   Shortness of breath      History of Present Illness:    Kortney Borjas is a 78 y o  female who presents with shortness of breath  Patient was just discharged from the hospital yesterday  She had been there for about a week being treated for pneumonia  She states she did feel better after receiving IV antibiotics in the hospital and was discharged home again yesterday  She states that overnight she became very short of breath laying flat in bed  This morning was having difficulty catching her breath  Also had increased lower extremity swelling bilateral   She was given diuretics in the emergency room today and does feel better  No chest pain  No longer has a productive cough  No fever or chills  She is done with her antibiotics  She states she was otherwise compliant with her medications    She does not know how much if any IV fluids they were giving her when she was at [facility]       Review of Systems:    Review of Systems   Constitutional: Negative for chills and fever  HENT: Negative for ear pain and sore throat  Eyes: Negative for pain and visual disturbance  Respiratory: Positive for shortness of breath  Negative for cough  Cardiovascular: Positive for leg swelling  Negative for chest pain and palpitations  Gastrointestinal: Negative for abdominal pain and vomiting  Genitourinary: Negative for dysuria and hematuria  Musculoskeletal: Negative for arthralgias and back pain  Skin: Negative for color change and rash  Neurological: Negative for seizures and syncope  All other systems reviewed and are negative          Past Medical and Surgical History:     Past Medical History:   Diagnosis Date   • Anxiety    • Asthma     controlled   • Back complaints    • Cancer (HCC)     nose   • Cellulitis of left lower leg     "not now"   • CHF (congestive heart failure) ([facility] [address] ) 02/2021   • Chronic bilateral thoracic back pain    • Chronic kidney disease     sees nephrologist regularly" stage 3"   • Chronic myofascial pain    • Chronic pain of both lower extremities    • Chronic pain of left knee     "both knees"   • Chronic pain syndrome     bilat legs and knees/neuropathy pain   • COVID 12/2021   • CPAP (continuous positive airway pressure) dependence     no currently uses   • Depression    • Diabetes mellitus (HCC)     insulin pump   • Disease of thyroid gland    • Does use hearing aid     bilat and will wear DOS   • DVT (deep venous thrombosis) ([facility] [address] ) 2013    left leg   • Gait disorder    • Gastroparesis    • GERD (gastroesophageal reflux disease)    • History of angina    • History of MRSA infection    • History of transfusion     Many years ago   • Hyperlipidemia    • Hypertension    • Hypoglycemic reaction     "occas low blood sugar"   • Insulin pump in place     pt reports saw endocrinologist 4/28 and will bring copy of instructions DOS • Irritable bowel syndrome    • Kidney disease    • Pacemaker    • Polyneuropathy associated with underlying disease ([address] )    • PONV (postoperative nausea and vomiting)    • Risk for falls    • S/P insertion of spinal cord stimulator 2018   • Sarcoidosis    • Shortness of breath     "exertion and not new"   • Sleep apnea    • TIA (transient ischemic attack)    • Use of cane as ambulatory aid    • Uses walker    • Wears dentures     permanent upper/and some missing teeth/partial lower       Past Surgical History:   Procedure Laterality Date   • ABDOMINAL ADHESION SURGERY N/A 2021    Procedure: laparoscopic LYSIS ADHESIONS;  Surgeon: Reginaldo Gutierrez MD;  [facility];  Service: Thoracic   • BREAST SURGERY     • BREAST SURGERY      reduction   • CARDIAC PACEMAKER PLACEMENT      pacemaker   •  SECTION     • COLONOSCOPY     • DILATION AND CURETTAGE OF UTERUS      "D&E"   • HERNIA REPAIR     • HYSTERECTOMY     • JOINT REPLACEMENT Left     LTKR   • NECK SURGERY      fused 4 discs with 4 screws implanted   • NISSEN FUNDOPLICATION LAPAROSCOPIC WITH ROBOTICS N/A 2021    Procedure: ROBOTIC [address] ;  Surgeon: Reginaldo Gutierrez MD;  [facility];  Service: Thoracic   • IN ESOPHAGOGASTRODUODENOSCOPY TRANSORAL DIAGNOSTIC N/A 2021    Procedure: ESOPHAGOGASTRODUODENOSCOPY (EGD); Surgeon: Reginaldo Gutierrez MD;  [facility];  Service: Thoracic   • IN ESOPHAGOGASTRODUODENOSCOPY TRANSORAL DIAGNOSTIC N/A 2022    Procedure: ESOPHAGOGASTRODUODENOSCOPY (EGD),;  Surgeon: Reginaldo Gutierrez MD;  [facility];  Service: Thoracic   • IN ESOPHAGOGASTRODUODENOSCOPY TRANSORAL DIAGNOSTIC N/A 2022    Procedure: ESOPHAGOGASTRODUODENOSCOPY (EGD);   Surgeon: Reginaldo Gutierrez MD;  [facility];  Service: Thoracic   • IN ESOPHAGOGASTRODUODENOSCOPY TRANSORAL DIAGNOSTIC N/A 2022    Procedure: ESOPHAGOGASTRODUODENOSCOPY (EGD); PYLORIC DILATION; GE JUNCTION DILATION;  Surgeon: Carlita Flores MD;  [facility];  Service: Thoracic   • MD ESOPHAGOGASTRODUODENOSCOPY TRANSORAL DIAGNOSTIC N/A 11/29/2022    Procedure: ESOPHAGOGASTRODUODENOSCOPY (EGD); Surgeon: Xander Joya MD;  [facility];  Service: Thoracic   • MD ESOPHAGOGASTRODUODENOSCOPY TRANSORAL DIAGNOSTIC N/A 1/24/2023    Procedure: ESOPHAGOGASTRODUODENOSCOPY (EGD); Surgeon: Carlita Flores MD;  [facility];  Service: Thoracic   • MD ESOPHAGOSCOPY FLEX BALLOON DILAT <30 MM DIAM N/A 01/12/2022    Procedure: DILATATION ESOPHAGEAL;  Surgeon: Carlita Flores MD;  [facility];  Service: Thoracic   • MD ESOPHAGOSCOPY FLEX BALLOON DILAT <30 MM DIAM N/A 02/16/2022    Procedure: DILATATION ESOPHAGEAL;  Surgeon: Carlita Flores MD;  [facility];  Service: Thoracic   • MD ESOPHAGOSCOPY FLEX BALLOON DILAT <30 MM DIAM N/A 11/29/2022    Procedure: DILATATION ESOPHAGEAL esophageal and pyloric dilation;  Surgeon: Xander Joya MD;  [facility];  Service: Thoracic   • MD ESOPHAGOSCOPY FLEX BALLOON DILAT <30 MM DIAM N/A 1/24/2023    Procedure: esophageal dilation dilated up to 47  with pylorus dilation dilated up to 18;  Surgeon: Carlita Flores MD;  [facility];  Service: Thoracic   • MD RIBEIRO IMPLTJ NSTIM ELTRDS PLATE/PADDLE EDRL Left 04/23/2018    Procedure:  Insertion of thoracic spinal cord stimulator electrode via laminotomy and placement of left buttock IPG;  Surgeon: Vitor Auguste MD;  [facility];  Service: Neurosurgery   • REPLACEMENT TOTAL KNEE     • ROTATOR CUFF REPAIR     • SPINAL STIMULATOR PLACEMENT Left 10/03/2018    Procedure: BUTTOCK RE-OPENING INCISION FOR REPOSITIONING OF IMPLANTABLE PULSE GENERATOR;  Surgeon: Vitor Auguste MD;  [facility];  Service: Neurosurgery   • TONSILLECTOMY     • UPPER GASTROINTESTINAL ENDOSCOPY     • WISDOM TOOTH EXTRACTION           Home Medications:    Prior to Admission medications    Medication Sig Start Date End Date Taking? Authorizing Provider   albuterol (ACCUNEB) 1 25 MG/3ML nebulizer solution Take 2 5 mg by nebulization every 6 (six) hours as needed for wheezing   Yes Historical Provider, MD   albuterol (PROVENTIL HFA,VENTOLIN HFA) 90 mcg/act inhaler Inhale 2 puffs every 6 (six) hours as needed for wheezing   Yes Historical Provider, MD   amitriptyline (ELAVIL) 10 mg tablet Take 10 mg by mouth daily at bedtime 3/7/22  Yes Historical Provider, MD   Armodafinil (Nuvigil) 250 MG tablet Take 1 tablet (250 mg total) by mouth every morning 1/30/23  Yes Dianne Bocanegra MD   aspirin (ECOTRIN LOW STRENGTH) 81 mg EC tablet Take 81 mg by mouth every morning   Yes Historical Provider, MD   atorvastatin (LIPITOR) 80 mg tablet Take 80 mg by mouth daily at bedtime     Yes Historical Provider, MD   calcium carbonate (OS-KARIS) 1250 (500 Ca) MG chewable tablet Chew 1 tablet daily   Yes Historical Provider, MD   calcium carbonate (TUMS) 500 mg chewable tablet Chew 1 tablet daily   Yes Historical Provider, MD   carvedilol (COREG) 3 125 mg tablet Take 3 125 mg by mouth 2 (two) times a day with meals   Yes Historical Provider, MD   chlorzoxazone (PARAFON FORTE) 500 mg tablet Take 1 PO QID PRN for pain and spasms  Patient taking differently: Take 500 mg by mouth 3 (three) times a day as needed Take 1 PO QID PRN for pain and spasms   1/31/22  Yes Lars ROMAINE Blackburn   CRANBERRY PO Take 1 capsule by mouth every evening    Yes Historical Provider, MD   Cyanocobalamin (VITAMIN B-12 PO) Take 1 capsule by mouth every evening    Yes Historical Provider, MD   dicyclomine (BENTYL) 10 mg capsule Take 1 capsule (10 mg total) by mouth 4 (four) times a day as needed (abdominal pain) 7/28/20  Yes Jyoti Kinney PA-C   docusate sodium (COLACE) 100 mg capsule TAKE ONE CAPSULE BY MOUTH EVERY DAY 11/16/22  Yes Cinderella Primas Chaput, DO   esomeprazole (NexIUM) 40 MG capsule Take 1 capsule (40 mg total) by mouth 2 (two) times a day before meals 8/4/21  Yes Princess Gan DO   famotidine (PEPCID) 20 mg tablet TAKE 1 TABLET BY MOUTH TWICE A DAY 1/26/23  Yes Mazin Medina PA-C   gabapentin (NEURONTIN) 100 mg capsule Take 100 mg by mouth 3 (three) times a day 2/25/22  Yes Historical Provider, MD   gabapentin (NEURONTIN) 300 mg capsule  1/30/23  Yes Historical Provider, MD   levothyroxine 100 mcg tablet Take 100 mcg by mouth daily   Yes Historical Provider, MD   losartan (COZAAR) 50 mg tablet Take 50 mg by mouth daily at bedtime   Yes Historical Provider, MD   NOVOLOG 100 UNIT/ML injection Pt reports unsure of total dose a day/did see her endocrinologist Dr Tameka Banegas  PA  Pt has pump on upper left abdomen cut basaL RATE IN HALF 1/2/18  Yes Historical Provider, MD   ondansetron (ZOFRAN) 8 mg tablet Take 8 mg by mouth every 8 (eight) hours as needed     Yes Historical Provider, MD   polyethylene glycol (MIRALAX) 17 g packet Take 17 g by mouth daily 11/10/21  Yes Princess Gan DO   pramipexole (MIRAPEX) 1 mg tablet Take 1 mg by mouth 2 (two) times a day     Yes Historical Provider, MD   Semaglutide (OZEMPIC, 0 25 OR 0 5 MG/DOSE, SC) Inject 0 25 mg under the skin once a week Sundays   Yes Historical Provider, MD   sertraline (ZOLOFT) 50 mg tablet Take 100 mg by mouth daily at bedtime   Yes Historical Provider, MD   torsemide (DEMADEX) 20 mg tablet Take 20 mg by mouth every morning   Yes Historical Provider, MD   bisacodyl (DULCOLAX) 10 mg suppository Insert 1 suppository (10 mg total) into the rectum daily  Patient not taking: Reported on 2/21/2023 3/10/22   Josh Jackson DO   cephalexin (KEFLEX) 500 mg capsule PLEASE SEE ATTACHED FOR DETAILED DIRECTIONS  Patient not taking: Reported on 2/21/2023 1/19/23   Historical Provider, MD   Darbepoetin Mike (ARANESP, ALBUMIN FREE, IJ) Inject as directed every 14 (fourteen) days  Patient not taking: Reported on 2/21/2023    Historical Provider, MD   fluticasone-salmeterol (ADVAIR) 100-50 mcg/dose inhaler Inhale 2 puffs as needed  Patient not taking: Reported on 2/21/2023    Historical Provider, MD   glucose blood test strip Testing six times daily  E11 65 on insulin pump 9/17/18   Historical Provider, MD   glucose blood test strip Use 1/27/23   Historical Provider, MD   Insulin Infusion Pump (MINIMED INSULIN PUMP) PRINCESS Use    Historical Provider, MD   ketorolac (ACULAR) 0 5 % ophthalmic solution INSTILL 1 DROP INTO THE OPERATIVE EYE ONCE DAILY STARTING 3 DAYS PRIOR TO SURGERY AS DIRECTED 1/26/23   Historical Provider, MD   Potassium Gluconate 595 MG CAPS Take by mouth 2 (two) times a day  Patient not taking: Reported on 2/21/2023    Historical Provider, MD   Senexon-S 8 6-50 MG per tablet TAKE 1 TABLET BY MOUTH EVERY DAY  Patient not taking: Reported on 2/21/2023 1/26/23   Marshall Vargas PA-C   solifenacin (VESICARE) 10 MG tablet Take 10 mg by mouth daily 4/20/22   Historical Provider, MD     I have reviewed home medications with patient personally  Allergies: Allergies   Allergen Reactions   • Bactrim [Sulfamethoxazole-Trimethoprim] Hives   • Sucralfate Hives     Facial swelling   • Topamax [Topiramate]      disorentation   • Azithromycin Itching   • Pregabalin Confusion and Other (See Comments)     altered mental status   • Ciprofloxacin Drowsiness     Other reaction(s):  Other (See Comments)  "drowsiness"   • Norvasc [Amlodipine] Swelling   • Baclofen      "That knocks me out "   • Bupropion Fatigue   • Methotrexate Nausea Only         Social History:    Substance Use History:   Social History     Substance and Sexual Activity   Alcohol Use Never     Social History     Tobacco Use   Smoking Status Never   Smokeless Tobacco Never     Social History     Substance and Sexual Activity   Drug Use Yes   • Frequency: 8 0 times per week   • Types: Marijuana    Comment: Medical Marijuana/vape pen once per week         Family History:    non-contributory      Physical Exam:     Vitals:   Blood Pressure: 164/74 (02/21/23 1604)  Pulse: 64 (02/21/23 1604)  Temperature: 99 5 °F (37 5 °C) (02/21/23 1125)  Temp Source: Oral (02/21/23 1125)  Respirations: 20 (02/21/23 1604)  SpO2: 94 % (02/21/23 1500)    Physical Exam  Vitals and nursing note reviewed  HENT:      Head: Normocephalic and atraumatic  Eyes:      Pupils: Pupils are equal, round, and reactive to light  Neck:      Comments: Hepatojugular reflux snf up  Cardiovascular:      Rate and Rhythm: Normal rate and regular rhythm  Heart sounds: No murmur heard  No friction rub  No gallop  Pulmonary:      Effort: Pulmonary effort is normal       Breath sounds: Rales (bilateral lung fields) present  No wheezing  Abdominal:      General: Bowel sounds are normal       Palpations: Abdomen is soft  Tenderness: There is no abdominal tenderness  Musculoskeletal:      Right lower leg: Edema (1= up to knees) present  Left lower leg: Edema (1+ up to knees) present  Additional Data:     Lab Results: I have personally reviewed pertinent reports  Results from last 7 days   Lab Units 02/21/23  1225   WBC Thousand/uL 6 46   HEMOGLOBIN g/dL 8 0*   HEMATOCRIT % 25 5*   PLATELETS Thousands/uL 169   NEUTROS PCT % 77*   LYMPHS PCT % 10*   MONOS PCT % 9   EOS PCT % 1     Results from last 7 days   Lab Units 02/21/23  1225   POTASSIUM mmol/L 4 2   CHLORIDE mmol/L 110*   CO2 mmol/L 22   BUN mg/dL 26*   CREATININE mg/dL 0 96   CALCIUM mg/dL 8 5   ALK PHOS U/L 192*   ALT U/L 67*   AST U/L 38     Results from last 7 days   Lab Units 02/21/23  1225   INR  1 41*     Results from last 7 days   Lab Units 02/21/23  1531   POC GLUCOSE mg/dl 60*     Results from last 7 days   Lab Units 02/15/23  1720   HEMOGLOBIN A1C % 6 7*         Imaging: I have personally reviewed pertinent reports        CTA ED chest PE study   Final Result by Brie Ahuja MD (02/21 1818)      No pulmonary embolus  Pulmonary edema with small right and trace left effusion  Enlarged subcarinal node, new since the thoracic spine CT from March 2021  This could be reactive, but recommend follow-up with a chest CT with no contrast in 3 months  The study was marked in [facility] for immediate notification and follow-up  Workstation performed: [id number]                   VTE Prophylaxis: Enoxaparin (Lovenox)        Anticipated Length of Stay:  Patient will be admitted on an Inpatient basis with an anticipated length of stay of greater than 2 midnights  Justification for Hospital Stay: Patient has acute decompensated CHF  She needs IV Lasix  Length of stay be greater than 2 midnights      Total Time for Visit, including Counseling / Coordination of Care: 45 minutes  Greater than 50% of this total time spent on direct patient counseling and coordination of care  ** Please Note: This note has been constructed using a voice recognition system   **

## 2023-02-21 NOTE — ED PROVIDER NOTES
History  Chief Complaint   Patient presents with   • Weakness - Generalized     Discharged from [facility] with pneumonia and UTI, treated with antibiotics, but reports not feeling better, complains of SOB, fatigue, chills     77 YO female presents with general weakness, shortness of breath and chest tightness  Patient states this had been present for the last week but worsened overnight  She has associated chills, fatigue, denies checking her temperature  Patient was admitted to [facility] 6 days prior, at that time found to have a pneumonia, possible UTI (she was being treated for this at the time of her admission, and some encephalopathy thought to be from the pneumonia  Patient was treated with broad spectrum antibiotics and discharged with 2 further days of a cephalosporin  States she felt her condition worsened overnight  Pt denies N/V/D/C, no dysuria, burning on urination or blood in urine  History provided by:  Patient, medical records and spouse   used: No        Prior to Admission Medications   Prescriptions Last Dose Informant Patient Reported? Taking?    Armodafinil (Nuvigil) 250 MG tablet   No Yes   Sig: Take 1 tablet (250 mg total) by mouth every morning   CRANBERRY PO   Yes Yes   Sig: Take 1 capsule by mouth every evening    Cyanocobalamin (VITAMIN B-12 PO)   Yes Yes   Sig: Take 1 capsule by mouth every evening    Darbepoetin Mike (ARANESP, ALBUMIN FREE, IJ) Not Taking  Yes No   Sig: Inject as directed every 14 (fourteen) days   Patient not taking: Reported on 2/21/2023   Insulin Infusion Pump (MINIMED INSULIN PUMP) PRINCESS   Yes No   Sig: Use   NOVOLOG 100 UNIT/ML injection   Yes Yes   Sig: Pt reports unsure of total dose a day/did see her endocrinologist Dr Aury Fair,  [place]  Pt has pump on upper left abdomen cut basaL RATE IN HALF   Potassium Gluconate 595 MG CAPS Not Taking  Yes No   Sig: Take by mouth 2 (two) times a day   Patient not taking: Reported on 2/21/2023   Semaglutide ([address] 0  25 OR 0 5 MG/DOSE, SC)   Yes Yes   Sig: Inject 0 25 mg under the skin once a week Sundays   Senexon-S 8 6-50 MG per tablet Not Taking  No No   Sig: TAKE 1 TABLET BY MOUTH EVERY DAY   Patient not taking: Reported on 2/21/2023   albuterol (ACCUNEB) 1 25 MG/3ML nebulizer solution   Yes Yes   Sig: Take 2 5 mg by nebulization every 6 (six) hours as needed for wheezing   albuterol (PROVENTIL HFA,VENTOLIN HFA) 90 mcg/act inhaler   Yes Yes   Sig: Inhale 2 puffs every 6 (six) hours as needed for wheezing   amitriptyline (ELAVIL) 10 mg tablet   Yes Yes   Sig: Take 10 mg by mouth daily at bedtime   aspirin (ECOTRIN LOW STRENGTH) 81 mg EC tablet   Yes Yes   Sig: Take 81 mg by mouth every morning   atorvastatin (LIPITOR) 80 mg tablet   Yes Yes   Sig: Take 80 mg by mouth daily at bedtime     bisacodyl (DULCOLAX) 10 mg suppository Not Taking  No No   Sig: Insert 1 suppository (10 mg total) into the rectum daily   Patient not taking: Reported on 2/21/2023   calcium carbonate (OS-KARIS) 1250 (500 Ca) MG chewable tablet   Yes Yes   Sig: Chew 1 tablet daily   calcium carbonate (TUMS) 500 mg chewable tablet   Yes Yes   Sig: Chew 1 tablet daily   carvedilol (COREG) 3 125 mg tablet   Yes Yes   Sig: Take 3 125 mg by mouth 2 (two) times a day with meals   cephalexin (KEFLEX) 500 mg capsule Not Taking  Yes No   Sig: PLEASE SEE ATTACHED FOR DETAILED DIRECTIONS   Patient not taking: Reported on 2/21/2023   chlorzoxazone (PARAFON FORTE) 500 mg tablet   No Yes   Sig: Take 1 PO QID PRN for pain and spasms  Patient taking differently: Take 500 mg by mouth 3 (three) times a day as needed Take 1 PO QID PRN for pain and spasms     dicyclomine (BENTYL) 10 mg capsule   No Yes   Sig: Take 1 capsule (10 mg total) by mouth 4 (four) times a day as needed (abdominal pain)   docusate sodium (COLACE) 100 mg capsule   No Yes   Sig: TAKE ONE CAPSULE BY MOUTH EVERY DAY   esomeprazole (NexIUM) 40 MG capsule   No Yes   Sig: Take 1 capsule (40 mg total) by mouth 2 (two) times a day before meals   famotidine (PEPCID) 20 mg tablet   No Yes   Sig: TAKE 1 TABLET BY MOUTH TWICE A DAY   fluticasone-salmeterol (ADVAIR) 100-50 mcg/dose inhaler Not Taking  Yes No   Sig: Inhale 2 puffs as needed   Patient not taking: Reported on 2/21/2023   gabapentin (NEURONTIN) 100 mg capsule   Yes Yes   Sig: Take 100 mg by mouth 3 (three) times a day   gabapentin (NEURONTIN) 300 mg capsule   Yes Yes   glucose blood test strip   Yes No   Sig: Testing six times daily  E11 65 on insulin pump   glucose blood test strip   Yes No   Sig: Use   ketorolac (ACULAR) 0 5 % ophthalmic solution   Yes No   Sig: INSTILL 1 DROP INTO THE OPERATIVE EYE ONCE DAILY STARTING 3 DAYS PRIOR TO SURGERY AS DIRECTED   levothyroxine 100 mcg tablet   Yes Yes   Sig: Take 100 mcg by mouth daily   losartan (COZAAR) 50 mg tablet   Yes Yes   Sig: Take 50 mg by mouth daily at bedtime   ondansetron (ZOFRAN) 8 mg tablet   Yes Yes   Sig: Take 8 mg by mouth every 8 (eight) hours as needed     polyethylene glycol (MIRALAX) 17 g packet   No Yes   Sig: Take 17 g by mouth daily   pramipexole (MIRAPEX) 1 mg tablet   Yes Yes   Sig: Take 1 mg by mouth 2 (two) times a day     sertraline (ZOLOFT) 50 mg tablet   Yes Yes   Sig: Take 100 mg by mouth daily at bedtime   solifenacin (VESICARE) 10 MG tablet   Yes No   Sig: Take 10 mg by mouth daily   torsemide (DEMADEX) 20 mg tablet   Yes Yes   Sig: Take 20 mg by mouth every morning      Facility-Administered Medications: None       Past Medical History:   Diagnosis Date   • Anxiety    • Asthma     controlled   • Back complaints    • Cancer ([facility])     nose   • Cellulitis of left lower leg     "not now"   • CHF (congestive heart failure) ([facility]) 02/2021   • Chronic bilateral thoracic back pain    • Chronic kidney disease     sees nephrologist regularly" stage 3"   • Chronic myofascial pain    • Chronic pain of both lower extremities    • Chronic pain of left knee     "both knees"   • Chronic pain syndrome bilat legs and knees/neuropathy pain   • COVID 2021   • CPAP (continuous positive airway pressure) dependence     no currently uses   • Depression    • Diabetes mellitus (HCC)     insulin pump   • Disease of thyroid gland    • Does use hearing aid     bilat and will wear DOS   • DVT (deep venous thrombosis) ([facility] [address] ) 2013    left leg   • Gait disorder    • Gastroparesis    • GERD (gastroesophageal reflux disease)    • History of angina    • History of MRSA infection    • History of transfusion     Many years ago   • Hyperlipidemia    • Hypertension    • Hypoglycemic reaction     "occas low blood sugar"   • Insulin pump in place     pt reports saw endocrinologist  and will bring copy of instructions DOS   • Irritable bowel syndrome    • Kidney disease    • Pacemaker    • Polyneuropathy associated with underlying disease ([facility] [address] )    • PONV (postoperative nausea and vomiting)    • Risk for falls    • S/P insertion of spinal cord stimulator 2018   • Sarcoidosis    • Shortness of breath     "exertion and not new"   • Sleep apnea    • TIA (transient ischemic attack)    • Use of cane as ambulatory aid    • Uses walker    • Wears dentures     permanent upper/and some missing teeth/partial lower       Past Surgical History:   Procedure Laterality Date   • ABDOMINAL ADHESION SURGERY N/A 2021    Procedure: laparoscopic LYSIS ADHESIONS;  Surgeon: Hiro Ny MD;  [facility];  Service: Thoracic   • BREAST SURGERY     • BREAST SURGERY      reduction   • CARDIAC PACEMAKER PLACEMENT      pacemaker   •  SECTION     • COLONOSCOPY     • DILATION AND CURETTAGE OF UTERUS      "D&E"   • HERNIA REPAIR     • HYSTERECTOMY     • JOINT REPLACEMENT Left     LTKR   • NECK SURGERY      fused 4 discs with 4 screws implanted   • NISSEN FUNDOPLICATION LAPAROSCOPIC WITH ROBOTICS N/A 2021    Procedure: ROBOTIC [address] ;  Surgeon: Hiro Ny MD;  [facility];  Service: Thoracic   • NV ESOPHAGOGASTRODUODENOSCOPY TRANSORAL DIAGNOSTIC N/A 05/03/2021    Procedure: ESOPHAGOGASTRODUODENOSCOPY (EGD); Surgeon: Joslyn Arnett MD;  [facility];  Service: Thoracic   • NV ESOPHAGOGASTRODUODENOSCOPY TRANSORAL DIAGNOSTIC N/A 01/12/2022    Procedure: ESOPHAGOGASTRODUODENOSCOPY (EGD),;  Surgeon: Joslyn Arnett MD;  [facility];  Service: Thoracic   • NV ESOPHAGOGASTRODUODENOSCOPY TRANSORAL DIAGNOSTIC N/A 02/16/2022    Procedure: ESOPHAGOGASTRODUODENOSCOPY (EGD); Surgeon: Joslyn Arnett MD;  [facility];  Service: Thoracic   • NV ESOPHAGOGASTRODUODENOSCOPY TRANSORAL DIAGNOSTIC N/A 07/12/2022    Procedure: ESOPHAGOGASTRODUODENOSCOPY (EGD); PYLORIC DILATION; GE JUNCTION DILATION;  Surgeon: Joslyn Arnett MD;  [facility];  Service: Thoracic   • NV ESOPHAGOGASTRODUODENOSCOPY TRANSORAL DIAGNOSTIC N/A 11/29/2022    Procedure: ESOPHAGOGASTRODUODENOSCOPY (EGD); Surgeon: Garfield Monge MD;  [facility];  Service: Thoracic   • NV ESOPHAGOGASTRODUODENOSCOPY TRANSORAL DIAGNOSTIC N/A 1/24/2023    Procedure: ESOPHAGOGASTRODUODENOSCOPY (EGD);   Surgeon: Joslyn Arnett MD;  [facility];  Service: Thoracic   • NV ESOPHAGOSCOPY FLEX BALLOON DILAT <30 MM DIAM N/A 01/12/2022    Procedure: DILATATION ESOPHAGEAL;  Surgeon: Joslyn Arnett MD;  [facility];  Service: Thoracic   • NV ESOPHAGOSCOPY FLEX BALLOON DILAT <30 MM DIAM N/A 02/16/2022    Procedure: DILATATION ESOPHAGEAL;  Surgeon: Joslyn Arnett MD;  [facility];  Service: Thoracic   • NV ESOPHAGOSCOPY FLEX Meghna Bars <30 MM DIAM N/A 11/29/2022    Procedure: DILATATION ESOPHAGEAL esophageal and pyloric dilation;  Surgeon: Garfield Monge MD;  [facility];  Service: Thoracic   • NV ESOPHAGOSCOPY FLEX BALLOON DILAT <30 MM DIAM N/A 1/24/2023    Procedure: esophageal dilation dilated up to 47  with pylorus dilation dilated up to 18;  Surgeon: Valentino Hart, MD;  [facility];  Service: Thoracic   • TX RIBEIRO IMPLTJ NSTIM ELTRDS PLATE/PADDLE EDRL Left 04/23/2018    Procedure: Insertion of thoracic spinal cord stimulator electrode via laminotomy and placement of left buttock IPG;  Surgeon: Farhad Perla MD;  [facility];  Service: Neurosurgery   • REPLACEMENT TOTAL KNEE     • ROTATOR CUFF REPAIR     • SPINAL STIMULATOR PLACEMENT Left 10/03/2018    Procedure: BUTTOCK RE-OPENING INCISION FOR REPOSITIONING OF IMPLANTABLE PULSE GENERATOR;  Surgeon: Farhad Perla MD;  [facility];  Service: Neurosurgery   • TONSILLECTOMY     • UPPER GASTROINTESTINAL ENDOSCOPY     • WISDOM TOOTH EXTRACTION         Family History   Problem Relation Age of Onset   • Diabetes Family    • Heart disease Family    • Hypertension Family    • Neuropathy Family    • No Known Problems Mother    • Heart disease Father    • Diabetes Father    • No Known Problems Maternal Grandmother    • No Known Problems Maternal Grandfather    • No Known Problems Paternal Grandmother    • No Known Problems Paternal Grandfather    • No Known Problems Brother    • No Known Problems Daughter    • Cancer Son      I have reviewed and agree with the history as documented  E-Cigarette/Vaping   • E-Cigarette Use Never User      E-Cigarette/Vaping Substances   • Nicotine No    • THC Yes    • CBD No    • Flavoring No    • Other No    • Unknown No      Social History     Tobacco Use   • Smoking status: Never   • Smokeless tobacco: Never   Vaping Use   • Vaping Use: Never used   Substance Use Topics   • Alcohol use: Never   • Drug use: Yes     Frequency: 8 0 times per week     Types: Marijuana     Comment: Medical Marijuana/vape pen once per week       Review of Systems   Constitutional: Positive for chills and fatigue  Negative for fever  HENT: Negative for dental problem  Eyes: Negative for visual disturbance     Respiratory: Positive for cough and shortness of breath  Cardiovascular: Positive for chest pain  Gastrointestinal: Negative for abdominal pain, diarrhea and vomiting  Genitourinary: Negative for dysuria and frequency  Musculoskeletal: Negative for arthralgias  Skin: Negative for rash  Neurological: Negative for dizziness, weakness and light-headedness  Psychiatric/Behavioral: Negative for agitation, behavioral problems and confusion  All other systems reviewed and are negative  Physical Exam  Physical Exam  Vitals and nursing note reviewed  Constitutional:       Appearance: Normal appearance  HENT:      Head: Normocephalic and atraumatic  Eyes:      Extraocular Movements: Extraocular movements intact  Conjunctiva/sclera: Conjunctivae normal    Cardiovascular:      Rate and Rhythm: Normal rate  Pulmonary:      Effort: Pulmonary effort is normal  No respiratory distress  Breath sounds: Rhonchi present  Abdominal:      General: There is no distension  Musculoskeletal:         General: Normal range of motion  Cervical back: Normal range of motion  Skin:     Findings: No rash  Neurological:      General: No focal deficit present  Mental Status: She is alert  Cranial Nerves: No cranial nerve deficit     Psychiatric:         Mood and Affect: Mood normal          Vital Signs  ED Triage Vitals   Temperature Pulse Respirations Blood Pressure SpO2   02/21/23 1125 02/21/23 1125 02/21/23 1125 02/21/23 1125 02/21/23 1125   99 5 °F (37 5 °C) 85 (!) 23 153/89 98 %      Temp Source Heart Rate Source Patient Position - Orthostatic VS BP Location FiO2 (%)   02/21/23 1125 02/21/23 1228 02/21/23 1228 02/21/23 1228 --   Oral Monitor Lying Right arm       Pain Score       02/21/23 1125       No Pain           Vitals:    02/21/23 1900 02/21/23 2326 02/22/23 0225 02/22/23 0758   BP: (!) 189/67 124/68 137/64 158/70   Pulse: 58 58 69 64   Patient Position - Orthostatic VS: Sitting Lying Lying Lying         Visual Acuity      ED Medications  Medications   albuterol inhalation solution 2 5 mg (has no administration in time range)   amitriptyline (ELAVIL) tablet 10 mg (10 mg Oral Given 2/21/23 2232)   modafinil (PROVIGIL) tablet 100 mg (has no administration in time range)   aspirin (ECOTRIN LOW STRENGTH) EC tablet 81 mg (has no administration in time range)   atorvastatin (LIPITOR) tablet 80 mg (80 mg Oral Given 2/21/23 2232)   bisacodyl (DULCOLAX) rectal suppository 10 mg (has no administration in time range)   carvedilol (COREG) tablet 3 125 mg (has no administration in time range)   dicyclomine (BENTYL) capsule 10 mg (10 mg Oral Given 2/22/23 0602)   docusate sodium (COLACE) capsule 100 mg (has no administration in time range)   pantoprazole (PROTONIX) EC tablet 40 mg (40 mg Oral Given 2/22/23 0602)   famotidine (PEPCID) tablet 20 mg (20 mg Oral Given 2/1956)   gabapentin (NEURONTIN) capsule 100 mg (100 mg Oral Given 2/21/23 2052)   levothyroxine tablet 100 mcg (100 mcg Oral Given 2/22/23 0602)   losartan (COZAAR) tablet 50 mg (50 mg Oral Given 2/21/23 2232)   polyethylene glycol (MIRALAX) packet 17 g (has no administration in time range)   pramipexole (MIRAPEX) tablet 1 mg (1 mg Oral Given 2/21/23 2049)   sertraline (ZOLOFT) tablet 100 mg (100 mg Oral Given 2/21/23 2232)   oxybutynin (DITROPAN-XL) 24 hr tablet 10 mg (has no administration in time range)   enoxaparin (LOVENOX) subcutaneous injection 40 mg (has no administration in time range)   furosemide (LASIX) injection 40 mg (has no administration in time range)   acetaminophen (TYLENOL) tablet 650 mg (650 mg Oral Given 2/1956)   sodium chloride 0 9 % bolus 1,000 mL (0 mL Intravenous Stopped 2/21/23 1423)   iohexol (OMNIPAQUE) 350 MG/ML injection (SINGLE-DOSE) 100 mL (100 mL Intravenous Given 2/21/23 1405)   ketorolac (TORADOL) injection 15 mg (15 mg Intravenous Given 2/21/23 1446)   furosemide (LASIX) injection 40 mg (40 mg Intravenous Given 2/21/23 1605) Diagnostic Studies  Results Reviewed     Procedure Component Value Units Date/Time    Basic metabolic panel [[id number]]  (Abnormal) Collected: 02/22/23 0554    Lab Status: Final result Specimen: Blood from Arm, Right Updated: 02/22/23 0639     Sodium 140 mmol/L      Potassium 4 7 mmol/L      Chloride 112 mmol/L      CO2 21 mmol/L      ANION GAP 7 mmol/L      BUN 26 mg/dL      Creatinine 0 91 mg/dL      Glucose 137 mg/dL      Calcium 8 3 mg/dL      eGFR 65 ml/min/1 73sq m     Narrative:      [place] guidelines for Chronic Kidney Disease (CKD):   •  Stage 1 with normal or high GFR (GFR > 90 mL/min/1 73 square meters)  •  Stage 2 Mild CKD (GFR = 60-89 mL/min/1 73 square meters)  •  Stage 3A Moderate CKD (GFR = 45-59 mL/min/1 73 square meters)  •  Stage 3B Moderate CKD (GFR = 30-44 mL/min/1 73 square meters)  •  Stage 4 Severe CKD (GFR = 15-29 mL/min/1 73 square meters)  •  Stage 5 End Stage CKD (GFR <15 mL/min/1 73 square meters)  Note: GFR calculation is accurate only with a steady state creatinine    Magnesium [[id number]]  (Normal) Collected: 02/22/23 0554    Lab Status: Final result Specimen: Blood from Arm, Right Updated: 02/22/23 0639     Magnesium 2 0 mg/dL     CBC [[id number]]  (Abnormal) Collected: 02/22/23 0554    Lab Status: Final result Specimen: Blood from Arm, Right Updated: 02/22/23 0607     WBC 3 53 Thousand/uL      RBC 2 37 Million/uL      Hemoglobin 7 3 g/dL      Hematocrit 23 8 %       fL      MCH 30 8 pg      MCHC 30 7 g/dL      RDW 15 0 %      Platelets 514 Thousands/uL      MPV 9 7 fL     Blood culture #2 [[id number]] Collected: 02/21/23 1323    Lab Status: Preliminary result Specimen: Blood from Arm, Right Updated: 02/21/23 2201     Blood Culture Received in Microbiology Lab  Culture in Progress      Blood culture #1 [[id number]] Collected: 02/21/23 1225    Lab Status: Preliminary result Specimen: Blood from Arm, Left Updated: 02/21/23 1701     Blood Culture Received in Microbiology Lab  Culture in Progress  Fingerstick Glucose (POCT) [[id number]]  (Abnormal) Collected: 02/21/23 1531    Lab Status: Final result Updated: 02/21/23 1532     POC Glucose 60 mg/dl     Lactic acid [[id number]]  (Normal) Collected: 02/21/23 1449    Lab Status: Final result Specimen: Blood from Arm, Left Updated: 02/21/23 1508     LACTIC ACID 0 9 mmol/L     Narrative:      Result may be elevated if tourniquet was used during collection  HS Troponin I 2hr [[id number]]  (Normal) Collected: 02/21/23 1424    Lab Status: Final result Specimen: Blood from Arm, Left Updated: 02/21/23 1506     hs TnI 2hr 21 ng/L      Delta 2hr hsTnI 1 ng/L     Urine Microscopic [[id number]]  (Abnormal) Collected: 02/21/23 1328    Lab Status: Final result Specimen: Urine, Clean Catch Updated: 02/21/23 1354     RBC, UA 1-2 /hpf      WBC, UA 1-2 /hpf      Epithelial Cells Occasional /hpf      Bacteria, UA Occasional /hpf      Hyaline Casts, UA 1-2 /lpf      MUCUS THREADS Occasional    Urine culture [[id number]] Collected: 02/21/23 1330    Lab Status:  In process Specimen: Urine, Clean Catch Updated: 02/21/23 1340    Urine Macroscopic, POC [[id number]]  (Abnormal) Collected: 02/21/23 1328    Lab Status: Final result Specimen: Urine Updated: 02/21/23 1329     Color, UA Yellow     Clarity, UA Clear     pH, UA 6 0     Leukocytes, UA Negative     Nitrite, UA Negative     Protein,  (2+) mg/dl      Glucose, UA Negative mg/dl      Ketones, UA Negative mg/dl      Urobilinogen, UA 0 2 E U /dl      Bilirubin, UA Negative     Occult Blood, UA Moderate     Specific Gravity, UA 1 025    Narrative:      CLINITEK RESULT    FLU/RSV/COVID - if FLU/RSV clinically relevant [[id number]]  (Normal) Collected: 02/21/23 1225    Lab Status: Final result Specimen: Nares from Nose Updated: 02/21/23 1327     SARS-CoV-2 Negative     INFLUENZA A PCR Negative     INFLUENZA B PCR Negative     RSV PCR Negative    Narrative:      FOR PEDIATRIC PATIENTS - copy/paste COVID Guidelines URL to browser: [email or web address]    SARS-CoV-2 assay is a Nucleic Acid Amplification assay intended for the  qualitative detection of nucleic acid from SARS-CoV-2 in nasopharyngeal  swabs  Results are for the presumptive identification of SARS-CoV-2 RNA  Positive results are indicative of infection with SARS-CoV-2, the virus  causing COVID-19, but do not rule out bacterial infection or co-infection  with other viruses  Laboratories within the [place] and its  territories are required to report all positive results to the appropriate  public health authorities  Negative results do not preclude SARS-CoV-2  infection and should not be used as the sole basis for treatment or other  patient management decisions  Negative results must be combined with  clinical observations, patient history, and epidemiological information  This test has not been FDA cleared or approved  This test has been authorized by FDA under an Emergency Use Authorization  (EUA)  This test is only authorized for the duration of time the  declaration that circumstances exist justifying the authorization of the  emergency use of an in vitro diagnostic tests for detection of SARS-CoV-2  virus and/or diagnosis of COVID-19 infection under section 564(b)(1) of  the Act, 21 U  S C  128UMM-7(F)(0), unless the authorization is terminated  or revoked sooner  The test has been validated but independent review by FDA  and CLIA is pending  Test performed using [employer] Gene[employer]pert: This RT-PCR assay targets N2,  a region unique to SARS-CoV-2  A conserved region in the E-gene was chosen  for pan-Sarbecovirus detection which includes SARS-CoV-2  According to CMS-2020-01-R, this platform meets the definition of high-throughput technology      TSH [[id number]]  (Normal) Collected: 02/21/23 1225    Lab Status: Final result Specimen: Blood from Arm, Left Updated: 02/21/23 1321     TSH 3RD GENERATON 2 158 uIU/mL     Narrative:      Patients undergoing fluorescein dye angiography may retain small amounts of fluorescein in the body for 48-72 hours post procedure  Samples containing fluorescein can produce falsely depressed TSH values  If the patient had this procedure,a specimen should be resubmitted post fluorescein clearance  B-Type Natriuretic Peptide(BNP) [[id number]]  (Abnormal) Collected: 02/21/23 1225    Lab Status: Final result Specimen: Blood from Arm, Left Updated: 02/21/23 1318      pg/mL     HS Troponin 0hr (reflex protocol) [[id number]]  (Normal) Collected: 02/21/23 1225    Lab Status: Final result Specimen: Blood from Arm, Left Updated: 02/21/23 1312     hs TnI 0hr 20 ng/L     D-Dimer [[id number]]  (Abnormal) Collected: 02/21/23 1225    Lab Status: Final result Specimen: Blood from Arm, Left Updated: 02/21/23 1300     D-Dimer, Quant 2 41 ug/ml FEU     Narrative: In the evaluation for possible pulmonary embolism, in the appropriate (Well's Score of 4 or less) patient, the age adjusted d-dimer cutoff for this patient can be calculated as:    Age x 0 01 (in ug/mL) for Age-adjusted D-dimer exclusion threshold for a patient over 50 years      Protime-INR [[id number]]  (Abnormal) Collected: 02/21/23 1225    Lab Status: Final result Specimen: Blood from Arm, Left Updated: 02/21/23 1300     Protime 17 2 seconds      INR 1 41    APTT [[id number]]  (Normal) Collected: 02/21/23 1225    Lab Status: Final result Specimen: Blood from Arm, Left Updated: 02/21/23 1300     PTT 32 seconds     Basic metabolic panel [[id number]]  (Abnormal) Collected: 02/21/23 1225    Lab Status: Final result Specimen: Blood from Arm, Left Updated: 02/21/23 1258     Sodium 138 mmol/L      Potassium 4 2 mmol/L      Chloride 110 mmol/L      CO2 22 mmol/L      ANION GAP 6 mmol/L      BUN 26 mg/dL      Creatinine 0 96 mg/dL      Glucose 194 mg/dL      Calcium 8 5 mg/dL      eGFR 61 ml/min/1 73sq m     Narrative:      [employer] Kid[employer] guidelines for Chronic Kidney Disease (CKD):   •  Stage 1 with normal or high GFR (GFR > 90 mL/min/1 73 square meters)  •  Stage 2 Mild CKD (GFR = 60-89 mL/min/1 73 square meters)  •  Stage 3A Moderate CKD (GFR = 45-59 mL/min/1 73 square meters)  •  Stage 3B Moderate CKD (GFR = 30-44 mL/min/1 73 square meters)  •  Stage 4 Severe CKD (GFR = 15-29 mL/min/1 73 square meters)  •  Stage 5 End Stage CKD (GFR <15 mL/min/1 73 square meters)  Note: GFR calculation is accurate only with a steady state creatinine    Hepatic function panel [[id number]]  (Abnormal) Collected: 02/21/23 1225    Lab Status: Final result Specimen: Blood from Arm, Left Updated: 02/21/23 1258     Total Bilirubin 0 66 mg/dL      Bilirubin, Direct 0 14 mg/dL      Alkaline Phosphatase 192 U/L      AST 38 U/L      ALT 67 U/L      Total Protein 6 5 g/dL      Albumin 3 6 g/dL     Magnesium [[id number]]  (Normal) Collected: 02/21/23 1225    Lab Status: Final result Specimen: Blood from Arm, Left Updated: 02/21/23 1258     Magnesium 1 9 mg/dL     Blood gas, venous [[id number]]  (Abnormal) Collected: 02/21/23 1225    Lab Status: Final result Specimen: Blood from Arm, Left Updated: 02/21/23 1240     pH, Con 7 396     pCO2, Con 31 5 mm Hg      pO2, Con 62 5 mm Hg      HCO3, Con 18 9 mmol/L      Base Excess, Con -5 2 mmol/L      O2 Content, Con 11 6 ml/dL      O2 HGB, VENOUS 89 4 %     CBC and differential [[id number]]  (Abnormal) Collected: 02/21/23 1225    Lab Status: Final result Specimen: Blood from Arm, Left Updated: 02/21/23 1239     WBC 6 46 Thousand/uL      RBC 2 60 Million/uL      Hemoglobin 8 0 g/dL      Hematocrit 25 5 %      MCV 98 fL      MCH 30 8 pg      MCHC 31 4 g/dL      RDW 15 1 %      MPV 10 3 fL      Platelets 242 Thousands/uL      nRBC 0 /100 WBCs      Neutrophils Relative 77 %      Immat GRANS % 2 %      Lymphocytes Relative 10 %      Monocytes Relative 9 %      Eosinophils Relative 1 %      Basophils Relative 1 %      Neutrophils Absolute 5 05 Thousands/µL      Immature Grans Absolute 0 12 Thousand/uL      Lymphocytes Absolute 0 67 Thousands/µL      Monocytes Absolute 0 56 Thousand/µL      Eosinophils Absolute 0 03 Thousand/µL      Basophils Absolute 0 03 Thousands/µL                  CTA ED chest PE study   Final Result by Addis Kaminski MD (02/21 2442)      No pulmonary embolus  Pulmonary edema with small right and trace left effusion  Enlarged subcarinal node, new since the thoracic spine CT from March 2021  This could be reactive, but recommend follow-up with a chest CT with no contrast in 3 months  The study was marked in [facility] for immediate notification and follow-up               Workstation performed: [id number]                    Procedures  ECG 12 Lead Documentation Only    Date/Time: 2/21/2023 2:24 PM  Performed by: Dayan Alvarez MD  Authorized by: Dayan Alvarez MD     ECG reviewed by me, the ED Provider: yes    Patient location:  ED  Interpretation:     Interpretation: abnormal    Rate:     ECG rate:  74    ECG rate assessment: normal    Rhythm:     Rhythm: paced    Pacing:     Type of pacing:  Atrial  QRS:     QRS axis:  Normal    QRS intervals:  Normal  Conduction:     Conduction: normal    ST segments:     ST segments:  Normal  T waves:     T waves: normal      CriticalCare Time  Performed by: Dayan Alvarez MD  Authorized by: Dayan Alvarez MD     Critical care provider statement:     Critical care time (minutes):  35    Critical care time was exclusive of:  Separately billable procedures and treating other patients and teaching time    Critical care was necessary to treat or prevent imminent or life-threatening deterioration of the following conditions:  Respiratory failure    Critical care was time spent personally by me on the following activities:  Blood draw for specimens, obtaining history from patient or surrogate, development of treatment plan with patient or surrogate, discussions with consultants, evaluation of patient's response to treatment, examination of patient, interpretation of cardiac output measurements, ordering and performing treatments and interventions, ordering and review of laboratory studies, ordering and review of radiographic studies, re-evaluation of patient's condition and review of old charts  Comments:      Pt with acute hypoxic respiratory failure requiring supplemental oxygen  ED Course  ED Course as of 02/22/23 0837   e Feb 21, 2023   1344 Hemoglobin(!): 8 0  8 4 yesterday  HEART Risk Score    Flowsheet Row Most Recent Value   Heart Score Risk Calculator    History 0 Filed at: 02/21/2023 1441   ECG 1 Filed at: 02/21/2023 1441   Age 2 Filed at: 02/21/2023 1441   Risk Factors 1 Filed at: 02/21/2023 1441   Troponin 1 Filed at: 02/21/2023 1441   HEART Score 5 Filed at: 02/21/2023 1441                     Initial Sepsis Screening     Row Name 02/21/23 1442                Is the patient's history suggestive of a new or worsening infection? Yes (Proceed)  -DH        Suspected source of infection pneumonia  -DH        Indicate SIRS criteria Tachypnea > 20 resp per min  -DH        Are two or more of the above signs & symptoms of infection both present and new to the patient? No  -DH              User Key  (r) = Recorded By, (t) = Taken By, (c) = Cosigned By    [address] Name Provider Type    Mike Navarrete MD Physician                SBIRT 22yo+    Flowsheet Row Most Recent Value   SBIRT (25 yo +)    In order to provide better care to our patients, we are screening all of our patients for alcohol and drug use  Would it be okay to ask you these screening questions? Yes Filed at: 02/21/2023 125   Initial Alcohol Screen: US AUDIT-C     1  How often do you have a drink containing alcohol? 0 Filed at: 02/21/2023 3453   2   How many drinks containing alcohol do you have on a typical day you are drinking? 0 Filed at: 02/21/2023 1259   3b  FEMALE Any Age, or MALE 65+: How often do you have 4 or more drinks on one occassion? 0 Filed at: 02/21/2023 1259   Audit-C Score 0 Filed at: 02/21/2023 1259   VANI: How many times in the past year have you    Used an illegal drug or used a prescription medication for non-medical reasons? Never Filed at: 02/21/2023 1259                    Medical Decision Making  1  Shortness of breath - Patient with recent hospitalization for pneumonia, states not feeling particularly well at discharge but worsening overnight with fatigue, chills, chest tightness  Will check COVID/Flu/RSV, CBC for leukocytosis and anemia (patient appears to have a history of this), metabolic panel for electrolyte abnormalities and dehydration,  LFT's to assess GB dysfunction, lipase for pancreatitis, Lactic acid  Will check ECG and troponin due to chest pain, rule out acute MI, d-dimer as she has recent hospitalization  Will determine modality of imaging (CXR vs CTA chest) based on D-dimer result  [facility] acute illness or injury  Weakness: undiagnosed new problem with uncertain prognosis  Amount and/or Complexity of Data Reviewed  Independent Historian: spouse  External Data Reviewed: labs, radiology and notes  Labs: ordered  Decision-making details documented in ED Course  Radiology: ordered  Details: Independently reviewed CTA of chest prior to radiology read  ECG/medicine tests: ordered and independent interpretation performed  Details: Interpretation in procedure section of note  Risk  Prescription drug management  Decision regarding hospitalization        Critical Care  Total time providing critical care: 30-74 minutes      Disposition  Final diagnoses:   Weakness   Congest[facility] heart failure ([facility]) ([facility] )     Time reflects when diagnosis was documented in both MDM as applicable and the Disposition within this note     Time User Action Codes Description Comment    2/21/2023  3:38 PM Emperatriz Harvey Add [R53 1] Weakness     2/21/2023  3:38 PM Carmina Gurvinder Kothari [I50 9] Congestive heart failure (CHF) [facility])       ED Disposition     ED Disposition   Admit    Condition   Stable    Date/Time   Tue Feb 21, 2023  3:38 PM    Comment   Case was discussed with ELIEZER and the patient's admission status was agreed to be Admission Status: inpatient status to the service of Dr Dasha Wilson   Follow-up Information    None         Current Discharge Medication List      CONTINUE these medications which have NOT CHANGED    Details   albuterol (ACCUNEB) 1 25 MG/3ML nebulizer solution Take 2 5 mg by nebulization every 6 (six) hours as needed for wheezing      albuterol (PROVENTIL HFA,VENTOLIN HFA) 90 mcg/act inhaler Inhale 2 puffs every 6 (six) hours as needed for wheezing      amitriptyline (ELAVIL) 10 mg tablet Take 10 mg by mouth daily at bedtime      Armodafinil (Nuvigil) 250 MG tablet Take 1 tablet (250 mg total) by mouth every morning  Qty: 30 tablet, Refills: 5    Associated Diagnoses: Hypersomnia      aspirin (ECOTRIN LOW STRENGTH) 81 mg EC tablet Take 81 mg by mouth every morning      atorvastatin (LIPITOR) 80 mg tablet Take 80 mg by mouth daily at bedtime        calcium carbonate (OS-KARIS) 1250 (500 Ca) MG chewable tablet Chew 1 tablet daily      calcium carbonate (TUMS) 500 mg chewable tablet Chew 1 tablet daily      carvedilol (COREG) 3 125 mg tablet Take 3 125 mg by mouth 2 (two) times a day with meals      chlorzoxazone (PARAFON FORTE) 500 mg tablet Take 1 PO QID PRN for pain and spasms  Qty: 120 tablet, Refills: 1    Associated Diagnoses: Complex regional pain syndrome type 1 of left lower extremity; Lumbar radiculopathy; Thoracic radiculopathy; Chronic bilateral thoracic back pain; Polyneuropathy associated with underlying disease ([facility] );  Pain syndrome, chronic; Spinal stenosis of lumbar region with neurogenic claudication; Gait disorder; Myofascial pain syndrome      CRANBERRY PO Take 1 capsule by mouth every evening       Cyanocobalamin (VITAMIN B-12 PO) Take 1 capsule by mouth every evening       dicyclomine (BENTYL) 10 mg capsule Take 1 capsule (10 mg total) by mouth 4 (four) times a day as needed (abdominal pain)  Qty: 60 capsule, Refills: 2    Associated Diagnoses: Irritable bowel syndrome with both constipation and diarrhea      docusate sodium (COLACE) 100 mg capsule TAKE ONE CAPSULE BY MOUTH EVERY DAY  Qty: 90 capsule, Refills: 3    Associated Diagnoses: Constipation, unspecified constipation type      esomeprazole (NexIUM) 40 MG capsule Take 1 capsule (40 mg total) by mouth 2 (two) times a day before meals  Qty: 180 capsule, Refills: 3    Associated Diagnoses: Gastroesophageal reflux disease with esophagitis      famotidine (PEPCID) 20 mg tablet TAKE 1 TABLET BY MOUTH TWICE A DAY  Qty: 180 tablet, Refills: 0    Associated Diagnoses: Esophageal stricture      !! gabapentin (NEURONTIN) 100 mg capsule Take 100 mg by mouth 3 (three) times a day      !! gabapentin (NEURONTIN) 300 mg capsule       levothyroxine 100 mcg tablet Take 100 mcg by mouth daily      losartan (COZAAR) 50 mg tablet Take 50 mg by mouth daily at bedtime      NOVOLOG 100 UNIT/ML injection Pt reports unsure of total dose a day/did see her endocrinologist Dr Melva Flores,  [place]  Pt has pump on upper left abdomen cut basaL RATE IN HALF  Refills: 3      ondansetron (ZOFRAN) 8 mg tablet Take 8 mg by mouth every 8 (eight) hours as needed        polyethylene glycol (MIRALAX) 17 g packet Take 17 g by mouth daily  Qty: 90 each, Refills: 3    Associated Diagnoses: Constipation, unspecified constipation type      pramipexole (MIRAPEX) 1 mg tablet Take 1 mg by mouth 2 (two) times a day        Semaglutide (OZEMPIC, 0 25 OR 0 5 MG/DOSE, SC) Inject 0 25 mg under the skin once a week Sundays      sertraline (ZOLOFT) 50 mg tablet Take 100 mg by mouth daily at bedtime      torsemide (DEMADEX) 20 mg tablet Take 20 mg by mouth every morning      bisacodyl (DULCOLAX) 10 mg suppository Insert 1 suppository (10 mg total) into the rectum daily  Qty: 30 suppository, Refills: 3    Associated Diagnoses: Irritable bowel syndrome with constipation      cephalexin (KEFLEX) 500 mg capsule PLEASE SEE ATTACHED FOR DETAILED DIRECTIONS      Darbepoetin Mike (ARANESP, ALBUMIN FREE, IJ) Inject as directed every 14 (fourteen) days      fluticasone-salmeterol (ADVAIR) 100-50 mcg/dose inhaler Inhale 2 puffs as needed      !! glucose blood test strip Testing six times daily  E11 65 on insulin pump      !! glucose blood test strip Use      Insulin Infusion Pump (MINIMED INSULIN PUMP) PRINCESS Use      ketorolac (ACULAR) 0 5 % ophthalmic solution INSTILL 1 DROP INTO THE OPERATIVE EYE ONCE DAILY STARTING 3 DAYS PRIOR TO SURGERY AS DIRECTED      Potassium Gluconate 595 MG CAPS Take by mouth 2 (two) times a day      Senexon-S 8 6-50 MG per tablet TAKE 1 TABLET BY MOUTH EVERY DAY  Qty: 30 tablet, Refills: 3    Associated Diagnoses: Constipation, unspecified constipation type      solifenacin (VESICARE) 10 MG tablet Take 10 mg by mouth daily       ! ! - Potential duplicate medications found  Please discuss with provider  No discharge procedures on file      PDMP Review       Value Time User    PDMP Reviewed  Yes 8/25/2020  9:04 AM [facility] Provider  Electronically Signed by           Glenn Solomon MD  02/22/23 4151

## 2023-02-22 ENCOUNTER — APPOINTMENT (INPATIENT)
Dept: NON INVASIVE DIAGNOSTICS | Facility: HOSPITAL | Age: 68
End: 2023-02-22

## 2023-02-22 PROBLEM — Z87.01 HISTORY OF PNEUMONIA: Status: ACTIVE | Noted: 2023-02-22

## 2023-02-22 PROBLEM — D64.9 ANEMIA: Status: ACTIVE | Noted: 2023-02-22

## 2023-02-22 PROBLEM — J96.90 RESPIRATORY FAILURE (HCC): Status: ACTIVE | Noted: 2023-02-22

## 2023-02-22 LAB
ANION GAP SERPL CALCULATED.3IONS-SCNC: 7 MMOL/L (ref 4–13)
BACTERIA UR CULT: ABNORMAL
BUN SERPL-MCNC: 26 MG/DL (ref 5–25)
CALCIUM SERPL-MCNC: 8.3 MG/DL (ref 8.4–10.2)
CHLORIDE SERPL-SCNC: 112 MMOL/L (ref 96–108)
CO2 SERPL-SCNC: 21 MMOL/L (ref 21–32)
CREAT SERPL-MCNC: 0.91 MG/DL (ref 0.6–1.3)
ERYTHROCYTE [DISTWIDTH] IN BLOOD BY AUTOMATED COUNT: 15 % (ref 11.6–15.1)
GFR SERPL CREATININE-BSD FRML MDRD: 65 ML/MIN/1.73SQ M
GLUCOSE SERPL-MCNC: 137 MG/DL (ref 65–140)
GLUCOSE SERPL-MCNC: 150 MG/DL (ref 65–140)
GLUCOSE SERPL-MCNC: 167 MG/DL (ref 65–140)
GLUCOSE SERPL-MCNC: 183 MG/DL (ref 65–140)
GLUCOSE SERPL-MCNC: 276 MG/DL (ref 65–140)
HCT VFR BLD AUTO: 23.8 % (ref 34.8–46.1)
HGB BLD-MCNC: 7.3 G/DL (ref 11.5–15.4)
MAGNESIUM SERPL-MCNC: 2 MG/DL (ref 1.9–2.7)
MCH RBC QN AUTO: 30.8 PG (ref 26.8–34.3)
MCHC RBC AUTO-ENTMCNC: 30.7 G/DL (ref 31.4–37.4)
MCV RBC AUTO: 100 FL (ref 82–98)
PLATELET # BLD AUTO: 162 THOUSANDS/UL (ref 149–390)
PMV BLD AUTO: 9.7 FL (ref 8.9–12.7)
POTASSIUM SERPL-SCNC: 4.7 MMOL/L (ref 3.5–5.3)
RBC # BLD AUTO: 2.37 MILLION/UL (ref 3.81–5.12)
SODIUM SERPL-SCNC: 140 MMOL/L (ref 135–147)
TSH SERPL DL<=0.05 MIU/L-ACNC: 2.69 UIU/ML (ref 0.45–4.5)
WBC # BLD AUTO: 3.53 THOUSAND/UL (ref 4.31–10.16)

## 2023-02-22 RX ORDER — TRAMADOL HYDROCHLORIDE 50 MG/1
50 TABLET ORAL EVERY 6 HOURS PRN
Status: DISCONTINUED | OUTPATIENT
Start: 2023-02-22 | End: 2023-03-01 | Stop reason: HOSPADM

## 2023-02-22 RX ADMIN — CARVEDILOL 3.12 MG: 3.12 TABLET, FILM COATED ORAL at 10:38

## 2023-02-22 RX ADMIN — GABAPENTIN 100 MG: 100 CAPSULE ORAL at 21:58

## 2023-02-22 RX ADMIN — LOSARTAN POTASSIUM 50 MG: 50 TABLET, FILM COATED ORAL at 21:59

## 2023-02-22 RX ADMIN — FAMOTIDINE 20 MG: 20 TABLET ORAL at 10:38

## 2023-02-22 RX ADMIN — OXYBUTYNIN CHLORIDE 10 MG: 10 TABLET, EXTENDED RELEASE ORAL at 10:37

## 2023-02-22 RX ADMIN — FUROSEMIDE 40 MG: 10 INJECTION, SOLUTION INTRAVENOUS at 10:37

## 2023-02-22 RX ADMIN — DOCUSATE SODIUM 100 MG: 100 CAPSULE, LIQUID FILLED ORAL at 10:37

## 2023-02-22 RX ADMIN — CARVEDILOL 3.12 MG: 3.12 TABLET, FILM COATED ORAL at 16:52

## 2023-02-22 RX ADMIN — GABAPENTIN 100 MG: 100 CAPSULE ORAL at 10:37

## 2023-02-22 RX ADMIN — DICYCLOMINE HYDROCHLORIDE 10 MG: 10 CAPSULE ORAL at 06:02

## 2023-02-22 RX ADMIN — AMITRIPTYLINE HYDROCHLORIDE 10 MG: 10 TABLET, FILM COATED ORAL at 21:59

## 2023-02-22 RX ADMIN — PANTOPRAZOLE SODIUM 40 MG: 40 TABLET, DELAYED RELEASE ORAL at 06:02

## 2023-02-22 RX ADMIN — PRAMIPEXOLE DIHYDROCHLORIDE 1 MG: 1 TABLET ORAL at 16:53

## 2023-02-22 RX ADMIN — ASPIRIN 81 MG: 81 TABLET, COATED ORAL at 10:38

## 2023-02-22 RX ADMIN — PERFLUTREN 0.6 ML/MIN: 6.52 INJECTION, SUSPENSION INTRAVENOUS at 16:02

## 2023-02-22 RX ADMIN — GABAPENTIN 100 MG: 100 CAPSULE ORAL at 16:52

## 2023-02-22 RX ADMIN — ENOXAPARIN SODIUM 40 MG: 40 INJECTION SUBCUTANEOUS at 10:37

## 2023-02-22 RX ADMIN — IRON SUCROSE 200 MG: 20 INJECTION, SOLUTION INTRAVENOUS at 16:53

## 2023-02-22 RX ADMIN — POLYETHYLENE GLYCOL 3350 17 G: 17 POWDER, FOR SOLUTION ORAL at 10:37

## 2023-02-22 RX ADMIN — SERTRALINE HYDROCHLORIDE 100 MG: 100 TABLET, FILM COATED ORAL at 21:58

## 2023-02-22 RX ADMIN — BISACODYL 10 MG: 10 SUPPOSITORY RECTAL at 10:37

## 2023-02-22 RX ADMIN — ATORVASTATIN CALCIUM 80 MG: 80 TABLET, FILM COATED ORAL at 21:58

## 2023-02-22 RX ADMIN — MODAFINIL 100 MG: 100 TABLET ORAL at 10:47

## 2023-02-22 RX ADMIN — FUROSEMIDE 40 MG: 10 INJECTION, SOLUTION INTRAVENOUS at 16:52

## 2023-02-22 RX ADMIN — FAMOTIDINE 20 MG: 20 TABLET ORAL at 16:52

## 2023-02-22 RX ADMIN — TRAMADOL HYDROCHLORIDE 50 MG: 50 TABLET, COATED ORAL at 19:38

## 2023-02-22 RX ADMIN — LEVOTHYROXINE SODIUM 100 MCG: 100 TABLET ORAL at 06:02

## 2023-02-22 RX ADMIN — PRAMIPEXOLE DIHYDROCHLORIDE 1 MG: 1 TABLET ORAL at 10:38

## 2023-02-22 RX ADMIN — TRAMADOL HYDROCHLORIDE 50 MG: 50 TABLET, COATED ORAL at 11:25

## 2023-02-22 NOTE — ASSESSMENT & PLAN NOTE
Was recently at [facility] on 2/15/2023 and treated for pneumonia  Receive IV cefepime as well as oral Omnicef to complete a 5-day antibiotic course  No further need for antibiotics here

## 2023-02-22 NOTE — PLAN OF CARE
Problem: Potential for Falls  Goal: Patient will remain free of falls  Description: INTERVENTIONS:  - Educate patient/family on patient safety including physical limitations  - Instruct patient to call for assistance with activity   - Consult OT/PT to assist with strengthening/mobility   - Keep Call bell within reach  - Keep bed low and locked with side rails adjusted as appropriate  - Keep care items and personal belongings within reach  - Initiate and maintain comfort rounds  - Make Fall Risk Sign visible to staff  - Offer Toileting every 2Hours, in advance of need  - Initiate/Maintain  bed alarm  - Obtain necessary fall risk management equipment:  - Apply yellow socks and bracelet for high fall risk patients  - Consider moving patient to room near nurses station  Outcome: Progressing

## 2023-02-22 NOTE — ASSESSMENT & PLAN NOTE
Lab Results   Component Value Date    HGBA1C 6 7 (H) 02/15/2023     Recent Labs     02/21/23  1531 02/21/23  2037 02/22/23  0800 02/22/23  1220   POCGLU 60* 143* 150* 167*   Prehospital patient uses an insulin pump  Okay to continue here for now  If has difficul controling sugars, low threshold to take pump off and further manage

## 2023-02-22 NOTE — ASSESSMENT & PLAN NOTE
Results from last 7 days   Lab Units 02/22/23  0554 02/21/23  1225   HEMOGLOBIN g/dL 7 3* 8 0*   Baseline appearing in the 12's  More recently hemoglobins have been in the 8's/9s according to care everywhere  Denies hematochezia or hemoptysis  We will check Hemoccult here  Reviewed care everywhere-recent iron panel from February 2023  Iron 32, transferrin 139, iron saturation 17, TIBC 188, ferritin 341  Will give iron 200 mg daily x3 days  Continue to follow hemoglobin

## 2023-02-22 NOTE — APP STUDENT NOTE
JOSE STUDENT  Inpatient Progress Note for TRAINING ONLY  Not Part of Legal Medical Record       Progress Note - Yuliet Willis 78 y o  female MRN: [id number]    Unit/Bed#: E4 -01 Encounter: [id number]      Assessment/Plan:  1  Acute on chronic diastolic CHF exacerbation   · Presented with worsening SOB and cough with associated chills and weakness  · Now reports her SOB and cough are improved  On exam, currently afebrile and without tachycardia/tachypnea, lungs CTA with frequent cough  Mild BLE edema, R>L  Weight increased from 80 6kg yesterday to 81 1 kg   · Troponins neg    Echo from 2/2022 showed EF 55-60% with mild LVH, AV scerlosis, and trace MR/TR  CTA chest 2/21/23 showed no PE, with pulmonary edema and small right and trace left effusion  CXR 2/23/23 shows trace vascular congestion with small effusions  Echo 2/23/23 showed EF 54%, conduction abnormality, abnormal diastolic function, AS, mild MR, trace TR  · Switched from IV Lasix 80 mg BID to Lasix drip  Home regimen torsemide 40 mg daily  Wean O2 nasal cannula as tolerated, currently on 2L  Monitor daily weights and strict I/O's  · Cardiology following, input appreciated    2  Recent pneumonia  · Patient admitted to [facility] 2/15 with UTI and for pneumonia and encephalopathy; received IV cefepime and PO Omnicef for 5 day abx course  · WBC 3 77  CTA chest and CXR as detailed above  · Wean O2 nasal cannula as tolerated, currently on 2L  No further antibiotics  3  Acute hypoxic respiratory failure  · Recently admitted to [facility] with pneumonia, suspect this is due to pneumonia vs acute on chronic diastolic CHF  · Still endorses SOB, improved with O2 nasal cannula, currently on 2L  · Continue supplemental oxygen    4  Acute on chronic anemia   · Current Hgb 7 7, baseline >10  · Reports fatigue, denies hemoptysis, hematuria, hematochezia, melena, hematemesis    · 2/2023 Iron panel: Iron 32, transferrin 139, iron sat 17%, TIBC 188, ferritin 341  · Occult blood test negative   · On day 3/3 200 mg IV iron supplementation  · Transfuse if Hgb <7  Continue to monitor with daily CBC  Recommend outpt EGD/colonoscopy    5  CAD  · Stress test from 6/17/2022 showed no ischemia or infarct with LVEF 65%  · Underwent left heart catheterization in 2011; Mid LAD 50%  · Continue ASA, Statin    6  Essential Hypertension  · Currently controlled  · Continue losartan, carvedilol  · Appreciate cardiology input for titration    7  Chronic kidney disease, stage 3  · Current GFR 60, at baseline    8  Type 2 diabetes mellitus with insulin pump  · Previously using insulin pump, switched to SSI on 2/23  · Fingerstick glucose consistently elevated  A1C on 2/15/23 6 7   · Initiate Lantus 5 mg daily  Continue to monitor fingerstick glucose and BMP  ADA diet    9  Asthma  · History of asthma  Presents with SOB and cough in the setting of recent pneumonia and acute on chronic CHF exacerbation  · PRN albuterol inhaler     10  History of DVT  · Patient denies lower extremity erythema, warmth, or pain  Mild bilateral lower extremity edema, with R>L  D-dimer elevated at 2 41  CTA chest shows no PE      11  Obstructive sleep apnea    12  Hypothyroidism   · TSH 2 694  · Continue to monitor TSH  Continue levothyroxine    13  GERD  · Continue pepcid and pantoprazole      Subjective:   Patient reports she feels better, with continued but improved SOB and cough  She was able to ambulate without oxygen and no SOB, but she reports SOB while lying flat  She is now complaining of increased pain in her legs  Denies chest pain, confusion, HA, dizziness, fevers, chills, dysuria/hematuria, N/V/D, abdominal pain, weakness  She is ambulating, urinating, and tolerating her diet  No acute events overnight  Objective:     Vitals: Blood pressure 149/70, pulse 63, temperature 98 6 °F (37 °C), temperature source Temporal, resp   rate 16, height 5' 2" (1 575 m), weight 81 1 kg (178 lb 12 7 oz), SpO2 93 % ,Body mass index is 32 7 kg/m²  Intake/Output Summary (Last 24 hours) at 2/24/2023 1230  Last data filed at 2/24/2023 1004  Gross per 24 hour   Intake 720 ml   Output 700 ml   Net 20 ml       Physical Exam: General appearance: alert and oriented, no acute distress  Head: Normocephalic, without obvious abnormality, atraumatic  Throat: lips, mucosa, and tongue normal; teeth and gums normal  Back: symmetric, no curvature  ROM normal  No CVA tenderness  Lungs: CTA b/l, no wheezes, rales, or rhonchi  Heart: regular rate and rhythm, S1, S2 normal, no murmur, click, rub or gallop  Abdomen: soft, distended, non-tender to palpation  Extremities: mild bilateral lower extremity edema, R>L  Skin: Skin color, texture, turgor normal  No rashes or lesions     Invasive Devices     Peripheral Intravenous Line  Duration           Peripheral IV 02/21/23 Distal;Dorsal (posterior); Left Forearm <1 day    Peripheral IV 02/21/23 Left Antecubital <1 day                Lab, Imaging and other studies: I have personally reviewed pertinent reports      VTE Pharmacologic Prophylaxis: Enoxaparin (Lovenox)

## 2023-02-22 NOTE — ASSESSMENT & PLAN NOTE
Likely secondary to acute CHF exacerbation as well as recent pneumonia  Currently requiring 4 L oxygen nasal cannula  Continue IV diuresis and taper as able

## 2023-02-22 NOTE — PROGRESS NOTES
Chapito Henry  Progress Note - Matty Castrejon 1955, 78 y o  female MRN: [id number]  Unit/Bed#: E4 -01 Encounter: [id number]  Primary Care Provider: Aletha Owusu DO   Date and time admitted to hospital: 2/21/2023 11:27 AM    * Acute CHF (congestive heart failure) (HCC)  Assessment & Plan  Wt Readings from Last 3 Encounters:   02/22/23 82 9 kg (182 lb 12 2 oz)   01/31/23 77 6 kg (171 lb)   01/24/23 77 6 kg (171 lb)   Karon 69 most of her care at [facility] -including her cardiologist  Presented with a complaint of shortness of breath  Requiring 4 L oxygen nasal cannula  Echocardiogram: 2/2022- EF 55-60%, mild LVH, AV sclerosis, trace MR/TR  Repeat echocardiogram pending here  CXR here with pulmonary edema  Home regimen torsemide 40 mg daily  Started on IV Lasix 40 mg twice daily - continue  Repeat CXR pending for tomorrow am  Monitor I's and O's and daily weights    Anemia  Assessment & Plan  Results from last 7 days   Lab Units 02/22/23  0554 02/21/23  1225   HEMOGLOBIN g/dL 7 3* 8 0*   Baseline appearing in the 12's  More recently hemoglobins have been in the 8's/9s according to care everywhere  Denies hematochezia or hemoptysis  We will check Hemoccult here  Reviewed care everywhere-recent iron panel from February 2023  Iron 32, transferrin 139, iron saturation 17, TIBC 188, ferritin 341  Will give iron 200 mg daily x3 days  Continue to follow hemoglobin    Respiratory failure ([address] )  Assessment & Plan  Likely secondary to acute CHF exacerbation as well as recent pneumonia  Currently requiring 4 L oxygen nasal cannula  Continue IV diuresis and taper as able    History of pneumonia  Assessment & Plan  Was recently at [facility] on 2/15/2023 and treated for pneumonia  Receive IV cefepime as well as oral Omnicef to complete a 5-day antibiotic course  No further need for antibiotics here    Type 2 diabetes mellitus with microalbuminuria, with long-term current use of insulin [facility])  Assessment & Plan  Lab Results   Component Value Date    HGBA1C 6 7 (H) 02/15/2023     Recent Labs     23  1531 23  2037 23  0800 23  1220   POCGLU 60* 143* 150* 167*   Prehospital patient uses an insulin pump  Okay to continue here for now  If has difficul controling sugars, low threshold to take pump off and further manage    Essential hypertension  Assessment & Plan  Home regimen Cozaar 50 mg daily, Coreg 3 125 mg twice daily  Blood sugar here in the 150s to 160s  We will continue to monitor  -defer medication titration to cardiology      VTE Pharmacologic Prophylaxis:   Pharmacologic: Enoxaparin (Lovenox)  Mechanical VTE Prophylaxis in Place: Yes    Discharge Plan: With need for continued inpatient stay for IV diuretics    Discussions with Specialists or Other Care Team Provider: nursing    Education and Discussions with Family / Patient: patient    Time Spent for Care: 45 minutes  More than 50% of total time spent on counseling and coordination of care as described above  Current Length of Stay: 1 day(s)  Current Patient Status: Inpatient   Code Status: Level 1 - Full Code    Subjective:   Resting in bed  Patient complains of feeling short of breath  She is currently on 4 L oxygen nasal cannula  She does not normally use this at home  Objective:     Vitals:   Temp (24hrs), Av 5 °F (36 4 °C), Min:97 °F (36 1 °C), Max:98 2 °F (36 8 °C)    Temp:  [97 °F (36 1 °C)-98 2 °F (36 8 °C)] 97 °F (36 1 °C)  HR:  [58-72] 72  Resp:  [18-25] 18  BP: (124-189)/(64-88) 160/88  SpO2:  [93 %-100 %] 93 %  Body mass index is 33 43 kg/m²  Input and Output Summary (last 24 hours): Intake/Output Summary (Last 24 hours) at 2023 1509  Last data filed at 2023 0949  Gross per 24 hour   Intake 240 ml   Output 750 ml   Net -510 ml       Physical Exam:     Physical Exam  Vitals and nursing note reviewed  Constitutional:       General: She is not in acute distress  Appearance: Normal appearance  She is normal weight  She is not ill-appearing, toxic-appearing or diaphoretic  HENT:      Head: Normocephalic and atraumatic  Eyes:      General: No scleral icterus  Cardiovascular:      Rate and Rhythm: Normal rate and regular rhythm  Pulmonary:      Breath sounds: Rales present  No rhonchi  Comments: On 4 L NC  Abdominal:      General: Bowel sounds are normal  There is no distension  Palpations: There is no mass  Tenderness: There is no abdominal tenderness  Hernia: No hernia is present  Musculoskeletal:         General: Swelling present  Cervical back: Neck supple  Skin:     General: Skin is warm and dry  Neurological:      Mental Status: She is alert and oriented to person, place, and time  Mental status is at baseline  Psychiatric:         Mood and Affect: Mood normal          Behavior: Behavior normal          Additional Data:     Labs:    Results from last 7 days   Lab Units 02/22/23  0554 02/21/23  2111 02/21/23  1225   WBC Thousand/uL 3 53*  --  6 46   HEMOGLOBIN g/dL 7 3*  --  8 0*   HEMATOCRIT % 23 8*  --  25 5*   PLATELETS Thousands/uL 162   < > 169   NEUTROS PCT %  --   --  77*   LYMPHS PCT %  --   --  10*   MONOS PCT %  --   --  9   EOS PCT %  --   --  1    < > = values in this interval not displayed  Results from last 7 days   Lab Units 02/22/23  0554 02/21/23  1225   POTASSIUM mmol/L 4 7 4 2   CHLORIDE mmol/L 112* 110*   CO2 mmol/L 21 22   BUN mg/dL 26* 26*   CREATININE mg/dL 0 91 0 96   CALCIUM mg/dL 8 3* 8 5   ALK PHOS U/L  --  192*   ALT U/L  --  67*   AST U/L  --  38     Results from last 7 days   Lab Units 02/21/23  1225   INR  1 41*       * I Have Reviewed All Lab Data Listed Above  * Additional Pertinent Lab Tests Reviewed:  All Labs For Current Hospital Admission Reviewed    Imaging:    Imaging Reports Reviewed Today Include: CTA PE study  Imaging Personally Reviewed by Myself Includes:      Recent Cultures (last 7 days):     Results from last 7 days   Lab Units 02/21/23  1323 02/21/23  1225   BLOOD CULTURE  Received in Microbiology Lab  Culture in Progress  Received in Microbiology Lab  Culture in Progress  Last 24 Hours Medication List:   Current Facility-Administered Medications   Medication Dose Route Frequency Provider Last Rate   • acetaminophen  650 mg Oral Q6H PRN Sukhwinder Chavez PA-C     • albuterol  2 5 mg Nebulization Q6H PRN Luna Cisneros Prechtel, DO     • amitriptyline  10 mg Oral HS Fred S Prechtel, DO     • aspirin  81 mg Oral QAM Fred S Prechtel, DO     • atorvastatin  80 mg Oral HS Fred S Prechtel, DO     • bisacodyl  10 mg Rectal Daily Fred S Prechtel, DO     • carvedilol  3 125 mg Oral BID With Meals Fred S Prechtel, DO     • dicyclomine  10 mg Oral 4x Daily PRN Luna Cisneros Prechtel, DO     • docusate sodium  100 mg Oral Daily Fred S Prechtel, DO     • enoxaparin  40 mg Subcutaneous Daily Fred S Prechtel, DO     • famotidine  20 mg Oral BID Fred S Prechtel, DO     • furosemide  40 mg Intravenous BID (diuretic) Luna Cisneros Prechtel, DO     • gabapentin  100 mg Oral TID Luna Cisneros Prechtel, DO     • levothyroxine  100 mcg Oral Early Morning Fred S Prechtel, DO     • losartan  50 mg Oral HS Fred S Prechtel, DO     • modafinil  100 mg Oral Daily Fred S Prechtel, DO     • oxybutynin  10 mg Oral Daily Fred S Prechtel, DO     • pantoprazole  40 mg Oral Early Morning Fred S Prechtel, DO     • polyethylene glycol  17 g Oral Daily Fred S Prechtel, DO     • pramipexole  1 mg Oral BID Fred S Prechtel, DO     • sertraline  100 mg Oral HS Fred S Prechtel, DO     • traMADol  50 mg Oral Q6H PRN Kathy Damon PA-C          Today, Patient Was Seen By: Kathy Damon PA-C    ** Please Note: This note has been constructed using a voice recognition system   **

## 2023-02-22 NOTE — CONSULTS
Consult - Cardiology   Rebecca Benitez 78 y o  female MRN: [id number]  Unit/Bed#: E4 -01 Encounter: [id number]        Reason For Consult:  Acute heart failure               ASSESSMENT:  Acute on chronic diastolic congestive heart failure   -   - CTA on arrival revealed pulmonary edema with small right and trace left effusion     - TTE 2/8/22: LVEF 55 to 60%, mild LVH, wall motion within normal limits, aortic sclerosis without stenosis, trace MR, trace TR    - outpatient diuretic Rx, torsemide 20 mg daily  Recent pneumonia   Acute hypoxic respiratory failure   - suspect from the setting of recent pneumonia with acute heart failure + acute on chronic anemia  - currently on 4 L supplemental oxygen via nasal cannula  Acute on chronic anemia   Nonobstructive coronary artery disease   - [facility] 2011: Mid LAD 50% negative by FFR  - stress test 6/17/22: likely normal myocardial perfusion nuclear stress study, likely no evidence of ischemia or infarct  No TID, LVEF 65%  Nondiagnostic ECG due to V paced  History of first-degree AVB, bifascicular block   - status post pacemaker insertion, 7/2019  Hypertension   - outpatient Rx, losartan 50 mg daily, carvedilol 3 125 mg twice daily, torsemide 20 mg daily  Hyperlipidemia   - Rx, atorvastatin 80 mg daily  - lipid panel 7/29/22: Cholesterol 240, , HDL 57, triglycerides 212  History of TIA, DVT in 2015  Primary hyperparathyroid status post left inferior parathyroidectomy, November 2020  Hypothyroid  Chronic kidney disease, stage III  Diabetes mellitus type 2  Oobesity  Obstructive sleep apnea  Back spinal stimulator    PLAN/ DISCUSSION:     · Status post furosemide 40 mg IV upon arrival; currently on furosemide 40 mg IV twice daily  · We will continue same today and assess response  · Currently on carvedilol 3 125 mg twice daily, losartan 50 mg daily  · Continue aspirin 81 mg daily, atorvastatin 80 mg daily     · Negative HS troponin trend of 20, 21   · Repeat echocardiogram to assess cardiac structure and function  · Monitor volume status with strict intake/output, daily weight  · Monitor renal function and electrolytes closely; maintain potassium > 4, magnesium > 2  History Of Present Illness:  60-year-old female presented to [facility] with complaints of shortness of breath  As noted below, patient with recent discharge from [facility] for pneumonia  Per patient, she has been having increased difficulty breathing since her discharge  She notes that she did take her diuretics yesterday, but she was not receiving them in the hospital prior  Patient was recently admitted at the [facility] on 2/15/23 through 2/20/23 after presenting for pneumonia and acute metabolic encephalopathy  Patient received IV antibiotics  She was noted to have hemoptysis, however it resolved prior to discharge  Upon assessment and evaluation, patient resting in bed  Patient with continued cough and shortness of breath  She denies chest pain, dizziness, lightheadedness, syncope, pre-syncope, palpitations  Please see significant cardiac history and problems enumerated above  Patient follows with [facility] cardiology as an outpatient      Past Medical History:        Past Medical History:   Diagnosis Date   • Anxiety    • Asthma     controlled   • Back complaints    • Cancer (HCC)     nose   • Cellulitis of left lower leg     "not now"   • CHF (congestive heart failure) ([facility] [address] ) 02/2021   • Chronic bilateral thoracic back pain    • Chronic kidney disease     sees nephrologist regularly" stage 3"   • Chronic myofascial pain    • Chronic pain of both lower extremities    • Chronic pain of left knee     "both knees"   • Chronic pain syndrome     bilat legs and knees/neuropathy pain   • COVID 12/2021   • CPAP (continuous positive airway pressure) dependence     no currently uses   • Depression    • Diabetes mellitus (HCC)     insulin pump   • Disease of thyroid gland    • Does use hearing aid     bilat and will wear DOS   • DVT (deep venous thrombosis) ([facility] [address] )     left leg   • Gait disorder    • Gastroparesis    • GERD (gastroesophageal reflux disease)    • History of angina    • History of MRSA infection    • History of transfusion     Many years ago   • Hyperlipidemia    • Hypertension    • Hypoglycemic reaction     "occas low blood sugar"   • Insulin pump in place     pt reports saw endocrinologist  and will bring copy of instructions DOS   • Irritable bowel syndrome    • Kidney disease    • Pacemaker    • Polyneuropathy associated with underlying disease ([facility] [address] )    • PONV (postoperative nausea and vomiting)    • Risk for falls    • S/P insertion of spinal cord stimulator 2018   • Sarcoidosis    • Shortness of breath     "exertion and not new"   • Sleep apnea    • TIA (transient ischemic attack)    • Use of cane as ambulatory aid    • Uses walker    • Wears dentures     permanent upper/and some missing teeth/partial lower      Past Surgical History:   Procedure Laterality Date   • ABDOMINAL ADHESION SURGERY N/A 2021    Procedure: laparoscopic LYSIS ADHESIONS;  Surgeon: Amanda Betts MD;  [facility];  Service: Thoracic   • BREAST SURGERY     • BREAST SURGERY      reduction   • CARDIAC PACEMAKER PLACEMENT      pacemaker   •  SECTION     • COLONOSCOPY     • DILATION AND CURETTAGE OF UTERUS      "D&E"   • HERNIA REPAIR     • HYSTERECTOMY     • JOINT REPLACEMENT Left     LTKR   • NECK SURGERY      fused 4 discs with 4 screws implanted   • NISSEN FUNDOPLICATION LAPAROSCOPIC WITH ROBOTICS N/A 2021    Procedure: ROBOTIC [address] ;  Surgeon: Amanda Betts MD;  [facility];  Service: Thoracic   • LA ESOPHAGOGASTRODUODENOSCOPY TRANSORAL DIAGNOSTIC N/A 2021    Procedure: ESOPHAGOGASTRODUODENOSCOPY (EGD);   Surgeon: Amanda Betts MD;  [facility];  Service: Thoracic   • NJ ESOPHAGOGASTRODUODENOSCOPY TRANSORAL DIAGNOSTIC N/A 01/12/2022    Procedure: ESOPHAGOGASTRODUODENOSCOPY (EGD),;  Surgeon: Debbie Owens MD;  [facility];  Service: Thoracic   • NJ ESOPHAGOGASTRODUODENOSCOPY TRANSORAL DIAGNOSTIC N/A 02/16/2022    Procedure: ESOPHAGOGASTRODUODENOSCOPY (EGD); Surgeon: Debbie Owens MD;  [facility];  Service: Thoracic   • NJ ESOPHAGOGASTRODUODENOSCOPY TRANSORAL DIAGNOSTIC N/A 07/12/2022    Procedure: ESOPHAGOGASTRODUODENOSCOPY (EGD); PYLORIC DILATION; GE JUNCTION DILATION;  Surgeon: Debbie Owens MD;  [facility];  Service: Thoracic   • NJ ESOPHAGOGASTRODUODENOSCOPY TRANSORAL DIAGNOSTIC N/A 11/29/2022    Procedure: ESOPHAGOGASTRODUODENOSCOPY (EGD); Surgeon: Diamond Dorman MD;  [facility];  Service: Thoracic   • NJ ESOPHAGOGASTRODUODENOSCOPY TRANSORAL DIAGNOSTIC N/A 1/24/2023    Procedure: ESOPHAGOGASTRODUODENOSCOPY (EGD); Surgeon: Debbie Owens MD;  [facility];  Service: Thoracic   • NJ ESOPHAGOSCOPY FLEX BALLOON DILAT <30 MM DIAM N/A 01/12/2022    Procedure: DILATATION ESOPHAGEAL;  Surgeon: Debbie Owens MD;  [facility];  Service: Thoracic   • NJ ESOPHAGOSCOPY FLEX BALLOON DILAT <30 MM DIAM N/A 02/16/2022    Procedure: DILATATION ESOPHAGEAL;  Surgeon: Debbie Owens MD;  [facility];  Service: Thoracic   • NJ ESOPHAGOSCOPY FLEX BALLOON DILAT <30 MM DIAM N/A 11/29/2022    Procedure: DILATATION ESOPHAGEAL esophageal and pyloric dilation;  Surgeon: Diamond Dorman MD;  [facility];  Service: Thoracic   • NJ ESOPHAGOSCOPY FLEX BALLOON DILAT <30 MM DIAM N/A 1/24/2023    Procedure: esophageal dilation dilated up to 47  with pylorus dilation dilated up to 18;  Surgeon: Debbie Owens MD;  [facility];  Service: Thoracic   • NJ RIBEIRO IMPLTJ NSTIM ELTRDS PLATE/PADDLE EDRL Left 04/23/2018    Procedure:  Insertion of thoracic spinal cord stimulator electrode via laminotomy and placement of left buttock IPG;  Surgeon: Cullen Whittington MD;  [facility];  Service: Neurosurgery   • REPLACEMENT TOTAL KNEE     • ROTATOR CUFF REPAIR     • SPINAL STIMULATOR PLACEMENT Left 10/03/2018    Procedure: BUTTOCK RE-OPENING INCISION FOR REPOSITIONING OF IMPLANTABLE PULSE GENERATOR;  Surgeon: Cullen Whittington MD;  [facility];  Service: Neurosurgery   • TONSILLECTOMY     • UPPER GASTROINTESTINAL ENDOSCOPY     • WISDOM TOOTH EXTRACTION          Allergy:        Allergies   Allergen Reactions   • Bactrim [Sulfamethoxazole-Trimethoprim] Hives   • Sucralfate Hives     Facial swelling   • Topamax [Topiramate]      disorentation   • Azithromycin Itching   • Pregabalin Confusion and Other (See Comments)     altered mental status   • Ciprofloxacin Drowsiness     Other reaction(s): Other (See Comments)  "drowsiness"   • Norvasc [Amlodipine] Swelling   • Baclofen      "That knocks me out "   • Bupropion Fatigue   • Methotrexate Nausea Only       Medications:       Prior to Admission medications    Medication Sig Start Date End Date Taking?  Authorizing Provider   albuterol (ACCUNEB) 1 25 MG/3ML nebulizer solution Take 2 5 mg by nebulization every 6 (six) hours as needed for wheezing   Yes Historical Provider, MD   albuterol (PROVENTIL HFA,VENTOLIN HFA) 90 mcg/act inhaler Inhale 2 puffs every 6 (six) hours as needed for wheezing   Yes Historical Provider, MD   amitriptyline (ELAVIL) 10 mg tablet Take 10 mg by mouth daily at bedtime 3/7/22  Yes Historical Provider, MD   Armodafinil (Nuvigil) 250 MG tablet Take 1 tablet (250 mg total) by mouth every morning 1/30/23  Yes Rubens Coombs MD   aspirin (ECOTRIN LOW STRENGTH) 81 mg EC tablet Take 81 mg by mouth every morning   Yes Historical Provider, MD   atorvastatin (LIPITOR) 80 mg tablet Take 80 mg by mouth daily at bedtime     Yes Historical Provider, MD   calcium carbonate (OS-KARIS) 1250 (500 Ca) MG chewable tablet Chew 1 tablet daily   Yes Historical Provider, MD   calcium carbonate (TUMS) 500 mg chewable tablet Chew 1 tablet daily   Yes Historical Provider, MD   carvedilol (COREG) 3 125 mg tablet Take 3 125 mg by mouth 2 (two) times a day with meals   Yes Historical Provider, MD   chlorzoxazone (PARAFON FORTE) 500 mg tablet Take 1 PO QID PRN for pain and spasms  Patient taking differently: Take 500 mg by mouth 3 (three) times a day as needed Take 1 PO QID PRN for pain and spasms   1/31/22  Yes ROMAINE Garcia   CRANBERRY PO Take 1 capsule by mouth every evening    Yes Historical Provider, MD   Cyanocobalamin (VITAMIN B-12 PO) Take 1 capsule by mouth every evening    Yes Historical Provider, MD   dicyclomine (BENTYL) 10 mg capsule Take 1 capsule (10 mg total) by mouth 4 (four) times a day as needed (abdominal pain) 7/28/20  Yes Vi Dobson PA-C   docusate sodium (COLACE) 100 mg capsule TAKE ONE CAPSULE BY MOUTH EVERY DAY 11/16/22  Yes Ebony Gan DO   esomeprazole (NexIUM) 40 MG capsule Take 1 capsule (40 mg total) by mouth 2 (two) times a day before meals 8/4/21  Yes Ebony Gan DO   famotidine (PEPCID) 20 mg tablet TAKE 1 TABLET BY MOUTH TWICE A DAY 1/26/23  Yes Vi Dobson PA-C   gabapentin (NEURONTIN) 100 mg capsule Take 100 mg by mouth 3 (three) times a day 2/25/22  Yes Historical Provider, MD   gabapentin (NEURONTIN) 300 mg capsule  1/30/23  Yes Historical Provider, MD   levothyroxine 100 mcg tablet Take 100 mcg by mouth daily   Yes Historical Provider, MD   losartan (COZAAR) 50 mg tablet Take 50 mg by mouth daily at bedtime   Yes Historical Provider, MD   NOVOLOG 100 UNIT/ML injection Pt reports unsure of total dose a day/did see her endocrinologist Dr Batsheva Diamond  PA  Pt has pump on upper left abdomen cut basaL RATE IN HALF 1/2/18  Yes Historical Provider, MD   ondansetron (ZOFRAN) 8 mg tablet Take 8 mg by mouth every 8 (eight) hours as needed     Yes Historical Provider, MD   polyethylene glycol (MIRALAX) 17 g packet Take 17 g by mouth daily 11/10/21  Yes Ebony Gan DO   pramipexole (MIRAPEX) 1 mg tablet Take 1 mg by mouth 2 (two) times a day     Yes Historical Provider, MD   Semaglutide (OZEMPIC, 0 25 OR 0 5 MG/DOSE, SC) Inject 0 25 mg under the skin once a week Sundays   Yes Historical Provider, MD   sertraline (ZOLOFT) 50 mg tablet Take 100 mg by mouth daily at bedtime   Yes Historical Provider, MD   torsemide (DEMADEX) 20 mg tablet Take 20 mg by mouth every morning   Yes Historical Provider, MD   bisacodyl (DULCOLAX) 10 mg suppository Insert 1 suppository (10 mg total) into the rectum daily  Patient not taking: Reported on 2/21/2023 3/10/22   Manoj Garcia DO   cephalexin (KEFLEX) 500 mg capsule PLEASE SEE ATTACHED FOR DETAILED DIRECTIONS  Patient not taking: Reported on 2/21/2023 1/19/23   Historical Provider, MD   Darbepoetin Mike (ARANESP, ALBUMIN FREE, IJ) Inject as directed every 14 (fourteen) days  Patient not taking: Reported on 2/21/2023    Historical Provider, MD   fluticasone-salmeterol (ADVAIR) 100-50 mcg/dose inhaler Inhale 2 puffs as needed  Patient not taking: Reported on 2/21/2023    Historical Provider, MD   glucose blood test strip Testing six times daily  E11 65 on insulin pump 9/17/18   Historical Provider, MD   glucose blood test strip Use 1/27/23   Historical Provider, MD   Insulin Infusion Pump (MINIMED INSULIN PUMP) PRINCESS Use    Historical Provider, MD   ketorolac (ACULAR) 0 5 % ophthalmic solution INSTILL 1 DROP INTO THE OPERATIVE EYE ONCE DAILY STARTING 3 DAYS PRIOR TO SURGERY AS DIRECTED 1/26/23   Historical Provider, MD   Potassium Gluconate 595 MG CAPS Take by mouth 2 (two) times a day  Patient not taking: Reported on 2/21/2023    Historical Provider, MD   Senexon-S 8 6-50 MG per tablet TAKE 1 TABLET BY MOUTH EVERY DAY  Patient not taking: Reported on 2/21/2023 1/26/23   Vi Dobson PA-C   solifenacin (VESICARE) 10 MG tablet Take 10 mg by mouth daily 4/20/22   Historical Provider, MD       Family History:     Family History   Problem Relation Age of Onset   • Diabetes Family    • Heart disease Family    • Hypertension Family    • Neuropathy Family    • No Known Problems Mother    • Heart disease Father    • Diabetes Father    • No Known Problems Maternal Grandmother    • No Known Problems Maternal Grandfather    • No Known Problems Paternal Grandmother    • No Known Problems Paternal Grandfather    • No Known Problems Brother    • No Known Problems Daughter    • Cancer Son         Social History:       Social History     Socioeconomic History   • Marital status: /Civil Union     Spouse name: None   • Number of children: None   • Years of education: None   • Highest education level: None   Occupational History   • None   Tobacco Use   • Smoking status: Never   • Smokeless tobacco: Never   Vaping Use   • Vaping Use: Never used   Substance and Sexual Activity   • Alcohol use: Never   • Drug use: Yes     Frequency: 8 0 times per week     Types: Marijuana     Comment: Medical Marijuana/vape pen once per week   • Sexual activity: None   Other Topics Concern   • None   Social History Narrative   • None     Social Determinants of Health     Financial Resource Strain: Not on file   Food Insecurity: Not on file   Transportation Needs: Not on file   Physical Activity: Not on file   Stress: Not on file   Social Connections: Not on file   Intimate Partner Violence: Not on file   Housing Stability: Not on file       ROS:  Negative except otherwise aforementioned above  Remainder review of systems is negative    Exam:  General:  alert, oriented and in no distress, cooperative  Head: Normocephalic, atraumatic  Eyes:  EOMI  Pupils - equal, round, reactive to accomodation  No icterus  Normal Conjunctiva     Oropharynx: moist and normal-appearing mucosa  Neck: supple, symmetrical, trachea midline  Heart: regular rate with controlled rhythm, murmur appreciated  Respiratory effort / Chest Inspection: unlabored, on supplemental oxygen via NC  Lungs: fine rales bilateral bases   Abdomen: flat, normal findings: bowel sounds normal and soft, non-tender  Lower Limbs: trace edema bilaterally       DATA:      ECG:                               Echocardiogram completed on 2/8/22:       Left ventricle is normal in size  There is mild concentric hypertrophy  Systolic function is normal with an ejection fraction of 55-60%  Wall   motion is within normal limits  Right ventricle cavity is top normal  Systolic function is normal  Pacer   wire present in the ventricle  Aortic sclerosis without stenosis  Left Ventricle   Left ventricle is normal in size  There is mild concentric hypertrophy  Systolic function is normal with an ejection fraction of 55-60%  Wall motion is within normal limits  Left atrial pressure is normal      Right Ventricle   Right ventricle cavity is top normal  Systolic function is normal  Pacer/ device wire present in the ventricle  Left Atrium   Left atrium cavity size is normal      Right Atrium   Right atrium cavity is normal  A pacemaker wire is present in the right atrium  IVC/SVC   The inferior vena cava demonstrates a diameter of <=21 mm and collapses >50%; therefore, the right atrial pressure is estimated at 0-5 mmHg  Mitral Valve   Mitral valve structure is normal  There is trace regurgitation  There is no evidence of mitral valve stenosis  Tricuspid Valve   Tricuspid valve structure is normal  There is trace regurgitation  There is no evidence of tricuspid valve stenosis  Aortic Valve   Aortic sclerosis without stenosis  There is no regurgitation  Pulmonic Valve   Pulmonic valve structure is not well-visualized  There is no regurgitation or stenosis  Ascending Aorta   The aorta appears normal in size  Pericardium   There is no pericardial effusion  Stress test completed on 6/17/22:   This result has an attachment that is not available  Likely normal myocardial perfusion nuclear stress study  Likely no evidence of ischemia or infarct   No TID  Normal left ventricular size and systolic function  Calculated LVEF 65%   Non-diagnostic ECG due to V-paced   Compared to prior study on 03/15/21, no significant differences  There is   a tiny area (1 segment) in the anterior/anterolateral with questionable   reversibility - does not appear different     Weights: Wt Readings from Last 3 Encounters:   02/22/23 82 9 kg (182 lb 12 2 oz)   01/31/23 77 6 kg (171 lb)   01/24/23 77 6 kg (171 lb)   , Body mass index is 33 43 kg/m²           Lab Studies:             Results from last 7 days   Lab Units 02/22/23  0554 02/21/23  2111 02/21/23  1225   WBC Thousand/uL 3 53*  --  6 46   HEMOGLOBIN g/dL 7 3*  --  8 0*   HEMATOCRIT % 23 8*  --  25 5*   PLATELETS Thousands/uL 162 180 169   ,   Results from last 7 days   Lab Units 02/22/23  0554 02/21/23  1225   POTASSIUM mmol/L 4 7 4 2   CHLORIDE mmol/L 112* 110*   CO2 mmol/L 21 22   BUN mg/dL 26* 26*   CREATININE mg/dL 0 91 0 96   CALCIUM mg/dL 8 3* 8 5   ALK PHOS U/L  --  192*   ALT U/L  --  67*   AST U/L  --  38

## 2023-02-22 NOTE — ASSESSMENT & PLAN NOTE
Home regimen Cozaar 50 mg daily, Coreg 3 125 mg twice daily  Blood sugar here in the 150s to 160s  We will continue to monitor  -defer medication titration to cardiology

## 2023-02-22 NOTE — ASSESSMENT & PLAN NOTE
Wt Readings from Last 3 Encounters:   02/22/23 82 9 kg (182 lb 12 2 oz)   01/31/23 77 6 kg (171 lb)   01/24/23 77 6 kg (171 lb)   Receives most of her care at [facility] -including her cardiologist  Presented with a complaint of shortness of breath  Requiring 4 L oxygen nasal cannula  Echocardiogram: 2/2022- EF 55-60%, mild LVH, AV sclerosis, trace MR/TR  Repeat echocardiogram pending here  CXR here with pulmonary edema  Home regimen torsemide 40 mg daily  Started on IV Lasix 40 mg twice daily - continue  Repeat CXR pending for tomorrow am  Monitor I's and O's and daily weights

## 2023-02-23 ENCOUNTER — APPOINTMENT (INPATIENT)
Dept: RADIOLOGY | Facility: HOSPITAL | Age: 68
End: 2023-02-23

## 2023-02-23 LAB
ANION GAP SERPL CALCULATED.3IONS-SCNC: 8 MMOL/L (ref 4–13)
AORTIC ROOT: 3.1 CM
AORTIC VALVE MEAN VELOCITY: 13 M/S
APICAL FOUR CHAMBER EJECTION FRACTION: 61 %
AV AREA BY CONTINUOUS VTI: 0.9 CM2
AV AREA PEAK VELOCITY: 1.3 CM2
AV LVOT MEAN GRADIENT: 2 MMHG
AV LVOT PEAK GRADIENT: 3 MMHG
AV MEAN GRADIENT: 7 MMHG
AV PEAK GRADIENT: 11 MMHG
AV VALVE AREA: 0.88 CM2
AV VELOCITY RATIO: 0.5
BUN SERPL-MCNC: 28 MG/DL (ref 5–25)
CALCIUM SERPL-MCNC: 8.3 MG/DL (ref 8.4–10.2)
CHLORIDE SERPL-SCNC: 110 MMOL/L (ref 96–108)
CO2 SERPL-SCNC: 23 MMOL/L (ref 21–32)
CREAT SERPL-MCNC: 0.97 MG/DL (ref 0.6–1.3)
DOP CALC AO PEAK VEL: 1.61 M/S
DOP CALC AO VTI: 29.61 CM
DOP CALC LVOT AREA: 2.54 CM2
DOP CALC LVOT DIAMETER: 1.8 CM
DOP CALC LVOT PEAK VEL VTI: 10.28 CM
DOP CALC LVOT PEAK VEL: 0.81 M/S
DOP CALC LVOT STROKE INDEX: 15.2 ML/M2
DOP CALC LVOT STROKE VOLUME: 26.15
E WAVE DECELERATION TIME: 181 MS
ERYTHROCYTE [DISTWIDTH] IN BLOOD BY AUTOMATED COUNT: 14.9 % (ref 11.6–15.1)
FRACTIONAL SHORTENING: 18 (ref 28–44)
GFR SERPL CREATININE-BSD FRML MDRD: 61 ML/MIN/1.73SQ M
GLUCOSE SERPL-MCNC: 105 MG/DL (ref 65–140)
GLUCOSE SERPL-MCNC: 119 MG/DL (ref 65–140)
GLUCOSE SERPL-MCNC: 179 MG/DL (ref 65–140)
GLUCOSE SERPL-MCNC: 186 MG/DL (ref 65–140)
GLUCOSE SERPL-MCNC: 48 MG/DL (ref 65–140)
GLUCOSE SERPL-MCNC: 54 MG/DL (ref 65–140)
GLUCOSE SERPL-MCNC: 75 MG/DL (ref 65–140)
HCT VFR BLD AUTO: 24 % (ref 34.8–46.1)
HEMOCCULT STL QL: NEGATIVE
HGB BLD-MCNC: 7.5 G/DL (ref 11.5–15.4)
INTERVENTRICULAR SEPTUM IN DIASTOLE (PARASTERNAL SHORT AXIS VIEW): 1.3 CM
INTERVENTRICULAR SEPTUM: 1.3 CM (ref 0.6–1.1)
LAAS-AP2: 24.3 CM2
LAAS-AP4: 19.7 CM2
LEFT ATRIUM AREA SYSTOLE SINGLE PLANE A4C: 20.2 CM2
LEFT ATRIUM SIZE: 3.7 CM
LEFT INTERNAL DIMENSION IN SYSTOLE: 3.7 CM (ref 2.1–4)
LEFT VENTRICLE DIASTOLIC VOLUME (MOD BIPLANE): 83 ML
LEFT VENTRICLE SYSTOLIC VOLUME (MOD BIPLANE): 38 ML
LEFT VENTRICULAR INTERNAL DIMENSION IN DIASTOLE: 4.5 CM (ref 3.5–6)
LEFT VENTRICULAR POSTERIOR WALL IN END DIASTOLE: 1.2 CM
LEFT VENTRICULAR STROKE VOLUME: 36 ML
LV EF: 55 %
LVSV (TEICH): 36 ML
MCH RBC QN AUTO: 31.1 PG (ref 26.8–34.3)
MCHC RBC AUTO-ENTMCNC: 31.3 G/DL (ref 31.4–37.4)
MCV RBC AUTO: 100 FL (ref 82–98)
MV E'TISSUE VEL-SEP: 5 CM/S
MV PEAK A VEL: 1.03 M/S
MV PEAK E VEL: 83 CM/S
MV STENOSIS PRESSURE HALF TIME: 52 MS
MV VALVE AREA P 1/2 METHOD: 4.23
PMV BLD AUTO: 10.2 FL (ref 8.9–12.7)
POTASSIUM SERPL-SCNC: 4.7 MMOL/L (ref 3.5–5.3)
RBC # BLD AUTO: 2.41 MILLION/UL (ref 3.81–5.12)
RIGHT ATRIUM AREA SYSTOLE A4C: 16.7 CM2
RIGHT VENTRICLE ID DIMENSION: 3.9 CM
SL CV LEFT ATRIUM LENGTH A2C: 5.6 CM
SL CV LV EF: 54
SL CV PED ECHO LEFT VENTRICLE DIASTOLIC VOLUME (MOD BIPLANE) 2D: 92 ML
SL CV PED ECHO LEFT VENTRICLE SYSTOLIC VOLUME (MOD BIPLANE) 2D: 57 ML
SODIUM SERPL-SCNC: 141 MMOL/L (ref 135–147)
TR MAX PG: 23 MMHG
TR PEAK VELOCITY: 2.4 M/S
TRICUSPID ANNULAR PLANE SYSTOLIC EXCURSION: 2.3 CM
TRICUSPID VALVE PEAK REGURGITATION VELOCITY: 2.4 M/S
WBC # BLD AUTO: 3.29 THOUSAND/UL (ref 4.31–10.16)

## 2023-02-23 RX ORDER — FUROSEMIDE 10 MG/ML
40 INJECTION INTRAMUSCULAR; INTRAVENOUS ONCE
Status: COMPLETED | OUTPATIENT
Start: 2023-02-23 | End: 2023-02-23

## 2023-02-23 RX ORDER — INSULIN LISPRO 100 [IU]/ML
1-6 INJECTION, SOLUTION INTRAVENOUS; SUBCUTANEOUS
Status: DISCONTINUED | OUTPATIENT
Start: 2023-02-23 | End: 2023-03-01 | Stop reason: HOSPADM

## 2023-02-23 RX ORDER — FUROSEMIDE 10 MG/ML
80 INJECTION INTRAMUSCULAR; INTRAVENOUS
Status: DISCONTINUED | OUTPATIENT
Start: 2023-02-23 | End: 2023-02-24

## 2023-02-23 RX ORDER — BENZONATATE 100 MG/1
100 CAPSULE ORAL 3 TIMES DAILY PRN
Status: DISCONTINUED | OUTPATIENT
Start: 2023-02-23 | End: 2023-03-01 | Stop reason: HOSPADM

## 2023-02-23 RX ADMIN — CARVEDILOL 3.12 MG: 3.12 TABLET, FILM COATED ORAL at 17:12

## 2023-02-23 RX ADMIN — FUROSEMIDE 80 MG: 10 INJECTION, SOLUTION INTRAVENOUS at 17:12

## 2023-02-23 RX ADMIN — PRAMIPEXOLE DIHYDROCHLORIDE 1 MG: 1 TABLET ORAL at 08:48

## 2023-02-23 RX ADMIN — DOCUSATE SODIUM 100 MG: 100 CAPSULE, LIQUID FILLED ORAL at 08:49

## 2023-02-23 RX ADMIN — LOSARTAN POTASSIUM 50 MG: 50 TABLET, FILM COATED ORAL at 21:37

## 2023-02-23 RX ADMIN — POLYETHYLENE GLYCOL 3350 17 G: 17 POWDER, FOR SOLUTION ORAL at 08:48

## 2023-02-23 RX ADMIN — TRAMADOL HYDROCHLORIDE 50 MG: 50 TABLET, COATED ORAL at 17:15

## 2023-02-23 RX ADMIN — BENZONATATE 100 MG: 100 CAPSULE ORAL at 17:12

## 2023-02-23 RX ADMIN — FAMOTIDINE 20 MG: 20 TABLET ORAL at 08:49

## 2023-02-23 RX ADMIN — GABAPENTIN 100 MG: 100 CAPSULE ORAL at 20:26

## 2023-02-23 RX ADMIN — AMITRIPTYLINE HYDROCHLORIDE 10 MG: 10 TABLET, FILM COATED ORAL at 21:38

## 2023-02-23 RX ADMIN — IRON SUCROSE 200 MG: 20 INJECTION, SOLUTION INTRAVENOUS at 08:48

## 2023-02-23 RX ADMIN — MODAFINIL 100 MG: 100 TABLET ORAL at 09:02

## 2023-02-23 RX ADMIN — FUROSEMIDE 40 MG: 10 INJECTION, SOLUTION INTRAVENOUS at 11:09

## 2023-02-23 RX ADMIN — ENOXAPARIN SODIUM 40 MG: 40 INJECTION SUBCUTANEOUS at 08:48

## 2023-02-23 RX ADMIN — INSULIN LISPRO 1 UNITS: 100 INJECTION, SOLUTION INTRAVENOUS; SUBCUTANEOUS at 21:37

## 2023-02-23 RX ADMIN — TRAMADOL HYDROCHLORIDE 50 MG: 50 TABLET, COATED ORAL at 06:17

## 2023-02-23 RX ADMIN — GABAPENTIN 100 MG: 100 CAPSULE ORAL at 17:23

## 2023-02-23 RX ADMIN — LEVOTHYROXINE SODIUM 100 MCG: 100 TABLET ORAL at 06:14

## 2023-02-23 RX ADMIN — OXYBUTYNIN CHLORIDE 10 MG: 10 TABLET, EXTENDED RELEASE ORAL at 08:49

## 2023-02-23 RX ADMIN — FAMOTIDINE 20 MG: 20 TABLET ORAL at 17:12

## 2023-02-23 RX ADMIN — PANTOPRAZOLE SODIUM 40 MG: 40 TABLET, DELAYED RELEASE ORAL at 06:15

## 2023-02-23 RX ADMIN — GABAPENTIN 100 MG: 100 CAPSULE ORAL at 08:49

## 2023-02-23 RX ADMIN — PRAMIPEXOLE DIHYDROCHLORIDE 1 MG: 1 TABLET ORAL at 18:05

## 2023-02-23 RX ADMIN — BENZONATATE 100 MG: 100 CAPSULE ORAL at 09:08

## 2023-02-23 RX ADMIN — SERTRALINE HYDROCHLORIDE 100 MG: 100 TABLET, FILM COATED ORAL at 21:37

## 2023-02-23 RX ADMIN — ASPIRIN 81 MG: 81 TABLET, COATED ORAL at 08:49

## 2023-02-23 RX ADMIN — BISACODYL 10 MG: 10 SUPPOSITORY RECTAL at 08:48

## 2023-02-23 RX ADMIN — ATORVASTATIN CALCIUM 80 MG: 80 TABLET, FILM COATED ORAL at 21:37

## 2023-02-23 RX ADMIN — CARVEDILOL 3.12 MG: 3.12 TABLET, FILM COATED ORAL at 08:49

## 2023-02-23 RX ADMIN — FUROSEMIDE 40 MG: 10 INJECTION, SOLUTION INTRAVENOUS at 08:49

## 2023-02-23 NOTE — ASSESSMENT & PLAN NOTE
Likely secondary to acute CHF exacerbation as well as recent pneumonia  Currently requiring 4 L oxygen nasal cannula  --> 2 L   Continue IV diuresis and taper as able

## 2023-02-23 NOTE — PROGRESS NOTES
[facility]  Progress Note - Cheryle Bare 1955, 78 y o  female MRN: [id number]  Unit/Bed#: E4 -01 Encounter: [id number]  Primary Care Provider: Julieta Echevarria DO   Date and time admitted to hospital: 2/21/2023 11:27 AM    * Acute CHF (congestive heart failure) (HCC)  Assessment & Plan  Wt Readings from Last 3 Encounters:   02/23/23 80 6 kg (177 lb 11 1 oz)   01/31/23 77 6 kg (171 lb)   01/24/23 77 6 kg (171 lb)   Karon 69 most of her care at [facility] -including her cardiologist  Presented with a complaint of shortness of breath  Requiring 4 L oxygen nasal cannula  Echocardiogram: 2/2022- EF 55-60%, mild LVH, AV sclerosis, trace MR/TR  Repeat echocardiogram here: EF 54%, paradoxical septal wall motion consistent with conduction abnormality, diastolic function mildly abnormal consistent with grade 1 abnormal relaxation, aortic valve sclerosis, mild mitral regurgitation, trace tricuspid regurgitation  CXR here with pulmonary edema  Home regimen torsemide 40 mg daily  Started on IV Lasix 40 mg twice daily --> advanced to 80 mg BID  Repeat CXR from this am pending  Down 5 lbs since yesterday    Anemia  Assessment & Plan  Results from last 7 days   Lab Units 02/23/23  0437 02/22/23  0554 02/21/23  1225   HEMOGLOBIN g/dL 7 5* 7 3* 8 0*   Baseline appearing in the 12's  More recently hemoglobins have been in the 8's/9s according to care everywhere  Denies hematochezia or hemoptysis  Reviewed care everywhere-recent iron panel from February 2023  Iron 32, transferrin 139, iron saturation 17, TIBC 188, ferritin 341  Giving iron 200 mg daily - day 2/3  Continue to follow hemoglobin  Heme occult negative    Respiratory failure (HCC)  Assessment & Plan  Likely secondary to acute CHF exacerbation as well as recent pneumonia  Currently requiring 4 L oxygen nasal cannula  --> 2 L   Continue IV diuresis and taper as able    History of pneumonia  Assessment & Plan  Was recently at [facility] on 2/15/2023 and treated for pneumonia  Receive IV cefepime as well as oral Omnicef to complete a 5-day antibiotic course  No further need for antibiotics here    Type 2 diabetes mellitus with microalbuminuria, with long-term current use of insulin [facility])  Assessment & Plan  Lab Results   Component Value Date    HGBA1C 6 7 (H) 02/15/2023     Recent Labs     23  1622 23  2040 23  0816 23  1109   POCGLU 183* 276* 186* 119   Prehospital patient uses an insulin pump  Pt reports pump is almost out of insulin   Will d c pump at this time and place on sliding scale    Essential hypertension  Assessment & Plan  Home regimen Cozaar 50 mg daily, Coreg 3 125 mg twice daily  Most recent /56    VTE Pharmacologic Prophylaxis:   Pharmacologic: Enoxaparin (Lovenox)  Mechanical VTE Prophylaxis in Place: Yes    Discharge Plan: With need for continued stay for IV diuresis - likely another 1-2 days    Discussions with Specialists or Other Care Team Provider: nursing    Education and Discussions with Family / Patient: patient, called  via telephone - unable to reach    Time Spent for Care: 30 minutes  More than 50% of total time spent on counseling and coordination of care as described above  Current Length of Stay: 2 day(s)  Current Patient Status: Inpatient   Code Status: Level 1 - Full Code    Subjective:   Resting in bed  Reports her breathing is slightly better  Still coughing  Lower extremity edema improving  Outputting urine  Reports pump is almost out of insulin -  will need to switch to sliding scale here  Objective:     Vitals:   Temp (24hrs), Av 4 °F (36 3 °C), Min:97 2 °F (36 2 °C), Max:97 6 °F (36 4 °C)    Temp:  [97 2 °F (36 2 °C)-97 6 °F (36 4 °C)] 97 4 °F (36 3 °C)  HR:  [60-66] 60  Resp:  [16-20] 16  BP: (120-165)/(56-78) 127/56  SpO2:  [94 %-99 %] 97 %  Body mass index is 32 5 kg/m²  Input and Output Summary (last 24 hours):        Intake/Output Summary (Last 24 hours) at 2/23/2023 1456  Last data filed at 2/22/2023 2001  Gross per 24 hour   Intake 220 ml   Output 350 ml   Net -130 ml       Physical Exam:     Physical Exam  Vitals and nursing note reviewed  Constitutional:       General: She is not in acute distress  Appearance: Normal appearance  She is obese  She is not ill-appearing, toxic-appearing or diaphoretic  HENT:      Head: Normocephalic and atraumatic  Eyes:      General: No scleral icterus  Cardiovascular:      Rate and Rhythm: Normal rate and regular rhythm  Pulmonary:      Effort: Pulmonary effort is normal  No respiratory distress  Breath sounds: No stridor  Rales present  No wheezing or rhonchi  Comments: On 2 L NC  Abdominal:      General: Bowel sounds are normal  There is no distension  Palpations: Abdomen is soft  There is no mass  Tenderness: There is no abdominal tenderness  Hernia: No hernia is present  Musculoskeletal:         General: Swelling present  Cervical back: Neck supple  Skin:     General: Skin is warm and dry  Neurological:      Mental Status: She is alert and oriented to person, place, and time  Mental status is at baseline  Psychiatric:         Mood and Affect: Mood normal          Behavior: Behavior normal          Additional Data:     Labs:    Results from last 7 days   Lab Units 02/23/23 0437 02/22/23  0554 02/21/23  2111 02/21/23  1225   WBC Thousand/uL 3 29* 3 53*  --  6 46   HEMOGLOBIN g/dL 7 5* 7 3*  --  8 0*   HEMATOCRIT % 24 0* 23 8*  --  25 5*   PLATELETS Thousands/uL  --  162   < > 169   NEUTROS PCT %  --   --   --  77*   LYMPHS PCT %  --   --   --  10*   MONOS PCT %  --   --   --  9   EOS PCT %  --   --   --  1    < > = values in this interval not displayed       Results from last 7 days   Lab Units 02/23/23 0437 02/22/23  0554 02/21/23  1225   POTASSIUM mmol/L 4 7   < > 4 2   CHLORIDE mmol/L 110*   < > 110*   CO2 mmol/L 23   < > 22   BUN mg/dL 28*   < > 26* CREATININE mg/dL 0 97   < > 0 96   CALCIUM mg/dL 8 3*   < > 8 5   ALK PHOS U/L  --   --  192*   ALT U/L  --   --  67*   AST U/L  --   --  38    < > = values in this interval not displayed  Results from last 7 days   Lab Units 02/21/23  1225   INR  1 41*       * I Have Reviewed All Lab Data Listed Above  * Additional Pertinent Lab Tests Reviewed: Ramon 67 Admission Reviewed    Imaging:    Imaging Reports Reviewed Today Include:   Imaging Personally Reviewed by Myself Includes:      Recent Cultures (last 7 days):     Results from last 7 days   Lab Units 02/21/23  1330 02/21/23  1323 02/21/23  1225   BLOOD CULTURE   --  No Growth at 24 hrs  No Growth at 24 hrs     URINE CULTURE  <10,000 cfu/ml Gram Positive Cocci*  --   --        Last 24 Hours Medication List:   Current Facility-Administered Medications   Medication Dose Route Frequency Provider Last Rate   • acetaminophen  650 mg Oral Q6H PRN Rajiv Lauren PA-C     • albuterol  2 5 mg Nebulization Q6H PRN Abner Heathhtel, DO     • amitriptyline  10 mg Oral HS Fred S Prechtel, DO     • aspirin  81 mg Oral QAM Fred S Prechtel, DO     • atorvastatin  80 mg Oral HS Fred S Prechtel, DO     • benzonatate  100 mg Oral TID PRN Ricardo Nina PA-C     • bisacodyl  10 mg Rectal Daily Fred S Prechtel, DO     • carvedilol  3 125 mg Oral BID With Meals Fred S Prechtel, DO     • dicyclomine  10 mg Oral 4x Daily PRN Abner Beckel, DO     • docusate sodium  100 mg Oral Daily Fred S Prechtel, DO     • enoxaparin  40 mg Subcutaneous Daily Fred S Prechtel, DO     • famotidine  20 mg Oral BID Abner Beckel, DO     • furosemide  80 mg Intravenous BID (diuretic) Harley Workman, DO     • gabapentin  100 mg Oral TID Abner Heathhtel, DO     • insulin lispro  1-6 Units Subcutaneous TID AC Tomeka Parker PA-C     • insulin lispro  1-6 Units Subcutaneous HS Tomeka Parker PA-C     • iron sucrose  200 mg Intravenous Daily Vernal Nash JULIO Parker 200 mg (02/22/23 3237)   • levothyroxine  100 mcg Oral Early Morning Fred S Prechtel, DO     • losartan  50 mg Oral HS Fred S Prechtel, DO     • modafinil  100 mg Oral Daily Fred S Prechtel, DO     • oxybutynin  10 mg Oral Daily Fred S Prechtel, DO     • pantoprazole  40 mg Oral Early Morning Fred S Prechtel, DO     • polyethylene glycol  17 g Oral Daily Fred S Prechtel, DO     • pramipexole  1 mg Oral BID Fred S Prechtel, DO     • sertraline  100 mg Oral HS Fred S Prechtel, DO     • traMADol  50 mg Oral Q6H PRN Karon Sequeira PA-C          Today, Patient Was Seen By: Karon Sequeira PA-C    ** Please Note: This note has been constructed using a voice recognition system   **

## 2023-02-23 NOTE — PLAN OF CARE
Problem: Potential for Falls  Goal: Patient will remain free of falls  Description: INTERVENTIONS:  - Educate patient/family on patient safety including physical limitations  - Instruct patient to call for assistance with activity   - Consult OT/PT to assist with strengthening/mobility   - Keep Call bell within reach  - Keep bed low and locked with side rails adjusted as appropriate  - Keep care items and personal belongings within reach  - Initiate and maintain comfort rounds  - Make Fall Risk Sign visible to staff  - Offer Toileting [id number], in advance of need  - Initiate/Maintain  bed alarm  - Obtain necessary fall risk management equipment:   - Apply yellow socks and bracelet for high fall risk patients  - Consider moving patient to room near nurses station  Outcome: Progressing

## 2023-02-23 NOTE — ASSESSMENT & PLAN NOTE
Was recently at [facility] on 2/15/2023 and treated for pneumonia  Receive IV cefepime as well as oral Omnicef to complete a 5-day antibiotic course  No further need for antibiotics here

## 2023-02-23 NOTE — PROGRESS NOTES
Cardiology Progress Note   Katherin Austin, MD Lou Huntsman, MD Romayne Arbour, DO, MD Daniel Sousa DO, Danielle Oliver DO, [facility] [place]  ----------------------------------------------------------------  [address] [address]    Silverio Yang 78 y o  female MRN: [id number]  Unit/Bed#: E4 -01 Encounter: [id number]      ASSESSMENT:   • Acute on chronic diastolic heart failure  ? LVEF 55-60%, mild LVH, AV sclerosis, trace MR/TR, February 2022  • Recent pneumonia  • Acute hypoxic respiratory failure  • CAD  ? s/p cath w/ 50% mLAD, 2011  ? Pharmacologic nuclear stress test negative for myocardial ischemia, June 2022  • Hypertension  • Dyslipidemia  • Type 2 diabetes mellitus  • Abnormal ECG w/ 1st degree AV block, bifascicular block  • History of TIA  • CKD stage III  • Obesity  • Hypothyroid  • History of CVT  • STU  • History of back spinal stimulator     PLAN:  Chest x-ray today still appears to show some pulmonary edema  We will continue to treat for acute decompensated heart failure  Increase Lasix to 80 mg IV twice daily  Strict I's/O's and daily weights  Keep potassium greater than 4 magnesium greater than 2  2D echocardiogram pending to assess cardiac structure and function  Continue aspirin, high intensity statin, carvedilol and losartan  TSH within normal limits  Further recommendations to follow testing    Signed: Romayne Arbour DO, FACC, MARCELO, FACP      History of Present Illness:  Patient seen and examined  Denies chest pain, pressure, tightness or squeezing  Denies lightheadedness, dizziness or palpitations  Admits to improved lower extremity swelling  Continues to have some orthopnea  Denies paroxysmal nocturnal dyspnea  Admits to dyspnea on exertion  Review of Systems:  Review of Systems   Constitutional: Negative for decreased appetite, fever, weight gain and weight loss     HENT: Negative for congestion and sore throat  Eyes: Negative for visual disturbance  Cardiovascular: Negative for chest pain, dyspnea on exertion, leg swelling, near-syncope and palpitations  Respiratory: Negative for cough and shortness of breath  Hematologic/Lymphatic: Negative for bleeding problem  Skin: Negative for rash  Musculoskeletal: Negative for myalgias and neck pain  Gastrointestinal: Negative for abdominal pain and nausea  Neurological: Negative for light-headedness and weakness  Psychiatric/Behavioral: Negative for depression  Allergies   Allergen Reactions   • Bactrim [Sulfamethoxazole-Trimethoprim] Hives   • Sucralfate Hives     Facial swelling   • Topamax [Topiramate]      disorentation   • Azithromycin Itching   • Pregabalin Confusion and Other (See Comments)     altered mental status   • Ciprofloxacin Drowsiness     Other reaction(s): Other (See Comments)  "drowsiness"   • Norvasc [Amlodipine] Swelling   • Baclofen      "That knocks me out "   • Bupropion Fatigue   • Methotrexate Nausea Only       No current facility-administered medications on file prior to encounter       Current Outpatient Medications on File Prior to Encounter   Medication Sig   • albuterol (ACCUNEB) 1 25 MG/3ML nebulizer solution Take 2 5 mg by nebulization every 6 (six) hours as needed for wheezing   • albuterol (PROVENTIL HFA,VENTOLIN HFA) 90 mcg/act inhaler Inhale 2 puffs every 6 (six) hours as needed for wheezing   • amitriptyline (ELAVIL) 10 mg tablet Take 10 mg by mouth daily at bedtime   • Armodafinil (Nuvigil) 250 MG tablet Take 1 tablet (250 mg total) by mouth every morning   • aspirin (ECOTRIN LOW STRENGTH) 81 mg EC tablet Take 81 mg by mouth every morning   • atorvastatin (LIPITOR) 80 mg tablet Take 80 mg by mouth daily at bedtime     • calcium carbonate (OS-KARIS) 1250 (500 Ca) MG chewable tablet Chew 1 tablet daily   • calcium carbonate (TUMS) 500 mg chewable tablet Chew 1 tablet daily   • carvedilol (COREG) 3 125 mg tablet Take 3 125 mg by mouth 2 (two) times a day with meals   • chlorzoxazone (PARAFON FORTE) 500 mg tablet Take 1 PO QID PRN for pain and spasms   (Patient taking differently: Take 500 mg by mouth 3 (three) times a day as needed Take 1 PO QID PRN for pain and spasms )   • CRANBERRY PO Take 1 capsule by mouth every evening    • Cyanocobalamin (VITAMIN B-12 PO) Take 1 capsule by mouth every evening    • dicyclomine (BENTYL) 10 mg capsule Take 1 capsule (10 mg total) by mouth 4 (four) times a day as needed (abdominal pain)   • docusate sodium (COLACE) 100 mg capsule TAKE ONE CAPSULE BY MOUTH EVERY DAY   • esomeprazole (NexIUM) 40 MG capsule Take 1 capsule (40 mg total) by mouth 2 (two) times a day before meals   • famotidine (PEPCID) 20 mg tablet TAKE 1 TABLET BY MOUTH TWICE A DAY   • gabapentin (NEURONTIN) 100 mg capsule Take 100 mg by mouth 3 (three) times a day   • gabapentin (NEURONTIN) 300 mg capsule    • levothyroxine 100 mcg tablet Take 100 mcg by mouth daily   • losartan (COZAAR) 50 mg tablet Take 50 mg by mouth daily at bedtime   • NOVOLOG 100 UNIT/ML injection Pt reports unsure of total dose a day/did see her endocrinologist Dr Dakota Martinez,  PA  Pt has pump on upper left abdomen cut basaL RATE IN HALF   • ondansetron (ZOFRAN) 8 mg tablet Take 8 mg by mouth every 8 (eight) hours as needed     • polyethylene glycol (MIRALAX) 17 g packet Take 17 g by mouth daily   • pramipexole (MIRAPEX) 1 mg tablet Take 1 mg by mouth 2 (two) times a day     • Semaglutide (OZEMPIC, 0 25 OR 0 5 MG/DOSE, SC) Inject 0 25 mg under the skin once a week Sundays   • sertraline (ZOLOFT) 50 mg tablet Take 100 mg by mouth daily at bedtime   • torsemide (DEMADEX) 20 mg tablet Take 20 mg by mouth every morning   • bisacodyl (DULCOLAX) 10 mg suppository Insert 1 suppository (10 mg total) into the rectum daily (Patient not taking: Reported on 2/21/2023)   • cephalexin (KEFLEX) 500 mg capsule PLEASE SEE ATTACHED FOR DETAILED DIRECTIONS (Patient not taking: Reported on 2/21/2023)   • Darbepoetin Mike (ARANESP, ALBUMIN FREE, IJ) Inject as directed every 14 (fourteen) days (Patient not taking: Reported on 2/21/2023)   • fluticasone-salmeterol (ADVAIR) 100-50 mcg/dose inhaler Inhale 2 puffs as needed (Patient not taking: Reported on 2/21/2023)   • glucose blood test strip Testing six times daily  E11 65 on insulin pump   • glucose blood test strip Use   • Insulin Infusion Pump (MINIMED INSULIN PUMP) PRINCESS Use   • ketorolac (ACULAR) 0 5 % ophthalmic solution INSTILL 1 DROP INTO THE OPERATIVE EYE ONCE DAILY STARTING 3 DAYS PRIOR TO SURGERY AS DIRECTED   • Potassium Gluconate 595 MG CAPS Take by mouth 2 (two) times a day (Patient not taking: Reported on 2/21/2023)   • Senexon-S 8 6-50 MG per tablet TAKE 1 TABLET BY MOUTH EVERY DAY (Patient not taking: Reported on 2/21/2023)   • solifenacin (VESICARE) 10 MG tablet Take 10 mg by mouth daily        Current Facility-Administered Medications   Medication Dose Route Frequency Provider Last Rate   • acetaminophen  650 mg Oral Q6H PRN Hershal Carrel, PA-C     • albuterol  2 5 mg Nebulization Q6H PRN Leeanne Hashimoto Prechtel, DO     • amitriptyline  10 mg Oral HS Fred S Prechtel, DO     • aspirin  81 mg Oral QAM Fred S Prechtel, DO     • atorvastatin  80 mg Oral HS Fred S Prechtel, DO     • benzonatate  100 mg Oral TID PRN Merilynn Lundborg, PA-C     • bisacodyl  10 mg Rectal Daily Fred S Prechtel, DO     • carvedilol  3 125 mg Oral BID With Meals Fred S Prechtel, DO     • dicyclomine  10 mg Oral 4x Daily PRN Leeanne Hashimoto Prechtel, DO     • docusate sodium  100 mg Oral Daily Fred S Prechtel, DO     • enoxaparin  40 mg Subcutaneous Daily Fred S Prechtel, DO     • famotidine  20 mg Oral BID Fred S Prechtel, DO     • furosemide  40 mg Intravenous BID (diuretic) Leeanne Hashimoto Prechtel, DO     • gabapentin  100 mg Oral TID Leeanne Hashimoto Prechtel, DO     • iron sucrose  200 mg Intravenous Daily Tomeka Parker PA-C 200 mg (02/22/23 1653)   • levothyroxine  100 mcg Oral Early Morning Fred S Prechtel, DO     • losartan  50 mg Oral HS Fred S Prechtel, DO     • modafinil  100 mg Oral Daily Fred S Prechtel, DO     • oxybutynin  10 mg Oral Daily Fred S Prechtel, DO     • pantoprazole  40 mg Oral Early Morning Fred S Prechtel, DO     • polyethylene glycol  17 g Oral Daily Fred S Prechtel, DO     • pramipexole  1 mg Oral BID Cricket Hunting Prechtel, DO     • sertraline  100 mg Oral HS Fred S Prechtel, DO     • traMADol  50 mg Oral Q6H PRN Tomeka Parker PA-C              Vitals:    02/23/23 0500 02/23/23 0600 02/23/23 0656 02/23/23 0700   BP:   165/72 157/67   BP Location:   Left arm Left arm   Pulse:   64 63   Resp:   18 20   Temp:   97 6 °F (36 4 °C) 97 6 °F (36 4 °C)   TempSrc:   Temporal Temporal   SpO2:   94% 99%   Weight: 80 6 kg (177 lb 11 1 oz) 80 6 kg (177 lb 11 1 oz)     Height:         Body mass index is 32 5 kg/m²  Intake/Output Summary (Last 24 hours) at 2/23/2023 1037  Last data filed at 2/22/2023 2001  Gross per 24 hour   Intake 220 ml   Output 350 ml   Net -130 ml       Weight change: -1 645 kg (-3 lb 10 oz)    PHYSICAL EXAMINATION:  Gen: Awake, Alert, NAD  Head/eyes: AT/NC, pupils equal and round, Anicteric  ENT: mmm  Neck: Supple, No elevated JVP, trachea midline  Resp: Decreased breath sounds bilaterally  CV: RRR +S1, S2, No m/r/g  Abd: Soft, NT/ND + BS  Ext: Trace LE edema bilaterally  Neuro:  Follows commands, moves all extermities  Psych: Appropriate affect, normal mood, pleasant attitude, non-combative  Skin: warm; no rash, erythema or venous stasis changes on exposed skin    Lab Results:  Results from last 7 days   Lab Units 02/23/23  0437 02/22/23  0554   WBC Thousand/uL 3 29* 3 53*   HEMOGLOBIN g/dL 7 5* 7 3*   HEMATOCRIT % 24 0* 23 8*   PLATELETS Thousands/uL  --  162     Results from last 7 days   Lab Units 02/23/23  0437 02/22/23  0554 02/21/23  1225   POTASSIUM mmol/L 4 7   < > 4 2   CHLORIDE mmol/L 110*   < > 110*   CO2 mmol/L 23   < > 22   BUN mg/dL 28*   < > 26*   CREATININE mg/dL 0 97   < > 0 96   CALCIUM mg/dL 8 3*   < > 8 5   ALK PHOS U/L  --   --  192*   ALT U/L  --   --  67*   AST U/L  --   --  38    < > = values in this interval not displayed  No results found for: TROPONINT      Results from last 7 days   Lab Units 02/21/23  1424 02/21/23  1225   HS TNI 0HR ng/L  --  20   HS TNI 2HR ng/L 21  --      Results from last 7 days   Lab Units 02/21/23  1225   INR  1 41*       Tele: , Aidan    This note was completed in part utilizing M-Modal Fluency Direct Software  Grammatical errors, random word insertions, spelling mistakes, and incomplete sentences may be an occasional consequence of this system secondary to software limitations, ambient noise, and hardware issues  If you have any questions or concerns about the content, text, or information contained within the body of this dictation, please contact the provider for clarification

## 2023-02-23 NOTE — PLAN OF CARE
Problem: Potential for Falls  Goal: Patient will remain free of falls  Description: INTERVENTIONS:  - Educate patient/family on patient safety including physical limitations  - Instruct patient to call for assistance with activity   - Consult OT/PT to assist with strengthening/mobility   - Keep Call bell within reach  - Keep bed low and locked with side rails adjusted as appropriate  - Keep care items and personal belongings within reach  - Initiate and maintain comfort rounds  - Make Fall Risk Sign visible to staff  - Offer Toileting every 3 Hours, in advance of need  - Initiate/Maintain comfort rounding  - Obtain necessary fall risk management equipment  - Apply yellow socks and bracelet for high fall risk patients  - Consider moving patient to room near nurses station  Outcome: Progressing     Problem: MOBILITY - ADULT  Goal: Maintain or return to baseline ADL function  Description: INTERVENTIONS:  -  Assess patient's ability to carry out ADLs; assess patient's baseline for ADL function and identify physical deficits which impact ability to perform ADLs (bathing, care of mouth/teeth, toileting, grooming, dressing, etc )  - Assess/evaluate cause of self-care deficits   - Assess range of motion  - Assess patient's mobility; develop plan if impaired  - Assess patient's need for assistive devices and provide as appropriate  - Encourage maximum independence but intervene and supervise when necessary  - Involve family in performance of ADLs  - Assess for home care needs following discharge   - Consider OT consult to assist with ADL evaluation and planning for discharge  - Provide patient education as appropriate  Outcome: Progressing  Goal: Maintains/Returns to pre admission functional level  Description: INTERVENTIONS:  - Perform BMAT or MOVE assessment daily    - Set and communicate daily mobility goal to care team and patient/family/caregiver     - Collaborate with rehabilitation services on mobility goals if consulted  - Perform Range of Motion 3 times a day  - Reposition patient every 2 hours    - Dangle patient 3 times a day  - Stand patient 3 times a day  - Ambulate patient 3 times a day  - Out of bed to chair 3 times a day   - Out of bed for meals 3 times a day  - Out of bed for toileting  - Record patient progress and toleration of activity level   Outcome: Progressing

## 2023-02-23 NOTE — ASSESSMENT & PLAN NOTE
Wt Readings from Last 3 Encounters:   02/23/23 80 6 kg (177 lb 11 1 oz)   01/31/23 77 6 kg (171 lb)   01/24/23 77 6 kg (171 lb)   Receives most of her care at [facility] -including her cardiologist  Presented with a complaint of shortness of breath  Requiring 4 L oxygen nasal cannula  Echocardiogram: 2/2022- EF 55-60%, mild LVH, AV sclerosis, trace MR/TR  Repeat echocardiogram here: EF 54%, paradoxical septal wall motion consistent with conduction abnormality, diastolic function mildly abnormal consistent with grade 1 abnormal relaxation, aortic valve sclerosis, mild mitral regurgitation, trace tricuspid regurgitation  CXR here with pulmonary edema  Home regimen torsemide 40 mg daily  Started on IV Lasix 40 mg twice daily --> advanced to 80 mg BID  Repeat CXR from this am pending  Down 5 lbs since yesterday

## 2023-02-23 NOTE — ASSESSMENT & PLAN NOTE
Results from last 7 days   Lab Units 02/23/23  0437 02/22/23  0554 02/21/23  1225   HEMOGLOBIN g/dL 7 5* 7 3* 8 0*   Baseline appearing in the 12's  More recently hemoglobins have been in the 8's/9s according to care everywhere  Denies hematochezia or hemoptysis  Reviewed care everywhere-recent iron panel from February 2023  Iron 32, transferrin 139, iron saturation 17, TIBC 188, ferritin 341  Giving iron 200 mg daily - day 2/3  Continue to follow hemoglobin  Heme occult negative

## 2023-02-23 NOTE — ASSESSMENT & PLAN NOTE
Lab Results   Component Value Date    HGBA1C 6 7 (H) 02/15/2023     Recent Labs     02/22/23  1622 02/22/23  2040 02/23/23  0816 02/23/23  1109   POCGLU 183* 276* 186* 119   Prehospital patient uses an insulin pump  Pt reports pump is almost out of insulin   Will d c pump at this time and place on sliding scale

## 2023-02-24 PROBLEM — I50.33 ACUTE ON CHRONIC DIASTOLIC (CONGESTIVE) HEART FAILURE (HCC): Status: ACTIVE | Noted: 2023-02-21

## 2023-02-24 PROBLEM — Z87.01 HISTORY OF PNEUMONIA: Status: RESOLVED | Noted: 2023-02-22 | Resolved: 2023-02-24

## 2023-02-24 PROBLEM — J96.00 ACUTE RESPIRATORY FAILURE (HCC): Status: ACTIVE | Noted: 2023-02-22

## 2023-02-24 LAB
ANION GAP SERPL CALCULATED.3IONS-SCNC: 9 MMOL/L (ref 4–13)
BUN SERPL-MCNC: 27 MG/DL (ref 5–25)
CALCIUM SERPL-MCNC: 8.2 MG/DL (ref 8.4–10.2)
CHLORIDE SERPL-SCNC: 105 MMOL/L (ref 96–108)
CO2 SERPL-SCNC: 24 MMOL/L (ref 21–32)
CREAT SERPL-MCNC: 0.98 MG/DL (ref 0.6–1.3)
ERYTHROCYTE [DISTWIDTH] IN BLOOD BY AUTOMATED COUNT: 15 % (ref 11.6–15.1)
GFR SERPL CREATININE-BSD FRML MDRD: 60 ML/MIN/1.73SQ M
GLUCOSE SERPL-MCNC: 100 MG/DL (ref 65–140)
GLUCOSE SERPL-MCNC: 101 MG/DL (ref 65–140)
GLUCOSE SERPL-MCNC: 116 MG/DL (ref 65–140)
GLUCOSE SERPL-MCNC: 127 MG/DL (ref 65–140)
GLUCOSE SERPL-MCNC: 347 MG/DL (ref 65–140)
HCT VFR BLD AUTO: 24.5 % (ref 34.8–46.1)
HGB BLD-MCNC: 7.7 G/DL (ref 11.5–15.4)
MCH RBC QN AUTO: 30.8 PG (ref 26.8–34.3)
MCHC RBC AUTO-ENTMCNC: 31.4 G/DL (ref 31.4–37.4)
MCV RBC AUTO: 98 FL (ref 82–98)
PLATELET # BLD AUTO: 166 THOUSANDS/UL (ref 149–390)
PMV BLD AUTO: 9.3 FL (ref 8.9–12.7)
POTASSIUM SERPL-SCNC: 3.9 MMOL/L (ref 3.5–5.3)
RBC # BLD AUTO: 2.5 MILLION/UL (ref 3.81–5.12)
SODIUM SERPL-SCNC: 138 MMOL/L (ref 135–147)
WBC # BLD AUTO: 3.77 THOUSAND/UL (ref 4.31–10.16)

## 2023-02-24 RX ORDER — BISACODYL 10 MG
10 SUPPOSITORY, RECTAL RECTAL DAILY PRN
Status: DISCONTINUED | OUTPATIENT
Start: 2023-02-24 | End: 2023-03-01 | Stop reason: HOSPADM

## 2023-02-24 RX ORDER — SODIUM PHOSPHATE, DIBASIC AND SODIUM PHOSPHATE, MONOBASIC 7; 19 G/133ML; G/133ML
1 ENEMA RECTAL ONCE AS NEEDED
Status: COMPLETED | OUTPATIENT
Start: 2023-02-24 | End: 2023-02-24

## 2023-02-24 RX ORDER — METOLAZONE 5 MG/1
5 TABLET ORAL ONCE
Status: COMPLETED | OUTPATIENT
Start: 2023-02-24 | End: 2023-02-24

## 2023-02-24 RX ORDER — INSULIN GLARGINE 100 [IU]/ML
5 INJECTION, SOLUTION SUBCUTANEOUS EVERY MORNING
Status: DISCONTINUED | OUTPATIENT
Start: 2023-02-24 | End: 2023-03-01 | Stop reason: HOSPADM

## 2023-02-24 RX ORDER — OXYCODONE HYDROCHLORIDE 5 MG/1
5 TABLET ORAL ONCE
Status: COMPLETED | OUTPATIENT
Start: 2023-02-24 | End: 2023-02-24

## 2023-02-24 RX ORDER — POTASSIUM CHLORIDE 20 MEQ/1
20 TABLET, EXTENDED RELEASE ORAL 2 TIMES DAILY
Status: DISCONTINUED | OUTPATIENT
Start: 2023-02-24 | End: 2023-02-27

## 2023-02-24 RX ORDER — FUROSEMIDE 10 MG/ML
20 SYRINGE (ML) INJECTION CONTINUOUS
Status: DISPENSED | OUTPATIENT
Start: 2023-02-24 | End: 2023-02-26

## 2023-02-24 RX ORDER — SENNOSIDES 8.6 MG
1 TABLET ORAL
Status: DISCONTINUED | OUTPATIENT
Start: 2023-02-24 | End: 2023-03-01 | Stop reason: HOSPADM

## 2023-02-24 RX ADMIN — CARVEDILOL 3.12 MG: 3.12 TABLET, FILM COATED ORAL at 16:30

## 2023-02-24 RX ADMIN — SODIUM PHOSPHATE 1 ENEMA: 7; 19 ENEMA RECTAL at 21:21

## 2023-02-24 RX ADMIN — GABAPENTIN 100 MG: 100 CAPSULE ORAL at 20:43

## 2023-02-24 RX ADMIN — POLYETHYLENE GLYCOL 3350 17 G: 17 POWDER, FOR SOLUTION ORAL at 09:58

## 2023-02-24 RX ADMIN — INSULIN GLARGINE 5 UNITS: 100 INJECTION, SOLUTION SUBCUTANEOUS at 12:13

## 2023-02-24 RX ADMIN — PRAMIPEXOLE DIHYDROCHLORIDE 1 MG: 1 TABLET ORAL at 18:42

## 2023-02-24 RX ADMIN — BISACODYL 10 MG: 10 SUPPOSITORY RECTAL at 09:59

## 2023-02-24 RX ADMIN — LEVOTHYROXINE SODIUM 100 MCG: 100 TABLET ORAL at 05:54

## 2023-02-24 RX ADMIN — ENOXAPARIN SODIUM 40 MG: 40 INJECTION SUBCUTANEOUS at 09:59

## 2023-02-24 RX ADMIN — OXYCODONE 5 MG: 5 TABLET ORAL at 21:22

## 2023-02-24 RX ADMIN — POTASSIUM CHLORIDE 20 MEQ: 1500 TABLET, EXTENDED RELEASE ORAL at 18:41

## 2023-02-24 RX ADMIN — INSULIN LISPRO 5 UNITS: 100 INJECTION, SOLUTION INTRAVENOUS; SUBCUTANEOUS at 12:13

## 2023-02-24 RX ADMIN — AMITRIPTYLINE HYDROCHLORIDE 10 MG: 10 TABLET, FILM COATED ORAL at 21:23

## 2023-02-24 RX ADMIN — TRAMADOL HYDROCHLORIDE 50 MG: 50 TABLET, COATED ORAL at 16:30

## 2023-02-24 RX ADMIN — ATORVASTATIN CALCIUM 80 MG: 80 TABLET, FILM COATED ORAL at 21:22

## 2023-02-24 RX ADMIN — OXYBUTYNIN CHLORIDE 10 MG: 10 TABLET, EXTENDED RELEASE ORAL at 09:59

## 2023-02-24 RX ADMIN — PANTOPRAZOLE SODIUM 40 MG: 40 TABLET, DELAYED RELEASE ORAL at 05:54

## 2023-02-24 RX ADMIN — GABAPENTIN 100 MG: 100 CAPSULE ORAL at 16:30

## 2023-02-24 RX ADMIN — ASPIRIN 81 MG: 81 TABLET, COATED ORAL at 09:59

## 2023-02-24 RX ADMIN — CARVEDILOL 3.12 MG: 3.12 TABLET, FILM COATED ORAL at 09:59

## 2023-02-24 RX ADMIN — BENZONATATE 100 MG: 100 CAPSULE ORAL at 10:03

## 2023-02-24 RX ADMIN — LOSARTAN POTASSIUM 50 MG: 50 TABLET, FILM COATED ORAL at 21:22

## 2023-02-24 RX ADMIN — MODAFINIL 100 MG: 100 TABLET ORAL at 09:59

## 2023-02-24 RX ADMIN — POTASSIUM CHLORIDE 20 MEQ: 1500 TABLET, EXTENDED RELEASE ORAL at 12:37

## 2023-02-24 RX ADMIN — FAMOTIDINE 20 MG: 20 TABLET ORAL at 09:59

## 2023-02-24 RX ADMIN — SERTRALINE HYDROCHLORIDE 100 MG: 100 TABLET, FILM COATED ORAL at 21:23

## 2023-02-24 RX ADMIN — SENNOSIDES 8.6 MG: 8.6 TABLET, FILM COATED ORAL at 12:14

## 2023-02-24 RX ADMIN — FAMOTIDINE 20 MG: 20 TABLET ORAL at 18:41

## 2023-02-24 RX ADMIN — PRAMIPEXOLE DIHYDROCHLORIDE 1 MG: 1 TABLET ORAL at 10:00

## 2023-02-24 RX ADMIN — TRAMADOL HYDROCHLORIDE 50 MG: 50 TABLET, COATED ORAL at 09:59

## 2023-02-24 RX ADMIN — METOLAZONE 5 MG: 5 TABLET ORAL at 12:36

## 2023-02-24 RX ADMIN — ACETAMINOPHEN 325MG 650 MG: 325 TABLET ORAL at 12:14

## 2023-02-24 RX ADMIN — IRON SUCROSE 200 MG: 20 INJECTION, SOLUTION INTRAVENOUS at 09:53

## 2023-02-24 RX ADMIN — DOCUSATE SODIUM 100 MG: 100 CAPSULE, LIQUID FILLED ORAL at 09:59

## 2023-02-24 RX ADMIN — SENNOSIDES 8.6 MG: 8.6 TABLET, FILM COATED ORAL at 21:22

## 2023-02-24 RX ADMIN — Medication 10 MG/HR: at 10:04

## 2023-02-24 RX ADMIN — Medication 20 MG/HR: at 20:42

## 2023-02-24 RX ADMIN — GABAPENTIN 100 MG: 100 CAPSULE ORAL at 09:59

## 2023-02-24 NOTE — PLAN OF CARE
Problem: Potential for Falls  Goal: Patient will remain free of falls  Description: INTERVENTIONS:  - Educate patient/family on patient safety including physical limitations  - Instruct patient to call for assistance with activity   - Consult OT/PT to assist with strengthening/mobility   - Keep Call bell within reach  - Keep bed low and locked with side rails adjusted as appropriate  - Keep care items and personal belongings within reach  - Initiate and maintain comfort rounds  - Make Fall Risk Sign visible to staff  Outcome: Progressing     Problem: MOBILITY - ADULT  Goal: Maintain or return to baseline ADL function  Description: INTERVENTIONS:  -  Assess patient's ability to carry out ADLs; assess patient's baseline for ADL function and identify physical deficits which impact ability to perform ADLs (bathing, care of mouth/teeth, toileting, grooming, dressing, etc )  - Assess/evaluate cause of self-care deficits   - Assess range of motion  - Assess patient's mobility; develop plan if impaired  - Assess patient's need for assistive devices and provide as appropriate  - Encourage maximum independence but intervene and supervise when necessary  - Involve family in performance of ADLs  - Assess for home care needs following discharge   - Consider OT consult to assist with ADL evaluation and planning for discharge  - Provide patient education as appropriate  Outcome: Progressing  Goal: Maintains/Returns to pre admission functional level  Description: INTERVENTIONS:  - Perform BMAT or MOVE assessment daily    - Set and communicate daily mobility goal to care team and patient/family/caregiver     - Collaborate with rehabilitation services on mobility goals if consulted  - Out of bed for meals 3 times a day  - Out of bed for toileting  - Record patient progress and toleration of activity level   Outcome: Progressing

## 2023-02-24 NOTE — ASSESSMENT & PLAN NOTE
Was recently at [facility] on 2/15/2023 and treated for pneumonia  Receive IV cefepime as well as oral Omnicef to complete a 5-day antibiotic course  No further need for antibiotics here  Repeat chest imaging with PCP outpatient

## 2023-02-24 NOTE — PROGRESS NOTES
[facility]  Progress Note - Sondra Robles 1955, 78 y o  female MRN: [id number]  Unit/Bed#: E4 -01 Encounter: [id number]  Primary Care Provider: Ryan Stout DO   Date and time admitted to hospital: 2/21/2023 11:27 AM    * Acute on chronic diastolic (congestive) heart failure (HCC)  Assessment & Plan  Wt Readings from Last 3 Encounters:   02/24/23 81 1 kg (178 lb 12 7 oz)   01/31/23 77 6 kg (171 lb)   01/24/23 77 6 kg (171 lb)   · Receives most of her care at [facility] -including her cardiologist  · Presented with a complaint of shortness of breath  · Requiring 4 L oxygen nasal cannula on admission   · Repeat echocardiogram here: EF 54%, paradoxical septal wall motion consistent with conduction abnormality, grade 1 diastolic function, aortic valve sclerosis, mild MR, trace TR  · Home regimen torsemide 40 mg daily  · Started on IV Lasix- to 80 mg BID --> advanced to Lasix gtt 2/24/2023   · Appreciate cardiology ongoing recs   · Monitor electrolytes, I/O, daily weights       Anemia  Assessment & Plan  Results from last 7 days   Lab Units 02/24/23  0531 02/23/23  0437 02/22/23  0554 02/21/23  1225   HEMOGLOBIN g/dL 7 7* 7 5* 7 3* 8 0*   history of anemia of chronic disease and MEAGAN on Aranesp outpatient - she believes she is due for this   Baseline appearing in the 12's  More recently hemoglobins have been in the 8's/9s according to care everywhere  Denies bleeding anywhere, initial occult stool negative   Also with leukopenia   Follows with Hematology at [facility]   care everywhere-recent iron panel from February 2023: Iron 32, transferrin 139, iron saturation 17, TIBC 188, ferritin 341  Given IV venofer day 3/3  Continue to follow hemoglobin  She needs re-eval outpatient with consideration for EGD and colonoscopy     Acute respiratory failure (HCC)  Assessment & Plan  · With hypoxia, Likely secondary to acute CHF exacerbation as well as recent pneumonia  · requiring 4 L oxygen nasal cannula on admission, weaning off oxygen as tolerated   · Will need home O2 prior to discharge     Type 2 diabetes mellitus with microalbuminuria, with long-term current use of insulin [facility])  Assessment & Plan  Lab Results   Component Value Date    HGBA1C 6 7 (H) 02/15/2023     Recent Labs     02/23/23  1757 02/23/23  1824 02/23/23  2058 02/24/23  0809   POCGLU 48* 105 179* 116   Prehospital patient uses an insulin pump  Pt reports pump is almost out of insulin   d c pump and placed on sliding scale, controlled on SSI currently however lunchtime glucose 347 so will add lantus 5 units   ADA diet     Essential hypertension  Assessment & Plan  · Home regimen losartan 50 mg daily, Coreg 3 125 mg twice daily  · Monitor VS on lasix gtt    History of pneumonia-resolved as of 2/24/2023  Assessment & Plan  Was recently at [facility] on 2/15/2023 and treated for pneumonia  Receive IV cefepime as well as oral Omnicef to complete a 5-day antibiotic course  No further need for antibiotics here  Repeat chest imaging with PCP outpatient     incidental findings   Enlarged subcarinal node, new since the thoracic spine CT from March 2021  This could be reactive, but recommend follow-up with a chest CT with no contrast in 3 months  VTE Pharmacologic Prophylaxis: VTE Score: 4 Moderate Risk (Score 3-4) - Pharmacological DVT Prophylaxis Ordered: enoxaparin (Lovenox)  Patient Centered Rounds: I performed bedside rounds with nursing staff today  Discussions with Specialists or Other Care Team Provider: cardiology     Education and Discussions with Family / Patient: Patient declined call to        Total Time Spent on Date of Encounter in care of patient: 25 minutes This time was spent on one or more of the following: performing physical exam; counseling and coordination of care; obtaining or reviewing history; documenting in the medical record; reviewing/ordering tests, medications or procedures; communicating with other healthcare professionals and discussing with patient's family/caregivers  Current Length of Stay: 3 day(s)  Current Patient Status: Inpatient   Certification Statement: The patient will continue to require additional inpatient hospital stay due to Acute CHF on Lasix drip  Discharge Plan: Anticipate discharge in 48 hrs to home with home services  Code Status: Level 1 - Full Code    Subjective:   Still with some constipation had a very small bowel movement this morning  Did gain a pound today  On Lasix drip currently  Still is on oxygen  No worsening shortness of breath  Denies bleeding from anywhere  Gets Aransep outpatient with hematology and may be due to this  That she had a colonoscopy about 3 years ago  Objective:     Vitals:   Temp (24hrs), Av 2 °F (36 8 °C), Min:97 4 °F (36 3 °C), Max:98 6 °F (37 °C)    Temp:  [97 4 °F (36 3 °C)-98 6 °F (37 °C)] 98 6 °F (37 °C)  HR:  [59-63] 63  Resp:  [16-18] 16  BP: (127-149)/(56-77) 149/70  SpO2:  [93 %-100 %] 93 %  Body mass index is 32 7 kg/m²  Input and Output Summary (last 24 hours): Intake/Output Summary (Last 24 hours) at 2023 1126  Last data filed at 2023 1004  Gross per 24 hour   Intake 720 ml   Output 700 ml   Net 20 ml       Physical Exam:   Physical Exam  Vitals and nursing note reviewed  Constitutional:       General: She is not in acute distress  Appearance: She is not ill-appearing  Cardiovascular:      Rate and Rhythm: Normal rate and regular rhythm  Pulmonary:      Effort: Pulmonary effort is normal  No respiratory distress  Breath sounds: No wheezing or rales  Comments: 2 L/min nasal cannula  Musculoskeletal:      Right lower leg: No edema  Left lower leg: No edema  Neurological:      Mental Status: She is alert  Mental status is at baseline     Psychiatric:         Mood and Affect: Mood normal           Additional Data:     Labs:  Results from last 7 days   Lab Units 02/24/23  0531 02/21/23  2111 02/21/23  1225   WBC Thousand/uL 3 77*   < > 6 46   HEMOGLOBIN g/dL 7 7*   < > 8 0*   HEMATOCRIT % 24 5*   < > 25 5*   PLATELETS Thousands/uL 166   < > 169   NEUTROS PCT %  --   --  77*   LYMPHS PCT %  --   --  10*   MONOS PCT %  --   --  9   EOS PCT %  --   --  1    < > = values in this interval not displayed  Results from last 7 days   Lab Units 02/24/23  0531 02/22/23  0554 02/21/23  1225   SODIUM mmol/L 138   < > 138   POTASSIUM mmol/L 3 9   < > 4 2   CHLORIDE mmol/L 105   < > 110*   CO2 mmol/L 24   < > 22   BUN mg/dL 27*   < > 26*   CREATININE mg/dL 0 98   < > 0 96   ANION GAP mmol/L 9   < > 6   CALCIUM mg/dL 8 2*   < > 8 5   ALBUMIN g/dL  --   --  3 6   TOTAL BILIRUBIN mg/dL  --   --  0 66   ALK PHOS U/L  --   --  192*   ALT U/L  --   --  67*   AST U/L  --   --  38   GLUCOSE RANDOM mg/dL 100   < > 194*    < > = values in this interval not displayed       Results from last 7 days   Lab Units 02/21/23  1225   INR  1 41*     Results from last 7 days   Lab Units 02/24/23  1106 02/24/23  0809 02/23/23  2058 02/23/23  1824 02/23/23  1757 02/23/23  1739 02/23/23  1109 02/23/23  0816 02/22/23  2040 02/22/23  1622 02/22/23  1220 02/22/23  0800   POC GLUCOSE mg/dl 347* 116 179* 105 48* 54* 119 186* 276* 183* 167* 150*         Results from last 7 days   Lab Units 02/21/23  1449   LACTIC ACID mmol/L 0 9       Lines/Drains:  Invasive Devices     Peripheral Intravenous Line  Duration           Peripheral IV 02/22/23 Proximal;Right;Ventral (anterior) Forearm 1 day                  Telemetry:  Telemetry Orders (From admission, onward)             48 Hour Telemetry Monitoring  Continuous x 48 hours        References:    Telemetry Guidelines   Question:  Reason for 48 Hour Telemetry  Answer:  Acute Decompensated CHF (continuous diuretic infusion or total diuretic dose > 200 mg daily, associated electrolyte derangement, ionotropic drip, history of ventricular arrhythmia, or new EF <35%) Imaging: Reviewed radiology reports from this admission including: chest xray    Recent Cultures (last 7 days):   Results from last 7 days   Lab Units 02/21/23  1330 02/21/23  1323 02/21/23  1225   BLOOD CULTURE   --  No Growth at 48 hrs  No Growth at 48 hrs     URINE CULTURE  <10,000 cfu/ml Gram Positive Cocci*  --   --        Last 24 Hours Medication List:   Current Facility-Administered Medications   Medication Dose Route Frequency Provider Last Rate   • acetaminophen  650 mg Oral Q6H PRN Avon Goltz, PA-C     • albuterol  2 5 mg Nebulization Q6H PRN Josph Filter Prechtel, DO     • amitriptyline  10 mg Oral HS Fred S Prechtel, DO     • aspirin  81 mg Oral QAM Fred S Prechtel, DO     • atorvastatin  80 mg Oral HS Fred S Prechtel, DO     • benzonatate  100 mg Oral TID PRN Svetlana Harris PA-C     • bisacodyl  10 mg Rectal Daily Fred S Prechtel, DO     • carvedilol  3 125 mg Oral BID With Meals Fred S Prechtel, DO     • dicyclomine  10 mg Oral 4x Daily PRN Josph Filter Prechtel, DO     • docusate sodium  100 mg Oral Daily Fred S Prechtel, DO     • enoxaparin  40 mg Subcutaneous Daily Fred S Prechtel, DO     • famotidine  20 mg Oral BID Fred S Prechtel, DO     • furosemide  10 mg/hr Intravenous Continuous Maya Kelley PA-C 10 mg/hr (02/24/23 1004)   • gabapentin  100 mg Oral TID Josph Filter Prechtel, DO     • insulin lispro  1-6 Units Subcutaneous TID AC Tomeka Parker PA-C     • insulin lispro  1-6 Units Subcutaneous HS Tomeka Parker PA-C     • levothyroxine  100 mcg Oral Early Morning Fred S Prechtel, DO     • losartan  50 mg Oral HS Fred S Prechtel, DO     • modafinil  100 mg Oral Daily Fred S Prechtel, DO     • oxybutynin  10 mg Oral Daily Fred S Prechtel, DO     • pantoprazole  40 mg Oral Early Morning Fred S Prechtel, DO     • polyethylene glycol  17 g Oral Daily Fred S Prechtel, DO     • pramipexole  1 mg Oral BID Josph Filter Prechtel, DO     • senna  1 tablet Oral HS Giana Bennett PA-C     • sertraline  100 mg Oral HS Fred Snow, DO     • traMADol  50 mg Oral Q6H PRN Ricardo Nina PA-C          Today, Patient Was Seen By: Torey Wasserman PA-C    **Please Note: This note may have been constructed using a voice recognition system  **

## 2023-02-24 NOTE — ASSESSMENT & PLAN NOTE
· With hypoxia, Likely secondary to acute CHF exacerbation as well as recent pneumonia  · requiring 4 L oxygen nasal cannula on admission, weaning off oxygen as tolerated   · Will need home O2 prior to discharge

## 2023-02-24 NOTE — ASSESSMENT & PLAN NOTE
Wt Readings from Last 3 Encounters:   02/24/23 81 1 kg (178 lb 12 7 oz)   01/31/23 77 6 kg (171 lb)   01/24/23 77 6 kg (171 lb)   · Receives most of her care at [facility] -including her cardiologist  · Presented with a complaint of shortness of breath  · Requiring 4 L oxygen nasal cannula on admission   · Repeat echocardiogram here: EF 54%, paradoxical septal wall motion consistent with conduction abnormality, grade 1 diastolic function, aortic valve sclerosis, mild MR, trace TR  · Home regimen torsemide 40 mg daily  · Started on IV Lasix- to 80 mg BID --> advanced to Lasix gtt 2/24/2023   · Appreciate cardiology ongoing recs   · Monitor electrolytes, I/O, daily weights

## 2023-02-24 NOTE — ASSESSMENT & PLAN NOTE
Results from last 7 days   Lab Units 02/24/23  0531 02/23/23  0437 02/22/23  0554 02/21/23  1225   HEMOGLOBIN g/dL 7 7* 7 5* 7 3* 8 0*   history of anemia of chronic disease and MEAGAN   Baseline appearing in the 12's  More recently hemoglobins have been in the 8's/9s according to care everywhere  Denies bleeding anywhere, initial occult stool negative   Also with leukopenia   Follows with Hematology at [facility]   care everywhere-recent iron panel from February 2023: Iron 32, transferrin 139, iron saturation 17, TIBC 188, ferritin 341  Given IV venofer day 3/3  Continue to follow hemoglobin  She needs re-eval outpatient with consideration for EGD and colonoscopy

## 2023-02-24 NOTE — NURSING NOTE
Patient states she feels like she has to urinate but can not  Writer bladder scanned patient and bladder scan shows 313mL  Patient states she does have episodes of urinary retention  Dr Mindi Murphy notified via tiger text  Awaiting further orders

## 2023-02-24 NOTE — PROGRESS NOTES
Cardiology         Progress Note - Cardiology   Sandie Close 78 y o  female MRN: [id number]  Unit/Bed#: E4 -01 Encounter: [id number]          Assessment/Recommendations/Discussion:   1  Acute on chronic diastolic CHF  2  Nonobstructive CAD with prior coronary angiography in 2011 revealing 50% mid LAD disease, negative myocardial perfusion scan for ischemia 6/2022  3  Hypertension  4  Dyslipidemia  5  Diabetes  6  History of TIA  7  CKD  8  Obesity      · Patient with suboptimal urine output stable weight, persistent shortness of breath and lower extremity edema  Increase furosemide infusion at 20 mg an hour and give 5 mg metolazone today  We will start potassium 20 mEq p o  twice daily empirically as potassium level is decreasing,  · Continue aspirin, statin, carvedilol, losartan            Subjective: Seen and examined, still feels short of breath with lower extremity edema  She states she has not voided yet today                  Physical Exam:  GEN:  NAD  HEENT:  MMM, NCAT, pink conjunctiva, EOMI, nonicteric sclera  CV:  NO JVD/HJR, RR, NO M/R/G, +S1/S2, NO PARASTERNAL HEAVE/THRILL, +LE EDEMA, NO HEPATIC SYSTOLIC PULSATION, WARM EXTREMITIES  RESP: Bilateral crackles  ABD:  SOFT, NT, NO GROSS ORGANOMEGALY        Vitals:   /70 (BP Location: Left arm)   Pulse 63   Temp 98 6 °F (37 °C) (Temporal)   Resp 16   Ht 5' 2" (1 575 m)   Wt 81 1 kg (178 lb 12 7 oz)   SpO2 93%   BMI 32 70 kg/m²   Vitals:    02/24/23 0500 02/24/23 0538   Weight: 81 1 kg (178 lb 12 7 oz) 81 1 kg (178 lb 12 7 oz)       Intake/Output Summary (Last 24 hours) at 2/24/2023 1220  Last data filed at 2/24/2023 1004  Gross per 24 hour   Intake 720 ml   Output 700 ml   Net 20 ml     Lab Results:  Results from last 7 days   Lab Units 02/24/23  0531   WBC Thousand/uL 3 77*   HEMOGLOBIN g/dL 7 7*   HEMATOCRIT % 24 5*   PLATELETS Thousands/uL 166     Results from last 7 days   Lab Units 02/24/23  0531 02/22/23  0554 02/21/23  1225 POTASSIUM mmol/L 3 9   < > 4 2   CHLORIDE mmol/L 105   < > 110*   CO2 mmol/L 24   < > 22   BUN mg/dL 27*   < > 26*   CREATININE mg/dL 0 98   < > 0 96   CALCIUM mg/dL 8 2*   < > 8 5   ALK PHOS U/L  --   --  192*   ALT U/L  --   --  67*   AST U/L  --   --  38    < > = values in this interval not displayed       Results from last 7 days   Lab Units 02/24/23  0531   POTASSIUM mmol/L 3 9   CHLORIDE mmol/L 105   CO2 mmol/L 24   BUN mg/dL 27*   CREATININE mg/dL 0 98   CALCIUM mg/dL 8 2*           Medications:    Current Facility-Administered Medications:   •  acetaminophen (TYLENOL) tablet 650 mg, 650 mg, Oral, Q6H PRN, Hakan Madison PA-C, 650 mg at 02/24/23 1214  •  albuterol inhalation solution 2 5 mg, 2 5 mg, Nebulization, Q6H PRN, Saira Snow, DO  •  amitriptyline (ELAVIL) tablet 10 mg, 10 mg, Oral, HS, Fred S Prechtel, DO, 10 mg at 02/23/23 2138  •  aspirin (ECOTRIN LOW STRENGTH) EC tablet 81 mg, 81 mg, Oral, QAM, Fred S Prechtel, DO, 81 mg at 02/24/23 6403  •  atorvastatin (LIPITOR) tablet 80 mg, 80 mg, Oral, HS, Fred S Prechtel, DO, 80 mg at 02/23/23 2137  •  benzonatate (TESSALON PERLES) capsule 100 mg, 100 mg, Oral, TID PRN, Tessa Dubon PA-C, 100 mg at 02/24/23 1003  •  bisacodyl (DULCOLAX) rectal suppository 10 mg, 10 mg, Rectal, Daily, Fred Heathhtel, DO, 10 mg at 02/24/23 6598  •  carvedilol (COREG) tablet 3 125 mg, 3 125 mg, Oral, BID With Meals, Saira Snow, DO, 3 125 mg at 02/24/23 1614  •  dicyclomine (BENTYL) capsule 10 mg, 10 mg, Oral, 4x Daily PRN, Dennice Naomi S Prechtel, DO, 10 mg at 02/22/23 0602  •  docusate sodium (COLACE) capsule 100 mg, 100 mg, Oral, Daily, Fred S Prechtel, DO, 100 mg at 02/24/23 0959  •  enoxaparin (LOVENOX) subcutaneous injection 40 mg, 40 mg, Subcutaneous, Daily, Fred S Prechtel, DO, 40 mg at 02/24/23 4170  •  famotidine (PEPCID) tablet 20 mg, 20 mg, Oral, BID, Fred S Prechtel, DO, 20 mg at 02/24/23 0959  •  furosemide (LASIX) 500 mg infusion 50 mL, 10 mg/hr, Intravenous, Continuous, Maya Kelley PA-C, Last Rate: 1 mL/hr at 02/24/23 1004, 10 mg/hr at 02/24/23 1004  •  gabapentin (NEURONTIN) capsule 100 mg, 100 mg, Oral, TID, Fred S Prechtel, DO, 100 mg at 02/24/23 0959  •  insulin glargine (LANTUS) subcutaneous injection 5 Units 0 05 mL, 5 Units, Subcutaneous, QAM, Giana Bennett PA-C, 5 Units at 02/24/23 1213  •  insulin lispro (HumaLOG) 100 units/mL subcutaneous injection 1-6 Units, 1-6 Units, Subcutaneous, TID AC, 5 Units at 02/24/23 1213 **AND** Fingerstick Glucose (POCT), , , TID AC, Tomeka Parker PA-C  •  insulin lispro (HumaLOG) 100 units/mL subcutaneous injection 1-6 Units, 1-6 Units, Subcutaneous, HS, Tomeka Parker PA-C, 1 Units at 02/23/23 2137  •  levothyroxine tablet 100 mcg, 100 mcg, Oral, Early Morning, Fred S Prechtel, DO, 100 mcg at 02/24/23 0554  •  losartan (COZAAR) tablet 50 mg, 50 mg, Oral, HS, Fred S Prechtel, DO, 50 mg at 02/23/23 2137  •  modafinil (PROVIGIL) tablet 100 mg, 100 mg, Oral, Daily, Fred S Prechtel, DO, 100 mg at 02/24/23 8320  •  oxybutynin (DITROPAN-XL) 24 hr tablet 10 mg, 10 mg, Oral, Daily, Fred S Prechtel, DO, 10 mg at 02/24/23 5449  •  pantoprazole (PROTONIX) EC tablet 40 mg, 40 mg, Oral, Early Morning, Fred S Prechtel, DO, 40 mg at 02/24/23 0554  •  polyethylene glycol (MIRALAX) packet 17 g, 17 g, Oral, Daily, Fred S Prechtel, DO, 17 g at 02/24/23 9100  •  pramipexole (MIRAPEX) tablet 1 mg, 1 mg, Oral, BID, Fred S Prechtel, DO, 1 mg at 02/24/23 1000  •  senna (SENOKOT) tablet 8 6 mg, 1 tablet, Oral, HS, Giana Bennett PA-C, 8 6 mg at 02/24/23 1214  •  sertraline (ZOLOFT) tablet 100 mg, 100 mg, Oral, HS, Fred Heathhtel, DO, 100 mg at 02/23/23 2137  •  traMADol (ULTRAM) tablet 50 mg, 50 mg, Oral, Q6H PRN, Dionna Castillo PA-C, 50 mg at 02/24/23 1329    This note was completed in part utilizing [employer] Fluency Direct Software    Grammatical errors, random word insertions, spelling mistakes, and incomplete sentences may be an occasional consequence of this system secondary to software limitations, ambient noise, and hardware issues  If you have any questions or concerns about the content, text, or information contained within the body of this dictation, please contact the provider for clarification

## 2023-02-24 NOTE — INCIDENTAL FINDINGS
The following findings require follow up:  Radiographic finding   Finding:   Enlarged subcarinal node, new since the thoracic spine CT from March 2021  This could be reactive, but recommend follow-up with a chest CT with no contrast in 3 months     Follow up required: yes   Follow up should be done within 1 week(s)    Please notify the following clinician to assist with the follow up:   Dr Lianna Padilla

## 2023-02-24 NOTE — PROGRESS NOTES
Pt had just 400ml urine output over night and an increase in weight from  80 6kg to 81 1kg this morning   Notified MD

## 2023-02-24 NOTE — CASE MANAGEMENT
Case Management Assessment & Discharge Planning Note    Patient name Princess May  Location East 4 /E4 MS 66-* MRN [id number]  : 1955 Date 2023       Current Admission Date: 2023  Current Admission Diagnosis:Acute on chronic diastolic (congestive) heart failure [facility])   Patient Active Problem List    Diagnosis Date Noted   • Acute respiratory failure ([facility] ) 2023   • Anemia 2023   • Acute on chronic diastolic (congestive) heart failure ([address] ) 2023   • COVID-19 virus infection 2022   • Esophageal dysphagia 2022   • Esophageal stricture 01/10/2022   • Spinal stenosis of lumbar region with neurogenic claudication    • Epigastric pain 2021   • Acute urinary retention 2021   • JULIANNE (acute kidney injury) ([facility] ) 2021   • PONV (postoperative nausea and vomiting) 2021   • Class 2 obesity in adult 2021   • Pacemaker 2021   • Medical marijuana use 2021   • Achalasia 2021   • Uncontrolled type 2 diabetes mellitus with hyperglycemia ([facility] [address] ) 2018   • Hypersomnia 10/19/2018   • BBB (bundle branch block) 2018   • Closed fracture of thoracic vertebra ([facility] ) 2018   • Deep venous thrombosis ([facility] ) 2018   • Localized, primary osteoarthritis 2018   • Chronic bilateral low back pain without sciatica 2018   • Lumbosacral spondylosis without myelopathy 2018   • Osteoarthritis of knee 2018   • Age related osteoporosis 2018   • Chronic scapular pain 2018   • Microcytic anemia 2018   • Chronic diastolic CHF (congestive heart failure) ([facility] ) 2018   • Coronary artery disease involving native coronary artery 2018   • History of TIA (transient ischemic attack) 2018   • Encounter for fitting and adjustment of spinal cord stimulator 2018   • S/P insertion of spinal cord stimulator 2018   • Lumbar radiculopathy 2018   • Thoracic radiculopathy 2018 • Complex regional pain syndrome type 1 of left lower extremity 03/09/2018   • Type 2 diabetes mellitus without complication, with long-term current use of insulin ([facility] ) 01/11/2018   • Chronic bilateral thoracic back pain 11/17/2017   • Chronic myofascial pain 11/17/2017   • Vitamin D deficiency 11/03/2017   • Post-menopausal 10/11/2017   • Primary hyperparathyroidism ([address] ) 10/11/2017   • Outlet dysfunction constipation 09/08/2017   • Chronic pain of left knee 07/25/2017   • Gait disorder 07/25/2017   • Polyneuropathy associated with underlying disease ([address] ) 07/25/2017   • Essential hypertension 06/29/2017   • Mixed hyperlipidemia 06/29/2017   • Primary hypothyroidism 06/29/2017   • Chronic pain of right knee 06/06/2017   • Pain syndrome, chronic 02/21/2017   • Moderate persistent asthma with acute exacerbation 01/06/2017   • Morbid obesity with BMI of 40 0-44 9, adult ([facility] ) 09/29/2016   • Obstructive sleep apnea 08/16/2016   • Anemia of chronic disease 07/27/2016   • Anxiety and depression 07/27/2016   • Lymphadenopathy 06/27/2016   • Normocytic anemia 10/04/2015   • Insomnia due to medical condition 09/24/2015   • Type 2 diabetes mellitus with microalbuminuria, with long-term current use of insulin ([facility] ) 07/13/2015   • Gastroparesis 05/28/2015   • Moderate persistent asthma without complication 84/50/1839   • Gastroesophageal reflux disease without esophagitis 02/18/2015   • Mixed urge and stress incontinence 11/14/2014   • Other B-complex deficiencies 05/31/2013   • Insulin pump status 02/27/2012   • Hyperlipidemia associated with type 2 diabetes mellitus ([facility] ) 04/04/2011   • Sarcoidosis 02/09/2009   • Irritable bowel syndrome with constipation 02/09/2009      LOS (days): 3  Geometric Mean LOS (GMLOS) (days): 3 90  Days to GMLOS:0 9     OBJECTIVE:    Risk of Unplanned Readmission Score: 25 68         Current admission status: Inpatient       Preferred Pharmacy:   [facility] #[id number]- [address] [facility] [address]  Phone: [phone] Fax: [address]  [address] [facility] [address]  Phone: [phone] Fax: [address]   [facility] [address]  Phone: [phone] Fax: [address], [place] - [address] [facility] [place] [address] [place] [address] [address] [facility] [address] [address]  Phone: [phone] Fax: [phone]    [facility] [place], [place] [address]  [address] [place] [address] [address] [place] [address]  Phone: [phone] Fax: [phone]    Primary Care Provider: Lexii Haskins DO    Primary Insurance: MEDICARE  Secondary Insurance: AARP    ASSESSMENT:  [address] Proxies     NithyaMo Representative - Spouse   Primary Phone: [phone] (Mobile)  Home Phone: [phone]               Advance Directives  Does patient have a [address] [place] [address] Avenue?: No  Was patient offered paperwork?: No (Decline)  Does patient currently have a Health Care decision maker?: Yes, please see Health Care Proxy section Mikie Mcdowell (Spouse)   [address] [facility] [address]   [place]   [phone] Lisa Sharp (M))  Does patient have Advance Directives?: Yes  Advance Directives: Living will  Primary Contact: Mikie Mcdowell (Spouse)   [address] [facility] [address]   [place]   [phone] (H)   [phone] (M)         Readmission Root Cause  30 Day Readmission: No    Patient Information  Admitted from[de-identified] Home  Mental Status: Alert  During Assessment patient was accompanied by: Parent  Assessment information provided by[de-identified] Patient  Primary Caregiver: Self  Support Systems: Spouse/significant other, Parent  [place] of Residence: [address] [facility] do you live in?: [address] entry access options  Select all that apply : Stairs  Number of steps to enter home  : 1  Do the steps have railings?: Yes  Type of Current Residence: 2 story home  Upon entering residence, is there a bedroom on the main floor (no further steps)?: Yes (Pt has a first floor set up )  Upon entering residence, is there a bathroom on the main floor (no further steps)?: Yes  In the last 12 months, was there a time when you were not able to pay the mortgage or rent on time?: No  In the last 12 months, how many places have you lived?: 1  In the last 12 months, was there a time when you did not have a steady place to sleep or slept in a shelter (including now)?: No  Homeless/housing insecurity resource given?: N/A  Living Arrangements: Lives w/ Spouse/significant other  Is patient a ?: No    Activities of Daily Living Prior to Admission  Functional Status: Independent  Completes ADLs independently?: Yes  Ambulates independently?: Yes  Does patient use assisted devices?: Yes  Assisted Devices (DME) used: Rollator  Does patient currently own DME?: Yes  What DME does the patient currently own?: Rollator, Straight Cane, Bedside Commode, Shower Chair, Wheelchair, Other (Comment) (Grab bars and nebulizer)  Does patient have a history of Outpatient Therapy (PT/OT)?: Yes  Does the patient have a history of Short-Term Rehab?: No  Does patient have a history of [facility]?: No  Does patient currently have [place] [address]?: No         Patient Information Continued  Income Source: Pension/skilled nursing  Does patient have prescription coverage?: Yes  Within the past 12 months, you worried that your food would run out before you got the money to buy more : Never true  Within the past 12 months, the food you bought just didn't last and you didn't have money to get more : Never true  Food insecurity resource given?: N/A  Does patient receive dialysis treatments?: No  Does patient have a history of substance abuse?: Currently using (Marijuana/vape pen 1x week)  Does patient have a history of Mental Health Diagnosis?: Yes (Depression and anxiety)  Has patient received inpatient treatment related to mental health in the last 2 years?: No         Means of Transportation  Means of Transport to Appts[de-identified] Drives Self  In the past 12 months, has lack of transportation kept you from medical appointments or from getting medications?: No  In the past 12 months, has lack of transportation kept you from meetings, work, or from getting things needed for daily living?: No  Was application for public transport provided?: N/A        DISCHARGE DETAILS:    Discharge planning discussed with[de-identified] Pt and Mother  Freedom of Choice: Yes  Comments - Freedom of Choice: Pt decline VNA for CHF  CM contacted family/caregiver?: Yes  Were Treatment Team discharge recommendations reviewed with patient/caregiver?: Yes  Did patient/caregiver verbalize understanding of patient care needs?: Yes  Were patient/caregiver advised of the risks associated with not following Treatment Team discharge recommendations?: Yes                   Other Referral/Resources/Interventions Provided:  Programs[de-identified] CHF         Treatment Team Recommendation: Home with [address] [facility]  Discharge Destination Plan[de-identified] Home          Additional Comments: Offer VNA for CHF and pt decline  Will f/u as pt is currently on oxygen

## 2023-02-24 NOTE — ASSESSMENT & PLAN NOTE
Lab Results   Component Value Date    HGBA1C 6 7 (H) 02/15/2023     Recent Labs     02/23/23  1757 02/23/23  1824 02/23/23 2058 02/24/23  0809   POCGLU 48* 105 179* 116   Prehospital patient uses an insulin pump  Pt reports pump is almost out of insulin   d c pump and placed on sliding scale, controlled on SSI currently   ADA diet

## 2023-02-25 PROBLEM — R93.89 ABNORMAL CT OF THE CHEST: Status: ACTIVE | Noted: 2023-02-25

## 2023-02-25 LAB
ANION GAP SERPL CALCULATED.3IONS-SCNC: 8 MMOL/L (ref 4–13)
BUN SERPL-MCNC: 28 MG/DL (ref 5–25)
CALCIUM SERPL-MCNC: 8.7 MG/DL (ref 8.4–10.2)
CHLORIDE SERPL-SCNC: 102 MMOL/L (ref 96–108)
CO2 SERPL-SCNC: 29 MMOL/L (ref 21–32)
CREAT SERPL-MCNC: 1.17 MG/DL (ref 0.6–1.3)
ERYTHROCYTE [DISTWIDTH] IN BLOOD BY AUTOMATED COUNT: 15 % (ref 11.6–15.1)
GFR SERPL CREATININE-BSD FRML MDRD: 48 ML/MIN/1.73SQ M
GLUCOSE SERPL-MCNC: 101 MG/DL (ref 65–140)
GLUCOSE SERPL-MCNC: 112 MG/DL (ref 65–140)
GLUCOSE SERPL-MCNC: 147 MG/DL (ref 65–140)
GLUCOSE SERPL-MCNC: 186 MG/DL (ref 65–140)
GLUCOSE SERPL-MCNC: 190 MG/DL (ref 65–140)
HCT VFR BLD AUTO: 27.1 % (ref 34.8–46.1)
HGB BLD-MCNC: 8.4 G/DL (ref 11.5–15.4)
MAGNESIUM SERPL-MCNC: 1.8 MG/DL (ref 1.9–2.7)
MCH RBC QN AUTO: 30.7 PG (ref 26.8–34.3)
MCHC RBC AUTO-ENTMCNC: 31 G/DL (ref 31.4–37.4)
MCV RBC AUTO: 99 FL (ref 82–98)
PHOSPHATE SERPL-MCNC: 5 MG/DL (ref 2.3–4.1)
PLATELET # BLD AUTO: 167 THOUSANDS/UL (ref 149–390)
PMV BLD AUTO: 9.9 FL (ref 8.9–12.7)
POTASSIUM SERPL-SCNC: 4.6 MMOL/L (ref 3.5–5.3)
RBC # BLD AUTO: 2.74 MILLION/UL (ref 3.81–5.12)
SODIUM SERPL-SCNC: 139 MMOL/L (ref 135–147)
WBC # BLD AUTO: 4.51 THOUSAND/UL (ref 4.31–10.16)

## 2023-02-25 RX ORDER — LANOLIN ALCOHOL/MO/W.PET/CERES
6 CREAM (GRAM) TOPICAL
Status: DISCONTINUED | OUTPATIENT
Start: 2023-02-25 | End: 2023-03-01 | Stop reason: HOSPADM

## 2023-02-25 RX ORDER — MAGNESIUM SULFATE HEPTAHYDRATE 40 MG/ML
2 INJECTION, SOLUTION INTRAVENOUS ONCE
Status: COMPLETED | OUTPATIENT
Start: 2023-02-25 | End: 2023-02-25

## 2023-02-25 RX ADMIN — LOSARTAN POTASSIUM 50 MG: 50 TABLET, FILM COATED ORAL at 21:00

## 2023-02-25 RX ADMIN — FAMOTIDINE 20 MG: 20 TABLET ORAL at 17:06

## 2023-02-25 RX ADMIN — PANTOPRAZOLE SODIUM 40 MG: 40 TABLET, DELAYED RELEASE ORAL at 05:32

## 2023-02-25 RX ADMIN — INSULIN LISPRO 1 UNITS: 100 INJECTION, SOLUTION INTRAVENOUS; SUBCUTANEOUS at 12:33

## 2023-02-25 RX ADMIN — BENZONATATE 100 MG: 100 CAPSULE ORAL at 12:39

## 2023-02-25 RX ADMIN — POTASSIUM CHLORIDE 20 MEQ: 1500 TABLET, EXTENDED RELEASE ORAL at 17:06

## 2023-02-25 RX ADMIN — POTASSIUM CHLORIDE 20 MEQ: 1500 TABLET, EXTENDED RELEASE ORAL at 08:20

## 2023-02-25 RX ADMIN — GABAPENTIN 100 MG: 100 CAPSULE ORAL at 08:20

## 2023-02-25 RX ADMIN — PRAMIPEXOLE DIHYDROCHLORIDE 1 MG: 1 TABLET ORAL at 17:08

## 2023-02-25 RX ADMIN — Medication 20 MG/HR: at 22:03

## 2023-02-25 RX ADMIN — ATORVASTATIN CALCIUM 80 MG: 80 TABLET, FILM COATED ORAL at 21:00

## 2023-02-25 RX ADMIN — FAMOTIDINE 20 MG: 20 TABLET ORAL at 08:20

## 2023-02-25 RX ADMIN — OXYBUTYNIN CHLORIDE 10 MG: 10 TABLET, EXTENDED RELEASE ORAL at 08:20

## 2023-02-25 RX ADMIN — Medication 6 MG: at 22:00

## 2023-02-25 RX ADMIN — INSULIN GLARGINE 5 UNITS: 100 INJECTION, SOLUTION SUBCUTANEOUS at 08:27

## 2023-02-25 RX ADMIN — GABAPENTIN 100 MG: 100 CAPSULE ORAL at 16:21

## 2023-02-25 RX ADMIN — LEVOTHYROXINE SODIUM 100 MCG: 100 TABLET ORAL at 05:32

## 2023-02-25 RX ADMIN — TRAMADOL HYDROCHLORIDE 50 MG: 50 TABLET, COATED ORAL at 20:53

## 2023-02-25 RX ADMIN — SENNOSIDES 8.6 MG: 8.6 TABLET, FILM COATED ORAL at 21:00

## 2023-02-25 RX ADMIN — CARVEDILOL 3.12 MG: 3.12 TABLET, FILM COATED ORAL at 08:20

## 2023-02-25 RX ADMIN — DOCUSATE SODIUM 100 MG: 100 CAPSULE, LIQUID FILLED ORAL at 08:20

## 2023-02-25 RX ADMIN — ACETAMINOPHEN 325MG 650 MG: 325 TABLET ORAL at 12:34

## 2023-02-25 RX ADMIN — MODAFINIL 100 MG: 100 TABLET ORAL at 08:27

## 2023-02-25 RX ADMIN — ASPIRIN 81 MG: 81 TABLET, COATED ORAL at 08:20

## 2023-02-25 RX ADMIN — SERTRALINE HYDROCHLORIDE 100 MG: 100 TABLET, FILM COATED ORAL at 21:00

## 2023-02-25 RX ADMIN — CARVEDILOL 3.12 MG: 3.12 TABLET, FILM COATED ORAL at 16:21

## 2023-02-25 RX ADMIN — ENOXAPARIN SODIUM 40 MG: 40 INJECTION SUBCUTANEOUS at 08:21

## 2023-02-25 RX ADMIN — GABAPENTIN 100 MG: 100 CAPSULE ORAL at 21:00

## 2023-02-25 RX ADMIN — MAGNESIUM SULFATE HEPTAHYDRATE 2 G: 40 INJECTION, SOLUTION INTRAVENOUS at 09:14

## 2023-02-25 RX ADMIN — POLYETHYLENE GLYCOL 3350 17 G: 17 POWDER, FOR SOLUTION ORAL at 08:20

## 2023-02-25 RX ADMIN — TRAMADOL HYDROCHLORIDE 50 MG: 50 TABLET, COATED ORAL at 09:16

## 2023-02-25 RX ADMIN — PRAMIPEXOLE DIHYDROCHLORIDE 1 MG: 1 TABLET ORAL at 08:21

## 2023-02-25 RX ADMIN — AMITRIPTYLINE HYDROCHLORIDE 10 MG: 10 TABLET, FILM COATED ORAL at 21:00

## 2023-02-25 RX ADMIN — INSULIN LISPRO 1 UNITS: 100 INJECTION, SOLUTION INTRAVENOUS; SUBCUTANEOUS at 21:00

## 2023-02-25 NOTE — PROGRESS NOTES
[address] [facility]  Progress Note - Veronica Felt 1955, 78 y o  female MRN: [id number]  Unit/Bed#: E4 -01 Encounter: [id number]  Primary Care Provider: Cordell Anderson DO   Date and time admitted to hospital: 2/21/2023 11:27 AM    * Acute on chronic diastolic (congestive) heart failure (HCC)  Assessment & Plan  Wt Readings from Last 3 Encounters:   02/25/23 79 2 kg (174 lb 9 7 oz)   01/31/23 77 6 kg (171 lb)   01/24/23 77 6 kg (171 lb)   · Receives most of her care at [facility] -including her cardiologist  · Presented with a complaint of shortness of breath and leg swelling   · Requiring 4 L oxygen nasal cannula on admission --> down to 1 lpm today   · Repeat echocardiogram here: EF 54%, paradoxical septal wall motion consistent with conduction abnormality, grade 1 diastolic function, aortic valve sclerosis, mild MR, trace TR  · Home regimen torsemide 40 mg daily  · Started on IV Lasix- to 80 mg BID --> advanced to Lasix gtt 2/24/2023 , currently on 20 mg/hr   · Given one time dose metolazone 5 mg yesterday 2/24   · weight down to 174 lb today   · Appreciate cardiology ongoing recs   · Monitor electrolytes, I/O, daily weights   · Hopefully can get her off oxygen soon      Abnormal CT of the chest  Assessment & Plan  Enlarged subcarinal node, new since the thoracic spine CT from March 2021  This could be reactive, but recommend follow-up with a chest CT with no contrast in 3 months    Incidental note completed    Anemia  Assessment & Plan  Results from last 7 days   Lab Units 02/25/23  0539 02/24/23  0531 02/23/23  0437 02/22/23  0554   HEMOGLOBIN g/dL 8 4* 7 7* 7 5* 7 3*   history of anemia of chronic disease and MEAGAN   Baseline appearing in the 12's  More recently hemoglobins have been in the 8's/9s according to care everywhere  Denies bleeding anywhere, initial occult stool negative   Follows with H[facility]ology at [facility]   Received [facility] outpatient   care everywhere-recent iron panel from February 2023: Iron 32, transferrin 139, iron saturation 17, TIBC 188, ferritin 341  Given IV venofer x 3 days   Continue to follow hemoglobin, now stable   She needs re-eval outpatient with consideration for EGD and colonoscopy     Acute respiratory failure ([address] )  Assessment & Plan  · With hypoxia, Likely secondary to acute CHF exacerbation as well as recent pneumonia  · requiring 4 L oxygen nasal cannula on admission, weaning off oxygen as tolerated   · Currently on 1 L/min nasal cannula  · Will need home O2 prior to discharge     Type 2 diabetes mellitus with microalbuminuria, with long-term current use of insulin [facility])  Assessment & Plan  Lab Results   Component Value Date    HGBA1C 6 7 (H) 02/15/2023     Recent Labs     02/24/23  1106 02/24/23  1615 02/24/23  2112 02/25/23  0707   POCGLU 347* 101 127 112   Prehospital patient uses an insulin pump  Pt reports pump is out of insulin   d c pump and placed on sliding scale, and lantus 5 units qAm   ADA diet     Essential hypertension  Assessment & Plan  · Home regimen losartan 50 mg daily, Coreg 3 125 mg twice daily  · Monitor VS on lasix gtt      VTE Pharmacologic Prophylaxis: VTE Score: 4 Moderate Risk (Score 3-4) - Pharmacological DVT Prophylaxis Ordered: enoxaparin (Lovenox)  Patient Centered Rounds: I performed bedside rounds with nursing staff today  Discussions with Specialists or Other Care Team Provider:     Education and Discussions with Family / Patient: Patient declined call to   Total Time Spent on Date of Encounter in care of patient: 25 minutes This time was spent on one or more of the following: performing physical exam; counseling and coordination of care; obtaining or reviewing history; documenting in the medical record; reviewing/ordering tests, medications or procedures; communicating with other healthcare professionals and discussing with patient's family/caregivers      Current Length of Stay: 4 day(s)  Current Patient Status: Inpatient   Certification Statement: The patient will continue to require additional inpatient hospital stay due to Acute CHF on Lasix drip  Discharge Plan: Anticipate discharge in 48 hrs to home with home services  Code Status: Level 1 - Full Code    Subjective:   Patient doing well had a very large bowel movement last night  She does feel like her feet are swollen today  Breathing is better  No chest pain  No lightheadedness or dizziness    Objective:     Vitals:   Temp (24hrs), Av 9 °F (36 6 °C), Min:97 4 °F (36 3 °C), Max:98 7 °F (37 1 °C)    Temp:  [97 4 °F (36 3 °C)-98 7 °F (37 1 °C)] 98 1 °F (36 7 °C)  HR:  [58-61] 61  Resp:  [18-20] 20  BP: (117-132)/(55-70) 121/61  SpO2:  [96 %-100 %] 96 %  Body mass index is 31 94 kg/m²  Input and Output Summary (last 24 hours): Intake/Output Summary (Last 24 hours) at 2023 0902  Last data filed at 2023 0701  Gross per 24 hour   Intake 833 56 ml   Output 2589 ml   Net -1755 44 ml       Physical Exam:   Physical Exam  Vitals and nursing note reviewed  Constitutional:       General: She is not in acute distress  Appearance: She is obese  She is not toxic-appearing  Cardiovascular:      Rate and Rhythm: Normal rate and regular rhythm  Heart sounds: Murmur heard  Comments: + JVD  Pulmonary:      Effort: Pulmonary effort is normal  No respiratory distress  Breath sounds: Normal breath sounds  No wheezing  Comments: 1 lpm nasal cannula   Abdominal:      General: Bowel sounds are normal       Palpations: Abdomen is soft  Musculoskeletal:      Right lower leg: Edema present  Left lower leg: Edema present  Skin:     Comments: Chronic hyperpigmentation of lower extremities bilaterally   Neurological:      Mental Status: She is alert  Mental status is at baseline     Psychiatric:         Mood and Affect: Mood normal           Additional Data:     Labs:  Results from last 7 days   Lab Units 23  8968 02/21/23 2111 02/21/23  1225   WBC Thousand/uL 4 51   < > 6 46   HEMOGLOBIN g/dL 8 4*   < > 8 0*   HEMATOCRIT % 27 1*   < > 25 5*   PLATELETS Thousands/uL 167   < > 169   NEUTROS PCT %  --   --  77*   LYMPHS PCT %  --   --  10*   MONOS PCT %  --   --  9   EOS PCT %  --   --  1    < > = values in this interval not displayed  Results from last 7 days   Lab Units 02/25/23  0539 02/22/23  0554 02/21/23  1225   SODIUM mmol/L 139   < > 138   POTASSIUM mmol/L 4 6   < > 4 2   CHLORIDE mmol/L 102   < > 110*   CO2 mmol/L 29   < > 22   BUN mg/dL 28*   < > 26*   CREATININE mg/dL 1 17   < > 0 96   ANION GAP mmol/L 8   < > 6   CALCIUM mg/dL 8 7   < > 8 5   ALBUMIN g/dL  --   --  3 6   TOTAL BILIRUBIN mg/dL  --   --  0 66   ALK PHOS U/L  --   --  192*   ALT U/L  --   --  67*   AST U/L  --   --  38   GLUCOSE RANDOM mg/dL 101   < > 194*    < > = values in this interval not displayed       Results from last 7 days   Lab Units 02/21/23  1225   INR  1 41*     Results from last 7 days   Lab Units 02/25/23  0707 02/24/23  2112 02/24/23  1615 02/24/23  1106 02/24/23  0809 02/23/23  2058 02/23/23  1824 02/23/23  1757 02/23/23  1739 02/23/23  1109 02/23/23  0816 02/22/23  2040   POC GLUCOSE mg/dl 112 127 101 347* 116 179* 105 48* 54* 119 186* 276*         Results from last 7 days   Lab Units 02/21/23  1449   LACTIC ACID mmol/L 0 9       Lines/Drains:  Invasive Devices     Peripheral Intravenous Line  Duration           Peripheral IV 02/22/23 Proximal;Right;Ventral (anterior) Forearm 2 days                  Telemetry:  Telemetry Orders (From admission, onward)             48 Hour Telemetry Monitoring  Continuous x 48 hours        References:    Telemetry Guidelines   Question:  Reason for 48 Hour Telemetry  Answer:  Acute Decompensated CHF (continuous diuretic infusion or total diuretic dose > 200 mg daily, associated electrolyte derangement, ionotropic drip, history of ventricular arrhythmia, or new EF <35%)                 Indication for Continued Telemetry Use: Acute CHF on >200 mg lasix/day or equivalent dose or with new reduced EF  Imaging: Reviewed radiology reports from this admission including: chest xray    Recent Cultures (last 7 days):   Results from last 7 days   Lab Units 02/21/23  1330 02/21/23  1323 02/21/23  1225   BLOOD CULTURE   --  No Growth at 72 hrs  No Growth at 72 hrs     URINE CULTURE  <10,000 cfu/ml Gram Positive Cocci*  --   --        Last 24 Hours Medication List:   Current Facility-Administered Medications   Medication Dose Route Frequency Provider Last Rate   • acetaminophen  650 mg Oral Q6H PRN Berny Burrell PA-C     • albuterol  2 5 mg Nebulization Q6H PRN Zena Favre Prechtel, DO     • amitriptyline  10 mg Oral HS Fred S Prechtel, DO     • aspirin  81 mg Oral QAM Fred S Prechtel, DO     • atorvastatin  80 mg Oral HS Fred S Prechtel, DO     • benzonatate  100 mg Oral TID PRN Catracho Loving PA-C     • bisacodyl  10 mg Rectal Daily Fred S Prechtel, DO     • bisacodyl  10 mg Rectal Daily PRN Danish Nolasco PA-C     • carvedilol  3 125 mg Oral BID With Meals Fred S Prechtel, DO     • dicyclomine  10 mg Oral 4x Daily PRN Zena Favre Prechtel, DO     • docusate sodium  100 mg Oral Daily Fred S Prechtel, DO     • enoxaparin  40 mg Subcutaneous Daily Fred S Prechtel, DO     • famotidine  20 mg Oral BID Fred S Prechtel, DO     • furosemide  20 mg/hr Intravenous Continuous Rujul Alex, DO 20 mg/hr (02/24/23 2042)   • gabapentin  100 mg Oral TID Zena Favre Prechtel, DO     • insulin glargine  5 Units Subcutaneous QAM Giana Bennett PA-C     • insulin lispro  1-6 Units Subcutaneous TID AC Tomeka Parker PA-C     • insulin lispro  1-6 Units Subcutaneous HS Tomeka Parker PA-C     • levothyroxine  100 mcg Oral Early Morning Fred S Prechtel, DO     • losartan  50 mg Oral HS Fred S Prechtel, DO     • magnesium sulfate  2 g Intravenous Once Giana Bennett PA-C     • modafinil  100 mg Oral Daily Aline Iisdro Prechtel, DO     • oxybutynin  10 mg Oral Daily Fred S Prechtel, DO     • pantoprazole  40 mg Oral Early Morning Fred S Prechtel, DO     • polyethylene glycol  17 g Oral Daily Fred S Prechtel, DO     • potassium chloride  20 mEq Oral BID Rujul Alex, DO     • pramipexole  1 mg Oral BID Fred S Prechtel, DO     • senna  1 tablet Oral HS Giana Bennett PA-C     • sertraline  100 mg Oral HS Fred S Prechtel, DO     • traMADol  50 mg Oral Q6H PRN Shahrzad Villalobos PA-C          Today, Patient Was Seen By: Parish Vazquez PA-C    **Please Note: This note may have been constructed using a voice recognition system  **

## 2023-02-25 NOTE — ASSESSMENT & PLAN NOTE
· Enlarged subcarinal node, new since the thoracic spine CT from March 2021  This could be reactive, but recommend follow-up with a chest CT with no contrast in 3 months    · Incidental note completed

## 2023-02-25 NOTE — ASSESSMENT & PLAN NOTE
Lab Results   Component Value Date    HGBA1C 6 7 (H) 02/15/2023     Recent Labs     02/24/23  1615 02/24/23  2112 02/25/23  0707 02/25/23  1113   POCGLU 101 127 112 190*   Prehospital patient uses an insulin pump  Pt reports pump is out of insulin   d c pump and placed on sliding scale, and lantus 5 units qAm   ADA diet

## 2023-02-25 NOTE — ASSESSMENT & PLAN NOTE
· With hypoxia, Likely secondary to acute CHF exacerbation as well as recent pneumonia  · requiring 4 L oxygen nasal cannula on admission, weaning off oxygen as tolerated   · Currently on 1 L/min nasal cannula  · Will need home O2 prior to discharge

## 2023-02-25 NOTE — PROGRESS NOTES
Cardiology Progress Note - Oziel Meyers 78 y o  female MRN: [id number]    Unit/Bed#: E4 -01 Encounter: [id number]      Assessment & Plan:    Acute on chronic diastolic (congestive) heart failure (HCC)  -TTE 2/22/2023 showed EF 54%, grade 1 DD, mild MR, estimated PA pressure 26 mmHg  - Home diuretic regimen on torsemide 20 mg daily  - Currently on Lasix drip at 20 mg/hr  - Given metolazone 5 mg x 1 yesterday    Essential hypertension  - Currently on carvedilol 2 125 mg twice daily, losartan 50 mg daily    Type 2 diabetes mellitus with microalbuminuria, with long-term current use of insulin (HCC)    Acute respiratory failure (HCC)  - Likely multifactorial in the setting of acute CHF exacerbation as well as recent a pneumonia  - Initially required 4 L nasal cannula, now satting well on 2 L    Anemia    Abnormal CT of the chest    Summary:   - Weight trended down about 4 pounds today  -Still appears volume up on exam, will continue with Lasix drip at 20 mg/hr  -She is having good urine output this morning, but if her urine output decreases this afternoon, will continue consider redosing metolazone  - Check daily standing weights  - Monitor creatinine and electrolytes closely    Subjective:    No significant events overnight  She reports feeling tired this morning, but otherwise okay  Feels her breathing has improved  Denies chest pain, abdominal pain, nausea, vomiting, fever, chills, headache, dizziness or palpitations  Objective:     Vitals: Blood pressure 121/61, pulse 61, temperature 98 1 °F (36 7 °C), resp  rate 20, height 5' 2" (1 575 m), weight 79 2 kg (174 lb 9 7 oz), SpO2 96 %  , Body mass index is 31 94 kg/m² ,   Orthostatic Blood Pressures    Flowsheet Row Most Recent Value   Blood Pressure 121/61 filed at 02/25/2023 0708   Patient Position - Orthostatic VS Lying filed at 02/25/2023 0405            Intake/Output Summary (Last 24 hours) at 2/25/2023 0951  Last data filed at 2/25/2023 0914  Gross per 24 hour   Intake 833 56 ml   Output 2839 ml   Net -2005 44 ml           Physical Exam:    GEN: Zohra Barriga appears well, alert and oriented x 3, pleasant and cooperative   HEENT: anicteric, mucous membranes moist  NECK: + Trace JVD  HEART: regular rate and rhythm, normal S1 and S2, no murmurs, clicks, gallops or rubs   LUNGS: Bilateral crackles at bases  ABDOMEN: normal bowel sounds, soft, no tenderness, no distention  EXTREMITIES: peripheral pulses normal; 1-2+ lower extremity edema  NEURO: no focal findings   SKIN: normal without suspicious lesions on exposed skin      Current Facility-Administered Medications:   •  acetaminophen (TYLENOL) tablet 650 mg, 650 mg, Oral, Q6H PRN, Valeda Boas, PA-C, 650 mg at 02/24/23 1214  •  albuterol inhalation solution 2 5 mg, 2 5 mg, Nebulization, Q6H PRN, Marylee Rhody Prechtel, DO  •  amitriptyline (ELAVIL) tablet 10 mg, 10 mg, Oral, HS, Fred S Prechtel, DO, 10 mg at 02/24/23 2123  •  aspirin (ECOTRIN LOW STRENGTH) EC tablet 81 mg, 81 mg, Oral, QAM, Fred S Prechtel, DO, 81 mg at 02/25/23 0820  •  atorvastatin (LIPITOR) tablet 80 mg, 80 mg, Oral, HS, Fred S Prechtel, DO, 80 mg at 02/24/23 2122  •  benzonatate (TESSALON PERLES) capsule 100 mg, 100 mg, Oral, TID PRN, Vanna Guadarrama PA-C, 100 mg at 02/24/23 1003  •  bisacodyl (DULCOLAX) rectal suppository 10 mg, 10 mg, Rectal, Daily, Fred Heathhtel, DO, 10 mg at 02/24/23 1266  •  bisacodyl (DULCOLAX) rectal suppository 10 mg, 10 mg, Rectal, Daily PRN, Cynthia Loving PA-C  •  carvedilol (COREG) tablet 3 125 mg, 3 125 mg, Oral, BID With Meals, Marylee Rhody Prechtel, DO, 3 125 mg at 02/25/23 0820  •  dicyclomine (BENTYL) capsule 10 mg, 10 mg, Oral, 4x Daily PRN, Laurelyn Kawasaki S Prechtel, DO, 10 mg at 02/22/23 0602  •  docusate sodium (COLACE) capsule 100 mg, 100 mg, Oral, Daily, Fred nSow, DO, 100 mg at 02/25/23 0820  •  enoxaparin (LOVENOX) subcutaneous injection 40 mg, 40 mg, Subcutaneous, Daily, Marylee Rhody Prechtel, DO, 40 mg at 02/25/23 8697  •  famotidine (PEPCID) tablet 20 mg, 20 mg, Oral, BID, Fred S Prechtel, DO, 20 mg at 02/25/23 0820  •  furosemide (LASIX) 500 mg infusion 50 mL, 20 mg/hr, Intravenous, Continuous, Rujul Alex, DO, Last Rate: 2 mL/hr at 02/24/23 2042, 20 mg/hr at 02/24/23 2042  •  gabapentin (NEURONTIN) capsule 100 mg, 100 mg, Oral, TID, Fred S Prechtel, DO, 100 mg at 02/25/23 0820  •  insulin glargine (LANTUS) subcutaneous injection 5 Units 0 05 mL, 5 Units, Subcutaneous, QAM, Giana Bennett PA-C, 5 Units at 02/25/23 0827  •  insulin lispro (HumaLOG) 100 units/mL subcutaneous injection 1-6 Units, 1-6 Units, Subcutaneous, TID AC, 5 Units at 02/24/23 1213 **AND** Fingerstick Glucose (POCT), , , TID AC, Tomeka Parker PA-C  •  insulin lispro (HumaLOG) 100 units/mL subcutaneous injection 1-6 Units, 1-6 Units, Subcutaneous, HS, Tomeka Parker PA-C, 1 Units at 02/23/23 2137  •  levothyroxine tablet 100 mcg, 100 mcg, Oral, Early Morning, Fred S Prechtel, DO, 100 mcg at 02/25/23 0532  •  losartan (COZAAR) tablet 50 mg, 50 mg, Oral, HS, Fred S Prechtel, DO, 50 mg at 02/24/23 2122  •  magnesium sulfate 2 g/50 mL IVPB (premix) 2 g, 2 g, Intravenous, Once, Giana Bennett PA-C, Last Rate: 25 mL/hr at 02/25/23 0914, 2 g at 02/25/23 0350  •  modafinil (PROVIGIL) tablet 100 mg, 100 mg, Oral, Daily, Fred S Prechtel, DO, 100 mg at 02/25/23 3157  •  oxybutynin (DITROPAN-XL) 24 hr tablet 10 mg, 10 mg, Oral, Daily, Fred S Prechtel, DO, 10 mg at 02/25/23 0820  •  pantoprazole (PROTONIX) EC tablet 40 mg, 40 mg, Oral, Early Morning, Fred S Prechtel, DO, 40 mg at 02/25/23 0532  •  polyethylene glycol (MIRALAX) packet 17 g, 17 g, Oral, Daily, Fred S Prechtel, DO, 17 g at 02/25/23 0820  •  potassium chloride (K-DUR,KLOR-CON) CR tablet 20 mEq, 20 mEq, Oral, BID, Rujul Alex, DO, 20 mEq at 02/25/23 0820  •  pramipexole (MIRAPEX) tablet 1 mg, 1 mg, Oral, BID, Fred S Prechtel, DO, 1 mg at 02/25/23 8986  • senna (SENOKOT) tablet 8 6 mg, 1 tablet, Oral, HS, Giana Bennett PA-C, 8 6 mg at 02/24/23 2122  •  sertraline (ZOLOFT) tablet 100 mg, 100 mg, Oral, HS, Fred Snow DO, 100 mg at 02/24/23 2123  •  traMADol (ULTRAM) tablet 50 mg, 50 mg, Oral, Q6H PRN, Roderick Tomas PA-C, 50 mg at 02/25/23 0916    Labs & Results:    Lab Results   Component Value Date    CKTOTAL 401 (H) 07/17/2020    CKMB 20 5 (H) 07/17/2020    CKMBINDEX 5 1 (H) 07/17/2020       Lab Results   Component Value Date    GLUCOSE 197 (H) 04/23/2018    CALCIUM 8 7 02/25/2023    K 4 6 02/25/2023    CO2 29 02/25/2023     02/25/2023    BUN 28 (H) 02/25/2023    CREATININE 1 17 02/25/2023       Lab Results   Component Value Date    WBC 4 51 02/25/2023    HGB 8 4 (L) 02/25/2023    HCT 27 1 (L) 02/25/2023    MCV 99 (H) 02/25/2023     02/25/2023     Results from last 7 days   Lab Units 02/21/23  1225   INR  1 41*       No results found for: CHOL  No results found for: HDL  No results found for: LDLCALC  No results found for: TRIG    Lab Results   Component Value Date    ALT 67 (H) 02/21/2023    AST 38 02/21/2023         EKG personally reviewed by )Jet Saenz MD  No acute changes   TELE: No significant arrhythmias seen on telemetry review

## 2023-02-25 NOTE — PLAN OF CARE
Problem: Potential for Falls  Goal: Patient will remain free of falls  Description: INTERVENTIONS:  - Educate patient/family on patient safety including physical limitations  - Instruct patient to call for assistance with activity   - Consult OT/PT to assist with strengthening/mobility   - Keep Call bell within reach  - Keep bed low and locked with side rails adjusted as appropriate  - Keep care items and personal belongings within reach  - Initiate and maintain comfort rounds  - Make Fall Risk Sign visible to staff  - Offer Toileting every Hours, in advance of need  - Initiate/Maintain alarm  - Obtain necessary fall risk management equipment:   - Apply yellow socks and bracelet for high fall risk patients  - Consider moving patient to room near nurses station  Outcome: Progressing     Problem: MOBILITY - ADULT  Goal: Maintain or return to baseline ADL function  Description: INTERVENTIONS:  -  Assess patient's ability to carry out ADLs; assess patient's baseline for ADL function and identify physical deficits which impact ability to perform ADLs (bathing, care of mouth/teeth, toileting, grooming, dressing, etc )  - Assess/evaluate cause of self-care deficits   - Assess range of motion  - Assess patient's mobility; develop plan if impaired  - Assess patient's need for assistive devices and provide as appropriate  - Encourage maximum independence but intervene and supervise when necessary  - Involve family in performance of ADLs  - Assess for home care needs following discharge   - Consider OT consult to assist with ADL evaluation and planning for discharge  - Provide patient education as appropriate  Outcome: Progressing  Goal: Maintains/Returns to pre admission functional level  Description: INTERVENTIONS:  - Perform BMAT or MOVE assessment daily    - Set and communicate daily mobility goal to care team and patient/family/caregiver     - Collaborate with rehabilitation services on mobility goals if consulted  - Perform Range of Motion  times a day  - Reposition patient every  hours    - Dangle patient  times a day  - Stand patient times a day  - Ambulate patient  times a day  - Out of bed to chair times a day   - Out of bed for meals times a day  - Out of bed for toileting  - Record patient progress and toleration of activity level   Outcome: Progressing     Problem: PAIN - ADULT  Goal: Verbalizes/displays adequate comfort level or baseline comfort level  Description: Interventions:  - Encourage patient to monitor pain and request assistance  - Assess pain using appropriate pain scale  - Administer analgesics based on type and severity of pain and evaluate response  - Implement non-pharmacological measures as appropriate and evaluate response  - Consider cultural and social influences on pain and pain management  - Notify physician/advanced practitioner if interventions unsuccessful or patient reports new pain  Outcome: Progressing     Problem: INFECTION - ADULT  Goal: Absence or prevention of progression during hospitalization  Description: INTERVENTIONS:  - Assess and monitor for signs and symptoms of infection  - Monitor lab/diagnostic results  - Monitor all insertion sites, i e  indwelling lines, tubes, and drains  - Monitor endotracheal if appropriate and nasal secretions for changes in amount and color  - [place] appropriate cooling/warming therapies per order  - Administer medications as ordered  - Instruct and encourage patient and family to use good hand hygiene technique  - Identify and instruct in appropriate isolation precautions for identified infection/condition  Outcome: Progressing  Goal: Absence of fever/infection during neutropenic period  Description: INTERVENTIONS:  - Monitor WBC    Outcome: Progressing     Problem: SAFETY ADULT  Goal: Patient will remain free of falls  Description: INTERVENTIONS:  - Educate patient/family on patient safety including physical limitations  - Instruct patient to call for assistance with activity   - Consult OT/PT to assist with strengthening/mobility   - Keep Call bell within reach  - Keep bed low and locked with side rails adjusted as appropriate  - Keep care items and personal belongings within reach  - Initiate and maintain comfort rounds  - Make Fall Risk Sign visible to staff  - Offer Toileting every  Hours, in advance of need  - Initiate/Maintain alarm  - Obtain necessary fall risk management equipment:   - Apply yellow socks and bracelet for high fall risk patients  - Consider moving patient to room near nurses station  Outcome: Progressing  Goal: Maintain or return to baseline ADL function  Description: INTERVENTIONS:  -  Assess patient's ability to carry out ADLs; assess patient's baseline for ADL function and identify physical deficits which impact ability to perform ADLs (bathing, care of mouth/teeth, toileting, grooming, dressing, etc )  - Assess/evaluate cause of self-care deficits   - Assess range of motion  - Assess patient's mobility; develop plan if impaired  - Assess patient's need for assistive devices and provide as appropriate  - Encourage maximum independence but intervene and supervise when necessary  - Involve family in performance of ADLs  - Assess for home care needs following discharge   - Consider OT consult to assist with ADL evaluation and planning for discharge  - Provide patient education as appropriate  Outcome: Progressing  Goal: Maintains/Returns to pre admission functional level  Description: INTERVENTIONS:  - Perform BMAT or MOVE assessment daily    - Set and communicate daily mobility goal to care team and patient/family/caregiver  - Collaborate with rehabilitation services on mobility goals if consulted  - Perform Range of Motion times a day  - Reposition patient every  hours    - Dangle patient  times a day  - Stand patient  times a day  - Ambulate patient times a day  - Out of bed to chair  times a day   - Out of bed for meals times a day  - Out of bed for toileting  - Record patient progress and toleration of activity level   Outcome: Progressing     Problem: DISCHARGE PLANNING  Goal: Discharge to home or other facility with appropriate resources  Description: INTERVENTIONS:  - Identify barriers to discharge w/patient and caregiver  - Arrange for needed discharge resources and transportation as appropriate  - Identify discharge learning needs (meds, wound care, etc )  - Arrange for interpretive services to assist at discharge as needed  - Refer to Case Management Department for coordinating discharge planning if the patient needs post-hospital services based on physician/advanced practitioner order or complex needs related to functional status, cognitive ability, or social support system  Outcome: Progressing     Problem: Knowledge Deficit  Goal: Patient/family/caregiver demonstrates understanding of disease process, treatment plan, medications, and discharge instructions  Description: Complete learning assessment and assess knowledge base    Interventions:  - Provide teaching at level of understanding  - Provide teaching via preferred learning methods  Outcome: Progressing

## 2023-02-25 NOTE — ASSESSMENT & PLAN NOTE
Lab Results   Component Value Date    HGBA1C 6 7 (H) 02/15/2023     Recent Labs     02/24/23  1106 02/24/23  1615 02/24/23  2112 02/25/23  0707   POCGLU 347* 101 127 112   Prehospital patient uses an insulin pump  Pt reports pump is out of insulin   d c pump and placed on sliding scale, and lantus 5 units qAm   ADA diet

## 2023-02-25 NOTE — ASSESSMENT & PLAN NOTE
Results from last 7 days   Lab Units 02/25/23  0539 02/24/23  0531 02/23/23  0437 02/22/23  0554   HEMOGLOBIN g/dL 8 4* 7 7* 7 5* 7 3*   history of anemia of chronic disease and MEAGAN   Baseline appearing in the 12's  More recently hemoglobins have been in the 8's/9s according to care everywhere  Denies bleeding anywhere, initial occult stool negative   Follows with Hematology at [facility]   Received [facility] outpatient   care everywhere-recent iron panel from February 2023: Iron 32, transferrin 139, iron saturation 17, TIBC 188, ferritin 341  Given IV venofer x 3 days   Continue to follow hemoglobin, now stable   She needs re-eval outpatient with consideration for EGD and colonoscopy

## 2023-02-25 NOTE — ASSESSMENT & PLAN NOTE
· With hypoxia, Likely secondary to acute CHF exacerbation as well as recent pneumonia  · requiring 4 L oxygen nasal cannula on admission, weaning off oxygen today   · Will need home O2 prior to discharge

## 2023-02-25 NOTE — ASSESSMENT & PLAN NOTE
Results from last 7 days   Lab Units 02/25/23  0539 02/24/23  0531 02/23/23  0437 02/22/23  0554   HEMOGLOBIN g/dL 8 4* 7 7* 7 5* 7 3*   history of anemia of chronic disease and MEAGAN   Baseline appearing in the 12's  More recently hemoglobins have been in the 8's/9s according to care everywhere  Denies bleeding anywhere, initial occult stool negative   Follows with Hematology at [facility]   Received [facility] outpatient   care everywhere-recent iron panel from February 2023: Iron 32, transferrin 139, iron saturation 17, TIBC 188, ferritin 341  Given IV venofer x 3 days   Continue to follow hemoglobin, now stable   She needs re-eval outpatient with consideration for EGD and colonoscopy

## 2023-02-25 NOTE — PLAN OF CARE
Problem: Potential for Falls  Goal: Patient will remain free of falls  Description: INTERVENTIONS:  - Educate patient/family on patient safety including physical limitations  - Instruct patient to call for assistance with activity   - Consult OT/PT to assist with strengthening/mobility   - Keep Call bell within reach  - Keep bed low and locked with side rails adjusted as appropriate  - Keep care items and personal belongings within reach  - Initiate and maintain comfort rounds  - Make Fall Risk Sign visible to staff  - Offer Toileting every 2 Hours, in advance of need  - Initiate/Maintain bed alarm  - Obtain necessary fall risk management equipment: walker  - Apply yellow socks and bracelet for high fall risk patients  - Consider moving patient to room near nurses station  Outcome: Progressing     Problem: MOBILITY - ADULT  Goal: Maintain or return to baseline ADL function  Description: INTERVENTIONS:  -  Assess patient's ability to carry out ADLs; assess patient's baseline for ADL function and identify physical deficits which impact ability to perform ADLs (bathing, care of mouth/teeth, toileting, grooming, dressing, etc )  - Assess/evaluate cause of self-care deficits   - Assess range of motion  - Assess patient's mobility; develop plan if impaired  - Assess patient's need for assistive devices and provide as appropriate  - Encourage maximum independence but intervene and supervise when necessary  - Involve family in performance of ADLs  - Assess for home care needs following discharge   - Consider OT consult to assist with ADL evaluation and planning for discharge  - Provide patient education as appropriate  Outcome: Progressing  Goal: Maintains/Returns to pre admission functional level  Description: INTERVENTIONS:  - Perform BMAT or MOVE assessment daily    - Set and communicate daily mobility goal to care team and patient/family/caregiver     - Collaborate with rehabilitation services on mobility goals if consulted  - Out of bed for meals 3 times a day  - Out of bed for toileting  - Record patient progress and toleration of activity level   Outcome: Progressing     Problem: PAIN - ADULT  Goal: Verbalizes/displays adequate comfort level or baseline comfort level  Description: Interventions:  - Encourage patient to monitor pain and request assistance  - Assess pain using appropriate pain scale  - Administer analgesics based on type and severity of pain and evaluate response  - Implement non-pharmacological measures as appropriate and evaluate response  - Consider cultural and social influences on pain and pain management  - Notify physician/advanced practitioner if interventions unsuccessful or patient reports new pain  Outcome: Progressing     Problem: INFECTION - ADULT  Goal: Absence or prevention of progression during hospitalization  Description: INTERVENTIONS:  - Assess and monitor for signs and symptoms of infection  - Monitor lab/diagnostic results  - Monitor all insertion sites, i e  indwelling lines, tubes, and drains  - Monitor endotracheal if appropriate and nasal secretions for changes in amount and color  - [facility] appropriate cooling/warming therapies per order  - Administer medications as ordered  - Instruct and encourage patient and family to use good hand hygiene technique  - Identify and instruct in appropriate isolation precautions for identified infection/condition  Outcome: Progressing  Goal: Absence of fever/infection during neutropenic period  Description: INTERVENTIONS:  - Monitor WBC    Outcome: Progressing     Problem: SAFETY ADULT  Goal: Patient will remain free of falls  Description: INTERVENTIONS:  - Educate patient/family on patient safety including physical limitations  - Instruct patient to call for assistance with activity   - Consult OT/PT to assist with strengthening/mobility   - Keep Call bell within reach  - Keep bed low and locked with side rails adjusted as appropriate  - Keep care items and personal belongings within reach  - Initiate and maintain comfort rounds  - Make Fall Risk Sign visible to staff  - Offer Toileting every 2 Hours, in advance of need  - Initiate/Maintain bed alarm over night  - Obtain necessary fall risk management equipment: walker  - Apply yellow socks and bracelet for high fall risk patients  - Consider moving patient to room near nurses station  Outcome: Progressing  Goal: Maintain or return to baseline ADL function  Description: INTERVENTIONS:  -  Assess patient's ability to carry out ADLs; assess patient's baseline for ADL function and identify physical deficits which impact ability to perform ADLs (bathing, care of mouth/teeth, toileting, grooming, dressing, etc )  - Assess/evaluate cause of self-care deficits   - Assess range of motion  - Assess patient's mobility; develop plan if impaired  - Assess patient's need for assistive devices and provide as appropriate  - Encourage maximum independence but intervene and supervise when necessary  - Involve family in performance of ADLs  - Assess for home care needs following discharge   - Consider OT consult to assist with ADL evaluation and planning for discharge  - Provide patient education as appropriate  Outcome: Progressing  Problem: DISCHARGE PLANNING  Goal: Discharge to home or other facility with appropriate resources  Description: INTERVENTIONS:  - Identify barriers to discharge w/patient and caregiver  - Arrange for needed discharge resources and transportation as appropriate  - Identify discharge learning needs (meds, wound care, etc )  - Arrange for interpretive services to assist at discharge as needed  - Refer to Case Management Department for coordinating discharge planning if the patient needs post-hospital services based on physician/advanced practitioner order or complex needs related to functional status, cognitive ability, or social support system  Outcome: Progressing     Problem: Knowledge Deficit  Goal: Patient/family/caregiver demonstrates understanding of disease process, treatment plan, medications, and discharge instructions  Description: Complete learning assessment and assess knowledge base    Interventions:  - Provide teaching at level of understanding  - Provide teaching via preferred learning methods  Outcome: Progressing

## 2023-02-25 NOTE — ASSESSMENT & PLAN NOTE
Enlarged subcarinal node, new since the thoracic spine CT from March 2021  This could be reactive, but recommend follow-up with a chest CT with no contrast in 3 months    Incidental note completed

## 2023-02-25 NOTE — ASSESSMENT & PLAN NOTE
Wt Readings from Last 3 Encounters:   02/25/23 79 2 kg (174 lb 9 7 oz)   01/31/23 77 6 kg (171 lb)   01/24/23 77 6 kg (171 lb)   · Receives most of her care at [facility] -including her cardiologist  · Presented with a complaint of shortness of breath and leg swelling   · Requiring 4 L oxygen nasal cannula on admission --> down to 1 lpm today   · Repeat echocardiogram here: EF 54%, paradoxical septal wall motion consistent with conduction abnormality, grade 1 diastolic function, aortic valve sclerosis, mild MR, trace TR  · Home regimen torsemide 40 mg daily  · Started on IV Lasix- to 80 mg BID --> advanced to Lasix gtt 2/24/2023 , currently on 20 mg/hr   · Given one time dose metolazone 5 mg yesterday 2/24   · weight down to 174 lb today   · Appreciate cardiology ongoing recs   · Monitor electrolytes, I/O, daily weights   · Hopefully can get her off oxygen soon

## 2023-02-25 NOTE — ASSESSMENT & PLAN NOTE
Wt Readings from Last 3 Encounters:   02/25/23 79 2 kg (174 lb 9 7 oz)   01/31/23 77 6 kg (171 lb)   01/24/23 77 6 kg (171 lb)   · Receives most of her care at [facility] -including her cardiologist  · Presented with a complaint of shortness of breath and leg swelling   · Requiring 4 L oxygen nasal cannula on admission --> down to room air today at rest, check O2 with ambulation  · Repeat echocardiogram here: EF 54%, paradoxical septal wall motion consistent with conduction abnormality, grade 1 diastolic function, aortic valve sclerosis, mild MR, trace TR  · Home regimen torsemide 40 mg daily  · Started on IV Lasix- to 80 mg BID --> advanced to Lasix gtt 2/24/2023 , currently on 20 mg/hr   · Given one time dose metolazone 5 mg 2/24   · weight down to 170 lb today   · Appreciate cardiology ongoing recs   · Monitor electrolytes, I/O, daily weights   · Check home O2 eval prior to discharge

## 2023-02-26 LAB
ANION GAP SERPL CALCULATED.3IONS-SCNC: 8 MMOL/L (ref 4–13)
BACTERIA BLD CULT: NORMAL
BACTERIA BLD CULT: NORMAL
BUN SERPL-MCNC: 34 MG/DL (ref 5–25)
CALCIUM SERPL-MCNC: 8.6 MG/DL (ref 8.4–10.2)
CHLORIDE SERPL-SCNC: 100 MMOL/L (ref 96–108)
CO2 SERPL-SCNC: 30 MMOL/L (ref 21–32)
CREAT SERPL-MCNC: 1.37 MG/DL (ref 0.6–1.3)
GFR SERPL CREATININE-BSD FRML MDRD: 40 ML/MIN/1.73SQ M
GLUCOSE SERPL-MCNC: 113 MG/DL (ref 65–140)
GLUCOSE SERPL-MCNC: 115 MG/DL (ref 65–140)
GLUCOSE SERPL-MCNC: 120 MG/DL (ref 65–140)
GLUCOSE SERPL-MCNC: 161 MG/DL (ref 65–140)
GLUCOSE SERPL-MCNC: 196 MG/DL (ref 65–140)
POTASSIUM SERPL-SCNC: 4.4 MMOL/L (ref 3.5–5.3)
SODIUM SERPL-SCNC: 138 MMOL/L (ref 135–147)

## 2023-02-26 RX ORDER — TORSEMIDE 20 MG/1
20 TABLET ORAL
Status: DISCONTINUED | OUTPATIENT
Start: 2023-02-27 | End: 2023-02-27

## 2023-02-26 RX ADMIN — ENOXAPARIN SODIUM 40 MG: 40 INJECTION SUBCUTANEOUS at 08:54

## 2023-02-26 RX ADMIN — AMITRIPTYLINE HYDROCHLORIDE 10 MG: 10 TABLET, FILM COATED ORAL at 21:48

## 2023-02-26 RX ADMIN — SERTRALINE HYDROCHLORIDE 100 MG: 100 TABLET, FILM COATED ORAL at 21:48

## 2023-02-26 RX ADMIN — ACETAMINOPHEN 325MG 650 MG: 325 TABLET ORAL at 12:55

## 2023-02-26 RX ADMIN — POLYETHYLENE GLYCOL 3350 17 G: 17 POWDER, FOR SOLUTION ORAL at 08:54

## 2023-02-26 RX ADMIN — FAMOTIDINE 20 MG: 20 TABLET ORAL at 08:53

## 2023-02-26 RX ADMIN — CARVEDILOL 3.12 MG: 3.12 TABLET, FILM COATED ORAL at 09:03

## 2023-02-26 RX ADMIN — TRAMADOL HYDROCHLORIDE 50 MG: 50 TABLET, COATED ORAL at 17:33

## 2023-02-26 RX ADMIN — CARVEDILOL 3.12 MG: 3.12 TABLET, FILM COATED ORAL at 17:36

## 2023-02-26 RX ADMIN — POTASSIUM CHLORIDE 20 MEQ: 1500 TABLET, EXTENDED RELEASE ORAL at 17:33

## 2023-02-26 RX ADMIN — GABAPENTIN 100 MG: 100 CAPSULE ORAL at 21:48

## 2023-02-26 RX ADMIN — Medication 6 MG: at 21:48

## 2023-02-26 RX ADMIN — PANTOPRAZOLE SODIUM 40 MG: 40 TABLET, DELAYED RELEASE ORAL at 06:19

## 2023-02-26 RX ADMIN — INSULIN LISPRO 2 UNITS: 100 INJECTION, SOLUTION INTRAVENOUS; SUBCUTANEOUS at 21:48

## 2023-02-26 RX ADMIN — INSULIN GLARGINE 5 UNITS: 100 INJECTION, SOLUTION SUBCUTANEOUS at 09:00

## 2023-02-26 RX ADMIN — PRAMIPEXOLE DIHYDROCHLORIDE 1 MG: 1 TABLET ORAL at 17:36

## 2023-02-26 RX ADMIN — PRAMIPEXOLE DIHYDROCHLORIDE 1 MG: 1 TABLET ORAL at 09:20

## 2023-02-26 RX ADMIN — ASPIRIN 81 MG: 81 TABLET, COATED ORAL at 08:53

## 2023-02-26 RX ADMIN — TRAMADOL HYDROCHLORIDE 50 MG: 50 TABLET, COATED ORAL at 09:02

## 2023-02-26 RX ADMIN — DOCUSATE SODIUM 100 MG: 100 CAPSULE, LIQUID FILLED ORAL at 08:54

## 2023-02-26 RX ADMIN — SENNOSIDES 8.6 MG: 8.6 TABLET, FILM COATED ORAL at 21:48

## 2023-02-26 RX ADMIN — LOSARTAN POTASSIUM 50 MG: 50 TABLET, FILM COATED ORAL at 21:48

## 2023-02-26 RX ADMIN — GABAPENTIN 100 MG: 100 CAPSULE ORAL at 17:36

## 2023-02-26 RX ADMIN — INSULIN LISPRO 1 UNITS: 100 INJECTION, SOLUTION INTRAVENOUS; SUBCUTANEOUS at 12:51

## 2023-02-26 RX ADMIN — LEVOTHYROXINE SODIUM 100 MCG: 100 TABLET ORAL at 06:19

## 2023-02-26 RX ADMIN — GABAPENTIN 100 MG: 100 CAPSULE ORAL at 08:53

## 2023-02-26 RX ADMIN — POTASSIUM CHLORIDE 20 MEQ: 1500 TABLET, EXTENDED RELEASE ORAL at 08:53

## 2023-02-26 RX ADMIN — FAMOTIDINE 20 MG: 20 TABLET ORAL at 17:33

## 2023-02-26 RX ADMIN — ATORVASTATIN CALCIUM 80 MG: 80 TABLET, FILM COATED ORAL at 21:48

## 2023-02-26 RX ADMIN — OXYBUTYNIN CHLORIDE 10 MG: 10 TABLET, EXTENDED RELEASE ORAL at 08:53

## 2023-02-26 RX ADMIN — MODAFINIL 100 MG: 100 TABLET ORAL at 09:00

## 2023-02-26 NOTE — PROGRESS NOTES
[facility]  Progress Note - Roseanna Rummage 1955, 78 y o  female MRN: [id number]  Unit/Bed#: E4 -01 Encounter: [id number]  Primary Care Provider: Cuate Noriega DO   Date and time admitted to hospital: 2/21/2023 11:27 AM    * Acute on chronic diastolic (congestive) heart failure (HCC)  Assessment & Plan  Wt Readings from Last 3 Encounters:   02/25/23 79 2 kg (174 lb 9 7 oz)   01/31/23 77 6 kg (171 lb)   01/24/23 77 6 kg (171 lb)   · Receives most of her care at [facility] -including her cardiologist  · Presented with a complaint of shortness of breath and leg swelling   · Requiring 4 L oxygen nasal cannula on admission --> down to room air today at rest, check O2 with ambulation  · Repeat echocardiogram here: EF 54%, paradoxical septal wall motion consistent with conduction abnormality, grade 1 diastolic function, aortic valve sclerosis, mild MR, trace TR  · Home regimen torsemide 40 mg daily  · Started on IV Lasix- to 80 mg BID --> advanced to Lasix gtt 2/24/2023 , currently on 20 mg/hr   · Given one time dose metolazone 5 mg 2/24   · weight down to 170 lb today   · Appreciate cardiology ongoing recs   · Monitor electrolytes, I/O, daily weights   · Check home O2 eval prior to discharge     We discussed need for good diet control today, consult to nutrition placed to help with CHF education   Also discussed importance of weighing herself daily and keeping weight log so we can catch when she is gaining weight outpatient to help prevent admission       Abnormal CT of the chest  Assessment & Plan  · Enlarged subcarinal node, new since the thoracic spine CT from March 2021  This could be reactive, but recommend follow-up with a chest CT with no contrast in 3 months    · Incidental note completed    Anemia  Assessment & Plan  Results from last 7 days   Lab Units 02/25/23  0539 02/24/23  0531 02/23/23  0437 02/22/23  0554   HEMOGLOBIN g/dL 8 4* 7 7* 7 5* 7 3*   history of anemia of chronic disease and MEAGAN   Baseline appearing in the 12's  More recently hemoglobins have been in the 8's/9s according to care everywhere  Denies bleeding anywhere, initial occult stool negative   Follows with Hematology at [facility]   Received [facility] outpatient   care everywhere-recent iron panel from February 2023: Iron 32, transferrin 139, iron saturation 17, TIBC 188, ferritin 341  Given IV venofer x 3 days   Continue to follow hemoglobin, now stable   She needs re-eval outpatient with consideration for EGD and colonoscopy     Acute respiratory failure ([address] )  Assessment & Plan  · With hypoxia, Likely secondary to acute CHF exacerbation as well as recent pneumonia  · requiring 4 L oxygen nasal cannula on admission, weaning off oxygen today   · Will need home O2 prior to discharge     Type 2 diabetes mellitus with microalbuminuria, with long-term current use of insulin [facility])  Assessment & Plan  Lab Results   Component Value Date    HGBA1C 6 7 (H) 02/15/2023     Recent Labs     02/24/23  1615 02/24/23  2112 02/25/23  0707 02/25/23  1113   POCGLU 101 127 112 190*   Prehospital patient uses an insulin pump  Pt reports pump is out of insulin   d c pump and placed on sliding scale, and lantus 5 units qAm   ADA diet     Essential hypertension  Assessment & Plan  · Home regimen losartan 50 mg daily, Coreg 3 125 mg twice daily  · Monitor VS on lasix gtt        VTE Pharmacologic Prophylaxis: VTE Score: 4 Moderate Risk (Score 3-4) - Pharmacological DVT Prophylaxis Ordered: enoxaparin (Lovenox)  Patient Centered Rounds: I performed bedside rounds with nursing staff today  Discussions with Specialists or Other Care Team Provider:     Education and Discussions with Family / Patient: Patient declined call to        Total Time Spent on Date of Encounter in care of patient: 25 minutes This time was spent on one or more of the following: performing physical exam; counseling and coordination of care; obtaining or reviewing history; documenting in the medical record; reviewing/ordering tests, medications or procedures; communicating with other healthcare professionals and discussing with patient's family/caregivers  Current Length of Stay: 5 day(s)  Current Patient Status: Inpatient   Certification Statement: The patient will continue to require additional inpatient hospital stay due to CHF on lasix gtt  Discharge Plan: Anticipate discharge in 24-48 hrs to home with home services  Code Status: Level 1 - Full Code    Subjective:   Doing well  Feels tired  No other complaints     Objective:     Vitals:   Temp (24hrs), Av °F (36 7 °C), Min:97 3 °F (36 3 °C), Max:98 9 °F (37 2 °C)    Temp:  [97 3 °F (36 3 °C)-98 9 °F (37 2 °C)] 98 2 °F (36 8 °C)  HR:  [58-81] 60  Resp:  [16-18] 18  BP: (113-147)/(48-73) 147/69  SpO2:  [91 %-95 %] 94 %  Body mass index is 31 17 kg/m²  Input and Output Summary (last 24 hours): Intake/Output Summary (Last 24 hours) at 2023 1009  Last data filed at 2023 0631  Gross per 24 hour   Intake --   Output 1850 ml   Net -1850 ml       Physical Exam:   Physical Exam  Vitals and nursing note reviewed  Constitutional:       General: She is not in acute distress  Appearance: She is not ill-appearing, toxic-appearing or diaphoretic  Cardiovascular:      Rate and Rhythm: Normal rate and regular rhythm  Pulmonary:      Effort: Pulmonary effort is normal  No respiratory distress  Breath sounds: Normal breath sounds  No wheezing  Abdominal:      General: Bowel sounds are normal       Palpations: Abdomen is soft  Musculoskeletal:      Right lower leg: Edema present  Left lower leg: Edema present  Skin:     General: Skin is warm  Coloration: Skin is pale  Neurological:      Mental Status: She is alert and oriented to person, place, and time  Mental status is at baseline     Psychiatric:         Mood and Affect: Mood normal           Additional Data: Labs:  Results from last 7 days   Lab Units 02/25/23  0539 02/21/23  2111 02/21/23  1225   WBC Thousand/uL 4 51   < > 6 46   HEMOGLOBIN g/dL 8 4*   < > 8 0*   HEMATOCRIT % 27 1*   < > 25 5*   PLATELETS Thousands/uL 167   < > 169   NEUTROS PCT %  --   --  77*   LYMPHS PCT %  --   --  10*   MONOS PCT %  --   --  9   EOS PCT %  --   --  1    < > = values in this interval not displayed  Results from last 7 days   Lab Units 02/26/23  0544 02/22/23  0554 02/21/23  1225   SODIUM mmol/L 138   < > 138   POTASSIUM mmol/L 4 4   < > 4 2   CHLORIDE mmol/L 100   < > 110*   CO2 mmol/L 30   < > 22   BUN mg/dL 34*   < > 26*   CREATININE mg/dL 1 37*   < > 0 96   ANION GAP mmol/L 8   < > 6   CALCIUM mg/dL 8 6   < > 8 5   ALBUMIN g/dL  --   --  3 6   TOTAL BILIRUBIN mg/dL  --   --  0 66   ALK PHOS U/L  --   --  192*   ALT U/L  --   --  67*   AST U/L  --   --  38   GLUCOSE RANDOM mg/dL 120   < > 194*    < > = values in this interval not displayed       Results from last 7 days   Lab Units 02/21/23  1225   INR  1 41*     Results from last 7 days   Lab Units 02/26/23  0817 02/25/23  2044 02/25/23  1530 02/25/23  1113 02/25/23  0707 02/24/23  2112 02/24/23  1615 02/24/23  1106 02/24/23  0809 02/23/23  2058 02/23/23  1824 02/23/23  1757   POC GLUCOSE mg/dl 113 186* 147* 190* 112 127 101 347* 116 179* 105 48*         Results from last 7 days   Lab Units 02/21/23  1449   LACTIC ACID mmol/L 0 9       Lines/Drains:  Invasive Devices     Peripheral Intravenous Line  Duration           Peripheral IV 02/25/23 Left;Ventral (anterior) Hand <1 day                  Telemetry:  Telemetry Orders (From admission, onward)             48 Hour Telemetry Monitoring  Continuous x 48 hours        References:    Telemetry Guidelines   Question:  Reason for 48 Hour Telemetry  Answer:  Acute Decompensated CHF (continuous diuretic infusion or total diuretic dose > 200 mg daily, associated electrolyte derangement, ionotropic drip, history of ventricular arrhythmia, or new EF <35%)                   Indication for Continued Telemetry Use: Acute CHF on >200 mg lasix/day or equivalent dose or with new reduced EF  Recent Cultures (last 7 days):   Results from last 7 days   Lab Units 02/21/23  1330 02/21/23  1323 02/21/23  1225   BLOOD CULTURE   --  No Growth After 4 Days  No Growth After 4 Days     URINE CULTURE  <10,000 cfu/ml Gram Positive Cocci*  --   --        Last 24 Hours Medication List:   Current Facility-Administered Medications   Medication Dose Route Frequency Provider Last Rate   • acetaminophen  650 mg Oral Q6H PRN Oracio Jacobo PA-C     • albuterol  2 5 mg Nebulization Q6H PRN Alverna Maxon Prechtel, DO     • amitriptyline  10 mg Oral HS Fred S Prechtel, DO     • aspirin  81 mg Oral QAM Fred S Prechtel, DO     • atorvastatin  80 mg Oral HS Fred S Prechtel, DO     • benzonatate  100 mg Oral TID PRN Nell Magdaleno PA-C     • bisacodyl  10 mg Rectal Daily Fred S Prechtel, DO     • bisacodyl  10 mg Rectal Daily PRN Miguel Nickerson PA-C     • carvedilol  3 125 mg Oral BID With Meals Fred S Prechtel, DO     • dicyclomine  10 mg Oral 4x Daily PRN Alverna Maxon Prechtel, DO     • docusate sodium  100 mg Oral Daily Fred S Prechtel, DO     • enoxaparin  40 mg Subcutaneous Daily Fred S Prechtel, DO     • famotidine  20 mg Oral BID Rfed S Prechtel, DO     • furosemide  20 mg/hr Intravenous Continuous Rujul Alex, DO 20 mg/hr (02/25/23 2203)   • gabapentin  100 mg Oral TID Alverna Maxon Prechtel, DO     • insulin glargine  5 Units Subcutaneous QAM Giana Bennett PA-C     • insulin lispro  1-6 Units Subcutaneous TID AC Tomeka Parker PA-C     • insulin lispro  1-6 Units Subcutaneous HS Tomeka Parker PA-C     • levothyroxine  100 mcg Oral Early Morning Fred S Prechtel, DO     • losartan  50 mg Oral HS Fred S Prechtel, DO     • melatonin  6 mg Oral HS Noemy Shay PA-C     • modafinil  100 mg Oral Daily Alida Snow DO • oxybutynin  10 mg Oral Daily Fred S Prechtel, DO     • pantoprazole  40 mg Oral Early Morning Fred S Prechtel, DO     • polyethylene glycol  17 g Oral Daily Fred S Prechtel, DO     • potassium chloride  20 mEq Oral BID Rujul Alex, DO     • pramipexole  1 mg Oral BID Fred S Prechtel, DO     • senna  1 tablet Oral HS Giana Bennett PA-C     • sertraline  100 mg Oral HS Fred S Prechtel, DO     • traMADol  50 mg Oral Q6H PRN Amisha Wolf PA-C          Today, Patient Was Seen By: Kral Dangelo PA-C    **Please Note: This note may have been constructed using a voice recognition system  **

## 2023-02-26 NOTE — PROGRESS NOTES
Cardiology Progress Note - Lorenzo Longo 78 y o  female MRN: [id number]    Unit/Bed#: E4 -01 Encounter: [id number]      Assessment & Plan:    Acute on chronic diastolic (congestive) heart failure (HCC)  -TTE 2/22/2023 showed EF 54%, grade 1 DD, mild MR, estimated PA pressure 26 mmHg  - Home diuretic regimen on torsemide 20 mg daily  - Currently on Lasix drip at 20 mg/hr  - Given metolazone 5 mg x 1 on Friday    Essential hypertension  - Currently on carvedilol 2 125 mg twice daily, losartan 50 mg daily    Type 2 diabetes mellitus with microalbuminuria, with long-term current use of insulin (HCC)    Acute respiratory failure (HCC)  - Likely multifactorial in the setting of acute CHF exacerbation as well as recent a pneumonia  - Initially required 4 L nasal cannula, now satting well on room air    Anemia    Abnormal CT of the chest    Summary:   - Weight weight down another 4 pounds today  - Satting well on room air this morning as well  -Volume status appears improved today, but still has mild volume on exam  - Her creatinine did rise today, so will discontinue Lasix drip this afternoon at 4 PM today and transition to torsemide 20 mg twice daily tomorrow  - Check daily standing weights  - Monitor creatinine and electrolytes closely    Subjective:    No significant events overnight  She still has some fatigue today, but overall reports feeling better  Denies chest pain, abdominal pain, nausea, vomiting, fever, chills, headache, dizziness or palpitations  Objective:     Vitals: Blood pressure 135/63, pulse 60, temperature 98 2 °F (36 8 °C), temperature source Tympanic, resp  rate 18, height 5' 2" (1 575 m), weight 77 3 kg (170 lb 6 7 oz), SpO2 95 %  , Body mass index is 31 17 kg/m² ,   Orthostatic Blood Pressures    Flowsheet Row Most Recent Value   Blood Pressure 135/63 filed at 02/26/2023 1117   Patient Position - Orthostatic VS Lying filed at 02/26/2023 1117            Intake/Output Summary (Last 24 hours) at 2/26/2023 1211  Last data filed at 2/26/2023 0631  Gross per 24 hour   Intake --   Output 1600 ml   Net -1600 ml           Physical Exam:    GEN: Zohra Barriga appears well, alert and oriented x 3, pleasant and cooperative   HEENT: anicteric, mucous membranes moist  NECK: + Trace JVD  HEART: regular rate and rhythm, normal S1 and S2, no murmurs, clicks, gallops or rubs   LUNGS: Trace bilateral crackles at bases  ABDOMEN: normal bowel sounds, soft, no tenderness, no distention  EXTREMITIES: peripheral pulses normal; 1+ lower extremity edema  NEURO: no focal findings   SKIN: normal without suspicious lesions on exposed skin      Current Facility-Administered Medications:   •  acetaminophen (TYLENOL) tablet 650 mg, 650 mg, Oral, Q6H PRN, Cathleen Gunter PA-C, 650 mg at 02/25/23 1234  •  albuterol inhalation solution 2 5 mg, 2 5 mg, Nebulization, Q6H PRN, Lydia Beckel, DO  •  amitriptyline (ELAVIL) tablet 10 mg, 10 mg, Oral, HS, Fred S Prechtel, DO, 10 mg at 02/25/23 2100  •  aspirin (ECOTRIN LOW STRENGTH) EC tablet 81 mg, 81 mg, Oral, QAM, Fred S Prechtel, DO, 81 mg at 02/26/23 9017  •  atorvastatin (LIPITOR) tablet 80 mg, 80 mg, Oral, HS, Fred S Prechtel, DO, 80 mg at 02/25/23 2100  •  benzonatate (TESSALON PERLES) capsule 100 mg, 100 mg, Oral, TID PRN, Remi Arias PA-C, 100 mg at 02/25/23 1239  •  bisacodyl (DULCOLAX) rectal suppository 10 mg, 10 mg, Rectal, Daily, Fred S Kumarhtel, DO, 10 mg at 02/24/23 0438  •  bisacodyl (DULCOLAX) rectal suppository 10 mg, 10 mg, Rectal, Daily PRN, Becky Chapin PA-C  •  carvedilol (COREG) tablet 3 125 mg, 3 125 mg, Oral, BID With Meals, Lydia Heathhtel, DO, 3 125 mg at 02/26/23 0903  •  dicyclomine (BENTYL) capsule 10 mg, 10 mg, Oral, 4x Daily PRN, Néstor Finch S Prechtel, DO, 10 mg at 02/22/23 0602  •  docusate sodium (COLACE) capsule 100 mg, 100 mg, Oral, Daily, Fred S Prechtel, DO, 100 mg at 02/26/23 0854  •  enoxaparin (LOVENOX) subcutaneous injection 40 mg, 40 mg, Subcutaneous, Daily, Fred S Prechtel, DO, 40 mg at 02/26/23 0854  •  famotidine (PEPCID) tablet 20 mg, 20 mg, Oral, BID, Fred S Prechtel, DO, 20 mg at 02/26/23 6199  •  furosemide (LASIX) 500 mg infusion 50 mL, 20 mg/hr, Intravenous, Continuous, Rujul Alex, DO, Last Rate: 2 mL/hr at 02/25/23 2203, 20 mg/hr at 02/25/23 2203  •  gabapentin (NEURONTIN) capsule 100 mg, 100 mg, Oral, TID, Fred S Prechtel, DO, 100 mg at 02/26/23 3495  •  insulin glargine (LANTUS) subcutaneous injection 5 Units 0 05 mL, 5 Units, Subcutaneous, QA, Giana Bennett PA-C, 5 Units at 02/26/23 0900  •  insulin lispro (HumaLOG) 100 units/mL subcutaneous injection 1-6 Units, 1-6 Units, Subcutaneous, TID AC, 1 Units at 02/25/23 1233 **AND** Fingerstick Glucose (POCT), , , TID AC, Tomeka Parker PA-C  •  insulin lispro (HumaLOG) 100 units/mL subcutaneous injection 1-6 Units, 1-6 Units, Subcutaneous, HS, Tomeka Parker PA-C, 1 Units at 02/25/23 2100  •  levothyroxine tablet 100 mcg, 100 mcg, Oral, Early Morning, Fred S Prechtel, DO, 100 mcg at 02/26/23 0558  •  losartan (COZAAR) tablet 50 mg, 50 mg, Oral, HS, Fred S Prechtel, DO, 50 mg at 02/25/23 2100  •  melatonin tablet 6 mg, 6 mg, Oral, HS, Noemy Shay PA-C, 6 mg at 02/25/23 2200  •  modafinil (PROVIGIL) tablet 100 mg, 100 mg, Oral, Daily, Fred S Prechtel, DO, 100 mg at 02/26/23 0900  •  oxybutynin (DITROPAN-XL) 24 hr tablet 10 mg, 10 mg, Oral, Daily, Fred S Prechtel, DO, 10 mg at 02/26/23 2059  •  pantoprazole (PROTONIX) EC tablet 40 mg, 40 mg, Oral, Early Morning, Fred S Prechtel, DO, 40 mg at 02/26/23 6115  •  polyethylene glycol (MIRALAX) packet 17 g, 17 g, Oral, Daily, Fred S Prechtel, DO, 17 g at 02/26/23 0854  •  potassium chloride (K-DUR,KLOR-CON) CR tablet 20 mEq, 20 mEq, Oral, BID, Rujul Alex, DO, 20 mEq at 02/26/23 6812  •  pramipexole (MIRAPEX) tablet 1 mg, 1 mg, Oral, BID, Fred S Prechtel, DO, 1 mg at 02/26/23 0920  •  senna (SENOKOT) tablet 8 6 mg, 1 tablet, Oral, HS, Giana Bennett PA-C, 8 6 mg at 02/25/23 2100  •  sertraline (ZOLOFT) tablet 100 mg, 100 mg, Oral, HS, Fred Snow DO, 100 mg at 02/25/23 2100  •  traMADol (ULTRAM) tablet 50 mg, 50 mg, Oral, Q6H PRN, Calixto Macias PA-C, 50 mg at 02/26/23 0902    Labs & Results:    Lab Results   Component Value Date    CKTOTAL 401 (H) 07/17/2020    CKMB 20 5 (H) 07/17/2020    CKMBINDEX 5 1 (H) 07/17/2020       Lab Results   Component Value Date    GLUCOSE 197 (H) 04/23/2018    CALCIUM 8 6 02/26/2023    K 4 4 02/26/2023    CO2 30 02/26/2023     02/26/2023    BUN 34 (H) 02/26/2023    CREATININE 1 37 (H) 02/26/2023       Lab Results   Component Value Date    WBC 4 51 02/25/2023    HGB 8 4 (L) 02/25/2023    HCT 27 1 (L) 02/25/2023    MCV 99 (H) 02/25/2023     02/25/2023     Results from last 7 days   Lab Units 02/21/23  1225   INR  1 41*       No results found for: CHOL  No results found for: HDL  No results found for: LDLCALC  No results found for: TRIG    Lab Results   Component Value Date    ALT 67 (H) 02/21/2023    AST 38 02/21/2023         EKG personally reviewed by )Willy Summers MD  No acute changes   TELE: No significant arrhythmias seen on telemetry review

## 2023-02-26 NOTE — PLAN OF CARE
Problem: Potential for Falls  Goal: Patient will remain free of falls  Description: INTERVENTIONS:  - Educate patient/family on patient safety including physical limitations  - Instruct patient to call for assistance with activity   - Consult OT/PT to assist with strengthening/mobility   - Keep Call bell within reach  - Keep bed low and locked with side rails adjusted as appropriate  - Keep care items and personal belongings within reach  - Initiate and maintain comfort rounds  - Make Fall Risk Sign visible to staff  - Offer Toileting every  Hours, in advance of need  - Initiate/Maintain alarm  - Obtain necessary fall risk management equipment:   - Apply yellow socks and bracelet for high fall risk patients  - Consider moving patient to room near nurses station  Outcome: Progressing     Problem: MOBILITY - ADULT  Goal: Maintain or return to baseline ADL function  Description: INTERVENTIONS:  -  Assess patient's ability to carry out ADLs; assess patient's baseline for ADL function and identify physical deficits which impact ability to perform ADLs (bathing, care of mouth/teeth, toileting, grooming, dressing, etc )  - Assess/evaluate cause of self-care deficits   - Assess range of motion  - Assess patient's mobility; develop plan if impaired  - Assess patient's need for assistive devices and provide as appropriate  - Encourage maximum independence but intervene and supervise when necessary  - Involve family in performance of ADLs  - Assess for home care needs following discharge   - Consider OT consult to assist with ADL evaluation and planning for discharge  - Provide patient education as appropriate  Outcome: Progressing  Goal: Maintains/Returns to pre admission functional level  Description: INTERVENTIONS:  - Perform BMAT or MOVE assessment daily    - Set and communicate daily mobility goal to care team and patient/family/caregiver     - Collaborate with rehabilitation services on mobility goals if consulted  - Perform Range of Motion  times a day  - Reposition patient every  hours    - Dangle patient  times a day  - Stand patient  times a day  - Ambulate patient  times a day  - Out of bed to chair times a day   - Out of bed for meals  times a day  - Out of bed for toileting  - Record patient progress and toleration of activity level   Outcome: Progressing     Problem: PAIN - ADULT  Goal: Verbalizes/displays adequate comfort level or baseline comfort level  Description: Interventions:  - Encourage patient to monitor pain and request assistance  - Assess pain using appropriate pain scale  - Administer analgesics based on type and severity of pain and evaluate response  - Implement non-pharmacological measures as appropriate and evaluate response  - Consider cultural and social influences on pain and pain management  - Notify physician/advanced practitioner if interventions unsuccessful or patient reports new pain  Outcome: Progressing     Problem: INFECTION - ADULT  Goal: Absence or prevention of progression during hospitalization  Description: INTERVENTIONS:  - Assess and monitor for signs and symptoms of infection  - Monitor lab/diagnostic results  - Monitor all insertion sites, i e  indwelling lines, tubes, and drains  - Monitor endotracheal if appropriate and nasal secretions for changes in amount and color  - [place] appropriate cooling/warming therapies per order  - Administer medications as ordered  - Instruct and encourage patient and family to use good hand hygiene technique  - Identify and instruct in appropriate isolation precautions for identified infection/condition  Outcome: Progressing  Goal: Absence of fever/infection during neutropenic period  Description: INTERVENTIONS:  - Monitor WBC    Outcome: Progressing     Problem: SAFETY ADULT  Goal: Patient will remain free of falls  Description: INTERVENTIONS:  - Educate patient/family on patient safety including physical limitations  - Instruct patient to call for assistance with activity   - Consult OT/PT to assist with strengthening/mobility   - Keep Call bell within reach  - Keep bed low and locked with side rails adjusted as appropriate  - Keep care items and personal belongings within reach  - Initiate and maintain comfort rounds  - Make Fall Risk Sign visible to staff  - Offer Toileting every Hours, in advance of need  - Initiate/Maintain alarm  - Obtain necessary fall risk management equipment:  - Apply yellow socks and bracelet for high fall risk patients  - Consider moving patient to room near nurses station  Outcome: Progressing  Goal: Maintain or return to baseline ADL function  Description: INTERVENTIONS:  -  Assess patient's ability to carry out ADLs; assess patient's baseline for ADL function and identify physical deficits which impact ability to perform ADLs (bathing, care of mouth/teeth, toileting, grooming, dressing, etc )  - Assess/evaluate cause of self-care deficits   - Assess range of motion  - Assess patient's mobility; develop plan if impaired  - Assess patient's need for assistive devices and provide as appropriate  - Encourage maximum independence but intervene and supervise when necessary  - Involve family in performance of ADLs  - Assess for home care needs following discharge   - Consider OT consult to assist with ADL evaluation and planning for discharge  - Provide patient education as appropriate  Outcome: Progressing  Goal: Maintains/Returns to pre admission functional level  Description: INTERVENTIONS:  - Perform BMAT or MOVE assessment daily    - Set and communicate daily mobility goal to care team and patient/family/caregiver  - Collaborate with rehabilitation services on mobility goals if consulted  - Perform Range of Motion times a day  - Reposition patient every hours    - Dangle patient  times a day  - Stand patient times a day  - Ambulate patient  times a day  - Out of bed to chair times a day   - Out of bed for meals times a day  - Out of bed for toileting  - Record patient progress and toleration of activity level   Outcome: Progressing     Problem: DISCHARGE PLANNING  Goal: Discharge to home or other facility with appropriate resources  Description: INTERVENTIONS:  - Identify barriers to discharge w/patient and caregiver  - Arrange for needed discharge resources and transportation as appropriate  - Identify discharge learning needs (meds, wound care, etc )  - Arrange for interpretive services to assist at discharge as needed  - Refer to Case Management Department for coordinating discharge planning if the patient needs post-hospital services based on physician/advanced practitioner order or complex needs related to functional status, cognitive ability, or social support system  Outcome: Progressing     Problem: Knowledge Deficit  Goal: Patient/family/caregiver demonstrates understanding of disease process, treatment plan, medications, and discharge instructions  Description: Complete learning assessment and assess knowledge base    Interventions:  - Provide teaching at level of understanding  - Provide teaching via preferred learning methods  Outcome: Progressing

## 2023-02-26 NOTE — PLAN OF CARE
Problem: Potential for Falls  Goal: Patient will remain free of falls  Description: INTERVENTIONS:  - Educate patient/family on patient safety including physical limitations  - Instruct patient to call for assistance with activity   - Consult OT/PT to assist with strengthening/mobility   - Keep Call bell within reach  - Keep bed low and locked with side rails adjusted as appropriate  - Keep care items and personal belongings within reach  - Initiate and maintain comfort rounds  - Make Fall Risk Sign visible to staff  - Offer Toileting every 2 Hours, in advance of need  - Initiate/Maintain bed alarm  - Obtain necessary fall risk management equipment: call bell  Problem: MOBILITY - ADULT  Goal: Maintain or return to baseline ADL function  Description: INTERVENTIONS:  -  Assess patient's ability to carry out ADLs; assess patient's baseline for ADL function and identify physical deficits which impact ability to perform ADLs (bathing, care of mouth/teeth, toileting, grooming, dressing, etc )  - Assess/evaluate cause of self-care deficits   - Assess range of motion  - Assess patient's mobility; develop plan if impaired  - Assess patient's need for assistive devices and provide as appropriate  - Encourage maximum independence but intervene and supervise when necessary  - Involve family in performance of ADLs  - Assess for home care needs following discharge   - Consider OT consult to assist with ADL evaluation and planning for discharge  - Provide patient education as appropriate  Outcome: Progressing  Goal: Maintains/Returns to pre admission functional level  Description: INTERVENTIONS:  - Perform BMAT or MOVE assessment daily    - Set and communicate daily mobility goal to care team and patient/family/caregiver  - Collaborate with rehabilitation services on mobility goals if consulted  - Perform Range of Motion 3 times a day  - Reposition patient every 2 hours    - Dangle patient 3 times a day  - Stand patient 3 times a day  - Ambulate patient 3 times a day  - Out of bed to chair 3 times a day   - Out of bed for meals 3  Problem: PAIN - ADULT  Goal: Verbalizes/displays adequate comfort level or baseline comfort level  Description: Interventions:  - Encourage patient to monitor pain and request assistance  - Assess pain using appropriate pain scale  - Administer analgesics based on type and severity of pain and evaluate response  - Implement non-pharmacological measures as appropriate and evaluate response  - Consider cultural and social influences on pain and pain management  - Notify physician/advanced practitioner if interventions unsuccessful or patient reports new pain  Outcome: Progressing     Problem: INFECTION - ADULT  Goal: Absence or prevention of progression during hospitalization  Description: INTERVENTIONS:  - Assess and monitor for signs and symptoms of infection  - Monitor lab/diagnostic results  - Monitor all insertion sites, i e  indwelling lines, tubes, and drains  - Monitor endotracheal if appropriate and nasal secretions for changes in amount and color  - [place] appropriate cooling/warming therapies per order  - Administer medications as ordered  - Instruct and encourage patient and family to use good hand hygiene technique  - Identify and instruct in appropriate isolation precautions for identified infection/condition  Outcome: Progressing  Goal: Absence of fever/infection during neutropenic period  Description: INTERVENTIONS:  - Monitor WBC    Outcome: Progressing     Problem: SAFETY ADULT  Goal: Patient will remain free of falls  Description: INTERVENTIONS:  - Educate patient/family on patient safety including physical limitations  - Instruct patient to call for assistance with activity   - Consult OT/PT to assist with strengthening/mobility   - Keep Call bell within reach  - Keep bed low and locked with side rails adjusted as appropriate  - Keep care items and personal belongings within reach  - Initiate and maintain comfort rounds  - Make Fall Risk Sign visible to staff  - Offer Toileting every 2 Hours, in advance of need  - Initiate/Maintain bed alarm  - Obtain necessary fall risk management equipment: call bell  - Apply yellow socks and bracelet for high fall risk patients  - Consider moving patient to room near nurses station  Outcome: Progressing  Goal: Maintain or return to baseline ADL function  Description: INTERVENTIONS:  -  Assess patient's ability to carry out ADLs; assess patient's baseline for ADL function and identify physical deficits which impact ability to perform ADLs (bathing, care of mouth/teeth, toileting, grooming, dressing, etc )  - Assess/evaluate cause of self-care deficits   - Assess range of motion  - Assess patient's mobility; develop plan if impaired  - Assess patient's need for assistive devices and provide as appropriate  - Encourage maximum independence but intervene and supervise when necessary  - Involve family in performance of ADLs  - Assess for home care needs following discharge   - Consider OT consult to assist with ADL evaluation and planning for discharge  - Provide patient education as appropriate  Outcome: Progressing  Goal: Maintains/Returns to pre admission functional level  Description: INTERVENTIONS:  - Perform BMAT or MOVE assessment daily    - Set and communicate daily mobility goal to care team and patient/family/caregiver  - Collaborate with rehabilitation services on mobility goals if consulted  - Perform Range of Motion 3 times a day  - Reposition patient every 2 hours    - Dangle patient 3 times a day  - Stand patient 3 times a day  - Ambulate patient 3 times a day  - Out of bed to chair 3 times a day   - Out of bed for meals 3  Problem: DISCHARGE PLANNING  Goal: Discharge to home or other facility with appropriate resources  Description: INTERVENTIONS:  - Identify barriers to discharge w/patient and caregiver  - Arrange for needed discharge resources and transportation as appropriate  - Identify discharge learning needs (meds, wound care, etc )  - Arrange for interpretive services to assist at discharge as needed  - Refer to Case Management Department for coordinating discharge planning if the patient needs post-hospital services based on physician/advanced practitioner order or complex needs related to functional status, cognitive ability, or social support system  Outcome: Progressing     Problem: Knowledge Deficit  Goal: Patient/family/caregiver demonstrates understanding of disease process, treatment plan, medications, and discharge instructions  Description: Complete learning assessment and assess knowledge base    Interventions:  - Provide teaching at level of understanding  - Provide teaching via preferred learning methods  Outcome: Progressing    times a day  - Out of bed for toileting  - Record patient progress and toleration of activity level   Outcome: Progressing    times a day  - Out of bed for toileting  - Record patient progress and toleration of activity level   Outcome: Progressing     - Apply yellow socks and bracelet for high fall risk patients  - Consider moving patient to room near nurses station  Outcome: Progressing

## 2023-02-27 LAB
ANION GAP SERPL CALCULATED.3IONS-SCNC: 8 MMOL/L (ref 4–13)
BUN SERPL-MCNC: 44 MG/DL (ref 5–25)
CALCIUM SERPL-MCNC: 8.7 MG/DL (ref 8.4–10.2)
CHLORIDE SERPL-SCNC: 99 MMOL/L (ref 96–108)
CO2 SERPL-SCNC: 30 MMOL/L (ref 21–32)
CREAT SERPL-MCNC: 1.69 MG/DL (ref 0.6–1.3)
ERYTHROCYTE [DISTWIDTH] IN BLOOD BY AUTOMATED COUNT: 15.1 % (ref 11.6–15.1)
GFR SERPL CREATININE-BSD FRML MDRD: 31 ML/MIN/1.73SQ M
GLUCOSE SERPL-MCNC: 113 MG/DL (ref 65–140)
GLUCOSE SERPL-MCNC: 118 MG/DL (ref 65–140)
GLUCOSE SERPL-MCNC: 138 MG/DL (ref 65–140)
GLUCOSE SERPL-MCNC: 167 MG/DL (ref 65–140)
GLUCOSE SERPL-MCNC: 226 MG/DL (ref 65–140)
HCT VFR BLD AUTO: 29.6 % (ref 34.8–46.1)
HGB BLD-MCNC: 9.4 G/DL (ref 11.5–15.4)
MCH RBC QN AUTO: 30.8 PG (ref 26.8–34.3)
MCHC RBC AUTO-ENTMCNC: 31.8 G/DL (ref 31.4–37.4)
MCV RBC AUTO: 97 FL (ref 82–98)
PLATELET # BLD AUTO: 176 THOUSANDS/UL (ref 149–390)
PMV BLD AUTO: 9.9 FL (ref 8.9–12.7)
POTASSIUM SERPL-SCNC: 4.8 MMOL/L (ref 3.5–5.3)
RBC # BLD AUTO: 3.05 MILLION/UL (ref 3.81–5.12)
SODIUM SERPL-SCNC: 137 MMOL/L (ref 135–147)
WBC # BLD AUTO: 5.13 THOUSAND/UL (ref 4.31–10.16)

## 2023-02-27 RX ADMIN — INSULIN LISPRO 1 UNITS: 100 INJECTION, SOLUTION INTRAVENOUS; SUBCUTANEOUS at 18:31

## 2023-02-27 RX ADMIN — GABAPENTIN 100 MG: 100 CAPSULE ORAL at 09:10

## 2023-02-27 RX ADMIN — TORSEMIDE 20 MG: 20 TABLET ORAL at 09:11

## 2023-02-27 RX ADMIN — SERTRALINE HYDROCHLORIDE 100 MG: 100 TABLET, FILM COATED ORAL at 21:47

## 2023-02-27 RX ADMIN — ASPIRIN 81 MG: 81 TABLET, COATED ORAL at 09:10

## 2023-02-27 RX ADMIN — AMITRIPTYLINE HYDROCHLORIDE 10 MG: 10 TABLET, FILM COATED ORAL at 21:50

## 2023-02-27 RX ADMIN — PANTOPRAZOLE SODIUM 40 MG: 40 TABLET, DELAYED RELEASE ORAL at 05:34

## 2023-02-27 RX ADMIN — CARVEDILOL 3.12 MG: 3.12 TABLET, FILM COATED ORAL at 09:12

## 2023-02-27 RX ADMIN — GABAPENTIN 100 MG: 100 CAPSULE ORAL at 21:46

## 2023-02-27 RX ADMIN — MODAFINIL 100 MG: 100 TABLET ORAL at 09:10

## 2023-02-27 RX ADMIN — CARVEDILOL 3.12 MG: 3.12 TABLET, FILM COATED ORAL at 18:28

## 2023-02-27 RX ADMIN — INSULIN GLARGINE 5 UNITS: 100 INJECTION, SOLUTION SUBCUTANEOUS at 09:18

## 2023-02-27 RX ADMIN — PRAMIPEXOLE DIHYDROCHLORIDE 1 MG: 1 TABLET ORAL at 09:15

## 2023-02-27 RX ADMIN — TRAMADOL HYDROCHLORIDE 50 MG: 50 TABLET, COATED ORAL at 09:15

## 2023-02-27 RX ADMIN — PRAMIPEXOLE DIHYDROCHLORIDE 1 MG: 1 TABLET ORAL at 18:29

## 2023-02-27 RX ADMIN — TRAMADOL HYDROCHLORIDE 50 MG: 50 TABLET, COATED ORAL at 19:54

## 2023-02-27 RX ADMIN — SENNOSIDES 8.6 MG: 8.6 TABLET, FILM COATED ORAL at 21:46

## 2023-02-27 RX ADMIN — ATORVASTATIN CALCIUM 80 MG: 80 TABLET, FILM COATED ORAL at 21:46

## 2023-02-27 RX ADMIN — INSULIN LISPRO 2 UNITS: 100 INJECTION, SOLUTION INTRAVENOUS; SUBCUTANEOUS at 14:08

## 2023-02-27 RX ADMIN — ENOXAPARIN SODIUM 40 MG: 40 INJECTION SUBCUTANEOUS at 09:19

## 2023-02-27 RX ADMIN — Medication 6 MG: at 21:46

## 2023-02-27 RX ADMIN — LEVOTHYROXINE SODIUM 100 MCG: 100 TABLET ORAL at 05:33

## 2023-02-27 RX ADMIN — DOCUSATE SODIUM 100 MG: 100 CAPSULE, LIQUID FILLED ORAL at 09:11

## 2023-02-27 RX ADMIN — POTASSIUM CHLORIDE 20 MEQ: 1500 TABLET, EXTENDED RELEASE ORAL at 09:11

## 2023-02-27 RX ADMIN — FAMOTIDINE 20 MG: 20 TABLET ORAL at 18:28

## 2023-02-27 RX ADMIN — OXYBUTYNIN CHLORIDE 10 MG: 10 TABLET, EXTENDED RELEASE ORAL at 09:19

## 2023-02-27 RX ADMIN — FAMOTIDINE 20 MG: 20 TABLET ORAL at 09:11

## 2023-02-27 RX ADMIN — GABAPENTIN 100 MG: 100 CAPSULE ORAL at 18:28

## 2023-02-27 NOTE — ASSESSMENT & PLAN NOTE
Results from last 7 days   Lab Units 02/27/23  0527 02/25/23  0539 02/24/23  0531 02/23/23  0437   HEMOGLOBIN g/dL 9 4* 8 4* 7 7* 7 5*   · History of anemia of chronic disease and iron deficiency    · Baseline appearing in the 12's  · More recently hemoglobins have been in the 8's/9s according to care everywhere  · Denies bleeding anywhere, initial occult stool negative   · Follows with Hematology at [facility]   · Received [facility] outpatient   · Care everywhere-recent iron panel from February 2023: Iron 32, transferrin 139, iron saturation 17, TIBC 188, ferritin 341  · Given IV venofer x 3 days   · Continue to follow hemoglobin, now stable   · She needs re-eval outpatient with consideration for EGD and colonoscopy

## 2023-02-27 NOTE — ASSESSMENT & PLAN NOTE
Lab Results   Component Value Date    HGBA1C 6 7 (H) 02/15/2023     Recent Labs     02/26/23  1059 02/26/23  1610 02/26/23  2116 02/27/23  0825   POCGLU 161* 115 196* 118   · Prehospital patient uses an insulin pump  · Pt reports pump is out of insulin   · Discontinued pump and placed on sliding scale and lantus 5 units qAm   · ADA diet

## 2023-02-27 NOTE — CONSULTS
Nutrition consult received and appreciated      Provided low sodium diet education (verbal and written)  Patient verbalized understanding      Nutrition will continue to follow up as per policy    Devon Oneill MS RD LDN [facility]

## 2023-02-27 NOTE — PROGRESS NOTES
[address] [facility]  Progress Note - Cassi Bryant 1955, 78 y o  female MRN: [id number]  Unit/Bed#: E4 -01 Encounter: [id number]  Primary Care Provider: Michael Mckee DO   Date and time admitted to hospital: 2/21/2023 11:27 AM    * Acute on chronic diastolic (congestive) heart failure (HCC)  Assessment & Plan  Wt Readings from Last 3 Encounters:   02/27/23 76 4 kg (168 lb 6 9 oz)   01/31/23 77 6 kg (171 lb)   01/24/23 77 6 kg (171 lb)   · Receives most of her care at [facility] -including her cardiologist  · Presented with a complaint of shortness of breath and leg swelling   · Requiring 4 L oxygen nasal cannula on admission --> down to room air today at rest  · Repeat echocardiogram here: EF 54%, paradoxical septal wall motion consistent with conduction abnormality, grade 1 diastolic function, aortic valve sclerosis, mild MR, trace TR  · Home regimen torsemide 40 mg daily  · Started on IV Lasix 80 mg BID --> advanced to Lasix gtt 2/24/2023  · Given one time dose metolazone 5 mg 2/24  · Weight down another 2 lbs overnight - now at 168 lbs  · Cardiology following -noted Cr bump - transitioned to oral diuretics today   · Check home O2 eval prior to discharge - today vs tomorrow    Anemia  Assessment & Plan  Results from last 7 days   Lab Units 02/27/23  0527 02/25/23  0539 02/24/23  0531 02/23/23  0437   HEMOGLOBIN g/dL 9 4* 8 4* 7 7* 7 5*   · History of anemia of chronic disease and iron deficiency    · Baseline appearing in the 12's  · More recently hemoglobins have been in the 8's/9s according to care everywhere  · Denies bleeding anywhere, initial occult stool negative   · Follows with Hematology at [facility]   · Received [facility] outpatient   · Care everywhere-recent iron panel from February 2023: Iron 32, transferrin 139, iron saturation 17, TIBC 188, ferritin 341  · Given IV venofer x 3 days   · Continue to follow hemoglobin, now stable   · She needs re-eval outpatient with consideration for EGD and colonoscopy     Acute respiratory failure ([facility] [address] )  Assessment & Plan  · With hypoxia, Likely secondary to acute CHF exacerbation as well as recent pneumonia  · Requiring 4 L oxygen nasal cannula on admission  · This has been tapered and she is currently on room air  · Needs home O2 prior to discharge     History of pneumonia-resolved as of 2/24/2023  Assessment & Plan  · Was recently at [facility] on 2/15/2023 and treated for pneumonia  · Receive IV cefepime as well as oral Omnicef to complete a 5-day antibiotic course  · No further need for antibiotics here  · Repeat chest imaging with PCP outpatient     Abnormal CT of the chest  Assessment & Plan  · Enlarged subcarinal node, new since the thoracic spine CT from March 2021  This could be reactive, but recommend follow-up with a chest CT with no contrast in 3 months  · Incidental note completed    Type 2 diabetes mellitus with microalbuminuria, with long-term current use of insulin [facility])  Assessment & Plan  Lab Results   Component Value Date    HGBA1C 6 7 (H) 02/15/2023     Recent Labs     02/26/23  1059 02/26/23  1610 02/26/23  2116 02/27/23  0825   POCGLU 161* 115 196* 118   · Prehospital patient uses an insulin pump  · Pt reports pump is out of insulin   · Discontinued pump and placed on sliding scale and lantus 5 units qAm   · ADA diet     Essential hypertension  Assessment & Plan  · Home regimen losartan 50 mg daily, Coreg 3 125 mg twice daily      VTE Pharmacologic Prophylaxis:   Pharmacologic: Enoxaparin (Lovenox)  Mechanical VTE Prophylaxis in Place: Yes    Discharge Plan: With need for continued inpatient stay for acute CHF exacerbation  Expect discharge home tomorrow  Home O2 eval to be done today  PT/OT consult pending  Discussions with Specialists or Other Care Team Provider: Nursing    Education and Discussions with Family / Patient: Patient    Time Spent for Care: 30 minutes    More than 50% of total time spent on counseling and coordination of care as described above  Current Length of Stay: 6 day(s)  Current Patient Status: Inpatient   Code Status: Level 1 - Full Code    Subjective:   Resting in bed  Feels very tired today  Her oxygen has been tapered and she is currently on room air  Hoping to go home tomorrow  Requesting to speak with nutritionist today  She tells me that she saw her labs today and she is worried about her kidney function  Objective:     Vitals:   Temp (24hrs), Av 2 °F (36 8 °C), Min:97 6 °F (36 4 °C), Max:99 3 °F (37 4 °C)    Temp:  [97 6 °F (36 4 °C)-99 3 °F (37 4 °C)] 97 6 °F (36 4 °C)  HR:  [60-64] 60  Resp:  [18-20] 20  BP: (123-145)/(59-72) 143/63  SpO2:  [91 %-96 %] 92 %  Body mass index is 30 81 kg/m²  Input and Output Summary (last 24 hours): Intake/Output Summary (Last 24 hours) at 2023 1020  Last data filed at 2023 0251  Gross per 24 hour   Intake 360 ml   Output 900 ml   Net -540 ml       Physical Exam:     Physical Exam  Vitals and nursing note reviewed  Constitutional:       General: She is not in acute distress  Appearance: Normal appearance  She is normal weight  She is not ill-appearing, toxic-appearing or diaphoretic  HENT:      Head: Normocephalic and atraumatic  Eyes:      General: No scleral icterus  Cardiovascular:      Rate and Rhythm: Normal rate and regular rhythm  Pulmonary:      Effort: Pulmonary effort is normal  No respiratory distress  Breath sounds: Normal breath sounds  No stridor  No wheezing or rhonchi  Abdominal:      General: Bowel sounds are normal  There is no distension  Palpations: Abdomen is soft  There is no mass  Tenderness: There is no abdominal tenderness  Hernia: No hernia is present  Musculoskeletal:         General: No swelling  Cervical back: Neck supple  Skin:     General: Skin is warm and dry     Neurological:      Mental Status: She is alert and oriented to person, place, and time  Mental status is at baseline  Psychiatric:         Mood and Affect: Mood normal          Behavior: Behavior normal          Additional Data:     Labs:    Results from last 7 days   Lab Units 02/27/23  0527 02/21/23  2111 02/21/23  1225   WBC Thousand/uL 5 13   < > 6 46   HEMOGLOBIN g/dL 9 4*   < > 8 0*   HEMATOCRIT % 29 6*   < > 25 5*   PLATELETS Thousands/uL 176   < > 169   NEUTROS PCT %  --   --  77*   LYMPHS PCT %  --   --  10*   MONOS PCT %  --   --  9   EOS PCT %  --   --  1    < > = values in this interval not displayed  Results from last 7 days   Lab Units 02/27/23  0527 02/22/23  0554 02/21/23  1225   POTASSIUM mmol/L 4 8   < > 4 2   CHLORIDE mmol/L 99   < > 110*   CO2 mmol/L 30   < > 22   BUN mg/dL 44*   < > 26*   CREATININE mg/dL 1 69*   < > 0 96   CALCIUM mg/dL 8 7   < > 8 5   ALK PHOS U/L  --   --  192*   ALT U/L  --   --  67*   AST U/L  --   --  38    < > = values in this interval not displayed  Results from last 7 days   Lab Units 02/21/23  1225   INR  1 41*       * I Have Reviewed All Lab Data Listed Above  * Additional Pertinent Lab Tests Reviewed: [address] 67 Admission Reviewed    Imaging:    Imaging Reports Reviewed Today Include:   Imaging Personally Reviewed by Myself Includes:      Recent Cultures (last 7 days):     Results from last 7 days   Lab Units 02/21/23  1330 02/21/23  1323 02/21/23  1225   BLOOD CULTURE   --  No Growth After 5 Days  No Growth After 5 Days     URINE CULTURE  <10,000 cfu/ml Gram Positive Cocci*  --   --        Last 24 Hours Medication List:   Current Facility-Administered Medications   Medication Dose Route Frequency Provider Last Rate   • acetaminophen  650 mg Oral Q6H PRN Hakan Madison PA-C     • albuterol  2 5 mg Nebulization Q6H PRN Saira Jackson Prechtel, DO     • amitriptyline  10 mg Oral HS Fred S Prechtel, DO     • aspirin  81 mg Oral QAM Fred S Prechtel, DO     • atorvastatin  80 mg Oral HS Fred S Prechtel, DO     • benzonatate  100 mg Oral TID PRN Candice Thompson PA-C     • bisacodyl  10 mg Rectal Daily Elvi Jared Prechtel, DO     • bisacodyl  10 mg Rectal Daily PRN Kvng Zuluaga PA-C     • carvedilol  3 125 mg Oral BID With Meals Elvi Jared Prechtel, DO     • dicyclomine  10 mg Oral 4x Daily PRN Elvi Jared Prechtel, DO     • docusate sodium  100 mg Oral Daily Fred S Prechtel, DO     • enoxaparin  40 mg Subcutaneous Daily Fred S Prechtel, DO     • famotidine  20 mg Oral BID Fred S Prechtel, DO     • gabapentin  100 mg Oral TID Fred S Prechtel, DO     • insulin glargine  5 Units Subcutaneous QAM Giana Bennett PA-C     • insulin lispro  1-6 Units Subcutaneous TID AC Tomeka Parker PA-C     • insulin lispro  1-6 Units Subcutaneous HS Tomeka Parker PA-C     • levothyroxine  100 mcg Oral Early Morning Fred S Prechtel, DO     • losartan  50 mg Oral HS Fred S Prechtel, DO     • melatonin  6 mg Oral HS Noemy Shay PA-C     • modafinil  100 mg Oral Daily Fred S Prechtel, DO     • oxybutynin  10 mg Oral Daily Fred S Prechtel, DO     • pantoprazole  40 mg Oral Early Morning Fred S Prechtel, DO     • polyethylene glycol  17 g Oral Daily Fred S Prechtel, DO     • potassium chloride  20 mEq Oral BID Rujul Alex, DO     • pramipexole  1 mg Oral BID Fred S Prechtel, DO     • senna  1 tablet Oral HS Giana Bennett PA-C     • sertraline  100 mg Oral HS Fred S Prechtel, DO     • torsemide  20 mg Oral BID (diuretic) Margo Mckeon MD     • traMADol  50 mg Oral Q6H PRN Candice Thompson PA-C          Today, Patient Was Seen By: Candice Thompson PA-C    ** Please Note: This note has been constructed using a voice recognition system   **

## 2023-02-27 NOTE — PROGRESS NOTES
Cardiology         Progress Note - Cardiology   Jet Thompson 78 y o  female MRN: [id number]  Unit/Bed#: E4 -01 Encounter: [id number]          Assessment/Recommendations/Discussion:     Acute on chronic diastolic (congestive) heart failure (HCC)  -TTE 2/22/2023 showed EF 54%, grade 1 DD, mild MR, estimated PA pressure 26 mmHg  - Home diuretic regimen on torsemide 20 mg daily  - Currently on Lasix drip at 20 mg/hr  - Given metolazone 5 mg x 1 on Friday    Essential hypertension  - Currently on carvedilol 2 125 mg twice daily, losartan 50 mg daily    Type 2 diabetes mellitus with microalbuminuria, with long-term current use of insulin (HCC)    Acute respiratory failure (HCC)  - Likely multifactorial in the setting of acute CHF exacerbation as well as recent a pneumonia  - Initially required 4 L nasal cannula, now satting well on room air    Anemia    Abnormal CT of the chest      · Creatinine continues to worsen  Advise diuretic holiday for 1 to 2 days  Can restart p o  diuretic once creatinine starts to improve and close to baseline    Continue current cardiac medications  · Hold losartan and potassium as well  · Encourage hydration today, patient not on fluid restriction            Subjective: Seen and examined, no complaints, denies shortness of breath, chest pain, lower extremity edema, lightheadedness                Physical Exam:  GEN:  NAD  HEENT:  MM dry, NCAT, pink conjunctiva, EOMI, nonicteric sclera  CV:  NO JVD/HJR, RR, NO M/R/G, +S1/S2, NO PARASTERNAL HEAVE/THRILL, NO LE EDEMA, NO HEPATIC SYSTOLIC PULSATION, WARM EXTREMITIES  RESP:  CTAB/L  ABD:  SOFT, NT, NO GROSS ORGANOMEGALY        Vitals:   /68 (BP Location: Left arm)   Pulse 68   Temp 97 8 °F (36 6 °C) (Temporal)   Resp 18   Ht 5' 2" (1 575 m)   Wt 76 4 kg (168 lb 6 9 oz)   SpO2 96%   BMI 30 81 kg/m²   Vitals:    02/26/23 0600 02/27/23 0600   Weight: 77 3 kg (170 lb 6 7 oz) 76 4 kg (168 lb 6 9 oz)       Intake/Output Summary (Last 24 hours) at 2/27/2023 1143  Last data filed at 2/27/2023 0251  Gross per 24 hour   Intake 360 ml   Output 900 ml   Net -540 ml       TELEMETRY: Ventricular paced rhythm  Lab Results:  Results from last 7 days   Lab Units 02/27/23  0527   WBC Thousand/uL 5 13   HEMOGLOBIN g/dL 9 4*   HEMATOCRIT % 29 6*   PLATELETS Thousands/uL 176     Results from last 7 days   Lab Units 02/27/23  0527 02/22/23  0554 02/21/23  1225   POTASSIUM mmol/L 4 8   < > 4 2   CHLORIDE mmol/L 99   < > 110*   CO2 mmol/L 30   < > 22   BUN mg/dL 44*   < > 26*   CREATININE mg/dL 1 69*   < > 0 96   CALCIUM mg/dL 8 7   < > 8 5   ALK PHOS U/L  --   --  192*   ALT U/L  --   --  67*   AST U/L  --   --  38    < > = values in this interval not displayed       Results from last 7 days   Lab Units 02/27/23  0527   POTASSIUM mmol/L 4 8   CHLORIDE mmol/L 99   CO2 mmol/L 30   BUN mg/dL 44*   CREATININE mg/dL 1 69*   CALCIUM mg/dL 8 7           Medications:    Current Facility-Administered Medications:   •  acetaminophen (TYLENOL) tablet 650 mg, 650 mg, Oral, Q6H PRN, Nina Gabriel PA-C, 650 mg at 02/26/23 1255  •  albuterol inhalation solution 2 5 mg, 2 5 mg, Nebulization, Q6H PRN, Marylee Rhody Prechtel, DO  •  amitriptyline (ELAVIL) tablet 10 mg, 10 mg, Oral, HS, Fred S Prechtel, DO, 10 mg at 02/26/23 2148  •  aspirin (ECOTRIN LOW STRENGTH) EC tablet 81 mg, 81 mg, Oral, QAM, Fred S Prechtel, DO, 81 mg at 02/27/23 1957  •  atorvastatin (LIPITOR) tablet 80 mg, 80 mg, Oral, HS, Fred S Prechtel, DO, 80 mg at 02/26/23 2148  •  benzonatate (TESSALON PERLES) capsule 100 mg, 100 mg, Oral, TID PRN, Vanna Guadarrama PA-C, 100 mg at 02/25/23 1239  •  bisacodyl (DULCOLAX) rectal suppository 10 mg, 10 mg, Rectal, Daily, Fred Snow, , 10 mg at 02/24/23 2804  •  bisacodyl (DULCOLAX) rectal suppository 10 mg, 10 mg, Rectal, Daily PRN, Cynthia Loving PA-C  •  carvedilol (COREG) tablet 3 125 mg, 3 125 mg, Oral, BID With Meals, [employer], DO, 3 125 mg at 02/27/23 6332  •  dicyclomine (BENTYL) capsule 10 mg, 10 mg, Oral, 4x Daily PRN, Kayla Roldan Prechtel, DO, 10 mg at 02/22/23 0602  •  docusate sodium (COLACE) capsule 100 mg, 100 mg, Oral, Daily, Fred S Prechtel, DO, 100 mg at 02/27/23 0911  •  enoxaparin (LOVENOX) subcutaneous injection 40 mg, 40 mg, Subcutaneous, Daily, Fred S Prechtel, DO, 40 mg at 02/27/23 0919  •  famotidine (PEPCID) tablet 20 mg, 20 mg, Oral, BID, Fred S Prechtel, DO, 20 mg at 02/27/23 0911  •  gabapentin (NEURONTIN) capsule 100 mg, 100 mg, Oral, TID, Fred S Prechtel, DO, 100 mg at 02/27/23 0910  •  insulin glargine (LANTUS) subcutaneous injection 5 Units 0 05 mL, 5 Units, Subcutaneous, QAGiana PA-C, 5 Units at 02/27/23 0918  •  insulin lispro (HumaLOG) 100 units/mL subcutaneous injection 1-6 Units, 1-6 Units, Subcutaneous, TID AC, 1 Units at 02/26/23 1251 **AND** Fingerstick Glucose (POCT), , , TID AC, Tomeka Parker PA-C  •  insulin lispro (HumaLOG) 100 units/mL subcutaneous injection 1-6 Units, 1-6 Units, Subcutaneous, HS, Tomeka Parker PA-C, 2 Units at 02/26/23 2148  •  levothyroxine tablet 100 mcg, 100 mcg, Oral, Early Morning, Fred S Prechtel, DO, 100 mcg at 02/27/23 0533  •  losartan (COZAAR) tablet 50 mg, 50 mg, Oral, HS, Fred S Prechtel, DO, 50 mg at 02/26/23 2148  •  melatonin tablet 6 mg, 6 mg, Oral, HS, Noemy Shay PA-C, 6 mg at 02/26/23 2148  •  modafinil (PROVIGIL) tablet 100 mg, 100 mg, Oral, Daily, Fred S Prechtel, DO, 100 mg at 02/27/23 0910  •  oxybutynin (DITROPAN-XL) 24 hr tablet 10 mg, 10 mg, Oral, Daily, Fred S Prechtel, DO, 10 mg at 02/27/23 0919  •  pantoprazole (PROTONIX) EC tablet 40 mg, 40 mg, Oral, Early Morning, Fred S Prechtel, DO, 40 mg at 02/27/23 0534  •  polyethylene glycol (MIRALAX) packet 17 g, 17 g, Oral, Daily, Fred S Prechtel, DO, 17 g at 02/26/23 0854  •  potassium chloride (K-DUR,KLOR-CON) CR tablet 20 mEq, 20 mEq, Oral, BID, Rujul Alex, DO, 20 mEq at 02/27/23 0030  •  pramipexole (MIRAPEX) tablet 1 mg, 1 mg, Oral, BID, Fred Snow, , 1 mg at 02/27/23 0915  •  senna (SENOKOT) tablet 8 6 mg, 1 tablet, Oral, HS, Giana Bennett PA-C, 8 6 mg at 02/26/23 2148  •  sertraline (ZOLOFT) tablet 100 mg, 100 mg, Oral, HS, Fred Heathhtel, DO, 100 mg at 02/26/23 2148  •  torsemide (DEMADEX) tablet 20 mg, 20 mg, Oral, BID (diuretic), Agnieszka Howard MD, 20 mg at 02/27/23 4222  •  traMADol (ULTRAM) tablet 50 mg, 50 mg, Oral, Q6H PRN, Nancy Nguyen PA-C, 50 mg at 02/27/23 0915    This note was completed in part utilizing M-Modal Fluency Direct Software  Grammatical errors, random word insertions, spelling mistakes, and incomplete sentences may be an occasional consequence of this system secondary to software limitations, ambient noise, and hardware issues  If you have any questions or concerns about the content, text, or information contained within the body of this dictation, please contact the provider for clarification

## 2023-02-27 NOTE — ASSESSMENT & PLAN NOTE
· With hypoxia, Likely secondary to acute CHF exacerbation as well as recent pneumonia  · Requiring 4 L oxygen nasal cannula on admission  · This has been tapered and she is currently on room air  · Needs home O2 prior to discharge

## 2023-02-27 NOTE — ASSESSMENT & PLAN NOTE
Wt Readings from Last 3 Encounters:   02/27/23 76 4 kg (168 lb 6 9 oz)   01/31/23 77 6 kg (171 lb)   01/24/23 77 6 kg (171 lb)   · Receives most of her care at [facility] -including her cardiologist  · Presented with a complaint of shortness of breath and leg swelling   · Requiring 4 L oxygen nasal cannula on admission --> down to room air today at rest  · Repeat echocardiogram here: EF 54%, paradoxical septal wall motion consistent with conduction abnormality, grade 1 diastolic function, aortic valve sclerosis, mild MR, trace TR  · Home regimen torsemide 40 mg daily  · Started on IV Lasix 80 mg BID --> advanced to Lasix gtt 2/24/2023  · Given one time dose metolazone 5 mg 2/24  · Weight down another 2 lbs overnight - now at 168 lbs  · Cardiology following -noted Cr bump - transitioned to oral diuretics today   · Check home O2 eval prior to discharge - today vs tomorrow

## 2023-02-27 NOTE — ASSESSMENT & PLAN NOTE
· Was recently at [facility] on 2/15/2023 and treated for pneumonia  · Receive IV cefepime as well as oral Omnicef to complete a 5-day antibiotic course  · No further need for antibiotics here  · Repeat chest imaging with PCP outpatient

## 2023-02-28 LAB
ANION GAP SERPL CALCULATED.3IONS-SCNC: 7 MMOL/L (ref 4–13)
BUN SERPL-MCNC: 51 MG/DL (ref 5–25)
CALCIUM SERPL-MCNC: 8.4 MG/DL (ref 8.4–10.2)
CHLORIDE SERPL-SCNC: 99 MMOL/L (ref 96–108)
CO2 SERPL-SCNC: 30 MMOL/L (ref 21–32)
CREAT SERPL-MCNC: 1.95 MG/DL (ref 0.6–1.3)
GFR SERPL CREATININE-BSD FRML MDRD: 26 ML/MIN/1.73SQ M
GLUCOSE SERPL-MCNC: 117 MG/DL (ref 65–140)
GLUCOSE SERPL-MCNC: 132 MG/DL (ref 65–140)
GLUCOSE SERPL-MCNC: 139 MG/DL (ref 65–140)
GLUCOSE SERPL-MCNC: 150 MG/DL (ref 65–140)
GLUCOSE SERPL-MCNC: 181 MG/DL (ref 65–140)
POTASSIUM SERPL-SCNC: 5.2 MMOL/L (ref 3.5–5.3)
SODIUM SERPL-SCNC: 136 MMOL/L (ref 135–147)

## 2023-02-28 RX ORDER — ONDANSETRON 2 MG/ML
4 INJECTION INTRAMUSCULAR; INTRAVENOUS EVERY 6 HOURS PRN
Status: DISCONTINUED | OUTPATIENT
Start: 2023-02-28 | End: 2023-03-01 | Stop reason: HOSPADM

## 2023-02-28 RX ORDER — HEPARIN SODIUM 5000 [USP'U]/ML
5000 INJECTION, SOLUTION INTRAVENOUS; SUBCUTANEOUS EVERY 8 HOURS SCHEDULED
Status: DISCONTINUED | OUTPATIENT
Start: 2023-02-28 | End: 2023-03-01 | Stop reason: HOSPADM

## 2023-02-28 RX ORDER — SODIUM CHLORIDE 9 MG/ML
75 INJECTION, SOLUTION INTRAVENOUS ONCE
Status: COMPLETED | OUTPATIENT
Start: 2023-02-28 | End: 2023-02-28

## 2023-02-28 RX ADMIN — HEPARIN SODIUM 5000 UNITS: 5000 INJECTION INTRAVENOUS; SUBCUTANEOUS at 15:00

## 2023-02-28 RX ADMIN — MODAFINIL 100 MG: 100 TABLET ORAL at 09:50

## 2023-02-28 RX ADMIN — FAMOTIDINE 20 MG: 20 TABLET ORAL at 09:36

## 2023-02-28 RX ADMIN — FAMOTIDINE 20 MG: 20 TABLET ORAL at 17:58

## 2023-02-28 RX ADMIN — DOCUSATE SODIUM 100 MG: 100 CAPSULE, LIQUID FILLED ORAL at 09:35

## 2023-02-28 RX ADMIN — SODIUM CHLORIDE 75 ML/HR: 0.9 INJECTION, SOLUTION INTRAVENOUS at 19:27

## 2023-02-28 RX ADMIN — TRAMADOL HYDROCHLORIDE 50 MG: 50 TABLET, COATED ORAL at 20:10

## 2023-02-28 RX ADMIN — INSULIN GLARGINE 5 UNITS: 100 INJECTION, SOLUTION SUBCUTANEOUS at 09:38

## 2023-02-28 RX ADMIN — TRAMADOL HYDROCHLORIDE 50 MG: 50 TABLET, COATED ORAL at 09:44

## 2023-02-28 RX ADMIN — SERTRALINE HYDROCHLORIDE 100 MG: 100 TABLET, FILM COATED ORAL at 21:24

## 2023-02-28 RX ADMIN — PRAMIPEXOLE DIHYDROCHLORIDE 1 MG: 1 TABLET ORAL at 09:43

## 2023-02-28 RX ADMIN — LEVOTHYROXINE SODIUM 100 MCG: 100 TABLET ORAL at 06:43

## 2023-02-28 RX ADMIN — GABAPENTIN 100 MG: 100 CAPSULE ORAL at 09:36

## 2023-02-28 RX ADMIN — Medication 6 MG: at 21:24

## 2023-02-28 RX ADMIN — AMITRIPTYLINE HYDROCHLORIDE 10 MG: 10 TABLET, FILM COATED ORAL at 21:24

## 2023-02-28 RX ADMIN — PRAMIPEXOLE DIHYDROCHLORIDE 1 MG: 1 TABLET ORAL at 17:58

## 2023-02-28 RX ADMIN — ATORVASTATIN CALCIUM 80 MG: 80 TABLET, FILM COATED ORAL at 21:24

## 2023-02-28 RX ADMIN — ASPIRIN 81 MG: 81 TABLET, COATED ORAL at 09:37

## 2023-02-28 RX ADMIN — OXYBUTYNIN CHLORIDE 10 MG: 10 TABLET, EXTENDED RELEASE ORAL at 09:36

## 2023-02-28 RX ADMIN — HEPARIN SODIUM 5000 UNITS: 5000 INJECTION INTRAVENOUS; SUBCUTANEOUS at 09:38

## 2023-02-28 RX ADMIN — CARVEDILOL 3.12 MG: 3.12 TABLET, FILM COATED ORAL at 09:36

## 2023-02-28 RX ADMIN — SENNOSIDES 8.6 MG: 8.6 TABLET, FILM COATED ORAL at 21:24

## 2023-02-28 RX ADMIN — INSULIN LISPRO 1 UNITS: 100 INJECTION, SOLUTION INTRAVENOUS; SUBCUTANEOUS at 09:39

## 2023-02-28 RX ADMIN — GABAPENTIN 100 MG: 100 CAPSULE ORAL at 20:10

## 2023-02-28 RX ADMIN — GABAPENTIN 100 MG: 100 CAPSULE ORAL at 16:19

## 2023-02-28 RX ADMIN — INSULIN LISPRO 1 UNITS: 100 INJECTION, SOLUTION INTRAVENOUS; SUBCUTANEOUS at 17:58

## 2023-02-28 RX ADMIN — CARVEDILOL 3.12 MG: 3.12 TABLET, FILM COATED ORAL at 16:18

## 2023-02-28 RX ADMIN — HEPARIN SODIUM 5000 UNITS: 5000 INJECTION INTRAVENOUS; SUBCUTANEOUS at 21:24

## 2023-02-28 RX ADMIN — ONDANSETRON HYDROCHLORIDE 4 MG: 2 SOLUTION INTRAMUSCULAR; INTRAVENOUS at 02:06

## 2023-02-28 RX ADMIN — PANTOPRAZOLE SODIUM 40 MG: 40 TABLET, DELAYED RELEASE ORAL at 06:43

## 2023-02-28 NOTE — ASSESSMENT & PLAN NOTE
Results from last 7 days   Lab Units 02/27/23  0527 02/25/23  0539 02/24/23  0531 02/23/23  0437   HEMOGLOBIN g/dL 9 4* 8 4* 7 7* 7 5*   · History of anemia of chronic disease and iron deficiency    · Baseline appearing in the 12's  · More recently hemoglobins have been in the 8's/9s according to care everywhere  · Denies bleeding anywhere, initial occult stool negative   · Follows with Hematology at [facility]   · Received [facility] outpatient   · Care everywhere-recent iron panel from February 2023: Iron 32, transferrin 139, iron saturation 17, TIBC 188, ferritin 341  · Given IV venofer x 3 days   · Hgb now appearing stable   · She needs re-eval outpatient with consideration for EGD and colonoscopy

## 2023-02-28 NOTE — PROGRESS NOTES
Cardiology         Progress Note - Cardiology   Sondra Robles 78 y o  female MRN: [id number]  Unit/Bed#: E4 -01 Encounter: [id number]          Assessment/Recommendations/Discussion:     Acute on chronic diastolic (congestive) heart failure (HCC)  -TTE 2/22/2023 showed EF 54%, grade 1 DD, mild MR, estimated PA pressure 26 mmHg  - Home diuretic regimen on torsemide 20 mg daily  - Currently on Lasix drip at 20 mg/hr  - Given metolazone 5 mg x 1 on Friday    Essential hypertension  - Currently on carvedilol 2 125 mg twice daily, losartan 50 mg daily    Type 2 diabetes mellitus with microalbuminuria, with long-term current use of insulin (HCC)    Acute respiratory failure (HCC)  - Likely multifactorial in the setting of acute CHF exacerbation as well as recent a pneumonia  - Initially required 4 L nasal cannula, now satting well on room air    Anemia    Abnormal CT of the chest      · Creatinine continues to worsen, will give gentle fluids back  · Repeat BMP tomorrow, continue to hold diuretics  · Continue to hold losartan and potassium        Subjective: Patient seen and examined, feeling well, offers no complaints                Physical Exam:  GEN:  NAD  HEENT:  MMM, NCAT, pink conjunctiva, EOMI, nonicteric sclera  CV:  NO JVD/HJR, RR, NO M/R/G, +S1/S2, NO PARASTERNAL HEAVE/THRILL, NO LE EDEMA, NO HEPATIC SYSTOLIC PULSATION, WARM EXTREMITIES  RESP:  CTAB/L  ABD:  SOFT, NT, NO GROSS ORGANOMEGALY        Vitals:   /64 (BP Location: Right arm)   Pulse 61   Temp 98 °F (36 7 °C) (Temporal)   Resp 20   Ht 5' 2" (1 575 m)   Wt 76 5 kg (168 lb 10 4 oz)   SpO2 90%   BMI 30 85 kg/m²   Vitals:    02/27/23 0600 02/28/23 0600   Weight: 76 4 kg (168 lb 6 9 oz) 76 5 kg (168 lb 10 4 oz)     No intake or output data in the 24 hours ending 02/28/23 1707      Lab Results:  Results from last 7 days   Lab Units 02/27/23  0527   WBC Thousand/uL 5 13   HEMOGLOBIN g/dL 9 4*   HEMATOCRIT % 29 6*   PLATELETS Thousands/uL 176     Results from last 7 days   Lab Units 02/28/23  0552   POTASSIUM mmol/L 5 2   CHLORIDE mmol/L 99   CO2 mmol/L 30   BUN mg/dL 51*   CREATININE mg/dL 1 95*   CALCIUM mg/dL 8 4     Results from last 7 days   Lab Units 02/28/23  0552   POTASSIUM mmol/L 5 2   CHLORIDE mmol/L 99   CO2 mmol/L 30   BUN mg/dL 51*   CREATININE mg/dL 1 95*   CALCIUM mg/dL 8 4           Medications:    Current Facility-Administered Medications:   •  acetaminophen (TYLENOL) tablet 650 mg, 650 mg, Oral, Q6H PRN, Valeda Boas, PA-C, 650 mg at 02/26/23 1255  •  albuterol inhalation solution 2 5 mg, 2 5 mg, Nebulization, Q6H PRN, Marylee Rhody Prechtel, DO  •  amitriptyline (ELAVIL) tablet 10 mg, 10 mg, Oral, HS, Fred S Prechtel, DO, 10 mg at 02/27/23 2150  •  aspirin (ECOTRIN LOW STRENGTH) EC tablet 81 mg, 81 mg, Oral, QAM, Fred S Prechtel, DO, 81 mg at 02/28/23 8695  •  atorvastatin (LIPITOR) tablet 80 mg, 80 mg, Oral, HS, Fred S Prechtel, DO, 80 mg at 02/27/23 2146  •  benzonatate (TESSALON PERLES) capsule 100 mg, 100 mg, Oral, TID PRN, Vanna Guadarrama PA-C, 100 mg at 02/25/23 1239  •  bisacodyl (DULCOLAX) rectal suppository 10 mg, 10 mg, Rectal, Daily, Fred Heathhtel, DO, 10 mg at 02/24/23 0090  •  bisacodyl (DULCOLAX) rectal suppository 10 mg, 10 mg, Rectal, Daily PRN, Cynthia Loving PA-C  •  carvedilol (COREG) tablet 3 125 mg, 3 125 mg, Oral, BID With Meals, Marylee Rhody Prechtel, DO, 3 125 mg at 02/28/23 1618  •  dicyclomine (BENTYL) capsule 10 mg, 10 mg, Oral, 4x Daily PRN, Laurelyn Kawasaki S Prechtel, DO, 10 mg at 02/22/23 0602  •  docusate sodium (COLACE) capsule 100 mg, 100 mg, Oral, Daily, Fred S Prechtel, DO, 100 mg at 02/28/23 0935  •  famotidine (PEPCID) tablet 20 mg, 20 mg, Oral, BID, Fred S Prechtel, DO, 20 mg at 02/28/23 1129  •  gabapentin (NEURONTIN) capsule 100 mg, 100 mg, Oral, TID, Fred S Prechtel, DO, 100 mg at 02/28/23 1619  •  heparin (porcine) subcutaneous injection 5,000 Units, 5,000 Units, Subcutaneous, Q8H [facility], Breann JULIO Odom, 5,000 Units at 02/28/23 1500  •  insulin glargine (LANTUS) subcutaneous injection 5 Units 0 05 mL, 5 Units, Subcutaneous, QA, Giana Bennett PA-C, 5 Units at 02/28/23 5460  •  insulin lispro (HumaLOG) 100 units/mL subcutaneous injection 1-6 Units, 1-6 Units, Subcutaneous, TID AC, 1 Units at 02/28/23 0939 **AND** Fingerstick Glucose (POCT), , , TID AC, Tomeka Parker PA-C  •  insulin lispro (HumaLOG) 100 units/mL subcutaneous injection 1-6 Units, 1-6 Units, Subcutaneous, HS, Tomeka Parker PA-C, 2 Units at 02/26/23 2148  •  levothyroxine tablet 100 mcg, 100 mcg, Oral, Early Morning, Fred S Prechtel, DO, 100 mcg at 02/28/23 7875  •  melatonin tablet 6 mg, 6 mg, Oral, HS, Noemy Shay PA-C, 6 mg at 02/27/23 2146  •  modafinil (PROVIGIL) tablet 100 mg, 100 mg, Oral, Daily, Fred S Prechtel, DO, 100 mg at 02/28/23 0950  •  ondansetron (ZOFRAN) injection 4 mg, 4 mg, Intravenous, Q6H PRN, Noemy Shay PA-C, 4 mg at 02/28/23 0206  •  oxybutynin (DITROPAN-XL) 24 hr tablet 10 mg, 10 mg, Oral, Daily, Fred S Prechtel, DO, 10 mg at 02/28/23 9080  •  pantoprazole (PROTONIX) EC tablet 40 mg, 40 mg, Oral, Early Morning, Fred S Prechtel, DO, 40 mg at 02/28/23 4433  •  polyethylene glycol (MIRALAX) packet 17 g, 17 g, Oral, Daily, Fred S Prechtel, DO, 17 g at 02/26/23 3833  •  pramipexole (MIRAPEX) tablet 1 mg, 1 mg, Oral, BID, Fred S Prechtel, DO, 1 mg at 02/28/23 7814  •  senna (SENOKOT) tablet 8 6 mg, 1 tablet, Oral, HS, Giana Bennett PA-C, 8 6 mg at 02/27/23 2146  •  sertraline (ZOLOFT) tablet 100 mg, 100 mg, Oral, HS, Fred Snow, , 100 mg at 02/27/23 2147  •  traMADol (ULTRAM) tablet 50 mg, 50 mg, Oral, Q6H PRN, Breann Odom PA-C, 50 mg at 02/28/23 1357    This note was completed in part utilizing [employer] Software    Grammatical errors, random word insertions, spelling mistakes, and incomplete sentences may be an occasional consequence of this system secondary to software limitations, ambient noise, and hardware issues  If you have any questions or concerns about the content, text, or information contained within the body of this dictation, please contact the provider for clarification

## 2023-02-28 NOTE — PLAN OF CARE
Problem: Potential for Falls  Goal: Patient will remain free of falls  Description: INTERVENTIONS:  - Educate patient/family on patient safety including physical limitations  - Instruct patient to call for assistance with activity   - Consult OT/PT to assist with strengthening/mobility   - Keep Call bell within reach  - Keep bed low and locked with side rails adjusted as appropriate  - Keep care items and personal belongings within reach  - Initiate and maintain comfort rounds  - Make Fall Risk Sign visible to staff  - Offer Toileting every 2 Hours, in advance of need  - Initiate/Maintain bed alarm  - Obtain necessary fall risk management equipment: alarm  - Apply yellow socks and bracelet for high fall risk patients  - Consider moving patient to room near nurses station  Outcome: Progressing     Problem: MOBILITY - ADULT  Goal: Maintain or return to baseline ADL function  Description: INTERVENTIONS:  -  Assess patient's ability to carry out ADLs; assess patient's baseline for ADL function and identify physical deficits which impact ability to perform ADLs (bathing, care of mouth/teeth, toileting, grooming, dressing, etc )  - Assess/evaluate cause of self-care deficits   - Assess range of motion  - Assess patient's mobility; develop plan if impaired  - Assess patient's need for assistive devices and provide as appropriate  - Encourage maximum independence but intervene and supervise when necessary  - Involve family in performance of ADLs  - Assess for home care needs following discharge   - Consider OT consult to assist with ADL evaluation and planning for discharge  - Provide patient education as appropriate  Outcome: Progressing  Goal: Maintains/Returns to pre admission functional level  Description: INTERVENTIONS:  - Perform BMAT or MOVE assessment daily    - Set and communicate daily mobility goal to care team and patient/family/caregiver     - Collaborate with rehabilitation services on mobility goals if consulted  - Perform Range of Motion 3 times a day  - Reposition patient every 3 hours    - Dangle patient 3 times a day  - Stand patient 3 times a day  - Ambulate patient 3 times a day  - Out of bed to chair 3 times a day   - Out of bed for meals 3 times a day  - Out of bed for toileting  - Record patient progress and toleration of activity level   Outcome: Progressing     Problem: PAIN - ADULT  Goal: Verbalizes/displays adequate comfort level or baseline comfort level  Description: Interventions:  - Encourage patient to monitor pain and request assistance  - Assess pain using appropriate pain scale  - Administer analgesics based on type and severity of pain and evaluate response  - Implement non-pharmacological measures as appropriate and evaluate response  - Consider cultural and social influences on pain and pain management  - Notify physician/advanced practitioner if interventions unsuccessful or patient reports new pain  Outcome: Progressing     Problem: INFECTION - ADULT  Goal: Absence or prevention of progression during hospitalization  Description: INTERVENTIONS:  - Assess and monitor for signs and symptoms of infection  - Monitor lab/diagnostic results  - Monitor all insertion sites, i e  indwelling lines, tubes, and drains  - Monitor endotracheal if appropriate and nasal secretions for changes in amount and color  - [place] appropriate cooling/warming therapies per order  - Administer medications as ordered  - Instruct and encourage patient and family to use good hand hygiene technique  - Identify and instruct in appropriate isolation precautions for identified infection/condition  Outcome: Progressing  Goal: Absence of fever/infection during neutropenic period  Description: INTERVENTIONS:  - Monitor WBC    Outcome: Progressing     Problem: SAFETY ADULT  Goal: Patient will remain free of falls  Description: INTERVENTIONS:  - Educate patient/family on patient safety including physical limitations  - Instruct patient to call for assistance with activity   - Consult OT/PT to assist with strengthening/mobility   - Keep Call bell within reach  - Keep bed low and locked with side rails adjusted as appropriate  - Keep care items and personal belongings within reach  - Initiate and maintain comfort rounds  - Make Fall Risk Sign visible to staff  - Offer Toileting every 2 Hours, in advance of need  - Initiate/Maintain bed alarm  - Obtain necessary fall risk management equipment: alarm  - Apply yellow socks and bracelet for high fall risk patients  - Consider moving patient to room near nurses station  Outcome: Progressing  Goal: Maintain or return to baseline ADL function  Description: INTERVENTIONS:  -  Assess patient's ability to carry out ADLs; assess patient's baseline for ADL function and identify physical deficits which impact ability to perform ADLs (bathing, care of mouth/teeth, toileting, grooming, dressing, etc )  - Assess/evaluate cause of self-care deficits   - Assess range of motion  - Assess patient's mobility; develop plan if impaired  - Assess patient's need for assistive devices and provide as appropriate  - Encourage maximum independence but intervene and supervise when necessary  - Involve family in performance of ADLs  - Assess for home care needs following discharge   - Consider OT consult to assist with ADL evaluation and planning for discharge  - Provide patient education as appropriate  Outcome: Progressing  Goal: Maintains/Returns to pre admission functional level  Description: INTERVENTIONS:  - Perform BMAT or MOVE assessment daily    - Set and communicate daily mobility goal to care team and patient/family/caregiver  - Collaborate with rehabilitation services on mobility goals if consulted  - Perform Range of Motion 3 times a day  - Reposition patient every 3 hours    - Dangle patient 3 times a day  - Stand patient 3 times a day  - Ambulate patient 3 times a day  - Out of bed to chair 3 times a day   - Out of bed for meals 3 times a day  - Out of bed for toileting  - Record patient progress and toleration of activity level   Outcome: Progressing     Problem: DISCHARGE PLANNING  Goal: Discharge to home or other facility with appropriate resources  Description: INTERVENTIONS:  - Identify barriers to discharge w/patient and caregiver  - Arrange for needed discharge resources and transportation as appropriate  - Identify discharge learning needs (meds, wound care, etc )  - Arrange for interpretive services to assist at discharge as needed  - Refer to Case Management Department for coordinating discharge planning if the patient needs post-hospital services based on physician/advanced practitioner order or complex needs related to functional status, cognitive ability, or social support system  Outcome: Progressing     Problem: Knowledge Deficit  Goal: Patient/family/caregiver demonstrates understanding of disease process, treatment plan, medications, and discharge instructions  Description: Complete learning assessment and assess knowledge base    Interventions:  - Provide teaching at level of understanding  - Provide teaching via preferred learning methods  Outcome: Progressing

## 2023-02-28 NOTE — ASSESSMENT & PLAN NOTE
Wt Readings from Last 3 Encounters:   02/28/23 76 5 kg (168 lb 10 4 oz)   01/31/23 77 6 kg (171 lb)   01/24/23 77 6 kg (171 lb)   · Receives most of her care at [facility] -including her cardiologist  · Presented with a complaint of shortness of breath and leg swelling   · Requiring 4 L oxygen nasal cannula on admission --> down to room air today at rest  · Repeated echocardiogram here: EF 54%, paradoxical septal wall motion consistent with conduction abnormality, grade 1 diastolic function, aortic valve sclerosis, mild MR, trace TR  · Home regimen torsemide 40 mg daily  · Started on IV Lasix 80 mg BID --> advanced to Lasix gtt 2/24/2023  · Given one time dose metolazone 5 mg 2/24  · Weight down almost 20 lbs since admission - currently at 168 lbs  · Cardiology following -noted Cr bump - now with need for diuretic holiday   · Losartan and potassium held yesterday

## 2023-02-28 NOTE — ASSESSMENT & PLAN NOTE
· Was recently at [facility] on 2/15/2023 and treated for pneumonia  · Receive IV cefepime as well as oral Omnicef to complete a 5-day antibiotic course  · No further need for antibiotics here  · Repeat chest imaging with PCP outpatient

## 2023-02-28 NOTE — ASSESSMENT & PLAN NOTE
Lab Results   Component Value Date    HGBA1C 6 7 (H) 02/15/2023     Recent Labs     02/27/23  1048 02/27/23  1827 02/27/23 2024 02/28/23  0854   POCGLU 226* 167* 138 150*   · Prehospital patient uses an insulin pump  · Pt reports pump is out of insulin   · Discontinued pump and placed on sliding scale and lantus 5 units qAm   · Diabetic diet

## 2023-02-28 NOTE — CASE MANAGEMENT
Case Management Discharge Planning Note    Patient name Tristen Velez  Location [facility] 4 /E4 MS 66-* MRN [id number]  : 1955 Date 2023       Current Admission Date: 2023  Current Admission Diagnosis:Acute on chronic diastolic (congestive) heart failure [facility])   Patient Active Problem List    Diagnosis Date Noted   • Abnormal CT of the chest 2023   • Acute respiratory failure ([facility] [address] ) 2023   • Anemia 2023   • Acute on chronic diastolic (congestive) heart failure ([facility] [address] ) 2023   • COVID-19 virus infection 2022   • Esophageal dysphagia 2022   • Esophageal stricture 01/10/2022   • Spinal stenosis of lumbar region with neurogenic claudication    • Epigastric pain 2021   • Acute urinary retention 2021   • JULIANNE (acute kidney injury) ([facility] ) 2021   • PONV (postoperative nausea and vomiting) 2021   • Class 2 obesity in adult 2021   • Pacemaker 2021   • Medical marijuana use 2021   • Achalasia 2021   • Uncontrolled type 2 diabetes mellitus with hyperglycemia ([facility] ) 2018   • Hypersomnia 10/19/2018   • BBB (bundle branch block) 2018   • Closed fracture of thoracic vertebra ([facility] ) 2018   • Deep venous thrombosis ([address] [facility] ) 2018   • Localized, primary osteoarthritis 2018   • Chronic bilateral low back pain without sciatica 2018   • Lumbosacral spondylosis without myelopathy 2018   • Osteoarthritis of knee 2018   • Age related osteoporosis 2018   • Chronic scapular pain 2018   • Microcytic anemia 2018   • Chronic diastolic CHF (congestive heart failure) ([facility] [address] ) 2018   • Coronary artery disease involving native coronary artery 2018   • History of TIA (transient ischemic attack) 2018   • Encounter for fitting and adjustment of spinal cord stimulator 2018   • S/P insertion of spinal cord stimulator 2018   • Lumbar radiculopathy 2018   • Thoracic radiculopathy 04/16/2018   • Complex regional pain syndrome type 1 of left lower extremity 03/09/2018   • Type 2 diabetes mellitus without complication, with long-term current use of insulin ([facility] [address] ) 01/11/2018   • Chronic bilateral thoracic back pain 11/17/2017   • Chronic myofascial pain 11/17/2017   • Vitamin D deficiency 11/03/2017   • Post-menopausal 10/11/2017   • Primary hyperparathyroidism ([facility] [address] ) 10/11/2017   • Outlet dysfunction constipation 09/08/2017   • Chronic pain of left knee 07/25/2017   • Gait disorder 07/25/2017   • Polyneuropathy associated with underlying disease ([facility] ) 07/25/2017   • Essential hypertension 06/29/2017   • Mixed hyperlipidemia 06/29/2017   • Primary hypothyroidism 06/29/2017   • Chronic pain of right knee 06/06/2017   • Pain syndrome, chronic 02/21/2017   • Moderate persistent asthma with acute exacerbation 01/06/2017   • Morbid obesity with BMI of 40 0-44 9, adult ([facility] ) 09/29/2016   • Obstructive sleep apnea 08/16/2016   • Anemia of chronic disease 07/27/2016   • Anxiety and depression 07/27/2016   • Lymphadenopathy 06/27/2016   • Normocytic anemia 10/04/2015   • Insomnia due to medical condition 09/24/2015   • Type 2 diabetes mellitus with microalbuminuria, with long-term current use of insulin ([facility] ) 07/13/2015   • Gastroparesis 05/28/2015   • Moderate persistent asthma without complication 36/03/8381   • Gastroesophageal reflux disease without esophagitis 02/18/2015   • Mixed urge and stress incontinence 11/14/2014   • Other B-complex deficiencies 05/31/2013   • Insulin pump status 02/27/2012   • Hyperlipidemia associated with type 2 diabetes mellitus ([facility] ) 04/04/2011   • Sarcoidosis 02/09/2009   • Irritable bowel syndrome with constipation 02/09/2009      LOS (days): 7  Geometric Mean LOS (GMLOS) (days): 3 90  Days to GMLOS:-3 1     OBJECTIVE:  Risk of Unplanned Readmission Score: 23 14         Current admission status: Inpatient   Preferred Pharmacy:   [facility]/pharmacy #[id number] [address]  Phone: [phone] Fax: [address] [facility]  [address] [facility] [address]  Phone: [phone] Fax: [facility] [address]   [facility] [address]  Phone: [phone] Fax: [place] , [facility] [place] [address] [facility] [address] [address] [facility] [address] [address]  Phone: [phone] Fax: [phone] [facility], [address]  [facility]  Phone: [phone] Fax: [phone]    Primary Care Provider: Zia Ahumada DO    Primary Insurance: MEDICARE  Secondary Insurance: AARP    DISCHARGE DETAILS:    Additional Comments: Patient reviewed during care coordination rounds with JULIO Parker who informed that pt has elevated creatinine and Cardiology is recommending a diuretic holiday  Pt will require an additional 48-72 hours inpatient  Pt now on room air, pt has declined [facility] for CHF this admission  CM department to remain available for additional discharge concerns or needs

## 2023-02-28 NOTE — PHYSICAL THERAPY NOTE
Physical Therapy Evaluation    Performed at least 2 patient identifiers during session:  Patient Active Problem List   Diagnosis    Anemia of chronic disease    Anxiety and depression    Other B-complex deficiencies    Chronic bilateral thoracic back pain    Chronic myofascial pain    Chronic pain of left knee    Chronic pain of right knee    Essential hypertension    Gait disorder    Gastroparesis    Gastroesophageal reflux disease without esophagitis    Sarcoidosis    Insomnia due to medical condition    Insulin pump status    Pain syndrome, chronic    Type 2 diabetes mellitus with microalbuminuria, with long-term current use of insulin ([facility])    Irritable bowel syndrome with constipation    Lymphadenopathy    Mixed hyperlipidemia    Mixed urge and stress incontinence    Moderate persistent asthma with acute exacerbation    Morbid obesity with BMI of 40 0-44 9, adult ([facility])    Normocytic anemia    Obstructive sleep apnea    Outlet dysfunction constipation    Polyneuropathy associated with underlying disease [facility] )    Post-menopausal    Primary hyperparathyroidism ([facility] )    Type 2 diabetes mellitus without complication, with long-term current use of insulin ([facility])    Vitamin D deficiency    Complex regional pain syndrome type 1 of left lower extremity    Primary hypothyroidism    Encounter for fitting and adjustment of spinal cord stimulator    S/P insertion of spinal cord stimulator    Chronic diastolic CHF (congestive heart failure) ([facility] )    Coronary artery disease involving native coronary artery    Closed fracture of thoracic vertebra ([facility])    Deep venous thrombosis ([facility])    Localized, primary osteoarthritis    Chronic bilateral low back pain without sciatica    Lumbar radiculopathy    Lumbosacral spondylosis without myelopathy    Osteoarthritis of knee    Age related osteoporosis    Thoracic radiculopathy    History of TIA (transient ischemic attack)    Microcytic anemia    Chronic scapular pain    Moderate persistent asthma without complication    Hypersomnia    BBB (bundle branch block)    Hyperlipidemia associated with type 2 diabetes mellitus ([facility] )    Uncontrolled type 2 diabetes mellitus with hyperglycemia (HCC)    Achalasia    PONV (postoperative nausea and vomiting)    Class 2 obesity in adult    Pacemaker    Medical marijuana use    Acute urinary retention    JULIANNE (acute kidney injury) ([facility] )    Epigastric pain    Spinal stenosis of lumbar region with neurogenic claudication    Esophageal stricture    Esophageal dysphagia    COVID-19 virus infection    Acute on chronic diastolic (congestive) heart failure (HCC)    Acute respiratory failure (HCC)    Anemia    Abnormal CT of the chest       Past Medical History:   Diagnosis Date    Anxiety     Asthma     controlled    Back complaints     Cancer ([facility] )     nose    Cellulitis of left lower leg     "not now"    CHF (congestive heart failure) ([facility] ) 02/2021    Chronic bilateral thoracic back pain     Chronic kidney disease     sees nephrologist regularly" stage 3"    Chronic myofascial pain     Chronic pain of both lower extremities     Chronic pain of left knee     "both knees"    Chronic pain syndrome     bilat legs and knees/neuropathy pain    COVID 12/2021    CPAP (continuous positive airway pressure) dependence     no currently uses    Depression     Diabetes mellitus ([facility] )     insulin pump    Disease of thyroid gland     Does use hearing aid     bilat and will wear DOS    DVT (deep venous thrombosis) ([facility] ) 2013    left leg    Gait disorder     Gastroparesis     GERD (gastroesophageal reflux disease)     History of angina     History of MRSA infection     History of transfusion     Many years ago    Hyperlipidemia     Hypertension     Hypoglycemic reaction     "occas low blood sugar"    Insulin pump in place     pt reports saw endocrinologist 4/28 and will bring copy of instructions DOS    Irritable bowel syndrome     Kidney disease     Pacemaker 2019 Polyneuropathy associated with underlying disease ([facility] )     PONV (postoperative nausea and vomiting)     Risk for falls     S/P insertion of spinal cord stimulator 2018    Sarcoidosis     Shortness of breath     "exertion and not new"    Sleep apnea     TIA (transient ischemic attack)     Use of cane as ambulatory aid     Uses walker     Wears dentures     permanent upper/and some missing teeth/partial lower       Past Surgical History:   Procedure Laterality Date    ABDOMINAL ADHESION SURGERY N/A 2021    Procedure: laparoscopic LYSIS ADHESIONS;  Surgeon: Enriqueta Perry MD;  [facility];  Service: Thoracic    BREAST SURGERY      BREAST SURGERY      reduction    CARDIAC PACEMAKER PLACEMENT      pacemaker     SECTION      COLONOSCOPY      DILATION AND CURETTAGE OF UTERUS      "D&E"    HERNIA REPAIR      HYSTERECTOMY      JOINT REPLACEMENT Left     LTKR    NECK SURGERY      fused 4 discs with 4 screws implanted    NISSEN FUNDOPLICATION LAPAROSCOPIC WITH ROBOTICS N/A 2021    Procedure: ROBOTIC [address] ;  Surgeon: Enriqueta Perry MD;  [facility];  Service: Thoracic    MN ESOPHAGOGASTRODUODENOSCOPY TRANSORAL DIAGNOSTIC N/A 2021    Procedure: ESOPHAGOGASTRODUODENOSCOPY (EGD); Surgeon: Enriqueta Perry MD;  [facility];  Service: Thoracic    MN ESOPHAGOGASTRODUODENOSCOPY TRANSORAL DIAGNOSTIC N/A 2022    Procedure: ESOPHAGOGASTRODUODENOSCOPY (EGD),;  Surgeon: Enriqueta Perry MD;  [facility];  Service: Thoracic    MN ESOPHAGOGASTRODUODENOSCOPY TRANSORAL DIAGNOSTIC N/A 2022    Procedure: ESOPHAGOGASTRODUODENOSCOPY (EGD);   Surgeon: Enriqueta Perry MD;  [facility];  Service: Thoracic    MN ESOPHAGOGASTRODUODENOSCOPY TRANSORAL DIAGNOSTIC N/A 2022    Procedure: ESOPHAGOGASTRODUODENOSCOPY (EGD); PYLORIC DILATION; GE JUNCTION DILATION;  Surgeon: Enriqueta Perry MD;  [facility];  Service: Thoracic    DE ESOPHAGOGASTRODUODENOSCOPY TRANSORAL DIAGNOSTIC N/A 11/29/2022    Procedure: ESOPHAGOGASTRODUODENOSCOPY (EGD); Surgeon: Nayely Brownlee MD;  [facility];  Service: Thoracic    DE ESOPHAGOGASTRODUODENOSCOPY TRANSORAL DIAGNOSTIC N/A 1/24/2023    Procedure: ESOPHAGOGASTRODUODENOSCOPY (EGD); Surgeon: Reginaldo Gutierrez MD;  [facility];  Service: Thoracic    DE ESOPHAGOSCOPY FLEX BALLOON DILAT <30 MM DIAM N/A 01/12/2022    Procedure: DILATATION ESOPHAGEAL;  Surgeon: Reginaldo Gutierrez MD;  [facility];  Service: Thoracic    DE ESOPHAGOSCOPY FLEX BALLOON DILAT <30 MM DIAM N/A 02/16/2022    Procedure: DILATATION ESOPHAGEAL;  Surgeon: Reginaldo Gutierrez MD;  [facility];  Service: Thoracic    DE ESOPHAGOSCOPY FLEX BALLOON DILAT <30 MM DIAM N/A 11/29/2022    Procedure: DILATATION ESOPHAGEAL esophageal and pyloric dilation;  Surgeon: Nayely Brownlee MD;  [facility];  Service: Thoracic    DE ESOPHAGOSCOPY FLEX BALLOON DILAT <30 MM DIAM N/A 1/24/2023    Procedure: esophageal dilation dilated up to 47  with pylorus dilation dilated up to 18;  Surgeon: Reginaldo Gutierrez MD;  [facility];  Service: Thoracic    DE RIBEIRO IMPLTJ NSTIM ELTRDS PLATE/PADDLE EDRL Left 04/23/2018    Procedure:  Insertion of thoracic spinal cord stimulator electrode via laminotomy and placement of left buttock IPG;  Surgeon: Christine Singh MD;  [facility];  Service: Neurosurgery    REPLACEMENT TOTAL KNEE      ROTATOR CUFF REPAIR      SPINAL STIMULATOR PLACEMENT Left 10/03/2018    Procedure: BUTTOCK RE-OPENING INCISION FOR REPOSITIONING OF IMPLANTABLE PULSE GENERATOR;  Surgeon: Christine Singh MD;  [facility];  Service: Neurosurgery    TONSILLECTOMY      UPPER GASTROINTESTINAL ENDOSCOPY      WISDOM TOOTH EXTRACTION        02/28/23 1059   PT Last Visit   PT Visit Date 02/28/23   Note Type   Note type Evaluation   Pain Assessment   Pain Assessment Tool 0-10   Pain Score 10 - Worst Possible Pain   Hospital Pain Intervention(s) Ambulation/increased activity; Emotional support   Restrictions/Precautions   Weight Bearing Precautions Per Order No   Other Precautions Bed Alarm; Chair Alarm   Home Living   Type of [address] [facility] Two level  (1+1+1, only railing on first step)   Bathroom Equipment Grab bars in shower; [address]; Wheelchair-manual;Cane  (rollator and rw)   Additional Comments pt indep with mobility, , drives  no falls  Prior Function   Level of [place] Independent with ADLs; Independent with functional mobility; Independent with IADLS   Lives With Spouse   Receives Help From Family   IADLs Independent with driving; Independent with meal prep; Independent with medication management   Comments pt lives with spouse, has first floor set-up  indep , will use assistive device as needed  General   Family/Caregiver Present No   Cognition   Overall Cognitive Status WFL   Orientation Level Oriented X4   Subjective   Subjective feeing better but has severe pain, is supposed to get surgery in future   RUE Assessment   RUE Assessment WFL   LUE Assessment   LUE Assessment WFL   RLE Assessment   RLE Assessment X  (str 4/5)   LLE Assessment   LLE Assessment X  (str 4+/5)   Coordination   Movements are Fluid and Coordinated 1   Sensation WFL   Bed Mobility   Rolling R 7  Independent   Rolling L 7  Independent   Supine to Sit 7  Independent   Transfers   Sit to Stand 5  Supervision   Stand to Sit 5  Supervision   Ambulation/Elevation   Gait pattern Decreased foot clearance; Excessively slow   Gait Assistance 5  Supervision   Assistive Device Rolling walker   Distance 300'   Balance   Static Sitting Good   Dynamic Sitting Fair +   Static Standing Fair +   Dynamic Standing Fair   Ambulatory Fair   Endurance Deficit   Endurance Deficit No   Activity Tolerance   Activity Tolerance Patient tolerated treatment well Medical Staff Made Aware OT   Nurse Made Aware yes   Assessment   Assessment pt admitted with shortness of breath  dx with acute on chronic chf, anemia, respiratory failure  pt referrted to PT   pt was living with spouse in 2 story home, staying on first floor  pt is limited  activity tolerance due to chronic debility and pain  pt was able to mobilize indep using rw  pt able to amb 300', no enamorado noted and no symptoms reported  pt has chronic deficits in pulmonary function, balance, joint integrity, pain cotrol but had intact coordination and sensation  pt does not need skilled IPPT and will be able to return home, no rehab needs  Barriers to Discharge None   Goals   Patient Goals go home   Recommendation   PT Discharge Recommendation No rehabilitation needs   AM-PAC Basic Mobility Inpatient   Turning in Flat Bed Without Bedrails 4   Lying on Back to Sitting on Edge of Flat Bed Without Bedrails 4   Moving Bed to Chair 4   Standing Up From Chair Using Arms 4   Walk in Room 4   Climb 3-5 Stairs With Railing 3   Basic Mobility Inpatient Raw Score 23   Basic Mobility Standardized Score 50 88   Highest Level Of Mobility   -HL Goal 7: Walk 25 feet or more   JH-HLM Achieved 7: Walk 25 feet or more           An AM-PAC Basic Mobility standardized score less than 42 9 suggests the patient may benefit from discharge to post-acute rehab services      History: co - morbidities, fall risk, use of assistive device,   Exam: impairments in locomotion, musculoskeletal, balance,, joint integrity,   Clinical: unstable/unpredictable- severe pain, fall risk  Complexity:high  Roise Awe, PT

## 2023-02-28 NOTE — ASSESSMENT & PLAN NOTE
· With hypoxia, lkely secondary to acute CHF exacerbation as well as recent pneumonia  · Requiring 4 L oxygen nasal cannula on admission  · This has been tapered and she is currently on room air  · Needs home O2 prior to discharge

## 2023-02-28 NOTE — ASSESSMENT & PLAN NOTE
Results from last 7 days   Lab Units 02/28/23  0552 02/27/23  0527 02/26/23  0544 02/25/23  0539   CREATININE mg/dL 1 95* 1 69* 1 37* 1 17   · JULIANNE on CKD stage 3  · Noted rising creatinine after IV diuresis and Lasix drip  · Baseline appears to be around 0 9-1 1  · All diuretics are currently held  · Losartan also currently held  · Switched DVT prophylaxis from Lovenox to heparin  · Consider possible overdiuresis?   · Recheck Cr in am

## 2023-02-28 NOTE — OCCUPATIONAL THERAPY NOTE
Occupational Therapy Evaluation     Patient Name: Libzeth Carmichael  MHFSS'T Date: 2/28/2023  Problem List  Principal Problem:    Acute on chronic diastolic (congestive) heart failure (HCC)  Active Problems:    Essential hypertension    Type 2 diabetes mellitus with microalbuminuria, with long-term current use of insulin (HCC)    JULIANNE (acute kidney injury) ([facility] [address] )    Acute respiratory failure (HCC)    Anemia    Abnormal CT of the chest    Past Medical History  Past Medical History:   Diagnosis Date    Anxiety     Asthma     controlled    Back complaints     Cancer ([facility] [address] )     nose    Cellulitis of left lower leg     "not now"    CHF (congestive heart failure) ([facility] [address] ) 02/2021    Chronic bilateral thoracic back pain     Chronic kidney disease     sees nephrologist regularly" stage 3"    Chronic myofascial pain     Chronic pain of both lower extremities     Chronic pain of left knee     "both knees"    Chronic pain syndrome     bilat legs and knees/neuropathy pain    COVID 12/2021    CPAP (continuous positive airway pressure) dependence     no currently uses    Depression     Diabetes mellitus ([facility] [address] )     insulin pump    Disease of thyroid gland     Does use hearing aid     bilat and will wear DOS    DVT (deep venous thrombosis) ([facility] [address] ) 2013    left leg    Gait disorder     Gastroparesis     GERD (gastroesophageal reflux disease)     History of angina     History of MRSA infection     History of transfusion     Many years ago    Hyperlipidemia     Hypertension     Hypoglycemic reaction     "occas low blood sugar"    Insulin pump in place     pt reports saw endocrinologist 4/28 and will bring copy of instructions DOS    Irritable bowel syndrome     Kidney disease     Pacemaker 2019    Polyneuropathy associated with underlying disease ([facility] [address] )     PONV (postoperative nausea and vomiting)     Risk for falls     S/P insertion of spinal cord stimulator 06/08/2018    Sarcoidosis     Shortness of breath     "exertion and not new"    Sleep apnea     TIA (transient ischemic attack)     Use of cane as ambulatory aid     Uses walker     Wears dentures     permanent upper/and some missing teeth/partial lower     Past Surgical History  Past Surgical History:   Procedure Laterality Date    ABDOMINAL ADHESION SURGERY N/A 2021    Procedure: laparoscopic LYSIS ADHESIONS;  Surgeon: Sirena Howell MD;  [facility];  Service: Thoracic    BREAST SURGERY      BREAST SURGERY      reduction    CARDIAC PACEMAKER PLACEMENT      pacemaker     SECTION      COLONOSCOPY      DILATION AND CURETTAGE OF UTERUS      "D&E"    HERNIA REPAIR      HYSTERECTOMY      JOINT REPLACEMENT Left     LTKR    NECK SURGERY      fused 4 discs with 4 screws implanted    NISSEN FUNDOPLICATION LAPAROSCOPIC WITH ROBOTICS N/A 2021    Procedure: ROBOTIC [address] ;  Surgeon: Sirena Howell MD;  [facility];  Service: Thoracic    CT ESOPHAGOGASTRODUODENOSCOPY TRANSORAL DIAGNOSTIC N/A 2021    Procedure: ESOPHAGOGASTRODUODENOSCOPY (EGD); Surgeon: Sirena Howell MD;  [facility];  Service: Thoracic    CT ESOPHAGOGASTRODUODENOSCOPY TRANSORAL DIAGNOSTIC N/A 2022    Procedure: ESOPHAGOGASTRODUODENOSCOPY (EGD),;  Surgeon: Sirena Howell MD;  [facility];  Service: Thoracic    CT ESOPHAGOGASTRODUODENOSCOPY TRANSORAL DIAGNOSTIC N/A 2022    Procedure: ESOPHAGOGASTRODUODENOSCOPY (EGD); Surgeon: Sirean Howell MD;  [facility];  Service: Thoracic    CT ESOPHAGOGASTRODUODENOSCOPY TRANSORAL DIAGNOSTIC N/A 2022    Procedure: ESOPHAGOGASTRODUODENOSCOPY (EGD); PYLORIC DILATION; GE JUNCTION DILATION;  Surgeon: Sirena Howell MD;  [facility];  Service: Thoracic    CT ESOPHAGOGASTRODUODENOSCOPY TRANSORAL DIAGNOSTIC N/A 2022    Procedure: ESOPHAGOGASTRODUODENOSCOPY (EGD);   Surgeon: Colletta Gibney, MD;  [facility];  Service: Thoracic    CT ESOPHAGOGASTRODUODENOSCOPY TRANSORAL DIAGNOSTIC N/A 1/24/2023    Procedure: ESOPHAGOGASTRODUODENOSCOPY (EGD); Surgeon: Elder Osorio MD;  [facility];  Service: Thoracic    NY ESOPHAGOSCOPY FLEX BALLOON DILAT <30 MM DIAM N/A 01/12/2022    Procedure: DILATATION ESOPHAGEAL;  Surgeon: Elder Osorio MD;  [facility];  Service: Thoracic    NY ESOPHAGOSCOPY FLEX BALLOON DILAT <30 MM DIAM N/A 02/16/2022    Procedure: DILATATION ESOPHAGEAL;  Surgeon: Elder Osorio MD;  [facility];  Service: Thoracic    NY ESOPHAGOSCOPY FLEX BALLOON DILAT <30 MM DIAM N/A 11/29/2022    Procedure: DILATATION ESOPHAGEAL esophageal and pyloric dilation;  Surgeon: Duncan Lantigua MD;  [facility];  Service: Thoracic    NY ESOPHAGOSCOPY FLEX BALLOON DILAT <30 MM DIAM N/A 1/24/2023    Procedure: esophageal dilation dilated up to 47  with pylorus dilation dilated up to 18;  Surgeon: Elder Osorio MD;  [facility];  Service: Thoracic    NY RIBEIRO IMPLTJ NSTIM ELTRDS PLATE/PADDLE EDRL Left 04/23/2018    Procedure: Insertion of thoracic spinal cord stimulator electrode via laminotomy and placement of left buttock IPG;  Surgeon: Summer Wolfe MD;  [facility];  Service: Neurosurgery    REPLACEMENT TOTAL KNEE      ROTATOR CUFF REPAIR      SPINAL STIMULATOR PLACEMENT Left 10/03/2018    Procedure: BUTTOCK RE-OPENING INCISION FOR REPOSITIONING OF IMPLANTABLE PULSE GENERATOR;  Surgeon: Summer Wolfe MD;  [facility];  Service: Neurosurgery    TONSILLECTOMY      UPPER GASTROINTESTINAL ENDOSCOPY      WISDOM TOOTH EXTRACTION           02/28/23 1037   OT Last Visit   OT Visit Date 02/28/23   Note Type   Note type Evaluation   Additional Comments Pt presents supine in bed, agreeable to OT/PT co-eval   Pain Assessment   Pain Assessment Tool 0-10   Pain Score 10 - Worst Possible Pain   Hospital Pain Intervention(s) Repositioned; Ambulation/increased activity; Emotional support Restrictions/Precautions   Weight Bearing Precautions Per Order No   Other Precautions Fall Risk;Pain   Home Living   Type of [address] Two level;Performs ADLs on one level; Able to live on main level with bedroom/bathroom;Stairs to enter with rails   Bathroom Shower/Tub Tub/shower unit   Bathroom Toilet Standard   Bathroom Equipment Grab bars in shower; Shower chair   Home Equipment Cane;Walker; Other (Comment)  (rollator)   Prior Function   Level of [place] Independent with ADLs; Independent with functional mobility; Needs assistance with IADLS   Lives With Spouse   Receives Help From Family  (spouse - currently home all the time 2* being laid off)   IADLs Independent with driving; Independent with meal prep; Independent with medication management   Falls in the last 6 months 0   Lifestyle   Autonomy Pt lives with spouse, 1st floor s/u  [place] with ADLs/IADLs/functional mobility with RW   Reciprocal Relationships Spouse   Intrinsic Gratification Watching TV   Subjective   Subjective "I'm great"   ADL   Where Assessed Edge of bed   Eating Assistance 7  Independent   Grooming Assistance 6  Modified Independent   UB Bathing Assistance 5  Supervision/Setup   LB Bathing Assistance 5  Supervision/Setup   UB Dressing Assistance 5  Supervision/Setup   LB Dressing Assistance 5  Supervision/Setup   Toileting Assistance  5  Supervision/Setup   Bed Mobility   Rolling L 7  Independent   Supine to Sit 6  Modified independent   Additional items HOB elevated   Transfers   Sit to Stand 5  Supervision   Additional items Armrests; Increased time required;Verbal cues; Other  (rollator)   Stand to Sit 5  Supervision   Additional items Armrests; Increased time required;Verbal cues; Other  (rollator)   Functional Mobility   Functional Mobility 5  Supervision   Additional items   (Rollator)   Balance   Static Sitting Good   Dynamic Sitting Fair +   Static Standing Fair +   Dynamic Standing Fair   Ambulatory Fair   Activity Tolerance   Activity Tolerance Patient tolerated treatment well   Medical Staff Made Aware PT   Nurse Made Aware Yes   RUE Assessment   RUE Assessment WFL   LUE Assessment   LUE Assessment WFL   Hand Function   Gross Motor Coordination Functional   Fine Motor Coordination Functional   Sensation   Light Touch No apparent deficits   Proprioception   Proprioception No apparent deficits   Vision-Basic Assessment   Current Vision Wears glasses all the time   Vision - Complex Assessment   Ocular Range of Motion Intact   Acuity Able to read employee name badge without difficulty   Perception   Inattention/Neglect Appears intact   Cognition   Overall Cognitive Status [facility]   Arousal/Participation Alert; Responsive; Cooperative   Attention Within functional limits   Orientation Level Oriented X4   Memory Within functional limits   Following Commands Follows all commands and directions without difficulty   Assessment   Assessment Patient is a 78y o  year old female seen for OT eval s/p admit to [facility] on 2/21/2023 with weakness, CHF, acute resp failure, anemia, abnormal CT of chest   Comorbidities affecting pt’s functional performance include a significant PMH of: HTN, DM2, JULIANNE, anemia, anxiety/depression, chronic b/l thoracic back pain, pain of b/l knees, gait disorder, GERD, chronic pain, HLD, incontinence, STU, CRPS of LLE, spinal cord stimulator, CHF, CAD, fx of thoracic verebra, DVT, thoracic radiculopathy, chronic scapular pain, DM2, PONV, medical marijuana use, dysphagia, COVID-19  Patient with active OT orders and activity orders for OOB as tolerated  Personal factors affecting pt at time of IE include: difficulty performing ADLs and IADLs, difficulty with functional mobility/transfers  Prior to admission, patient was (I) with ADLs/IADLs, although spouse does ensure medication compliance, per pt, but she sets it up herself via pill box   Upon evaluation, pt close to baseline for ADLs, functioning at S-[place] level; S with 9OK for functional mobility  No skilled IPOT needs at this time thus will d/c  Based on the aforementioned OT evaluation, functional performance deficits, and assessments, pt has been identified as a high complexity evaluation    Co treatment with PT secondary to complex medical condition of pt, possible A of 2 required to achieve and maintain transitional movements, requiring the need of skilled therapeutic intervention of 2 therapists to achieve delivery of services  Goals   Patient Goals to return home   LTG Time Frame 10-14   Plan   Treatment Interventions ADL retraining;Functional transfer training;UE strengthening/ROM; Endurance training;Patient/family training;Equipment evaluation/education;Continued evaluation; Energy conservation; Activityengagement; Compensatory technique education   Goal Expiration Date 03/14/23   OT Treatment Day 0   OT Frequency 3-5x/wk   Recommendation   OT Discharge Recommendation No rehabilitation needs   AM-PAC Daily Activity Inpatient   Lower Body Dressing 3   Bathing 3   Toileting 3   Upper Body Dressing 3   Grooming 3   Eating 4   Daily Activity Raw Score 19   Daily Activity Standardized Score (Calc for Raw Score >=11) 40 22   AM-PAC Applied Cognition Inpatient   Following a Speech/Presentation 4   Understanding Ordinary Conversation 4   Taking Medications 3   Remembering Where Things Are Placed or Put Away 4   Remembering List of 4-5 Errands 4   Taking Care of Complicated Tasks 3   Applied Cognition Raw Score 22   Applied Cognition Standardized Score 47 83     [employer]

## 2023-02-28 NOTE — PROGRESS NOTES
[facility]  Progress Note - Elizabeth Ferrara 1955, 78 y o  female MRN: [id number]  Unit/Bed#: E4 -01 Encounter: [id number]  Primary Care Provider: Puneet Kitchen DO   Date and time admitted to hospital: 2/21/2023 11:27 AM    * Acute on chronic diastolic (congestive) heart failure (HCC)  Assessment & Plan  Wt Readings from Last 3 Encounters:   02/28/23 76 5 kg (168 lb 10 4 oz)   01/31/23 77 6 kg (171 lb)   01/24/23 77 6 kg (171 lb)   · Receives most of her care at [facility] -including her cardiologist  · Presented with a complaint of shortness of breath and leg swelling   · Requiring 4 L oxygen nasal cannula on admission --> down to room air today at rest  · Repeated echocardiogram here: EF 54%, paradoxical septal wall motion consistent with conduction abnormality, grade 1 diastolic function, aortic valve sclerosis, mild MR, trace TR  · Home regimen torsemide 40 mg daily  · Started on IV Lasix 80 mg BID --> advanced to Lasix gtt 2/24/2023  · Given one time dose metolazone 5 mg 2/24  · Weight down almost 20 lbs since admission - currently at 168 lbs  · Cardiology following -noted Cr bump - now with need for diuretic holiday   · Losartan and potassium held yesterday    JULIANNE (acute kidney injury) [facility])  Assessment & Plan  Results from last 7 days   Lab Units 02/28/23  0552 02/27/23  0527 02/26/23  0544 02/25/23  0539   CREATININE mg/dL 1 95* 1 69* 1 37* 1 17   · JULIANNE on CKD stage 3  · Noted rising creatinine after IV diuresis and Lasix drip  · Baseline appears to be around 0 9-1 1  · All diuretics are currently held  · Losartan also currently held  · Switched DVT prophylaxis from Lovenox to heparin  · Consider possible overdiuresis?   · Recheck Cr in am    Anemia  Assessment & Plan  Results from last 7 days   Lab Units 02/27/23  0527 02/25/23  0539 02/24/23  0531 02/23/23  0437   HEMOGLOBIN g/dL 9 4* 8 4* 7 7* 7 5*   · History of anemia of chronic disease and iron deficiency · Baseline appearing in the 12's  · More recently hemoglobins have been in the 8's/9s according to care everywhere  · Denies bleeding anywhere, initial occult stool negative   · Follows with Hematology at [facility]   · Received [facility] outpatient   · Care everywhere-recent iron panel from February 2023: Iron 32, transferrin 139, iron saturation 17, TIBC 188, ferritin 341  · Given IV venofer x 3 days   · Hgb now appearing stable   · She needs re-eval outpatient with consideration for EGD and colonoscopy     Acute respiratory failure ([address] )  Assessment & Plan  · With hypoxia, lkely secondary to acute CHF exacerbation as well as recent pneumonia  · Requiring 4 L oxygen nasal cannula on admission  · This has been tapered and she is currently on room air  · Needs home O2 prior to discharge     History of pneumonia-resolved as of 2/24/2023  Assessment & Plan  · Was recently at [facility] on 2/15/2023 and treated for pneumonia  · Receive IV cefepime as well as oral Omnicef to complete a 5-day antibiotic course  · No further need for antibiotics here  · Repeat chest imaging with PCP outpatient     Abnormal CT of the chest  Assessment & Plan  · Enlarged subcarinal node, new since the thoracic spine CT from March 2021  This could be reactive, but recommend follow-up with a chest CT with no contrast in 3 months    · Incidental note completed    Type 2 diabetes mellitus with microalbuminuria, with long-term current use of insulin [facility])  Assessment & Plan  Lab Results   Component Value Date    HGBA1C 6 7 (H) 02/15/2023     Recent Labs     02/27/23  1048 02/27/23  1827 02/27/23 2024 02/28/23  0854   POCGLU 226* 167* 138 150*   · Prehospital patient uses an insulin pump  · Pt reports pump is out of insulin   · Discontinued pump and placed on sliding scale and lantus 5 units qAm   · Diabetic diet    Essential hypertension  Assessment & Plan  · Home regimen losartan 50 mg daily, Coreg 3 125 mg twice daily  · Losartan held in the setting of rising Cr      VTE Pharmacologic Prophylaxis:   Pharmacologic: Heparin  Mechanical VTE Prophylaxis in Place: Yes    Discharge Plan: With need for continued inpatient stay for JULIANNE    Discussions with Specialists or Other Care Team Provider: Nursing    Education and Discussions with Family / Patient: Patient,  via telephone    Time Spent for Care: 45 minutes  More than 50% of total time spent on counseling and coordination of care as described above  Current Length of Stay: 7 day(s)  Current Patient Status: Inpatient   Code Status: Level 1 - Full Code    Subjective:   Resting in bed  Patient reports she had an episode of vomiting this morning  It was green in color  She ate salad last night and feels it came back up this morning  She otherwise denies any current nausea, abdominal pain  She is very worried about her kideny function  She reports that her father had to have dialysis and that she never wants to go through it herself  Updated  via telephone  Objective:     Vitals:   Temp (24hrs), Av 5 °F (36 4 °C), Min:96 5 °F (35 8 °C), Max:97 9 °F (36 6 °C)    Temp:  [96 5 °F (35 8 °C)-97 9 °F (36 6 °C)] 97 9 °F (36 6 °C)  HR:  [58-68] 61  Resp:  [18] 18  BP: (117-125)/(58-71) 120/59  SpO2:  [93 %-97 %] 97 %  Body mass index is 30 85 kg/m²  Input and Output Summary (last 24 hours):     No intake or output data in the 24 hours ending 23 1000    Physical Exam:     Physical Exam  Vitals and nursing note reviewed  Constitutional:       General: She is not in acute distress  Appearance: Normal appearance  She is obese  She is not ill-appearing, toxic-appearing or diaphoretic  HENT:      Head: Normocephalic and atraumatic  Eyes:      General: No scleral icterus  Cardiovascular:      Rate and Rhythm: Normal rate and regular rhythm  Pulmonary:      Effort: Pulmonary effort is normal  No respiratory distress  Breath sounds: Normal breath sounds  No stridor  No wheezing or rhonchi  Comments: On room air  Abdominal:      General: Bowel sounds are normal  There is no distension  Palpations: Abdomen is soft  There is no mass  Hernia: No hernia is present  Musculoskeletal:         General: No swelling  Cervical back: Neck supple  Skin:     General: Skin is warm and dry  Neurological:      Mental Status: She is alert and oriented to person, place, and time  Mental status is at baseline  Psychiatric:         Mood and Affect: Mood normal          Behavior: Behavior normal          Additional Data:     Labs:    Results from last 7 days   Lab Units 02/27/23  0527 02/21/23  2111 02/21/23  1225   WBC Thousand/uL 5 13   < > 6 46   HEMOGLOBIN g/dL 9 4*   < > 8 0*   HEMATOCRIT % 29 6*   < > 25 5*   PLATELETS Thousands/uL 176   < > 169   NEUTROS PCT %  --   --  77*   LYMPHS PCT %  --   --  10*   MONOS PCT %  --   --  9   EOS PCT %  --   --  1    < > = values in this interval not displayed  Results from last 7 days   Lab Units 02/28/23  0552 02/22/23  0554 02/21/23  1225   POTASSIUM mmol/L 5 2   < > 4 2   CHLORIDE mmol/L 99   < > 110*   CO2 mmol/L 30   < > 22   BUN mg/dL 51*   < > 26*   CREATININE mg/dL 1 95*   < > 0 96   CALCIUM mg/dL 8 4   < > 8 5   ALK PHOS U/L  --   --  192*   ALT U/L  --   --  67*   AST U/L  --   --  38    < > = values in this interval not displayed  Results from last 7 days   Lab Units 02/21/23  1225   INR  1 41*       * I Have Reviewed All Lab Data Listed Above  * Additional Pertinent Lab Tests Reviewed: [address] Admission Reviewed    Imaging:    Imaging Reports Reviewed Today Include:   Imaging Personally Reviewed by Myself Includes:      Recent Cultures (last 7 days):     Results from last 7 days   Lab Units 02/21/23  1330 02/21/23  1323 02/21/23  1225   BLOOD CULTURE   --  No Growth After 5 Days  No Growth After 5 Days     URINE CULTURE  <10,000 cfu/ml Gram Positive Cocci*  --   --        Last 24 Hours Medication List:   Current Facility-Administered Medications   Medication Dose Route Frequency Provider Last Rate   • acetaminophen  650 mg Oral Q6H PRN Brandon Cruz PA-C     • albuterol  2 5 mg Nebulization Q6H PRN Timothy Billings Prechtel, DO     • amitriptyline  10 mg Oral HS Fred S Prechtel, DO     • aspirin  81 mg Oral QAM Fred S Prechtel, DO     • atorvastatin  80 mg Oral HS Fred S Prechtel, DO     • benzonatate  100 mg Oral TID PRN Latrell Nicholas PA-C     • bisacodyl  10 mg Rectal Daily Fred S Prechtel, DO     • bisacodyl  10 mg Rectal Daily PRN Sandro Boyd PA-C     • carvedilol  3 125 mg Oral BID With Meals Fred S Prechtel, DO     • dicyclomine  10 mg Oral 4x Daily PRN Timothy Billings Prechtel, DO     • docusate sodium  100 mg Oral Daily Fred S Prechtel, DO     • famotidine  20 mg Oral BID Fred S Prechtel, DO     • gabapentin  100 mg Oral TID Timothy Billings Prechtel, DO     • heparin (porcine)  5,000 Units Subcutaneous Q8H [address] Tomeka Parker PA-C     • insulin glargine  5 Units Subcutaneous QAM Giana Bennett PA-C     • insulin lispro  1-6 Units Subcutaneous TID AC Tomeka Parker PA-C     • insulin lispro  1-6 Units Subcutaneous HS Tomeka Parker PA-C     • levothyroxine  100 mcg Oral Early Morning Fred S Prechtel, DO     • melatonin  6 mg Oral HS Noemy Shay PA-C     • modafinil  100 mg Oral Daily Fred S Prechtel, DO     • ondansetron  4 mg Intravenous Q6H PRN Noemy Shay PA-C     • oxybutynin  10 mg Oral Daily Fred S Prechtel, DO     • pantoprazole  40 mg Oral Early Morning Fred S Prechtel, DO     • polyethylene glycol  17 g Oral Daily Fred S Prechtel, DO     • pramipexole  1 mg Oral BID Fred S Prechtel, DO     • senna  1 tablet Oral HS Giana Bennett PA-C     • sertraline  100 mg Oral HS Fred S Prechtel, DO     • traMADol  50 mg Oral Q6H PRN Latrell Nicholas PA-C          Today, Patient Was Seen By: Latrell Nicholas PA-C    ** Please Note: This note has been constructed using a voice recognition system   **

## 2023-03-01 VITALS
WEIGHT: 169.31 LBS | HEART RATE: 61 BPM | DIASTOLIC BLOOD PRESSURE: 76 MMHG | TEMPERATURE: 96.8 F | BODY MASS INDEX: 31.16 KG/M2 | SYSTOLIC BLOOD PRESSURE: 138 MMHG | RESPIRATION RATE: 16 BRPM | HEIGHT: 62 IN | OXYGEN SATURATION: 96 %

## 2023-03-01 PROBLEM — R33.9 URINARY RETENTION: Status: ACTIVE | Noted: 2021-05-08

## 2023-03-01 PROBLEM — J96.00 ACUTE RESPIRATORY FAILURE (HCC): Status: RESOLVED | Noted: 2023-02-22 | Resolved: 2023-03-01

## 2023-03-01 PROBLEM — N32.81 OAB (OVERACTIVE BLADDER): Status: ACTIVE | Noted: 2017-09-08

## 2023-03-01 PROBLEM — N17.9 AKI (ACUTE KIDNEY INJURY) (HCC): Status: RESOLVED | Noted: 2021-05-08 | Resolved: 2023-03-01

## 2023-03-01 LAB
ANION GAP SERPL CALCULATED.3IONS-SCNC: 6 MMOL/L (ref 4–13)
BUN SERPL-MCNC: 45 MG/DL (ref 5–25)
CALCIUM SERPL-MCNC: 8.5 MG/DL (ref 8.4–10.2)
CHLORIDE SERPL-SCNC: 102 MMOL/L (ref 96–108)
CO2 SERPL-SCNC: 30 MMOL/L (ref 21–32)
CREAT SERPL-MCNC: 1.42 MG/DL (ref 0.6–1.3)
GFR SERPL CREATININE-BSD FRML MDRD: 38 ML/MIN/1.73SQ M
GLUCOSE SERPL-MCNC: 115 MG/DL (ref 65–140)
GLUCOSE SERPL-MCNC: 116 MG/DL (ref 65–140)
GLUCOSE SERPL-MCNC: 208 MG/DL (ref 65–140)
POTASSIUM SERPL-SCNC: 4.9 MMOL/L (ref 3.5–5.3)
SODIUM SERPL-SCNC: 138 MMOL/L (ref 135–147)

## 2023-03-01 RX ORDER — TORSEMIDE 20 MG/1
40 TABLET ORAL DAILY
Status: DISCONTINUED | OUTPATIENT
Start: 2023-03-02 | End: 2023-03-01 | Stop reason: HOSPADM

## 2023-03-01 RX ORDER — TERAZOSIN 1 MG/1
1 CAPSULE ORAL
Qty: 30 CAPSULE | Refills: 0 | Status: SHIPPED | OUTPATIENT
Start: 2023-03-01

## 2023-03-01 RX ORDER — TERAZOSIN 1 MG/1
1 CAPSULE ORAL
Status: DISCONTINUED | OUTPATIENT
Start: 2023-03-01 | End: 2023-03-01 | Stop reason: HOSPADM

## 2023-03-01 RX ORDER — LOSARTAN POTASSIUM 50 MG/1
50 TABLET ORAL
Refills: 0
Start: 2023-03-06

## 2023-03-01 RX ORDER — TORSEMIDE 20 MG/1
40 TABLET ORAL DAILY
Qty: 60 TABLET | Refills: 0 | Status: SHIPPED | OUTPATIENT
Start: 2023-03-01

## 2023-03-01 RX ADMIN — INSULIN LISPRO 2 UNITS: 100 INJECTION, SOLUTION INTRAVENOUS; SUBCUTANEOUS at 12:05

## 2023-03-01 RX ADMIN — PRAMIPEXOLE DIHYDROCHLORIDE 1 MG: 1 TABLET ORAL at 08:47

## 2023-03-01 RX ADMIN — LEVOTHYROXINE SODIUM 100 MCG: 100 TABLET ORAL at 05:23

## 2023-03-01 RX ADMIN — PANTOPRAZOLE SODIUM 40 MG: 40 TABLET, DELAYED RELEASE ORAL at 05:23

## 2023-03-01 RX ADMIN — ASPIRIN 81 MG: 81 TABLET, COATED ORAL at 08:47

## 2023-03-01 RX ADMIN — CARVEDILOL 3.12 MG: 3.12 TABLET, FILM COATED ORAL at 08:50

## 2023-03-01 RX ADMIN — OXYBUTYNIN CHLORIDE 10 MG: 10 TABLET, EXTENDED RELEASE ORAL at 08:47

## 2023-03-01 RX ADMIN — TRAMADOL HYDROCHLORIDE 50 MG: 50 TABLET, COATED ORAL at 08:47

## 2023-03-01 RX ADMIN — FAMOTIDINE 20 MG: 20 TABLET ORAL at 08:47

## 2023-03-01 RX ADMIN — INSULIN GLARGINE 5 UNITS: 100 INJECTION, SOLUTION SUBCUTANEOUS at 08:47

## 2023-03-01 RX ADMIN — HEPARIN SODIUM 5000 UNITS: 5000 INJECTION INTRAVENOUS; SUBCUTANEOUS at 05:23

## 2023-03-01 RX ADMIN — HEPARIN SODIUM 5000 UNITS: 5000 INJECTION INTRAVENOUS; SUBCUTANEOUS at 13:23

## 2023-03-01 RX ADMIN — TERAZOSIN HYDROCHLORIDE 1 MG: 1 CAPSULE ORAL at 13:49

## 2023-03-01 RX ADMIN — MODAFINIL 100 MG: 100 TABLET ORAL at 08:47

## 2023-03-01 RX ADMIN — GABAPENTIN 100 MG: 100 CAPSULE ORAL at 08:47

## 2023-03-01 RX ADMIN — DOCUSATE SODIUM 100 MG: 100 CAPSULE, LIQUID FILLED ORAL at 08:47

## 2023-03-01 RX ADMIN — BISACODYL 10 MG: 10 SUPPOSITORY RECTAL at 08:47

## 2023-03-01 NOTE — ASSESSMENT & PLAN NOTE
Lab Results   Component Value Date    HGBA1C 6 7 (H) 02/15/2023     Recent Labs     02/28/23  1649 02/28/23  2105 03/01/23  0742 03/01/23  1136   POCGLU 181* 132 116 208*   · Prehospital patient uses an insulin pump  · Pt reports pump is out of insulin   · Discontinued pump and placed on sliding scale and lantus 5 units qAm   · Diabetic diet

## 2023-03-01 NOTE — ASSESSMENT & PLAN NOTE
Results from last 7 days   Lab Units 03/01/23  0532 02/28/23  0552 02/27/23  0527 02/26/23  0544   CREATININE mg/dL 1 42* 1 95* 1 69* 1 37*   · JULIANNE on CKD stage 3  · Noted rising creatinine after IV diuresis and Lasix drip  · Baseline appears to be around 0 9-1 1  · All diuretics are currently held for diuretic holiday  · Losartan also currently held  · Switched DVT prophylaxis from Lovenox to heparin  · Function has improved today  · Also complicated by urinary retention requiring straight cath early this morning, urology consulted  · Continue to monitor BMP  · We will discuss with cardiology today regarding resumption of diuretics

## 2023-03-01 NOTE — ASSESSMENT & PLAN NOTE
Lab Results   Component Value Date    HGBA1C 6 7 (H) 02/15/2023     Recent Labs     02/28/23  1225 02/28/23  1649 02/28/23  2105 03/01/23  0742   POCGLU 117 181* 132 116   · Prehospital patient uses an insulin pump  · Pt reports pump is out of insulin   · Discontinued pump and placed on sliding scale and lantus 5 units qAm   · Diabetic diet

## 2023-03-01 NOTE — DISCHARGE SUMMARY
[facility]  Discharge- Andrade Weaver 1955, 78 y o  female MRN: [id number]  Unit/Bed#: E4 -01 Encounter: [id number]  Primary Care Provider: Cornelia Rice DO   Date and time admitted to hospital: 2/21/2023 11:27 AM    * Acute on chronic diastolic (congestive) heart failure (HCC)  Assessment & Plan  Wt Readings from Last 3 Encounters:   03/01/23 76 8 kg (169 lb 5 oz)   01/31/23 77 6 kg (171 lb)   01/24/23 77 6 kg (171 lb)   · Receives most of her care at [facility] -including her cardiologist  · Presented with a complaint of shortness of breath and leg swelling   · Requiring 4 L oxygen nasal cannula on admission --> weaned to room air for several days   · Repeated echocardiogram here: EF 54%, paradoxical septal wall motion consistent with conduction abnormality, grade 1 diastolic function, aortic valve sclerosis, mild MR, trace TR  · Home regimen torsemide 40 mg daily  · Started on IV Lasix 80 mg BID --> advanced to Lasix gtt 2/24/2023  · Given one time dose metolazone 5 mg 2/24  · Weight down almost 20 lbs since admission - currently at 168 lbs  · Cardiology following -noted Cr bump - so lasix gtt held and given diuretic holiday and renal function now improved to 1 42   · She did have an episode of urinary retention requiring straight cath this morning  · We will discuss with cardiology regarding resumption of oral diuretics  · Monitor for any further retention  · BMP in a m  Abnormal CT of the chest  Assessment & Plan  · Enlarged subcarinal node, new since the thoracic spine CT from March 2021  This could be reactive, but recommend follow-up with a chest CT with no contrast in 3 months    · Incidental note completed    Anemia  Assessment & Plan  Results from last 7 days   Lab Units 02/27/23  0527 02/25/23  0539 02/24/23  0531 02/23/23  0437   HEMOGLOBIN g/dL 9 4* 8 4* 7 7* 7 5*   · History of anemia of chronic disease and iron deficiency    · Baseline appearing in the 12's  · More recently hemoglobins have been in the 8's/9s according to care everywhere  · Denies bleeding anywhere, initial occult stool negative   · Follows with Hematology at [facility]   · Received [facility] outpatient   · Care everywhere-recent iron panel from February 2023: Iron 32, transferrin 139, iron saturation 17, TIBC 188, ferritin 341  · Given IV venofer x 3 days   · Hgb now appearing stable   · She needs re-eval outpatient with consideration for EGD and colonoscopy     JULIANNE (acute kidney injury) [facility])  Assessment & Plan  Results from last 7 days   Lab Units 03/01/23  0532 02/28/23  0552 02/27/23  0527 02/26/23  0544   CREATININE mg/dL 1 42* 1 95* 1 69* 1 37*   · JULIANNE on CKD stage 3  · Noted rising creatinine after IV diuresis and Lasix drip  · Baseline appears to be around 0 9-1 1  · All diuretics are currently held for diuretic holiday  · Losartan also currently held  · Switched DVT prophylaxis from Lovenox to heparin  · Function has improved today  · Also complicated by urinary retention requiring straight cath early this morning, urology consulted  · Continue to monitor BMP  · We will discuss with cardiology today regarding resumption of diuretics    Urinary retention  Assessment & Plan  · This is a chronic issue, she is now having urinary retention requiring straight cath this morning around 3 AM for 600 cc  · Bladder scan this morning for 124 cc, has not urinated on her own since straight cath  · Continue monitor on retention protocol  · We will ask urology to weigh in  · Did have a bowel movement this morning has been up ambulating the halls    Type 2 diabetes mellitus with microalbuminuria, with long-term current use of insulin [facility])  Assessment & Plan  Lab Results   Component Value Date    HGBA1C 6 7 (H) 02/15/2023     Recent Labs     02/28/23  1225 02/28/23  1649 02/28/23  2105 03/01/23  0742   POCGLU 117 181* 132 116   · Prehospital patient uses an insulin pump  · Pt reports pump is out of insulin · Discontinued pump and placed on sliding scale and lantus 5 units qAm   · Diabetic diet    Essential hypertension  Assessment & Plan  · Home regimen losartan 50 mg daily, Coreg 3 125 mg twice daily  · Losartan held in the setting of rising Cr    Acute respiratory failure (HCC)-resolved as of 3/1/2023  Assessment & Plan  · With hypoxia, lkely secondary to acute CHF exacerbation as well as recent pneumonia  · Requiring 4 L oxygen nasal cannula on admission  · This has been tapered and she is currently on room air for several days          VTE Pharmacologic Prophylaxis: VTE Score: 4 High Risk (Score >/= 5) - Pharmacological DVT Prophylaxis Ordered: heparin  Sequential Compression Devices Ordered  Patient Centered Rounds: I performed bedside rounds with nursing staff today  Discussions with Specialists or Other Care Team Provider: cardiology will also discuss with urology     Education and Discussions with Family / Patient: Patient declined call to   Total Time Spent on Date of Encounter in care of patient: 35 minutes This time was spent on one or more of the following: performing physical exam; counseling and coordination of care; obtaining or reviewing history; documenting in the medical record; reviewing/ordering tests, medications or procedures; communicating with other healthcare professionals and discussing with patient's family/caregivers  Current Length of Stay: 8 day(s)  Current Patient Status: Inpatient   Certification Statement: The patient will continue to require additional inpatient hospital stay due to acute CHF, urinary retention   Discharge Plan: Anticipate discharge tomorrow to home  Code Status: Level 1 - Full Code    Subjective:   Renal function has improved but unfortunately did require straight cath this morning  This is a chronic issue  She has not urinated since thyroidization bladder scan this morning 124 cc  No other acute complaints  No shortness of breath  Has been up ambulating the halls and did have a bowel movement this morning  Objective:     Vitals:   Temp (24hrs), Av 8 °F (36 6 °C), Min:97 6 °F (36 4 °C), Max:98 °F (36 7 °C)    Temp:  [97 6 °F (36 4 °C)-98 °F (36 7 °C)] 97 6 °F (36 4 °C)  HR:  [57-66] 61  Resp:  [18-20] 18  BP: (115-152)/(64-81) 134/65  SpO2:  [90 %-98 %] 96 %  Body mass index is 30 97 kg/m²  Input and Output Summary (last 24 hours): Intake/Output Summary (Last 24 hours) at 3/1/2023 1003  Last data filed at 3/1/2023 0445  Gross per 24 hour   Intake --   Output 850 ml   Net -850 ml       Physical Exam:   Physical Exam  Vitals and nursing note reviewed  Constitutional:       General: She is not in acute distress  Appearance: She is not ill-appearing or toxic-appearing  Cardiovascular:      Rate and Rhythm: Normal rate and regular rhythm  Pulmonary:      Effort: Pulmonary effort is normal  No respiratory distress  Breath sounds: Normal breath sounds  No wheezing or rales  Abdominal:      General: Bowel sounds are normal       Palpations: Abdomen is soft  Musculoskeletal:      Right lower leg: No edema  Left lower leg: No edema  Skin:     General: Skin is warm  Neurological:      Mental Status: She is alert  Mental status is at baseline     Psychiatric:         Mood and Affect: Mood normal           Additional Data:     Labs:  Results from last 7 days   Lab Units 23  0527   WBC Thousand/uL 5 13   HEMOGLOBIN g/dL 9 4*   HEMATOCRIT % 29 6*   PLATELETS Thousands/uL 176     Results from last 7 days   Lab Units 23  0532   SODIUM mmol/L 138   POTASSIUM mmol/L 4 9   CHLORIDE mmol/L 102   CO2 mmol/L 30   BUN mg/dL 45*   CREATININE mg/dL 1 42*   ANION GAP mmol/L 6   CALCIUM mg/dL 8 5   GLUCOSE RANDOM mg/dL 115         Results from last 7 days   Lab Units 23  0742 23  2105 23  1649 23  1225 23  0854 23  2024 23  1827 23  1048 23  0825 23  9079 02/26/23  1610 02/26/23  1059   POC GLUCOSE mg/dl 116 132 181* 117 150* 138 167* 226* 118 196* 115 161*               Lines/Drains:  Invasive Devices     Peripheral Intravenous Line  Duration           Peripheral IV 02/27/23 Left;Ventral (anterior) Forearm 1 day                      Imaging: Reviewed radiology reports from this admission including: chest xray    Recent Cultures (last 7 days):         Last 24 Hours Medication List:   Current Facility-Administered Medications   Medication Dose Route Frequency Provider Last Rate   • acetaminophen  650 mg Oral Q6H PRN Mary Gibbs PA-C     • albuterol  2 5 mg Nebulization Q6H PRN Josy Carmelita Prechtel, DO     • amitriptyline  10 mg Oral HS Fred S Prechtel, DO     • aspirin  81 mg Oral QAM Fred S Prechtel, DO     • atorvastatin  80 mg Oral HS Fred S Prechtel, DO     • benzonatate  100 mg Oral TID PRN Calixto Macias PA-C     • bisacodyl  10 mg Rectal Daily Fred S Prechtel, DO     • bisacodyl  10 mg Rectal Daily PRN Declan Ulloa PA-C     • carvedilol  3 125 mg Oral BID With Meals Fred S Prechtel, DO     • dicyclomine  10 mg Oral 4x Daily PRN Josy [place] Prechtel, DO     • docusate sodium  100 mg Oral Daily Fred S Prechtel, DO     • famotidine  20 mg Oral BID Fred S Prechtel, DO     • gabapentin  100 mg Oral TID Josy [place] Prechtel, DO     • heparin (porcine)  5,000 Units Subcutaneous Q8H [address] Tomeka Parker PA-C     • insulin glargine  5 Units Subcutaneous QAM Giana Bennett PA-C     • insulin lispro  1-6 Units Subcutaneous TID AC Tomeka Parker PA-C     • insulin lispro  1-6 Units Subcutaneous HS Tomeka Parker PA-C     • levothyroxine  100 mcg Oral Early Morning Fred S Prechtel, DO     • melatonin  6 mg Oral HS Noemy Shay PA-C     • modafinil  100 mg Oral Daily Fred S Prechtel, DO     • ondansetron  4 mg Intravenous Q6H PRN Noemy Shay PA-C     • oxybutynin  10 mg Oral Daily Fred S Prechtel,      • pantoprazole  40 mg Oral Early Morning Fred S Prechtel, DO     • polyethylene glycol  17 g Oral Daily Fred S Prechtel, DO     • pramipexole  1 mg Oral BID Cloyde Linea Prechtel, DO     • senna  1 tablet Oral HS Giana Bennett PA-C     • sertraline  100 mg Oral HS Fred S Prechtel, DO     • traMADol  50 mg Oral Q6H PRN Leandra Aponte PA-C          Today, Patient Was Seen By: Emerald Drake PA-C    **Please Note: This note may have been constructed using a voice recognition system  **

## 2023-03-01 NOTE — ASSESSMENT & PLAN NOTE
· Was recently at [facility] on 2/15/2023 and treated for pneumonia  · Receive IV cefepime as well as oral Omnicef to complete a 5-day antibiotic course  · No further need for antibiotics here  · Repeat chest imaging with PCP outpatient

## 2023-03-01 NOTE — CONSULTS
Consult - Urology   Elizabeth Ferrara 1955, 78 y o  female MRN: [id number]    Unit/Bed#: E4 -01 Encounter: [id number]      Urinary retention  Assessment & Plan  New incomplete bladder emptying  Longstanding overactive bladder    Stop anticholinergic ditropan rx  Continue bowel regimen to treat constipation  Trial of low dose alpha blocker hytrin 1mg daily to promote bladder neck relaxation and improved emptying  She is cautioned on hypotension lightheadedness with this rx  Monitor after dose  Creatinine at her baseline 1 4  Continue urinary retention protocol  If truly unable to void over the next 24 hrs and multiple straight catheterizations please follow protocol for villareal catheter insertion  She should followup with her urologist who manages her voiding dysfunction after discharge  Urology will sign off but remain available for any further inpatient needs  Please feel free to contact the provider currently covering the Urology [facility] role for this campus with questions or concerns  Subjective: usually has tremendous urinary urgency and overactivity with incontinence  Last day or so has the opposite  Has diminished sensation and unable to urinate on her own  Has happened once before, right after her bladder botox in the fall  She is hopeful to avoid villareal catheters  She has never performed self catheterizations and hopes to avoid that as well  She tells me she has had urodynamics cystoscopy etc and is scheduled for Interstim placement with her urologist at [facility] in April  She currently uses no rx for voiding  Review of Systems   Constitutional: Negative for activity change, appetite change, chills, fever and unexpected weight change  HENT: Negative  Respiratory: Negative  Negative for shortness of breath  Cardiovascular: Negative  Negative for chest pain  Gastrointestinal: Negative for abdominal pain, diarrhea, nausea and vomiting  Endocrine: Negative  Genitourinary: Positive for difficulty urinating (new today) and urgency (usually, today doesn't feel anything)  Negative for decreased urine volume, dysuria, flank pain, frequency, hematuria and pelvic pain  Musculoskeletal: Negative for back pain and gait problem  Skin: Negative  Allergic/Immunologic: Negative  Neurological: Negative  Hematological: Negative for adenopathy  Does not bruise/bleed easily  Objective:  Vitals: Blood pressure 113/63, pulse 63, temperature (!) 96 9 °F (36 1 °C), temperature source Temporal, resp  rate 16, height 5' 2" (1 575 m), weight 76 8 kg (169 lb 5 oz), SpO2 95 %  ,Body mass index is 30 97 kg/m²  Physical Exam  Vitals and nursing note reviewed  Constitutional:       General: She is not in acute distress  Appearance: She is well-developed  She is not diaphoretic  HENT:      Head: Normocephalic and atraumatic  Pulmonary:      Effort: Pulmonary effort is normal    Abdominal:      Tenderness: There is no abdominal tenderness  There is no right CVA tenderness or left CVA tenderness  Musculoskeletal:      Right lower leg: No edema  Left lower leg: No edema  Skin:     General: Skin is warm and dry  Neurological:      Mental Status: She is alert and oriented to person, place, and time        Gait: Gait normal    Psychiatric:         Speech: Speech normal          Behavior: Behavior normal          Labs:  Recent Labs     02/27/23  0527   WBC 5 13     Recent Labs     02/27/23  0527   HGB 9 4*       Recent Labs     02/27/23  0527 02/28/23  0552 03/01/23  0532   CREATININE 1 69* 1 95* 1 42*         History:  Social History     Socioeconomic History   • Marital status: /Civil Union     Spouse name: None   • Number of children: None   • Years of education: None   • Highest education level: None   Occupational History   • None   Tobacco Use   • Smoking status: Never   • Smokeless tobacco: Never   Vaping Use   • Vaping Use: Never used   Substance and Sexual Activity   • Alcohol use: Never   • Drug use: Yes     Frequency: 8 0 times per week     Types: Marijuana     Comment: Medical Marijuana/vape pen once per week   • Sexual activity: None   Other Topics Concern   • None   Social History Narrative   • None     Social Determinants of Health     Financial Resource Strain: Not on file   Food Insecurity: No Food Insecurity   • Worried About Running Out of Food in the Last Year: Never true   • Ran Out of Food in the Last Year: Never true   Transportation Needs: No Transportation Needs   • Lack of Transportation (Medical): No   • Lack of Transportation (Non-Medical): No   Physical Activity: Not on file   Stress: Not on file   Social Connections: Not on file   Intimate Partner Violence: Not on file   Housing Stability: Low Risk    • Unable to Pay for Housing in the Last Year: No   • Number of Places Lived in the Last Year: 1   • Unstable Housing in the Last Year: No     Financial Resource Strain: Not on file   Food Insecurity: No Food Insecurity   • Worried About [facility] in the Last Year: Never true   • Ran Out of Food in the Last Year: Never true   Transportation Needs: No Transportation Needs   • Lack of Transportation (Medical): No   • Lack of Transportation (Non-Medical):  No   Physical Activity: Not on file   Stress: Not on file   Social Connections: Not on file   Intimate Partner Violence: Not on file   Housing Stability: Low Risk    • Unable to Pay for Housing in the Last Year: No   • Number of Places Lived in the Last Year: 1   • Unstable Housing in the Last Year: No      Diagnosis Date   • Anxiety    • Asthma     controlled   • Back complaints    • Cancer (HCC)     nose   • Cellulitis of left lower leg     "not now"   • CHF (congestive heart failure) ([facility] ) 02/2021   • Chronic bilateral thoracic back pain    • Chronic kidney disease     sees nephrologist regularly" stage 3"   • Chronic myofascial pain    • Chronic pain of both lower extremities • Chronic pain of left knee     "both knees"   • Chronic pain syndrome     bilat legs and knees/neuropathy pain   • COVID 2021   • CPAP (continuous positive airway pressure) dependence     no currently uses   • Depression    • Diabetes mellitus (HCC)     insulin pump   • Disease of thyroid gland    • Does use hearing aid     bilat and will wear DOS   • DVT (deep venous thrombosis) ([facility] )     left leg   • Gait disorder    • Gastroparesis    • GERD (gastroesophageal reflux disease)    • History of angina    • History of MRSA infection    • History of transfusion     Many years ago   • Hyperlipidemia    • Hypertension    • Hypoglycemic reaction     "occas low blood sugar"   • Insulin pump in place     pt reports saw endocrinologist  and will bring copy of instructions DOS   • Irritable bowel syndrome    • Kidney disease    • Pacemaker    • Polyneuropathy associated with underlying disease ([facility] )    • PONV (postoperative nausea and vomiting)    • Risk for falls    • S/P insertion of spinal cord stimulator 2018   • Sarcoidosis    • Shortness of breath     "exertion and not new"   • Sleep apnea    • TIA (transient ischemic attack)    • Use of cane as ambulatory aid    • Uses walker    • Wears dentures     permanent upper/and some missing teeth/partial lower     Past Surgical History:   Procedure Laterality Date   • ABDOMINAL ADHESION SURGERY N/A 2021    Procedure: laparoscopic LYSIS ADHESIONS;  Surgeon: Tyson Lerma MD;  [facility];  Service: Thoracic   • BREAST SURGERY     • BREAST SURGERY      reduction   • CARDIAC PACEMAKER PLACEMENT      pacemaker   •  SECTION     • COLONOSCOPY     • DILATION AND CURETTAGE OF UTERUS      "D&E"   • HERNIA REPAIR     • HYSTERECTOMY     • JOINT REPLACEMENT Left     LTKR   • NECK SURGERY      fused 4 discs with 4 screws implanted   • NISSEN FUNDOPLICATION LAPAROSCOPIC WITH ROBOTICS N/A 2021    Procedure: ROBOTIC HELLER MYOTOMY WITH BERNARDO FUNDIPLICATION ;  Surgeon: Ken Do MD;  [facility];  Service: Thoracic   • TN ESOPHAGOGASTRODUODENOSCOPY TRANSORAL DIAGNOSTIC N/A 05/03/2021    Procedure: ESOPHAGOGASTRODUODENOSCOPY (EGD); Surgeon: Ken Do MD;  [facility];  Service: Thoracic   • TN ESOPHAGOGASTRODUODENOSCOPY TRANSORAL DIAGNOSTIC N/A 01/12/2022    Procedure: ESOPHAGOGASTRODUODENOSCOPY (EGD),;  Surgeon: Ken Do MD;  [facility];  Service: Thoracic   • TN ESOPHAGOGASTRODUODENOSCOPY TRANSORAL DIAGNOSTIC N/A 02/16/2022    Procedure: ESOPHAGOGASTRODUODENOSCOPY (EGD); Surgeon: Ken Do MD;  [facility];  Service: Thoracic   • TN ESOPHAGOGASTRODUODENOSCOPY TRANSORAL DIAGNOSTIC N/A 07/12/2022    Procedure: ESOPHAGOGASTRODUODENOSCOPY (EGD); PYLORIC DILATION; GE JUNCTION DILATION;  Surgeon: Ken Do MD;  [facility];  Service: Thoracic   • TN ESOPHAGOGASTRODUODENOSCOPY TRANSORAL DIAGNOSTIC N/A 11/29/2022    Procedure: ESOPHAGOGASTRODUODENOSCOPY (EGD); Surgeon: Tiago Grissom MD;  [facility];  Service: Thoracic   • TN ESOPHAGOGASTRODUODENOSCOPY TRANSORAL DIAGNOSTIC N/A 1/24/2023    Procedure: ESOPHAGOGASTRODUODENOSCOPY (EGD);   Surgeon: Ken Do MD;  [facility];  Service: Thoracic   • TN ESOPHAGOSCOPY FLEX BALLOON DILAT <30 MM DIAM N/A 01/12/2022    Procedure: DILATATION ESOPHAGEAL;  Surgeon: Ken Do MD;  [facility];  Service: Thoracic   • TN ESOPHAGOSCOPY FLEX BALLOON DILAT <30 MM DIAM N/A 02/16/2022    Procedure: DILATATION ESOPHAGEAL;  Surgeon: Ken Do MD;  [facility];  Service: Thoracic   • TN ESOPHAGOSCOPY FLEX Mercedes Misha <30 MM DIAM N/A 11/29/2022    Procedure: DILATATION ESOPHAGEAL esophageal and pyloric dilation;  Surgeon: Tiago Grissom MD;  [facility];  Service: Thoracic   • TN ESOPHAGOSCOPY FLEX Mercedes Misha <30 MM DIAM N/A 1/24/2023 Procedure: esophageal dilation dilated up to 54  with pylorus dilation dilated up to 18;  Surgeon: Xochitl Hein MD;  [facility];  Service: Thoracic   • WI RIBEIRO IMPLTJ NSTIM ELTRDS PLATE/PADDLE EDRL Left 04/23/2018    Procedure:  Insertion of thoracic spinal cord stimulator electrode via laminotomy and placement of left buttock IPG;  Surgeon: Larry Scott MD;  [facility];  Service: Neurosurgery   • REPLACEMENT TOTAL KNEE     • ROTATOR CUFF REPAIR     • SPINAL STIMULATOR PLACEMENT Left 10/03/2018    Procedure: BUTTOCK RE-OPENING INCISION FOR REPOSITIONING OF IMPLANTABLE PULSE GENERATOR;  Surgeon: Larry Scott MD;  [facility];  Service: Neurosurgery   • TONSILLECTOMY     • UPPER GASTROINTESTINAL ENDOSCOPY     • WISDOM TOOTH EXTRACTION       Family History   Problem Relation Age of Onset   • Diabetes Family    • Heart disease Family    • Hypertension Family    • Neuropathy Family    • No Known Problems Mother    • Heart disease Father    • Diabetes Father    • No Known Problems Maternal Grandmother    • No Known Problems Maternal Grandfather    • No Known Problems Paternal Grandmother    • No Known Problems Paternal Grandfather    • No Known Problems Brother    • No Known Problems Daughter    • Cancer Son        Chava Weathers [place]  Date: 3/1/2023 Time: 12:35 PM

## 2023-03-01 NOTE — PROGRESS NOTES
[facility]  Progress Note - Cheryle Bare 1955, 78 y o  female MRN: [id number]  Unit/Bed#: E4 -01 Encounter: [id number]  Primary Care Provider: Julieta Echevarria DO   Date and time admitted to hospital: 2/21/2023 11:27 AM    * Acute on chronic diastolic (congestive) heart failure (HCC)  Assessment & Plan  Wt Readings from Last 3 Encounters:   03/01/23 76 8 kg (169 lb 5 oz)   01/31/23 77 6 kg (171 lb)   01/24/23 77 6 kg (171 lb)   · Receives most of her care at [facility] -including her cardiologist  · Presented with a complaint of shortness of breath and leg swelling   · Requiring 4 L oxygen nasal cannula on admission --> weaned to room air for several days   · Repeated echocardiogram here: EF 54%, paradoxical septal wall motion consistent with conduction abnormality, grade 1 diastolic function, aortic valve sclerosis, mild MR, trace TR  · Home regimen torsemide 40 mg daily  · Started on IV Lasix 80 mg BID --> advanced to Lasix gtt 2/24/2023  · Given one time dose metolazone 5 mg 2/24  · Weight down almost 20 lbs since admission - currently at 168 lbs  · Cardiology following -noted Cr bump - so lasix gtt held and given diuretic holiday and renal function now improved to 1 42   · She did have an episode of urinary retention requiring straight cath this morning  · We will discuss with cardiology regarding resumption of oral diuretics  · Monitor for any further retention  · BMP in a m  Abnormal CT of the chest  Assessment & Plan  · Enlarged subcarinal node, new since the thoracic spine CT from March 2021  This could be reactive, but recommend follow-up with a chest CT with no contrast in 3 months    · Incidental note completed    JULIANNE (acute kidney injury) [facility])  Assessment & Plan  Results from last 7 days   Lab Units 03/01/23  0532 02/28/23  0552 02/27/23  0527 02/26/23  0544   CREATININE mg/dL 1 42* 1 95* 1 69* 1 37*   · JULIANNE on CKD stage 3  · Noted rising creatinine after IV diuresis and Lasix drip  · Baseline appears to be around 0 9-1 1  · All diuretics are currently held for diuretic holiday  · Losartan also currently held  · Switched DVT prophylaxis from Lovenox to heparin  · Function has improved today  · Also complicated by urinary retention requiring straight cath early this morning, urology consulted  · Continue to monitor BMP  · We will discuss with cardiology today regarding resumption of diuretics    Urinary retention  Assessment & Plan  · This is an acute issue, hx of OAB, she is now having urinary retention requiring straight cath this morning around 3 AM for 600 cc  · Bladder scan this morning for 124 cc, has not urinated on her own since straight cath  · Continue monitor on retention protocol  · We will ask urology to weigh in  · Did have a bowel movement this morning has been up ambulating the halls    Type 2 diabetes mellitus with microalbuminuria, with long-term current use of insulin [facility])  Assessment & Plan  Lab Results   Component Value Date    HGBA1C 6 7 (H) 02/15/2023     Recent Labs     02/28/23  1649 02/28/23  2105 03/01/23  0742 03/01/23  1136   POCGLU 181* 132 116 208*   · Prehospital patient uses an insulin pump  · Pt reports pump is out of insulin   · Discontinued pump and placed on sliding scale and lantus 5 units qAm   · Diabetic diet         VTE Pharmacologic Prophylaxis: VTE Score: 4 High Risk (Score >/= 5) - Pharmacological DVT Prophylaxis Ordered: heparin  Sequential Compression Devices Ordered      Patient Centered Rounds: I performed bedside rounds with nursing staff today    Discussions with Specialists or Other Care Team Provider: cardiology will also discuss with urology      Education and Discussions with Family / Patient: Patient declined call to        Total Time Spent on Date of Encounter in care of patient: 35 minutes This time was spent on one or more of the following: performing physical exam; counseling and coordination of care; obtaining or reviewing history; documenting in the medical record; reviewing/ordering tests, medications or procedures; communicating with other healthcare professionals and discussing with patient's family/caregivers      Current Length of Stay: 8 day(s)  Current Patient Status: Inpatient   Certification Statement: The patient will continue to require additional inpatient hospital stay due to acute CHF, urinary retention   Discharge Plan: Anticipate discharge tomorrow to home      Code Status: Level 1 - Full Code     Subjective:   Renal function has improved but unfortunately did require straight cath this morning  This is a chronic issue  She has not urinated since thyroidization bladder scan this morning 124 cc  No other acute complaints  No shortness of breath  Has been up ambulating the halls and did have a bowel movement this morning      Objective:      Vitals:   Temp (24hrs), Av 8 °F (36 6 °C), Min:97 6 °F (36 4 °C), Max:98 °F (36 7 °C)     Temp:  [97 6 °F (36 4 °C)-98 °F (36 7 °C)] 97 6 °F (36 4 °C)  HR:  [57-66] 61  Resp:  [18-20] 18  BP: (115-152)/(64-81) 134/65  SpO2:  [90 %-98 %] 96 %  Body mass index is 30 97 kg/m²       Input and Output Summary (last 24 hours):      Intake/Output Summary (Last 24 hours) at 3/1/2023 1003  Last data filed at 3/1/2023 0445      Gross per 24 hour   Intake --   Output 850 ml   Net -850 ml         Physical Exam:   Physical Exam  Vitals and nursing note reviewed  Constitutional:       General: She is not in acute distress  Appearance: She is not ill-appearing or toxic-appearing  Cardiovascular:      Rate and Rhythm: Normal rate and regular rhythm  Pulmonary:      Effort: Pulmonary effort is normal  No respiratory distress  Breath sounds: Normal breath sounds  No wheezing or rales  Abdominal:      General: Bowel sounds are normal       Palpations: Abdomen is soft  Musculoskeletal:      Right lower leg: No edema  Left lower leg: No edema  Skin:     General: Skin is warm  Neurological:      Mental Status: She is alert  Mental status is at baseline     Psychiatric:         Mood and Affect: Mood normal             Additional Data:      Labs:       Results from last 7 days   Lab Units 02/27/23  0527   WBC Thousand/uL 5 13   HEMOGLOBIN g/dL 9 4*   HEMATOCRIT % 29 6*   PLATELETS Thousands/uL 176           Results from last 7 days   Lab Units 03/01/23  0532   SODIUM mmol/L 138   POTASSIUM mmol/L 4 9   CHLORIDE mmol/L 102   CO2 mmol/L 30   BUN mg/dL 45*   CREATININE mg/dL 1 42*   ANION GAP mmol/L 6   CALCIUM mg/dL 8 5   GLUCOSE RANDOM mg/dL 115                          Results from last 7 days   Lab Units 03/01/23  0742 02/28/23  2105 02/28/23  1649 02/28/23  1225 02/28/23  0854 02/27/23  2024 02/27/23  1827 02/27/23  1048 02/27/23  0825 02/26/23  2116 02/26/23  1610 02/26/23  1059   POC GLUCOSE mg/dl 116 132 181* 117 150* 138 167* 226* 118 196* 115 161*                 Lines/Drains:      Invasive Devices            Peripheral Intravenous Line  Duration             Peripheral IV 02/27/23 Left;Ventral (anterior) Forearm 1 day                             Imaging: Reviewed radiology reports from this admission including: chest xray     Recent Cultures (last 7 days):          Last 24 Hours Medication List:            Current Facility-Administered Medications   Medication Dose Route Frequency Provider Last Rate   • acetaminophen  650 mg Oral Q6H PRN Brandon Cruz PA-C     • albuterol  2 5 mg Nebulization Q6H PRN Timothy Snow, DO     • amitriptyline  10 mg Oral HS Fred S Prechtel, DO     • aspirin  81 mg Oral QAM Fred S Prechtel, DO     • atorvastatin  80 mg Oral HS Fred S Prechtel, DO     • benzonatate  100 mg Oral TID PRN Latrell Nicholas PA-C     • bisacodyl  10 mg Rectal Daily Fred Heathhtel, DO     • bisacodyl  10 mg Rectal Daily PRN Sandro Boyd PA-C     • carvedilol  3 125 mg Oral BID With Meals Timothy Billings Prechtel, DO     • dicyclomine  10 mg Oral 4x Daily PRN Glenn Savannah Prechtel, DO     • docusate sodium  100 mg Oral Daily Fred S Prechtel, DO     • famotidine  20 mg Oral BID Fred S Prechtel, DO     • gabapentin  100 mg Oral TID Glenn Savannah Prechtel, DO     • heparin (porcine)  5,000 Units Subcutaneous Q8H [address] Tomeka Parker PA-C     • insulin glargine  5 Units Subcutaneous QAM Giana Bennett PA-C     • insulin lispro  1-6 Units Subcutaneous TID AC Tomeka Parker PA-C     • insulin lispro  1-6 Units Subcutaneous HS Tomeka Parker PA-C     • levothyroxine  100 mcg Oral Early Morning Fred S Prechtel, DO     • melatonin  6 mg Oral HS Noemy Shay PA-C     • modafinil  100 mg Oral Daily Fred S Prechtel, DO     • ondansetron  4 mg Intravenous Q6H PRN Noemy Shay PA-C     • oxybutynin  10 mg Oral Daily Fred S Prechtel, DO     • pantoprazole  40 mg Oral Early Morning Fred S Prechtel, DO     • polyethylene glycol  17 g Oral Daily Fred S Prechtel, DO     • pramipexole  1 mg Oral BID Fred S Prechtel, DO     • senna  1 tablet Oral HS Giana Bennett PA-C     • sertraline  100 mg Oral HS Fred S Prechtel, DO     • traMADol  50 mg Oral Q6H PRN Maikel Noble PA-C           Today, Patient Was Seen By: Ciara Mercer PA-C     **Please Note: This note may have been constructed using a voice recognition system  **

## 2023-03-01 NOTE — DISCHARGE INSTRUCTIONS
Lindsay,    As discussed, if you have any issues with urination or decreased urine output please come back to the emergency department  We are starting you on terazosin 1 mg in the evening time to help with your urination  Please call your primary urologist to schedule an outpatient follow-up appointment for any ongoing issues  I would like you to get lab work on Monday to check your renal function  Please continue torsemide 40 mg once daily tomorrow 3/2/2023  Call your primary cardiologist with any weight gain of 3 pounds overnight or 5 pounds over 3 days  Continue to check her weights daily and keep a weight log  If you have any worsening shortness of breath, significant weight gain, increased leg swelling return to the emergency department  I also need you to follow-up with your hematologist at [facility] for ongoing management and evaluation of your anemia      It was a pleasure taking care of you in the hospital     Parish Vazquez PA-C

## 2023-03-01 NOTE — DISCHARGE SUMMARY
[facility]  Discharge- Nehal Lean 1955, 78 y o  female MRN: [id number]  Unit/Bed#: E4 -01 Encounter: [id number]  Primary Care Provider: Daniel Mathew DO   Date and time admitted to hospital: 2/21/2023 11:27 AM    * Acute on chronic diastolic (congestive) heart failure (HCC)  Assessment & Plan  Wt Readings from Last 3 Encounters:   03/01/23 76 8 kg (169 lb 5 oz)   01/31/23 77 6 kg (171 lb)   01/24/23 77 6 kg (171 lb)   · Receives most of her care at [facility] -including her cardiologist  · Presented with a complaint of shortness of breath and leg swelling   · Requiring 4 L oxygen nasal cannula on admission --> weaned to room air for several days   · Repeated echocardiogram here: EF 54%, paradoxical septal wall motion consistent with conduction abnormality, grade 1 diastolic function, aortic valve sclerosis, mild MR, trace TR  · Home regimen torsemide 40 mg daily  · Started on IV Lasix 80 mg BID --> advanced to Lasix gtt 2/24/2023  · Given one time dose metolazone 5 mg 2/24  · Weight down almost 20 lbs since admission - currently at 168 lbs  · Cardiology following -noted Cr bump - so lasix gtt held and given diuretic holiday and renal function now improved to 1 42   · She did have an episode of urinary retention requiring straight cath this morning, appears to have resolved   · Discharge on torsemide 40 mg daily   · BMP in 1 week   · She was educated at length check her weights daily at home and to keep a weight log, call cardiologist for weight gain of 3 pounds overnight or 5 pounds over 3 days    Abnormal CT of the chest  Assessment & Plan  · Enlarged subcarinal node, new since the thoracic spine CT from March 2021  This could be reactive, but recommend follow-up with a chest CT with no contrast in 3 months    · Incidental note completed    Anemia  Assessment & Plan  Results from last 7 days   Lab Units 02/27/23  0527 02/25/23  0539 02/24/23  0531 02/23/23  9950 HEMOGLOBIN g/dL 9 4* 8 4* 7 7* 7 5*   · History of anemia of chronic disease and iron deficiency    · Baseline appearing in the 12's  · More recently hemoglobins have been in the 8's/9s according to care everywhere  · Denies bleeding anywhere, initial occult stool negative   · Follows with Hematology at [facility]   · Received [facility] outpatient   · Care everywhere-recent iron panel from February 2023: Iron 32, transferrin 139, iron saturation 17, TIBC 188, ferritin 341  · Given IV venofer x 3 days   · Hgb now appearing stable   · She needs re-eval outpatient with consideration for EGD and colonoscopy     Urinary retention  Assessment & Plan  · This is an acute issue, hx of OAB, she is now having urinary retention requiring straight cath this morning around 3 AM for 600 cc  · Bladder scan this morning for 124 cc  · Patient urinated without significant residual on bladder scan this afternoon  · Appreciate urology consult   · Stopped oxybuytnin and started on terazosin   · BM today and she is ambulating halls which was encouraged   · Needs outpatient Urology follow up  · Return to the emergency department with any difficulty with urination    Type 2 diabetes mellitus with microalbuminuria, with long-term current use of insulin [facility])  Assessment & Plan  Lab Results   Component Value Date    HGBA1C 6 7 (H) 02/15/2023     Recent Labs     02/28/23  1649 02/28/23  2105 03/01/23  0742 03/01/23  1136   POCGLU 181* 132 116 208*   · Prehospital patient uses an insulin pump      Essential hypertension  Assessment & Plan  · Home regimen losartan 50 mg daily, Coreg 3 125 mg twice daily  · Losartan held in the setting of rising Cr, BMP in 1 week    Acute respiratory failure (HCC)-resolved as of 3/1/2023  Assessment & Plan  · With hypoxia, lkely secondary to acute CHF exacerbation as well as recent pneumonia  · Requiring 4 L oxygen nasal cannula on admission  · This has been tapered and she is currently on room air for several days      History of pneumonia-resolved as of 2/24/2023  Assessment & Plan  · Was recently at [facility] on 2/15/2023 and treated for pneumonia  · Receive IV cefepime as well as oral Omnicef to complete a 5-day antibiotic course  · No further need for antibiotics here  · Repeat chest imaging with PCP outpatient     JULIANNE (acute kidney injury) (HCC)-resolved as of 3/1/2023  Assessment & Plan  Results from last 7 days   Lab Units 03/01/23  0532 02/28/23  0552 02/27/23  0527 02/26/23  0544   CREATININE mg/dL 1 42* 1 95* 1 69* 1 37*   · JULIANNE on CKD stage 3  · Noted rising creatinine after IV diuresis and Lasix drip  · Baseline appears to be around 0 9-1 1  · All diuretics are currently held for diuretic holiday  · Losartan also currently held  · Renal function improved, plan to resume diuretics tomorrow  · BMP in 1 week  · Also noted to have urinary retention could have been contributing, she urinated without significant residual on bladder scan prior to discharge  · On terazosin 1 mg daily  · Patient follow-up with urology      Medical Problems     Resolved Problems  Date Reviewed: 3/1/2023          Resolved    JULIANNE (acute kidney injury) ([facility] [address] ) 3/1/2023     Resolved by  Dave Miranda PA-C    History of pneumonia 2/24/2023     Resolved by  Dave Miranda PA-C    Acute respiratory failure ([facility] [address] ) 3/1/2023     Resolved by  Dave Miranda PA-C        Discharging Physician / Practitioner: Dave Miranda PA-C  PCP: Raiza Oliva DO  Admission Date:   Admission Orders (From admission, onward)     Ordered        02/21/23 1539  INPATIENT ADMISSION  Once                      Discharge Date: 03/01/23    Consultations During Hospital Stay:  · Cardiology   · Urology     Procedures Performed:   · Echocardiogram:  Interpretation Summary       •  Left Ventricle: Left ventricular cavity size is normal  There is mild concentric hypertrophy  The left ventricular ejection fraction is 54% by visual estimation   Systolic function is low normal  There is paradoxical septal wall motion consistent with conduction abnormality  Diastolic function is mildly abnormal, consistent with grade I (abnormal) relaxation  •  Right Ventricle: Right ventricular cavity size is normal  Systolic function is normal   •  Aortic Valve: There is aortic valve sclerosis  •  Mitral Valve: There is mild regurgitation  •  Tricuspid Valve: There is trace regurgitation  Pulmonary artery systolic pressures are estimated at 26 mmHg  •  There is no study for comparison      Findings    Left Ventricle Left ventricular cavity size is normal  Wall thickness is increased  There is mild concentric hypertrophy  The left ventricular ejection fraction is 54% by visual estimation  Systolic function is low normal   There is paradoxical septal wall motion consistent with conduction abnormality  Diastolic function is mildly abnormal, consistent with grade I (abnormal) relaxation  Right Ventricle Right ventricular cavity size is normal  Systolic function is normal    Left Atrium The atrium is normal in size  Right Atrium The atrium is normal in size  Atrial Septum The interatrial septum appears to be grossly normal without evidence of shunting by color-flow Doppler  Aortic Valve The aortic valve is trileaflet  The leaflets are mildly calcified  The leaflets exhibit normal mobility  There is no evidence of regurgitation  There is aortic valve sclerosis  Mitral Valve The leaflets exhibit normal mobility  There is mild regurgitation  There is no evidence of stenosis  Tricuspid Valve The leaflets exhibit normal mobility  There is trace regurgitation  There is no evidence of stenosis  Pulmonary artery systolic pressures are estimated at 26 mmHg  Pulmonic Valve The pulmonic valve was not well visualized  There is trace regurgitation  There is no evidence of stenosis  Ascending Aorta The aortic root is normal in size  IVC/SVC The inferior vena cava is normal in size  Respirophasic changes were normal    Pericardium There is no pericardial effusion  The pericardium is normal in appearance  Significant Findings / Test Results:   · Chest x-ray:  FINDINGS:  Left dual-lead pacemaker is in satisfactory position  Neurostimulator is seen in the thoracic canal      Cardiomediastinal silhouette appears unremarkable      Pulmonary vascular congestion is seen with small effusions      Bony structures demonstrate widening of the left acromioclavicular joint which could be related to prior trauma or surgery  Patient is status post cervical fusion  IMPRESSION:     Pulmonary vascular congestion with small effusions  · CTA chest:  FINDINGS:     PULMONARY ARTERIES:  No pulmonary embolus       LUNGS:  Compromised by motion with extensive groundglass opacity and septal thickening due to pulmonary edema  Benign calcified granulomas      AIRWAYS: No significant filling defects      PLEURA:  Small right and trace left pleural effusion      HEART/GREAT VESSELS:  Moderate cardiomegaly  Left subclavian pacemaker leads in the right atrial appendage and right ventricular septum  Mild coronary artery calcification indicating atherosclerotic heart disease  Aberrant right subclavian artery   coursing posterior to the esophagus, a normal variant      MEDIASTINUM AND JAZZY:  Enlarged subcarinal lymph nodes      CHEST WALL AND LOWER NECK: Unremarkable      UPPER ABDOMEN:  Unremarkable      OSSEOUS STRUCTURES:  Moderate degenerative disease in the spine  Cervicothoracic fusion  Spinal cord stimulator      IMPRESSION:     No pulmonary embolus      Pulmonary edema with small right and trace left effusion      Enlarged subcarinal node, new since the thoracic spine CT from March 2021  This could be reactive, but recommend follow-up with a chest CT with no contrast in 3 months  Incidental Findings:   Enlarged subcarinal node, new since the thoracic spine CT from March 2021    This could be reactive, but recommend follow-up with a chest CT with no contrast in 3 months  · I reviewed the above mentioned incidental findings with the patient and/or family and they expressed understanding  Test Results Pending at Discharge (will require follow up): · BMP in 1 week     Outpatient Tests Requested:  · Urology, cardiology, primary care provider    Complications: Acute respiratory failure with hypoxia, urinary retention    Reason for Admission: Acute on chronic diastolic CHF    Hospital Course:   Marge Abraham is a 78 y o  female patient who originally presented to the hospital on 2/21/2023 due to shortness of breath, weight gain and leg swelling  Patient was admitted for acute on chronic diastolic heart failure with acute respiratory failure with hypoxia  Patient was seen in consultation with cardiology  She received IV diuresis and was ultimately placed on a Lasix drip  She then developed acute kidney injury and received diuretic holiday  Plan to discharge on torsemide 40 mg daily tomorrow  She needs repeat BMP in 1 week to monitor renal function and electrolytes  She needs close outpatient follow-up with cardiology  She was educated to continue low-sodium diet, daily weights  She should call cardiology with any 3 pound weight gain overnight or 5 pound weight gain over 3 days  She was stable on room air prior to discharge  Patient was recently treated for pneumonia at [facility] and will need repeat chest imaging with her PCP outpatient  She was also incidentally noted to have "Enlarged subcarinal node, new since the thoracic spine CT from March 2021  This could be reactive, but recommend follow-up with a chest CT with no contrast in 3 months "    Patient also noted to have acute urinary retention and straight cath x1  She did have a bowel movement and was ambulating the halls  Her oxybutynin was stopped and she was started on terazosin 1 mg daily after seen in consultation with urology    This did resolve prior to discharge  She needs ongoing outpatient evaluation with urology  She was educated return to the emergency department with any decreased urine output or difficulty with urination  Patient noted to have anemia without any evidence of bleeding  Occult stool negative  She was given IV Venofer x3 days  She follows with hematology at [facility] and needs ongoing follow-up  She does receive aranesp outpatient  She needs ongoing outpatient evaluation with consideration for repeat  colonoscopy outpatient  Please see above list of diagnoses and related plan for additional information  Condition at Discharge: stable    Discharge Day Visit / Exam:   * Please refer to separate progress note for these details *    Discussion with Family: Updated  () at bedside  Discharge instructions/Information to patient and family:   See after visit summary for information provided to patient and family  Provisions for Follow-Up Care:  See after visit summary for information related to follow-up care and any pertinent home health orders  Disposition:   Home    Planned Readmission: none     Discharge Statement:  I spent 50 minutes discharging the patient  This time was spent on the day of discharge  I had direct contact with the patient on the day of discharge  Greater than 50% of the total time was spent examining patient, answering all patient questions, arranging and discussing plan of care with patient as well as directly providing post-discharge instructions  Additional time then spent on discharge activities  Discharge Medications:  See after visit summary for reconciled discharge medications provided to patient and/or family        **Please Note: This note may have been constructed using a voice recognition system**

## 2023-03-01 NOTE — PROGRESS NOTES
Progress Note - Cardiology   Asif Davey 78 y o  female MRN: [id number]  Unit/Bed#: E4 -01 Encounter: [id number]      Assessment/Recommendations/Discussion:   • Acute on chronic diastolic heart failure with preserved ejection fraction  • Hypertension  • Type 2 diabetes  • Acute respiratory failure in the setting of acute CHF exacerbation as well as pneumonia  • Anemia  • Urinary retention    Results from last 7 days   Lab Units 02/22/23  1601   SL CV LV EF  54        PLAN  • She is having urinary retention today  This may explain the acute elevation in her creatinine which is subsequently improved today  • She has been evaluated by urology, planning to stop Ditropan, starting Hytrin  • Can hold diuretics today and plan to restart torsemide tomorrow, will check BMP on Monday  • If she is able to urinate, would be okay for DC from my standpoint  She will need close outpatient follow-up with her primary cardiologist at [address] [facility] [address]:   HPI  Denies shortness of breath chest pain or lower extremity swelling today  Her main complaint is urinary retention      Review of Systems: As noted in HPI  Rest of ROS is negative  Vitals:   /63 (BP Location: Right arm)   Pulse 63   Temp (!) 96 9 °F (36 1 °C) (Temporal)   Resp 16   Ht 5' 2" (1 575 m)   Wt 76 8 kg (169 lb 5 oz)   SpO2 95%   BMI 30 97 kg/m²   I/O       02/27 0701  02/28 0700 02/28 0701  03/01 0700 03/01 0701  03/02 0700    P  O  Total Intake(mL/kg)       Urine (mL/kg/hr)  850 (0 5)     Total Output  850     Net  -850                Weight (last 2 days)     Date/Time Weight    03/01/23 0600 76 8 (169 31)    02/28/23 0600 76 5 (168 65)    02/27/23 0600 76 4 (168 43)          Physical Exam   Constitutional: awake, alert and oriented, in no acute distress, no obvious deformities, obese female  Head: Normocephalic, without obvious abnormality, atraumatic  Eyes: conjunctivae clear and moist  Sclera anicteric  No xanthelasmas  Pupils equal bilaterally  Extraocular motions are full  Ear nose mouth and throat: ears are symmetrical bilaterally, hearing appears to be equal bilaterally, no nasal discharge or epistaxis, oropharynx is clear with moist mucous membranes  Neck: Trachea is midline, neck is supple, no thyromegaly or significant lymphadenopathy, there is full range of motion  Lungs: clear to auscultation bilaterally, no wheezes, no rales, no rhonchi, no accessory muscle use, breathing is nonlabored  Heart: regular rate and rhythm, S1, S2 normal, no murmur, no click, no rub and no gallop, no lower extremity edema  Abdomen: Obese, soft, non-tender; bowel sounds normal; no masses, no organomegaly  Psychiatric: Patient is oriented to time, place, person, mood/affect is negative for depression, anxiety, agitation, appears to have appropriate insight  Skin: Skin is warm, dry, intact  No obvious rashes or lesions on exposed extremities  Nail beds are pink with no cyanosis or clubbing      TELEMETRY:   Lab Results:  Results from last 7 days   Lab Units 02/27/23  0527   WBC Thousand/uL 5 13   HEMOGLOBIN g/dL 9 4*   HEMATOCRIT % 29 6*   PLATELETS Thousands/uL 176     Results from last 7 days   Lab Units 03/01/23  0532   POTASSIUM mmol/L 4 9   CHLORIDE mmol/L 102   CO2 mmol/L 30   BUN mg/dL 45*   CREATININE mg/dL 1 42*   CALCIUM mg/dL 8 5     Results from last 7 days   Lab Units 03/01/23  0532   POTASSIUM mmol/L 4 9   CHLORIDE mmol/L 102   CO2 mmol/L 30   BUN mg/dL 45*   CREATININE mg/dL 1 42*   CALCIUM mg/dL 8 5           Medications:    Current Facility-Administered Medications:   •  acetaminophen (TYLENOL) tablet 650 mg, 650 mg, Oral, Q6H PRN, Rakel Bajwa PA-C, 650 mg at 02/26/23 1255  •  albuterol inhalation solution 2 5 mg, 2 5 mg, Nebulization, Q6H PRN, Cricket Heathhtel, DO  •  amitriptyline (ELAVIL) tablet 10 mg, 10 mg, Oral, HS, Fred SIMMONS Prechtel, DO, 10 mg at 02/28/23 2124  •  aspirin (ECOTRIN LOW STRENGTH) EC tablet 81 mg, 81 mg, Oral, QAM, Josph Filter Prechtel, DO, 81 mg at 03/01/23 7901  •  atorvastatin (LIPITOR) tablet 80 mg, 80 mg, Oral, HS, Fred S Prechtel, DO, 80 mg at 02/28/23 2124  •  benzonatate (TESSALON PERLES) capsule 100 mg, 100 mg, Oral, TID PRN, [facility] Better, PA-C, 100 mg at 02/25/23 1239  •  bisacodyl (DULCOLAX) rectal suppository 10 mg, 10 mg, Rectal, Daily, Fred S Prechtel, DO, 10 mg at 03/01/23 5209  •  bisacodyl (DULCOLAX) rectal suppository 10 mg, 10 mg, Rectal, Daily PRN, Ember Baker PA-C  •  carvedilol (COREG) tablet 3 125 mg, 3 125 mg, Oral, BID With Meals, Josph Filter Prechtel, DO, 3 125 mg at 03/01/23 0850  •  dicyclomine (BENTYL) capsule 10 mg, 10 mg, Oral, 4x Daily PRN, Josph Filter Prechtel, DO, 10 mg at 02/22/23 0602  •  docusate sodium (COLACE) capsule 100 mg, 100 mg, Oral, Daily, Fred S Prechtel, DO, 100 mg at 03/01/23 0847  •  famotidine (PEPCID) tablet 20 mg, 20 mg, Oral, BID, Fred S Prechtel, DO, 20 mg at 03/01/23 0847  •  gabapentin (NEURONTIN) capsule 100 mg, 100 mg, Oral, TID, Fred S Prechtel, DO, 100 mg at 03/01/23 0847  •  heparin (porcine) subcutaneous injection 5,000 Units, 5,000 Units, Subcutaneous, Q8H [facility], Tomeka Parker PA-C, 5,000 Units at 03/01/23 1323  •  insulin glargine (LANTUS) subcutaneous injection 5 Units 0 05 mL, 5 Units, Subcutaneous, QAM, Giana Bennett PA-C, 5 Units at 03/01/23 0847  •  insulin lispro (HumaLOG) 100 units/mL subcutaneous injection 1-6 Units, 1-6 Units, Subcutaneous, TID AC, 2 Units at 03/01/23 1205 **AND** Fingerstick Glucose (POCT), , , TID AC, Tomeka Parker PA-C  •  insulin lispro (HumaLOG) 100 units/mL subcutaneous injection 1-6 Units, 1-6 Units, Subcutaneous, HS, Tomeka Parker PA-C, 2 Units at 02/26/23 2148  •  levothyroxine tablet 100 mcg, 100 mcg, Oral, Early Morning, Fred Snow, , 100 mcg at 03/01/23 0572  •  melatonin tablet 6 mg, 6 mg, Oral, HS, Noemy Shay PA-C, 6 mg at 02/28/23 2124  •  modafinil (PROVIGIL) tablet 100 mg, 100 mg, Oral, Daily, Fred S Prechtel, DO, 100 mg at 03/01/23 0847  •  ondansetron (ZOFRAN) injection 4 mg, 4 mg, Intravenous, Q6H PRN, Noemy Shay PA-C, 4 mg at 02/28/23 0206  •  pantoprazole (PROTONIX) EC tablet 40 mg, 40 mg, Oral, Early Morning, Fred S Prechtel, DO, 40 mg at 03/01/23 1693  •  polyethylene glycol (MIRALAX) packet 17 g, 17 g, Oral, Daily, Fred S Prechtel, DO, 17 g at 02/26/23 0765  •  pramipexole (MIRAPEX) tablet 1 mg, 1 mg, Oral, BID, Fred S Prechtel, DO, 1 mg at 03/01/23 6337  •  senna (SENOKOT) tablet 8 6 mg, 1 tablet, Oral, HS, Giana Bennett PA-C, 8 6 mg at 02/28/23 2124  •  sertraline (ZOLOFT) tablet 100 mg, 100 mg, Oral, HS, Fred S Prechtel, DO, 100 mg at 02/28/23 2124  •  terazosin (HYTRIN) capsule 1 mg, 1 mg, Oral, HS, Mono Mueller PA-C  •  traMADol (ULTRAM) tablet 50 mg, 50 mg, Oral, Q6H PRN, Merilynn Lundborg, PA-C, 50 mg at 03/01/23 0847    Portions of the record may have been created with voice recognition software  Occasional wrong word or "sound a like" substitutions may have occurred due to the inherent limitations of voice recognition software  Read the chart carefully and recognize, using context, where substitutions have occurred      Brooks Youssef DO, [facility]  3/1/2023 1:35 PM

## 2023-03-01 NOTE — ASSESSMENT & PLAN NOTE
Wt Readings from Last 3 Encounters:   03/01/23 76 8 kg (169 lb 5 oz)   01/31/23 77 6 kg (171 lb)   01/24/23 77 6 kg (171 lb)   · Receives most of her care at [facility] -including her cardiologist  · Presented with a complaint of shortness of breath and leg swelling   · Requiring 4 L oxygen nasal cannula on admission --> weaned to room air for several days   · Repeated echocardiogram here: EF 54%, paradoxical septal wall motion consistent with conduction abnormality, grade 1 diastolic function, aortic valve sclerosis, mild MR, trace TR  · Home regimen torsemide 40 mg daily  · Started on IV Lasix 80 mg BID --> advanced to Lasix gtt 2/24/2023  · Given one time dose metolazone 5 mg 2/24  · Weight down almost 20 lbs since admission - currently at 168 lbs  · Cardiology following -noted Cr bump - so lasix gtt held and given diuretic holiday and renal function now improved to 1 42   · She did have an episode of urinary retention requiring straight cath this morning  · We will discuss with cardiology regarding resumption of oral diuretics  · Monitor for any further retention  · BMP in a m

## 2023-03-01 NOTE — ASSESSMENT & PLAN NOTE
Lab Results   Component Value Date    HGBA1C 6 7 (H) 02/15/2023     Recent Labs     02/28/23  1649 02/28/23  2105 03/01/23  0742 03/01/23  1136   POCGLU 181* 132 116 208*   · Prehospital patient uses an insulin pump

## 2023-03-01 NOTE — PLAN OF CARE
Problem: Potential for Falls  Goal: Patient will remain free of falls  Description: INTERVENTIONS:  - Educate patient/family on patient safety including physical limitations  - Instruct patient to call for assistance with activity   - Consult OT/PT to assist with strengthening/mobility   - Keep Call bell within reach  - Keep bed low and locked with side rails adjusted as appropriate  - Keep care items and personal belongings within reach  - Initiate and maintain comfort rounds  - Make Fall Risk Sign visible to staff  - Offer Toileting every 2 Hours, in advance of need  - Initiate/Maintain bed alarm  - Apply yellow socks and bracelet for high fall risk patients  - Consider moving patient to room near nurses station  Outcome: Progressing     Problem: MOBILITY - ADULT  Goal: Maintain or return to baseline ADL function  Description: INTERVENTIONS:  -  Assess patient's ability to carry out ADLs; assess patient's baseline for ADL function and identify physical deficits which impact ability to perform ADLs (bathing, care of mouth/teeth, toileting, grooming, dressing, etc )  - Assess/evaluate cause of self-care deficits   - Assess range of motion  - Assess patient's mobility; develop plan if impaired  - Assess patient's need for assistive devices and provide as appropriate  - Encourage maximum independence but intervene and supervise when necessary  - Involve family in performance of ADLs  - Assess for home care needs following discharge   - Consider OT consult to assist with ADL evaluation and planning for discharge  - Provide patient education as appropriate  Outcome: Progressing  Goal: Maintains/Returns to pre admission functional level  Description: INTERVENTIONS:  - Perform BMAT or MOVE assessment daily    - Set and communicate daily mobility goal to care team and patient/family/caregiver  - Collaborate with rehabilitation services on mobility goals if consulted  - Perform Range of Motion 4 times a day    - Reposition patient every 4 hours    - Dangle patient 4 times a day  - Stand patient 4 times a day  - Ambulate patient 4 times a day  - Out of bed to chair 4 times a day   - Out of bed for meals 4 times a day  - Out of bed for toileting  - Record patient progress and toleration of activity level   Outcome: Progressing     Problem: PAIN - ADULT  Goal: Verbalizes/displays adequate comfort level or baseline comfort level  Description: Interventions:  - Encourage patient to monitor pain and request assistance  - Assess pain using appropriate pain scale  - Administer analgesics based on type and severity of pain and evaluate response  - Implement non-pharmacological measures as appropriate and evaluate response  - Consider cultural and social influences on pain and pain management  - Notify physician/advanced practitioner if interventions unsuccessful or patient reports new pain  Outcome: Progressing     Problem: INFECTION - ADULT  Goal: Absence or prevention of progression during hospitalization  Description: INTERVENTIONS:  - Assess and monitor for signs and symptoms of infection  - Monitor lab/diagnostic results  - Monitor all insertion sites, i e  indwelling lines, tubes, and drains  - Monitor endotracheal if appropriate and nasal secretions for changes in amount and color  - [place] appropriate cooling/warming therapies per order  - Administer medications as ordered  - Instruct and encourage patient and family to use good hand hygiene technique  - Identify and instruct in appropriate isolation precautions for identified infection/condition  Outcome: Progressing  Goal: Absence of fever/infection during neutropenic period  Description: INTERVENTIONS:  - Monitor WBC    Outcome: Progressing     Problem: SAFETY ADULT  Goal: Patient will remain free of falls  Description: INTERVENTIONS:  - Educate patient/family on patient safety including physical limitations  - Instruct patient to call for assistance with activity   - Consult OT/PT to assist with strengthening/mobility   - Keep Call bell within reach  - Keep bed low and locked with side rails adjusted as appropriate  - Keep care items and personal belongings within reach  - Initiate and maintain comfort rounds  - Make Fall Risk Sign visible to staff  - Offer Toileting every 2 Hours, in advance of need  - Initiate/Maintain bed alarm  - Apply yellow socks and bracelet for high fall risk patients  - Consider moving patient to room near nurses station  Outcome: Progressing  Goal: Maintain or return to baseline ADL function  Description: INTERVENTIONS:  -  Assess patient's ability to carry out ADLs; assess patient's baseline for ADL function and identify physical deficits which impact ability to perform ADLs (bathing, care of mouth/teeth, toileting, grooming, dressing, etc )  - Assess/evaluate cause of self-care deficits   - Assess range of motion  - Assess patient's mobility; develop plan if impaired  - Assess patient's need for assistive devices and provide as appropriate  - Encourage maximum independence but intervene and supervise when necessary  - Involve family in performance of ADLs  - Assess for home care needs following discharge   - Consider OT consult to assist with ADL evaluation and planning for discharge  - Provide patient education as appropriate  Outcome: Progressing  Goal: Maintains/Returns to pre admission functional level  Description: INTERVENTIONS:  - Perform BMAT or MOVE assessment daily    - Set and communicate daily mobility goal to care team and patient/family/caregiver  - Collaborate with rehabilitation services on mobility goals if consulted  - Perform Range of Motion 4 times a day  - Reposition patient every 2 hours    - Dangle patient 4 times a day  - Stand patient 4 times a day  - Ambulate patient 4 times a day  - Out of bed to chair 4 times a day   - Out of bed for meals 4 times a day  - Out of bed for toileting  - Record patient progress and toleration of activity level   Outcome: Progressing     Problem: DISCHARGE PLANNING  Goal: Discharge to home or other facility with appropriate resources  Description: INTERVENTIONS:  - Identify barriers to discharge w/patient and caregiver  - Arrange for needed discharge resources and transportation as appropriate  - Identify discharge learning needs (meds, wound care, etc )  - Arrange for interpretive services to assist at discharge as needed  - Refer to Case Management Department for coordinating discharge planning if the patient needs post-hospital services based on physician/advanced practitioner order or complex needs related to functional status, cognitive ability, or social support system  Outcome: Progressing     Problem: Knowledge Deficit  Goal: Patient/family/caregiver demonstrates understanding of disease process, treatment plan, medications, and discharge instructions  Description: Complete learning assessment and assess knowledge base    Interventions:  - Provide teaching at level of understanding  - Provide teaching via preferred learning methods  Outcome: Progressing

## 2023-03-01 NOTE — PLAN OF CARE
Problem: Potential for Falls  Goal: Patient will remain free of falls  Description: INTERVENTIONS:  - Educate patient/family on patient safety including physical limitations  - Instruct patient to call for assistance with activity   - Consult OT/PT to assist with strengthening/mobility   - Keep Call bell within reach  - Keep bed low and locked with side rails adjusted as appropriate  - Keep care items and personal belongings within reach  - Initiate and maintain comfort rounds  - Make Fall Risk Sign visible to staff  - Apply yellow socks and bracelet for high fall risk patients  - Consider moving patient to room near nurses station  3/1/2023 1611 by Tasneem Rasheed RN  Outcome: Adequate for Discharge  3/1/2023 1418 by Tasneem Rasheed RN  Outcome: Progressing     Problem: MOBILITY - ADULT  Goal: Maintain or return to baseline ADL function  Description: INTERVENTIONS:  -  Assess patient's ability to carry out ADLs; assess patient's baseline for ADL function and identify physical deficits which impact ability to perform ADLs (bathing, care of mouth/teeth, toileting, grooming, dressing, etc )  - Assess/evaluate cause of self-care deficits   - Assess range of motion  - Assess patient's mobility; develop plan if impaired  - Assess patient's need for assistive devices and provide as appropriate  - Encourage maximum independence but intervene and supervise when necessary  - Involve family in performance of ADLs  - Assess for home care needs following discharge   - Consider OT consult to assist with ADL evaluation and planning for discharge  - Provide patient education as appropriate  3/1/2023 1611 by Tasneem Rasheed RN  Outcome: Adequate for Discharge  3/1/2023 1418 by Tasneem Rasheed RN  Outcome: Progressing  Goal: Maintains/Returns to pre admission functional level  Description: INTERVENTIONS:  - Perform BMAT or MOVE assessment daily    - Set and communicate daily mobility goal to care team and patient/family/caregiver     - Collaborate with rehabilitation services on mobility goals if consulted  - Out of bed for toileting  - Record patient progress and toleration of activity level   3/1/2023 1611 by India Ellington RN  Outcome: Adequate for Discharge  3/1/2023 1418 by India Ellington RN  Outcome: Progressing     Problem: PAIN - ADULT  Goal: Verbalizes/displays adequate comfort level or baseline comfort level  Description: Interventions:  - Encourage patient to monitor pain and request assistance  - Assess pain using appropriate pain scale  - Administer analgesics based on type and severity of pain and evaluate response  - Implement non-pharmacological measures as appropriate and evaluate response  - Consider cultural and social influences on pain and pain management  - Notify physician/advanced practitioner if interventions unsuccessful or patient reports new pain  3/1/2023 1611 by India Ellington RN  Outcome: Adequate for Discharge  3/1/2023 1418 by India Ellington RN  Outcome: Progressing     Problem: INFECTION - ADULT  Goal: Absence or prevention of progression during hospitalization  Description: INTERVENTIONS:  - Assess and monitor for signs and symptoms of infection  - Monitor lab/diagnostic results  - Monitor all insertion sites, i e  indwelling lines, tubes, and drains  - Monitor endotracheal if appropriate and nasal secretions for changes in amount and color  - [place] appropriate cooling/warming therapies per order  - Administer medications as ordered  - Instruct and encourage patient and family to use good hand hygiene technique  - Identify and instruct in appropriate isolation precautions for identified infection/condition  3/1/2023 1611 by India Ellington RN  Outcome: Adequate for Discharge  3/1/2023 1418 by India Ellington RN  Outcome: Progressing  Goal: Absence of fever/infection during neutropenic period  Description: INTERVENTIONS:  - Monitor WBC    3/1/2023 1611 by India Ellington RN  Outcome: Adequate for Discharge  3/1/2023 1418 by Patrizia Fitzpatrick RN  Outcome: Progressing     Problem: SAFETY ADULT  Goal: Patient will remain free of falls  Description: INTERVENTIONS:  - Educate patient/family on patient safety including physical limitations  - Instruct patient to call for assistance with activity   - Consult OT/PT to assist with strengthening/mobility   - Keep Call bell within reach  - Keep bed low and locked with side rails adjusted as appropriate  - Keep care items and personal belongings within reach  - Initiate and maintain comfort rounds  - Make Fall Risk Sign visible to staff  - Apply yellow socks and bracelet for high fall risk patients  - Consider moving patient to room near nurses station  3/1/2023 1611 by Patrizia Fitzpatrick RN  Outcome: Adequate for Discharge  3/1/2023 1418 by Patrizia Fitzpatrick RN  Outcome: Progressing  Goal: Maintain or return to baseline ADL function  Description: INTERVENTIONS:  -  Assess patient's ability to carry out ADLs; assess patient's baseline for ADL function and identify physical deficits which impact ability to perform ADLs (bathing, care of mouth/teeth, toileting, grooming, dressing, etc )  - Assess/evaluate cause of self-care deficits   - Assess range of motion  - Assess patient's mobility; develop plan if impaired  - Assess patient's need for assistive devices and provide as appropriate  - Encourage maximum independence but intervene and supervise when necessary  - Involve family in performance of ADLs  - Assess for home care needs following discharge   - Consider OT consult to assist with ADL evaluation and planning for discharge  - Provide patient education as appropriate  3/1/2023 1611 by Patrizia Fitzpatrick RN  Outcome: Adequate for Discharge  3/1/2023 1418 by Patrizia Fitzpatrick RN  Outcome: Progressing  Goal: Maintains/Returns to pre admission functional level  Description: INTERVENTIONS:  - Perform BMAT or MOVE assessment daily    - Set and communicate daily mobility goal to care team and patient/family/caregiver  - Collaborate with rehabilitation services on mobility goals if consulted  - Out of bed for toileting  - Record patient progress and toleration of activity level   3/1/2023 1611 by Granville Kocher, RN  Outcome: Adequate for Discharge  3/1/2023 1418 by Granville Kocher, RN  Outcome: Progressing     Problem: DISCHARGE PLANNING  Goal: Discharge to home or other facility with appropriate resources  Description: INTERVENTIONS:  - Identify barriers to discharge w/patient and caregiver  - Arrange for needed discharge resources and transportation as appropriate  - Identify discharge learning needs (meds, wound care, etc )  - Arrange for interpretive services to assist at discharge as needed  - Refer to Case Management Department for coordinating discharge planning if the patient needs post-hospital services based on physician/advanced practitioner order or complex needs related to functional status, cognitive ability, or social support system  3/1/2023 1611 by Granville Kocher, RN  Outcome: Adequate for Discharge  3/1/2023 1418 by Granville Kocher, RN  Outcome: Progressing     Problem: Knowledge Deficit  Goal: Patient/family/caregiver demonstrates understanding of disease process, treatment plan, medications, and discharge instructions  Description: Complete learning assessment and assess knowledge base    Interventions:  - Provide teaching at level of understanding  - Provide teaching via preferred learning methods  3/1/2023 1611 by Granville Kocher, RN  Outcome: Adequate for Discharge  3/1/2023 1418 by Granville Kocher, RN  Outcome: Progressing

## 2023-03-01 NOTE — NURSING NOTE
AVS reviewed with patient and   Both verbalized understanding of CHF education, low sodium diet, and urinary retention  IV removed  Left with all belongings

## 2023-03-01 NOTE — ASSESSMENT & PLAN NOTE
Wt Readings from Last 3 Encounters:   03/01/23 76 8 kg (169 lb 5 oz)   01/31/23 77 6 kg (171 lb)   01/24/23 77 6 kg (171 lb)   · Receives most of her care at [facility] -including her cardiologist  · Presented with a complaint of shortness of breath and leg swelling   · Requiring 4 L oxygen nasal cannula on admission --> weaned to room air for several days   · Repeated echocardiogram here: EF 54%, paradoxical septal wall motion consistent with conduction abnormality, grade 1 diastolic function, aortic valve sclerosis, mild MR, trace TR  · Home regimen torsemide 40 mg daily  · Started on IV Lasix 80 mg BID --> advanced to Lasix gtt 2/24/2023  · Given one time dose metolazone 5 mg 2/24  · Weight down almost 20 lbs since admission - currently at 168 lbs  · Cardiology following -noted Cr bump - so lasix gtt held and given diuretic holiday and renal function now improved to 1 42   · She did have an episode of urinary retention requiring straight cath this morning, appears to have resolved   · Discharge on torsemide 40 mg daily   · BMP in 1 week   · She was educated at length check her weights daily at home and to keep a weight log, call cardiologist for weight gain of 3 pounds overnight or 5 pounds over 3 days

## 2023-03-01 NOTE — ASSESSMENT & PLAN NOTE
· This is an acute issue, hx of OAB, she is now having urinary retention requiring straight cath this morning around 3 AM for 600 cc  · Bladder scan this morning for 124 cc, has not urinated on her own since straight cath  · Continue monitor on retention protocol  · We will ask urology to weigh in  · Did have a bowel movement this morning has been up ambulating the halls

## 2023-03-01 NOTE — ASSESSMENT & PLAN NOTE
Results from last 7 days   Lab Units 03/01/23  0532 02/28/23  0552 02/27/23  0527 02/26/23  0544   CREATININE mg/dL 1 42* 1 95* 1 69* 1 37*   · JULIANNE on CKD stage 3  · Noted rising creatinine after IV diuresis and Lasix drip  · Baseline appears to be around 0 9-1 1  · All diuretics are currently held for diuretic holiday  · Losartan also currently held  · Renal function improved, plan to resume diuretics tomorrow  · BMP in 1 week  · Also noted to have urinary retention could have been contributing, she urinated without significant residual on bladder scan prior to discharge  · On terazosin 1 mg daily  · Patient follow-up with urology

## 2023-03-01 NOTE — ASSESSMENT & PLAN NOTE
· This is a chronic issue, she is now having urinary retention requiring straight cath this morning around 3 AM for 600 cc  · Bladder scan this morning for 124 cc, has not urinated on her own since straight cath  · Continue monitor on retention protocol  · We will ask urology to weigh in  · Did have a bowel movement this morning has been up ambulating the halls

## 2023-03-01 NOTE — ASSESSMENT & PLAN NOTE
Wt Readings from Last 3 Encounters:   03/01/23 76 8 kg (169 lb 5 oz)   01/31/23 77 6 kg (171 lb)   01/24/23 77 6 kg (171 lb)   · Receives most of her care at [facility] -including her cardiologist  · Presented with a complaint of shortness of breath and leg swelling   · Requiring 4 L oxygen nasal cannula on admission --> weaned to room air for several days   · Repeated echocardiogram here: EF 54%, paradoxical septal wall motion consistent with conduction abnormality, grade 1 diastolic function, aortic valve sclerosis, mild MR, trace TR  · Home regimen torsemide 40 mg daily  · Started on IV Lasix 80 mg BID --> advanced to Lasix gtt 2/24/2023  · Given one time dose metolazone 5 mg 2/24  · Weight down almost 20 lbs since admission - currently at 168 lbs  · Cardiology following -noted Cr bump - so lasix gtt held and given diuretic holiday and renal function now improved to 1 42   · She did have an episode of urinary retention requiring straight cath this morning  · We will discuss with cardiology regarding resumption of oral diuretics  · Monitor for any further retention  · BMP in a m

## 2023-03-01 NOTE — ASSESSMENT & PLAN NOTE
· With hypoxia, lkely secondary to acute CHF exacerbation as well as recent pneumonia  · Requiring 4 L oxygen nasal cannula on admission  · This has been tapered and she is currently on room air for several days

## 2023-03-01 NOTE — CASE MANAGEMENT
Case Management Progress Note    Patient name Maddie Dunn  Location [facility] /E4 -* MRN [id number]  : 1955 Date 3/1/2023       LOS (days): 8  Geometric Mean LOS (GMLOS) (days): 3 90  Days to GMLOS:-3 9        OBJECTIVE:        Current admission status: Inpatient  Preferred Pharmacy:   [facility]  [address]  Phone: [phone] Fax: [address] [place] [address]  [address]  Phone: [phone] Fax: [address], [facility] [address]   [address] [address]  Phone: [phone] Fax: [address] [place] [address] [address] [facility] [address] [place] [address] [facility] [address]  Phone: [phone] Fax: [phone] 63 - 7827 [address], [place]  [address]  Phone: [phone] Fax: [phone]    Primary Care Provider: Jin Rosales DO    Primary Insurance: MEDICARE  Secondary Insurance: [employer]    PROGRESS NOTE:          Met patient at bedside, discussed after care plans, as today patient declined HHC, reports no needs CM/SW to follow

## 2023-03-01 NOTE — ASSESSMENT & PLAN NOTE
Results from last 7 days   Lab Units 02/27/23  0527 02/25/23  0539 02/24/23  0531 02/23/23  0437   HEMOGLOBIN g/dL 9 4* 8 4* 7 7* 7 5*   · History of anemia of chronic disease and iron deficiency    · Baseline appearing in the 12's  · More recently hemoglobins have been in the 8's/9s according to care everywhere  · Denies bleeding anywhere, initial occult stool negative   · Follows with Hematology at [facility]   · Received [facility] outpatient   · Care everywhere-recent iron panel from February 2023: Iron 32, transferrin 139, iron saturation 17, TIBC 188, ferritin 341  · Given IV venofer x 3 days   · Hgb now appearing stable   · She needs re-eval outpatient with consideration for EGD and colonoscopy

## 2023-03-01 NOTE — ASSESSMENT & PLAN NOTE
· Home regimen losartan 50 mg daily, Coreg 3 125 mg twice daily  · Losartan held in the setting of rising Cr, BMP in 1 week

## 2023-03-01 NOTE — ASSESSMENT & PLAN NOTE
· This is an acute issue, hx of OAB, she is now having urinary retention requiring straight cath this morning around 3 AM for 600 cc  · Bladder scan this morning for 124 cc  · Patient urinated without significant residual on bladder scan this afternoon  · Appreciate urology consult   · Stopped oxybuytnin and started on terazosin   · BM today and she is ambulating halls which was encouraged   · Needs outpatient Urology follow up  · Return to the emergency department with any difficulty with urination

## 2023-03-01 NOTE — PLAN OF CARE
Problem: Potential for Falls  Goal: Patient will remain free of falls  Description: INTERVENTIONS:  - Educate patient/family on patient safety including physical limitations  - Instruct patient to call for assistance with activity   - Consult OT/PT to assist with strengthening/mobility   - Keep Call bell within reach  - Keep bed low and locked with side rails adjusted as appropriate  - Keep care items and personal belongings within reach  - Initiate and maintain comfort rounds  - Make Fall Risk Sign visible to staff  - Offer Toileting every 2 Hours, in advance of need  - Initiate/Maintain bed alarm  - Obtain necessary fall risk management equipment: bed alarm  - Apply yellow socks and bracelet for high fall risk patients  - Consider moving patient to room near nurses station  Outcome: Progressing     Problem: MOBILITY - ADULT  Goal: Maintain or return to baseline ADL function  Description: INTERVENTIONS:  -  Assess patient's ability to carry out ADLs; assess patient's baseline for ADL function and identify physical deficits which impact ability to perform ADLs (bathing, care of mouth/teeth, toileting, grooming, dressing, etc )  - Assess/evaluate cause of self-care deficits   - Assess range of motion  - Assess patient's mobility; develop plan if impaired  - Assess patient's need for assistive devices and provide as appropriate  - Encourage maximum independence but intervene and supervise when necessary  - Involve family in performance of ADLs  - Assess for home care needs following discharge   - Consider OT consult to assist with ADL evaluation and planning for discharge  - Provide patient education as appropriate  Outcome: Progressing  Goal: Maintains/Returns to pre admission functional level  Description: INTERVENTIONS:  - Perform BMAT or MOVE assessment daily    - Set and communicate daily mobility goal to care team and patient/family/caregiver     - Collaborate with rehabilitation services on mobility goals if consulted  - Ambulate patient 3 times a day  - Out of bed to chair 3 times a day   - Out of bed for meals 3 times a day  - Out of bed for toileting  - Record patient progress and toleration of activity level   Outcome: Progressing     Problem: PAIN - ADULT  Goal: Verbalizes/displays adequate comfort level or baseline comfort level  Description: Interventions:  - Encourage patient to monitor pain and request assistance  - Assess pain using appropriate pain scale  - Administer analgesics based on type and severity of pain and evaluate response  - Implement non-pharmacological measures as appropriate and evaluate response  - Consider cultural and social influences on pain and pain management  - Notify physician/advanced practitioner if interventions unsuccessful or patient reports new pain  Outcome: Progressing     Problem: INFECTION - ADULT  Goal: Absence or prevention of progression during hospitalization  Description: INTERVENTIONS:  - Assess and monitor for signs and symptoms of infection  - Monitor lab/diagnostic results  - Monitor all insertion sites, i e  indwelling lines, tubes, and drains  - Monitor endotracheal if appropriate and nasal secretions for changes in amount and color  - Webster appropriate cooling/warming therapies per order  - Administer medications as ordered  - Instruct and encourage patient and family to use good hand hygiene technique  - Identify and instruct in appropriate isolation precautions for identified infection/condition  Outcome: Progressing  Goal: Absence of fever/infection during neutropenic period  Description: INTERVENTIONS:  - Monitor WBC    Outcome: Progressing     Problem: SAFETY ADULT  Goal: Patient will remain free of falls  Description: INTERVENTIONS:  - Educate patient/family on patient safety including physical limitations  - Instruct patient to call for assistance with activity   - Consult OT/PT to assist with strengthening/mobility   - Keep Call bell within reach  - Keep bed low and locked with side rails adjusted as appropriate  - Keep care items and personal belongings within reach  - Initiate and maintain comfort rounds  - Make Fall Risk Sign visible to staff  - Offer Toileting every 2 Hours, in advance of need  - Initiate/Maintain bed alarm  - Obtain necessary fall risk management equipment: bed alarm  - Apply yellow socks and bracelet for high fall risk patients  - Consider moving patient to room near nurses station  Outcome: Progressing  Goal: Maintain or return to baseline ADL function  Description: INTERVENTIONS:  -  Assess patient's ability to carry out ADLs; assess patient's baseline for ADL function and identify physical deficits which impact ability to perform ADLs (bathing, care of mouth/teeth, toileting, grooming, dressing, etc )  - Assess/evaluate cause of self-care deficits   - Assess range of motion  - Assess patient's mobility; develop plan if impaired  - Assess patient's need for assistive devices and provide as appropriate  - Encourage maximum independence but intervene and supervise when necessary  - Involve family in performance of ADLs  - Assess for home care needs following discharge   - Consider OT consult to assist with ADL evaluation and planning for discharge  - Provide patient education as appropriate  Outcome: Progressing  Goal: Maintains/Returns to pre admission functional level  Description: INTERVENTIONS:  - Perform BMAT or MOVE assessment daily    - Set and communicate daily mobility goal to care team and patient/family/caregiver     - Collaborate with rehabilitation services on mobility goals if consulted  - Ambulate patient 3 times a day  - Out of bed to chair 3 times a day   - Out of bed for meals 3 times a day  - Out of bed for toileting  - Record patient progress and toleration of activity level   Outcome: Progressing     Problem: DISCHARGE PLANNING  Goal: Discharge to home or other facility with appropriate resources  Description: INTERVENTIONS:  - Identify barriers to discharge w/patient and caregiver  - Arrange for needed discharge resources and transportation as appropriate  - Identify discharge learning needs (meds, wound care, etc )  - Arrange for interpretive services to assist at discharge as needed  - Refer to Case Management Department for coordinating discharge planning if the patient needs post-hospital services based on physician/advanced practitioner order or complex needs related to functional status, cognitive ability, or social support system  Outcome: Progressing     Problem: Knowledge Deficit  Goal: Patient/family/caregiver demonstrates understanding of disease process, treatment plan, medications, and discharge instructions  Description: Complete learning assessment and assess knowledge base    Interventions:  - Provide teaching at level of understanding  - Provide teaching via preferred learning methods  Outcome: Progressing

## 2023-03-09 NOTE — PROGRESS NOTES
Colon and Rectal Surgery   Lizbeth Carmichael 78 y o  female MRN: [id number]   Encounter: [id number]  03/10/23   4:16 PM        ASSESSMENT:    Nata Banks is a 61-year-old female, multiple medical problems including CHF, chronic kidney disease 3, on diuretic, polyneuropathy  Examination today she has normal digital rectal examination, external tags, mild grade 1 internal hemorrhoids that we discussed the anatomy and understanding that there will be swelling with some of her constipation symptoms but as long as she is not having active frequent bleeding which she is not to avoid any hemorrhoid procedure if possible especially given her multitude of medical diagnoses  PLAN:  High fiber diet/increased hydration, 20-30grams fiber per day, increased fruits/vegetables/psyllium(metamucil or konsyl 1 tbsp 1-2x/day)  Miralax as needed for constipation, would avoid enemas if possible as these may also traumatize the area  CC:  Dr Madison Vieira    HPI  Lizbeth Carmichael is a 78 y o  female referred for evaluation today by Dr Favian Mckeon for evaluation of hemorrhoids  The patient reports occasionally painful, itching and burning anal lumps she has had for many years now, worsening over the last 3 months; she notes one of them "popped" over a month ago causing bleeding  She notes bright red rectal bleeding with bowel movements in the toilet bowl  She is using Preparation H cream and notes 1 bowel movement every 3 days  The patient had a colonoscopy on 1/20/2022 with Dr Noemi Vieira which showed: Indication: Constipation  Impression:  Two subcentimeter colon polyps removed as above  Mild left-sided diverticulosis  Otherwise normal colon  7 year recall due to personal history of colon polyps  Final Diagnosis   A  Polyp, Colorectal, cecal polyp:  - Polypoid colonic mucosa with no significant histopathologic abnormality   - Negative for dysplasia and malignancy       B   Polyp, Colorectal, ascending colon polyp:  - Tubular adenoma  - Negative for high-grade dysplasia and malignancy         Historical Information   Past Medical History:   Diagnosis Date   • Anxiety    • Asthma     controlled   • Back complaints    • Cancer (HCC)     nose   • Cellulitis of left lower leg     "not now"   • [facility]estive [facility] failure) [facility] ) 02/2021   • Chronic bilateral thoracic back pain    • Chronic kidney disease     sees nephrologist regularly" stage 3"   • Chronic myofascial pain    • Chronic pain of both lower extremities    • Chronic pain of left knee     "both knees"   • Chronic pain syndrome     bilat legs and knees/neuropathy pain   • COVID 12/2021   • CPAP (continuous positive airway pressure) dependence     no currently uses   • Depression    • Diabetes mellitus (HCC)     insulin pump   • Disease of thyroid gland    • Does use hearing aid     bilat and will wear DOS   • DVT (deep venous thrombosis) ([facility] ) 2013    left leg   • Gait disorder    • Gastroparesis    • GERD (gastroesophageal reflux disease)    • History of angina    • History of MRSA infection    • History of transfusion     Many years ago   • Hyperlipidemia    • Hypertension    • Hypoglycemic reaction     "occas low blood sugar"   • Insulin pump in place     pt reports saw endocrinologist 4/28 and will bring copy of instructions DOS   • Irritable bowel syndrome    • Kidney disease    • Pacemaker 2019   • Polyneuropathy associated with underlying disease ([facility] )    • PONV (postoperative nausea and vomiting)    • Risk for falls    • S/P insertion of spinal cord stimulator 06/08/2018   • Sarcoidosis    • Shortness of breath     "exertion and not new"   • Sleep apnea    • TIA (transient ischemic attack)    • Use of cane as ambulatory aid    • Uses walker    • Wears dentures     permanent upper/and some missing teeth/partial lower     Past Surgical History:   Procedure Laterality Date   • ABDOMINAL ADHESION SURGERY N/A 05/03/2021    Procedure: laparoscopic LYSIS ADHESIONS; Surgeon: Monik Ansari MD;  [facility];  Service: Thoracic   • BREAST SURGERY     • BREAST SURGERY      reduction   • CARDIAC PACEMAKER PLACEMENT      pacemaker   •  SECTION     • COLONOSCOPY     • DILATION AND CURETTAGE OF UTERUS      "D&E"   • HERNIA REPAIR     • HYSTERECTOMY     • JOINT REPLACEMENT Left     LTKR   • NECK SURGERY      fused 4 discs with 4 screws implanted   • NISSEN FUNDOPLICATION LAPAROSCOPIC WITH ROBOTICS N/A 2021    Procedure: ROBOTIC [address] ;  Surgeon: Monik Ansari MD;  [facility];  Service: Thoracic   • W[place] ESOPHAGOGASTRODUODENOSCOPY TRANSORAL DIAGNOSTIC N/A 2021    Procedure: ESOPHAGOGASTRODUODENOSCOPY (EGD); Surgeon: Monik Ansari MD;  [facility];  Service: Thoracic   • WV ESOPHAGOGASTRODUODENOSCOPY TRANSORAL DIAGNOSTIC N/A 2022    Procedure: ESOPHAGOGASTRODUODENOSCOPY (EGD),;  Surgeon: Monik Ansari MD;  [facility];  Service: Thoracic   • WV ESOPHAGOGASTRODUODENOSCOPY TRANSORAL DIAGNOSTIC N/A 2022    Procedure: ESOPHAGOGASTRODUODENOSCOPY (EGD); Surgeon: Monik Ansari MD;  [facility];  Service: Thoracic   • [place] ESOPHAGOGASTRODUODENOSCOPY TRANSORAL DIAGNOSTIC N/A 2022    Procedure: ESOPHAGOGASTRODUODENOSCOPY (EGD); PYLORIC DILATION; GE JUNCTION DILATION;  Surgeon: Monik Ansari MD;  [facility];  Service: Thoracic   • WV ESOPHAGOGASTRODUODENOSCOPY TRANSORAL DIAGNOSTIC N/A 2022    Procedure: ESOPHAGOGASTRODUODENOSCOPY (EGD); Surgeon: Manjula Huff MD;  [facility];  Service: Thoracic   • WV ESOPHAGOGASTRODUODENOSCOPY TRANSORAL DIAGNOSTIC N/A 2023    Procedure: ESOPHAGOGASTRODUODENOSCOPY (EGD);   Surgeon: Monik Ansari MD;  [facility];  Service: Thoracic   • WV ESOPHAGOSCOPY FLEX BALLOON DILAT <30 MM DIAM N/A 2022    Procedure: DILATATION ESOPHAGEAL;  Surgeon: Miguelito Luna Ruperto Hoskins MD;  [facility];  Service: Thoracic   • OR ESOPHAGOSCOPY FLEX BALLOON DILAT <30 MM DIAM N/A 02/16/2022    Procedure: DILATATION ESOPHAGEAL;  Surgeon: Carlita Flores MD;  [facility];  Service: Thoracic   • OR ESOPHAGOSCOPY FLEX BALLOON DILAT <30 MM DIAM N/A 11/29/2022    Procedure: DILATATION ESOPHAGEAL esophageal and pyloric dilation;  Surgeon: Xander Joya MD;  [facility];  Service: Thoracic   • OR ESOPHAGOSCOPY FLEX BALLOON DILAT <30 MM DIAM N/A 1/24/2023    Procedure: esophageal dilation dilated up to 47  with pylorus dilation dilated up to 18;  Surgeon: Carlita Flores MD;  [facility];  Service: Thoracic   • OR RIBEIRO IMPLTJ NSTIM ELTRDS PLATE/PADDLE EDRL Left 04/23/2018    Procedure:  Insertion of thoracic spinal cord stimulator electrode via laminotomy and placement of left buttock IPG;  Surgeon: Vitor Auguste MD;  [facility];  Service: Neurosurgery   • REPLACEMENT TOTAL KNEE     • ROTATOR CUFF REPAIR     • SPINAL STIMULATOR PLACEMENT Left 10/03/2018    Procedure: BUTTOCK RE-OPENING INCISION FOR REPOSITIONING OF IMPLANTABLE PULSE GENERATOR;  Surgeon: Vitor Auguste MD;  [facility];  Service: Neurosurgery   • TONSILLECTOMY     • UPPER GASTROINTESTINAL ENDOSCOPY     • WISDOM TOOTH EXTRACTION       Meds/Allergies     Current Outpatient Medications:   •  albuterol (ACCUNEB) 1 25 MG/3ML nebulizer solution, Take 2 5 mg by nebulization every 6 (six) hours as needed for wheezing, Disp: , Rfl:   •  albuterol (PROVENTIL HFA,VENTOLIN HFA) 90 mcg/act inhaler, Inhale 2 puffs every 6 (six) hours as needed for wheezing, Disp: , Rfl:   •  amitriptyline (ELAVIL) 10 mg tablet, Take 10 mg by mouth daily at bedtime, Disp: , Rfl:   •  Armodafinil (Nuvigil) 250 MG tablet, Take 1 tablet (250 mg total) by mouth every morning, Disp: 30 tablet, Rfl: 5  •  aspirin (ECOTRIN LOW STRENGTH) 81 mg EC tablet, Take 81 mg by mouth every morning, Disp: , Rfl:   • atorvastatin (LIPITOR) 80 mg tablet, Take 80 mg by mouth daily at bedtime  , Disp: , Rfl:   •  bisacodyl (DULCOLAX) 10 mg suppository, Insert 1 suppository (10 mg total) into the rectum daily, Disp: 30 suppository, Rfl: 3  •  calcium carbonate (OS-KARIS) 1250 (500 Ca) MG chewable tablet, Chew 1 tablet daily, Disp: , Rfl:   •  calcium carbonate (TUMS) 500 mg chewable tablet, Chew 1 tablet daily, Disp: , Rfl:   •  carvedilol (COREG) 3 125 mg tablet, Take 3 125 mg by mouth 2 (two) times a day with meals, Disp: , Rfl:   •  chlorzoxazone (PARAFON FORTE) 500 mg tablet, Take 1 PO QID PRN for pain and spasms   (Patient taking differently: Take 500 mg by mouth 3 (three) times a day as needed Take 1 PO QID PRN for pain and spasms ), Disp: 120 tablet, Rfl: 1  •  Cyanocobalamin (VITAMIN B-12 PO), Take 1 capsule by mouth every evening , Disp: , Rfl:   •  Darbepoetin Mike (ARANESP, ALBUMIN FREE, IJ), Inject as directed every 14 (fourteen) days, Disp: , Rfl:   •  docusate sodium (COLACE) 100 mg capsule, TAKE ONE CAPSULE BY MOUTH EVERY DAY, Disp: 90 capsule, Rfl: 3  •  esomeprazole (NexIUM) 40 MG capsule, Take 1 capsule (40 mg total) by mouth 2 (two) times a day before meals, Disp: 180 capsule, Rfl: 3  •  famotidine (PEPCID) 20 mg tablet, TAKE 1 TABLET BY MOUTH TWICE A DAY, Disp: 180 tablet, Rfl: 0  •  fluticasone-salmeterol (ADVAIR) 100-50 mcg/dose inhaler, Inhale 2 puffs as needed, Disp: , Rfl:   •  gabapentin (NEURONTIN) 100 mg capsule, Take 100 mg by mouth 2 (two) times a day, Disp: , Rfl:   •  glucose blood test strip, Testing six times daily  E11 65 on insulin pump, Disp: , Rfl:   •  glucose blood test strip, Use, Disp: , Rfl:   •  Insulin Infusion Pump (MINIMED INSULIN PUMP) PRINCESS, Use, Disp: , Rfl:   •  ketorolac (ACULAR) 0 5 % ophthalmic solution, INSTILL 1 DROP INTO THE OPERATIVE EYE ONCE DAILY STARTING 3 DAYS PRIOR TO SURGERY AS DIRECTED, Disp: , Rfl:   •  losartan (COZAAR) 50 mg tablet, Take 1 tablet (50 mg total) by mouth daily at bedtime Do not start before March 6, 2023 , Disp: , Rfl: 0  •  NOVOLOG 100 UNIT/ML injection, Pt reports unsure of total dose a day/did see her endocrinologist Dr Jolie Kent,  PA Pt has pump on upper left abdomen cut basaL RATE IN HALF, Disp: , Rfl: 3  •  ondansetron (ZOFRAN) 8 mg tablet, Take 8 mg by mouth every 8 (eight) hours as needed  , Disp: , Rfl:   •  polyethylene glycol (MIRALAX) 17 g packet, Take 17 g by mouth daily, Disp: 90 each, Rfl: 3  •  pramipexole (MIRAPEX) 1 mg tablet, Take 1 mg by mouth 2 (two) times a day  , Disp: , Rfl:   •  Semaglutide (OZEMPIC, 0 25 OR 0 5 MG/DOSE, SC), Inject 0 25 mg under the skin once a week Sundays, Disp: , Rfl:   •  sertraline (ZOLOFT) 50 mg tablet, Take 100 mg by mouth daily at bedtime, Disp: , Rfl:   •  terazosin (HYTRIN) 1 mg capsule, Take 1 capsule (1 mg total) by mouth daily at bedtime, Disp: 30 capsule, Rfl: 0  •  torsemide (DEMADEX) 20 mg tablet, Take 2 tablets (40 mg total) by mouth daily, Disp: 60 tablet, Rfl: 0  •  levothyroxine 100 mcg tablet, Take 100 mcg by mouth daily, Disp: , Rfl:     Allergies   Allergen Reactions   • Bactrim [Sulfamethoxazole-Trimethoprim] Hives   • Sucralfate Hives     Facial swelling   • Topamax [Topiramate]      disorentation   • Azithromycin Itching   • Pregabalin Confusion and Other (See Comments)     altered mental status   • Ciprofloxacin Drowsiness     Other reaction(s):  Other (See Comments)  "drowsiness"   • Norvasc [Amlodipine] Swelling   • Baclofen      "That knocks me out "   • Bupropion Fatigue   • Methotrexate Nausea Only     Social History   Social History     Substance and Sexual Activity   Alcohol Use Never     Social History     Substance and Sexual Activity   Drug Use Yes   • Frequency: 8 0 times per week   • Types: Marijuana    Comment: Medical Marijuana/vape pen once per week     Social History     Tobacco Use   Smoking Status Never   Smokeless Tobacco Never     Family History:   Family History   Problem Relation Age of [place]   • Diabetes Family    • Heart disease Family    • Hypertension Family    • Neuropathy Family    • No Known Problems Mother    • Heart disease Father    • Diabetes Father    • No Known Problems Maternal Grandmother    • No Known Problems Maternal Grandfather    • No Known Problems Paternal Grandmother    • No Known Problems Paternal Grandfather    • No Known Problems Brother    • No Known Problems Daughter    • Cancer Son        Review of Systems   Constitutional: Negative  HENT: Negative  Eyes: Negative  Respiratory: Negative  Cardiovascular: Negative  Gastrointestinal: Positive for anal bleeding and rectal pain  Endocrine: Negative  Genitourinary: Negative  Musculoskeletal: Negative  Skin: Negative  Allergic/Immunologic: Negative  Neurological: Negative  Hematological: Negative  Psychiatric/Behavioral: Negative  Objective   Current Vitals:   Vitals:    03/10/23 1524   Weight: 78 5 kg (173 lb)   Height: 5' 2" (1 575 m)     Physical Exam:  General:no distress, difficult ambulation  Pulm:no increased work of breathing  Abdomen:soft,nontender  Rectal:normal perianal skin/sphincter tone, external tags,no masses palpated    Anoscopy    Date/Time: 3/10/2023 4:17 PM  Performed by: Rebel Farrar MD  Authorized by: Rebel Farrar MD     Verbal consent obtained?: Yes    Consent given by:  Patient  Patient identity confirmed:  Verbally with patient  Indications: hemorrhoids and rectal bleeding    Scope type: Anoscope   Normal anorectal mucosa, grade 1 internal hemorrhoids circumferential without irritation/engorgement or suggestion of recent bleeding

## 2023-03-10 ENCOUNTER — OFFICE VISIT (OUTPATIENT)
Age: 68
End: 2023-03-10

## 2023-03-10 VITALS — BODY MASS INDEX: 31.83 KG/M2 | WEIGHT: 173 LBS | HEIGHT: 62 IN

## 2023-03-10 DIAGNOSIS — K64.9 HEMORRHOIDS, UNSPECIFIED HEMORRHOID TYPE: Primary | ICD-10-CM

## 2023-03-10 NOTE — PATIENT INSTRUCTIONS
ASSESSMENT:    Hakan Marin is a 80-year-old female, multiple medical problems including CHF, chronic kidney disease 3, on diuretic, polyneuropathy  Examination today she has normal digital rectal examination, external tags, mild grade 1 internal hemorrhoids that we discussed the anatomy and understanding that there will be swelling with some of her constipation symptoms but as long as she is not having active frequent bleeding which she is not to avoid any hemorrhoid procedure if possible especially given her multitude of medical diagnoses      PLAN:  High fiber diet/increased hydration, 20-30grams fiber per day, increased fruits/vegetables/psyllium(metamucil or konsyl 1 tbsp 1-2x/day)  Miralax as needed for constipation, would avoid enemas if possible as these may also traumatize the area  CC:  Dr Lourdes Fabry Dr Lugene Kid

## 2023-03-20 ENCOUNTER — OFFICE VISIT (OUTPATIENT)
Dept: OBGYN CLINIC | Facility: HOSPITAL | Age: 68
End: 2023-03-20

## 2023-03-20 ENCOUNTER — HOSPITAL ENCOUNTER (OUTPATIENT)
Dept: RADIOLOGY | Facility: HOSPITAL | Age: 68
Discharge: HOME | End: 2023-03-20
Attending: ORTHOPAEDIC SURGERY

## 2023-03-20 VITALS
DIASTOLIC BLOOD PRESSURE: 77 MMHG | HEIGHT: 62 IN | HEART RATE: 86 BPM | WEIGHT: 173.06 LBS | SYSTOLIC BLOOD PRESSURE: 150 MMHG | BODY MASS INDEX: 31.85 KG/M2

## 2023-03-20 DIAGNOSIS — M43.16 SPONDYLOLISTHESIS AT L4-L5 LEVEL: ICD-10-CM

## 2023-03-20 DIAGNOSIS — M51.36 LUMBAR DEGENERATIVE DISC DISEASE: ICD-10-CM

## 2023-03-20 DIAGNOSIS — R52 PAIN: Primary | ICD-10-CM

## 2023-03-20 DIAGNOSIS — R52 PAIN: ICD-10-CM

## 2023-05-03 DIAGNOSIS — K22.2 ESOPHAGEAL STRICTURE: ICD-10-CM

## 2023-05-03 RX ORDER — FAMOTIDINE 20 MG/1
TABLET, FILM COATED ORAL
Qty: 180 TABLET | Refills: 0 | Status: SHIPPED | OUTPATIENT
Start: 2023-05-03

## 2023-06-06 ENCOUNTER — TELEPHONE (OUTPATIENT)
Dept: UROLOGY | Facility: MEDICAL CENTER | Age: 68
End: 2023-06-06

## 2023-06-06 NOTE — TELEPHONE ENCOUNTER
Pt called asking about if any of our providers interstim  Pt goes to [facility] at the moment and is not happy with the care  I did inform her that we have one provider who is located in [facility] or [place]    Pt did not want to go that far at this time

## 2023-07-19 ENCOUNTER — TELEPHONE (OUTPATIENT)
Dept: UROLOGY | Facility: MEDICAL CENTER | Age: 68
End: 2023-07-19

## 2023-07-19 NOTE — TELEPHONE ENCOUNTER
New Patient    What is the reason for the patient’s appointment?:  Pt called stated that she has bladder stim and looking to transfer care to [facility]   Pt is not happy with the care she has at [facility]   Pt has issues with the bladder stim   Not sure if we do bladder stim.   Please review   What office location does the patient prefer?:  [place]   Does patient have Imaging/Lab Results:    Have patient records been requested?:  If No, are the records showing in Epic:       INSURANCE:   Do we accept the patient's insurance or is the patient Self-Pay?:    Insurance Provider: Medicare   Plan Type/Number:   Member ID#:       HISTORY:   Has the patient had any previous Urologist(s)?:  Yes [facility]  Was the patient seen in the ED?: yes     Has the patient had any outside testing done?:[facility]    Does the patient have a personal history of cancer?:  No

## 2023-09-15 ENCOUNTER — OFFICE VISIT (OUTPATIENT)
Dept: GASTROENTEROLOGY | Facility: CLINIC | Age: 68
End: 2023-09-15
Payer: MEDICARE

## 2023-09-15 VITALS
TEMPERATURE: 98.5 F | SYSTOLIC BLOOD PRESSURE: 120 MMHG | HEIGHT: 62 IN | BODY MASS INDEX: 36.07 KG/M2 | WEIGHT: 196 LBS | DIASTOLIC BLOOD PRESSURE: 70 MMHG

## 2023-09-15 DIAGNOSIS — K21.9 GASTROESOPHAGEAL REFLUX DISEASE WITHOUT ESOPHAGITIS: ICD-10-CM

## 2023-09-15 DIAGNOSIS — E66.01 MORBID OBESITY WITH BMI OF 40.0-44.9, ADULT (HCC): ICD-10-CM

## 2023-09-15 DIAGNOSIS — K31.84 GASTROPARESIS: Primary | ICD-10-CM

## 2023-09-15 DIAGNOSIS — K58.1 IRRITABLE BOWEL SYNDROME WITH CONSTIPATION: ICD-10-CM

## 2023-09-15 DIAGNOSIS — K22.0 ACHALASIA: ICD-10-CM

## 2023-09-15 PROBLEM — K22.2 ESOPHAGEAL STRICTURE: Status: RESOLVED | Noted: 2022-01-10 | Resolved: 2023-09-15

## 2023-09-15 PROBLEM — R13.19 ESOPHAGEAL DYSPHAGIA: Status: RESOLVED | Noted: 2022-07-07 | Resolved: 2023-09-15

## 2023-09-15 PROCEDURE — 99214 OFFICE O/P EST MOD 30 MIN: CPT | Performed by: INTERNAL MEDICINE

## 2023-09-15 RX ORDER — LINACLOTIDE 145 UG/1
145 CAPSULE, GELATIN COATED ORAL DAILY
Qty: 90 CAPSULE | Refills: 3 | Status: SHIPPED | OUTPATIENT
Start: 2023-09-15

## 2023-09-15 NOTE — PROGRESS NOTES
[facility] - Outpatient Follow-up Note  Link Franco 80 y.o. female MRN: [id number]  Encounter: [id number]          ASSESSMENT AND PLAN:  80year old female here for follow up. She has a PPM and some CHF with diastolic dysfunction and follows with Cardiology. Had small amount of ascites on most recnent CT scan but unchanged from prior. 1. Gastroparesis  -can give short course of reglan; but can hold as she thinks it is causing discomfort. 2. Gastroesophageal reflux disease without esophagitis  -continue pepcid, can take the reglan for short courses     3. Irritable bowel syndrome with constipation  -was taking miralax, enemas, and suppositories but without any help, taking percocet as needed     4. Achalasia  -s/p treatment    Follow up in a few months time    ______________________________________________    SUBJECTIVE:  80year old female who reported epigastric abdominal pain which prompted an ER visit aqt [facility]. She had an EGD at [facility] but I don't see the biopsies resulted as of yet. She was told it was negative. She was discharged with reglan for 8 days. Pain is much improved. REVIEW OF SYSTEMS IS OTHERWISE NEGATIVE.       Historical Information   Past Medical History:   Diagnosis Date   • Anxiety    • Asthma     controlled   • Back complaints    • Cancer (HCC)     nose   • Cellulitis of left lower leg     "not now"   • CHF (congestive heart failure) ([address]) 02/2021   • Chronic bilateral thoracic back pain    • Chronic kidney disease     sees nephrologist regularly" stage 3"   • Chronic myofascial pain    • Chronic pain of both lower extremities    • Chronic pain of left knee     "both knees"   • Chronic pain syndrome     bilat legs and knees/neuropathy pain   • COVID 12/2021   • CPAP (continuous positive airway pressure) dependence     no currently uses   • Depression    • Diabetes mellitus (HCC)     insulin pump   • Disease of thyroid gland    • Does use hearing aid     bilat and will wear DOS   • DVT (deep venous thrombosis) ([address]) 2013    left leg   • Gait disorder    • Gastroparesis    • GERD (gastroesophageal reflux disease)    • History of angina    • History of MRSA infection    • History of transfusion     Many years ago   • Hyperlipidemia    • Hypertension    • Hypoglycemic reaction     "occas low blood sugar"   • Insulin pump in place     pt reports saw endocrinologist  and will bring copy of instructions DOS   • Irritable bowel syndrome    • Kidney disease    • Pacemaker    • Polyneuropathy associated with underlying disease ([address])    • PONV (postoperative nausea and vomiting)    • Risk for falls    • S/P insertion of spinal cord stimulator 2018   • Sarcoidosis    • Shortness of breath     "exertion and not new"   • Sleep apnea    • TIA (transient ischemic attack)    • Use of cane as ambulatory aid    • Uses walker    • Wears dentures     permanent upper/and some missing teeth/partial lower     Past Surgical History:   Procedure Laterality Date   • ABDOMINAL ADHESION SURGERY N/A 2021    Procedure: laparoscopic LYSIS ADHESIONS;  Surgeon: Eloina Martin MD;  [facility];  Service: Thoracic   • BREAST SURGERY     • BREAST SURGERY      reduction   • CARDIAC PACEMAKER PLACEMENT      pacemaker   •  SECTION     • COLONOSCOPY     • DILATION AND CURETTAGE OF UTERUS      "D&E"   • HERNIA REPAIR     • HYSTERECTOMY     • JOINT REPLACEMENT Left     LTKR   • NECK SURGERY      fused 4 discs with 4 screws implanted   • NISSEN FUNDOPLICATION LAPAROSCOPIC WITH ROBOTICS N/A 2021    Procedure: ROBOTIC [facility] ;  Surgeon: Eloina Martin MD;  [facility]: [facility];  Service: Thoracic   • NV ESOPHAGOGASTRODUODENOSCOPY TRANSORAL DIAGNOSTIC N/A 2021    Procedure: ESOPHAGOGASTRODUODENOSCOPY (EGD);   Surgeon: Eloina Martin MD;  [facility];  Service: Thoracic   • NV ESOPHAGOGASTRODUODENOSCOPY TRANSORAL DIAGNOSTIC N/A 01/12/2022    Procedure: ESOPHAGOGASTRODUODENOSCOPY (EGD),;  Surgeon: Charanjit Allen MD;  [facility];  Service: Thoracic   • MT ESOPHAGOGASTRODUODENOSCOPY TRANSORAL DIAGNOSTIC N/A 02/16/2022    Procedure: ESOPHAGOGASTRODUODENOSCOPY (EGD); Surgeon: Charanjit Allen MD;  [facility];  Service: Thoracic   • MT ESOPHAGOGASTRODUODENOSCOPY TRANSORAL DIAGNOSTIC N/A 07/12/2022    Procedure: ESOPHAGOGASTRODUODENOSCOPY (EGD); PYLORIC DILATION; GE JUNCTION DILATION;  Surgeon: Charanjit Allen MD;  [facility];  Service: Thoracic   • MT ESOPHAGOGASTRODUODENOSCOPY TRANSORAL DIAGNOSTIC N/A 11/29/2022    Procedure: ESOPHAGOGASTRODUODENOSCOPY (EGD); Surgeon: Elizabeth Ulloa MD;  [facility];  Service: Thoracic   • MT ESOPHAGOGASTRODUODENOSCOPY TRANSORAL DIAGNOSTIC N/A 1/24/2023    Procedure: ESOPHAGOGASTRODUODENOSCOPY (EGD); Surgeon: Charanjit Allen MD;  [facility];  Service: Thoracic   • MT ESOPHAGOSCOPY FLEX BALLOON DILAT <30 MM DIAM N/A 01/12/2022    Procedure: DILATATION ESOPHAGEAL;  Surgeon: Charanjit Allen MD;  [facility];  Service: Thoracic   • MT ESOPHAGOSCOPY FLEX BALLOON DILAT <30 MM DIAM N/A 02/16/2022    Procedure: DILATATION ESOPHAGEAL;  Surgeon: Charanjit Allen MD;  [facility];  Service: Thoracic   • MT ESOPHAGOSCOPY FLEX BALLOON DILAT <30 MM DIAM N/A 11/29/2022    Procedure: DILATATION ESOPHAGEAL esophageal and pyloric dilation;  Surgeon: Elizabeth Ulloa MD;  [facility];  Service: Thoracic   • MT ESOPHAGOSCOPY FLEX BALLOON DILAT <30 MM DIAM N/A 1/24/2023    Procedure: esophageal dilation dilated up to 47  with pylorus dilation dilated up to 18;  Surgeon: Charanjit Allen MD;  [facility];  Service: Thoracic   • MT RIBEIRO IMPLTJ NSTIM ELTRDS PLATE/PADDLE EDRL Left 04/23/2018    Procedure:  Insertion of thoracic spinal cord stimulator electrode via laminotomy and placement of left buttock IPG;  Surgeon: Inga Ewing MD;  [facility];  Service: Neurosurgery   • REPLACEMENT TOTAL KNEE     • ROTATOR CUFF REPAIR     • SPINAL STIMULATOR PLACEMENT Left 10/03/2018    Procedure: BUTTOCK RE-OPENING INCISION FOR REPOSITIONING OF IMPLANTABLE PULSE GENERATOR;  Surgeon: Inga Ewing MD;  [facility];  Service: Neurosurgery   • TONSILLECTOMY     • UPPER GASTROINTESTINAL ENDOSCOPY     • WISDOM TOOTH EXTRACTION       Social History   Social History     Substance and Sexual Activity   Alcohol Use Never     Social History     Substance and Sexual Activity   Drug Use Yes   • Frequency: 8.0 times per week   • Types: Marijuana    Comment: Medical Marijuana/vape pen once per week     Social History     Tobacco Use   Smoking Status Never   Smokeless Tobacco Never     Family History   Problem Relation Age of Onset   • Diabetes Family    • Heart disease Family    • Hypertension Family    • Neuropathy Family    • No Known Problems Mother    • Heart disease Father    • Diabetes Father    • No Known Problems Maternal Grandmother    • No Known Problems Maternal Grandfather    • No Known Problems Paternal Grandmother    • No Known Problems Paternal Grandfather    • No Known Problems Brother    • No Known Problems Daughter    • Cancer Son        Meds/Allergies       Current Outpatient Medications:   •  albuterol (ACCUNEB) 1.25 MG/3ML nebulizer solution  •  albuterol (PROVENTIL HFA,VENTOLIN HFA) 90 mcg/act inhaler  •  amitriptyline (ELAVIL) 10 mg tablet  •  Armodafinil (Nuvigil) 250 MG tablet  •  aspirin (ECOTRIN LOW STRENGTH) 81 mg EC tablet  •  atorvastatin (LIPITOR) 80 mg tablet  •  bisacodyl (DULCOLAX) 10 mg suppository  •  calcium carbonate (OS-KARIS) 1250 (500 Ca) MG chewable tablet  •  calcium carbonate (TUMS) 500 mg chewable tablet  •  carvedilol (COREG) 3.125 mg tablet  •  chlorzoxazone (PARAFON FORTE) 500 mg tablet  •  Cyanocobalamin (VITAMIN B-12 PO)  • Darbepoetin Mike (ARANESP, ALBUMIN FREE, IJ)  •  docusate sodium (COLACE) 100 mg capsule  •  esomeprazole (NexIUM) 40 MG capsule  •  famotidine (PEPCID) 20 mg tablet  •  fluticasone-salmeterol (ADVAIR) 100-50 mcg/dose inhaler  •  gabapentin (NEURONTIN) 100 mg capsule  •  glucose blood test strip  •  glucose blood test strip  •  Insulin Infusion Pump (MINIMED INSULIN PUMP) PRINCESS  •  losartan (COZAAR) 50 mg tablet  •  NOVOLOG 100 UNIT/ML injection  •  polyethylene glycol (MIRALAX) 17 g packet  •  pramipexole (MIRAPEX) 1 mg tablet  •  terazosin (HYTRIN) 1 mg capsule  •  torsemide (DEMADEX) 20 mg tablet  •  ketorolac (ACULAR) 0.5 % ophthalmic solution  •  levothyroxine 100 mcg tablet  •  ondansetron (ZOFRAN) 8 mg tablet  •  Semaglutide (OZEMPIC, 0.25 OR 0.5 MG/DOSE, SC)  •  sertraline (ZOLOFT) 50 mg tablet  No current facility-administered medications for this visit. Allergies   Allergen Reactions   • Bactrim [Sulfamethoxazole-Trimethoprim] Hives   • Sucralfate Hives     Facial swelling   • Topamax [Topiramate]      disorentation   • Azithromycin Itching   • Pregabalin Confusion and Other (See Comments)     altered mental status   • Ciprofloxacin Drowsiness     Other reaction(s): Other (See Comments)  "drowsiness"   • Norvasc [Amlodipine] Swelling   • Baclofen      "That knocks me out."   • Bupropion Fatigue   • Methotrexate Nausea Only           Objective     Blood pressure 120/70, temperature 98.5 °F (36.9 °C), temperature source Tympanic, height 5' 2" (1.575 m), weight 88.9 kg (196 lb). Body mass index is 35.85 kg/m². PHYSICAL EXAM:      General Appearance:   Alert, cooperative, no distress   HEENT:   Normocephalic, atraumatic, anicteric.     Neck:  Supple, symmetrical, trachea midline   Lungs:   Clear to auscultation bilaterally; no rales, rhonchi or wheezing; respirations unlabored    Heart[de-identified]   Regular rate and rhythm; no murmur, rub, or gallop.    Abdomen:   Soft, non-tender, non-distended; normal bowel sounds; no masses, no organomegaly    Genitalia:   Deferred    Rectal:   Deferred    Extremities:  No cyanosis, clubbing or edema    Pulses:  2+ and symmetric    Skin:  No jaundice, rashes, or lesions    Lymph nodes:  No palpable cervical lymphadenopathy        Lab Results:   No visits with results within 1 Day(s) from this visit. Latest known visit with results is:   Admission on 04/09/2023, Discharged on 04/09/2023   Component Date Value   • WBC 04/09/2023 3.52 (L)    • RBC 04/09/2023 2.57 (L)    • Hemoglobin 04/09/2023 8.0 (L)    • Hematocrit 04/09/2023 25.9 (L)    • MCV 04/09/2023 101 (H)    • MCH 04/09/2023 31.1    • MCHC 04/09/2023 30.9 (L)    • RDW 04/09/2023 14.0    • MPV 04/09/2023 9.1    • Platelets 23/47/7919 177    • nRBC 04/09/2023 0    • Neutrophils Relative 04/09/2023 73    • Immat GRANS % 04/09/2023 1    • Lymphocytes Relative 04/09/2023 15    • Monocytes Relative 04/09/2023 9    • Eosinophils Relative 04/09/2023 2    • Basophils Relative 04/09/2023 0    • Neutrophils Absolute 04/09/2023 2.59    • Immature Grans Absolute 04/09/2023 0.03    • Lymphocytes Absolute 04/09/2023 0.52 (L)    • Monocytes Absolute 04/09/2023 0.30    • Eosinophils Absolute 04/09/2023 0.07    • Basophils Absolute 04/09/2023 0.01    • Sodium 04/09/2023 141    • Potassium 04/09/2023 4.1    • Chloride 04/09/2023 106    • CO2 04/09/2023 29    • ANION GAP 04/09/2023 6    • BUN 04/09/2023 26 (H)    • Creatinine 04/09/2023 1.23    • Glucose 04/09/2023 226 (H)    • Calcium 04/09/2023 8.7    • AST 04/09/2023 26    • ALT 04/09/2023 21    • Alkaline Phosphatase 04/09/2023 99    • Total Protein 04/09/2023 6.4    • Albumin 04/09/2023 3.5    • Total Bilirubin 04/09/2023 0.30    • eGFR 04/09/2023 45    • Blood Culture 04/09/2023 No Growth After 5 Days. • Blood Culture 04/09/2023 No Growth After 5 Days.           Radiology Results:   GI IMAGE CAPTURE - EGD    Result Date: 9/12/2023  Narrative: Please See Opnote for Result    US ABDOMEN LIMITED (RUQ)    Result Date: 9/12/2023  Narrative: HISTORY: Gallstones COMPARISON: CT abdomen/pelvis of 9/11/2023 TECHNIQUE: Limited ultrasound of the abdomen was performed utilizing grayscale and color Doppler technique to evaluate the right upper quadrant FINDINGS: Liver: The liver is mildly enlarged measuring 19.1 cm in longitudinal diameter. The hepatic parenchyma is coarsened and mildly heterogeneous, but with no discrete visualized complex cystic or solid masses. . Bile ducts: There is no evidence of intra-or extrahepatic biliary ductal dilatation. The common duct measures 4 mm in caliber. Gallbladder: The gallbladder is physiologically distended. No gallstones are visualized. There is no gallbladder wall thickening or pericholecystic fluid. Right Kidney: The right kidney is normal in size, measuring 9.3 cm in length. There are no obstructing calcifications or hydronephrosis. Pancreas: Poorly visualized due to midline obscuring bowel gas limiting sonographic assessment. Aorta and IVC: The limited views of the aorta and inferior vena cava are within normal limits. Ascites: None    Impression: IMPRESSION: 1.  No sonographic evidence of shadowing cholelithiasis, cholecystitis or abnormal biliary ductal dilation. 2.  Mildly enlarged liver with coarsened hepatic parenchyma such as can be seen with hepatocellular dysfunction and can be further correlated with pertinent hepatic serologic markers. Workstation:[id number]    VAS VENOUS DUPLEX LOWER EXTREMITY BILATERAL COMPLETE    Result Date: 9/11/2023  Narrative: History: Bilateral lower extremity swelling. Evaluate for DVT. Real-time B-mode ultrasound, spectral Doppler analysis and color Doppler examination of both lower extremities were performed. Both common femoral veins, femoral veins, and popliteal veins are normal in caliber and compressibility. These veins demonstrate phasic venous waveforms, with normal blood flow on color Doppler examination.  The deep calf veins could not be adequately visualized for evaluation due to significantly decreased penetration related to overlying soft tissue edema. Impression: Impression: No evidence of DVT in either visualized femoral/popliteal system. The deep calf veins could not be adequately visualized for evaluation. Workstation:[id number]    XR chest portable    Result Date: 9/11/2023  Narrative: Clinical: Evaluate for effusions, pneumonia. According to electronic medical records, the patient presents with abdominal pain. TECHNIQUE: Portable erect AP frontal chest x-ray. COMPARISON: Chest x-ray dated 8/24/2023 and lung bases from CT abdomen dated 9/11/2023. FINDINGS: Spinal catheter and surgical hardware in the cervical spine are again noted. Cardiac pacemaker is again seen. The heart is top normal in size. The mediastinal silhouette is within normal limits. Linear subsegmental right lower lobe atelectasis is increased compared to prior chest x-ray. The lungs are otherwise clear. I do not identify a pleural effusion or pulmonary consolidation. Impression: IMPRESSION: Mild subsegmental right lower lobe atelectasis. Workstation:[id number]    CTA abdomen pelvis w wo contrast    Result Date: 9/11/2023  Narrative: History: Abdominal pain. Comments: CT scan of the abdomen and pelvis is performed from the level of the lung bases to the pubic symphysis with the use of intravenous contrast. The oral contrast is not administered limiting evaluation of the gastrointestinal tract. Comparison is made with prior CT scan of abdomen and pelvis done on 7/11/2023. Coronal and sagittal reformatted images are obtained utilizing MPR. Abdomen. --------------------- The lung bases are clear. The enlarged liver demonstrates homogeneous attenuation without focal mass lesion measuring 19.0 cm in length. There is generalized anasarca with small amount of perihepatic and abdominopelvic ascites. There are no CT findings of radiopaque cholelithiasis.  The enlarged spleen measures 15.0 cm in length with too small to characterize splenic low-attenuation lesion unchanged as compared to prior CT scan of abdomen and pelvis done on 7/11/2023 which may represent cyst versus hemangioma, however, other etiologies would not be excluded. The pancreas is normal. The adrenal glands are normal. There are no findings of hydronephrosis or hydroureter. There is small hiatal hernia. The small and large bowel loops are normal in caliber. The appendix is normal. There are atherosclerotic aortic and vascular calcifications. Pelvis. --------------------- There is no free fluid. Bones. --------------------- There are postsurgical changes of L4-L5 posterior lumbar fusion. There are subcutaneous soft tissue and posterior paraspinal inflammatory changes and subcutaneous air at the level of the L1 spinous process. Thoracic and lumbar spine multilevel osteoarthritic changes are seen. There is thoracolumbar scoliosis. Impression: IMPRESSION: 1. The enlarged liver demonstrates homogeneous attenuation without focal mass lesion measuring 19.0 cm in length. There is generalized anasarca with small amount of perihepatic and abdominopelvic ascites. The enlarged spleen measures 15.0 cm in length with too small to characterize splenic low-attenuation lesion unchanged as compared to prior CT scan of abdomen and pelvis done on 7/11/2023 which may represent cyst versus hemangioma, however, other etiologies would not be excluded. 2.There are postsurgical changes of L4-L5 posterior lumbar fusion. There are subcutaneous soft tissue and posterior paraspinal inflammatory changes and subcutaneous air at the level of the L1 spinous. The correlation with findings and further evaluation with MRI of lumbar spine with intravenous contrast can be considered . 3. The report of the study was notified to Ruperto Suh MD, emergency department physician on 9/11/2023 at 7:56 AM. Questions or Further discussion?  Please contact me: Tobin/Text/Cell phone: [address] [facility] [phone]. Email: [email or web address] General Radiology #: [phone] Workstation:[id number]    XR chest portable    Result Date: 8/24/2023  Narrative: History: Shortness of breath. Portable AP erect examination of the chest was obtained. The lung fields are clear. The heart is not enlarged. The mediastinal structures are normal. Pulmonary edema that was suggested on 7/23/2023 is no longer present. Impression: IMPRESSION: No acute cardiopulmonary pathology. [id number]    CT spine lumbar wo contrast    Result Date: 8/21/2023  Narrative: History: Spinal fusion Procedure: CT scan of the lumbar spine  8/21/2023 Technique: CT of the lumbar spine was performed with thin section axial images followed by sagittal and coronal reformations. Radiation dose Automated exposure control was used for dose reduction Comparison: None.] Findings:   Postoperative changes are present. The patient is had previous pedicle screw placement bilaterally L4 and L5 with interbody fusion. Patient's had previous right hemilaminectomy. There is new lucency around the screws at L4 and L5. This is stable anterolisthesis this is grade 1. I there is a gas-filled wound at the surgical site in the midline. There is mild soft tissue swelling Overall there is mild osteopenia and therefore and plate changes. There is an old inferior endplate fracture J22. There is mild osteophyte at the T12-L1 level. L1-2: The foramina are patent. There is no central stenosis. L2-3: Is mild disc bulge. The foramina are patent. There is gas in the wound at this site. There is associated soft tissue edema L3-4: The intervertebral disc height is intact. Foramina patent is no central stenosis. L4-5: Postoperative changes are present. Artifact degrades imaging. There is lucency in the vertebral bodies around the screws. This suggests infection or loosening.  L5-S1 intervertebral disc is rudimentary this may be partially fused level there is vacuum phenomenon the SI joints bilaterally. Impression: Impression: 1. I there is significant soft tissue edema at the surgical approach and gas within the wound with lack of healing. Lucency around the pedicle screws is present bilaterally concerning for underlying infection. Grade 1 anterolisthesis L4 in relation L5 is stable CT examination performed with dose lowering protocol in accordance with MICHI.  [id number]

## 2023-11-14 ENCOUNTER — TELEPHONE (OUTPATIENT)
Dept: UROLOGY | Facility: MEDICAL CENTER | Age: 68
End: 2023-11-14

## 2023-11-14 NOTE — TELEPHONE ENCOUNTER
LVM for patient that Dr. Lira Stands does not do bladder stim;  would need to see  Dr. Duy Vela or Dr. Deejay Pereira for this. Asked patient to call to reschedenmanuel vega if this is what she wanted to have done.

## 2023-11-16 ENCOUNTER — CONSULT (OUTPATIENT)
Dept: UROLOGY | Facility: MEDICAL CENTER | Age: 68
End: 2023-11-16
Payer: MEDICARE

## 2023-11-16 VITALS — HEART RATE: 68 BPM | OXYGEN SATURATION: 98 % | BODY MASS INDEX: 33.31 KG/M2 | WEIGHT: 181 LBS | HEIGHT: 62 IN

## 2023-11-16 DIAGNOSIS — R35.0 URINARY FREQUENCY: Primary | ICD-10-CM

## 2023-11-16 PROBLEM — N18.32 CHRONIC KIDNEY DISEASE (CKD) STAGE G3B/A1, MODERATELY DECREASED GLOMERULAR FILTRATION RATE (GFR) BETWEEN 30-44 ML/MIN/1.73 SQUARE METER AND ALBUMINURIA CREATININE RATIO LESS THAN 30 MG/G (HCC): Chronic | Status: ACTIVE | Noted: 2021-05-19

## 2023-11-16 PROBLEM — G25.81 RESTLESS LEG SYNDROME: Chronic | Status: ACTIVE | Noted: 2019-10-24

## 2023-11-16 PROBLEM — M25.50 POLYARTHRALGIA: Status: ACTIVE | Noted: 2020-04-23

## 2023-11-16 PROBLEM — I44.2 COMPLETE HEART BLOCK (HCC): Chronic | Status: ACTIVE | Noted: 2023-07-12

## 2023-11-16 PROBLEM — N18.30 ANEMIA OF CHRONIC RENAL FAILURE, STAGE 3 (MODERATE): Status: ACTIVE | Noted: 2022-06-29

## 2023-11-16 PROBLEM — E11.42 DIABETIC PERIPHERAL NEUROPATHY ASSOCIATED WITH TYPE 2 DIABETES MELLITUS (HCC): Status: ACTIVE | Noted: 2020-03-23

## 2023-11-16 PROBLEM — N32.81 OAB (OVERACTIVE BLADDER): Status: ACTIVE | Noted: 2022-01-07

## 2023-11-16 PROBLEM — D63.1 ANEMIA OF CHRONIC RENAL FAILURE, STAGE 3 (MODERATE): Status: ACTIVE | Noted: 2022-06-29

## 2023-11-16 PROBLEM — G89.4 CHRONIC PAIN DISORDER: Chronic | Status: ACTIVE | Noted: 2017-02-21

## 2023-11-16 PROBLEM — N18.31 STAGE 3A CHRONIC KIDNEY DISEASE (HCC): Status: ACTIVE | Noted: 2021-11-17

## 2023-11-16 PROBLEM — I25.10 CORONARY ARTERY DISEASE INVOLVING NATIVE CORONARY ARTERY OF NATIVE HEART WITHOUT ANGINA PECTORIS: Chronic | Status: ACTIVE | Noted: 2018-07-03

## 2023-11-16 PROBLEM — I45.2 BIFASCICULAR BLOCK: Chronic | Status: ACTIVE | Noted: 2018-09-18

## 2023-11-16 PROCEDURE — 99203 OFFICE O/P NEW LOW 30 MIN: CPT | Performed by: UROLOGY

## 2023-11-16 RX ORDER — ASCORBIC ACID 500 MG
50 TABLET ORAL
COMMUNITY
Start: 2023-06-25

## 2023-11-16 RX ORDER — EZETIMIBE 10 MG/1
10 TABLET ORAL DAILY
COMMUNITY
Start: 2023-10-20

## 2023-11-16 RX ORDER — CEFUROXIME AXETIL 250 MG/1
250 TABLET ORAL 2 TIMES DAILY
COMMUNITY
Start: 2023-10-30

## 2023-11-16 RX ORDER — OMEPRAZOLE 20 MG/1
20 TABLET, DELAYED RELEASE ORAL 2 TIMES DAILY
COMMUNITY

## 2023-11-16 RX ORDER — OXYCODONE HYDROCHLORIDE AND ACETAMINOPHEN 5; 325 MG/1; MG/1
TABLET ORAL
COMMUNITY
Start: 2023-10-27

## 2023-11-16 NOTE — PROGRESS NOTES
Patient requested a brief visit to discuss her upcoming plans for overactive bladder treatment at [facility]. She has a long history of overactive bladder, urgency frequency, urge incontinence. Has had numerous overactive bladder medicines, currently oxybutynin with only minimal results. She had InterStim placed in April 2023. She says that never worked, she finds it uncomfortable, and it is scheduled to be removed on November 30, 2023. She had Botox before the InterStim earlier this year. She had retention and had to wear a catheter for 5 to 7 days, she is not really sure how long it was. After the catheter came out, she had good control of her overactive bladder for 3 to 4 months. They have offered her Botox again, but she is worried about having a catheter. She asked what other alternatives, I described posterior tibial nerve stimulation as being a different form of neuromodulation. However, if InterStim did not help, I would not recommend PTNS. I told her that transient retention after Botox is uncommon, but not rare. She says that the Botox did work after the catheter was removed. After significant discussion, I think the recommendations from [facility] are appropriate and I recommend continuing with their plan.

## 2023-11-17 RX ORDER — METAXALONE 800 MG/1
800 TABLET ORAL 3 TIMES DAILY
COMMUNITY

## 2023-11-17 RX ORDER — ACETAMINOPHEN 500 MG
500 TABLET ORAL EVERY 4 HOURS PRN
COMMUNITY

## 2023-11-17 RX ORDER — INSULIN GLARGINE 100 [IU]/ML
24 INJECTION, SOLUTION SUBCUTANEOUS AS NEEDED
COMMUNITY

## 2023-11-17 RX ORDER — ZINC GLUCONATE 50 MG
50 TABLET ORAL DAILY
COMMUNITY

## 2023-12-04 ENCOUNTER — NURSE TRIAGE (OUTPATIENT)
Age: 68
End: 2023-12-04

## 2023-12-04 ENCOUNTER — OFFICE VISIT (OUTPATIENT)
Dept: GASTROENTEROLOGY | Facility: CLINIC | Age: 68
End: 2023-12-04
Payer: MEDICARE

## 2023-12-04 VITALS
DIASTOLIC BLOOD PRESSURE: 79 MMHG | SYSTOLIC BLOOD PRESSURE: 180 MMHG | HEIGHT: 61 IN | BODY MASS INDEX: 31.04 KG/M2 | OXYGEN SATURATION: 99 % | TEMPERATURE: 97.1 F | WEIGHT: 164.4 LBS | HEART RATE: 69 BPM

## 2023-12-04 DIAGNOSIS — R10.30 LOWER ABDOMINAL PAIN: ICD-10-CM

## 2023-12-04 DIAGNOSIS — K59.04 CHRONIC IDIOPATHIC CONSTIPATION: Primary | ICD-10-CM

## 2023-12-04 PROCEDURE — 99214 OFFICE O/P EST MOD 30 MIN: CPT | Performed by: DIETITIAN, REGISTERED

## 2023-12-04 RX ORDER — INSULIN ASPART 100 [IU]/ML
INJECTION, SOLUTION INTRAVENOUS; SUBCUTANEOUS
COMMUNITY
Start: 2023-11-27

## 2023-12-04 RX ORDER — OXYCODONE HYDROCHLORIDE 5 MG/1
TABLET ORAL
COMMUNITY
Start: 2023-11-30

## 2023-12-04 RX ORDER — LUBIPROSTONE 24 UG/1
24 CAPSULE ORAL 2 TIMES DAILY WITH MEALS
Qty: 60 CAPSULE | Refills: 3 | Status: SHIPPED | OUTPATIENT
Start: 2023-12-04

## 2023-12-04 RX ORDER — CEPHALEXIN 500 MG/1
CAPSULE ORAL
COMMUNITY
Start: 2023-11-27

## 2023-12-04 RX ORDER — CYCLOSPORINE 0.5 MG/ML
1 EMULSION OPHTHALMIC 2 TIMES DAILY
COMMUNITY
Start: 2023-11-20

## 2023-12-04 NOTE — PROGRESS NOTES
[facility] - Outpatient Follow-up Note  Violetta Miller 80 y.o. female MRN: [id number]  Encounter: [id number]          ASSESSMENT AND PLAN:    1. Lower abdominal pain  2. Chronic idiopathic constipation  Patient reports chronic, constant cramping lower abdominal pain that is severe at times, causes her to double over in pain, as well as significant constipation. Is only able to have a BM by using an enema, which she does every 2 days or so, with associated sensation of incomplete emptying. She has failed therapy with MiraLAX, dulcolax, Linzess. She also has hx of urinary incontinence for which she previously used a sacral nerve stimulator but this was removed last week 11/30/2023 as it did not help. CTA abdomen pelvis 11/13/2023 showed small hiatal hernia, atherosclerotic disease of aorta with no high grade disease of SMA, celiac, or GUNNER. EGD with gastritis 9/2023. Colonoscopy with diverticulosis and polyps 1/2022. TSH normal 7/2023. CMP showed glucose 401, BUN 32, GFR 56 on 11/13/2023.  -Start Amitiza 24 mcg twice daily.  -Until Amitiza prescription is filled, can increase MiraLAX to twice daily. Then can continue MiraLAX use as needed. -Given hx of urinary incontinence as well as dependence on enemas, recommend anal manometry.   -Instructed patient to call the office or send a [employer] message if any questions/concerns or any new/worsening symptoms arise. Patient verbalizes understanding and agrees with plan as outlined with no questions at this time.     Follow up 3 months.      __________________________________________________________    SUBJECTIVE:  Violetta Miller is a 81-year-old female with complex PMH significant for T2DM, HTN, HLD, CAD, complete heart block s/p pacemaker, CHF, hyperparathyroidism, hypothyroidism, CKD stage 3b with anemia of CKD, STU, gastroparesis, GERD, IBS-C, achalasia, hx of DVT and TIA, and anxiety/depression and past surgical history significant for C/S, hysterectomy, TOBY, and recent removal of sacral nerve stimulator on 11/30/2023 who presents for follow-up office visit, with lower abdominal pain. Last seen in office 9/15/2023. Patient called the office today, reporting significant lower abdominal pain 9/10 at times with associated constipation, and was therefore scheduled for same day appointment for further evaluation. Patient reports chronic, constant cramping lower abdominal pain that is severe at times, causes her to double over in pain. She was recently seen at [facility] ED for this pain about 1 month ago. CTA abdomen pelvis 11/13/2023 showed small hiatal hernia, atherosclerotic disease of aorta with no high grade disease of SMA, celiac, or GUNNER, no acute findings. She reports her abdominal pain is worst when she feels more constipated. She moves bowels every 2 days on average, but is only able to produce a BM with the use of an enema. These BM's are small with the sensation of incomplete emptying. She uses MiraLAX 17 gm daily. Previously used Dulcolax suppositories and Linzess 145 mcg daily but these were not helpful. Admits she does not drink much water due to urinary incontinence being a limiting factor. She uses Percocet chronically, typically 1 tablet every other day. She tries to limit the use of this medication as much as able.       REVIEW OF SYSTEMS:  10 point ROS reviewed and negative, except as above      Historical Information   Past Medical History:   Diagnosis Date    Anxiety     Asthma     controlled    Back complaints     Cancer ([address])     nose    Cellulitis of left lower leg     "not now"    CHF (congestive heart failure) ([address]) 02/2021    Chronic bilateral thoracic back pain     Chronic kidney disease     sees nephrologist regularly" stage 3"    Chronic myofascial pain     Chronic pain of both lower extremities     Chronic pain of left knee     "both knees"    Chronic pain syndrome     bilat legs and knees/neuropathy pain    COVID 12/2021 CPAP (continuous positive airway pressure) dependence     no currently uses    Depression     Diabetes mellitus ([address])     insulin pump    Disease of thyroid gland     Does use hearing aid     bilat and will wear DOS    DVT (deep venous thrombosis) ([address])     left leg    Gait disorder     Gastroparesis     GERD (gastroesophageal reflux disease)     History of angina     History of MRSA infection     History of transfusion     Many years ago    Hyperlipidemia     Hypertension     Hypoglycemic reaction     "occas low blood sugar"    Insulin pump in place     pt reports saw endocrinologist  and will bring copy of instructions DOS    Irritable bowel syndrome     Kidney disease     Pacemaker     Polyneuropathy associated with underlying disease ([address])     PONV (postoperative nausea and vomiting)     Risk for falls     S/P insertion of spinal cord stimulator 2018    Sarcoidosis     Shortness of breath     "exertion and not new"    Sleep apnea     TIA (transient ischemic attack)     Use of cane as ambulatory aid     Uses walker     Wears dentures     permanent upper/and some missing teeth/partial lower     Past Surgical History:   Procedure Laterality Date    ABDOMINAL ADHESION SURGERY N/A 2021    Procedure: laparoscopic LYSIS ADHESIONS;  Surgeon: Mylene Gates MD;  [facility];  Service: Thoracic    BREAST SURGERY      BREAST SURGERY      reduction    CARDIAC PACEMAKER PLACEMENT      pacemaker     SECTION      COLONOSCOPY      DILATION AND CURETTAGE OF UTERUS      "D&E"    HERNIA REPAIR      HYSTERECTOMY      JOINT REPLACEMENT Left     LTKR    NECK SURGERY      fused 4 discs with 4 screws implanted    NISSEN FUNDOPLICATION LAPAROSCOPIC WITH ROBOTICS N/A 2021    Procedure: ROBOTIC [address] ;  Surgeon: Mylene Gates MD;  [facility];  Service: Thoracic    GA ESOPHAGOGASTRODUODENOSCOPY TRANSORAL DIAGNOSTIC N/A 2021 Procedure: ESOPHAGOGASTRODUODENOSCOPY (EGD); Surgeon: Karen Veras MD;  [facility];  Service: Thoracic    HI ESOPHAGOGASTRODUODENOSCOPY TRANSORAL DIAGNOSTIC N/A 01/12/2022    Procedure: ESOPHAGOGASTRODUODENOSCOPY (EGD),;  Surgeon: Karen Veras MD;  [facility];  Service: Thoracic    HI ESOPHAGOGASTRODUODENOSCOPY TRANSORAL DIAGNOSTIC N/A 02/16/2022    Procedure: ESOPHAGOGASTRODUODENOSCOPY (EGD); Surgeon: Karen Veras MD;  [facility];  Service: Thoracic    HI ESOPHAGOGASTRODUODENOSCOPY TRANSORAL DIAGNOSTIC N/A 07/12/2022    Procedure: ESOPHAGOGASTRODUODENOSCOPY (EGD); PYLORIC DILATION; GE JUNCTION DILATION;  Surgeon: Karen Veras MD;  [facility];  Service: Thoracic    HI ESOPHAGOGASTRODUODENOSCOPY TRANSORAL DIAGNOSTIC N/A 11/29/2022    Procedure: ESOPHAGOGASTRODUODENOSCOPY (EGD); Surgeon: Radha Ortiz MD;  [facility];  Service: Thoracic    HI ESOPHAGOGASTRODUODENOSCOPY TRANSORAL DIAGNOSTIC N/A 1/24/2023    Procedure: ESOPHAGOGASTRODUODENOSCOPY (EGD);   Surgeon: Karen Veras MD;  [facility];  Service: Thoracic    HI ESOPHAGOSCOPY FLEX BALLOON DILAT <30 MM DIAM N/A 01/12/2022    Procedure: DILATATION ESOPHAGEAL;  Surgeon: Karen Veras MD;  [facility];  Service: Thoracic    HI ESOPHAGOSCOPY FLEX BALLOON DILAT <30 MM DIAM N/A 02/16/2022    Procedure: DILATATION ESOPHAGEAL;  Surgeon: Karen Veras MD;  [facility];  Service: Thoracic    HI ESOPHAGOSCOPY FLEX BALLOON DILAT <30 MM DIAM N/A 11/29/2022    Procedure: DILATATION ESOPHAGEAL esophageal and pyloric dilation;  Surgeon: Radha Ortiz MD;  [facility];  Service: Thoracic    HI ESOPHAGOSCOPY FLEX BALLOON DILAT <30 MM DIAM N/A 1/24/2023    Procedure: esophageal dilation dilated up to 47  with pylorus dilation dilated up to 18;  Surgeon: Karen Veras MD;  [facility];  Service: Thoracic    HI RIBEIRO IMPLTJ NSTIM ELTRDS PLATE/PADDLE EDRL Left 04/23/2018    Procedure:  Insertion of thoracic spinal cord stimulator electrode via laminotomy and placement of left buttock IPG;  Surgeon: Paulino Gooden MD;  [facility];  Service: Neurosurgery    REPLACEMENT TOTAL KNEE      ROTATOR CUFF REPAIR      SPINAL STIMULATOR PLACEMENT Left 10/03/2018    Procedure: BUTTOCK RE-OPENING INCISION FOR REPOSITIONING OF IMPLANTABLE PULSE GENERATOR;  Surgeon: Paulino Gooden MD;  [facility];  Service: Neurosurgery    TONSILLECTOMY      UPPER GASTROINTESTINAL ENDOSCOPY      WISDOM TOOTH EXTRACTION       Social History   Social History     Substance and Sexual Activity   Alcohol Use Never     Social History     Substance and Sexual Activity   Drug Use Yes    Frequency: 8.0 times per week    Types: Marijuana    Comment: Medical Marijuana/vape pen once per week     Social History     Tobacco Use   Smoking Status Never   Smokeless Tobacco Never     Family History   Problem Relation Age of Onset    Diabetes Family     Heart disease Family     Hypertension Family     Neuropathy Family     No Known Problems Mother     Heart disease Father     Diabetes Father     No Known Problems Maternal Grandmother     No Known Problems Maternal Grandfather     No Known Problems Paternal Grandmother     No Known Problems Paternal Grandfather     No Known Problems Brother     No Known Problems Daughter     Cancer Son        Meds/Allergies       Current Outpatient Medications:     acetaminophen (TYLENOL) 500 mg tablet    albuterol (ACCUNEB) 1.25 MG/3ML nebulizer solution    albuterol (PROVENTIL HFA,VENTOLIN HFA) 90 mcg/act inhaler    amitriptyline (ELAVIL) 10 mg tablet    Armodafinil (Nuvigil) 250 MG tablet    aspirin (ECOTRIN LOW STRENGTH) 81 mg EC tablet    atorvastatin (LIPITOR) 80 mg tablet    bisacodyl (DULCOLAX) 10 mg suppository    calcium carbonate (OS-KARIS) 1250 (500 Ca) MG chewable tablet    calcium carbonate (TUMS) 500 mg chewable tablet carvedilol (COREG) 3.125 mg tablet    cefuroxime (CEFTIN) 250 mg tablet    Chelated Zinc 50 MG TABS    chlorzoxazone (PARAFON FORTE) 500 mg tablet    CRANBERRY FRUIT CONCENTRATE PO    Cyanocobalamin (VITAMIN B-12 PO)    Darbepoetin Mike (ARANESP, ALBUMIN FREE, IJ)    docusate sodium (COLACE) 100 mg capsule    esomeprazole (NexIUM) 40 MG capsule    ezetimibe (ZETIA) 10 mg tablet    famotidine (PEPCID) 20 mg tablet    fluticasone-salmeterol (ADVAIR) 100-50 mcg/dose inhaler    gabapentin (NEURONTIN) 100 mg capsule    glucagon (GLUCAGEN) 1 mg injection    glucose 4 g chewable tablet    glucose blood test strip    glucose blood test strip    insulin glargine (LANTUS) 100 units/mL subcutaneous injection    Insulin Infusion Pump (MINIMED INSULIN PUMP) PRINCESS    levothyroxine 100 mcg tablet    linaCLOtide (Linzess) 145 MCG CAPS    losartan (COZAAR) 50 mg tablet    metaxalone (SKELAXIN) 800 mg tablet    NOVOLOG 100 UNIT/ML injection    omeprazole (PriLOSEC OTC) 20 MG tablet    ondansetron (ZOFRAN) 8 mg tablet    oxyCODONE-acetaminophen (PERCOCET) 5-325 mg per tablet    polyethylene glycol (MIRALAX) 17 g packet    pramipexole (MIRAPEX) 1 mg tablet    Semaglutide (OZEMPIC, 0.25 OR 0.5 MG/DOSE, SC)    sertraline (ZOLOFT) 50 mg tablet    terazosin (HYTRIN) 1 mg capsule    torsemide (DEMADEX) 20 mg tablet    zinc gluconate 50 mg tablet    Allergies   Allergen Reactions    Bactrim [Sulfamethoxazole-Trimethoprim] Hives    Sucralfate Hives     Facial swelling    Topamax [Topiramate]      disorentation    Azithromycin Itching    Pregabalin Confusion and Other (See Comments)     altered mental status    Ciprofloxacin Drowsiness     Other reaction(s):  Other (See Comments)  "drowsiness"    Norvasc [Amlodipine] Swelling    Baclofen      "That knocks me out."    Bupropion Fatigue    Methotrexate Nausea Only           Objective     Wt Readings from Last 3 Encounters:   11/16/23 82.1 kg (181 lb)   09/15/23 88.9 kg (196 lb)   03/20/23 78.5 kg (173 lb 1 oz)     Temp Readings from Last 3 Encounters:   09/15/23 98.5 °F (36.9 °C) (Tympanic)   04/09/23 98.4 °F (36.9 °C) (Oral)   03/01/23 (!) 96.8 °F (36 °C) (Temporal)     BP Readings from Last 3 Encounters:   09/15/23 120/70   04/09/23 149/77   03/20/23 150/77     Pulse Readings from Last 3 Encounters:   11/16/23 68   04/09/23 74   03/20/23 86          PHYSICAL EXAM:      Physical Exam  Vitals reviewed. Constitutional:       General: She is not in acute distress. Appearance: She is well-developed. HENT:      Head: Normocephalic and atraumatic. Eyes:      Conjunctiva/sclera: Conjunctivae normal.   Cardiovascular:      Rate and Rhythm: Normal rate and regular rhythm. Heart sounds: No murmur heard. Pulmonary:      Effort: Pulmonary effort is normal. No respiratory distress. Breath sounds: Normal breath sounds. Abdominal:      General: Bowel sounds are normal. There is no distension. Palpations: Abdomen is soft. Tenderness: There is generalized abdominal tenderness. There is no guarding. Musculoskeletal:         General: No swelling. Cervical back: Neck supple. Skin:     General: Skin is warm and dry. Neurological:      Mental Status: She is alert. Psychiatric:         Mood and Affect: Mood normal.          Lab Results:   No visits with results within 1 Day(s) from this visit.    Latest known visit with results is:   Admission on 04/09/2023, Discharged on 04/09/2023   Component Date Value    WBC 04/09/2023 3.52 (L)     RBC 04/09/2023 2.57 (L)     Hemoglobin 04/09/2023 8.0 (L)     Hematocrit 04/09/2023 25.9 (L)     MCV 04/09/2023 101 (H)     MCH 04/09/2023 31.1     MCHC 04/09/2023 30.9 (L)     RDW 04/09/2023 14.0     MPV 04/09/2023 9.1     Platelets 72/60/5561 177     nRBC 04/09/2023 0     Neutrophils Relative 04/09/2023 73     Immat GRANS % 04/09/2023 1     Lymphocytes Relative 04/09/2023 15     Monocytes Relative 04/09/2023 9     Eosinophils Relative 04/09/2023 2 Basophils Relative 04/09/2023 0     Neutrophils Absolute 04/09/2023 2.59     Immature Grans Absolute 04/09/2023 0.03     Lymphocytes Absolute 04/09/2023 0.52 (L)     Monocytes Absolute 04/09/2023 0.30     Eosinophils Absolute 04/09/2023 0.07     Basophils Absolute 04/09/2023 0.01     Sodium 04/09/2023 141     Potassium 04/09/2023 4.1     Chloride 04/09/2023 106     CO2 04/09/2023 29     ANION GAP 04/09/2023 6     BUN 04/09/2023 26 (H)     Creatinine 04/09/2023 1.23     Glucose 04/09/2023 226 (H)     Calcium 04/09/2023 8.7     AST 04/09/2023 26     ALT 04/09/2023 21     Alkaline Phosphatase 04/09/2023 99     Total Protein 04/09/2023 6.4     Albumin 04/09/2023 3.5     Total Bilirubin 04/09/2023 0.30     eGFR 04/09/2023 45     Blood Culture 04/09/2023 No Growth After 5 Days. Blood Culture 04/09/2023 No Growth After 5 Days. Lab Results   Component Value Date    WBC 3.52 (L) 04/09/2023    HGB 8.0 (L) 04/09/2023    HCT 25.9 (L) 04/09/2023     (H) 04/09/2023     04/09/2023       Lab Results   Component Value Date    SODIUM 141 04/09/2023    K 4.1 04/09/2023     04/09/2023    CO2 29 04/09/2023    AGAP 6 04/09/2023    BUN 26 (H) 04/09/2023    CREATININE 1.23 04/09/2023    GLUC 226 (H) 04/09/2023    GLUF 190 (H) 05/17/2021    CALCIUM 8.7 04/09/2023    AST 26 04/09/2023    ALT 21 04/09/2023    ALKPHOS 99 04/09/2023    TP 6.4 04/09/2023    TBILI 0.30 04/09/2023    EGFR 45 04/09/2023         Radiology Results:   CTA abdomen pelvis w wo contrast    Result Date: 11/13/2023  Narrative: STUDY: ENHANCED CT OF THE ABDOMEN AND PELVIS CLINICAL INFORMATION:   History: Lower abdominal/back pain TECHNIQUE: Using helical technique, axial images were obtained through the abdomen and pelvis following administration of 90 cc of Omnipaque 350 intravenous contrast. Enteric contrast was not administered. Coronal and sagittal reformations were performed.    COMPARISON: CT abdomen pelvis dated 9/11/2023 FINDINGS: LOWER THORAX: The lung bases are unremarkable. Small hiatal hernia. LIVER: Normal in size and configuration. No suspicious mass. The portal veins are patent. BILE DUCTS: No intrahepatic or extrahepatic bile duct dilation. GALLBLADDER: No calcified stones. Normal wall thickness. PANCREAS: Unremarkable. No main pancreatic duct dilation. SPLEEN: Subcentimeter hypoattenuating observation, too small to characterize, statistically benign. ADRENAL GLANDS: Unremarkable. KIDNEYS/URETERS: Bilateral symmetric enhancement. No hydroureteronephrosis. No suspicious renal mass. BLADDER: Unremarkable. REPRODUCTIVE ORGANS: Hysterectomy. No adnexal mass. BOWEL: No disproportionate dilation of the small or large bowel. The appendix is unremarkable. PERITONEUM/RETROPERITONEUM: No fluid collection, ascites, or pneumoperitoneum. LYMPH NODES: No abdominal or pelvic lymphadenopathy. VESSELS: Atherosclerotic disease without aortic aneurysm. No high grade disease of the proximal mesenteric arteries (SMA, celiac, or GUNNER). ABDOMINAL/PELVIC WALL: Implantable sacral neurostimulator is identified. Additional neurostimulator device noted overlying the posterior aspect of the mid thoracic spine. Soft tissue thickening with cutaneous deformity within the posterior soft tissues at the level of L3, compatible with ulceration. BONES: No acute osseous abnormalities. Multilevel degenerative changes of the visualized thoracolumbar spine. Posterior fusion hardware spanning the lower lumbar spine. Impression: IMPRESSION: No acute findings within the abdomen or pelvis to explain the patient's clinical presentation. Suggestion of a posterior midline cutaneous ulceration with underlying skin thickening at the level of L3 vertebral body, correlate with visual inspection/clinically. Other findings as above. CT examination performed with dose lowering protocol in accordance with ALARA.  Workstation:[id number]    VAS VENOUS DUPLEX LOWER EXTREMITY BILATERAL COMPLETE    Result Date: 11/13/2023  Narrative: Venous Doppler bilateral lower extremity History: Bilateral lower extremity swelling   Ultrasound examination of the veins of both lower extremities was performed. B-mode two-dimensional vascular structure, Doppler spectral analysis, and color flow Doppler imaging was performed. No intraluminal thrombus is demonstrated. Satisfactory venous waveforms are seen and where applicable, venous walls are able to be coapted with direct transducer compression. Impression: Impression:  No evidence of DVT in either lower extremity. Workstation:[id number]    XR chest portable    Result Date: 11/13/2023  Narrative: Clinical indication-dyspnea. Leg swelling Procedure-portable AP view of the chest: Comparison studies dated September 2020. Findings- Pacer leads are seen in right atrial and right ventricle region. Normal-size cardiac silhouette. No lung consolidation, CHF, pneumothorax or a pleural effusion. A spinal stimulator lead is seen overlying the lower dorsal spinal canal.    Impression: IMPRESSION: No lung consolidation, pneumothorax or CHF Workstation:[id number]      Prior Procedure Results/Reports   Last EGD 9/12/2023 at [facility]  Normal esophagus  Diffuse erythema and atrophic appearance of stomach s/p biopsies. Pathology revealed gastric mucosa with mild chronic inactive gastritis, and negative for H pylori, intestinal metaplasia, dysplasia. Normal duodenum    Last colonoscopy 1/20/2022  One sessile, adenomatous-appearing polyp in the cecum; removed by cold snare  One sessile polyp measuring smaller than 5 mm in the ascending colon; removed by cold snare  Diverticula in the descending colon, sigmoid colon and rectosigmoid  The transverse colon appeared normal.    Anoscopy 3/10/2023  Normal anorectal mucosa, grade 1 internal hemorrhoids circumferential without irritation/engorgement or suggestion of recent bleeding.     Shari Garcia PA-C  Available on [employer]

## 2023-12-04 NOTE — Clinical Note
Hi Dr. Venice Pierce, This patient got added to your schedule for tomorrow to discuss scheduling anal manometry. I just saw her in office for severe cramping lower abdominal pain, which I feel is related to constipation. She has history of OAB s/p sacral nerve stimulator which was unhelpful so was removed on 11/30/2023. Also on chronic opioid therapy and is dependent on enemas to have a BM. I ordered Amitiza BID. Please let me know if you have any questions!   Thanks, Kayla Nam

## 2023-12-04 NOTE — TELEPHONE ENCOUNTER
Last ov 9/15/23 Dr. Robi Elias, Next appt 12/7/23, Procedure anoscopy C/R 3/10/23, Colon 1/20/22, Labs 11/13/23, Imaging CTA abd/pel, scheduled visit at [facility] today. FYI:  Patient experiencing abdominal pain, started a few weeks ago. Patient declined ER evaluation and requested appointment today. She is willing to go to another offfice and patient scheduled in [address] [place] [address] today to see WAYNE Hernandez. Reason for Disposition   Age > 60 years    Answer Assessment - Initial Assessment Questions  1. LOCATION: "Where does it hurt?"       Below navel  2. RADIATION: "Does the pain shoot anywhere else?" (e.g., chest, back)      Across lower abdomen  3. ONSET: "When did the pain begin?" (e.g., minutes, hours or days ago)       Started past few weeks  4. SUDDEN: "Gradual or sudden onset?"      Gradual but worsening  5. PATTERN "Does the pain come and go, or is it constant?"     - If constant: "Is it getting better, staying the same, or worsening?"       (Note: Constant means the pain never goes away completely; most serious pain is constant and it progresses)      - If intermittent: "How long does it last?" "Do you have pain now?"      (Note: Intermittent means the pain goes away completely between bouts)      constant  6. SEVERITY: "How bad is the pain?"  (e.g., Scale 1-10; mild, moderate, or severe)    - MILD (1-3): doesn't interfere with normal activities, abdomen soft and not tender to touch     - MODERATE (4-7): interferes with normal activities or awakens from sleep, tender to touch     - SEVERE (8-10): excruciating pain, doubled over, unable to do any normal activities       Rate at 9, doubled over  7. RECURRENT SYMPTOM: "Have you ever had this type of stomach pain before?" If Yes, ask: "When was the last time?" and "What happened that time?"       no  8.  CAUSE: "What do you think is causing the stomach pain?"      unknown  9. RELIEVING/AGGRAVATING FACTORS: "What makes it better or worse?" (e.g., movement, antacids, bowel movement)      No change with anything  10. OTHER SYMPTOMS: "Has there been any vomiting, diarrhea, constipation, or urine problems?"        Hx of constipation  11.  PREGNANCY: "Is there any chance you are pregnant?" "When was your last menstrual period?"        N/A    Protocols used: Abdominal Pain - Female-ADULT-[place]

## 2023-12-04 NOTE — TELEPHONE ENCOUNTER
----- Message from Lalita Mccoy sent at 12/4/2023 10:49 AM EST -----  Patient has been experiencing Abdominal pain, and  constant cramping. Patient would like to be seen as soon as possible at the [place] location. Patient has an appt for 12/07/2023 with leandro

## 2023-12-05 ENCOUNTER — TELEPHONE (OUTPATIENT)
Age: 68
End: 2023-12-05

## 2023-12-05 DIAGNOSIS — K59.04 CHRONIC IDIOPATHIC CONSTIPATION: Primary | ICD-10-CM

## 2023-12-05 RX ORDER — LACTULOSE 10 G/15ML
10 SOLUTION ORAL DAILY
Qty: 473 ML | Refills: 2 | Status: SHIPPED | OUTPATIENT
Start: 2023-12-05

## 2023-12-05 NOTE — TELEPHONE ENCOUNTER
Patients GI provider:  April Crow    Number to return call: [phone]    Reason for call: Patient calling in regards to Amitiza. Patient states that the medication is too expensive. Is asking if there is an alternative medication that can be prescribed.      Scheduled procedure/appointment date if applicable: Procedure 0/3/85

## 2023-12-05 NOTE — TELEPHONE ENCOUNTER
Patient's insurance plan does not have lubiprostone, plecanatide, or prucalopride on formulary. Linaclotide previously ineffective. Will start lactulose 10 mg once daily.

## 2023-12-05 NOTE — TELEPHONE ENCOUNTER
I spoke with the pt regarding the medication sent to the pharmacy and also informed pt the side effect. Pt verbalizes understanding.

## 2023-12-22 DIAGNOSIS — K22.2 ESOPHAGEAL STRICTURE: ICD-10-CM

## 2023-12-22 DIAGNOSIS — K59.00 CONSTIPATION, UNSPECIFIED CONSTIPATION TYPE: ICD-10-CM

## 2023-12-22 RX ORDER — FAMOTIDINE 20 MG/1
TABLET, FILM COATED ORAL
Qty: 180 TABLET | Refills: 1 | Status: SHIPPED | OUTPATIENT
Start: 2023-12-22

## 2023-12-22 RX ORDER — DOCUSATE SODIUM 100 MG/1
100 CAPSULE, LIQUID FILLED ORAL DAILY
Qty: 90 CAPSULE | Refills: 1 | Status: SHIPPED | OUTPATIENT
Start: 2023-12-22

## 2023-12-26 ENCOUNTER — TELEPHONE (OUTPATIENT)
Dept: HEMATOLOGY ONCOLOGY | Facility: CLINIC | Age: 68
End: 2023-12-26

## 2023-12-26 NOTE — TELEPHONE ENCOUNTER
Appointment Schedule   Who are you speaking with? Patient   If it is not the patient, are they listed on an active communication consent form? Yes   Which provider is the appointment scheduled with? Dr. Bentley   At which location is the appointment scheduled for? [facility]   When is the appointment scheduled?  Please list date and time 1/11/24   What is the reason for this appointment? Follow up   Did patient voice understanding of the details of this appointment? Yes   Was the no show policy reviewed with patient? Yes

## 2023-12-28 ENCOUNTER — TELEPHONE (OUTPATIENT)
Dept: GASTROENTEROLOGY | Facility: HOSPITAL | Age: 68
End: 2023-12-28

## 2024-01-03 ENCOUNTER — TELEPHONE (OUTPATIENT)
Dept: HEMATOLOGY ONCOLOGY | Facility: CLINIC | Age: 69
End: 2024-01-03

## 2024-01-03 NOTE — TELEPHONE ENCOUNTER
Appointment Confirmation   Who are you speaking with? Patient   If it is not the patient, are they listed on an active communication consent form? N/A   Which provider is the appointment scheduled with?  Dr. Bentley   When is the appointment scheduled?  Please list date and time 1/11/24 @ 3:40pm   At which location is the appointment scheduled to take place? [place]   Did caller verbalize understanding of appointment details? Yes

## 2024-01-08 ENCOUNTER — TELEPHONE (OUTPATIENT)
Dept: HEMATOLOGY ONCOLOGY | Facility: CLINIC | Age: 69
End: 2024-01-08

## 2024-01-08 NOTE — TELEPHONE ENCOUNTER
Patient Call    Who are you speaking with? Patient    If it is not the patient, are they listed on an active communication consent form? N/A   What is the reason for this call? Patient calling to see if Dr Bentley could see her tomorrow instead of 1/11/24.  I informed patient Dr Bentley was not at [facility] tomorrow.  Patient verbalized her understanding.   Does this require a call back? No   If a call back is required, please list best call back number N/a   If a call back is required, advise that a message will be forwarded to their care team and someone will return their call as soon as possible.   Did you relay this information to the patient? No

## 2024-01-08 NOTE — TELEPHONE ENCOUNTER
Appointment Confirmation   Who are you speaking with? Patient   If it is not the patient, are they listed on an active communication consent form? N/A   Which provider is the appointment scheduled with?  Dr. Bentley   When is the appointment scheduled?  Please list date and time 01/11/2024 @3:40PM    At which location is the appointment scheduled to take place? [place]   Did caller verbalize understanding of appointment details? Yes

## 2024-01-09 ENCOUNTER — HOSPITAL ENCOUNTER (OUTPATIENT)
Dept: GASTROENTEROLOGY | Facility: HOSPITAL | Age: 69
Discharge: HOME | End: 2024-01-09
Payer: MEDICARE

## 2024-01-09 ENCOUNTER — TELEPHONE (OUTPATIENT)
Age: 69
End: 2024-01-09

## 2024-01-09 VITALS
SYSTOLIC BLOOD PRESSURE: 193 MMHG | RESPIRATION RATE: 18 BRPM | OXYGEN SATURATION: 93 % | DIASTOLIC BLOOD PRESSURE: 72 MMHG | TEMPERATURE: 97.8 F | HEART RATE: 60 BPM

## 2024-01-09 DIAGNOSIS — K59.04 CHRONIC IDIOPATHIC CONSTIPATION: ICD-10-CM

## 2024-01-09 PROCEDURE — 91122 HB ANAL PRESSURE RECORD: CPT

## 2024-01-09 NOTE — PERIOPERATIVE NURSING NOTE
Patient brought in the room and educated on procedure. Anal digital exam performed and anal manometry catheter inserted via anal canal to rectum and test performed. Patient tolerated procedure. Catheter removed intact. Balloon expulsion test performed and a single use balloon inserted via anal canal to rectum and 50 cc room temperature water used to inflate the balloon. Patient assisted to the commode and instructed to push out the balloon. Patient did NOT expel the balloon in the 3 minute time frame.  The water from the balloon was removed and the catheter was removed intact from the rectal canal. Patient tolerated procedure.Patient discharged from room and ambulated with her walker out of the room in stable condition.

## 2024-01-11 ENCOUNTER — TELEPHONE (OUTPATIENT)
Age: 69
End: 2024-01-11

## 2024-01-11 NOTE — TELEPHONE ENCOUNTER
Patients GI provider:  WAYNE Almaguer    Number to return call: [phone]    Reason for call: Pt calling stating she thought she saw that her Manometry results were in.   Please return her call to advise.    Scheduled procedure/appointment date if applicable: 1/9/24

## 2024-01-12 ENCOUNTER — TELEPHONE (OUTPATIENT)
Age: 69
End: 2024-01-12

## 2024-01-12 NOTE — TELEPHONE ENCOUNTER
Pt called to reschedule her appt for today for the sleep f/u because she is sick and is not feeling well enough. Please call pt back to reschedule. Pt also said she is having surgery next week. Please call [phone]. Thank you.

## 2024-01-16 ENCOUNTER — OFFICE VISIT (OUTPATIENT)
Dept: SLEEP CENTER | Facility: CLINIC | Age: 69
End: 2024-01-16
Payer: MEDICARE

## 2024-01-16 ENCOUNTER — TELEPHONE (OUTPATIENT)
Dept: HEMATOLOGY ONCOLOGY | Facility: CLINIC | Age: 69
End: 2024-01-16

## 2024-01-16 VITALS
BODY MASS INDEX: 34.93 KG/M2 | WEIGHT: 185 LBS | DIASTOLIC BLOOD PRESSURE: 85 MMHG | SYSTOLIC BLOOD PRESSURE: 140 MMHG | HEIGHT: 61 IN

## 2024-01-16 DIAGNOSIS — G47.19 EXCESSIVE DAYTIME SLEEPINESS: ICD-10-CM

## 2024-01-16 DIAGNOSIS — G47.33 OBSTRUCTIVE SLEEP APNEA: Primary | ICD-10-CM

## 2024-01-16 PROCEDURE — 99213 OFFICE O/P EST LOW 20 MIN: CPT

## 2024-01-16 NOTE — TELEPHONE ENCOUNTER
Appointment Confirmation   Who are you speaking with? Patient   If it is not the patient, are they listed on an active communication consent form? N/A   Which provider is the appointment scheduled with?  Dr. Bentley   When is the appointment scheduled?  Please list date and time 01/18/2024 @3PM    At which location is the appointment scheduled to take place? [place]   Did caller verbalize understanding of appointment details? Yes

## 2024-01-16 NOTE — PROGRESS NOTES
[facility]  Sleep Medicine Follow Up/Established Patient    PATIENT NAME: Zohra Barriga  DATE OF SERVICE: January 16, 2024  DATE OF LAST VISIT: 8/18/2022    ASSESSMENT/PLAN:  Zohra Barriga is a 68 y.o. female with PMHx of STU not compliant w/ CPAP, HFpEF, CAD, DM2, CKD 3b, asthma, sarcoidosis, HTN, CHB s/p PPM, RLS, hypothyroidism, hyperparathyroidism, OAB, CPRS, h/o TIA,  h/o DVT, HLD, IBS, gastroparesis, medical marijuana use, anxiety, depression and obesity who returns to the office for follow up of STU and EDS.    Obstructive Sleep Apnea  Excessive Daytime Sleepiness  -The patient has a history of severe STU (AHI 33.3, O2 imelda 85 %, 2016) and is supposed to be on auto CPAP 9-11 cmH2O; however, currently her device has no heated air and she states she is unable to use the machine as a result.  Additionally she has significant EDS and has previously been on armodafinil with trial of modafinil in the past as well.  She appears fairly sleepy in the office today and states her daytime sleepiness is pretty significant at this point.  We do long discussion about the need to treat sleep apnea especially severe sleep apnea, and its impact on daytime sleepiness and daily function.  As she had been off the armodafinil for over a year and has not been using her CPAP will not refill this medication at this time.  Additionally, given her cardiac history would be tentative about placing her on this medication in the future.  - Reviewed the patient's machine with the medical equipment company today and confirmed that it is not heating the air properly.  Given her machine is over 6 years old she is due for a new one at this time.  - Will prescribe new APAP 5-15 cmH2O with a nasal mask, and informed the patient they can change out their mask for free within the first 30 days of having their machine.  - Explained the importance of keeping the machine clean, and that they should be eligible for refills of  supplies every 3-6 months depending on insurance.  We also discussed the insurance compliance requirements.  - Encouraged healthy lifestyle with adequate sleep (7-9 hours per night), healthy balanced diet and routine exercise.  Explained the importance of avoiding driving while drowsy.  - Follow-up in 3 months  -     Cancel: Split Study; Future  -     CPAP Auto New DME    ________________________________________________________________________________________________    Interval History: Zohra Barriga is a 68 y.o. female with PMHx of STU not compliant w/ CPAP, HFpEF, CAD, DM2, CKD 3b, asthma, sarcoidosis, HTN, CHB s/p PPM, RLS, hypothyroidism, hyperparathyroidism, OAB, CPRS, h/o TIA,  h/o DVT, HLD, IBS, gastroparesis, medical marijuana use, anxiety, depression and obesity who returns to the office for follow up of STU and EDS. the patient reports she has not been using her CPAP recently as the machine has not been working correctly.  She has been hospitalized multiple times most recently for heart failure during which her machine had no heated air.  She thinks it has been at least 4 to 5 months since she has used her machine last consistently.  She has not tried calling her DME to address the heater issue.  She also notes issues with her tubing and states is not fitting properly into the machine.  Upon looking at her device she has 2 types of tubing in her bag heated and nonheated heated tubing appears to be the proper tubing and fits into the machine without issue; however, when machine is turned on there is no heat.  She does that when she was using the CPAP she did have less daytime sleepiness.  Currently she states she will fall asleep anytime she is not doing and engaging activity and will generally sleep for about 15 minutes.    She is also not been on armodafinil or amitriptyline for over a year.  On the medication she is tried in the past for insomnia including melatonin which caused significant  anxiety.    Prior History: The patient has been following with a sleep physician since 2016, initially at [facility] and then with [facility] since 2018.  She has a history of severe STU and has been on CPAP 9-11 cmH2O therapy for a number of years at this point.  She was having difficulties with her CPAP due to sleep onset insomnia, and was noting daytime sleepiness due to her difficulties with PAP use.  Dr. Stratton started her on modafinil and amitriptyline for these issues initially.  The patient reported modafinil was wearing off too soon, Sunosi was ordered however was not covered by her insurance, and ultimately she was prescribed armodafinil 150 mg daily.  The armodafinil worked for her until 2021 when she reported increasing drowsiness and it was increased to 250 mg daily.  In 2022 the patient reported the armodafinil 250 mg was no longer working for her, and she was prescribed [employer]; however, this was never filled and the patient remained on the armodafinil.  She was last seen on 8/18/2022 at which time she was noted to still be sleepy despite armodafinil 250 mg daily, but it was felt that this may actually be due to lack of use of her CPAP.  She was to follow-up in 1 year; however, has not been seen since then.    ESS: Total score: 20/24  Greater or equal to 10 is positive for excessive daytime sleepiness  Pertinent Meds: None    Sleep-Wake Schedule:  Bedtime: 2200  Wake Time: 0630  Difficulty Falling Asleep: No  Avg Number of Awakenings: 2x  Cause of Awakenings: bathroom and for unclear reason, no trouble falling back to sleep  Weekend Sleep Schedule: may sleep in until 0700 on the weekends  Naps: none, but will unintentionally fall asleep ~5 days a week for 15 min, may occur multiple times a day (usually when she's not engaged in an activity).    Avg TST per 24 hours: 7 hours    Past Treatments:  Auto-CPAP 9-11 cmH2O    Prior Sleep Studies:  PSG 6/25/2013  AHI 30.9    Split-Night PSG -- 8/15/2016 (Wt  "226.5lbs)  AHI - 33.3  Sup AHI - NA  REM AHI - 0.0 (4 min)  O2 Marlon - 85.0%  Recommended Pressure - 11 cmH2O  AHI at Rec Pressure - 8.8    Other Relevant Labs and Studies:  None    Past Medical History:   Diagnosis Date    Anxiety     Asthma     controlled    Back complaints     Cancer ([facility])     nose    Cellulitis of left lower leg     \"not now\"    CHF (congestive heart failure) ([facility]) 02/2021    Chronic bilateral thoracic back pain     Chronic kidney disease     sees nephrologist regularly\" stage 3\"    Chronic myofascial pain     Chronic pain of both lower extremities     Chronic pain of left knee     \"both knees\"    Chronic pain syndrome     bilat legs and knees/neuropathy pain    COVID 12/2021    CPAP (continuous positive airway pressure) dependence     no currently uses    Depression     Diabetes mellitus ([facility])     insulin pump    Disease of thyroid gland     Does use hearing aid     bilat and will wear DOS    DVT (deep venous thrombosis) ([facility]) 2013    left leg    Gait disorder     Gastroparesis     GERD (gastroesophageal reflux disease)     History of angina     History of MRSA infection     History of transfusion     Many years ago    Hyperlipidemia     Hypertension     Hypoglycemic reaction     \"occas low blood sugar\"    Insulin pump in place     pt reports saw endocrinologist 4/28 and will bring copy of instructions DOS    Irritable bowel syndrome     Kidney disease     Pacemaker 2019    Polyneuropathy associated with underlying disease ([facility])     PONV (postoperative nausea and vomiting)     Risk for falls     S/P insertion of spinal cord stimulator 06/08/2018    Sarcoidosis     Shortness of breath     \"exertion and not new\"    Sleep apnea     TIA (transient ischemic attack)     Use of cane as ambulatory aid     Uses walker     Wears dentures     permanent upper/and some missing teeth/partial lower     Past Surgical History:   Procedure Laterality Date    ABDOMINAL ADHESION SURGERY N/A 05/03/2021    " "Procedure: laparoscopic LYSIS ADHESIONS;  Surgeon: Jill Bentley MD;  [facility]: [facility];  Service: Thoracic    BREAST SURGERY      BREAST SURGERY      reduction    CARDIAC PACEMAKER PLACEMENT      pacemaker     SECTION      COLONOSCOPY      DILATION AND CURETTAGE OF UTERUS      \"D&E\"    HERNIA REPAIR      HYSTERECTOMY      JOINT REPLACEMENT Left     LTKR    NECK SURGERY      fused 4 discs with 4 screws implanted    NISSEN FUNDOPLICATION LAPAROSCOPIC WITH ROBOTICS N/A 2021    Procedure: ROBOTIC HELLER MYOTOMY WITH BERNARDO FUNDIPLICATION ;  Surgeon: Jill Bentley MD;  [facility];  Service: Thoracic    UT ESOPHAGOGASTRODUODENOSCOPY TRANSORAL DIAGNOSTIC N/A 2021    Procedure: ESOPHAGOGASTRODUODENOSCOPY (EGD);  Surgeon: Jill Bentley MD;  [facility];  Service: Thoracic    UT ESOPHAGOGASTRODUODENOSCOPY TRANSORAL DIAGNOSTIC N/A 2022    Procedure: ESOPHAGOGASTRODUODENOSCOPY (EGD),;  Surgeon: Jill Bentley MD;  [facility];  Service: Thoracic    UT ESOPHAGOGASTRODUODENOSCOPY TRANSORAL DIAGNOSTIC N/A 2022    Procedure: ESOPHAGOGASTRODUODENOSCOPY (EGD);  Surgeon: Jill Bentley MD;  [facility];  Service: Thoracic    UT ESOPHAGOGASTRODUODENOSCOPY TRANSORAL DIAGNOSTIC N/A 2022    Procedure: ESOPHAGOGASTRODUODENOSCOPY (EGD); PYLORIC DILATION; GE JUNCTION DILATION;  Surgeon: iJll Bentley MD;  [facility];  Service: Thoracic    UT ESOPHAGOGASTRODUODENOSCOPY TRANSORAL DIAGNOSTIC N/A 2022    Procedure: ESOPHAGOGASTRODUODENOSCOPY (EGD);  Surgeon: Varinder Steiner MD;  [facility];  Service: Thoracic    UT ESOPHAGOGASTRODUODENOSCOPY TRANSORAL DIAGNOSTIC N/A 2023    Procedure: ESOPHAGOGASTRODUODENOSCOPY (EGD);  Surgeon: Jill Bentley MD;  [facility];  Service: Thoracic    UT ESOPHAGOSCOPY FLEX BALLOON DILAT <30 MM DIAM N/A 2022    Procedure: DILATATION ESOPHAGEAL;  " Surgeon: Jill Bentley MD;  [facility];  Service: Thoracic    AL ESOPHAGOSCOPY FLEX BALLOON DILAT <30 MM DIAM N/A 02/16/2022    Procedure: DILATATION ESOPHAGEAL;  Surgeon: Jill Bentley MD;  [facility];  Service: Thoracic    AL ESOPHAGOSCOPY FLEX BALLOON DILAT <30 MM DIAM N/A 11/29/2022    Procedure: DILATATION ESOPHAGEAL esophageal and pyloric dilation;  Surgeon: Varinder Steiner MD;  [facility];  Service: Thoracic    AL ESOPHAGOSCOPY FLEX BALLOON DILAT <30 MM DIAM N/A 1/24/2023    Procedure: esophageal dilation dilated up to 54  with pylorus dilation dilated up to 18;  Surgeon: Jill Bentley MD;  [facility];  Service: Thoracic    AL RIBEIRO IMPLTJ NSTIM ELTRDS PLATE/PADDLE EDRL Left 04/23/2018    Procedure: Insertion of thoracic spinal cord stimulator electrode via laminotomy and placement of left buttock IPG;  Surgeon: Shahab Bullock MD;  [facility];  Service: Neurosurgery    REPLACEMENT TOTAL KNEE      ROTATOR CUFF REPAIR      SPINAL STIMULATOR PLACEMENT Left 10/03/2018    Procedure: BUTTOCK RE-OPENING INCISION FOR REPOSITIONING OF IMPLANTABLE PULSE GENERATOR;  Surgeon: Shahab Bullock MD;  [facility];  Service: Neurosurgery    TONSILLECTOMY      UPPER GASTROINTESTINAL ENDOSCOPY      WISDOM TOOTH EXTRACTION       Patient Active Problem List   Diagnosis    Anemia of chronic disease    Anxiety and depression    Other B-complex deficiencies    Chronic bilateral thoracic back pain    Chronic myofascial pain    Chronic pain of left knee    Chronic pain of right knee    Essential hypertension    Gait disorder    Gastroparesis    Gastroesophageal reflux disease without esophagitis    Sarcoidosis    Insomnia due to medical condition    Insulin pump status    Pain syndrome, chronic    Type 2 diabetes mellitus with microalbuminuria, with long-term current use of insulin (HCC)    Irritable bowel syndrome with constipation    Lymphadenopathy    Mixed  hyperlipidemia    Mixed urge and stress incontinence    Moderate persistent asthma with acute exacerbation    Morbid obesity with BMI of 40.0-44.9, adult ([facility])    Normocytic anemia    Obstructive sleep apnea    Polyneuropathy associated with underlying disease ([facility])    Post-menopausal    Primary hyperparathyroidism ([facility])    Type 2 diabetes mellitus without complication, with long-term current use of insulin ([facility])    Vitamin D deficiency    Complex regional pain syndrome type 1 of left lower extremity    Primary hypothyroidism    Encounter for fitting and adjustment of spinal cord stimulator    S/P insertion of spinal cord stimulator    Chronic diastolic CHF (congestive heart failure) ([facility])    Coronary artery disease involving native coronary artery    Closed fracture of thoracic vertebra ([facility])    Deep venous thrombosis ([facility])    Localized, primary osteoarthritis    Chronic bilateral low back pain without sciatica    Lumbar radiculopathy    Lumbosacral spondylosis without myelopathy    Osteoarthritis of knee    Age related osteoporosis    Thoracic radiculopathy    History of TIA (transient ischemic attack)    Microcytic anemia    Chronic scapular pain    Moderate persistent asthma without complication    Hypersomnia    BBB (bundle branch block)    Hyperlipidemia associated with type 2 diabetes mellitus     Uncontrolled type 2 diabetes mellitus with hyperglycemia ([facility])    Achalasia    PONV (postoperative nausea and vomiting)    Class 2 obesity in adult    Pacemaker    Medical marijuana use    Urinary retention    Epigastric pain    Spinal stenosis of lumbar region with neurogenic claudication    COVID-19 virus infection    Acute on chronic diastolic (congestive) heart failure (HCC)    Anemia    Abnormal CT of the chest    Complete heart block ([facility])    Diabetic peripheral neuropathy associated with type 2 diabetes mellitus ([facility])    OAB (overactive bladder)    Polyarthralgia    Restless leg syndrome    Anemia of chronic  renal failure, stage 3 (moderate)     Chronic kidney disease (CKD) stage G3b/A1, moderately decreased glomerular filtration rate (GFR) between 30-44 mL/min/1.73 square meter and albuminuria creatinine ratio less than 30 mg/g (HCC)    Stage 3a chronic kidney disease (HCC)    Coronary artery disease involving native coronary artery of native heart without angina pectoris    Bifascicular block    Chronic pain disorder     Allergies as of 01/16/2024 - Reviewed 01/16/2024   Allergen Reaction Noted    Bactrim [sulfamethoxazole-trimethoprim] Hives 10/18/2017    Sucralfate Hives 10/18/2017    Topamax [topiramate]  07/25/2017    Azithromycin Itching 10/18/2017    Pregabalin Confusion and Other (See Comments) 12/19/2018    Ciprofloxacin Drowsiness 04/03/2020    Norvasc [amlodipine] Swelling 04/27/2019    Baclofen  04/23/2018    Bupropion Fatigue 07/02/2018    Methotrexate Nausea Only 10/18/2017     REVIEW OF SYSTEMS:  Review of Systems  10-point system review completed, all of which are negative except as mentioned above.    CURRENT MEDICATIONS:  Current Outpatient Medications   Medication Instructions    acetaminophen (TYLENOL) 500 mg, Oral, Every 4 hours PRN, Take 2 tablets (1000 mg)by mouth every 4 hours as needed, for mild pain & moderate pain    albuterol (ACCUNEB) 2.5 mg, Nebulization, Every 6 hours PRN    albuterol (PROVENTIL HFA,VENTOLIN HFA) 90 mcg/act inhaler 2 puffs, Inhalation, Every 6 hours PRN    amitriptyline (ELAVIL) 10 mg, Oral, Daily at bedtime    Armodafinil (NUVIGIL) 250 mg, Oral, Every morning    aspirin (ECOTRIN LOW STRENGTH) 81 mg, Oral, Every morning    atorvastatin (LIPITOR) 80 mg, Oral, Daily at bedtime    bisacodyl (DULCOLAX) 10 mg, Rectal, Daily    calcium carbonate (OS-KARIS) 1250 (500 Ca) MG chewable tablet 1 tablet, Oral, Daily    calcium carbonate (TUMS) 500 mg chewable tablet 1 tablet, Daily    carvedilol (COREG) 3.125 mg, Oral, 2 times daily with meals    cefuroxime (CEFTIN) 250 mg, Oral, 2  times daily    cephalexin (KEFLEX) 500 mg capsule PLEASE SEE ATTACHED FOR DETAILED DIRECTIONS    Chelated Zinc 50 mg, Oral    chlorzoxazone (PARAFON FORTE) 500 mg tablet Take 1 PO QID PRN for pain and spasms.    CRANBERRY FRUIT CONCENTRATE  mg, Oral, Daily, Every morning for supplement    Cyanocobalamin (VITAMIN B-12 PO) 1 capsule, Oral, Every evening    Darbepoetin Mike (ARANESP, ALBUMIN FREE, IJ) Injection, Every 14 days    docusate sodium (COLACE) 100 mg, Oral, Daily    ezetimibe (ZETIA) 10 mg, Oral, Daily    famotidine (PEPCID) 20 mg tablet TAKE 1 TABLET BY MOUTH TWICE A DAY    fluticasone-salmeterol (ADVAIR) 100-50 mcg/dose inhaler 2 puffs, Inhalation, As needed    gabapentin (NEURONTIN) 600 mg, Oral, 2 times daily    glucagon (GLUCAGEN) 1 mg, Once, Inject 1 mg under the skin as needed for low blood sugar    glucose blood test strip Testing six times daily  E11.65 on insulin pump    glucose blood test strip Other    glucose 16 g, Oral, As needed, Take 15 g by mouth as needed (blood sugar less than 70 if able to safely swallow)    insulin glargine (LANTUS) 24 Units, Subcutaneous, As needed, Inject 24 units under the skin as needed (for pump failure)    Insulin Infusion Pump (MINIMED INSULIN PUMP) PRINCESS Does not apply    lactulose (CHRONULAC) 10 g, Oral, Daily    levothyroxine 100 mcg, Oral, Daily    losartan (COZAAR) 50 mg, Oral, Daily at bedtime    lubiprostone (AMITIZA) 24 mcg, Oral, 2 times daily with meals    metaxalone (SKELAXIN) 800 mg, Oral, 3 times daily, FOR MUSCLE SPASM AS NEEDED    NOVOLOG 100 UNIT/ML injection Pt reports unsure of total dose a day/did see her endocrinologist Dr Daviss,  [place]  Pt has pump on upper left abdomen cut basaL RATE IN HALF    NovoLOG 100 UNIT/ML injection INJECT UP  UNITS DAILY VIA INSULIN PUMP. E11.65    omeprazole (PRILOSEC OTC) 20 mg, Oral, 2 times daily    ondansetron (ZOFRAN) 8 mg, Oral, Every 8 hours PRN    oxyCODONE (ROXICODONE) 5 immediate release tablet  "PLEASE SEE ATTACHED FOR DETAILED DIRECTIONS    oxyCODONE-acetaminophen (PERCOCET) 5-325 mg per tablet PLEASE SEE ATTACHED FOR DETAILED DIRECTIONS    polyethylene glycol (MIRALAX) 17 g, Oral, Daily    pramipexole (MIRAPEX) 1 mg, Oral, 2 times daily    Restasis 0.05 % ophthalmic emulsion 1 drop, Both Eyes, 2 times daily, As directed    Semaglutide (OZEMPIC, 0.25 OR 0.5 MG/DOSE, SC) 0.25 mg, Weekly    sertraline (ZOLOFT) 100 mg, Oral, Daily at bedtime, Take 1 tablet (100 mg total) by mouth daily    terazosin (HYTRIN) 1 mg, Oral, Daily at bedtime    torsemide (DEMADEX) 40 mg, Oral, Daily    zinc gluconate 50 mg, Oral, Daily       SOCIAL HISTORY:  Social History     Tobacco Use    Smoking status: Never    Smokeless tobacco: Never   Vaping Use    Vaping status: Never Used   Substance Use Topics    Alcohol use: Never    Drug use: Yes     Frequency: 8.0 times per week     Types: Marijuana     Comment: Medical Marijuana/vape pen once per week       FAMILY HISTORY:  Family History   Problem Relation Age of Onset    Diabetes Family     Heart disease Family     Hypertension Family     Neuropathy Family     No Known Problems Mother     Heart disease Father     Diabetes Father     No Known Problems Maternal Grandmother     No Known Problems Maternal Grandfather     No Known Problems Paternal Grandmother     No Known Problems Paternal Grandfather     No Known Problems Brother     No Known Problems Daughter     Cancer Son       PHYSICAL EXAMINATION:  Vital Signs:  /85 (BP Location: Left arm, Patient Position: Sitting, Cuff Size: Large)   Ht 5' 1\" (1.549 m)   Wt 83.9 kg (185 lb)   BMI 34.96 kg/m²   Body mass index is 34.96 kg/m².    Constitutional: NAD, well appearing   Mental Status: AAOx3  Skin: Warm, dry, no rashes noted   Eyes: PERRL, normal conjunctiva  ENT: Nasal congestion absent, nasal valve incompetence absent.  Posterior Airspace:   Lara Tongue Position: 4  Retrognathia: absent  Overbite: absent  High Arched " "Palate: absent  Tongue Scalloping/Ridging: absent  Tonsils: 1+  Uvula: normal  Chest: RRR, +S1/S2, CTA B/L, no W/R/R   Abdomen: Soft, NT/ND  Extremities: No digital clubbing or pedal edema    I have spent a total time of 30 minutes on 01/16/24 in caring for this patient including Instructions for management, Patient and family education, Counseling / Coordination of care, Documenting in the medical record, Reviewing / ordering tests, medicine, procedures  , and Obtaining or reviewing history  .    Olivia Mccurdy MD  Pulmonary-Critical Care and Sleep Medicine  01/16/24    Portions of the record may have been created with voice recognition software. Occasional wrong word or \"sound a like\" substitutions may have occurred due to the inherent limitations of voice recognition software. Please read the chart carefully and recognize, using context, where substitutions have occurred.   "

## 2024-01-16 NOTE — PATIENT INSTRUCTIONS
PAP Therapy Initiation    I will have a prescription sent for auto-CPAP 5-15 cmH2O with a nasal mask.  You will ultimately receive your machine and regular supplies from a [employer] (durable medical equipment) company. You should schedule the initial set up as well as the follow-up appointment after you receive the device at the  before you leave today.  You should be eligible for new supplies approximately every 3-6 months, depending on your insurance coverage.  If your mask doesn't fit well, call our office to schedule a formal mask fitting.  Insurance requires regular usage and periodic office follow ups for PAP therapy, to continue to cover supplies.  Insurance requires a follow-up in the office within 90 days of starting your new PAP device.  As stated above, this should be scheduled before you leave today.      CMS/Insurance Requirements    Your insurance requires a face-to-face follow up visit within a 31-90 day period after starting CPAP.  Your insurance requires compliance with CPAP, which is at least 4 hours per night for 70% of the time. This must be done over a 30 day period and must occur within the initial 31-90 day period after starting CPAP.  Your insurance also requires at least yearly follow ups to continue to pay for CPAP supplies.    PAP Supply Guidelines    Below are the guidelines for reordering your supplies. You will be responsible for your deductible, co payments, and out of pocket expenses.    Item Frequency   Nasal Mask (no headgear) 1 every 3 months   Nasal Mask Cushion 1 every 2 weeks   Full Face Mask (no headgear) 1 every 3 months   Full Face Mask Cushion 1 every month   Nasal Pillows 1 every 2 weeks   Headgear 1 every 6 months   hin Strap 1 every 6 months   mariangel 1 every 3 months   Filters: Reusable 1 every 6 months   Filters: Disposable 1 every 2 weeks   Humidifier Chamber(disposable) 1 every 6 months       About Your PAP Therapy    Continuous Positive Airway Pressure/Bilevel  Therapy  You have been prescribed Positive Airway Pressure (PAP) therapy to treat sleep apnea. The most common type of sleep apnea is obstructive sleep apnea (STU), a condition in which the upper airway collapses during sleep. This obstruction keeps air from getting into your lungs. The prescribed PAP machine (CPAP or Bilevel or ASV) will smoothly blow air into your airway to prevent it from closing. The machine will use a mask to deliver the air through your nose and/or mouth. These devices are available in various sizes and styles.    It will take some time for you to get used to the new equipment and it may take a while for you to begin to feel the benefits of PAP therapy. Please be patient. If you are having problems adjusting to your machine, please contact us for help.    Beginning your PAP Therapy  Assembly:  Place your PAP machine on a level surface near your bed.  To prevent injury, do not place the machine higher than your head.  Keep the machine at least 12 inches away from anything that may block the vents.  Plug the machine into a properly grounded electrical outlet. It is better to avoid using an extension cord. If necessary, use a heavy-duty one.  If you are NOT using a humidifier with you PAP equipment:  Attach one end of your 6-foot tubing to the PAP unit outlet and the other end to your mask. (If your mask does not have a built-in exhalation port, a special exhalation valve should be used between the mask and the 6-foot tubing.)  Attach the headgear to your mask.  If you are using a humidifier with your PAP equipment:  Fill the humidifier with DISTILLED water to the maximum fill line.  Attach the humidifier to the PAP unit's outlet as instructed by the respiratory therapist with your home care company. Attach one end of your 6-foot tubing to the humidifier outlet and the other end to your mask. (If your mask does not have a built-in exhalation port, a special exhalation valve should be used between  "the mask and the 6-foot tubing.)  If you have been prescribed oxygen to be used with the PAP machine, you will be instructed on how to attach your oxygen tubing. Always turn off the oxygen tank before turning off your PAP machine.    Getting Started:  Wash your face and apply the mask and headgear as instructed by the sleep technologist or respiratory therapist. The mask should fit snugly to prevent air leaks, but not so tight as to cause skin irritation or discomfort.  Turn the PAP power switch to the ON position. You should feel air blowing through the mask. Breathe normally and adjust the mask gently if you feel an air leak.  If there are no leaks, activate the \"ramp\" feature on your PAP machine. The ramp allows a lower pressure to be delivered for a designated period of time (usually 5 to 45 minutes) to allow you to relax and  fall asleep more easily. The pressure will then gradually increase to the prescribed pressure that controls your apnea.  Remember to turn OFF your PAP unit when it is not in use.    Care and Maintenance    Headgear should be washed as needed. Daily inspection and weekly washings are recommended. Do not disassemble the straps. Machine wash in warm water, making sure to attach Velcro hooks and tabs before washing. Line dry or machine dry on a low setting.  Masks should be washed every day. Daily inspection is recommended. Leave the mask and tubing attached. Gently wash the mask with a soft cloth using warm water and mild detergent, concentrating on the mask cushion flaps. DO NOT use alcohol or bleach. Rinse thoroughly and air dry.  Tubing and headgear should be washed weekly. Daily inspection is recommended. Wash in warm water and mild detergent and rinse thoroughly. Hook the tubing to the machine and blow until dry.  Humidifier should be washed daily and filled with DISTILLED water before use. Wash with warm water and mild detergent. Disinfect weekly by soaking with a solution of 1 part " white vinegar and 3 parts water for 30 minutes. Rinse thoroughly and air dry.  Disposable filters should be replaced once a month. Wash reusable foam filters with warm water and mild detergent at least once a month. Rinse thoroughly and dry with paper towels.  Avoid  that contain fragrance or conditioners, as these will leave a residue.  NEVER iron any soft goods.    Troubleshooting    If the machine fails to turn on:  Make sure that the PAP machine is plugged into a grounded outlet.  Make sure that the ON/OFF switch is in the ON position.  Make sure that the wall outlet has power.    If there is no pressure coming from your machine:  Make sure that the inlet filter is clean and unblocked.  Make sure that the cooling fan is unblocked and that air is flowing freely.  Make sure that all tubing is securely connected.    If your PAP machine still fails to operate, please call your DME for assistance.    What You Should Know About Insurance    There are many different insurance policies and coverage for PAP equipment will vary. Find out what your coverage provides and what your responsibilities are as a consumer. PAP therapy equipment is considered Durable Medical Equipment (DME). Here are a few questions to ask your insurance provider:  What benefits do I have for DME? Do I have a deductible and/or co pay for DME?  Is there a repair or replacement plan for durable medical equipment?  How often can I receive a replacement mask, tubing, headgear and filters?  Do I have to demonstrate that I am using my PAP device?  o How do I do that?    Important Information    It is very important to keep your PAP equipment and supplies clean. The life of the supplies depends on the care they receive. Under normal circumstances, expect the mask and headgear to last 9-12 months. Refer to the owner's manual for more information.    Important Safety Reminders    Keep equipment free from obstruction.  Use your PAP equipment as  directed.  DO NOT try to adjust your pressure setting.  DO NOT block the exhalation port or valve.  Keep filters clean to prevent overheating.  If a humidifier is being used, place it at a level lower than your head.  If using a heated humidifier, allow unit to cool before cleaning or refilling.  Follow safety guidelines regarding oxygen equipment.DO NOT operate multiple electrical devices from one outlet.  If you have a medical emergency, contact the Emergency Medical Services.

## 2024-01-18 ENCOUNTER — TELEPHONE (OUTPATIENT)
Dept: HEMATOLOGY ONCOLOGY | Facility: CLINIC | Age: 69
End: 2024-01-18

## 2024-01-18 ENCOUNTER — TELEMEDICINE (OUTPATIENT)
Dept: CARDIAC SURGERY | Facility: CLINIC | Age: 69
End: 2024-01-18
Payer: MEDICARE

## 2024-01-18 ENCOUNTER — TELEPHONE (OUTPATIENT)
Age: 69
End: 2024-01-18

## 2024-01-18 DIAGNOSIS — K22.0 ACHALASIA: ICD-10-CM

## 2024-01-18 DIAGNOSIS — K59.04 CHRONIC IDIOPATHIC CONSTIPATION: ICD-10-CM

## 2024-01-18 DIAGNOSIS — R13.19 ESOPHAGEAL DYSPHAGIA: Primary | ICD-10-CM

## 2024-01-18 DIAGNOSIS — R19.8 INADEQUATE DEFECATORY PROPULSION: Primary | ICD-10-CM

## 2024-01-18 PROCEDURE — 91122 PR ANORECTAL MANOMETRY: CPT | Performed by: INTERNAL MEDICINE

## 2024-01-18 PROCEDURE — 99213 OFFICE O/P EST LOW 20 MIN: CPT | Performed by: THORACIC SURGERY (CARDIOTHORACIC VASCULAR SURGERY)

## 2024-01-18 NOTE — TELEPHONE ENCOUNTER
Patient Call    Who are you speaking with? Patient    If it is not the patient, are they listed on an active communication consent form? N/A   What is the reason for this call?   The patient would like to know if she is able to do a virtual visit for her appointment with Dr. Bentley today at 3PM     The patient states she can't get away around to well.    Does this require a call back? Yes  Please contact the patient to let her know if this can be changed or not.    If a call back is required, please list [facility] call back number [phone]   If a call back is required, advise that a message will be forwarded to their care team and someone will return their call as soon as possible.   Did you relay this information to the patient? Yes

## 2024-01-18 NOTE — TELEPHONE ENCOUNTER
Pt requesting results of anal manometry 01/09/24. It is noted that an ambulatory referral to physical therapy for pelvic health has been submitted.

## 2024-01-18 NOTE — TELEPHONE ENCOUNTER
Patients GI provider: DOREEN FREEMAN     Number to return call: ( [phone]     Reason for call: Pt returning office call for results trans to RN     Scheduled procedure/appointment date if applicable: Apt/procedure

## 2024-01-18 NOTE — TELEPHONE ENCOUNTER
Patient Call    Who are you speaking with? Patient    If it is not the patient, are they listed on an active communication consent form? Yes   What is the reason for this call? Waiting for virtual appmt   Does this require a call back? Yes   If a call back is required, please list best call back number [phone]    If a call back is required, advise that a message will be forwarded to their care team and someone will return their call as soon as possible.   Did you relay this information to the patient? Yes

## 2024-01-18 NOTE — TELEPHONE ENCOUNTER
Called patient and confirmed identity using full name and .  Reviewed results of anal manometry showing mostly normal results however with failure to expel balloon, suggesting pelvic floor dysfunction.  Placed referral to pelvic floor physical therapy and explained the reasoning for this referral to the patient.  She is amenable to pursuing this treatment option.  I provided her with the [facility]  office phone number as this is close to her home [phone]).    Patient verbalized understanding and agreement with the plan as outlined and expressed gratitude for the phone call.  I advised her to call our office again if she has any questions or concerns in the future.

## 2024-01-18 NOTE — PROGRESS NOTES
Virtual Regular Visit    Verification of patient location:    Patient is located at Home in the following state in which I hold an active license [place]      Assessment/Plan:    Problem List Items Addressed This Visit       Achalasia     Lindsay is a 69yo F who is known to me. She underwent a Heller in 5/21. She presents today with recurrent symptoms and difficulty swallowing.    We discussed today that she may have end stage achalasia at this point. I would like to send her for an UGI and repeat manometry to ensure that her myotomy is still functioning. She may need a dilation but I would prefer to start with the above tests first. The patient understands and she will come in and see me after the above has been completed    Jill Bentley MD  Thoracic Surgery  (Available on Tiger Text)  Office: [phone]           Relevant Orders    FL upper GI UGI    Esophageal manometry     Other Visit Diagnoses       Esophageal dysphagia    -  Primary    Relevant Orders    FL upper GI UGI    Esophageal manometry                 Reason for visit is   Chief Complaint   Patient presents with    Virtual Regular Visit          Encounter provider Jill Bentley MD    Provider located at [facility] [place]  [address] [address]  [phone]      Recent Visits  Date Type Provider Dept   01/18/24 Telephone Jill Bentley MD [facility] [place]   01/18/24 Telemedicine Jill Bentley MD Pg Thoracic Surg [facility]   Showing recent visits within past 7 days and meeting all other requirements  Future Appointments  No visits were found meeting these conditions.  Showing future appointments within next 150 days and meeting all other requirements       The patient was identified by name and date of birth. Zohra JANETH Barriga was informed that this is a telemedicine visit and that the visit is being conducted through the Epic Embedded platform. She  "agrees to proceed..  My office door was closed. No one else was in the room.  She acknowledged consent and understanding of privacy and security of the video platform. The patient has agreed to participate and understands they can discontinue the visit at any time.    Patient is aware this is a billable service.     Subjective    Diagnosis: Achalasia  Procedure: Robotic heller myotomy, Kareem fundoplication, laparoscopic lysis of adhesions 5/3/21      Zohra Barriga is a 68 y.o. female.  Lindsay is a 67yo F who is known to me. She underwent a Heller in 5/21. She presents today with recurrent symptoms and difficulty swallowing. Difficulty to both liquids and solids. She is not losing weight. She has not had a pneumonia. She is not coughing.      HPI     Past Medical History:   Diagnosis Date    Anxiety     Asthma     controlled    Back complaints     Cancer ([facility])     nose    Cellulitis of left lower leg     \"not now\"    CHF (congestive heart failure) ([facility]) 02/2021    Chronic bilateral thoracic back pain     Chronic kidney disease     sees nephrologist regularly\" stage 3\"    Chronic myofascial pain     Chronic pain of both lower extremities     Chronic pain of left knee     \"both knees\"    Chronic pain syndrome     bilat legs and knees/neuropathy pain    COVID 12/2021    CPAP (continuous positive airway pressure) dependence     no currently uses    Depression     Diabetes mellitus ([facility])     insulin pump    Disease of thyroid gland     Does use hearing aid     bilat and will wear DOS    DVT (deep venous thrombosis) ([facility]) 2013    left leg    Gait disorder     Gastroparesis     GERD (gastroesophageal reflux disease)     History of angina     History of MRSA infection     History of transfusion     Many years ago    Hyperlipidemia     Hypertension     Hypoglycemic reaction     \"occas low blood sugar\"    Insulin pump in place     pt reports saw endocrinologist 4/28 and will bring copy of instructions DOS    Irritable bowel " "syndrome     Kidney disease     Pacemaker 2019    Polyneuropathy associated with underlying disease (HCC)     PONV (postoperative nausea and vomiting)     Risk for falls     S/P insertion of spinal cord stimulator 2018    Sarcoidosis     Shortness of breath     \"exertion and not new\"    Sleep apnea     TIA (transient ischemic attack)     Use of cane as ambulatory aid     Uses walker     Wears dentures     permanent upper/and some missing teeth/partial lower       Past Surgical History:   Procedure Laterality Date    ABDOMINAL ADHESION SURGERY N/A 2021    Procedure: laparoscopic LYSIS ADHESIONS;  Surgeon: Jill Bentley MD;  [facility];  Service: Thoracic    BREAST SURGERY      BREAST SURGERY      reduction    CARDIAC PACEMAKER PLACEMENT      pacemaker     SECTION      COLONOSCOPY      DILATION AND CURETTAGE OF UTERUS      \"D&E\"    HERNIA REPAIR      HYSTERECTOMY      JOINT REPLACEMENT Left     LTKR    NECK SURGERY      fused 4 discs with 4 screws implanted    NISSEN FUNDOPLICATION LAPAROSCOPIC WITH ROBOTICS N/A 2021    Procedure: ROBOTIC HELLER MYOTOMY WITH BERNARDO FUNDIPLICATION ;  Surgeon: Jill Bentley MD;  [facility];  Service: Thoracic    WA ESOPHAGOGASTRODUODENOSCOPY TRANSORAL DIAGNOSTIC N/A 2021    Procedure: ESOPHAGOGASTRODUODENOSCOPY (EGD);  Surgeon: Jill Bentley MD;  [facility];  Service: Thoracic    WA ESOPHAGOGASTRODUODENOSCOPY TRANSORAL DIAGNOSTIC N/A 2022    Procedure: ESOPHAGOGASTRODUODENOSCOPY (EGD),;  Surgeon: Jill Bentley MD;  [facility];  Service: Thoracic    WA ESOPHAGOGASTRODUODENOSCOPY TRANSORAL DIAGNOSTIC N/A 2022    Procedure: ESOPHAGOGASTRODUODENOSCOPY (EGD);  Surgeon: Jill Bentley MD;  [facility];  Service: Thoracic    WA ESOPHAGOGASTRODUODENOSCOPY TRANSORAL DIAGNOSTIC N/A 2022    Procedure: ESOPHAGOGASTRODUODENOSCOPY (EGD); PYLORIC DILATION; GE JUNCTION " DILATION;  Surgeon: Jill Bentley MD;  [facility];  Service: Thoracic    FL ESOPHAGOGASTRODUODENOSCOPY TRANSORAL DIAGNOSTIC N/A 11/29/2022    Procedure: ESOPHAGOGASTRODUODENOSCOPY (EGD);  Surgeon: Varinder Steiner MD;  [facility];  Service: Thoracic    FL ESOPHAGOGASTRODUODENOSCOPY TRANSORAL DIAGNOSTIC N/A 1/24/2023    Procedure: ESOPHAGOGASTRODUODENOSCOPY (EGD);  Surgeon: Jill Bentley MD;  [facility];  Service: Thoracic    FL ESOPHAGOSCOPY FLEX BALLOON DILAT <30 MM DIAM N/A 01/12/2022    Procedure: DILATATION ESOPHAGEAL;  Surgeon: Jill Bentley MD;  [facility];  Service: Thoracic    FL ESOPHAGOSCOPY FLEX BALLOON DILAT <30 MM DIAM N/A 02/16/2022    Procedure: DILATATION ESOPHAGEAL;  Surgeon: Jill Bentley MD;  [facility];  Service: Thoracic    FL ESOPHAGOSCOPY FLEX BALLOON DILAT <30 MM DIAM N/A 11/29/2022    Procedure: DILATATION ESOPHAGEAL esophageal and pyloric dilation;  Surgeon: Varinder Steiner MD;  [facility];  Service: Thoracic    FL ESOPHAGOSCOPY FLEX BALLOON DILAT <30 MM DIAM N/A 1/24/2023    Procedure: esophageal dilation dilated up to 54  with pylorus dilation dilated up to 18;  Surgeon: Jill Bentley MD;  [facility];  Service: Thoracic    FL RIBEIRO IMPLTJ NSTIM ELTRDS PLATE/PADDLE EDRL Left 04/23/2018    Procedure: Insertion of thoracic spinal cord stimulator electrode via laminotomy and placement of left buttock IPG;  Surgeon: Shahab Bullock MD;  [facility];  Service: Neurosurgery    REPLACEMENT TOTAL KNEE      ROTATOR CUFF REPAIR      SPINAL STIMULATOR PLACEMENT Left 10/03/2018    Procedure: BUTTOCK RE-OPENING INCISION FOR REPOSITIONING OF IMPLANTABLE PULSE GENERATOR;  Surgeon: Shahab Bullock MD;  [facility];  Service: Neurosurgery    TONSILLECTOMY      UPPER GASTROINTESTINAL ENDOSCOPY      WISDOM TOOTH EXTRACTION         Current Outpatient Medications   Medication Sig Dispense  Refill    acetaminophen (TYLENOL) 500 mg tablet Take 500 mg by mouth every 4 (four) hours as needed for mild pain Take 2 tablets (1000 mg)by mouth every 4 hours as needed, for mild pain & moderate pain      albuterol (ACCUNEB) 1.25 MG/3ML nebulizer solution Take 2.5 mg by nebulization every 6 (six) hours as needed for wheezing      albuterol (PROVENTIL HFA,VENTOLIN HFA) 90 mcg/act inhaler Inhale 2 puffs every 6 (six) hours as needed for wheezing      amitriptyline (ELAVIL) 10 mg tablet Take 10 mg by mouth daily at bedtime      aspirin (ECOTRIN LOW STRENGTH) 81 mg EC tablet Take 81 mg by mouth every morning      atorvastatin (LIPITOR) 80 mg tablet Take 80 mg by mouth daily at bedtime        bisacodyl (DULCOLAX) 10 mg suppository Insert 1 suppository (10 mg total) into the rectum daily 30 suppository 3    calcium carbonate (OS-KARIS) 1250 (500 Ca) MG chewable tablet Chew 1 tablet daily      calcium carbonate (TUMS) 500 mg chewable tablet Chew 1 tablet daily (Patient not taking: Reported on 12/4/2023)      carvedilol (COREG) 3.125 mg tablet Take 3.125 mg by mouth 2 (two) times a day with meals      cefuroxime (CEFTIN) 250 mg tablet Take 250 mg by mouth 2 (two) times a day      cephalexin (KEFLEX) 500 mg capsule PLEASE SEE ATTACHED FOR DETAILED DIRECTIONS      Chelated Zinc 50 MG TABS Take 50 mg by mouth      chlorzoxazone (PARAFON FORTE) 500 mg tablet Take 1 PO QID PRN for pain and spasms. (Patient taking differently: Take 500 mg by mouth 3 (three) times a day as needed Take 1 PO QID PRN for pain and spasms.) 120 tablet 1    CRANBERRY FRUIT CONCENTRATE PO Take 450 mg by mouth in the morning Every morning for supplement      Cyanocobalamin (VITAMIN B-12 PO) Take 1 capsule by mouth every evening       Darbepoetin Mike (ARANESP, ALBUMIN FREE, IJ) Inject as directed every 14 (fourteen) days      docusate sodium (COLACE) 100 mg capsule TAKE 1 CAPSULE BY MOUTH EVERY DAY 90 capsule 1    ezetimibe (ZETIA) 10 mg tablet Take 10 mg  by mouth daily      famotidine (PEPCID) 20 mg tablet TAKE 1 TABLET BY MOUTH TWICE A  tablet 1    fluticasone-salmeterol (ADVAIR) 100-50 mcg/dose inhaler Inhale 2 puffs as needed      gabapentin (NEURONTIN) 100 mg capsule Take 600 mg by mouth 2 (two) times a day      glucagon (GLUCAGEN) 1 mg injection 1 mg once Inject 1 mg under the skin as needed for low blood sugar      glucose 4 g chewable tablet Chew 16 g as needed for low blood sugar Take 15 g by mouth as needed (blood sugar less than 70 if able to safely swallow)      glucose blood test strip Testing six times daily  E11.65 on insulin pump      glucose blood test strip Use      insulin glargine (LANTUS) 100 units/mL subcutaneous injection Inject 24 Units under the skin if needed Inject 24 units under the skin as needed (for pump failure)      Insulin Infusion Pump (MINIMED INSULIN PUMP) PRINCESS Use      lactulose (CHRONULAC) 10 g/15 mL solution Take 15 mL (10 g total) by mouth in the morning 473 mL 2    levothyroxine 100 mcg tablet Take 100 mcg by mouth daily      losartan (COZAAR) 50 mg tablet Take 1 tablet (50 mg total) by mouth daily at bedtime Do not start before March 6, 2023.  0    lubiprostone (AMITIZA) 24 mcg capsule Take 1 capsule (24 mcg total) by mouth 2 (two) times a day with meals 60 capsule 3    metaxalone (SKELAXIN) 800 mg tablet Take 800 mg by mouth 3 (three) times a day FOR MUSCLE SPASM AS NEEDED      NOVOLOG 100 UNIT/ML injection Pt reports unsure of total dose a day/did [place] her endocrinologist Dr Daviss,  [place]  Pt has pump on upper left abdomen cut basaL RATE IN HALF  3    NovoLOG 100 UNIT/ML injection INJECT UP  UNITS DAILY VIA INSULIN PUMP. E11.65      omeprazole (PriLOSEC OTC) 20 MG tablet Take 20 mg by mouth 2 (two) times a day      ondansetron (ZOFRAN) 8 mg tablet Take 8 mg by mouth every 8 (eight) hours as needed      oxyCODONE (ROXICODONE) 5 immediate release tablet PLEASE SEE ATTACHED FOR DETAILED DIRECTIONS       "oxyCODONE-acetaminophen (PERCOCET) 5-325 mg per tablet PLEASE SEE ATTACHED FOR DETAILED DIRECTIONS      polyethylene glycol (MIRALAX) 17 g packet Take 17 g by mouth daily 90 each 3    pramipexole (MIRAPEX) 1 mg tablet Take 1 mg by mouth 2 (two) times a day        Restasis 0.05 % ophthalmic emulsion Administer 1 drop to both eyes 2 (two) times a day As directed      Semaglutide (OZEMPIC, 0.25 OR 0.5 MG/DOSE, SC) Inject 0.25 mg under the skin once a week Sundays (Patient not taking: Reported on 12/4/2023)      sertraline (ZOLOFT) 50 mg tablet Take 100 mg by mouth daily at bedtime Take 1 tablet (100 mg total) by mouth daily      terazosin (HYTRIN) 1 mg capsule Take 1 capsule (1 mg total) by mouth daily at bedtime 30 capsule 0    torsemide (DEMADEX) 20 mg tablet Take 2 tablets (40 mg total) by mouth daily 60 tablet 0    zinc gluconate 50 mg tablet Take 50 mg by mouth daily       No current facility-administered medications for this visit.        Allergies   Allergen Reactions    Bactrim [Sulfamethoxazole-Trimethoprim] Hives    Sucralfate Hives     Facial swelling    Topamax [Topiramate]      disorentation    Azithromycin Itching    Pregabalin Confusion and Other (See Comments)     altered mental status    Ciprofloxacin Drowsiness     Other reaction(s): Other (See Comments)  \"drowsiness\"    Norvasc [Amlodipine] Swelling    Baclofen      \"That knocks me out.\"    Bupropion Fatigue    Methotrexate Nausea Only       Review of Systems   Constitutional:  Negative for chills, fatigue, fever and unexpected weight change.   HENT:  Positive for trouble swallowing.    Eyes: Negative.  Negative for visual disturbance.   Respiratory:  Negative for cough, shortness of breath and stridor.    Cardiovascular:  Negative for chest pain.   Gastrointestinal: Negative.    Endocrine: Negative.    Genitourinary: Negative.    Musculoskeletal: Negative.    Skin: Negative.    Neurological:  Negative for dizziness, light-headedness and headaches. "   Hematological:  Negative for adenopathy.   Psychiatric/Behavioral: Negative.         Video Exam    There were no vitals filed for this visit.    Physical Exam  Constitutional:       General: She is not in acute distress.     Appearance: Normal appearance. She is well-developed and normal weight. She is not diaphoretic.   HENT:      Head: Normocephalic and atraumatic.      Nose: Nose normal.   Eyes:      General: No scleral icterus.     Conjunctiva/sclera: Conjunctivae normal.   Pulmonary:      Effort: Pulmonary effort is normal. No respiratory distress.      Breath sounds: No stridor.   Musculoskeletal:      Cervical back: Normal range of motion.   Skin:     Coloration: Skin is not pale.      Findings: No erythema or rash.   Neurological:      Mental Status: She is alert and oriented to person, place, and time. Mental status is at baseline.   Psychiatric:         Mood and Affect: Mood normal.         Behavior: Behavior normal.         Thought Content: Thought content normal.         Judgment: Judgment normal.          Visit Time  Total Visit Duration: 15 minutes

## 2024-01-19 ENCOUNTER — TELEPHONE (OUTPATIENT)
Dept: SLEEP CENTER | Facility: CLINIC | Age: 69
End: 2024-01-19

## 2024-01-19 LAB
DME PARACHUTE DELIVERY DATE REQUESTED: NORMAL
DME PARACHUTE DELIVERY NOTE: NORMAL
DME PARACHUTE ITEM DESCRIPTION: NORMAL
DME PARACHUTE ORDER STATUS: NORMAL
DME PARACHUTE SUPPLIER NAME: NORMAL
DME PARACHUTE SUPPLIER PHONE: NORMAL

## 2024-01-19 NOTE — TELEPHONE ENCOUNTER
1/30/24 Roland set up    Rx and clinicals for DME setup sent to [facility] via [employer].

## 2024-01-21 NOTE — ASSESSMENT & PLAN NOTE
Lindsay is a 69yo F who is known to me. She underwent a Heller in 5/21. She presents today with recurrent symptoms and difficulty swallowing.    We discussed today that she may have end stage achalasia at this point. I would like to send her for an UGI and repeat manometry to ensure that her myotomy is still functioning. She may need a dilation but I would prefer to start with the above tests first. The patient understands and she will come in and see me after the above has been completed    Jill Bentley MD  Thoracic Surgery  (Available on Tiger Text)  Office: [phone]

## 2024-01-23 LAB
DME PARACHUTE DELIVERY DATE EXPECTED: NORMAL
DME PARACHUTE DELIVERY DATE REQUESTED: NORMAL
DME PARACHUTE DELIVERY NOTE: NORMAL
DME PARACHUTE ITEM DESCRIPTION: NORMAL
DME PARACHUTE ORDER STATUS: NORMAL
DME PARACHUTE SUPPLIER NAME: NORMAL
DME PARACHUTE SUPPLIER PHONE: NORMAL

## 2024-01-29 ENCOUNTER — EVALUATION (OUTPATIENT)
Dept: PHYSICAL THERAPY | Facility: CLINIC | Age: 69
End: 2024-01-29
Payer: MEDICARE

## 2024-01-29 DIAGNOSIS — K59.04 CHRONIC IDIOPATHIC CONSTIPATION: ICD-10-CM

## 2024-01-29 DIAGNOSIS — R19.8 INADEQUATE DEFECATORY PROPULSION: ICD-10-CM

## 2024-01-29 PROCEDURE — 97112 NEUROMUSCULAR REEDUCATION: CPT | Performed by: PHYSICAL THERAPIST

## 2024-01-29 PROCEDURE — 97162 PT EVAL MOD COMPLEX 30 MIN: CPT | Performed by: PHYSICAL THERAPIST

## 2024-01-29 NOTE — PROGRESS NOTES
PT Evaluation     Today's date: 2024  Patient name: Zohra Barriga  : 1955  MRN: [id number]  Referring provider: Scooby Almaguer PA-C  Dx:   Encounter Diagnosis     ICD-10-CM    1. Inadequate defecatory propulsion  R19.8 Ambulatory Referral to Physical Therapy      2. Chronic idiopathic constipation  K59.04 Ambulatory Referral to Physical Therapy                     Assessment  Impairments: abnormal gait, activity intolerance, impaired balance, impaired physical strength, lacks appropriate home exercise program, pain with function, poor posture  and poor body mechanics  Understanding of Dx/Px/POC: fair   Prognosis: fair    Goals  (Up to 8 weeks)  1. Independent with PF HEP.  2. Independent with self postural correction and use of a squatty potty at home to improve BM.  3. Independent with a bladder/bowel diary use to max fluid intake.  4. Independent with safe transfers sit to stand with PFM activation to min UI.      Plan  Patient would benefit from: skilled physical therapy  Planned therapy interventions: manual therapy, neuromuscular re-education, patient education, postural training, strengthening, stretching, therapeutic activities, therapeutic exercise, therapeutic training, transfer training, home exercise program, graded exercise, functional ROM exercises, flexibility, activity modification, abdominal trunk stabilization, behavior modification, body mechanics training and breathing training  Frequency: 1x week  Duration in weeks: 8  Treatment plan discussed with: patient        PT Pelvic Floor Subjective:   History of Present Illness:   Pt is 67 y/o female with Hx chronic constipation for about 3 years and complex medical Hx. (See pt's chart for details). Pt was recently seen by her gastro specialist who referred to pt to [facility] for PF evaluation and tx.  Current functional limitations: gait d/o post back Sx (), (+) use of RW for amb, decreased posture (flexed and lateral shift observed), (+)  worsening of constipation verbalized, (+) UI (bladder stimulator in the past - removed  and had multiple bladder Botox injections - no improvement noted), (+) diapers use .        Recurrent probem    Quality of life: poor    Social Support:     Lives in:  One-story house    Lives with:  Spouse    Relationship status: /committed    Work status: retired    Life stress level: 7    Life stress severity: severe    History of Depression: yesPronouns: she/her  Hand dominance:  Right  Diet and Exercise:    Diet:balanced nutrition    Exercise type: no activity    Decreased functional mobility.  OB/ gyn History    Gestational History:     Prior Pregnancy: Yes      Number of prior pregnancies: 3    Number of term pregnancies: 2    Delivery Type:  section      Number of caesarian sections: 2 (7 lbs, 5.7 lbs)    Delivery Complications:  C-sections due to a prolong pushing stage of labor, emergency C-sections (43 years ago)    Menstrual History:      Menopausal: menopause  hormone replacement therapyForm of hormone replacement therapy: oral (use of meds for about 2 years)  Bladder Function:     Voiding Difficulties positive for: urgency, frequent urination, straining and incomplete emptying       Voiding Difficulties comments:     Voiding frequency: every 31-60 minutes    Urinary leakage: urine leakage    Urinary leakage aggravated by: coughing, sneezing, bending, standing up, walking to the bathroom, post-void dribble and anxiety    Nocturia (episodes per night): 4    Painful urination: No      Fluid Intake Type:  Water, tea and soda    Intake (ounces): Water intake (oz): 20 oz. Soda intake (oz): 2 cans per day. Tea intake (oz): hot tea/herbal tea 2 cups.   Incontinence Management:     Pads/Diaper Use:  Day, night and 24 hours    Patient has attempted at least 4 weeks of independent pelvic floor strengthening exercises without a resolution of symptoms  Bowel Function:     Voiding DIfficulties: painful  "defecating, unfinished feeling after defecating and constipation      Bowel Function comments:  Educated on home use of a squatty potty.    Bowel frequency: every 3 days    [place] Stool Scale: type 1 and type 2    Stool softener use: stool softeners    Enema use: enema    Uses \"squatty potty\": no Squatty Potty  Sexual Function:     Sexually Active:  Sexually active    Lubrication Use: Yes    pain does not cause abstinence    Patient wishes to return to having intercourse: currently unable to have intercourse but wants toSexual function: vaginal pain  Pain:     Current pain ratin    At best pain ratin    At worst pain ratin    Onset:  More than 2 years ago    Quality:  Burning    Aggravating factors:  [place]    Duration of symptoms:  Less than 1 hour    Relieving factors:  Relaxation and rest    Progression:  No change  Diagnostic Tests: Anal manometry performed on 24: failure to expel balloon.:  Treatments:     Previous treatment:  Medication    Current treatment: medication    Patient Goals:     Patient goals for therapy:  Improved comfort, improved bladder or bowel function, fully empty bladder or bowels and improved quality of life      Objective     Static Posture   General Observations  Asymmetrical weight bearing, shifted right, tilted right and flexed.     Comments  Objective part of PF IE - TBA next tx session    Postural Observations  Seated posture: poor  Standing posture: poor  Correction of posture: has no consistent effect    Additional Postural Observation Details  L-roll use recommended    Ambulation     Ambulation: Level Surfaces   Ambulation with assistive device: independent    Observational Gait   Gait: antalgic and asymmetric   Base of support: increased             Precautions: complex medical Hx.      Manuals             PF MMT TBA            DR EMANUEL                                      Neuro Re-Ed                          Pelvic floor " PT/anatomy/edu/posture/purpose/benefits 5'            Squatty potty use/posture/benefits/purpose 5'            Bladder/bowel/diary use/edu/benefits/purpose 5'                                                   Ther Ex                                                                                                                     Ther Activity                                       Gait Training                                       Modalities

## 2024-01-30 ENCOUNTER — TELEPHONE (OUTPATIENT)
Dept: GASTROENTEROLOGY | Facility: HOSPITAL | Age: 69
End: 2024-01-30

## 2024-01-30 ENCOUNTER — TELEPHONE (OUTPATIENT)
Dept: SLEEP CENTER | Facility: CLINIC | Age: 69
End: 2024-01-30

## 2024-01-30 LAB
DME PARACHUTE DELIVERY DATE ACTUAL: NORMAL
DME PARACHUTE DELIVERY DATE EXPECTED: NORMAL
DME PARACHUTE DELIVERY DATE REQUESTED: NORMAL
DME PARACHUTE DELIVERY NOTE: NORMAL
DME PARACHUTE ITEM DESCRIPTION: NORMAL
DME PARACHUTE ORDER STATUS: NORMAL
DME PARACHUTE SUPPLIER NAME: NORMAL
DME PARACHUTE SUPPLIER PHONE: NORMAL

## 2024-01-30 NOTE — TELEPHONE ENCOUNTER
Pt was set up on 1/30/24 with a resmed cpap machine from [employer]. Machine set at 5-15cm and mask N20 S

## 2024-02-13 ENCOUNTER — TELEPHONE (OUTPATIENT)
Dept: CARDIAC SURGERY | Facility: CLINIC | Age: 69
End: 2024-02-13

## 2024-02-13 NOTE — TELEPHONE ENCOUNTER
Spoke with pt in regards to follow-up appointment on 02/15/24 with Dr. Bentley. I informed pt that we will need to reschedule this appointment until after she gets her Barium swallow test done which is scheduled for 02/27/24 as well as her Esophageal Manometry test scheduled for 03/01/24. I have rescheduled her follow up appointment with Dr. Bentley for 03/07/2024. Pt verbalized appreciation for this.

## 2024-02-15 ENCOUNTER — OFFICE VISIT (OUTPATIENT)
Dept: PHYSICAL THERAPY | Facility: CLINIC | Age: 69
End: 2024-02-15
Payer: MEDICARE

## 2024-02-15 DIAGNOSIS — K59.04 CHRONIC IDIOPATHIC CONSTIPATION: ICD-10-CM

## 2024-02-15 DIAGNOSIS — R19.8 INADEQUATE DEFECATORY PROPULSION: Primary | ICD-10-CM

## 2024-02-15 PROCEDURE — 97140 MANUAL THERAPY 1/> REGIONS: CPT | Performed by: PHYSICAL THERAPIST

## 2024-02-15 PROCEDURE — 97530 THERAPEUTIC ACTIVITIES: CPT | Performed by: PHYSICAL THERAPIST

## 2024-02-15 PROCEDURE — 97110 THERAPEUTIC EXERCISES: CPT | Performed by: PHYSICAL THERAPIST

## 2024-02-15 NOTE — PROGRESS NOTES
"Daily Note     Today's date: 2/15/2024  Patient name: Zohra Barriga  : 1955  MRN: [id number]  Referring provider: Scooby Almaguer PA-C  Dx:   Encounter Diagnosis     ICD-10-CM    1. Inadequate defecatory propulsion  R19.8       2. Chronic idiopathic constipation  K59.04                      Subjective: No major changes since IE. Pt reports being overall sore due to her BP.      Objective: See treatment diary below      Assessment: Tolerated treatment well. Patient demonstrated fatigue post treatment and would benefit from continued PT for further PFM strengthening as homero. Pt was educated on proper postural correction using L-roll in sitting to improve posture. HEP was given and reviewed. VC's/TC's needed for safe and correct ex tech. Max A needed for transfers from supine to sit. Pt shows PFM weakness with decreased endurance with quick and prolong hold. Due to decreased functional mobility pt will perform therex/HEP in sitting due to safety with transfers.      Plan: Continue per plan of care.  Progress treatment as tolerated.       Precautions: complex medical Hx.      Manuals 1/29 2/15           PF MMT TBA  vag/anal           DR TBA Not able to assess due to adipose tissue                                     Neuro Re-Ed                          Pelvic floor PT/anatomy/edu/posture/purpose/benefits 5'            Squatty potty use/posture/benefits/purpose 5'            Bladder/bowel/diary use/edu/benefits/purpose 5' 3' review                        L-roll use for upright postural correction/edu  5'                        Ther Ex             Sitting PF/TA  5x3\" VC's/TC's           Sitting Ad's/PF/TA t-ball use  5x3\" VC's/TC's           Sitting PF/TA and u/l TKE  5x3\" VC's/TC's                                                                            Ther Activity             HEP edu/review  10'           Transfers supine to sit  5' TC's/TC's for safe tech MaxA needed           Gait Training                  "                      Modalities

## 2024-02-16 ENCOUNTER — TELEPHONE (OUTPATIENT)
Dept: NEUROSURGERY | Facility: CLINIC | Age: 69
End: 2024-02-16

## 2024-02-16 NOTE — TELEPHONE ENCOUNTER
2/16/24 - EST. PT CALLED TO MAKE APPT W/POWER SHE STATES SHE HAS 2 FX    XR LSPINE 2/15/24 [facility]    PT STATES SHE IS HAVING A CT DONE TODAY OF HER SPINE [facility]     INFORMED HER AN  WILL FU WITH HER MON. ONCE IMAGING IS COMPLETED

## 2024-02-19 NOTE — TELEPHONE ENCOUNTER
2/19/24 - PT CALLED TO INFORM US HER IMAGING IN COMPLETED     CT TSPINE 2/16/24 [facility]  XR LSPINE 2/15/24 [facility]     INFORMED HER AN  WILL BE IN CONTACT WITH HER

## 2024-02-20 ENCOUNTER — TELEPHONE (OUTPATIENT)
Dept: NEUROSURGERY | Facility: CLINIC | Age: 69
End: 2024-02-20

## 2024-02-20 ENCOUNTER — TELEPHONE (OUTPATIENT)
Dept: GASTROENTEROLOGY | Facility: HOSPITAL | Age: 69
End: 2024-02-20

## 2024-02-22 ENCOUNTER — APPOINTMENT (OUTPATIENT)
Dept: PHYSICAL THERAPY | Facility: CLINIC | Age: 69
End: 2024-02-22
Payer: MEDICARE

## 2024-02-27 ENCOUNTER — TELEPHONE (OUTPATIENT)
Dept: NEUROLOGY | Facility: CLINIC | Age: 69
End: 2024-02-27

## 2024-02-28 ENCOUNTER — APPOINTMENT (OUTPATIENT)
Dept: PHYSICAL THERAPY | Facility: CLINIC | Age: 69
End: 2024-02-28
Payer: MEDICARE

## 2024-03-01 ENCOUNTER — HOSPITAL ENCOUNTER (OUTPATIENT)
Dept: GASTROENTEROLOGY | Facility: HOSPITAL | Age: 69
End: 2024-03-01
Attending: THORACIC SURGERY (CARDIOTHORACIC VASCULAR SURGERY)
Payer: MEDICARE

## 2024-03-01 VITALS
TEMPERATURE: 98.8 F | RESPIRATION RATE: 16 BRPM | SYSTOLIC BLOOD PRESSURE: 148 MMHG | HEART RATE: 80 BPM | DIASTOLIC BLOOD PRESSURE: 80 MMHG | OXYGEN SATURATION: 95 %

## 2024-03-01 DIAGNOSIS — R13.19 ESOPHAGEAL DYSPHAGIA: ICD-10-CM

## 2024-03-01 DIAGNOSIS — K22.0 ACHALASIA: ICD-10-CM

## 2024-03-01 PROCEDURE — 91010 ESOPHAGUS MOTILITY STUDY: CPT

## 2024-03-01 PROCEDURE — 91020 GASTRIC MOTILITY STUDIES: CPT | Performed by: INTERNAL MEDICINE

## 2024-03-03 ENCOUNTER — HOSPITAL ENCOUNTER (EMERGENCY)
Facility: HOSPITAL | Age: 69
Discharge: HOME | End: 2024-03-03
Attending: EMERGENCY MEDICINE
Payer: MEDICARE

## 2024-03-03 ENCOUNTER — APPOINTMENT (EMERGENCY)
Dept: RADIOLOGY | Facility: HOSPITAL | Age: 69
End: 2024-03-03
Payer: MEDICARE

## 2024-03-03 ENCOUNTER — APPOINTMENT (EMERGENCY)
Dept: NON INVASIVE DIAGNOSTICS | Facility: HOSPITAL | Age: 69
End: 2024-03-03
Payer: MEDICARE

## 2024-03-03 VITALS
WEIGHT: 188.71 LBS | RESPIRATION RATE: 18 BRPM | DIASTOLIC BLOOD PRESSURE: 65 MMHG | BODY MASS INDEX: 35.66 KG/M2 | SYSTOLIC BLOOD PRESSURE: 140 MMHG | HEART RATE: 77 BPM | TEMPERATURE: 99.5 F | OXYGEN SATURATION: 93 %

## 2024-03-03 DIAGNOSIS — M54.31 SCIATICA OF RIGHT SIDE: Primary | ICD-10-CM

## 2024-03-03 PROCEDURE — 99283 EMERGENCY DEPT VISIT LOW MDM: CPT

## 2024-03-03 PROCEDURE — 73564 X-RAY EXAM KNEE 4 OR MORE: CPT

## 2024-03-03 PROCEDURE — 93971 EXTREMITY STUDY: CPT | Performed by: SURGERY

## 2024-03-03 PROCEDURE — 93971 EXTREMITY STUDY: CPT

## 2024-03-03 RX ORDER — LIDOCAINE 50 MG/G
1 PATCH TOPICAL DAILY
Qty: 30 PATCH | Refills: 0 | Status: SHIPPED | OUTPATIENT
Start: 2024-03-03 | End: 2024-03-08 | Stop reason: ALTCHOICE

## 2024-03-03 RX ORDER — OXYCODONE HYDROCHLORIDE AND ACETAMINOPHEN 5; 325 MG/1; MG/1
1 TABLET ORAL ONCE
Status: COMPLETED | OUTPATIENT
Start: 2024-03-03 | End: 2024-03-03

## 2024-03-03 RX ORDER — LIDOCAINE 50 MG/G
1 PATCH TOPICAL ONCE
Status: DISCONTINUED | OUTPATIENT
Start: 2024-03-03 | End: 2024-03-03 | Stop reason: HOSPADM

## 2024-03-03 RX ADMIN — OXYCODONE AND ACETAMINOPHEN 1 TABLET: 5; 325 TABLET ORAL at 09:49

## 2024-03-03 NOTE — ED NOTES
Tigertext sent to Vascular tech, will be here in approx 1 hour     Anita Leone, BAUDILIO  03/03/24 1014

## 2024-03-03 NOTE — ED PROVIDER NOTES
History  Chief Complaint   Patient presents with    Leg Pain     Pt's c/o right leg pain ongoing for a while. Denies any falls or injury     Patient is a 68-year-old female.  She does have chronic back pain.  She has had spinal fusion.  She presents to the emergency room with a 2-month history of atraumatic right leg pain.  It is worse in her knee.  Does not clearly radiate from her back.  The pain, however does involve her whole leg.  No weakness or paralysis.  No numbness or paresthesias.  No fever or chills.  Over the last couple days it has gotten worse and she could not take it anymore.  She presents to the emergency room for evaluation.  She does have a history of DVT in the left leg.  This was, however, postop.  She is currently not on oral anticoagulation.        Prior to Admission Medications   Prescriptions Last Dose Informant Patient Reported? Taking?   CRANBERRY FRUIT CONCENTRATE PO  Self Yes No   Sig: Take 450 mg by mouth in the morning Every morning for supplement   Chelated Zinc 50 MG TABS  Self Yes No   Sig: Take 50 mg by mouth   Cyanocobalamin (VITAMIN B-12 PO)  Self Yes No   Sig: Take 1 capsule by mouth every evening    Darbepoetin Mike (ARANESP, ALBUMIN FREE, IJ)  Self Yes No   Sig: Inject as directed every 14 (fourteen) days   Insulin Infusion Pump (MINIMED INSULIN PUMP) PRINCESS  Self Yes No   Sig: Use   NOVOLOG 100 UNIT/ML injection  Self Yes No   Sig: Pt reports unsure of total dose a day/did see her endocrinologist Dr Daviss,  [place]  Pt has pump on upper left abdomen cut basaL RATE IN HALF   NovoLOG 100 UNIT/ML injection  Self Yes No   Sig: INJECT UP  UNITS DAILY VIA INSULIN PUMP. E11.65   Restasis 0.05 % ophthalmic emulsion  Self Yes No   Sig: Administer 1 drop to both eyes 2 (two) times a day As directed   Semaglutide (OZEMPIC, 0.25 OR 0.5 MG/DOSE, SC)  Self Yes No   Sig: Inject 0.25 mg under the skin once a week Sundays   Patient not taking: Reported on 12/4/2023   acetaminophen (TYLENOL)  500 mg tablet  Self Yes No   Sig: Take 500 mg by mouth every 4 (four) hours as needed for mild pain Take 2 tablets (1000 mg)by mouth every 4 hours as needed, for mild pain & moderate pain   albuterol (ACCUNEB) 1.25 MG/3ML nebulizer solution  Self Yes No   Sig: Take 2.5 mg by nebulization every 6 (six) hours as needed for wheezing   albuterol (PROVENTIL HFA,VENTOLIN HFA) 90 mcg/act inhaler  Self Yes No   Sig: Inhale 2 puffs every 6 (six) hours as needed for wheezing   amitriptyline (ELAVIL) 10 mg tablet  Self Yes No   Sig: Take 10 mg by mouth daily at bedtime   aspirin (ECOTRIN LOW STRENGTH) 81 mg EC tablet  Self Yes No   Sig: Take 81 mg by mouth every morning   atorvastatin (LIPITOR) 80 mg tablet  Self Yes No   Sig: Take 80 mg by mouth daily at bedtime     bisacodyl (DULCOLAX) 10 mg suppository  Self No No   Sig: Insert 1 suppository (10 mg total) into the rectum daily   calcium carbonate (OS-KARIS) 1250 (500 Ca) MG chewable tablet  Self Yes No   Sig: Chew 1 tablet daily   calcium carbonate (TUMS) 500 mg chewable tablet  Self Yes No   Sig: Chew 1 tablet daily   Patient not taking: Reported on 12/4/2023   carvedilol (COREG) 3.125 mg tablet  Self Yes No   Sig: Take 3.125 mg by mouth 2 (two) times a day with meals   cefuroxime (CEFTIN) 250 mg tablet  Self Yes No   Sig: Take 250 mg by mouth 2 (two) times a day   cephalexin (KEFLEX) 500 mg capsule  Self Yes No   Sig: PLEASE SEE ATTACHED FOR DETAILED DIRECTIONS   chlorzoxazone (PARAFON FORTE) 500 mg tablet  Self No No   Sig: Take 1 PO QID PRN for pain and spasms.   Patient taking differently: Take 500 mg by mouth 3 (three) times a day as needed Take 1 PO QID PRN for pain and spasms.   docusate sodium (COLACE) 100 mg capsule   No No   Sig: TAKE 1 CAPSULE BY MOUTH EVERY DAY   ezetimibe (ZETIA) 10 mg tablet  Self Yes No   Sig: Take 10 mg by mouth daily   famotidine (PEPCID) 20 mg tablet   No No   Sig: TAKE 1 TABLET BY MOUTH TWICE A DAY   fluticasone-salmeterol (ADVAIR) 100-50  mcg/dose inhaler  Self Yes No   Sig: Inhale 2 puffs as needed   gabapentin (NEURONTIN) 100 mg capsule  Self Yes No   Sig: Take 600 mg by mouth 2 (two) times a day   glucagon (GLUCAGEN) 1 mg injection  Self Yes No   Si mg once Inject 1 mg under the skin as needed for low blood sugar   glucose 4 g chewable tablet  Self Yes No   Sig: Chew 16 g as needed for low blood sugar Take 15 g by mouth as needed (blood sugar less than 70 if able to safely swallow)   glucose blood test strip  Self Yes No   Sig: Testing six times daily  E11.65 on insulin pump   glucose blood test strip  Self Yes No   Sig: Use   insulin glargine (LANTUS) 100 units/mL subcutaneous injection  Self Yes No   Sig: Inject 24 Units under the skin if needed Inject 24 units under the skin as needed (for pump failure)   lactulose (CHRONULAC) 10 g/15 mL solution   No No   Sig: Take 15 mL (10 g total) by mouth in the morning   levothyroxine 100 mcg tablet  Self Yes No   Sig: Take 100 mcg by mouth daily   losartan (COZAAR) 50 mg tablet  Self No No   Sig: Take 1 tablet (50 mg total) by mouth daily at bedtime Do not start before 2023.   lubiprostone (AMITIZA) 24 mcg capsule   No No   Sig: Take 1 capsule (24 mcg total) by mouth 2 (two) times a day with meals   metaxalone (SKELAXIN) 800 mg tablet  Self Yes No   Sig: Take 800 mg by mouth 3 (three) times a day FOR MUSCLE SPASM AS NEEDED   omeprazole (PriLOSEC OTC) 20 MG tablet  Self Yes No   Sig: Take 20 mg by mouth 2 (two) times a day   ondansetron (ZOFRAN) 8 mg tablet  Self Yes No   Sig: Take 8 mg by mouth every 8 (eight) hours as needed   oxyCODONE (ROXICODONE) 5 immediate release tablet  Self Yes No   Sig: PLEASE SEE ATTACHED FOR DETAILED DIRECTIONS   oxyCODONE-acetaminophen (PERCOCET) 5-325 mg per tablet  Self Yes No   Sig: PLEASE SEE ATTACHED FOR DETAILED DIRECTIONS   polyethylene glycol (MIRALAX) 17 g packet  Self No No   Sig: Take 17 g by mouth daily   pramipexole (MIRAPEX) 1 mg tablet  Self Yes  "No   Sig: Take 1 mg by mouth 2 (two) times a day     sertraline (ZOLOFT) 50 mg tablet  Self Yes No   Sig: Take 100 mg by mouth daily at bedtime Take 1 tablet (100 mg total) by mouth daily   terazosin (HYTRIN) 1 mg capsule  Self No No   Sig: Take 1 capsule (1 mg total) by mouth daily at bedtime   torsemide (DEMADEX) 20 mg tablet  Self No No   Sig: Take 2 tablets (40 mg total) by mouth daily   zinc gluconate 50 mg tablet  Self Yes No   Sig: Take 50 mg by mouth daily      Facility-Administered Medications: None       Past Medical History:   Diagnosis Date    Anxiety     Asthma     controlled    Back complaints     Cancer ([facility])     nose    Cellulitis of left lower leg     \"not now\"    CHF (congestive heart failure) ([facility]) 02/2021    Chronic bilateral thoracic back pain     Chronic kidney disease     sees nephrologist regularly\" stage 3\"    Chronic myofascial pain     Chronic pain of both lower extremities     Chronic pain of left knee     \"both knees\"    Chronic pain syndrome     bilat legs and knees/neuropathy pain    COVID 12/2021    CPAP (continuous positive airway pressure) dependence     no currently uses    Depression     Diabetes mellitus ([facility])     insulin pump    Disease of thyroid gland     Does use hearing aid     bilat and will wear DOS    DVT (deep venous thrombosis) ([facility]) 2013    left leg    Gait disorder     Gastroparesis     GERD (gastroesophageal reflux disease)     History of angina     History of MRSA infection     History of transfusion     Many years ago    Hyperlipidemia     Hypertension     Hypoglycemic reaction     \"occas low blood sugar\"    Insulin pump in place     pt reports saw endocrinologist 4/28 and will bring copy of instructions DOS    Irritable bowel syndrome     Kidney disease     Pacemaker 2019    Polyneuropathy associated with underlying disease ([facility])     PONV (postoperative nausea and vomiting)     Risk for falls     S/P insertion of spinal cord stimulator 06/08/2018    Sarcoidosis  " "   Shortness of breath     \"exertion and not new\"    Sleep apnea     TIA (transient ischemic attack)     Use of cane as ambulatory aid     Uses walker     Wears dentures     permanent upper/and some missing teeth/partial lower       Past Surgical History:   Procedure Laterality Date    ABDOMINAL ADHESION SURGERY N/A 2021    Procedure: laparoscopic LYSIS ADHESIONS;  Surgeon: Jill Bentley MD;  [facility];  Service: Thoracic    BREAST SURGERY      BREAST SURGERY      reduction    CARDIAC PACEMAKER PLACEMENT      pacemaker     SECTION      COLONOSCOPY      DILATION AND CURETTAGE OF UTERUS      \"D&E\"    HERNIA REPAIR      HYSTERECTOMY      JOINT REPLACEMENT Left     LTKR    NECK SURGERY      fused 4 discs with 4 screws implanted    NISSEN FUNDOPLICATION LAPAROSCOPIC WITH ROBOTICS N/A 2021    Procedure: ROBOTIC HELLER MYOTOMY WITH BERNARDO FUNDIPLICATION ;  Surgeon: Jill Bentley MD;  [facility];  Service: Thoracic    LA ESOPHAGOGASTRODUODENOSCOPY TRANSORAL DIAGNOSTIC N/A 2021    Procedure: ESOPHAGOGASTRODUODENOSCOPY (EGD);  Surgeon: Jill Bentley MD;  [facility];  Service: Thoracic    LA ESOPHAGOGASTRODUODENOSCOPY TRANSORAL DIAGNOSTIC N/A 2022    Procedure: ESOPHAGOGASTRODUODENOSCOPY (EGD),;  Surgeon: Jill Bentley MD;  [facility];  Service: Thoracic    LA ESOPHAGOGASTRODUODENOSCOPY TRANSORAL DIAGNOSTIC N/A 2022    Procedure: ESOPHAGOGASTRODUODENOSCOPY (EGD);  Surgeon: Jill Bentley MD;  [facility];  Service: Thoracic    LA ESOPHAGOGASTRODUODENOSCOPY TRANSORAL DIAGNOSTIC N/A 2022    Procedure: ESOPHAGOGASTRODUODENOSCOPY (EGD); PYLORIC DILATION; GE JUNCTION DILATION;  Surgeon: Jill Bentley MD;  [facility];  Service: Thoracic    LA ESOPHAGOGASTRODUODENOSCOPY TRANSORAL DIAGNOSTIC N/A 2022    Procedure: ESOPHAGOGASTRODUODENOSCOPY (EGD);  Surgeon: Varinder Steiner MD;  " [facility];  Service: Thoracic    MA ESOPHAGOGASTRODUODENOSCOPY TRANSORAL DIAGNOSTIC N/A 1/24/2023    Procedure: ESOPHAGOGASTRODUODENOSCOPY (EGD);  Surgeon: Jill Bentley MD;  [facility];  Service: Thoracic    MA ESOPHAGOSCOPY FLEX BALLOON DILAT <30 MM DIAM N/A 01/12/2022    Procedure: DILATATION ESOPHAGEAL;  Surgeon: Jill Bentley MD;  [facility];  Service: Thoracic    MA ESOPHAGOSCOPY FLEX BALLOON DILAT <30 MM DIAM N/A 02/16/2022    Procedure: DILATATION ESOPHAGEAL;  Surgeon: Jill Bentley MD;  [facility];  Service: Thoracic    MA ESOPHAGOSCOPY FLEX BALLOON DILAT <30 MM DIAM N/A 11/29/2022    Procedure: DILATATION ESOPHAGEAL esophageal and pyloric dilation;  Surgeon: Varinder Steiner MD;  [facility];  Service: Thoracic    MA ESOPHAGOSCOPY FLEX BALLOON DILAT <30 MM DIAM N/A 1/24/2023    Procedure: esophageal dilation dilated up to 54  with pylorus dilation dilated up to 18;  Surgeon: Jill Bentley MD;  [facility];  Service: Thoracic    MA RIBEIRO IMPLTJ NSTIM ELTRDS PLATE/PADDLE EDRL Left 04/23/2018    Procedure: Insertion of thoracic spinal cord stimulator electrode via laminotomy and placement of left buttock IPG;  Surgeon: Shahab Bullock MD;  [facility];  Service: Neurosurgery    REPLACEMENT TOTAL KNEE      ROTATOR CUFF REPAIR      SPINAL STIMULATOR PLACEMENT Left 10/03/2018    Procedure: BUTTOCK RE-OPENING INCISION FOR REPOSITIONING OF IMPLANTABLE PULSE GENERATOR;  Surgeon: Shahab Bullock MD;  [facility];  Service: Neurosurgery    TONSILLECTOMY      UPPER GASTROINTESTINAL ENDOSCOPY      WISDOM TOOTH EXTRACTION         Family History   Problem Relation Age of Onset    Diabetes Family     Heart disease Family     Hypertension Family     Neuropathy Family     No Known Problems Mother     Heart disease Father     Diabetes Father     No Known Problems Maternal Grandmother     No Known Problems Maternal Grandfather      No Known Problems Paternal Grandmother     No Known Problems Paternal Grandfather     No Known Problems Brother     No Known Problems Daughter     Cancer Son      I have reviewed and agree with the history as documented.    E-Cigarette/Vaping    E-Cigarette Use Never User      E-Cigarette/Vaping Substances    Nicotine No     THC Yes     CBD No     Flavoring No     Other No     Unknown No      Social History     Tobacco Use    Smoking status: Never    Smokeless tobacco: Never   Vaping Use    Vaping status: Never Used   Substance Use Topics    Alcohol use: Never    Drug use: Not Currently     Frequency: 8.0 times per week     Types: Marijuana     Comment: Medical Marijuana/vape pen once per week       Review of Systems   Constitutional:  Negative for chills and fever.   HENT:  Negative for rhinorrhea and sore throat.    Eyes:  Negative for pain, redness and visual disturbance.   Respiratory:  Negative for cough and shortness of breath.    Cardiovascular:  Negative for chest pain and leg swelling.   Gastrointestinal:  Negative for abdominal pain, diarrhea and vomiting.   Endocrine: Negative for polydipsia and polyuria.   Genitourinary:  Negative for dysuria, frequency, hematuria, vaginal bleeding and vaginal discharge.   Musculoskeletal:  Positive for arthralgias and back pain. Negative for neck pain.   Skin:  Negative for rash and wound.   Allergic/Immunologic: Negative for immunocompromised state.   Neurological:  Negative for weakness, numbness and headaches.   Hematological:  Does not bruise/bleed easily.   Psychiatric/Behavioral:  Negative for hallucinations and suicidal ideas.    All other systems reviewed and are negative.      Physical Exam  Physical Exam  Vitals reviewed.   Constitutional:       General: She is not in acute distress.  HENT:      Head: Normocephalic and atraumatic.      Nose: Nose normal.      Mouth/Throat:      Mouth: Mucous membranes are moist.   Eyes:      General:         Right eye: No  discharge.         Left eye: No discharge.      Conjunctiva/sclera: Conjunctivae normal.   Cardiovascular:      Rate and Rhythm: Normal rate and regular rhythm.      Pulses: Normal pulses.      Heart sounds: Normal heart sounds. No murmur heard.     No friction rub. No gallop.   Pulmonary:      Effort: Pulmonary effort is normal. No respiratory distress.      Breath sounds: Normal breath sounds. No stridor. No wheezing, rhonchi or rales.   Abdominal:      General: Bowel sounds are normal. There is no distension.      Palpations: Abdomen is soft.      Tenderness: There is no abdominal tenderness. There is no right CVA tenderness, left CVA tenderness, guarding or rebound.   Musculoskeletal:         General: Tenderness present. No swelling, deformity or signs of injury. Normal range of motion.      Cervical back: Normal range of motion and neck supple. No rigidity.      Right lower leg: No edema.      Left lower leg: No edema.      Comments: There is tenderness to the right sacroiliac joint.  There is diffuse tenderness to the right knee.  No effusion.  No erythema or warmth.  She does have range of motion of the knee.   Skin:     General: Skin is warm and dry.      Coloration: Skin is not jaundiced.      Findings: No rash.   Neurological:      General: No focal deficit present.      Mental Status: She is alert and oriented to person, place, and time.      Sensory: No sensory deficit.      Motor: Motor function is intact.      Comments: No saddle anesthesia.   Psychiatric:         Mood and Affect: Mood normal.         Behavior: Behavior normal.         Vital Signs  ED Triage Vitals [03/03/24 0903]   Temperature Pulse Respirations Blood Pressure SpO2   99.5 °F (37.5 °C) 77 18 140/65 93 %      Temp Source Heart Rate Source Patient Position - Orthostatic VS BP Location FiO2 (%)   Oral Monitor -- -- --      Pain Score       9           Vitals:    03/03/24 0903   BP: 140/65   Pulse: 77         Visual Acuity      ED  Medications  Medications   lidocaine (LIDODERM) 5 % patch 1 patch (has no administration in time range)   oxyCODONE-acetaminophen (PERCOCET) 5-325 mg per tablet 1 tablet (1 tablet Oral Given 3/3/24 0949)       Diagnostic Studies  Results Reviewed       None                   XR knee 4+ vw right injury   ED Interpretation by Berny Helms MD (03/03 9677)   No fracture or dislocation      VAS lower limb venous duplex study, unilateral/limited    (Results Pending)              Procedures  Procedures         ED Course                                             Medical Decision Making  Most likely this is sciatica.  Ultrasound was negative for DVT.  X-ray of the knee was negative for fracture or dislocation.  There are no signs or symptoms of cord compression or cauda equina syndrome.  No incontinence.  No saddle embolism.  No urinary retention.  Appropriate for discharge and outpatient management.  Will refer to comprehensive spine.    Amount and/or Complexity of Data Reviewed  Radiology: ordered and independent interpretation performed. Decision-making details documented in ED Course.    Risk  Prescription drug management.  Decision regarding hospitalization.             Disposition  Final diagnoses:   Sciatica of right side     Time reflects when diagnosis was documented in both MDM as applicable and the Disposition within this note       Time User Action Codes Description Comment    3/3/2024 12:30 PM Berny Helms Add [M54.31] Sciatica of right side           ED Disposition       ED Disposition   Discharge    Condition   Stable    Date/Time   Sun Mar 3, 2024 12:30 PM    Comment   Zohra Barriga discharge to home/self care.                   Follow-up Information       Follow up With Specialties Details Why Contact Info Additional Information    [facility] Physical Therapy In 1 week  [phone]            Patient's Medications   Discharge Prescriptions    LIDOCAINE  (LIDODERM) 5 %    Apply 1 patch topically over 12 hours daily Applied to right lower back.  Remove & Discard patch within 12 hours or as directed by MD       Start Date: 3/3/2024  End Date: --       Order Dose: 1 patch       Quantity: 30 patch    Refills: 0           PDMP Review         Value Time User    PDMP Reviewed  Yes 8/25/2020  9:04 AM ROMAINE Zuñiga            ED Provider  Electronically Signed by             Berny Helms MD  03/03/24 7823

## 2024-03-05 ENCOUNTER — TELEPHONE (OUTPATIENT)
Age: 69
End: 2024-03-05

## 2024-03-05 ENCOUNTER — TELEPHONE (OUTPATIENT)
Dept: HEMATOLOGY ONCOLOGY | Facility: CLINIC | Age: 69
End: 2024-03-05

## 2024-03-05 ENCOUNTER — TELEPHONE (OUTPATIENT)
Dept: PHYSICAL THERAPY | Facility: OTHER | Age: 69
End: 2024-03-05

## 2024-03-05 NOTE — TELEPHONE ENCOUNTER
Patients GI provider:  ROSA FREEMAN    Number to return call: [phone]     Reason for call: Pt calling to review manometry results. Patient also questioning next step in her care. Please review results and contact patient to further discuss.

## 2024-03-05 NOTE — TELEPHONE ENCOUNTER
Appointment Change  Cancel, Reschedule, Change to Virtual      Who are you speaking with? Patient   If it is not the patient, is the caller listed on the communication consent form? N/A   Which provider is the appointment scheduled with? Dr. Bentley   When was the original appointment scheduled?    Please list date and time 03/07/2024 @ 2:20PM    At which location is the appointment scheduled to take place? [place]   Was the appointment rescheduled?     Was the appointment changed from an in person visit to a virtual visit?    If so, please list the details of the change. Yes, 04/18/2024 @ 2:20PM    What is the reason for the appointment change? Needs to complete testing

## 2024-03-05 NOTE — TELEPHONE ENCOUNTER
Call placed to the patient per [facility] referral.    Spoke with the patient and explained comp spine. Pt already has a PT eval scheduled for 3/11/24.     Pt also has a Neurosx appt for 3/8/24, and will call Neurology to reschedule her cancelled/missed appointment from 3/4/24    Comp spine has nothing more to offer the patient    Closed

## 2024-03-06 NOTE — PROGRESS NOTES
Patient ID: Zohra Barriga is a 68 y.o. female.    Assessment/Plan:    Myelopathy (HCC)    Myelopathy:  She has brisk reflexes in the upper extremities, urinary retention  and has been leaning to the right side.  She has new right arm weakness and numbness.  I am concerned about cervical myelopathy.      recommendations:  -Check CT myelogram  -Check BMP prior to myelogram  -Patient is on baby aspirin    Pain in right leg  After lumbar spinal fusion last summer, patient did develop an infection and required a wound VAC.  She continues to have pain in the right leg which is worsened over the last 6 months.    Recommendations:  -CT myelogram  -She is currently on gabapentin 600 mg twice daily.  I added 100 mg twice daily.  Current creatinine clearance is 60.  However, creatinine is better than it had been in the last few months.  -Resume PT when able-    Follow-up after CT myelogram or sooner if difficulties    I have spent a total time of 55 minutes on 03/07/24 in caring for this patient including Diagnostic results, Risks and benefits of tx options, Instructions for management, Patient and family education, Importance of tx compliance, Impressions, Counseling / Coordination of care, Documenting in the medical record, Reviewing / ordering tests, medicine, procedures  , Obtaining or reviewing history  , and Communicating with other healthcare professionals .        Subjective:    Mrs Barriga is a 68 yr old lady with PMH diabetes, anemia, gastroparesis, sarcoid, STU, asthma, HLD, cervical and lumbar spine surgeries who presents for new neuromuscular evaluation of symptoms right leg pain, leaning to the R and gait decline. She is here with her .     She has pain in both legs for several months. Right is worse than the left side. She has pain from the thigh all the way into the toes. She has pain in the R knee. The left side hurts less. Walking worsens the symptoms. Trying to sit down and get up makes it worse. +LBP.  "    She has a spinal cord stimulator, which worked for a while. She says it was turned off because it stopped working.     She had lumbar spinal fusion in 2023. Right after surgery she did not have pain. But over time the pain has started in a different distribution.     She takes gabapentin 600 mg twice daily for the pain. Percocet takes the edge off. She has been on gabapentin for a year.     She has diabetes which she says is controlled. Meds were increased in the last year. She tells me most recent HbA1c was 9.  There is no recent HbA1c in our system.    She has been using the rollator for 6 months.     She has numbness of the right arm. The right arm has always been shorter than the left arm.  She has been leaning to the right side for about 6 months.    She has not seen pain mgt recently.     She has urinary incontinence for 6 months.  She currently has a catheter and will be doing a voiding trial in the near future.         Objective:    Blood pressure 124/74, pulse 72, temperature 97.6 °F (36.4 °C), temperature source Temporal, resp. rate 16, height 5' 1\" (1.549 m), weight 85.7 kg (189 lb), SpO2 95%.    Physical Exam  Constitutional:       Appearance: Normal appearance.   HENT:      Mouth/Throat:      Mouth: Mucous membranes are moist.      Pharynx: Oropharynx is clear.   Eyes:      Conjunctiva/sclera: Conjunctivae normal.   Neck:      Vascular: No carotid bruit.   Cardiovascular:      Rate and Rhythm: Normal rate and regular rhythm.      Heart sounds: Murmur heard.   Pulmonary:      Effort: Pulmonary effort is normal.      Breath sounds: Normal breath sounds.   Musculoskeletal:      Right lower leg: Edema present.      Left lower leg: Edema present.      Comments: Right arm is significantly shorter than the left arm   Psychiatric:         Mood and Affect: Mood normal.         Behavior: Behavior normal.         Neurological Exam  Mental status: Awake, alert, oriented to person place and time.  Naming, " concentration, repetition intact.  She had difficulty naming the current event.  Recall 3/3 immediately and 3/3 at 5 minutes with prompts.    Cranial nerves: Extraocular movements intact.  Mild bilateral ptosis.  Pupils equally round and reactive to light.  Visual fields full to confrontation.  Facial sensation intact.  Face symmetric.  Speech clear. Hearing intact.  Tongue, uvula, palate midline and intact.  Sternocleidomastoid intact.    Motor:   Deltoid: Right 4+ /5, left 5/5  Biceps: Right 4+ /5, left 5/5  Triceps: Right 4+ /5, left 5/5  Flexor carpi radialis: Right 5/5, left 5/5  Flexor carpi ulnaris: Right 5/5, left 5/5  : Right  4 /5, left 5/5  Intrinsic hand muscles: Right 3/5, left 4/5    Iliopsoas: Right 5/5, left 5/5  Quadriceps: Right 5/5, left 5/5  Tibialis anterior: Right 5/5, left 5/5  Medial gastrocnemius: Right 5/5, left 5/5  Extensor hallucis longus: Right 5-/5, left 5/5  Abductor hallucis: Right 5/5, left 5/5    Cerebellar: No dysmetria.  She walks with a rollator, leaning to the right side.  Slight shuffling.    Reflexes:   Biceps: Right 3+, left 3+  Triceps: Right 1+, left 2+  Brachioradialis: Right 2+, left 3+  Patellar: Right 2+, left 2+  Achilles: Right 0, left 0  Plantar responses mute bilaterally.  Marium sign absent bilaterally.    Sensory: Light touch and vibration intact.  Vibration was at least 16 seconds at the great toes bilaterally.  Temperature reduced in the right lateral leg and foot.  Pinprick reduced in the right lateral leg and foot and the left lateral leg.  Position sense intact.    ROS:    Review of Systems  Constitutional:  Negative for appetite change, fatigue and fever.   HENT:  Positive for trouble swallowing. Negative for hearing loss, tinnitus and voice change.    Eyes: Negative.  Negative for photophobia, pain and visual disturbance.   Respiratory: Negative.  Negative for shortness of breath.    Cardiovascular: Negative.  Negative for palpitations.    Gastrointestinal: Negative.  Negative for nausea and vomiting.   Endocrine: Positive for cold intolerance.   Genitourinary:  Positive for frequency and urgency. Negative for dysuria.   Musculoskeletal:  Positive for back pain and neck stiffness. Negative for gait problem, myalgias and neck pain.   Skin: Negative.  Negative for rash.   Allergic/Immunologic: Negative.    Neurological:  Positive for numbness. Negative for dizziness, tremors, seizures, syncope, facial asymmetry, speech difficulty, weakness, light-headedness and headaches.   Hematological:  Bruises/bleeds easily.   Psychiatric/Behavioral: Negative.  Negative for confusion, hallucinations and sleep disturbance.    All other systems reviewed and are negative.       DATA:  Labs, 2/23/2024:  Hemoglobin 11.7  Platelets 185  MCV 88    Labs 1/12/2024:  Glucose 288  BUN 44  Creatinine 1.23  GFR 48  TSH 4.02  Vitamin B12 965      CT Lumbar spine July 2023:  IMPRESSION:   Lumbar spine:   1. Lumbar spine CT with IV contrast has very low sensitivity for evaluation of   acute infection. If there is concern for acute infection, the appropriate study   is lumbar spine MRI with and without IV contrast.   2. Fat stranding and fluid present within the posterior subcutaneous soft   tissues and posterior paraspinal musculature at the postsurgical levels of   L3-L5. Evaluation for abscess would require lumbar spine MRI with and without IV   contrast.   3. Significant streak artifact from the patient's spinal fusion hardware at   L4-L5. This level cannot be evaluated.   4. Recommend lumbar spine MRI with and without IV contrast.   5. Postsurgical changes at L4-L5. Posterior spine fusion with bilateral pedicle   screws and interconnecting rods. Interbody spacer device present. No evidence of   acute hardware complication. No osseous fusion across the intervertebral disc   space. Significant streak artifact throughout the spinal canal at this level.   Evaluation for epidural  abscess or recurrent/residual degenerative changes is   nondiagnostic. Recommend lumbar spine MRI with and without IV contrast.   6. Partially imaged spinal stimulator leads at T9 level. Partially imaged   stimulator lead within the posterior subcutaneous soft tissues at the level of   the inferior sacrum.

## 2024-03-07 ENCOUNTER — OFFICE VISIT (OUTPATIENT)
Dept: NEUROLOGY | Facility: CLINIC | Age: 69
End: 2024-03-07
Payer: MEDICARE

## 2024-03-07 VITALS
HEART RATE: 72 BPM | HEIGHT: 61 IN | WEIGHT: 189 LBS | OXYGEN SATURATION: 95 % | SYSTOLIC BLOOD PRESSURE: 124 MMHG | DIASTOLIC BLOOD PRESSURE: 74 MMHG | TEMPERATURE: 97.6 F | BODY MASS INDEX: 35.68 KG/M2 | RESPIRATION RATE: 16 BRPM

## 2024-03-07 DIAGNOSIS — M47.816 LUMBAR SPONDYLOSIS: ICD-10-CM

## 2024-03-07 DIAGNOSIS — G63 POLYNEUROPATHY ASSOCIATED WITH UNDERLYING DISEASE (HCC): ICD-10-CM

## 2024-03-07 DIAGNOSIS — M79.604 PAIN IN RIGHT LEG: ICD-10-CM

## 2024-03-07 DIAGNOSIS — R32 URINARY INCONTINENCE: ICD-10-CM

## 2024-03-07 DIAGNOSIS — G95.9 MYELOPATHY (HCC): Primary | ICD-10-CM

## 2024-03-07 PROBLEM — S22.000A THORACIC COMPRESSION FRACTURE (HCC): Status: ACTIVE | Noted: 2024-03-07

## 2024-03-07 PROCEDURE — 99204 OFFICE O/P NEW MOD 45 MIN: CPT | Performed by: PSYCHIATRY & NEUROLOGY

## 2024-03-07 RX ORDER — CYCLOBENZAPRINE HCL 10 MG
TABLET ORAL
COMMUNITY
Start: 2024-03-03

## 2024-03-07 RX ORDER — GABAPENTIN 100 MG/1
100 CAPSULE ORAL 2 TIMES DAILY
Qty: 60 CAPSULE | Refills: 3 | Status: SHIPPED | OUTPATIENT
Start: 2024-03-07

## 2024-03-07 NOTE — PROGRESS NOTES
Review of Systems   Constitutional:  Negative for appetite change, fatigue and fever.   HENT:  Positive for trouble swallowing. Negative for hearing loss, tinnitus and voice change.    Eyes: Negative.  Negative for photophobia, pain and visual disturbance.   Respiratory: Negative.  Negative for shortness of breath.    Cardiovascular: Negative.  Negative for palpitations.   Gastrointestinal: Negative.  Negative for nausea and vomiting.   Endocrine: Positive for cold intolerance.   Genitourinary:  Positive for frequency and urgency. Negative for dysuria.   Musculoskeletal:  Positive for back pain and neck stiffness. Negative for gait problem, myalgias and neck pain.   Skin: Negative.  Negative for rash.   Allergic/Immunologic: Negative.    Neurological:  Positive for numbness. Negative for dizziness, tremors, seizures, syncope, facial asymmetry, speech difficulty, weakness, light-headedness and headaches.   Hematological:  Bruises/bleeds easily.   Psychiatric/Behavioral: Negative.  Negative for confusion, hallucinations and sleep disturbance.    All other systems reviewed and are negative.

## 2024-03-07 NOTE — ASSESSMENT & PLAN NOTE
After lumbar spinal fusion last summer, patient did develop an infection and required a wound VAC.  She continues to have pain in the right leg which is worsened over the last 6 months.    Recommendations:  -CT myelogram  -She is currently on gabapentin 600 mg twice daily.  I added 100 mg twice daily.  Current creatinine clearance is 60.  However, creatinine is better than it had been in the last few months.  -Resume PT when able-

## 2024-03-07 NOTE — ASSESSMENT & PLAN NOTE
Myelopathy:  She has brisk reflexes in the upper extremities, urinary retention  and has been leaning to the right side.  She has new right arm weakness and numbness.  I am concerned about cervical myelopathy.      recommendations:  -Check CT myelogram  -Check BMP prior to myelogram  -Patient is on baby aspirin

## 2024-03-08 ENCOUNTER — OFFICE VISIT (OUTPATIENT)
Dept: NEUROSURGERY | Facility: CLINIC | Age: 69
End: 2024-03-08
Payer: MEDICARE

## 2024-03-08 ENCOUNTER — TELEPHONE (OUTPATIENT)
Dept: NEUROLOGY | Facility: CLINIC | Age: 69
End: 2024-03-08

## 2024-03-08 VITALS
DIASTOLIC BLOOD PRESSURE: 82 MMHG | TEMPERATURE: 97.3 F | OXYGEN SATURATION: 99 % | BODY MASS INDEX: 35.12 KG/M2 | WEIGHT: 186 LBS | SYSTOLIC BLOOD PRESSURE: 148 MMHG | HEART RATE: 70 BPM | HEIGHT: 61 IN

## 2024-03-08 DIAGNOSIS — S32.000S COMPRESSION FX, LUMBAR SPINE, SEQUELA: ICD-10-CM

## 2024-03-08 DIAGNOSIS — S22.000A THORACIC COMPRESSION FRACTURE (HCC): Primary | ICD-10-CM

## 2024-03-08 DIAGNOSIS — M85.80 OSTEOPENIA: ICD-10-CM

## 2024-03-08 PROCEDURE — 99213 OFFICE O/P EST LOW 20 MIN: CPT | Performed by: NURSE PRACTITIONER

## 2024-03-08 RX ORDER — TOLTERODINE 4 MG/1
4 CAPSULE, EXTENDED RELEASE ORAL DAILY
COMMUNITY

## 2024-03-08 NOTE — TELEPHONE ENCOUNTER
Spoke to [facility].  Dr Casarez wants a CT Scan ASAP.  She will schedule them and we do not need to do anything for precertification.

## 2024-03-08 NOTE — PATIENT INSTRUCTIONS
Start brace wearing as discusses   Start TUMS   on with each meal B_L_S----HO HYPERCALCIUMEMIA , PARATHYROIDECTOMY 11/30/2020 --AVOPID CALCIUM UNTIL ASSESSED BY ENDOCRINE AT [facility]   Start Vitamin D 1000 IU  daily       Thoracic spine fracture Plan of Care    Instructions for Continued care     Will wear brace for approximately 8-12 weeks, serial x-rays at every visit to assess healing of lumbar fracture    Use TLSO brace when OOB or HOB > 45 degrees, standing or walking. Can remove brace when I bed     Pain control as perper your current orders and plan   No driving while wearing brace.do not remove to drive until directed by neurosurgery    Thoracic and lumbar spine precautions  and rational to prevent fracture progression no bending at the waist , no lifting greater than 10 LBS  a gallon of milk weighs approximately 10 lbs , do not carry a purse or bag weighing greater than 10 - 20 lbs. No bending, pushing,pulling, trunk twisting, overhead or forward stretching until cleared by neurosurgery    Follow-up in after completion of MRI lumbar wo.   Ordered DXA --assess for osteoporosis /penia   Follow up sooner or go to closest ED uncontrolled back or leg pain, leg weakness, paresthesia (numbness or tingling worsening for baseline), bowel (incontinence) or bladder  (retention) issues, ambulatory dysfunction.    Contact neurosurgery with additional questions or concerns.

## 2024-03-08 NOTE — PROGRESS NOTES
Assessment/Plan:    Thoracic compression fracture (HCC)  As addressed in HPI  She presents 3 weeks and 1 day status post imaging demonstrating thoracic compression fractures  She admits and describes a TLSO brace which she is not wearing.  She had her last DEXA scan completed on 10/8/2020 that demonstrated osteopenia  She is treating not treating with calcium, she has a documented history of parathyroid ectomy and hypercalcemia.  Nor is she treating with vitamin D.  She complains of pain in bilateral mid lumbar sacral area with distribution laterally in the low back, denies buttock pain, and pain starts again in bilateral thighs and lower legs into her foot as well she also complains of numbness and tingling in her feet and toes.  She has a history of polyneuropathy/peripheral neuropathy and insulin-dependent diabetes treated with an insulin pump.  Medicinal treatment is oxycodone 5 325 mg, cyclobenzaprine, gabapentin, Tylenol 500 mg she reports using sparingly.  Chronic overactive bladder treating with Ditropan, now has a Little catheter inserted on March 5 and per patient will be discontinued tomorrow.  She reports the catheter was inserted because she could not stop her urine flow not because of urinary retention.  She denies bowel incontinence, saddle anesthesia, she does demonstrate gait instability and weakness in her lower extremities which is chronic due to probably neuropathic/peripheral neuropathy in both lower extremities.  Patient expresses interest in kyphoplasty but CT does demonstrate fractures as chronic her new compression fractures are T11 and T12.    Imaging-as addressed in HPI CT of thoracic and x-ray lumbar spine.      Plan    Reviewed imagining with patient and   Explained conservative management as per protocol, no neurosurgical intervention,  wear brace for approximately 6 -12 weeks, serial x-ray at specified  intervals.  Explained in great detail rationale for TLSO brace and encouraged  to start brace use per protocol today when she returns home.  She denies improperly fitting brace reports has been helped her adjust the brace.  Explained  thoracic and lumbar spine precautions to prevent fracture progression   Reinforced pain management recommendations acetaminophen 500 mg po every 4 hours or 1000 mg (2 tabs) every 8 hours, is taking prn. But she is opioid dependant .   Reviewed as per protocol and documented in AVS Follow-up in 4 weeks with thoracic spine x-ray or sooner if symptoms worsen. Xray Due 4/5/2023 on or shortly after date , then RTO 3 days to one week after completion.     Advised of red flag symptoms , if he/she were to develop go to the to go to closest ED for assessment,  uncontrolled back or leg pain, leg weakness, paresthesia, bowel or bladder issues, ambulatory dysfunction.    Contact neurosurgery with additional questions or concerns   Instructions for continued care documented in AVS.         Metrics :  Mateo Santi 19/24  Oswestry disability 21/50  42 %   EQ 5D 5 L  23546 or 0.734 your health today 72   VAS  8/10  your       Osteopenia  As addressed and thoracic compression fractures  DEXA 2020 demonstrated osteopenia  Treatment is not optimized as addressed in thoracic compression fractures.    Plan  Note to her endocrinologist at [facility]---2020 DXA w/ osteopenia ,  new compression fx T 1 T12   DX on imagining 2/2024---need to optimize treatment for osteoporosis   DEXA ordered, last DEXA completed at [facility].  As per check out her DEXA cannot be completed until May 2024.         Thoracic compression fracture (HCC)  -     DXA bone density spine hip and pelvis; Future  -     Ambulatory Referral to Endocrinology; Future      Osteopenia  -     DXA bone density spine hip and pelvis; Future  -     Ambulatory Referral to Endocrinology; Future          Subjective: Self referral for back and lower extremity pain    Patient ID: Zohra Barriga is a 68 y.o. female PM/SH: DM2 with insulin pump,  DVT in 2010, anxiety, chalazia, anemia, status post iron infusions, asthma, anxiety, bifascicular block, chronic constipation, chronic diastolic CHF, EF 55%, chronic fatigue syndrome, CKD 2, colon polyp, CAD, depression, diabetic neuropathy, esophageal stricture, dysphagia, gastroparesis, GERD, headache, status postcardiac pacemaker 7/31/2019 BSC I Accolade MRI L3 1 1-4 [id number], high calcium levels, status post parathyroidectomy , HLD, HTN, hypersomnolence, HTN, hypothyroidism medical marijuana use, MRSA infection, heart murmur, overactive bladder, osteoporosis, pneumonia, TIA, vitamin B12 deficiency vitamin D deficiency, polyneuropathy secondary to insulin-dependent diabetic , numerous left knee surgeries and revisions including requirement for skin grafts and muscle grafts.  Reports she has a temporary ureteral catheter inserted around March 2, 2024 and will be removed tomorrow due to constant urinary incontinence.  She has a history of STU..  [phone]  MENDOZA SCS --is not MRI conditional. Cardiac pacemaker -unknown if is MRI conditional was inserted in 2019, patient remarked it does not matter because the spinal cord stimulator is not MRI .  Patient does not have a card with her for the pacemaker.      HPI   She has a longstanding history of chronic pain syndrome affecting her bilateral low back and lower extremities.  Spinal cord stimulator Abbott trial in 2018 with 99% efficacy for improvement in low back and bilateral lower extremities.  4/23/2018 she underwent surgery with Dr. Kobe Cambpellion of thoracic spinal cord stimulator electrode via laminotomy and placement of left buttock IPG (Left: Spine Thoracic) diagnosis chronic pain syndrome, polyneuropathy associated with underlying disease, chronic pain of both lower extremities.  Patient reports a spinal cord stimulator has been turned off for a year or more it has not been efficacious.  She reports multiple calls and programming sessions with added reps  to no avail to relieve her pain as it did during the trial.  Reports after permanent implant she progressively had less efficacy and eventually it did not help her back pain or lower lower extremity pain at all.    Status Post Surgery 6/13/2023 L4-L5 lumbar fusion TLIF by Dr.Tsen Branch has a history of spondylolisthesis of the lumbar region.  ,   She reports in February 2024 she started with acute or onset of back pain of unknown etiology.  She denies falling or any type of trauma.  She reports the pain is located in bilateral mid lumbar sacral area, no pain in the buttocks and pain again in the right thigh anterior posterior down both lower extremities into bilateral feet and toes.  She reports following up with her PCP who ordered x-rays of her back that demonstrated compression side fractures.  She reports the PCP ordered her a brace and gave her no further information or direction regarding further management of the compression fracture.    Imaging  2/16/24 CT thoracic spine 1.  Stable chronic compression deformities at T4, T11 and T12, without osseous retropulsion. No evidence of acute fracture. 2.  Postsurgical and degenerative changes as described above. There does not appear to be high-grade spinal stenosis.   3.  Groundglass nodule at the right lung apex measuring 6 mm. Follow-up chest CT is recommended.     2/15/24 Xray lumbar spine There are postsurgical changes of discectomy, interbody fusion, laminectomy and posterior hardware fusion at L4-5 without evidence of hardware failure or complication. There is a stable 6 mm anterior subluxation of L4 and L5. Note is made of a new moderate compression fracture of the T12 vertebral body. There is a possible mild to moderate compression fracture at T11. No   retropulsed bone is noted at these levels. Evidence of new alignment abnormalities in the thoracolumbar spine as detailed. Evidence of new compression fractures of T11 and T12 without retropulsed bone at  the level. Stable postsurgical changes at L4-5. Other findings as noted.     Date of  2/15/23 is the benchmark for compression fracture correlating with x-ray lumbar spine, the patient presents 3 weeks and 1 day post this diagnosis.          REVIEW OF SYSTEMS  Review of Systems   Musculoskeletal:  Positive for back pain (radiating down right leg) and gait problem (uses rolling walker).   Neurological:  Positive for weakness and numbness (right leg).         Meds/Allergies     Current Outpatient Medications   Medication Sig Dispense Refill    acetaminophen (TYLENOL) 500 mg tablet Take 500 mg by mouth every 4 (four) hours as needed for mild pain Take 2 tablets (1000 mg)by mouth every 4 hours as needed, for mild pain & moderate pain      albuterol (ACCUNEB) 1.25 MG/3ML nebulizer solution Take 2.5 mg by nebulization every 6 (six) hours as needed for wheezing      albuterol (PROVENTIL HFA,VENTOLIN HFA) 90 mcg/act inhaler Inhale 2 puffs every 6 (six) hours as needed for wheezing      aspirin (ECOTRIN LOW STRENGTH) 81 mg EC tablet Take 81 mg by mouth every morning      atorvastatin (LIPITOR) 80 mg tablet Take 80 mg by mouth daily at bedtime        bisacodyl (DULCOLAX) 10 mg suppository Insert 1 suppository (10 mg total) into the rectum daily 30 suppository 3    carvedilol (COREG) 3.125 mg tablet Take 3.125 mg by mouth 2 (two) times a day with meals      CRANBERRY FRUIT CONCENTRATE PO Take 450 mg by mouth in the morning Every morning for supplement      cyclobenzaprine (FLEXERIL) 10 mg tablet       Darbepoetin Mike (ARANESP, ALBUMIN FREE, IJ) Inject as directed every 14 (fourteen) days      ezetimibe (ZETIA) 10 mg tablet Take 10 mg by mouth daily      gabapentin (NEURONTIN) 100 mg capsule Take 600 mg by mouth 2 (two) times a day      gabapentin (Neurontin) 100 mg capsule Take 1 capsule (100 mg total) by mouth 2 (two) times a day This will be in addition to gabapentin 100 mg BID 60 capsule 3    glucagon (GLUCAGEN) 1 mg  "injection 1 mg once Inject 1 mg under the skin as needed for low blood sugar      glucose 4 g chewable tablet Chew 16 g as needed for low blood sugar Take 15 g by mouth as needed (blood sugar less than 70 if able to safely swallow)      glucose blood test strip Testing six times daily  E11.65 on insulin pump      insulin glargine (LANTUS) 100 units/mL subcutaneous injection Inject 24 Units under the skin if needed Inject 24 units under the skin as needed (for pump failure)      Insulin Infusion Pump (MINIMED INSULIN PUMP) PRINCESS Use      levothyroxine 100 mcg tablet Take 100 mcg by mouth daily      NovoLOG 100 UNIT/ML injection INJECT UP  UNITS DAILY VIA INSULIN PUMP. E11.65      ondansetron (ZOFRAN) 8 mg tablet Take 8 mg by mouth every 8 (eight) hours as needed      oxyCODONE-acetaminophen (PERCOCET) 5-325 mg per tablet PLEASE SEE ATTACHED FOR DETAILED DIRECTIONS      polyethylene glycol (MIRALAX) 17 g packet Take 17 g by mouth daily 90 each 3    pramipexole (MIRAPEX) 1 mg tablet Take 1 mg by mouth 2 (two) times a day        Restasis 0.05 % ophthalmic emulsion Administer 1 drop to both eyes 2 (two) times a day As directed      tolterodine (Detrol LA) 4 mg 24 hr capsule Take 4 mg by mouth daily      torsemide (DEMADEX) 20 mg tablet Take 2 tablets (40 mg total) by mouth daily 60 tablet 0    zinc gluconate 50 mg tablet Take 50 mg by mouth daily       No current facility-administered medications for this visit.       Allergies   Allergen Reactions    Bactrim [Sulfamethoxazole-Trimethoprim] Hives    Sucralfate Hives     Facial swelling    Topamax [Topiramate]      disorentation    Azithromycin Itching    Pregabalin Confusion and Other (See Comments)     altered mental status    Ciprofloxacin Drowsiness     Other reaction(s): Other (See Comments)  \"drowsiness\"    Norvasc [Amlodipine] Swelling    Baclofen      \"That knocks me out.\"    Bupropion Fatigue    Methotrexate Nausea Only       PAST HISTORY    Past Medical " "History:   Diagnosis Date    Anxiety     Asthma     controlled    Back complaints     Cancer ([facility])     nose    Cellulitis of left lower leg     \"not now\"    CHF (congestive heart failure) ([facility]) 02/2021    Chronic bilateral thoracic back pain     Chronic kidney disease     sees nephrologist regularly\" stage 3\"    Chronic myofascial pain     Chronic pain of both lower extremities     Chronic pain of left knee     \"both knees\"    Chronic pain syndrome     bilat legs and knees/neuropathy pain    COVID 12/2021    CPAP (continuous positive airway pressure) dependence     no currently uses    Depression     Diabetes mellitus ([facility])     insulin pump    Difficulty walking 2018    Disease of thyroid gland     Does use hearing aid     bilat and will wear DOS    DVT (deep venous thrombosis) ([facility]) 2013    left leg    Gait disorder     Gastroparesis     GERD (gastroesophageal reflux disease)     Head injury     Headache, tension-type     History of angina     History of MRSA infection     History of transfusion     Many years ago    HL (hearing loss) 2018    Hyperlipidemia     Hypertension     Hypoglycemic reaction     \"occas low blood sugar\"    Insulin pump in place     pt reports saw endocrinologist 4/28 and will bring copy of instructions DOS    Irritable bowel syndrome     Kidney disease     Memory loss 2021    Movement disorder 2021    Neuropathy in diabetes ([facility]) 2021    Constant pain legs and feet    Pacemaker 2019    Polyneuropathy associated with underlying disease ([facility])     PONV (postoperative nausea and vomiting)     Risk for falls     S/P insertion of spinal cord stimulator 06/08/2018    Sarcoidosis     Shortness of breath     \"exertion and not new\"    Sleep apnea     Stroke ([facility])     TIA (transient ischemic attack)     Use of cane as ambulatory aid     Uses walker     Wears dentures     permanent upper/and some missing teeth/partial lower       Past Surgical History:   Procedure Laterality Date    ABDOMINAL ADHESION " "SURGERY N/A 2021    Procedure: laparoscopic LYSIS ADHESIONS;  Surgeon: Jill Bentley MD;  [facility];  Service: Thoracic    BREAST SURGERY      BREAST SURGERY      reduction    CARDIAC PACEMAKER PLACEMENT      pacemaker     SECTION      COLONOSCOPY      DILATION AND CURETTAGE OF UTERUS      \"D&E\"    HERNIA REPAIR      HYSTERECTOMY      JOINT REPLACEMENT Left     LTKR    NECK SURGERY      fused 4 discs with 4 screws implanted    NISSEN FUNDOPLICATION LAPAROSCOPIC WITH ROBOTICS N/A 2021    Procedure: ROBOTIC HELLER MYOTOMY WITH BERNARDO FUNDIPLICATION ;  Surgeon: Jill Bentley MD;  [facility];  Service: Thoracic    TN ESOPHAGOGASTRODUODENOSCOPY TRANSORAL DIAGNOSTIC N/A 2021    Procedure: ESOPHAGOGASTRODUODENOSCOPY (EGD);  Surgeon: Jill Bentley MD;  [facility];  Service: Thoracic    TN ESOPHAGOGASTRODUODENOSCOPY TRANSORAL DIAGNOSTIC N/A 2022    Procedure: ESOPHAGOGASTRODUODENOSCOPY (EGD),;  Surgeon: Jill Bentley MD;  [facility];  Service: Thoracic    TN ESOPHAGOGASTRODUODENOSCOPY TRANSORAL DIAGNOSTIC N/A 2022    Procedure: ESOPHAGOGASTRODUODENOSCOPY (EGD);  Surgeon: Jill Bentley MD;  [facility];  Service: Thoracic    TN ESOPHAGOGASTRODUODENOSCOPY TRANSORAL DIAGNOSTIC N/A 2022    Procedure: ESOPHAGOGASTRODUODENOSCOPY (EGD); PYLORIC DILATION; GE JUNCTION DILATION;  Surgeon: Jill Bentley MD;  [facility];  Service: Thoracic    TN ESOPHAGOGASTRODUODENOSCOPY TRANSORAL DIAGNOSTIC N/A 2022    Procedure: ESOPHAGOGASTRODUODENOSCOPY (EGD);  Surgeon: Varinder Steiner MD;  [facility];  Service: Thoracic    TN ESOPHAGOGASTRODUODENOSCOPY TRANSORAL DIAGNOSTIC N/A 2023    Procedure: ESOPHAGOGASTRODUODENOSCOPY (EGD);  Surgeon: Jill Bentley MD;  [facility];  Service: Thoracic    TN ESOPHAGOSCOPY FLEX BALLOON DILAT <30 MM DIAM N/A 2022    Procedure: " DILATATION ESOPHAGEAL;  Surgeon: Jill Bentley MD;  [facility];  Service: Thoracic    MA ESOPHAGOSCOPY FLEX BALLOON DILAT <30 MM DIAM N/A 02/16/2022    Procedure: DILATATION ESOPHAGEAL;  Surgeon: Jill Bentley MD;  [facility];  Service: Thoracic    MA ESOPHAGOSCOPY FLEX BALLOON DILAT <30 MM DIAM N/A 11/29/2022    Procedure: DILATATION ESOPHAGEAL esophageal and pyloric dilation;  Surgeon: Varinder Steiner MD;  [facility];  Service: Thoracic    MA ESOPHAGOSCOPY FLEX BALLOON DILAT <30 MM DIAM N/A 01/24/2023    Procedure: esophageal dilation dilated up to 54  with pylorus dilation dilated up to 18;  Surgeon: Jill Bentley MD;  [facility];  Service: Thoracic    MA RIBEIRO IMPLTJ NSTIM ELTRDS PLATE/PADDLE EDRL Left 04/23/2018    Procedure: Insertion of thoracic spinal cord stimulator electrode via laminotomy and placement of left buttock IPG;  Surgeon: Shahab Bullock MD;  [facility];  Service: Neurosurgery    REPLACEMENT TOTAL KNEE      ROTATOR CUFF REPAIR      SPINAL STIMULATOR PLACEMENT Left 10/03/2018    Procedure: BUTTOCK RE-OPENING INCISION FOR REPOSITIONING OF IMPLANTABLE PULSE GENERATOR;  Surgeon: Shahab Bullock MD;  [facility];  Service: Neurosurgery    TONSILLECTOMY      UPPER GASTROINTESTINAL ENDOSCOPY      VASCULAR SURGERY      WISDOM TOOTH EXTRACTION         Social History     Tobacco Use    Smoking status: Never    Smokeless tobacco: Never   Vaping Use    Vaping status: Never Used   Substance Use Topics    Alcohol use: No    Drug use: Not Currently     Frequency: 8.0 times per week     Types: Marijuana     Comment: Medical Marijuana/vape pen once per week       Family History   Problem Relation Age of Onset    Diabetes Family     Heart disease Family     Hypertension Family     Neuropathy Family     No Known Problems Mother     Heart disease Father     Diabetes Father     Neuropathy Father     Stroke Father     No Known Problems  "Maternal Grandmother     No Known Problems Maternal Grandfather     No Known Problems Paternal Grandmother     No Known Problems Paternal Grandfather     No Known Problems Brother     No Known Problems Daughter     Cancer Son        The following portions of the patient's history were reviewed and updated as appropriate: allergies, current medications, past family history, past medical history, past social history, past surgical history and problem list.      EXAM    Vitals:Blood pressure 148/82, pulse 70, temperature (!) 97.3 °F (36.3 °C), temperature source Temporal, height 5' 1\" (1.549 m), weight 84.4 kg (186 lb), SpO2 99%.,Body mass index is 35.14 kg/m².     Physical Exam  Vitals and nursing note reviewed. Exam conducted with a chaperone present ().   HENT:      Head: Normocephalic and atraumatic.   Pulmonary:      Effort: Pulmonary effort is normal. No respiratory distress.   Musculoskeletal:      Right lower leg: Edema present.      Left lower leg: Edema present.      Comments: Leans to the right while sitting and slightly when standing( reports this is chronic) limitation and flexion extension and rotation secondary to poor balance control and pain in back and legs.   Neurological:      Mental Status: She is oriented to person, place, and time.      Sensory: No sensory deficit.      Motor: No weakness.      Coordination: Coordination normal.      Gait: Gait abnormal (Ambulates using a rolling walker has poor balance control).   Psychiatric:         Mood and Affect: Mood normal.         Speech: Speech is slurred.      Comments: Drowsy, lethargic, dozing through the visit, mildly slurred speech, demonstrated difficulty answering questions and following directions.  Repeated questions several times.         Neurologic Exam     Mental Status   Oriented to person, place, and time.   Oriented to person.   Oriented to place.   Speech: slurred   Level of consciousness: drowsy  Unable to perform simple " calculations.   Dozing appears sedated.     Motor Exam   Muscle bulk: decreased  Overall muscle tone: normal    Strength   Right iliopsoas: 4/5  Left iliopsoas: 4/5  Right quadriceps: 4/5  Left quadriceps: 4/5  Right hamstrin/5  Left hamstrin/5  Right glutei: 4/5  Left glutei: 4/5  Right anterior tibial: 4/5  Left anterior tibial: 4/5  Right posterior tibial: 4/5  Left posterior tibial: 4/5  Right gastroc: 5/5  Left gastroc: 5/5    Sensory Exam   Right leg light touch: decreased from ankle  Left leg light touch: decreased from ankle      Imaging Studies  XR knee 4+ vw right injury    Result Date: 3/4/2024  Narrative: XR KNEE 4+ VW RIGHT INJURY INDICATION: Diffuse right knee pain for 2 to 3 months. No injury/trauma. COMPARISON: None FINDINGS: No acute fracture or dislocation. Small joint effusion. Mild tricompartmental osteoarthritis as evidenced by small marginal osteophytes and loss of joint space. No lytic or blastic osseous lesion. Chondrocalcinosis of the lateral joint space.     Impression: No acute osseous abnormality. Degenerative changes as described. Resident: RHEA AHN I, the attending radiologist, have reviewed the images and agree with the final report above. Workstation performed: [id number]     VAS lower limb venous duplex study, unilateral/limited    Result Date: 3/3/2024  Narrative:  [facility] REPORT CLINICAL: Indications: Right Limb Pain M79.609. Patient presents with right lower extremity pain x 21 days.  Operative History: Patient denies cardiovascular surgeries  Risk Factors The patient has history of HTN, Diabetes (Yes), Hyperlipidemia and CKD.   CONCLUSION: Impression: RIGHT LOWER LIMB No evidence of acute or chronic deep vein thrombosis. No evidence of superficial thrombophlebitis noted. Doppler evaluation shows a normal response to augmentation maneuvers. Popliteal, posterior tibial and anterior tibial arterial Doppler waveform's are biphasic.  LEFT LOWER LIMB  LIMITED Evaluation shows no evidence of thrombus in the common femoral vein. Doppler evaluation shows a normal response to augmentation maneuvers.  Technical findings were given to Dr Helms. Mar  3 2024 11:41AM  SIGNATURE: Electronically Signed by: ANTONY KIM MD on 2024-03-03 04:39:19 PM    Esophageal manometry    Result Date: 3/1/2024  Narrative: Indication-esophageal dysphagia Esophageal manometry Esophageal motility-10 out of 10 swallows demonstrated normal esophageal contractility pattern with mean DCI of 1616 mmHg.s.cm LES-median IRP is within normal limits Impedance-80% complete clearance of liquid bolus swallows Double pressures on representing likely a small hiatal hernia Troy classification-normal esophageal motility     CT spine thoracic wo contrast    Result Date: 2/17/2024  Narrative: CT thoracic spine without contrast HISTORY: Closed wedge compression fracture of the T12 vertebral Technique: Unenhanced axial CT images through the thoracic spine, with sagittal and coronal reformatted images. CT examination performed with dose lowering protocol in accordance with ALARA. COMPARISON: Lumbar x-ray dated 2/15/2024, CT chest dated 2/15/2023 FINDINGS: There is slight dextroscoliosis of the thoracic spine. The thoracic kyphosis is preserved.. There is a stable minimal, chronic compression deformity along the superior endplate of T4. Stable mild, chronic compression deformity along the inferior endplate of T11. Stable moderate, chronic compression deformity along the inferior endplate of T12. There is no notable osseous retropulsion at these levels. Vertebral body heights are otherwise maintained. There is no evidence of acute fracture. There are post surgical changes at T8. The posterior elements otherwise appear intact. Spinal stimulator device enters the spinal canal posteriorly at the T10 and T9 level with electrode array posteriorly in the spinal canal at the T7 and T8 levels, causing streak artifact.  Partially imaged anterior spinal hardware in the lower cervical region. There is a small posterior disc osteophyte complex at T2-T3 which indents the ventral thecal sac. There is mild calcification of the ligamentum flavum at T3-T4, indenting the dorsal thecal sac without prominent spinal canal narrowing. There is a broad-based posterior disc osteophyte complex at T5-T6 which indents the ventral thecal sac. There is mild left neural foraminal narrowing at this level. There is a right paracentral disc osteophyte complex at T6-T7 which indents the ventral thecal sac and mildly narrows the right neural foramen. Evaluation of the T7 and T8 levels is suboptimal due to streak artifact. There is a small posterior disc osteophyte complex at T9-T10 which minimally indents the ventral thecal sac. There is mild bilateral neural foraminal narrowing. There is facet arthrosis at T10-T11 with mild bilateral neural foraminal narrowing. Small posterior disc osteophyte complex at T12-L1 minimally indents the ventral thecal sac. There is a 6 mm groundglass nodule at the right lung apex (axial image 125). Note is made of an azygos lobe and the right lung. There may be along cyst at the left lung apex.    Impression: IMPRESSION: 1.  Stable chronic compression deformities at T4, T11 and T12, without osseous retropulsion. No evidence of acute fracture. 2.  Postsurgical and degenerative changes as described above. There does not appear to be high-grade spinal stenosis. 3.  Groundglass nodule at the right lung apex measuring 6 mm. Follow-up chest CT is recommended. Significant Findings: [place] Act 112. Workstation:[id number]    XR spine lumbar 2 or 3 views injury    Result Date: 2/15/2024  Narrative: Examination: 2 view lumbar spine Comparisons: Lumbar spine x-rays dated 10/13/2023. INDICATION: Lumbar fusion. Postoperative follow-up. FINDINGS: 2 erect views of the lumbar spine are performed. The prior x-rays are obtained with the patient is  nonweightbearing. Note is made of a marked tilt of the lower thoracic and upper lumbar spine to the right which can indicate infectious scoliosis. As well there is a mild kyphosis at the thoracolumbar junction. There are postsurgical changes of discectomy, interbody fusion, laminectomy and posterior hardware fusion at L4-5 without evidence of hardware failure or complication. There is a stable 6 mm anterior subluxation of L4 and L5. Note is made of a new moderate compression fracture of the T12 vertebral body. There is a possible mild to moderate compression fracture at T11. No retropulsed bone is noted at these levels. The vertebral bodies appear symmetric in height at all other levels with intact endplates. There is mild to moderate degenerative disc disease throughout the lower thoracic and lumbar spine. There is facet arthrosis in the lower lumbar spine as before. The visualized pelvis and hips are grossly intact. The soft tissues are unremarkable. Again there is evidence of a spinal stimulator as before.    Impression: IMPRESSION: Evidence of new alignment abnormalities in the thoracolumbar spine as detailed. Evidence of new compression fractures of T11 and T12 without retropulsed bone at the level. Stable postsurgical changes at L4-5. Other findings as noted. The referring physician was notified of this report by me by tiger text at 4:22 PM on 2/15/2024. Workstation:[id number]      I have personally reviewed pertinent reports.   and I have personally reviewed pertinent films in PACS    I have spent a total time of 45 minutes on 03/09/24 in caring for this patient including Diagnostic results, Risks and benefits of tx options, Instructions for management, Patient and family education, Importance of tx compliance, Risk factor reductions, Impressions, Counseling / Coordination of care, Documenting in the medical record, Reviewing / ordering tests, medicine, procedures  , Obtaining or reviewing history  , and  Communicating with other healthcare professionals .

## 2024-03-08 NOTE — Clinical Note
Can one of you please call and schedule f/u appointment solo w/ me 3 days to one week post completion of thoracici xray due on April 5 2023   Patient and  are  aware she needs the xray on April 5

## 2024-03-08 NOTE — ASSESSMENT & PLAN NOTE
Back paibn started in feb 20244   went [facility] dr sexton who  told  to ave xrauy forunf compression fracture     Bilateral mid lumbar to sacral pain none in the buttiocks    in right thigh entire  into the foot  top  not in toes.      DR ko  ordered  brace   History of Tsen ---lumbar spine sx  [facility]  last year   Self referred         SCS  inserted by Dr Bullock

## 2024-03-08 NOTE — ASSESSMENT & PLAN NOTE
As addressed in HPI  She presents 3 weeks and 1 day status post imaging demonstrating thoracic compression fractures  She admits and describes a TLSO brace which she is not wearing.  She had her last DEXA scan completed on 10/8/2020 that demonstrated osteopenia  She is treating not treating with calcium, she has a documented history of parathyroid ectomy and hypercalcemia.  Nor is she treating with vitamin D.  She complains of pain in bilateral mid lumbar sacral area with distribution laterally in the low back, denies buttock pain, and pain starts again in bilateral thighs and lower legs into her foot as well she also complains of numbness and tingling in her feet and toes.  She has a history of polyneuropathy/peripheral neuropathy and insulin-dependent diabetes treated with an insulin pump.  Medicinal treatment is oxycodone 5 325 mg, cyclobenzaprine, gabapentin, Tylenol 500 mg she reports using sparingly.  Chronic overactive bladder treating with Ditropan, now has a Little catheter inserted on March 5 and per patient will be discontinued tomorrow.  She reports the catheter was inserted because she could not stop her urine flow not because of urinary retention.  She denies bowel incontinence, saddle anesthesia, she does demonstrate gait instability and weakness in her lower extremities which is chronic due to probably neuropathic/peripheral neuropathy in both lower extremities.  Patient expresses interest in kyphoplasty but CT does demonstrate fractures as chronic her new compression fractures are T11 and T12.    Imaging-as addressed in HPI CT of thoracic and x-ray lumbar spine.      Plan    Reviewed imagining with patient and   Explained conservative management as per protocol, no neurosurgical intervention,  wear brace for approximately 6 -12 weeks, serial x-ray at specified  intervals.  Explained in great detail rationale for TLSO brace and encouraged to start brace use per protocol today when she returns  home.  She denies improperly fitting brace reports has been helped her adjust the brace.  Explained  thoracic and lumbar spine precautions to prevent fracture progression   Reinforced pain management recommendations acetaminophen 500 mg po every 4 hours or 1000 mg (2 tabs) every 8 hours, is taking prn. But she is opioid dependant .   Reviewed as per protocol and documented in AVS Follow-up in 4 weeks with thoracic spine x-ray or sooner if symptoms worsen. Xray Due 4/5/2023 on or shortly after date , then RTO 3 days to one week after completion.     Advised of red flag symptoms , if he/she were to develop go to the to go to closest ED for assessment,  uncontrolled back or leg pain, leg weakness, paresthesia, bowel or bladder issues, ambulatory dysfunction.    Contact neurosurgery with additional questions or concerns   Instructions for continued care documented in AVS.         Metrics :  Mateo Santi 19/24  Oswestry disability 21/50  42 %   EQ 5D 5 L  22192 or 0.734 your health today 72   VAS  8/10  your

## 2024-03-08 NOTE — PROGRESS NOTES
Neurosurgery Office Note  Zohra Barriga 68 y.o. female MRN: [id number]      Assessment/Plan     Thoracic compression fracture (HCC)  2/16/24 CT thoracic spine 1.  Stable chronic compression deformities at T4, T11 and T12, without osseous   retropulsion. No evidence of acute fracture.   2.  Postsurgical and degenerative changes as described above. There does not   appear to be high-grade spinal stenosis.   3.  Groundglass nodule at the right lung apex measuring 6 mm. Follow-up chest CT   is recommended.     2/15/24 Xray lumbar spine There are postsurgical changes of discectomy, interbody fusion,   laminectomy and posterior hardware fusion at L4-5 without evidence of hardware   failure or complication. There is a stable 6 mm anterior subluxation of L4 and   L5. Note is made of a new moderate compression fracture of the T12 vertebral   body. There is a possible mild to moderate compression fracture at T11. No   retropulsed bone is noted at these levels. Evidence of new alignment abnormalities in the thoracolumbar spine as detailed. Evidence of new compression fractures of T11 and T12 without retropulsed bone at the level. Stable postsurgical changes at L4-5. Other findings as noted.      {Assess/PlanSmartLinks:40827}      I have spent a total time of *** minutes on 03/08/24 in caring for this patient including {AMB Counseling Topics:[id number]}.      CHIEF COMPLAINT    Chief Complaint   Patient presents with    Consult     2ND OPINION TSPINE COMPRESSION FX, PT IN [facility]       HISTORY    History of Present Illness     68 y.o. year old female     HPI    See Discussion    REVIEW OF SYSTEMS    Review of Systems   Musculoskeletal:  Positive for back pain (radiating down right leg) and gait problem.   Neurological:  Positive for weakness and numbness (right leg).       ROS obtained by MA. Reviewed. See HPI.     Meds/Allergies     Current Outpatient Medications   Medication Sig Dispense Refill    acetaminophen (TYLENOL) 500 mg  tablet Take 500 mg by mouth every 4 (four) hours as needed for mild pain Take 2 tablets (1000 mg)by mouth every 4 hours as needed, for mild pain & moderate pain      albuterol (ACCUNEB) 1.25 MG/3ML nebulizer solution Take 2.5 mg by nebulization every 6 (six) hours as needed for wheezing      albuterol (PROVENTIL HFA,VENTOLIN HFA) 90 mcg/act inhaler Inhale 2 puffs every 6 (six) hours as needed for wheezing      aspirin (ECOTRIN LOW STRENGTH) 81 mg EC tablet Take 81 mg by mouth every morning      atorvastatin (LIPITOR) 80 mg tablet Take 80 mg by mouth daily at bedtime        bisacodyl (DULCOLAX) 10 mg suppository Insert 1 suppository (10 mg total) into the rectum daily 30 suppository 3    carvedilol (COREG) 3.125 mg tablet Take 3.125 mg by mouth 2 (two) times a day with meals      CRANBERRY FRUIT CONCENTRATE PO Take 450 mg by mouth in the morning Every morning for supplement      cyclobenzaprine (FLEXERIL) 10 mg tablet       Darbepoetin Mike (ARANESP, ALBUMIN FREE, IJ) Inject as directed every 14 (fourteen) days      ezetimibe (ZETIA) 10 mg tablet Take 10 mg by mouth daily      gabapentin (NEURONTIN) 100 mg capsule Take 600 mg by mouth 2 (two) times a day      gabapentin (Neurontin) 100 mg capsule Take 1 capsule (100 mg total) by mouth 2 (two) times a day This will be in addition to gabapentin 100 mg BID 60 capsule 3    glucagon (GLUCAGEN) 1 mg injection 1 mg once Inject 1 mg under the skin as needed for low blood sugar      glucose 4 g chewable tablet Chew 16 g as needed for low blood sugar Take 15 g by mouth as needed (blood sugar less than 70 if able to safely swallow)      glucose blood test strip Testing six times daily  E11.65 on insulin pump      glucose blood test strip Use      insulin glargine (LANTUS) 100 units/mL subcutaneous injection Inject 24 Units under the skin if needed Inject 24 units under the skin as needed (for pump failure)      Insulin Infusion Pump (MINIMED INSULIN PUMP) PRINCESS Use       lactulose (CHRONULAC) 10 g/15 mL solution Take 15 mL (10 g total) by mouth in the morning 473 mL 2    levothyroxine 100 mcg tablet Take 100 mcg by mouth daily      NOVOLOG 100 UNIT/ML injection Pt reports unsure of total dose a day/did see her endocrinologist Dr Howard,  [place]  Pt has pump on upper left abdomen cut basaL RATE IN HALF  3    NovoLOG 100 UNIT/ML injection INJECT UP  UNITS DAILY VIA INSULIN PUMP. E11.65      ondansetron (ZOFRAN) 8 mg tablet Take 8 mg by mouth every 8 (eight) hours as needed      oxyCODONE-acetaminophen (PERCOCET) 5-325 mg per tablet PLEASE SEE ATTACHED FOR DETAILED DIRECTIONS      polyethylene glycol (MIRALAX) 17 g packet Take 17 g by mouth daily 90 each 3    pramipexole (MIRAPEX) 1 mg tablet Take 1 mg by mouth 2 (two) times a day        Restasis 0.05 % ophthalmic emulsion Administer 1 drop to both eyes 2 (two) times a day As directed      sertraline (ZOLOFT) 50 mg tablet Take 100 mg by mouth daily at bedtime Take 1 tablet (100 mg total) by mouth daily      torsemide (DEMADEX) 20 mg tablet Take 2 tablets (40 mg total) by mouth daily 60 tablet 0    zinc gluconate 50 mg tablet Take 50 mg by mouth daily      amitriptyline (ELAVIL) 10 mg tablet Take 10 mg by mouth daily at bedtime (Patient not taking: Reported on 3/8/2024)      calcium carbonate (OS-KARIS) 1250 (500 Ca) MG chewable tablet Chew 1 tablet daily (Patient not taking: Reported on 3/8/2024)      calcium carbonate (TUMS) 500 mg chewable tablet Chew 1 tablet daily (Patient not taking: Reported on 3/8/2024)      cefuroxime (CEFTIN) 250 mg tablet Take 250 mg by mouth 2 (two) times a day (Patient not taking: Reported on 3/8/2024)      cephalexin (KEFLEX) 500 mg capsule PLEASE SEE ATTACHED FOR DETAILED DIRECTIONS (Patient not taking: Reported on 3/8/2024)      Chelated Zinc 50 MG TABS Take 50 mg by mouth (Patient not taking: Reported on 3/8/2024)      chlorzoxazone (PARAFON FORTE) 500 mg tablet Take 1 PO QID PRN for pain and spasms.  (Patient not taking: Reported on 3/8/2024) 120 tablet 1    Cyanocobalamin (VITAMIN B-12 PO) Take 1 capsule by mouth every evening  (Patient not taking: Reported on 3/8/2024)      docusate sodium (COLACE) 100 mg capsule TAKE 1 CAPSULE BY MOUTH EVERY DAY (Patient not taking: Reported on 3/8/2024) 90 capsule 1    famotidine (PEPCID) 20 mg tablet TAKE 1 TABLET BY MOUTH TWICE A DAY (Patient not taking: Reported on 3/8/2024) 180 tablet 1    fluticasone-salmeterol (ADVAIR) 100-50 mcg/dose inhaler Inhale 2 puffs as needed (Patient not taking: Reported on 3/8/2024)      lidocaine (Lidoderm) 5 % Apply 1 patch topically over 12 hours daily Applied to right lower back.  Remove & Discard patch within 12 hours or as directed by MD (Patient not taking: Reported on 3/8/2024) 30 patch 0    losartan (COZAAR) 50 mg tablet Take 1 tablet (50 mg total) by mouth daily at bedtime Do not start before March 6, 2023. (Patient not taking: Reported on 3/8/2024)  0    lubiprostone (AMITIZA) 24 mcg capsule Take 1 capsule (24 mcg total) by mouth 2 (two) times a day with meals (Patient not taking: Reported on 3/8/2024) 60 capsule 3    metaxalone (SKELAXIN) 800 mg tablet Take 800 mg by mouth 3 (three) times a day FOR MUSCLE SPASM AS NEEDED (Patient not taking: Reported on 3/8/2024)      omeprazole (PriLOSEC OTC) 20 MG tablet Take 20 mg by mouth 2 (two) times a day (Patient not taking: Reported on 3/8/2024)      oxyCODONE (ROXICODONE) 5 immediate release tablet PLEASE SEE ATTACHED FOR DETAILED DIRECTIONS (Patient not taking: Reported on 3/8/2024)      Semaglutide (OZEMPIC, 0.25 OR 0.5 MG/DOSE, SC) Inject 0.25 mg under the skin once a week Sundays (Patient not taking: Reported on 12/4/2023)      terazosin (HYTRIN) 1 mg capsule Take 1 capsule (1 mg total) by mouth daily at bedtime (Patient not taking: Reported on 3/8/2024) 30 capsule 0     No current facility-administered medications for this visit.       Allergies   Allergen Reactions    Bactrim  "[Sulfamethoxazole-Trimethoprim] Hives    Sucralfate Hives     Facial swelling    Topamax [Topiramate]      disorentation    Azithromycin Itching    Pregabalin Confusion and Other (See Comments)     altered mental status    Ciprofloxacin Drowsiness     Other reaction(s): Other (See Comments)  \"drowsiness\"    Norvasc [Amlodipine] Swelling    Baclofen      \"That knocks me out.\"    Bupropion Fatigue    Methotrexate Nausea Only       PAST HISTORY    Past Medical History:   Diagnosis Date    Anxiety     Asthma     controlled    Back complaints     Cancer (Formerly [facility])     nose    Cellulitis of left lower leg     \"not now\"    CHF (congestive heart failure) (Formerly [facility]) 02/2021    Chronic bilateral thoracic back pain     Chronic kidney disease     sees nephrologist regularly\" stage 3\"    Chronic myofascial pain     Chronic pain of both lower extremities     Chronic pain of left knee     \"both knees\"    Chronic pain syndrome     bilat legs and knees/neuropathy pain    COVID 12/2021    CPAP (continuous positive airway pressure) dependence     no currently uses    Depression     Diabetes mellitus (Formerly [facility])     insulin pump    Difficulty walking 2018    Disease of thyroid gland     Does use hearing aid     bilat and will wear DOS    DVT (deep venous thrombosis) (Formerly [facility]) 2013    left leg    Gait disorder     Gastroparesis     GERD (gastroesophageal reflux disease)     Head injury     Headache, tension-type     History of angina     History of MRSA infection     History of transfusion     Many years ago    HL (hearing loss) 2018    Hyperlipidemia     Hypertension     Hypoglycemic reaction     \"occas low blood sugar\"    Insulin pump in place     pt reports saw endocrinologist 4/28 and will bring copy of instructions DOS    Irritable bowel syndrome     Kidney disease     Memory loss 2021    Movement disorder 2021    Neuropathy in diabetes (Formerly [facility]) 2021    Constant pain legs and feet    Pacemaker 2019    Polyneuropathy associated with underlying disease " "(HCC)     PONV (postoperative nausea and vomiting)     Risk for falls     S/P insertion of spinal cord stimulator 2018    Sarcoidosis     Shortness of breath     \"exertion and not new\"    Sleep apnea     Stroke (HCC)     TIA (transient ischemic attack)     Use of cane as ambulatory aid     Uses walker     Wears dentures     permanent upper/and some missing teeth/partial lower       Past Surgical History:   Procedure Laterality Date    ABDOMINAL ADHESION SURGERY N/A 2021    Procedure: laparoscopic LYSIS ADHESIONS;  Surgeon: Jill Bentley MD;  [facility];  Service: Thoracic    BREAST SURGERY      BREAST SURGERY      reduction    CARDIAC PACEMAKER PLACEMENT      pacemaker     SECTION      COLONOSCOPY      DILATION AND CURETTAGE OF UTERUS      \"D&E\"    HERNIA REPAIR      HYSTERECTOMY      JOINT REPLACEMENT Left     LTKR    NECK SURGERY      fused 4 discs with 4 screws implanted    NISSEN FUNDOPLICATION LAPAROSCOPIC WITH ROBOTICS N/A 2021    Procedure: ROBOTIC HELLER MYOTOMY WITH BERNARDO FUNDIPLICATION ;  Surgeon: Jill Bentley MD;  [facility]: [facility];  Service: Thoracic    PA ESOPHAGOGASTRODUODENOSCOPY TRANSORAL DIAGNOSTIC N/A 2021    Procedure: ESOPHAGOGASTRODUODENOSCOPY (EGD);  Surgeon: Jill Bentley MD;  [facility];  Service: Thoracic    PA ESOPHAGOGASTRODUODENOSCOPY TRANSORAL DIAGNOSTIC N/A 2022    Procedure: ESOPHAGOGASTRODUODENOSCOPY (EGD),;  Surgeon: Jill Bentley MD;  [facility];  Service: Thoracic    PA ESOPHAGOGASTRODUODENOSCOPY TRANSORAL DIAGNOSTIC N/A 2022    Procedure: ESOPHAGOGASTRODUODENOSCOPY (EGD);  Surgeon: Jill Bentley MD;  [facility];  Service: Thoracic    PA ESOPHAGOGASTRODUODENOSCOPY TRANSORAL DIAGNOSTIC N/A 2022    Procedure: ESOPHAGOGASTRODUODENOSCOPY (EGD); PYLORIC DILATION; GE JUNCTION DILATION;  Surgeon: Jill Bentley MD;  [facility];  Service: Thoracic "    PA ESOPHAGOGASTRODUODENOSCOPY TRANSORAL DIAGNOSTIC N/A 11/29/2022    Procedure: ESOPHAGOGASTRODUODENOSCOPY (EGD);  Surgeon: Varinder Steiner MD;  [facility];  Service: Thoracic    PA ESOPHAGOGASTRODUODENOSCOPY TRANSORAL DIAGNOSTIC N/A 01/24/2023    Procedure: ESOPHAGOGASTRODUODENOSCOPY (EGD);  Surgeon: Jill Bentley MD;  [facility];  Service: Thoracic    PA ESOPHAGOSCOPY FLEX BALLOON DILAT <30 MM DIAM N/A 01/12/2022    Procedure: DILATATION ESOPHAGEAL;  Surgeon: Jill Bentley MD;  [facility];  Service: Thoracic    PA ESOPHAGOSCOPY FLEX BALLOON DILAT <30 MM DIAM N/A 02/16/2022    Procedure: DILATATION ESOPHAGEAL;  Surgeon: Jill Bentley MD;  [facility];  Service: Thoracic    PA ESOPHAGOSCOPY FLEX BALLOON DILAT <30 MM DIAM N/A 11/29/2022    Procedure: DILATATION ESOPHAGEAL esophageal and pyloric dilation;  Surgeon: Varinder Steiner MD;  [facility];  Service: Thoracic    PA ESOPHAGOSCOPY FLEX BALLOON DILAT <30 MM DIAM N/A 01/24/2023    Procedure: esophageal dilation dilated up to 54  with pylorus dilation dilated up to 18;  Surgeon: Jill Bentley MD;  [facility];  Service: Thoracic    PA RIBEIRO IMPLTJ NSTIM ELTRDS PLATE/PADDLE EDRL Left 04/23/2018    Procedure: Insertion of thoracic spinal cord stimulator electrode via laminotomy and placement of left buttock IPG;  Surgeon: Shahab Bullock MD;  [facility];  Service: Neurosurgery    REPLACEMENT TOTAL KNEE      ROTATOR CUFF REPAIR      SPINAL STIMULATOR PLACEMENT Left 10/03/2018    Procedure: BUTTOCK RE-OPENING INCISION FOR REPOSITIONING OF IMPLANTABLE PULSE GENERATOR;  Surgeon: Shahab Bullock MD;  [facility];  Service: Neurosurgery    TONSILLECTOMY      UPPER GASTROINTESTINAL ENDOSCOPY      VASCULAR SURGERY      WISDOM TOOTH EXTRACTION         Social History     Tobacco Use    Smoking status: Never    Smokeless tobacco: Never   Vaping Use    Vaping status: Never  "Used   Substance Use Topics    Alcohol use: No    Drug use: Not Currently     Frequency: 8.0 times per week     Types: Marijuana     Comment: Medical Marijuana/vape pen once per week       Family History   Problem Relation Age of Onset    Diabetes Family     Heart disease Family     Hypertension Family     Neuropathy Family     No Known Problems Mother     Heart disease Father     Diabetes Father     Neuropathy Father     Stroke Father     No Known Problems Maternal Grandmother     No Known Problems Maternal Grandfather     No Known Problems Paternal Grandmother     No Known Problems Paternal Grandfather     No Known Problems Brother     No Known Problems Daughter     Cancer Son          Above history personally reviewed.       EXAM    Vitals:Blood pressure 148/82, pulse 70, temperature (!) 97.3 °F (36.3 °C), temperature source Temporal, height 5' 1\" (1.549 m), weight 84.4 kg (186 lb), SpO2 99%.,Body mass index is 35.14 kg/m².     Physical Exam    Neurologic Exam      MEDICAL DECISION MAKING    Imaging Studies:     XR knee 4+ vw right injury    Result Date: 3/4/2024  Narrative: XR KNEE 4+ VW RIGHT INJURY INDICATION: Diffuse right knee pain for 2 to 3 months. No injury/trauma. COMPARISON: None FINDINGS: No acute fracture or dislocation. Small joint effusion. Mild tricompartmental osteoarthritis as evidenced by small marginal osteophytes and loss of joint space. No lytic or blastic osseous lesion. Chondrocalcinosis of the lateral joint space.     Impression: No acute osseous abnormality. Degenerative changes as described. Resident: RHEA AHN I, the attending radiologist, have reviewed the images and agree with the final report above. Workstation performed: [id number]     VAS lower limb venous duplex study, unilateral/limited    Result Date: 3/3/2024  Narrative:  [facility] REPORT [facility]INICAL: Indications: Right Limb Pain [M79.609]. Patient presents with right lower extremity pain x 21 days.  Operative " History: Patient denies cardiovascular surgeries  Risk Factors The patient has history of HTN, Diabetes (Yes), Hyperlipidemia and CKD.   CONCLUSION: Impression: RIGHT LOWER LIMB No evidence of acute or chronic deep vein thrombosis. No evidence of superficial thrombophlebitis noted. Doppler evaluation shows a normal response to augmentation maneuvers. Popliteal, posterior tibial and anterior tibial arterial Doppler waveform's are biphasic.  LEFT LOWER LIMB LIMITED Evaluation shows no evidence of thrombus in the common femoral vein. Doppler evaluation shows a normal response to augmentation maneuvers.  Technical findings were given to Dr Helms. Mar  3 2024 11:41AM  SIGNATURE: Electronically Signed by: ANTONY KIM MD on 2024-03-03 04:39:19 PM    Esophageal manometry    Result Date: 3/1/2024  Narrative: Indication-esophageal dysphagia Esophageal manometry Esophageal motility-10 out of 10 swallows demonstrated normal esophageal contractility pattern with mean DCI of 1616 mmHg.s.cm LES-median IRP is within normal limits Impedance-80% complete clearance of liquid bolus swallows Double pressures on representing likely a small hiatal hernia [facility] classification-normal esophageal motility     CT spine thoracic wo contrast    Result Date: 2/17/2024  Narrative: CT thoracic spine without contrast HISTORY: Closed wedge compression fracture of the T12 vertebral Technique: Unenhanced axial CT images through the thoracic spine, with sagittal and coronal reformatted images. CT examination performed with dose lowering protocol in accordance with ALARA. COMPARISON: Lumbar x-ray dated 2/15/2024, CT chest dated 2/15/2023 FINDINGS: There is slight dextroscoliosis of the thoracic spine. The thoracic kyphosis is preserved.. There is a stable minimal, chronic compression deformity along the superior endplate of T4. Stable mild, chronic compression deformity along the inferior endplate of T11. Stable moderate, chronic compression deformity  along the inferior endplate of T12. There is no notable osseous retropulsion at these levels. Vertebral body heights are otherwise maintained. There is no evidence of acute fracture. There are post surgical changes at T8. The posterior elements otherwise appear intact. Spinal stimulator device enters the spinal canal posteriorly at the T10 and T9 level with electrode array posteriorly in the spinal canal at the T7 and T8 levels, causing streak artifact. Partially imaged anterior spinal hardware in the lower cervical region. There is a small posterior disc osteophyte complex at T2-T3 which indents the ventral thecal sac. There is mild calcification of the ligamentum flavum at T3-T4, indenting the dorsal thecal sac without prominent spinal canal narrowing. There is a broad-based posterior disc osteophyte complex at T5-T6 which indents the ventral thecal sac. There is mild left neural foraminal narrowing at this level. There is a right paracentral disc osteophyte complex at T6-T7 which indents the ventral thecal sac and mildly narrows the right neural foramen. Evaluation of the T7 and T8 levels is suboptimal due to streak artifact. There is a small posterior disc osteophyte complex at T9-T10 which minimally indents the ventral thecal sac. There is mild bilateral neural foraminal narrowing. There is facet arthrosis at T10-T11 with mild bilateral neural foraminal narrowing. Small posterior disc osteophyte complex at T12-L1 minimally indents the ventral thecal sac. There is a 6 mm groundglass nodule at the right lung apex (axial image 125). Note is made of an azygos lobe and the right lung. There may be along cyst at the left lung apex.    Impression: IMPRESSION: 1.  Stable chronic compression deformities at T4, T11 and T12, without osseous retropulsion. No evidence of acute fracture. 2.  Postsurgical and degenerative changes as described above. There does not appear to be high-grade spinal stenosis. 3.  Groundglass  nodule at the right lung apex measuring 6 mm. Follow-up chest CT is recommended. Significant Findings: [place] Act 112. Workstation:[id number]    XR spine lumbar 2 or 3 views injury    Result Date: 2/15/2024  Narrative: Examination: 2 view lumbar spine Comparisons: Lumbar spine x-rays dated 10/13/2023. INDICATION: Lumbar fusion. Postoperative follow-up. FINDINGS: 2 erect views of the lumbar spine are performed. The prior x-rays are obtained with the patient is nonweightbearing. Note is made of a marked tilt of the lower thoracic and upper lumbar spine to the right which can indicate infectious scoliosis. As well there is a mild kyphosis at the thoracolumbar junction. There are postsurgical changes of discectomy, interbody fusion, laminectomy and posterior hardware fusion at L4-5 without evidence of hardware failure or complication. There is a stable 6 mm anterior subluxation of L4 and L5. Note is made of a new moderate compression fracture of the T12 vertebral body. There is a possible mild to moderate compression fracture at T11. No retropulsed bone is noted at these levels. The vertebral bodies appear symmetric in height at all other levels with intact endplates. There is mild to moderate degenerative disc disease throughout the lower thoracic and lumbar spine. There is facet arthrosis in the lower lumbar spine as before. The visualized pelvis and hips are grossly intact. The soft tissues are unremarkable. Again there is evidence of a spinal stimulator as before.    Impression: IMPRESSION: Evidence of new alignment abnormalities in the thoracolumbar spine as detailed. Evidence of new compression fractures of T11 and T12 without retropulsed bone at the level. Stable postsurgical changes at L4-5. Other findings as noted. The referring physician was notified of this report by me by tiger text at 4:22 PM on 2/15/2024. Workstation:[id number]      {Results Review Statement:89301}

## 2024-03-09 PROBLEM — M85.80 OSTEOPENIA: Status: ACTIVE | Noted: 2024-03-09

## 2024-03-09 NOTE — ASSESSMENT & PLAN NOTE
As addressed and thoracic compression fractures  DEXA 2020 demonstrated osteopenia  Treatment is not optimized as addressed in thoracic compression fractures.    Plan  Note to her endocrinologist at [facility]---2020 DXA w/ osteopenia ,  new compression fx T 1 T12   DX on imagining 2/2024---need to optimize treatment for osteoporosis   DEXA ordered, last DEXA completed at [facility].  As per check out her DEXA cannot be completed until May 2024.

## 2024-03-11 ENCOUNTER — EVALUATION (OUTPATIENT)
Dept: PHYSICAL THERAPY | Facility: REHABILITATION | Age: 69
End: 2024-03-11
Payer: MEDICARE

## 2024-03-11 DIAGNOSIS — M54.16 LUMBAR RADICULOPATHY: Primary | ICD-10-CM

## 2024-03-11 PROCEDURE — 97162 PT EVAL MOD COMPLEX 30 MIN: CPT | Performed by: PHYSICAL THERAPIST

## 2024-03-11 NOTE — PROGRESS NOTES
"PT Evaluation     Today's date: 3/11/2024  Patient name: Zohra Barriga  : 1955  MRN: [id number]  Referring provider: Geeta Lowry CRNP  Dx:   Encounter Diagnosis     ICD-10-CM    1. Lumbar radiculopathy  M54.16                      Assessment  Assessment details: Zohra Barriga is a 68 y.o. female referred to outpt PT with dx of lumbar radiculopathy.  Pt presents with decrease in right LE strength, positive right leg pain, and positive TTP along lumbar spine with flexion and right SB lumbar posturing.  Pt funct is limited with decrease in tolerance to sitting, decrease in standing, and limited with walking tolerance with use of rollator.  Pt would benefit from skilled PT to address these limitations and max funct.     Barriers to therapy: Reached out to neurogsurgery regarding new orders placed for cervical, thoracic, and lumbar CT scan and use of TLSO bracing to make sure PT is appropriate at this time. Will begin PT once clearance is received.        Subjective Evaluation    History of Present Illness  Mechanism of injury: Pt reports having right leg pain over the last few months and reports over the last 2 weeks has become more severe.  Pt went to ED 2 weeks ago secondary to severe of sx's and has recently been seen by neurology and neurosurgery.  Pt is currently using tylenol, percocet, and gabapentin to assist with her sx's, noting that it \"takes the edge off\" but doesn't dissipate pain.  Pt reports improvement in right leg pain with laying, but increases with sitting and walking activities.  Pt does note that when she sits and walks or stands she is \"crooked\" and leans to right.  Pt reports some LBP but not as severe and present equal bilat LE.  Pt does have hx of lumbar fusion 23 L4-5 and was seen in hospital, facility rehab, and home PT.  Pt reports that she had less pain initially after surgery but has progressively become more severe and intense.  Pt has lumbar stimulator that she's had " "over the last 3 years, but notes that she is no longer using secondary to \"not working.\"  Pt is planning to call and schedule for stimulator removal.  Pt is walking with use of rollator at all times, does note she has diff with her balance and feels LOB frequently.  Pt does note ability to \"catch herself\" frequently, reports falling in September.    Patient Goals  Patient goals for therapy: decreased pain and increased strength  Patient goal: \"I want to walk\"  Pain  Current pain ratin  At worst pain ratin          Objective     Neurological Testing     Sensation     Lumbar   Left   Intact: light touch    Right   Intact: light touch    Reflexes   Left   Patellar (L4): normal (2+)  Achilles (S1): normal (2+)  Clonus sign: negative    Right   Patellar (L4): normal (2+)  Achilles (S1): normal (2+)  Clonus sign: negative    Active Range of Motion     Additional Active Range of Motion Details  Pt stands with lumbar flexion and right SB posturing and inability to extend right knee to endrange due to restrictions of trunk ROM.  Pt has severe TTP throughout lumbar spine and bilat SIJ.      Strength/Myotome Testing     Left Hip   Planes of Motion   Flexion: 5    Right Hip   Planes of Motion   Flexion: 3+    Left Knee   Flexion: 5  Extension: 5    Right Knee   Flexion: 4-  Extension: 3+    Left Ankle/Foot   Dorsiflexion: 5  Plantar flexion: 5    Right Ankle/Foot   Dorsiflexion: 4  Plantar flexion: 4    Tests     Lumbar     Left   Negative slump test.     Right   Positive slump test.              "

## 2024-03-13 ENCOUNTER — TELEPHONE (OUTPATIENT)
Dept: GASTROENTEROLOGY | Facility: CLINIC | Age: 69
End: 2024-03-13

## 2024-03-13 NOTE — TELEPHONE ENCOUNTER
----- Message from Scooby Almaguer PA-C sent at 3/5/2024  4:22 PM EST -----  Regarding: FW: Esophageal manometry results  Please call patient to set up a follow up office visit at her convenience.  Thanks!

## 2024-03-21 ENCOUNTER — HOSPITAL ENCOUNTER (OUTPATIENT)
Dept: RADIOLOGY | Facility: HOSPITAL | Age: 69
Discharge: HOME | End: 2024-03-21
Attending: THORACIC SURGERY (CARDIOTHORACIC VASCULAR SURGERY)
Payer: MEDICARE

## 2024-03-21 ENCOUNTER — TELEPHONE (OUTPATIENT)
Dept: HEMATOLOGY ONCOLOGY | Facility: CLINIC | Age: 69
End: 2024-03-21

## 2024-03-21 DIAGNOSIS — R13.19 ESOPHAGEAL DYSPHAGIA: ICD-10-CM

## 2024-03-21 DIAGNOSIS — K22.0 ACHALASIA: ICD-10-CM

## 2024-03-21 PROCEDURE — 74240 X-RAY XM UPR GI TRC 1CNTRST: CPT

## 2024-03-21 NOTE — TELEPHONE ENCOUNTER
Patient Call    Who are you speaking with? Patient    If it is not the patient, are they listed on an active communication consent form? N/A   What is the reason for this call? Patient calling to request her appointment with Dr. Bentley be a virtual visit 4/18/24 @ 2:20pm.  Patient states she is unable to drive    Does this require a call back? yes   If a call back is required, please list [facility] call back number [phone]   If a call back is required, advise that a message will be forwarded to their care team and someone will return their call as soon as possible.   Did you relay this information to the patient? Yes

## 2024-03-25 ENCOUNTER — TELEPHONE (OUTPATIENT)
Dept: NEUROSURGERY | Facility: CLINIC | Age: 69
End: 2024-03-25

## 2024-03-25 NOTE — TELEPHONE ENCOUNTER
Received a call from Zohra requesting a call back. Returned her call and confirmed her planned fup and need for xray prior. She understands that she should have this done next Friday 4/5 and was appreciative of the confirmation.

## 2024-03-29 ENCOUNTER — OFFICE VISIT (OUTPATIENT)
Dept: SLEEP CENTER | Facility: CLINIC | Age: 69
End: 2024-03-29
Payer: MEDICARE

## 2024-03-29 VITALS
DIASTOLIC BLOOD PRESSURE: 80 MMHG | SYSTOLIC BLOOD PRESSURE: 150 MMHG | HEIGHT: 61 IN | HEART RATE: 82 BPM | WEIGHT: 186 LBS | OXYGEN SATURATION: 98 % | BODY MASS INDEX: 35.12 KG/M2

## 2024-03-29 DIAGNOSIS — G47.33 OBSTRUCTIVE SLEEP APNEA: Primary | ICD-10-CM

## 2024-03-29 DIAGNOSIS — E66.9 CLASS 2 OBESITY WITH BODY MASS INDEX (BMI) OF 35.0 TO 35.9 IN ADULT, UNSPECIFIED OBESITY TYPE, UNSPECIFIED WHETHER SERIOUS COMORBIDITY PRESENT: ICD-10-CM

## 2024-03-29 DIAGNOSIS — G47.10 HYPERSOMNIA: ICD-10-CM

## 2024-03-29 PROCEDURE — 99214 OFFICE O/P EST MOD 30 MIN: CPT

## 2024-03-29 RX ORDER — CEFPODOXIME PROXETIL 200 MG/1
TABLET, FILM COATED ORAL
COMMUNITY
Start: 2024-03-25

## 2024-03-29 RX ORDER — SERTRALINE HYDROCHLORIDE 100 MG/1
TABLET, FILM COATED ORAL
COMMUNITY
Start: 2024-03-20

## 2024-03-29 NOTE — PROGRESS NOTES
[facility]  Sleep Medicine Follow Up/Established Patient    PATIENT NAME: Zohra Barriga  DATE OF SERVICE: March 29, 2024  DATE OF LAST VISIT: 1/16/2024    ASSESSMENT/PLAN:  Zohra Barriga is a 68 y.o. female with PMHx of STU on CPAP, HFpEF, CAD, DM2, CKD 3b, asthma, sarcoidosis, HTN, CHB s/p PPM, RLS, hypothyroidism, hyperparathyroidism, OAB, CPRS, h/o TIA,  h/o DVT, HLD, IBS, gastroparesis, medical marijuana use, anxiety, depression and obesity who returns to the office for follow up.    Obstructive Sleep Apnea  Hypersomnia  Morbid Obesity Class 2  - The patient has a history of severe STU (AHI 33.3, O2 imelda 85 %, 2016) and is currently on auto CPAP 5-15 cmH2O.  She is here today for initial compliance visit for the new machine, but is not currently meeting compliance.  It appears her recent urinary issues are contributing to her inability to be completely compliant with the device, but she now has a catheter and feels that she can increase her use moving forward.  She does have elevated mass leak and difficulty with tolerating her current mask as well.  -Suspect her hypersomnia is at least in part due to her incompletely treated STU.  Will reassess need for additional testing for hypersomnia after the patient is able to be compliant with her device.  - Therapy compliance data reviewed: Compliance 47%, residual AHI <5 and significant mask leak noted.  - Continue APAP 5-15 cmH2O with a mask refitting which will be done at the end of today's visit.  - Refill supplies was sent to the patient's DME.  - Explained the importance of keeping the machine clean, and that they should be eligible for refills of supplies every 3-6 months depending on insurance.  We also discussed the insurance compliance requirements.  - Encouraged healthy lifestyle with adequate sleep (7-9 hours per night), healthy balanced diet and routine exercise.  Explained the importance of avoiding driving while drowsy.  -  Follow-up in 3 months.  -     Mask fitting only; Future  -     PAP DME Resupply/Reorder  ________________________________________________________________________________________________    Interval History: Zohra Barriga is a 68 y.o. female with PMHx of STU on CPAP, HFpEF, CAD, DM2, CKD 3b, asthma, sarcoidosis, HTN, CHB s/p PPM, RLS, hypothyroidism, hyperparathyroidism, OAB, CPRS, h/o TIA,  h/o DVT, HLD, IBS, gastroparesis, medical marijuana use, anxiety, depression and obesity who returns to the office for follow up.  Since her last visit she got started on CPAP, but was having difficulty due to frequently needing to wake up to urinate.  She states recently she had a catheter placed and her nighttime awakenings have decreased to almost 0 as a result.  She does get benefit from the machine, and states her  is also noted that she is more awake and alert during the day.  She does find her nasal mask irritating at times, and would like to consider a different style of mask.  She does also still have some residual daytime sleepiness, but states it is much better than prior.  She denies any issues with pressure intolerance, aerophagia or rainout or mask.  She does note at times she will wake up with a mask dislodged and is not sure how long it has been off for.    PAP History  Sleep Apnea Type: STU  Most Recent AHI: 33.3  Treatment: APAP    DME: [employer]    Uses APAP for 3-4 hours per night, 5-7 nights per week.  Current PAP Settings: 5-15 cmH2O  Compliance Data: 47%, see belwo    Mask Type: nasal  PAP Issues: mask leaks  and mask discomfort  Uses Chin Strap: No  Uses Ramp Function: Yes   Uses Humidity: Yes     There is a perceived benefit by the patient: does feel more rested with PAP therapy  Observers report no longer has snoring with APAP use.    Prior History: The patient has been following with a sleep physician since 2016, initially at [facility] and then with [facility] since 2018.  She has a history of  severe STU and has been on CPAP 9-11 cmH2O therapy for a number of years at this point.  She was having difficulties with her CPAP due to sleep onset insomnia, and was noting daytime sleepiness due to her difficulties with PAP use.  Dr. Stratton started her on modafinil and amitriptyline for these issues initially.  The patient reported modafinil was wearing off too soon, Sunosi was ordered however was not covered by her insurance, and ultimately she was prescribed armodafinil 150 mg daily.  The armodafinil worked for her until 2021 when she reported increasing drowsiness and it was increased to 250 mg daily.  In 2022 the patient reported the armodafinil 250 mg was no longer working for her, and she was prescribed W[employer]ix; however, this was never filled and the patient remained on the armodafinil.  She was last seen on 8/18/2022 at which time she was noted to still be sleepy despite armodafinil 250 mg daily, but it was felt that this may actually be due to lack of use of her CPAP.  She was to follow-up in 1 year; however, she was lost to follow-up for time.  She represented in 1/2024 at which time it was noted her device was broken beyond repair and need to be replaced.  At her last visit she was given a prescription for new auto CPAP 5-15 cmH2O which she has since received, and she is now returning today for initial compliance visit.    ESS: Total score: 21/24  Greater or equal to 10 is positive for excessive daytime sleepiness  Pertinent Meds: None    Sleep-Wake Schedule:  Bedtime: 8063-7518  Wake Time: 0269-5410 (usually because  is waking up)  Difficulty Falling Asleep: No  Avg Number of Awakenings: none now with catheter, previously 3-5x  Cause of Awakenings: bathroom  Weekend Sleep Schedule: unchanged  Naps: denies    Avg TST per 24 hours: 6-7 hours    Past Treatments:  APAP 9-11 cmH2O    Prior Sleep Studies:  PSG 6/25/2013  AHI 30.9     Split-Night PSG -- 8/15/2016 (Wt 226.5lbs)  AHI - 33.3  Sup AHI -  "NA  REM AHI - 0.0 (4 min)  O2 Marlon - 85.0%  Recommended Pressure - 11 cmH2O  AHI at Rec Pressure - 8.8    Other Relevant Labs and Studies:  Therapy and Compliance Data -- 2/28/2024 - 3/28/2024      Past Medical History:   Diagnosis Date    Anxiety     Asthma     controlled    Back complaints     Cancer ([facility])     nose    Cellulitis of left lower leg     \"not now\"    CHF (congestive heart failure) ([facility]) 02/2021    Chronic bilateral thoracic back pain     Chronic kidney disease     sees nephrologist regularly\" stage 3\"    Chronic myofascial pain     Chronic pain of both lower extremities     Chronic pain of left knee     \"both knees\"    Chronic pain syndrome     bilat legs and knees/neuropathy pain    COVID 12/2021    CPAP (continuous positive airway pressure) dependence     no currently uses    Depression     Diabetes mellitus ([facility])     insulin pump    Difficulty walking 2018    Disease of thyroid gland     Does use hearing aid     bilat and will wear DOS    DVT (deep venous thrombosis) ([facility]) 2013    left leg    Gait disorder     Gastroparesis     GERD (gastroesophageal reflux disease)     Head injury     Headache, tension-type     History of angina     History of MRSA infection     History of transfusion     Many years ago    HL (hearing loss) 2018    Hyperlipidemia     Hypertension     Hypoglycemic reaction     \"occas low blood sugar\"    Insulin pump in place     pt reports saw endocrinologist 4/28 and will bring copy of instructions DOS    Irritable bowel syndrome     Kidney disease     Memory loss 2021    Movement disorder 2021    Neuropathy in diabetes ([facility]) 2021    Constant pain legs and feet    Pacemaker 2019    Polyneuropathy associated with underlying disease ([facility])     PONV (postoperative nausea and vomiting)     Risk for falls     S/P insertion of spinal cord stimulator 06/08/2018    Sarcoidosis     Shortness of breath     \"exertion and not new\"    Sleep apnea     Stroke ([facility])     TIA (transient ischemic " "attack)     Use of cane as ambulatory aid     Uses walker     Wears dentures     permanent upper/and some missing teeth/partial lower     Past Surgical History:   Procedure Laterality Date    ABDOMINAL ADHESION SURGERY N/A 2021    Procedure: laparoscopic LYSIS ADHESIONS;  Surgeon: Jill Bentley MD;  [facility]: [facility];  Service: Thoracic    BREAST SURGERY      BREAST SURGERY      reduction    CARDIAC PACEMAKER PLACEMENT      pacemaker     SECTION      COLONOSCOPY      DILATION AND CURETTAGE OF UTERUS      \"D&E\"    HERNIA REPAIR      HYSTERECTOMY      JOINT REPLACEMENT Left     LTKR    NECK SURGERY      fused 4 discs with 4 screws implanted    NISSEN FUNDOPLICATION LAPAROSCOPIC WITH ROBOTICS N/A 2021    Procedure: ROBOTIC HELLER MYOTOMY WITH BERNARDO FUNDIPLICATION ;  Surgeon: Jill Bentley MD;  [facility]: [facility];  Service: Thoracic    CO ESOPHAGOGASTRODUODENOSCOPY TRANSORAL DIAGNOSTIC N/A 2021    Procedure: ESOPHAGOGASTRODUODENOSCOPY (EGD);  Surgeon: Jill Bentley MD;  [facility];  Service: Thoracic    CO ESOPHAGOGASTRODUODENOSCOPY TRANSORAL DIAGNOSTIC N/A 2022    Procedure: ESOPHAGOGASTRODUODENOSCOPY (EGD),;  Surgeon: Jill Bentley MD;  [facility];  Service: Thoracic    CO ESOPHAGOGASTRODUODENOSCOPY TRANSORAL DIAGNOSTIC N/A 2022    Procedure: ESOPHAGOGASTRODUODENOSCOPY (EGD);  Surgeon: Jill Bentley MD;  [facility];  Service: Thoracic    CO ESOPHAGOGASTRODUODENOSCOPY TRANSORAL DIAGNOSTIC N/A 2022    Procedure: ESOPHAGOGASTRODUODENOSCOPY (EGD); PYLORIC DILATION; GE JUNCTION DILATION;  Surgeon: Jill Bentley MD;  [facility];  Service: Thoracic    CO ESOPHAGOGASTRODUODENOSCOPY TRANSORAL DIAGNOSTIC N/A 2022    Procedure: ESOPHAGOGASTRODUODENOSCOPY (EGD);  Surgeon: Varinder Steiner MD;  [facility];  Service: Thoracic    CO ESOPHAGOGASTRODUODENOSCOPY TRANSORAL DIAGNOSTIC N/A " 01/24/2023    Procedure: ESOPHAGOGASTRODUODENOSCOPY (EGD);  Surgeon: Jill Bentley MD;  [facility];  Service: Thoracic    AZ ESOPHAGOSCOPY FLEX BALLOON DILAT <30 MM DIAM N/A 01/12/2022    Procedure: DILATATION ESOPHAGEAL;  Surgeon: Jill Bentley MD;  [facility];  Service: Thoracic    AZ ESOPHAGOSCOPY FLEX BALLOON DILAT <30 MM DIAM N/A 02/16/2022    Procedure: DILATATION ESOPHAGEAL;  Surgeon: Jill Bentley MD;  [facility];  Service: Thoracic    AZ ESOPHAGOSCOPY FLEX BALLOON DILAT <30 MM DIAM N/A 11/29/2022    Procedure: DILATATION ESOPHAGEAL esophageal and pyloric dilation;  Surgeon: Varinder Steiner MD;  [facility];  Service: Thoracic    AZ ESOPHAGOSCOPY FLEX BALLOON DILAT <30 MM DIAM N/A 01/24/2023    Procedure: esophageal dilation dilated up to 54  with pylorus dilation dilated up to 18;  Surgeon: Jill Bentley MD;  [facility];  Service: Thoracic    AZ RIBEIRO IMPLTJ NSTIM ELTRDS PLATE/PADDLE EDRL Left 04/23/2018    Procedure: Insertion of thoracic spinal cord stimulator electrode via laminotomy and placement of left buttock IPG;  Surgeon: Shahab Bullock MD;  [facility];  Service: Neurosurgery    REPLACEMENT TOTAL KNEE      ROTATOR CUFF REPAIR      SPINAL STIMULATOR PLACEMENT Left 10/03/2018    Procedure: BUTTOCK RE-OPENING INCISION FOR REPOSITIONING OF IMPLANTABLE PULSE GENERATOR;  Surgeon: Shahab Bullock MD;  [facility];  Service: Neurosurgery    TONSILLECTOMY      UPPER GASTROINTESTINAL ENDOSCOPY      VASCULAR SURGERY      WISDOM TOOTH EXTRACTION       Patient Active Problem List   Diagnosis    Anemia of chronic disease    Anxiety and depression    Other B-complex deficiencies    Chronic bilateral thoracic back pain    Chronic myofascial pain    Pain in right leg    Chronic pain of left knee    Chronic pain of right knee    Essential hypertension    Gait disorder    Gastroparesis    Gastroesophageal reflux disease  without esophagitis    Sarcoidosis    Insomnia due to medical condition    Insulin pump status    Pain syndrome, chronic    Type 2 diabetes mellitus with microalbuminuria, with long-term current use of insulin ([facility])    Irritable bowel syndrome with constipation    Lymphadenopathy    Mixed hyperlipidemia    Mixed urge and stress incontinence    Moderate persistent asthma with acute exacerbation    Normocytic anemia    Obstructive sleep apnea    Polyneuropathy associated with underlying disease ([facility])    Post-menopausal    Primary hyperparathyroidism ([facility])    Type 2 diabetes mellitus without complication, with long-term current use of insulin ([facility])    Vitamin D deficiency    Complex regional pain syndrome type 1 of left lower extremity    Primary hypothyroidism    Encounter for fitting and adjustment of spinal cord stimulator    S/P insertion of spinal cord stimulator    Chronic diastolic CHF (congestive heart failure) ([facility])    Coronary artery disease involving native coronary artery    Closed fracture of thoracic vertebra ([facility])    Deep venous thrombosis ([facility])    Localized, primary osteoarthritis    Chronic bilateral low back pain without sciatica    Lumbar radiculopathy    Lumbosacral spondylosis without myelopathy    Osteoarthritis of knee    Age related osteoporosis    Thoracic radiculopathy    History of TIA (transient ischemic attack)    Microcytic anemia    Chronic scapular pain    Moderate persistent asthma without complication    Hypersomnia    BBB (bundle branch block)    Hyperlipidemia associated with type 2 diabetes mellitus     Uncontrolled type 2 diabetes mellitus with hyperglycemia ([facility])    Achalasia    PONV (postoperative nausea and vomiting)    Class 2 obesity in adult    Pacemaker    Medical marijuana use    Urinary retention    Epigastric pain    Spinal stenosis of lumbar region with neurogenic claudication    COVID-19 virus infection    Acute on chronic diastolic (congestive) heart failure ([facility])     Anemia    Abnormal CT of the chest    Complete heart block (HCC)    Diabetic peripheral neuropathy associated with type 2 diabetes mellitus (HCC)    OAB (overactive bladder)    Polyarthralgia    Restless leg syndrome    Anemia of chronic renal failure, stage 3 (moderate)     Chronic kidney disease (CKD) stage G3b/A1, moderately decreased glomerular filtration rate (GFR) between 30-44 mL/min/1.73 square meter and albuminuria creatinine ratio less than 30 mg/g (HCC)    Stage 3a chronic kidney disease (HCC)    Coronary artery disease involving native coronary artery of native heart without angina pectoris    Bifascicular block    Chronic pain disorder    Myelopathy ([facility])    Thoracic compression fracture (HCC)    Compression fx, lumbar spine, sequela    Osteopenia     Allergies as of 03/29/2024 - Reviewed 03/29/2024   Allergen Reaction Noted    Bactrim [sulfamethoxazole-trimethoprim] Hives 10/18/2017    Sucralfate Hives 10/18/2017    Topamax [topiramate]  07/25/2017    Azithromycin Itching 10/18/2017    Pregabalin Confusion and Other (See Comments) 12/19/2018    Ciprofloxacin Drowsiness 04/03/2020    Norvasc [amlodipine] Swelling 04/27/2019    Baclofen  04/23/2018    Bupropion Fatigue 07/02/2018    Methotrexate Nausea Only 10/18/2017     REVIEW OF SYSTEMS:  Review of Systems  10-point system review completed, all of which are negative except as mentioned above.    CURRENT MEDICATIONS:  Current Outpatient Medications   Medication Instructions    acetaminophen (TYLENOL) 500 mg, Oral, Every 4 hours PRN, Take 2 tablets (1000 mg)by mouth every 4 hours as needed, for mild pain & moderate pain    albuterol (ACCUNEB) 2.5 mg, Nebulization, Every 6 hours PRN    albuterol (PROVENTIL HFA,VENTOLIN HFA) 90 mcg/act inhaler 2 puffs, Inhalation, Every 6 hours PRN    aspirin (ECOTRIN LOW STRENGTH) 81 mg, Oral, Every morning    atorvastatin (LIPITOR) 80 mg, Oral, Daily at bedtime    bisacodyl (DULCOLAX) 10 mg, Rectal, Daily    carvedilol  (COREG) 3.125 mg, Oral, 2 times daily with meals    cefpodoxime (VANTIN) 200 mg tablet TAKE 1 TABLET BY MOUTH 2 TIMES A DAY FOR 10 DAYS.    CRANBERRY FRUIT CONCENTRATE  mg, Oral, Daily, Every morning for supplement    cyclobenzaprine (FLEXERIL) 10 mg tablet     Darbepoetin Mike (ARANESP, ALBUMIN FREE, IJ) Injection, Every 14 days    ezetimibe (ZETIA) 10 mg, Oral, Daily    gabapentin (NEURONTIN) 600 mg, Oral, 2 times daily    gabapentin (NEURONTIN) 100 mg, Oral, 2 times daily, This will be in addition to gabapentin 100 mg BID    glucagon (GLUCAGEN) 1 mg, Once, Inject 1 mg under the skin as needed for low blood sugar    glucose blood test strip Testing six times daily  E11.65 on insulin pump    glucose 16 g, Oral, As needed, Take 15 g by mouth as needed (blood sugar less than 70 if able to safely swallow)    insulin glargine (LANTUS) 24 Units, Subcutaneous, As needed, Inject 24 units under the skin as needed (for pump failure)    Insulin Infusion Pump (MINIMED INSULIN PUMP) PRINCESS Does not apply    levothyroxine 100 mcg, Oral, Daily    NovoLOG 100 UNIT/ML injection INJECT UP  UNITS DAILY VIA INSULIN PUMP. E11.65    ondansetron (ZOFRAN) 8 mg, Oral, Every 8 hours PRN    oxyCODONE-acetaminophen (PERCOCET) 5-325 mg per tablet PLEASE SEE ATTACHED FOR DETAILED DIRECTIONS    polyethylene glycol (MIRALAX) 17 g, Oral, Daily    pramipexole (MIRAPEX) 1 mg, Oral, 2 times daily    Restasis 0.05 % ophthalmic emulsion 1 drop, Both Eyes, 2 times daily, As directed    sertraline (ZOLOFT) 100 mg tablet     tolterodine (DETROL LA) 4 mg, Oral, Daily    torsemide (DEMADEX) 40 mg, Oral, Daily    zinc gluconate 50 mg, Oral, Daily     SOCIAL HISTORY:  Social History     Tobacco Use    Smoking status: Never    Smokeless tobacco: Never   Vaping Use    Vaping status: Never Used   Substance Use Topics    Alcohol use: No    Drug use: Not Currently     Frequency: 8.0 times per week     Types: Marijuana     Comment: Medical Marijuana/vape  "pen once per week     FAMILY HISTORY:  Family History   Problem Relation Age of Onset    Diabetes Family     Heart disease Family     Hypertension Family     Neuropathy Family     No Known Problems Mother     Heart disease Father     Diabetes Father     Neuropathy Father     Stroke Father     No Known Problems Maternal Grandmother     No Known Problems Maternal Grandfather     No Known Problems Paternal Grandmother     No Known Problems Paternal Grandfather     No Known Problems Brother     No Known Problems Daughter     Cancer Son      PHYSICAL EXAMINATION:  Vital Signs:  /80   Pulse 82   Ht 5' 1\" (1.549 m)   Wt 84.4 kg (186 lb)   SpO2 98%   BMI 35.14 kg/m²   Body mass index is 35.14 kg/m².  Constitutional: NAD, well appearing, obese   Mental Status: AAOx3  Skin: Warm, dry, no rashes noted   Eyes: PERRL, normal conjunctiva  ENT: Nasal congestion absent, nasal valve incompetence absent.  Posterior Airspace:   Lara Tongue Position: 4  Retrognathia: absent  Overbite: absent  High Arched Palate: absent  Tongue Scalloping/Ridging: absent  Tonsils: 1+  Uvula: normal  Chest: RRR, +S1/S2, CTA B/L, no W/R/R, no M/R/G   Abdomen: Soft, NT/ND  Extremities: No digital clubbing or pedal edema    I have spent a total time of 30 minutes on 03/29/24 in caring for this patient including Instructions for management, Patient and family education, Importance of tx compliance, Counseling / Coordination of care, Documenting in the medical record, Reviewing / ordering tests, medicine, procedures  , and Obtaining or reviewing history  .    Olivia Mccurdy MD  Pulmonary-Critical Care and Sleep Medicine  03/29/24    Portions of the record may have been created with voice recognition software. Occasional wrong word or \"sound a like\" substitutions may have occurred due to the inherent limitations of voice recognition software. Please read the chart carefully and recognize, using context, where substitutions have occurred.   "

## 2024-03-29 NOTE — PATIENT INSTRUCTIONS
Continue PAP Therapy  - Continue APAP at 5-15 cmH2O.  - Script for mask refitting.  Remember to clean your mask and equipment regularly, as directed.  You should be eligible for new supplies approximately every 3-6 months, depending on your insurance coverage. Contact your Durable Medical Equipment (DME) company for new supplies as needed.  Follow up in 3 months    Care and Maintenance  Headgear should be washed as needed. Daily inspection and weekly washings are recommended. Do not disassemble the straps. Machine wash in warm water, making sure to attach Velcro hooks and tabs before washing. Line dry or machine dry on a low setting.  Masks should be washed every day. Daily inspection is recommended. Leave the mask and tubing attached. Gently wash the mask with a soft cloth using warm water and mild detergent, concentrating on the mask cushion flaps. DO NOT use alcohol or bleach. Rinse thoroughly and air dry.  Tubing and headgear should be washed weekly. Daily inspection is recommended. Wash in warm water and mild detergent and rinse thoroughly. Hook the tubing to the machine and blow until dry.  Humidifier should be washed daily and filled with DISTILLED water before use. Wash with warm water and mild detergent. Disinfect weekly by soaking with a solution of 1 part white vinegar and 3 parts water for 30 minutes. Rinse thoroughly and air dry.  Disposable filters should be replaced once a month. Wash reusable foam filters with warm water and mild detergent at least once a month. Rinse thoroughly and dry with paper towels.  Avoid  that contain fragrance or conditioners, as these will leave a residue.  NEVER iron any soft goods.    Insurance Requirements  Your insurance requires a face-to-face follow up visit within a 31-90 day period after starting PAP therapy.  Your insurance requires compliance with your PAP device, which is at least 4 hours per night for 70% of the time. This must be done over a 30 day  period and must occur within the initial 31-90 day period after starting CPAP.  Your insurance also requires at least yearly follow ups to continue to pay for CPAP supplies.     PAP Supply Guidelines  Below are the guidelines for reordering your supplies. You will be responsible for your deductible, co payments, and out of pocket expenses.    Item Frequency   Nasal Mask (no headgear) 1 every 3 months   Nasal Mask Cushion 1 every 2 weeks   Full Face Mask (no headgear) 1 every 3 months   Full Face Mask Cushion 1 every month   Nasal Pillows 1 every 2 weeks   Headgear 1 every 6 months   hin Strap 1 every 6 months   mariangel 1 every 3 months   Filters: Reusable 1 every 6 months   Filters: Disposable 1 every 2 weeks   Humidifier Chamber(disposable) 1 every 6 months

## 2024-04-02 NOTE — NURSING NOTE
Called patient to complete consult and go over instructions, patient is scheduled on 4/11/24   for diagnostic  myelogram. Verified allergies and current anticoagulant medication of ASA 81 mg present but not required to stop per periprocedural management of coagulation status and hemostasis risk in percutaneous image guided procedure guidelines, she is aware not to take any amount higher than her 81 mg. Diet, medication instructions (taking own meds prior to test), also went over with patient that as of today with hold any ASA higher than her 81 mg  or ASA products till the date of the procedure and need for  was explained. Also went over instructions of location, time and date of procedure, of location and time ambulatory procedure unit. Also reviewed the procedure itself and answered any questions. Explained also post recovery instructions. She informed me that she has a indwelling villareal catheter present. Patient made aware that she may call if any other questions that may arise to the myself, gave them my contact information.   Information was also sent via e-mail to verified address and via epic my chart, see message below.  Good morning Mrs. Barriga,    You are scheduled on 4/11/24 @ 1 pm  for diagnostic myelogram.      You are to come @ 11:30 am to [facility] at [address] Present yourself to Admission services that is located on the Ground floor to the left of the information desk in Building B. Please sign in using the Kiosk (if any issues with your registration, an admission personnel will assist you). Once admitted you will be instructed to go up to the 1st floor - Surgical services (it will be to the left at the main corridor on the 1st floor); they will get you changed for your procedure, update medical information, and draw blood work. A platelet and clotting time are needed the day of the procedure to assure your safety and healing post procedure.    If you have a spinal,  brain or any other type of stimulator please bring with you the controller and the  (or have the controller fully charged) for your study.      You may eat a light non-greasy breakfast/lunch, drink fluids and take all your medications that are prescribed to you. You may continue to take your prescribed 81 mg Aspirin but please do not take any Aspirin higher than your 81 mg. Also be cautious of any over the counter medication please check if Aspirin is one of the ingredients (Tylenol, Motrin and Aleve are fine to take). You will need to be off Aspirin for at least 5 days to have the procedure done if you should take any Aspirin.    For this study you will have local anesthetic (Lidocaine) to the lower back, you will be awake during the study.   If you should need an anti-anxiety medication, please contact your primary doctor and request for such medication. Bring the medication with you so you can take an hour prior to the procedure.      If not on fluid restrictions, please increase your fluid intake 3 days prior to the procedure.    For the procedure you will be on your stomach, we will use an Xray to assist in accessing your cerebral spinal fluid once the area in your lower back is cleaned and you will get a local anesthetic. We will be inserting IV contrast to your CSF, to get the pictures that are needed for your ordering provider you will be inverted towards your head so the contrast can move upward to the needed location. Once the contrast is at the location needed for ideal pictures you will be placed back on to your stretcher and taken over to CT to get the scans needed. Upon completion on the CT, you will be sent back to surgical services where you will be monitored for the allotted time usually 1 hour.    Upon discharge you will need a ride home so please bring a  for this study. The night of or the day following you may experience soreness at your lower back and a headache, these are normal  and expected. Your discharge instructions will be to rest, hydrate with fluids (caffeine helps also) and take over the counter medication such as Tylenol, Motrin, or Aleve.      Please feel free to call if any other questions or concerns should arise.   Cassi Sanchez RN  [facility] RN  [address] [place] [address]  [phone] (Office)  [phone] (Fax)  [email or web address]

## 2024-04-03 ENCOUNTER — TELEPHONE (OUTPATIENT)
Dept: SLEEP CENTER | Facility: CLINIC | Age: 69
End: 2024-04-03

## 2024-04-04 LAB

## 2024-04-08 ENCOUNTER — TELEPHONE (OUTPATIENT)
Dept: HEMATOLOGY ONCOLOGY | Facility: CLINIC | Age: 69
End: 2024-04-08

## 2024-04-08 ENCOUNTER — HOSPITAL ENCOUNTER (OUTPATIENT)
Dept: RADIOLOGY | Facility: HOSPITAL | Age: 69
Discharge: HOME | End: 2024-04-08
Payer: MEDICARE

## 2024-04-08 DIAGNOSIS — S22.000A THORACIC COMPRESSION FRACTURE (HCC): ICD-10-CM

## 2024-04-08 DIAGNOSIS — M85.80 OSTEOPENIA: ICD-10-CM

## 2024-04-08 PROCEDURE — 72072 X-RAY EXAM THORAC SPINE 3VWS: CPT

## 2024-04-08 NOTE — TELEPHONE ENCOUNTER
Patient Call    Who are you speaking with? Patient    If it is not the patient, are they listed on an active communication consent form? N/A   What is the reason for this call? Patient is having a hard time swallowing food and she would like to speak to the office..    Does this require a call back? Yes   If a call back is required, please list best call back number [phone]    If a call back is required, advise that a message will be forwarded to their care team and someone will return their call as soon as possible.   Did you relay this information to the patient? Yes

## 2024-04-08 NOTE — TELEPHONE ENCOUNTER
I called patient she reports having a hard time swallowing most solids. She's able to drink and keep fluids down. She reports this is worse than it was in the past. Reviewed findings from Upper GI and manometry study. Advised patient to try protein shakes, cream soups and if she eats solids food to chew very well.  She has an appointment on 4/18/24. If symptoms worsen and she's unable to keep fluids down to call office. She denies being SOB.

## 2024-04-11 ENCOUNTER — HOSPITAL ENCOUNTER (OUTPATIENT)
Dept: RADIOLOGY | Facility: HOSPITAL | Age: 69
Discharge: HOME | End: 2024-04-11
Attending: PSYCHIATRY & NEUROLOGY

## 2024-04-11 ENCOUNTER — TELEPHONE (OUTPATIENT)
Dept: HEMATOLOGY ONCOLOGY | Facility: CLINIC | Age: 69
End: 2024-04-11

## 2024-04-11 NOTE — TELEPHONE ENCOUNTER
Appointment Confirmation   Who are you speaking with? Patient   If it is not the patient, are they listed on an active communication consent form? N/A   Which provider is the appointment scheduled with?  Dr. Bentley   When is the appointment scheduled?  Please list date and time 4/18/24 220   At which location is the appointment scheduled to take place? Virtual   Did caller verbalize understanding of appointment details? Yes

## 2024-04-12 ENCOUNTER — TELEPHONE (OUTPATIENT)
Dept: NEUROSURGERY | Facility: CLINIC | Age: 69
End: 2024-04-12

## 2024-04-12 NOTE — TELEPHONE ENCOUNTER
"4/12/24 - CALLED PT TO RESCHEDULE TODAYS \"NO SHOW\" APPT. PT ADVISED THAT SHE WAS CURRENTLY ADMITTED AT [facility] AND THAT SHE WILL CALL US BACK TO RESCHEDULE ONCE SHE'S DISCHARGED.   "

## 2024-04-17 ENCOUNTER — TELEPHONE (OUTPATIENT)
Dept: PULMONOLOGY | Facility: CLINIC | Age: 69
End: 2024-04-17

## 2024-04-17 ENCOUNTER — TELEPHONE (OUTPATIENT)
Dept: HEMATOLOGY ONCOLOGY | Facility: CLINIC | Age: 69
End: 2024-04-17

## 2024-04-17 NOTE — TELEPHONE ENCOUNTER
Patient Call    Who are you speaking with? Patient    If it is not the patient, are they listed on an active communication consent form? N/A   What is the reason for this call? Patient is requesting Dr. Harrison call her for the scheduled virtual visit 4/18/24 @ 2:20pm   Does this require a call back? yes   If a call back is required, please list [facility] call back number [phone]   If a call back is required, advise that a message will be forwarded to their care team and someone will return their call as soon as possible.   Did you relay this information to the patient? yes

## 2024-04-18 ENCOUNTER — TELEPHONE (OUTPATIENT)
Dept: HEMATOLOGY ONCOLOGY | Facility: CLINIC | Age: 69
End: 2024-04-18

## 2024-04-18 NOTE — TELEPHONE ENCOUNTER
Appointment Confirmation   Who are you speaking with? Patient   If it is not the patient, are they listed on an active communication consent form? N/A   Which provider is the appointment scheduled with?  Dr. Bentley   When is the appointment scheduled?  Please list date and time 4/18/24 2:20pm   At which location is the appointment scheduled to take place? Virtual Phone call, please call [phone]   Did caller verbalize understanding of appointment details? Yes       Advised patient Dr Bentley would call her for this appointment.

## 2024-04-18 NOTE — TELEPHONE ENCOUNTER
Patient Call    Who are you speaking with? Patient    If it is not the patient, are they listed on an active communication consent form? N/A   What is the reason for this call? Pt is calling in regards to her appt today at 2:20 PM with Dr Bentley. Pt is waiting for her to call and wanted to make sure the appt was still supposed to happen. I advised pt it was possible Dr Bentley was running behind and to wait for her call.    Does this require a call back? Yes   If a call back is required, please list best call back number [phone]   If a call back is required, advise that a message will be forwarded to their care team and someone will return their call as soon as possible.   Did you relay this information to the patient? Yes

## 2024-04-18 NOTE — TELEPHONE ENCOUNTER
I spoke to pt in regards to virtual appointment with Dr. Bentley on 04/18/2024. I informed the pt that Dr. Bentley was running behind today in the clinic and to expect her to call her either the end of today or tomorrow for sure. Pt verbalized understanding and was appreciative of the call back.

## 2024-04-19 NOTE — TELEPHONE ENCOUNTER
Patient Call    Who are you speaking with? Patient    If it is not the patient, are they listed on an active communication consent form? N/A   What is the reason for this call? Patient calling to speak with Dr Bentley.  Patient was unable to have her phone call appointment with Dr Bentley yesterday due to a scheduling conflict.  Patient would like to know if she will be able to speak with Dr Bentley today. Patient had testing done for Dr Bentley and would like to know the results. Patient would like a call back.   Does this require a call back? Yes   If a call back is required, please list best call back number [phone]   If a call back is required, advise that a message will be forwarded to their care team and someone will return their call as soon as possible.   Did you relay this information to the patient? Yes

## 2024-04-19 NOTE — TELEPHONE ENCOUNTER
Patient Call    Who are you speaking with? Patient    If it is not the patient, are they listed on an active communication consent form? N/A   What is the reason for this call? Patient is requesting to speak to Dr. Bentley, patient states she was supposed to receive a call 2 days ago.    Does this require a call back? Yes   If a call back is required, please list best call back number [phone]   If a call back is required, advise that a message will be forwarded to their care team and someone will return their call as soon as possible.   Did you relay this information to the patient? Yes

## 2024-04-22 ENCOUNTER — TELEPHONE (OUTPATIENT)
Dept: CARDIAC SURGERY | Facility: CLINIC | Age: 69
End: 2024-04-22

## 2024-04-22 ENCOUNTER — PREP FOR PROCEDURE (OUTPATIENT)
Dept: CARDIAC SURGERY | Facility: CLINIC | Age: 69
End: 2024-04-22

## 2024-04-22 ENCOUNTER — TELEPHONE (OUTPATIENT)
Dept: HEMATOLOGY ONCOLOGY | Facility: CLINIC | Age: 69
End: 2024-04-22

## 2024-04-22 DIAGNOSIS — K22.0 ACHALASIA: Primary | ICD-10-CM

## 2024-04-22 NOTE — TELEPHONE ENCOUNTER
Patient Call    Who are you speaking with? Patient    If it is not the patient, are they listed on an active communication consent form? N/A   What is the reason for this call? Patient calling to speak with Dr. Bentley regarding her medical condition.  She has been waiting since last week for a callback   Does this require a call back? yes   If a call back is required, please list best call back number [phone]   If a call back is required, advise that a message will be forwarded to their care team and someone will return their call as soon as possible.   Did you relay this information to the patient? yes

## 2024-04-22 NOTE — TELEPHONE ENCOUNTER
I spoke with Lindsay. Will plan for EGD and dilation on May 1st. Will consent and update HNP on day of surgery    Jill Bentley MD  Thoracic Surgery  (Available on [employer])  Office: [phone]

## 2024-04-23 ENCOUNTER — TELEPHONE (OUTPATIENT)
Dept: HEMATOLOGY ONCOLOGY | Facility: CLINIC | Age: 69
End: 2024-04-23

## 2024-04-23 NOTE — TELEPHONE ENCOUNTER
Patient Call    Who are you speaking with? Patient    If it is not the patient, are they listed on an active communication consent form? Yes   What is the reason for this call? Need to discuss status of patient   Does this require a call back? Yes   If a call back is required, please list [facility] call back number [phone]    If a call back is required, advise that a message will be forwarded to their care team and someone will return their call as soon as possible.   Did you relay this information to the patient? Yes

## 2024-04-24 ENCOUNTER — ANESTHESIA EVENT (OUTPATIENT)
Dept: PERIOP | Facility: HOSPITAL | Age: 69
End: 2024-04-24
Payer: MEDICARE

## 2024-04-24 RX ORDER — UREA 10 %
1 LOTION (ML) TOPICAL DAILY
COMMUNITY

## 2024-04-24 NOTE — PRE-PROCEDURE INSTRUCTIONS
Pre-Surgery Instructions:   Medication Instructions    acetaminophen (TYLENOL) 500 mg tablet Uses PRN- OK to take day of surgery    albuterol (PROVENTIL HFA,VENTOLIN HFA) 90 mcg/act inhaler Uses PRN- OK to take day of surgery    aspirin (ECOTRIN LOW STRENGTH) 81 mg EC tablet Obtraining instructions at  4/24    atorvastatin (LIPITOR) 80 mg tablet Take night before surgery    bisacodyl (DULCOLAX) 10 mg suppository Hold day of surgery.    calcium carbonate (OS-KARIS) 1250 (500 Ca) MG chewable tablet Stop taking 7 days prior to surgery.    carvedilol (COREG) 3.125 mg tablet Take day of surgery.    CRANBERRY FRUIT CONCENTRATE PO Stop taking 7 days prior to surgery.    cyclobenzaprine (FLEXERIL) 10 mg tablet Uses PRN- OK to take day of surgery    ezetimibe (ZETIA) 10 mg tablet Take night before surgery    gabapentin (NEURONTIN) 100 mg capsule Take day of surgery.    insulin glargine (LANTUS) 100 units/mL subcutaneous injection Takes full dose at HS if needed    levothyroxine 100 mcg tablet Take day of surgery.    NovoLOG 100 UNIT/ML injection Take 1/2 dose evenging before if needed    ondansetron (ZOFRAN) 8 mg tablet Take day of surgery.    oxyCODONE-acetaminophen (PERCOCET) 5-325 mg per tablet Uses PRN- OK to take day of surgery    polyethylene glycol (MIRALAX) 17 g packet Hold day of surgery.    pramipexole (MIRAPEX) 1 mg tablet Take day of surgery.    Restasis 0.05 % ophthalmic emulsion Take day of surgery.    sertraline (ZOLOFT) 100 mg tablet Take night before surgery    tolterodine (Detrol LA) 4 mg 24 hr capsule Hold day of surgery.    zinc gluconate 50 mg tablet Stop taking 7 days prior to surgery.      Medication instructions for day surgery reviewed. Please use only a sip of water to take your instructed medications. Avoid all over the counter vitamins, supplements and NSAIDS for one week prior to surgery per anesthesia guidelines. Tylenol is ok to take as needed. Pt instructed to obtain ASA instructions at   scheduled 4/24 as well as dose to set insulin pump to DOS. Pt verbalized understanding.      You will receive a call one business day prior to surgery with an arrival time and hospital directions. If your surgery is scheduled on a Monday, the hospital will be calling you on the Friday prior to your surgery. If you have not heard from anyone by 8pm, please call the hospital supervisor through the hospital  at [phone]. ([place] [phone] or [place] [phone]).    Do not eat or drink anything after midnight the night before your surgery, including candy, mints, lifesavers, or chewing gum. Do not drink alcohol 24hrs before your surgery. Try not to smoke at least 24hrs before your surgery.       Follow the pre surgery showering instructions as listed in the “My Surgical Experience Booklet” or otherwise provided by your surgeon's office. Do not use a blade to shave the surgical area 1 week before surgery. It is okay to use a clean electric clippers up to 24 hours before surgery. Do not apply any lotions, creams, including makeup, cologne, deodorant, or perfumes after showering on the day of your surgery. Do not use dry shampoo, hair spray, hair gel, or any type of hair products.     No contact lenses, eye make-up, or artificial eyelashes. Remove nail polish, including gel polish, and any artificial, gel, or acrylic nails if possible. Remove all jewelry including rings and body piercing jewelry.     Wear causal clothing that is easy to take on and off. Consider your type of surgery.    Keep any valuables, jewelry, piercings at home. Please bring any specially ordered equipment (sling, braces) if indicated.    Arrange for a responsible person to drive you to and from the hospital on the day of your surgery. Please confirm the visitor policy for the day of your procedure when you receive your phone call with an arrival time.     Call the surgeon's office with any new illnesses, exposures, or additional  questions prior to surgery.    Please reference your “My Surgical Experience Booklet” for additional information to prepare for your upcoming surgery.

## 2024-04-29 ENCOUNTER — OFFICE VISIT (OUTPATIENT)
Dept: NEUROSURGERY | Facility: CLINIC | Age: 69
End: 2024-04-29
Payer: MEDICARE

## 2024-04-29 VITALS
HEIGHT: 61 IN | BODY MASS INDEX: 35.12 KG/M2 | SYSTOLIC BLOOD PRESSURE: 164 MMHG | HEART RATE: 83 BPM | DIASTOLIC BLOOD PRESSURE: 90 MMHG | TEMPERATURE: 96.9 F | RESPIRATION RATE: 16 BRPM | WEIGHT: 186 LBS | OXYGEN SATURATION: 96 %

## 2024-04-29 DIAGNOSIS — Z79.4 TYPE 2 DIABETES MELLITUS WITH MICROALBUMINURIA, WITH LONG-TERM CURRENT USE OF INSULIN (HCC): Primary | ICD-10-CM

## 2024-04-29 DIAGNOSIS — T85.192A MALFUNCTION OF SPINAL CORD STIMULATOR, INITIAL ENCOUNTER (HCC): ICD-10-CM

## 2024-04-29 DIAGNOSIS — Z01.818 PRE-PROCEDURAL EXAMINATION: ICD-10-CM

## 2024-04-29 DIAGNOSIS — E11.29 TYPE 2 DIABETES MELLITUS WITH MICROALBUMINURIA, WITH LONG-TERM CURRENT USE OF INSULIN (HCC): Primary | ICD-10-CM

## 2024-04-29 DIAGNOSIS — R80.9 TYPE 2 DIABETES MELLITUS WITH MICROALBUMINURIA, WITH LONG-TERM CURRENT USE OF INSULIN (HCC): Primary | ICD-10-CM

## 2024-04-29 DIAGNOSIS — G89.4 CHRONIC PAIN DISORDER: Chronic | ICD-10-CM

## 2024-04-29 PROCEDURE — 99214 OFFICE O/P EST MOD 30 MIN: CPT | Performed by: NEUROLOGICAL SURGERY

## 2024-04-29 RX ORDER — CEFAZOLIN SODIUM 2 G/50ML
2000 SOLUTION INTRAVENOUS ONCE
OUTPATIENT
Start: 2024-04-29 | End: 2024-04-29

## 2024-04-29 RX ORDER — CHLORHEXIDINE GLUCONATE ORAL RINSE 1.2 MG/ML
15 SOLUTION DENTAL ONCE
OUTPATIENT
Start: 2024-04-29 | End: 2024-04-29

## 2024-04-29 RX ORDER — ACETAMINOPHEN 325 MG/1
975 TABLET ORAL ONCE
OUTPATIENT
Start: 2024-04-29 | End: 2024-04-29

## 2024-04-29 RX ORDER — GABAPENTIN 300 MG/1
300 CAPSULE ORAL ONCE
OUTPATIENT
Start: 2024-04-29 | End: 2024-04-29

## 2024-04-29 NOTE — PROGRESS NOTES
Neurosurgery Office Note  Zohra Barriga 68 y.o. female MRN: [id number]      Assessment/Plan     Type 2 diabetes mellitus with microalbuminuria, with long-term current use of insulin (HCC)  Is of the patient at high risk of wound healing issues and infection.  It seems as if her glycemic control has been optimized as best as possible.  Lab Results   Component Value Date    HGBA1C 8.4 (H) 11/13/2023       Chronic pain disorder  He is on chronic pain but spinal cord stimulator is no longer effective in managing her pain despite numerous attempts at reprogramming.  Have the spinal cord stimulator removed.    Malfunction of spinal cord stimulator (HCC)  Abbott spinal cord stimulator system is no longer effective in managing her pain.  She has not used it for well over a year.  She has been told that her system is not MRI conditional or compatible.  As such she would like the system to be removed.    We discussed reopening of thoracic and left buttock incision for removal of spinal cord stimulator system.  The goal of surgery is to remove the system so she can undergo MRI.  The risks of surgery reviewed in detail.    1.  Risk of general anesthetic, infection and bleeding.  2.  Of spinal cord injury with new pain, weakness or numbness and incontinence.  Risk of CSF leak was reviewed as well.  3.  Risk of infection and reoperation.  Risk of retained fragments of the stimulator system which would preclude MRI in the future.    Expected postoperative course and medications were reviewed with the patient.    All her questions were answered to her satisfaction.  Written and verbal consent were personally obtained.  He is not interested in maintaining the system, even if it is determined to be MRI conditional.    I have spent a total time of 30 minutes on 04/29/24 in caring for this patient including Risks and benefits of tx options, Patient and family education, Impressions, and Documenting in the medical  record.    Subjective/Objective     Chief Complaint    Malfunctioning spinal cord stimulator system.    HPI    68-year-old woman with Abbott spinal cord stimulator system placed in 2018.  It was initially effective in managing her chronic pain and CRPS, but became less effective over time despite numerous attempts to reprogram.  She has not used the system for over a year as it is ineffective.  Like to undergo MRI but has been told that the system is not MRI compatible or conditional.  As such, she presents today to discuss removal of the system.        LINWOOD PALUMBO personally reviewed and updated.    Review of Systems   Constitutional:  Positive for fatigue.   HENT: Negative.     Eyes: Negative.    Respiratory: Negative.     Cardiovascular: Negative.    Gastrointestinal:  Positive for constipation.   Endocrine: Negative.    Genitourinary:  Positive for frequency (during the day/nocturia x3) and urgency.        Leakage    Possible bladder STIM surgery/removal    Now receives botox   Musculoskeletal:  Positive for back pain (across lower back radiates into bilateral legs) and gait problem (uses rolling walker for assistance).        Placed SCS/ abbott in 2018    Seeking removal due to health conditions which require frequent MRI   Skin: Negative.    Allergic/Immunologic: Negative.    Neurological:  Positive for weakness (bilateral legs) and numbness (bilateral legs/feet numbness and tingling). Negative for dizziness, seizures, syncope and headaches.   Hematological:  Does not bruise/bleed easily (patient on ASA 81).        Insulin pump placed And a glucose monitor   Psychiatric/Behavioral:  Positive for sleep disturbance (due to pain).        Family History    Family History   Problem Relation Age of Onset    Diabetes Family     Heart disease Family     Hypertension Family     Neuropathy Family     No Known Problems Mother     Heart disease Father     Diabetes Father     Neuropathy Father     Stroke Father     No Known  Problems Maternal Grandmother     No Known Problems Maternal Grandfather     No Known Problems Paternal Grandmother     No Known Problems Paternal Grandfather     No Known Problems Brother     No Known Problems Daughter     Cancer Son        Social History    Social History     Socioeconomic History    Marital status: /Civil Union     Spouse name: Not on file    Number of children: Not on file    Years of education: Not on file    Highest education level: Not on file   Occupational History    Not on file   Tobacco Use    Smoking status: Never    Smokeless tobacco: Never   Vaping Use    Vaping status: Never Used   Substance and Sexual Activity    Alcohol use: No    Drug use: Not Currently     Frequency: 8.0 times per week     Types: Marijuana     Comment: Medical Marijuana/vape pen once per week    Sexual activity: Yes     Partners: Male     Birth control/protection: None   Other Topics Concern    Not on file   Social History Narrative    Not on file     Social Determinants of Health     Financial Resource Strain: Low Risk  (4/10/2024)    Received from [facility]    Overall Financial Resource Strain (CARDIA)     Difficulty of Paying Living Expenses: Not very hard   Food Insecurity: No Food Insecurity (4/10/2024)    Received from [facility]    Hunger Vital Sign     Worried About Running Out of Food in the Last Year: Never true     Ran Out of Food in the Last Year: Never true   Transportation Needs: No Transportation Needs (4/10/2024)    Received from [facility]    PRAPARE - Transportation     Lack of Transportation (Medical): No     Lack of Transportation (Non-Medical): No   Physical Activity: Not on file   Stress: Not on file   Social Connections: Feeling Socially Integrated (6/24/2023)    Received from [facility]    OASIS : Social Isolation     How often do you feel lonely or isolated from those around you?: Never   Intimate Partner  "Violence: Not At Risk (4/10/2024)    Received from [facility]    Humiliation, Afraid, Rape, and Kick questionnaire     Fear of Current or Ex-Partner: No     Emotionally Abused: No     Physically Abused: No     Sexually Abused: No   Housing Stability: Low Risk  (4/10/2024)    Received from [facility]    Housing Stability Vital Sign     Unable to Pay for Housing in the Last Year: No     Number of Places Lived in the Last Year: 1     Unstable Housing in the Last Year: No       Past Medical History    Past Medical History:   Diagnosis Date    Anxiety     Asthma     controlled    Back complaints     Cancer ([facility])     nose    Cellulitis of left lower leg     \"not now\"    CHF (congestive heart failure) ([facility]) 02/2021    Chronic bilateral thoracic back pain     Chronic kidney disease     sees nephrologist regularly\" stage 3\"    Chronic myofascial pain     Chronic pain of both lower extremities     Chronic pain of left knee     \"both knees\"    Chronic pain syndrome     bilat legs and knees/neuropathy pain    COVID 12/2021    CPAP (continuous positive airway pressure) dependence     no currently uses    Depression     Diabetes mellitus ([facility])     insulin pump    Difficulty walking 2018    Disease of thyroid gland     Does use hearing aid     bilat and will wear DOS    DVT (deep venous thrombosis) ([facility]) 2013    left leg    Gait disorder     Gastroparesis     GERD (gastroesophageal reflux disease)     Head injury     Headache, tension-type     History of angina     History of MRSA infection     History of transfusion     Many years ago    HL (hearing loss) 2018    Hyperlipidemia     Hypertension     Hypoglycemic reaction     \"occas low blood sugar\"    Insulin pump in place     pt reports saw endocrinologist 4/28 and will bring copy of instructions DOS    Irritable bowel syndrome     Kidney disease     Memory loss 2021    Movement disorder 2021    Neuropathy in diabetes ([facility]) 2021    Constant " "pain legs and feet    Pacemaker     Pneumonia     Polyneuropathy associated with underlying disease (HCC)     PONV (postoperative nausea and vomiting)     Risk for falls     S/P insertion of spinal cord stimulator 2018    Sarcoidosis     Shortness of breath     \"exertion and not new\"    Sleep apnea     Stroke (HCC)     TIA (transient ischemic attack)     Use of cane as ambulatory aid     Uses walker     Wears dentures     permanent upper/and some missing teeth/partial lower       Surgical History    Past Surgical History:   Procedure Laterality Date    ABDOMINAL ADHESION SURGERY N/A 2021    Procedure: laparoscopic LYSIS ADHESIONS;  Surgeon: Jill Bentley MD;  [facility];  Service: Thoracic    BREAST SURGERY      BREAST SURGERY      reduction    CARDIAC PACEMAKER PLACEMENT      pacemaker     SECTION      COLONOSCOPY      DILATION AND CURETTAGE OF UTERUS      \"D&E\"    HERNIA REPAIR      HYSTERECTOMY      JOINT REPLACEMENT Left     LTKR    NECK SURGERY      fused 4 discs with 4 screws implanted    NISSEN FUNDOPLICATION LAPAROSCOPIC WITH ROBOTICS N/A 2021    Procedure: ROBOTIC HELLER MYOTOMY WITH BERNARDO FUNDIPLICATION ;  Surgeon: Jill Bentley MD;  [facility];  Service: Thoracic    CT ESOPHAGOGASTRODUODENOSCOPY TRANSORAL DIAGNOSTIC N/A 2021    Procedure: ESOPHAGOGASTRODUODENOSCOPY (EGD);  Surgeon: Jill Bentley MD;  [facility];  Service: Thoracic    CT ESOPHAGOGASTRODUODENOSCOPY TRANSORAL DIAGNOSTIC N/A 2022    Procedure: ESOPHAGOGASTRODUODENOSCOPY (EGD),;  Surgeon: Jill Bentley MD;  [facility];  Service: Thoracic    CT ESOPHAGOGASTRODUODENOSCOPY TRANSORAL DIAGNOSTIC N/A 2022    Procedure: ESOPHAGOGASTRODUODENOSCOPY (EGD);  Surgeon: Jill Bentley MD;  [facility];  Service: Thoracic    CT ESOPHAGOGASTRODUODENOSCOPY TRANSORAL DIAGNOSTIC N/A 2022    Procedure: ESOPHAGOGASTRODUODENOSCOPY " (EGD); PYLORIC DILATION; GE JUNCTION DILATION;  Surgeon: Jill Bentley MD;  [facility];  Service: Thoracic    WV ESOPHAGOGASTRODUODENOSCOPY TRANSORAL DIAGNOSTIC N/A 11/29/2022    Procedure: ESOPHAGOGASTRODUODENOSCOPY (EGD);  Surgeon: Varinder Steiner MD;  [facility];  Service: Thoracic    WV ESOPHAGOGASTRODUODENOSCOPY TRANSORAL DIAGNOSTIC N/A 01/24/2023    Procedure: ESOPHAGOGASTRODUODENOSCOPY (EGD);  Surgeon: Jill Bentley MD;  [facility];  Service: Thoracic    WV ESOPHAGOSCOPY FLEX BALLOON DILAT <30 MM DIAM N/A 01/12/2022    Procedure: DILATATION ESOPHAGEAL;  Surgeon: Jill Bentley MD;  [facility];  Service: Thoracic    WV ESOPHAGOSCOPY FLEX BALLOON DILAT <30 MM DIAM N/A 02/16/2022    Procedure: DILATATION ESOPHAGEAL;  Surgeon: Jill Bentley MD;  [facility];  Service: Thoracic    WV ESOPHAGOSCOPY FLEX BALLOON DILAT <30 MM DIAM N/A 11/29/2022    Procedure: DILATATION ESOPHAGEAL esophageal and pyloric dilation;  Surgeon: Varinder Steiner MD;  [facility];  Service: Thoracic    WV ESOPHAGOSCOPY FLEX BALLOON DILAT <30 MM DIAM N/A 01/24/2023    Procedure: esophageal dilation dilated up to 54  with pylorus dilation dilated up to 18;  Surgeon: Jill Bentley MD;  [facility];  Service: Thoracic    WV RIBEIRO IMPLTJ NSTIM ELTRDS PLATE/PADDLE EDRL Left 04/23/2018    Procedure: Insertion of thoracic spinal cord stimulator electrode via laminotomy and placement of left buttock IPG;  Surgeon: Shahab Bullock MD;  [facility];  Service: Neurosurgery    REPLACEMENT TOTAL KNEE      ROTATOR CUFF REPAIR      SPINAL STIMULATOR PLACEMENT Left 10/03/2018    Procedure: BUTTOCK RE-OPENING INCISION FOR REPOSITIONING OF IMPLANTABLE PULSE GENERATOR;  Surgeon: Shahab Bullock MD;  [facility];  Service: Neurosurgery    TONSILLECTOMY      UPPER GASTROINTESTINAL ENDOSCOPY      VASCULAR SURGERY      WISDOM TOOTH EXTRACTION          Medications      Current Outpatient Medications:     acetaminophen (TYLENOL) 500 mg tablet, Take 500 mg by mouth every 4 (four) hours as needed for mild pain Take 2 tablets (1000 mg)by mouth every 4 hours as needed, for mild pain & moderate pain, Disp: , Rfl:     albuterol (ACCUNEB) 1.25 MG/3ML nebulizer solution, Take 2.5 mg by nebulization every 6 (six) hours as needed for wheezing, Disp: , Rfl:     albuterol (PROVENTIL HFA,VENTOLIN HFA) 90 mcg/act inhaler, Inhale 2 puffs every 6 (six) hours as needed for wheezing, Disp: , Rfl:     aspirin (ECOTRIN LOW STRENGTH) 81 mg EC tablet, Take 81 mg by mouth every morning, Disp: , Rfl:     atorvastatin (LIPITOR) 80 mg tablet, Take 80 mg by mouth daily at bedtime  , Disp: , Rfl:     bisacodyl (DULCOLAX) 10 mg suppository, Insert 1 suppository (10 mg total) into the rectum daily, Disp: 30 suppository, Rfl: 3    calcium carbonate (OS-KARIS) 1250 (500 Ca) MG chewable tablet, Chew 1 tablet daily, Disp: , Rfl:     carvedilol (COREG) 3.125 mg tablet, Take 3.125 mg by mouth 2 (two) times a day with meals, Disp: , Rfl:     CRANBERRY FRUIT CONCENTRATE PO, Take 450 mg by mouth in the morning Every morning for supplement, Disp: , Rfl:     cyclobenzaprine (FLEXERIL) 10 mg tablet, , Disp: , Rfl:     Darbepoetin Mike (ARANESP, ALBUMIN FREE, IJ), Inject as directed every 14 (fourteen) days, Disp: , Rfl:     ezetimibe (ZETIA) 10 mg tablet, Take 10 mg by mouth daily, Disp: , Rfl:     gabapentin (NEURONTIN) 100 mg capsule, Take 600 mg by mouth 2 (two) times a day, Disp: , Rfl:     gabapentin (Neurontin) 100 mg capsule, Take 1 capsule (100 mg total) by mouth 2 (two) times a day This will be in addition to gabapentin 100 mg BID, Disp: 60 capsule, Rfl: 3    glucagon (GLUCAGEN) 1 mg injection, 1 mg once Inject 1 mg under the skin as needed for low blood sugar, Disp: , Rfl:     glucose 4 g chewable tablet, Chew 16 g as needed for low blood sugar Take 15 g by mouth as needed (blood sugar less  than 70 if able to safely swallow), Disp: , Rfl:     insulin glargine (LANTUS) 100 units/mL subcutaneous injection, Inject 24 Units under the skin if needed Inject 24 units under the skin as needed (for pump failure), Disp: , Rfl:     Insulin Infusion Pump (MINIMED INSULIN PUMP) PRINCESS, Use, Disp: , Rfl:     levothyroxine 100 mcg tablet, Take 100 mcg by mouth daily, Disp: , Rfl:     NovoLOG 100 UNIT/ML injection, INJECT UP  UNITS DAILY VIA INSULIN PUMP. E11.65, Disp: , Rfl:     ondansetron (ZOFRAN) 8 mg tablet, Take 8 mg by mouth every 8 (eight) hours as needed, Disp: , Rfl:     oxyCODONE-acetaminophen (PERCOCET) 5-325 mg per tablet, PLEASE SEE ATTACHED FOR DETAILED DIRECTIONS, Disp: , Rfl:     polyethylene glycol (MIRALAX) 17 g packet, Take 17 g by mouth daily, Disp: 90 each, Rfl: 3    pramipexole (MIRAPEX) 1 mg tablet, Take 1 mg by mouth 2 (two) times a day  , Disp: , Rfl:     Restasis 0.05 % ophthalmic emulsion, Administer 1 drop to both eyes 2 (two) times a day As directed, Disp: , Rfl:     sertraline (ZOLOFT) 100 mg tablet, Take 100 mg by mouth daily at bedtime, Disp: , Rfl:     tolterodine (Detrol LA) 4 mg 24 hr capsule, Take 4 mg by mouth daily, Disp: , Rfl:     torsemide (DEMADEX) 20 mg tablet, Take 2 tablets (40 mg total) by mouth daily, Disp: 60 tablet, Rfl: 0    zinc gluconate 50 mg tablet, Take 50 mg by mouth daily, Disp: , Rfl:     cefpodoxime (VANTIN) 200 mg tablet, TAKE 1 TABLET BY MOUTH 2 TIMES A DAY FOR 10 DAYS. (Patient not taking: Reported on 4/24/2024), Disp: , Rfl:     glucose blood test strip, Testing six times daily  E11.65 on insulin pump, Disp: , Rfl:     Allergies    Allergies   Allergen Reactions    Bactrim [Sulfamethoxazole-Trimethoprim] Hives    Sucralfate Hives     Facial swelling    Topamax [Topiramate]      disorentation    Azithromycin Itching    Pregabalin Confusion and Other (See Comments)     altered mental status    Ciprofloxacin Drowsiness     Other reaction(s): Other (See  "Comments)  \"drowsiness\"    Norvasc [Amlodipine] Swelling    Baclofen      \"That knocks me out.\"    Bupropion Fatigue    Methotrexate Nausea Only       The following portions of the patient's history were reviewed and updated as appropriate: allergies, current medications, past family history, past medical history, past social history, past surgical history, and problem list.    Physical Exam    Vitals:  Blood pressure 164/90, pulse 83, temperature (!) 96.9 °F (36.1 °C), temperature source Temporal, resp. rate 16, height 5' 1\" (1.549 m), weight 84.4 kg (186 lb), SpO2 96%.,Body mass index is 35.14 kg/m².    Physical Exam  Vitals reviewed.   Constitutional:       General: She is not in acute distress.  Eyes:      Extraocular Movements: Extraocular movements intact.   Cardiovascular:      Rate and Rhythm: Normal rate and regular rhythm.   Pulmonary:      Effort: Pulmonary effort is normal. No respiratory distress.   Musculoskeletal:         General: Deformity present.      Comments: Exaggerated thoracic kyphosis.   Skin:     General: Skin is warm and dry.   Neurological:      Mental Status: She is alert and oriented to person, place, and time.      Motor: Weakness present.      Gait: Gait abnormal.      Comments: Stands from seated position slowly using a walker.  Walks with an unsteady gait.  Stooped forward posture.  Grossly full power within lower extremities though there appears to be significant restriction range of motion at multiple joints.   Psychiatric:         Mood and Affect: Mood normal.         Behavior: Behavior normal.     Neurologic Exam     Mental Status   Oriented to person, place, and time.       I have personally reviewed pertinent reports.   and I have personally reviewed pertinent films in PACS  "

## 2024-04-29 NOTE — ASSESSMENT & PLAN NOTE
Abbott spinal cord stimulator system is no longer effective in managing her pain.  She has not used it for well over a year.  She has been told that her system is not MRI conditional or compatible.  As such she would like the system to be removed.    We discussed reopening of thoracic and left buttock incision for removal of spinal cord stimulator system.  The goal of surgery is to remove the system so she can undergo MRI.  The risks of surgery reviewed in detail.    1.  Risk of general anesthetic, infection and bleeding.  2.  Of spinal cord injury with new pain, weakness or numbness and incontinence.  Risk of CSF leak was reviewed as well.  3.  Risk of infection and reoperation.  Risk of retained fragments of the stimulator system which would preclude MRI in the future.    Expected postoperative course and medications were reviewed with the patient.    All her questions were answered to her satisfaction.  Written and verbal consent were personally obtained.  He is not interested in maintaining the system, even if it is determined to be MRI conditional.

## 2024-04-29 NOTE — ASSESSMENT & PLAN NOTE
Is of the patient at high risk of wound healing issues and infection.  It seems as if her glycemic control has been optimized as best as possible.  Lab Results   Component Value Date    HGBA1C 8.4 (H) 11/13/2023

## 2024-04-29 NOTE — ASSESSMENT & PLAN NOTE
He is on chronic pain but spinal cord stimulator is no longer effective in managing her pain despite numerous attempts at reprogramming.  Have the spinal cord stimulator removed.

## 2024-04-30 NOTE — ANESTHESIA PREPROCEDURE EVALUATION
Procedure:  ESOPHAGOGASTRODUODENOSCOPY (EGD), with dilation (Esophagus)  DILATATION ESOPHAGEAL (Esophagus)    Relevant Problems   ANESTHESIA   (+) PONV (postoperative nausea and vomiting)      CARDIO   (+) BBB (bundle branch block)   (+) Bifascicular block   (+) Chronic bilateral thoracic back pain   (+) Complete heart block (HCC)   (+) Coronary artery disease involving native coronary artery of native heart without angina pectoris   (+) Deep venous thrombosis (HCC)   (+) Essential hypertension   (+) Hyperlipidemia associated with type 2 diabetes mellitus  (HCC)   (+) Mixed hyperlipidemia      ENDO   (+) Primary hyperparathyroidism (HCC)   (+) Primary hypothyroidism   (+) Type 2 diabetes mellitus with microalbuminuria, with long-term current use of insulin (HCC)   (+) Type 2 diabetes mellitus without complication, with long-term current use of insulin ([facility]) (Hba1c 8.4)      GI/HEPATIC   (+) Gastroesophageal reflux disease without esophagitis      /RENAL   (+) Anemia of chronic renal failure, stage 3 (moderate)  ([facility]) (Hb 8)   (+) Stage 3a chronic kidney disease (HCC)      HEMATOLOGY   (+) Anemia of chronic renal failure, stage 3 (moderate)  ([facility]) (Hb 8)      MUSCULOSKELETAL   (+) Chronic bilateral low back pain without sciatica   (+) Chronic bilateral thoracic back pain   (+) Localized, primary osteoarthritis   (+) Lumbosacral spondylosis without myelopathy      NEURO/PSYCH   (+) Anxiety and depression   (+) Chronic bilateral low back pain without sciatica   (+) Chronic bilateral thoracic back pain   (+) Chronic pain disorder   (+) Complex regional pain syndrome type 1 of left lower extremity   (+) Diabetic peripheral neuropathy associated with type 2 diabetes mellitus (HCC)      PULMONARY   (+) Moderate persistent asthma without complication   (+) Obstructive sleep apnea      Cardiovascular/Peripheral Vascular   (+) Chronic diastolic CHF (congestive heart failure) (HCC)      TTE 4/2024    Left Ventricle: Systolic  function is mildly decreased with an ejection   fraction of 50%. There is grade I (mild) diastolic dysfunction.     Aortic Valve: The aortic valve is trileaflet. The leaflets are mildly   calcified.    Mitral Valve: There is trace regurgitation.     Tricuspid Valve: There is trace regurgitation.     Pulmonic Valve: There is trace regurgitation.     Right Ventricle: Right ventricle cavity is normal. Pacer wire noted.   Physical Exam    Airway    Mallampati score: II  TM Distance: >3 FB  Neck ROM: full     Dental    upper dentures and lower dentures    Cardiovascular      Pulmonary      Other Findings  post-pubertal.    ECG 2/2023  V-paced at 71    Anesthesia Plan  ASA Score- 3     Anesthesia Type- general with ASA Monitors.         Additional Monitors:     Airway Plan: ETT.    Comment: Propofol infusion, BIS, reglan, zofran. .       Plan Factors-Exercise tolerance (METS): >4 METS.    Chart reviewed. EKG reviewed.  Existing labs reviewed. Patient summary reviewed.    Patient is not a current smoker.      Obstructive sleep apnea risk education given perioperatively.        Induction- intravenous.    Postoperative Plan- Plan for postoperative opioid use. Planned trial extubation    Informed Consent- Anesthetic plan and risks discussed with patient.  I personally reviewed this patient with the CRNA. Discussed and agreed on the Anesthesia Plan with the CRNA..

## 2024-05-01 ENCOUNTER — ANESTHESIA (OUTPATIENT)
Dept: PERIOP | Facility: HOSPITAL | Age: 69
End: 2024-05-01
Payer: MEDICARE

## 2024-05-01 ENCOUNTER — APPOINTMENT (OUTPATIENT)
Dept: RADIOLOGY | Facility: HOSPITAL | Age: 69
End: 2024-05-01
Payer: MEDICARE

## 2024-05-01 ENCOUNTER — HOSPITAL ENCOUNTER (OUTPATIENT)
Dept: RADIOLOGY | Facility: HOSPITAL | Age: 69
Setting detail: OUTPATIENT SURGERY
Discharge: HOME | End: 2024-05-01
Payer: MEDICARE

## 2024-05-01 ENCOUNTER — HOSPITAL ENCOUNTER (OUTPATIENT)
Facility: HOSPITAL | Age: 69
Setting detail: OUTPATIENT SURGERY
Discharge: HOME | End: 2024-05-01
Attending: THORACIC SURGERY (CARDIOTHORACIC VASCULAR SURGERY) | Admitting: THORACIC SURGERY (CARDIOTHORACIC VASCULAR SURGERY)
Payer: MEDICARE

## 2024-05-01 VITALS
RESPIRATION RATE: 16 BRPM | HEIGHT: 59 IN | HEART RATE: 61 BPM | TEMPERATURE: 98.1 F | DIASTOLIC BLOOD PRESSURE: 70 MMHG | WEIGHT: 180 LBS | SYSTOLIC BLOOD PRESSURE: 142 MMHG | BODY MASS INDEX: 36.29 KG/M2 | OXYGEN SATURATION: 94 %

## 2024-05-01 DIAGNOSIS — K22.0 ACHALASIA: ICD-10-CM

## 2024-05-01 LAB
GLUCOSE SERPL-MCNC: 227 MG/DL (ref 65–140)
GLUCOSE SERPL-MCNC: 261 MG/DL (ref 65–140)

## 2024-05-01 PROCEDURE — C1769 GUIDE WIRE: HCPCS | Performed by: THORACIC SURGERY (CARDIOTHORACIC VASCULAR SURGERY)

## 2024-05-01 PROCEDURE — 71045 X-RAY EXAM CHEST 1 VIEW: CPT

## 2024-05-01 PROCEDURE — NC001 PR NO CHARGE: Performed by: THORACIC SURGERY (CARDIOTHORACIC VASCULAR SURGERY)

## 2024-05-01 PROCEDURE — 82948 REAGENT STRIP/BLOOD GLUCOSE: CPT

## 2024-05-01 PROCEDURE — 43248 EGD GUIDE WIRE INSERTION: CPT | Performed by: THORACIC SURGERY (CARDIOTHORACIC VASCULAR SURGERY)

## 2024-05-01 RX ORDER — FENTANYL CITRATE 50 UG/ML
INJECTION, SOLUTION INTRAMUSCULAR; INTRAVENOUS AS NEEDED
Status: DISCONTINUED | OUTPATIENT
Start: 2024-05-01 | End: 2024-05-01

## 2024-05-01 RX ORDER — SODIUM CHLORIDE, SODIUM LACTATE, POTASSIUM CHLORIDE, CALCIUM CHLORIDE 600; 310; 30; 20 MG/100ML; MG/100ML; MG/100ML; MG/100ML
50 INJECTION, SOLUTION INTRAVENOUS CONTINUOUS
Status: DISCONTINUED | OUTPATIENT
Start: 2024-05-01 | End: 2024-05-01 | Stop reason: HOSPADM

## 2024-05-01 RX ORDER — FENTANYL CITRATE/PF 50 MCG/ML
25 SYRINGE (ML) INJECTION
Status: DISCONTINUED | OUTPATIENT
Start: 2024-05-01 | End: 2024-05-01 | Stop reason: HOSPADM

## 2024-05-01 RX ORDER — DIPHENHYDRAMINE HCL 25 MG
25 TABLET ORAL ONCE
Status: COMPLETED | OUTPATIENT
Start: 2024-05-01 | End: 2024-05-01

## 2024-05-01 RX ORDER — LIDOCAINE HYDROCHLORIDE 10 MG/ML
INJECTION, SOLUTION EPIDURAL; INFILTRATION; INTRACAUDAL; PERINEURAL AS NEEDED
Status: DISCONTINUED | OUTPATIENT
Start: 2024-05-01 | End: 2024-05-01

## 2024-05-01 RX ORDER — ONDANSETRON 2 MG/ML
INJECTION INTRAMUSCULAR; INTRAVENOUS AS NEEDED
Status: DISCONTINUED | OUTPATIENT
Start: 2024-05-01 | End: 2024-05-01

## 2024-05-01 RX ORDER — PROPOFOL 10 MG/ML
INJECTION, EMULSION INTRAVENOUS CONTINUOUS PRN
Status: DISCONTINUED | OUTPATIENT
Start: 2024-05-01 | End: 2024-05-01

## 2024-05-01 RX ORDER — SUCCINYLCHOLINE/SOD CL,ISO/PF 100 MG/5ML
SYRINGE (ML) INTRAVENOUS AS NEEDED
Status: DISCONTINUED | OUTPATIENT
Start: 2024-05-01 | End: 2024-05-01

## 2024-05-01 RX ORDER — PHENYLEPHRINE HCL IN 0.9% NACL 1 MG/10 ML
SYRINGE (ML) INTRAVENOUS AS NEEDED
Status: DISCONTINUED | OUTPATIENT
Start: 2024-05-01 | End: 2024-05-01

## 2024-05-01 RX ORDER — PROPOFOL 10 MG/ML
INJECTION, EMULSION INTRAVENOUS AS NEEDED
Status: DISCONTINUED | OUTPATIENT
Start: 2024-05-01 | End: 2024-05-01

## 2024-05-01 RX ADMIN — Medication 100 MG: at 09:50

## 2024-05-01 RX ADMIN — FENTANYL CITRATE 25 MCG: 50 INJECTION INTRAMUSCULAR; INTRAVENOUS at 10:25

## 2024-05-01 RX ADMIN — PROPOFOL 40 MCG/KG/MIN: 10 INJECTION, EMULSION INTRAVENOUS at 09:50

## 2024-05-01 RX ADMIN — SODIUM CHLORIDE, SODIUM LACTATE, POTASSIUM CHLORIDE, AND CALCIUM CHLORIDE 50 ML/HR: .6; .31; .03; .02 INJECTION, SOLUTION INTRAVENOUS at 09:01

## 2024-05-01 RX ADMIN — NOREPINEPHRINE BITARTRATE 3 MCG/MIN: 1 INJECTION, SOLUTION, CONCENTRATE INTRAVENOUS at 09:56

## 2024-05-01 RX ADMIN — FENTANYL CITRATE 25 MCG: 50 INJECTION INTRAMUSCULAR; INTRAVENOUS at 10:13

## 2024-05-01 RX ADMIN — FENTANYL CITRATE 50 MCG: 50 INJECTION INTRAMUSCULAR; INTRAVENOUS at 09:50

## 2024-05-01 RX ADMIN — DIPHENHYDRAMINE HCL 25 MG: 25 TABLET ORAL at 12:54

## 2024-05-01 RX ADMIN — LIDOCAINE HYDROCHLORIDE 50 MG: 10 INJECTION, SOLUTION EPIDURAL; INFILTRATION; INTRACAUDAL; PERINEURAL at 09:50

## 2024-05-01 RX ADMIN — ONDANSETRON 4 MG: 2 INJECTION INTRAMUSCULAR; INTRAVENOUS at 09:50

## 2024-05-01 RX ADMIN — Medication 100 MCG: at 09:57

## 2024-05-01 RX ADMIN — Medication 100 MCG: at 10:10

## 2024-05-01 RX ADMIN — PROPOFOL 140 MG: 10 INJECTION, EMULSION INTRAVENOUS at 09:50

## 2024-05-01 NOTE — ANESTHESIA POSTPROCEDURE EVALUATION
Post-Op Assessment Note    CV Status:  Stable  Pain Score: 0    Pain management: adequate       Mental Status:  Sleepy   Hydration Status:  Stable   PONV Controlled:  None   Airway Patency:  Patent     Post Op Vitals Reviewed: Yes    No anethesia notable event occurred.    Staff: Anesthesiologist, CRNA               BP (!) 87/50 (05/01/24 1042)    Temp 97.6 °F (36.4 °C) (05/01/24 1042)    Pulse 65 (05/01/24 1042)   Resp 12 (05/01/24 1042)    SpO2 100 % (05/01/24 1042)

## 2024-05-01 NOTE — H&P
"H&P - Thoracic Surgery  Zohra Barriga 68 y.o. female MRN: [id number]  Unit/Bed#: OR [facility] Encounter: [id number]      Assessment/Plan      Assessment:  Achalasia s/p Heller with Kareem. Now with dysphagia  Plan:  OR today for EGD with dilation    History of Present Illness     HPI: Zohra Barriga is a 68 y.o. year old female who presents with dysphagia after her Heller with Kareem    Review of Systems   Constitutional:  Negative for chills, fatigue, fever and unexpected weight change.   HENT:  Positive for trouble swallowing.    Eyes: Negative.  Negative for visual disturbance.   Respiratory:  Negative for cough, shortness of breath and stridor.    Cardiovascular:  Negative for chest pain.   Gastrointestinal:  Positive for vomiting. Negative for nausea.   Endocrine: Negative.    Genitourinary: Negative.    Musculoskeletal: Negative.    Skin: Negative.    Neurological:  Negative for dizziness, light-headedness and headaches.   Hematological:  Negative for adenopathy.   Psychiatric/Behavioral: Negative.           Historical Information   Past Medical History:   Diagnosis Date    Anxiety     Asthma     controlled    Back complaints     Cancer ([facility])     nose    Cellulitis of left lower leg     \"not now\"    CHF (congestive heart failure) ([facility]) 02/2021    Chronic bilateral thoracic back pain     Chronic kidney disease     sees nephrologist regularly\" stage 3\"    Chronic myofascial pain     Chronic pain of both lower extremities     Chronic pain of left knee     \"both knees\"    Chronic pain syndrome     bilat legs and knees/neuropathy pain    COVID 12/2021    CPAP (continuous positive airway pressure) dependence     no currently uses    Depression     Diabetes mellitus ([facility])     insulin pump    Difficulty walking 2018    Disease of thyroid gland     Does use hearing aid     bilat and will wear DOS    DVT (deep venous thrombosis) ([facility]) 2013    left leg    Gait disorder     Gastroparesis     GERD (gastroesophageal reflux disease)     " "Head injury     Headache, tension-type     History of angina     History of MRSA infection     History of transfusion     Many years ago    HL (hearing loss)     Hyperlipidemia     Hypertension     Hypoglycemic reaction     \"occas low blood sugar\"    Insulin pump in place     pt reports saw endocrinologist  and will bring copy of instructions DOS    Irritable bowel syndrome     Kidney disease     Memory loss     Movement disorder     Neuropathy in diabetes (HCC)     Constant pain legs and feet    Pacemaker     Pneumonia     Polyneuropathy associated with underlying disease (HCC)     PONV (postoperative nausea and vomiting)     Risk for falls     S/P insertion of spinal cord stimulator 2018    Sarcoidosis     Shortness of breath     \"exertion and not new\"    Sleep apnea     Stroke (HCC)     TIA (transient ischemic attack)     Use of cane as ambulatory aid     Uses walker     Wears dentures     permanent upper/and some missing teeth/partial lower     Past Surgical History:   Procedure Laterality Date    ABDOMINAL ADHESION SURGERY N/A 2021    Procedure: laparoscopic LYSIS ADHESIONS;  Surgeon: Jill Bentley MD;  [facility]: [facility];  Service: Thoracic    BREAST SURGERY      BREAST SURGERY      reduction    CARDIAC PACEMAKER PLACEMENT      pacemaker     SECTION      COLONOSCOPY      DILATION AND CURETTAGE OF UTERUS      \"D&E\"    HERNIA REPAIR      HYSTERECTOMY      JOINT REPLACEMENT Left     LTKR    NECK SURGERY      fused 4 discs with 4 screws implanted    NISSEN FUNDOPLICATION LAPAROSCOPIC WITH ROBOTICS N/A 2021    Procedure: ROBOTIC HELLER MYOTOMY WITH BERNARDO FUNDIPLICATION ;  Surgeon: Jill Bentley MD;  [facility]: [facility];  Service: Thoracic    ME ESOPHAGOGASTRODUODENOSCOPY TRANSORAL DIAGNOSTIC N/A 2021    Procedure: ESOPHAGOGASTRODUODENOSCOPY (EGD);  Surgeon: Jill Bentley MD;  [facility];  Service: Thoracic    ME " ESOPHAGOGASTRODUODENOSCOPY TRANSORAL DIAGNOSTIC N/A 01/12/2022    Procedure: ESOPHAGOGASTRODUODENOSCOPY (EGD),;  Surgeon: Jill Bentley MD;  [facility];  Service: Thoracic    SC ESOPHAGOGASTRODUODENOSCOPY TRANSORAL DIAGNOSTIC N/A 02/16/2022    Procedure: ESOPHAGOGASTRODUODENOSCOPY (EGD);  Surgeon: Jill Bentley MD;  [facility];  Service: Thoracic    SC ESOPHAGOGASTRODUODENOSCOPY TRANSORAL DIAGNOSTIC N/A 07/12/2022    Procedure: ESOPHAGOGASTRODUODENOSCOPY (EGD); PYLORIC DILATION; GE JUNCTION DILATION;  Surgeon: Jill Bentley MD;  [facility];  Service: Thoracic    SC ESOPHAGOGASTRODUODENOSCOPY TRANSORAL DIAGNOSTIC N/A 11/29/2022    Procedure: ESOPHAGOGASTRODUODENOSCOPY (EGD);  Surgeon: Varinder Steiner MD;  [facility];  Service: Thoracic    SC ESOPHAGOGASTRODUODENOSCOPY TRANSORAL DIAGNOSTIC N/A 01/24/2023    Procedure: ESOPHAGOGASTRODUODENOSCOPY (EGD);  Surgeon: Jill Bentley MD;  [facility];  Service: Thoracic    SC ESOPHAGOSCOPY FLEX BALLOON DILAT <30 MM DIAM N/A 01/12/2022    Procedure: DILATATION ESOPHAGEAL;  Surgeon: Jill Bentley MD;  [facility];  Service: Thoracic    SC ESOPHAGOSCOPY FLEX BALLOON DILAT <30 MM DIAM N/A 02/16/2022    Procedure: DILATATION ESOPHAGEAL;  Surgeon: Jill Bentley MD;  [facility];  Service: Thoracic    SC ESOPHAGOSCOPY FLEX BALLOON DILAT <30 MM DIAM N/A 11/29/2022    Procedure: DILATATION ESOPHAGEAL esophageal and pyloric dilation;  Surgeon: Varinder Steiner MD;  [facility];  Service: Thoracic    SC ESOPHAGOSCOPY FLEX BALLOON DILAT <30 MM DIAM N/A 01/24/2023    Procedure: esophageal dilation dilated up to 54  with pylorus dilation dilated up to 18;  Surgeon: Jill Bentley MD;  [facility];  Service: Thoracic    SC RIBEIRO IMPLTJ NSTIM ELTRDS PLATE/PADDLE EDRL Left 04/23/2018    Procedure: Insertion of thoracic spinal cord stimulator electrode via  laminotomy and placement of left buttock IPG;  Surgeon: Shahab Bullock MD;  [facility];  Service: Neurosurgery    REPLACEMENT TOTAL KNEE      ROTATOR CUFF REPAIR      SPINAL STIMULATOR PLACEMENT Left 10/03/2018    Procedure: BUTTOCK RE-OPENING INCISION FOR REPOSITIONING OF IMPLANTABLE PULSE GENERATOR;  Surgeon: Shahab Bullock MD;  [facility];  Service: Neurosurgery    TONSILLECTOMY      UPPER GASTROINTESTINAL ENDOSCOPY      VASCULAR SURGERY      WISDOM TOOTH EXTRACTION       Social History   Social History     Substance and Sexual Activity   Alcohol Use No     Social History     Substance and Sexual Activity   Drug Use Not Currently    Frequency: 8.0 times per week    Types: Marijuana    Comment: Medical Marijuana/vape pen once per week     Social History     Tobacco Use   Smoking Status Never   Smokeless Tobacco Never     E-Cigarette/Vaping    E-Cigarette Use Never User      E-Cigarette/Vaping Substances    Nicotine No     THC Yes     CBD No     Flavoring No     Other No     Unknown No      Family History   Problem Relation Age of Onset    Diabetes Family     Heart disease Family     Hypertension Family     Neuropathy Family     No Known Problems Mother     Heart disease Father     Diabetes Father     Neuropathy Father     Stroke Father     No Known Problems Maternal Grandmother     No Known Problems Maternal Grandfather     No Known Problems Paternal Grandmother     No Known Problems Paternal Grandfather     No Known Problems Brother     No Known Problems Daughter     Cancer Son        Meds/Allergies      Current Meds:   Current Facility-Administered Medications   Medication Dose Route Frequency    lactated ringers infusion  50 mL/hr Intravenous Continuous       Allergies   Allergen Reactions    Bactrim [Sulfamethoxazole-Trimethoprim] Hives    Sucralfate Hives     Facial swelling    Topamax [Topiramate]      disorentation    Azithromycin Itching    Pregabalin Confusion and Other (See Comments)      "altered mental status    Ciprofloxacin Drowsiness     Other reaction(s): Other (See Comments)  \"drowsiness\"    Norvasc [Amlodipine] Swelling    Baclofen      \"That knocks me out.\"    Bupropion Fatigue    Methotrexate Nausea Only       Objective   No intake or output data in the 24 hours ending 05/01/24 0934    Invasive Devices       Peripheral Intravenous Line  Duration             Peripheral IV 05/01/24 Left Antecubital <1 day                    Physical Exam  Vitals and nursing note reviewed.   Constitutional:       General: She is not in acute distress.     Appearance: She is well-developed.   HENT:      Head: Normocephalic and atraumatic.      Nose: Nose normal.      Mouth/Throat:      Mouth: Mucous membranes are moist.   Eyes:      Conjunctiva/sclera: Conjunctivae normal.   Cardiovascular:      Rate and Rhythm: Normal rate and regular rhythm.      Pulses: Normal pulses.      Heart sounds: No murmur heard.  Pulmonary:      Effort: Pulmonary effort is normal. No respiratory distress.      Breath sounds: Normal breath sounds.   Abdominal:      Palpations: Abdomen is soft.      Tenderness: There is no abdominal tenderness.   Musculoskeletal:         General: No swelling.      Cervical back: Neck supple.   Skin:     General: Skin is warm and dry.      Capillary Refill: Capillary refill takes less than 2 seconds.   Neurological:      Mental Status: She is alert.   Psychiatric:         Mood and Affect: Mood normal.             "

## 2024-05-01 NOTE — OP NOTE
OPERATIVE REPORT  PATIENT NAME: Zohra Barriga    :  1955  MRN: [id number]  Pt Location:  OR ROOM 08    SURGERY DATE: 2024    Surgeons and Role:     * Jill Bentley MD - Primary    Preop Diagnosis:  Achalasia [K22.0]    Post-Op Diagnosis Codes:     * Achalasia [K22.0]    Procedure(s):  ESOPHAGOGASTRODUODENOSCOPY (EGD)  DILATATION ESOPHAGEAL    Specimen(s):  * No specimens in log *    Estimated Blood Loss:   Minimal    Drains:  * No LDAs found *    Anesthesia Type:   General    Operative Indications:  Achalasia [K22.0]    Operative Findings:  Mild narrowing of the Gejxn. Pylorus was wide open    Complications:   None    Procedure and Technique:  Zohra Barriga was brought to the operating room and placed in the supine position.  After institution of adequate general anesthesia, fiberoptic endoscopy is performed. The scope was advanced through the esophagus.  The GE junction was mildly narrowed.  On retroflexion, the wrap was intact.  The scope was advanced into the stomach.  The stomach was fully insufflated.  The PleurX was identified.  The scope easily passed through the pylorus.  Because of this, the decision was made not to proceed with a dilation of the pylorus.  The scope was retracted back into the stomach and a guidewire was placed through the scope and the scope was removed over the guidewire.  Serial dilations up to 54 English were performed. Repeat endoscopy demonstrated no bleeding or signs of perforation.  The excess air was suctioned from the GI tract and removed.     The patient was extubated and brought to the recovery in stable condition having tolerated the procedure well.         I was present for the entire procedure. and A qualified resident physician was not available.    Patient Disposition:  PACU  and extubated and stable        SIGNATURE: Jill Bentley MD  DATE: May 1, 2024  TIME: 10:54 AM

## 2024-05-01 NOTE — DISCHARGE INSTR - AVS FIRST PAGE
You may take Tylenol 650 mg every 6 hours as needed for pain or the dosing of the Tylenol that you have at home.  Please refer to the bottle directly for specific dosing instructions.  You are to eat a soft diet for the next 4 to 5 days; chew food well, take small bites.

## 2024-05-08 ENCOUNTER — TELEPHONE (OUTPATIENT)
Dept: OTHER | Facility: OTHER | Age: 69
End: 2024-05-08

## 2024-05-08 ENCOUNTER — APPOINTMENT (OUTPATIENT)
Dept: LAB | Facility: CLINIC | Age: 69
End: 2024-05-08
Payer: MEDICARE

## 2024-05-08 DIAGNOSIS — G89.4 CHRONIC PAIN DISORDER: ICD-10-CM

## 2024-05-08 DIAGNOSIS — T85.192A MALFUNCTION OF SPINAL CORD STIMULATOR, INITIAL ENCOUNTER (HCC): ICD-10-CM

## 2024-05-08 DIAGNOSIS — Z79.4 TYPE 2 DIABETES MELLITUS WITH MICROALBUMINURIA, WITH LONG-TERM CURRENT USE OF INSULIN (HCC): ICD-10-CM

## 2024-05-08 DIAGNOSIS — R80.9 TYPE 2 DIABETES MELLITUS WITH MICROALBUMINURIA, WITH LONG-TERM CURRENT USE OF INSULIN (HCC): ICD-10-CM

## 2024-05-08 DIAGNOSIS — E11.29 TYPE 2 DIABETES MELLITUS WITH MICROALBUMINURIA, WITH LONG-TERM CURRENT USE OF INSULIN (HCC): ICD-10-CM

## 2024-05-08 DIAGNOSIS — Z01.818 PRE-PROCEDURAL EXAMINATION: ICD-10-CM

## 2024-05-08 LAB
ALBUMIN SERPL BCP-MCNC: 4.2 G/DL (ref 3.5–5)
ALP SERPL-CCNC: 128 U/L (ref 34–104)
ALT SERPL W P-5'-P-CCNC: 30 U/L (ref 7–52)
ANION GAP SERPL CALCULATED.3IONS-SCNC: 9 MMOL/L (ref 4–13)
AST SERPL W P-5'-P-CCNC: 33 U/L (ref 13–39)
BACTERIA UR QL AUTO: ABNORMAL /HPF
BASOPHILS # BLD AUTO: 0.03 THOUSANDS/ÂΜL (ref 0–0.1)
BASOPHILS NFR BLD AUTO: 1 % (ref 0–1)
BILIRUB SERPL-MCNC: 0.63 MG/DL (ref 0.2–1)
BILIRUB UR QL STRIP: NEGATIVE
BUN SERPL-MCNC: 30 MG/DL (ref 5–25)
CALCIUM SERPL-MCNC: 8.7 MG/DL (ref 8.4–10.2)
CHLORIDE SERPL-SCNC: 101 MMOL/L (ref 96–108)
CLARITY UR: CLEAR
CO2 SERPL-SCNC: 25 MMOL/L (ref 21–32)
COLOR UR: ABNORMAL
CREAT SERPL-MCNC: 1.17 MG/DL (ref 0.6–1.3)
EOSINOPHIL # BLD AUTO: 0.04 THOUSAND/ÂΜL (ref 0–0.61)
EOSINOPHIL NFR BLD AUTO: 1 % (ref 0–6)
ERYTHROCYTE [DISTWIDTH] IN BLOOD BY AUTOMATED COUNT: 15.1 % (ref 11.6–15.1)
EST. AVERAGE GLUCOSE BLD GHB EST-MCNC: 194 MG/DL
GFR SERPL CREATININE-BSD FRML MDRD: 48 ML/MIN/1.73SQ M
GLUCOSE SERPL-MCNC: 472 MG/DL (ref 65–140)
GLUCOSE UR STRIP-MCNC: ABNORMAL MG/DL
HBA1C MFR BLD: 8.4 %
HCT VFR BLD AUTO: 34.9 % (ref 34.8–46.1)
HGB BLD-MCNC: 10.9 G/DL (ref 11.5–15.4)
HGB UR QL STRIP.AUTO: ABNORMAL
HYALINE CASTS #/AREA URNS LPF: ABNORMAL /LPF
IMM GRANULOCYTES # BLD AUTO: 0.03 THOUSAND/UL (ref 0–0.2)
IMM GRANULOCYTES NFR BLD AUTO: 1 % (ref 0–2)
KETONES UR STRIP-MCNC: NEGATIVE MG/DL
LEUKOCYTE ESTERASE UR QL STRIP: ABNORMAL
LYMPHOCYTES # BLD AUTO: 0.74 THOUSANDS/ÂΜL (ref 0.6–4.47)
LYMPHOCYTES NFR BLD AUTO: 16 % (ref 14–44)
MCH RBC QN AUTO: 29 PG (ref 26.8–34.3)
MCHC RBC AUTO-ENTMCNC: 31.2 G/DL (ref 31.4–37.4)
MCV RBC AUTO: 93 FL (ref 82–98)
MONOCYTES # BLD AUTO: 0.46 THOUSAND/ÂΜL (ref 0.17–1.22)
MONOCYTES NFR BLD AUTO: 10 % (ref 4–12)
NEUTROPHILS # BLD AUTO: 3.29 THOUSANDS/ÂΜL (ref 1.85–7.62)
NEUTS SEG NFR BLD AUTO: 71 % (ref 43–75)
NITRITE UR QL STRIP: NEGATIVE
NON-SQ EPI CELLS URNS QL MICRO: ABNORMAL /HPF
NRBC BLD AUTO-RTO: 0 /100 WBCS
PH UR STRIP.AUTO: 6 [PH]
PLATELET # BLD AUTO: 234 THOUSANDS/UL (ref 149–390)
PMV BLD AUTO: 9.6 FL (ref 8.9–12.7)
POTASSIUM SERPL-SCNC: 4.5 MMOL/L (ref 3.5–5.3)
PROT SERPL-MCNC: 7.6 G/DL (ref 6.4–8.4)
PROT UR STRIP-MCNC: ABNORMAL MG/DL
RBC # BLD AUTO: 3.76 MILLION/UL (ref 3.81–5.12)
RBC #/AREA URNS AUTO: ABNORMAL /HPF
SODIUM SERPL-SCNC: 135 MMOL/L (ref 135–147)
SP GR UR STRIP.AUTO: 1.01 (ref 1–1.03)
UROBILINOGEN UR STRIP-ACNC: <2 MG/DL
WBC # BLD AUTO: 4.59 THOUSAND/UL (ref 4.31–10.16)
WBC #/AREA URNS AUTO: ABNORMAL /HPF

## 2024-05-08 PROCEDURE — 36415 COLL VENOUS BLD VENIPUNCTURE: CPT

## 2024-05-08 PROCEDURE — 85730 THROMBOPLASTIN TIME PARTIAL: CPT

## 2024-05-08 PROCEDURE — 83036 HEMOGLOBIN GLYCOSYLATED A1C: CPT

## 2024-05-08 PROCEDURE — 85610 PROTHROMBIN TIME: CPT

## 2024-05-08 PROCEDURE — 80053 COMPREHEN METABOLIC PANEL: CPT

## 2024-05-08 PROCEDURE — 81001 URINALYSIS AUTO W/SCOPE: CPT

## 2024-05-08 PROCEDURE — 85025 COMPLETE CBC W/AUTO DIFF WBC: CPT

## 2024-05-09 ENCOUNTER — APPOINTMENT (OUTPATIENT)
Dept: LAB | Facility: HOSPITAL | Age: 69
End: 2024-05-09
Payer: MEDICARE

## 2024-05-09 DIAGNOSIS — E11.29 TYPE 2 DIABETES MELLITUS WITH MICROALBUMINURIA, WITH LONG-TERM CURRENT USE OF INSULIN (HCC): ICD-10-CM

## 2024-05-09 DIAGNOSIS — T85.192A MALFUNCTION OF SPINAL CORD STIMULATOR, INITIAL ENCOUNTER (HCC): ICD-10-CM

## 2024-05-09 DIAGNOSIS — R80.9 TYPE 2 DIABETES MELLITUS WITH MICROALBUMINURIA, WITH LONG-TERM CURRENT USE OF INSULIN (HCC): ICD-10-CM

## 2024-05-09 DIAGNOSIS — Z79.4 TYPE 2 DIABETES MELLITUS WITH MICROALBUMINURIA, WITH LONG-TERM CURRENT USE OF INSULIN (HCC): ICD-10-CM

## 2024-05-09 DIAGNOSIS — G89.4 CHRONIC PAIN DISORDER: Chronic | ICD-10-CM

## 2024-05-09 DIAGNOSIS — Z01.818 PRE-PROCEDURAL EXAMINATION: ICD-10-CM

## 2024-05-09 LAB
APTT PPP: 32 SECONDS (ref 23–37)
ATRIAL RATE: 89 BPM
INR PPP: 1.24 (ref 0.84–1.19)
P AXIS: 45 DEGREES
PR INTERVAL: 196 MS
PROTHROMBIN TIME: 15.5 SECONDS (ref 11.6–14.5)
QRS AXIS: 90 DEGREES
QRSD INTERVAL: 164 MS
QT INTERVAL: 444 MS
QTC INTERVAL: 540 MS
T WAVE AXIS: -70 DEGREES
VENTRICULAR RATE: 89 BPM

## 2024-05-09 PROCEDURE — 93005 ELECTROCARDIOGRAM TRACING: CPT

## 2024-05-09 PROCEDURE — 93010 ELECTROCARDIOGRAM REPORT: CPT

## 2024-05-09 NOTE — TELEPHONE ENCOUNTER
"Critical result on Zohra Barriga, 1955, MRN [id number] Glucose 472  Collected 05/08/2024 @1405  Ordering: Dr. Bullock     [unfilled]    Please reply with \"Acknowledged\" upon receipt of this critical value. By acknowledging, you are agreeing to act upon this critical value. If this is not your patient, please advise.    Reported by Desiree uCevas on 05/08/24 at 10:47 PM    Acknowledged by Linda Mann via [employer].    "

## 2024-06-03 ENCOUNTER — OFFICE VISIT (OUTPATIENT)
Dept: NEUROSURGERY | Facility: CLINIC | Age: 69
End: 2024-06-03
Payer: MEDICARE

## 2024-06-03 VITALS
BODY MASS INDEX: 36.29 KG/M2 | DIASTOLIC BLOOD PRESSURE: 68 MMHG | TEMPERATURE: 98.2 F | HEART RATE: 81 BPM | OXYGEN SATURATION: 95 % | SYSTOLIC BLOOD PRESSURE: 138 MMHG | WEIGHT: 180 LBS | HEIGHT: 59 IN | RESPIRATION RATE: 16 BRPM

## 2024-06-03 DIAGNOSIS — S22.000A THORACIC COMPRESSION FRACTURE (HCC): ICD-10-CM

## 2024-06-03 DIAGNOSIS — M85.80 OSTEOPENIA: Primary | ICD-10-CM

## 2024-06-03 DIAGNOSIS — S22.009A CLOSED FRACTURE OF THORACIC VERTEBRA (HCC): ICD-10-CM

## 2024-06-03 PROCEDURE — 99214 OFFICE O/P EST MOD 30 MIN: CPT | Performed by: NURSE PRACTITIONER

## 2024-06-03 NOTE — PROGRESS NOTES
Assessment/Plan:    Osteopenia  DXA,  3/13/2024     Closed fracture of thoracic vertebra (HCC)  As addressed in HPI     Thoracic compression fracture (HCC)  As documented in HPI.  She presents 15 weeks status post compression fracture for review of DEXA scan and reassessment.  DEXA completed on 3/11/2024, unclear why there was an extended length of time in office follow-up as patient was due to return in 4 weeks after last visit x-ray due on 4/5/2023 and returned 3 days to 1 week after.  Patient did not arrive with a TLSO brace, it is unclear when she stopped wearing the brace.  Patient has a longstanding history of chronic back pain with lumbar radiculopathic.  She also has a spinal cord stimulator.  Therefore the severity of her pain today is reported as 7/10 in the low back and radiates in the bilateral legs to the knees this is likely multifactorial.  Of note patient has an upcoming surgery procedure scheduled with Dr. Bullock for removal of spinal cord stimulator which is 9 MRI conditional.  Patient is no longer using spinal cord stimulator for the management of her chronic pain symptoms and wishes to undergo MRIs.  She ambulates using a walker.  She has a history of chronic incontinence.      3/13/2024 DEXA osteopenia of the left femoral neck corresponding to increased fracture risk and bone mineral disc density decreased.    Plan  Reviewed DEXA result with patient  Medicinal therapy is not optimized for the management of osteopenia with a history of compression fractures.    Patient reports she is following with an endocrinologist at [facility].  Reported I will send DEXA report to the endocrinologist at [facility] to address optimizing care.  Electronically routed DEXA scan report to endocrinologist and patient's PCP.  Patient agrees to follow-up with both practitioners to discuss optimizing medicinal treatment for her disease.  Reviewed red flag symptoms report to the closest emergency room for assessment.  Advised if  she has additional questions or concerns she should contact the neurosurgery department..      Metrics Compression Fracture   6/3/2024RM LBP18, ODQ 25/50, 50% [id number] or 0.330,Your Health State Today 72, VAS7/19          Subjective: Return to office visit 15 weeks after compression fracture for DEXA scan review.  This is a follow-up to her last office visit on 3/8/2024    Patient ID: Zohra Barriga is a 68 y.o. female     HPI   She reports in February 2024 she started with acute or onset of back pain of unknown etiology.  She denies falling or any type of trauma.  She reports the pain is located in bilateral mid lumbar sacral area, no pain in the buttocks and pain again in the right thigh anterior posterior down both lower extremities into bilateral feet and toes.  She reports following up with her PCP who ordered x-rays of her back that demonstrated compression side fractures.  She reports the PCP ordered her a brace and gave her no further information or direction regarding further management of the compression fracture.     Imaging  2/16/24 CT thoracic spine 1.  Stable chronic compression deformities at T4, T11 and T12, without osseous retropulsion. No evidence of acute fracture. 2.  Postsurgical and degenerative changes as described above. There does not appear to be high-grade spinal stenosis.   3.  Groundglass nodule at the right lung apex measuring 6 mm. Follow-up chest CT is recommended.     2/15/24 Xray lumbar spine There are postsurgical changes of discectomy, interbody fusion, laminectomy and posterior hardware fusion at L4-5 without evidence of hardware failure or complication. There is a stable 6 mm anterior subluxation of L4 and L5. Note is made of a new moderate compression fracture of the T12 vertebral body. There is a possible mild to moderate compression fracture at T11. No   retropulsed bone is noted at these levels. Evidence of new alignment abnormalities in the thoracolumbar spine as  detailed. Evidence of new compression fractures of T11 and T12 without retropulsed bone at the level. Stable postsurgical changes at L4-5. Other findings as noted.      Date of  2/15/23 is the benchmark for compression fracture correlating with x-ray lumbar spine, the patient presents 3 weeks and 1 day post this diagnosis.            REVIEW OF SYSTEMS  Review of Systems   Respiratory:  Negative for chest tightness and shortness of breath.    Gastrointestinal:         Denies bowel incontinence   Genitourinary:         UA incontinence, not new, had prior to fracture    Recently had cath removed   Musculoskeletal:  Positive for arthralgias (right hip locks up on her), back pain (radiates to B/L buttocks/hips and posterior legs to knees), gait problem (using rolling walker) and myalgias (legs).        Has TLSO, not wearing today, does wear sometimes   Neurological:  Positive for weakness (legs and hands) and numbness (N/T feet and hands, had prior to fracture).   Psychiatric/Behavioral:  Positive for sleep disturbance.          Meds/Allergies     Current Outpatient Medications   Medication Sig Dispense Refill    acetaminophen (TYLENOL) 500 mg tablet Take 500 mg by mouth every 4 (four) hours as needed for mild pain Take 2 tablets (1000 mg)by mouth every 4 hours as needed, for mild pain & moderate pain      albuterol (ACCUNEB) 1.25 MG/3ML nebulizer solution Take 2.5 mg by nebulization every 6 (six) hours as needed for wheezing      albuterol (PROVENTIL HFA,VENTOLIN HFA) 90 mcg/act inhaler Inhale 2 puffs every 6 (six) hours as needed for wheezing      aspirin (ECOTRIN LOW STRENGTH) 81 mg EC tablet Take 81 mg by mouth every morning      atorvastatin (LIPITOR) 80 mg tablet Take 80 mg by mouth daily at bedtime        calcium carbonate (OS-KARIS) 1250 (500 Ca) MG chewable tablet Chew 1 tablet daily      carvedilol (COREG) 3.125 mg tablet Take 3.125 mg by mouth 2 (two) times a day with meals      CRANBERRY FRUIT CONCENTRATE PO  Take 450 mg by mouth in the morning Every morning for supplement      cyclobenzaprine (FLEXERIL) 10 mg tablet       Darbepoetin Mike (ARANESP, ALBUMIN FREE, IJ) Inject as directed every 14 (fourteen) days      ezetimibe (ZETIA) 10 mg tablet Take 10 mg by mouth daily      gabapentin (NEURONTIN) 100 mg capsule Take 600 mg by mouth 2 (two) times a day      glucagon (GLUCAGEN) 1 mg injection 1 mg once Inject 1 mg under the skin as needed for low blood sugar      glucose 4 g chewable tablet Chew 16 g as needed for low blood sugar Take 15 g by mouth as needed (blood sugar less than 70 if able to safely swallow)      insulin glargine (LANTUS) 100 units/mL subcutaneous injection Inject 24 Units under the skin if needed Inject 24 units under the skin as needed (for pump failure)      Insulin Infusion Pump (MINIMED INSULIN PUMP) PRINCESS Use      levothyroxine 100 mcg tablet Take 100 mcg by mouth daily      NovoLOG 100 UNIT/ML injection INJECT UP  UNITS DAILY VIA INSULIN PUMP. E11.65      ondansetron (ZOFRAN) 8 mg tablet Take 8 mg by mouth every 8 (eight) hours as needed      oxyCODONE-acetaminophen (PERCOCET) 5-325 mg per tablet PLEASE SEE ATTACHED FOR DETAILED DIRECTIONS      polyethylene glycol (MIRALAX) 17 g packet Take 17 g by mouth daily 90 each 3    pramipexole (MIRAPEX) 1 mg tablet Take 1 mg by mouth 2 (two) times a day        Restasis 0.05 % ophthalmic emulsion Administer 1 drop to both eyes 2 (two) times a day As directed      sertraline (ZOLOFT) 100 mg tablet Take 100 mg by mouth daily at bedtime      torsemide (DEMADEX) 20 mg tablet Take 2 tablets (40 mg total) by mouth daily 60 tablet 0    zinc gluconate 50 mg tablet Take 50 mg by mouth daily      bisacodyl (DULCOLAX) 10 mg suppository Insert 1 suppository (10 mg total) into the rectum daily (Patient not taking: Reported on 6/3/2024) 30 suppository 3    cefpodoxime (VANTIN) 200 mg tablet TAKE 1 TABLET BY MOUTH 2 TIMES A DAY FOR 10 DAYS. (Patient not taking:  "Reported on 4/24/2024)      gabapentin (Neurontin) 100 mg capsule Take 1 capsule (100 mg total) by mouth 2 (two) times a day This will be in addition to gabapentin 100 mg BID (Patient not taking: Reported on 6/3/2024) 60 capsule 3    glucose blood test strip Testing six times daily  E11.65 on insulin pump      tolterodine (Detrol LA) 4 mg 24 hr capsule Take 4 mg by mouth daily       No current facility-administered medications for this visit.       Allergies   Allergen Reactions    Bactrim [Sulfamethoxazole-Trimethoprim] Hives    Sucralfate Hives     Facial swelling    Topamax [Topiramate]      disorentation    Azithromycin Itching    Pregabalin Confusion and Other (See Comments)     altered mental status    Ciprofloxacin Drowsiness     Other reaction(s): Other (See Comments)  \"drowsiness\"    Norvasc [Amlodipine] Swelling    Baclofen      \"That knocks me out.\"    Bupropion Fatigue    Methotrexate Nausea Only       PAST HISTORY    Past Medical History:   Diagnosis Date    Anxiety     Asthma     controlled    Back complaints     Cancer ([facility])     nose    Cellulitis of left lower leg     \"not now\"    CHF (congestive heart failure) ([facility]) 02/2021    Chronic bilateral thoracic back pain     Chronic kidney disease     sees nephrologist regularly\" stage 3\"    Chronic myofascial pain     Chronic pain of both lower extremities     Chronic pain of left knee     \"both knees\"    Chronic pain syndrome     bilat legs and knees/neuropathy pain    COVID 12/2021    CPAP (continuous positive airway pressure) dependence     no currently uses    Depression     Diabetes mellitus ([facility])     insulin pump    Difficulty walking 2018    Disease of thyroid gland     Does use hearing aid     bilat and will wear DOS    DVT (deep venous thrombosis) ([facility]) 2013    left leg    Gait disorder     Gastroparesis     GERD (gastroesophageal reflux disease)     Head injury     Headache, tension-type     History of angina     History of MRSA infection     " "History of transfusion     Many years ago    HL (hearing loss) 2018    Hyperlipidemia     Hypertension     Hypoglycemic reaction     \"occas low blood sugar\"    Insulin pump in place     pt reports saw endocrinologist  and will bring copy of instructions DOS    Irritable bowel syndrome     Kidney disease     Memory loss     Movement disorder     Neuropathy in diabetes (HCC)     Constant pain legs and feet    Nose fracture     Pacemaker 2019    Pneumonia     Polyneuropathy associated with underlying disease (HCC)     PONV (postoperative nausea and vomiting)     Risk for falls     S/P insertion of spinal cord stimulator 2018    Sarcoidosis     Shortness of breath     \"exertion and not new\"    Sleep apnea     Stroke (HCC)     Thoracic vertebral fracture (HCC)     TIA (transient ischemic attack)     Use of cane as ambulatory aid     Uses walker     Wears dentures     permanent upper/and some missing teeth/partial lower       Past Surgical History:   Procedure Laterality Date    ABDOMINAL ADHESION SURGERY N/A 2021    Procedure: laparoscopic LYSIS ADHESIONS;  Surgeon: Jill Bentley MD;  [facility];  Service: Thoracic    BACK SURGERY  2023    TLIF at [facility], Dr. Levy    BREAST SURGERY      BREAST SURGERY      reduction    CARDIAC PACEMAKER PLACEMENT      pacemaker     SECTION      COLONOSCOPY      DILATION AND CURETTAGE OF UTERUS      \"D&E\"    HERNIA REPAIR      HYSTERECTOMY      JOINT REPLACEMENT Left     LTKR    NECK SURGERY      fused 4 discs with 4 screws implanted    NISSEN FUNDOPLICATION LAPAROSCOPIC WITH ROBOTICS N/A 2021    Procedure: ROBOTIC HELLER MYOTOMY WITH BERNARDO FUNDIPLICATION ;  Surgeon: Jill Bentley MD;  [facility];  Service: Thoracic    NOSE SURGERY  2024    closed reduction    DE ESOPHAGOGASTRODUODENOSCOPY TRANSORAL DIAGNOSTIC N/A 2021    Procedure: ESOPHAGOGASTRODUODENOSCOPY (EGD);  Surgeon: Jill Bentley MD;  " [facility];  Service: Thoracic    IA ESOPHAGOGASTRODUODENOSCOPY TRANSORAL DIAGNOSTIC N/A 01/12/2022    Procedure: ESOPHAGOGASTRODUODENOSCOPY (EGD),;  Surgeon: Jill Bentley MD;  [facility];  Service: Thoracic    IA ESOPHAGOGASTRODUODENOSCOPY TRANSORAL DIAGNOSTIC N/A 02/16/2022    Procedure: ESOPHAGOGASTRODUODENOSCOPY (EGD);  Surgeon: Jill Bentley MD;  [facility];  Service: Thoracic    IA ESOPHAGOGASTRODUODENOSCOPY TRANSORAL DIAGNOSTIC N/A 07/12/2022    Procedure: ESOPHAGOGASTRODUODENOSCOPY (EGD); PYLORIC DILATION; GE JUNCTION DILATION;  Surgeon: Jill Bentley MD;  [facility];  Service: Thoracic    IA ESOPHAGOGASTRODUODENOSCOPY TRANSORAL DIAGNOSTIC N/A 11/29/2022    Procedure: ESOPHAGOGASTRODUODENOSCOPY (EGD);  Surgeon: Varinder Steiner MD;  [facility];  Service: Thoracic    IA ESOPHAGOGASTRODUODENOSCOPY TRANSORAL DIAGNOSTIC N/A 01/24/2023    Procedure: ESOPHAGOGASTRODUODENOSCOPY (EGD);  Surgeon: Jill Bentley MD;  [facility];  Service: Thoracic    IA ESOPHAGOGASTRODUODENOSCOPY TRANSORAL DIAGNOSTIC N/A 05/01/2024    Procedure: ESOPHAGOGASTRODUODENOSCOPY (EGD);  Surgeon: Jill Bentley MD;  [facility];  Service: Thoracic    IA ESOPHAGOSCOPY FLEX BALLOON DILAT <30 MM DIAM N/A 01/12/2022    Procedure: DILATATION ESOPHAGEAL;  Surgeon: Jill Bentley MD;  [facility];  Service: Thoracic    IA ESOPHAGOSCOPY FLEX BALLOON DILAT <30 MM DIAM N/A 02/16/2022    Procedure: DILATATION ESOPHAGEAL;  Surgeon: Jill Bentley MD;  [facility];  Service: Thoracic    IA ESOPHAGOSCOPY FLEX BALLOON DILAT <30 MM DIAM N/A 11/29/2022    Procedure: DILATATION ESOPHAGEAL esophageal and pyloric dilation;  Surgeon: Varinder Steiner MD;  [facility];  Service: Thoracic    IA ESOPHAGOSCOPY FLEX BALLOON DILAT <30 MM DIAM N/A 01/24/2023    Procedure: esophageal dilation dilated up to 54  with pylorus  dilation dilated up to 18;  Surgeon: Jill Bentley MD;  [facility];  Service: Thoracic    AK ESOPHAGOSCOPY FLEX BALLOON DILAT <30 MM DIAM N/A 05/01/2024    Procedure: DILATATION ESOPHAGEAL;  Surgeon: Jill Bentley MD;  [facility];  Service: Thoracic    AK RIBEIRO IMPLTJ NSTIM ELTRDS PLATE/PADDLE EDRL Left 04/23/2018    Procedure: Insertion of thoracic spinal cord stimulator electrode via laminotomy and placement of left buttock IPG;  Surgeon: Shahab Bullock MD;  [facility];  Service: Neurosurgery    REPLACEMENT TOTAL KNEE      ROTATOR CUFF REPAIR      SPINAL STIMULATOR PLACEMENT Left 10/03/2018    Procedure: BUTTOCK RE-OPENING INCISION FOR REPOSITIONING OF IMPLANTABLE PULSE GENERATOR;  Surgeon: Shahab Bullock MD;  [facility];  Service: Neurosurgery    TONSILLECTOMY      UPPER GASTROINTESTINAL ENDOSCOPY      VASCULAR SURGERY      WISDOM TOOTH EXTRACTION         Social History     Tobacco Use    Smoking status: Never    Smokeless tobacco: Never   Vaping Use    Vaping status: Never Used   Substance Use Topics    Alcohol use: No    Drug use: Not Currently     Frequency: 8.0 times per week     Types: Marijuana     Comment: Medical Marijuana/vape pen once per week       Family History   Problem Relation Age of Onset    Diabetes Family     Heart disease Family     Hypertension Family     Neuropathy Family     No Known Problems Mother     Heart disease Father     Diabetes Father     Neuropathy Father     Stroke Father     No Known Problems Maternal Grandmother     No Known Problems Maternal Grandfather     No Known Problems Paternal Grandmother     No Known Problems Paternal Grandfather     No Known Problems Brother     No Known Problems Daughter     Cancer Son        The following portions of the patient's history were reviewed and updated as appropriate: allergies, current medications, past family history, past medical history, past social history, past surgical history and  "problem list.      EXAM    Vitals:Blood pressure 138/68, pulse 81, temperature 98.2 °F (36.8 °C), temperature source Temporal, resp. rate 16, height 4' 11\" (1.499 m), weight 81.6 kg (180 lb), SpO2 95%.,Body mass index is 36.36 kg/m².     Physical Exam    Neurologic Exam    Imaging Studies  CT facial bones wo contrast    Result Date: 5/12/2024  Narrative: PROCEDURE INFORMATION: Exam: CT Maxillofacial Without Contrast Exam date and time: 5/12/2024 6:25 AM Age: 68 years old Clinical indication: Injury or trauma; Fall; Blunt trauma (contusions or hematomas); Consciousness not specified; Nose; Additional info: Facial trauma, blunt TECHNIQUE: Imaging protocol: Computed tomography of the face without contrast. Total images: 1227 Radiation optimization: All CT scans at this facility use at least one of these dose optimization techniques: automated exposure control; mA and/or kV adjustment per patient size (includes targeted exams where dose is matched to clinical indication); or iterative reconstruction. COMPARISON: CT HEAD WO CONTRAST 3/17/2024 6:58 PM FINDINGS: Orbital cavities: Orbits are normal. Globes are unremarkable. Bones: There are comminuted fractures of the bilateral nasal bones with impaction. There is a tiny fracture at the anterior margin of the nasal septum image 94/5. There is artifact overlying the facial midline and knows related to bandage and nasal packing. Paranasal sinuses: There are air-fluid levels in the maxillary antra. There are retained secretions in the ethmoid sinus and nasopharyngeal airway. There are small air-fluid levels in the sphenoid sinus.  Mucous retention cyst and small air-fluid level in the right maxillary antrum. Soft tissues: Unremarkable.     Impression: IMPRESSION: Bilateral, comminuted impacted nasal bone fractures. Small anterior nasal septal fracture. Retained secretions and air-fluid levels in the paranasal sinuses.    CT head wo contrast    Result Date: " 5/12/2024  Narrative: PROCEDURE INFORMATION: Exam: CT Head Without Contrast Exam date and time: 5/12/2024 6:25 AM Age: 68 years old Clinical indication: Injury or trauma; Fall; Blunt trauma (contusions or hematomas); Consciousness not specified; Nose; Additional info: Facial trauma, blunt TECHNIQUE: Imaging protocol: Computed tomography of the head without contrast. Total images: 1 Radiation optimization: All CT scans at this facility use at least one of these dose optimization techniques: automated exposure control; mA and/or kV adjustment per patient size (includes targeted exams where dose is matched to clinical indication); or iterative reconstruction. COMPARISON: CT HEAD WO CONTRAST 3/17/2024 6:58 PM FINDINGS: Brain: Global brain atrophy and chronic white matter ischemic changes are present. Cerebral ventricles: Compensatory ventriculomegaly Paranasal sinuses: Air-fluid levels in the bilateral maxillary antra. Tiny air-fluid level in the right sphenoid sinus. Patchy opacification and retained secretions throughout the ethmoid sinus. There are retained secretions throughout the nasopharyngeal airway. Mastoid air cells: Visualized mastoid air cells are well aerated. Bones: There are comminuted nasal bone fractures. Soft tissues: There is anterior mid facial soft tissue swelling. There are lacerations of the anterior nasal soft tissues.     Impression: IMPRESSION: 1.   No acute intracranial findings. 2.   Chronic involutional and small vessel ischemic changes of the brain. 3.   Comminuted anterior nasal bone fractures. 4.   Anterior nasal laceration. 5.   Retained secretions in the nasal airway and paranasal sinuses.       I have personally reviewed pertinent reports.   and I have personally reviewed pertinent films in PACS    I have spent a total time of 30 minutes on 06/03/24 in caring for this patient including Diagnostic results, Risks and benefits of tx options, Instructions for management, Patient and family  education, Importance of tx compliance, Risk factor reductions, Impressions, Counseling / Coordination of care, Documenting in the medical record, Reviewing / ordering tests, medicine, procedures  , Obtaining or reviewing history  , and Communicating with other healthcare professionals .     PLEASE NOTE:  This encounter may have been completed utilizing the [employer] Direct Speech Voice Recognition Software. Grammatical errors, random word insertions, pronoun errors and incomplete sentences are occasional consequences of the system due to software limitations, ambient noise and hardware issues.These may be missed even after proof reading prior to affixing electronic signature. Please do not hesitate to contact me directly if you have any questions or concerns about the content, text or information contained within the body of this dictation.

## 2024-06-04 NOTE — ASSESSMENT & PLAN NOTE
As documented in HPI.  She presents 15 weeks status post compression fracture for review of DEXA scan and reassessment.  DEXA completed on 3/11/2024, unclear why there was an extended length of time in office follow-up as patient was due to return in 4 weeks after last visit x-ray due on 4/5/2023 and returned 3 days to 1 week after.  Patient did not arrive with a TLSO brace, it is unclear when she stopped wearing the brace.  Patient has a longstanding history of chronic back pain with lumbar radiculopathic.  She also has a spinal cord stimulator.  Therefore the severity of her pain today is reported as 7/10 in the low back and radiates in the bilateral legs to the knees this is likely multifactorial.  Of note patient has an upcoming surgery procedure scheduled with Dr. Bullock for removal of spinal cord stimulator which is 9 MRI conditional.  Patient is no longer using spinal cord stimulator for the management of her chronic pain symptoms and wishes to undergo MRIs.  She ambulates using a walker.  She has a history of chronic incontinence.      3/13/2024 DEXA osteopenia of the left femoral neck corresponding to increased fracture risk and bone mineral disc density decreased.    Plan  Reviewed DEXA result with patient  Medicinal therapy is not optimized for the management of osteopenia with a history of compression fractures.    Patient reports she is following with an endocrinologist at [facility].  Reported I will send DEXA report to the endocrinologist at [facility] to address optimizing care.  Electronically routed DEXA scan report to endocrinologist and patient's PCP.  Patient agrees to follow-up with both practitioners to discuss optimizing medicinal treatment for her disease.  Reviewed red flag symptoms report to the closest emergency room for assessment.  Advised if she has additional questions or concerns she should contact the neurosurgery department..      Metrics Compression Fracture   6/3/2024RM LBP18, ODQ 25/50, 50%  [id number] or 0.330,Your Health State Today 72, VAS7/19

## 2024-06-19 ENCOUNTER — ANESTHESIA EVENT (OUTPATIENT)
Dept: PERIOP | Facility: HOSPITAL | Age: 69
End: 2024-06-19
Payer: MEDICARE

## 2024-06-19 RX ORDER — MIRABEGRON 50 MG/1
TABLET, EXTENDED RELEASE ORAL
COMMUNITY

## 2024-06-19 NOTE — PRE-PROCEDURE INSTRUCTIONS
Pre-Surgery Instructions:   Medication Instructions    acetaminophen (TYLENOL) 500 mg tablet Uses PRN- OK to take day of surgery    albuterol (ACCUNEB) 1.25 MG/3ML nebulizer solution Take day of surgery.    albuterol (PROVENTIL HFA,VENTOLIN HFA) 90 mcg/act inhaler Take day of surgery.    aspirin (ECOTRIN LOW STRENGTH) 81 mg EC tablet Instructions provided by MD    atorvastatin (LIPITOR) 80 mg tablet Take day of surgery.    calcium carbonate (OS-KARIS) 1250 (500 Ca) MG chewable tablet Stop taking 7 days prior to surgery.    cefpodoxime (VANTIN) 200 mg tablet Instructions provided by MD    CRANBERRY FRUIT CONCENTRATE PO Stop taking 7 days prior to surgery.    cyclobenzaprine (FLEXERIL) 10 mg tablet Uses PRN- OK to take day of surgery    ezetimibe (ZETIA) 10 mg tablet Take day of surgery.    gabapentin (NEURONTIN) 100 mg capsule Take day of surgery.    Insulin Infusion Pump (MINIMED INSULIN PUMP) PRINCESS Instructions provided by [facility] endocrinologist    levothyroxine 100 mcg tablet Take day of surgery.    Mirabegron ER (Myrbetriq) 50 MG TB24 Take day of surgery.    NovoLOG 100 UNIT/ML injection Instructions provided by MD per Endocrinologist     ondansetron (ZOFRAN) 8 mg tablet Uses PRN- OK to take day of surgery    oxyCODONE-acetaminophen (PERCOCET) 5-325 mg per tablet Uses PRN- OK to take day of surgery    polyethylene glycol (MIRALAX) 17 g packet Uses PRN- DO NOT take day of surgery    pramipexole (MIRAPEX) 1 mg tablet Uses PRN- OK to take day of surgery    Restasis 0.05 % ophthalmic emulsion Take day of surgery.    sertraline (ZOLOFT) 100 mg tablet Take day of surgery.    torsemide (DEMADEX) 20 mg tablet Hold day of surgery.    zinc gluconate 50 mg tablet Stop taking 7 days prior to surgery.    Medication instructions for day surgery reviewed. Please use only a sip of water to take your instructed medications. Avoid all over the counter vitamins, supplements and NSAIDS for one week prior to surgery per anesthesia  guidelines. Tylenol is ok to take as needed.     You will receive a call one business day prior to surgery with an arrival time and hospital directions. If your surgery is scheduled on a Monday, the hospital will be calling you on the Friday prior to your surgery. If you have not heard from anyone by 8pm, please call the hospital supervisor through the hospital  at [phone]. ([place] [phone] or [place] [phone]).    Do not eat or drink anything after midnight the night before your surgery, including candy, mints, lifesavers, or chewing gum. Do not drink alcohol 24hrs before your surgery. Try not to smoke at least 24hrs before your surgery.       Follow the pre surgery showering instructions as listed in the “My Surgical Experience Booklet” or otherwise provided by your surgeon's office. Do not use a blade to shave the surgical area 1 week before surgery. It is okay to use a clean electric clippers up to 24 hours before surgery. Do not apply any lotions, creams, including makeup, cologne, deodorant, or perfumes after showering on the day of your surgery. Do not use dry shampoo, hair spray, hair gel, or any type of hair products.     No contact lenses, eye make-up, or artificial eyelashes. Remove nail polish, including gel polish, and any artificial, gel, or acrylic nails if possible. Remove all jewelry including rings and body piercing jewelry.     Wear causal clothing that is easy to take on and off. Consider your type of surgery.    Keep any valuables, jewelry, piercings at home. Please bring any specially ordered equipment (sling, braces) if indicated.    Arrange for a responsible person to drive you to and from the hospital on the day of your surgery. Please confirm the visitor policy for the day of your procedure when you receive your phone call with an arrival time.     Call the surgeon's office with any new illnesses, exposures, or additional questions prior to surgery.    Please  reference your “My Surgical Experience Booklet” for additional information to prepare for your upcoming surgery.

## 2024-06-26 ENCOUNTER — TELEPHONE (OUTPATIENT)
Age: 69
End: 2024-06-26

## 2024-06-26 NOTE — TELEPHONE ENCOUNTER
Any  Pt has surgery scheduled with Dr Bullock 7/2/24 and is double checking that all clearances are in for upcoming procedure, can you please let he know if she needs anything else at .

## 2024-06-27 ENCOUNTER — EVALUATION (OUTPATIENT)
Facility: REHABILITATION | Age: 69
End: 2024-06-27
Payer: MEDICARE

## 2024-06-27 DIAGNOSIS — R26.89 BALANCE DISORDER: Primary | ICD-10-CM

## 2024-06-27 DIAGNOSIS — G89.4 CHRONIC PAIN SYNDROME: ICD-10-CM

## 2024-06-27 PROCEDURE — 97162 PT EVAL MOD COMPLEX 30 MIN: CPT | Performed by: PHYSICAL THERAPIST

## 2024-06-27 NOTE — PROGRESS NOTES
PT Evaluation     Today's date: 2024  Patient name: Zohra Barriga  : 1955  MRN: [id number]  Referring provider: Celestino Santiago DO  Dx:   Encounter Diagnosis     ICD-10-CM    1. Balance disorder  R26.89       2. Chronic pain syndrome  G89.4                      Assessment    Assessment details: Lindsay reports to PT with CC of repeated falls, impaired balance, and declining mobility.    Results of evaluation indicate impaired functional LE strength, impaired functional walking endurance/speed, abnormal gait, and risk for falls per multiple outcome measures. Her posture is significantly deviated to the right and there is midline tenderness to lumbar L4 and L5. Upon review of her med hx she was found to have stable chronic compression deformities at T4, T11 and T12, without osseous retropulsion on CT scan on 24.   These deficits are resulting in significant difficulty with safe household mobility, negotiating stairs, transfer from chair and commode.   The patient would benefit from skilled PT to address these deficits and maximize function.     Goals  STGs (4 weeks)  Pt will be independent with comprehensive HEP   Pt will demonstrate a least 5-second improvement on timed up and go  Patient will demonstrate at least 5-second improvement on 5 times sit to stand  Patient will be able to complete 6-minute walk test    LTG's (to be achieved by d/c)  Pt will be able to self manage sx's independently   Pt will be able to ambulate at least 50 feet within her household utilizing single-point cane for mobility        Plan    Frequency: 2x week  Duration in weeks: 12  Plan of Care beginning date: 2024  Plan of Care expiration date: 10/3/2024  Treatment plan discussed with: patient  Plan details: Initiate POC.         Subjective Evaluation    History of Present Illness  Mechanism of injury: Lindsay comes to PT with CC of worsening balance and falls.     This past  she was getting off the comode in her bedroom  and fell landing on her face. She was seen in the ED for this and was dx with orbital fracture on the L side. She still has pain today in this area.     She had another all off the toilette around month's days because she fell asleep. She also hit her head and broke her nose. She is going to see a plastic surgeon.     No other falls over past year.     She attributes a lot of the decline in her mobility due to her chronic low back pain. She has a spinal stimulator which will be removed.     She has been using a rolator walker ever since her spinal stimulator surgery about a year ago. She tried going to a cane but felt unsteady.     She has had PT a few places ove rthe past year and was not happy because they did not work on her balance, they only stretched her legs. She had some home PT, but it wasn't very help.     She lives in a two story home with 1 step to enter. She has a railing to enter. Has a first floor set up. She has a  who helps her with ADLs.     She has a lot of difficulty walking in the home and getting up and down from chairs. It takes her very long to walk from the home to the car. She uses her walker all times, but will sometimes hold her  instead.     Pt goals: walk in the house easier. Use a cane.    Pain  Current pain ratin  Location: face          Objective    Vitals:  -BP: 160/80  -HR: 63  -SpO2: 96%          Balance Test Initial Eval      5x Sit to Stand: 32.4s needs BL UE      TU.1s rolator.       Gait Speed:        2 Minute Walk Test: 136ft rolator       6 Minute Walk Test:       [place] Balance Scale:       FGA:                            Gait Assessment: Significant trunk deviation to R side (unable to correct), significantly slowed speed.          Precautions: Coronary artery disease, congestive heart failure, hx of DVT, asthma, type II DM, chronic low back pain, neuropathy,     Manuals                                                                 Neuro Re-Ed              Fwd/bwd              Side stepping                                                                              Ther Ex             Scifit              Treadmill                                                                                            Ther Activity                                       Gait Training             SPC in SOLO                          Modalities

## 2024-06-27 NOTE — TELEPHONE ENCOUNTER
Spoke with patient, made her aware we are still only waiting on her PCP medical clearance. Left message with her PCP office to obtain medical clearance.

## 2024-06-27 NOTE — LETTER
2024    Celestino Santiago DO  [address]  [address]  [facility] [address]    Patient: Zohra Barriga   YOB: 1955   Date of Visit: 2024     Encounter Diagnosis     ICD-10-CM    1. Balance disorder  R26.89       2. Chronic pain syndrome  G89.4           Dear Dr. Santiago:    Thank you for your recent referral of Zohra Barriga. Please review the attached evaluation summary from Zohra's recent visit.     Please verify that you agree with the plan of care by signing the attached order.     If you have any questions or concerns, please do not hesitate to call.     I sincerely appreciate the opportunity to share in the care of one of your patients and hope to have another opportunity to work with you in the near future.       Sincerely,    Armen Campo, PT      Referring Provider:      I certify that I have read the below Plan of Care and certify the need for these services furnished under this plan of treatment while under my care.                    Celestino Santiago DO  [address]  [address]  [facility] [address]  Via Fax: [phone]          PT Evaluation     Today's date: 2024  Patient name: Zohra Barriga  : 1955  MRN: [id number]  Referring provider: Celestino Santiago DO  Dx:   Encounter Diagnosis     ICD-10-CM    1. Balance disorder  R26.89       2. Chronic pain syndrome  G89.4                      Assessment    Assessment details: Lindsay reports to PT with CC of repeated falls, impaired balance, and declining mobility.    Results of evaluation indicate impaired functional LE strength, impaired functional walking endurance/speed, abnormal gait, and risk for falls per multiple outcome measures. Her posture is significantly deviated to the right and there is midline tenderness to lumbar L4 and L5. Upon review of her med hx she was found to have stable chronic compression deformities at T4, T11 and T12, without osseous retropulsion on CT scan on 24.   These deficits are  resulting in significant difficulty with safe household mobility, negotiating stairs, transfer from chair and commode.   The patient would benefit from skilled PT to address these deficits and maximize function.     Goals  STGs (4 weeks)  Pt will be independent with comprehensive HEP   Pt will demonstrate a least 5-second improvement on timed up and go  Patient will demonstrate at least 5-second improvement on 5 times sit to stand  Patient will be able to complete 6-minute walk test    LTG's (to be achieved by d/c)  Pt will be able to self manage sx's independently   Pt will be able to ambulate at least 50 feet within her household utilizing single-point cane for mobility        Plan    Frequency: 2x week  Duration in weeks: 12  Plan of Care beginning date: 6/27/2024  Plan of Care expiration date: 10/3/2024  Treatment plan discussed with: patient  Plan details: Initiate POC.         Subjective Evaluation    History of Present Illness  Mechanism of injury: Lindsay comes to PT with CC of worsening balance and falls.     This past Sunday she was getting off the comode in her bedroom and fell landing on her face. She was seen in the ED for this and was dx with orbital fracture on the L side. She still has pain today in this area.     She had another all off the toilette around monther's days because she fell asleep. She also hit her head and broke her nose. She is going to see a plastic surgeon.     No other falls over past year.     She attributes a lot of the decline in her mobility due to her chronic low back pain. She has a spinal stimulator which will be removed.     She has been using a rolator walker ever since her spinal stimulator surgery about a year ago. She tried going to a cane but felt unsteady.     She has had PT a few places ove rthe past year and was not happy because they did not work on her balance, they only stretched her legs. She had some home PT, but it wasn't very help.     She lives in a two story  home with 1 step to enter. She has a railing to enter. Has a first floor set up. She has a  who helps her with ADLs.     She has a lot of difficulty walking in the home and getting up and down from chairs. It takes her very long to walk from the home to the car. She uses her walker all times, but will sometimes hold her  instead.     Pt goals: walk in the house easier. Use a cane.    Pain  Current pain ratin  Location: face          Objective    Vitals:  -BP: 160/80  -HR: 63  -SpO2: 96%          Balance Test Initial Eval      5x Sit to Stand: 32.4s needs BL UE      TU.1s rolator.       Gait Speed:        2 Minute Walk Test: 136ft rolator       6 Minute Walk Test:       [place] Balance Scale:       FGA:                            Gait Assessment: Significant trunk deviation to R side (unable to correct), significantly slowed speed.          Precautions: Coronary artery disease, congestive heart failure, hx of DVT, asthma, type II DM, chronic low back pain, neuropathy,     Manuals                                                                 Neuro Re-Ed             Fwd/bwd              Side stepping                                                                              Ther Ex             Scifit              Treadmill                                                                                            Ther Activity                                       Gait Training             SPC in SOLO                          Modalities

## 2024-06-28 ENCOUNTER — OFFICE VISIT (OUTPATIENT)
Facility: REHABILITATION | Age: 69
End: 2024-06-28
Payer: MEDICARE

## 2024-06-28 DIAGNOSIS — G89.4 CHRONIC PAIN SYNDROME: ICD-10-CM

## 2024-06-28 DIAGNOSIS — R26.89 BALANCE DISORDER: Primary | ICD-10-CM

## 2024-06-28 PROCEDURE — 97110 THERAPEUTIC EXERCISES: CPT | Performed by: PHYSICAL THERAPIST

## 2024-06-28 PROCEDURE — 97112 NEUROMUSCULAR REEDUCATION: CPT | Performed by: PHYSICAL THERAPIST

## 2024-06-28 PROCEDURE — 97116 GAIT TRAINING THERAPY: CPT | Performed by: PHYSICAL THERAPIST

## 2024-06-28 NOTE — PROGRESS NOTES
Daily Note     Today's date: 2024  Patient name: Zohra Barriga  : 1955  MRN: [id number]  Referring provider: Celestino Santiago DO  Dx:   Encounter Diagnosis     ICD-10-CM    1. Balance disorder  R26.89       2. Chronic pain syndrome  G89.4                      Subjective: Feeling well no changes from last visit. She know that her posture wasn't so deviated to the R a year ago, but is unsure when and how it got to how it is now. She sees a neurosurgeon for her stimulator, but states he doesn't follow her for her spine or other aspects of her spine pain.       Objective: See treatment diary below      Assessment: Tolerated treatment well.  Emphasized time ambulating with single-point cane today to address patient's goals which she was challenged with due to slowed speed and fatigue.  Required some cueing for posture and proper use of single-point cane.  Challenged with stepping over cones while walking with rollator however improved with practice.  Educated on home exercise program today and emphasized increasing overall steps per day at home.  Despite cueing has little ability to correct for her deviated right side posture.  She may benefit from a consultation with orthopedics to determine how much of this is a fixed deformity.      Plan: Continue to progress intensity and volume as able.  Work on ambulating with single-point cane.     Precautions: Coronary artery disease, congestive heart failure, hx of DVT, asthma, type II DM, chronic low back pain, neuropathy,     Access Code: [email or web address]  URL: [email or web address]  Date: 2024  Prepared by: Armen Campo    Program Notes  Daily Walking Program-Take walks in your home in an open space with your walker for at least 5 minutes. Do this a 2-3x a day as tolerated.     Exercises  - Sit to Stand with Armchair  - 1 x daily - 7 x weekly - 3 sets - 10 reps  - Standing March with Counter Support  - 1 x daily - 7 x weekly - 3 sets - 10  reps    Manuals 6/28                                                                Neuro Re-Ed             Fwd/bwd              Side stepping             Stepping over cones Walker 20ft x3             STS From chair Ues. x10                                                   Ther Ex             Scifit  10min L3             Treadmill                                                                                            Ther Activity                                       Gait Training             SPC in SOLO -20ft  -40ft  -40ft                         Modalities

## 2024-07-01 ENCOUNTER — DOCUMENTATION (OUTPATIENT)
Dept: NEUROSURGERY | Facility: CLINIC | Age: 69
End: 2024-07-01

## 2024-07-01 NOTE — PROGRESS NOTES
Attached is the PDMP for patient that is scheduled for surgery tomorrow, 07/02/24.     DISCHARGING NURSE: Please fax home care order, face sheet, discharge summary & AVS to [facility] at [phone]. Call [phone] to inform of discharge.

## 2024-07-02 ENCOUNTER — APPOINTMENT (OUTPATIENT)
Dept: RADIOLOGY | Facility: HOSPITAL | Age: 69
End: 2024-07-02
Payer: MEDICARE

## 2024-07-02 ENCOUNTER — HOSPITAL ENCOUNTER (OUTPATIENT)
Facility: HOSPITAL | Age: 69
Setting detail: OUTPATIENT SURGERY
Discharge: HOME | End: 2024-07-02
Attending: NEUROLOGICAL SURGERY | Admitting: NEUROLOGICAL SURGERY
Payer: MEDICARE

## 2024-07-02 ENCOUNTER — ANESTHESIA (OUTPATIENT)
Dept: PERIOP | Facility: HOSPITAL | Age: 69
End: 2024-07-02
Payer: MEDICARE

## 2024-07-02 VITALS
HEART RATE: 60 BPM | HEIGHT: 59 IN | TEMPERATURE: 98.4 F | BODY MASS INDEX: 36.29 KG/M2 | SYSTOLIC BLOOD PRESSURE: 109 MMHG | OXYGEN SATURATION: 96 % | DIASTOLIC BLOOD PRESSURE: 52 MMHG | RESPIRATION RATE: 18 BRPM | WEIGHT: 180 LBS

## 2024-07-02 DIAGNOSIS — Z98.890 POST-OPERATIVE STATE: Primary | ICD-10-CM

## 2024-07-02 LAB
GLUCOSE SERPL-MCNC: 112 MG/DL (ref 65–140)
GLUCOSE SERPL-MCNC: 302 MG/DL (ref 65–140)

## 2024-07-02 PROCEDURE — 72080 X-RAY EXAM THORACOLMB 2/> VW: CPT

## 2024-07-02 PROCEDURE — 82948 REAGENT STRIP/BLOOD GLUCOSE: CPT

## 2024-07-02 RX ORDER — MIDAZOLAM HYDROCHLORIDE 2 MG/2ML
INJECTION, SOLUTION INTRAMUSCULAR; INTRAVENOUS AS NEEDED
Status: DISCONTINUED | OUTPATIENT
Start: 2024-07-02 | End: 2024-07-02

## 2024-07-02 RX ORDER — FENTANYL CITRATE/PF 50 MCG/ML
50 SYRINGE (ML) INJECTION
Status: DISCONTINUED | OUTPATIENT
Start: 2024-07-02 | End: 2024-07-02 | Stop reason: HOSPADM

## 2024-07-02 RX ORDER — METOCLOPRAMIDE HYDROCHLORIDE 5 MG/ML
10 INJECTION INTRAMUSCULAR; INTRAVENOUS ONCE AS NEEDED
Status: DISCONTINUED | OUTPATIENT
Start: 2024-07-02 | End: 2024-07-02 | Stop reason: HOSPADM

## 2024-07-02 RX ORDER — SCOLOPAMINE TRANSDERMAL SYSTEM 1 MG/1
1 PATCH, EXTENDED RELEASE TRANSDERMAL
Status: DISCONTINUED | OUTPATIENT
Start: 2024-07-02 | End: 2024-07-02 | Stop reason: HOSPADM

## 2024-07-02 RX ORDER — DEXAMETHASONE SODIUM PHOSPHATE 10 MG/ML
INJECTION, SOLUTION INTRAMUSCULAR; INTRAVENOUS AS NEEDED
Status: DISCONTINUED | OUTPATIENT
Start: 2024-07-02 | End: 2024-07-02

## 2024-07-02 RX ORDER — OXYCODONE HYDROCHLORIDE AND ACETAMINOPHEN 5; 325 MG/1; MG/1
1 TABLET ORAL EVERY 4 HOURS PRN
Status: DISCONTINUED | OUTPATIENT
Start: 2024-07-02 | End: 2024-07-02 | Stop reason: HOSPADM

## 2024-07-02 RX ORDER — FENTANYL CITRATE 50 UG/ML
INJECTION, SOLUTION INTRAMUSCULAR; INTRAVENOUS AS NEEDED
Status: DISCONTINUED | OUTPATIENT
Start: 2024-07-02 | End: 2024-07-02

## 2024-07-02 RX ORDER — ROCURONIUM BROMIDE 10 MG/ML
INJECTION, SOLUTION INTRAVENOUS AS NEEDED
Status: DISCONTINUED | OUTPATIENT
Start: 2024-07-02 | End: 2024-07-02

## 2024-07-02 RX ORDER — HYDROMORPHONE HCL/PF 1 MG/ML
0.5 SYRINGE (ML) INJECTION
Status: DISCONTINUED | OUTPATIENT
Start: 2024-07-02 | End: 2024-07-02 | Stop reason: HOSPADM

## 2024-07-02 RX ORDER — LIDOCAINE HYDROCHLORIDE 20 MG/ML
INJECTION, SOLUTION EPIDURAL; INFILTRATION; INTRACAUDAL; PERINEURAL AS NEEDED
Status: DISCONTINUED | OUTPATIENT
Start: 2024-07-02 | End: 2024-07-02

## 2024-07-02 RX ORDER — LIDOCAINE HYDROCHLORIDE AND EPINEPHRINE 10; 10 MG/ML; UG/ML
INJECTION, SOLUTION INFILTRATION; PERINEURAL AS NEEDED
Status: DISCONTINUED | OUTPATIENT
Start: 2024-07-02 | End: 2024-07-02 | Stop reason: HOSPADM

## 2024-07-02 RX ORDER — ACETAMINOPHEN 325 MG/1
975 TABLET ORAL ONCE
Status: COMPLETED | OUTPATIENT
Start: 2024-07-02 | End: 2024-07-02

## 2024-07-02 RX ORDER — KETAMINE HCL IN NACL, ISO-OSM 100MG/10ML
SYRINGE (ML) INJECTION AS NEEDED
Status: DISCONTINUED | OUTPATIENT
Start: 2024-07-02 | End: 2024-07-02

## 2024-07-02 RX ORDER — CHLORHEXIDINE GLUCONATE ORAL RINSE 1.2 MG/ML
15 SOLUTION DENTAL ONCE
Status: COMPLETED | OUTPATIENT
Start: 2024-07-02 | End: 2024-07-02

## 2024-07-02 RX ORDER — HYDROMORPHONE HCL IN WATER/PF 6 MG/30 ML
0.2 PATIENT CONTROLLED ANALGESIA SYRINGE INTRAVENOUS
Status: DISCONTINUED | OUTPATIENT
Start: 2024-07-02 | End: 2024-07-02 | Stop reason: HOSPADM

## 2024-07-02 RX ORDER — ONDANSETRON 2 MG/ML
INJECTION INTRAMUSCULAR; INTRAVENOUS AS NEEDED
Status: DISCONTINUED | OUTPATIENT
Start: 2024-07-02 | End: 2024-07-02

## 2024-07-02 RX ORDER — PROPOFOL 10 MG/ML
INJECTION, EMULSION INTRAVENOUS AS NEEDED
Status: DISCONTINUED | OUTPATIENT
Start: 2024-07-02 | End: 2024-07-02

## 2024-07-02 RX ORDER — CEPHALEXIN 500 MG/1
500 CAPSULE ORAL EVERY 6 HOURS SCHEDULED
Qty: 20 CAPSULE | Refills: 0 | Status: SHIPPED | OUTPATIENT
Start: 2024-07-02 | End: 2024-07-07

## 2024-07-02 RX ORDER — DIPHENHYDRAMINE HYDROCHLORIDE 50 MG/ML
12.5 INJECTION INTRAMUSCULAR; INTRAVENOUS ONCE AS NEEDED
Status: DISCONTINUED | OUTPATIENT
Start: 2024-07-02 | End: 2024-07-02 | Stop reason: HOSPADM

## 2024-07-02 RX ORDER — SODIUM CHLORIDE, SODIUM LACTATE, POTASSIUM CHLORIDE, CALCIUM CHLORIDE 600; 310; 30; 20 MG/100ML; MG/100ML; MG/100ML; MG/100ML
INJECTION, SOLUTION INTRAVENOUS CONTINUOUS PRN
Status: DISCONTINUED | OUTPATIENT
Start: 2024-07-02 | End: 2024-07-02

## 2024-07-02 RX ORDER — CEFAZOLIN SODIUM 2 G/50ML
2000 SOLUTION INTRAVENOUS ONCE
Status: DISCONTINUED | OUTPATIENT
Start: 2024-07-02 | End: 2024-07-02 | Stop reason: HOSPADM

## 2024-07-02 RX ORDER — CEFAZOLIN SODIUM 1 G/3ML
INJECTION, POWDER, FOR SOLUTION INTRAMUSCULAR; INTRAVENOUS AS NEEDED
Status: DISCONTINUED | OUTPATIENT
Start: 2024-07-02 | End: 2024-07-02

## 2024-07-02 RX ORDER — ONDANSETRON 2 MG/ML
4 INJECTION INTRAMUSCULAR; INTRAVENOUS ONCE AS NEEDED
Status: DISCONTINUED | OUTPATIENT
Start: 2024-07-02 | End: 2024-07-02 | Stop reason: HOSPADM

## 2024-07-02 RX ORDER — ACETAMINOPHEN 160 MG
TABLET,DISINTEGRATING ORAL AS NEEDED
Status: DISCONTINUED | OUTPATIENT
Start: 2024-07-02 | End: 2024-07-02 | Stop reason: HOSPADM

## 2024-07-02 RX ORDER — SODIUM CHLORIDE, SODIUM LACTATE, POTASSIUM CHLORIDE, CALCIUM CHLORIDE 600; 310; 30; 20 MG/100ML; MG/100ML; MG/100ML; MG/100ML
125 INJECTION, SOLUTION INTRAVENOUS CONTINUOUS
Status: DISCONTINUED | OUTPATIENT
Start: 2024-07-02 | End: 2024-07-02 | Stop reason: HOSPADM

## 2024-07-02 RX ORDER — GABAPENTIN 300 MG/1
300 CAPSULE ORAL ONCE
Status: COMPLETED | OUTPATIENT
Start: 2024-07-02 | End: 2024-07-02

## 2024-07-02 RX ORDER — EPHEDRINE SULFATE 50 MG/ML
INJECTION INTRAVENOUS AS NEEDED
Status: DISCONTINUED | OUTPATIENT
Start: 2024-07-02 | End: 2024-07-02

## 2024-07-02 RX ORDER — LIDOCAINE HYDROCHLORIDE 10 MG/ML
0.5 INJECTION, SOLUTION EPIDURAL; INFILTRATION; INTRACAUDAL; PERINEURAL ONCE AS NEEDED
Status: COMPLETED | OUTPATIENT
Start: 2024-07-02 | End: 2024-07-02

## 2024-07-02 RX ORDER — BUPIVACAINE HYDROCHLORIDE AND EPINEPHRINE 5; 5 MG/ML; UG/ML
INJECTION, SOLUTION EPIDURAL; INTRACAUDAL; PERINEURAL AS NEEDED
Status: DISCONTINUED | OUTPATIENT
Start: 2024-07-02 | End: 2024-07-02 | Stop reason: HOSPADM

## 2024-07-02 RX ADMIN — LIDOCAINE HYDROCHLORIDE 100 MG: 20 INJECTION, SOLUTION EPIDURAL; INFILTRATION; INTRACAUDAL at 12:27

## 2024-07-02 RX ADMIN — SODIUM CHLORIDE, SODIUM LACTATE, POTASSIUM CHLORIDE, AND CALCIUM CHLORIDE 125 ML/HR: .6; .31; .03; .02 INJECTION, SOLUTION INTRAVENOUS at 11:27

## 2024-07-02 RX ADMIN — FENTANYL CITRATE 50 MCG: 50 INJECTION INTRAMUSCULAR; INTRAVENOUS at 12:27

## 2024-07-02 RX ADMIN — FENTANYL CITRATE 50 MCG: 50 INJECTION INTRAMUSCULAR; INTRAVENOUS at 12:51

## 2024-07-02 RX ADMIN — PHENYLEPHRINE HYDROCHLORIDE 20 MCG/MIN: 10 INJECTION INTRAVENOUS at 12:37

## 2024-07-02 RX ADMIN — SUGAMMADEX 200 MG: 100 INJECTION, SOLUTION INTRAVENOUS at 13:55

## 2024-07-02 RX ADMIN — EPHEDRINE SULFATE 5 MG: 50 INJECTION, SOLUTION INTRAVENOUS at 12:44

## 2024-07-02 RX ADMIN — SODIUM CHLORIDE, SODIUM LACTATE, POTASSIUM CHLORIDE, AND CALCIUM CHLORIDE: .6; .31; .03; .02 INJECTION, SOLUTION INTRAVENOUS at 12:22

## 2024-07-02 RX ADMIN — SCOPOLAMINE 1 PATCH: 1 SYSTEM TRANSDERMAL at 11:16

## 2024-07-02 RX ADMIN — ROCURONIUM BROMIDE 50 MG: 10 INJECTION, SOLUTION INTRAVENOUS at 12:27

## 2024-07-02 RX ADMIN — CEFAZOLIN 2000 MG: 1 INJECTION, POWDER, FOR SOLUTION INTRAMUSCULAR; INTRAVENOUS at 12:50

## 2024-07-02 RX ADMIN — EPHEDRINE SULFATE 5 MG: 50 INJECTION, SOLUTION INTRAVENOUS at 12:24

## 2024-07-02 RX ADMIN — MIDAZOLAM 2 MG: 1 INJECTION INTRAMUSCULAR; INTRAVENOUS at 12:18

## 2024-07-02 RX ADMIN — ACETAMINOPHEN 975 MG: 325 TABLET, FILM COATED ORAL at 11:03

## 2024-07-02 RX ADMIN — CHLORHEXIDINE GLUCONATE 0.12% ORAL RINSE 15 ML: 1.2 LIQUID ORAL at 11:03

## 2024-07-02 RX ADMIN — LIDOCAINE HYDROCHLORIDE 0.5 ML: 10 INJECTION, SOLUTION EPIDURAL; INFILTRATION; INTRACAUDAL; PERINEURAL at 11:27

## 2024-07-02 RX ADMIN — Medication 20 MG: at 12:27

## 2024-07-02 RX ADMIN — GABAPENTIN 300 MG: 300 CAPSULE ORAL at 11:03

## 2024-07-02 RX ADMIN — EPHEDRINE SULFATE 10 MG: 50 INJECTION, SOLUTION INTRAVENOUS at 12:32

## 2024-07-02 RX ADMIN — ONDANSETRON 4 MG: 2 INJECTION INTRAMUSCULAR; INTRAVENOUS at 13:39

## 2024-07-02 RX ADMIN — DEXAMETHASONE SODIUM PHOSPHATE 10 MG: 10 INJECTION, SOLUTION INTRAMUSCULAR; INTRAVENOUS at 12:27

## 2024-07-02 RX ADMIN — Medication 30 MG: at 12:50

## 2024-07-02 RX ADMIN — PROPOFOL 80 MG: 10 INJECTION, EMULSION INTRAVENOUS at 12:27

## 2024-07-02 NOTE — OP NOTE
OPERATIVE REPORT  PATIENT NAME: Zohra Barriga    :  1955  MRN: [id number]  Pt Location:  OR ROOM 09    SURGERY DATE: 2024    Surgeons and Role:     * Shahab Bullock MD - Primary  No assistant.  No qualified residents available.    Preop Diagnosis:  Chronic pain disorder [G89.4]  Malfunction of spinal cord stimulator, initial encounter ([facility], [facility]) [T85.192A]    Post-Op Diagnosis Codes:     * Chronic pain disorder [G89.4]     * Malfunction of spinal cord stimulator, initial encounter (HCC) [T85.192A]    Procedure(s):  1.  Reopening of thoracic for revision laminotomy and left buttock incision for removal of spinal cord stimulator system.    Specimen(s):  * No specimens in log *    Estimated Blood Loss:   Minimal    Drains:  * No LDAs found *    Anesthesia Type:   General    Operative Indications:  Chronic pain disorder [G89.4]  Malfunction of spinal cord stimulator, initial encounter (HCC) [T85.192A]    Operative Findings:  Spinal cord stimulator system removed in its entirety as verified by intraoperative fluoroscopy.    Complications:   None    Procedure and Technique:  Clinical Note:    The goals and alternatives to the procedure described above were discussed with patient.  The risks of surgery reviewed in detail.  Once all questions were answered to their satisfaction, they asked to proceed with surgery.    Operating Room Note    The patient was brought to the operating room and placed under general anesthetic. Once all appropriate lines were in position, the patient was carefully turned in the prone position onto a Alonso frame. Care was taken to ensure that all pressure points were padded and the neck was in a neutral position. AP fluoroscopy was used to identify the spinal cord stimulator implant a surgical incision over the midline thoracic incision and left buttock incision.  The skin was then prepped and draped in usual sterile fashion. A full surgical timeout was undertaken identifying the site, side  and level of surgery. One percent lidocaine with 100,000 of epinephrine was used to infiltrate the soft tissue. The patient received preoperative antibiotics.    Ten blade was used to incise the skin over the planned buttock incision. Monopolar cautery was used to dissect through the subcutaneous tissue by the IPG.  The IPG and distal leads were removed.  The distal leads were cut from the IPG.    10 blade was used to incise the skin at the thoracic incision. Monopolar cautery was used to dissect down along the spinous processes and lamina.  Identified the electrodes and dissected using monopolar cautery and blunt dissection the posterior aspect of the base of the paddle electrode.  Leksell rongeur was used to remove some of the spinous process and Kerrison punch was used to perform additional laminotomy.  Curved curette was used to dissect the electrode from within the epidural space.  The paddle electrode was adherent to the scar capsule at certain points but was dissected with a nerve hook.  The paddle electrode was then removed.  Final AP fluoroscopy demonstrated complete removal of the spinal cord stimulator system.  16 paddle contacts were counted on the back table as well.  Both surgical sites were irrigated with antibiotic irrigation.    Bipolar cautery was used for further hemostasis.  At the thoracic incision, Vicryl suture was used to approximate the fascia.  Inverted Vicryl suture was used to approximate the subcutaneous tissues at both incisions.  Running 4 0 Monocryl was then used to close both incisions.      0.5% Marcaine with 1/100,000 of epinephrine was used to infiltrate the soft tissue. A Miplex was applied to both incisions.    The patient was carefully transferred onto the operating gurney and extubated. The patient was transferred to the PACU where they were noted to be hemodynamically stable and neurologically unchanged. The results of surgery were discussed with patient and family.      I  was present for the entire procedure.    Patient Disposition:  PACU     SIGNATURE: Shahab Bullock MD  DATE: July 2, 2024  TIME: 2:05 PM

## 2024-07-02 NOTE — INTERVAL H&P NOTE
"H&P reviewed. After examining the patient I find no changes in the patients condition since the H&P had been written.    Patient personally seen and examined.  Neurological examination unchanged compared to last office/progress note, with the following exceptions:    /60   Pulse 59   Temp (!) 97.2 °F (36.2 °C) (Temporal)   Resp 16   Ht 4' 11\" (1.499 m)   Wt 81.6 kg (180 lb)   SpO2 97%   BMI 36.36 kg/m²      None.  Regular cardiac rate and rhythm.  No respiratory distress.  Abdomen nontender.  Normocephalic.  Grossly 4/5 power in lower extremities.  Subacute ecchymosis over face secondary to recent fall.  Nasal fracture healed per patient.  She understands that she will be prone during this procedure.  Has stopped antiplatelet/anticoagulation medication as instructed.    Post operative instructions and medications have been reviewed with the patient and family.    Assessment and Plan:    All questions have been answered to the patient and family satisfaction.  Plan to proceed with reopening of thoracic and left buttock incisions for removal of spinal cord stimulator system. They are in agreement with proceeding.    "

## 2024-07-02 NOTE — ANESTHESIA PREPROCEDURE EVALUATION
Procedure:  REOPENING OF THORACIC AND LEFT BUTTOCK INCISION FOR REMOVAL OF SPINAL CORD STIMULATOR SYSTEM (Bilateral: Spine Thoracic)    Relevant Problems   ANESTHESIA   (+) PONV (postoperative nausea and vomiting)      CARDIO   (+) BBB (bundle branch block)   (+) Bifascicular block   (+) Chronic bilateral thoracic back pain   (+) Chronic scapular pain   (+) Complete heart block (HCC)   (+) Coronary artery disease involving native coronary artery   (+) Coronary artery disease involving native coronary artery of native heart without angina pectoris   (+) Deep venous thrombosis (HCC)   (+) Essential hypertension   (+) Hyperlipidemia associated with type 2 diabetes mellitus  (HCC)   (+) Mixed hyperlipidemia   (+) Pacemaker      ENDO   (+) Primary hyperparathyroidism (HCC)   (+) Primary hypothyroidism   (+) Type 2 diabetes mellitus with microalbuminuria, with long-term current use of insulin ([facility])   (+) Type 2 diabetes mellitus without complication, with long-term current use of insulin (HCC)      GI/HEPATIC   (+) Gastroesophageal reflux disease without esophagitis      /RENAL   (+) Anemia of chronic renal failure, stage 3 (moderate)  ([facility])   (+) Chronic kidney disease (CKD) stage G3b/A1, moderately decreased glomerular filtration rate (GFR) between 30-44 mL/min/1.73 square meter and albuminuria creatinine ratio less than 30 mg/g ([facility])   (+) Stage 3a chronic kidney disease (HCC)      GYN (within normal limits)      HEMATOLOGY   (+) Anemia   (+) Anemia of chronic disease   (+) Anemia of chronic renal failure, stage 3 (moderate)  ([facility])   (+) Microcytic anemia   (+) Normocytic anemia      MUSCULOSKELETAL   (+) Chronic bilateral low back pain without sciatica   (+) Chronic bilateral thoracic back pain   (+) Localized, primary osteoarthritis   (+) Lumbosacral spondylosis without myelopathy   (+) Osteoarthritis of knee      NEURO/PSYCH   (+) Anxiety and depression   (+) Chronic bilateral low back pain without sciatica   (+)  Chronic bilateral thoracic back pain   (+) Chronic pain disorder   (+) Complex regional pain syndrome type 1 of left lower extremity   (+) Diabetic peripheral neuropathy associated with type 2 diabetes mellitus (HCC)   (+) Pain syndrome, chronic      PULMONARY   (+) Moderate persistent asthma with acute exacerbation   (+) Moderate persistent asthma without complication   (+) Obstructive sleep apnea      Endocrine   (+) Uncontrolled type 2 diabetes mellitus with hyperglycemia (HCC)      Behavioral Health   (+) Medical marijuana use      Neurology/Sleep   (+) History of TIA (transient ischemic attack)   (+) Polyneuropathy associated with underlying disease ([facility])   (+) S/P insertion of spinal cord stimulator      Orthopedic/Musculoskeletal   (+) Closed fracture of thoracic vertebra ([facility])   (+) Compression fx, lumbar spine, sequela   (+) Lumbar radiculopathy   (+) Osteopenia   (+) Polyarthralgia   (+) Spinal stenosis of lumbar region with neurogenic claudication   (+) Thoracic compression fracture (HCC)   (+) Thoracic radiculopathy      Cardiovascular/Peripheral Vascular   (+) Chronic diastolic CHF (congestive heart failure) (HCC)      FEN/Gastrointestinal   (+) Achalasia   (+) Gastroparesis      Other   (+) Malfunction of spinal cord stimulator ([facility])   (+) Sarcoidosis        Physical Exam    Airway    Mallampati score: II  TM Distance: >3 FB  Neck ROM: full     Dental   No notable dental hx     Cardiovascular  Rhythm: regular, Rate: normal, Cardiovascular exam normal    Pulmonary  Pulmonary exam normal Breath sounds clear to auscultation    Other Findings  post-pubertal.      Anesthesia Plan  ASA Score- 3     Anesthesia Type- general with ASA Monitors.         Additional Monitors:     Airway Plan: ETT.           Plan Factors-Exercise tolerance (METS): >4 METS.    Chart reviewed. EKG reviewed. Imaging results reviewed. Existing labs reviewed. Patient summary reviewed.                  Induction-  intravenous.    Postoperative Plan- Plan for postoperative opioid use.     Perioperative Resuscitation Plan - Level 1 - Full Code.       Informed Consent- Anesthetic plan and risks discussed with patient.  I personally reviewed this patient with the CRNA. Discussed and agreed on the Anesthesia Plan with the CRNA..    Recent labs personally reviewed:  Lab Results   Component Value Date    WBC 4.59 05/08/2024    HGB 8.0 (L) 06/17/2024     05/08/2024     Lab Results   Component Value Date    K 4.1 06/25/2024    BUN 33 (H) 06/25/2024    CREATININE 0.86 06/25/2024    GLUCOSE 197 (H) 04/23/2018     Lab Results   Component Value Date    PTT 32 05/08/2024      Lab Results   Component Value Date    INR 1.24 (H) 05/08/2024       Blood type B    Lab Results   Component Value Date    HGBA1C 8.4 (H) 05/08/2024       I, Jin Chairez MD, have personally seen and evaluated the patient prior to anesthetic care.  I have reviewed the pre-anesthetic record, and other medical records if appropriate to the anesthetic care.  If a CRNA is involved in the case, I have reviewed the CRNA assessment, if present, and agree. Risks/benefits and alternatives discussed with patient including possible PONV, sore throat, and possibility of rare anesthetic and surgical emergencies.

## 2024-07-02 NOTE — DISCHARGE INSTR - AVS FIRST PAGE
Discharge Instructions  Removal of spinal cord stimulator    Activity:  Do not lift more than 10 pounds for 6 weeks.  Avoid bending, lifting and twisting for 6 weeks.  No strenuous activities. No driving for 2 weeks.   When able to shower, continue to use clean towel and washcloth for 2 weeks post-op.  Continue to change bed linens and pajamas more frequently. Wear clean clothes daily.     Surgical incision care:  Keep dressing in place for 3 days.  Keep incision dry for 3 days.  May shower with mild antimicrobial soap after 3 days.   After 3 days, incisions may be left open to air, but must remain clean.  Do not immerse the incisions in water for 6 weeks.  Do not apply any creams or ointments to the incision for 6 weeks, unless otherwise instructed by [facility].  Contact office if increasing redness, drainage, pain or swelling around the incisions.    Postoperative medication:  Complete course of antibiotic as directed.  [facility] will provide pain medication as coordinated with your pain specialist. All prescriptions must come from a single provider.   Take all medications as prescribed.  Call office with any questions/concerns.  Please contact office for questions regarding dosage and modifications.  No antiplatelet, anticoagulation or Nonsteroidal anti-inflammatory (NSAIDs) medication until cleared by [facility], unless otherwise instructed.   Do not operate heavy machinery or vehicles while taking sedating medications.  Use a bowel regimen while on opioids as they induce constipation. Ie. Senokot-S, Miralax, Colace, etc. Increase fiber and water intake.

## 2024-07-02 NOTE — ANESTHESIA POSTPROCEDURE EVALUATION
Post-Op Assessment Note    CV Status:  Stable    Pain management: adequate       Mental Status:  Alert and awake   Hydration Status:  Euvolemic   PONV Controlled:  Controlled   Airway Patency:  Patent     Post Op Vitals Reviewed: Yes    No anethesia notable event occurred.    Staff: Anesthesiologist, CRNA               BP   134/65   Temp      Pulse  60   Resp   13   SpO2   100

## 2024-07-05 ENCOUNTER — TELEPHONE (OUTPATIENT)
Dept: NEUROSURGERY | Facility: CLINIC | Age: 69
End: 2024-07-05

## 2024-07-05 NOTE — TELEPHONE ENCOUNTER
1st attempt- Called Zohra Barriga on primary contact number after surgery to check in on recovery and provide post surgical instructions. Got voicemail, left message to callback. Will make another attempt if no callback is received.

## 2024-07-08 NOTE — TELEPHONE ENCOUNTER
Called patient to see how she is doing after surgery. Patient reports she is doing well overall and denies any incisional issues or fevers. Patient is able to ambulate around the house and complete ADLs. Educated the patient about the importance of preventing blood clots and provided measures how to prevent them.     Patient has moved her bowels since the surgery. Encouraged patient to take an over the counter stool softener, if she is taking narcotic pain medication. Encouraged fiber intake and fluids.    Reviewed incision care with the patient. Advised that after three days she may take a shower and gently wash the surgical site with soap and water. Use clean wash cloth, towels, and clothing. Do not submerge in water until cleared by the surgeon. Do not apply any creams, ointments, or lotions to the site.  Patient is aware to call the office if any redness, swelling, drainage, dehiscence of incision, or fever >100 F occurs.    Patient is aware to call the office if any concerns or questions may arise. Reminded patient of her upcoming appointments with the date/time/location. Patient was appreciative for the call.

## 2024-07-09 ENCOUNTER — APPOINTMENT (OUTPATIENT)
Facility: REHABILITATION | Age: 69
End: 2024-07-09
Payer: MEDICARE

## 2024-07-11 ENCOUNTER — OFFICE VISIT (OUTPATIENT)
Facility: REHABILITATION | Age: 69
End: 2024-07-11
Payer: MEDICARE

## 2024-07-11 DIAGNOSIS — G89.4 CHRONIC PAIN SYNDROME: ICD-10-CM

## 2024-07-11 DIAGNOSIS — R26.89 BALANCE DISORDER: Primary | ICD-10-CM

## 2024-07-11 PROCEDURE — 97110 THERAPEUTIC EXERCISES: CPT | Performed by: PHYSICAL THERAPIST

## 2024-07-11 PROCEDURE — 97116 GAIT TRAINING THERAPY: CPT | Performed by: PHYSICAL THERAPIST

## 2024-07-11 PROCEDURE — 97112 NEUROMUSCULAR REEDUCATION: CPT | Performed by: PHYSICAL THERAPIST

## 2024-07-11 NOTE — PROGRESS NOTES
Daily Note     Today's date: 2024  Patient name: Zohra Barriga  : 1955  MRN: [id number]  Referring provider: Celestino Santiago DO  Dx:   Encounter Diagnosis     ICD-10-CM    1. Balance disorder  R26.89       2. Chronic pain syndrome  G89.4                        Subjective: Feeling good since her surgery, reports no pain from the incision. No falls since last visit.       Objective: See treatment diary below      Assessment: Tolerated treatment fair. Pt more limited with walking endurance today using SPC reporting fatigue and shortness of breath that resolved with seated rest breaks. Otherwise reported feeling normal today. SpO2 today measured around 91-92% with walking and as low as 88% on scifit. Spo2 was recorded at 96% at rest on evaluation, and she denies any hx of low blood oxygen. This should continue to be monitored closely. Recommended pt monitor with her pulse ox at home and go to ED if she notices any unexplained difficulty breathing, shortness of breath, or lightheadedness.     Through out session provided further education on using her walker safely by being mindful of not drifting to far left. Also educated on moving closer to where she is going to sit before sitting down. Moves slowly requiring longer time to complete exercises listed. She should use the walker at all times to prevent falls at home, but encouraged walking and performing HEP.       Plan: Continue to progress intensity and volume as able.  Work on ambulating with single-point cane. Monitor SpO2     Precautions: Coronary artery disease, congestive heart failure, hx of DVT, asthma, type II DM, chronic low back pain, neuropathy,     Access Code: [email or web address]  URL: [email or web address]  Date: 2024  Prepared by: Armen Campo    Program Notes  Daily Walking Program-Take walks in your home in an open space with your walker for at least 5 minutes. Do this a 2-3x a day as tolerated.     Exercises  - Sit to Stand with  Armchair  - 1 x daily - 7 x weekly - 3 sets - 10 reps  - Standing March with Counter Support  - 1 x daily - 7 x weekly - 3 sets - 10 reps  - Seated long arc quads - 3 sets - 10 reps     Manuals 6/28 7/11                                                               Neuro Re-Ed             Fwd/bwd              Side stepping             Stepping over cones Walker 20ft x3             STS From chair Ues. x10            Blaze pods  SPC. Turning in place to pods on chairs and high table. 2min x2                                     Ther Ex             Scifit  10min L3  10min L3. Goal of 70spm  HR: 90  O2: 88%           Treadmill              Lateral trunk stretching   NV (do L side lying with pillows as needed for gentle trunk stretch)                                                                            Ther Activity                                       Gait Training             SPC in SOLO -20ft  -40ft  -40ft -12ft  -20ft  -18ft    HR: 98  O2: 91-92%                        Modalities

## 2024-07-15 ENCOUNTER — OFFICE VISIT (OUTPATIENT)
Facility: REHABILITATION | Age: 69
End: 2024-07-15
Payer: MEDICARE

## 2024-07-15 DIAGNOSIS — R26.89 BALANCE DISORDER: Primary | ICD-10-CM

## 2024-07-15 DIAGNOSIS — G89.4 CHRONIC PAIN SYNDROME: ICD-10-CM

## 2024-07-15 PROCEDURE — 97112 NEUROMUSCULAR REEDUCATION: CPT

## 2024-07-15 PROCEDURE — 97116 GAIT TRAINING THERAPY: CPT

## 2024-07-15 PROCEDURE — 97110 THERAPEUTIC EXERCISES: CPT

## 2024-07-15 NOTE — PROGRESS NOTES
Daily Note     Today's date: 7/15/2024  Patient name: Zohra Barriga  : 1955  MRN: [id number]  Referring provider: Celestino Santiago DO  Dx:   Encounter Diagnosis     ICD-10-CM    1. Balance disorder  R26.89       2. Chronic pain syndrome  G89.4           Start Time: 1526  Stop Time: 1606  Total time in clinic (min): 40 minutes    Subjective: Reports that she is doing okay. Reports that she was really tired following last session. Notes that she has been keeping up with her walking program while at home.       Objective: See treatment diary below      Assessment: Tolerated treatment well. Continued per primary therapist and per POC. Continues to have bruising orbital and nasal areas. Continues to have decreased step length and stride length even with rollator.  L sidelying with pillow intolerable for patient, likely too much of a trunk stretch - reporting pain in the hip - trialed some gentle seated QL stretching that patient tolerate slightly better. Good tolerance for sit<> stand. Did better with L SPC compared to R, having cane in the L hand had ability to reduce full trunk lean towards the R. Patient demonstrated fatigue post treatment, exhibited good technique with therapeutic exercises, and would benefit from continued PT      Plan: Continue per plan of care.  Progress treatment as tolerated.       Precautions: Coronary artery disease, congestive heart failure, hx of DVT, asthma, type II DM, chronic low back pain, neuropathy,     Access Code: [email or web address]  URL: [email or web address]  Date: 2024  Prepared by: Armen Campo    Program Notes  Daily Walking Program-Take walks in your home in an open space with your walker for at least 5 minutes. Do this a 2-3x a day as tolerated.     Exercises  - Sit to Stand with Armchair  - 1 x daily - 7 x weekly - 3 sets - 10 reps  - Standing March with Counter Support  - 1 x daily - 7 x weekly - 3 sets - 10 reps  - Seated long arc quads - 3 sets - 10 reps  "    Manuals 6/28 7/11 7/15                                                              Neuro Re-Ed             Fwd/bwd              Side stepping             Stepping over cones Walker 20ft x3             STS From chair Ues. x10  3x5 BUE push           Blaze pods  SPC. Turning in place to pods on chairs and high table. 2min x2                                     Ther Ex             Scifit  10min L3  10min L3. Goal of 70spm  HR: 90  O2: 88% 10 min L3, SPM goal of 70          Treadmill              Seated QL S    10x 5\"          Lateral trunk stretching   NV (do L side lying with pillows as needed for gentle trunk stretch) X1 min but very uncomfortable for patient                                                                            Ther Activity                                       Gait Training             SPC in SOLO -20ft  -40ft  -40ft -12ft  -20ft  -18ft    HR: 98  O2: 91-92% - 16 ft   89 bpm, 93% Spo2   - 22 ft                        Modalities                                              "

## 2024-07-16 ENCOUNTER — CLINICAL SUPPORT (OUTPATIENT)
Dept: NEUROSURGERY | Facility: CLINIC | Age: 69
End: 2024-07-16

## 2024-07-16 VITALS — TEMPERATURE: 97.4 F

## 2024-07-16 DIAGNOSIS — Z48.89 POSTOPERATIVE VISIT: Primary | ICD-10-CM

## 2024-07-16 PROCEDURE — 99024 POSTOP FOLLOW-UP VISIT: CPT | Performed by: NEUROLOGICAL SURGERY

## 2024-07-16 NOTE — PROGRESS NOTES
Post-Op Visit- Neurosurgery    Zohra Barriga 68 y.o. female MRN: [id number]    Chief Complaint:  Patient presents post: Reopening Of Thoracic And Left Buttock Incision For Removal Of Spinal Cord Stimulator System - Bilateral    History of Present Illness:  Patient presents for 2 week POV for incision check accompanied by child and ambulating with a rollator.  Patient reports she is doing well overall and denies any incisional issues or fevers.  she denies any new weakness, numbness or tingling since the surgery.  Patient denies surgical pain at this time and rates their pain as a Pain Score: 0-No pain/10.       Assessment:   Vitals:    07/16/24 1301   Temp: (!) 97.4 °F (36.3 °C)       Wound Exam: Incision well approximated.  No erythema, edema or drainage present.   Location: back             Discussion/Summary:  Doing well postoperatively. Reviewed incision care with patient including daily observation for s/s infection including: increased erythema, edema, drainage, dehiscence of incision or fever >101.  Should these be observed, she understands that she is to call and/or return immediately for reassessment.  Advised patient to continue cleansing area with mild soap and water and pat dry. Not to apply any lotions, creams, or ointments, & not to submerge in any water for 4 more weeks.     She is to maintain activity restrictions until cleared by the surgeon. Activity levels were also reviewed with the patient in detail, she is to lift no greater than 10 pounds and ambulation is encouraged as tolerated.     Verified date/time/location of upcoming POV. She is to call the office with any further questions or concerns, or if any incisional issues or fevers would arise.

## 2024-07-18 ENCOUNTER — APPOINTMENT (OUTPATIENT)
Facility: REHABILITATION | Age: 69
End: 2024-07-18
Payer: MEDICARE

## 2024-07-22 ENCOUNTER — OFFICE VISIT (OUTPATIENT)
Facility: REHABILITATION | Age: 69
End: 2024-07-22
Payer: MEDICARE

## 2024-07-22 DIAGNOSIS — R26.89 BALANCE DISORDER: Primary | ICD-10-CM

## 2024-07-22 DIAGNOSIS — G89.4 CHRONIC PAIN SYNDROME: ICD-10-CM

## 2024-07-22 PROCEDURE — 97150 GROUP THERAPEUTIC PROCEDURES: CPT

## 2024-07-22 PROCEDURE — 97112 NEUROMUSCULAR REEDUCATION: CPT

## 2024-07-22 PROCEDURE — 97116 GAIT TRAINING THERAPY: CPT

## 2024-07-22 NOTE — PROGRESS NOTES
Daily Note     Today's date: 2024  Patient name: Zohra Barriga  : 1955  MRN: [id number]  Referring provider: Celestino Santiago DO  Dx:   Encounter Diagnosis     ICD-10-CM    1. Balance disorder  R26.89       2. Chronic pain syndrome  G89.4           Start Time: 1137  Stop Time: 1210  Total time in clinic (min): 33 minutes    Subjective: Arrived to session 25 mins late due to issues with insulin pump, accommodated with shortened session.       Objective: See treatment diary below    [id number] 1on1   [id number] group       Assessment: Tolerated treatment well. Blood sugar elevated at beginning of session, reading of 373 per patient's dexcom, dropped into more appropriate range during session - patient reported that she had too much carbs while at breakfast. Patient demonstrated fatigue post treatment, exhibited good technique with therapeutic exercises, and would benefit from continued PT. Able to traverse much greater distances with SPC during today's session. Able to perform more activities with the cane indicative of improving endurance. Noted good stretch with pball stretch.       Plan: Continue per plan of care.  Progress treatment as tolerated.       Precautions: Coronary artery disease, congestive heart failure, hx of DVT, asthma, type II DM, chronic low back pain, neuropathy,     Access Code: [email or web address]  URL: [email or web address]  Date: 2024  Prepared by: Armen Campo    Program Notes  Daily Walking Program-Take walks in your home in an open space with your walker for at least 5 minutes. Do this a 2-3x a day as tolerated.     Exercises  - Sit to Stand with Armchair  - 1 x daily - 7 x weekly - 3 sets - 10 reps  - Standing March with Counter Support  - 1 x daily - 7 x weekly - 3 sets - 10 reps  - Seated long arc quads - 3 sets - 10 reps     Manuals 6/28 7/11 7/15 7/22                                                             Neuro Re-Ed             Fwd/bwd              Side stepping  "            Stepping over cones Walker 20ft x3             STS From chair Ues. x10  3x5 BUE push           Blaze pods  SPC. Turning in place to pods on chairs and high table. 2min x2                                     Ther Ex             Scifit  10min L3  10min L3. Goal of 70spm  HR: 90  O2: 88% 10 min L3, SPM goal of 70 5 min L3 SPM > 50          Treadmill              Seated QL S    10x 5\" 10x5\", pball rollout stretch too          Lateral trunk stretching   NV (do L side lying with pillows as needed for gentle trunk stretch) X1 min but very uncomfortable for patient                                                                            Ther Activity                                       Gait Training             SPC in SOLO -20ft  -40ft  -40ft -12ft  -20ft  -18ft    HR: 98  O2: 91-92% - 16 ft   89 bpm, 93% Spo2   - 22 ft  16 ft R SPC,     50ft R SPC CGAx1                       Modalities                                                "

## 2024-07-25 ENCOUNTER — OFFICE VISIT (OUTPATIENT)
Facility: REHABILITATION | Age: 69
End: 2024-07-25
Payer: MEDICARE

## 2024-07-25 DIAGNOSIS — G89.4 CHRONIC PAIN SYNDROME: ICD-10-CM

## 2024-07-25 DIAGNOSIS — R26.89 BALANCE DISORDER: Primary | ICD-10-CM

## 2024-07-25 PROCEDURE — 97112 NEUROMUSCULAR REEDUCATION: CPT

## 2024-07-25 PROCEDURE — 97110 THERAPEUTIC EXERCISES: CPT

## 2024-07-25 NOTE — PROGRESS NOTES
"Daily Note / Progress note     Today's date: 2024  Patient name: Zohra Barriga  : 1955  MRN: [id number]  Referring provider: Celestino Santiago DO  Dx:   Encounter Diagnosis     ICD-10-CM    1. Balance disorder  R26.89       2. Chronic pain syndrome  G89.4           Start Time: 1503  Stop Time: 1545  Total time in clinic (min): 42 minutes    Subjective: Reports that she is doing okay. Is frustrated that her cataracts surgeon blew her off again and she didn't get the surgery. Notes that there is a bump on the bottom of her foot that was continued to be bothersome.       Objective: See treatment diary below      Balance Test Initial Eval         5x Sit to Stand: 32.4s needs BL UE  20.6 seconds BUE        TU.1s rolator.  27.1 seconds  rollator        Gait Speed:     0.53 m/s with rollator       2 Minute Walk Test: 136ft rolator   215ft rollator         6 Minute Walk Test:    300 ft in 2 mins and 52 seconds (98% Spo2)        Batista Balance Scale:    31/56       FGA:                                        Assessment: Tolerated treatment well. Continued per primary therapist POC. Session emphasis continued on improvement of mobility with least restrictive assistive device. Ecchymosis bilateral orbital is improving. Did nicely with improvements compared to initial evaluation. TUG and 5x STS both improved greater than MCID as well, suggesting meaningful improvements. Feels that she gets stuck someties, \"I sometimes get stuck at the counter and I can't move, I have to call for my husabnd'  Assessed BATISTA balance scale during today's visit, places patient at high fall risk and room for improvement in areas for static balance without UE support available.  Endurance testing with nice improvement, able to traverse an extra 52 seconds at higher gait speed with her rollator during today's session. Gait speed classifies patient as increased fall risk, and limited community ambulator even with rollator assistive " device. Patient demonstrated fatigue post treatment, exhibited good technique with therapeutic exercises, and would benefit from continued PT. Despite improvements noted, Lindsay would greatly benefit from further skilled PT services to improve functional mobility and endurance. POC runs to 10/3, added new goals to achieve going forward in addition to already established long term goals.     Goals  STGs (4 weeks)  Pt will be independent with comprehensive HEP   Pt will demonstrate a least 5-second improvement on timed up and go  Patient will demonstrate at least 5-second improvement on 5 times sit to stand - MET   Patient will be able to complete 6-minute walk test     LTG's (to be achieved by d/c)  Pt will be able to self manage sx's independently   Pt will be able to ambulate at least 50 feet within her household utilizing single-point cane for mobility    New Goals (as of 7/25/24)   Pt will improve to be able to perform 5x STS without BUE support   Improve gait speed by 0.1 m/s   Improve TUG to less than 15 seconds with rollator   Complete TUG with SPC   Complete 6MWT with SPC     Plan: Continue per plan of care.  Progress treatment as tolerated.       Precautions: Coronary artery disease, congestive heart failure, hx of DVT, asthma, type II DM, chronic low back pain, neuropathy,     Access Code: [email or web address]  URL: [email or web address]  Date: 06/28/2024  Prepared by: Armen Campo    Program Notes  Daily Walking Program-Take walks in your home in an open space with your walker for at least 5 minutes. Do this a 2-3x a day as tolerated.     Exercises  - Sit to Stand with Armchair  - 1 x daily - 7 x weekly - 3 sets - 10 reps  - Standing March with Counter Support  - 1 x daily - 7 x weekly - 3 sets - 10 reps  - Seated long arc quads - 3 sets - 10 reps     Manuals 6/28 7/11 7/15 7/22 7/25                                                            Neuro Re-Ed     Outcome measures, results, progress        "  Fwd/bwd              Side stepping             Stepping over cones Walker 20ft x3             STS From chair Ues. x10  3x5 BUE push   3x5 BUE push         Blaze pods  SPC. Turning in place to pods on chairs and high table. 2min x2                                     Ther Ex             Scifit  10min L3  10min L3. Goal of 70spm  HR: 90  O2: 88% 10 min L3, SPM goal of 70 5 min L3 SPM > 50  10  min L3 SPM > 70 SPM         Treadmill              Seated QL S    10x 5\" 10x5\", pball rollout stretch too          Lateral trunk stretching   NV (do L side lying with pillows as needed for gentle trunk stretch) X1 min but very uncomfortable for patient                                                                            Ther Activity                                       Gait Training             SPC in SOLO -20ft  -40ft  -40ft -12ft  -20ft  -18ft    HR: 98  O2: 91-92% - 16 ft   89 bpm, 93% Spo2   - 22 ft  16 ft R SPC,     50ft R SPC CGAx1                       Modalities                                                  "

## 2024-07-29 ENCOUNTER — OFFICE VISIT (OUTPATIENT)
Facility: REHABILITATION | Age: 69
End: 2024-07-29
Payer: MEDICARE

## 2024-07-29 DIAGNOSIS — R26.89 BALANCE DISORDER: Primary | ICD-10-CM

## 2024-07-29 DIAGNOSIS — G89.4 CHRONIC PAIN SYNDROME: ICD-10-CM

## 2024-07-29 PROCEDURE — 97110 THERAPEUTIC EXERCISES: CPT | Performed by: PHYSICAL THERAPIST

## 2024-07-29 PROCEDURE — 97116 GAIT TRAINING THERAPY: CPT | Performed by: PHYSICAL THERAPIST

## 2024-07-29 NOTE — PROGRESS NOTES
Daily Note     Today's date: 2024  Patient name: Zohra Barriga  : 1955  MRN: [id number]  Referring provider: Celestino Santiago DO  Dx:   Encounter Diagnosis     ICD-10-CM    1. Balance disorder  R26.89       2. Chronic pain syndrome  G89.4                        Subjective: Reports that she is doing okay. Is having cataracts surgery this Wednesday.       Objective: See treatment diary below      Assessment: Tolerated treatment well. Initiated treadmill walking today with good tolerance able to gradually progress speed through out, but needing visual and verbal cues for step length and posture. Able to progress overground walking speed with SPC today as well. Less than optimal response to pball roll out for trunk stretching today reported L sided lumbar pain following. Would benefit from continued PT.       Plan: Continue per plan of care.  Progress treatment as tolerated.       Precautions: Coronary artery disease, congestive heart failure, hx of DVT, asthma, type II DM, chronic low back pain, neuropathy,     Access Code: [email or web address]  URL: [email or web address]  Date: 2024  Prepared by: Armen Campo    Program Notes  Daily Walking Program-Take walks in your home in an open space with your walker for at least 5 minutes. Do this a 2-3x a day as tolerated.     Exercises  - Sit to Stand with Armchair  - 1 x daily - 7 x weekly - 3 sets - 10 reps  - Standing March with Counter Support  - 1 x daily - 7 x weekly - 3 sets - 10 reps  - Seated long arc quads - 3 sets - 10 reps     Manuals 6/28 7/11 7/15 7/22 7/25 7/29                                                           Neuro Re-Ed     Outcome measures, results, progress         Fwd/bwd              Side stepping             Stepping over cones Walker 20ft x3             STS From chair Ues. x10  3x5 BUE push   3x5 BUE push         Blaze pods  SPC. Turning in place to pods on chairs and high table. 2min x2                                     Ther  "Ex             Scifit  10min L3  10min L3. Goal of 70spm  HR: 90  O2: 88% 10 min L3, SPM goal of 70 5 min L3 SPM > 50  10  min L3 SPM > 70 SPM  8min L4        Treadmill       0.5-0.7mph 8min       Seated QL S    10x 5\" 10x5\", pball rollout stretch too   10x5\" pball rollout to L bias       Lateral trunk stretching   NV (do L side lying with pillows as needed for gentle trunk stretch) X1 min but very uncomfortable for patient                                                                            Ther Activity                                       Gait Training             SPC in SOLO -20ft  -40ft  -40ft -12ft  -20ft  -18ft    HR: 98  O2: 91-92% - 16 ft   89 bpm, 93% Spo2   - 22 ft  16 ft R SPC,     50ft R SPC CGAx1   SPC on R 90ft x2                    Modalities                                                  "

## 2024-08-01 ENCOUNTER — APPOINTMENT (OUTPATIENT)
Facility: REHABILITATION | Age: 69
End: 2024-08-01
Payer: MEDICARE

## 2024-08-05 ENCOUNTER — APPOINTMENT (OUTPATIENT)
Facility: REHABILITATION | Age: 69
End: 2024-08-05
Payer: MEDICARE

## 2024-08-08 ENCOUNTER — APPOINTMENT (OUTPATIENT)
Facility: REHABILITATION | Age: 69
End: 2024-08-08
Payer: MEDICARE

## 2024-08-12 ENCOUNTER — APPOINTMENT (OUTPATIENT)
Facility: REHABILITATION | Age: 69
End: 2024-08-12
Payer: MEDICARE

## 2024-08-13 ENCOUNTER — OFFICE VISIT (OUTPATIENT)
Dept: SLEEP CENTER | Facility: CLINIC | Age: 69
End: 2024-08-13
Payer: MEDICARE

## 2024-08-13 VITALS
DIASTOLIC BLOOD PRESSURE: 70 MMHG | BODY MASS INDEX: 34.27 KG/M2 | HEART RATE: 79 BPM | WEIGHT: 170 LBS | SYSTOLIC BLOOD PRESSURE: 148 MMHG | HEIGHT: 59 IN

## 2024-08-13 DIAGNOSIS — G47.33 OSA (OBSTRUCTIVE SLEEP APNEA): Primary | ICD-10-CM

## 2024-08-13 DIAGNOSIS — G47.10 HYPERSOMNIA: ICD-10-CM

## 2024-08-13 DIAGNOSIS — E66.09 CLASS 1 OBESITY DUE TO EXCESS CALORIES WITH SERIOUS COMORBIDITY AND BODY MASS INDEX (BMI) OF 34.0 TO 34.9 IN ADULT: ICD-10-CM

## 2024-08-13 PROCEDURE — 99213 OFFICE O/P EST LOW 20 MIN: CPT

## 2024-08-13 NOTE — PATIENT INSTRUCTIONS
Continue PAP Therapy  - Retry your mask when you feel you're ready and then once able to use the device again call the office and we can order a mask refitting.  - Continue APAP at 5-15 cmH2O.  Remember to clean your mask and equipment regularly, as directed.  You should be eligible for new supplies approximately every 3-6 months, depending on your insurance coverage. Contact your Durable Medical Equipment (DME) company for new supplies as needed.  Follow up in 6 months    Care and Maintenance  Headgear should be washed as needed. Daily inspection and weekly washings are recommended. Do not disassemble the straps. Machine wash in warm water, making sure to attach Velcro hooks and tabs before washing. Line dry or machine dry on a low setting.  Masks should be washed every day. Daily inspection is recommended. Leave the mask and tubing attached. Gently wash the mask with a soft cloth using warm water and mild detergent, concentrating on the mask cushion flaps. DO NOT use alcohol or bleach. Rinse thoroughly and air dry.  Tubing and headgear should be washed weekly. Daily inspection is recommended. Wash in warm water and mild detergent and rinse thoroughly. Hook the tubing to the machine and blow until dry.  Humidifier should be washed daily and filled with DISTILLED water before use. Wash with warm water and mild detergent. Disinfect weekly by soaking with a solution of 1 part white vinegar and 3 parts water for 30 minutes. Rinse thoroughly and air dry.  Disposable filters should be replaced once a month. Wash reusable foam filters with warm water and mild detergent at least once a month. Rinse thoroughly and dry with paper towels.  Avoid  that contain fragrance or conditioners, as these will leave a residue.  NEVER iron any soft goods.    Insurance Requirements  Your insurance requires a face-to-face follow up visit within a 31-90 day period after starting PAP therapy.  Your insurance requires compliance with  your PAP device, which is at least 4 hours per night for 70% of the time. This must be done over a 30 day period and must occur within the initial 31-90 day period after starting CPAP.  Your insurance also requires at least yearly follow ups to continue to pay for CPAP supplies.     PAP Supply Guidelines  Below are the guidelines for reordering your supplies. You will be responsible for your deductible, co payments, and out of pocket expenses.    Item Frequency   Nasal Mask (no headgear) 1 every 3 months   Nasal Mask Cushion 1 every 2 weeks   Full Face Mask (no headgear) 1 every 3 months   Full Face Mask Cushion 1 every month   Nasal Pillows 1 every 2 weeks   Headgear 1 every 6 months   hin Strap 1 every 6 months   mariangel 1 every 3 months   Filters: Reusable 1 every 6 months   Filters: Disposable 1 every 2 weeks   Humidifier Chamber(disposable) 1 every 6 months

## 2024-08-13 NOTE — PROGRESS NOTES
[facility]  Sleep Medicine Follow Up/Established Patient    PATIENT NAME: Zohra Barriga  DATE OF SERVICE: August 13, 2024  DATE OF LAST VISIT: 3/29/2024    ASSESSMENT/PLAN:  Zohra Barriga is a 69 y.o. female with PMHx of STU on CPAP, HFpEF, CAD, DM2, CKD 3b, asthma, sarcoidosis, HTN, CHB s/p PPM, RLS, hypothyroidism, hyperparathyroidism, OAB, CPRS, h/o TIA,  h/o DVT, HLD, IBS, gastroparesis, medical marijuana use, anxiety, depression and obesity who returns to the office for follow up.    STU (Obstructive Sleep Apnea)  - The patient has a history of severe STU (AHI 33.3, O2 imelda 85 %, 2016) and is currently prescribed auto CPAP 5-15 cmH2O.  She is not currently using her device due to recent fall with orbital fractures and inability to tolerate the mask.  - Therapy and compliance data reviewed: Device is not currently being used.  - As she does note some issues with the mask even before her recent fractures we will have her try the mask again when she feels she is ready and then call the office if she notes continued leak/discomfort to consider refitting.  - Restart APAP 5-15 cmH2O when able.  - Explained the importance of keeping the machine clean, and that they should be eligible for refills of supplies every 3-6 months depending on insurance.  We also discussed the insurance compliance requirements.  - Encouraged healthy lifestyle with adequate sleep (7-9 hours per night), healthy balanced diet and routine exercise.  Explained the importance of avoiding driving while drowsy.  - Follow-up in 6 months.    Hypersomnia  - Likely multifactorial in etiology with untreated STU as above in addition to insomnia seemingly brought on by her untreated STU and polypharmacy.  - Will reassess the degree of daytime sleepiness once back on PAP therapy.    Class 1 Obesity  - Weight loss is  recommended.  ________________________________________________________________________________________________    Interval History: Zohra Barriga is a 69 y.o. female with PMHx of STU on CPAP, HFpEF, CAD, DM2, CKD 3b, asthma, sarcoidosis, HTN, CHB s/p PPM, RLS, hypothyroidism, hyperparathyroidism, OAB, CPRS, h/o TIA,  h/o DVT, HLD, IBS, gastroparesis, medical marijuana use, anxiety, depression and obesity who returns to the office for follow up.  The patient reports that since her last office visit she had 2 falls with resulting facial fractures for which she was following with ophthalmology and plastic surgery.  She notes she has not been using the device as she has not been able to tolerate anything on her face currently.  She is still having some pain from her injuries and does not feel ready to retry the CPAP just yet, but reports she would like to try the device again when she is able.  She does note that when she was using the CPAP previously she was having some leak from her mask and occasionally was dislodging in sleep.    Currently she is having significant daytime sleepiness and will doze off when sitting quietly a few times a day.  She does not currently drive.    PAP History  Sleep Apnea Type: STU  Most Recent AHI: 33.3 in 2016  Treatment: APAP    DME: [facility]    There is no recorded use for the last 30 days.  Current PAP Settings: 5-15 cmH2O  Compliance Data: 0% last 30 days    Mask Type: nasal  PAP Issues: mask leaks  and mask dislodges during sleep  Uses Chin Strap: No  Uses Ramp Function: Yes   Uses Humidity: Yes    Prior History: The patient has been following with a sleep physician since 2016, initially at [facility] and then with [facility] since 2018.  She has a history of severe STU and has been on CPAP 9-11 cmH2O therapy for a number of years at this point.  She was having difficulties with her CPAP due to sleep onset insomnia, and was noting daytime sleepiness due to her difficulties with PAP  use.  Dr. Stratton started her on modafinil and amitriptyline for these issues initially.  The patient reported modafinil was wearing off too soon, Sunosi was ordered however was not covered by her insurance, and ultimately she was prescribed armodafinil 150 mg daily.  The armodafinil worked for her until 2021 when she reported increasing drowsiness and it was increased to 250 mg daily.  In 2022 the patient reported the armodafinil 250 mg was no longer working for her, and she was prescribed [employer]; however, this was never filled and the patient remained on the armodafinil.  She was last seen on 8/18/2022 at which time she was noted to still be sleepy despite armodafinil 250 mg daily, but it was felt that this may actually be due to lack of use of her CPAP.  She was to follow-up in 1 year; however, she was lost to follow-up for time.  She represented in 1/2024 at which time it was noted her device was broken beyond repair and need to be replaced.  She was given a prescription for a new auto CPAP 5-15 cmH2O, but on initial compliance check she was not meeting compliance due to recent urinary issues and frequent awakenings as a result.  Plan at that time was to increase CPAP use and send for mask refitting.  She is now representing for recheck of compliance.    ESS: Total score: 24/24  Greater or equal to 10 is positive for excessive daytime sleepiness  Pertinent Meds: None    Sleep-Wake Schedule:  Bedtime: 2200  Wake Time: 0600  Difficulty Falling Asleep: No  Avg Number of Awakenings: 2x -- does have trouble falling back to sleep  Cause of Awakenings: unclear  Weekend Sleep Schedule: unchanged  Naps: will unintentionally doze off 4-5x a day, 5-15 min    Avg TST per 24 hours: 4 hours    Past Treatments:  APAP 9-11 cmH2O     Prior Sleep Studies:  PSG 6/25/2013  AHI 30.9      Split-Night PSG -- 8/15/2016 (Wt 226.5lbs)  AHI - 33.3  Sup AHI - NA  REM AHI - 0.0 (4 min)  O2 Marlon - 85.0%  Recommended Pressure - 11 cmH2O  AHI at  "Rec Pressure - 8.8    Other Relevant Labs and Studies:  None    Past Medical History:   Diagnosis Date    Anxiety     Asthma     controlled    Back complaints     Cancer ([facility])     nose    Cellulitis of left lower leg     \"not now\"    CHF (congestive heart failure) ([facility]) 02/2021    Chronic bilateral thoracic back pain     Chronic kidney disease     sees nephrologist regularly\" stage 3\"    Chronic myofascial pain     Chronic pain of both lower extremities     Chronic pain of left knee     \"both knees\"    Chronic pain syndrome     bilat legs and knees/neuropathy pain    COVID 12/2021    CPAP (continuous positive airway pressure) dependence     no currently uses    Depression     Diabetes mellitus ([facility])     insulin pump    Difficulty walking 2018    Disease of thyroid gland     Does use hearing aid     bilat and will wear DOS    DVT (deep venous thrombosis) ([facility]) 2013    left leg    Gait disorder     Gastroparesis     GERD (gastroesophageal reflux disease)     Head injury     Headache, tension-type     History of angina     History of MRSA infection     History of transfusion     Many years ago    HL (hearing loss) 2018    Hyperlipidemia     Hypertension     Hypoglycemic reaction     \"occas low blood sugar\"    Insulin pump in place     pt reports saw endocrinologist 4/28 and will bring copy of instructions DOS    Irritable bowel syndrome     Kidney disease     Memory loss 2021    Movement disorder 2021    Neuropathy in diabetes ([facility]) 2021    Constant pain legs and feet    Nose fracture     Pacemaker 2019    Pneumonia     Polyneuropathy associated with underlying disease ([facility])     PONV (postoperative nausea and vomiting)     Risk for falls     S/P insertion of spinal cord stimulator 06/08/2018    Sarcoidosis     Shortness of breath     \"exertion and not new\"    Sleep apnea     Stroke ([facility])     Thoracic vertebral fracture ([facility])     TIA (transient ischemic attack)     Use of cane as ambulatory aid     Uses walker     Wears " "dentures     permanent upper/and some missing teeth/partial lower     Past Surgical History:   Procedure Laterality Date    ABDOMINAL ADHESION SURGERY N/A 2021    Procedure: laparoscopic LYSIS ADHESIONS;  Surgeon: Jill Bentley MD;  [facility];  Service: Thoracic    BACK SURGERY  2023    [facility] at [facility], Dr. Levy    BREAST SURGERY      BREAST SURGERY      reduction    CARDIAC PACEMAKER PLACEMENT      pacemaker     SECTION      COLONOSCOPY      DILATION AND CURETTAGE OF UTERUS      \"D&E\"    HERNIA REPAIR      HYSTERECTOMY      JOINT REPLACEMENT Left     LTKR    NECK SURGERY      fused 4 discs with 4 screws implanted    NISSEN FUNDOPLICATION LAPAROSCOPIC WITH ROBOTICS N/A 2021    Procedure: ROBOTIC HELLER MYOTOMY WITH BERNARDO FUNDIPLICATION ;  Surgeon: Jill Bentley MD;  [facility];  Service: Thoracic    NOSE SURGERY  2024    closed reduction    HI ESOPHAGOGASTRODUODENOSCOPY TRANSORAL DIAGNOSTIC N/A 2021    Procedure: ESOPHAGOGASTRODUODENOSCOPY (EGD);  Surgeon: Jill Bentley MD;  [facility];  Service: Thoracic    HI ESOPHAGOGASTRODUODENOSCOPY TRANSORAL DIAGNOSTIC N/A 2022    Procedure: ESOPHAGOGASTRODUODENOSCOPY (EGD),;  Surgeon: Jill Bentley MD;  [facility];  Service: Thoracic    HI ESOPHAGOGASTRODUODENOSCOPY TRANSORAL DIAGNOSTIC N/A 2022    Procedure: ESOPHAGOGASTRODUODENOSCOPY (EGD);  Surgeon: Jill Bentley MD;  [facility];  Service: Thoracic    HI ESOPHAGOGASTRODUODENOSCOPY TRANSORAL DIAGNOSTIC N/A 2022    Procedure: ESOPHAGOGASTRODUODENOSCOPY (EGD); PYLORIC DILATION; GE JUNCTION DILATION;  Surgeon: Jill Bentley MD;  [facility];  Service: Thoracic    HI ESOPHAGOGASTRODUODENOSCOPY TRANSORAL DIAGNOSTIC N/A 2022    Procedure: ESOPHAGOGASTRODUODENOSCOPY (EGD);  Surgeon: Varinder Steiner MD;  [facility];  Service: Thoracic    HI " ESOPHAGOGASTRODUODENOSCOPY TRANSORAL DIAGNOSTIC N/A 01/24/2023    Procedure: ESOPHAGOGASTRODUODENOSCOPY (EGD);  Surgeon: Jill Bentley MD;  [facility];  Service: Thoracic    MO ESOPHAGOGASTRODUODENOSCOPY TRANSORAL DIAGNOSTIC N/A 05/01/2024    Procedure: ESOPHAGOGASTRODUODENOSCOPY (EGD);  Surgeon: Jill Bentley MD;  [facility];  Service: Thoracic    MO ESOPHAGOSCOPY FLEX BALLOON DILAT <30 MM DIAM N/A 01/12/2022    Procedure: DILATATION ESOPHAGEAL;  Surgeon: Jill Bentley MD;  [facility];  Service: Thoracic    MO ESOPHAGOSCOPY FLEX BALLOON DILAT <30 MM DIAM N/A 02/16/2022    Procedure: DILATATION ESOPHAGEAL;  Surgeon: Jill Bentley MD;  [facility];  Service: Thoracic    MO ESOPHAGOSCOPY FLEX BALLOON DILAT <30 MM DIAM N/A 11/29/2022    Procedure: DILATATION ESOPHAGEAL esophageal and pyloric dilation;  Surgeon: Varinder Steiner MD;  [facility];  Service: Thoracic    MO ESOPHAGOSCOPY FLEX BALLOON DILAT <30 MM DIAM N/A 01/24/2023    Procedure: esophageal dilation dilated up to 54  with pylorus dilation dilated up to 18;  Surgeon: Jill Bentley MD;  [facility];  Service: Thoracic    MO ESOPHAGOSCOPY FLEX BALLOON DILAT <30 MM DIAM N/A 05/01/2024    Procedure: DILATATION ESOPHAGEAL;  Surgeon: Jill Bentley MD;  [facility];  Service: Thoracic    MO RIBEIRO IMPLTJ NSTIM ELTRDS PLATE/PADDLE EDRL Left 04/23/2018    Procedure: Insertion of thoracic spinal cord stimulator electrode via laminotomy and placement of left buttock IPG;  Surgeon: Shahab Bullock MD;  [facility];  Service: Neurosurgery    REPLACEMENT TOTAL KNEE      ROTATOR CUFF REPAIR      SPINAL CORD STIMULATOR REMOVAL Bilateral 7/2/2024    Procedure: REOPENING OF THORACIC AND LEFT BUTTOCK INCISION FOR REMOVAL OF SPINAL CORD STIMULATOR SYSTEM;  Surgeon: Shahab Bullock MD;  [facility];  Service: Neurosurgery    SPINAL STIMULATOR PLACEMENT Left  10/03/2018    Procedure: BUTTOCK RE-OPENING INCISION FOR REPOSITIONING OF IMPLANTABLE PULSE GENERATOR;  Surgeon: Shahab Bullock MD;  [facility];  Service: Neurosurgery    TONSILLECTOMY      UPPER GASTROINTESTINAL ENDOSCOPY      VASCULAR SURGERY      WISDOM TOOTH EXTRACTION       Patient Active Problem List   Diagnosis    Anemia of chronic disease    Anxiety and depression    Other B-complex deficiencies    Chronic bilateral thoracic back pain    Chronic myofascial pain    Pain in right leg    Chronic pain of left knee    Chronic pain of right knee    Essential hypertension    Gait disorder    Gastroparesis    Gastroesophageal reflux disease without esophagitis    Sarcoidosis    Insomnia due to medical condition    Insulin pump status    Pain syndrome, chronic    Type 2 diabetes mellitus with microalbuminuria, with long-term current use of insulin ([facility])    Irritable bowel syndrome with constipation    Lymphadenopathy    Mixed hyperlipidemia    Mixed urge and stress incontinence    Moderate persistent asthma with acute exacerbation    Normocytic anemia    Obstructive sleep apnea    Polyneuropathy associated with underlying disease ([facility])    Post-menopausal    Primary hyperparathyroidism ([facility])    Type 2 diabetes mellitus without complication, with long-term current use of insulin ([facility])    Vitamin D deficiency    Complex regional pain syndrome type 1 of left lower extremity    Primary hypothyroidism    Encounter for fitting and adjustment of spinal cord stimulator    S/P insertion of spinal cord stimulator    Chronic diastolic CHF (congestive heart failure) ([facility])    Coronary artery disease involving native coronary artery    Closed fracture of thoracic vertebra ([facility])    Deep venous thrombosis ([facility])    Localized, primary osteoarthritis    Chronic bilateral low back pain without sciatica    Lumbar radiculopathy    Lumbosacral spondylosis without myelopathy    Osteoarthritis of knee    Age related osteoporosis     Thoracic radiculopathy    History of TIA (transient ischemic attack)    Microcytic anemia    Chronic scapular pain    Malfunction of spinal cord stimulator ([facility])    Moderate persistent asthma without complication    Hypersomnia    BBB (bundle branch block)    Hyperlipidemia associated with type 2 diabetes mellitus  ([facility])    Uncontrolled type 2 diabetes mellitus with hyperglycemia ([facility])    Achalasia    PONV (postoperative nausea and vomiting)    Class 2 obesity in adult    Pacemaker    Medical marijuana use    Urinary retention    Epigastric pain    Spinal stenosis of lumbar region with neurogenic claudication    COVID-19 virus infection    Acute on chronic diastolic (congestive) heart failure ([facility])    Anemia    Abnormal CT of the chest    Complete heart block ([facility])    Diabetic peripheral neuropathy associated with type 2 diabetes mellitus ([facility])    OAB (overactive bladder)    Polyarthralgia    Restless leg syndrome    Anemia of chronic renal failure, stage 3 (moderate)  ([facility])    Chronic kidney disease (CKD) stage G3b/A1, moderately decreased glomerular filtration rate (GFR) between 30-44 mL/min/1.73 square meter and albuminuria creatinine ratio less than 30 mg/g ([facility])    Stage 3a chronic kidney disease ([facility])    Coronary artery disease involving native coronary artery of native heart without angina pectoris    Bifascicular block    Chronic pain disorder    Myelopathy ([facility])    Thoracic compression fracture ([facility])    Compression fx, lumbar spine, sequela    Osteopenia     Allergies as of 08/13/2024 - Reviewed 08/13/2024   Allergen Reaction Noted    Bactrim [sulfamethoxazole-trimethoprim] Hives 10/18/2017    Sucralfate Hives 10/18/2017    Topamax [topiramate]  07/25/2017    Azithromycin Itching 10/18/2017    Pregabalin Confusion and Other (See Comments) 12/19/2018    Ciprofloxacin Drowsiness 04/03/2020    Norvasc [amlodipine] Swelling 04/27/2019    Baclofen  04/23/2018    Bupropion Fatigue 07/02/2018    Methotrexate  Nausea Only 10/18/2017     REVIEW OF SYSTEMS:  Review of Systems  10-point system review completed, all of which are negative except as mentioned above.    CURRENT MEDICATIONS:  Current Outpatient Medications   Medication Instructions    acetaminophen (TYLENOL) 500 mg, Oral, Every 4 hours PRN, Take 2 tablets (1000 mg)by mouth every 4 hours as needed, for mild pain & moderate pain    albuterol (ACCUNEB) 2.5 mg, Nebulization, Every 6 hours PRN    albuterol (PROVENTIL HFA,VENTOLIN HFA) 90 mcg/act inhaler 2 puffs, Inhalation, Every 6 hours PRN    atorvastatin (LIPITOR) 80 mg, Oral, Daily at bedtime    calcium carbonate (OS-KARIS) 1250 (500 Ca) MG chewable tablet 1 tablet, Oral, Daily    carvedilol (COREG) 3.125 mg, Oral, 2 times daily with meals    CRANBERRY FRUIT CONCENTRATE  mg, Oral, Daily, Every morning for supplement    cyclobenzaprine (FLEXERIL) 10 mg tablet     Darbepoetin Mike (ARANESP, ALBUMIN FREE, IJ) Injection, Every 14 days    ezetimibe (ZETIA) 10 mg, Oral, Daily    gabapentin (NEURONTIN) 600 mg, Oral, 3 times daily    glucagon (GLUCAGEN) 1 mg, Once, Inject 1 mg under the skin as needed for low blood sugar    glucose blood test strip Testing six times daily  E11.65 on insulin pump    glucose 16 g, Oral, As needed, Take 15 g by mouth as needed (blood sugar less than 70 if able to safely swallow)    insulin glargine (LANTUS) 24 Units, Subcutaneous, As needed, Inject 24 units under the skin as needed (for pump failure)    Insulin Infusion Pump (MINIMED INSULIN PUMP) PRINCESS Does not apply    levothyroxine 100 mcg, Oral, Daily    Mirabegron ER (Myrbetriq) 50 MG TB24 Oral    NovoLOG 100 UNIT/ML injection INJECT UP  UNITS DAILY VIA INSULIN PUMP. E11.65    ondansetron (ZOFRAN) 8 mg, Oral, Every 8 hours PRN    oxyCODONE-acetaminophen (PERCOCET) 5-325 mg per tablet 1 tablet, Oral, Every 6 hours PRN    polyethylene glycol (MIRALAX) 17 g, Oral, Daily    pramipexole (MIRAPEX) 1 mg, Oral, 2 times daily     "Restasis 0.05 % ophthalmic emulsion 1 drop, Both Eyes, 2 times daily, As directed    sertraline (ZOLOFT) 100 mg, Oral, Daily at bedtime    tolterodine (DETROL LA) 4 mg, Oral, Daily    torsemide (DEMADEX) 40 mg, Oral, Daily    zinc gluconate 50 mg, Oral, Daily     SOCIAL HISTORY:  Social History     Tobacco Use    Smoking status: Never    Smokeless tobacco: Never   Vaping Use    Vaping status: Never Used   Substance Use Topics    Alcohol use: No    Drug use: Not Currently     Frequency: 8.0 times per week     Types: Marijuana     Comment: Medical Marijuana/vape pen once per week     FAMILY HISTORY:  Family History   Problem Relation Age of Onset    Diabetes Family     Heart disease Family     Hypertension Family     Neuropathy Family     No Known Problems Mother     Heart disease Father     Diabetes Father     Neuropathy Father     Stroke Father     No Known Problems Maternal Grandmother     No Known Problems Maternal Grandfather     No Known Problems Paternal Grandmother     No Known Problems Paternal Grandfather     No Known Problems Brother     No Known Problems Daughter     Cancer Son      PHYSICAL EXAMINATION:  Vital Signs:  /70 (BP Location: Left arm, Patient Position: Sitting, Cuff Size: Large)   Pulse 79   Ht 4' 11\" (1.499 m)   Wt 77.1 kg (170 lb)   BMI 34.34 kg/m²   Body mass index is 34.34 kg/m².  Constitutional: NAD, well appearing, obese   Mental Status: AAOx3  Skin: Warm, dry, no rashes noted   Eyes: PERRL, normal conjunctiva  ENT: Nasal congestion absent, nasal valve incompetence absent.  Posterior Airspace:   Lara Tongue Position: 4  Retrognathia: absent  Overbite: absent  High Arched Palate: absent  Tongue Scalloping/Ridging: absent  Tonsils: 1+  Uvula: normal  Chest: RRR, +S1/S2, CTA B/L, no W/R/R, no M/R/G   Abdomen: Soft, NT/ND  Extremities: No digital clubbing or pedal edema      Olivia Mccurdy MD  Pulmonary-Critical Care and Sleep Medicine  08/13/24    Portions of the record may " "have been created with voice recognition software. Occasional wrong word or \"sound a like\" substitutions may have occurred due to the inherent limitations of voice recognition software. Please read the chart carefully and recognize, using context, where substitutions have occurred.  "

## 2024-08-14 ENCOUNTER — OFFICE VISIT (OUTPATIENT)
Dept: NEUROSURGERY | Facility: CLINIC | Age: 69
End: 2024-08-14

## 2024-08-14 VITALS
RESPIRATION RATE: 16 BRPM | OXYGEN SATURATION: 94 % | WEIGHT: 170 LBS | HEART RATE: 97 BPM | DIASTOLIC BLOOD PRESSURE: 80 MMHG | HEIGHT: 59 IN | SYSTOLIC BLOOD PRESSURE: 160 MMHG | TEMPERATURE: 97.3 F | BODY MASS INDEX: 34.27 KG/M2

## 2024-08-14 DIAGNOSIS — G89.4 PAIN SYNDROME, CHRONIC: Primary | Chronic | ICD-10-CM

## 2024-08-14 PROCEDURE — 99024 POSTOP FOLLOW-UP VISIT: CPT | Performed by: NEUROLOGICAL SURGERY

## 2024-08-14 NOTE — ASSESSMENT & PLAN NOTE
Status post removal of spinal cord stimulator system.  Incisions well-healed.  Will follow-up through this office on a as needed basis.

## 2024-08-14 NOTE — PROGRESS NOTES
Neurosurgery Office Note  Zohra Barriga 69 y.o. female MRN: [id number]      Assessment & Plan     Pain syndrome, chronic  Status post removal of spinal cord stimulator system.  Incisions well-healed.  Will follow-up through this office on a as needed basis.    I have spent a total time of 15 minutes on 08/14/24 in caring for this patient including Instructions for management, Impressions, and Counseling / Coordination of care.    Subjective/Objective     Chief Complaint    Follow-up post removal of spinal cord stimulator system.    HPI    Denies any difficulties with her incisions.  She recently presented to the emergency department with left flank pain and right hip/groin pain.  Otherwise she appears to be at her neurological baseline.  Denies any fever, chills or sweats.        LINWOOD PALUMBO personally reviewed and updated.    Review of Systems   Musculoskeletal:  Positive for back pain (left hip, right groin) and gait problem (ambulating with rolling walker).   Neurological:  Positive for weakness (right leg) and numbness (feet).       Family History    Family History   Problem Relation Age of Onset    Diabetes Family     Heart disease Family     Hypertension Family     Neuropathy Family     No Known Problems Mother     Heart disease Father     Diabetes Father     Neuropathy Father     Stroke Father     No Known Problems Maternal Grandmother     No Known Problems Maternal Grandfather     No Known Problems Paternal Grandmother     No Known Problems Paternal Grandfather     No Known Problems Brother     No Known Problems Daughter     Cancer Son        Social History    Social History     Socioeconomic History    Marital status: /Civil Union     Spouse name: Not on file    Number of children: Not on file    Years of education: Not on file    Highest education level: Not on file   Occupational History    Not on file   Tobacco Use    Smoking status: Never    Smokeless tobacco: Never   Vaping Use    Vaping status:  Never Used   Substance and Sexual Activity    Alcohol use: No    Drug use: Not Currently     Frequency: 8.0 times per week     Types: Marijuana     Comment: Medical Marijuana/vape pen once per week    Sexual activity: Yes     Partners: Male     Birth control/protection: None   Other Topics Concern    Not on file   Social History Narrative    Not on file     Social Determinants of Health     Financial Resource Strain: Low Risk  (6/24/2024)    Received from [facility]    Overall Financial Resource Strain (CARDIA)     Difficulty of Paying Living Expenses: Not very hard   Food Insecurity: No Food Insecurity (6/24/2024)    Received from [facility]    Hunger Vital Sign     Worried About Running Out of Food in the Last Year: Never true     Ran Out of Food in the Last Year: Never true   Transportation Needs: No Transportation Needs (6/24/2024)    Received from [facility]    PRAPARE - Transportation     Lack of Transportation (Medical): No     Lack of Transportation (Non-Medical): No   Physical Activity: Not on file   Stress: Not on file   Social Connections: Feeling Socially Integrated (6/24/2023)    Received from [facility], [facility]    OASIS : Social Isolation     How often do you feel lonely or isolated from those around you?: Never   Intimate Partner Violence: Not At Risk (6/24/2024)    Received from [facility]    Humiliation, Afraid, Rape, and Kick questionnaire     Fear of Current or Ex-Partner: No     Emotionally Abused: No     Physically Abused: No     Sexually Abused: No   Housing Stability: Low Risk  (6/15/2024)    Received from [facility]    Housing Stability Vital Sign     Unable to Pay for Housing in the Last Year: No     Number of Times Moved in the Last Year: 1     Homeless in the Last Year: No       Past Medical History    Past Medical History:   Diagnosis Date    Anxiety      "Asthma     controlled    Back complaints     Cancer ([facility])     nose    Cellulitis of left lower leg     \"not now\"    CHF (congestive heart failure) ([facility]) 02/2021    Chronic bilateral thoracic back pain     Chronic kidney disease     sees nephrologist regularly\" stage 3\"    Chronic myofascial pain     Chronic pain of both lower extremities     Chronic pain of left knee     \"both knees\"    Chronic pain syndrome     bilat legs and knees/neuropathy pain    COVID 12/2021    CPAP (continuous positive airway pressure) dependence     no currently uses    Depression     Diabetes mellitus ([facility])     insulin pump    Difficulty walking 2018    Disease of thyroid gland     Does use hearing aid     bilat and will wear DOS    DVT (deep venous thrombosis) ([facility]) 2013    left leg    Gait disorder     Gastroparesis     GERD (gastroesophageal reflux disease)     Head injury     Headache, tension-type     History of angina     History of MRSA infection     History of transfusion     Many years ago    HL (hearing loss) 2018    Hyperlipidemia     Hypertension     Hypoglycemic reaction     \"occas low blood sugar\"    Insulin pump in place     pt reports saw endocrinologist 4/28 and will bring copy of instructions DOS    Irritable bowel syndrome     Kidney disease     Memory loss 2021    Movement disorder 2021    Neuropathy in diabetes ([facility]) 2021    Constant pain legs and feet    Nose fracture     Pacemaker 2019    Pneumonia     Polyneuropathy associated with underlying disease ([facility])     PONV (postoperative nausea and vomiting)     Risk for falls     S/P insertion of spinal cord stimulator 06/08/2018    Sarcoidosis     Shortness of breath     \"exertion and not new\"    Sleep apnea     Stroke ([facility])     Thoracic vertebral fracture ([facility])     TIA (transient ischemic attack)     Use of cane as ambulatory aid     Uses walker     Wears dentures     permanent upper/and some missing teeth/partial lower       Surgical History    Past Surgical History: " "  Procedure Laterality Date    ABDOMINAL ADHESION SURGERY N/A 2021    Procedure: laparoscopic LYSIS ADHESIONS;  Surgeon: Jill Bentley MD;  [facility];  Service: Thoracic    BACK SURGERY  2023    [facility] at [facility], Dr. Levy    BREAST SURGERY      BREAST SURGERY      reduction    CARDIAC PACEMAKER PLACEMENT      pacemaker     SECTION      COLONOSCOPY      DILATION AND CURETTAGE OF UTERUS      \"D&E\"    HERNIA REPAIR      HYSTERECTOMY      JOINT REPLACEMENT Left     LTKR    NECK SURGERY      fused 4 discs with 4 screws implanted    NISSEN FUNDOPLICATION LAPAROSCOPIC WITH ROBOTICS N/A 2021    Procedure: ROBOTIC HELLER MYOTOMY WITH BERNARDO FUNDIPLICATION ;  Surgeon: Jill Bentley MD;  [facility];  Service: Thoracic    NOSE SURGERY  2024    closed reduction    IN ESOPHAGOGASTRODUODENOSCOPY TRANSORAL DIAGNOSTIC N/A 2021    Procedure: ESOPHAGOGASTRODUODENOSCOPY (EGD);  Surgeon: Jill Bentley MD;  [facility];  Service: Thoracic    IN ESOPHAGOGASTRODUODENOSCOPY TRANSORAL DIAGNOSTIC N/A 2022    Procedure: ESOPHAGOGASTRODUODENOSCOPY (EGD),;  Surgeon: Jill Bentley MD;  [facility];  Service: Thoracic    IN ESOPHAGOGASTRODUODENOSCOPY TRANSORAL DIAGNOSTIC N/A 2022    Procedure: ESOPHAGOGASTRODUODENOSCOPY (EGD);  Surgeon: Jill Bentley MD;  [facility];  Service: Thoracic    IN ESOPHAGOGASTRODUODENOSCOPY TRANSORAL DIAGNOSTIC N/A 2022    Procedure: ESOPHAGOGASTRODUODENOSCOPY (EGD); PYLORIC DILATION; GE JUNCTION DILATION;  Surgeon: Jill Bentley MD;  [facility];  Service: Thoracic    IN ESOPHAGOGASTRODUODENOSCOPY TRANSORAL DIAGNOSTIC N/A 2022    Procedure: ESOPHAGOGASTRODUODENOSCOPY (EGD);  Surgeon: Varinder Steiner MD;  [facility];  Service: Thoracic    IN ESOPHAGOGASTRODUODENOSCOPY TRANSORAL DIAGNOSTIC N/A 2023    Procedure: ESOPHAGOGASTRODUODENOSCOPY (EGD);  " Surgeon: Jill Bentley MD;  [facility];  Service: Thoracic    VA ESOPHAGOGASTRODUODENOSCOPY TRANSORAL DIAGNOSTIC N/A 05/01/2024    Procedure: ESOPHAGOGASTRODUODENOSCOPY (EGD);  Surgeon: Jill Bentley MD;  [facility];  Service: Thoracic    VA ESOPHAGOSCOPY FLEX BALLOON DILAT <30 MM DIAM N/A 01/12/2022    Procedure: DILATATION ESOPHAGEAL;  Surgeon: Jill Bentley MD;  [facility];  Service: Thoracic    VA ESOPHAGOSCOPY FLEX BALLOON DILAT <30 MM DIAM N/A 02/16/2022    Procedure: DILATATION ESOPHAGEAL;  Surgeon: Jill Bentley MD;  [facility];  Service: Thoracic    VA ESOPHAGOSCOPY FLEX BALLOON DILAT <30 MM DIAM N/A 11/29/2022    Procedure: DILATATION ESOPHAGEAL esophageal and pyloric dilation;  Surgeon: Varinder Steiner MD;  [facility];  Service: Thoracic    VA ESOPHAGOSCOPY FLEX BALLOON DILAT <30 MM DIAM N/A 01/24/2023    Procedure: esophageal dilation dilated up to 54  with pylorus dilation dilated up to 18;  Surgeon: Jill Bentley MD;  [facility];  Service: Thoracic    VA ESOPHAGOSCOPY FLEX BALLOON DILAT <30 MM DIAM N/A 05/01/2024    Procedure: DILATATION ESOPHAGEAL;  Surgeon: Jill Bentley MD;  [facility];  Service: Thoracic    VA RIBEIRO IMPLTJ NSTIM ELTRDS PLATE/PADDLE EDRL Left 04/23/2018    Procedure: Insertion of thoracic spinal cord stimulator electrode via laminotomy and placement of left buttock IPG;  Surgeon: Shahab Bullock MD;  [facility];  Service: Neurosurgery    REPLACEMENT TOTAL KNEE      ROTATOR CUFF REPAIR      SPINAL CORD STIMULATOR REMOVAL Bilateral 7/2/2024    Procedure: REOPENING OF THORACIC AND LEFT BUTTOCK INCISION FOR REMOVAL OF SPINAL CORD STIMULATOR SYSTEM;  Surgeon: Shahab Bullock MD;  [facility];  Service: Neurosurgery    SPINAL STIMULATOR PLACEMENT Left 10/03/2018    Procedure: BUTTOCK RE-OPENING INCISION FOR REPOSITIONING OF IMPLANTABLE PULSE GENERATOR;  Surgeon:  Shahab Bullock MD;  [facility];  Service: Neurosurgery    TONSILLECTOMY      UPPER GASTROINTESTINAL ENDOSCOPY      VASCULAR SURGERY      WISDOM TOOTH EXTRACTION         Medications      Current Outpatient Medications:     acetaminophen (TYLENOL) 500 mg tablet, Take 500 mg by mouth every 4 (four) hours as needed for mild pain Take 2 tablets (1000 mg)by mouth every 4 hours as needed, for mild pain & moderate pain, Disp: , Rfl:     albuterol (ACCUNEB) 1.25 MG/3ML nebulizer solution, Take 2.5 mg by nebulization every 6 (six) hours as needed for wheezing, Disp: , Rfl:     albuterol (PROVENTIL HFA,VENTOLIN HFA) 90 mcg/act inhaler, Inhale 2 puffs every 6 (six) hours as needed for wheezing, Disp: , Rfl:     atorvastatin (LIPITOR) 80 mg tablet, Take 80 mg by mouth daily at bedtime  , Disp: , Rfl:     calcium carbonate (OS-KARIS) 1250 (500 Ca) MG chewable tablet, Chew 1 tablet daily, Disp: , Rfl:     carvedilol (COREG) 3.125 mg tablet, Take 3.125 mg by mouth 2 (two) times a day with meals, Disp: , Rfl:     CRANBERRY FRUIT CONCENTRATE PO, Take 450 mg by mouth in the morning Every morning for supplement, Disp: , Rfl:     cyclobenzaprine (FLEXERIL) 10 mg tablet, , Disp: , Rfl:     Darbepoetin Mike (ARANESP, ALBUMIN FREE, IJ), Inject as directed every 14 (fourteen) days, Disp: , Rfl:     ezetimibe (ZETIA) 10 mg tablet, Take 10 mg by mouth daily, Disp: , Rfl:     gabapentin (NEURONTIN) 100 mg capsule, Take 600 mg by mouth 3 (three) times a day, Disp: , Rfl:     glucagon (GLUCAGEN) 1 mg injection, 1 mg once Inject 1 mg under the skin as needed for low blood sugar, Disp: , Rfl:     glucose 4 g chewable tablet, Chew 16 g as needed for low blood sugar Take 15 g by mouth as needed (blood sugar less than 70 if able to safely swallow), Disp: , Rfl:     glucose blood test strip, Testing six times daily  E11.65 on insulin pump, Disp: , Rfl:     insulin glargine (LANTUS) 100 units/mL subcutaneous injection, Inject 24 Units under the  "skin if needed Inject 24 units under the skin as needed (for pump failure), Disp: , Rfl:     Insulin Infusion Pump (MINIMED INSULIN PUMP) PRINCESS, Use, Disp: , Rfl:     levothyroxine 100 mcg tablet, Take 100 mcg by mouth daily, Disp: , Rfl:     Mirabegron ER (Myrbetriq) 50 MG TB24, Take by mouth, Disp: , Rfl:     NovoLOG 100 UNIT/ML injection, INJECT UP  UNITS DAILY VIA INSULIN PUMP. E11.65, Disp: , Rfl:     ondansetron (ZOFRAN) 8 mg tablet, Take 8 mg by mouth every 8 (eight) hours as needed, Disp: , Rfl:     oxyCODONE-acetaminophen (PERCOCET) 5-325 mg per tablet, Take 1 tablet by mouth every 6 (six) hours as needed, Disp: , Rfl:     polyethylene glycol (MIRALAX) 17 g packet, Take 17 g by mouth daily, Disp: 90 each, Rfl: 3    pramipexole (MIRAPEX) 1 mg tablet, Take 1 mg by mouth 2 (two) times a day  , Disp: , Rfl:     Restasis 0.05 % ophthalmic emulsion, Administer 1 drop to both eyes 2 (two) times a day As directed, Disp: , Rfl:     sertraline (ZOLOFT) 100 mg tablet, Take 100 mg by mouth daily at bedtime, Disp: , Rfl:     tolterodine (Detrol LA) 4 mg 24 hr capsule, Take 4 mg by mouth daily, Disp: , Rfl:     torsemide (DEMADEX) 20 mg tablet, Take 2 tablets (40 mg total) by mouth daily, Disp: 60 tablet, Rfl: 0    zinc gluconate 50 mg tablet, Take 50 mg by mouth daily, Disp: , Rfl:     Allergies    Allergies   Allergen Reactions    Bactrim [Sulfamethoxazole-Trimethoprim] Hives    Sucralfate Hives     Facial swelling    Topamax [Topiramate]      disorentation    Azithromycin Itching    Pregabalin Confusion and Other (See Comments)     altered mental status    Ciprofloxacin Drowsiness     Other reaction(s): Other (See Comments)  \"drowsiness\"    Norvasc [Amlodipine] Swelling    Baclofen      \"That knocks me out.\"    Bupropion Fatigue    Methotrexate Nausea Only       The following portions of the patient's history were reviewed and updated as appropriate: allergies, current medications, past family history, past " "medical history, past social history, past surgical history, and problem list.    Physical Exam    Vitals:  Blood pressure 160/80, pulse 97, temperature (!) 97.3 °F (36.3 °C), temperature source Temporal, resp. rate 16, height 4' 11\" (1.499 m), weight 77.1 kg (170 lb), SpO2 94%.,Body mass index is 34.34 kg/m².    Physical Exam  Vitals reviewed.   Constitutional:       General: She is not in acute distress.     Appearance: She is ill-appearing.   Eyes:      Extraocular Movements: Extraocular movements intact.   Pulmonary:      Effort: Pulmonary effort is normal. No respiratory distress.   Skin:     General: Skin is warm and dry.      Comments: Midline thoracic and left buttock incisions well-healed.  No redness, tenderness or drainage.   Neurological:      Mental Status: She is alert.   Psychiatric:         Mood and Affect: Mood normal.         Behavior: Behavior normal.       Neurologic Exam    "

## 2024-08-22 ENCOUNTER — APPOINTMENT (OUTPATIENT)
Facility: REHABILITATION | Age: 69
End: 2024-08-22
Payer: MEDICARE

## 2024-09-05 ENCOUNTER — APPOINTMENT (OUTPATIENT)
Facility: REHABILITATION | Age: 69
End: 2024-09-05
Payer: MEDICARE

## 2024-09-05 NOTE — PROGRESS NOTES
Re-Evaluation      Today's date: 2024  Patient name: Zohra Barriga  : 1955  MRN: [id number]  Referring provider: Celestino Santiago DO  Dx:   No diagnosis found.                   Subjective: Reports that she is doing okay. Is having cataracts surgery this Wednesday.       Objective: See treatment diary below    Vitals:  -BP: 160/80  -HR: 63  -SpO2: 96%              Balance Test Initial Eval         5x Sit to Stand: 32.4s needs BL UE         TU.1s rolator.          Gait Speed:            2 Minute Walk Test: 136ft rolator          6 Minute Walk Test:           [place] Balance Scale:           FGA:                                        Goals  STGs (4 weeks)  Pt will be independent with comprehensive HEP   Pt will demonstrate a least 5-second improvement on timed up and go  Patient will demonstrate at least 5-second improvement on 5 times sit to stand  Patient will be able to complete 6-minute walk test    LTG's (to be achieved by d/c)  Pt will be able to self manage sx's independently   Pt will be able to ambulate at least 50 feet within her household utilizing single-point cane for mobility    Assessment: Tolerated treatment well. Initiated treadmill walking today with good tolerance able to gradually progress speed through out, but needing visual and verbal cues for step length and posture. Able to progress overground walking speed with SPC today as well. Less than optimal response to pball roll out for trunk stretching today reported L sided lumbar pain following. Would benefit from continued PT.       Plan: Continue per plan of care.  Progress treatment as tolerated.       Precautions: Coronary artery disease, congestive heart failure, hx of DVT, asthma, type II DM, chronic low back pain, neuropathy,     Access Code: [email or web address]  URL: [email or web address]  Date: 2024  Prepared by: Armen Campo    Program Notes  Daily Walking Program-Take walks in your home in an open space with your  "walker for at least 5 minutes. Do this a 2-3x a day as tolerated.     Exercises  - Sit to Stand with Armchair  - 1 x daily - 7 x weekly - 3 sets - 10 reps  - Standing March with Counter Support  - 1 x daily - 7 x weekly - 3 sets - 10 reps  - Seated long arc quads - 3 sets - 10 reps     Manuals 9/5  7/15 7/22 7/25 7/29                                       Neuro Re-Ed     Outcome measures, results, progress     Fwd/bwd          Side stepping         Stepping over cones         STS   3x5 BUE push   3x5 BUE push     Blaze pods                           Ther Ex         Scifit    10 min L3, SPM goal of 70 5 min L3 SPM > 50  10  min L3 SPM > 70 SPM  8min L4    Treadmill       0.5-0.7mph 8min   Seated QL S    10x 5\" 10x5\", pball rollout stretch too   10x5\" pball rollout to L bias   Lateral trunk stretching    X1 min but very uncomfortable for patient                                                    Ther Activity                           Gait Training         SPC in SOLO   - 16 ft   89 bpm, 93% Spo2   - 22 ft  16 ft R SPC,     50ft R SPC CGAx1   SPC on R 90ft x2            Modalities                                      "

## 2024-09-09 ENCOUNTER — APPOINTMENT (OUTPATIENT)
Facility: REHABILITATION | Age: 69
End: 2024-09-09
Payer: MEDICARE

## 2024-09-09 NOTE — PROGRESS NOTES
Re-Evaluation      Today's date: 2024  Patient name: Zohra Barriga  : 1955  MRN: [id number]  Referring provider: Celestino Santiago DO  Dx:   No diagnosis found.                   Subjective: Reports that she is doing okay. Is having cataracts surgery this Wednesday.       Objective: See treatment diary below    Vitals:  -BP: 160/80  -HR: 63  -SpO2: 96%              Balance Test Initial Eval         5x Sit to Stand: 32.4s needs BL UE         TU.1s rolator.          Gait Speed:            2 Minute Walk Test: 136ft rolator          6 Minute Walk Test:           [place] Balance Scale:           FGA:                                        Goals  STGs (4 weeks)  Pt will be independent with comprehensive HEP   Pt will demonstrate a least 5-second improvement on timed up and go  Patient will demonstrate at least 5-second improvement on 5 times sit to stand  Patient will be able to complete 6-minute walk test    LTG's (to be achieved by d/c)  Pt will be able to self manage sx's independently   Pt will be able to ambulate at least 50 feet within her household utilizing single-point cane for mobility    Assessment: Tolerated treatment well. Initiated treadmill walking today with good tolerance able to gradually progress speed through out, but needing visual and verbal cues for step length and posture. Able to progress overground walking speed with SPC today as well. Less than optimal response to pball roll out for trunk stretching today reported L sided lumbar pain following. Would benefit from continued PT.       Plan: Continue per plan of care.  Progress treatment as tolerated.       Precautions: Coronary artery disease, congestive heart failure, hx of DVT, asthma, type II DM, chronic low back pain, neuropathy,     Access Code: [email or web address]  URL: [email or web address]  Date: 2024  Prepared by: Armen Campo    Program Notes  Daily Walking Program-Take walks in your home in an open space with your  "walker for at least 5 minutes. Do this a 2-3x a day as tolerated.     Exercises  - Sit to Stand with Armchair  - 1 x daily - 7 x weekly - 3 sets - 10 reps  - Standing March with Counter Support  - 1 x daily - 7 x weekly - 3 sets - 10 reps  - Seated long arc quads - 3 sets - 10 reps     Manuals 9/9  7/15 7/22 7/25 7/29                                       Neuro Re-Ed     Outcome measures, results, progress     Fwd/bwd          Side stepping         Stepping over cones         STS   3x5 BUE push   3x5 BUE push     Blaze pods                           Ther Ex         Scifit    10 min L3, SPM goal of 70 5 min L3 SPM > 50  10  min L3 SPM > 70 SPM  8min L4    Treadmill       0.5-0.7mph 8min   Seated QL S    10x 5\" 10x5\", pball rollout stretch too   10x5\" pball rollout to L bias   Lateral trunk stretching    X1 min but very uncomfortable for patient                                                    Ther Activity                           Gait Training         SPC in SOLO   - 16 ft   89 bpm, 93% Spo2   - 22 ft  16 ft R SPC,     50ft R SPC CGAx1   SPC on R 90ft x2            Modalities                                      "

## 2024-09-09 NOTE — PROGRESS NOTES
Re-Evaluation      Today's date: 2024  Patient name: Zohra Barriga  : 1955  MRN: [id number]  Referring provider: Celestino Santiago DO  Dx:   No diagnosis found.                   Subjective: Reports that she is doing okay. Is having cataracts surgery this Wednesday.       Objective: See treatment diary below    Vitals:  -BP: 160/80  -HR: 63  -SpO2: 96%              Balance Test Initial Eval         5x Sit to Stand: 32.4s needs BL UE         TU.1s rolator.          Gait Speed:            2 Minute Walk Test: 136ft rolator          6 Minute Walk Test:           Arrieta Balance Scale:           FGA:                                        Goals  STGs (4 weeks)  Pt will be independent with comprehensive HEP   Pt will demonstrate a least 5-second improvement on timed up and go  Patient will demonstrate at least 5-second improvement on 5 times sit to stand  Patient will be able to complete 6-minute walk test    LTG's (to be achieved by d/c)  Pt will be able to self manage sx's independently   Pt will be able to ambulate at least 50 feet within her household utilizing single-point cane for mobility    Assessment: Tolerated treatment well. Initiated treadmill walking today with good tolerance able to gradually progress speed through out, but needing visual and verbal cues for step length and posture. Able to progress overground walking speed with SPC today as well. Less than optimal response to pball roll out for trunk stretching today reported L sided lumbar pain following. Would benefit from continued PT.       Plan: Continue per plan of care.  Progress treatment as tolerated.       Precautions: Coronary artery disease, congestive heart failure, hx of DVT, asthma, type II DM, chronic low back pain, neuropathy,     Access Code: [email or web address]  URL: [email or web address]  Date: 2024  Prepared by: Armen Campo    Program Notes  Daily Walking Program-Take walks in your home in an open space with your  "walker for at least 5 minutes. Do this a 2-3x a day as tolerated.     Exercises  - Sit to Stand with Armchair  - 1 x daily - 7 x weekly - 3 sets - 10 reps  - Standing March with Counter Support  - 1 x daily - 7 x weekly - 3 sets - 10 reps  - Seated long arc quads - 3 sets - 10 reps     Manuals 9/9  7/15 7/22 7/25 7/29                                       Neuro Re-Ed     Outcome measures, results, progress     Fwd/bwd          Side stepping         Stepping over cones         STS   3x5 BUE push   3x5 BUE push     Blaze pods                           Ther Ex         Scifit    10 min L3, SPM goal of 70 5 min L3 SPM > 50  10  min L3 SPM > 70 SPM  8min L4    Treadmill       0.5-0.7mph 8min   Seated QL S    10x 5\" 10x5\", pball rollout stretch too   10x5\" pball rollout to L bias   Lateral trunk stretching    X1 min but very uncomfortable for patient                                                    Ther Activity                           Gait Training         SPC in SOLO   - 16 ft   89 bpm, 93% Spo2   - 22 ft  16 ft R SPC,     50ft R SPC CGAx1   SPC on R 90ft x2            Modalities                                      "

## 2024-09-12 ENCOUNTER — EVALUATION (OUTPATIENT)
Facility: REHABILITATION | Age: 69
End: 2024-09-12
Payer: MEDICARE

## 2024-09-12 DIAGNOSIS — G89.4 CHRONIC PAIN DISORDER: ICD-10-CM

## 2024-09-12 DIAGNOSIS — R26.89 BALANCE DISORDER: Primary | ICD-10-CM

## 2024-09-12 PROCEDURE — 97110 THERAPEUTIC EXERCISES: CPT | Performed by: PHYSICAL THERAPIST

## 2024-09-12 NOTE — PROGRESS NOTES
Re-Evaluation      Today's date: 2024  Patient name: Zorha Barriga  : 1955  MRN: [id number]  Referring provider: Celestino Santiago DO  Dx:   Encounter Diagnosis     ICD-10-CM    1. Balance disorder  R26.89       2. Chronic pain disorder  G89.4                          Subjective: Since last visit she fell out of bed while sleeping and broke her pinky finger on the R side. She continues to walking with a rolling walker. She has a brace for her broken finger but doesn't use at all times because she says she cannot drive while wearing it.  She brought a standard 2 wheeled rolling walker today instead of her usual 4 wheeled rollator because she says it is the only thing she can get in and out of the car by herself.      Objective: See treatment diary below               Balance Test Initial Eval         5x Sit to Stand: 32.4s needs BL UE  25s Needs BL UE        TU.1s rolator.   33s standard RW       Gait Speed:            2 Minute Walk Test: 136ft rolator   105ft standard RW       6 Minute Walk Test:           Arrieta Balance Scale:           FGA:                                        Goals  STGs (4 weeks)  Pt will be independent with comprehensive HEP   Pt will demonstrate a least 5-second improvement on timed up and go  Patient will demonstrate at least 5-second improvement on 5 times sit to stand  Patient will be able to complete 6-minute walk test    LTG's (to be achieved by d/c)  Pt will be able to self manage sx's independently   Pt will be able to ambulate at least 50 feet within her household utilizing single-point cane for mobility    Assessment: Lindsay returns to physical therapy following hiatus secondary to other medical issues and visit cancellations.  She arrived late to visit today limiting time available for further testing.  Outcome measures that were performed today showed similar findings from last time in physical therapy with some declines on 2-minute walk test which are likely secondary  "to her using an unfamiliar assistive device today.  She continues to demonstrate significant functional lower extremity weakness, impaired functional endurance, and significantly deviated posture to the right which results in greater difficulty ambulating and using walker.  She is at risk for falls per multiple outcome measures.  She would benefit from skilled PT to address these deficits and maximize her function.  We discussed today that in order for her to make progress it is very important that she improves her consistency with attendance to physical therapy.      Plan: Reinitiate plan of care 2 times per week for 8 weeks.     Precautions: Coronary artery disease, congestive heart failure, hx of DVT, asthma, type II DM, chronic low back pain, neuropathy,     Access Code: [email or web address]  URL: [email or web address]  Date: 06/28/2024  Prepared by: Armen Campo    Program Notes  Daily Walking Program-Take walks in your home in an open space with your walker for at least 5 minutes. Do this a 2-3x a day as tolerated.     Exercises  - Sit to Stand with Armchair  - 1 x daily - 7 x weekly - 3 sets - 10 reps  - Standing March with Counter Support  - 1 x daily - 7 x weekly - 3 sets - 10 reps  - Seated long arc quads - 3 sets - 10 reps     Manuals 9/12  7/15 7/22 7/25 7/29                                       Neuro Re-Ed See testing    Outcome measures, results, progress     Fwd/bwd          Side stepping         Stepping over cones         STS   3x5 BUE push   3x5 BUE push     Blaze pods                           Ther Ex         Scifit  10min L1   10 min L3, SPM goal of 70 5 min L3 SPM > 50  10  min L3 SPM > 70 SPM  8min L4    Treadmill       0.5-0.7mph 8min   Seated QL S    10x 5\" 10x5\", pball rollout stretch too   10x5\" pball rollout to L bias   Lateral trunk stretching    X1 min but very uncomfortable for patient                                                    Ther Activity                           Gait " Training         SPC in SOLO   - 16 ft   89 bpm, 93% Spo2   - 22 ft  16 ft R SPC,     50ft R SPC CGAx1   SPC on R 90ft x2            Modalities

## 2024-09-12 NOTE — LETTER
2024    Celestino Santiago DO  [address]  [address]  [facility] [address]    Patient: Zohra Barriga   YOB: 1955   Date of Visit: 2024     Encounter Diagnosis     ICD-10-CM    1. Balance disorder  R26.89       2. Chronic pain disorder  G89.4           Dear Dr. Santiago:    Thank you for your recent referral of Zohra Barriga. Please review the attached evaluation summary from Zohra's recent visit.     Please verify that you agree with the plan of care by signing the attached order.     If you have any questions or concerns, please do not hesitate to call.     I sincerely appreciate the opportunity to share in the care of one of your patients and hope to have another opportunity to work with you in the near future.       Sincerely,    Armen Campo, PT      Referring Provider:      I certify that I have read the below Plan of Care and certify the need for these services furnished under this plan of treatment while under my care.                    Celestino Santiago DO  [address]  [address]  [facility] [address]  Via Fax: [phone]          Re-Evaluation      Today's date: 2024  Patient name: Zohra Barriga  : 1955  MRN: [id number]  Referring provider: Celestino Santiago DO  Dx:   Encounter Diagnosis     ICD-10-CM    1. Balance disorder  R26.89       2. Chronic pain disorder  G89.4                          Subjective: Since last visit she fell out of bed while sleeping and broke her pinky finger on the R side. She continues to walking with a rolling walker. She has a brace for her broken finger but doesn't use at all times because she says she cannot drive while wearing it.  She brought a standard 2 wheeled rolling walker today instead of her usual 4 wheeled rollator because she says it is the only thing she can get in and out of the car by herself.      Objective: See treatment diary below               Balance Test Initial Eval         5x Sit to Stand: 32.4s needs BL  UE  25s Needs BL UE        TU.1s rolator.   33s standard RW       Gait Speed:            2 Minute Walk Test: 136ft rolator   105ft standard RW       6 Minute Walk Test:           Arrieta Balance Scale:           FGA:                                        Goals  STGs (4 weeks)  Pt will be independent with comprehensive HEP   Pt will demonstrate a least 5-second improvement on timed up and go  Patient will demonstrate at least 5-second improvement on 5 times sit to stand  Patient will be able to complete 6-minute walk test    LTG's (to be achieved by d/c)  Pt will be able to self manage sx's independently   Pt will be able to ambulate at least 50 feet within her household utilizing single-point cane for mobility    Assessment: Lindsay returns to physical therapy following hiatus secondary to other medical issues and visit cancellations.  She arrived late to visit today limiting time available for further testing.  Outcome measures that were performed today showed similar findings from last time in physical therapy with some declines on 2-minute walk test which are likely secondary to her using an unfamiliar assistive device today.  She continues to demonstrate significant functional lower extremity weakness, impaired functional endurance, and significantly deviated posture to the right which results in greater difficulty ambulating and using walker.  She is at risk for falls per multiple outcome measures.  She would benefit from skilled PT to address these deficits and maximize her function.  We discussed today that in order for her to make progress it is very important that she improves her consistency with attendance to physical therapy.      Plan: Reinitiate plan of care 2 times per week for 8 weeks.     Precautions: Coronary artery disease, congestive heart failure, hx of DVT, asthma, type II DM, chronic low back pain, neuropathy,     Access Code: [email or web address]  URL: [email or web address]  Date:  "06/28/2024  Prepared by: Armen Campo    Program Notes  Daily Walking Program-Take walks in your home in an open space with your walker for at least 5 minutes. Do this a 2-3x a day as tolerated.     Exercises  - Sit to Stand with Armchair  - 1 x daily - 7 x weekly - 3 sets - 10 reps  - Standing March with Counter Support  - 1 x daily - 7 x weekly - 3 sets - 10 reps  - Seated long arc quads - 3 sets - 10 reps     Manuals 9/12  7/15 7/22 7/25 7/29                                       Neuro Re-Ed See testing    Outcome measures, results, progress     Fwd/bwd          Side stepping         Stepping over cones         STS   3x5 BUE push   3x5 BUE push     Blaze pods                           Ther Ex         Scifit  10min L1   10 min L3, SPM goal of 70 5 min L3 SPM > 50  10  min L3 SPM > 70 SPM  8min L4    Treadmill       0.5-0.7mph 8min   Seated QL S    10x 5\" 10x5\", pball rollout stretch too   10x5\" pball rollout to L bias   Lateral trunk stretching    X1 min but very uncomfortable for patient                                                    Ther Activity                           Gait Training         SPC in SOLO   - 16 ft   89 bpm, 93% Spo2   - 22 ft  16 ft R SPC,     50ft R SPC CGAx1   SPC on R 90ft x2            Modalities                                                      "

## 2024-09-16 ENCOUNTER — TELEPHONE (OUTPATIENT)
Age: 69
End: 2024-09-16

## 2024-09-16 ENCOUNTER — OFFICE VISIT (OUTPATIENT)
Facility: REHABILITATION | Age: 69
End: 2024-09-16
Payer: MEDICARE

## 2024-09-16 DIAGNOSIS — R26.89 BALANCE DISORDER: Primary | ICD-10-CM

## 2024-09-16 DIAGNOSIS — G89.4 CHRONIC PAIN DISORDER: ICD-10-CM

## 2024-09-16 PROCEDURE — 97116 GAIT TRAINING THERAPY: CPT | Performed by: PHYSICAL THERAPIST

## 2024-09-16 PROCEDURE — 97112 NEUROMUSCULAR REEDUCATION: CPT | Performed by: PHYSICAL THERAPIST

## 2024-09-16 NOTE — PROGRESS NOTES
Daily Note    Today's date: 2024  Patient name: Zohra Barriga  : 1955  MRN: [id number]  Referring provider: Celestino Santiago DO  Dx:   Encounter Diagnosis     ICD-10-CM    1. Balance disorder  R26.89       2. Chronic pain disorder  G89.4                            Subjective: Reports doing ok. No changes from last visit.       Objective: See treatment diary below    Pt treated 1 on 1 from 4:15 to 5:00          Assessment: Tolerated treatment well.  Performed trial of walking with hiking pole today for encouraging more upright posture however patient struggled to utilize this and felt more stable utilizing single-point cane today.  Provided cueing throughout in order to improve postural alignment however lateral deviation is too severe to fully correct for.  Also requires cueing to maintain alignment with her walker for safety which she was able to improve with today.  Would benefit from continued PT.      Plan: Challenge dynamic balance strength and endurance as able.  Encourage safety with walker.     Precautions: Coronary artery disease, congestive heart failure, hx of DVT, asthma, type II DM, chronic low back pain, neuropathy,     Access Code: [email or web address]  URL: [email or web address]  Date: 2024  Prepared by: Armen Campo    Program Notes  Daily Walking Program-Take walks in your home in an open space with your walker for at least 5 minutes. Do this a 2-3x a day as tolerated.     Exercises  - Sit to Stand with Armchair  - 1 x daily - 7 x weekly - 3 sets - 10 reps  - Standing March with Counter Support  - 1 x daily - 7 x weekly - 3 sets - 10 reps  - Seated long arc quads - 3 sets - 10 reps     Manuals                                            Neuro Re-Ed See testing        Fwd/bwd          Side stepping         Stepping over cones         STS  X10 from chair single UE push off.        Blaze pods         Weaving between cones  20ft laps x2 RW        Standing, walk 10ft, sit    X10 using SPC        Ther Ex         Scifit  10min L1  10min L4       Treadmill          Seated QL S          Lateral trunk stretching                                                       Ther Activity                           Gait Training         SPC in SOLO  Single hiking pole on R 40ft    SPC on R 40ft                 Modalities

## 2024-09-16 NOTE — TELEPHONE ENCOUNTER
New Patient Triage  Please Triage:   New Patient-   What is the reason for the patients appointment?  Incontinence    Imaging/Lab Results:     Insurance:   Do we accept the pt's insurance or is the patient Self-Pay?  Provider & Plan:  medicare   Member ID#:   [id number]         History  Has the pt had any previous urologist? [facility]    Have pt records been requested? In Epic     Has the pt had any outside testing done? no    Does the pt have a personal history of cancer?: no

## 2024-09-19 ENCOUNTER — APPOINTMENT (OUTPATIENT)
Facility: REHABILITATION | Age: 69
End: 2024-09-19
Payer: MEDICARE

## 2024-09-20 ENCOUNTER — HOSPITAL ENCOUNTER (OUTPATIENT)
Facility: HOSPITAL | Age: 69
Setting detail: OBSERVATION
Discharge: HOME | End: 2024-09-21
Attending: EMERGENCY MEDICINE | Admitting: FAMILY MEDICINE
Payer: MEDICARE

## 2024-09-20 ENCOUNTER — APPOINTMENT (EMERGENCY)
Dept: CT IMAGING | Facility: HOSPITAL | Age: 69
End: 2024-09-20
Payer: MEDICARE

## 2024-09-20 DIAGNOSIS — S22.31XA RIGHT RIB FRACTURE: Primary | ICD-10-CM

## 2024-09-20 DIAGNOSIS — D61.818 PANCYTOPENIA (HCC): ICD-10-CM

## 2024-09-20 DIAGNOSIS — R59.0 HILAR LYMPHADENOPATHY: ICD-10-CM

## 2024-09-20 DIAGNOSIS — I27.21 PULMONARY ARTERY HYPERTENSION (HCC): ICD-10-CM

## 2024-09-20 DIAGNOSIS — R93.89 ABNORMAL CT OF THE CHEST: ICD-10-CM

## 2024-09-20 LAB
2HR DELTA HS TROPONIN: 3 NG/L
ALBUMIN SERPL BCG-MCNC: 3.7 G/DL (ref 3.5–5)
ALP SERPL-CCNC: 77 U/L (ref 34–104)
ALT SERPL W P-5'-P-CCNC: 26 U/L (ref 7–52)
ANION GAP SERPL CALCULATED.3IONS-SCNC: 8 MMOL/L (ref 4–13)
APTT PPP: 33 SECONDS (ref 23–34)
AST SERPL W P-5'-P-CCNC: 47 U/L (ref 13–39)
ATRIAL RATE: 76 BPM
ATRIAL RATE: 82 BPM
BASOPHILS # BLD AUTO: 0.01 THOUSANDS/ΜL (ref 0–0.1)
BASOPHILS NFR BLD AUTO: 0 % (ref 0–1)
BILIRUB SERPL-MCNC: 0.77 MG/DL (ref 0.2–1)
BNP SERPL-MCNC: 240 PG/ML (ref 0–100)
BUN SERPL-MCNC: 32 MG/DL (ref 5–25)
CALCIUM SERPL-MCNC: 8.4 MG/DL (ref 8.4–10.2)
CARDIAC TROPONIN I PNL SERPL HS: 27 NG/L
CARDIAC TROPONIN I PNL SERPL HS: 30 NG/L
CHLORIDE SERPL-SCNC: 107 MMOL/L (ref 96–108)
CO2 SERPL-SCNC: 20 MMOL/L (ref 21–32)
CREAT SERPL-MCNC: 1.47 MG/DL (ref 0.6–1.3)
D DIMER PPP FEU-MCNC: 3.91 UG/ML FEU
EOSINOPHIL # BLD AUTO: 0.05 THOUSAND/ΜL (ref 0–0.61)
EOSINOPHIL NFR BLD AUTO: 2 % (ref 0–6)
ERYTHROCYTE [DISTWIDTH] IN BLOOD BY AUTOMATED COUNT: 15.8 % (ref 11.6–15.1)
GFR SERPL CREATININE-BSD FRML MDRD: 36 ML/MIN/1.73SQ M
GLUCOSE SERPL-MCNC: 144 MG/DL (ref 65–140)
GLUCOSE SERPL-MCNC: 202 MG/DL (ref 65–140)
HCT VFR BLD AUTO: 33.8 % (ref 34.8–46.1)
HGB BLD-MCNC: 10.5 G/DL (ref 11.5–15.4)
IMM GRANULOCYTES # BLD AUTO: 0.02 THOUSAND/UL (ref 0–0.2)
IMM GRANULOCYTES NFR BLD AUTO: 1 % (ref 0–2)
INR PPP: 1.17 (ref 0.85–1.19)
LIPASE SERPL-CCNC: 22 U/L (ref 11–82)
LYMPHOCYTES # BLD AUTO: 0.59 THOUSANDS/ΜL (ref 0.6–4.47)
LYMPHOCYTES NFR BLD AUTO: 19 % (ref 14–44)
MCH RBC QN AUTO: 27.9 PG (ref 26.8–34.3)
MCHC RBC AUTO-ENTMCNC: 31.1 G/DL (ref 31.4–37.4)
MCV RBC AUTO: 90 FL (ref 82–98)
MONOCYTES # BLD AUTO: 0.39 THOUSAND/ΜL (ref 0.17–1.22)
MONOCYTES NFR BLD AUTO: 12 % (ref 4–12)
NEUTROPHILS # BLD AUTO: 2.08 THOUSANDS/ΜL (ref 1.85–7.62)
NEUTS SEG NFR BLD AUTO: 66 % (ref 43–75)
NRBC BLD AUTO-RTO: 0 /100 WBCS
P AXIS: 55 DEGREES
P AXIS: 57 DEGREES
PLATELET # BLD AUTO: 134 THOUSANDS/UL (ref 149–390)
PMV BLD AUTO: 9.2 FL (ref 8.9–12.7)
POTASSIUM SERPL-SCNC: 5.3 MMOL/L (ref 3.5–5.3)
PR INTERVAL: 202 MS
PR INTERVAL: 212 MS
PROT SERPL-MCNC: 6.4 G/DL (ref 6.4–8.4)
PROTHROMBIN TIME: 15.1 SECONDS (ref 12.3–15)
QRS AXIS: 96 DEGREES
QRS AXIS: 98 DEGREES
QRSD INTERVAL: 144 MS
QRSD INTERVAL: 166 MS
QT INTERVAL: 458 MS
QT INTERVAL: 460 MS
QTC INTERVAL: 515 MS
QTC INTERVAL: 537 MS
RBC # BLD AUTO: 3.77 MILLION/UL (ref 3.81–5.12)
SODIUM SERPL-SCNC: 135 MMOL/L (ref 135–147)
T WAVE AXIS: -60 DEGREES
T WAVE AXIS: -64 DEGREES
VENTRICULAR RATE: 76 BPM
VENTRICULAR RATE: 82 BPM
WBC # BLD AUTO: 3.14 THOUSAND/UL (ref 4.31–10.16)

## 2024-09-20 PROCEDURE — 96376 TX/PRO/DX INJ SAME DRUG ADON: CPT

## 2024-09-20 PROCEDURE — 74177 CT ABD & PELVIS W/CONTRAST: CPT

## 2024-09-20 PROCEDURE — 36415 COLL VENOUS BLD VENIPUNCTURE: CPT

## 2024-09-20 PROCEDURE — 83880 ASSAY OF NATRIURETIC PEPTIDE: CPT

## 2024-09-20 PROCEDURE — 84484 ASSAY OF TROPONIN QUANT: CPT

## 2024-09-20 PROCEDURE — 96361 HYDRATE IV INFUSION ADD-ON: CPT

## 2024-09-20 PROCEDURE — 96375 TX/PRO/DX INJ NEW DRUG ADDON: CPT

## 2024-09-20 PROCEDURE — 99285 EMERGENCY DEPT VISIT HI MDM: CPT

## 2024-09-20 PROCEDURE — 93010 ELECTROCARDIOGRAM REPORT: CPT | Performed by: STUDENT IN AN ORGANIZED HEALTH CARE EDUCATION/TRAINING PROGRAM

## 2024-09-20 PROCEDURE — 80053 COMPREHEN METABOLIC PANEL: CPT

## 2024-09-20 PROCEDURE — 83690 ASSAY OF LIPASE: CPT

## 2024-09-20 PROCEDURE — 96365 THER/PROPH/DIAG IV INF INIT: CPT

## 2024-09-20 PROCEDURE — 93005 ELECTROCARDIOGRAM TRACING: CPT

## 2024-09-20 PROCEDURE — 85610 PROTHROMBIN TIME: CPT

## 2024-09-20 PROCEDURE — 85379 FIBRIN DEGRADATION QUANT: CPT

## 2024-09-20 PROCEDURE — 85730 THROMBOPLASTIN TIME PARTIAL: CPT

## 2024-09-20 PROCEDURE — 71275 CT ANGIOGRAPHY CHEST: CPT

## 2024-09-20 PROCEDURE — 85025 COMPLETE CBC W/AUTO DIFF WBC: CPT

## 2024-09-20 PROCEDURE — 82948 REAGENT STRIP/BLOOD GLUCOSE: CPT

## 2024-09-20 RX ADMIN — DEXTROSE 1000 MG: 50 INJECTION, SOLUTION INTRAVENOUS at 22:58

## 2024-09-20 RX ADMIN — SODIUM CHLORIDE 500 ML: 0.9 INJECTION, SOLUTION INTRAVENOUS at 19:55

## 2024-09-20 RX ADMIN — MORPHINE SULFATE 2 MG: 2 INJECTION, SOLUTION INTRAMUSCULAR; INTRAVENOUS at 22:24

## 2024-09-20 RX ADMIN — MORPHINE SULFATE 2 MG: 2 INJECTION, SOLUTION INTRAMUSCULAR; INTRAVENOUS at 19:56

## 2024-09-20 RX ADMIN — IOHEXOL 100 ML: 350 INJECTION, SOLUTION INTRAVENOUS at 21:38

## 2024-09-21 ENCOUNTER — APPOINTMENT (EMERGENCY)
Dept: CT IMAGING | Facility: HOSPITAL | Age: 69
End: 2024-09-21
Payer: MEDICARE

## 2024-09-21 VITALS
DIASTOLIC BLOOD PRESSURE: 59 MMHG | SYSTOLIC BLOOD PRESSURE: 137 MMHG | TEMPERATURE: 97.4 F | WEIGHT: 170 LBS | RESPIRATION RATE: 20 BRPM | HEART RATE: 66 BPM | BODY MASS INDEX: 34.34 KG/M2 | OXYGEN SATURATION: 95 %
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
## 2024-09-21 PROBLEM — I50.32 CHRONIC DIASTOLIC (CONGESTIVE) HEART FAILURE (HCC): Status: ACTIVE | Noted: 2023-02-21

## 2024-09-21 PROBLEM — S22.31XA CLOSED FRACTURE OF ONE RIB OF RIGHT SIDE: Status: ACTIVE | Noted: 2024-09-21

## 2024-09-21 LAB
ALBUMIN SERPL BCG-MCNC: 3.3 G/DL (ref 3.5–5)
ALP SERPL-CCNC: 75 U/L (ref 34–104)
ALT SERPL W P-5'-P-CCNC: 26 U/L (ref 7–52)
ANION GAP SERPL CALCULATED.3IONS-SCNC: 7 MMOL/L (ref 4–13)
AST SERPL W P-5'-P-CCNC: 30 U/L (ref 13–39)
BASOPHILS # BLD AUTO: 0.01 THOUSANDS/ΜL (ref 0–0.1)
BASOPHILS NFR BLD AUTO: 0 % (ref 0–1)
BILIRUB SERPL-MCNC: 0.71 MG/DL (ref 0.2–1)
BUN SERPL-MCNC: 24 MG/DL (ref 5–25)
CALCIUM ALBUM COR SERPL-MCNC: 8.7 MG/DL (ref 8.3–10.1)
CALCIUM SERPL-MCNC: 8.1 MG/DL (ref 8.4–10.2)
CHLORIDE SERPL-SCNC: 109 MMOL/L (ref 96–108)
CO2 SERPL-SCNC: 23 MMOL/L (ref 21–32)
CREAT SERPL-MCNC: 1.1 MG/DL (ref 0.6–1.3)
DME PARACHUTE DELIVERY DATE REQUESTED: NORMAL
DME PARACHUTE ITEM DESCRIPTION: NORMAL
DME PARACHUTE ITEM DESCRIPTION: NORMAL
DME PARACHUTE ORDER STATUS: NORMAL
DME PARACHUTE SUPPLIER NAME: NORMAL
DME PARACHUTE SUPPLIER PHONE: NORMAL
EOSINOPHIL # BLD AUTO: 0.07 THOUSAND/ΜL (ref 0–0.61)
EOSINOPHIL NFR BLD AUTO: 3 % (ref 0–6)
ERYTHROCYTE [DISTWIDTH] IN BLOOD BY AUTOMATED COUNT: 15.9 % (ref 11.6–15.1)
GFR SERPL CREATININE-BSD FRML MDRD: 51 ML/MIN/1.73SQ M
GLUCOSE P FAST SERPL-MCNC: 136 MG/DL (ref 65–99)
GLUCOSE SERPL-MCNC: 124 MG/DL (ref 65–140)
GLUCOSE SERPL-MCNC: 136 MG/DL (ref 65–140)
GLUCOSE SERPL-MCNC: 136 MG/DL (ref 65–140)
GLUCOSE SERPL-MCNC: 143 MG/DL (ref 65–140)
GLUCOSE SERPL-MCNC: 257 MG/DL (ref 65–140)
HCT VFR BLD AUTO: 29.5 % (ref 34.8–46.1)
HGB BLD-MCNC: 9.4 G/DL (ref 11.5–15.4)
IMM GRANULOCYTES # BLD AUTO: 0.02 THOUSAND/UL (ref 0–0.2)
IMM GRANULOCYTES NFR BLD AUTO: 1 % (ref 0–2)
LYMPHOCYTES # BLD AUTO: 0.5 THOUSANDS/ΜL (ref 0.6–4.47)
LYMPHOCYTES NFR BLD AUTO: 19 % (ref 14–44)
MCH RBC QN AUTO: 27.9 PG (ref 26.8–34.3)
MCHC RBC AUTO-ENTMCNC: 31.9 G/DL (ref 31.4–37.4)
MCV RBC AUTO: 88 FL (ref 82–98)
MONOCYTES # BLD AUTO: 0.32 THOUSAND/ΜL (ref 0.17–1.22)
MONOCYTES NFR BLD AUTO: 12 % (ref 4–12)
NEUTROPHILS # BLD AUTO: 1.76 THOUSANDS/ΜL (ref 1.85–7.62)
NEUTS SEG NFR BLD AUTO: 65 % (ref 43–75)
NRBC BLD AUTO-RTO: 0 /100 WBCS
PLATELET # BLD AUTO: 117 THOUSANDS/UL (ref 149–390)
PMV BLD AUTO: 9.1 FL (ref 8.9–12.7)
POTASSIUM SERPL-SCNC: 4.4 MMOL/L (ref 3.5–5.3)
PROCALCITONIN SERPL-MCNC: 0.08 NG/ML
PROT SERPL-MCNC: 5.8 G/DL (ref 6.4–8.4)
RBC # BLD AUTO: 3.37 MILLION/UL (ref 3.81–5.12)
SODIUM SERPL-SCNC: 139 MMOL/L (ref 135–147)
WBC # BLD AUTO: 2.68 THOUSAND/UL (ref 4.31–10.16)

## 2024-09-21 PROCEDURE — 80053 COMPREHEN METABOLIC PANEL: CPT | Performed by: PHYSICIAN ASSISTANT

## 2024-09-21 PROCEDURE — 97163 PT EVAL HIGH COMPLEX 45 MIN: CPT

## 2024-09-21 PROCEDURE — 99235 HOSP IP/OBS SAME DATE MOD 70: CPT | Performed by: FAMILY MEDICINE

## 2024-09-21 PROCEDURE — 85025 COMPLETE CBC W/AUTO DIFF WBC: CPT | Performed by: PHYSICIAN ASSISTANT

## 2024-09-21 PROCEDURE — 82948 REAGENT STRIP/BLOOD GLUCOSE: CPT

## 2024-09-21 PROCEDURE — 97166 OT EVAL MOD COMPLEX 45 MIN: CPT

## 2024-09-21 PROCEDURE — 84145 PROCALCITONIN (PCT): CPT | Performed by: PHYSICIAN ASSISTANT

## 2024-09-21 PROCEDURE — 99223 1ST HOSP IP/OBS HIGH 75: CPT | Performed by: PHYSICIAN ASSISTANT

## 2024-09-21 PROCEDURE — 72125 CT NECK SPINE W/O DYE: CPT

## 2024-09-21 RX ORDER — ATORVASTATIN CALCIUM 80 MG/1
80 TABLET, FILM COATED ORAL
Status: DISCONTINUED | OUTPATIENT
Start: 2024-09-21 | End: 2024-09-21 | Stop reason: HOSPADM

## 2024-09-21 RX ORDER — CARVEDILOL 3.12 MG/1
3.12 TABLET ORAL 2 TIMES DAILY WITH MEALS
Status: DISCONTINUED | OUTPATIENT
Start: 2024-09-21 | End: 2024-09-21 | Stop reason: HOSPADM

## 2024-09-21 RX ORDER — GABAPENTIN 300 MG/1
300 CAPSULE ORAL
Status: DISCONTINUED | OUTPATIENT
Start: 2024-09-21 | End: 2024-09-21 | Stop reason: HOSPADM

## 2024-09-21 RX ORDER — LEVOTHYROXINE SODIUM 100 UG/1
100 TABLET ORAL
Status: DISCONTINUED | OUTPATIENT
Start: 2024-09-21 | End: 2024-09-21 | Stop reason: HOSPADM

## 2024-09-21 RX ORDER — PRAMIPEXOLE DIHYDROCHLORIDE 1 MG/1
1 TABLET ORAL 2 TIMES DAILY
Status: DISCONTINUED | OUTPATIENT
Start: 2024-09-21 | End: 2024-09-21 | Stop reason: HOSPADM

## 2024-09-21 RX ORDER — OXYCODONE HYDROCHLORIDE 5 MG/1
5 TABLET ORAL EVERY 4 HOURS PRN
Status: DISCONTINUED | OUTPATIENT
Start: 2024-09-21 | End: 2024-09-21 | Stop reason: HOSPADM

## 2024-09-21 RX ORDER — MORPHINE SULFATE 4 MG/ML
4 INJECTION, SOLUTION INTRAMUSCULAR; INTRAVENOUS EVERY 4 HOURS PRN
Status: DISCONTINUED | OUTPATIENT
Start: 2024-09-21 | End: 2024-09-21 | Stop reason: HOSPADM

## 2024-09-21 RX ORDER — ENOXAPARIN SODIUM 100 MG/ML
40 INJECTION SUBCUTANEOUS DAILY
Status: DISCONTINUED | OUTPATIENT
Start: 2024-09-21 | End: 2024-09-21 | Stop reason: HOSPADM

## 2024-09-21 RX ADMIN — ENOXAPARIN SODIUM 40 MG: 40 INJECTION SUBCUTANEOUS at 09:08

## 2024-09-21 RX ADMIN — PRAMIPEXOLE DIHYDROCHLORIDE 1 MG: 1 TABLET ORAL at 09:08

## 2024-09-21 RX ADMIN — LEVOTHYROXINE SODIUM 100 MCG: 100 TABLET ORAL at 09:08

## 2024-09-21 RX ADMIN — CARVEDILOL 3.12 MG: 3.12 TABLET, FILM COATED ORAL at 09:08

## 2024-09-21 NOTE — ASSESSMENT & PLAN NOTE
Felt out of chair while typing at the computer w/o loc.  Fell and broke right 8th rib which is mildly displaced w/O2 sats 90-92% on RA  Cta pe study demonstrates associated infiltrate likely contusion vs atelectasis less likely pneumonia. Unlikely PNA, procal normal, vital normal, WBC fine.  Monitor off abx consult pt/ot/cm -> pt refusing home PT, will DC to home with outpt PT. Rollator ordered.

## 2024-09-21 NOTE — H&P
H&P - Hospitalist   Name: Zohra Barriga 69 y.o. female I MRN: [id number]  Unit/Bed#: [place] [address] -01 I Date of Admission: 9/20/2024   Date of Service: 9/21/2024 I Hospital Day: 0     Assessment & Plan  Closed fracture of one rib of right side  Ct chest w/Acute minimally displaced right eighth rib fracture laterally  Right middle lobe consolidative opacities with associated pleural calcifications. While this in part may represent atelectasis, findings are suspected to represent superimposed pneumonia or pulmonary contusions in the appropriate clinical context.  -check procalcitonin, analgesics, monitor off abx  -admit to observation  Essential hypertension  Continue coreg/torsemide  Pain syndrome, chronic  S/p pain pump  Type 2 diabetes mellitus with microalbuminuria, with long-term current use of insulin (HCC)  Lab Results   Component Value Date    HGBA1C 8.4 (H) 05/08/2024       Recent Labs     09/20/24  2146 09/21/24  0144 09/21/24  0428   POCGLU 144* 124 143*       Blood Sugar Average: Last 72 hrs:  (P) 137  Poorly controlled.  Continue insulin pump managed by patient for now  Chronic diastolic (congestive) heart failure (HCC)  Wt Readings from Last 3 Encounters:   09/20/24 77.1 kg (170 lb)   08/14/24 77.1 kg (170 lb)   08/13/24 77.1 kg (170 lb)     Continue bb demadex  Daily weights I/os         Fall  Felt out of chair while typing at the computer w/o loc.  Fell and broke right 8th rib which is mildly displaced w/O2 sats 90-92% on RA  Cta pe study demonstrates associated infiltrate likely contusion vs atelectasis less likely pneumonia  Monitor off abx consult pt/ot/cm  Check orthostatics, monitor closely on baclofen pump analgesics and gabapentin    VTE Pharmacologic Prophylaxis:   Moderate Risk (Score 3-4) - Pharmacological DVT Prophylaxis Ordered: enoxaparin (Lovenox).  Code Status: fc  Discussion with family:     Anticipated Length of Stay: Patient will be admitted on an observation basis with an  anticipated length of stay of less than 2 midnights secondary to rib fx and ?pna.    History of Present Illness   Chief Complaint: fall right rib pain    Zohra Barriga is a 69 y.o. female with a PMH of IDDM, chronic pain syndrome on baclofen pump gabapentin, chronic congestive heart failure, sarcoidosis, htn  who presents with fall right rib pain.  Pt notes she had a fall about 3-4 days pta while seated at her chair in early morning in kitchen typing on her keyboard.  She notes she chronically 'leans to the right' and slid out of her chair.  She fell on the ground and was down for about 5 hours until her  came home.  She notes she didn't immediately start with rib pain until 2 days later or so and had worsening right sided cp worse w/inspiration and difficulty w/mobility due to pain and came to ed for evaluation.  No loc or presyncope/palpitations.  No n/v/f/c.  No sick contacts. No cough sore throat.  In ed underwent trauma scan significant for minimally displaced right fib fx with associated atelectasis vs contusion vs infiltrate.  O2 sat was 90%s to 94% and admission was requested for pain control and borderline O2 sat..    Review of Systems   Constitutional:  Negative for appetite change, chills and fever.   Respiratory:  Positive for chest tightness and shortness of breath.    Gastrointestinal:  Negative for abdominal pain, diarrhea, nausea and vomiting.   All other systems reviewed and are negative.        Social History:  Marital Status: /Civil Union   Occupation:   Patient Pre-hospital Living Situation:   Patient Pre-hospital Level of Mobility: walker at baseline  Patient Pre-hospital Diet Restrictions:     Objective     Vitals:   Blood Pressure: 140/60 (09/21/24 0358)  Pulse: 67 (09/21/24 0358)  Temperature: (!) 97.2 °F (36.2 °C) (09/21/24 0358)  Temp Source: Oral (09/20/24 1901)  Respirations: 20 (09/21/24 0358)  Weight - Scale: 77.1 kg (170 lb) (09/20/24 1901)  SpO2: 97 % (09/21/24  0358)    Physical Exam  Vitals reviewed.   Constitutional:       General: She is not in acute distress.     Appearance: She is not ill-appearing, toxic-appearing or diaphoretic.   HENT:      Head: Normocephalic and atraumatic.      Right Ear: External ear normal.      Left Ear: External ear normal.   Eyes:      Extraocular Movements: Extraocular movements intact.   Cardiovascular:      Rate and Rhythm: Normal rate and regular rhythm.      Heart sounds: No murmur heard.     No friction rub. No gallop.   Pulmonary:      Breath sounds: No wheezing, rhonchi or rales.   Chest:      Chest wall: Tenderness present.   Abdominal:      General: There is no distension.      Palpations: Abdomen is soft. There is no mass.      Tenderness: There is no abdominal tenderness. There is no guarding or rebound.      Hernia: No hernia is present.   Musculoskeletal:      Right lower leg: No edema.      Left lower leg: No edema.   Skin:     General: Skin is warm and dry.   Neurological:      Mental Status: She is alert.      Comments: Slightly asymmetric smile improved w/facial expression   strength equal b/l in hands  Dorsiplantar flexion equal b/l         Lines/Drains:  Lines/Drains/Airways       Active Status       None                        Additional Data:   Lab Results: I have reviewed the following results:   Results from last 7 days   Lab Units 09/20/24 1955   WBC Thousand/uL 3.14*   HEMOGLOBIN g/dL 10.5*   HEMATOCRIT % 33.8*   PLATELETS Thousands/uL 134*   SEGS PCT % 66   LYMPHO PCT % 19   MONO PCT % 12   EOS PCT % 2     Results from last 7 days   Lab Units 09/20/24 1955   SODIUM mmol/L 135   POTASSIUM mmol/L 5.3   CHLORIDE mmol/L 107   CO2 mmol/L 20*   BUN mg/dL 32*   CREATININE mg/dL 1.47*   ANION GAP mmol/L 8   CALCIUM mg/dL 8.4   ALBUMIN g/dL 3.7   TOTAL BILIRUBIN mg/dL 0.77   ALK PHOS U/L 77   ALT U/L 26   AST U/L 47*   GLUCOSE RANDOM mg/dL 202*     Results from last 7 days   Lab Units 09/20/24 1955   INR  1.17      Results from last 7 days   Lab Units 09/21/24  0428 09/21/24  0144 09/20/24  2146   POC GLUCOSE mg/dl 143* 124 144*     Lab Results   Component Value Date    HGBA1C 8.4 (H) 05/08/2024    HGBA1C 8.4 (H) 11/13/2023    HGBA1C 6.3 (H) 07/29/2023           Imaging Review: Reviewed radiology reports from this admission including: CT chest.  Other Studies:     Administrative Statements       ** Please Note: This note has been constructed using a voice recognition system. **

## 2024-09-21 NOTE — NURSING NOTE
Reviewed discharge paperwork with patient and spouse. All questions and concerns answered at this time. IV and masimo removed. Patient and belongings accompanied to lobby via wheelchair with spouse and PCA for transport home.

## 2024-09-21 NOTE — PLAN OF CARE
Problem: Potential for Falls  Goal: Patient will remain free of falls  Description: INTERVENTIONS:  - Educate patient/family on patient safety including physical limitations  - Instruct patient to call for assistance with activity   - Consult OT/PT to assist with strengthening/mobility   - Keep Call bell within reach  - Keep bed low and locked with side rails adjusted as appropriate  - Keep care items and personal belongings within reach  - Initiate and maintain comfort rounds  - Make Fall Risk Sign visible to staff  - Obtain necessary fall risk management equipment  - Apply yellow socks and bracelet for high fall risk patients  - Consider moving patient to room near nurses station  Outcome: Progressing     Problem: Prexisting or High Potential for Compromised Skin Integrity  Goal: Skin integrity is maintained or improved  Description: INTERVENTIONS:  - Identify patients at risk for skin breakdown  - Assess and monitor skin integrity  - Assess and monitor nutrition and hydration status  - Monitor labs   - Assess for incontinence   - Turn and reposition patient  - Assist with mobility/ambulation  - Relieve pressure over bony prominences  - Avoid friction and shearing  - Provide appropriate hygiene as needed including keeping skin clean and dry  - Evaluate need for skin moisturizer/barrier cream  - Collaborate with interdisciplinary team   - Patient/family teaching  - Consider wound care consult   Outcome: Progressing     Problem: PAIN - ADULT  Goal: Verbalizes/displays adequate comfort level or baseline comfort level  Description: Interventions:  - Encourage patient to monitor pain and request assistance  - Assess pain using appropriate pain scale  - Administer analgesics based on type and severity of pain and evaluate response  - Implement non-pharmacological measures as appropriate and evaluate response  - Consider cultural and social influences on pain and pain management  - Notify physician/advanced  practitioner if interventions unsuccessful or patient reports new pain  Outcome: Progressing     Problem: SKIN/TISSUE INTEGRITY - ADULT  Goal: Skin Integrity remains intact(Skin Breakdown Prevention)  Description: Assess:  -Perform Osvaldo assessment   -Clean and moisturize skin   -Inspect skin when repositioning, toileting, and assisting with ADLS  -Assess under medical devices  -Assess extremities for adequate circulation and sensation     Bed Management:  -Have minimal linens on bed & keep smooth, unwrinkled  -Change linens as needed when moist or perspiring  -Avoid sitting or lying in one position for more than 2 hours while in bed  -Keep HOB at 30 degrees     Toileting:  -Offer bedside commode  -Assess for incontinence   -Use incontinent care products after each incontinent episode     Activity:  -Mobilize patient 3 times a day  -Encourage activity and walks on unit  -Encourage or provide ROM exercises   -Turn and reposition patient every 2 Hours  -Use appropriate equipment to lift or move patient in bed  -Instruct/ Assist with weight shifting  when out of bed in chair  -Consider limitation of chair time 2 hour intervals    Skin Care:  -Avoid use of baby powder, tape, friction and shearing, hot water or constrictive clothing  -Relieve pressure over bony prominences   -Do not massage red bony areas    Next Steps:  -Teach patient strategies to minimize risks    -Consider consults to  interdisciplinary teams   Outcome: Progressing  Goal: Incision(s), wounds(s) or drain site(s) healing without S/S of infection  Description: INTERVENTIONS  - Assess and document dressing, incision, wound bed, drain sites and surrounding tissue  - Provide patient and family education  - Perform skin care/dressing changes  Outcome: Progressing  Goal: Pressure injury heals and does not worsen  Description: Interventions:  - Implement low air loss mattress or specialty surface (Criteria met)  - Apply silicone foam dressing  -  Instruct/assist with weight shifting every 60 minutes when in chair   - Limit chair time to 2 hour intervals  - Use special pressure reducing interventions when in chair   - Apply fecal or urinary incontinence containment device   - Perform passive or active ROM   - Turn and reposition patient & offload bony prominences every 2 hours   - Utilize friction reducing device or surface for transfers   - Consider consults to  interdisciplinary teams   - Use incontinent care products after each incontinent episode  - Consider nutrition services referral as needed  Outcome: Progressing     Problem: MUSCULOSKELETAL - ADULT  Goal: Maintain or return mobility to safest level of function  Description: INTERVENTIONS:  - Assess patient's ability to carry out ADLs; assess patient's baseline for ADL function and identify physical deficits which impact ability to perform ADLs (bathing, care of mouth/teeth, toileting, grooming, dressing, etc.)  - Assess/evaluate cause of self-care deficits   - Assess range of motion  - Assess patient's mobility  - Assess patient's need for assistive devices and provide as appropriate  - Encourage maximum independence but intervene and supervise when necessary  - Involve family in performance of ADLs  - Assess for home care needs following discharge   - Consider OT consult to assist with ADL evaluation and planning for discharge  - Provide patient education as appropriate  Outcome: Progressing  Goal: Maintain proper alignment of affected body part  Description: INTERVENTIONS:  - Support, maintain and protect limb and body alignment  - Provide patient/ family with appropriate education  Outcome: Progressing

## 2024-09-21 NOTE — ASSESSMENT & PLAN NOTE
Felt out of chair while typing at the computer w/o loc.  Fell and broke right 8th rib which is mildly displaced w/O2 sats 90-92% on RA  Cta pe study demonstrates associated infiltrate likely contusion vs atelectasis less likely pneumonia  Monitor off abx consult pt/ot/cm  Check orthostatics, monitor closely on baclofen pump analgesics and gabapentin

## 2024-09-21 NOTE — ED PROVIDER NOTES
1. Right rib fracture    2. Abnormal CT of the chest    3. Pancytopenia (HCC)    4. Pulmonary artery hypertension (HCC)    5. Hilar lymphadenopathy      ED Disposition       ED Disposition   Admit    Condition   Stable    Date/Time   Fri Sep 20, 2024 11:00 PM    Comment   Case was discussed with  and the patient's admission status was agreed to be  to the service of   .               Assessment & Plan       Medical Decision Making  DDX including but not limited to: ACS, MI, PE, PTX, pneumonia, appendicitis, gastroparesis, hepatitis, pancreatitis, colitis, enteritis, ileus, bowel obstruction, intussusception, volvulus, cholecystitis, biliary colic, choledocholithiasis, perforated viscus    Will obtain EKG, troponin to evaluate for ACS.  Will obtain D Dimer. Will obtain CBC to evaluate for leukocytosis, anemia.  Will obtain CMP to evaluate kidney function, for electrolyte disturbance.  Will obtain coags. Will obtain lipase to evaluate for pancreatitis.      Mild pancytopenia noted on labs.  Creatinine elevated, though improved compared to previous.  Labs otherwise without actionable derangement.  CTA reveals right eighth rib fracture, and consolidative opacities to right middle lobe. Will give Rocephin.  Patient prompted further eval recent trauma, notes she did have a fall 4 days ago.  Will obtain CT cervical spine as patient reports mild neck pain since fall.  CT cervical spine without acute traumatic abnormalities.    At the time of admission, the patient is in no acute distress. I discussed with the patient and family the diagnosis, treatment plan, and plan for admission; they were given the opportunity to ask questions and verbalized understanding. They agree with plan.    Problems Addressed:  Abnormal CT of the chest: acute illness or injury  Hilar lymphadenopathy: acute illness or injury  Pancytopenia (HCC): acute illness or injury  Pulmonary artery hypertension (HCC): acute illness or injury  Right rib  fracture: acute illness or injury    Amount and/or Complexity of Data Reviewed  External Data Reviewed: labs, radiology and notes.  Labs: ordered. Decision-making details documented in ED Course.  Radiology: ordered. Decision-making details documented in ED Course.  ECG/medicine tests: ordered and independent interpretation performed. Decision-making details documented in ED Course.    Risk  Prescription drug management.  Decision regarding hospitalization.      EKG atrial sensed ventricular paced rhythm at 82 bpm, PAC, RAD, no inappropriate concordance or excessive discordance, no STEMI as interpreted by me    Repeat EKG atrially sensed ventricularly paced rhythm at 76 bpm, RAD, no inappropriate concordance or excessive discordance, no STEMI as interpreted by me    HEART score:    History 0=Slightly or non-suspicious   ECG 1=Nonspecific repolarization disturbance   Age 2= > 65 years   Risk Factors 2= > 3 risk factors, or history of atherosclerotic disease   Troponin 1= Between 13-35 ng/L   Total 6                  ED Course as of 09/21/24 0403   Fri Sep 20, 2024   2101 D-Dimer, Quant(!): 3.91   2249 PE Study with CT abdomen & pelvis with contrast  Acute minimally displaced right eighth rib fracture laterally  Right middle lobe consolidative opacities with associated pleural calcifications. While this in part may represent atelectasis, findings are suspected to represent superimposed pneumonia or pulmonary contusions in the appropriate clinical context.  Mediastinal and right hilar lymphadenopathy. Short-term follow-up CT chest is recommended to ensure that these resolve.  Main pulmonary arterial enlargement suggestive of pulmonary arterial hypertension.     2315 Rib fx noted on CTA.  Discussed with patient.  Patient does recall falling on Tuesday (4 days ago).  She states she was sitting in a chair when she fell forward.  She does not recall having rib pain following this, but notes mild neck pain since.  Will  "obtain CT cervical spine.       Medications   morphine injection 2 mg (2 mg Intravenous Given 9/1956)   sodium chloride 0.9 % bolus 500 mL (0 mL Intravenous Stopped 9/20/24 2055)   iohexol (OMNIPAQUE) 350 MG/ML injection (MULTI-DOSE) 100 mL (100 mL Intravenous Given 9/20/24 2138)   morphine injection 2 mg (2 mg Intravenous Given 9/20/24 2224)   ceftriaxone (ROCEPHIN) 1 g/50 mL in dextrose IVPB (0 mg Intravenous Stopped 9/20/24 2328)       History of Present Illness       The patient is a 69-year-old female with history of diabetes mellitus, CHF, CKD, DVT, who presents to the ED for evaluation of right upper abdominal/rib pain that began last night.  She reports it began suddenly, while she was at rest, and has worsened since.  She reports it is very severe, and is exacerbated by movement or taking a deep breath.  She has never had similar pain in the past.  Pain does radiate into her back.  She does feel short of breath as a result of this pain. She denies injury to the area, fever, chills, chest pain, nausea, vomiting, diarrhea, constipation, melena, hematochezia, dysuria, hematuria,  leg swelling, calf pain, recent travel, hemoptysis.  She did have a spinal cord stimulator removed on 7-2-24.      Chief Complaint   Patient presents with    Abdominal Pain     Right sided abd/rib pain since last night      Past Medical History:   Diagnosis Date    Anxiety     Asthma     controlled    Back complaints     Cancer ([facility])     nose    Cellulitis of left lower leg     \"not now\"    CHF (congestive heart failure) ([facility]) 02/2021    Chronic bilateral thoracic back pain     Chronic kidney disease     sees nephrologist regularly\" stage 3\"    Chronic myofascial pain     Chronic pain of both lower extremities     Chronic pain of left knee     \"both knees\"    Chronic pain syndrome     bilat legs and knees/neuropathy pain    COVID 12/2021    CPAP (continuous positive airway pressure) dependence     no currently uses    Depression " "    Diabetes mellitus ([facility])     insulin pump    Difficulty walking     Disease of thyroid gland     Does use hearing aid     bilat and will wear DOS    DVT (deep venous thrombosis) ([facility])     left leg    Gait disorder     Gastroparesis     GERD (gastroesophageal reflux disease)     Head injury     Headache, tension-type     History of angina     History of MRSA infection     History of transfusion     Many years ago    HL (hearing loss)     Hyperlipidemia     Hypertension     Hypoglycemic reaction     \"occas low blood sugar\"    Insulin pump in place     pt reports saw endocrinologist  and will bring copy of instructions DOS    Irritable bowel syndrome     Kidney disease     Memory loss     Movement disorder     Neuropathy in diabetes ([facility])     Constant pain legs and feet    Nose fracture     Pacemaker     Pneumonia     Polyneuropathy associated with underlying disease ([facility])     PONV (postoperative nausea and vomiting)     Risk for falls     S/P insertion of spinal cord stimulator 2018    Sarcoidosis     Shortness of breath     \"exertion and not new\"    Sleep apnea     Stroke ([facility])     Thoracic vertebral fracture ([facility])     TIA (transient ischemic attack)     Use of cane as ambulatory aid     Uses walker     Wears dentures     permanent upper/and some missing teeth/partial lower     Past Surgical History:   Procedure Laterality Date    ABDOMINAL ADHESION SURGERY N/A 2021    Procedure: laparoscopic LYSIS ADHESIONS;  Surgeon: Jill Bentley MD;  [facility];  Service: Thoracic    BACK SURGERY  2023    TL[facility] at [facility], Dr. Levy    BREAST SURGERY      BREAST SURGERY      reduction    CARDIAC PACEMAKER PLACEMENT      pacemaker     SECTION      COLONOSCOPY      DILATION AND CURETTAGE OF UTERUS      \"D&E\"    HERNIA REPAIR      HYSTERECTOMY      JOINT REPLACEMENT Left     LTKR    NECK SURGERY      fused 4 discs with 4 screws implanted    NISSEN FUNDOPLICATION " LAPAROSCOPIC WITH ROBOTICS N/A 05/03/2021    Procedure: ROBOTIC HELLER MYOTOMY WITH BERNARDO FUNDIPLICATION ;  Surgeon: Jill Bentley MD;  [facility];  Service: Thoracic    NOSE SURGERY  05/20/2024    closed reduction    TX ESOPHAGOGASTRODUODENOSCOPY TRANSORAL DIAGNOSTIC N/A 05/03/2021    Procedure: ESOPHAGOGASTRODUODENOSCOPY (EGD);  Surgeon: Jill Bentley MD;  [facility];  Service: Thoracic    TX ESOPHAGOGASTRODUODENOSCOPY TRANSORAL DIAGNOSTIC N/A 01/12/2022    Procedure: ESOPHAGOGASTRODUODENOSCOPY (EGD),;  Surgeon: Jill Bentley MD;  [facility];  Service: Thoracic    TX ESOPHAGOGASTRODUODENOSCOPY TRANSORAL DIAGNOSTIC N/A 02/16/2022    Procedure: ESOPHAGOGASTRODUODENOSCOPY (EGD);  Surgeon: Jill Bentley MD;  [facility];  Service: Thoracic    TX ESOPHAGOGASTRODUODENOSCOPY TRANSORAL DIAGNOSTIC N/A 07/12/2022    Procedure: ESOPHAGOGASTRODUODENOSCOPY (EGD); PYLORIC DILATION; GE JUNCTION DILATION;  Surgeon: Jill Bentley MD;  [facility];  Service: Thoracic    TX ESOPHAGOGASTRODUODENOSCOPY TRANSORAL DIAGNOSTIC N/A 11/29/2022    Procedure: ESOPHAGOGASTRODUODENOSCOPY (EGD);  Surgeon: Varinder Steiner MD;  [facility];  Service: Thoracic    TX ESOPHAGOGASTRODUODENOSCOPY TRANSORAL DIAGNOSTIC N/A 01/24/2023    Procedure: ESOPHAGOGASTRODUODENOSCOPY (EGD);  Surgeon: Jill Bentley MD;  [facility];  Service: Thoracic    TX ESOPHAGOGASTRODUODENOSCOPY TRANSORAL DIAGNOSTIC N/A 05/01/2024    Procedure: ESOPHAGOGASTRODUODENOSCOPY (EGD);  Surgeon: Jill Bentley MD;  [facility];  Service: Thoracic    TX ESOPHAGOSCOPY FLEX BALLOON DILAT <30 MM DIAM N/A 01/12/2022    Procedure: DILATATION ESOPHAGEAL;  Surgeon: Jill Bentley MD;  [facility];  Service: Thoracic    TX ESOPHAGOSCOPY FLEX BALLOON DILAT <30 MM DIAM N/A 02/16/2022    Procedure: DILATATION ESOPHAGEAL;  Surgeon: Jill Bentley MD;   [facility];  Service: Thoracic    NY ESOPHAGOSCOPY FLEX BALLOON DILAT <30 MM DIAM N/A 11/29/2022    Procedure: DILATATION ESOPHAGEAL esophageal and pyloric dilation;  Surgeon: Varinder Steiner MD;  [facility];  Service: Thoracic    NY ESOPHAGOSCOPY FLEX BALLOON DILAT <30 MM DIAM N/A 01/24/2023    Procedure: esophageal dilation dilated up to 54  with pylorus dilation dilated up to 18;  Surgeon: Jill Bentley MD;  [facility];  Service: Thoracic    NY ESOPHAGOSCOPY FLEX BALLOON DILAT <30 MM DIAM N/A 05/01/2024    Procedure: DILATATION ESOPHAGEAL;  Surgeon: Jill Bentley MD;  [facility];  Service: Thoracic    NY RIBEIRO IMPLTJ NSTIM ELTRDS PLATE/PADDLE EDRL Left 04/23/2018    Procedure: Insertion of thoracic spinal cord stimulator electrode via laminotomy and placement of left buttock IPG;  Surgeon: Shahab Bullock MD;  [facility];  Service: Neurosurgery    REPLACEMENT TOTAL KNEE      ROTATOR CUFF REPAIR      SPINAL CORD STIMULATOR REMOVAL Bilateral 7/2/2024    Procedure: REOPENING OF THORACIC AND LEFT BUTTOCK INCISION FOR REMOVAL OF SPINAL CORD STIMULATOR SYSTEM;  Surgeon: Shahab Bullock MD;  [facility];  Service: Neurosurgery    SPINAL STIMULATOR PLACEMENT Left 10/03/2018    Procedure: BUTTOCK RE-OPENING INCISION FOR REPOSITIONING OF IMPLANTABLE PULSE GENERATOR;  Surgeon: Shahab Bullock MD;  [facility];  Service: Neurosurgery    TONSILLECTOMY      UPPER GASTROINTESTINAL ENDOSCOPY      VASCULAR SURGERY      WISDOM TOOTH EXTRACTION           Review of Systems   Constitutional:  Negative for chills and fever.   HENT:  Negative for congestion and rhinorrhea.    Respiratory:  Positive for shortness of breath. Negative for cough.    Cardiovascular:  Positive for chest pain (rib). Negative for leg swelling.   Gastrointestinal:  Positive for abdominal pain. Negative for constipation, diarrhea, nausea and vomiting.   Genitourinary:  Negative for dysuria  and flank pain.   Musculoskeletal:  Negative for arthralgias and myalgias.   Skin:  Negative for rash and wound.   Neurological:  Negative for dizziness, weakness, numbness and headaches.           Objective     ED Triage Vitals [09/20/24 1901]   Temperature Pulse Blood Pressure Respirations SpO2 Patient Position - Orthostatic VS   98.4 °F (36.9 °C) 81 163/76 18 96 % Sitting      Temp Source Heart Rate Source BP Location FiO2 (%) Pain Score    Oral Monitor Right arm -- 7        Physical Exam  Vitals and nursing note reviewed.   Constitutional:       General: She is not in acute distress.     Appearance: She is well-developed. She is not toxic-appearing.      Comments: Uncomfortable appearing     HENT:      Head: Normocephalic and atraumatic.   Eyes:      Conjunctiva/sclera: Conjunctivae normal.   Cardiovascular:      Rate and Rhythm: Normal rate and regular rhythm.      Heart sounds: No murmur heard.  Pulmonary:      Effort: Pulmonary effort is normal. No respiratory distress.      Breath sounds: Normal breath sounds.   Chest:      Chest wall: Tenderness present. No crepitus.       Abdominal:      Palpations: Abdomen is soft.      Tenderness: There is abdominal tenderness in the right upper quadrant.   Musculoskeletal:         General: No swelling.      Cervical back: Neck supple.   Skin:     General: Skin is warm and dry.      Capillary Refill: Capillary refill takes less than 2 seconds.   Neurological:      Mental Status: She is alert.   Psychiatric:         Mood and Affect: Mood normal.         Labs Reviewed   CBC AND DIFFERENTIAL - Abnormal       Result Value    WBC 3.14 (*)     RBC 3.77 (*)     Hemoglobin 10.5 (*)     Hematocrit 33.8 (*)     MCV 90      MCH 27.9      MCHC 31.1 (*)     RDW 15.8 (*)     MPV 9.2      Platelets 134 (*)     nRBC 0      Segmented % 66      Immature Grans % 1      Lymphocytes % 19      Monocytes % 12      Eosinophils Relative 2      Basophils Relative 0      Absolute Neutrophils 2.08       Absolute Immature Grans 0.02      Absolute Lymphocytes 0.59 (*)     Absolute Monocytes 0.39      Eosinophils Absolute 0.05      Basophils Absolute 0.01     PROTIME-INR - Abnormal    Protime 15.1 (*)     INR 1.17      Narrative:     INR Therapeutic Range    Indication                                             INR Range      Atrial Fibrillation                                               2.0-3.0  Hypercoagulable State                                    2.0.2.3  Left Ventricular Asist Device                            2.0-3.0  Mechanical Heart Valve                                  -    Aortic(with afib, MI, embolism, HF, LA enlargement,    and/or coagulopathy)                                     2.0-3.0 (2.5-3.5)     Mitral                                                             2.5-3.5  Prosthetic/Bioprosthetic Heart Valve               2.0-3.0  Venous thromboembolism (VTE: VT, PE        2.0-3.0   COMPREHENSIVE METABOLIC PANEL - Abnormal    Sodium 135      Potassium 5.3      Chloride 107      CO2 20 (*)     ANION GAP 8      BUN 32 (*)     Creatinine 1.47 (*)     Glucose 202 (*)     Calcium 8.4      AST 47 (*)     ALT 26      Alkaline Phosphatase 77      Total Protein 6.4      Albumin 3.7      Total Bilirubin 0.77      eGFR 36      Narrative:     National Kid[employer] Foundation guidelines for Chronic Kidney Disease (CKD):     Stage 1 with normal or high GFR (GFR > 90 mL/min/1.73 square meters)    Stage 2 Mild CKD (GFR = 60-89 mL/min/1.73 square meters)    Stage 3A Moderate CKD (GFR = 45-59 mL/min/1.73 square meters)    Stage 3B Moderate CKD (GFR = 30-44 mL/min/1.73 square meters)    Stage 4 Severe CKD (GFR = 15-29 mL/min/1.73 square meters)    Stage 5 End Stage CKD (GFR <15 mL/min/1.73 square meters)  Note: GFR calculation is accurate only with a steady state creatinine   B-TYPE NATRIURETIC PEPTIDE (BNP) - Abnormal     (*)    D-DIMER, QUANTITATIVE - Abnormal    D-Dimer, Quant 3.91 (*)      Narrative:     In the evaluation for possible pulmonary embolism, in the appropriate (Well's Score of 4 or less) patient, the age adjusted d-dimer cutoff for this patient can be calculated as:    Age x 0.01 (in ug/mL) for Age-adjusted D-dimer exclusion threshold for a patient over 50 years.   POCT GLUCOSE - Abnormal    POC Glucose 144 (*)    APTT - Normal    PTT 33     LIPASE - Normal    Lipase 22     HS TROPONIN I 0HR - Normal    hs TnI 0hr 27     HS TROPONIN I 2HR - Normal    hs TnI 2hr 30      Delta 2hr hsTnI 3     POCT GLUCOSE - Normal    POC Glucose 124       CT spine cervical without contrast   Final Interpretation by Bull Vines MD (09/21 0211)      Mild dextroscoliotic curvature of the lower cervical spine and anterior spinal fusion hardware at the C5-6 level. No acute cervical spine fracture or traumatic malalignment.                  Workstation performed: [id number]         PE Study with CT abdomen & pelvis with contrast   Final Interpretation by Flaco Buchanan DO (09/20 2247)   No pulmonary embolus.   Acute minimally displaced right eighth rib fracture laterally   Right middle lobe consolidative opacities with associated pleural calcifications. While this in part may represent atelectasis, findings are suspected to represent superimposed pneumonia or pulmonary contusions in the appropriate clinical context.   Mediastinal and right hilar lymphadenopathy. Short-term follow-up CT chest is recommended to ensure that these resolve.   Main pulmonary arterial enlargement suggestive of pulmonary arterial hypertension.                  Workstation performed: [id number]             Procedures    ED Medication and Procedure Management   Prior to Admission Medications   Prescriptions Last Dose Informant Patient Reported? Taking?   CRANBERRY FRUIT CONCENTRATE PO  Self Yes No   Sig: Take 450 mg by mouth in the morning Every morning for supplement   Darbepoetin Mike (ARANESP, ALBUMIN FREE, IJ)  Self Yes No   Sig:  Inject as directed every 14 (fourteen) days   Insulin Infusion Pump (MINIMED INSULIN PUMP) PRINCESS  Self Yes No   Sig: Use   Mirabegron ER (Myrbetriq) 50 MG TB24   Yes No   Sig: Take by mouth   NovoLOG 100 UNIT/ML injection  Self Yes No   Sig: INJECT UP  UNITS DAILY VIA INSULIN PUMP. E11.65   Restasis 0.05 % ophthalmic emulsion  Self Yes No   Sig: Administer 1 drop to both eyes 2 (two) times a day As directed   acetaminophen (TYLENOL) 500 mg tablet  Self Yes No   Sig: Take 500 mg by mouth every 4 (four) hours as needed for mild pain Take 2 tablets (1000 mg)by mouth every 4 hours as needed, for mild pain & moderate pain   albuterol (ACCUNEB) 1.25 MG/3ML nebulizer solution  Self Yes No   Sig: Take 2.5 mg by nebulization every 6 (six) hours as needed for wheezing   albuterol (PROVENTIL HFA,VENTOLIN HFA) 90 mcg/act inhaler  Self Yes No   Sig: Inhale 2 puffs every 6 (six) hours as needed for wheezing   atorvastatin (LIPITOR) 80 mg tablet  Self Yes No   Sig: Take 80 mg by mouth daily at bedtime     calcium carbonate (OS-KARIS) 1250 (500 Ca) MG chewable tablet   Yes No   Sig: Chew 1 tablet daily   carvedilol (COREG) 3.125 mg tablet  Self Yes No   Sig: Take 3.125 mg by mouth 2 (two) times a day with meals   cyclobenzaprine (FLEXERIL) 10 mg tablet  Self Yes No   ezetimibe (ZETIA) 10 mg tablet  Self Yes No   Sig: Take 10 mg by mouth daily   gabapentin (NEURONTIN) 100 mg capsule  Self Yes No   Sig: Take 600 mg by mouth 3 (three) times a day   glucagon (GLUCAGEN) 1 mg injection  Self Yes No   Si mg once Inject 1 mg under the skin as needed for low blood sugar   glucose 4 g chewable tablet  Self Yes No   Sig: Chew 16 g as needed for low blood sugar Take 15 g by mouth as needed (blood sugar less than 70 if able to safely swallow)   glucose blood test strip  Self Yes No   Sig: Testing six times daily  E11.65 on insulin pump   insulin glargine (LANTUS) 100 units/mL subcutaneous injection  Self Yes No   Sig: Inject 24 Units  under the skin if needed Inject 24 units under the skin as needed (for pump failure)   levothyroxine 100 mcg tablet  Self Yes No   Sig: Take 100 mcg by mouth daily   ondansetron (ZOFRAN) 8 mg tablet  Self Yes No   Sig: Take 8 mg by mouth every 8 (eight) hours as needed   oxyCODONE-acetaminophen (PERCOCET) 5-325 mg per tablet  Self Yes No   Sig: Take 1 tablet by mouth every 6 (six) hours as needed   polyethylene glycol (MIRALAX) 17 g packet  Self No No   Sig: Take 17 g by mouth daily   pramipexole (MIRAPEX) 1 mg tablet  Self Yes No   Sig: Take 1 mg by mouth 2 (two) times a day     sertraline (ZOLOFT) 100 mg tablet   Yes No   Sig: Take 100 mg by mouth daily at bedtime   tolterodine (Detrol LA) 4 mg 24 hr capsule   Yes No   Sig: Take 4 mg by mouth daily   torsemide (DEMADEX) 20 mg tablet  Self No No   Sig: Take 2 tablets (40 mg total) by mouth daily   zinc gluconate 50 mg tablet  Self Yes No   Sig: Take 50 mg by mouth daily      Facility-Administered Medications: None     Current Discharge Medication List        CONTINUE these medications which have NOT CHANGED    Details   acetaminophen (TYLENOL) 500 mg tablet Take 500 mg by mouth every 4 (four) hours as needed for mild pain Take 2 tablets (1000 mg)by mouth every 4 hours as needed, for mild pain & moderate pain      albuterol (ACCUNEB) 1.25 MG/3ML nebulizer solution Take 2.5 mg by nebulization every 6 (six) hours as needed for wheezing      albuterol (PROVENTIL HFA,VENTOLIN HFA) 90 mcg/act inhaler Inhale 2 puffs every 6 (six) hours as needed for wheezing      atorvastatin (LIPITOR) 80 mg tablet Take 80 mg by mouth daily at bedtime        calcium carbonate (OS-KARIS) 1250 (500 Ca) MG chewable tablet Chew 1 tablet daily      carvedilol (COREG) 3.125 mg tablet Take 3.125 mg by mouth 2 (two) times a day with meals      CRANBERRY FRUIT CONCENTRATE PO Take 450 mg by mouth in the morning Every morning for supplement      cyclobenzaprine (FLEXERIL) 10 mg tablet        Darbepoetin Mike (ARANESP, ALBUMIN FREE, IJ) Inject as directed every 14 (fourteen) days      ezetimibe (ZETIA) 10 mg tablet Take 10 mg by mouth daily      gabapentin (NEURONTIN) 100 mg capsule Take 600 mg by mouth 3 (three) times a day      glucagon (GLUCAGEN) 1 mg injection 1 mg once Inject 1 mg under the skin as needed for low blood sugar      glucose 4 g chewable tablet Chew 16 g as needed for low blood sugar Take 15 g by mouth as needed (blood sugar less than 70 if able to safely swallow)      glucose blood test strip Testing six times daily  E11.65 on insulin pump      insulin glargine (LANTUS) 100 units/mL subcutaneous injection Inject 24 Units under the skin if needed Inject 24 units under the skin as needed (for pump failure)      Insulin Infusion Pump (MINIMED INSULIN PUMP) PRINCESS Use      levothyroxine 100 mcg tablet Take 100 mcg by mouth daily      Mirabegron ER (Myrbetriq) 50 MG TB24 Take by mouth      NovoLOG 100 UNIT/ML injection INJECT UP  UNITS DAILY VIA INSULIN PUMP. E11.65      ondansetron (ZOFRAN) 8 mg tablet Take 8 mg by mouth every 8 (eight) hours as needed      oxyCODONE-acetaminophen (PERCOCET) 5-325 mg per tablet Take 1 tablet by mouth every 6 (six) hours as needed      polyethylene glycol (MIRALAX) 17 g packet Take 17 g by mouth daily  Qty: 90 each, Refills: 3    Associated Diagnoses: Constipation, unspecified constipation type      pramipexole (MIRAPEX) 1 mg tablet Take 1 mg by mouth 2 (two) times a day        Restasis 0.05 % ophthalmic emulsion Administer 1 drop to both eyes 2 (two) times a day As directed      sertraline (ZOLOFT) 100 mg tablet Take 100 mg by mouth daily at bedtime      tolterodine (Detrol LA) 4 mg 24 hr capsule Take 4 mg by mouth daily      torsemide (DEMADEX) 20 mg tablet Take 2 tablets (40 mg total) by mouth daily  Qty: 60 tablet, Refills: 0    Associated Diagnoses: Acute on chronic diastolic (congestive) heart failure (HCC)      zinc gluconate 50 mg tablet Take  50 mg by mouth daily           No discharge procedures on file.     Venus Parikh PA-C  09/21/24 1157

## 2024-09-21 NOTE — OCCUPATIONAL THERAPY NOTE
"    Occupational Therapy Evaluation     Patient Name: Zohra Barriga  Today's Date: 9/21/2024  Problem List  Principal Problem:    Closed fracture of one rib of right side  Active Problems:    Essential hypertension    Pain syndrome, chronic    Type 2 diabetes mellitus with microalbuminuria, with long-term current use of insulin (formerly [facility])    Chronic diastolic (congestive) heart failure (formerly [facility])    Fall    Past Medical History  Past Medical History:   Diagnosis Date    Anxiety     Asthma     controlled    Back complaints     Cancer (formerly [facility])     nose    Cellulitis of left lower leg     \"not now\"    CHF (congestive heart failure) (formerly [facility]) 02/2021    Chronic bilateral thoracic back pain     Chronic kidney disease     sees nephrologist regularly\" stage 3\"    Chronic myofascial pain     Chronic pain of both lower extremities     Chronic pain of left knee     \"both knees\"    Chronic pain syndrome     bilat legs and knees/neuropathy pain    COVID 12/2021    CPAP (continuous positive airway pressure) dependence     no currently uses    Depression     Diabetes mellitus (formerly [facility])     insulin pump    Difficulty walking 2018    Disease of thyroid gland     Does use hearing aid     bilat and will wear DOS    DVT (deep venous thrombosis) (formerly [facility]) 2013    left leg    Gait disorder     Gastroparesis     GERD (gastroesophageal reflux disease)     Head injury     Headache, tension-type     History of angina     History of MRSA infection     History of transfusion     Many years ago    HL (hearing loss) 2018    Hyperlipidemia     Hypertension     Hypoglycemic reaction     \"occas low blood sugar\"    Insulin pump in place     pt reports saw endocrinologist 4/28 and will bring copy of instructions DOS    Irritable bowel syndrome     Kidney disease     Memory loss 2021    Movement disorder 2021    Neuropathy in diabetes (formerly [facility]) 2021    Constant pain legs and feet    Nose fracture     Pacemaker 2019    Pneumonia     Polyneuropathy associated with underlying " "disease (HCC)     PONV (postoperative nausea and vomiting)     Risk for falls     S/P insertion of spinal cord stimulator 2018    Sarcoidosis     Shortness of breath     \"exertion and not new\"    Sleep apnea     Stroke (HCC)     Thoracic vertebral fracture (HCC)     TIA (transient ischemic attack)     Use of cane as ambulatory aid     Uses walker     Wears dentures     permanent upper/and some missing teeth/partial lower     Past Surgical History  Past Surgical History:   Procedure Laterality Date    ABDOMINAL ADHESION SURGERY N/A 2021    Procedure: laparoscopic LYSIS ADHESIONS;  Surgeon: Jill Bentley MD;  [facility];  Service: Thoracic    BACK SURGERY  2023    TL[facility] at [facility], Dr. Levy    BREAST SURGERY      BREAST SURGERY      reduction    CARDIAC PACEMAKER PLACEMENT      pacemaker     SECTION      COLONOSCOPY      DILATION AND CURETTAGE OF UTERUS      \"D&E\"    HERNIA REPAIR      HYSTERECTOMY      JOINT REPLACEMENT Left     LTKR    NECK SURGERY      fused 4 discs with 4 screws implanted    NISSEN FUNDOPLICATION LAPAROSCOPIC WITH ROBOTICS N/A 2021    Procedure: ROBOTIC HELLER MYOTOMY WITH BERNARDO FUNDIPLICATION ;  Surgeon: Jill Bentley MD;  [facility];  Service: Thoracic    NOSE SURGERY  2024    closed reduction    NM ESOPHAGOGASTRODUODENOSCOPY TRANSORAL DIAGNOSTIC N/A 2021    Procedure: ESOPHAGOGASTRODUODENOSCOPY (EGD);  Surgeon: Jill Bentley MD;  [facility];  Service: Thoracic    NM ESOPHAGOGASTRODUODENOSCOPY TRANSORAL DIAGNOSTIC N/A 2022    Procedure: ESOPHAGOGASTRODUODENOSCOPY (EGD),;  Surgeon: Jill Bentley MD;  [facility];  Service: Thoracic    NM ESOPHAGOGASTRODUODENOSCOPY TRANSORAL DIAGNOSTIC N/A 2022    Procedure: ESOPHAGOGASTRODUODENOSCOPY (EGD);  Surgeon: Jill Bentley MD;  [facility];  Service: Thoracic    NM ESOPHAGOGASTRODUODENOSCOPY TRANSORAL DIAGNOSTIC N/A " 07/12/2022    Procedure: ESOPHAGOGASTRODUODENOSCOPY (EGD); PYLORIC DILATION; GE JUNCTION DILATION;  Surgeon: Jill Bentley MD;  [facility];  Service: Thoracic    NY ESOPHAGOGASTRODUODENOSCOPY TRANSORAL DIAGNOSTIC N/A 11/29/2022    Procedure: ESOPHAGOGASTRODUODENOSCOPY (EGD);  Surgeon: Varinder Steiner MD;  [facility];  Service: Thoracic    NY ESOPHAGOGASTRODUODENOSCOPY TRANSORAL DIAGNOSTIC N/A 01/24/2023    Procedure: ESOPHAGOGASTRODUODENOSCOPY (EGD);  Surgeon: Jill Bentley MD;  [facility];  Service: Thoracic    NY ESOPHAGOGASTRODUODENOSCOPY TRANSORAL DIAGNOSTIC N/A 05/01/2024    Procedure: ESOPHAGOGASTRODUODENOSCOPY (EGD);  Surgeon: Jill Bentley MD;  [facility];  Service: Thoracic    NY ESOPHAGOSCOPY FLEX BALLOON DILAT <30 MM DIAM N/A 01/12/2022    Procedure: DILATATION ESOPHAGEAL;  Surgeon: Jill Bentley MD;  [facility];  Service: Thoracic    NY ESOPHAGOSCOPY FLEX BALLOON DILAT <30 MM DIAM N/A 02/16/2022    Procedure: DILATATION ESOPHAGEAL;  Surgeon: Jill Bentley MD;  [facility];  Service: Thoracic    NY ESOPHAGOSCOPY FLEX BALLOON DILAT <30 MM DIAM N/A 11/29/2022    Procedure: DILATATION ESOPHAGEAL esophageal and pyloric dilation;  Surgeon: Varinder Steiner MD;  [facility];  Service: Thoracic    NY ESOPHAGOSCOPY FLEX BALLOON DILAT <30 MM DIAM N/A 01/24/2023    Procedure: esophageal dilation dilated up to 54  with pylorus dilation dilated up to 18;  Surgeon: Jill Bentley MD;  [facility];  Service: Thoracic    NY ESOPHAGOSCOPY FLEX BALLOON DILAT <30 MM DIAM N/A 05/01/2024    Procedure: DILATATION ESOPHAGEAL;  Surgeon: Jill Bentley MD;  [facility];  Service: Thoracic    NY RIBEIRO IMPLTJ NSTIM ELTRDS PLATE/PADDLE EDRL Left 04/23/2018    Procedure: Insertion of thoracic spinal cord stimulator electrode via laminotomy and placement of left buttock IPG;  Surgeon: Shahab Bullock  MD;  [facility];  Service: Neurosurgery    REPLACEMENT TOTAL KNEE      ROTATOR CUFF REPAIR      SPINAL CORD STIMULATOR REMOVAL Bilateral 7/2/2024    Procedure: REOPENING OF THORACIC AND LEFT BUTTOCK INCISION FOR REMOVAL OF SPINAL CORD STIMULATOR SYSTEM;  Surgeon: Shahab Bullock MD;  [facility];  Service: Neurosurgery    SPINAL STIMULATOR PLACEMENT Left 10/03/2018    Procedure: BUTTOCK RE-OPENING INCISION FOR REPOSITIONING OF IMPLANTABLE PULSE GENERATOR;  Surgeon: Shahab Bullock MD;  [facility];  Service: Neurosurgery    TONSILLECTOMY      UPPER GASTROINTESTINAL ENDOSCOPY      VASCULAR SURGERY      WISDOM TOOTH EXTRACTION             09/21/24 0840   OT Last Visit   OT Visit Date 09/21/24   Note Type   Note type Evaluation   Additional Comments greeted in supine and agreeable.   Pain Assessment   Pain Assessment Tool 0-10   Pain Score No Pain   Restrictions/Precautions   Weight Bearing Precautions Per Order No   Other Precautions Fall Risk   Home Living   Type of Home House   Home Layout Two level;Able to live on main level with bedroom/bathroom  (1+1 KRYSTAL)   Bathroom Shower/Tub Tub/shower unit   Bathroom Toilet Standard   Bathroom Equipment Grab bars in shower;Grab bars around toilet;Commode;Shower chair   Home Equipment Walker;Cane  (bed rail)   Prior Function   Level of [place] Independent with ADLs;Modified independent with wheelchair;Independent with IADLS   Lives With Spouse  (who works.)   Receives Help From Family   IADLs Independent with driving;Independent with meal prep;Independent with medication management   Falls in the last 6 months 1 to 4   Comments OPPT   ADL   Where Assessed Edge of bed   Eating Assistance 6  Modified independent   Grooming Assistance 6  Modified Independent   UB Bathing Assistance 5  Supervision/Setup   LB Bathing Assistance 5  Supervision/Setup   UB Dressing Assistance 5  Supervision/Setup   LB Dressing Assistance 4  Minimal Assistance   Toileting  Assistance  5  Supervision/Setup   Bed Mobility   Supine to Sit 5  Supervision   Additional items HOB elevated;Bedrails;Increased time required   Additional Comments left seated up in chair following session. call light in reach.   Transfers   Sit to Stand 5  Supervision   Additional items Bedrails;Increased time required   Stand to Sit 5  Supervision   Additional items Armrests;Increased time required   Additional Comments cues for safety, hand placement and best tech.   Functional Mobility   Functional Mobility 5  Supervision   Additional Comments Ax1, RW, bed<>chair. declined further mobility.   Additional items Rolling walker   Balance   Static Sitting Fair +   Dynamic Sitting Fair   Static Standing Fair   Dynamic Standing Fair -   Ambulatory Fair -   Activity Tolerance   Activity Tolerance Patient tolerated treatment well;Patient limited by fatigue   Medical Staff Made Aware PT Lars   Nurse Made Aware BAUDILIO Pimentel   RURUSSELL Assessment   RUE Assessment WFL   LUE Assessment   LUE Assessment WFL   Hand Function   Gross Motor Coordination Functional   Fine Motor Coordination Functional   Psychosocial   Psychosocial (WDL) WDL   Cognition   Overall Cognitive Status WFL   Arousal/Participation Cooperative   Attention Within functional limits   Orientation Level Oriented X4   Memory Within functional limits   Following Commands Follows all commands and directions without difficulty   Assessment   Limitation Decreased ADL status;Decreased UE strength;Decreased Safe judgement during ADL;Decreased endurance;Decreased self-care trans;Decreased high-level ADLs   Prognosis Good   Assessment Zohra Barriga is a 69 y.o. female seen for OT evaluation s/p admit to [facility] on 9/20/2024 w/s/p fall with Closed fracture of one rib of right side.  Comorbidities affecting pt's functional performance at time of assessment include:  diabetes mellitus, CHF, CKD, DVT . Pt with active OT orders and activity orders for Up and OOB as tolerated.  Personal factors affecting pt at time of IE include:KRYSTAL home environment, difficulty performing ADLs, difficulty performing IADLs, and difficulty performing transfers/mobility. Prior to admission, pt lives with  in a 2 level home with 1st floor set up. I with ADLS, IADLs and mobility with rollator. 3 falls. Upon evaluation: Pt currently requires supervision for UB ADLs, Naty for LB ADLs, supervision for toileting, supervision for bed mobility, supervision for functional transfers, and supervisionRW  mobility 2* the following deficits impacting occupational performance:weakness, decreased strength , decreased balance, and decreased activity tolerance.   Pt to benefit from continued skilled OT tx while in the hospital to address deficits as defined above and maximize level of functional independence w ADL's and functional mobility. Occupational Performance areas to address include: grooming, bathing/shower, toilet hygiene, dressing, health maintenance, functional mobility, and clothing management. From OT standpoint, recommendation at time of d/c would be level 3 resources (OPPT). OT to follow pt on caseload 2-3x/wk.   Goals   Patient Goals to gohome.   STG Time Frame 3-5   Short Term Goal #1 Pt will improve activity tolerance to G for min 30 min txment sessions for increase engagement in functional tasks   Short Term Goal #2 Pt will complete bed mobility at a Mod I level w/ G balance/safety demonstrated to decrease caregiver assistance required   Short Term Goal  Pt will complete LB dressing/self care w/ mod I using adaptive device and DME as needed   LTG Time Frame 10-14   Long Term Goal #1 Pt will complete toileting w/ mod I w/ G hygiene/thoroughness using DME as needed   Long Term Goal #2 Pt will improve functional transfers to Mod I on/off all surfaces using DME as needed w/ G balance/safety   Long Term Goal Pt will improve functional mobility during ADL/IADL/leisure tasks to Mod I using DME as needed w/ G  balance/safety   Plan   Treatment Interventions ADL retraining;Functional transfer training;UE strengthening/ROM;Endurance training;Patient/family training;Equipment evaluation/education;Neuromuscular reeducation;Compensatory technique education;Energy conservation;Activityengagement   Goal Expiration Date 10/05/24   OT Treatment Day 0   OT Frequency 2-3x/wk   Discharge Recommendation   Rehab Resource Intensity Level, OT III (Minimum Resource Intensity)   Additional Comments  The patient's raw score on the AM-PAC Daily Activity Inpatient Short Form is 21. A raw score of greater than or equal to 19 suggests the patient may benefit from discharge to home. Please refer to the recommendation of the Occupational Therapist for safe discharge planning.  a   AM-PAC Daily Activity Inpatient   Lower Body Dressing 3   Bathing 3   Toileting 3   Upper Body Dressing 4   Grooming 4   Eating 4   Daily Activity Raw Score 21   Daily Activity Standardized Score (Calc for Raw Score >=11) 44.27   AM-PAC Applied Cognition Inpatient   Following a Speech/Presentation 4   Understanding Ordinary Conversation 4   Taking Medications 4   Remembering Where Things Are Placed or Put Away 4   Remembering List of 4-5 Errands 4   Taking Care of Complicated Tasks 4   Applied Cognition Raw Score 24   Applied Cognition Standardized Score 62.21   Nancy Gilmore, OT

## 2024-09-21 NOTE — ASSESSMENT & PLAN NOTE
Ct chest w/Acute minimally displaced right eighth rib fracture laterally  Right middle lobe consolidative opacities with associated pleural calcifications. While this in part may represent atelectasis, findings are suspected to represent superimposed pneumonia or pulmonary contusions in the appropriate clinical context.  -check procalcitonin, analgesics, monitor off abx  -admit to observation

## 2024-09-21 NOTE — ASSESSMENT & PLAN NOTE
Wt Readings from Last 3 Encounters:   09/20/24 77.1 kg (170 lb)   08/14/24 77.1 kg (170 lb)   08/13/24 77.1 kg (170 lb)     Continue bb demadex  Daily weights I/os

## 2024-09-21 NOTE — INCIDENTAL FINDINGS
The following findings require follow up:  Radiographic finding   Finding: Mediastinal and right hilar lymphadenopathy. Short-term follow-up CT chest is recommended to ensure that these resolve.  Main pulmonary arterial enlargement suggestive of pulmonary arterial hypertension.   Follow up required: PCP follow up, repeat CT   Follow up should be done within 2  week(s)    Please notify the following clinician to assist with the follow up:   Dr. Celestino Santiago     Incidental finding results were discussed with the Patient by Venus Parikh PA-C on 09/21/24.   They expressed understanding and all questions answered.

## 2024-09-21 NOTE — DISCHARGE SUMMARY
Discharge Summary - Hospitalist   Name: Zohra Barriga 69 y.o. female I MRN: [id number]  Unit/Bed#: [place] [address] -01 I Date of Admission: 9/20/2024   Date of Service: 9/21/2024 I Hospital Day: 0     Assessment & Plan  Closed fracture of one rib of right side  Ct chest w/Acute minimally displaced right eighth rib fracture laterally  Right middle lobe consolidative opacities with associated pleural calcifications. While this in part may represent atelectasis, findings are suspected to represent superimposed pneumonia or pulmonary contusions in the appropriate clinical context.  - no signs of PNA on exam, vitals, labs.  Essential hypertension  Continue coreg/torsemide. Controlled on DC  Pain syndrome, chronic  S/p pain pump   Type 2 diabetes mellitus with microalbuminuria, with long-term current use of insulin (HCC)  Lab Results   Component Value Date    HGBA1C 8.4 (H) 05/08/2024       Recent Labs     09/21/24  0144 09/21/24  0428 09/21/24  0809 09/21/24  1128   POCGLU 124 143* 136 257*       Blood Sugar Average: Last 72 hrs:  (P) 160.8  Poorly controlled.  Continue insulin pump managed by patient for now.    DC 09/21/24  Chronic diastolic (congestive) heart failure (HCC)  Wt Readings from Last 3 Encounters:   09/20/24 77.1 kg (170 lb)   08/14/24 77.1 kg (170 lb)   08/13/24 77.1 kg (170 lb)     Continue bb demadex  Daily weights I/os          Fall  Felt out of chair while typing at the computer w/o loc.  Fell and broke right 8th rib which is mildly displaced w/O2 sats 90-92% on RA  Cta pe study demonstrates associated infiltrate likely contusion vs atelectasis less likely pneumonia. Unlikely PNA, procal normal, vital normal, WBC fine.  Monitor off abx consult pt/ot/cm -> pt refusing home PT, will DC to home with outpt PT. Rollator ordered.     Medical Problems       Resolved Problems  Date Reviewed: 9/21/2024   None       Discharging Physician / Practitioner: Isra Alex DO  PCP: Celestino Santiago DO  Admission Date:    Admission Orders (From admission, onward)       Ordered        09/21/24 0249  Place in Observation  Once                          Discharge Date: 09/21/24    Consultations During Hospital Stay:  none    Procedures Performed:   none    Significant Findings / Test Results:   CT spine cervical without contrast   Final Result by Bull Vines MD (09/21 0211)      Mild dextroscoliotic curvature of the lower cervical spine and anterior spinal fusion hardware at the C5-6 level. No acute cervical spine fracture or traumatic malalignment.                  Workstation performed: [id number]         PE Study with CT abdomen & pelvis with contrast   Final Result by Flaco Buchanan DO (09/20 2247)   No pulmonary embolus.   Acute minimally displaced right eighth rib fracture laterally   Right middle lobe consolidative opacities with associated pleural calcifications. While this in part may represent atelectasis, findings are suspected to represent superimposed pneumonia or pulmonary contusions in the appropriate clinical context.   Mediastinal and right hilar lymphadenopathy. Short-term follow-up CT chest is recommended to ensure that these resolve.   Main pulmonary arterial enlargement suggestive of pulmonary arterial hypertension.                  Workstation performed: [id number]               Incidental Findings:   none   I reviewed the above mentioned incidental findings with the patient and/or family and they expressed understanding.    Test Results Pending at Discharge (will require follow up):   none     Outpatient Tests Requested:  none    Complications:  none    Reason for Admission: fall + pain    Hospital Course:   Zohra Barriga is a 69 y.o. female patient who originally presented to the hospital on 9/20/2024 due to ongoing foot pain after a fall that she had.  CT cervical spine showed no acute fracture and CT chest, abdomen, pelvis showed atelectasis with laterally moved right eighth rib.  PT saw patient on the  morning of 9/21/2024.  Recommended home PT but patient refused and would prefer outpatient PT.  Patient agreeable to going home and would prefer that.  Discharge in the afternoon of 9/21/2024.    Hospital Course: No notes on file    The patient, initially admitted to the hospital as inpatient, was discharged earlier than expected given the following: no signs of infection.  Please see above list of diagnoses and related plan for additional information.     Condition at Discharge: stable    Discharge Day Visit / Exam:   Subjective: Patient was seen and examined at bedside.  Patient reports no acute complaints besides her usual pain.  Patient was evaluated by PT/OT, stable to go home with home PT.  Patient refusing home PT and would rather do it outpatient.  Vitals: Blood Pressure: 137/59 (09/21/24 0807)  Pulse: 66 (09/21/24 0807)  Temperature: (!) 97.4 °F (36.3 °C) (09/21/24 0807)  Temp Source: Oral (09/21/24 0807)  Respirations: 20 (09/21/24 0807)  Weight - Scale: 77.1 kg (170 lb) (09/20/24 1901)  SpO2: 95 % (09/21/24 0807)  Exam:   Physical Exam  Vitals reviewed.   Constitutional:       General: She is not in acute distress.     Appearance: She is not ill-appearing or diaphoretic.   HENT:      Head: Normocephalic and atraumatic.      Nose: Nose normal. No congestion or rhinorrhea.      Mouth/Throat:      Mouth: Mucous membranes are moist.      Pharynx: Oropharynx is clear. No oropharyngeal exudate.   Eyes:      General: No scleral icterus.     Conjunctiva/sclera: Conjunctivae normal.      Pupils: Pupils are equal, round, and reactive to light.   Cardiovascular:      Rate and Rhythm: Normal rate and regular rhythm.      Pulses: Normal pulses.      Heart sounds: No murmur heard.  Pulmonary:      Effort: Pulmonary effort is normal.      Breath sounds: Normal breath sounds. No wheezing.   Chest:      Chest wall: No tenderness.   Abdominal:      General: Bowel sounds are normal.      Palpations: Abdomen is soft. There  is no mass.      Tenderness: There is no abdominal tenderness.   Musculoskeletal:         General: Tenderness and deformity present.      Cervical back: Normal range of motion.   Skin:     General: Skin is warm.      Capillary Refill: Capillary refill takes less than 2 seconds.      Findings: No bruising or rash.   Neurological:      Mental Status: She is alert and oriented to person, place, and time.      Motor: Weakness present.      Gait: Gait abnormal.   Psychiatric:         Mood and Affect: Mood normal.         Behavior: Behavior normal.          Discussion with Family: Updated  () via phone.    Discharge instructions/Information to patient and family:   See after visit summary for information provided to patient and family.      Provisions for Follow-Up Care:  See after visit summary for information related to follow-up care and any pertinent home health orders.      Mobility at time of Discharge:   Basic Mobility Inpatient Raw Score: 19  JH-HLM Goal: 6: Walk 10 steps or more  JH-HLM Achieved: 4: Move to chair/commode (pt decline to walk farther)  HLM Goal achieved. Continue to encourage appropriate mobility.     Disposition:   Home    Planned Readmission: no    Discharge Medications:  See after visit summary for reconciled discharge medications provided to patient and/or family.      Administrative Statements   Discharge Statement:  I have spent a total time of 37 minutes in caring for this patient on the day of the visit/encounter. >30 minutes of time was spent on: Diagnostic results, Prognosis, Risks and benefits of tx options, Instructions for management, Impressions, Counseling / Coordination of care, Documenting in the medical record, and Reviewing / ordering tests, medicine, procedures  .    **Please Note: This note may have been constructed using a voice recognition system**

## 2024-09-21 NOTE — CASE MANAGEMENT
Case Management Assessment & Discharge Planning Note    Patient name Zohra Barriga  Location [place] [address] /[place] 2 M* MRN [id number]  : 1955 Date 2024       Current Admission Date: 2024  Current Admission Diagnosis:Closed fracture of one rib of right side   Patient Active Problem List    Diagnosis Date Noted Date Diagnosed    Fall 2024     Closed fracture of one rib of right side 2024     Osteopenia 2024     Compression fx, lumbar spine, sequela 2024     Myelopathy (HCC) 2024     Thoracic compression fracture ([facility]) 2024     Complete heart block ([facility]) 2023     Abnormal CT of the chest 2023     Anemia 2023     Chronic diastolic (congestive) heart failure ([facility]) 2023     COVID-19 virus infection 2022     Anemia of chronic renal failure, stage 3 (moderate)  ([facility]) 2022     OAB (overactive bladder) 2022     Stage 3a chronic kidney disease ([facility]) 2021     Spinal stenosis of lumbar region with neurogenic claudication      Chronic kidney disease (CKD) stage G3b/A1, moderately decreased glomerular filtration rate (GFR) between 30-44 mL/min/1.73 square meter and albuminuria creatinine ratio less than 30 mg/g ([facility]) 2021     Epigastric pain 2021     Urinary retention 2021     PONV (postoperative nausea and vomiting) 2021     Class 2 obesity in adult 2021     Pacemaker 2021     Medical marijuana use 2021     Achalasia 2021     Polyarthralgia 2020     Diabetic peripheral neuropathy associated with type 2 diabetes mellitus ([facility]) 2020     Restless leg syndrome 10/24/2019     Uncontrolled type 2 diabetes mellitus with hyperglycemia ([facility]) 2018     Hypersomnia 10/19/2018     BBB (bundle branch block) 2018     Bifascicular block 2018     Malfunction of spinal cord stimulator ([facility]) 2018     Closed fracture of thoracic vertebra ([facility]) 2018      Deep venous thrombosis (HCC) 07/06/2018     Localized, primary osteoarthritis 07/06/2018     Chronic bilateral low back pain without sciatica 07/06/2018     Lumbosacral spondylosis without myelopathy 07/06/2018     Osteoarthritis of knee 07/06/2018     Age related osteoporosis 07/06/2018     Chronic scapular pain 07/06/2018     Microcytic anemia 07/04/2018     Chronic diastolic CHF (congestive heart failure) (HCC) 07/03/2018     Coronary artery disease involving native coronary artery 07/03/2018     History of TIA (transient ischemic attack) 07/03/2018     Coronary artery disease involving native coronary artery of native heart without angina pectoris 07/03/2018     Encounter for fitting and adjustment of spinal cord stimulator 06/08/2018     S/P insertion of spinal cord stimulator 06/08/2018     Lumbar radiculopathy 04/16/2018     Thoracic radiculopathy 04/16/2018     Complex regional pain syndrome type 1 of left lower extremity 03/09/2018     Type 2 diabetes mellitus without complication, with long-term current use of insulin ([facility]) 01/11/2018     Chronic bilateral thoracic back pain 11/17/2017     Chronic myofascial pain 11/17/2017     Vitamin D deficiency 11/03/2017     Post-menopausal 10/11/2017     Primary hyperparathyroidism (HCC) 10/11/2017     Pain in right leg 07/25/2017     Chronic pain of left knee 07/25/2017     Gait disorder 07/25/2017     Polyneuropathy associated with underlying disease ([facility]) 07/25/2017     Essential hypertension 06/29/2017     Mixed hyperlipidemia 06/29/2017     Primary hypothyroidism 06/29/2017     Chronic pain of right knee 06/06/2017     Pain syndrome, chronic 02/21/2017     Chronic pain disorder 02/21/2017     Moderate persistent asthma with acute exacerbation 01/06/2017     Obstructive sleep apnea 08/16/2016     Anemia of chronic disease 07/27/2016     Anxiety and depression 07/27/2016     Lymphadenopathy 06/27/2016     Normocytic anemia 10/04/2015     Insomnia due to medical  condition 09/24/2015     Type 2 diabetes mellitus with microalbuminuria, with long-term current use of insulin (HCC) 07/13/2015     Gastroparesis 05/28/2015     Moderate persistent asthma without complication 04/10/2015     Gastroesophageal reflux disease without esophagitis 02/18/2015     Mixed urge and stress incontinence 11/14/2014     Other B-complex deficiencies 05/31/2013     Insulin pump status 02/27/2012     Hyperlipidemia associated with type 2 diabetes mellitus  (HCC) 04/04/2011     Sarcoidosis 02/09/2009     Irritable bowel syndrome with constipation 02/09/2009       LOS (days): 0  Geometric Mean LOS (GMLOS) (days):   Days to GMLOS:     OBJECTIVE:         Current admission status: Observation       Preferred Pharmacy:   [facility]/pharmacy #[id number] - [place] - [address] [address]  Phone: [phone] Fax: [phone]    [facility]  [address] [place] [address]  Phone: [phone] Fax: [phone]    [facility] [address] [place] [address]  Phone: [phone] Fax: [phone]    [facility] [place] [facility] [place] [facility] [address] [email or web address] [address] [email or web address] [address] [facility] [address]  Phone: [phone] Fax: [phone]    [facility]/pharmacy #[id number] - [place] [address] [address]  Phone: [phone] Fax: [phone]    Primary Care Provider: Celestino Santiago DO    Primary Insurance: MEDICARE  Secondary Insurance: AARP    ASSESSMENT:  Active Health Care Proxies       Enrike Barriga Health Care Representative - Spouse   Primary Phone: [phone] (Mobile)  Home Phone: [phone]                 Readmission Root Cause  30 Day Readmission: No    Patient Information  Admitted from:: Home  Mental Status: Alert  During Assessment patient was accompanied by: Not accompanied during assessment  Assessment information provided by:: Patient  Primary Caregiver:  Self  Support Systems: Spouse/significant other, Parent  [facility] of Residence: [place]  What city do you live in?: [place]  Home entry access options. Select all that apply.: Stairs  Number of steps to enter home.: 2  Type of Current Residence: 2 story home  Upon entering residence, is there a bedroom on the main floor (no further steps)?: Yes  Upon entering residence, is there a bathroom on the main floor (no further steps)?: Yes  Living Arrangements: Lives w/ Spouse/significant other  Is patient a ?: No    Activities of Daily Living Prior to Admission  Functional Status: Independent  Completes ADLs independently?: Yes  Ambulates independently?: Yes  Does patient use assisted devices?: Yes  Assisted Devices (DME) used: Walker, CPAP  DME Company Name (respiratory supplies): [employer]  Does patient currently own DME?: Yes  What DME does the patient currently own?: CPAP, Walker  Does patient have a history of Outpatient Therapy (PT/OT)?: Yes (Currently going to [facility])  Does the patient have a history of Short-Term Rehab?: No  Does patient have a history of HHC?: No  Does patient currently have HHC?: No      Patient Information Continued  Income Source: Pension/detention  Does patient have prescription coverage?: Yes  Does patient receive dialysis treatments?: No  Does patient have a history of substance abuse?: No  Does patient have a history of Mental Health Diagnosis?: No    Means of Transportation  Means of Transport to Appts:: Family transport      Social Determinants of Health (SDOH)      Flowsheet Row Most Recent Value   Housing Stability    In the last 12 months, was there a time when you were not able to pay the mortgage or rent on time? N   In the past 12 months, how many times have you moved where you were living? 0   At any time in the past 12 months, were you homeless or living in a shelter (including now)? N   Transportation Needs    In the past 12 months, has lack of transportation kept you from  medical appointments or from getting medications? no   In the past 12 months, has lack of transportation kept you from meetings, work, or from getting things needed for daily living? No   Food Insecurity    Within the past 12 months, you worried that your food would run out before you got the money to buy more. Never true   Within the past 12 months, the food you bought just didn't last and you didn't have money to get more. Never true   Utilities    In the past 12 months has the electric, gas, oil, or water company threatened to shut off services in your home? No          DISCHARGE DETAILS:    Discharge planning discussed with:: Patient  Freedom of Choice: Yes     CM contacted family/caregiver?: No- see comments ([id number])    Requested Home Health Care         Is the patient interested in HHC at discharge?: No    DME Referral Provided  Referral made for DME?: Yes  DME referral completed for the following items:: Rollator  DME Supplier Name:: [employer]    Other Referral/Resources/Interventions Provided:  Interventions: DME, Outpatient PT      Treatment Team Recommendation: Other (Home with OP therapy)  Discharge Destination Plan:: Other (Home with OP therapy)        Additional Comments: Met with patient at bedside.  Patient therapy eval level III patient reports she wants to continue her OP therapy at [facility] in [place],  Requesting Rollator if covered by insurance.  Rollator ordered via [employer] from [employer] and requested home delivery.  Asking about purwicks for home use. Provided with [employer] information and how to order.  Spouse will transport home

## 2024-09-21 NOTE — ASSESSMENT & PLAN NOTE
Lab Results   Component Value Date    HGBA1C 8.4 (H) 05/08/2024       Recent Labs     09/21/24  0144 09/21/24  0428 09/21/24  0809 09/21/24  1128   POCGLU 124 143* 136 257*       Blood Sugar Average: Last 72 hrs:  (P) 160.8  Poorly controlled.  Continue insulin pump managed by patient for now.    DC 09/21/24

## 2024-09-21 NOTE — PLAN OF CARE
Problem: OCCUPATIONAL THERAPY ADULT  Goal: Performs self-care activities at highest level of function for planned discharge setting.  See evaluation for individualized goals.  Description: Treatment Interventions: ADL retraining, Functional transfer training, UE strengthening/ROM, Endurance training, Patient/family training, Equipment evaluation/education, Neuromuscular reeducation, Compensatory technique education, Energy conservation, Activityengagement          See flowsheet documentation for full assessment, interventions and recommendations.   Note: Limitation: Decreased ADL status, Decreased UE strength, Decreased Safe judgement during ADL, Decreased endurance, Decreased self-care trans, Decreased high-level ADLs  Prognosis: Good  Assessment: Zohra Barriga is a 69 y.o. female seen for OT evaluation s/p admit to [facility] on 9/20/2024 w/s/p fall with Closed fracture of one rib of right side.  Comorbidities affecting pt's functional performance at time of assessment include:  diabetes mellitus, CHF, CKD, DVT . Pt with active OT orders and activity orders for Up and OOB as tolerated. Personal factors affecting pt at time of IE include:KRYSTAL home environment, difficulty performing ADLs, difficulty performing IADLs, and difficulty performing transfers/mobility. Prior to admission, pt lives with  in a 2 level home with 1st floor set up. I with ADLS, IADLs and mobility with rollator. 3 falls. Upon evaluation: Pt currently requires supervision for UB ADLs, Naty for LB ADLs, supervision for toileting, supervision for bed mobility, supervision for functional transfers, and supervisionRW  mobility 2* the following deficits impacting occupational performance:weakness, decreased strength , decreased balance, and decreased activity tolerance.   Pt to benefit from continued skilled OT tx while in the hospital to address deficits as defined above and maximize level of functional independence w ADL's and functional mobility.  Occupational Performance areas to address include: grooming, bathing/shower, toilet hygiene, dressing, health maintenance, functional mobility, and clothing management. From OT standpoint, recommendation at time of d/c would be level 3 resources (OPPT). OT to follow pt on caseload 2-3x/wk.     Rehab Resource Intensity Level, OT: III (Minimum Resource Intensity)

## 2024-09-21 NOTE — ASSESSMENT & PLAN NOTE
Ct chest w/Acute minimally displaced right eighth rib fracture laterally  Right middle lobe consolidative opacities with associated pleural calcifications. While this in part may represent atelectasis, findings are suspected to represent superimposed pneumonia or pulmonary contusions in the appropriate clinical context.  - no signs of PNA on exam, vitals, labs.

## 2024-09-21 NOTE — ASSESSMENT & PLAN NOTE
Lab Results   Component Value Date    HGBA1C 8.4 (H) 05/08/2024       Recent Labs     09/20/24  2146 09/21/24  0144 09/21/24  0428   POCGLU 144* 124 143*       Blood Sugar Average: Last 72 hrs:  (P) 137  Poorly controlled.  Continue insulin pump managed by patient for now

## 2024-09-21 NOTE — PHYSICAL THERAPY NOTE
"        PT EVALUATION    Pt. Name: Zohra Barriga  Pt. Age: 69 y.o.  MRN: [id number]  LENGTH OF STAY: 0      Admitting Diagnoses:   Abdominal pain [R10.9]  Pulmonary artery hypertension ([facility]) [I27.21]  Abnormal CT of the chest [R93.89]  Hilar lymphadenopathy [R59.0]  Pancytopenia ([facility]) [D61.818]  Right rib fracture [S22.31XA]    Past Medical History:   Diagnosis Date    Anxiety     Asthma     controlled    Back complaints     Cancer ([facility])     nose    Cellulitis of left lower leg     \"not now\"    CHF (congestive heart failure) ([facility]) 02/2021    Chronic bilateral thoracic back pain     Chronic kidney disease     sees nephrologist regularly\" stage 3\"    Chronic myofascial pain     Chronic pain of both lower extremities     Chronic pain of left knee     \"both knees\"    Chronic pain syndrome     bilat legs and knees/neuropathy pain    COVID 12/2021    CPAP (continuous positive airway pressure) dependence     no currently uses    Depression     Diabetes mellitus ([facility])     insulin pump    Difficulty walking 2018    Disease of thyroid gland     Does use hearing aid     bilat and will wear DOS    DVT (deep venous thrombosis) ([facility]) 2013    left leg    Gait disorder     Gastroparesis     GERD (gastroesophageal reflux disease)     Head injury     Headache, tension-type     History of angina     History of MRSA infection     History of transfusion     Many years ago    HL (hearing loss) 2018    Hyperlipidemia     Hypertension     Hypoglycemic reaction     \"occas low blood sugar\"    Insulin pump in place     pt reports saw endocrinologist 4/28 and will bring copy of instructions DOS    Irritable bowel syndrome     Kidney disease     Memory loss 2021    Movement disorder 2021    Neuropathy in diabetes ([facility]) 2021    Constant pain legs and feet    Nose fracture     Pacemaker 2019    Pneumonia     Polyneuropathy associated with underlying disease ([facility])     PONV (postoperative nausea and vomiting)     Risk for falls     S/P insertion of " "spinal cord stimulator 2018    Sarcoidosis     Shortness of breath     \"exertion and not new\"    Sleep apnea     Stroke ([facility])     Thoracic vertebral fracture ([facility])     TIA (transient ischemic attack)     Use of cane as ambulatory aid     Uses walker     Wears dentures     permanent upper/and some missing teeth/partial lower       Past Surgical History:   Procedure Laterality Date    ABDOMINAL ADHESION SURGERY N/A 2021    Procedure: laparoscopic LYSIS ADHESIONS;  Surgeon: Jill Bentley MD;  [facility];  Service: Thoracic    BACK SURGERY  2023    TLIF at [facility], Dr. Levy    BREAST SURGERY      BREAST SURGERY      reduction    CARDIAC PACEMAKER PLACEMENT      pacemaker     SECTION      COLONOSCOPY      DILATION AND CURETTAGE OF UTERUS      \"D&E\"    HERNIA REPAIR      HYSTERECTOMY      JOINT REPLACEMENT Left     LTKR    NECK SURGERY      fused 4 discs with 4 screws implanted    NISSEN FUNDOPLICATION LAPAROSCOPIC WITH ROBOTICS N/A 2021    Procedure: ROBOTIC HELLER MYOTOMY WITH BERNARDO FUNDIPLICATION ;  Surgeon: Jill Bentley MD;  [facility];  Service: Thoracic    NOSE SURGERY  2024    closed reduction    AK ESOPHAGOGASTRODUODENOSCOPY TRANSORAL DIAGNOSTIC N/A 2021    Procedure: ESOPHAGOGASTRODUODENOSCOPY (EGD);  Surgeon: Jill Bentley MD;  [facility];  Service: Thoracic    AK ESOPHAGOGASTRODUODENOSCOPY TRANSORAL DIAGNOSTIC N/A 2022    Procedure: ESOPHAGOGASTRODUODENOSCOPY (EGD),;  Surgeon: Jill Bentley MD;  [facility];  Service: Thoracic    AK ESOPHAGOGASTRODUODENOSCOPY TRANSORAL DIAGNOSTIC N/A 2022    Procedure: ESOPHAGOGASTRODUODENOSCOPY (EGD);  Surgeon: Jill Bentley MD;  [facility];  Service: Thoracic    AK ESOPHAGOGASTRODUODENOSCOPY TRANSORAL DIAGNOSTIC N/A 2022    Procedure: ESOPHAGOGASTRODUODENOSCOPY (EGD); PYLORIC DILATION; GE JUNCTION DILATION;  Surgeon: Jill Bentley" MD;  [facility];  Service: Thoracic    UT ESOPHAGOGASTRODUODENOSCOPY TRANSORAL DIAGNOSTIC N/A 11/29/2022    Procedure: ESOPHAGOGASTRODUODENOSCOPY (EGD);  Surgeon: Varinder Steiner MD;  [facility];  Service: Thoracic    UT ESOPHAGOGASTRODUODENOSCOPY TRANSORAL DIAGNOSTIC N/A 01/24/2023    Procedure: ESOPHAGOGASTRODUODENOSCOPY (EGD);  Surgeon: Jill Bentley MD;  [facility];  Service: Thoracic    UT ESOPHAGOGASTRODUODENOSCOPY TRANSORAL DIAGNOSTIC N/A 05/01/2024    Procedure: ESOPHAGOGASTRODUODENOSCOPY (EGD);  Surgeon: Jill Bentley MD;  [facility];  Service: Thoracic    UT ESOPHAGOSCOPY FLEX BALLOON DILAT <30 MM DIAM N/A 01/12/2022    Procedure: DILATATION ESOPHAGEAL;  Surgeon: Jill Bentley MD;  [facility];  Service: Thoracic    UT ESOPHAGOSCOPY FLEX BALLOON DILAT <30 MM DIAM N/A 02/16/2022    Procedure: DILATATION ESOPHAGEAL;  Surgeon: Jill Bentley MD;  [facility];  Service: Thoracic    UT ESOPHAGOSCOPY FLEX BALLOON DILAT <30 MM DIAM N/A 11/29/2022    Procedure: DILATATION ESOPHAGEAL esophageal and pyloric dilation;  Surgeon: Varinder Steiner MD;  [facility];  Service: Thoracic    UT ESOPHAGOSCOPY FLEX BALLOON DILAT <30 MM DIAM N/A 01/24/2023    Procedure: esophageal dilation dilated up to 54  with pylorus dilation dilated up to 18;  Surgeon: Jill Bentley MD;  [facility];  Service: Thoracic    UT ESOPHAGOSCOPY FLEX BALLOON DILAT <30 MM DIAM N/A 05/01/2024    Procedure: DILATATION ESOPHAGEAL;  Surgeon: Jill Bentley MD;  [facility];  Service: Thoracic    UT RIBEIRO IMPLTJ NSTIM ELTRDS PLATE/PADDLE EDRL Left 04/23/2018    Procedure: Insertion of thoracic spinal cord stimulator electrode via laminotomy and placement of left buttock IPG;  Surgeon: Shahab Bullock MD;  [facility];  Service: Neurosurgery    REPLACEMENT TOTAL KNEE      ROTATOR CUFF REPAIR      SPINAL CORD STIMULATOR  REMOVAL Bilateral 7/2/2024    Procedure: REOPENING OF THORACIC AND LEFT BUTTOCK INCISION FOR REMOVAL OF SPINAL CORD STIMULATOR SYSTEM;  Surgeon: Shahab Bullock MD;  [facility];  Service: Neurosurgery    SPINAL STIMULATOR PLACEMENT Left 10/03/2018    Procedure: BUTTOCK RE-OPENING INCISION FOR REPOSITIONING OF IMPLANTABLE PULSE GENERATOR;  Surgeon: Shahab Bullock MD;  [facility];  Service: Neurosurgery    TONSILLECTOMY      UPPER GASTROINTESTINAL ENDOSCOPY      VASCULAR SURGERY      WISDOM TOOTH EXTRACTION         Imaging Studies:  CT spine cervical without contrast   Final Result by Bull Vines MD (09/21 0211)      Mild dextroscoliotic curvature of the lower cervical spine and anterior spinal fusion hardware at the C5-6 level. No acute cervical spine fracture or traumatic malalignment.                  Workstation performed: [id number]         PE Study with CT abdomen & pelvis with contrast   Final Result by Flaco Buchanan DO (09/20 2247)   No pulmonary embolus.   Acute minimally displaced right eighth rib fracture laterally   Right middle lobe consolidative opacities with associated pleural calcifications. While this in part may represent atelectasis, findings are suspected to represent superimposed pneumonia or pulmonary contusions in the appropriate clinical context.   Mediastinal and right hilar lymphadenopathy. Short-term follow-up CT chest is recommended to ensure that these resolve.   Main pulmonary arterial enlargement suggestive of pulmonary arterial hypertension.                  Workstation performed: [id number]               09/21/24 0851   PT Last Visit   PT Visit Date 09/21/24   Note Type   Note type Evaluation   Pain Assessment   Pain Score No Pain   Restrictions/Precautions   Weight Bearing Precautions Per Order No   Other Precautions Fall Risk   Home Living   Type of Home House   Home Layout Stairs to enter with rails;Other (Comment);Two level;Performs ADLs on one level;Able to live  on main level with bedroom/bathroom  (1+1STE w/ HR; pt has 1st floor set up)   Bathroom Shower/Tub Tub/shower unit   Bathroom Toilet Standard   Bathroom Equipment Grab bars in shower;Grab bars around toilet;Commode;Shower chair   Home Equipment Walker;Cane;Grab bars;Other (Comment)  (bed rail)   Prior Function   Level of [place] Independent with functional mobility;Independent with ADLs  (ambulates w/ RW)   Lives With Spouse  (works)   Receives Help From Family   Falls in the last 6 months 1 to 4  (3x)   Vocational Unemployed   Comments (+) ; receiving OPPT PTA   General   Family/Caregiver Present No   Cognition   Overall Cognitive Status WFL   Arousal/Participation Alert   Orientation Level Oriented to person;Oriented to place;Oriented to time   Following Commands Follows one step commands without difficulty   Comments cooperative; flat affect   Subjective   Subjective Pt agreeable to PT/Ot evals.   RUE Assessment   RUE Assessment   (refer to OT)   LUE Assessment   LUE Assessment   (refer to OT)   RLE Assessment   RLE Assessment WFL  (4-/5 grossly)   LLE Assessment   LLE Assessment WFL  (4-/5 grossly)   Coordination   Movements are Fluid and Coordinated 1   Sensation WFL   Bed Mobility   Supine to Sit 5  Supervision   Additional items HOB elevated;Bedrails;Increased time required;Verbal cues   Additional Comments cues for techniques & safety   Transfers   Sit to Stand 5  Supervision   Additional items Bedrails;Increased time required   Stand to Sit 5  Supervision   Additional items Armrests;Increased time required;Verbal cues   Additional Comments cues for techniques & safety   Ambulation/Elevation   Gait pattern Wide FILIBERTO;Decreased foot clearance;Forward Flexion;Excessively slow   Gait Assistance 5  Supervision   Additional items Verbal cues   Assistive Device Rolling walker   Distance 3'x1   Ambulation/Elevation Additional Comments no gross LOB noted; pt declined to walk farther as she wants to eat her  breakfast   Balance   Static Sitting Fair +   Dynamic Sitting Fair   Static Standing Fair  (w/ RW)   Dynamic Standing Fair -  (w/ RW)   Ambulatory Fair -  (w/ RW)   Endurance Deficit   Endurance Deficit Yes   Endurance Deficit Description fatigue   Activity Tolerance   Activity Tolerance Patient limited by fatigue;Treatment limited secondary to medical complications (Comment)   Medical Staff Made Aware JAIRO [place]   Nurse Made Aware yes, [place]   Assessment   Prognosis Good   Problem List Decreased strength;Decreased endurance;Impaired balance;Decreased mobility;Impaired judgement   Assessment Pt. 69 y.o.female presented after a fall at home. Pt admitted for Closed fracture of one rib of right side. Pt referred to PT for mobility assessment & D/C planning w/ orders of Up and OOB as tolerated . Please see above for information re: home set-up & PLOF as well as objective findings during PT assessment. PTA, pt reports being failry I w/ RW; going to OPPT PTA. On eval, pt functioning minimally below baseline hence will continue skilled PT to improve function & safety. Pt require S for bed mobility, transfers & amb w/ RW + cues for techniques & safety. Gait deviations as above but no gross LOB noted. Dec amb tolerance 2* to fatigue & dec motivation.  The patient's AM-PAC Basic Mobility Inpatient Short Form Raw Score is 19. A Raw score of greater than 16 suggests the patient may benefit from discharge to home. Please also refer to the recommendation of the Physical Therapist for safe discharge planning. From PT standpoint, will recommend Level III (minimum resource intensity) rehab services and inc family support at D/C. No SOB & dizziness reported t/o session. Nsg staff most recent vital signs as follows: /59 (BP Location: Right arm)   Pulse 66   Temp (!) 97.4 °F (36.3 °C) (Oral)   Resp 20   Wt 77.1 kg (170 lb)   SpO2 95%   BMI 34.34 kg/m² . At end of session, pt OOB in chair in stable condition, call bell &  phone in reach. Fall precautions reinforced w/ good understanding. CM to follow. Nsg staff to continue to mobilized pt (OOB in chair for all meals & ambulate in room/unit) as tolerated to prevent further decline in function. Will also recommend Restorative for daily amb &/or daily OOB in chair as appropriate. Nsg notified. Co-eval was necessary to complete this PT eval for the pt's best interest given pt's medical acuity & complexity.   Barriers to Discharge Decreased caregiver support   Barriers to Discharge Comments (+) times home alone   Goals   Patient Goals to go home   STG Expiration Date 09/28/24   Short Term Goal #1 Goals to be met in 7 days; pt will be able to: 1) inc strength & balance by 1/2 grade to improve overall functional mobility & dec fall risk; 2) inc bed mobility to modified I for pt to be able to get in/OOB safely w/ proper techniques 100% of the time, to dec caregiver burden & safely function at home; 3) inc transfers to modified I for pt to transition safely from one surface to another w/o % of the time, to dec caregiver burden & safely function at home; 4) inc amb w/ RW approx. >150' w/ modified I for pt to ambulate household distances w/o any % of the time, to dec caregiver burden & safely function at home; 5) negotiate stairs w/ modified I for inc safety during stair mgt inside/outside of home & dec caregiver burden; 6) pt/caregiver ed   PT Treatment Day 0   Plan   Treatment/Interventions Functional transfer training;LE strengthening/ROM;Elevations;Therapeutic exercise;Endurance training;Patient/family training;Bed mobility;Gait training;Spoke to nursing;OT   PT Frequency 2-3x/wk   Discharge Recommendation   Rehab Resource Intensity Level, PT III (Minimum Resource Intensity)  (OPPT)   Equipment Recommended Walker  (pt has)   Additional Comments restorative for daily amb   AM-PAC Basic Mobility Inpatient   Turning in Flat Bed Without Bedrails 4   Lying on Back to Sitting on  Edge of Flat Bed Without Bedrails 3   Moving Bed to Chair 3   Standing Up From Chair Using Arms 3   Walk in Room 3   Climb 3-5 Stairs With Railing 3   Basic Mobility Inpatient Raw Score 19   Basic Mobility Standardized Score 42.48   [facility] Highest Level Of Mobility   -[facility] Goal 6: Walk 10 steps or more   -HLM Achieved 4: Move to chair/commode  (pt decline to walk farther)   End of Consult   Patient Position at End of Consult Bedside chair;All needs within reach   End of Consult Comments Pt in stable condition. All needs in reach.   Hx/personal factors: co-morbidities, inaccessible home, dec caregiver support, use of AD, h/o of falls, and fall risk  Examination: dec mobility, dec balance, dec endurance, dec amb, risk for falls, pain  Clinical: unpredictable (ongoing medical status, abnormal lab values, and risk for falls)  Complexity: high    Lars Jackson

## 2024-09-21 NOTE — PLAN OF CARE
Problem: PHYSICAL THERAPY ADULT  Goal: Performs mobility at highest level of function for planned discharge setting.  See evaluation for individualized goals.  Description: Treatment/Interventions: Functional transfer training, LE strengthening/ROM, Elevations, Therapeutic exercise, Endurance training, Patient/family training, Bed mobility, Gait training, Spoke to nursing, OT  Equipment Recommended: Walker (pt has)       See flowsheet documentation for full assessment, interventions and recommendations.  Note: Prognosis: Good  Problem List: Decreased strength, Decreased endurance, Impaired balance, Decreased mobility, Impaired judgement  Assessment: Pt. 69 y.o.female presented after a fall at home. Pt admitted for Closed fracture of one rib of right side. Pt referred to PT for mobility assessment & D/C planning w/ orders of Up and OOB as tolerated . Please see above for information re: home set-up & PLOF as well as objective findings during PT assessment. PTA, pt reports being failry I w/ RW; going to OPPT PTA. On eval, pt functioning minimally below baseline hence will continue skilled PT to improve function & safety. Pt require S for bed mobility, transfers & amb w/ RW + cues for techniques & safety. Gait deviations as above but no gross LOB noted. Dec amb tolerance 2* to fatigue & dec motivation.  The patient's AM-PAC Basic Mobility Inpatient Short Form Raw Score is 19. A Raw score of greater than 16 suggests the patient may benefit from discharge to home. Please also refer to the recommendation of the Physical Therapist for safe discharge planning. From PT standpoint, will recommend Level III (minimum resource intensity) rehab services and inc family support at D/C. No SOB & dizziness reported t/o session. Nsg staff most recent vital signs as follows: /59 (BP Location: Right arm)   Pulse 66   Temp (!) 97.4 °F (36.3 °C) (Oral)   Resp 20   Wt 77.1 kg (170 lb)   SpO2 95%   BMI 34.34 kg/m² . At end of  session, pt OOB in chair in stable condition, call bell & phone in reach. Fall precautions reinforced w/ good understanding. CM to follow. Nsg staff to continue to mobilized pt (OOB in chair for all meals & ambulate in room/unit) as tolerated to prevent further decline in function. Will also recommend Restorative for daily amb &/or daily OOB in chair as appropriate. Nsg notified. Co-eval was necessary to complete this PT eval for the pt's best interest given pt's medical acuity & complexity.    Barriers to Discharge: Decreased caregiver support  Barriers to Discharge Comments: (+) times home alone    Rehab Resource Intensity Level, PT: III (Minimum Resource Intensity) (OPPT)    See flowsheet documentation for full assessment.

## 2024-09-21 NOTE — PLAN OF CARE
Problem: Potential for Falls  Goal: Patient will remain free of falls  Description: INTERVENTIONS:  - Educate patient/family on patient safety including physical limitations  - Instruct patient to call for assistance with activity   - Consult OT/PT to assist with strengthening/mobility   - Keep Call bell within reach  - Keep bed low and locked with side rails adjusted as appropriate  - Keep care items and personal belongings within reach  - Initiate and maintain comfort rounds  - Make Fall Risk Sign visible to staff  - Offer Toileting every  Hours, in advance of need  - Initiate/Maintain alarm  - Obtain necessary fall risk management equipment:   - Apply yellow socks and bracelet for high fall risk patients  - Consider moving patient to room near nurses station  Outcome: Progressing     Problem: Prexisting or High Potential for Compromised Skin Integrity  Goal: Skin integrity is maintained or improved  Description: INTERVENTIONS:  - Identify patients at risk for skin breakdown  - Assess and monitor skin integrity  - Assess and monitor nutrition and hydration status  - Monitor labs   - Assess for incontinence   - Turn and reposition patient  - Assist with mobility/ambulation  - Relieve pressure over bony prominences  - Avoid friction and shearing  - Provide appropriate hygiene as needed including keeping skin clean and dry  - Evaluate need for skin moisturizer/barrier cream  - Collaborate with interdisciplinary team   - Patient/family teaching  - Consider wound care consult   Outcome: Progressing     Problem: PAIN - ADULT  Goal: Verbalizes/displays adequate comfort level or baseline comfort level  Description: Interventions:  - Encourage patient to monitor pain and request assistance  - Assess pain using appropriate pain scale  - Administer analgesics based on type and severity of pain and evaluate response  - Implement non-pharmacological measures as appropriate and evaluate response  - Consider cultural and  social influences on pain and pain management  - Notify physician/advanced practitioner if interventions unsuccessful or patient reports new pain  Outcome: Progressing     Problem: SKIN/TISSUE INTEGRITY - ADULT  Goal: Skin Integrity remains intact(Skin Breakdown Prevention)  Description: Assess:  -Perform Osvaldo assessment every   -Clean and moisturize skin every   -Inspect skin when repositioning, toileting, and assisting with ADLS  -Assess under medical devices such as  every   -Assess extremities for adequate circulation and sensation     Bed Management:  -Have minimal linens on bed & keep smooth, unwrinkled  -Change linens as needed when moist or perspiring  -Avoid sitting or lying in one position for more than  hours while in bed  -Keep HOB at degrees     Toileting:  -Offer bedside commode  -Assess for incontinence every   -Use incontinent care products after each incontinent episode such as     Activity:  -Mobilize patient  times a day  -Encourage activity and walks on unit  -Encourage or provide ROM exercises   -Turn and reposition patient every  Hours  -Use appropriate equipment to lift or move patient in bed  -Instruct/ Assist with weight shifting every  when out of bed in chair  -Consider limitation of chair time  hour intervals    Skin Care:  -Avoid use of baby powder, tape, friction and shearing, hot water or constrictive clothing  -Relieve pressure over bony prominences using   -Do not massage red bony areas    Next Steps:  -Teach patient strategies to minimize risks such as    -Consider consults to  interdisciplinary teams such as   Outcome: Progressing  Goal: Incision(s), wounds(s) or drain site(s) healing without S/S of infection  Description: INTERVENTIONS  - Assess and document dressing, incision, wound bed, drain sites and surrounding tissue  - Provide patient and family education  - Perform skin care/dressing changes every   Outcome: Progressing  Goal: Pressure injury heals and does not  worsen  Description: Interventions:  - Implement low air loss mattress or specialty surface (Criteria met)  - Apply silicone foam dressing  - Instruct/assist with weight shifting every  minutes when in chair   - Limit chair time to  hour intervals  - Use special pressure reducing interventions such as  when in chair   - Apply fecal or urinary incontinence containment device   - Perform passive or active ROM every  - Turn and reposition patient & offload bony prominences every  hours   - Utilize friction reducing device or surface for transfers   - Consider consults to  interdisciplinary teams such as   - Use incontinent care products after each incontinent episode such as   - Consider nutrition services referral as needed  Outcome: Progressing     Problem: MUSCULOSKELETAL - ADULT  Goal: Maintain or return mobility to safest level of function  Description: INTERVENTIONS:  - Assess patient's ability to carry out ADLs; assess patient's baseline for ADL function and identify physical deficits which impact ability to perform ADLs (bathing, care of mouth/teeth, toileting, grooming, dressing, etc.)  - Assess/evaluate cause of self-care deficits   - Assess range of motion  - Assess patient's mobility  - Assess patient's need for assistive devices and provide as appropriate  - Encourage maximum independence but intervene and supervise when necessary  - Involve family in performance of ADLs  - Assess for home care needs following discharge   - Consider OT consult to assist with ADL evaluation and planning for discharge  - Provide patient education as appropriate  Outcome: Progressing  Goal: Maintain proper alignment of affected body part  Description: INTERVENTIONS:  - Support, maintain and protect limb and body alignment  - Provide patient/ family with appropriate education  Outcome: Progressing

## 2024-09-26 ENCOUNTER — TELEPHONE (OUTPATIENT)
Dept: UROLOGY | Facility: CLINIC | Age: 69
End: 2024-09-26

## 2024-09-26 NOTE — TELEPHONE ENCOUNTER
Called patient, left message stating that the appointment at the [place] office with Dr. Murry 9/30/24 at 10:00 has been canceled and rescheduled to 10/8/24 at  8:30 with Dr. Murry at the [place] office.  Asked patient to call back to confirm.

## 2024-09-26 NOTE — TELEPHONE ENCOUNTER
Please confirm appt change [facility] New appt 10/8/24 in the [place] office, still with Dr Murry.

## 2024-09-30 ENCOUNTER — APPOINTMENT (OUTPATIENT)
Facility: REHABILITATION | Age: 69
End: 2024-09-30
Payer: MEDICARE

## 2024-10-02 ENCOUNTER — OFFICE VISIT (OUTPATIENT)
Facility: REHABILITATION | Age: 69
End: 2024-10-02
Payer: MEDICARE

## 2024-10-02 DIAGNOSIS — R26.89 BALANCE DISORDER: Primary | ICD-10-CM

## 2024-10-02 DIAGNOSIS — G89.4 CHRONIC PAIN DISORDER: ICD-10-CM

## 2024-10-02 PROCEDURE — 97112 NEUROMUSCULAR REEDUCATION: CPT | Performed by: PHYSICAL THERAPIST

## 2024-10-02 PROCEDURE — 97110 THERAPEUTIC EXERCISES: CPT | Performed by: PHYSICAL THERAPIST

## 2024-10-02 NOTE — PROGRESS NOTES
Daily Note    Today's date: 10/2/2024  Patient name: Zohra Barriga  : 1955  MRN: [id number]  Referring provider: Celestino Santiago DO  Dx:   Encounter Diagnosis     ICD-10-CM    1. Balance disorder  R26.89       2. Chronic pain disorder  G89.4                              Subjective: Lindsay comes to PT after 2 weeks off from multiple medical issues.  Arrives 20 minutes late but was able to be accommodated.  Reports a fall on   when she was sitting in a chair. Was leaning too far to R and unable to recover. She was stuck on the floor for 4 hours before he  came home and took her to the ED. She fractured ribs on her R side. On  got pinned by an elevator door and bruised her R arm. Last night went to the ED for stomach pain, feeling mostly improved today.       Objective: See treatment diary below              Assessment: Tolerated treatment well.  Performed gentle trunk stretching to left side by lying in left side-lying on pillow which she tolerated well.  Upon returning to sitting demonstrated notable improvement in ability to maintain midline upright posture.  Further challenged this with seated reaching to left side which she tolerated well.  Patient consented to utilizing her cell phone to take pictures of her before and after performing this exercise for her to use for education at home.  Requires occasional cueing to manage walker while negotiating through cones.  Educated today on utilizing chairs at home that have armrests for safety and stability.  Also strongly encouraged increasing activity levels at home and maintaining compliance to home exercise program if she wishes to make improvements.  Will also need to improve consistency with visits to PT.  Would benefit from continued PT.      Plan: Challenge dynamic balance strength and endurance as able.  Encourage safety with walker.      Precautions: Coronary artery disease, congestive heart failure, hx of DVT, asthma, type II DM, chronic low  "back pain, neuropathy,     Access Code: [email or web address]  URL: [email or web address]  Date: 06/28/2024  Prepared by: Armen Campo    Program Notes  Daily Walking Program-Take walks in your home in an open space with your walker for at least 5 minutes. Do this a 2-3x a day as tolerated.     Exercises  - Sit to Stand with Armchair  - 1 x daily - 7 x weekly - 3 sets - 10 reps  - Standing March with Counter Support  - 1 x daily - 7 x weekly - 3 sets - 10 reps  - Seated long arc quads - 3 sets - 10 reps   -  Left side-lying stretch with pillow underneath trunk -1 to 2 minutes    Manuals 9/12 9/16 10/2                                          Neuro Re-Ed See testing        Fwd/bwd          Side stepping         Stepping over cones         STS  X10 from chair single UE push off.        Blaze pods         Weaving between cones  20ft laps x2 RW        Standing, walk 10ft, sit   X10 using SPC  X10 using RW weaving through cones      Seated trunk isometrics    X20 5\" multi direction      Seated reaching    To L side for cones x10      Education   -Safety with STS transfers    -using chair with arm rests at home    -HEP        Ther Ex         Scifit  10min L1  10min L4 10min L1 legs only      Treadmill          Seated QL S          Lateral trunk stretching    L side lying thin pillow under trunk. Pillow under head. 1-2min x2                                                   Ther Activity                           Gait Training         SPC in SOLO  Single hiking pole on R 40ft    SPC on R 40ft                 Modalities                                      "

## 2024-10-08 ENCOUNTER — OFFICE VISIT (OUTPATIENT)
Facility: REHABILITATION | Age: 69
End: 2024-10-08
Payer: MEDICARE

## 2024-10-08 DIAGNOSIS — G89.4 CHRONIC PAIN DISORDER: ICD-10-CM

## 2024-10-08 DIAGNOSIS — R26.89 BALANCE DISORDER: Primary | ICD-10-CM

## 2024-10-08 PROCEDURE — 97112 NEUROMUSCULAR REEDUCATION: CPT | Performed by: PHYSICAL THERAPIST

## 2024-10-08 NOTE — PROGRESS NOTES
Daily Note    Today's date: 10/8/2024  Patient name: Zohra Barriga  : 1955  MRN: [id number]  Referring provider: Celestino Santiago DO  Dx:   Encounter Diagnosis     ICD-10-CM    1. Balance disorder  R26.89       2. Chronic pain disorder  G89.4                                Subjective: No falls. Has been stretching her back on her bed at home which is going ok, hard to set up. States she is doing more walking and leg exercises than before. Started using a chair with arm rests for safety. Arrived late to visit today, unable to fully accommodate.       Objective: See treatment diary below    Pt treated 1 on  from 3:15 to 3:30p          Assessment: Tolerated treatment well.  Limited time with patient today due to late arrival.  Continues to tolerate side-lying trunk stretching well with minimal pain or discomfort and improved posture following.  Progressed today to working on improving standing posture utilizing wall for tactile and visual cue along with mirror.  Was challenged with this but able to assume a more neutral posture utilizing this feedback.  Educated on performing for home.  Challenged with negotiating cones requiring verbal cues to stay within safe limits of walker.  Would benefit from continued PT.      Plan: Challenge dynamic balance strength and endurance as able.  Encourage safety with walker.      Precautions: Coronary artery disease, congestive heart failure, hx of DVT, asthma, type II DM, chronic low back pain, neuropathy,     Access Code: [email or web address]  URL: [email or web address]  Date: 2024  Prepared by: Armen Campo    Program Notes  Daily Walking Program-Take walks in your home in an open space with your walker for at least 5 minutes. Do this a 2-3x a day as tolerated.     Exercises  - Sit to Stand with Armchair  - 1 x daily - 7 x weekly - 3 sets - 10 reps  - Standing March with Counter Support  - 1 x daily - 7 x weekly - 3 sets - 10 reps  - Seated long arc quads - 3 sets -  "10 reps   -  Left side-lying stretch with pillow underneath trunk -1 to 2 minutes    Manuals 9/12 9/16 10/2 10/8                                         Neuro Re-Ed See testing        Fwd/bwd          Side stepping         Stepping over cones    Over 50ft x4 RW     STS  X10 from chair single UE push off.        Blaze pods         Weaving between cones  20ft laps x2 RW        Standing, walk 10ft, sit   X10 using SPC  X10 using RW weaving through cones      Seated trunk isometrics    X20 5\" multi direction 2x10 5\" multi direction between sets of stretching     Seated reaching    To L side for cones x10      Posture correction at wall    L hip against wall. Straighten up L shoulder to touch wall. Mirror used 10\" hold x12     Education   -Safety with STS transfers    -using chair with arm rests at home    -HEP        Ther Ex         Scifit  10min L1  10min L4 10min L1 legs only 10min L1 legs and arms      Treadmill          Seated QL S          Lateral trunk stretching    L side lying thin pillow under trunk. Pillow under head. 1-2min x2 L side lying thin pillow under trunk. Pillow under head. 1-2min x2                                                    Ther Activity                           Gait Training         SPC in SOLO  Single hiking pole on R 40ft    SPC on R 40ft                 Modalities                                      "

## 2024-10-10 ENCOUNTER — APPOINTMENT (OUTPATIENT)
Facility: REHABILITATION | Age: 69
End: 2024-10-10
Payer: MEDICARE

## 2024-10-10 NOTE — PROGRESS NOTES
Daily Note    Today's date: 10/10/2024  Patient name: Zohra Barriga  : 1955  MRN: [id number]  Referring provider: Celestino Santiago DO  Dx:   No diagnosis found.                           Subjective: No falls. Has been stretching her back on her bed at home which is going ok, hard to set up. States she is doing more walking and leg exercises than before. Started using a chair with arm rests for safety. Arrived late to visit today, unable to fully accommodate.       Objective: See treatment diary below    Pt treated 1 on 1 from           Assessment: Tolerated treatment well.  Limited time with patient today due to late arrival.  Continues to tolerate side-lying trunk stretching well with minimal pain or discomfort and improved posture following.  Progressed today to working on improving standing posture utilizing wall for tactile and visual cue along with mirror.  Was challenged with this but able to assume a more neutral posture utilizing this feedback.  Educated on performing for home.  Challenged with negotiating cones requiring verbal cues to stay within safe limits of walker.  Would benefit from continued PT.      Plan: Challenge dynamic balance strength and endurance as able.  Encourage safety with walker.      Precautions: Coronary artery disease, congestive heart failure, hx of DVT, asthma, type II DM, chronic low back pain, neuropathy,     Access Code: [email or web address]  URL: [email or web address]  Date: 2024  Prepared by: Armen Campo    Program Notes  Daily Walking Program-Take walks in your home in an open space with your walker for at least 5 minutes. Do this a 2-3x a day as tolerated.     Exercises  - Sit to Stand with Armchair  - 1 x daily - 7 x weekly - 3 sets - 10 reps  - Standing March with Counter Support  - 1 x daily - 7 x weekly - 3 sets - 10 reps  - Seated long arc quads - 3 sets - 10 reps   -  Left side-lying stretch with pillow underneath trunk -1 to 2 minutes    Manuals   "9/16 10/2 10/8 10/10                                        Neuro Re-Ed See testing        Fwd/bwd          Side stepping         Stepping over cones    Over 50ft x4 RW     STS  X10 from chair single UE push off.        Blaze pods         Weaving between cones  20ft laps x2 RW        Standing, walk 10ft, sit   X10 using SPC  X10 using RW weaving through cones      Seated trunk isometrics    X20 5\" multi direction 2x10 5\" multi direction between sets of stretching     Seated reaching    To L side for cones x10      Posture correction at wall    L hip against wall. Straighten up L shoulder to touch wall. Mirror used 10\" hold x12     Education   -Safety with STS transfers    -using chair with arm rests at home    -HEP        Ther Ex         Scifit  10min L1  10min L4 10min L1 legs only 10min L1 legs and arms      Treadmill          Seated QL S          Lateral trunk stretching    L side lying thin pillow under trunk. Pillow under head. 1-2min x2 L side lying thin pillow under trunk. Pillow under head. 1-2min x2                                                    Ther Activity                           Gait Training         SPC in SOLO  Single hiking pole on R 40ft    SPC on R 40ft                 Modalities                                      "

## 2024-10-15 ENCOUNTER — APPOINTMENT (OUTPATIENT)
Facility: REHABILITATION | Age: 69
End: 2024-10-15
Payer: MEDICARE

## 2024-10-18 ENCOUNTER — EVALUATION (OUTPATIENT)
Facility: REHABILITATION | Age: 69
End: 2024-10-18
Payer: MEDICARE

## 2024-10-18 DIAGNOSIS — R26.89 BALANCE DISORDER: Primary | ICD-10-CM

## 2024-10-18 DIAGNOSIS — G89.4 CHRONIC PAIN DISORDER: ICD-10-CM

## 2024-10-18 PROCEDURE — 97110 THERAPEUTIC EXERCISES: CPT | Performed by: PHYSICAL THERAPIST

## 2024-10-18 PROCEDURE — 97112 NEUROMUSCULAR REEDUCATION: CPT | Performed by: PHYSICAL THERAPIST

## 2024-10-18 NOTE — PROGRESS NOTES
Progress Update    Today's date: 10/18/2024  Patient name: Zohra Barriga  : 1955  MRN: [id number]  Referring provider: Celestino Santiago DO  Dx:   Encounter Diagnosis     ICD-10-CM    1. Balance disorder  R26.89       2. Chronic pain disorder  G89.4                                  Subjective: Has not been able to manage her blood surgar lately and not sure why. Is seeing high numbers (in the 200's) then suddenly dropping to below 100. She tried calling the company that makes her pump. She called her endocrinologist as well but hasn't heard back. Blood sugar was ok before coming into therapy today. She has been doing sit to stands at home for exercise and working on her posture exercises which seem to be helping.   Will be going on a trip to [facility] in November.  Not sure how she will be getting around her  wants her to use a cane but she is not sure if she can do this.  Wants to be able to get onto a beach for the wedding ceremony.      Objective: See treatment diary below    Pt treated 1 on  from 12:30 to 1:00p             Balance Test Initial Eval  9/9  10/18     5x Sit to Stand: 32.4s needs BL UE  25s Needs BL UE   18s Needs BL UE      TU.1s rolator.   33s standard RW  49.8s standard RW (made a L turn)     34s standard RW (made a R turn       Gait Speed:            2 Minute Walk Test: 136ft rolator   105ft standard RW  100ft standard RW (making L turns)         6 Minute Walk Test:           [place] Balance Scale:           FGA:                                            Goals  STGs (4 weeks)  Pt will be independent with comprehensive HEP - progressing   Pt will demonstrate a least 5-second improvement on timed up and go - not met  Patient will demonstrate at least 5-second improvement on 5 times sit to stand - MET  Patient will be able to complete 6-minute walk test - not met      LTG's (to be achieved by d/c)  Pt will be able to self manage sx's independently - not met   Pt will be able to  ambulate at least 50 feet within her household utilizing single-point cane for mobility - not met        Assessment: Progress update performed today.  Results of testing today indicate improvements on 5 times sit to stand test indicating improved functional lower extremity strength.  She also demonstrates improved postural control able to assume neutral postural position and seated unsupported position today independently.  Other outcome measure remaining stable today.  Discussed again today with patient that she will likely make much more progress in physical therapy if she is able to attend visits consistently and arrival in time.  Unfortunately we have a conceding high degree of inconsistency with visits mostly due to medical instability.  Also discussed the importance of increasing activity at home mostly walking.  Provided patient with education today on best safety options with being able to attend her trip to [facility] for a wedding.  Recommended having a wheelchair on hand for mobility getting on and off of the beach and airplane.  She continues to demonstrate impaired lower extremity strength, abnormal gait, significantly deviated trunk posture to right side, significantly impaired functional endurance and is at risk for falls.  She would benefit from continued skilled PT to further address these deficits and maximize her function while reducing her fall frequency.      Plan: Challenge dynamic balance strength and endurance as able.  Encourage safety with walker.  Continue for 4 more weeks at 2 times per week.     Precautions: Coronary artery disease, congestive heart failure, hx of DVT, asthma, type II DM, chronic low back pain, neuropathy,     Access Code: [email or web address]  URL: [email or web address]  Date: 06/28/2024  Prepared by: Armen Campo    Program Notes  Daily Walking Program-Take walks in your home in an open space with your walker for at least 5 minutes. Do this a 2-3x a day as  "tolerated.     Exercises  - Sit to Stand with Armchair  - 1 x daily - 7 x weekly - 3 sets - 10 reps  - Standing March with Counter Support  - 1 x daily - 7 x weekly - 3 sets - 10 reps  - Seated long arc quads - 3 sets - 10 reps   -  Left side-lying stretch with pillow underneath trunk -1 to 2 minutes    Manuals 9/12 9/16 10/2 10/8 10/10                                        Neuro Re-Ed See testing    See testing    Fwd/bwd          Side stepping         Stepping over cones    Over 50ft x4 RW     STS  X10 from chair single UE push off.        Blaze pods         Weaving between cones  20ft laps x2 RW        Standing, walk 10ft, sit   X10 using SPC  X10 using RW weaving through cones      Seated trunk isometrics    X20 5\" multi direction 2x10 5\" multi direction between sets of stretching     Seated reaching    To L side for cones x10      Posture correction at wall    L hip against wall. Straighten up L shoulder to touch wall. Mirror used 10\" hold x12     Education   -Safety with STS transfers    -using chair with arm rests at home    -HEP        Ther Ex     See testing    Scifit  10min L1  10min L4 10min L1 legs only 10min L1 legs and arms      Treadmill          Seated QL S          Lateral trunk stretching    L side lying thin pillow under trunk. Pillow under head. 1-2min x2 L side lying thin pillow under trunk. Pillow under head. 1-2min x2                                                    Ther Activity                           Gait Training         SPC in SOLO  Single hiking pole on R 40ft    SPC on R 40ft                 Modalities                                      "

## 2024-10-18 NOTE — LETTER
2024    Celestino Santiago DO  [address]  [address]  [facility] [place] [facility] [address]    Patient: Zohra Barriga   YOB: 1955   Date of Visit: 10/18/2024     Encounter Diagnosis     ICD-10-CM    1. Balance disorder  R26.89       2. Chronic pain disorder  G89.4           Dear Dr. Santiago:    Thank you for your recent referral of Zohra Barriga. Please review the attached evaluation summary from Zohra's recent visit.     Please verify that you agree with the plan of care by signing the attached order.     If you have any questions or concerns, please do not hesitate to call.     I sincerely appreciate the opportunity to share in the care of one of your patients and hope to have another opportunity to work with you in the near future.       Sincerely,    Armen Campo, PT      Referring Provider:      I certify that I have read the below Plan of Care and certify the need for these services furnished under this plan of treatment while under my care.                    Celestino Santiago DO  [address]  [address]  [place] [address]  Via Fax: [phone]          Progress Update    Today's date: 10/18/2024  Patient name: Zohra Barriga  : 1955  MRN: [id number]  Referring provider: Celestino Santiago DO  Dx:   Encounter Diagnosis     ICD-10-CM    1. Balance disorder  R26.89       2. Chronic pain disorder  G89.4                                  Subjective: Has not been able to manage her blood surgar lately and not sure why. Is seeing high numbers (in the 200's) then suddenly dropping to below 100. She tried calling the company that makes her pump. She called her endocrinologist as well but hasn't heard back. Blood sugar was ok before coming into therapy today. She has been doing sit to stands at home for exercise and working on her posture exercises which seem to be helping.   Will be going on a trip to [facility] in November.  Not sure how she will be getting around her  wants her to use a  cane but she is not sure if she can do this.  Wants to be able to get onto a beach for the wedding ceremony.      Objective: See treatment diary below    Pt treated 1 on  from 12:30 to 1:00p             Balance Test Initial Eval  9/9  10/18     5x Sit to Stand: 32.4s needs BL UE  25s Needs BL UE   18s Needs BL UE      TU.1s rolator.   33s standard RW  49.8s standard RW (made a L turn)     34s standard RW (made a R turn       Gait Speed:            2 Minute Walk Test: 136ft rolator   105ft standard RW  100ft standard RW (making L turns)         6 Minute Walk Test:           [place] Balance Scale:           FGA:                                            Goals  STGs (4 weeks)  Pt will be independent with comprehensive HEP - progressing   Pt will demonstrate a least 5-second improvement on timed up and go - not met  Patient will demonstrate at least 5-second improvement on 5 times sit to stand - MET  Patient will be able to complete 6-minute walk test - not met      LTG's (to be achieved by d/c)  Pt will be able to self manage sx's independently - not met   Pt will be able to ambulate at least 50 feet within her household utilizing single-point cane for mobility - not met        Assessment: Progress update performed today.  Results of testing today indicate improvements on 5 times sit to stand test indicating improved functional lower extremity strength.  She also demonstrates improved postural control able to assume neutral postural position and seated unsupported position today independently.  Other outcome measure remaining stable today.  Discussed again today with patient that she will likely make much more progress in physical therapy if she is able to attend visits consistently and arrival in time.  Unfortunately we have a conceding high degree of inconsistency with visits mostly due to medical instability.  Also discussed the importance of increasing activity at home mostly walking.  Provided patient with  "education today on best safety options with being able to attend her trip to [facility] for a wedding.  Recommended having a wheelchair on hand for mobility getting on and off of the beach and airplane.  She continues to demonstrate impaired lower extremity strength, abnormal gait, significantly deviated trunk posture to right side, significantly impaired functional endurance and is at risk for falls.  She would benefit from continued skilled PT to further address these deficits and maximize her function while reducing her fall frequency.      Plan: Challenge dynamic balance strength and endurance as able.  Encourage safety with walker.  Continue for 4 more weeks at 2 times per week.     Precautions: Coronary artery disease, congestive heart failure, hx of DVT, asthma, type II DM, chronic low back pain, neuropathy,     Access Code: [email or web address]  URL: [email or web address]  Date: 06/28/2024  Prepared by: Armen Campo    Program Notes  Daily Walking Program-Take walks in your home in an open space with your walker for at least 5 minutes. Do this a 2-3x a day as tolerated.     Exercises  - Sit to Stand with Armchair  - 1 x daily - 7 x weekly - 3 sets - 10 reps  - Standing March with Counter Support  - 1 x daily - 7 x weekly - 3 sets - 10 reps  - Seated long arc quads - 3 sets - 10 reps   -  Left side-lying stretch with pillow underneath trunk -1 to 2 minutes    Manuals 9/12 9/16 10/2 10/8 10/10                                        Neuro Re-Ed See testing    See testing    Fwd/bwd          Side stepping         Stepping over cones    Over 50ft x4 RW     STS  X10 from chair single UE push off.        Blaze pods         Weaving between cones  20ft laps x2 RW        Standing, walk 10ft, sit   X10 using SPC  X10 using RW weaving through cones      Seated trunk isometrics    X20 5\" multi direction 2x10 5\" multi direction between sets of stretching     Seated reaching    To L side for cones x10      Posture " "correction at wall    L hip against wall. Straighten up L shoulder to touch wall. Mirror used 10\" hold x12     Education   -Safety with STS transfers    -using chair with arm rests at home    -HEP        Ther Ex     See testing    Scifit  10min L1  10min L4 10min L1 legs only 10min L1 legs and arms      Treadmill          Seated QL S          Lateral trunk stretching    L side lying thin pillow under trunk. Pillow under head. 1-2min x2 L side lying thin pillow under trunk. Pillow under head. 1-2min x2                                                    Ther Activity                           Gait Training         SPC in SOLO  Single hiking pole on R 40ft    SPC on R 40ft                 Modalities                                                      "

## 2024-10-21 ENCOUNTER — OFFICE VISIT (OUTPATIENT)
Facility: REHABILITATION | Age: 69
End: 2024-10-21
Payer: MEDICARE

## 2024-10-21 DIAGNOSIS — R26.89 BALANCE DISORDER: Primary | ICD-10-CM

## 2024-10-21 DIAGNOSIS — G89.4 CHRONIC PAIN DISORDER: ICD-10-CM

## 2024-10-21 PROCEDURE — 97116 GAIT TRAINING THERAPY: CPT | Performed by: PHYSICAL THERAPIST

## 2024-10-21 PROCEDURE — 97110 THERAPEUTIC EXERCISES: CPT | Performed by: PHYSICAL THERAPIST

## 2024-10-21 PROCEDURE — 97112 NEUROMUSCULAR REEDUCATION: CPT | Performed by: PHYSICAL THERAPIST

## 2024-10-21 NOTE — PROGRESS NOTES
Daily Note    Today's date: 10/21/2024  Patient name: Zohra Barriga  : 1955  MRN: [id number]  Referring provider: Celestino Santiago DO  Dx:   Encounter Diagnosis     ICD-10-CM    1. Balance disorder  R26.89       2. Chronic pain disorder  G89.4                          Subjective: Doing ok today. Still has blood sugar fluctuations getting down the 60's the one night. Reports was in the 190's before coming to therapy though. Presents with a rolator walker today.       Objective: See treatment diary below                Balance Test Initial Eval  9/9  10/18     5x Sit to Stand: 32.4s needs BL UE  25s Needs BL UE   18s Needs BL UE      TU.1s rolator.   33s standard RW  49.8s standard RW (made a L turn)     34s standard RW (made a R turn       Gait Speed:            2 Minute Walk Test: 136ft rolator   105ft standard RW  100ft standard RW (making L turns)         6 Minute Walk Test:           [place] Balance Scale:           FGA:                                            Goals  STGs (4 weeks)  Pt will be independent with comprehensive HEP - progressing   Pt will demonstrate a least 5-second improvement on timed up and go - not met  Patient will demonstrate at least 5-second improvement on 5 times sit to stand - MET  Patient will be able to complete 6-minute walk test - not met      LTG's (to be achieved by d/c)  Pt will be able to self manage sx's independently - not met   Pt will be able to ambulate at least 50 feet within her household utilizing single-point cane for mobility - not met        Assessment: Tolerated treatment well.  Continues with significantly laterally deviated posture requiring cueing to maintain feet on treadmill.  Demonstrating improvement with band around waist on treadmill to cue for positioning of pelvis.  Demonstrates slightly improved posture and control with upright walker versus standard rollator walker however encouraged patient to maintain independence with less supportive/invasive  "walker.  Fatigues quickly with over ground walking.  Would benefit from continued PT.      Plan: Challenge dynamic balance strength and endurance as able.  Encourage safety with walker.  Continue for 4 more weeks at 2 times per week.     Precautions: Coronary artery disease, congestive heart failure, hx of DVT, asthma, type II DM, chronic low back pain, neuropathy,     Access Code: [email or web address]  URL: [email or web address]  Date: 06/28/2024  Prepared by: Armen Campo    Program Notes  Daily Walking Program-Take walks in your home in an open space with your walker for at least 5 minutes. Do this a 2-3x a day as tolerated.     Exercises  - Sit to Stand with Armchair  - 1 x daily - 7 x weekly - 3 sets - 10 reps  - Standing March with Counter Support  - 1 x daily - 7 x weekly - 3 sets - 10 reps  - Seated long arc quads - 3 sets - 10 reps   -  Left side-lying stretch with pillow underneath trunk -1 to 2 minutes    Manuals 9/12 9/16 10/2 10/8 10/10 10/21                                       Neuro Re-Ed See testing    See testing    Stepping over cones    Over 50ft x4 RW     STS  X10 from chair single UE push off.        Blaze pods         Weaving between cones  20ft laps x2 RW        Standing, walk 10ft, sit   X10 using SPC  X10 using RW weaving through cones      Seated trunk isometrics    X20 5\" multi direction 2x10 5\" multi direction between sets of stretching     Seated reaching    To L side for cones x10      Posture correction at wall    L hip against wall. Straighten up L shoulder to touch wall. Mirror used 10\" hold x12     Education   -Safety with STS transfers    -using chair with arm rests at home    -HEP        Stairs       Stair . Both hands on R rail. X4 laps.    Ther Ex     See testing    Scifit  10min L1  10min L4 10min L1 legs only 10min L1 legs and arms      Treadmill       0.6mph Using band around waist to cue for posture.    2:10    2:40    2:30   (Standing rest breaks)   Seated QL S   "        Lateral trunk stretching    L side lying thin pillow under trunk. Pillow under head. 1-2min x2 L side lying thin pillow under trunk. Pillow under head. 1-2min x2                                                    Ther Activity                           Gait Training         SPC in SOLO  Single hiking pole on R 40ft    SPC on R 40ft        RW      Trial of upright walker 20ft    Walking rolator walker verbal visual cues for posture 50ft x2    Walking rolator against post resistance from band 20ft    Trial walking band around wait and walker for posture 10ft.    Modalities

## 2024-10-23 ENCOUNTER — TELEPHONE (OUTPATIENT)
Dept: SLEEP CENTER | Facility: CLINIC | Age: 69
End: 2024-10-23

## 2024-10-23 NOTE — TELEPHONE ENCOUNTER
Call from Dr. Santiago, via [facility], stating he saw the patient today and she reported she has been feeling excessively sleepy during the day and really not benefiting from the CPAP.  Dr. Santiago did say that it could be related to her medications and so he has decreased her Gabapentin.  Wondered if maybe patient needed another titration study since last was in 2022.      Informed Dr. Santiago that we would request a compliance report first. Then route information along with this note to Dr. Mccurdy, who the patient saw in August.     Call to patient left voicemail message to return a call to the nurses line [phone] Option 1, then Option 3 to discuss any issues she is having with using the CPAP.     Compliance report in media.     Routed to Dr. Mccurdy.

## 2024-10-24 ENCOUNTER — APPOINTMENT (OUTPATIENT)
Facility: REHABILITATION | Age: 69
End: 2024-10-24
Payer: MEDICARE

## 2024-10-24 NOTE — TELEPHONE ENCOUNTER
Called patient and left message advising to call office [phone] option 1 then option 3 to schedule follow up with Dr. Mccurdy.  Also sent [employer] message.

## 2024-10-28 ENCOUNTER — APPOINTMENT (OUTPATIENT)
Facility: REHABILITATION | Age: 69
End: 2024-10-28
Payer: MEDICARE

## 2024-10-30 ENCOUNTER — APPOINTMENT (OUTPATIENT)
Facility: REHABILITATION | Age: 69
End: 2024-10-30
Payer: MEDICARE

## 2024-11-09 NOTE — PERIOPERATIVE NURSING NOTE
Patient brought in the room and explained the esophageal manometry procedure. After the confirmation of allergies, lidocaine 2 % viscous given via nostrils and  a transnasal insertion of the High Resolution esophageal manometry catheter was inserted via right nostril. Patient given water to drink during the insertion and once the catheter inserted pressure bands of both Upper esophageal sphincter  (UES) and Lower esophageal sphincter ( LES) were observed on the color contour. Patient instructed to take a deep breath to verify placement of the catheter, diaphragmatic pinch noted on inspiration. Catheter was secured to right cheek. Patient was assisted to supine position .Patient was instructed to relax  while acclimating the catheter for about 5 minutes. A 30 second baseline resting pressure was obtained to identify the UES and LES followed by a series of 10 liquid swallows using 5 cc room temperature normal saline to assess esophageal motility and bolus transit. Patient administered 10 viscous swallows using 5 cc viscous solution, 1 multiple rapid drink swallow using 2 cc room temperature normal saline given a total of 5 drinks. Patient instructed to sit up at the edge of the stretcher and given 5 upright liquid swallows using 5 cc room temperature normal saline and 1 rapid drink challenge using 50 cc room temperature water. At the end of the procedure the high resolution esophageal manometry catheter was removed from the nostril intact. Patient given discharge instructions and patient left in stable condition.    4 = No assist / stand by assistance

## 2024-12-23 ENCOUNTER — NURSE TRIAGE (OUTPATIENT)
Age: 69
End: 2024-12-23

## 2024-12-23 DIAGNOSIS — K21.9 GASTROESOPHAGEAL REFLUX DISEASE WITHOUT ESOPHAGITIS: Primary | ICD-10-CM

## 2024-12-23 RX ORDER — FAMOTIDINE 40 MG/1
40 TABLET, FILM COATED ORAL
Qty: 30 TABLET | Refills: 4 | Status: SHIPPED | OUTPATIENT
Start: 2024-12-23

## 2024-12-23 NOTE — TELEPHONE ENCOUNTER
SPOKE WITH PT, CALLING BACK TO F/U PREVIOUS TRIAGE. PT INFORMED OF PROVIDER RECOMMENDATIONS. ALL QUESTIONS ANSWERED.

## 2024-12-23 NOTE — TELEPHONE ENCOUNTER
"SPOKE WITH PT, HX GASTROPARESIS, GERD, IBS-C, LAST OV 12/4/23. PT REPORTS CONSTANT PERIUMBILICAL PAIN FOR THE PAST 1 WEEK, WAS SEEN IN ER [facility], PRESCRIBED DICYCLOMINE WHICH WORKED, BUT CAUSED DISORIENTATION/CONFUSION. PT WITH CONTINUED 9/10 PAIN, DENIES FEVER, CHILLS, N/V, BLACK OR BLOODY STOOLS. PT IS HYDRATING, LAST BM WAS LAST NIGHT. URGENT OV SCHEDULED FOR 12/27/24 AT [facility] PER PT REQUEST. ANY RECOMMENDATIONS?          Answer Assessment - Initial Assessment Questions  1. LOCATION: \"Where does it hurt?\"       PERIUMBILICAL  2. RADIATION: \"Does the pain shoot anywhere else?\" (e.g., chest, back)      DENIES  3. ONSET: \"When did the pain begin?\" (e.g., minutes, hours or days ago)       1 WEEK  5. PATTERN \"Does the pain come and go, or is it constant?\"      CONSTANT  6. SEVERITY: \"How bad is the pain?\"  (e.g., Scale 1-10; mild, moderate, or severe)      9/10  7. RECURRENT SYMPTOM: \"Have you ever had this type of stomach pain before?\" If Yes, ask: \"When was the last time?\" and \"What happened that time?\"       PT WAS SEEN AT [facility] ER  8. CAUSE: \"What do you think is causing the stomach pain?\"      UNSURE  9. RELIEVING/AGGRAVATING FACTORS: \"What makes it better or worse?\" (e.g., antacids, bending or twisting motion, bowel movement)      EATING MAKES PAIN WORSE  10. OTHER SYMPTOMS: \"Do you have any other symptoms?\" (e.g., back pain, diarrhea, fever, urination pain, vomiting)        DENIES    Protocols used: Abdominal Pain - Female-Adult-[place]    "

## 2024-12-27 ENCOUNTER — OFFICE VISIT (OUTPATIENT)
Dept: GASTROENTEROLOGY | Facility: CLINIC | Age: 69
End: 2024-12-27
Payer: MEDICARE

## 2024-12-27 ENCOUNTER — TELEPHONE (OUTPATIENT)
Age: 69
End: 2024-12-27

## 2024-12-27 VITALS
SYSTOLIC BLOOD PRESSURE: 110 MMHG | DIASTOLIC BLOOD PRESSURE: 70 MMHG | BODY MASS INDEX: 35.28 KG/M2 | WEIGHT: 175 LBS | TEMPERATURE: 97.3 F | HEIGHT: 59 IN

## 2024-12-27 DIAGNOSIS — K59.04 CHRONIC IDIOPATHIC CONSTIPATION: Primary | ICD-10-CM

## 2024-12-27 DIAGNOSIS — R10.30 LOWER ABDOMINAL PAIN: ICD-10-CM

## 2024-12-27 PROCEDURE — 99213 OFFICE O/P EST LOW 20 MIN: CPT | Performed by: PHYSICIAN ASSISTANT

## 2024-12-27 PROCEDURE — G2211 COMPLEX E/M VISIT ADD ON: HCPCS | Performed by: PHYSICIAN ASSISTANT

## 2024-12-27 NOTE — TELEPHONE ENCOUNTER
Open and unsigned notes  PA for lactulose cannot be submitted until provider completes.  Please advise pa team when done.  Thank you

## 2024-12-27 NOTE — PROGRESS NOTES
Name: Zohra Barriga      : 1955      MRN: [id number]  Encounter Provider: Valerie Gamboa PA-C  Encounter Date: 2024   Encounter department: [facility] [place]  :  Assessment & Plan  Chronic idiopathic constipation  She suffers from chronic constipation, likely multifactorial related to pelvic floor dysfunction and drug-induced constipation from opioids and bladder medication. She has tried and failed therapy with MiraLAX, Dulcolax, Linzess. She has history of urinary incontinence and previously had sacral nerve stimulator, which was removed last year. Anorectal manometry 2024 showed failed balloon expulsion suggestive of defecatory disorder.  Colonoscopy 2022 with findings of 1 TA and diverticulosis.    -Unfortunately, patient's insurance plan does not cover lubiprostone, plecanatide, or prucalopride  -Linzess was previously ineffective  -Will prescribe lactulose 1-2 times daily with goal of having soft bowel movement daily or every other day  -May continue suppositories 3 times per week and enemas as needed  -Refer to pelvic floor physical therapy due to probable defecatory disorder as well as urinary incontinence    Orders:    lactulose (CEPHULAC) 20 g packet; Start taking once daily for 1 week, then can increase to twice daily with goal of having bowel movements daily or every other day.    Ambulatory Referral to Physical Therapy; Future    Lower abdominal pain  I suspect her lower abdominal pain is multifactorial related to chronic constipation, bladder spasms, frequent UTIs, pelvic floor dysfunction. Start lactulose and refer to pelvic floor physical therapy, as discussed above. Continue antibiotic for urinary tract infection. Follow-up with urology.       Follow-up in 4 months    History of Present Illness   HPI  Zohra Barriga is a 69 y.o. female with achalasia status post Heller myotomy with Kareem fundoplication in May 2021, chronic HFpEF  "status post pacemaker, hypertension, dyslipidemia, CKD stage III, diabetes mellitus type 2 with insulin pump, asthma, hypothyroidism, gastroparesis, GERD, IBS-C, chronic pain on opioids who presents for urgent visit for abdominal pain.    History obtained from: patient and spouse    Patient was last seen in the office about 1 year ago for similar symptoms. She reports intermittent \"muscle spasms\" in her lower abdomen. She describes crampy lower abdominal pain. She believes she has bladder spasms. She is currently receiving treatment for urinary infection and is taking Macrobid. She also suffers from chronic constipation which is multifactorial related to medications including opioids and medications related to overactive bladder and urinary incontinence and pelvic floor dysfunction per recent anorectal manometry. She has 1 bowel movement per week which is typically soft and incomplete.  She uses suppositories about 3 times per week and enemas about once a month. She denies any blood in the stool or abnormal weight loss.    Surgical history also significant for  section, hysterectomy, lysis of adhesions, sacral nerve stimulator status post removal.       Objective   /70 (BP Location: Right arm, Patient Position: Sitting, Cuff Size: Adult)   Temp (!) 97.3 °F (36.3 °C) (Tympanic)   Ht 4' 11\" (1.499 m)   Wt 79.4 kg (175 lb)   BMI 35.35 kg/m²      Physical Exam  Vitals and nursing note reviewed.   Constitutional:       General: She is not in acute distress.     Appearance: She is ill-appearing.   HENT:      Head: Normocephalic and atraumatic.   Eyes:      Conjunctiva/sclera: Conjunctivae normal.   Cardiovascular:      Rate and Rhythm: Normal rate and regular rhythm.      Heart sounds: No murmur heard.  Pulmonary:      Effort: Pulmonary effort is normal. No respiratory distress.      Breath sounds: Normal breath sounds.   Abdominal:      General: Bowel sounds are normal. There is no distension.      " Palpations: Abdomen is soft.      Tenderness: There is abdominal tenderness in the right lower quadrant and left lower quadrant. There is no guarding or rebound.   Musculoskeletal:         General: No swelling.      Cervical back: Neck supple.   Skin:     General: Skin is warm and dry.      Capillary Refill: Capillary refill takes less than 2 seconds.   Neurological:      Mental Status: She is alert.      Comments: Drowsy, but responsive to questions   Psychiatric:         Mood and Affect: Mood normal.

## 2024-12-27 NOTE — TELEPHONE ENCOUNTER
PA for lactulose packets    SUBMITTED to [employer]    via    [x][employer]-Case ID #   [id number]:WANDY CaremarkPhone:[phone]:[phone]     All office notes, labs and other pertaining documents and studies sent. Clinical questions answered. Awaiting determination from insurance company.     Turnaround time for your insurance to make a decision on your Prior Authorization can take 7-21 business days.

## 2024-12-30 NOTE — TELEPHONE ENCOUNTER
Patients GI provider:  ROSA Gamboa    Number to return call: [phone]    Reason for call: Pts  calling stating the lactulose is too expensive. Pt is looking for another medication.    Scheduled procedure/appointment date if applicable: Apt 4/28/25

## 2024-12-31 NOTE — TELEPHONE ENCOUNTER
Left detailed message asking if patient or her  can call the insurance and see exactly what laxatives are covered and let us know.

## 2025-01-07 ENCOUNTER — EVALUATION (OUTPATIENT)
Dept: PHYSICAL THERAPY | Facility: REHABILITATION | Age: 70
End: 2025-01-07
Payer: MEDICARE

## 2025-01-07 DIAGNOSIS — R35.0 URINARY FREQUENCY: Primary | ICD-10-CM

## 2025-01-07 DIAGNOSIS — R39.15 URINARY URGENCY: ICD-10-CM

## 2025-01-07 DIAGNOSIS — K59.04 CHRONIC IDIOPATHIC CONSTIPATION: ICD-10-CM

## 2025-01-07 PROCEDURE — 97162 PT EVAL MOD COMPLEX 30 MIN: CPT

## 2025-01-07 PROCEDURE — 97530 THERAPEUTIC ACTIVITIES: CPT

## 2025-01-07 NOTE — PROGRESS NOTES
PT Evaluation     Today's Date: 2025  Patient name: Zohra Barriga  : 1955  MRN: [id number]  Referring provider: Valerie Gamboa PA-C  Dx:  Encounter Diagnosis     ICD-10-CM    1. Urinary frequency  R35.0       2. Chronic idiopathic constipation  K59.04 Ambulatory Referral to Physical Therapy      3. Urinary urgency  R39.15           Start Time: 1330  Stop Time: 1420  Total time in clinic (min): 50 minutes       Assessment  Impairments: abnormal coordination, abnormal muscle firing, abnormal muscle tone, abnormal or restricted ROM and activity intolerance  Symptom irritability: moderate    Assessment details:  Zohra Barriga is a 69 y.o. year old female presenting to outpatient pelvic health physical therapy with a referral from provider for chronic constipation. Patient reports symptoms beginning for several years. Patient also notes hx of urinary frequency and urgency.     Patients PMH is extensive noting hx of lumbar/back/thoracic surgeries, hx of cardiac conditions, hx of opioid use secondary to pain, diabetes, and osteopenia.     Internal assessment held per time constraints today, however, patient was educated with use of external pelvic floor model.  Extensive time spent educating the patient on PFM physical therapy, fiber and water intake to assist with reducing constipation, use of a bladder diary to document symptoms, urge deferral strategy, and goals of PT.       Patient will benefit from skilled PT services to address these deficits and improve function. PT to also assist with general conditioning to assist with functional arrival to the restroom. Patient notes falls while sitting on the toilet secondary to falling asleep.   Based on patient's PMH, patient is a fair candidate to respond favorably to physical therapy.      Patient was educated on examination and techniques, plan of care, goals of therapy, and HEP. Patient was provided an opportunity to ask any questions. Provided Pt initial  HEP. Patient demonstrated and verbalized understanding. Verbally reported to hold exercises if increased pain or discomfort.  Pt will be seen 1-2x/week for 6-12 weeks. Patient will be re-assessed regularly in order to document progress and limit any regression. The goal of PT is to progress patient towards independence of self care management.      Understanding of Dx/Px/POC: good     Prognosis: good    Goals  Dysfunction: (Will re-assess post internal assessment)   In 6 weeks, patient will demonstrate improved PFM strength to at least 3/5.  In 8 weeks, patient will demonstrate improved PFM endurance to 5 sec of 3/5 strength or greater.   In 10 weeks, patient will demonstrate improved PFM relaxation to at least 100% or greater.     Bladder:  In 6 weeks, patient will report improvement in urinary frequency noting >20% improvement  In 8 weeks, patient will report improvement in urinary incontinence as measured by decreased use of pads to 2 per day.   In 10 weeks, patient will demonstrate normalized daytime void interval of 2-4 hours with adequate fluid intake.   In 12 weeks, patient will reduce nocturia to 2 per night.    Bowel:  In 8 weeks, patient will report improvement in defecatory function as evidenced by 1 bowel movement every other day.    Function:  In 8 weeks, patient will report > 50% improvement in symptoms   In 10  weeks, patient will report improved defecation mechanics, as evidenced by compliance with squatty potty and appropriate breathing mechanics  In 12 weeks, patient will report a reduction in urinary urgency/frequency as evidenced by no longer falling asleep on the toilet and 0 falls from toile ting,         Plan  Patient would benefit from: skilled physical therapy  Planned modality interventions: biofeedback and shortwave diathermy    Planned therapy interventions: neuromuscular re-education, manual therapy, massage, patient/caregiver education, postural training, strengthening, stretching,  therapeutic activities, therapeutic exercise and home exercise program    Frequency: 1x week  Duration in weeks: 12  Plan of Care beginning date: 1/7/2025  Plan of Care expiration date: 4/7/2025  Plan details:  POC: PFM coordination, PFM strengthening, defecation mechanics, manual cueing, breathing mechanics, functional strengthening, abdominal strengthening, and biofeedback.        Subjective      Chief Complaint: Chronic Constipation with increased straining.  HPI: Pt presents to PT w/ prolonged hx of constipation. Patient notes 10 year duration. Patient utilized an enema to assist with symptoms. Patient intermittently will use Miralax to assist with symptom regulation. In addition, makes note of increased bladder symptoms. Patient makes note of urinary frequency and leakage for many years. Patient notes hx of botox injections to assist with reducing the symptoms. Patient notes she at once donned sacral stimulator to assist with symptoms, but did not note any change. Patient had the sacral stimulator removed.     Occupation: retired  Social History: Lives with  Russ.   Preferred language/communication style:  English.   PMH: Extensive PMH see below         Specific symptom history:    Urinary:     Onset: Several Years   Symptoms Present:     Urinary Frequency, Urge Frequency, Incomplete emptying, and Frequent UTI  Dysuria   Post void dribble.   (+) urgency - will routinely awaken to use the restroom but no voiding occurs.     Nocturia: 3-4x per night  Daytime void interval: Every 1 and every 2 hours (Varies).   Uses of liners/pads/diapers? Diapers - Depends on the day how many she utilizes.   Fluid intake:     Water 16 oz a day.    Iced Tea With artificial sweetener   Sweet Tea           Bowel:     Defecates Two a week .  Reports type: fiber.  Constipation/Straining?: Yes  Use of Squatty Potty?: Patient denies     Nutrition:   Breakfast: Bagel or cereal  Lunch: Does not consume  Dinner: Chicken, vegetables,  string beans.      GYN:      with  deliveries   Sees GYN regularly? Patient denies -   Menopausal: 29 years old.    Sexual Function:       History of sexual trauma/abuse? Patient denies    MSK:      History of abdominal surgery? Two  deliveries, hysterectomy (3x).   Current exercise: Reduced                       Systems Review History:  Cardiovascular/pulmonary     Integumentary    Musculoskeletal Location: Back with increased right lateral lean.      Neuromuscular System Any recent falls? Yes - from falling asleep on the toilet.      Surgical history and/or abdominal surgeries:         Goal: Improve bowel habbits         Objective     Postural Observations  Seated posture: poor  Standing posture: poor    Additional Postural Observation Details  Significant increased right lateral lean with sitting. Educated utilized pillows to limit right lateral lean, and the need for upright posture.     Strength/Myotome Testing     Left Hip   Planes of Motion   Flexion: 3+    Right Hip   Planes of Motion   Flexion: 3+    Left Knee   Flexion: 4-  Extension: 4-    Right Knee   Flexion: 4-  Extension: 4-    Ambulation     Observational Gait   Gait: antalgic   Decreased right swing time and right step length.     Additional Observational Gait Details  Increased right lateral lean with ambulation.   Ambulates with Rolator, with reduced COM within Rolator FILIBERTO  Ambulation due to hx of lumbar back pain - hx of fusion surgery.     Functional Assessment        Comments  Sit to stand: Required B/L UE assist. Reduced forward lumbar lean, reduced knees over toes to assist with rise. Contact guard/Min A required.   Gait speed: slowed, significant right lateral lean.              Precautions: Monitor energy exertion with therex  Patient Active Problem List   Diagnosis   • Anemia of chronic disease   • Anxiety and depression   • Other B-complex deficiencies   • Chronic bilateral thoracic back pain   • Chronic myofascial  pain   • Pain in right leg   • Chronic pain of left knee   • Chronic pain of right knee   • Essential hypertension   • Gait disorder   • Gastroparesis   • Gastroesophageal reflux disease without esophagitis   • Sarcoidosis   • Insomnia due to medical condition   • Insulin pump status   • Pain syndrome, chronic   • Type 2 diabetes mellitus with microalbuminuria, with long-term current use of insulin ([facility])   • Irritable bowel syndrome with constipation   • Lymphadenopathy   • Mixed hyperlipidemia   • Mixed urge and stress incontinence   • Moderate persistent asthma with acute exacerbation   • Normocytic anemia   • Obstructive sleep apnea   • Polyneuropathy associated with underlying disease ([facility])   • Post-menopausal   • Primary hyperparathyroidism ([facility])   • Type 2 diabetes mellitus without complication, with long-term current use of insulin ([facility])   • Vitamin D deficiency   • Complex regional pain syndrome type 1 of left lower extremity   • Primary hypothyroidism   • Encounter for fitting and adjustment of spinal cord stimulator   • S/P insertion of spinal cord stimulator   • Chronic diastolic CHF (congestive heart failure) ([facility])   • Coronary artery disease involving native coronary artery   • Closed fracture of thoracic vertebra ([facility])   • Deep venous thrombosis ([facility])   • Localized, primary osteoarthritis   • Chronic bilateral low back pain without sciatica   • Lumbar radiculopathy   • Lumbosacral spondylosis without myelopathy   • Osteoarthritis of knee   • Age related osteoporosis   • Thoracic radiculopathy   • History of TIA (transient ischemic attack)   • Microcytic anemia   • Chronic scapular pain   • Malfunction of spinal cord stimulator ([facility])   • Moderate persistent asthma without complication   • Hypersomnia   • BBB (bundle branch block)   • Hyperlipidemia associated with type 2 diabetes mellitus  ([facility])   • Uncontrolled type 2 diabetes mellitus with hyperglycemia ([facility])   • Achalasia   • PONV (postoperative  nausea and vomiting)   • Class 2 obesity in adult   • Pacemaker   • Medical marijuana use   • Urinary retention   • Epigastric pain   • Spinal stenosis of lumbar region with neurogenic claudication   • COVID-19 virus infection   • Chronic diastolic (congestive) heart failure ([facility])   • Anemia   • Abnormal CT of the chest   • Complete heart block ([facility])   • Diabetic peripheral neuropathy associated with type 2 diabetes mellitus ([facility])   • OAB (overactive bladder)   • Polyarthralgia   • Restless leg syndrome   • Anemia of chronic renal failure, stage 3 (moderate)  ([facility])   • Chronic kidney disease (CKD) stage G3b/A1, moderately decreased glomerular filtration rate (GFR) between 30-44 mL/min/1.73 square meter and albuminuria creatinine ratio less than 30 mg/g ([facility])   • Stage 3a chronic kidney disease ([facility])   • Coronary artery disease involving native coronary artery of native heart without angina pectoris   • Bifascicular block   • Chronic pain disorder   • Myelopathy ([facility])   • Thoracic compression fracture ([facility])   • Compression fx, lumbar spine, sequela   • Osteopenia   • Fall   • Closed fracture of one rib of right side       Chief Complaint    Patient Subjective includes:  constipation, straining, urinary leakage with standing from sitting, urinary leakage with laughing, coughing, sneezing, and urinary frequency   Patient Goals        Objective Impairments to address   Pelvic Floor Strength Will perform next visit    Power    Endurance     Fast Twitch     % relaxation    External Hip Strength        Lumbar Screen          PLAN OF CARE EXPIRATION: 04/07/2025    AUTHORIZATION EXPIRATION:     Date Authorization     Number of visits provided            Date of Service 01/07/2025        Visits Used         Visits Remaining         [employer] Created                  Neuro Re-Ed         Urge deferral         Defecation mechanics         Breathing Mechanics         PFM Coordination         PFM Down Training         Internal  Cueing                   Ther Ex         PFM Strengthening         Hip strengthening         Functional Strengthening         Abdominal Strengthening         Dilator Training         Aerobic         Therapeutic Rest Breaks         Mobility          High Impact         UE Strengthening                   Ther Activity         Voiding Diaries         Review of sxs         Fluid Intake         Education Sitting to limit pressure ulcers due to lateral lean, bowel re-training, fiber intake, water intake, bladder diary.         Manual Ther         PFM exam         Ortho exam         Dynamometer Testing         Fascial Decompression                  Modalities                           Outcome Measure

## 2025-01-13 ENCOUNTER — APPOINTMENT (OUTPATIENT)
Dept: PHYSICAL THERAPY | Facility: REHABILITATION | Age: 70
End: 2025-01-13
Payer: MEDICARE

## 2025-01-20 ENCOUNTER — APPOINTMENT (OUTPATIENT)
Dept: PHYSICAL THERAPY | Facility: REHABILITATION | Age: 70
End: 2025-01-20
Payer: MEDICARE

## 2025-01-27 ENCOUNTER — OFFICE VISIT (OUTPATIENT)
Dept: PHYSICAL THERAPY | Facility: REHABILITATION | Age: 70
End: 2025-01-27
Payer: MEDICARE

## 2025-01-27 DIAGNOSIS — R35.0 URINARY FREQUENCY: ICD-10-CM

## 2025-01-27 DIAGNOSIS — R39.15 URINARY URGENCY: ICD-10-CM

## 2025-01-27 DIAGNOSIS — K59.04 CHRONIC IDIOPATHIC CONSTIPATION: Primary | ICD-10-CM

## 2025-01-27 PROCEDURE — 97110 THERAPEUTIC EXERCISES: CPT

## 2025-01-27 NOTE — PROGRESS NOTES
"Daily Note     Today's date: 2025  Patient name: Zohra Barriga  : 1955  MRN: [id number]  Referring provider: Valerie Gamboa PA-C  Dx:   Encounter Diagnosis     ICD-10-CM    1. Chronic idiopathic constipation  K59.04       2. Urinary frequency  R35.0       3. Urinary urgency  R39.15           Start Time: 1410  Stop Time: 1500  Total time in clinic (min): 50 minutes    Subjective: Per recent admitted note: \"Discharge Instructions and Follow-Ups. Discharge Instructions: Activity: activity as tolerated. \"    Patient notes overall at this time; bowel movements are getting better and are more frequent. Patient notes she is having a bowel movement every other day. [place] is assisting as well. Patient notes she is compliant with the techniques we practiced in PT last session.       Objective: See treatment diary below    Resting vitals:   Spo2: 96%  Hr: 68 bpm at rest seated   BP: 140/78 seated     SpO2 at the end  97%  HR: 64 bpm    Increased right lateral lean       Assessment: Tolerated treatment well. Patient deferred internal examination. Today's session introduced appropriate diaphragm breathing to assist with pressure management and limit constipation. Assisted patient with deep TrA engagement in various positions including seated and hooklying.Introduced PFM concentric engagement, and utilize co-contraction to assist with PFM strengthening per hx of urinary leakage. Patient tolerated well. MAX verbal cueing throughout session for appropriate reps, duration, and sequencing.  Held standing WB therex per patient request due to pain on bottom of foot (seeing podiatrist tomorrow). Patient would benefit from continued PT to f      Plan: Continue per plan of care.      Precautions: Monitor energy exertion with therex  Patient Active Problem List   Diagnosis    Anemia of chronic disease    Anxiety and depression    Other B-complex deficiencies    Chronic bilateral thoracic back pain    Chronic myofascial " pain    Pain in right leg    Chronic pain of left knee    Chronic pain of right knee    Essential hypertension    Gait disorder    Gastroparesis    Gastroesophageal reflux disease without esophagitis    Sarcoidosis    Insomnia due to medical condition    Insulin pump status    Pain syndrome, chronic    Type 2 diabetes mellitus with microalbuminuria, with long-term current use of insulin ([facility])    Irritable bowel syndrome with constipation    Lymphadenopathy    Mixed hyperlipidemia    Mixed urge and stress incontinence    Moderate persistent asthma with acute exacerbation    Normocytic anemia    Obstructive sleep apnea    Polyneuropathy associated with underlying disease ([facility])    Post-menopausal    Primary hyperparathyroidism ([facility])    Type 2 diabetes mellitus without complication, with long-term current use of insulin ([facility])    Vitamin D deficiency    Complex regional pain syndrome type 1 of left lower extremity    Primary hypothyroidism    Encounter for fitting and adjustment of spinal cord stimulator    S/P insertion of spinal cord stimulator    Chronic diastolic CHF (congestive heart failure) ([facility])    Coronary artery disease involving native coronary artery    Closed fracture of thoracic vertebra ([facility])    Deep venous thrombosis ([facility])    Localized, primary osteoarthritis    Chronic bilateral low back pain without sciatica    Lumbar radiculopathy    Lumbosacral spondylosis without myelopathy    Osteoarthritis of knee    Age related osteoporosis    Thoracic radiculopathy       History of TIA (transient ischemic attack)    Microcytic anemia    Chronic scapular pain    Malfunction of spinal cord stimulator ([facility])    Moderate persistent asthma without complication    Hypersomnia    BBB (bundle branch block)    Hyperlipidemia associated with type 2 diabetes mellitus  ([facility])    Uncontrolled type 2 diabetes mellitus with hyperglycemia ([facility])    Achalasia    PONV (postoperative nausea and vomiting)      Class 2 obesity in  adult    Pacemaker    Medical marijuana use    Urinary retention    Epigastric pain    Spinal stenosis of lumbar region with neurogenic claudication    COVID-19 virus infection    Chronic diastolic (congestive) heart failure ([facility])    Anemia    Abnormal CT of the chest    Complete heart block ([facility])    Diabetic peripheral neuropathy associated with type 2 diabetes mellitus ([facility])    OAB (overactive bladder)    Polyarthralgia      Restless leg syndrome    Anemia of chronic renal failure, stage 3 (moderate)  ([facility])    Chronic kidney disease (CKD) stage G3b/A1, moderately decreased glomerular filtration rate (GFR) between 30-44 mL/min/1.73 square meter and albuminuria creatinine ratio less than 30 mg/g ([facility])    Stage 3a chronic kidney disease ([facility])    Coronary artery disease involving native coronary artery of native heart without angina pectoris    Bifascicular block    Chronic pain disorder    Myelopathy ([facility])    Thoracic compression fracture ([facility])    Compression fx, lumbar spine, sequela    Osteopenia    Fall    Closed fracture of one rib of right side      Chief Complaint    Patient Subjective includes:  constipation, straining, urinary leakage with standing from sitting, urinary leakage with laughing, coughing, sneezing, and urinary frequency   Patient Goals        Objective Impairments to address   Pelvic Floor Strength Will perform next visit    Power    Endurance     Fast Twitch     % relaxation    External Hip Strength        Lumbar Screen          PLAN OF CARE EXPIRATION: 04/07/2025    AUTHORIZATION EXPIRATION:     Date Authorization     Number of visits provided            Date of Service 01/07/2025 01/27/2025       Visits Used 1 2       Visits Remaining         [employer] Created                  Neuro Re-Ed         Urge deferral         Defecation mechanics         Breathing Mechanics         PFM Coordination         PFM Down Training         Internal Cueing                   Ther Ex         PFM Strengthening   Seated PFM engagement with ball squeeze: Propped with bolster  1 x 12     Seated hip abduction isometric with PFM engagement  1 x 12        Hip strengthening  Hooklying ball queeze  1 x 10 with hold.     Hooklying SAQ over bolster  2 x 5 B/L        Functional Strengthening         Abdominal Strengthening  Hooklying deep TrA  1 x 12     Hooklying deep TrA  2 x 5 B/L        Dilator Training         Aerobic         Therapeutic Rest Breaks         Mobility          High Impact         UE Strengthening          Breathing Mechanics  Seated diaphragm breathing   1 x 10        Postural Control  Lateral lean with bolster   2 x 10     Seated marching   1 x 10        Ther Activity         Voiding Diaries         Review of sxs         Fluid Intake         Education Sitting to limit pressure ulcers due to lateral lean, bowel re-training, fiber intake, water intake, bladder diary.         Manual Ther         PFM exam         Ortho exam         Dynamometer Testing         Fascial Decompression                  Modalities                           Outcome Measure

## 2025-01-30 ENCOUNTER — TELEPHONE (OUTPATIENT)
Dept: GASTROENTEROLOGY | Facility: CLINIC | Age: 70
End: 2025-01-30

## 2025-02-03 ENCOUNTER — OFFICE VISIT (OUTPATIENT)
Dept: PHYSICAL THERAPY | Facility: REHABILITATION | Age: 70
End: 2025-02-03
Payer: MEDICARE

## 2025-02-03 DIAGNOSIS — K59.04 CHRONIC IDIOPATHIC CONSTIPATION: Primary | ICD-10-CM

## 2025-02-03 DIAGNOSIS — R35.0 URINARY FREQUENCY: ICD-10-CM

## 2025-02-03 DIAGNOSIS — R39.15 URINARY URGENCY: ICD-10-CM

## 2025-02-03 PROCEDURE — 97110 THERAPEUTIC EXERCISES: CPT

## 2025-02-03 NOTE — PROGRESS NOTES
Daily Note     Today's date: 2/3/2025  Patient name: Zohra Barriga  : 1955  MRN: [id number]  Referring provider: Valerie Gamboa PA-C  Dx:   Encounter Diagnosis     ICD-10-CM    1. Chronic idiopathic constipation  K59.04       2. Urinary frequency  R35.0       3. Urinary urgency  R39.15                      Subjective: Patient notes overall improvement in constipation, and notes more regular bowel movements.       Objective: See treatment diary below. Note continued right lateral lean in sitting. Cueing required for more upright posture.     HR: 68 bpm  Sp02: 98%  BP: 130/74      Assessment: Tolerated treatment well. Requested patient bladder/bowel dairy for next visit to ensure continued positive progression. If (+) progression, patient will be d/c. Further progressed upright posture to assist with PFM length/tension. In addition, further assisted with cueing to reduce lateral lean, as well as assist with sit to stands for improved functional arrival to the restroom without incontinence. Patient would benefit from continued PT      Plan: Continue per plan of care.      Precautions: Monitor energy exertion with therex  Patient Active Problem List   Diagnosis    Anemia of chronic disease    Anxiety and depression    Other B-complex deficiencies    Chronic bilateral thoracic back pain    Chronic myofascial pain    Pain in right leg    Chronic pain of left knee    Chronic pain of right knee    Essential hypertension    Gait disorder    Gastroparesis    Gastroesophageal reflux disease without esophagitis    Sarcoidosis    Insomnia due to medical condition    Insulin pump status    Pain syndrome, chronic    Type 2 diabetes mellitus with microalbuminuria, with long-term current use of insulin (HCC)    Irritable bowel syndrome with constipation    Lymphadenopathy    Mixed hyperlipidemia    Mixed urge and stress incontinence    Moderate persistent asthma with acute exacerbation    Normocytic anemia    Obstructive  sleep apnea    Polyneuropathy associated with underlying disease (HCC)    Post-menopausal    Primary hyperparathyroidism (HCC)    Type 2 diabetes mellitus without complication, with long-term current use of insulin ([facility])    Vitamin D deficiency    Complex regional pain syndrome type 1 of left lower extremity    Primary hypothyroidism    Encounter for fitting and adjustment of spinal cord stimulator    S/P insertion of spinal cord stimulator    Chronic diastolic CHF (congestive heart failure) ([facility])    Coronary artery disease involving native coronary artery    Closed fracture of thoracic vertebra ([facility])    Deep venous thrombosis ([facility])    Localized, primary osteoarthritis    Chronic bilateral low back pain without sciatica    Lumbar radiculopathy    Lumbosacral spondylosis without myelopathy    Osteoarthritis of knee    Age related osteoporosis    Thoracic radiculopathy       History of TIA (transient ischemic attack)    Microcytic anemia    Chronic scapular pain    Malfunction of spinal cord stimulator ([facility])    Moderate persistent asthma without complication    Hypersomnia    BBB (bundle branch block)    Hyperlipidemia associated with type 2 diabetes mellitus  ([facility])    Uncontrolled type 2 diabetes mellitus with hyperglycemia ([facility])    Achalasia    PONV (postoperative nausea and vomiting)      Class 2 obesity in adult    Pacemaker    Medical marijuana use    Urinary retention    Epigastric pain    Spinal stenosis of lumbar region with neurogenic claudication    COVID-19 virus infection    Chronic diastolic (congestive) heart failure (HCC)    Anemia    Abnormal CT of the chest    Complete heart block ([facility])    Diabetic peripheral neuropathy associated with type 2 diabetes mellitus ([facility])    OAB (overactive bladder)    Polyarthralgia      Restless leg syndrome    Anemia of chronic renal failure, stage 3 (moderate)  ([facility])    Chronic kidney disease (CKD) stage G3b/A1, moderately decreased glomerular filtration rate (GFR)  between 30-44 mL/min/1.73 square meter and albuminuria creatinine ratio less than 30 mg/g ([facility])    Stage 3a chronic kidney disease (HCC)    Coronary artery disease involving native coronary artery of native heart without angina pectoris    Bifascicular block    Chronic pain disorder    Myelopathy (HCC)    Thoracic compression fracture (HCC)    Compression fx, lumbar spine, sequela    Osteopenia    Fall    Closed fracture of one rib of right side      Chief Complaint    Patient Subjective includes:  constipation, straining, urinary leakage with standing from sitting, urinary leakage with laughing, coughing, sneezing, and urinary frequency   Patient Goals        Objective Impairments to address   Pelvic Floor Strength Will perform next visit    Power    Endurance     Fast Twitch     % relaxation    External Hip Strength        Lumbar Screen          PLAN OF CARE EXPIRATION: 04/07/2025    AUTHORIZATION EXPIRATION:     Date Authorization     Number of visits provided            Date of Service 01/07/2025 01/27/2025 02/03/2025      Visits Used 1 2 3      Visits Remaining         [employer] Created                  Neuro Re-Ed         Urge deferral         Defecation mechanics         Breathing Mechanics         PFM Coordination         PFM Down Training         Internal Cueing                   Ther Ex         PFM Strengthening  Seated PFM engagement with ball squeeze: Propped with bolster  1 x 12     Seated hip abduction isometric with PFM engagement  1 x 12  Seated PFM engagement   1 x 5    Hooklying HOB elevated hip abduction isometric with PFM engagement  1 x 10           Hip strengthening  Hooklying ball queeze  1 x 10 with hold.     Hooklying SAQ over bolster  2 x 5 B/L        Functional Strengthening   Forward ball roll with pbll  1 x 10     Sit to stand with ball  1 x 10 with PT assist         Abdominal Strengthening  Hooklying deep TrA  1 x 12     Hooklying deep TrA  2 x 5 B/L  Hooklying pallof press in supine    1 x 10     Seated left rotation with TB with PT assist   1 x 10           Dilator Training         Aerobic         Therapeutic Rest Breaks         Mobility          High Impact         UE Strengthening          Breathing Mechanics  Seated diaphragm breathing   1 x 10        Postural Control  Lateral lean with bolster   2 x 10     Seated marching   1 x 10  Lateral lean with bolster toward the left   1 x 15     Practicing upright sitting with RUE pushing through extremity   1 x 10       Ther Activity         Voiding Diaries         Review of sxs         Fluid Intake         Education Sitting to limit pressure ulcers due to lateral lean, bowel re-training, fiber intake, water intake, bladder diary.         Manual Ther         PFM exam         Ortho exam         Dynamometer Testing         Fascial Decompression                  Modalities                           Outcome Measure

## 2025-02-04 ENCOUNTER — TELEPHONE (OUTPATIENT)
Dept: GASTROENTEROLOGY | Facility: CLINIC | Age: 70
End: 2025-02-04

## 2025-02-10 ENCOUNTER — APPOINTMENT (OUTPATIENT)
Dept: RADIOLOGY | Facility: HOSPITAL | Age: 70
DRG: 871 | End: 2025-02-10
Payer: MEDICARE

## 2025-02-10 ENCOUNTER — HOSPITAL ENCOUNTER (INPATIENT)
Facility: HOSPITAL | Age: 70
LOS: 8 days | DRG: 871 | End: 2025-02-19
Attending: EMERGENCY MEDICINE | Admitting: INTERNAL MEDICINE
Payer: MEDICARE

## 2025-02-10 ENCOUNTER — APPOINTMENT (EMERGENCY)
Dept: RADIOLOGY | Facility: HOSPITAL | Age: 70
DRG: 871 | End: 2025-02-10
Payer: MEDICARE

## 2025-02-10 ENCOUNTER — APPOINTMENT (EMERGENCY)
Dept: CT IMAGING | Facility: HOSPITAL | Age: 70
DRG: 871 | End: 2025-02-10
Payer: MEDICARE

## 2025-02-10 DIAGNOSIS — N17.9 AKI (ACUTE KIDNEY INJURY) (HCC): ICD-10-CM

## 2025-02-10 DIAGNOSIS — S82.402A TIBIA/FIBULA FRACTURE, LEFT, CLOSED, INITIAL ENCOUNTER: ICD-10-CM

## 2025-02-10 DIAGNOSIS — J96.01 ACUTE HYPOXIC RESPIRATORY FAILURE (HCC): ICD-10-CM

## 2025-02-10 DIAGNOSIS — R26.2 AMBULATORY DYSFUNCTION: ICD-10-CM

## 2025-02-10 DIAGNOSIS — I50.21 ACUTE SYSTOLIC CONGESTIVE HEART FAILURE (HCC): ICD-10-CM

## 2025-02-10 DIAGNOSIS — S82.202A TIBIA/FIBULA FRACTURE, LEFT, CLOSED, INITIAL ENCOUNTER: ICD-10-CM

## 2025-02-10 DIAGNOSIS — I44.2 COMPLETE HEART BLOCK (HCC): Chronic | ICD-10-CM

## 2025-02-10 DIAGNOSIS — A41.9 SEPSIS (HCC): ICD-10-CM

## 2025-02-10 DIAGNOSIS — S82.892D CLOSED FRACTURE OF LEFT ANKLE WITH ROUTINE HEALING: ICD-10-CM

## 2025-02-10 DIAGNOSIS — E11.65 UNCONTROLLED TYPE 2 DIABETES MELLITUS WITH HYPERGLYCEMIA (HCC): ICD-10-CM

## 2025-02-10 DIAGNOSIS — E11.621 DIABETIC ULCER OF RIGHT MIDFOOT ASSOCIATED WITH TYPE 2 DIABETES MELLITUS, WITH OTHER ULCER SEVERITY (HCC): ICD-10-CM

## 2025-02-10 DIAGNOSIS — L97.418 DIABETIC ULCER OF RIGHT MIDFOOT ASSOCIATED WITH TYPE 2 DIABETES MELLITUS, WITH OTHER ULCER SEVERITY (HCC): ICD-10-CM

## 2025-02-10 DIAGNOSIS — N39.0 UTI (URINARY TRACT INFECTION): Primary | ICD-10-CM

## 2025-02-10 DIAGNOSIS — J10.1 INFLUENZA A: ICD-10-CM

## 2025-02-10 DIAGNOSIS — I50.33 ACUTE ON CHRONIC DIASTOLIC (CONGESTIVE) HEART FAILURE (HCC): ICD-10-CM

## 2025-02-10 PROBLEM — G93.41 ACUTE METABOLIC ENCEPHALOPATHY: Status: ACTIVE | Noted: 2025-02-10

## 2025-02-10 LAB
ALBUMIN SERPL BCG-MCNC: 4.1 G/DL (ref 3.5–5)
ALP SERPL-CCNC: 109 U/L (ref 34–104)
ALT SERPL W P-5'-P-CCNC: 16 U/L (ref 7–52)
AMORPH URATE CRY URNS QL MICRO: ABNORMAL
ANION GAP SERPL CALCULATED.3IONS-SCNC: 9 MMOL/L (ref 4–13)
APTT PPP: 35 SECONDS (ref 23–34)
AST SERPL W P-5'-P-CCNC: 28 U/L (ref 13–39)
ATRIAL RATE: 90 BPM
BACTERIA UR QL AUTO: ABNORMAL /HPF
BASOPHILS # BLD AUTO: 0.02 THOUSANDS/ΜL (ref 0–0.1)
BASOPHILS NFR BLD AUTO: 0 % (ref 0–1)
BILIRUB SERPL-MCNC: 0.96 MG/DL (ref 0.2–1)
BILIRUB UR QL STRIP: NEGATIVE
BILIRUB UR QL STRIP: NEGATIVE
BUN SERPL-MCNC: 30 MG/DL (ref 5–25)
CALCIUM SERPL-MCNC: 9.3 MG/DL (ref 8.4–10.2)
CHLORIDE SERPL-SCNC: 103 MMOL/L (ref 96–108)
CLARITY UR: ABNORMAL
CLARITY UR: CLEAR
CO2 SERPL-SCNC: 26 MMOL/L (ref 21–32)
COLOR UR: YELLOW
COLOR UR: YELLOW
CREAT SERPL-MCNC: 1.04 MG/DL (ref 0.6–1.3)
EOSINOPHIL # BLD AUTO: 0.01 THOUSAND/ΜL (ref 0–0.61)
EOSINOPHIL NFR BLD AUTO: 0 % (ref 0–6)
ERYTHROCYTE [DISTWIDTH] IN BLOOD BY AUTOMATED COUNT: 16.1 % (ref 11.6–15.1)
FLUAV RNA RESP QL NAA+PROBE: NEGATIVE
FLUBV RNA RESP QL NAA+PROBE: NEGATIVE
GFR SERPL CREATININE-BSD FRML MDRD: 55 ML/MIN/1.73SQ M
GLUCOSE SERPL-MCNC: 181 MG/DL (ref 65–140)
GLUCOSE SERPL-MCNC: 237 MG/DL (ref 65–140)
GLUCOSE UR STRIP-MCNC: NEGATIVE MG/DL
GLUCOSE UR STRIP-MCNC: NEGATIVE MG/DL
HCT VFR BLD AUTO: 36.6 % (ref 34.8–46.1)
HGB BLD-MCNC: 11.3 G/DL (ref 11.5–15.4)
HGB UR QL STRIP.AUTO: ABNORMAL
HGB UR QL STRIP.AUTO: ABNORMAL
HYALINE CASTS #/AREA URNS LPF: ABNORMAL /LPF
IMM GRANULOCYTES # BLD AUTO: 0.05 THOUSAND/UL (ref 0–0.2)
IMM GRANULOCYTES NFR BLD AUTO: 1 % (ref 0–2)
INR PPP: 1.25 (ref 0.85–1.19)
KETONES UR STRIP-MCNC: NEGATIVE MG/DL
KETONES UR STRIP-MCNC: NEGATIVE MG/DL
LACTATE SERPL-SCNC: 1.5 MMOL/L (ref 0.5–2)
LEUKOCYTE ESTERASE UR QL STRIP: ABNORMAL
LEUKOCYTE ESTERASE UR QL STRIP: ABNORMAL
LYMPHOCYTES # BLD AUTO: 0.62 THOUSANDS/ΜL (ref 0.6–4.47)
LYMPHOCYTES NFR BLD AUTO: 10 % (ref 14–44)
MCH RBC QN AUTO: 27.9 PG (ref 26.8–34.3)
MCHC RBC AUTO-ENTMCNC: 30.9 G/DL (ref 31.4–37.4)
MCV RBC AUTO: 90 FL (ref 82–98)
MONOCYTES # BLD AUTO: 0.63 THOUSAND/ΜL (ref 0.17–1.22)
MONOCYTES NFR BLD AUTO: 10 % (ref 4–12)
NEUTROPHILS # BLD AUTO: 5.18 THOUSANDS/ΜL (ref 1.85–7.62)
NEUTS SEG NFR BLD AUTO: 79 % (ref 43–75)
NITRITE UR QL STRIP: NEGATIVE
NITRITE UR QL STRIP: NEGATIVE
NON-SQ EPI CELLS URNS QL MICRO: ABNORMAL /HPF
NRBC BLD AUTO-RTO: 0 /100 WBCS
P AXIS: 79 DEGREES
PH UR STRIP.AUTO: 6 [PH]
PH UR STRIP.AUTO: 6 [PH] (ref 4.5–8)
PLATELET # BLD AUTO: 174 THOUSANDS/UL (ref 149–390)
PMV BLD AUTO: 10.5 FL (ref 8.9–12.7)
POTASSIUM SERPL-SCNC: 4.9 MMOL/L (ref 3.5–5.3)
PR INTERVAL: 198 MS
PROCALCITONIN SERPL-MCNC: 0.11 NG/ML
PROT SERPL-MCNC: 7.7 G/DL (ref 6.4–8.4)
PROT UR STRIP-MCNC: >=300 MG/DL
PROT UR STRIP-MCNC: ABNORMAL MG/DL
PROTHROMBIN TIME: 15.8 SECONDS (ref 12.3–15)
QRS AXIS: 106 DEGREES
QRSD INTERVAL: 144 MS
QT INTERVAL: 394 MS
QTC INTERVAL: 481 MS
RBC # BLD AUTO: 4.05 MILLION/UL (ref 3.81–5.12)
RBC #/AREA URNS AUTO: ABNORMAL /HPF
RSV RNA RESP QL NAA+PROBE: NEGATIVE
SARS-COV-2 RNA RESP QL NAA+PROBE: NEGATIVE
SODIUM SERPL-SCNC: 138 MMOL/L (ref 135–147)
SP GR UR STRIP.AUTO: 1.02 (ref 1–1.03)
SP GR UR STRIP.AUTO: >=1.03 (ref 1–1.03)
T WAVE AXIS: -57 DEGREES
UROBILINOGEN UR QL STRIP.AUTO: 0.2 E.U./DL
UROBILINOGEN UR STRIP-ACNC: <2 MG/DL
VENTRICULAR RATE: 90 BPM
WBC # BLD AUTO: 6.51 THOUSAND/UL (ref 4.31–10.16)
WBC #/AREA URNS AUTO: ABNORMAL /HPF

## 2025-02-10 PROCEDURE — 99285 EMERGENCY DEPT VISIT HI MDM: CPT

## 2025-02-10 PROCEDURE — 85730 THROMBOPLASTIN TIME PARTIAL: CPT

## 2025-02-10 PROCEDURE — 85610 PROTHROMBIN TIME: CPT

## 2025-02-10 PROCEDURE — 0241U HB NFCT DS VIR RESP RNA 4 TRGT: CPT

## 2025-02-10 PROCEDURE — 87040 BLOOD CULTURE FOR BACTERIA: CPT

## 2025-02-10 PROCEDURE — 73620 X-RAY EXAM OF FOOT: CPT

## 2025-02-10 PROCEDURE — 84145 PROCALCITONIN (PCT): CPT

## 2025-02-10 PROCEDURE — 87086 URINE CULTURE/COLONY COUNT: CPT

## 2025-02-10 PROCEDURE — 73610 X-RAY EXAM OF ANKLE: CPT

## 2025-02-10 PROCEDURE — 93010 ELECTROCARDIOGRAM REPORT: CPT

## 2025-02-10 PROCEDURE — 94760 N-INVAS EAR/PLS OXIMETRY 1: CPT

## 2025-02-10 PROCEDURE — 96365 THER/PROPH/DIAG IV INF INIT: CPT

## 2025-02-10 PROCEDURE — 70450 CT HEAD/BRAIN W/O DYE: CPT

## 2025-02-10 PROCEDURE — 80053 COMPREHEN METABOLIC PANEL: CPT

## 2025-02-10 PROCEDURE — 94002 VENT MGMT INPAT INIT DAY: CPT

## 2025-02-10 PROCEDURE — 83605 ASSAY OF LACTIC ACID: CPT

## 2025-02-10 PROCEDURE — 81001 URINALYSIS AUTO W/SCOPE: CPT

## 2025-02-10 PROCEDURE — 72125 CT NECK SPINE W/O DYE: CPT

## 2025-02-10 PROCEDURE — 96375 TX/PRO/DX INJ NEW DRUG ADDON: CPT

## 2025-02-10 PROCEDURE — 82948 REAGENT STRIP/BLOOD GLUCOSE: CPT

## 2025-02-10 PROCEDURE — 74177 CT ABD & PELVIS W/CONTRAST: CPT

## 2025-02-10 PROCEDURE — 99223 1ST HOSP IP/OBS HIGH 75: CPT | Performed by: NURSE PRACTITIONER

## 2025-02-10 PROCEDURE — 36415 COLL VENOUS BLD VENIPUNCTURE: CPT

## 2025-02-10 PROCEDURE — 93005 ELECTROCARDIOGRAM TRACING: CPT

## 2025-02-10 PROCEDURE — 85025 COMPLETE CBC W/AUTO DIFF WBC: CPT

## 2025-02-10 PROCEDURE — 99285 EMERGENCY DEPT VISIT HI MDM: CPT | Performed by: EMERGENCY MEDICINE

## 2025-02-10 PROCEDURE — 71260 CT THORAX DX C+: CPT

## 2025-02-10 RX ORDER — SERTRALINE HYDROCHLORIDE 100 MG/1
100 TABLET, FILM COATED ORAL
Status: DISCONTINUED | OUTPATIENT
Start: 2025-02-11 | End: 2025-02-19 | Stop reason: HOSPADM

## 2025-02-10 RX ORDER — POLYETHYLENE GLYCOL 3350 17 G/17G
17 POWDER, FOR SOLUTION ORAL DAILY
Status: DISCONTINUED | OUTPATIENT
Start: 2025-02-11 | End: 2025-02-19 | Stop reason: HOSPADM

## 2025-02-10 RX ORDER — INSULIN GLARGINE 100 [IU]/ML
24 INJECTION, SOLUTION SUBCUTANEOUS
Status: DISCONTINUED | OUTPATIENT
Start: 2025-02-10 | End: 2025-02-13

## 2025-02-10 RX ORDER — FLUTICASONE FUROATE AND VILANTEROL 200; 25 UG/1; UG/1
1 POWDER RESPIRATORY (INHALATION)
Status: DISCONTINUED | OUTPATIENT
Start: 2025-02-11 | End: 2025-02-19 | Stop reason: HOSPADM

## 2025-02-10 RX ORDER — TORSEMIDE 20 MG/1
20 TABLET ORAL DAILY
Status: DISCONTINUED | OUTPATIENT
Start: 2025-02-11 | End: 2025-02-16

## 2025-02-10 RX ORDER — HYDROMORPHONE HCL IN WATER/PF 6 MG/30 ML
0.2 PATIENT CONTROLLED ANALGESIA SYRINGE INTRAVENOUS ONCE
Status: COMPLETED | OUTPATIENT
Start: 2025-02-10 | End: 2025-02-10

## 2025-02-10 RX ORDER — CARVEDILOL 3.12 MG/1
3.12 TABLET ORAL 2 TIMES DAILY WITH MEALS
Status: DISCONTINUED | OUTPATIENT
Start: 2025-02-11 | End: 2025-02-11

## 2025-02-10 RX ORDER — OXYCODONE HYDROCHLORIDE 5 MG/1
5 TABLET ORAL ONCE
Refills: 0 | Status: COMPLETED | OUTPATIENT
Start: 2025-02-10 | End: 2025-02-10

## 2025-02-10 RX ORDER — EZETIMIBE 10 MG/1
10 TABLET ORAL DAILY
Status: DISCONTINUED | OUTPATIENT
Start: 2025-02-11 | End: 2025-02-19 | Stop reason: HOSPADM

## 2025-02-10 RX ORDER — ALBUTEROL SULFATE 0.83 MG/ML
2.5 SOLUTION RESPIRATORY (INHALATION) EVERY 6 HOURS PRN
Status: DISCONTINUED | OUTPATIENT
Start: 2025-02-10 | End: 2025-02-19 | Stop reason: HOSPADM

## 2025-02-10 RX ORDER — VANCOMYCIN HYDROCHLORIDE 1 G/200ML
12.5 INJECTION, SOLUTION INTRAVENOUS EVERY 12 HOURS
Status: DISCONTINUED | OUTPATIENT
Start: 2025-02-11 | End: 2025-02-10

## 2025-02-10 RX ORDER — ATORVASTATIN CALCIUM 80 MG/1
80 TABLET, FILM COATED ORAL
Status: DISCONTINUED | OUTPATIENT
Start: 2025-02-11 | End: 2025-02-16

## 2025-02-10 RX ORDER — ONDANSETRON 2 MG/ML
4 INJECTION INTRAMUSCULAR; INTRAVENOUS ONCE
Status: COMPLETED | OUTPATIENT
Start: 2025-02-10 | End: 2025-02-10

## 2025-02-10 RX ORDER — OXYCODONE AND ACETAMINOPHEN 5; 325 MG/1; MG/1
1 TABLET ORAL EVERY 6 HOURS PRN
Refills: 0 | Status: CANCELLED | OUTPATIENT
Start: 2025-02-10

## 2025-02-10 RX ORDER — LEVOTHYROXINE SODIUM 112 UG/1
112 TABLET ORAL
Status: DISCONTINUED | OUTPATIENT
Start: 2025-02-11 | End: 2025-02-19 | Stop reason: HOSPADM

## 2025-02-10 RX ORDER — ENOXAPARIN SODIUM 100 MG/ML
40 INJECTION SUBCUTANEOUS DAILY
Status: DISCONTINUED | OUTPATIENT
Start: 2025-02-11 | End: 2025-02-19 | Stop reason: HOSPADM

## 2025-02-10 RX ORDER — PRAMIPEXOLE DIHYDROCHLORIDE 1 MG/1
1 TABLET ORAL 2 TIMES DAILY
Status: DISCONTINUED | OUTPATIENT
Start: 2025-02-11 | End: 2025-02-19 | Stop reason: HOSPADM

## 2025-02-10 RX ORDER — VANCOMYCIN HYDROCHLORIDE 1 G/200ML
12.5 INJECTION, SOLUTION INTRAVENOUS EVERY 24 HOURS
Status: DISCONTINUED | OUTPATIENT
Start: 2025-02-11 | End: 2025-02-12

## 2025-02-10 RX ORDER — FAMOTIDINE 20 MG/1
20 TABLET, FILM COATED ORAL
Status: DISCONTINUED | OUTPATIENT
Start: 2025-02-11 | End: 2025-02-19 | Stop reason: HOSPADM

## 2025-02-10 RX ORDER — ACETAMINOPHEN 10 MG/ML
1000 INJECTION, SOLUTION INTRAVENOUS ONCE
Status: COMPLETED | OUTPATIENT
Start: 2025-02-10 | End: 2025-02-10

## 2025-02-10 RX ORDER — SODIUM CHLORIDE 9 MG/ML
75 INJECTION, SOLUTION INTRAVENOUS CONTINUOUS
Status: DISCONTINUED | OUTPATIENT
Start: 2025-02-10 | End: 2025-02-13

## 2025-02-10 RX ORDER — GABAPENTIN 300 MG/1
300 CAPSULE ORAL 3 TIMES DAILY
Status: CANCELLED | OUTPATIENT
Start: 2025-02-11

## 2025-02-10 RX ADMIN — VANCOMYCIN HYDROCHLORIDE 1250 MG: 1 INJECTION, POWDER, LYOPHILIZED, FOR SOLUTION INTRAVENOUS at 21:13

## 2025-02-10 RX ADMIN — OXYCODONE 5 MG: 5 TABLET ORAL at 23:29

## 2025-02-10 RX ADMIN — IOHEXOL 100 ML: 350 INJECTION, SOLUTION INTRAVENOUS at 19:33

## 2025-02-10 RX ADMIN — ACETAMINOPHEN 1000 MG: 10 INJECTION INTRAVENOUS at 21:19

## 2025-02-10 RX ADMIN — SODIUM CHLORIDE 75 ML/HR: 0.9 INJECTION, SOLUTION INTRAVENOUS at 22:20

## 2025-02-10 RX ADMIN — HYDROMORPHONE HYDROCHLORIDE 0.2 MG: 0.2 INJECTION, SOLUTION INTRAMUSCULAR; INTRAVENOUS; SUBCUTANEOUS at 22:15

## 2025-02-10 RX ADMIN — INSULIN GLARGINE 24 UNITS: 100 INJECTION, SOLUTION SUBCUTANEOUS at 22:24

## 2025-02-10 RX ADMIN — ONDANSETRON 4 MG: 2 INJECTION INTRAMUSCULAR; INTRAVENOUS at 18:50

## 2025-02-10 RX ADMIN — CEFTRIAXONE SODIUM 1000 MG: 10 INJECTION, POWDER, FOR SOLUTION INTRAVENOUS at 20:25

## 2025-02-10 NOTE — ED NOTES
Pt on 3L of O2, Saturation was at 85-87% on room air. Provider notified     Preeti Ibrahim RN  02/10/25 0434

## 2025-02-10 NOTE — ED PROVIDER NOTES
Time reflects when diagnosis was documented in both MDM as applicable and the Disposition within this note       Time User Action Codes Description Comment    2/10/2025  9:06 PM Pennie Haddad Add [N39.0] UTI (urinary tract infection)     2/10/2025  9:06 PM Pennie Haddad Add [R26.2] Ambulatory dysfunction     2/10/2025  9:33 PM Chasidy Webber Add [A41.9] Sepsis (HCC)           ED Disposition       ED Disposition   Admit    Condition   Stable    Date/Time   Mon Feb 10, 2025  6:37 PM    Comment   Case was discussed with   and the patient's admission status was agreed to be Admission Status: inpatient status to the service of    .               Assessment & Plan       Medical Decision Making  Amount and/or Complexity of Data Reviewed  Labs: ordered. Decision-making details documented in ED Course.  Radiology: ordered and independent interpretation performed. Decision-making details documented in ED Course.    Risk  Prescription drug management.  Decision regarding hospitalization.      Patient is 69 y.o. female with PMH of CHF, CKD, DM on insulin, DVT, htn, hld,  who presents to the ED with fall. See history and physical documented below.    Differential diagnosis included but not limited to intracranial hemorrhage, c-spine fracture, lumbar fracture. AMS differential includes UTI, electrolyte abnormality or 2/2 trauma.  Plan sepsis workup to r/o infectious cause including PNA, UTI, acute on chronic wounds. Will CT head, neck and chest abdomen/pelvis to r/o PNA, intraabdominal pathology or traumatic injuries. Will xray left ankle to r/o fracture. UA to r/o UTI. Will require admission for ambulatory dysfunction given unable to walk since fall.     View ED course below for further discussion on patient workup.       All labs reviewed and utilized in the medical decision making process  All radiology studies independently viewed by me and interpreted by the radiologist.  I reviewed all testing with the patient.      Labs remarkable for weakly positive UTI 10-20 WBC and leukocytes on straight cath urine, BUN 30, mildly elevated coags. CT chest abdomen/pelvis no acute findings, CT head and C-spine no acute findings. Given ceftiraxone for UTI and vancomycin given chronic wounds.    Remains stable in ED, case discussed with [facility] and admitted for UTI and ambulatory dysfunction.     ED Course as of 02/11/25 0331   Mon Feb 10, 2025   1658 POC Glucose(!): 181   1738 Leukocytes, UA(!): Trace   1742 Nitrite, UA: Negative   1742 Blood, UA(!): Trace   1742 WBC: 6.51   1742 Hemoglobin(!): 11.3  Stable    1742 Platelet Count: 174   1744 Procedure Note: EKG  Date/Time: 02/10/25 5:44 PM   Interpreted by: JEY PATIÑO  Indications / Diagnosis: sepsis workup  ECG reviewed by me, the ED Provider: yes   The EKG demonstrates:  Rhythm: paced rhythm  Intervals:   Axis:  right axis  QRS/Blocks: wide QRS  ST Changes: No acute ST Changes, no STD/KRYSTAL.     1810 PROTIME(!): 15.8   1810 POCT INR(!): 1.25   1810 PTT(!): 35   1848 Procalcitonin: 0.11   1915 LACTIC ACID: 1.5   1921 BUN(!): 30   1922 GLUCOSE(!): 237   1922 ALK PHOS(!): 109   2004 RBC Urine(!): 2-4   2005 WBC, UA(!): 10-20   2005 Bacteria, UA: Occasional   2005 CT head wo contrast  IMPRESSION:     No acute intracranial abnormality.     2005 IMPRESSION:     No evidence of acute cervical spine fracture or traumatic malalignment.     2005 CT chest abdomen pelvis w contrast  IMPRESSION:     No evidence of acute trauma in the chest, abdomen or pelvis.        2050 Reached out to [facility] for admission.       Medications   sodium chloride 0.9 % infusion (75 mL/hr Intravenous New Bag 2/10/25 2220)   albuterol inhalation solution 2.5 mg (has no administration in time range)   atorvastatin (LIPITOR) tablet 80 mg (has no administration in time range)   carvedilol (COREG) tablet 3.125 mg (has no administration in time range)   ezetimibe (ZETIA) tablet 10 mg (has no administration in time range)    famotidine (PEPCID) tablet 20 mg (has no administration in time range)   fluticasone-vilanterol 200-25 mcg/actuation 1 puff (has no administration in time range)   insulin glargine (LANTUS) subcutaneous injection 24 Units 0.24 mL (24 Units Subcutaneous Given 2/10/25 2224)   levothyroxine tablet 112 mcg (has no administration in time range)   polyethylene glycol (MIRALAX) packet 17 g (has no administration in time range)   pramipexole (MIRAPEX) tablet 1 mg (has no administration in time range)   sertraline (ZOLOFT) tablet 100 mg (has no administration in time range)   torsemide (DEMADEX) tablet 20 mg (has no administration in time range)   enoxaparin (LOVENOX) subcutaneous injection 40 mg (has no administration in time range)   ceftriaxone (ROCEPHIN) 1 g/50 mL in dextrose IVPB (has no administration in time range)   oxyCODONE (ROXICODONE) split tablet 2.5 mg (0 mg Oral [facility]/10[facility]32)   vancomycin (VANCOCIN) IVPB (premix in dextrose) 1,000 mg 200 mL (has no administration in time range)   sodium chloride 0.9 % bolus 1,000 mL (1,000 mL Intravenous New Bag 2/11/25 0329)   ondansetron (ZOFRAN) injection 4 mg (4 mg Intravenous Given 2/10/25 1850)   iohexol (OMNIPAQUE) 350 MG/ML injection (MULTI-DOSE) 100 mL (100 mL Intravenous Given 2/10/25 1933)   ceftriaxone (ROCEPHIN) 1 g/50 mL in dextrose IVPB (0 mg Intravenous Stopped 2/10/25 2108)   vancomycin (VANCOCIN) 1,250 mg in sodium chloride 0.9 % 250 mL IVPB (0 mg Intravenous Stopped 2/10/25 2313)   acetaminophen (Ofirmev) injection 1,000 mg (0 mg Intravenous Stopped 2/10/25 2138)   HYDROmorphone HCl (DILAUDID) injection 0.2 mg (0.2 mg Intravenous Given 2/10/25 2215)   oxyCODONE (ROXICODONE) IR tablet 5 mg (5 mg Oral Given 2/10/25 2329)   sodium chloride 0.9 % bolus 1,000 mL (0 mL Intravenous Stopped 2/11/25 8381)       ED Risk Strat Scores                          SBIRT 22yo+      Flowsheet Row Most Recent Value   Initial Alcohol Screen: US AUDIT-C     1.  "How often do you have a drink containing alcohol? 0 Filed at: 02/10/2025 2009   2. How many drinks containing alcohol do you have on a typical day you are drinking?  0 Filed at: 02/10/2025 2009   3a. Male UNDER 65: How often do you have five or more drinks on one occasion? 0 Filed at: 02/10/2025 2009   3b. FEMALE Any Age, or MALE 65+: How often do you have 4 or more drinks on one occassion? 0 Filed at: 02/10/2025 2009   Audit-C Score 0 Filed at: 02/10/2025 2009   VANI: How many times in the past year have you...    Used an illegal drug or used a prescription medication for non-medical reasons? Never Filed at: 02/10/2025 2009                            History of Present Illness       Chief Complaint   Patient presents with    Fall     Pts  reports believes pt had mechanical fall in bathroom. Pt has some confusion at baseline but  reports seems more disoriented. +headstrike -thinners. Pain to head, b/l arm and L ankle        Past Medical History:   Diagnosis Date    Anxiety     Asthma     controlled    Back complaints     Cancer ([facility])     nose    Cellulitis of left lower leg     \"not now\"    CHF (congestive heart failure) ([facility]) 02/2021    Chronic bilateral thoracic back pain     Chronic kidney disease     sees nephrologist regularly\" stage 3\"    Chronic myofascial pain     Chronic pain of both lower extremities     Chronic pain of left knee     \"both knees\"    Chronic pain syndrome     bilat legs and knees/neuropathy pain    COVID 12/2021    CPAP (continuous positive airway pressure) dependence     no currently uses    Depression     Diabetes mellitus ([facility])     insulin pump    Difficulty walking 2018    Disease of thyroid gland     Does use hearing aid     bilat and will wear DOS    DVT (deep venous thrombosis) ([facility]) 2013    left leg    Gait disorder     Gastroparesis     GERD (gastroesophageal reflux disease)     Head injury     Headache, tension-type     History of angina     History of MRSA infection " "    History of transfusion     Many years ago    HL (hearing loss) 2018    Hyperlipidemia     Hypertension     Hypoglycemic reaction     \"occas low blood sugar\"    Insulin pump in place     pt reports saw endocrinologist  and will bring copy of instructions DOS    Irritable bowel syndrome     Kidney disease     Memory loss     Movement disorder     Neuropathy in diabetes (HCC)     Constant pain legs and feet    Nose fracture     Pacemaker 2019    Pneumonia     Polyneuropathy associated with underlying disease (HCC)     PONV (postoperative nausea and vomiting)     Risk for falls     S/P insertion of spinal cord stimulator 2018    Sarcoidosis     Shortness of breath     \"exertion and not new\"    Sleep apnea     Stroke (HCC)     Thoracic vertebral fracture (HCC)     TIA (transient ischemic attack)     Use of cane as ambulatory aid     Uses walker     Wears dentures     permanent upper/and some missing teeth/partial lower      Past Surgical History:   Procedure Laterality Date    ABDOMINAL ADHESION SURGERY N/A 2021    Procedure: laparoscopic LYSIS ADHESIONS;  Surgeon: Jill Bentley MD;  [facility];  Service: Thoracic    BACK SURGERY  2023    TLIF at [facility], Dr. Levy    BREAST SURGERY      BREAST SURGERY      reduction    CARDIAC PACEMAKER PLACEMENT      pacemaker     SECTION      COLONOSCOPY      DILATION AND CURETTAGE OF UTERUS      \"D&E\"    HERNIA REPAIR      HYSTERECTOMY      JOINT REPLACEMENT Left     LTKR    NECK SURGERY      fused 4 discs with 4 screws implanted    NISSEN FUNDOPLICATION LAPAROSCOPIC WITH ROBOTICS N/A 2021    Procedure: ROBOTIC HELLER MYOTOMY WITH BERNARDO FUNDIPLICATION ;  Surgeon: Jill Bentley MD;  [facility];  Service: Thoracic    NOSE SURGERY  2024    closed reduction    UT ESOPHAGOGASTRODUODENOSCOPY TRANSORAL DIAGNOSTIC N/A 2021    Procedure: ESOPHAGOGASTRODUODENOSCOPY (EGD);  Surgeon: Jill Bentley, " MD;  [facility];  Service: Thoracic    SC ESOPHAGOGASTRODUODENOSCOPY TRANSORAL DIAGNOSTIC N/A 01/12/2022    Procedure: ESOPHAGOGASTRODUODENOSCOPY (EGD),;  Surgeon: Jill Bentley MD;  [facility];  Service: Thoracic    SC ESOPHAGOGASTRODUODENOSCOPY TRANSORAL DIAGNOSTIC N/A 02/16/2022    Procedure: ESOPHAGOGASTRODUODENOSCOPY (EGD);  Surgeon: Jill Bentley MD;  [facility];  Service: Thoracic    SC ESOPHAGOGASTRODUODENOSCOPY TRANSORAL DIAGNOSTIC N/A 07/12/2022    Procedure: ESOPHAGOGASTRODUODENOSCOPY (EGD); PYLORIC DILATION; GE JUNCTION DILATION;  Surgeon: Jill Bentley MD;  [facility];  Service: Thoracic    SC ESOPHAGOGASTRODUODENOSCOPY TRANSORAL DIAGNOSTIC N/A 11/29/2022    Procedure: ESOPHAGOGASTRODUODENOSCOPY (EGD);  Surgeon: Varinder Steiner MD;  [facility];  Service: Thoracic    SC ESOPHAGOGASTRODUODENOSCOPY TRANSORAL DIAGNOSTIC N/A 01/24/2023    Procedure: ESOPHAGOGASTRODUODENOSCOPY (EGD);  Surgeon: Jill Bentley MD;  [facility];  Service: Thoracic    SC ESOPHAGOGASTRODUODENOSCOPY TRANSORAL DIAGNOSTIC N/A 05/01/2024    Procedure: ESOPHAGOGASTRODUODENOSCOPY (EGD);  Surgeon: Jill Bentley MD;  [facility];  Service: Thoracic    SC ESOPHAGOSCOPY FLEX BALLOON DILAT <30 MM DIAM N/A 01/12/2022    Procedure: DILATATION ESOPHAGEAL;  Surgeon: Jill Bentley MD;  [facility];  Service: Thoracic    SC ESOPHAGOSCOPY FLEX BALLOON DILAT <30 MM DIAM N/A 02/16/2022    Procedure: DILATATION ESOPHAGEAL;  Surgeon: Jill Bentley MD;  [facility];  Service: Thoracic    SC ESOPHAGOSCOPY FLEX BALLOON DILAT <30 MM DIAM N/A 11/29/2022    Procedure: DILATATION ESOPHAGEAL esophageal and pyloric dilation;  Surgeon: Varinder Steiner MD;  [facility];  Service: Thoracic    SC ESOPHAGOSCOPY FLEX BALLOON DILAT <30 MM DIAM N/A 01/24/2023    Procedure: esophageal dilation dilated up to 54  with pylorus  dilation dilated up to 18;  Surgeon: Jill Bentley MD;  [facility];  Service: Thoracic    UT ESOPHAGOSCOPY FLEX BALLOON DILAT <30 MM DIAM N/A 05/01/2024    Procedure: DILATATION ESOPHAGEAL;  Surgeon: Jill Bentley MD;  [facility];  Service: Thoracic    UT RIBEIRO IMPLTJ NSTIM ELTRDS PLATE/PADDLE EDRL Left 04/23/2018    Procedure: Insertion of thoracic spinal cord stimulator electrode via laminotomy and placement of left buttock IPG;  Surgeon: Shahab Bullock MD;  [facility];  Service: Neurosurgery    REPLACEMENT TOTAL KNEE      ROTATOR CUFF REPAIR      SPINAL CORD STIMULATOR REMOVAL Bilateral 07/02/2024    Procedure: REOPENING OF THORACIC AND LEFT BUTTOCK INCISION FOR REMOVAL OF SPINAL CORD STIMULATOR SYSTEM;  Surgeon: Shahab Bullock MD;  [facility];  Service: Neurosurgery    SPINAL STIMULATOR PLACEMENT Left 10/03/2018    Procedure: BUTTOCK RE-OPENING INCISION FOR REPOSITIONING OF IMPLANTABLE PULSE GENERATOR;  Surgeon: Shahab Bullock MD;  [facility];  Service: Neurosurgery    TONSILLECTOMY      UPPER GASTROINTESTINAL ENDOSCOPY      VASCULAR SURGERY      WISDOM TOOTH EXTRACTION        Family History   Problem Relation Age of Onset    Diabetes Family     Heart disease Family     Hypertension Family     Neuropathy Family     No Known Problems Mother     Heart disease Father     Diabetes Father     Neuropathy Father     Stroke Father     No Known Problems Maternal Grandmother     No Known Problems Maternal Grandfather     No Known Problems Paternal Grandmother     No Known Problems Paternal Grandfather     No Known Problems Brother     No Known Problems Daughter     Cancer Son       Social History     Tobacco Use    Smoking status: Never    Smokeless tobacco: Never   Vaping Use    Vaping status: Never Used   Substance Use Topics    Alcohol use: No    Drug use: Not Currently     Frequency: 8.0 times per week     Types: Marijuana     Comment: Medical Marijuana/vape pen  once per week      E-Cigarette/Vaping    E-Cigarette Use Never User       E-Cigarette/Vaping Substances    Nicotine No     THC Yes     CBD No     Flavoring No     Other No     Unknown No       I have reviewed and agree with the history as documented.     HPI  Patient is 69 y.o. female with PMH of CHF, CKD, DM on insulin, DVT, not currently anticoagulated htn, hld, who presents to the ED after fall. Per  patient more confused this morning, typically happens when having PNA or UTI. Went to a restaurant and in bathroom had mechanical fall when trying to turn with her walker and fell hitting her head. No LOC. Currently c/o headache, abdominal pain and left ankle pain. Has had recent cough. No fever/chills, no chest pain, SOB, no new leg pain/swelling, but has chronic b/l LE wounds, currently on keflex.       Review of Systems   Constitutional:  Negative for chills and fever.   HENT:  Negative for ear pain and sore throat.    Respiratory:  Positive for cough. Negative for shortness of breath.    Cardiovascular:  Negative for chest pain, palpitations and leg swelling.   Gastrointestinal:  Negative for abdominal pain, diarrhea, nausea and vomiting.   Genitourinary:  Negative for dysuria, frequency and hematuria.   Musculoskeletal:  Negative for back pain and neck pain.        Left ankle pain    Skin:  Positive for wound. Negative for rash.   Neurological:  Positive for headaches. Negative for dizziness and light-headedness.           Objective       ED Triage Vitals   Temperature Pulse Blood Pressure Respirations SpO2 Patient Position - Orthostatic VS   02/10/25 1625 02/10/25 1625 02/10/25 1625 02/10/25 1625 02/10/25 1625 02/10/25 2011   97.8 °F (36.6 °C) 76 (!) 178/78 16 99 % Lying      Temp Source Heart Rate Source BP Location FiO2 (%) Pain Score    02/10/25 2100 02/10/25 1625 02/10/25 1900 -- 02/10/25 2215    Oral Monitor Right arm  10 - Worst Possible Pain      Vitals      Date and Time Temp Pulse SpO2 Resp BP  Pain Score FACES Pain Rating User   02/11/25 0315 -- 60 97 % 15 80/45 -- -- [facility]   02/11/25 0300 -- 62 97 % 17 84/44 -- -- [facility]   02/11/25 0230 -- 62 98 % 18 94/47 -- -- [facility]   02/11/25 0130 -- 64 97 % 20 92/50 -- -- [facility]   02/11/25 0100 -- 66 95 % 19 88/52 -- -- [facility]   02/11/25 0030 -- 72 94 % 22 92/55 -- -- [facility]   02/11/25 0000 -- 74 97 % 20 97/53 -- -- [facility]   02/10/25 2339 -- -- -- -- -- 10 - Worst Possible Pain -- [facility]   02/10/25 2335 -- -- 96 % -- -- -- -- KR   02/10/25 2330 -- 76 95 % -- 105/54 -- -- [facility]   02/10/25 2329 -- -- -- -- -- 10 - Worst Possible Pain -- [facility]   02/10/25 2300 -- 82 96 % 20 107/51 -- -- [facility]   02/10/25 2215 -- -- -- -- -- 10 - Worst Possible Pain -- [facility]   02/10/25 2200 99.4 °F (37.4 °C) 86 95 % 20 114/68 -- -- [facility]   02/10/25 2100 102.2 °F (39 °C) 84 95 % 20 111/70 -- -- [facility]   02/10/25 2011 -- 87 93 % 18 113/57 -- -- [facility]   02/10/25 1900 -- 90 93 % 14 147/69 -- -- [facility]   02/10/25 1800 -- 94 97 % 20 124/76 -- -- [facility]   02/10/25 1625 97.8 °F (36.6 °C) 76 99 % 16 178/78 -- -- J[facility]            Physical Exam  Constitutional:       Comments: Chronically ill appearing. Sleepy, wakes to voice.        HENT:      Head: Normocephalic.      Comments: Ecchymosis to left forehead     Right Ear: Tympanic membrane normal.      Left Ear: Tympanic membrane normal.      Ears:      Comments: No hemotympanum     Mouth/Throat:      Mouth: Mucous membranes are dry.      Comments: Dried blood on lips, no active bleeding, lips cracked  Eyes:      Extraocular Movements: Extraocular movements intact.      Conjunctiva/sclera: Conjunctivae normal.      Pupils: Pupils are equal, round, and reactive to light.   Neck:      Comments: No midline C-spine TTP  Cardiovascular:      Rate and Rhythm: Normal rate and regular rhythm.      Heart sounds: No murmur heard.     No friction rub. No gallop.   Pulmonary:      Effort: Pulmonary effort is normal. No respiratory distress.      Breath sounds: No wheezing, rhonchi or rales.   Chest:       Comments: No anterior chest wall TTP  Abdominal:      Tenderness: There is generalized abdominal tenderness.   Genitourinary:     Comments: Fungal appearing rash under pannus, wear diaper  Musculoskeletal:      Cervical back: Neck supple.      Comments: Midline lumbar TTP, no midline thoracic TTP. Pelvis stable. No tenderness with palpation of ASIS.     Left lateral and medial malleolus TTP   Skin:     Comments: BLLE venous stasis changes with chronic wounds to BLLE. Wound to right plantar aspect of food, no drainage. No warmth.    Neurological:      Comments: On exam sleepy, but wakes to voice. Alert and oriented x 3. Moves all extremities equally          Results Reviewed       Procedure Component Value Units Date/Time    Lactic acid, plasma (w/reflex if result > 2.0) [[id number]]  (Normal) Collected: 02/11/25 0141    Lab Status: Final result Specimen: Blood from Arm, Left Updated: 02/11/25 0203     LACTIC ACID 0.8 mmol/L     Narrative:      Result may be elevated if tourniquet was used during collection.    Blood culture #1 [[id number]] Collected: 02/10/25 1757    Lab Status: Preliminary result Specimen: Blood from Arm, Right Updated: 02/10/25 2301     Blood Culture Received in Microbiology Lab. Culture in Progress.    Blood culture #2 [[id number]] Collected: 02/10/25 1719    Lab Status: Preliminary result Specimen: Blood from Arm, Left Updated: 02/10/25 2301     Blood Culture Received in Microbiology Lab. Culture in Progress.    FLU/RSV/COVID - if FLU/RSV clinically relevant (2hr TAT) [[id number]]  (Normal) Collected: 02/10/25 2116    Lab Status: Final result Specimen: Nares from Nose Updated: 02/10/25 2206     SARS-CoV-2 Negative     INFLUENZA A PCR Negative     INFLUENZA B PCR Negative     RSV PCR Negative    Narrative:      This test has been performed using the CoV-2/Flu/RSV plus assay on the [employer] GeneXpert platform. This test has been validated by the  and verified by the performing laboratory.      This test is designed to amplify and detect the following: nucleocapsid (N), envelope (E), and RNA-dependent RNA polymerase (RdRP) genes of the SARS-CoV-2 genome; matrix (M), basic polymerase (PB2), and acidic protein (PA) segments of the influenza A genome; matrix (M) and non-structural protein (NS) segments of the influenza B genome, and the nucleocapsid genes of RSV A and RSV B.     Positive results are indicative of the presence of Flu A, Flu B, RSV, and/or SARS-CoV-2 RNA. Positive results for SARS-CoV-2 or suspected novel influenza should be reported to state, local, or federal health departments according to local reporting requirements.      All results should be assessed in conjunction with clinical presentation and other laboratory markers for clinical management.     FOR PEDIATRIC PATIENTS - copy/paste COVID Guidelines URL to browser: [email or web address]       Urine Microscopic [[id number]]  (Abnormal) Collected: 02/10/25 1726    Lab Status: Final result Specimen: Urine, Clean Catch Updated: 02/10/25 1955     RBC, UA 2-4 /hpf      WBC, UA 10-20 /hpf      Epithelial Cells Occasional /hpf      Bacteria, UA Occasional /hpf      Hyaline Casts, UA 5-10 /lpf      Amorphous Crystals, UA Occasional    Urine culture [[id number]] Collected: 02/10/25 1726    Lab Status: In process Specimen: Urine, Clean Catch Updated: 02/10/25 1955    UA w Reflex to Microscopic w Reflex to Culture [[id number]]  (Abnormal) Collected: 02/10/25 1726    Lab Status: Final result Specimen: Urine, Clean Catch Updated: 02/10/25 1937     Color, UA Yellow     Clarity, UA Turbid     Specific Gravity, UA 1.020     pH, UA 6.0     Leukocytes, UA Small     Nitrite, UA Negative     Protein,  (2+) mg/dl      Glucose, UA Negative mg/dl      Ketones, UA Negative mg/dl      Urobilinogen, UA <2.0 mg/dl      Bilirubin, UA Negative     Occult Blood, UA Trace    Lactic acid [[id number]]  (Normal)  Collected: 02/10/25 1708    Lab Status: Final result Specimen: Blood from Arm, Left Updated: 02/10/25 1900     LACTIC ACID 1.5 mmol/L     Narrative:      Result may be elevated if tourniquet was used during collection.    Comprehensive metabolic panel [[id number]]  (Abnormal) Collected: 02/10/25 1708    Lab Status: Final result Specimen: Blood from Arm, Left Updated: 02/10/25 1854     Sodium 138 mmol/L      Potassium 4.9 mmol/L      Chloride 103 mmol/L      CO2 26 mmol/L      ANION GAP 9 mmol/L      BUN 30 mg/dL      Creatinine 1.04 mg/dL      Glucose 237 mg/dL      Calcium 9.3 mg/dL      AST 28 U/L      ALT 16 U/L      Alkaline Phosphatase 109 U/L      Total Protein 7.7 g/dL      Albumin 4.1 g/dL      Total Bilirubin 0.96 mg/dL      eGFR 55 ml/min/1.73sq m     Narrative:      National Kid[employer] Disease Foundation guidelines for Chronic Kidney Disease (CKD):     Stage 1 with normal or high GFR (GFR > 90 mL/min/1.73 square meters)    Stage 2 Mild CKD (GFR = 60-89 mL/min/1.73 square meters)    Stage 3A Moderate CKD (GFR = 45-59 mL/min/1.73 square meters)    Stage 3B Moderate CKD (GFR = 30-44 mL/min/1.73 square meters)    Stage 4 Severe CKD (GFR = 15-29 mL/min/1.73 square meters)    Stage 5 End Stage CKD (GFR <15 mL/min/1.73 square meters)  Note: GFR calculation is accurate only with a steady state creatinine    Procalcitonin [[id number]]  (Normal) Collected: 02/10/25 1708    Lab Status: Final result Specimen: Blood from Arm, Left Updated: 02/10/25 1842     Procalcitonin 0.11 ng/ml     Protime-INR [[id number]]  (Abnormal) Collected: 02/10/25 1708    Lab Status: Final result Specimen: Blood from Arm, Left Updated: 02/10/25 1755     Protime 15.8 seconds      INR 1.25    Narrative:      INR Therapeutic Range    Indication                                             INR Range      Atrial Fibrillation                                               2.0-3.0  Hypercoagulable State                                    2.0.2.3  Left  Ventricular Asist Device                            2.0-3.0  Mechanical Heart Valve                                  -    Aortic(with afib, MI, embolism, HF, LA enlargement,    and/or coagulopathy)                                     2.0-3.0 (2.5-3.5)     Mitral                                                             2.5-3.5  Prosthetic/Bioprosthetic Heart Valve               2.0-3.0  Venous thromboembolism (VTE: VT, PE        2.0-3.0    APTT [[id number]]  (Abnormal) Collected: 02/10/25 1708    Lab Status: Final result Specimen: Blood from Arm, Left Updated: 02/10/25 1755     PTT 35 seconds     CBC and differential [[id number]]  (Abnormal) Collected: 02/10/25 1707    Lab Status: Final result Specimen: Blood Updated: 02/10/25 1733     WBC 6.51 Thousand/uL      RBC 4.05 Million/uL      Hemoglobin 11.3 g/dL      Hematocrit 36.6 %      MCV 90 fL      MCH 27.9 pg      MCHC 30.9 g/dL      RDW 16.1 %      MPV 10.5 fL      Platelets 174 Thousands/uL      nRBC 0 /100 WBCs      Segmented % 79 %      Immature Grans % 1 %      Lymphocytes % 10 %      Monocytes % 10 %      Eosinophils Relative 0 %      Basophils Relative 0 %      Absolute Neutrophils 5.18 Thousands/µL      Absolute Immature Grans 0.05 Thousand/uL      Absolute Lymphocytes 0.62 Thousands/µL      Absolute Monocytes 0.63 Thousand/µL      Eosinophils Absolute 0.01 Thousand/µL      Basophils Absolute 0.02 Thousands/µL     Urine Macroscopic, POC [[id number]]  (Abnormal) Collected: 02/10/25 1724    Lab Status: Final result Specimen: Urine Updated: 02/10/25 1725     Color, UA Yellow     Clarity, UA Clear     pH, UA 6.0     Leukocytes, UA Trace     Nitrite, UA Negative     Protein, UA >=300 mg/dl      Glucose, UA Negative mg/dl      Ketones, UA Negative mg/dl      Urobilinogen, UA 0.2 E.U./dl      Bilirubin, UA Negative     Occult Blood, UA Trace     Specific Gravity, UA >=1.030    Narrative:      CLINITEK RESULT    Fingerstick Glucose (POCT) [[id number]]  (Abnormal)  Collected: 02/10/25 1647    Lab Status: Final result Specimen: Blood Updated: 02/10/25 1648     POC Glucose 181 mg/dl             XR ankle 3+ views LEFT   ED Interpretation by Pennie Haddad DO (02/11 0222)   No acute osseous abnormality       CT head wo contrast   Final Interpretation by Josh Baugh MD (02/10 1952)      No acute intracranial abnormality.                  Workstation performed: [id number]         CT cervical spine without contrast   Final Interpretation by Josh Baugh MD (02/10 1954)      No evidence of acute cervical spine fracture or traumatic malalignment.                  Workstation performed: [id number]         CT chest abdomen pelvis w contrast   Final Interpretation by Josh Baugh MD (02/10 2003)      No evidence of acute trauma in the chest, abdomen or pelvis.               Workstation performed: [id number]         XR foot 2 vw right    (Results Pending)       Procedures    ED Medication and Procedure Management   Prior to Admission Medications   Prescriptions Last Dose Informant Patient Reported? Taking?   CRANBERRY FRUIT CONCENTRATE PO  Self Yes No   Sig: Take 450 mg by mouth in the morning Every morning for supplement   Darbepoetin Mike (ARANESP, ALBUMIN FREE, IJ)  Self Yes No   Sig: Inject as directed every 14 (fourteen) days   Fluticasone-Salmeterol (Advair) 250-50 mcg/dose inhaler   Yes No   Sig: Inhale 1 puff 2 (two) times a day   Insulin Infusion Pump (MINIMED INSULIN PUMP) PRINCESS  Self Yes No   Sig: Use   NovoLOG 100 UNIT/ML injection  Self Yes No   Sig: INJECT UP  UNITS DAILY VIA INSULIN PUMP. E11.65   acetaminophen (TYLENOL) 500 mg tablet  Self Yes No   Sig: Take 500 mg by mouth every 4 (four) hours as needed for mild pain Take 2 tablets (1000 mg)by mouth every 4 hours as needed, for mild pain & moderate pain   albuterol (ACCUNEB) 1.25 MG/3ML nebulizer solution  Self Yes No   Sig: Take 2.5 mg by nebulization every 6 (six) hours as needed for  wheezing   albuterol (PROVENTIL HFA,VENTOLIN HFA) 90 mcg/act inhaler  Self Yes No   Sig: Inhale 2 puffs every 6 (six) hours as needed for wheezing   atorvastatin (LIPITOR) 80 mg tablet  Self Yes No   Sig: Take 80 mg by mouth daily at bedtime     calcium carbonate (OS-KARIS) 1250 (500 Ca) MG chewable tablet   Yes No   Sig: Chew 1 tablet daily   carvedilol (COREG) 3.125 mg tablet  Self Yes No   Sig: Take 3.125 mg by mouth 2 (two) times a day with meals   cyclobenzaprine (FLEXERIL) 10 mg tablet  Self Yes No   ezetimibe (ZETIA) 10 mg tablet  Self Yes No   Sig: Take 10 mg by mouth daily   famotidine (PEPCID) 40 MG tablet   No No   Sig: Take 1 tablet (40 mg total) by mouth daily at bedtime   gabapentin (NEURONTIN) 100 mg capsule  Self Yes No   Sig: Take 600 mg by mouth 3 (three) times a day   gabapentin (NEURONTIN) 300 mg capsule   Yes No   glucagon (GLUCAGEN) 1 mg injection  Self Yes No   Si mg once Inject 1 mg under the skin as needed for low blood sugar   glucose 4 g chewable tablet  Self Yes No   Sig: Chew 16 g as needed for low blood sugar Take 15 g by mouth as needed (blood sugar less than 70 if able to safely swallow)   glucose blood test strip  Self Yes No   Sig: Testing six times daily  E11.65 on insulin pump   insulin glargine (LANTUS) 100 units/mL subcutaneous injection  Self Yes No   Sig: Inject 24 Units under the skin if needed Inject 24 units under the skin as needed (for pump failure)   lactulose (CEPHULAC) 20 g packet   No No   Sig: Start taking once daily for 1 week, then can increase to twice daily with goal of having bowel movements daily or every other day.   levothyroxine 112 mcg tablet   Yes No   Sig: Take 1 tablet by mouth in the morning   ondansetron (ZOFRAN) 4 mg tablet   Yes No   Si mg   oxyCODONE-acetaminophen (PERCOCET) 5-325 mg per tablet  Self Yes No   Sig: Take 1 tablet by mouth every 6 (six) hours as needed   polyethylene glycol (MIRALAX) 17 g packet  Self No No   Sig: Take 17 g by  mouth daily   pramipexole (MIRAPEX) 1 mg tablet  Self Yes No   Sig: Take 1 mg by mouth 2 (two) times a day     sertraline (ZOLOFT) 100 mg tablet   Yes No   Sig: Take 100 mg by mouth daily at bedtime   tolterodine (Detrol LA) 4 mg 24 hr capsule   Yes No   Sig: Take 4 mg by mouth daily   torsemide (DEMADEX) 20 mg tablet  Self No No   Sig: Take 2 tablets (40 mg total) by mouth daily   zinc gluconate 50 mg tablet  Self Yes No   Sig: Take 50 mg by mouth daily      Facility-Administered Medications: None     Patient's Medications   Discharge Prescriptions    No medications on file     No discharge procedures on file.  ED SEPSIS DOCUMENTATION   Time reflects when diagnosis was documented in both MDM as applicable and the Disposition within this note       Time User Action Codes Description Comment    2/10/2025  9:06 PM Pennie Haddad Add [N39.0] UTI (urinary tract infection)     2/10/2025  9:06 PM Pennie Haddad Add [R26.2] Ambulatory dysfunction     2/10/2025  9:33 PM Chasidy Webber Add [A41.9] Sepsis (HCC)            Initial Sepsis Screening       Row Name 02/11/25 0112                Is the patient's history suggestive of a new or worsening infection? Yes (Proceed)  -        Suspected source of infection suspect infection, source unknown  -        Indicate SIRS criteria Hyperthemia > 38.3C (100.9F) OR Hypothermia <36C (96.8F);Tachycardia > 90 bpm  -MH        Are two or more of the above signs & symptoms of infection both present and new to the patient? Yes (Proceed)  -        Assess for evidence of organ dysfunction: Are any of the below criteria present within 6 hours of suspected infection and SIRS criteria that are NOT considered to be chronic conditions? SBP < 90  -        Date of presentation of septic shock 02/11/25  -        Time of presentation of septic shock 0112  -MH        Fluid Resuscitation: A lesser volume than 30 ml/kg IV fluid will be given  -        The 30 mL/kg fluid bolus was not given to  "the patient despite having hypotension, a lactate of >= 4 mmol/L, or documentation of septic shock secondary to: Heart Failure  -MH        Instead of the 30 ml/kg fluid bolus, the following volume of crystalloid fluid will be ordered: 1L  -MH        Of the following fluid type: NSS  -MH        Is the patient is persistently hypotensive in the hour after fluid bolus administration? If yes, patient meets criteria for vasopressor use. NO  -MH        Sepsis Note: Click \"NEXT\" below (NOT \"close\") to generate sepsis note based on above information. YES (proceed by clicking \"NEXT\")  -                  User Key  (r) = Recorded By, (t) = Taken By, (c) = Cosigned By      Initials Name Provider Type     ROMAINE Weinstein Nurse Practitioner                       Pennie Haddad,   02/11/25 0347    "

## 2025-02-11 ENCOUNTER — APPOINTMENT (INPATIENT)
Dept: NON INVASIVE DIAGNOSTICS | Facility: HOSPITAL | Age: 70
DRG: 871 | End: 2025-02-11
Payer: MEDICARE

## 2025-02-11 PROBLEM — E11.621 DIABETIC ULCER OF RIGHT MIDFOOT ASSOCIATED WITH TYPE 2 DIABETES MELLITUS (HCC): Status: ACTIVE | Noted: 2025-02-11

## 2025-02-11 PROBLEM — L97.419 DIABETIC ULCER OF RIGHT MIDFOOT ASSOCIATED WITH TYPE 2 DIABETES MELLITUS (HCC): Status: ACTIVE | Noted: 2025-02-11

## 2025-02-11 PROBLEM — M25.572 LEFT ANKLE PAIN: Status: ACTIVE | Noted: 2025-02-11

## 2025-02-11 PROBLEM — S00.83XA FACIAL CONTUSION, INITIAL ENCOUNTER: Status: ACTIVE | Noted: 2025-02-11

## 2025-02-11 PROBLEM — A41.9 SEVERE SEPSIS (HCC): Status: ACTIVE | Noted: 2025-02-11

## 2025-02-11 PROBLEM — N39.0 UTI (URINARY TRACT INFECTION): Status: ACTIVE | Noted: 2025-02-11

## 2025-02-11 PROBLEM — I50.20 SYSTOLIC CHF (HCC): Status: ACTIVE | Noted: 2025-02-11

## 2025-02-11 PROBLEM — R65.20 SEVERE SEPSIS (HCC): Status: ACTIVE | Noted: 2025-02-11

## 2025-02-11 LAB
ALBUMIN SERPL BCG-MCNC: 3.3 G/DL (ref 3.5–5)
ALP SERPL-CCNC: 66 U/L (ref 34–104)
ALT SERPL W P-5'-P-CCNC: 10 U/L (ref 7–52)
ANION GAP SERPL CALCULATED.3IONS-SCNC: 6 MMOL/L (ref 4–13)
AST SERPL W P-5'-P-CCNC: 18 U/L (ref 13–39)
BASOPHILS # BLD AUTO: 0.02 THOUSANDS/ΜL (ref 0–0.1)
BASOPHILS NFR BLD AUTO: 0 % (ref 0–1)
BILIRUB SERPL-MCNC: 0.72 MG/DL (ref 0.2–1)
BUN SERPL-MCNC: 32 MG/DL (ref 5–25)
CALCIUM ALBUM COR SERPL-MCNC: 7.9 MG/DL (ref 8.3–10.1)
CALCIUM SERPL-MCNC: 7.3 MG/DL (ref 8.4–10.2)
CHLORIDE SERPL-SCNC: 110 MMOL/L (ref 96–108)
CO2 SERPL-SCNC: 23 MMOL/L (ref 21–32)
CREAT SERPL-MCNC: 1.17 MG/DL (ref 0.6–1.3)
EOSINOPHIL # BLD AUTO: 0 THOUSAND/ΜL (ref 0–0.61)
EOSINOPHIL NFR BLD AUTO: 0 % (ref 0–6)
ERYTHROCYTE [DISTWIDTH] IN BLOOD BY AUTOMATED COUNT: 16.2 % (ref 11.6–15.1)
GFR SERPL CREATININE-BSD FRML MDRD: 47 ML/MIN/1.73SQ M
GLUCOSE SERPL-MCNC: 117 MG/DL (ref 65–140)
GLUCOSE SERPL-MCNC: 135 MG/DL (ref 65–140)
GLUCOSE SERPL-MCNC: 136 MG/DL (ref 65–140)
GLUCOSE SERPL-MCNC: 153 MG/DL (ref 65–140)
GLUCOSE SERPL-MCNC: 155 MG/DL (ref 65–140)
HCT VFR BLD AUTO: 26.2 % (ref 34.8–46.1)
HGB BLD-MCNC: 8 G/DL (ref 11.5–15.4)
IMM GRANULOCYTES # BLD AUTO: 0.05 THOUSAND/UL (ref 0–0.2)
IMM GRANULOCYTES NFR BLD AUTO: 1 % (ref 0–2)
LACTATE SERPL-SCNC: 0.8 MMOL/L (ref 0.5–2)
LYMPHOCYTES # BLD AUTO: 0.65 THOUSANDS/ΜL (ref 0.6–4.47)
LYMPHOCYTES NFR BLD AUTO: 11 % (ref 14–44)
MAGNESIUM SERPL-MCNC: 1.6 MG/DL (ref 1.9–2.7)
MCH RBC QN AUTO: 28.8 PG (ref 26.8–34.3)
MCHC RBC AUTO-ENTMCNC: 30.5 G/DL (ref 31.4–37.4)
MCV RBC AUTO: 94 FL (ref 82–98)
MONOCYTES # BLD AUTO: 0.64 THOUSAND/ΜL (ref 0.17–1.22)
MONOCYTES NFR BLD AUTO: 11 % (ref 4–12)
NEUTROPHILS # BLD AUTO: 4.42 THOUSANDS/ΜL (ref 1.85–7.62)
NEUTS SEG NFR BLD AUTO: 77 % (ref 43–75)
NRBC BLD AUTO-RTO: 0 /100 WBCS
PHOSPHATE SERPL-MCNC: 4.9 MG/DL (ref 2.3–4.1)
PLATELET # BLD AUTO: 104 THOUSANDS/UL (ref 149–390)
PMV BLD AUTO: 10.4 FL (ref 8.9–12.7)
POTASSIUM SERPL-SCNC: 4.4 MMOL/L (ref 3.5–5.3)
PROCALCITONIN SERPL-MCNC: 6.35 NG/ML
PROT SERPL-MCNC: 5.7 G/DL (ref 6.4–8.4)
RBC # BLD AUTO: 2.78 MILLION/UL (ref 3.81–5.12)
SODIUM SERPL-SCNC: 139 MMOL/L (ref 135–147)
T4 FREE SERPL-MCNC: 1.33 NG/DL (ref 0.61–1.12)
TSH SERPL DL<=0.05 MIU/L-ACNC: 7.22 UIU/ML (ref 0.45–4.5)
WBC # BLD AUTO: 5.78 THOUSAND/UL (ref 4.31–10.16)

## 2025-02-11 PROCEDURE — 93923 UPR/LXTR ART STDY 3+ LVLS: CPT

## 2025-02-11 PROCEDURE — 80053 COMPREHEN METABOLIC PANEL: CPT | Performed by: NURSE PRACTITIONER

## 2025-02-11 PROCEDURE — 84443 ASSAY THYROID STIM HORMONE: CPT | Performed by: NURSE PRACTITIONER

## 2025-02-11 PROCEDURE — 85025 COMPLETE CBC W/AUTO DIFF WBC: CPT | Performed by: NURSE PRACTITIONER

## 2025-02-11 PROCEDURE — 84100 ASSAY OF PHOSPHORUS: CPT | Performed by: NURSE PRACTITIONER

## 2025-02-11 PROCEDURE — 94660 CPAP INITIATION&MGMT: CPT

## 2025-02-11 PROCEDURE — 84439 ASSAY OF FREE THYROXINE: CPT | Performed by: NURSE PRACTITIONER

## 2025-02-11 PROCEDURE — 84145 PROCALCITONIN (PCT): CPT | Performed by: NURSE PRACTITIONER

## 2025-02-11 PROCEDURE — 36415 COLL VENOUS BLD VENIPUNCTURE: CPT | Performed by: NURSE PRACTITIONER

## 2025-02-11 PROCEDURE — 83735 ASSAY OF MAGNESIUM: CPT | Performed by: NURSE PRACTITIONER

## 2025-02-11 PROCEDURE — 83605 ASSAY OF LACTIC ACID: CPT | Performed by: NURSE PRACTITIONER

## 2025-02-11 PROCEDURE — 93925 LOWER EXTREMITY STUDY: CPT

## 2025-02-11 PROCEDURE — 99233 SBSQ HOSP IP/OBS HIGH 50: CPT | Performed by: STUDENT IN AN ORGANIZED HEALTH CARE EDUCATION/TRAINING PROGRAM

## 2025-02-11 PROCEDURE — 82948 REAGENT STRIP/BLOOD GLUCOSE: CPT

## 2025-02-11 PROCEDURE — 97163 PT EVAL HIGH COMPLEX 45 MIN: CPT

## 2025-02-11 RX ORDER — SPIRONOLACTONE 25 MG/1
25 TABLET ORAL DAILY
Status: DISCONTINUED | OUTPATIENT
Start: 2025-02-11 | End: 2025-02-16

## 2025-02-11 RX ORDER — LIDOCAINE 50 MG/G
1 PATCH TOPICAL DAILY
Status: DISCONTINUED | OUTPATIENT
Start: 2025-02-11 | End: 2025-02-19 | Stop reason: HOSPADM

## 2025-02-11 RX ORDER — INSULIN LISPRO 100 [IU]/ML
1-6 INJECTION, SOLUTION INTRAVENOUS; SUBCUTANEOUS
Status: DISCONTINUED | OUTPATIENT
Start: 2025-02-11 | End: 2025-02-19 | Stop reason: HOSPADM

## 2025-02-11 RX ORDER — CARVEDILOL 6.25 MG/1
6.25 TABLET ORAL 2 TIMES DAILY WITH MEALS
Status: DISCONTINUED | OUTPATIENT
Start: 2025-02-11 | End: 2025-02-19 | Stop reason: HOSPADM

## 2025-02-11 RX ORDER — LOSARTAN POTASSIUM 50 MG/1
50 TABLET ORAL 2 TIMES DAILY
COMMUNITY

## 2025-02-11 RX ORDER — ALBUMIN (HUMAN) 12.5 G/50ML
25 SOLUTION INTRAVENOUS ONCE
Status: COMPLETED | OUTPATIENT
Start: 2025-02-11 | End: 2025-02-11

## 2025-02-11 RX ORDER — MAGNESIUM SULFATE 1 G/100ML
1 INJECTION INTRAVENOUS ONCE
Status: COMPLETED | OUTPATIENT
Start: 2025-02-11 | End: 2025-02-11

## 2025-02-11 RX ORDER — OXYCODONE HYDROCHLORIDE 5 MG/1
5 TABLET ORAL EVERY 4 HOURS PRN
Status: ON HOLD | COMMUNITY
End: 2025-02-19

## 2025-02-11 RX ORDER — SPIRONOLACTONE 25 MG/1
25 TABLET ORAL DAILY
COMMUNITY

## 2025-02-11 RX ORDER — LOSARTAN POTASSIUM 50 MG/1
50 TABLET ORAL 2 TIMES DAILY
Status: DISCONTINUED | OUTPATIENT
Start: 2025-02-11 | End: 2025-02-16

## 2025-02-11 RX ADMIN — INSULIN LISPRO 1 UNITS: 100 INJECTION, SOLUTION INTRAVENOUS; SUBCUTANEOUS at 16:28

## 2025-02-11 RX ADMIN — SODIUM CHLORIDE 500 ML: 0.9 INJECTION, SOLUTION INTRAVENOUS at 09:00

## 2025-02-11 RX ADMIN — SERTRALINE HYDROCHLORIDE 100 MG: 100 TABLET ORAL at 22:00

## 2025-02-11 RX ADMIN — LEVOTHYROXINE SODIUM 112 MCG: 112 TABLET ORAL at 06:16

## 2025-02-11 RX ADMIN — FAMOTIDINE 20 MG: 20 TABLET, FILM COATED ORAL at 22:00

## 2025-02-11 RX ADMIN — CEFTRIAXONE SODIUM 1000 MG: 10 INJECTION, POWDER, FOR SOLUTION INTRAVENOUS at 20:32

## 2025-02-11 RX ADMIN — SODIUM CHLORIDE 1000 ML: 0.9 INJECTION, SOLUTION INTRAVENOUS at 01:12

## 2025-02-11 RX ADMIN — PRAMIPEXOLE DIHYDROCHLORIDE 1 MG: 1 TABLET ORAL at 17:02

## 2025-02-11 RX ADMIN — SODIUM CHLORIDE 75 ML/HR: 0.9 INJECTION, SOLUTION INTRAVENOUS at 16:25

## 2025-02-11 RX ADMIN — DICLOFENAC SODIUM 2 G: 10 GEL TOPICAL at 21:59

## 2025-02-11 RX ADMIN — VANCOMYCIN HYDROCHLORIDE 1000 MG: 1 INJECTION, SOLUTION INTRAVENOUS at 11:58

## 2025-02-11 RX ADMIN — SODIUM CHLORIDE 500 ML: 0.9 INJECTION, SOLUTION INTRAVENOUS at 10:35

## 2025-02-11 RX ADMIN — MAGNESIUM SULFATE IN DEXTROSE 1 G: 10 INJECTION, SOLUTION INTRAVENOUS at 06:19

## 2025-02-11 RX ADMIN — CYANOCOBALAMIN TAB 500 MCG 1000 MCG: 500 TAB at 09:05

## 2025-02-11 RX ADMIN — Medication 2.5 MG: at 17:03

## 2025-02-11 RX ADMIN — Medication 2.5 MG: at 09:20

## 2025-02-11 RX ADMIN — Medication 2.5 MG: at 22:58

## 2025-02-11 RX ADMIN — ENOXAPARIN SODIUM 40 MG: 40 INJECTION SUBCUTANEOUS at 09:06

## 2025-02-11 RX ADMIN — FLUTICASONE FUROATE AND VILANTEROL TRIFENATATE 1 PUFF: 200; 25 POWDER RESPIRATORY (INHALATION) at 09:14

## 2025-02-11 RX ADMIN — ATORVASTATIN CALCIUM 80 MG: 80 TABLET, FILM COATED ORAL at 17:02

## 2025-02-11 RX ADMIN — SODIUM CHLORIDE 1000 ML: 0.9 INJECTION, SOLUTION INTRAVENOUS at 03:29

## 2025-02-11 RX ADMIN — ALBUMIN (HUMAN) 25 G: 0.25 INJECTION, SOLUTION INTRAVENOUS at 04:45

## 2025-02-11 RX ADMIN — INSULIN GLARGINE 24 UNITS: 100 INJECTION, SOLUTION SUBCUTANEOUS at 22:00

## 2025-02-11 RX ADMIN — EZETIMIBE 10 MG: 10 TABLET ORAL at 09:17

## 2025-02-11 RX ADMIN — PRAMIPEXOLE DIHYDROCHLORIDE 1 MG: 1 TABLET ORAL at 09:05

## 2025-02-11 NOTE — PLAN OF CARE
Problem: PHYSICAL THERAPY ADULT  Goal: Performs mobility at highest level of function for planned discharge setting.  See evaluation for individualized goals.  Description: Treatment/Interventions: Functional transfer training, LE strengthening/ROM, Elevations, Therapeutic exercise, Endurance training, Patient/family training, Bed mobility, Gait training, Spoke to nursing, OT          See flowsheet documentation for full assessment, interventions and recommendations.  Note: Prognosis: Fair  Problem List: Decreased strength, Decreased endurance, Impaired balance, Decreased mobility, Impaired judgement, Decreased safety awareness, Pain, Decreased skin integrity  Assessment: Pt. 69 y.o.female presented w/ confusion, lethargy, weakness & s/p fall at home. Pt admitted for Acute metabolic encephalopathy w/ UTI (urinary tract infection), Sepsis (HCC) (A41.9)  Ambulatory dysfunction, R midfoot diabetic ulcer, L ankle pain & facial contusion. No fx identified. Per Podiatry, LE WBAT. Pt referred to PT for mobility assessment & D/C planning. Objective findings during PT assessment. PTA, pt reports being I w/ RW. On eval, pt functioning below baseline hence will continue skilled PT to improve function & safety. Pt require modAx1 for most functional mobility except sit to stand require maxAx1 + cues for techniques & safety. Gait deviations as above but no gross LOB noted.  Dec amb tolerance 2* to pain, weakness & fatigue. The patient's AM-PAC Basic Mobility Inpatient Short Form Raw Score is 11. A Raw score of less than or equal to 16 suggests the patient may benefit from discharge to post-acute rehabilitation services. Please also refer to the recommendation of the Physical Therapist for safe discharge planning. From PT standpoint, will recommend Level I vs II rehab services at D/C. No SOB & dizziness reported t/o session. Nsg staff most recent vital signs as follows: BP (!) 86/53 (BP Location: Left arm)   Pulse 65   Temp  "98.6 °F (37 °C) (Oral)   Resp 19   Ht 4' 11\" (1.499 m)   Wt 76.9 kg (169 lb 8.5 oz)   SpO2 95%   BMI 34.24 kg/m² . At end of session, pt back in bed in stable condition, call bell & phone in reach, bed alarm activated, all lines intact. Fall precautions reinforced w/ good understanding. CM to follow. Nsg staff to continue to mobilized pt (OOB in chair for all meals) as tolerated to prevent further decline in function.  Nsg notified.        Rehab Resource Intensity Level, PT: I (Maximum Resource Intensity) (vs II, pending progress)    See flowsheet documentation for full assessment.        "

## 2025-02-11 NOTE — ASSESSMENT & PLAN NOTE
Wt Readings from Last 3 Encounters:   02/10/25 76.9 kg (169 lb 8.5 oz)   12/27/24 79.4 kg (175 lb)   09/20/24 77.1 kg (170 lb)     Cardiomyopathy of unknown cause diagnosed in January during recent hospitalization with newly reduced EF 35 to 39%  PTA GDMT: carvedilol 6.25 mg twice daily, losartan 50 mg twice daily, spironolactone 25 mg daily, torsemide 20 mg daily  HELD for now in setting of hypotension  Per , patient had not been taking her diuretics at home daily, was just taking them prn and was doing okay  Plan to undergo ischemic work-up in the near future  Monitor volume status while on IVF hydration  2 g sodium diet

## 2025-02-11 NOTE — SEPSIS NOTE
"  Sepsis Note   Zohra Barriga 69 y.o. female MRN: [id number]  Unit/Bed#: ED-39 Encounter: [id number]       Initial Sepsis Screening       Row Name 02/11/25 0112                Is the patient's history suggestive of a new or worsening infection? Yes (Proceed)  -        Suspected source of infection suspect infection, source unknown  -        Indicate SIRS criteria Hyperthemia > 38.3C (100.9F) OR Hypothermia <36C (96.8F);Tachycardia > 90 bpm  -        Are two or more of the above signs & symptoms of infection both present and new to the patient? Yes (Proceed)  -        Assess for evidence of organ dysfunction: Are any of the below criteria present within 6 hours of suspected infection and SIRS criteria that are NOT considered to be chronic conditions? SBP < 90  -        Date of presentation of septic shock 02/11/25  -        Time of presentation of septic shock 0112  -        Fluid Resuscitation: A lesser volume than 30 ml/kg IV fluid will be given  -        The 30 mL/kg fluid bolus was not given to the patient despite having hypotension, a lactate of >= 4 mmol/L, or documentation of septic shock secondary to: Heart Failure  -        Instead of the 30 ml/kg fluid bolus, the following volume of crystalloid fluid will be ordered: 1L  -        Of the following fluid type: NSS  -        Is the patient is persistently hypotensive in the hour after fluid bolus administration? If yes, patient meets criteria for vasopressor use. NO  -MH        Sepsis Note: Click \"NEXT\" below (NOT \"close\") to generate sepsis note based on above information. YES (proceed by clicking \"NEXT\")  -                  User Key  (r) = Recorded By, (t) = Taken By, (c) = Cosigned By      Initials Name Provider Type     ROMAINE Weinstein Nurse Practitioner                        Body mass index is 34.24 kg/m².  Wt Readings from Last 1 Encounters:   02/10/25 76.9 kg (169 lb 8.5 oz)     IBW (Ideal Body Weight): 43.2 kg    Ideal " body weight: 48.8 kg (107 lb 8.4 oz)  Adjusted ideal body weight: 60 kg (132 lb 5.2 oz)

## 2025-02-11 NOTE — ASSESSMENT & PLAN NOTE
Meeting criteria with fever, tachycardia, hypotension  Suspected 2/2 UTI  Empiric antibiotic therapy  Lactic acid normal  Continue IVF hydration  Blood cultures pending, urine cultures pending  Continue to trend fever curve, leukocytosis

## 2025-02-11 NOTE — ASSESSMENT & PLAN NOTE
Per  and patient, she suffered a mechanical fall when she was trying to use the ladies room at a restaurant when she was unable to maneuver her walker the right way  She fell flat on her face, denies any LOC, denies any prodromal symptoms, was perfectly fine prior to the incident  Noted to have some confusion on admission  Likely multifactorial with possible UTI, post-trauma, severe pain  CTH with no acute intracranial abnormalities  This am, patient is back to her baseline mental status  Will continue to monitor

## 2025-02-11 NOTE — ASSESSMENT & PLAN NOTE
Known ulceration plantar aspect of right foot  Follows with podiatry and recently completed a course of po antibiotics (last week)  Xray right foot with no radiographic evidence of osteomyelitis  Podiatry following, at this time no evidence of infection, continue wound care

## 2025-02-11 NOTE — PLAN OF CARE
Problem: Potential for Falls  Goal: Patient will remain free of falls  Description: INTERVENTIONS:  - Educate patient/family on patient safety including physical limitations  - Instruct patient to call for assistance with activity   - Consult OT/PT to assist with strengthening/mobility   - Keep Call bell within reach  - Keep bed low and locked with side rails adjusted as appropriate  - Keep care items and personal belongings within reach  - Initiate and maintain comfort rounds  - Make Fall Risk Sign visible to staff  - Apply yellow socks and bracelet for high fall risk patients  - Consider moving patient to room near nurses station  Outcome: Progressing     Problem: PAIN - ADULT  Goal: Verbalizes/displays adequate comfort level or baseline comfort level  Description: Interventions:  - Encourage patient to monitor pain and request assistance  - Assess pain using appropriate pain scale  - Administer analgesics based on type and severity of pain and evaluate response  - Implement non-pharmacological measures as appropriate and evaluate response  - Consider cultural and social influences on pain and pain management  - Notify physician/advanced practitioner if interventions unsuccessful or patient reports new pain  Outcome: Progressing     Problem: INFECTION - ADULT  Goal: Absence or prevention of progression during hospitalization  Description: INTERVENTIONS:  - Assess and monitor for signs and symptoms of infection  - Monitor lab/diagnostic results  - Monitor all insertion sites, i.e. indwelling lines, tubes, and drains  - Monitor endotracheal if appropriate and nasal secretions for changes in amount and color  - [place] appropriate cooling/warming therapies per order  - Administer medications as ordered  - Instruct and encourage patient and family to use good hand hygiene technique  - Identify and instruct in appropriate isolation precautions for identified infection/condition  Outcome: Progressing  Goal: Absence  of fever/infection during neutropenic period  Description: INTERVENTIONS:  - Monitor WBC    Outcome: Progressing     Problem: Knowledge Deficit  Goal: Patient/family/caregiver demonstrates understanding of disease process, treatment plan, medications, and discharge instructions  Description: Complete learning assessment and assess knowledge base.  Interventions:  - Provide teaching at level of understanding  - Provide teaching via preferred learning methods  Outcome: Progressing

## 2025-02-11 NOTE — ASSESSMENT & PLAN NOTE
Wt Readings from Last 3 Encounters:   02/10/25 76.9 kg (169 lb 8.5 oz)   12/27/24 79.4 kg (175 lb)   09/20/24 77.1 kg (170 lb)     Cardiomyopathy of unknown cause diagnosed in January during recent hospitalization with newly reduced EF 35 to 39%  Continue PTA GDMT carvedilol 6.25 mg twice daily, losartan 50 mg twice daily, spironolactone 25 mg daily, torsemide 20 mg daily  Per  at bedside she will be going undergoing an ischemic workup in the near future  Monitor volume status  2 g sodium diet

## 2025-02-11 NOTE — ASSESSMENT & PLAN NOTE
Lab Results   Component Value Date    HGBA1C 7.7 (H) 01/17/2025       Recent Labs     02/10/25  1647 02/11/25  0805 02/11/25  1048 02/11/25  1553   POCGLU 181* 135 136 155*       Blood Sugar Average: Last 72 hrs:  (P) 151.75    Managed by endocrinology in the outpatient setting  Insulin pump removed and sent home with   Utilize basal Lantus at bedtime  SSI with Accu-Cheks  Hypoglycemia protocol

## 2025-02-11 NOTE — ASSESSMENT & PLAN NOTE
Presented from home with confusion, lethargy, weakness and fall  Meeting sepsis criteria  Catheterized specimen turbid  IV fluid resuscitation  Empiric antibiotic therapy  CT brain no acute intracranial abnormality  Continue to monitor neurostatus

## 2025-02-11 NOTE — ASSESSMENT & PLAN NOTE
Meeting criteria with fever, tachycardia  Suspected 2/2 UTI  Empiric antibiotic therapy  Lactic acid normal  Episode of hypotension resolved with IV fluid resuscitation  Blood cultures pending  Continue to trend fever curve, leukocytosis

## 2025-02-11 NOTE — ASSESSMENT & PLAN NOTE
Known ulceration plantar aspect of right foot  Has been states follows with podiatry  Appears boggy/edematous, no drainage Check x-ray right foot  Empiric antibiotic therapy for potential underlying wound  2/3 at [facility] ED diagnosed with right leg cellulitis and initiated on doxycycline no evidence of cellulitis remains

## 2025-02-11 NOTE — PROGRESS NOTES
Zohra Barriga is a 69 y.o. female who is currently ordered Vancomycin IV with management by the Pharmacy Consult service.  Relevant clinical data and objective / subjective history reviewed.  Vancomycin Assessment:  Indication and Goal AUC/Trough:    Clinical Status: stable  Micro:     Renal Function:  SCr: 1.04 mg/dL  CrCl: 50 mL/min  Renal replacement: Not on dialysis  Days of Therapy: 1  Current Dose:  vancomycin 1,250 mg IV at 21:13 tonight    Vancomycin Plan:  New Dosing:  vancomycin 1,000 mg IV q24h starting tomorrow am ~ 0900  Estimated AUC: 426 mcg*hr/mL  Estimated Trough: 11.7 mcg/mL  Next Level: 2/13/25 with AM labs  Renal Function Monitoring: Daily BMP and UOP  Pharmacy will continue to follow closely for s/sx of nephrotoxicity, infusion reactions and appropriateness of therapy.  BMP and CBC will be ordered per protocol. We will continue to follow the patient’s culture results and clinical progress daily.    Kalyani Fournier, Pharmacist

## 2025-02-11 NOTE — SEPSIS NOTE
"  Sepsis Note   Zohra Barriga 69 y.o. female MRN: [id number]  Unit/Bed#: ED-39 Encounter: [id number]       Initial Sepsis Screening       Row Name 02/11/25 0112                Is the patient's history suggestive of a new or worsening infection? Yes (Proceed)  -        Suspected source of infection suspect infection, source unknown  -        Indicate SIRS criteria Hyperthemia > 38.3C (100.9F) OR Hypothermia <36C (96.8F);Tachycardia > 90 bpm  -        Are two or more of the above signs & symptoms of infection both present and new to the patient? Yes (Proceed)  -        Assess for evidence of organ dysfunction: Are any of the below criteria present within 6 hours of suspected infection and SIRS criteria that are NOT considered to be chronic conditions? SBP < 90  -        Date of presentation of septic shock 02/11/25  -        Time of presentation of septic shock 0112  -        Fluid Resuscitation: A lesser volume than 30 ml/kg IV fluid will be given  -        The 30 mL/kg fluid bolus was not given to the patient despite having hypotension, a lactate of >= 4 mmol/L, or documentation of septic shock secondary to: Heart Failure  -        Instead of the 30 ml/kg fluid bolus, the following volume of crystalloid fluid will be ordered: 1L  -        Of the following fluid type: NSS  -        Is the patient is persistently hypotensive in the hour after fluid bolus administration? If yes, patient meets criteria for vasopressor use. NO  -MH        Sepsis Note: Click \"NEXT\" below (NOT \"close\") to generate sepsis note based on above information. YES (proceed by clicking \"NEXT\")  -                  User Key  (r) = Recorded By, (t) = Taken By, (c) = Cosigned By      Initials Name Provider Type     ROMAINE Weinstein Nurse Practitioner                    Default Flowsheet Data (Last 720 Hours)       Sepsis Reassess       Row Name 02/11/25 0604                   Repeat Volume Status and Tissue Perfusion " "Assessment Performed    Date of Reassessment: 02/11/25  -        Time of Reassessment: 0604 -        Sepsis Reassessment Note: Click \"NEXT\" below (NOT \"close\") to generate sepsis reassessment note. --        Repeat Volume Status and Tissue Perfusion Assessment Performed --                  User Key  (r) = Recorded By, (t) = Taken By, (c) = Cosigned By      Initials Name Provider Type     ROMAINE Weinstein Nurse Practitioner                    Body mass index is 34.24 kg/m².  Wt Readings from Last 1 Encounters:   02/10/25 76.9 kg (169 lb 8.5 oz)     IBW (Ideal Body Weight): 43.2 kg    Ideal body weight: 48.8 kg (107 lb 8.4 oz)  Adjusted ideal body weight: 60 kg (132 lb 5.2 oz)    "

## 2025-02-11 NOTE — ASSESSMENT & PLAN NOTE
Lab Results   Component Value Date    HGBA1C 7.7 (H) 01/17/2025       Recent Labs     02/10/25  1647   POCGLU 181*       Blood Sugar Average: Last 72 hrs:  (P) 181    Managed by endocrinology in the outpatient setting  Insulin pump removed and sent home with   Utilize basal Lantus at bedtime  SSI with Accu-Cheks  Hypoglycemia protocol

## 2025-02-11 NOTE — PROGRESS NOTES
Progress Note - Hospitalist   Name: Zohra Barriga 69 y.o. female I MRN: [id number]  Unit/Bed#: E2 -01 I Date of Admission: 2/10/2025   Date of Service: 2/11/2025 I Hospital Day: 0    Assessment & Plan  Acute metabolic encephalopathy  Per  and patient, she suffered a mechanical fall when she was trying to use the ladies room at a restaurant when she was unable to maneuver her walker the right way  She fell flat on her face, denies any LOC, denies any prodromal symptoms, was perfectly fine prior to the incident  Noted to have some confusion on admission  Likely multifactorial with possible UTI, post-trauma, severe pain  CTH with no acute intracranial abnormalities  This am, patient is back to her baseline mental status  Will continue to monitor  Obstructive sleep apnea  STU on CPAP  Type 2 diabetes mellitus, with long-term current use of insulin (HCC)  Lab Results   Component Value Date    HGBA1C 7.7 (H) 01/17/2025       Recent Labs     02/10/25  1647 02/11/25  0805 02/11/25  1048 02/11/25  1553   POCGLU 181* 135 136 155*       Blood Sugar Average: Last 72 hrs:  (P) 151.75    Managed by endocrinology in the outpatient setting  Insulin pump removed and sent home with   Utilize basal Lantus at bedtime  SSI with Accu-Cheks  Hypoglycemia protocol  Systolic CHF (HCC)  Wt Readings from Last 3 Encounters:   02/10/25 76.9 kg (169 lb 8.5 oz)   12/27/24 79.4 kg (175 lb)   09/20/24 77.1 kg (170 lb)     Cardiomyopathy of unknown cause diagnosed in January during recent hospitalization with newly reduced EF 35 to 39%  PTA GDMT: carvedilol 6.25 mg twice daily, losartan 50 mg twice daily, spironolactone 25 mg daily, torsemide 20 mg daily  HELD for now in setting of hypotension  Per , patient had not been taking her diuretics at home daily, was just taking them prn and was doing okay  Plan to undergo ischemic work-up in the near future  Monitor volume status while on IVF hydration  2 g sodium diet  Diabetic  ulcer of right midfoot associated with type 2 diabetes mellitus (HCC)  Known ulceration plantar aspect of right foot  Follows with podiatry and recently completed a course of po antibiotics (last week)  Xray right foot with no radiographic evidence of osteomyelitis  Podiatry following, at this time no evidence of infection, continue wound care  UTI (urinary tract infection)  Catheterized urine specimen turbid in appearance with pyuria  Urine culture pending  Empiric ceftriaxone  Associated mental status change  Blood cultures pending  Severe sepsis (HCC)  Meeting criteria with fever, tachycardia, hypotension  Suspected 2/2 UTI  Empiric antibiotic therapy  Lactic acid normal  Continue IVF hydration  Blood cultures pending, urine cultures pending  Continue to trend fever curve, leukocytosis  Facial contusion, initial encounter  S/p fall  Presented with left-sided facial contusion  Trauma scans unremarkable for acute traumatic injury  PT/OT/case management  Pain control  Left ankle pain  Presented with left ankle pain post fall  No acute fracture or subluxation on x-ray  Pain control  PT/OT/case management  Acute anemia  Hgb noted to drop from 11.3 to 8 on admission with no overt signs of bleeding  Trauma scans negative for any signs of bleed  Possibly dilutional from all the IVF received  Will continue to monitor for any signs of bleed  Monitor H/H  Transfuse to goal >7  Fall  Patient presents following mechanical fall after inability to maneuver wheelchair in the ladies room  Fell flat on her face and stomach  Trauma scans negative  Multimodal pain regimen with cautious use of opioids given hypotensive episodes  Appreciate PT and OT eval    VTE Pharmacologic Prophylaxis: VTE Score: 5 High Risk (Score >/= 5) - Pharmacological DVT Prophylaxis Ordered: enoxaparin (Lovenox). Sequential Compression Devices Ordered.    Mobility:   Basic Mobility Inpatient Raw Score: 11  -[facility] Goal: 4: Move to chair/commode  [facility]  Achieved: 4: Move to chair/commode  -[facility] Goal achieved. Continue to encourage appropriate mobility.    Patient Centered Rounds: I performed bedside rounds with nursing staff today.   Discussions with Specialists or Other Care Team Provider: Podiatry    Education and Discussions with Family / Patient: Updated  () at bedside.    Current Length of Stay: 0 day(s)  Current Patient Status: Inpatient   Certification Statement: The patient will continue to require additional inpatient hospital stay due to sepsis  Discharge Plan:  TBD    Code Status: Level 1 - Full Code    Subjective   Patient seen and examined and complaining of a lot of pain everywhere, mostly on bilateral upper extremities as well as bilateral lower extremities. Denies abdominal pain, denies shortness of breath.     Objective :  Temp:  [97.9 °F (36.6 °C)-102.2 °F (39 °C)] 97.9 °F (36.6 °C)  HR:  [60-94] 60  BP: ()/(39-76) 100/56  Resp:  [14-22] 18  SpO2:  [93 %-100 %] 100 %  O2 Device: Nasal cannula  Nasal Cannula O2 Flow Rate (L/min):  [2 L/min-3 L/min] 2 L/min    Body mass index is 34.24 kg/m².     Input and Output Summary (last 24 hours):     Intake/Output Summary (Last 24 hours) at 2/11/2025 1754  Last data filed at 2/11/2025 1651  Gross per 24 hour   Intake 2600 ml   Output 374 ml   Net 2226 ml       Physical Exam  Vitals and nursing note reviewed.   Constitutional:       General: She is not in acute distress.     Appearance: She is well-developed. She is ill-appearing.      Comments: Tired-appearing   HENT:      Head: Normocephalic. Abrasion and contusion present.      Comments: Bruising noted over left lateral aspect of forehead  Eyes:      Conjunctiva/sclera: Conjunctivae normal.   Cardiovascular:      Rate and Rhythm: Normal rate and regular rhythm.      Heart sounds: No murmur heard.  Pulmonary:      Effort: Pulmonary effort is normal. No respiratory distress.      Breath sounds: Normal breath sounds.   Abdominal:       Palpations: Abdomen is soft.      Tenderness: There is no abdominal tenderness.   Musculoskeletal:         General: No swelling.      Cervical back: Neck supple.   Skin:     General: Skin is warm and dry.      Capillary Refill: Capillary refill takes less than 2 seconds.      Comments: Stasis dermatitis noted over bilateral lower extremities   Erythema, warmth, tenderness on palpation over bilateral lower extremities   Neurological:      General: No focal deficit present.      Mental Status: She is alert and oriented to person, place, and time. Mental status is at baseline.   Psychiatric:         Mood and Affect: Mood normal.       Lines/Drains:      Lab Results: I have reviewed the following results:   Results from last 7 days   Lab Units 02/11/25  0533   WBC Thousand/uL 5.78   HEMOGLOBIN g/dL 8.0*   HEMATOCRIT % 26.2*   PLATELETS Thousands/uL 104*   SEGS PCT % 77*   LYMPHO PCT % 11*   MONO PCT % 11   EOS PCT % 0     Results from last 7 days   Lab Units 02/11/25  0533   SODIUM mmol/L 139   POTASSIUM mmol/L 4.4   CHLORIDE mmol/L 110*   CO2 mmol/L 23   BUN mg/dL 32*   CREATININE mg/dL 1.17   ANION GAP mmol/L 6   CALCIUM mg/dL 7.3*   ALBUMIN g/dL 3.3*   TOTAL BILIRUBIN mg/dL 0.72   ALK PHOS U/L 66   ALT U/L 10   AST U/L 18   GLUCOSE RANDOM mg/dL 153*     Results from last 7 days   Lab Units 02/10/25  1708   INR  1.25*     Results from last 7 days   Lab Units 02/11/25  1553 02/11/25  1048 02/11/25  0805 02/10/25  1647   POC GLUCOSE mg/dl 155* 136 135 181*         Results from last 7 days   Lab Units 02/11/25  0533 02/11/25  0141 02/10/25  1708   LACTIC ACID mmol/L  --  0.8 1.5   PROCALCITONIN ng/ml 6.35*  --  0.11       Recent Cultures (last 7 days):   Results from last 7 days   Lab Units 02/10/25  1757 02/10/25  1719   BLOOD CULTURE  Received in Microbiology Lab. Culture in Progress. Received in Microbiology Lab. Culture in Progress.       Imaging Results Review: I reviewed radiology reports from this admission  including: XR foot right, XR left ankle, CT chest, abdomen and pelvis, CT spine, CTH.  Other Study Results Review: No additional pertinent studies reviewed.    Last 24 Hours Medication List:     Current Facility-Administered Medications:     albuterol inhalation solution 2.5 mg, Q6H PRN    atorvastatin (LIPITOR) tablet 80 mg, Daily With Dinner    [Held by provider] carvedilol (COREG) tablet 6.25 mg, BID With Meals    ceftriaxone (ROCEPHIN) 1 g/50 mL in dextrose IVPB, Q24H    cyanocobalamin (VITAMIN B-12) tablet 1,000 mcg, Daily    Diclofenac Sodium (VOLTAREN) 1 % topical gel 2 g, 4x Daily    enoxaparin (LOVENOX) subcutaneous injection 40 mg, Daily    ezetimibe (ZETIA) tablet 10 mg, Daily    famotidine (PEPCID) tablet 20 mg, HS    fluticasone-vilanterol 200-25 mcg/actuation 1 puff, Daily    insulin glargine (LANTUS) subcutaneous injection 24 Units 0.24 mL, HS    insulin lispro (HumALOG/ADMELOG) 100 units/mL subcutaneous injection 1-6 Units, TID AC **AND** Fingerstick Glucose (POCT), TID AC    insulin lispro (HumALOG/ADMELOG) 100 units/mL subcutaneous injection 1-6 Units, HS    levothyroxine tablet 112 mcg, Early Morning    lidocaine (LIDODERM) 5 % patch 1 patch, Daily    [Held by provider] losartan (COZAAR) tablet 50 mg, BID    oxyCODONE (ROXICODONE) split tablet 2.5 mg, Q6H PRN    polyethylene glycol (MIRALAX) packet 17 g, Daily    pramipexole (MIRAPEX) tablet 1 mg, BID    sertraline (ZOLOFT) tablet 100 mg, HS    sodium chloride 0.9 % infusion, Continuous, Last Rate: 75 mL/hr (02/11/25 1625)    [Held by provider] spironolactone (ALDACTONE) tablet 25 mg, Daily    [Held by provider] torsemide (DEMADEX) tablet 20 mg, Daily    vancomycin (VANCOCIN) IVPB (premix in dextrose) 1,000 mg 200 mL, Q24H, Last Rate: 1,000 mg (02/11/25 1158)    Administrative Statements   Today, Patient Was Seen By: Asya Trujillo MD    **Please Note: This note may have been constructed using a voice recognition system.**

## 2025-02-11 NOTE — H&P
H&P - Hospitalist   Name: Zohra Barriga 69 y.o. female I MRN: [id number]  Unit/Bed#: ED-39 I Date of Admission: 2/10/2025   Date of Service: 2/11/2025 I Hospital Day: 0     Assessment & Plan  Acute metabolic encephalopathy  Presented from home with confusion, lethargy, weakness and fall  Meeting sepsis criteria  Catheterized specimen turbid  IV fluid resuscitation  Empiric antibiotic therapy  CT brain no acute intracranial abnormality  Continue to monitor neurostatus  Obstructive sleep apnea  STU on CPAP  Type 2 diabetes mellitus, with long-term current use of insulin (HCC)  Lab Results   Component Value Date    HGBA1C 7.7 (H) 01/17/2025       Recent Labs     02/10/25  1647   POCGLU 181*       Blood Sugar Average: Last 72 hrs:  (P) 181    Managed by endocrinology in the outpatient setting  Insulin pump removed and sent home with   Utilize basal Lantus at bedtime  SSI with Accu-Cheks  Hypoglycemia protocol  Systolic CHF (HCC)  Wt Readings from Last 3 Encounters:   02/10/25 76.9 kg (169 lb 8.5 oz)   12/27/24 79.4 kg (175 lb)   09/20/24 77.1 kg (170 lb)     Cardiomyopathy of unknown cause diagnosed in January during recent hospitalization with newly reduced EF 35 to 39%  Continue PTA GDMT carvedilol 6.25 mg twice daily, losartan 50 mg twice daily, spironolactone 25 mg daily, torsemide 20 mg daily  Per  at bedside she will be going undergoing an ischemic workup in the near future  Monitor volume status  2 g sodium diet  Diabetic ulcer of right midfoot associated with type 2 diabetes mellitus (HCC)  Known ulceration plantar aspect of right foot  Has been states follows with podiatry  Appears boggy/edematous, no drainage Check x-ray right foot  Empiric antibiotic therapy for potential underlying wound  2/3 at [facility] ED diagnosed with right leg cellulitis and initiated on doxycycline no evidence of cellulitis remains      UTI (urinary tract infection)  Catheterized urine specimen turbid in appearance with  pyuria  Urine culture pending  Empiric ceftriaxone  Associated mental status change  Blood cultures pending  Severe sepsis (HCC)  Meeting criteria with fever, tachycardia  Suspected 2/2 UTI  Empiric antibiotic therapy  Lactic acid normal  Episode of hypotension resolved with IV fluid resuscitation  Blood cultures pending  Continue to trend fever curve, leukocytosis  Facial contusion, initial encounter  S/p fall  Presented with left-sided facial contusion  Trauma scans unremarkable for acute traumatic injury  PT/OT/case management  Pain control  Left ankle pain  Presented with left ankle pain post fall  No acute fracture or subluxation on x-ray  Pain control  PT/OT/case management      VTE Pharmacologic Prophylaxis: VTE Score: 5 High Risk (Score >/= 5) - Pharmacological DVT Prophylaxis Ordered: enoxaparin (Lovenox). Sequential Compression Devices Ordered.  Code Status: Level 1 - Full Code   Discussion with family: Updated  () at bedside.    Anticipated Length of Stay: Patient will be admitted on an observation basis with an anticipated length of stay of less than 2 midnights secondary to mental status.    History of Present Illness   Chief Complaint: Confusion, lethargy, fall    Zohra Barriga is a 69 y.o. female with a PMH of CHF with reduced ejection fraction, STU on CPAP, IDDM on insulin pump, spinal stimulator who presents with mechanical fall.  Patient reports from home with  after falling in a restaurant bathroom hitting the left side of her head.  Presented to the ED, no acute injuries were notified on statement and patient was presented to the medical service for further evaluation and treatment.  At that admission patient lethargic but nonfocal exam very very warm to touch..    Review of Systems   Constitutional:  Positive for fatigue. Negative for chills and fever.   HENT:  Negative for ear pain and sore throat.    Eyes:  Negative for pain and visual disturbance.   Respiratory:   "Negative for cough and shortness of breath.    Cardiovascular:  Negative for chest pain and palpitations.   Gastrointestinal:  Negative for abdominal pain and vomiting.   Genitourinary:  Negative for dysuria and hematuria.   Musculoskeletal:  Positive for gait problem. Negative for arthralgias and back pain.   Skin:  Negative for color change and rash.   Neurological:  Positive for weakness. Negative for seizures and syncope.   Psychiatric/Behavioral:  Positive for confusion.    All other systems reviewed and are negative.      Historical Information   Past Medical History:   Diagnosis Date    Anxiety     Asthma     controlled    Back complaints     Cancer (Formerly [facility])     nose    Cellulitis of left lower leg     \"not now\"    CHF (congestive heart failure) ([facility]) 02/2021    Chronic bilateral thoracic back pain     Chronic kidney disease     sees nephrologist regularly\" stage 3\"    Chronic myofascial pain     Chronic pain of both lower extremities     Chronic pain of left knee     \"both knees\"    Chronic pain syndrome     bilat legs and knees/neuropathy pain    COVID 12/2021    CPAP (continuous positive airway pressure) dependence     no currently uses    Depression     Diabetes mellitus ([facility])     insulin pump    Difficulty walking 2018    Disease of thyroid gland     Does use hearing aid     bilat and will wear DOS    DVT (deep venous thrombosis) ([facility]) 2013    left leg    Gait disorder     Gastroparesis     GERD (gastroesophageal reflux disease)     Head injury     Headache, tension-type     History of angina     History of MRSA infection     History of transfusion     Many years ago    HL (hearing loss) 2018    Hyperlipidemia     Hypertension     Hypoglycemic reaction     \"occas low blood sugar\"    Insulin pump in place     pt reports saw endocrinologist 4/28 and will bring copy of instructions DOS    Irritable bowel syndrome     Kidney disease     Memory loss 2021    Movement disorder 2021    Neuropathy in diabetes ([facility]) " "    Constant pain legs and feet    Nose fracture     Pacemaker 2019    Pneumonia     Polyneuropathy associated with underlying disease (HCC)     PONV (postoperative nausea and vomiting)     Risk for falls     S/P insertion of spinal cord stimulator 2018    Sarcoidosis     Shortness of breath     \"exertion and not new\"    Sleep apnea     Stroke (HCC)     Thoracic vertebral fracture (HCC)     TIA (transient ischemic attack)     Use of cane as ambulatory aid     Uses walker     Wears dentures     permanent upper/and some missing teeth/partial lower     Past Surgical History:   Procedure Laterality Date    ABDOMINAL ADHESION SURGERY N/A 2021    Procedure: laparoscopic LYSIS ADHESIONS;  Surgeon: Jill Bentley MD;  [facility];  Service: Thoracic    BACK SURGERY  2023    TL[facility] at [facility], Dr. Levy    BREAST SURGERY      BREAST SURGERY      reduction    CARDIAC PACEMAKER PLACEMENT      pacemaker     SECTION      COLONOSCOPY      DILATION AND CURETTAGE OF UTERUS      \"D&E\"    HERNIA REPAIR      HYSTERECTOMY      JOINT REPLACEMENT Left     LTKR    NECK SURGERY      fused 4 discs with 4 screws implanted    NISSEN FUNDOPLICATION LAPAROSCOPIC WITH ROBOTICS N/A 2021    Procedure: ROBOTIC HELLER MYOTOMY WITH BERNARDO FUNDIPLICATION ;  Surgeon: Jill Bentley MD;  [facility];  Service: Thoracic    NOSE SURGERY  2024    closed reduction    PA ESOPHAGOGASTRODUODENOSCOPY TRANSORAL DIAGNOSTIC N/A 2021    Procedure: ESOPHAGOGASTRODUODENOSCOPY (EGD);  Surgeon: Jill Bentley MD;  [facility];  Service: Thoracic    PA ESOPHAGOGASTRODUODENOSCOPY TRANSORAL DIAGNOSTIC N/A 2022    Procedure: ESOPHAGOGASTRODUODENOSCOPY (EGD),;  Surgeon: Jill Bentley MD;  [facility];  Service: Thoracic    PA ESOPHAGOGASTRODUODENOSCOPY TRANSORAL DIAGNOSTIC N/A 2022    Procedure: ESOPHAGOGASTRODUODENOSCOPY (EGD);  Surgeon: Jill Bentley MD;  " [facility];  Service: Thoracic    IA ESOPHAGOGASTRODUODENOSCOPY TRANSORAL DIAGNOSTIC N/A 07/12/2022    Procedure: ESOPHAGOGASTRODUODENOSCOPY (EGD); PYLORIC DILATION; GE JUNCTION DILATION;  Surgeon: Jill Bentley MD;  [facility];  Service: Thoracic    IA ESOPHAGOGASTRODUODENOSCOPY TRANSORAL DIAGNOSTIC N/A 11/29/2022    Procedure: ESOPHAGOGASTRODUODENOSCOPY (EGD);  Surgeon: Varinder Steiner MD;  [facility];  Service: Thoracic    IA ESOPHAGOGASTRODUODENOSCOPY TRANSORAL DIAGNOSTIC N/A 01/24/2023    Procedure: ESOPHAGOGASTRODUODENOSCOPY (EGD);  Surgeon: Jill Bentley MD;  [facility];  Service: Thoracic    IA ESOPHAGOGASTRODUODENOSCOPY TRANSORAL DIAGNOSTIC N/A 05/01/2024    Procedure: ESOPHAGOGASTRODUODENOSCOPY (EGD);  Surgeon: Jill Bentley MD;  [facility];  Service: Thoracic    IA ESOPHAGOSCOPY FLEX BALLOON DILAT <30 MM DIAM N/A 01/12/2022    Procedure: DILATATION ESOPHAGEAL;  Surgeon: Jill Bentley MD;  [facility];  Service: Thoracic    IA ESOPHAGOSCOPY FLEX BALLOON DILAT <30 MM DIAM N/A 02/16/2022    Procedure: DILATATION ESOPHAGEAL;  Surgeon: Jill Bentley MD;  [facility];  Service: Thoracic    IA ESOPHAGOSCOPY FLEX BALLOON DILAT <30 MM DIAM N/A 11/29/2022    Procedure: DILATATION ESOPHAGEAL esophageal and pyloric dilation;  Surgeon: Varinder Steiner MD;  [facility];  Service: Thoracic    IA ESOPHAGOSCOPY FLEX BALLOON DILAT <30 MM DIAM N/A 01/24/2023    Procedure: esophageal dilation dilated up to 54  with pylorus dilation dilated up to 18;  Surgeon: Jill Bentley MD;  [facility];  Service: Thoracic    IA ESOPHAGOSCOPY FLEX BALLOON DILAT <30 MM DIAM N/A 05/01/2024    Procedure: DILATATION ESOPHAGEAL;  Surgeon: Jill Bentely MD;  [facility];  Service: Thoracic    IA RIBEIRO IMPLTJ NSTIM ELTRDS PLATE/PADDLE EDRL Left 04/23/2018    Procedure: Insertion of thoracic spinal  cord stimulator electrode via laminotomy and placement of left buttock IPG;  Surgeon: Shahab Bullock MD;  [facility];  Service: Neurosurgery    REPLACEMENT TOTAL KNEE      ROTATOR CUFF REPAIR      SPINAL CORD STIMULATOR REMOVAL Bilateral 07/02/2024    Procedure: REOPENING OF THORACIC AND LEFT BUTTOCK INCISION FOR REMOVAL OF SPINAL CORD STIMULATOR SYSTEM;  Surgeon: Shahab Bullock MD;  [facility];  Service: Neurosurgery    SPINAL STIMULATOR PLACEMENT Left 10/03/2018    Procedure: BUTTOCK RE-OPENING INCISION FOR REPOSITIONING OF IMPLANTABLE PULSE GENERATOR;  Surgeon: Shahab Bullock MD;  [facility];  Service: Neurosurgery    TONSILLECTOMY      UPPER GASTROINTESTINAL ENDOSCOPY      VASCULAR SURGERY      WISDOM TOOTH EXTRACTION       Social History     Tobacco Use    Smoking status: Never    Smokeless tobacco: Never   Vaping Use    Vaping status: Never Used   Substance and Sexual Activity    Alcohol use: No    Drug use: Not Currently     Frequency: 8.0 times per week     Types: Marijuana     Comment: Medical Marijuana/vape pen once per week    Sexual activity: Yes     Partners: Male     Birth control/protection: None     E-Cigarette/Vaping    E-Cigarette Use Never User      E-Cigarette/Vaping Substances    Nicotine No     THC Yes     CBD No     Flavoring No     Other No     Unknown No      Family History   Problem Relation Age of Onset    Diabetes Family     Heart disease Family     Hypertension Family     Neuropathy Family     No Known Problems Mother     Heart disease Father     Diabetes Father     Neuropathy Father     Stroke Father     No Known Problems Maternal Grandmother     No Known Problems Maternal Grandfather     No Known Problems Paternal Grandmother     No Known Problems Paternal Grandfather     No Known Problems Brother     No Known Problems Daughter     Cancer Son      Social History:  Marital Status: /Civil Union   Occupation: Retired  Patient Pre-hospital Living Situation:  Home  Patient Pre-hospital Level of Mobility: walks  Patient Pre-hospital Diet Restrictions: None    Meds/Allergies   I have reviewed home medications with patient personally.  Prior to Admission medications    Medication Sig Start Date End Date Taking? Authorizing Provider   acetaminophen (TYLENOL) 500 mg tablet Take 500 mg by mouth every 4 (four) hours as needed for mild pain Take 2 tablets (1000 mg)by mouth every 4 hours as needed, for mild pain & moderate pain    Historical Provider, MD   albuterol (ACCUNEB) 1.25 MG/3ML nebulizer solution Take 2.5 mg by nebulization every 6 (six) hours as needed for wheezing    Historical Provider, MD   albuterol (PROVENTIL HFA,VENTOLIN HFA) 90 mcg/act inhaler Inhale 2 puffs every 6 (six) hours as needed for wheezing    Historical Provider, MD   atorvastatin (LIPITOR) 80 mg tablet Take 80 mg by mouth daily at bedtime      Historical Provider, MD   calcium carbonate (OS-KARIS) 1250 (500 Ca) MG chewable tablet Chew 1 tablet daily    Historical Provider, MD   carvedilol (COREG) 3.125 mg tablet Take 3.125 mg by mouth 2 (two) times a day with meals    Historical Provider, MD   CRANBERRY FRUIT CONCENTRATE PO Take 450 mg by mouth in the morning Every morning for supplement    Historical Provider, MD   cyclobenzaprine (FLEXERIL) 10 mg tablet  3/3/24   Historical Provider, MD   Darbepoetin Mike (ARANESP, ALBUMIN FREE, IJ) Inject as directed every 14 (fourteen) days    Historical Provider, MD   ezetimibe (ZETIA) 10 mg tablet Take 10 mg by mouth daily 10/20/23   Historical Provider, MD   famotidine (PEPCID) 40 MG tablet Take 1 tablet (40 mg total) by mouth daily at bedtime 12/23/24   S[place]ón PA-C   Fluticasone-Salmeterol (Advair) 250-50 mcg/dose inhaler Inhale 1 puff 2 (two) times a day 10/21/24   Historical Provider, MD   gabapentin (NEURONTIN) 100 mg capsule Take 600 mg by mouth 3 (three) times a day 2/25/22   Historical Provider, MD   gabapentin (NEURONTIN) 300 mg capsule   10/23/24   Historical Provider, MD   glucagon (GLUCAGEN) 1 mg injection 1 mg once Inject 1 mg under the skin as needed for low blood sugar    Historical Provider, MD   glucose 4 g chewable tablet Chew 16 g as needed for low blood sugar Take 15 g by mouth as needed (blood sugar less than 70 if able to safely swallow)    Historical Provider, MD   glucose blood test strip Testing six times daily  E11.65 on insulin pump 9/17/18   Historical Provider, MD   insulin glargine (LANTUS) 100 units/mL subcutaneous injection Inject 24 Units under the skin if needed Inject 24 units under the skin as needed (for pump failure)    Historical Provider, MD   Insulin Infusion Pump (MINIMED INSULIN PUMP) PRINCESS Use    Historical Provider, MD   lactulose (CEPHULAC) 20 g packet Start taking once daily for 1 week, then can increase to twice daily with goal of having bowel movements daily or every other day. 12/27/24   Valerie Gamboa PA-C   levothyroxine 112 mcg tablet Take 1 tablet by mouth in the morning 10/19/24   Historical Provider, MD   NovoLOG 100 UNIT/ML injection INJECT UP  UNITS DAILY VIA INSULIN PUMP. E11.65 11/27/23   Historical Provider, MD   ondansetron (ZOFRAN) 4 mg tablet 8 mg 10/23/24   Historical Provider, MD   oxyCODONE-acetaminophen (PERCOCET) 5-325 mg per tablet Take 1 tablet by mouth every 6 (six) hours as needed 10/27/23   Historical Provider, MD   polyethylene glycol (MIRALAX) 17 g packet Take 17 g by mouth daily 11/10/21   Torri Gan DO   pramipexole (MIRAPEX) 1 mg tablet Take 1 mg by mouth 2 (two) times a day      Historical Provider, MD   sertraline (ZOLOFT) 100 mg tablet Take 100 mg by mouth daily at bedtime 3/20/24   Historical Provider, MD   tolterodine (Detrol LA) 4 mg 24 hr capsule Take 4 mg by mouth daily    Historical Provider, MD   torsemide (DEMADEX) 20 mg tablet Take 2 tablets (40 mg total) by mouth daily 3/1/23   Giana Bennett PA-C   zinc gluconate 50 mg tablet Take 50 mg by mouth  "daily    Historical Provider, MD     Allergies   Allergen Reactions    Bactrim [Sulfamethoxazole-Trimethoprim] Hives    Sucralfate Hives     Facial swelling    Topamax [Topiramate]      disorentation    Azithromycin Itching    Pregabalin Confusion and Other (See Comments)     altered mental status    Ciprofloxacin Drowsiness     Other reaction(s): Other (See Comments)  \"drowsiness\"    Norvasc [Amlodipine] Swelling    Baclofen      \"That knocks me out.\"    Bupropion Fatigue    Methotrexate Nausea Only       Objective :  Temp:  [97.8 °F (36.6 °C)-102.2 °F (39 °C)] 99.4 °F (37.4 °C)  HR:  [60-94] 62  BP: ()/(44-78) 92/51  Resp:  [14-22] 14  SpO2:  [93 %-99 %] 97 %  O2 Device: Nasal cannula  Nasal Cannula O2 Flow Rate (L/min):  [3 L/min] 3 L/min    Physical Exam  Vitals and nursing note reviewed.   Constitutional:       Appearance: Normal appearance. She is normal weight. She is ill-appearing.   HENT:      Head: Normocephalic. Contusion present.      Mouth/Throat:      Mouth: Mucous membranes are dry.   Eyes:      Conjunctiva/sclera: Conjunctivae normal.   Cardiovascular:      Rate and Rhythm: Normal rate and regular rhythm.      Pulses: Normal pulses.      Heart sounds: Normal heart sounds.   Pulmonary:      Effort: Pulmonary effort is normal.      Breath sounds: Normal breath sounds.   Abdominal:      General: Abdomen is flat.      Palpations: Abdomen is soft.      Tenderness: There is no abdominal tenderness.   Musculoskeletal:         General: Normal range of motion.      Cervical back: Normal range of motion and neck supple.   Skin:     General: Skin is warm and dry.      Capillary Refill: Capillary refill takes less than 2 seconds.      Coloration: Skin is pale.      Findings: Bruising, erythema and rash present.   Neurological:      General: No focal deficit present.      Mental Status: She is lethargic.      Motor: Weakness present.      Comments: Lethargic, nonfocal neurological exam   Psychiatric:        "  Mood and Affect: Mood normal.         Behavior: Behavior normal.         Lab Results: I have reviewed the following results:  Results from last 7 days   Lab Units 02/10/25  1707   WBC Thousand/uL 6.51   HEMOGLOBIN g/dL 11.3*   HEMATOCRIT % 36.6   PLATELETS Thousands/uL 174   SEGS PCT % 79*   LYMPHO PCT % 10*   MONO PCT % 10   EOS PCT % 0     Results from last 7 days   Lab Units 02/10/25  1708   SODIUM mmol/L 138   POTASSIUM mmol/L 4.9   CHLORIDE mmol/L 103   CO2 mmol/L 26   BUN mg/dL 30*   CREATININE mg/dL 1.04   ANION GAP mmol/L 9   CALCIUM mg/dL 9.3   ALBUMIN g/dL 4.1   TOTAL BILIRUBIN mg/dL 0.96   ALK PHOS U/L 109*   ALT U/L 16   AST U/L 28   GLUCOSE RANDOM mg/dL 237*     Results from last 7 days   Lab Units 02/10/25  1708   INR  1.25*     Results from last 7 days   Lab Units 02/10/25  1647   POC GLUCOSE mg/dl 181*     Lab Results   Component Value Date    HGBA1C 7.7 (H) 01/17/2025    HGBA1C 8.0 (H) 01/04/2025    HGBA1C 9.4 (H) 10/05/2024     Results from last 7 days   Lab Units 02/11/25  0141 02/10/25  1708   LACTIC ACID mmol/L 0.8 1.5   PROCALCITONIN ng/ml  --  0.11       Imaging Results Review: I reviewed radiology reports from this admission including: CT chest, CT abdomen/pelvis, CT head, and CT C-spine.  Other Study Results Review: EKG was reviewed.     ** Please Note: This note has been constructed using a voice recognition system. **

## 2025-02-11 NOTE — CONSULTS
Podiatry - Consultation    Patient Information:   Zohra Barriga 69 y.o. female MRN: [id number]  Unit/Bed#: E2 -01 Encounter: [id number]  PCP: Celestino Santiago DO  Date of Admission:  2/10/2025  Date of Consultation: 02/11/25  Requesting Physician: Asya Haddad *      ASSESSMENT:    Zohra Barriga is a 69 y.o. female with:    Right foot diabetic ulceration, Rai grade 2.  Left leg traumatic wound  Left ankle pain  T2DM with most recent hemoglobin A1c of 7.7.  History of DVT.    PLAN:    Patient evaluated at bedside.    Right foot wound: Noted subfirst metatarsal wound.  Base appears granular, does not probe deeply.  No acute clinical signs infection at this time.  X-ray was negative for osteomyelitis.  Although pulses are lightly palpable, foot appears dysvascular.  Will plan for noninvasive vascular studies to rule out PAD.  Will plan for local wound care consisting of [facility].  Will plan for ambulatory referral to wound care at [facility] upon discharge.  Left leg traumatic wound: secondary to traumatic injury with refrigerator door several weeks ago.  Was being managed by plastic surgery at [facility].  Previously was on short course of oral antibiotics.  No acute clinical signs flexion of this time.  Will plan for local wound care.  Left ankle pain: Nonspecific upon evaluation.  X-ray negative for osseous pathology.  Will continue to monitor.  Elevation and offloading on green foam wedges or pillows when non-ambulatory.  Rest of care per primary team.  Will discuss this plan with my attending and update as needed.    Weightbearing status: Weightbearing as tolerated    SUBJECTIVE:    History of Present Illness:    Zohra Barriga is a 69 y.o. female who is originally admitted 2/10/2025 due to acute metabolic encephalopathy. Patient has a past medical history of type 2 diabetes mellitus, acute metabolic encephalopathy, diabetic foot ulceration, UTI, severe sepsis and left ankle pain.    We are  "consulted for left ankle pain and diabetic foot ulceration to the right foot.  Patient states that she has been following with Dr. Anthony for local wound care.  She states that he has been sharply debriding the wound however does not feel that she was seeing any improvement.  She feels that she was going to [facility] wound care for further evaluation.  Patient currently is having left ankle pain secondary to her fall.  She states that the ankle pain is global.  Patient currently denying any nausea vomiting shortness breath fever chest pain.    Review of Systems:    Constitutional: Negative.    HENT: Negative.    Eyes: Negative.    Respiratory: Negative.    Cardiovascular: Negative.    Gastrointestinal: Negative.    Musculoskeletal: Left ankle pain.  Skin: Left leg wound, right plantar foot wound.  Neurological: Neuropathy.  Psych: Negative.     Past Medical and Surgical History:     Past Medical History:   Diagnosis Date    Anxiety     Asthma     controlled    Back complaints     Cancer ([facility])     nose    Cellulitis of left lower leg     \"not now\"    CHF (congestive heart failure) ([facility]) 02/2021    Chronic bilateral thoracic back pain     Chronic kidney disease     sees nephrologist regularly\" stage 3\"    Chronic myofascial pain     Chronic pain of both lower extremities     Chronic pain of left knee     \"both knees\"    Chronic pain syndrome     bilat legs and knees/neuropathy pain    COVID 12/2021    CPAP (continuous positive airway pressure) dependence     no currently uses    Depression     Diabetes mellitus ([facility])     insulin pump    Difficulty walking 2018    Disease of thyroid gland     Does use hearing aid     bilat and will wear DOS    DVT (deep venous thrombosis) ([facility]) 2013    left leg    Gait disorder     Gastroparesis     GERD (gastroesophageal reflux disease)     Head injury     Headache, tension-type     History of angina     History of MRSA infection     History of transfusion     Many years ago    HL " "(hearing loss) 2018    Hyperlipidemia     Hypertension     Hypoglycemic reaction     \"occas low blood sugar\"    Insulin pump in place     pt reports saw endocrinologist  and will bring copy of instructions DOS    Irritable bowel syndrome     Kidney disease     Memory loss     Movement disorder     Neuropathy in diabetes (HCC)     Constant pain legs and feet    Nose fracture     Pacemaker 2019    Pneumonia     Polyneuropathy associated with underlying disease (HCC)     PONV (postoperative nausea and vomiting)     Risk for falls     S/P insertion of spinal cord stimulator 2018    Sarcoidosis     Shortness of breath     \"exertion and not new\"    Sleep apnea     Stroke (HCC)     Thoracic vertebral fracture (HCC)     TIA (transient ischemic attack)     Use of cane as ambulatory aid     Uses walker     Wears dentures     permanent upper/and some missing teeth/partial lower       Past Surgical History:   Procedure Laterality Date    ABDOMINAL ADHESION SURGERY N/A 2021    Procedure: laparoscopic LYSIS ADHESIONS;  Surgeon: Jill Bentley MD;  [facility];  Service: Thoracic    BACK SURGERY  2023    TLIF at Dr. Mildred MCLEAN    BREAST SURGERY      BREAST SURGERY      reduction    CARDIAC PACEMAKER PLACEMENT      pacemaker     SECTION      COLONOSCOPY      DILATION AND CURETTAGE OF UTERUS      \"D&E\"    HERNIA REPAIR      HYSTERECTOMY      JOINT REPLACEMENT Left     LTKR    NECK SURGERY      fused 4 discs with 4 screws implanted    NISSEN FUNDOPLICATION LAPAROSCOPIC WITH ROBOTICS N/A 2021    Procedure: ROBOTIC HELLER MYOTOMY WITH BERNARDO FUNDIPLICATION ;  Surgeon: Jill Bentley MD;  [facility];  Service: Thoracic    NOSE SURGERY  2024    closed reduction    WY ESOPHAGOGASTRODUODENOSCOPY TRANSORAL DIAGNOSTIC N/A 2021    Procedure: ESOPHAGOGASTRODUODENOSCOPY (EGD);  Surgeon: Jill Bentley MD;  [facility];  Service: Thoracic    WY " ESOPHAGOGASTRODUODENOSCOPY TRANSORAL DIAGNOSTIC N/A 01/12/2022    Procedure: ESOPHAGOGASTRODUODENOSCOPY (EGD),;  Surgeon: Jill Bentley MD;  [facility];  Service: Thoracic    WY ESOPHAGOGASTRODUODENOSCOPY TRANSORAL DIAGNOSTIC N/A 02/16/2022    Procedure: ESOPHAGOGASTRODUODENOSCOPY (EGD);  Surgeon: Jill Bentley MD;  [facility];  Service: Thoracic    WY ESOPHAGOGASTRODUODENOSCOPY TRANSORAL DIAGNOSTIC N/A 07/12/2022    Procedure: ESOPHAGOGASTRODUODENOSCOPY (EGD); PYLORIC DILATION; GE JUNCTION DILATION;  Surgeon: Jill Bentley MD;  [facility];  Service: Thoracic    WY ESOPHAGOGASTRODUODENOSCOPY TRANSORAL DIAGNOSTIC N/A 11/29/2022    Procedure: ESOPHAGOGASTRODUODENOSCOPY (EGD);  Surgeon: Varinder Steiner MD;  [facility];  Service: Thoracic    WY ESOPHAGOGASTRODUODENOSCOPY TRANSORAL DIAGNOSTIC N/A 01/24/2023    Procedure: ESOPHAGOGASTRODUODENOSCOPY (EGD);  Surgeon: Jill Bentley MD;  [facility];  Service: Thoracic    WY ESOPHAGOGASTRODUODENOSCOPY TRANSORAL DIAGNOSTIC N/A 05/01/2024    Procedure: ESOPHAGOGASTRODUODENOSCOPY (EGD);  Surgeon: Jill Bentley MD;  [facility];  Service: Thoracic    WY ESOPHAGOSCOPY FLEX BALLOON DILAT <30 MM DIAM N/A 01/12/2022    Procedure: DILATATION ESOPHAGEAL;  Surgeon: Jill Bentley MD;  [facility];  Service: Thoracic    WY ESOPHAGOSCOPY FLEX BALLOON DILAT <30 MM DIAM N/A 02/16/2022    Procedure: DILATATION ESOPHAGEAL;  Surgeon: Jill Bentley MD;  [facility];  Service: Thoracic    WY ESOPHAGOSCOPY FLEX BALLOON DILAT <30 MM DIAM N/A 11/29/2022    Procedure: DILATATION ESOPHAGEAL esophageal and pyloric dilation;  Surgeon: Varinder Steiner MD;  [facility];  Service: Thoracic    WY ESOPHAGOSCOPY FLEX BALLOON DILAT <30 MM DIAM N/A 01/24/2023    Procedure: esophageal dilation dilated up to 54  with pylorus dilation dilated up to 18;  Surgeon: Jill MILLER  MD Mc;  [facility];  Service: Thoracic    KY ESOPHAGOSCOPY FLEX BALLOON DILAT <30 MM DIAM N/A 05/01/2024    Procedure: DILATATION ESOPHAGEAL;  Surgeon: Jill Bentley MD;  [facility];  Service: Thoracic    KY RIBEIRO IMPLTJ NSTIM ELTRDS PLATE/PADDLE EDRL Left 04/23/2018    Procedure: Insertion of thoracic spinal cord stimulator electrode via laminotomy and placement of left buttock IPG;  Surgeon: Shahab Bullock MD;  [facility];  Service: Neurosurgery    REPLACEMENT TOTAL KNEE      ROTATOR CUFF REPAIR      SPINAL CORD STIMULATOR REMOVAL Bilateral 07/02/2024    Procedure: REOPENING OF THORACIC AND LEFT BUTTOCK INCISION FOR REMOVAL OF SPINAL CORD STIMULATOR SYSTEM;  Surgeon: Shahab Bullock MD;  [facility];  Service: Neurosurgery    SPINAL STIMULATOR PLACEMENT Left 10/03/2018    Procedure: BUTTOCK RE-OPENING INCISION FOR REPOSITIONING OF IMPLANTABLE PULSE GENERATOR;  Surgeon: Shahab Bullock MD;  [facility];  Service: Neurosurgery    TONSILLECTOMY      UPPER GASTROINTESTINAL ENDOSCOPY      VASCULAR SURGERY      WISDOM TOOTH EXTRACTION         Meds/Allergies:      Medications Prior to Admission:     albuterol (PROVENTIL HFA,VENTOLIN HFA) 90 mcg/act inhaler    carvedilol (COREG) 3.125 mg tablet    Cholecalciferol 1.25 MG (21839 UT) capsule    cyanocobalamin (VITAMIN B-12) 1000 MCG tablet    cyclobenzaprine (FLEXERIL) 10 mg tablet    ezetimibe (ZETIA) 10 mg tablet    famotidine (PEPCID) 40 MG tablet    gabapentin (NEURONTIN) 300 mg capsule    glucagon (GLUCAGEN) 1 mg injection    glucose blood test strip    insulin glargine (LANTUS) 100 units/mL subcutaneous injection    Insulin Infusion Pump (MINIMED INSULIN PUMP) PRINCESS    levothyroxine 112 mcg tablet    losartan (COZAAR) 50 mg tablet    NovoLOG 100 UNIT/ML injection    ondansetron (ZOFRAN) 4 mg tablet    oxyCODONE (ROXICODONE) 5 immediate release tablet    polyethylene glycol (MIRALAX) 17 g packet    pramipexole  "(MIRAPEX) 1 mg tablet    sertraline (ZOLOFT) 100 mg tablet    spironolactone (ALDACTONE) 25 mg tablet    torsemide (DEMADEX) 20 mg tablet    zinc gluconate 50 mg tablet    acetaminophen (TYLENOL) 500 mg tablet    albuterol (ACCUNEB) 1.25 MG/3ML nebulizer solution    atorvastatin (LIPITOR) 80 mg tablet    calcium carbonate (OS-KARIS) 1250 (500 Ca) MG chewable tablet    CRANBERRY FRUIT CONCENTRATE PO    Darbepoetin Mike (ARANESP, ALBUMIN FREE, IJ)    Fluticasone-Salmeterol (Advair) 250-50 mcg/dose inhaler    glucose 4 g chewable tablet    lactulose (CEPHULAC) 20 g packet    tolterodine (Detrol LA) 4 mg 24 hr capsule    Allergies   Allergen Reactions    Bactrim [Sulfamethoxazole-Trimethoprim] Hives    Sucralfate Hives     Facial swelling    Topamax [Topiramate]      disorentation    Azithromycin Itching    Pregabalin Confusion and Other (See Comments)     altered mental status    Ciprofloxacin Drowsiness     Other reaction(s): Other (See Comments)  \"drowsiness\"    Norvasc [Amlodipine] Swelling    Baclofen      \"That knocks me out.\"    Bupropion Fatigue    Methotrexate Nausea Only       Social History:     Marital Status: /Civil Union    Substance Use History:   Social History     Substance and Sexual Activity   Alcohol Use No     Social History     Tobacco Use   Smoking Status Never   Smokeless Tobacco Never     Social History     Substance and Sexual Activity   Drug Use Not Currently    Frequency: 8.0 times per week    Types: Marijuana    Comment: Medical Marijuana/vape pen once per week       Family History:    Family History   Problem Relation Age of Onset    Diabetes Family     Heart disease Family     Hypertension Family     Neuropathy Family     No Known Problems Mother     Heart disease Father     Diabetes Father     Neuropathy Father     Stroke Father     No Known Problems Maternal Grandmother     No Known Problems Maternal Grandfather     No Known Problems Paternal Grandmother     No Known Problems " "Paternal Grandfather     No Known Problems Brother     No Known Problems Daughter     Cancer Son          OBJECTIVE:    Vitals:   Blood Pressure: (!) 86/53 (02/11/25 1153)  Pulse: 65 (02/11/25 0854)  Temperature: 98.6 °F (37 °C) (02/11/25 0854)  Temp Source: Oral (02/11/25 0854)  Respirations: 19 (02/11/25 0854)  Height: 4' 11\" (149.9 cm) (02/10/25 2330)  Weight - Scale: 76.9 kg (169 lb 8.5 oz) (02/10/25 2330)  SpO2: 95 % (02/11/25 0854)    Physical Exam:    General Appearance: Alert, cooperative, no distress.  HEENT: Head normocephalic, atraumatic, without obvious abnormality.  Heart: Normal rate and rhythm.  Lungs: Non-labored breathing. No respiratory distress.  Abdomen: Without distension.  Psychiatric: AAOx3  Lower Extremity:    Vascular:   DP: Right: 1+ Left: 1+  PT: Right: non-palpable Left: non-palpable  CRT < 3 seconds at the digits. +1/4 edema noted at bilateral lower extremities.   Pedal hair is absent.   Skin temperature is cool bilaterally.    Musculoskeletal:  Patient unwilling to perform MMT secondary to pain.  Global ankle pain left ankle.  There is a plantarflexed bilateral first metatarsal.    Dermatological:  Lower extremity wound(s) as noted below:    Wound #: 1  Location: right plantar foot  Length 2cm: Width 2cm: Depth 0.3cm:   Deepest Tissue Noted in Base: fascia  Probe to Bone: No  Peripheral Skin Description: Attached  Granulation: 100% Fibrotic Tissue: 0% Necrotic Tissue: 0%   Drainage Amount: minimal  Signs of Infection: No          Wound #: 2  Location: left anterior leg  Length 5cm: Width 1cm: Depth 0.1cm:   Deepest Tissue Noted in Base: eschar  Probe to Bone: No  Peripheral Skin Description: Attached  Granulation: 0% Fibrotic Tissue: 100% Necrotic Tissue: 0%   Drainage Amount: minimal  Signs of Infection: No          Neurological:  Gross sensation is diminished.   Light touch is diminished.   Protective sensation is diminished.      Additional data:     Lab Results: I have personally " "reviewed pertinent labs including:    Results from last 7 days   Lab Units 02/11/25  0533   WBC Thousand/uL 5.78   HEMOGLOBIN g/dL 8.0*   HEMATOCRIT % 26.2*   PLATELETS Thousands/uL 104*   SEGS PCT % 77*   LYMPHO PCT % 11*   MONO PCT % 11   EOS PCT % 0     Results from last 7 days   Lab Units 02/11/25  0533   POTASSIUM mmol/L 4.4   CHLORIDE mmol/L 110*   CO2 mmol/L 23   BUN mg/dL 32*   CREATININE mg/dL 1.17   CALCIUM mg/dL 7.3*   ALK PHOS U/L 66   ALT U/L 10   AST U/L 18     Results from last 7 days   Lab Units 02/10/25  1708   INR  1.25*       Cultures: I have personally reviewed pertinent cultures including:    Results from last 7 days   Lab Units 02/10/25  1757 02/10/25  1719   BLOOD CULTURE  Received in Microbiology Lab. Culture in Progress. Received in Microbiology Lab. Culture in Progress.           Imaging: I have personally reviewed pertinent reports in PACS.  EKG, Pathology, and Other Studies: I have personally reviewed pertinent reports.    Time Spent for Care: 30 minutes.  More than 50% of total time spent on counseling and coordination of care as described above.      ** Please Note: Portions of the record may have been created with voice recognition software. Occasional wrong word or \"sound a like\" substitutions may have occurred due to the inherent limitations of voice recognition software. Read the chart carefully and recognize, using context, where substitutions have occurred. **    "

## 2025-02-11 NOTE — ASSESSMENT & PLAN NOTE
Hgb noted to drop from 11.3 to 8 on admission with no overt signs of bleeding  Trauma scans negative for any signs of bleed  Possibly dilutional from all the IVF received  Will continue to monitor for any signs of bleed  Monitor H/H  Transfuse to goal >7

## 2025-02-11 NOTE — PHYSICAL THERAPY NOTE
"        PT EVALUATION    Pt. Name: Zohra Barriga  Pt. Age: 69 y.o.  MRN: [id number]  LENGTH OF STAY: 0      Admitting Diagnoses:   UTI (urinary tract infection) [N39.0]  Sepsis ([facility]) [A41.9]  Ambulatory dysfunction [R26.2]    Past Medical History:   Diagnosis Date    Anxiety     Asthma     controlled    Back complaints     Cancer ([facility])     nose    Cellulitis of left lower leg     \"not now\"    CHF (congestive heart failure) ([facility]) 02/2021    Chronic bilateral thoracic back pain     Chronic kidney disease     sees nephrologist regularly\" stage 3\"    Chronic myofascial pain     Chronic pain of both lower extremities     Chronic pain of left knee     \"both knees\"    Chronic pain syndrome     bilat legs and knees/neuropathy pain    COVID 12/2021    CPAP (continuous positive airway pressure) dependence     no currently uses    Depression     Diabetes mellitus ([facility])     insulin pump    Difficulty walking 2018    Disease of thyroid gland     Does use hearing aid     bilat and will wear DOS    DVT (deep venous thrombosis) ([facility]) 2013    left leg    Gait disorder     Gastroparesis     GERD (gastroesophageal reflux disease)     Head injury     Headache, tension-type     History of angina     History of MRSA infection     History of transfusion     Many years ago    HL (hearing loss) 2018    Hyperlipidemia     Hypertension     Hypoglycemic reaction     \"occas low blood sugar\"    Insulin pump in place     pt reports saw endocrinologist 4/28 and will bring copy of instructions DOS    Irritable bowel syndrome     Kidney disease     Memory loss 2021    Movement disorder 2021    Neuropathy in diabetes ([facility]) 2021    Constant pain legs and feet    Nose fracture     Pacemaker 2019    Pneumonia     Polyneuropathy associated with underlying disease ([facility])     PONV (postoperative nausea and vomiting)     Risk for falls     S/P insertion of spinal cord stimulator 06/08/2018    Sarcoidosis     Shortness of breath     \"exertion and not new\"    " "Sleep apnea     Stroke (HCC)     Thoracic vertebral fracture (HCC)     TIA (transient ischemic attack)     Use of cane as ambulatory aid     Uses walker     Wears dentures     permanent upper/and some missing teeth/partial lower       Past Surgical History:   Procedure Laterality Date    ABDOMINAL ADHESION SURGERY N/A 2021    Procedure: laparoscopic LYSIS ADHESIONS;  Surgeon: Jill Bentley MD;  [facility];  Service: Thoracic    BACK SURGERY  2023    [facility] at [facility], Dr. Levy    BREAST SURGERY      BREAST SURGERY      reduction    CARDIAC PACEMAKER PLACEMENT      pacemaker     SECTION      COLONOSCOPY      DILATION AND CURETTAGE OF UTERUS      \"D&E\"    HERNIA REPAIR      HYSTERECTOMY      JOINT REPLACEMENT Left     LTKR    NECK SURGERY      fused 4 discs with 4 screws implanted    NISSEN FUNDOPLICATION LAPAROSCOPIC WITH ROBOTICS N/A 2021    Procedure: ROBOTIC HELLER MYOTOMY WITH BERNARDO FUNDIPLICATION ;  Surgeon: Jill Bentley MD;  [facility];  Service: Thoracic    NOSE SURGERY  2024    closed reduction    SD ESOPHAGOGASTRODUODENOSCOPY TRANSORAL DIAGNOSTIC N/A 2021    Procedure: ESOPHAGOGASTRODUODENOSCOPY (EGD);  Surgeon: Jill Bentley MD;  [facility];  Service: Thoracic    SD ESOPHAGOGASTRODUODENOSCOPY TRANSORAL DIAGNOSTIC N/A 2022    Procedure: ESOPHAGOGASTRODUODENOSCOPY (EGD),;  Surgeon: Jill Bentley MD;  [facility];  Service: Thoracic    SD ESOPHAGOGASTRODUODENOSCOPY TRANSORAL DIAGNOSTIC N/A 2022    Procedure: ESOPHAGOGASTRODUODENOSCOPY (EGD);  Surgeon: Jill Bentley MD;  [facility];  Service: Thoracic    SD ESOPHAGOGASTRODUODENOSCOPY TRANSORAL DIAGNOSTIC N/A 2022    Procedure: ESOPHAGOGASTRODUODENOSCOPY (EGD); PYLORIC DILATION; GE JUNCTION DILATION;  Surgeon: Jill Bentley MD;  [facility];  Service: Thoracic    SD ESOPHAGOGASTRODUODENOSCOPY TRANSORAL DIAGNOSTIC " N/A 11/29/2022    Procedure: ESOPHAGOGASTRODUODENOSCOPY (EGD);  Surgeon: Varinder Steiner MD;  [facility];  Service: Thoracic    SD ESOPHAGOGASTRODUODENOSCOPY TRANSORAL DIAGNOSTIC N/A 01/24/2023    Procedure: ESOPHAGOGASTRODUODENOSCOPY (EGD);  Surgeon: Jill Bentley MD;  [facility];  Service: Thoracic    SD ESOPHAGOGASTRODUODENOSCOPY TRANSORAL DIAGNOSTIC N/A 05/01/2024    Procedure: ESOPHAGOGASTRODUODENOSCOPY (EGD);  Surgeon: Jill Bentley MD;  [facility];  Service: Thoracic    SD ESOPHAGOSCOPY FLEX BALLOON DILAT <30 MM DIAM N/A 01/12/2022    Procedure: DILATATION ESOPHAGEAL;  Surgeon: Jill Bentley MD;  [facility];  Service: Thoracic    SD ESOPHAGOSCOPY FLEX BALLOON DILAT <30 MM DIAM N/A 02/16/2022    Procedure: DILATATION ESOPHAGEAL;  Surgeon: Jill Bentley MD;  [facility];  Service: Thoracic    SD ESOPHAGOSCOPY FLEX BALLOON DILAT <30 MM DIAM N/A 11/29/2022    Procedure: DILATATION ESOPHAGEAL esophageal and pyloric dilation;  Surgeon: Varinder Steiner MD;  [facility];  Service: Thoracic    SD ESOPHAGOSCOPY FLEX BALLOON DILAT <30 MM DIAM N/A 01/24/2023    Procedure: esophageal dilation dilated up to 54  with pylorus dilation dilated up to 18;  Surgeon: Jill Bentley MD;  [facility];  Service: Thoracic    SD ESOPHAGOSCOPY FLEX BALLOON DILAT <30 MM DIAM N/A 05/01/2024    Procedure: DILATATION ESOPHAGEAL;  Surgeon: Jill Bentley MD;  [facility];  Service: Thoracic    SD RIBEIRO IMPLTJ NSTIM ELTRDS PLATE/PADDLE EDRL Left 04/23/2018    Procedure: Insertion of thoracic spinal cord stimulator electrode via laminotomy and placement of left buttock IPG;  Surgeon: Shahab Bullock MD;  [facility];  Service: Neurosurgery    REPLACEMENT TOTAL KNEE      ROTATOR CUFF REPAIR      SPINAL CORD STIMULATOR REMOVAL Bilateral 07/02/2024    Procedure: REOPENING OF THORACIC AND LEFT BUTTOCK INCISION FOR REMOVAL  OF SPINAL CORD STIMULATOR SYSTEM;  Surgeon: Shahab Bullock MD;  [facility];  Service: Neurosurgery    SPINAL STIMULATOR PLACEMENT Left 10/03/2018    Procedure: BUTTOCK RE-OPENING INCISION FOR REPOSITIONING OF IMPLANTABLE PULSE GENERATOR;  Surgeon: Shahab Bullock MD;  [facility];  Service: Neurosurgery    TONSILLECTOMY      UPPER GASTROINTESTINAL ENDOSCOPY      VASCULAR SURGERY      WISDOM TOOTH EXTRACTION         Imaging Studies:  XR foot 2 vw right   Final Result by Joshua Harden MD (02/11 0822)      No radiographic evidence of osteomyelitis.         Computerized Assisted Algorithm (CAA) may have been used to analyze all applicable images.         Workstation performed: [id number]         XR ankle 3+ views LEFT   ED Interpretation by Pennie Haddad DO (02/11 0222)   No acute osseous abnormality       Final Result by Joshua Harden MD (02/11 0821)      No acute osseous abnormality.         Computerized Assisted Algorithm (CAA) may have been used to analyze all applicable images.               Workstation performed: [id number]         CT head wo contrast   Final Result by Josh Baugh MD (02/10 1952)      No acute intracranial abnormality.                  Workstation performed: [id number]         CT cervical spine without contrast   Final Result by Josh Baugh MD (02/10 1954)      No evidence of acute cervical spine fracture or traumatic malalignment.                  Workstation performed: [id number]         CT chest abdomen pelvis w contrast   Final Result by Josh Baugh MD (02/10 2003)      No evidence of acute trauma in the chest, abdomen or pelvis.               Workstation performed: [id number]         VAS ARTERIAL DUPLEX- LOWER LIMB BILATERAL    (Results Pending)         02/11/25 1420   PT Last Visit   PT Visit Date 02/11/25   Note Type   Note type Evaluation   Pain Assessment   Pain Score 10 - Worst Possible Pain   Pain Location/Orientation Orientation:  Left;Orientation: Right;Location: Foot;Other (Comment);Location: Leg  (R foot & L ankle)   Hospital Pain Intervention(s) Repositioned;Ambulation/increased activity;Emotional support;Rest   Restrictions/Precautions   Weight Bearing Precautions Per Order Yes   RLE Weight Bearing Per Order WBAT  (per podiatry)   LLE Weight Bearing Per Order WBAT  (per podiatry)   Other Precautions Cognitive;Chair Alarm;Bed Alarm;Fall Risk;Pain;O2;Multiple lines  (2L O2 NC)   Home Living   Type of Home House   Home Layout Two level;Able to live on main level with bedroom/bathroom;Stairs to enter with rails;Other (Comment)  (1+1STE; pt has 1st floor set up)   Bathroom Shower/Tub Tub/shower unit   Bathroom Toilet Standard   Bathroom Equipment Grab bars in shower;Shower chair;Grab bars around toilet;Commode   Home Equipment Walker;Cane;Other (Comment)  (Bedrail; not on home O2 at baseline)   Prior Function   Level of [place] Independent with functional mobility;Independent with ADLs  (ambulates w/ RW)   Lives With Spouse  (retired)   Receives Help From Family   Falls in the last 6 months 1 to 4  (4x)   Comments pt reports she has not driven x 3 months; (-) alone   General   Family/Caregiver Present Yes  (mother)   Cognition   Overall Cognitive Status Impaired   Arousal/Participation Alert   Orientation Level Oriented to person;Oriented to place;Oriented to time   Following Commands Follows one step commands without difficulty   Comments pleasant & cooperative; require inc time to state date & place   Subjective   Subjective Pt agreeable to PT eval.   RUE Assessment   RUE Assessment WFL  (3+/5 grossly)   LUE Assessment   LUE Assessment WFL  (3+/5 grossly)   RLE Assessment   RLE Assessment WFL  (3+/5 grossly except hip 3-/5)   LLE Assessment   LLE Assessment WFL  (3+/5 grossly except hip 3-/5)   Bed Mobility   Supine to Sit Unable to assess  (pt OOB seated on BSC upon my arrival)   Sit to Supine 3  Moderate assistance   Additional items  Assist x 1;Increased time required;Verbal cues;LE management   Additional Comments cues for techniques & safety   Transfers   Sit to Stand 2  Maximal assistance   Additional items Assist x 1;Armrests;Increased time required;Verbal cues   Stand to Sit 3  Moderate assistance   Additional items Assist x 1;Increased time required;Verbal cues   Toilet transfer 2  Maximal assistance   Additional items Assist x 1  (to stand from BSC)   Additional Comments cues for techniques & safety; w/ RW; maxAx1 on 1st attempt then modAx2 on 2nd attempt   Ambulation/Elevation   Gait pattern Poor UE support;Forward Flexion;Wide FILIBERTO;Decreased foot clearance;L Foot drag;Decreased L stance;Shuffling;Short stride;Step to;Excessively slow;Decreased heel strike;Decreased toe off;Improper Weight shift   Gait Assistance 3  Moderate assist   Additional items Assist x 1;Verbal cues;Tactile cues   Assistive Device Rolling walker   Distance 5'x1 (2'x1 BSC to bed + 3'x1 lateral steps to HOB)   Ambulation/Elevation Additional Comments unsteady gait but no gross LOB noted; difficulty bearing wt on L ankle & difficulty advancing LLE   Balance   Static Sitting Fair +   Dynamic Sitting Fair   Static Standing Poor +  (w/ RW)   Dynamic Standing Poor  (w/ RW)   Ambulatory Poor  (w/ RW)   Endurance Deficit   Endurance Deficit Yes   Endurance Deficit Description pain; fatigue; weakness   Activity Tolerance   Activity Tolerance Patient limited by fatigue;Patient limited by pain;Treatment limited secondary to medical complications (Comment)   Nurse Made Aware yes, T[place]   Assessment   Prognosis Fair   Problem List Decreased strength;Decreased endurance;Impaired balance;Decreased mobility;Impaired judgement;Decreased safety awareness;Pain;Decreased skin integrity   Assessment Pt. 69 y.o.female presented w/ confusion, lethargy, weakness & s/p fall at home. Pt admitted for Acute metabolic encephalopathy w/ UTI (urinary tract infection), Sepsis ([facility]) (A41.9)   "Ambulatory dysfunction, R midfoot diabetic ulcer, L ankle pain & facial contusion. No fx identified. Per Podiatry, LE WBAT. Pt referred to PT for mobility assessment & D/C planning. Objective findings during PT assessment. PTA, pt reports being I w/ RW. On eval, pt functioning below baseline hence will continue skilled PT to improve function & safety. Pt require modAx1 for most functional mobility except sit to stand require maxAx1 + cues for techniques & safety. Gait deviations as above but no gross LOB noted.  Dec amb tolerance 2* to pain, weakness & fatigue. The patient's AM-PAC Basic Mobility Inpatient Short Form Raw Score is 11. A Raw score of less than or equal to 16 suggests the patient may benefit from discharge to post-acute rehabilitation services. Please also refer to the recommendation of the Physical Therapist for safe discharge planning. From PT standpoint, will recommend Level I vs II rehab services at D/C. No SOB & dizziness reported t/o session. Nsg staff most recent vital signs as follows: BP (!) 86/53 (BP Location: Left arm)   Pulse 65   Temp 98.6 °F (37 °C) (Oral)   Resp 19   Ht 4' 11\" (1.499 m)   Wt 76.9 kg (169 lb 8.5 oz)   SpO2 95%   BMI 34.24 kg/m² . At end of session, pt back in bed in stable condition, call bell & phone in reach, bed alarm activated, all lines intact. Fall precautions reinforced w/ good understanding. CM to follow. Nsg staff to continue to mobilized pt (OOB in chair for all meals) as tolerated to prevent further decline in function.  Nsg notified.   Goals   Patient Goals to go home   STG Expiration Date 02/25/25   Short Term Goal #1 Goals to be met in 14 days; pt will be able to: 1) inc strength & balance by 1/2 grade to improve overall functional mobility & dec fall risk; 2) inc bed mobility to modified I for pt to be able to get in/OOB safely w/ proper techniques 100% of the time, to dec caregiver burden & safely function at home; 3) inc transfers to S for pt to " transition safely from one surface to another w/o % of the time, to dec caregiver burden & safely function at home; 4) inc amb w/ RW approx. >150' w/ S for pt to ambulate household distances w/o any % of the time, to dec caregiver burden & safely function at home; 5) negotiate stairs w/ S for inc safety during stair mgt inside/outside of home & dec caregiver burden; 6) pt/caregiver ed   PT Treatment Day 0   Plan   Treatment/Interventions Functional transfer training;LE strengthening/ROM;Elevations;Therapeutic exercise;Endurance training;Patient/family training;Bed mobility;Gait training;Spoke to nursing;OT   PT Frequency 3-5x/wk   Discharge Recommendation   Rehab Resource Intensity Level, PT I (Maximum Resource Intensity)  (vs II, pending progress)   AM-PAC Basic Mobility Inpatient   Turning in Flat Bed Without Bedrails 3   Lying on Back to Sitting on Edge of Flat Bed Without Bedrails 2   Moving Bed to Chair 2   Standing Up From Chair Using Arms 1   Walk in Room 2   Climb 3-5 Stairs With Railing 1   Basic Mobility Inpatient Raw Score 11   Basic Mobility Standardized Score 30.25   [facility] Highest Level Of Mobility   -[facility] Goal 4: Move to chair/commode   -HL Achieved 4: Move to chair/commode   End of Consult   Patient Position at End of Consult Supine;Bed/Chair alarm activated;All needs within reach   End of Consult Comments Pt in stable condition. All needs in reach. All lines intact. Bed alarm activated.   Hx/personal factors: co-morbidities, inaccessible home, mutliple lines, use of AD, dec cognition, pain, h/o of falls, fall risk, assist w/ ADL's, and O2  Examination: dec mobility, dec balance, dec endurance, dec amb, risk for falls, pain, dec cognition  Clinical: unpredictable (ongoing medical status, abnormal lab values, risk for falls, and pain mgt)  Complexity: high    Lars Renetta

## 2025-02-11 NOTE — ASSESSMENT & PLAN NOTE
Patient presents following mechanical fall after inability to maneuver wheelchair in the ladies room  Fell flat on her face and stomach  Trauma scans negative  Multimodal pain regimen with cautious use of opioids given hypotensive episodes  Appreciate PT and OT eval

## 2025-02-11 NOTE — ASSESSMENT & PLAN NOTE
S/p fall  Presented with left-sided facial contusion  Trauma scans unremarkable for acute traumatic injury  PT/OT/case management  Pain control

## 2025-02-11 NOTE — ASSESSMENT & PLAN NOTE
Catheterized urine specimen turbid in appearance with pyuria  Urine culture pending  Empiric ceftriaxone  Associated mental status change  Blood cultures pending

## 2025-02-11 NOTE — ASSESSMENT & PLAN NOTE
Presented with left ankle pain post fall  No acute fracture or subluxation on x-ray  Pain control  PT/OT/case management

## 2025-02-11 NOTE — PROGRESS NOTES
Zohra Barriga is a 69 y.o. female who is currently ordered Vancomycin IV with management by the Pharmacy Consult service.  Relevant clinical data and objective / subjective history reviewed.  Vancomycin Assessment:  Indication and Goal AUC/Trough:    Clinical Status: stable  Micro:     Renal Function:  SCr: 1.17 mg/dL  CrCl: 43.6 mL/min  Renal replacement: Not on dialysis  Days of Therapy: 2  Current Dose: 1000 mg IV q24h   Vancomycin Plan:  New Dosing: Maintain current dose of 1000 mg IV q24h   Estimated AUC: 491 mcg*hr/mL  Estimated Trough: 14.2 mcg/mL  Next Level: 2/13 with AM labs   Renal Function Monitoring: Daily BMP and UOP  Pharmacy will continue to follow closely for s/sx of nephrotoxicity, infusion reactions and appropriateness of therapy.  BMP and CBC will be ordered per protocol. We will continue to follow the patient’s culture results and clinical progress daily.    Ainsley Smith, Pharmacist

## 2025-02-12 ENCOUNTER — APPOINTMENT (OUTPATIENT)
Dept: PHYSICAL THERAPY | Facility: REHABILITATION | Age: 70
End: 2025-02-12
Payer: MEDICARE

## 2025-02-12 LAB
ANION GAP SERPL CALCULATED.3IONS-SCNC: 8 MMOL/L (ref 4–13)
BACTERIA UR CULT: NORMAL
BASOPHILS # BLD AUTO: 0.01 THOUSANDS/ΜL (ref 0–0.1)
BASOPHILS NFR BLD AUTO: 0 % (ref 0–1)
BUN SERPL-MCNC: 38 MG/DL (ref 5–25)
CALCIUM SERPL-MCNC: 7.5 MG/DL (ref 8.4–10.2)
CHLORIDE SERPL-SCNC: 110 MMOL/L (ref 96–108)
CO2 SERPL-SCNC: 18 MMOL/L (ref 21–32)
CREAT SERPL-MCNC: 1.36 MG/DL (ref 0.6–1.3)
EOSINOPHIL # BLD AUTO: 0.05 THOUSAND/ΜL (ref 0–0.61)
EOSINOPHIL NFR BLD AUTO: 1 % (ref 0–6)
ERYTHROCYTE [DISTWIDTH] IN BLOOD BY AUTOMATED COUNT: 16.6 % (ref 11.6–15.1)
GFR SERPL CREATININE-BSD FRML MDRD: 40 ML/MIN/1.73SQ M
GLUCOSE SERPL-MCNC: 113 MG/DL (ref 65–140)
GLUCOSE SERPL-MCNC: 119 MG/DL (ref 65–140)
GLUCOSE SERPL-MCNC: 127 MG/DL (ref 65–140)
GLUCOSE SERPL-MCNC: 137 MG/DL (ref 65–140)
GLUCOSE SERPL-MCNC: 90 MG/DL (ref 65–140)
GLUCOSE SERPL-MCNC: 92 MG/DL (ref 65–140)
HCT VFR BLD AUTO: 28.1 % (ref 34.8–46.1)
HGB BLD-MCNC: 8.4 G/DL (ref 11.5–15.4)
IMM GRANULOCYTES # BLD AUTO: 0.02 THOUSAND/UL (ref 0–0.2)
IMM GRANULOCYTES NFR BLD AUTO: 1 % (ref 0–2)
LYMPHOCYTES # BLD AUTO: 0.87 THOUSANDS/ΜL (ref 0.6–4.47)
LYMPHOCYTES NFR BLD AUTO: 21 % (ref 14–44)
MAGNESIUM SERPL-MCNC: 1.9 MG/DL (ref 1.9–2.7)
MCH RBC QN AUTO: 29 PG (ref 26.8–34.3)
MCHC RBC AUTO-ENTMCNC: 29.9 G/DL (ref 31.4–37.4)
MCV RBC AUTO: 97 FL (ref 82–98)
MONOCYTES # BLD AUTO: 0.42 THOUSAND/ΜL (ref 0.17–1.22)
MONOCYTES NFR BLD AUTO: 10 % (ref 4–12)
NEUTROPHILS # BLD AUTO: 2.72 THOUSANDS/ΜL (ref 1.85–7.62)
NEUTS SEG NFR BLD AUTO: 67 % (ref 43–75)
NRBC BLD AUTO-RTO: 0 /100 WBCS
PLATELET # BLD AUTO: 105 THOUSANDS/UL (ref 149–390)
PMV BLD AUTO: 11 FL (ref 8.9–12.7)
POTASSIUM SERPL-SCNC: 4.9 MMOL/L (ref 3.5–5.3)
PROCALCITONIN SERPL-MCNC: 6.93 NG/ML
RBC # BLD AUTO: 2.9 MILLION/UL (ref 3.81–5.12)
SODIUM SERPL-SCNC: 136 MMOL/L (ref 135–147)
WBC # BLD AUTO: 4.09 THOUSAND/UL (ref 4.31–10.16)

## 2025-02-12 PROCEDURE — 84145 PROCALCITONIN (PCT): CPT | Performed by: STUDENT IN AN ORGANIZED HEALTH CARE EDUCATION/TRAINING PROGRAM

## 2025-02-12 PROCEDURE — 83735 ASSAY OF MAGNESIUM: CPT | Performed by: STUDENT IN AN ORGANIZED HEALTH CARE EDUCATION/TRAINING PROGRAM

## 2025-02-12 PROCEDURE — 82948 REAGENT STRIP/BLOOD GLUCOSE: CPT

## 2025-02-12 PROCEDURE — 85025 COMPLETE CBC W/AUTO DIFF WBC: CPT | Performed by: STUDENT IN AN ORGANIZED HEALTH CARE EDUCATION/TRAINING PROGRAM

## 2025-02-12 PROCEDURE — 99233 SBSQ HOSP IP/OBS HIGH 50: CPT | Performed by: STUDENT IN AN ORGANIZED HEALTH CARE EDUCATION/TRAINING PROGRAM

## 2025-02-12 PROCEDURE — 97167 OT EVAL HIGH COMPLEX 60 MIN: CPT

## 2025-02-12 PROCEDURE — 93922 UPR/L XTREMITY ART 2 LEVELS: CPT | Performed by: STUDENT IN AN ORGANIZED HEALTH CARE EDUCATION/TRAINING PROGRAM

## 2025-02-12 PROCEDURE — 93925 LOWER EXTREMITY STUDY: CPT | Performed by: STUDENT IN AN ORGANIZED HEALTH CARE EDUCATION/TRAINING PROGRAM

## 2025-02-12 PROCEDURE — 80048 BASIC METABOLIC PNL TOTAL CA: CPT | Performed by: STUDENT IN AN ORGANIZED HEALTH CARE EDUCATION/TRAINING PROGRAM

## 2025-02-12 RX ORDER — CALCIUM GLUCONATE 20 MG/ML
1 INJECTION, SOLUTION INTRAVENOUS ONCE
Status: COMPLETED | OUTPATIENT
Start: 2025-02-12 | End: 2025-02-12

## 2025-02-12 RX ORDER — CALCIUM CARBONATE 500(1250)
1 TABLET ORAL
Status: DISCONTINUED | OUTPATIENT
Start: 2025-02-13 | End: 2025-02-19 | Stop reason: HOSPADM

## 2025-02-12 RX ORDER — VANCOMYCIN HYDROCHLORIDE 750 MG/150ML
750 INJECTION, SOLUTION INTRAVENOUS EVERY 24 HOURS
Status: DISCONTINUED | OUTPATIENT
Start: 2025-02-12 | End: 2025-02-14

## 2025-02-12 RX ORDER — CYCLOBENZAPRINE HCL 10 MG
10 TABLET ORAL 3 TIMES DAILY PRN
Status: DISCONTINUED | OUTPATIENT
Start: 2025-02-12 | End: 2025-02-19 | Stop reason: HOSPADM

## 2025-02-12 RX ORDER — ONDANSETRON 2 MG/ML
4 INJECTION INTRAMUSCULAR; INTRAVENOUS EVERY 8 HOURS PRN
Status: DISCONTINUED | OUTPATIENT
Start: 2025-02-12 | End: 2025-02-14

## 2025-02-12 RX ADMIN — DICLOFENAC SODIUM 2 G: 10 GEL TOPICAL at 08:05

## 2025-02-12 RX ADMIN — VANCOMYCIN HYDROCHLORIDE 750 MG: 750 INJECTION, SOLUTION INTRAVENOUS at 12:58

## 2025-02-12 RX ADMIN — CALCIUM GLUCONATE 1 G: 20 INJECTION, SOLUTION INTRAVENOUS at 11:42

## 2025-02-12 RX ADMIN — LEVOTHYROXINE SODIUM 112 MCG: 112 TABLET ORAL at 05:15

## 2025-02-12 RX ADMIN — Medication 2.5 MG: at 06:35

## 2025-02-12 RX ADMIN — ATORVASTATIN CALCIUM 80 MG: 80 TABLET, FILM COATED ORAL at 16:45

## 2025-02-12 RX ADMIN — PRAMIPEXOLE DIHYDROCHLORIDE 1 MG: 1 TABLET ORAL at 17:42

## 2025-02-12 RX ADMIN — DICLOFENAC SODIUM 2 G: 10 GEL TOPICAL at 17:42

## 2025-02-12 RX ADMIN — ONDANSETRON 4 MG: 2 INJECTION INTRAMUSCULAR; INTRAVENOUS at 02:58

## 2025-02-12 RX ADMIN — SERTRALINE HYDROCHLORIDE 100 MG: 100 TABLET ORAL at 21:06

## 2025-02-12 RX ADMIN — MELATONIN 3 MG: 3 TAB ORAL at 00:03

## 2025-02-12 RX ADMIN — PRAMIPEXOLE DIHYDROCHLORIDE 1 MG: 1 TABLET ORAL at 08:06

## 2025-02-12 RX ADMIN — SODIUM CHLORIDE 75 ML/HR: 0.9 INJECTION, SOLUTION INTRAVENOUS at 23:35

## 2025-02-12 RX ADMIN — ENOXAPARIN SODIUM 40 MG: 40 INJECTION SUBCUTANEOUS at 08:05

## 2025-02-12 RX ADMIN — EZETIMIBE 10 MG: 10 TABLET ORAL at 08:06

## 2025-02-12 RX ADMIN — ONDANSETRON 4 MG: 2 INJECTION INTRAMUSCULAR; INTRAVENOUS at 23:34

## 2025-02-12 RX ADMIN — Medication 2.5 MG: at 14:55

## 2025-02-12 RX ADMIN — CEFTRIAXONE SODIUM 1000 MG: 10 INJECTION, POWDER, FOR SOLUTION INTRAVENOUS at 21:06

## 2025-02-12 RX ADMIN — CARVEDILOL 6.25 MG: 6.25 TABLET, FILM COATED ORAL at 16:45

## 2025-02-12 RX ADMIN — FLUTICASONE FUROATE AND VILANTEROL TRIFENATATE 1 PUFF: 200; 25 POWDER RESPIRATORY (INHALATION) at 07:55

## 2025-02-12 RX ADMIN — INSULIN GLARGINE 24 UNITS: 100 INJECTION, SOLUTION SUBCUTANEOUS at 21:06

## 2025-02-12 RX ADMIN — SODIUM CHLORIDE 75 ML/HR: 0.9 INJECTION, SOLUTION INTRAVENOUS at 05:21

## 2025-02-12 RX ADMIN — CYANOCOBALAMIN TAB 500 MCG 1000 MCG: 500 TAB at 08:06

## 2025-02-12 RX ADMIN — DICLOFENAC SODIUM 2 G: 10 GEL TOPICAL at 21:12

## 2025-02-12 RX ADMIN — FAMOTIDINE 20 MG: 20 TABLET, FILM COATED ORAL at 21:06

## 2025-02-12 NOTE — PROGRESS NOTES
Progress Note - Hospitalist   Name: Zohra Barriga 69 y.o. female I MRN: [id number]  Unit/Bed#: E2 -01 I Date of Admission: 2/10/2025   Date of Service: 2/12/2025 I Hospital Day: 1    Assessment & Plan  Acute metabolic encephalopathy  Per  and patient, she suffered a mechanical fall when she was trying to use the ladies room at a restaurant when she was unable to maneuver her walker the right way  She fell flat on her face, denies any LOC, denies any prodromal symptoms, was perfectly fine prior to the incident  Noted to have some confusion on admission  Likely multifactorial with possible UTI, post-trauma, severe pain  CTH with no acute intracranial abnormalities  RESOLVED  Obstructive sleep apnea  STU on CPAP  Type 2 diabetes mellitus, with long-term current use of insulin (HCC)  Lab Results   Component Value Date    HGBA1C 7.7 (H) 01/17/2025       Recent Labs     02/11/25  1048 02/11/25  1553 02/11/25  2106 02/12/25  0613   POCGLU 136 155* 117 90       Blood Sugar Average: Last 72 hrs:  (P) [id number]    Managed by endocrinology in the outpatient setting  Insulin pump removed and sent home with   Utilize basal Lantus at bedtime  SSI with Accu-Cheks  Hypoglycemia protocol  Systolic CHF (HCC)  Wt Readings from Last 3 Encounters:   02/10/25 76.9 kg (169 lb 8.5 oz)   12/27/24 79.4 kg (175 lb)   09/20/24 77.1 kg (170 lb)     Cardiomyopathy of unknown cause diagnosed in January during recent hospitalization with newly reduced EF 35 to 39%  PTA GDMT: carvedilol 6.25 mg twice daily, losartan 50 mg twice daily, spironolactone 25 mg daily, torsemide 20 mg daily  BP improved, resume carvedilol, continue to hold losartan, spironolactone and torsemide in light of JULIANNE  Per , patient had not been taking her diuretics at home daily, was just taking them prn and was doing okay  Plan to undergo ischemic work-up in the near future  Monitor volume status while on IVF hydration  2 g sodium  diet  Diabetic ulcer of right midfoot associated with type 2 diabetes mellitus (HCC)  Known ulceration plantar aspect of right foot  Follows with podiatry and recently completed a course of po antibiotics (last week)  Xray right foot with no radiographic evidence of osteomyelitis  Podiatry following, at this time no evidence of infection, continue wound care  UTI (urinary tract infection)  Catheterized urine specimen turbid in appearance with pyuria  Patient with associated encephalopathy  Urine culture pending  Continue empiric ceftriaxone  Severe sepsis (HCC)  Meeting criteria with fever, tachycardia, hypotension  Suspected 2/2 UTI  Empiric antibiotic therapy  Lactic acid normal  Continue IVF hydration  Blood cultures no growth at 24H, urine cultures pending  Continue to trend fever curve, leukocytosis  Facial contusion, initial encounter  S/p fall  Presented with left-sided facial contusion  Trauma scans unremarkable for acute traumatic injury  PT/OT/case management  Pain control  Left ankle pain  Presented with left ankle pain post fall  No acute fracture or subluxation on x-ray  Pain control  PT/OT/case management  Acute anemia  Hgb noted to drop from 11.3 to 8 on admission with no overt signs of bleeding  Trauma scans negative for any signs of bleed  Possibly dilutional from IVF received  Monitor H/H  Transfuse to goal >7  Fall  Patient presents following mechanical fall after inability to maneuver wheelchair in the ladies room  Fell flat on her face and stomach  Trauma scans negative  Multimodal pain regimen with cautious use of opioids given hypotensive episodes  PT and OT recommending level I, rehab  Essential hypertension  Patient maintained on Coreg and Losartan in the outpatient setting  Had episodes of hypotension day following admission in setting of sepsis  PTA medications were held  BP now improved, will resume Coreg however will continue to hold Losartan, Torsemide and Spironolactone given JULIANNE  JULIANNE  (acute kidney injury) (HCC)  Patient's creatinine increased to 1.36 today from 1.17 (baseline noted to be 0.9-1.1)  Suspect pre-renal with hypotensive episodes yesterday  Will continue cautious IVF hydration at this time  Continue to hold Losartan, Torsemide, Spironolactone  Monitor I&Os, daily weights  Avoid nephrotoxins    VTE Pharmacologic Prophylaxis: VTE Score: 5 High Risk (Score >/= 5) - Pharmacological DVT Prophylaxis Ordered: enoxaparin (Lovenox). Sequential Compression Devices Ordered.    Mobility:   Basic Mobility Inpatient Raw Score: 11  -HL Goal: 4: Move to chair/commode  JH-HLM Achieved: 1: Laying in bed  JH-HLM Goal NOT achieved. Continue with multidisciplinary rounding and encourage appropriate mobility to improve upon -HL goals.    Patient Centered Rounds: I performed bedside rounds with nursing staff today.   Discussions with Specialists or Other Care Team Provider: case management, PT,OT    Education and Discussions with Family / Patient:  patient and  at bedside.     Current Length of Stay: 1 day(s)  Current Patient Status: Inpatient   Certification Statement: The patient will continue to require additional inpatient hospital stay due to JULIANNE, UTI  Discharge Plan: Anticipate discharge in 24-48 hrs to rehab facility.    Code Status: Level 1 - Full Code    Subjective   Patient seen and examined at bedside. Continues to complain of pain over upper and lower extremities but endorses that she is feeling better as compared to yesterday.    Objective :  Temp:  [97.9 °F (36.6 °C)-98.6 °F (37 °C)] 98.1 °F (36.7 °C)  HR:  [60-65] 65  BP: ()/(39-92) 156/92  Resp:  [18-20] 20  SpO2:  [95 %-100 %] 98 %  O2 Device: Nasal cannula  Nasal Cannula O2 Flow Rate (L/min):  [2 L/min] 2 L/min    Body mass index is 34.24 kg/m².     Input and Output Summary (last 24 hours):     Intake/Output Summary (Last 24 hours) at 2/12/2025 0802  Last data filed at 2/12/2025 0601  Gross per 24 hour   Intake 2127.5 ml    Output 374 ml   Net 1753.5 ml       Physical Exam  Vitals and nursing note reviewed.   Constitutional:       General: She is not in acute distress.     Appearance: She is well-developed. She is ill-appearing.   HENT:      Head: Normocephalic and atraumatic.   Eyes:      Conjunctiva/sclera: Conjunctivae normal.   Cardiovascular:      Rate and Rhythm: Normal rate and regular rhythm.      Heart sounds: No murmur heard.  Pulmonary:      Effort: Pulmonary effort is normal. No respiratory distress.      Breath sounds: Normal breath sounds.   Abdominal:      Palpations: Abdomen is soft.      Tenderness: There is no abdominal tenderness.   Musculoskeletal:         General: No swelling.      Cervical back: Neck supple.      Right lower leg: No edema.      Left lower leg: No edema.   Skin:     General: Skin is warm and dry.      Capillary Refill: Capillary refill takes less than 2 seconds.      Findings: Bruising and erythema present.   Neurological:      Mental Status: She is alert and oriented to person, place, and time. Mental status is at baseline.   Psychiatric:         Mood and Affect: Mood normal.           Lines/Drains:              Lab Results: I have reviewed the following results:   Results from last 7 days   Lab Units 02/12/25  0520   WBC Thousand/uL 4.09*   HEMOGLOBIN g/dL 8.4*   HEMATOCRIT % 28.1*   PLATELETS Thousands/uL 105*   SEGS PCT % 67   LYMPHO PCT % 21   MONO PCT % 10   EOS PCT % 1     Results from last 7 days   Lab Units 02/12/25  0520 02/11/25  0533   SODIUM mmol/L 136 139   POTASSIUM mmol/L 4.9 4.4   CHLORIDE mmol/L 110* 110*   CO2 mmol/L 18* 23   BUN mg/dL 38* 32*   CREATININE mg/dL 1.36* 1.17   ANION GAP mmol/L 8 6   CALCIUM mg/dL 7.5* 7.3*   ALBUMIN g/dL  --  3.3*   TOTAL BILIRUBIN mg/dL  --  0.72   ALK PHOS U/L  --  66   ALT U/L  --  10   AST U/L  --  18   GLUCOSE RANDOM mg/dL 92 153*     Results from last 7 days   Lab Units 02/10/25  1708   INR  1.25*     Results from last 7 days   Lab Units  02/12/25  0613 02/11/25  2106 02/11/25  1553 02/11/25  1048 02/11/25  0805 02/10/25  1647   POC GLUCOSE mg/dl 90 117 155* 136 135 181*         Results from last 7 days   Lab Units 02/12/25  0520 02/11/25  0533 02/11/25  0141 02/10/25  1708   LACTIC ACID mmol/L  --   --  0.8 1.5   PROCALCITONIN ng/ml 6.93* 6.35*  --  0.11       Recent Cultures (last 7 days):   Results from last 7 days   Lab Units 02/10/25  1757 02/10/25  1726 02/10/25  1719   BLOOD CULTURE  No Growth at 24 hrs.  --  No Growth at 24 hrs.   URINE CULTURE   --  Culture too young- will reincubate  --        Imaging Results Review: No pertinent imaging studies reviewed.  Other Study Results Review: No additional pertinent studies reviewed.    Last 24 Hours Medication List:     Current Facility-Administered Medications:     albuterol inhalation solution 2.5 mg, Q6H PRN    atorvastatin (LIPITOR) tablet 80 mg, Daily With Dinner    carvedilol (COREG) tablet 6.25 mg, BID With Meals    ceftriaxone (ROCEPHIN) 1 g/50 mL in dextrose IVPB, Q24H, Last Rate: 1,000 mg (02/11/25 2032)    cyanocobalamin (VITAMIN B-12) tablet 1,000 mcg, Daily    Diclofenac Sodium (VOLTAREN) 1 % topical gel 2 g, 4x Daily    enoxaparin (LOVENOX) subcutaneous injection 40 mg, Daily    ezetimibe (ZETIA) tablet 10 mg, Daily    famotidine (PEPCID) tablet 20 mg, HS    fluticasone-vilanterol 200-25 mcg/actuation 1 puff, Daily    insulin glargine (LANTUS) subcutaneous injection 24 Units 0.24 mL, HS    insulin lispro (HumALOG/ADMELOG) 100 units/mL subcutaneous injection 1-6 Units, TID AC **AND** Fingerstick Glucose (POCT), TID AC    insulin lispro (HumALOG/ADMELOG) 100 units/mL subcutaneous injection 1-6 Units, HS    levothyroxine tablet 112 mcg, Early Morning    lidocaine (LIDODERM) 5 % patch 1 patch, Daily    [Held by provider] losartan (COZAAR) tablet 50 mg, BID    melatonin tablet 3 mg, HS PRN    ondansetron (ZOFRAN) injection 4 mg, Q8H PRN    oxyCODONE (ROXICODONE) split tablet 2.5 mg, Q6H  PRN    polyethylene glycol (MIRALAX) packet 17 g, Daily    pramipexole (MIRAPEX) tablet 1 mg, BID    sertraline (ZOLOFT) tablet 100 mg, HS    sodium chloride 0.9 % infusion, Continuous, Last Rate: 75 mL/hr (02/12/25 0521)    [Held by provider] spironolactone (ALDACTONE) tablet 25 mg, Daily    [Held by provider] torsemide (DEMADEX) tablet 20 mg, Daily    vancomycin (VANCOCIN) IVPB (premix in dextrose) 750 mg 150 mL, Q24H    Administrative Statements   Today, Patient Was Seen By: Asya Trujillo MD    **Please Note: This note may have been constructed using a voice recognition system.**

## 2025-02-12 NOTE — ASSESSMENT & PLAN NOTE
Patient maintained on Coreg and Losartan in the outpatient setting  Had episodes of hypotension day following admission in setting of sepsis  PTA medications were held  BP now improved, will resume Coreg however will continue to hold Losartan, Torsemide and Spironolactone given JULIANNE

## 2025-02-12 NOTE — ASSESSMENT & PLAN NOTE
Wt Readings from Last 3 Encounters:   02/10/25 76.9 kg (169 lb 8.5 oz)   12/27/24 79.4 kg (175 lb)   09/20/24 77.1 kg (170 lb)     Cardiomyopathy of unknown cause diagnosed in January during recent hospitalization with newly reduced EF 35 to 39%  PTA GDMT: carvedilol 6.25 mg twice daily, losartan 50 mg twice daily, spironolactone 25 mg daily, torsemide 20 mg daily  BP improved, resume carvedilol, continue to hold losartan, spironolactone and torsemide in light of JULIANNE  Per , patient had not been taking her diuretics at home daily, was just taking them prn and was doing okay  Plan to undergo ischemic work-up in the near future  Monitor volume status while on IVF hydration  2 g sodium diet

## 2025-02-12 NOTE — CASE MANAGEMENT
Case Management Assessment & Discharge Planning Note    Patient name Zohra Barriga  Location [facility] /E2 -* MRN [id number]  : 1955 Date 2025       Current Admission Date: 2/10/2025  Current Admission Diagnosis:Acute metabolic encephalopathy   Patient Active Problem List    Diagnosis Date Noted Date Diagnosed    Systolic CHF (HCC) 2025     Diabetic ulcer of right midfoot associated with type 2 diabetes mellitus (HCC) 2025     UTI (urinary tract infection) 2025     Severe sepsis ([facility]) 2025     Facial contusion, initial encounter 2025     Left ankle pain 2025     Acute metabolic encephalopathy 02/10/2025     Fall 2024     Closed fracture of one rib of right side 2024     Osteopenia 2024     Compression fx, lumbar spine, sequela 2024     Myelopathy ([facility]) 2024     Thoracic compression fracture ([facility]) 2024     Complete heart block ([facility]) 2023     Abnormal CT of the chest 2023     Acute anemia 2023     Chronic diastolic (congestive) heart failure ([facility]) 2023     COVID-19 virus infection 2022     Anemia of chronic renal failure, stage 3 (moderate)  ([facility]) 2022     OAB (overactive bladder) 2022     Stage 3a chronic kidney disease ([facility]) 2021     Spinal stenosis of lumbar region with neurogenic claudication      Chronic kidney disease (CKD) stage G3b/A1, moderately decreased glomerular filtration rate (GFR) between 30-44 mL/min/1.73 square meter and albuminuria creatinine ratio less than 30 mg/g ([facility]) 2021     Epigastric pain 2021     Urinary retention 2021     JULIANNE (acute kidney injury) ([facility]) 2021     PONV (postoperative nausea and vomiting) 2021     Class 2 obesity in adult 2021     Pacemaker 2021     Medical marijuana use 2021     Achalasia 2021     Polyarthralgia 2020     Diabetic peripheral neuropathy associated with type  2 diabetes mellitus ([facility]) 03/23/2020     Restless leg syndrome 10/24/2019     Uncontrolled type 2 diabetes mellitus with hyperglycemia ([facility]) 12/31/2018     Hypersomnia 10/19/2018     BBB (bundle branch block) 09/18/2018     Bifascicular block 09/18/2018     Malfunction of spinal cord stimulator ([facility]) 08/02/2018     Closed fracture of thoracic vertebra ([facility]) 07/06/2018     Deep venous thrombosis ([facility]) 07/06/2018     Localized, primary osteoarthritis 07/06/2018     Chronic bilateral low back pain without sciatica 07/06/2018     Lumbosacral spondylosis without myelopathy 07/06/2018     Osteoarthritis of knee 07/06/2018     Age related osteoporosis 07/06/2018     Chronic scapular pain 07/06/2018     Microcytic anemia 07/04/2018     Chronic diastolic CHF (congestive heart failure) ([facility]) 07/03/2018     Coronary artery disease involving native coronary artery 07/03/2018     History of TIA (transient ischemic attack) 07/03/2018     Coronary artery disease involving native coronary artery of native heart without angina pectoris 07/03/2018     Encounter for fitting and adjustment of spinal cord stimulator 06/08/2018     S/P insertion of spinal cord stimulator 06/08/2018     Lumbar radiculopathy 04/16/2018     Thoracic radiculopathy 04/16/2018     Complex regional pain syndrome type 1 of left lower extremity 03/09/2018     Type 2 diabetes mellitus, with long-term current use of insulin ([facility]) 01/11/2018     Chronic bilateral thoracic back pain 11/17/2017     Chronic myofascial pain 11/17/2017     Vitamin D deficiency 11/03/2017     Post-menopausal 10/11/2017     Primary hyperparathyroidism ([facility]) 10/11/2017     Pain in right leg 07/25/2017     Chronic pain of left knee 07/25/2017     Gait disorder 07/25/2017     Polyneuropathy associated with underlying disease ([facility]) 07/25/2017     Essential hypertension 06/29/2017     Mixed hyperlipidemia 06/29/2017     Primary hypothyroidism 06/29/2017     Chronic pain of right knee 06/06/2017      Pain syndrome, chronic 02/21/2017     Chronic pain disorder 02/21/2017     Moderate persistent asthma with acute exacerbation 01/06/2017     Obstructive sleep apnea 08/16/2016     Anemia of chronic disease 07/27/2016     Anxiety and depression 07/27/2016     Lymphadenopathy 06/27/2016     Normocytic anemia 10/04/2015     Insomnia due to medical condition 09/24/2015     Type 2 diabetes mellitus with microalbuminuria, with long-term current use of insulin (HCC) 07/13/2015     Gastroparesis 05/28/2015     Moderate persistent asthma without complication 04/10/2015     Gastroesophageal reflux disease without esophagitis 02/18/2015     Mixed urge and stress incontinence 11/14/2014     Other B-complex deficiencies 05/31/2013     Insulin pump status 02/27/2012     Hyperlipidemia associated with type 2 diabetes mellitus  (HCC) 04/04/2011     Sarcoidosis 02/09/2009     Irritable bowel syndrome with constipation 02/09/2009       LOS (days): 1  Geometric Mean LOS (GMLOS) (days): 4.9  Days to GMLOS:3.9     OBJECTIVE:    Risk of Unplanned Readmission Score: 26.76         Current admission status: Inpatient       Preferred Pharmacy:   [facility] #[id number] - [address] [place] [address]  Phone: [phone] Fax: [phone]    [facility]  [address] [place] [address]  Phone: [phone] Fax: [phone]    [facility] [address] [place], [place] [address]  Phone: [phone] Fax: [phone]    [facility] [place] - [address]  [address]  [place] [address]  Phone: [phone] Fax: [phone]    [facility]/pharmacy #[id number] [facility] - [address] [place] [address]  Phone: [phone] Fax: [phone]    Primary Care Provider: Celestino Santiago DO    Primary Insurance: MEDICARE  Secondary Insurance: AARP    ASSESSMENT:  Active Health Care Proxies        Enrike Barriga Health Care Representative - Spouse   Primary Phone: [phone] (Mobile)  Home Phone: [phone]                 Advance Directives  Does patient have a Health Care POA?: No  Was patient offered paperwork?: Yes  Does patient currently have a Health Care decision maker?: Yes, please see Health Care Proxy section  Does patient have Advance Directives?: No (paperwork provided)  Was patient offered paperwork?: Yes  Primary Contact: Enrike (spouse) [phone]              Patient Information  Admitted from:: Home  Mental Status: Alert  During Assessment patient was accompanied by: Spouse  Assessment information provided by:: Patient, Spouse  Primary Caregiver: Self  Support Systems: Spouse/significant other  County of Residence: [place]  What city do you live in?: [place]  Home entry access options. Select all that apply.: Stairs  Number of steps to enter home.: 2  Do the steps have railings?: Yes  Type of Current Residence: [address] [place] [address] home  Upon entering residence, is there a bedroom on the main floor (no further steps)?: Yes  Upon entering residence, is there a bathroom on the main floor (no further steps)?: Yes  Living Arrangements: Lives w/ Spouse/significant other    Activities of Daily Living Prior to Admission  Functional Status: Independent  Completes ADLs independently?: Yes  Ambulates independently?: Yes  Does patient use assisted devices?: Yes  Assisted Devices (DME) used: Walker, Shower Chair, CPAP  Does patient currently own DME?: Yes  What DME does the patient currently own?: CPAP, Shower Chair, Walker, Rollator, Bedside Commode, Other (Comment), Straight Cane (shower grab bars)  Does patient have a history of Outpatient Therapy (PT/OT)?: Yes ([facility])  Does the patient have a history of Short-Term Rehab?: Yes ([facility])  Does patient currently have HHC?: No         Patient Information Continued  Income Source: SSI/SSD  Does patient receive dialysis treatments?: No  Does patient have a  history of substance abuse?:  (hx medical marijuana use)  Does patient have a history of Mental Health Diagnosis?: Yes (anxiety and depression)  Is patient receiving treatment for mental health?: Yes (medication management)  Has patient received inpatient treatment related to mental health in the last 2 years?: No         Means of Transportation  Means of Transport to Appts:: Family transport          DISCHARGE DETAILS:    Discharge planning discussed with:: Patient and spouse  Freedom of Choice: Yes  Comments - Freedom of Choice: agreeable to blanket referrals for acute rehab  CM contacted family/caregiver?: Yes             Contacts  Patient Contacts: gabi Calvert  Relationship to Patient:: Family  Contact Method: In Person  Reason/Outcome: Emergency Contact, Discharge Planning         DME Referral Provided  Referral made for DME?: No    Other Referral/Resources/Interventions Provided:  Interventions: Acute Rehab  Referral Comments: referrals placed in [facility]in                                                      Additional Comments: CM met with pt and spouse at bedside. Pt resides with spouse in [facility] with first floor set up in [place]. Pt was IPTA with use of DME. Reviewed therapy recommendation with pt and spouse, they are agreeable to referrals for acute rehab. Pt requesting advanced directives which were provided at bedside. Referrals placed in [facility]. CM dept to continue to follow.

## 2025-02-12 NOTE — ASSESSMENT & PLAN NOTE
Hgb noted to drop from 11.3 to 8 on admission with no overt signs of bleeding  Trauma scans negative for any signs of bleed  Possibly dilutional from IVF received  Monitor H/H  Transfuse to goal >7

## 2025-02-12 NOTE — ASSESSMENT & PLAN NOTE
Catheterized urine specimen turbid in appearance with pyuria  Patient with associated encephalopathy  Urine culture pending  Continue empiric ceftriaxone

## 2025-02-12 NOTE — ASSESSMENT & PLAN NOTE
Meeting criteria with fever, tachycardia, hypotension  Suspected 2/2 UTI  Empiric antibiotic therapy  Lactic acid normal  Continue IVF hydration  Blood cultures no growth at 24H, urine cultures pending  Continue to trend fever curve, leukocytosis

## 2025-02-12 NOTE — ASSESSMENT & PLAN NOTE
Per  and patient, she suffered a mechanical fall when she was trying to use the ladies room at a restaurant when she was unable to maneuver her walker the right way  She fell flat on her face, denies any LOC, denies any prodromal symptoms, was perfectly fine prior to the incident  Noted to have some confusion on admission  Likely multifactorial with possible UTI, post-trauma, severe pain  CTH with no acute intracranial abnormalities  RESOLVED

## 2025-02-12 NOTE — ASSESSMENT & PLAN NOTE
Patient's creatinine increased to 1.36 today from 1.17 (baseline noted to be 0.9-1.1)  Suspect pre-renal with hypotensive episodes yesterday  Will continue cautious IVF hydration at this time  Continue to hold Losartan, Torsemide, Spironolactone  Monitor I&Os, daily weights  Avoid nephrotoxins

## 2025-02-12 NOTE — PLAN OF CARE
Problem: Potential for Falls  Goal: Patient will remain free of falls  Description: INTERVENTIONS:  - Educate patient/family on patient safety including physical limitations  - Instruct patient to call for assistance with activity   - Consult OT/PT to assist with strengthening/mobility   - Keep Call bell within reach  - Keep bed low and locked with side rails adjusted as appropriate  - Keep care items and personal belongings within reach  - Initiate and maintain comfort rounds  - Make Fall Risk Sign visible to staff  - Offer Toileting every 2 Hours, in advance of need  - Initiate/Maintain bed alarm  - Obtain necessary fall risk management equipment:    - Apply yellow socks and bracelet for high fall risk patients  - Consider moving patient to room near nurses station  Outcome: Progressing     Problem: PAIN - ADULT  Goal: Verbalizes/displays adequate comfort level or baseline comfort level  Description: Interventions:  - Encourage patient to monitor pain and request assistance  - Assess pain using appropriate pain scale  - Administer analgesics based on type and severity of pain and evaluate response  - Implement non-pharmacological measures as appropriate and evaluate response  - Consider cultural and social influences on pain and pain management  - Notify physician/advanced practitioner if interventions unsuccessful or patient reports new pain  Outcome: Progressing     Problem: INFECTION - ADULT  Goal: Absence or prevention of progression during hospitalization  Description: INTERVENTIONS:  - Assess and monitor for signs and symptoms of infection  - Monitor lab/diagnostic results  - Monitor all insertion sites, i.e. indwelling lines, tubes, and drains  - Monitor endotracheal if appropriate and nasal secretions for changes in amount and color  - [place] appropriate cooling/warming therapies per order  - Administer medications as ordered  - Instruct and encourage patient and family to use good hand hygiene  technique  - Identify and instruct in appropriate isolation precautions for identified infection/condition  Outcome: Progressing  Goal: Absence of fever/infection during neutropenic period  Description: INTERVENTIONS:  - Monitor WBC    Outcome: Progressing     Problem: SAFETY ADULT  Goal: Patient will remain free of falls  Description: INTERVENTIONS:  - Educate patient/family on patient safety including physical limitations  - Instruct patient to call for assistance with activity   - Consult OT/PT to assist with strengthening/mobility   - Keep Call bell within reach  - Keep bed low and locked with side rails adjusted as appropriate  - Keep care items and personal belongings within reach  - Initiate and maintain comfort rounds  - Make Fall Risk Sign visible to staff  - Offer Toileting every 2 Hours, in advance of need  - Initiate/Maintain bed alarm  - Obtain necessary fall risk management equipment:    - Apply yellow socks and bracelet for high fall risk patients  - Consider moving patient to room near nurses station  Outcome: Progressing  Goal: Maintain or return to baseline ADL function  Description: INTERVENTIONS:  -  Assess patient's ability to carry out ADLs; assess patient's baseline for ADL function and identify physical deficits which impact ability to perform ADLs (bathing, care of mouth/teeth, toileting, grooming, dressing, etc.)  - Assess/evaluate cause of self-care deficits   - Assess range of motion  - Assess patient's mobility; develop plan if impaired  - Assess patient's need for assistive devices and provide as appropriate  - Encourage maximum independence but intervene and supervise when necessary  - Involve family in performance of ADLs  - Assess for home care needs following discharge   - Consider OT consult to assist with ADL evaluation and planning for discharge  - Provide patient education as appropriate  Outcome: Progressing  Goal: Maintains/Returns to pre admission functional  level  Description: INTERVENTIONS:  - Perform AM-PAC 6 Click Basic Mobility/ Daily Activity assessment daily.  - Set and communicate daily mobility goal to care team and patient/family/caregiver.   - Collaborate with rehabilitation services on mobility goals if consulted  - Perform Range of Motion 3 times a day.  - Reposition patient every 2 hours.  - Dangle patient 3 times a day  - Stand patient 3 times a day  - Ambulate patient 3 times a day  - Out of bed to chair 3 times a day   - Out of bed for meals 3 times a day  - Out of bed for toileting  - Record patient progress and toleration of activity level   Outcome: Progressing     Problem: Knowledge Deficit  Goal: Patient/family/caregiver demonstrates understanding of disease process, treatment plan, medications, and discharge instructions  Description: Complete learning assessment and assess knowledge base.  Interventions:  - Provide teaching at level of understanding  - Provide teaching via preferred learning methods  Outcome: Progressing     Problem: Prexisting or High Potential for Compromised Skin Integrity  Goal: Skin integrity is maintained or improved  Description: INTERVENTIONS:  - Identify patients at risk for skin breakdown  - Assess and monitor skin integrity  - Assess and monitor nutrition and hydration status  - Monitor labs   - Assess for incontinence   - Turn and reposition patient  - Assist with mobility/ambulation  - Relieve pressure over bony prominences  - Avoid friction and shearing  - Provide appropriate hygiene as needed including keeping skin clean and dry  - Evaluate need for skin moisturizer/barrier cream  - Collaborate with interdisciplinary team   - Patient/family teaching  - Consider wound care consult   Outcome: Progressing

## 2025-02-12 NOTE — OCCUPATIONAL THERAPY NOTE
"    Occupational Therapy Evaluation     Patient Name: Zohra Barriga  Today's Date: 2/12/2025  Problem List  Principal Problem:    Acute metabolic encephalopathy  Active Problems:    Essential hypertension    Obstructive sleep apnea    Type 2 diabetes mellitus, with long-term current use of insulin ([facility])    JULIANNE (acute kidney injury) ([facility])    Acute anemia    Fall    Systolic CHF ([facility])    Diabetic ulcer of right midfoot associated with type 2 diabetes mellitus ([facility])    UTI (urinary tract infection)    Severe sepsis ([facility])    Facial contusion, initial encounter    Left ankle pain    Past Medical History  Past Medical History:   Diagnosis Date    Anxiety     Asthma     controlled    Back complaints     Cancer ([facility])     nose    Cellulitis of left lower leg     \"not now\"    CHF (congestive heart failure) ([facility]) 02/2021    Chronic bilateral thoracic back pain     Chronic kidney disease     sees nephrologist regularly\" stage 3\"    Chronic myofascial pain     Chronic pain of both lower extremities     Chronic pain of left knee     \"both knees\"    Chronic pain syndrome     bilat legs and knees/neuropathy pain    COVID 12/2021    CPAP (continuous positive airway pressure) dependence     no currently uses    Depression     Diabetes mellitus ([facility])     insulin pump    Difficulty walking 2018    Disease of thyroid gland     Does use hearing aid     bilat and will wear DOS    DVT (deep venous thrombosis) ([facility]) 2013    left leg    Gait disorder     Gastroparesis     GERD (gastroesophageal reflux disease)     Head injury     Headache, tension-type     History of angina     History of MRSA infection     History of transfusion     Many years ago    HL (hearing loss) 2018    Hyperlipidemia     Hypertension     Hypoglycemic reaction     \"occas low blood sugar\"    Insulin pump in place     pt reports saw endocrinologist 4/28 and will bring copy of instructions DOS    Irritable bowel syndrome     Kidney disease     Memory loss 2021    " "Movement disorder     Neuropathy in diabetes ([facility])     Constant pain legs and feet    Nose fracture     Pacemaker 2019    Pneumonia     Polyneuropathy associated with underlying disease ([facility])     PONV (postoperative nausea and vomiting)     Risk for falls     S/P insertion of spinal cord stimulator 2018    Sarcoidosis     Shortness of breath     \"exertion and not new\"    Sleep apnea     Stroke ([facility]CC)     Thoracic vertebral fracture ([facility])     TIA (transient ischemic attack)     Use of cane as ambulatory aid     Uses walker     Wears dentures     permanent upper/and some missing teeth/partial lower     Past Surgical History  Past Surgical History:   Procedure Laterality Date    ABDOMINAL ADHESION SURGERY N/A 2021    Procedure: laparoscopic LYSIS ADHESIONS;  Surgeon: Jill Bentley MD;  [facility];  Service: Thoracic    BACK SURGERY  2023    TL[facility] at [facility], Dr. Levy    BREAST SURGERY      BREAST SURGERY      reduction    CARDIAC PACEMAKER PLACEMENT      pacemaker     SECTION      COLONOSCOPY      DILATION AND CURETTAGE OF UTERUS      \"D&E\"    HERNIA REPAIR      HYSTERECTOMY      JOINT REPLACEMENT Left     LTKR    NECK SURGERY      fused 4 discs with 4 screws implanted    NISSEN FUNDOPLICATION LAPAROSCOPIC WITH ROBOTICS N/A 2021    Procedure: ROBOTIC HELLER MYOTOMY WITH BERNARDO FUNDIPLICATION ;  Surgeon: Jill Bentley MD;  [facility];  Service: Thoracic    NOSE SURGERY  2024    closed reduction    MT ESOPHAGOGASTRODUODENOSCOPY TRANSORAL DIAGNOSTIC N/A 2021    Procedure: ESOPHAGOGASTRODUODENOSCOPY (EGD);  Surgeon: Jill Bentley MD;  [facility];  Service: Thoracic    MT ESOPHAGOGASTRODUODENOSCOPY TRANSORAL DIAGNOSTIC N/A 2022    Procedure: ESOPHAGOGASTRODUODENOSCOPY (EGD),;  Surgeon: Jill Bentley MD;  [facility];  Service: Thoracic    MT ESOPHAGOGASTRODUODENOSCOPY TRANSORAL DIAGNOSTIC N/A 2022    " Procedure: ESOPHAGOGASTRODUODENOSCOPY (EGD);  Surgeon: Jill Bentley MD;  [facility];  Service: Thoracic    NM ESOPHAGOGASTRODUODENOSCOPY TRANSORAL DIAGNOSTIC N/A 07/12/2022    Procedure: ESOPHAGOGASTRODUODENOSCOPY (EGD); PYLORIC DILATION; GE JUNCTION DILATION;  Surgeon: Jill Bentley MD;  [facility];  Service: Thoracic    NM ESOPHAGOGASTRODUODENOSCOPY TRANSORAL DIAGNOSTIC N/A 11/29/2022    Procedure: ESOPHAGOGASTRODUODENOSCOPY (EGD);  Surgeon: Varinder Steiner MD;  [facility];  Service: Thoracic    NM ESOPHAGOGASTRODUODENOSCOPY TRANSORAL DIAGNOSTIC N/A 01/24/2023    Procedure: ESOPHAGOGASTRODUODENOSCOPY (EGD);  Surgeon: Jill Bentley MD;  [facility];  Service: Thoracic    NM ESOPHAGOGASTRODUODENOSCOPY TRANSORAL DIAGNOSTIC N/A 05/01/2024    Procedure: ESOPHAGOGASTRODUODENOSCOPY (EGD);  Surgeon: Jill Bentley MD;  [facility];  Service: Thoracic    NM ESOPHAGOSCOPY FLEX BALLOON DILAT <30 MM DIAM N/A 01/12/2022    Procedure: DILATATION ESOPHAGEAL;  Surgeon: Jill Bentley MD;  [facility];  Service: Thoracic    NM ESOPHAGOSCOPY FLEX BALLOON DILAT <30 MM DIAM N/A 02/16/2022    Procedure: DILATATION ESOPHAGEAL;  Surgeon: Jill Bentley MD;  [facility];  Service: Thoracic    NM ESOPHAGOSCOPY FLEX BALLOON DILAT <30 MM DIAM N/A 11/29/2022    Procedure: DILATATION ESOPHAGEAL esophageal and pyloric dilation;  Surgeon: Varinder Steiner MD;  [facility];  Service: Thoracic    NM ESOPHAGOSCOPY FLEX BALLOON DILAT <30 MM DIAM N/A 01/24/2023    Procedure: esophageal dilation dilated up to 54  with pylorus dilation dilated up to 18;  Surgeon: Jill Bentley MD;  [facility];  Service: Thoracic    NM ESOPHAGOSCOPY FLEX BALLOON DILAT <30 MM DIAM N/A 05/01/2024    Procedure: DILATATION ESOPHAGEAL;  Surgeon: Jill Bentley MD;  [facility];  Service: Thoracic    NM RIBEIRO IMPLT NSTIM  ELTRDS PLATE/PADDLE EDRL Left 04/23/2018    Procedure: Insertion of thoracic spinal cord stimulator electrode via laminotomy and placement of left buttock IPG;  Surgeon: Shahab Bullock MD;  [facility];  Service: Neurosurgery    REPLACEMENT TOTAL KNEE      ROTATOR CUFF REPAIR      SPINAL CORD STIMULATOR REMOVAL Bilateral 07/02/2024    Procedure: REOPENING OF THORACIC AND LEFT BUTTOCK INCISION FOR REMOVAL OF SPINAL CORD STIMULATOR SYSTEM;  Surgeon: Shahab Bullock MD;  [facility];  Service: Neurosurgery    SPINAL STIMULATOR PLACEMENT Left 10/03/2018    Procedure: BUTTOCK RE-OPENING INCISION FOR REPOSITIONING OF IMPLANTABLE PULSE GENERATOR;  Surgeon: Shahab Bullock MD;  [facility];  Service: Neurosurgery    TONSILLECTOMY      UPPER GASTROINTESTINAL ENDOSCOPY      VASCULAR SURGERY      WISDOM TOOTH EXTRACTION             02/12/25 1014   Note Type   Note type Evaluation   Pain Assessment   Pain Assessment Tool 0-10   Pain Score 8   Pain Location/Orientation Location: Generalized   Restrictions/Precautions   Weight Bearing Precautions Per Order Yes   RLE Weight Bearing Per Order WBAT   LLE Weight Bearing Per Order WBAT   Other Precautions Cognitive;Chair Alarm;Bed Alarm;O2;Fall Risk;Pain;Multiple lines  (SPO2=88-89% on RA, 98-99% on 2liters of O2)   Home Living   Type of Home House   Home Layout Two level;Able to live on main level with bedroom/bathroom  (2 jaya)   Bathroom Shower/Tub Tub/shower unit   Bathroom Toilet Standard   Bathroom Equipment Grab bars in shower;Shower chair;Grab bars around toilet;Commode   Home Equipment Walker;Cane   Prior Function   Level of [place] Independent with ADLs;Independent with functional mobility   Lives With Spouse   Falls in the last 6 months 1 to 4   Lifestyle   Autonomy PTA pt states independence with her ADLs, transfers, ambulation--with RW; +, +falls=4   Reciprocal Relationships supportive    Service to Others homemaker   Intrinsic  "Gratification watching TV   Subjective   Subjective \"I don't know what happened to me. I was doing so many things for myself before I came in here.\"   ADL   Where Assessed Edge of bed   Eating Assistance 6  Modified independent   Grooming Assistance 6  Modified Independent   UB Bathing Assistance 4  Minimal Assistance   LB Bathing Assistance 2  Maximal Assistance   UB Dressing Assistance 4  Minimal Assistance   LB Dressing Assistance 2  Maximal Assistance   Toileting Assistance  1  Total Assistance   Bed Mobility   Rolling R 2  Maximal assistance   Additional items Assist x 1;Increased time required;Verbal cues;LE management   Rolling L 2  Maximal assistance   Additional items Assist x 1;Increased time required;Verbal cues;LE management   Supine to Sit 2  Maximal assistance   Additional items Assist x 1;Increased time required;Verbal cues;LE management   Transfers   Sit to Stand 2  Maximal assistance   Additional items Assist x 1;Increased time required;Verbal cues   Stand to Sit 2  Maximal assistance   Additional items Assist x 1;Increased time required;Verbal cues   Additional Comments bp's=144/82(EOB)   Functional Mobility   Functional Mobility 2  Maximal assistance  (recommend \"quick-move\" vs hoyerlift for OOB with nsg)   Additional Comments x1   Additional items Rolling walker   Balance   Static Sitting Fair +   Dynamic Sitting Fair   Static Standing Poor +   Dynamic Standing Poor   Activity Tolerance   Activity Tolerance Patient limited by fatigue;Patient limited by pain   Medical Staff Made Aware nsg, P.T., CM   RUE Assessment   RUE Assessment WFL   RUE Strength   RUE Overall Strength Within Functional Limits - able to perform ADL tasks with strength  (4/5 throughout)   LUE Assessment   LUE Assessment WFL   LUE Strength   LUE Overall Strength Within Functional Limits - able to perform ADL tasks with strength  (4/5 throughout)   Hand Function   Gross Motor Coordination Functional   Fine Motor Coordination " "Functional   Sensation   Light Touch No apparent deficits   Proprioception   Proprioception No apparent deficits   Vision-Basic Assessment   Current Vision   (glasses)   Vision - Complex Assessment   Acuity Able to read clock/calendar on wall without difficulty   Psychosocial   Psychosocial (WDL) WDL   Perception   Inattention/Neglect Appears intact   Cognition   Overall Cognitive Status WFL   Arousal/Participation Alert   Attention Attends with cues to redirect   Orientation Level Oriented X4   Memory Decreased recall of precautions   Following Commands Follows one step commands with increased time or repetition   Assessment   Limitation Decreased ADL status;Decreased UE strength;Decreased Safe judgement during ADL;Decreased cognition;Decreased endurance;Decreased high-level ADLs   Prognosis Fair   Assessment Pt is a 68y/o female admitted to the hospital 2* symptoms of increased confusion, fall. Pt noted with acute metabolic encephalopathy; CT/imaging=neg for acute findings. Podiatry allowing WBAT b/l LE's--hx right foot diabetic ulceration/left leg traumatic wound. Pt with PMH anxiety, asthma, CKD, DM, DVT, HTN, memory loss, neuropathy, TIA, neck sx, L TKR, spinal stimulator, pacemaker, back sx. PTA pt states independence with her ADLs, transfers, ambulation--with RW; +, +falls=4. During initial eval, pt demonstrated deficits with her functional balance, functional mobility, ADL status, transfer safety, b/l UE strength, activity tolerance(currently fair=15-20mins), and questionable cognition(i.e.problem-solving). Pt would benefit from continued OT tx for the above deficits.2-5xwk/1-2wks. The patient's raw score on the AM-PAC Daily Activity Inpatient Short Form is 17. A raw score of less than 19 suggests the patient may benefit from discharge to post-acute rehabilitation services. Please refer to the recommendation of the Occupational Therapist for safe discharge planning.   Goals   Patient Goals \"to get " "better\"   STG Time Frame   (1-7 days)   Short Term Goal #1 Pt will demonstrate improved activity tolerance to good(20-30mins) and standing tolerance to 3-5mins to assist with ADLs.   Short Term Goal #2 Pt will demonstrate Naty with their sit-stand transfers to assist with completion of their LE dressing.   Short Term Goal  Pt will demonstrate proper walker/transfer safety 100% of the time.   LTG Time Frame   (7-14 days)   Long Term Goal #1 Pt will demonstrate improved functional balance by 1 grade to assist with ADLs/transfers.   Long Term Goal #2 Pt will demonstrate supervision with her UE and Naty with her LE bathing/dressing.   Long Term Goal Pt will tolerate continued cognitive/home-safety assessment and appropriate d/c recommendations will be provided.   Plan   Treatment Interventions ADL retraining;Functional transfer training;UE strengthening/ROM;Endurance training;Cognitive reorientation;Patient/family training;Compensatory technique education;Continued evaluation   Goal Expiration Date 02/26/25   OT Treatment Day 0   OT Frequency   (2-5xwk/1-2wks)   Discharge Recommendation   Rehab Resource Intensity Level, OT II (Moderate Resource Intensity)   AM-PAC Daily Activity Inpatient   Lower Body Dressing 2   Bathing 2   Toileting 2   Upper Body Dressing 3   Grooming 4   Eating 4   Daily Activity Raw Score 17   Daily Activity Standardized Score (Calc for Raw Score >=11) 37.26   AM-PAC Applied Cognition Inpatient   Following a Speech/Presentation 3   Understanding Ordinary Conversation 3   Taking Medications 3   Remembering Where Things Are Placed or Put Away 3   Remembering List of 4-5 Errands 3   Taking Care of Complicated Tasks 3   Applied Cognition Raw Score 18   Applied Cognition Standardized Score 38.07   Danis Emery         "

## 2025-02-12 NOTE — ASSESSMENT & PLAN NOTE
Patient presents following mechanical fall after inability to maneuver wheelchair in the ladies room  Fell flat on her face and stomach  Trauma scans negative  Multimodal pain regimen with cautious use of opioids given hypotensive episodes  PT and OT recommending level I, rehab

## 2025-02-12 NOTE — PROGRESS NOTES
Zohra Barriga is a 69 y.o. female who is currently ordered Vancomycin IV with management by the Pharmacy Consult service.  Relevant clinical data and objective / subjective history reviewed.  Vancomycin Assessment:  Indication and Goal AUC/Trough:    Clinical Status: stable  Micro:     Renal Function:  SCr: 1.36 mg/dL(Baseline creatinine 1.1 - 1.3)  CrCl: 37.5 mL/min  Renal replacement: Not on dialysis  Days of Therapy: 3  Current Dose: 1000 mg IV q24h   Vancomycin Plan:  New Dosin mg IV q24h  Estimated AUC: 444 mcg*hr/mL  Estimated Trough: 13.6 mcg/mL  Next Level:  with AM labs   Renal Function Monitoring: Daily BMP and UOP  Pharmacy will continue to follow closely for s/sx of nephrotoxicity, infusion reactions and appropriateness of therapy.  BMP and CBC will be ordered per protocol. We will continue to follow the patient’s culture results and clinical progress daily.    Mukund Young, Pharmacist

## 2025-02-12 NOTE — ASSESSMENT & PLAN NOTE
Lab Results   Component Value Date    HGBA1C 7.7 (H) 01/17/2025       Recent Labs     02/11/25  1048 02/11/25  1553 02/11/25  2106 02/12/25  0613   POCGLU 136 155* 117 90       Blood Sugar Average: Last 72 hrs:  (P) [id number]    Managed by endocrinology in the outpatient setting  Insulin pump removed and sent home with   Utilize basal Lantus at bedtime  SSI with Accu-Cheks  Hypoglycemia protocol

## 2025-02-12 NOTE — PLAN OF CARE
Problem: Potential for Falls  Goal: Patient will remain free of falls  Description: INTERVENTIONS:  - Educate patient/family on patient safety including physical limitations  - Instruct patient to call for assistance with activity   - Consult OT/PT to assist with strengthening/mobility   - Keep Call bell within reach  - Keep bed low and locked with side rails adjusted as appropriate  - Keep care items and personal belongings within reach  - Initiate and maintain comfort rounds  - Make Fall Risk Sign visible to staff  - Offer Toileting every 2 Hours, in advance of need  - Initiate/Maintain bed alarm  - Obtain necessary fall risk management equipment:    - Apply yellow socks and bracelet for high fall risk patients  - Consider moving patient to room near nurses station  2/11/2025 2040 by Nikki Plummer RN  Outcome: Progressing     Problem: PAIN - ADULT  Goal: Verbalizes/displays adequate comfort level or baseline comfort level  Description: Interventions:  - Encourage patient to monitor pain and request assistance  - Assess pain using appropriate pain scale  - Administer analgesics based on type and severity of pain and evaluate response  - Implement non-pharmacological measures as appropriate and evaluate response  - Consider cultural and social influences on pain and pain management  - Notify physician/advanced practitioner if interventions unsuccessful or patient reports new pain  2/11/2025 2040 by Nikki Plummer RN  Outcome: Progressing     Problem: INFECTION - ADULT  Goal: Absence or prevention of progression during hospitalization  Description: INTERVENTIONS:  - Assess and monitor for signs and symptoms of infection  - Monitor lab/diagnostic results  - Monitor all insertion sites, i.e. indwelling lines, tubes, and drains  - Monitor endotracheal if appropriate and nasal secretions for changes in amount and color  - [place] appropriate cooling/warming therapies per order  - Administer medications as  ordered  - Instruct and encourage patient and family to use good hand hygiene technique  - Identify and instruct in appropriate isolation precautions for identified infection/condition  2/11/2025 2040 by Nikki Plummer RN  Outcome: Progressing     Problem: INFECTION - ADULT  Goal: Absence of fever/infection during neutropenic period  Description: INTERVENTIONS:  - Monitor WBC    2/11/2025 2040 by Nikki Plummer RN  Outcome: Progressing     Problem: SAFETY ADULT  Goal: Patient will remain free of falls  Description: INTERVENTIONS:  - Educate patient/family on patient safety including physical limitations  - Instruct patient to call for assistance with activity   - Consult OT/PT to assist with strengthening/mobility   - Keep Call bell within reach  - Keep bed low and locked with side rails adjusted as appropriate  - Keep care items and personal belongings within reach  - Initiate and maintain comfort rounds  - Make Fall Risk Sign visible to staff  - Offer Toileting every 2 Hours, in advance of need  - Initiate/Maintain bed alarm  - Obtain necessary fall risk management equipment:    - Apply yellow socks and bracelet for high fall risk patients  - Consider moving patient to room near nurses station  2/11/2025 2040 by Nikki Plummer RN  Outcome: Progressing     Problem: SAFETY ADULT  Goal: Maintain or return to baseline ADL function  Description: INTERVENTIONS:  -  Assess patient's ability to carry out ADLs; assess patient's baseline for ADL function and identify physical deficits which impact ability to perform ADLs (bathing, care of mouth/teeth, toileting, grooming, dressing, etc.)  - Assess/evaluate cause of self-care deficits   - Assess range of motion  - Assess patient's mobility; develop plan if impaired  - Assess patient's need for assistive devices and provide as appropriate  - Encourage maximum independence but intervene and supervise when necessary  - Involve family in performance of ADLs  - Assess for  home care needs following discharge   - Consider OT consult to assist with ADL evaluation and planning for discharge  - Provide patient education as appropriate  2/11/2025 2040 by Nikki Plummer RN  Outcome: Progressing     Problem: SAFETY ADULT  Goal: Maintains/Returns to pre admission functional level  Description: INTERVENTIONS:  - Perform AM-PAC 6 Click Basic Mobility/ Daily Activity assessment daily.  - Set and communicate daily mobility goal to care team and patient/family/caregiver.   - Collaborate with rehabilitation services on mobility goals if consulted  - Perform Range of Motion 3 times a day.  - Reposition patient every 2 hours.  - Dangle patient 3 times a day  - Stand patient 3 times a day  - Ambulate patient 3 times a day  - Out of bed to chair 3 times a day   - Out of bed for meals 3 times a day  - Out of bed for toileting  - Record patient progress and toleration of activity level   2/11/2025 2040 by Nikki Plummer RN  Outcome: Progressing     Problem: DISCHARGE PLANNING  Goal: Discharge to home or other facility with appropriate resources  Description: INTERVENTIONS:  - Identify barriers to discharge w/patient and caregiver  - Arrange for needed discharge resources and transportation as appropriate  - Identify discharge learning needs (meds, wound care, etc.)  - Arrange for interpretive services to assist at discharge as needed  - Refer to Case Management Department for coordinating discharge planning if the patient needs post-hospital services based on physician/advanced practitioner order or complex needs related to functional status, cognitive ability, or social support system  2/11/2025 2040 by Nikki Plummer RN  Outcome: Progressing     Problem: Knowledge Deficit  Goal: Patient/family/caregiver demonstrates understanding of disease process, treatment plan, medications, and discharge instructions  Description: Complete learning assessment and assess knowledge base.  Interventions:  -  Provide teaching at level of understanding  - Provide teaching via preferred learning methods  2/11/2025 2040 by Nikki Plummer RN  Outcome: Progressing     Problem: Prexisting or High Potential for Compromised Skin Integrity  Goal: Skin integrity is maintained or improved  Description: INTERVENTIONS:  - Identify patients at risk for skin breakdown  - Assess and monitor skin integrity  - Assess and monitor nutrition and hydration status  - Monitor labs   - Assess for incontinence   - Turn and reposition patient  - Assist with mobility/ambulation  - Relieve pressure over bony prominences  - Avoid friction and shearing  - Provide appropriate hygiene as needed including keeping skin clean and dry  - Evaluate need for skin moisturizer/barrier cream  - Collaborate with interdisciplinary team   - Patient/family teaching  - Consider wound care consult   2/11/2025 2040 by Nikki Plummer RN  Outcome: Progressing

## 2025-02-12 NOTE — PLAN OF CARE
Problem: OCCUPATIONAL THERAPY ADULT  Goal: Performs self-care activities at highest level of function for planned discharge setting.  See evaluation for individualized goals.  Description: Treatment Interventions: ADL retraining, Functional transfer training, UE strengthening/ROM, Endurance training, Cognitive reorientation, Patient/family training, Compensatory technique education, Continued evaluation          See flowsheet documentation for full assessment, interventions and recommendations.   Note: Limitation: Decreased ADL status, Decreased UE strength, Decreased Safe judgement during ADL, Decreased cognition, Decreased endurance, Decreased high-level ADLs  Prognosis: Fair  Assessment: Pt is a 70y/o female admitted to the hospital 2* symptoms of increased confusion, fall. Pt noted with acute metabolic encephalopathy; CT/imaging=neg for acute findings. Podiatry allowing WBAT b/l LE's--hx right foot diabetic ulceration/left leg traumatic wound. Pt with PMH anxiety, asthma, CKD, DM, DVT, HTN, memory loss, neuropathy, TIA, neck sx, L TKR, spinal stimulator, pacemaker, back sx. PTA pt states independence with her ADLs, transfers, ambulation--with RW; +, +falls=4. During initial eval, pt demonstrated deficits with her functional balance, functional mobility, ADL status, transfer safety, b/l UE strength, activity tolerance(currently fair=15-20mins), and questionable cognition(i.e.problem-solving). Pt would benefit from continued OT tx for the above deficits.2-5xwk/1-2wks. The patient's raw score on the AM-PAC Daily Activity Inpatient Short Form is 17. A raw score of less than 19 suggests the patient may benefit from discharge to post-acute rehabilitation services. Please refer to the recommendation of the Occupational Therapist for safe discharge planning.     Rehab Resource Intensity Level, OT: II (Moderate Resource Intensity)

## 2025-02-13 ENCOUNTER — APPOINTMENT (INPATIENT)
Dept: RADIOLOGY | Facility: HOSPITAL | Age: 70
DRG: 871 | End: 2025-02-13
Payer: MEDICARE

## 2025-02-13 PROBLEM — J96.01 ACUTE HYPOXIC RESPIRATORY FAILURE (HCC): Status: ACTIVE | Noted: 2023-02-22

## 2025-02-13 LAB
ANION GAP SERPL CALCULATED.3IONS-SCNC: 7 MMOL/L (ref 4–13)
BASOPHILS # BLD AUTO: 0.02 THOUSANDS/ΜL (ref 0–0.1)
BASOPHILS NFR BLD AUTO: 0 % (ref 0–1)
BUN SERPL-MCNC: 36 MG/DL (ref 5–25)
CALCIUM SERPL-MCNC: 7.9 MG/DL (ref 8.4–10.2)
CHLORIDE SERPL-SCNC: 111 MMOL/L (ref 96–108)
CO2 SERPL-SCNC: 19 MMOL/L (ref 21–32)
CREAT SERPL-MCNC: 1.19 MG/DL (ref 0.6–1.3)
EOSINOPHIL # BLD AUTO: 0.06 THOUSAND/ΜL (ref 0–0.61)
EOSINOPHIL NFR BLD AUTO: 1 % (ref 0–6)
ERYTHROCYTE [DISTWIDTH] IN BLOOD BY AUTOMATED COUNT: 16.5 % (ref 11.6–15.1)
GFR SERPL CREATININE-BSD FRML MDRD: 47 ML/MIN/1.73SQ M
GLUCOSE SERPL-MCNC: 128 MG/DL (ref 65–140)
GLUCOSE SERPL-MCNC: 139 MG/DL (ref 65–140)
GLUCOSE SERPL-MCNC: 143 MG/DL (ref 65–140)
GLUCOSE SERPL-MCNC: 184 MG/DL (ref 65–140)
GLUCOSE SERPL-MCNC: 56 MG/DL (ref 65–140)
GLUCOSE SERPL-MCNC: 63 MG/DL (ref 65–140)
GLUCOSE SERPL-MCNC: 63 MG/DL (ref 65–140)
GLUCOSE SERPL-MCNC: 87 MG/DL (ref 65–140)
GLUCOSE SERPL-MCNC: 94 MG/DL (ref 65–140)
HCT VFR BLD AUTO: 28.8 % (ref 34.8–46.1)
HGB BLD-MCNC: 8.7 G/DL (ref 11.5–15.4)
IMM GRANULOCYTES # BLD AUTO: 0.03 THOUSAND/UL (ref 0–0.2)
IMM GRANULOCYTES NFR BLD AUTO: 1 % (ref 0–2)
LYMPHOCYTES # BLD AUTO: 0.85 THOUSANDS/ΜL (ref 0.6–4.47)
LYMPHOCYTES NFR BLD AUTO: 18 % (ref 14–44)
MAGNESIUM SERPL-MCNC: 1.9 MG/DL (ref 1.9–2.7)
MCH RBC QN AUTO: 28.3 PG (ref 26.8–34.3)
MCHC RBC AUTO-ENTMCNC: 30.2 G/DL (ref 31.4–37.4)
MCV RBC AUTO: 94 FL (ref 82–98)
MONOCYTES # BLD AUTO: 0.43 THOUSAND/ΜL (ref 0.17–1.22)
MONOCYTES NFR BLD AUTO: 9 % (ref 4–12)
NEUTROPHILS # BLD AUTO: 3.25 THOUSANDS/ΜL (ref 1.85–7.62)
NEUTS SEG NFR BLD AUTO: 71 % (ref 43–75)
NRBC BLD AUTO-RTO: 1 /100 WBCS
PLATELET # BLD AUTO: 125 THOUSANDS/UL (ref 149–390)
PMV BLD AUTO: 11 FL (ref 8.9–12.7)
POTASSIUM SERPL-SCNC: 4.6 MMOL/L (ref 3.5–5.3)
RBC # BLD AUTO: 3.07 MILLION/UL (ref 3.81–5.12)
SODIUM SERPL-SCNC: 137 MMOL/L (ref 135–147)
VANCOMYCIN SERPL-MCNC: 18.2 UG/ML (ref 10–20)
WBC # BLD AUTO: 4.64 THOUSAND/UL (ref 4.31–10.16)

## 2025-02-13 PROCEDURE — 73590 X-RAY EXAM OF LOWER LEG: CPT

## 2025-02-13 PROCEDURE — 80202 ASSAY OF VANCOMYCIN: CPT | Performed by: INTERNAL MEDICINE

## 2025-02-13 PROCEDURE — 85025 COMPLETE CBC W/AUTO DIFF WBC: CPT | Performed by: STUDENT IN AN ORGANIZED HEALTH CARE EDUCATION/TRAINING PROGRAM

## 2025-02-13 PROCEDURE — 94640 AIRWAY INHALATION TREATMENT: CPT

## 2025-02-13 PROCEDURE — 83735 ASSAY OF MAGNESIUM: CPT | Performed by: STUDENT IN AN ORGANIZED HEALTH CARE EDUCATION/TRAINING PROGRAM

## 2025-02-13 PROCEDURE — 97530 THERAPEUTIC ACTIVITIES: CPT

## 2025-02-13 PROCEDURE — 94760 N-INVAS EAR/PLS OXIMETRY 1: CPT

## 2025-02-13 PROCEDURE — 82948 REAGENT STRIP/BLOOD GLUCOSE: CPT

## 2025-02-13 PROCEDURE — 71045 X-RAY EXAM CHEST 1 VIEW: CPT

## 2025-02-13 PROCEDURE — 99232 SBSQ HOSP IP/OBS MODERATE 35: CPT | Performed by: STUDENT IN AN ORGANIZED HEALTH CARE EDUCATION/TRAINING PROGRAM

## 2025-02-13 PROCEDURE — 80048 BASIC METABOLIC PNL TOTAL CA: CPT | Performed by: STUDENT IN AN ORGANIZED HEALTH CARE EDUCATION/TRAINING PROGRAM

## 2025-02-13 PROCEDURE — 97116 GAIT TRAINING THERAPY: CPT

## 2025-02-13 PROCEDURE — 97110 THERAPEUTIC EXERCISES: CPT

## 2025-02-13 RX ORDER — FUROSEMIDE 10 MG/ML
20 INJECTION INTRAMUSCULAR; INTRAVENOUS ONCE
Status: COMPLETED | OUTPATIENT
Start: 2025-02-13 | End: 2025-02-13

## 2025-02-13 RX ORDER — OXYCODONE HYDROCHLORIDE 5 MG/1
5 TABLET ORAL EVERY 4 HOURS PRN
Refills: 0 | Status: DISCONTINUED | OUTPATIENT
Start: 2025-02-13 | End: 2025-02-19 | Stop reason: HOSPADM

## 2025-02-13 RX ORDER — INSULIN GLARGINE 100 [IU]/ML
15 INJECTION, SOLUTION SUBCUTANEOUS
Status: DISCONTINUED | OUTPATIENT
Start: 2025-02-13 | End: 2025-02-19 | Stop reason: HOSPADM

## 2025-02-13 RX ORDER — ACETAMINOPHEN 325 MG/1
650 TABLET ORAL EVERY 6 HOURS PRN
Status: DISCONTINUED | OUTPATIENT
Start: 2025-02-13 | End: 2025-02-14

## 2025-02-13 RX ORDER — HYDROMORPHONE HCL IN WATER/PF 6 MG/30 ML
0.2 PATIENT CONTROLLED ANALGESIA SYRINGE INTRAVENOUS EVERY 4 HOURS PRN
Status: DISCONTINUED | OUTPATIENT
Start: 2025-02-13 | End: 2025-02-14

## 2025-02-13 RX ORDER — ACETAMINOPHEN 325 MG/1
650 TABLET ORAL EVERY 6 HOURS PRN
Status: DISCONTINUED | OUTPATIENT
Start: 2025-02-13 | End: 2025-02-13

## 2025-02-13 RX ADMIN — FAMOTIDINE 20 MG: 20 TABLET, FILM COATED ORAL at 22:10

## 2025-02-13 RX ADMIN — CARVEDILOL 6.25 MG: 6.25 TABLET, FILM COATED ORAL at 15:57

## 2025-02-13 RX ADMIN — CYANOCOBALAMIN TAB 500 MCG 1000 MCG: 500 TAB at 08:08

## 2025-02-13 RX ADMIN — ACETAMINOPHEN 650 MG: 325 TABLET, FILM COATED ORAL at 06:30

## 2025-02-13 RX ADMIN — ENOXAPARIN SODIUM 40 MG: 40 INJECTION SUBCUTANEOUS at 08:07

## 2025-02-13 RX ADMIN — LIDOCAINE 5% 1 PATCH: 700 PATCH TOPICAL at 08:23

## 2025-02-13 RX ADMIN — DICLOFENAC SODIUM 2 G: 10 GEL TOPICAL at 12:17

## 2025-02-13 RX ADMIN — DICLOFENAC SODIUM 2 G: 10 GEL TOPICAL at 23:04

## 2025-02-13 RX ADMIN — ACETAMINOPHEN 650 MG: 325 TABLET, FILM COATED ORAL at 00:04

## 2025-02-13 RX ADMIN — CYCLOBENZAPRINE 10 MG: 10 TABLET, FILM COATED ORAL at 16:47

## 2025-02-13 RX ADMIN — INSULIN LISPRO 1 UNITS: 100 INJECTION, SOLUTION INTRAVENOUS; SUBCUTANEOUS at 15:57

## 2025-02-13 RX ADMIN — FLUTICASONE FUROATE AND VILANTEROL TRIFENATATE 1 PUFF: 200; 25 POWDER RESPIRATORY (INHALATION) at 08:07

## 2025-02-13 RX ADMIN — PRAMIPEXOLE DIHYDROCHLORIDE 1 MG: 1 TABLET ORAL at 08:08

## 2025-02-13 RX ADMIN — CARVEDILOL 6.25 MG: 6.25 TABLET, FILM COATED ORAL at 08:08

## 2025-02-13 RX ADMIN — DICLOFENAC SODIUM 2 G: 10 GEL TOPICAL at 08:18

## 2025-02-13 RX ADMIN — POLYETHYLENE GLYCOL 3350 17 G: 17 POWDER, FOR SOLUTION ORAL at 08:08

## 2025-02-13 RX ADMIN — SERTRALINE HYDROCHLORIDE 100 MG: 100 TABLET ORAL at 22:10

## 2025-02-13 RX ADMIN — PRAMIPEXOLE DIHYDROCHLORIDE 1 MG: 1 TABLET ORAL at 18:11

## 2025-02-13 RX ADMIN — FUROSEMIDE 20 MG: 10 INJECTION, SOLUTION INTRAVENOUS at 10:26

## 2025-02-13 RX ADMIN — HYDROMORPHONE HYDROCHLORIDE 0.2 MG: 0.2 INJECTION, SOLUTION INTRAMUSCULAR; INTRAVENOUS; SUBCUTANEOUS at 14:55

## 2025-02-13 RX ADMIN — CEFTRIAXONE SODIUM 1000 MG: 10 INJECTION, POWDER, FOR SOLUTION INTRAVENOUS at 22:00

## 2025-02-13 RX ADMIN — ALBUTEROL SULFATE 2.5 MG: 2.5 SOLUTION RESPIRATORY (INHALATION) at 08:26

## 2025-02-13 RX ADMIN — VANCOMYCIN HYDROCHLORIDE 750 MG: 750 INJECTION, SOLUTION INTRAVENOUS at 12:18

## 2025-02-13 RX ADMIN — LEVOTHYROXINE SODIUM 112 MCG: 112 TABLET ORAL at 05:00

## 2025-02-13 RX ADMIN — ATORVASTATIN CALCIUM 80 MG: 80 TABLET, FILM COATED ORAL at 15:57

## 2025-02-13 RX ADMIN — DICLOFENAC SODIUM 2 G: 10 GEL TOPICAL at 18:11

## 2025-02-13 RX ADMIN — Medication 2.5 MG: at 08:26

## 2025-02-13 RX ADMIN — CALCIUM 1 TABLET: 500 TABLET ORAL at 08:07

## 2025-02-13 RX ADMIN — EZETIMIBE 10 MG: 10 TABLET ORAL at 08:14

## 2025-02-13 RX ADMIN — ACETAMINOPHEN 325MG 650 MG: 325 TABLET ORAL at 16:12

## 2025-02-13 RX ADMIN — CYCLOBENZAPRINE 10 MG: 10 TABLET, FILM COATED ORAL at 10:34

## 2025-02-13 NOTE — ASSESSMENT & PLAN NOTE
Wt Readings from Last 3 Encounters:   02/10/25 76.9 kg (169 lb 8.5 oz)   12/27/24 79.4 kg (175 lb)   09/20/24 77.1 kg (170 lb)     Cardiomyopathy of unknown cause diagnosed in January during recent hospitalization with newly reduced EF 35 to 39%  PTA GDMT: carvedilol 6.25 mg twice daily, losartan 50 mg twice daily, spironolactone 25 mg daily, torsemide 20 mg daily  BP improved, resume carvedilol, continue to hold losartan, spironolactone and torsemide in light of JULIANNE  Per , patient had only been taking her diuretics prn at home  2 g sodium diet  Will give 1 dose of 20 mg IV lasix given hypoxia with some evidence of congestion on CXR  Daily weights, I&Os

## 2025-02-13 NOTE — PLAN OF CARE
Problem: Potential for Falls  Goal: Patient will remain free of falls  Description: INTERVENTIONS:  - Educate patient/family on patient safety including physical limitations  - Instruct patient to call for assistance with activity   - Consult OT/PT to assist with strengthening/mobility   - Keep Call bell within reach  - Keep bed low and locked with side rails adjusted as appropriate  - Keep care items and personal belongings within reach  - Initiate and maintain comfort rounds  - Make Fall Risk Sign visible to staff  - Offer Toileting every 2 Hours, in advance of need  - Initiate/Maintain bed alarm  - Obtain necessary fall risk management equipment:    - Apply yellow socks and bracelet for high fall risk patients  - Consider moving patient to room near nurses station  2/12/2025 2052 by Selina Romero RN  Outcome: Progressing  2/12/2025 2052 by Selina Romero RN  Outcome: Progressing     Problem: PAIN - ADULT  Goal: Verbalizes/displays adequate comfort level or baseline comfort level  Description: Interventions:  - Encourage patient to monitor pain and request assistance  - Assess pain using appropriate pain scale  - Administer analgesics based on type and severity of pain and evaluate response  - Implement non-pharmacological measures as appropriate and evaluate response  - Consider cultural and social influences on pain and pain management  - Notify physician/advanced practitioner if interventions unsuccessful or patient reports new pain  2/12/2025 2052 by Selina Romero RN  Outcome: Progressing  2/12/2025 2052 by Selina Romero RN  Outcome: Progressing     Problem: INFECTION - ADULT  Goal: Absence or prevention of progression during hospitalization  Description: INTERVENTIONS:  - Assess and monitor for signs and symptoms of infection  - Monitor lab/diagnostic results  - Monitor all insertion sites, i.e. indwelling lines, tubes, and drains  - Monitor endotracheal if appropriate and nasal secretions for changes in  amount and color  - [place] appropriate cooling/warming therapies per order  - Administer medications as ordered  - Instruct and encourage patient and family to use good hand hygiene technique  - Identify and instruct in appropriate isolation precautions for identified infection/condition  2/12/2025 2052 by Selina Romero RN  Outcome: Progressing  2/12/2025 2052 by Selina Romero RN  Outcome: Progressing  Goal: Absence of fever/infection during neutropenic period  Description: INTERVENTIONS:  - Monitor WBC    2/12/2025 2052 by Selina Romero RN  Outcome: Progressing  2/12/2025 2052 by Selina Romero RN  Outcome: Progressing     Problem: SAFETY ADULT  Goal: Patient will remain free of falls  Description: INTERVENTIONS:  - Educate patient/family on patient safety including physical limitations  - Instruct patient to call for assistance with activity   - Consult OT/PT to assist with strengthening/mobility   - Keep Call bell within reach  - Keep bed low and locked with side rails adjusted as appropriate  - Keep care items and personal belongings within reach  - Initiate and maintain comfort rounds  - Make Fall Risk Sign visible to staff  - Offer Toileting every 2 Hours, in advance of need  - Initiate/Maintain bed alarm  - Obtain necessary fall risk management equipment:    - Apply yellow socks and bracelet for high fall risk patients  - Consider moving patient to room near nurses station  2/12/2025 2052 by Selina Romero RN  Outcome: Progressing  2/12/2025 2052 by Selina Romero RN  Outcome: Progressing  Goal: Maintain or return to baseline ADL function  Description: INTERVENTIONS:  -  Assess patient's ability to carry out ADLs; assess patient's baseline for ADL function and identify physical deficits which impact ability to perform ADLs (bathing, care of mouth/teeth, toileting, grooming, dressing, etc.)  - Assess/evaluate cause of self-care deficits   - Assess range of motion  - Assess patient's mobility; develop  plan if impaired  - Assess patient's need for assistive devices and provide as appropriate  - Encourage maximum independence but intervene and supervise when necessary  - Involve family in performance of ADLs  - Assess for home care needs following discharge   - Consider OT consult to assist with ADL evaluation and planning for discharge  - Provide patient education as appropriate  2/12/2025 2052 by Selina Romero RN  Outcome: Progressing  2/12/2025 2052 by Selina Romero RN  Outcome: Progressing  Goal: Maintains/Returns to pre admission functional level  Description: INTERVENTIONS:  - Perform AM-PAC 6 Click Basic Mobility/ Daily Activity assessment daily.  - Set and communicate daily mobility goal to care team and patient/family/caregiver.   - Collaborate with rehabilitation services on mobility goals if consulted  - Perform Range of Motion 3 times a day.  - Reposition patient every 2 hours.  - Dangle patient 3 times a day  - Stand patient 3 times a day  - Ambulate patient 3 times a day  - Out of bed to chair 3 times a day   - Out of bed for meals 3 times a day  - Out of bed for toileting  - Record patient progress and toleration of activity level   2/12/2025 2052 by Selina Romero RN  Outcome: Progressing  2/12/2025 2052 by Selina Romero RN  Outcome: Progressing     Problem: DISCHARGE PLANNING  Goal: Discharge to home or other facility with appropriate resources  Description: INTERVENTIONS:  - Identify barriers to discharge w/patient and caregiver  - Arrange for needed discharge resources and transportation as appropriate  - Identify discharge learning needs (meds, wound care, etc.)  - Arrange for interpretive services to assist at discharge as needed  - Refer to Case Management Department for coordinating discharge planning if the patient needs post-hospital services based on physician/advanced practitioner order or complex needs related to functional status, cognitive ability, or social support  system  2/12/2025 2052 by Selina Romero RN  Outcome: Progressing  2/12/2025 2052 by Selina Romero RN  Outcome: Progressing     Problem: Knowledge Deficit  Goal: Patient/family/caregiver demonstrates understanding of disease process, treatment plan, medications, and discharge instructions  Description: Complete learning assessment and assess knowledge base.  Interventions:  - Provide teaching at level of understanding  - Provide teaching via preferred learning methods  2/12/2025 2052 by Selina Romero RN  Outcome: Progressing  2/12/2025 2052 by Selina Romero RN  Outcome: Progressing     Problem: Prexisting or High Potential for Compromised Skin Integrity  Goal: Skin integrity is maintained or improved  Description: INTERVENTIONS:  - Identify patients at risk for skin breakdown  - Assess and monitor skin integrity  - Assess and monitor nutrition and hydration status  - Monitor labs   - Assess for incontinence   - Turn and reposition patient  - Assist with mobility/ambulation  - Relieve pressure over bony prominences  - Avoid friction and shearing  - Provide appropriate hygiene as needed including keeping skin clean and dry  - Evaluate need for skin moisturizer/barrier cream  - Collaborate with interdisciplinary team   - Patient/family teaching  - Consider wound care consult   2/12/2025 2052 by Selina Romero RN  Outcome: Progressing  2/12/2025 2052 by Selina Romero RN  Outcome: Progressing

## 2025-02-13 NOTE — ASSESSMENT & PLAN NOTE
Catheterized urine specimen turbid in appearance with pyuria  Patient with associated encephalopathy  Urine culture growing >100,000 CFU mixed contaminants  Continue empiric ceftriaxone to complete 5-day course

## 2025-02-13 NOTE — ASSESSMENT & PLAN NOTE
Patient presents following mechanical fall after inability to maneuver wheelchair in the ladies room  Fell flat on her face and stomach  Trauma scans negative, will obtain XR tib fib bilateral legs given severe pain over both  Multimodal pain regimen with cautious use of opioids given hypotensive episodes  PT and OT recommending level I, rehab

## 2025-02-13 NOTE — RESTORATIVE TECHNICIAN NOTE
Restorative Technician Note      Patient Name: Zohra Barriga     Restorative Tech Visit Date: 02/13/25  Note Type: Mobility  Patient Position Upon Consult: Bedside chair  Activity Performed: Ambulated  Assistive Device: Roller walker  Patient Position at End of Consult: All needs within reach; Bedside chair; Bed/Chair alarm activated        ns

## 2025-02-13 NOTE — TREATMENT PLAN
Left distal tib fib fracture noted on xray. Consult placed to Orthopedic surgery.  Continue multimodal pain control

## 2025-02-13 NOTE — ASSESSMENT & PLAN NOTE
Patient's creatinine increased to 1.36 today from 1.17 (baseline noted to be 0.9-1.1)  Suspect pre-renal with hypotensive episodes within 24 hours of admission  Improved with IVF hydration  Continue to hold Losartan, Torsemide and spironolactone  Monitor I&Os, daily weights  Avoid nephrotoxins

## 2025-02-13 NOTE — DISCHARGE INSTR - AVS FIRST PAGE
Discharge Instructions - Podiatry    Weight Bearing Status: WBAT right foot. Partial weight bearing for transfers in CAM boot left foot                       Pain: Continue analgesics as directed    Follow-up appointment instructions: Please make an appointment within one week of discharge with Dr. Harvey. Contact sooner if any increase in pain, or signs of infection occur    Patient Wound Care Instructions:   Right foot: Remove dressings to right foot.  Wash with soap and water.  Apply Maxorb 4 x 4 gauze and wrap with Kerry.  Secure with tape.  Change on a Monday Wednesday Friday type basis.    Left foot:.  Apply a small amount of Xeroform and a bordered foam to the left anterior leg wound.  Change on a Monday Wednesday Friday basis.

## 2025-02-13 NOTE — PLAN OF CARE
Problem: Potential for Falls  Goal: Patient will remain free of falls  Description: INTERVENTIONS:  - Educate patient/family on patient safety including physical limitations  - Instruct patient to call for assistance with activity   - Consult OT/PT to assist with strengthening/mobility   - Keep Call bell within reach  - Keep bed low and locked with side rails adjusted as appropriate  - Keep care items and personal belongings within reach  - Initiate and maintain comfort rounds  - Make Fall Risk Sign visible to staff  - Offer Toileting every 2 Hours, in advance of need  - Initiate/Maintain bed alarm  - Obtain necessary fall risk management equipment:    - Apply yellow socks and bracelet for high fall risk patients  - Consider moving patient to room near nurses station  2/13/2025 1054 by Debbie Gonzalez  Outcome: Progressing  2/13/2025 1054 by Debbie Gonzalez  Outcome: Progressing     Problem: PAIN - ADULT  Goal: Verbalizes/displays adequate comfort level or baseline comfort level  Description: Interventions:  - Encourage patient to monitor pain and request assistance  - Assess pain using appropriate pain scale  - Administer analgesics based on type and severity of pain and evaluate response  - Implement non-pharmacological measures as appropriate and evaluate response  - Consider cultural and social influences on pain and pain management  - Notify physician/advanced practitioner if interventions unsuccessful or patient reports new pain  2/13/2025 1054 by Debbie Gonzalez  Outcome: Progressing  2/13/2025 1054 by Debbie Gonzalez  Outcome: Progressing     Problem: INFECTION - ADULT  Goal: Absence or prevention of progression during hospitalization  Description: INTERVENTIONS:  - Assess and monitor for signs and symptoms of infection  - Monitor lab/diagnostic results  - Monitor all insertion sites, i.e. indwelling lines, tubes, and drains  - Monitor endotracheal if appropriate and nasal secretions for changes in amount and color  -  Lane appropriate cooling/warming therapies per order  - Administer medications as ordered  - Instruct and encourage patient and family to use good hand hygiene technique  - Identify and instruct in appropriate isolation precautions for identified infection/condition  2/13/2025 1054 by Debbie Gonzalez  Outcome: Progressing  2/13/2025 1054 by Debbie Gonzalez  Outcome: Progressing  Goal: Absence of fever/infection during neutropenic period  Description: INTERVENTIONS:  - Monitor WBC    2/13/2025 1054 by Debbie Gonzalez  Outcome: Progressing  2/13/2025 1054 by Debbie Gonzalez  Outcome: Progressing     Problem: SAFETY ADULT  Goal: Patient will remain free of falls  Description: INTERVENTIONS:  - Educate patient/family on patient safety including physical limitations  - Instruct patient to call for assistance with activity   - Consult OT/PT to assist with strengthening/mobility   - Keep Call bell within reach  - Keep bed low and locked with side rails adjusted as appropriate  - Keep care items and personal belongings within reach  - Initiate and maintain comfort rounds  - Make Fall Risk Sign visible to staff  - Offer Toileting every 2 Hours, in advance of need  - Initiate/Maintain bed alarm  - Obtain necessary fall risk management equipment:    - Apply yellow socks and bracelet for high fall risk patients  - Consider moving patient to room near nurses station  2/13/2025 1054 by Debbie Gonzalez  Outcome: Progressing  2/13/2025 1054 by Debbie Gonzalez  Outcome: Progressing  Goal: Maintain or return to baseline ADL function  Description: INTERVENTIONS:  -  Assess patient's ability to carry out ADLs; assess patient's baseline for ADL function and identify physical deficits which impact ability to perform ADLs (bathing, care of mouth/teeth, toileting, grooming, dressing, etc.)  - Assess/evaluate cause of self-care deficits   - Assess range of motion  - Assess patient's mobility; develop plan if impaired  - Assess patient's need for assistive devices  and provide as appropriate  - Encourage maximum independence but intervene and supervise when necessary  - Involve family in performance of ADLs  - Assess for home care needs following discharge   - Consider OT consult to assist with ADL evaluation and planning for discharge  - Provide patient education as appropriate  2/13/2025 1054 by Debbie Gonzalez  Outcome: Progressing  2/13/2025 1054 by Debbie Gonzalez  Outcome: Progressing  Goal: Maintains/Returns to pre admission functional level  Description: INTERVENTIONS:  - Perform AM-PAC 6 Click Basic Mobility/ Daily Activity assessment daily.  - Set and communicate daily mobility goal to care team and patient/family/caregiver.   - Collaborate with rehabilitation services on mobility goals if consulted  - Perform Range of Motion 3 times a day.  - Reposition patient every 2 hours.  - Dangle patient 3 times a day  - Stand patient 3 times a day  - Ambulate patient 3 times a day  - Out of bed to chair 3 times a day   - Out of bed for meals 3 times a day  - Out of bed for toileting  - Record patient progress and toleration of activity level   2/13/2025 1054 by Debbie Gonzalez  Outcome: Progressing  2/13/2025 1054 by Debbie Gonzalez  Outcome: Progressing     Problem: DISCHARGE PLANNING  Goal: Discharge to home or other facility with appropriate resources  Description: INTERVENTIONS:  - Identify barriers to discharge w/patient and caregiver  - Arrange for needed discharge resources and transportation as appropriate  - Identify discharge learning needs (meds, wound care, etc.)  - Arrange for interpretive services to assist at discharge as needed  - Refer to Case Management Department for coordinating discharge planning if the patient needs post-hospital services based on physician/advanced practitioner order or complex needs related to functional status, cognitive ability, or social support system  2/13/2025 1054 by Debbie Gonzalez  Outcome: Progressing  2/13/2025 1054 by Debbie Gonzalez  Outcome:  Progressing     Problem: Knowledge Deficit  Goal: Patient/family/caregiver demonstrates understanding of disease process, treatment plan, medications, and discharge instructions  Description: Complete learning assessment and assess knowledge base.  Interventions:  - Provide teaching at level of understanding  - Provide teaching via preferred learning methods  2/13/2025 1054 by Debbie Gonzalez  Outcome: Progressing  2/13/2025 1054 by Debbie Gonzalez  Outcome: Progressing     Problem: Prexisting or High Potential for Compromised Skin Integrity  Goal: Skin integrity is maintained or improved  Description: INTERVENTIONS:  - Identify patients at risk for skin breakdown  - Assess and monitor skin integrity  - Assess and monitor nutrition and hydration status  - Monitor labs   - Assess for incontinence   - Turn and reposition patient  - Assist with mobility/ambulation  - Relieve pressure over bony prominences  - Avoid friction and shearing  - Provide appropriate hygiene as needed including keeping skin clean and dry  - Evaluate need for skin moisturizer/barrier cream  - Collaborate with interdisciplinary team   - Patient/family teaching  - Consider wound care consult   2/13/2025 1054 by Debbie Gonzalez  Outcome: Progressing  2/13/2025 1054 by Debbie Gonzalez  Outcome: Progressing

## 2025-02-13 NOTE — ASSESSMENT & PLAN NOTE
Noted to desaturate to the 80s requiring 2L O2 via nasal cannula to maintain sats >90%, currently on 4L  D/c IVF given patient's history of CHF  Obtain CXR  Titrate O2 and wean to room air

## 2025-02-13 NOTE — PROGRESS NOTES
Progress Note - Hospitalist   Name: Zohra Barriga 69 y.o. female I MRN: [id number]  Unit/Bed#: E2 -01 I Date of Admission: 2/10/2025   Date of Service: 2/13/2025 I Hospital Day: 2    Assessment & Plan  Acute metabolic encephalopathy  Per  and patient, she suffered a mechanical fall when she was trying to use the ladies room at a restaurant when she was unable to maneuver her walker the right way  She fell flat on her face, denies any LOC, denies any prodromal symptoms, was perfectly fine prior to the incident  Noted to have some confusion on admission  Likely multifactorial with possible UTI, post-trauma, severe pain  CTH with no acute intracranial abnormalities  Waxing and waning with brief moments of confusion however per , significantly improved from admission  Obstructive sleep apnea  STU on CPAP  Type 2 diabetes mellitus, with long-term current use of insulin (HCC)  Lab Results   Component Value Date    HGBA1C 7.7 (H) 01/17/2025       Recent Labs     02/12/25  2344 02/13/25  0439 02/13/25  0458 02/13/25  0610   POCGLU 127 63* 94 128       Blood Sugar Average: Last 72 hrs:  (P) [id number]    Managed by endocrinology in the outpatient setting  Insulin pump removed and sent home with   Utilize basal Lantus at bedtime  SSI with Accu-Cheks  Hypoglycemia protocol  Systolic CHF (HCC)  Wt Readings from Last 3 Encounters:   02/10/25 76.9 kg (169 lb 8.5 oz)   12/27/24 79.4 kg (175 lb)   09/20/24 77.1 kg (170 lb)     Cardiomyopathy of unknown cause diagnosed in January during recent hospitalization with newly reduced EF 35 to 39%  PTA GDMT: carvedilol 6.25 mg twice daily, losartan 50 mg twice daily, spironolactone 25 mg daily, torsemide 20 mg daily  BP improved, resume carvedilol, continue to hold losartan, spironolactone and torsemide in light of JULIANNE  Per , patient had only been taking her diuretics prn at home  2 g sodium diet  Will give 1 dose of 20 mg IV lasix given hypoxia  with some evidence of congestion on CXR  Daily weights, I&Os  Diabetic ulcer of right midfoot associated with type 2 diabetes mellitus (HCC)  Known ulceration plantar aspect of right foot  Follows with podiatry and recently completed a course of po antibiotics (last week)  Xray right foot with no radiographic evidence of osteomyelitis  Podiatry following, at this time no evidence of infection, continue wound care  UTI (urinary tract infection)  Catheterized urine specimen turbid in appearance with pyuria  Patient with associated encephalopathy  Urine culture growing >100,000 CFU mixed contaminants  Continue empiric ceftriaxone to complete 5-day course  Severe sepsis (HCC)  Meeting criteria with fever, tachycardia, hypotension  Suspected 2/2 UTI  Empiric antibiotic therapy with Rocephin, continue to complete 5 day course  Lactic acid normal  S/p IVF hydration  Blood cultures no growth at 48H, urine cultures growing >100,000 CFU mixed contaminants  Continue to trend fever curve, leukocytosis  Facial contusion, initial encounter  S/p fall  Presented with left-sided facial contusion  Trauma scans unremarkable for acute traumatic injury  PT/OT/case management  Pain control  Left ankle pain  Presented with left ankle pain post fall  No acute fracture or subluxation on x-ray  Pain control  PT/OT/case management  Acute anemia  Hgb noted to drop from 11.3 to 8 on admission with no overt signs of bleeding  Trauma scans negative for any signs of bleed  Possibly dilutional from IVF received  Monitor H/H  Transfuse to goal >7  Fall  Patient presents following mechanical fall after inability to maneuver wheelchair in the ladies room  Fell flat on her face and stomach  Trauma scans negative, will obtain XR tib fib bilateral legs given severe pain over both  Multimodal pain regimen with cautious use of opioids given hypotensive episodes  PT and OT recommending level I, rehab  Essential hypertension  Patient maintained on Coreg and  Losartan in the outpatient setting  Had episodes of hypotension day following admission in setting of sepsis  PTA medications were held  BP now improved, will resume Coreg however will continue to hold Losartan, Torsemide and Spironolactone given JULIANNE  Will have to add another agent if BP continues to be high, patient will Amlodipine allergy, can trial hydralazine  JULIANNE (acute kidney injury) (HCC)  Patient's creatinine increased to 1.36 today from 1.17 (baseline noted to be 0.9-1.1)  Suspect pre-renal with hypotensive episodes within 24 hours of admission  Improved with IVF hydration  Continue to hold Losartan, Torsemide and spironolactone  Monitor I&Os, daily weights  Avoid nephrotoxins  Acute hypoxic respiratory failure (HCC)  Noted to desaturate to the 80s requiring 2L O2 via nasal cannula to maintain sats >90%, currently on 4L  D/c IVF given patient's history of CHF  Obtain CXR  Titrate O2 and wean to room air    VTE Pharmacologic Prophylaxis: VTE Score: 5 High Risk (Score >/= 5) - Pharmacological DVT Prophylaxis Ordered: enoxaparin (Lovenox). Sequential Compression Devices Ordered.    Mobility:   Basic Mobility Inpatient Raw Score: 12  JH-HLM Goal: 4: Move to chair/commode  JH-HLM Achieved: 3: Sit at edge of bed  JH-HLM Goal NOT achieved. Continue with multidisciplinary rounding and encourage appropriate mobility to improve upon JH-HLM goals.    Patient Centered Rounds: I performed bedside rounds with nursing staff today.   Discussions with Specialists or Other Care Team Provider: case management    Education and Discussions with Family / Patient:  .     Current Length of Stay: 2 day(s)  Current Patient Status: Inpatient   Certification Statement: The patient will continue to require additional inpatient hospital stay due to ongoing management of UTI, encephalopathy, hypoxia  Discharge Plan: Anticipate discharge in 24-48 hrs to rehab facility.    Code Status: Level 1 - Full Code    Subjective   Patient  seen and examined at bedside. States her breathing is better. Lower extremity pain is improved as well as upper extremity pain.     Objective :  Temp:  [97.5 °F (36.4 °C)-99.2 °F (37.3 °C)] 97.5 °F (36.4 °C)  HR:  [59-67] 62  BP: (143-162)/() 157/88  Resp:  [18-22] 18  SpO2:  [94 %-97 %] 94 %  O2 Device: Nasal cannula  Nasal Cannula O2 Flow Rate (L/min):  [2 L/min] 2 L/min    Body mass index is 34.24 kg/m².     Input and Output Summary (last 24 hours):     Intake/Output Summary (Last 24 hours) at 2/13/2025 0810  Last data filed at 2/13/2025 0503  Gross per 24 hour   Intake 1858.75 ml   Output 325 ml   Net 1533.75 ml       Physical Exam  Vitals and nursing note reviewed.   Constitutional:       General: She is not in acute distress.     Appearance: She is well-developed.   HENT:      Head: Normocephalic and atraumatic.   Eyes:      Conjunctiva/sclera: Conjunctivae normal.   Cardiovascular:      Rate and Rhythm: Normal rate and regular rhythm.      Heart sounds: No murmur heard.  Pulmonary:      Effort: Pulmonary effort is normal. No respiratory distress.      Breath sounds: Wheezing present.   Abdominal:      Palpations: Abdomen is soft.      Tenderness: There is no abdominal tenderness.   Musculoskeletal:         General: Tenderness and signs of injury present. No swelling.      Cervical back: Neck supple.   Skin:     General: Skin is warm and dry.      Capillary Refill: Capillary refill takes less than 2 seconds.   Neurological:      Mental Status: She is alert.   Psychiatric:         Mood and Affect: Mood normal.           Lines/Drains:  Lines/Drains/Airways       Active Status       Name Placement date Placement time Site Days    External Urinary Catheter 02/12/25  1245  -- less than 1                            Lab Results: I have reviewed the following results:   Results from last 7 days   Lab Units 02/13/25  0443   WBC Thousand/uL 4.64   HEMOGLOBIN g/dL 8.7*   HEMATOCRIT % 28.8*   PLATELETS Thousands/uL  125*   SEGS PCT % 71   LYMPHO PCT % 18   MONO PCT % 9   EOS PCT % 1     Results from last 7 days   Lab Units 02/13/25  0443 02/12/25  0520 02/11/25  0533   SODIUM mmol/L 137   < > 139   POTASSIUM mmol/L 4.6   < > 4.4   CHLORIDE mmol/L 111*   < > 110*   CO2 mmol/L 19*   < > 23   BUN mg/dL 36*   < > 32*   CREATININE mg/dL 1.19   < > 1.17   ANION GAP mmol/L 7   < > 6   CALCIUM mg/dL 7.9*   < > 7.3*   ALBUMIN g/dL  --   --  3.3*   TOTAL BILIRUBIN mg/dL  --   --  0.72   ALK PHOS U/L  --   --  66   ALT U/L  --   --  10   AST U/L  --   --  18   GLUCOSE RANDOM mg/dL 63*   < > 153*    < > = values in this interval not displayed.     Results from last 7 days   Lab Units 02/10/25  1708   INR  1.25*     Results from last 7 days   Lab Units 02/13/25  0610 02/13/25  0458 02/13/25  0439 02/12/25  2344 02/12/25  2030 02/12/25  1556 02/12/25  1107 02/12/25  0613 02/11/25  2106 02/11/25  1553 02/11/25  1048 02/11/25  0805   POC GLUCOSE mg/dl 128 94 63* 127 137 113 119 90 117 155* 136 135         Results from last 7 days   Lab Units 02/12/25  0520 02/11/25  0533 02/11/25  0141 02/10/25  1708   LACTIC ACID mmol/L  --   --  0.8 1.5   PROCALCITONIN ng/ml 6.93* 6.35*  --  0.11       Recent Cultures (last 7 days):   Results from last 7 days   Lab Units 02/10/25  1757 02/10/25  1726 02/10/25  1719   BLOOD CULTURE  No Growth at 48 hrs.  --  No Growth at 48 hrs.   URINE CULTURE   --  >100,000 cfu/ml  --        Imaging Results Review: No pertinent imaging studies reviewed. Pending final read of CXR, lower extremity xrays  Other Study Results Review: No additional pertinent studies reviewed.    Last 24 Hours Medication List:     Current Facility-Administered Medications:     acetaminophen (TYLENOL) tablet 650 mg, Q6H PRN    albuterol inhalation solution 2.5 mg, Q6H PRN    atorvastatin (LIPITOR) tablet 80 mg, Daily With Dinner    calcium carbonate (OYSTER SHELL,OSCAL) 500 mg tablet 1 tablet, Daily With Breakfast    carvedilol (COREG) tablet 6.25  mg, BID With Meals    ceftriaxone (ROCEPHIN) 1 g/50 mL in dextrose IVPB, Q24H, Last Rate: 1,000 mg (02/12/25 2106)    cyanocobalamin (VITAMIN B-12) tablet 1,000 mcg, Daily    cyclobenzaprine (FLEXERIL) tablet 10 mg, TID PRN    Diclofenac Sodium (VOLTAREN) 1 % topical gel 2 g, 4x Daily    enoxaparin (LOVENOX) subcutaneous injection 40 mg, Daily    ezetimibe (ZETIA) tablet 10 mg, Daily    famotidine (PEPCID) tablet 20 mg, HS    fluticasone-vilanterol 200-25 mcg/actuation 1 puff, Daily    insulin glargine (LANTUS) subcutaneous injection 15 Units 0.15 mL, HS    insulin lispro (HumALOG/ADMELOG) 100 units/mL subcutaneous injection 1-6 Units, TID AC **AND** Fingerstick Glucose (POCT), TID AC    insulin lispro (HumALOG/ADMELOG) 100 units/mL subcutaneous injection 1-6 Units, HS    levothyroxine tablet 112 mcg, Early Morning    lidocaine (LIDODERM) 5 % patch 1 patch, Daily    [Held by provider] losartan (COZAAR) tablet 50 mg, BID    melatonin tablet 3 mg, HS PRN    ondansetron (ZOFRAN) injection 4 mg, Q8H PRN    oxyCODONE (ROXICODONE) split tablet 2.5 mg, Q6H PRN    polyethylene glycol (MIRALAX) packet 17 g, Daily    pramipexole (MIRAPEX) tablet 1 mg, BID    sertraline (ZOLOFT) tablet 100 mg, HS    [Held by provider] spironolactone (ALDACTONE) tablet 25 mg, Daily    [Held by provider] torsemide (DEMADEX) tablet 20 mg, Daily    vancomycin (VANCOCIN) IVPB (premix in dextrose) 750 mg 150 mL, Q24H, Last Rate: 750 mg (02/12/25 1258)    Administrative Statements   Today, Patient Was Seen By: Asya Trujillo MD    **Please Note: This note may have been constructed using a voice recognition system.**

## 2025-02-13 NOTE — ASSESSMENT & PLAN NOTE
Patient maintained on Coreg and Losartan in the outpatient setting  Had episodes of hypotension day following admission in setting of sepsis  PTA medications were held  BP now improved, will resume Coreg however will continue to hold Losartan, Torsemide and Spironolactone given JULIANNE  Will have to add another agent if BP continues to be high, patient will Amlodipine allergy, can trial hydralazine

## 2025-02-13 NOTE — ASSESSMENT & PLAN NOTE
Lab Results   Component Value Date    HGBA1C 7.7 (H) 01/17/2025       Recent Labs     02/12/25  2344 02/13/25  0439 02/13/25  0458 02/13/25  0610   POCGLU 127 63* 94 128       Blood Sugar Average: Last 72 hrs:  (P) [id number]    Managed by endocrinology in the outpatient setting  Insulin pump removed and sent home with   Utilize basal Lantus at bedtime  SSI with Accu-Cheks  Hypoglycemia protocol

## 2025-02-13 NOTE — QUICK NOTE
Notified by RN of diaphoresis and AMS  Admitted with encephalopathy in setting of UTI    Blood glucose 63  Patient tolerated snack and juice   Blood glucose trend Feb 12: 90, 119, 113, 137, 127  Current insulin regimen: sliding scale Alg 3 and Lantus 24 units qhs  Will decrease dose of Lantus from 24 units to 15 units qhs  RN reports poor appetite, given Zofran last night for nausea

## 2025-02-13 NOTE — ASSESSMENT & PLAN NOTE
Meeting criteria with fever, tachycardia, hypotension  Suspected 2/2 UTI  Empiric antibiotic therapy with Rocephin, continue to complete 5 day course  Lactic acid normal  S/p IVF hydration  Blood cultures no growth at 48H, urine cultures growing >100,000 CFU mixed contaminants  Continue to trend fever curve, leukocytosis

## 2025-02-13 NOTE — PROGRESS NOTES
Zohra Barriga is a 69 y.o. female who is currently ordered Vancomycin IV with management by the Pharmacy Consult service.  Relevant clinical data and objective / subjective history reviewed.  Vancomycin Assessment:  Indication and Goal AUC/Trough:    Clinical Status: stable  Micro:     Renal Function:  SCr: 1.19 mg/dL  CrCl: 42.9 mL/min  Renal replacement: Not on dialysis  Days of Therapy: 4  Current Dose: 1000 mg IV q24h   Vancomycin Plan:  New Dosin mg IV q24h  Estimated AUC: 465 mcg*hr/mL  Estimated Trough: 14.2 mcg/mL  Next Level:  with AM labs   Renal Function Monitoring: Daily BMP and UOP  Pharmacy will continue to follow closely for s/sx of nephrotoxicity, infusion reactions and appropriateness of therapy.  BMP and CBC will be ordered per protocol. We will continue to follow the patient’s culture results and clinical progress daily.          Mukund Young, Pharmacist

## 2025-02-13 NOTE — PROGRESS NOTES
Patient:    MRN:  [id number]    [place] Request ID:  [id number]    Level of care reserved:  Inpatient Rehab Facility    Partner Reserved:  [place] [id number] [phone]    Clinical needs requested:    Geography searched:  10 miles around [id number]    Start of Service:    Request sent:  12:24pm EST on 2/12/2025 by Chasidy Puente    Partner reserved:  12:10pm EST on 2/13/2025 by Sierra Plascencia    Choice list shared:  11:42am EST on 2/13/2025 by Sierra Plascencia

## 2025-02-13 NOTE — QUICK NOTE
Podiatry Quick Note:  -Contacted by Иван for repeat ankle pain.  New ankle x-rays of the left ankle are showing a minimally displaced oblique fibular fracture.  Given minimal displacement of the fracture, current open diabetic foot ulceration, and elevated hemoglobin A1c of 7.7 patient would benefit from weightbearing for transfers in cam boot.  She should be nonweightbearing at all the times in this cam boot.  -Will discuss with slim and patient.

## 2025-02-13 NOTE — CASE MANAGEMENT
Case Management Discharge Planning Note    Patient name Zohra Barriga  Location [facility] /E2 -* MRN [id number]  : 1955 Date 2025       Current Admission Date: 2/10/2025  Current Admission Diagnosis:Acute metabolic encephalopathy   Patient Active Problem List    Diagnosis Date Noted Date Diagnosed    Systolic CHF (HCC) 2025     Diabetic ulcer of right midfoot associated with type 2 diabetes mellitus (HCC) 2025     UTI (urinary tract infection) 2025     Severe sepsis ([facility]) 2025     Facial contusion, initial encounter 2025     Left ankle pain 2025     Acute metabolic encephalopathy 02/10/2025     Fall 2024     Closed fracture of one rib of right side 2024     Osteopenia 2024     Compression fx, lumbar spine, sequela 2024     Myelopathy ([facility]) 2024     Thoracic compression fracture ([facility]) 2024     Complete heart block ([facility]) 2023     Abnormal CT of the chest 2023     Acute hypoxic respiratory failure ([facility]) 2023     Acute anemia 2023     Chronic diastolic (congestive) heart failure ([facility]) 2023     COVID-19 virus infection 2022     Anemia of chronic renal failure, stage 3 (moderate)  ([facility]) 2022     OAB (overactive bladder) 2022     Stage 3a chronic kidney disease ([facility]) 2021     Spinal stenosis of lumbar region with neurogenic claudication      Chronic kidney disease (CKD) stage G3b/A1, moderately decreased glomerular filtration rate (GFR) between 30-44 mL/min/1.73 square meter and albuminuria creatinine ratio less than 30 mg/g ([facility]) 2021     Epigastric pain 2021     Urinary retention 2021     JULIANNE (acute kidney injury) ([facility]) 2021     PONV (postoperative nausea and vomiting) 2021     Class 2 obesity in adult 2021     Pacemaker 2021     Medical marijuana use 2021     Achalasia 2021     Polyarthralgia 2020     Diabetic  peripheral neuropathy associated with type 2 diabetes mellitus ([facility]) 03/23/2020     Restless leg syndrome 10/24/2019     Uncontrolled type 2 diabetes mellitus with hyperglycemia ([facility]) 12/31/2018     Hypersomnia 10/19/2018     BBB (bundle branch block) 09/18/2018     Bifascicular block 09/18/2018     Malfunction of spinal cord stimulator ([facility]) 08/02/2018     Closed fracture of thoracic vertebra ([facility]) 07/06/2018     Deep venous thrombosis ([facility]) 07/06/2018     Localized, primary osteoarthritis 07/06/2018     Chronic bilateral low back pain without sciatica 07/06/2018     Lumbosacral spondylosis without myelopathy 07/06/2018     Osteoarthritis of knee 07/06/2018     Age related osteoporosis 07/06/2018     Chronic scapular pain 07/06/2018     Microcytic anemia 07/04/2018     Chronic diastolic CHF (congestive heart failure) ([facility]) 07/03/2018     Coronary artery disease involving native coronary artery 07/03/2018     History of TIA (transient ischemic attack) 07/03/2018     Coronary artery disease involving native coronary artery of native heart without angina pectoris 07/03/2018     Encounter for fitting and adjustment of spinal cord stimulator 06/08/2018     S/P insertion of spinal cord stimulator 06/08/2018     Lumbar radiculopathy 04/16/2018     Thoracic radiculopathy 04/16/2018     Complex regional pain syndrome type 1 of left lower extremity 03/09/2018     Type 2 diabetes mellitus, with long-term current use of insulin ([facility]) 01/11/2018     Chronic bilateral thoracic back pain 11/17/2017     Chronic myofascial pain 11/17/2017     Vitamin D deficiency 11/03/2017     Post-menopausal 10/11/2017     Primary hyperparathyroidism ([facility]) 10/11/2017     Pain in right leg 07/25/2017     Chronic pain of left knee 07/25/2017     Gait disorder 07/25/2017     Polyneuropathy associated with underlying disease ([facility]) 07/25/2017     Essential hypertension 06/29/2017     Mixed hyperlipidemia 06/29/2017     Primary hypothyroidism  06/29/2017     Chronic pain of right knee 06/06/2017     Pain syndrome, chronic 02/21/2017     Chronic pain disorder 02/21/2017     Moderate persistent asthma with acute exacerbation 01/06/2017     Obstructive sleep apnea 08/16/2016     Anemia of chronic disease 07/27/2016     Anxiety and depression 07/27/2016     Lymphadenopathy 06/27/2016     Normocytic anemia 10/04/2015     Insomnia due to medical condition 09/24/2015     Type 2 diabetes mellitus with microalbuminuria, with long-term current use of insulin (HCC) 07/13/2015     Gastroparesis 05/28/2015     Moderate persistent asthma without complication 04/10/2015     Gastroesophageal reflux disease without esophagitis 02/18/2015     Mixed urge and stress incontinence 11/14/2014     Other B-complex deficiencies 05/31/2013     Insulin pump status 02/27/2012     Hyperlipidemia associated with type 2 diabetes mellitus  (HCC) 04/04/2011     Sarcoidosis 02/09/2009     Irritable bowel syndrome with constipation 02/09/2009       LOS (days): 2  Geometric Mean LOS (GMLOS) (days): 4.9  Days to GMLOS:2.9     OBJECTIVE:  Risk of Unplanned Readmission Score: 26.32         Current admission status: Inpatient   Preferred Pharmacy:   [id number] - [place] - [address] [place] [address]  Phone: [phone] Fax: [phone]    [facility] [address] [place] [address]  Phone: [phone] Fax: [phone]    [facility] [address] [place], [place] [address]  Phone: [phone] Fax: [phone]    [facility]  [place] - [address]  [address]  [place] [address]  Phone: [phone] Fax: [phone]    [id number] - [place] - [address] [place] [address]  Phone: [phone] Fax: [phone]    Primary Care Provider: Celestino Santiago DO    Primary Insurance: MEDICARE  Secondary Insurance:  [employer]    DISCHARGE DETAILS:    Discharge planning discussed with:: Patient and spouse  Freedom of Choice: Yes  Comments - Freedom of Choice: Preference for [facility]. [facility] accepting and reserved in aidin  CM contacted family/caregiver?: Yes  Were Treatment Team discharge recommendations reviewed with patient/caregiver?: Yes  Did patient/caregiver verbalize understanding of patient care needs?: N/A- going to facility       Contacts  Patient Contacts: Enrike spouse  Relationship to Patient:: Family  Contact Method: In Person  Reason/Outcome: Discharge Planning, Emergency Contact    Requested Home Health Care         Is the patient interested in HHC at discharge?: No    DME Referral Provided  Referral made for DME?: No    Other Referral/Resources/Interventions Provided:  Interventions: Acute Rehab         Treatment Team Recommendation: Acute Rehab  Discharge Destination Plan:: Acute Rehab                                         Additional Comments: Spouse reported being a member with [facility] and requested to use them for transportation on discharge. Call placed to [facility] and  left x2 requesting return call to discuss transportation at discharge.

## 2025-02-13 NOTE — PLAN OF CARE
Problem: PHYSICAL THERAPY ADULT  Goal: Performs mobility at highest level of function for planned discharge setting.  See evaluation for individualized goals.  Description: Treatment/Interventions: Functional transfer training, LE strengthening/ROM, Elevations, Therapeutic exercise, Endurance training, Patient/family training, Bed mobility, Gait training, Spoke to nursing, OT          See flowsheet documentation for full assessment, interventions and recommendations.  Outcome: Progressing  Note: Prognosis: Fair  Problem List: Decreased strength, Decreased range of motion, Decreased endurance, Impaired balance, Decreased mobility, Decreased coordination, Impaired judgement, Pain, Decreased skin integrity, Obesity  Assessment: Pt. reported pain in her wrist/hand/arms. Pt. noted with R lean and unable to correct herself without physical assist. Pt. given max cues for all technqiues for ambulation and transfers. Pt. progressed from a Mod A x 2 to Min A x 2 for transfers as reps increased. Pt. able to ambulate longer distance and max cues for posture, LE sequencing and A with negotiating RW given. Chair follow and seated rest given between ambulation. Pt. seated on chair post session with all needs within reach and was in upright posture. Chair alarm engaged and all need within reach at the end of session and  present in room. Will continue to follow per PT POC. Pt. is fucntioning below her baseline and will benefit from continued skilled PT to address the mobility deficits.  Barriers to Discharge: None     Rehab Resource Intensity Level, PT: I (Maximum Resource Intensity)    See flowsheet documentation for full assessment.

## 2025-02-13 NOTE — PHYSICAL THERAPY NOTE
Physical Therapy Treatment Note     02/13/25 1206   PT Last Visit   PT Visit Date 02/13/25   Note Type   Note Type Treatment   Pain Assessment   Pain Assessment Tool 0-10   Pain Location/Orientation Orientation: Bilateral;Location: Arm   Restrictions/Precautions   Weight Bearing Precautions Per Order Yes   RLE Weight Bearing Per Order WBAT   LLE Weight Bearing Per Order WBAT   Other Precautions Fall Risk;Pain;WBS;Chair Alarm;Limb alert;Multiple lines;O2   General   Chart Reviewed Yes   Family/Caregiver Present Yes  ( Russ)   Subjective   Subjective Pt. agreeable to PT   Transfers   Sit to Stand   (Mod - Min A)   Additional items Assist x 2;Armrests;Increased time required;Verbal cues   Stand to Sit 4  Minimal assistance   Additional items Assist x 1;Verbal cues;Increased time required;Armrests   Ambulation/Elevation   Gait pattern Improper Weight shift;Poor UE support;Forward Flexion;Decreased foot clearance;Short stride;Excessively slow;Decreased toe off;Decreased heel strike   Gait Assistance 4  Minimal assist   Additional items Assist x 2;Verbal cues;Other (Comment)  (3rd A for chair follow)   Assistive Device Rolling walker   Distance 6ft, 12ft   Balance   Static Sitting Fair   Dynamic Sitting Fair -   Static Standing Fair -  (with RW)   Dynamic Standing Poor +   Ambulatory Poor +   Endurance Deficit   Endurance Deficit Yes   Endurance Deficit Description fatigue, weakness   Activity Tolerance   Activity Tolerance Patient limited by fatigue   Nurse Made Aware yes   Exercises   TKR Sitting;10 reps;Bilateral;AAROM   Assessment   Prognosis Fair   Problem List Decreased strength;Decreased range of motion;Decreased endurance;Impaired balance;Decreased mobility;Decreased coordination;Impaired judgement;Pain;Decreased skin integrity;Obesity   Assessment Pt. reported pain in her wrist/hand/arms. Pt. noted with R lean and unable to correct herself without physical assist. Pt. given max cues for all technqiues for  ambulation and transfers. Pt. progressed from a Mod A x 2 to Min A x 2 for transfers as reps increased. Pt. able to ambulate longer distance and max cues for posture, LE sequencing and A with negotiating RW given. Chair follow and seated rest given between ambulation. Pt. seated on chair post session with all needs within reach and was in upright posture. Chair alarm engaged and all need within reach at the end of session and  present in room. Will continue to follow per PT POC. Pt. is fucntioning below her baseline and will benefit from continued skilled PT to address the mobility deficits.   Barriers to Discharge None   Goals   Patient Goals None reported   STG Expiration Date 02/25/25   PT Treatment Day 1   Plan   Treatment/Interventions Functional transfer training;LE strengthening/ROM;Therapeutic exercise;Spoke to nursing;Family;Spoke to case management;Gait training;Equipment eval/education;Patient/family training   Progress Progressing toward goals   PT Frequency 3-5x/wk   Discharge Recommendation   Rehab Resource Intensity Level, PT I (Maximum Resource Intensity)   AM-PAC Basic Mobility Inpatient   Turning in Flat Bed Without Bedrails 2   Lying on Back to Sitting on Edge of Flat Bed Without Bedrails 2   Moving Bed to Chair 2   Standing Up From Chair Using Arms 2   Walk in Room 2   Climb 3-5 Stairs With Railing 2   Basic Mobility Inpatient Raw Score 12   Basic Mobility Standardized Score 32.23   [facility] Highest Level Of Mobility   -HL Goal 4: Move to chair/commode   -HLM Achieved 6: Walk 10 steps or more   End of Consult   Patient Position at End of Consult Bedside chair;Bed/Chair alarm activated;All needs within reach           Elmer Barahona PTA    An AM-PAC basic mobility standardized score less than 42.9 suggest the patient may benefit from discharge to post-acute rehab services.

## 2025-02-14 ENCOUNTER — APPOINTMENT (INPATIENT)
Dept: RADIOLOGY | Facility: HOSPITAL | Age: 70
DRG: 871 | End: 2025-02-14
Payer: MEDICARE

## 2025-02-14 PROBLEM — J10.1 INFLUENZA A: Status: ACTIVE | Noted: 2025-02-14

## 2025-02-14 PROBLEM — D64.9 ACUTE ANEMIA: Status: RESOLVED | Noted: 2023-02-22 | Resolved: 2025-02-14

## 2025-02-14 PROBLEM — J10.1 INFLUENZA A: Status: RESOLVED | Noted: 2025-02-14 | Resolved: 2025-02-14

## 2025-02-14 LAB
ALBUMIN SERPL BCG-MCNC: 3.8 G/DL (ref 3.5–5)
ALP SERPL-CCNC: 226 U/L (ref 34–104)
ALT SERPL W P-5'-P-CCNC: 77 U/L (ref 7–52)
ANION GAP SERPL CALCULATED.3IONS-SCNC: 8 MMOL/L (ref 4–13)
ANION GAP SERPL CALCULATED.3IONS-SCNC: 9 MMOL/L (ref 4–13)
AST SERPL W P-5'-P-CCNC: 137 U/L (ref 13–39)
BASE EX.OXY STD BLDV CALC-SCNC: 77.2 % (ref 60–80)
BASE EXCESS BLDV CALC-SCNC: -10.1 MMOL/L
BASOPHILS # BLD AUTO: 0.02 THOUSANDS/ΜL (ref 0–0.1)
BASOPHILS NFR BLD AUTO: 0 % (ref 0–1)
BILIRUB SERPL-MCNC: 0.87 MG/DL (ref 0.2–1)
BNP SERPL-MCNC: 3210 PG/ML (ref 0–100)
BUN SERPL-MCNC: 38 MG/DL (ref 5–25)
BUN SERPL-MCNC: 39 MG/DL (ref 5–25)
CALCIUM SERPL-MCNC: 7.9 MG/DL (ref 8.4–10.2)
CALCIUM SERPL-MCNC: 8.8 MG/DL (ref 8.4–10.2)
CHLORIDE SERPL-SCNC: 108 MMOL/L (ref 96–108)
CHLORIDE SERPL-SCNC: 109 MMOL/L (ref 96–108)
CO2 SERPL-SCNC: 19 MMOL/L (ref 21–32)
CO2 SERPL-SCNC: 19 MMOL/L (ref 21–32)
CREAT SERPL-MCNC: 1.3 MG/DL (ref 0.6–1.3)
CREAT SERPL-MCNC: 1.3 MG/DL (ref 0.6–1.3)
EOSINOPHIL # BLD AUTO: 0.04 THOUSAND/ΜL (ref 0–0.61)
EOSINOPHIL NFR BLD AUTO: 1 % (ref 0–6)
ERYTHROCYTE [DISTWIDTH] IN BLOOD BY AUTOMATED COUNT: 16.2 % (ref 11.6–15.1)
FLUAV RNA RESP QL NAA+PROBE: POSITIVE
FLUBV RNA RESP QL NAA+PROBE: NEGATIVE
GFR SERPL CREATININE-BSD FRML MDRD: 42 ML/MIN/1.73SQ M
GFR SERPL CREATININE-BSD FRML MDRD: 42 ML/MIN/1.73SQ M
GLUCOSE SERPL-MCNC: 104 MG/DL (ref 65–140)
GLUCOSE SERPL-MCNC: 124 MG/DL (ref 65–140)
GLUCOSE SERPL-MCNC: 31 MG/DL (ref 65–140)
GLUCOSE SERPL-MCNC: 34 MG/DL (ref 65–140)
GLUCOSE SERPL-MCNC: 73 MG/DL (ref 65–140)
GLUCOSE SERPL-MCNC: 81 MG/DL (ref 65–140)
GLUCOSE SERPL-MCNC: 85 MG/DL (ref 65–140)
GLUCOSE SERPL-MCNC: 92 MG/DL (ref 65–140)
GLUCOSE SERPL-MCNC: 96 MG/DL (ref 65–140)
HCO3 BLDV-SCNC: 15.9 MMOL/L (ref 24–30)
HCT VFR BLD AUTO: 34.8 % (ref 34.8–46.1)
HGB BLD-MCNC: 10.4 G/DL (ref 11.5–15.4)
IMM GRANULOCYTES # BLD AUTO: 0.09 THOUSAND/UL (ref 0–0.2)
IMM GRANULOCYTES NFR BLD AUTO: 1 % (ref 0–2)
LACTATE SERPL-SCNC: 1.9 MMOL/L (ref 0.5–2)
LYMPHOCYTES # BLD AUTO: 0.74 THOUSANDS/ΜL (ref 0.6–4.47)
LYMPHOCYTES NFR BLD AUTO: 11 % (ref 14–44)
MAGNESIUM SERPL-MCNC: 2.2 MG/DL (ref 1.9–2.7)
MCH RBC QN AUTO: 28.3 PG (ref 26.8–34.3)
MCHC RBC AUTO-ENTMCNC: 29.9 G/DL (ref 31.4–37.4)
MCV RBC AUTO: 95 FL (ref 82–98)
MONOCYTES # BLD AUTO: 0.4 THOUSAND/ΜL (ref 0.17–1.22)
MONOCYTES NFR BLD AUTO: 6 % (ref 4–12)
NEUTROPHILS # BLD AUTO: 5.28 THOUSANDS/ΜL (ref 1.85–7.62)
NEUTS SEG NFR BLD AUTO: 81 % (ref 43–75)
NRBC BLD AUTO-RTO: 0 /100 WBCS
O2 CT BLDV-SCNC: 12.9 ML/DL
PCO2 BLDV: 35.6 MM HG (ref 42–50)
PH BLDV: 7.27 [PH] (ref 7.3–7.4)
PLATELET # BLD AUTO: 156 THOUSANDS/UL (ref 149–390)
PMV BLD AUTO: 11.1 FL (ref 8.9–12.7)
PO2 BLDV: 46.8 MM HG (ref 35–45)
POTASSIUM SERPL-SCNC: 4.5 MMOL/L (ref 3.5–5.3)
POTASSIUM SERPL-SCNC: 5.3 MMOL/L (ref 3.5–5.3)
PROT SERPL-MCNC: 7.3 G/DL (ref 6.4–8.4)
RBC # BLD AUTO: 3.68 MILLION/UL (ref 3.81–5.12)
RSV RNA RESP QL NAA+PROBE: NEGATIVE
SARS-COV-2 RNA RESP QL NAA+PROBE: NEGATIVE
SODIUM SERPL-SCNC: 136 MMOL/L (ref 135–147)
SODIUM SERPL-SCNC: 136 MMOL/L (ref 135–147)
VANCOMYCIN SERPL-MCNC: 19.8 UG/ML (ref 10–20)
WBC # BLD AUTO: 6.57 THOUSAND/UL (ref 4.31–10.16)

## 2025-02-14 PROCEDURE — 94002 VENT MGMT INPAT INIT DAY: CPT

## 2025-02-14 PROCEDURE — 80053 COMPREHEN METABOLIC PANEL: CPT | Performed by: NURSE PRACTITIONER

## 2025-02-14 PROCEDURE — 83735 ASSAY OF MAGNESIUM: CPT | Performed by: NURSE PRACTITIONER

## 2025-02-14 PROCEDURE — 71045 X-RAY EXAM CHEST 1 VIEW: CPT

## 2025-02-14 PROCEDURE — 82948 REAGENT STRIP/BLOOD GLUCOSE: CPT

## 2025-02-14 PROCEDURE — 94760 N-INVAS EAR/PLS OXIMETRY 1: CPT

## 2025-02-14 PROCEDURE — 85025 COMPLETE CBC W/AUTO DIFF WBC: CPT | Performed by: NURSE PRACTITIONER

## 2025-02-14 PROCEDURE — 94640 AIRWAY INHALATION TREATMENT: CPT

## 2025-02-14 PROCEDURE — 80202 ASSAY OF VANCOMYCIN: CPT | Performed by: NURSE PRACTITIONER

## 2025-02-14 PROCEDURE — 83605 ASSAY OF LACTIC ACID: CPT | Performed by: NURSE PRACTITIONER

## 2025-02-14 PROCEDURE — 80048 BASIC METABOLIC PNL TOTAL CA: CPT

## 2025-02-14 PROCEDURE — 99291 CRITICAL CARE FIRST HOUR: CPT | Performed by: INTERNAL MEDICINE

## 2025-02-14 PROCEDURE — 82805 BLOOD GASES W/O2 SATURATION: CPT | Performed by: NURSE PRACTITIONER

## 2025-02-14 PROCEDURE — 83880 ASSAY OF NATRIURETIC PEPTIDE: CPT

## 2025-02-14 PROCEDURE — 0241U HB NFCT DS VIR RESP RNA 4 TRGT: CPT | Performed by: NURSE PRACTITIONER

## 2025-02-14 RX ORDER — LABETALOL HYDROCHLORIDE 5 MG/ML
20 INJECTION, SOLUTION INTRAVENOUS EVERY 4 HOURS PRN
Status: DISCONTINUED | OUTPATIENT
Start: 2025-02-14 | End: 2025-02-16

## 2025-02-14 RX ORDER — GUAIFENESIN/DEXTROMETHORPHAN 100-10MG/5
10 SYRUP ORAL EVERY 4 HOURS PRN
Status: DISCONTINUED | OUTPATIENT
Start: 2025-02-14 | End: 2025-02-19 | Stop reason: HOSPADM

## 2025-02-14 RX ORDER — FUROSEMIDE 10 MG/ML
20 INJECTION INTRAMUSCULAR; INTRAVENOUS ONCE
Status: COMPLETED | OUTPATIENT
Start: 2025-02-14 | End: 2025-02-14

## 2025-02-14 RX ORDER — ACETAMINOPHEN 10 MG/ML
1000 INJECTION, SOLUTION INTRAVENOUS ONCE
Status: COMPLETED | OUTPATIENT
Start: 2025-02-14 | End: 2025-02-14

## 2025-02-14 RX ORDER — FUROSEMIDE 10 MG/ML
40 INJECTION INTRAMUSCULAR; INTRAVENOUS ONCE
Status: COMPLETED | OUTPATIENT
Start: 2025-02-14 | End: 2025-02-14

## 2025-02-14 RX ORDER — AMOXICILLIN 250 MG
1 CAPSULE ORAL
Status: DISCONTINUED | OUTPATIENT
Start: 2025-02-14 | End: 2025-02-19 | Stop reason: HOSPADM

## 2025-02-14 RX ORDER — DEXTROSE MONOHYDRATE 25 G/50ML
25 INJECTION, SOLUTION INTRAVENOUS ONCE
Status: COMPLETED | OUTPATIENT
Start: 2025-02-14 | End: 2025-02-14

## 2025-02-14 RX ORDER — HYDROCODONE POLISTIREX AND CHLORPHENIRAMINE POLISTIREX 10; 8 MG/5ML; MG/5ML
5 SUSPENSION, EXTENDED RELEASE ORAL ONCE
Refills: 0 | Status: DISCONTINUED | OUTPATIENT
Start: 2025-02-14 | End: 2025-02-19 | Stop reason: HOSPADM

## 2025-02-14 RX ORDER — DEXTROSE MONOHYDRATE 25 G/50ML
INJECTION, SOLUTION INTRAVENOUS
Status: COMPLETED
Start: 2025-02-14 | End: 2025-02-14

## 2025-02-14 RX ORDER — ONDANSETRON 2 MG/ML
4 INJECTION INTRAMUSCULAR; INTRAVENOUS ONCE
Status: COMPLETED | OUTPATIENT
Start: 2025-02-14 | End: 2025-02-14

## 2025-02-14 RX ORDER — OSELTAMIVIR PHOSPHATE 30 MG/1
30 CAPSULE ORAL EVERY 12 HOURS SCHEDULED
Status: DISCONTINUED | OUTPATIENT
Start: 2025-02-14 | End: 2025-02-19 | Stop reason: HOSPADM

## 2025-02-14 RX ADMIN — OSELTAMIVIR PHOSPHATE 30 MG: 30 CAPSULE ORAL at 21:20

## 2025-02-14 RX ADMIN — DICLOFENAC SODIUM 2 G: 10 GEL TOPICAL at 18:17

## 2025-02-14 RX ADMIN — SENNOSIDES AND DOCUSATE SODIUM 1 TABLET: 50; 8.6 TABLET ORAL at 18:00

## 2025-02-14 RX ADMIN — FUROSEMIDE 40 MG: 10 INJECTION, SOLUTION INTRAVENOUS at 03:33

## 2025-02-14 RX ADMIN — LIDOCAINE 5% 1 PATCH: 700 PATCH TOPICAL at 11:04

## 2025-02-14 RX ADMIN — DEXTROSE MONOHYDRATE 25 ML: 25 INJECTION, SOLUTION INTRAVENOUS at 08:08

## 2025-02-14 RX ADMIN — ONDANSETRON 4 MG: 2 INJECTION INTRAMUSCULAR; INTRAVENOUS at 03:33

## 2025-02-14 RX ADMIN — ONDANSETRON 4 MG: 2 INJECTION INTRAMUSCULAR; INTRAVENOUS at 00:21

## 2025-02-14 RX ADMIN — OXYCODONE 5 MG: 5 TABLET ORAL at 15:04

## 2025-02-14 RX ADMIN — CYCLOBENZAPRINE 10 MG: 10 TABLET, FILM COATED ORAL at 18:00

## 2025-02-14 RX ADMIN — ENOXAPARIN SODIUM 40 MG: 40 INJECTION SUBCUTANEOUS at 08:00

## 2025-02-14 RX ADMIN — ACETAMINOPHEN 1000 MG: 10 INJECTION INTRAVENOUS at 08:46

## 2025-02-14 RX ADMIN — FUROSEMIDE 20 MG: 10 INJECTION, SOLUTION INTRAVENOUS at 00:58

## 2025-02-14 RX ADMIN — ALBUTEROL SULFATE 2.5 MG: 2.5 SOLUTION RESPIRATORY (INHALATION) at 14:59

## 2025-02-14 RX ADMIN — SERTRALINE HYDROCHLORIDE 100 MG: 100 TABLET ORAL at 21:20

## 2025-02-14 RX ADMIN — CEFTRIAXONE SODIUM 1000 MG: 10 INJECTION, POWDER, FOR SOLUTION INTRAVENOUS at 21:20

## 2025-02-14 RX ADMIN — ACETAMINOPHEN 325MG 650 MG: 325 TABLET ORAL at 15:04

## 2025-02-14 RX ADMIN — DICLOFENAC SODIUM 2 G: 10 GEL TOPICAL at 23:00

## 2025-02-14 RX ADMIN — PRAMIPEXOLE DIHYDROCHLORIDE 1 MG: 1 TABLET ORAL at 18:03

## 2025-02-14 RX ADMIN — FAMOTIDINE 20 MG: 20 TABLET, FILM COATED ORAL at 21:20

## 2025-02-14 RX ADMIN — ALBUTEROL SULFATE 2.5 MG: 2.5 SOLUTION RESPIRATORY (INHALATION) at 20:55

## 2025-02-14 RX ADMIN — CARVEDILOL 6.25 MG: 6.25 TABLET, FILM COATED ORAL at 16:44

## 2025-02-14 RX ADMIN — ALBUTEROL SULFATE 2.5 MG: 2.5 SOLUTION RESPIRATORY (INHALATION) at 00:31

## 2025-02-14 RX ADMIN — SENNOSIDES AND DOCUSATE SODIUM 1 TABLET: 50; 8.6 TABLET ORAL at 23:00

## 2025-02-14 RX ADMIN — ATORVASTATIN CALCIUM 80 MG: 80 TABLET, FILM COATED ORAL at 16:44

## 2025-02-14 NOTE — ASSESSMENT & PLAN NOTE
Podiatry consulted - Given minimal displacement of the fracture, current open diabetic foot ulceration, and elevated hemoglobin A1c of 7.7 patient would benefit from weightbearing for transfers in cam boot.  She should be nonweightbearing at all the times in this cam boot.  Xray showing a minimally displaced oblique fibular fracture.

## 2025-02-14 NOTE — ASSESSMENT & PLAN NOTE
In the setting of sepsis due to UTI and influenza  Improved since admission although now slightly confused due to respiratory status

## 2025-02-14 NOTE — ASSESSMENT & PLAN NOTE
Secondary to Influenza A and possible UTI  Patient completed Sepsis protocol on admission  Continue ceftriaxone  Tamiflu  Monitor fluid status closely  Blood cultures negative  Urine Culture mixed contaminants

## 2025-02-14 NOTE — PROGRESS NOTES
Progress Note - Critical Care/ICU   Name: Zohra Barriga 69 y.o. female I MRN: [id number]  Unit/Bed#: ICU 09 I Date of Admission: 2/10/2025   Date of Service: 2/14/2025 I Hospital Day: 3      Assessment & Plan  Acute respiratory failure (HCC)  Patient transferred to ICU after developing increasing shortness of breath, crackles throughout lung fields, hypertension and tachycardia.   Patient presented to [facility] after a mechanical fall and lethargy which was believed to be due to sepsis from a UTI. Patient was being treated with ceftriaxone.   Repeat Influenza A positive    Plan:  Lasix 40 mg x1  BIPAP  Zofran for nausea  High probability of intubation  Essential hypertension  Hold oral medications at this time  Labetalol prn for blood pressure >180  Type 2 diabetes mellitus, with long-term current use of insulin ([facility])  Lab Results   Component Value Date    HGBA1C 7.7 (H) 01/17/2025       Recent Labs     02/13/25  1540 02/13/25  2139 02/13/25  2213 02/13/25  2333   POCGLU 184* 56* 87 139       Blood Sugar Average: Last 72 hrs:  (P) 119    Managed by endocrinology in the outpatient setting  Insulin pump removed and sent home with   Utilize basal Lantus at bedtime, consider transitioning to SSI due to decline in condition  SSI with Accu-Cheks  Hypoglycemia protocol    JULIANNE (acute kidney injury) (HCC)  Slightly elevated from baseline  Continue to monitor urine output  Avoid nephrotoxins  Limit IV fluids due to heart failure and decreased EF  Fall    Acute metabolic encephalopathy  In the setting of sepsis due to UTI and influenza  Improved since admission although now slightly confused due to respiratory status  Systolic CHF (HCC)  Wt Readings from Last 3 Encounters:   02/13/25 76.1 kg (167 lb 12.3 oz)   12/27/24 79.4 kg (175 lb)   09/20/24 77.1 kg (170 lb)       Cardiomyopathy of unknown cause diagnosed in January during recent hospitalization with newly reduced EF 35 to 39%  PTA GDMT carvedilol 6.25 mg twice daily,  losartan 50 mg twice daily, spironolactone 25 mg daily, torsemide 20 mg daily - continue once able to take PO. Will hold due to respiratory status.  Lasix 40 mg x 1      Diabetic ulcer of right midfoot associated with type 2 diabetes mellitus (HCC)  Lab Results   Component Value Date    HGBA1C 7.7 (H) 01/17/2025       Recent Labs     02/13/25  1540 02/13/25  2139 02/13/25  2213 02/13/25  2333   POCGLU 184* 56* 87 139       Blood Sugar Average: Last 72 hrs:  (P) 119    Known ulceration plantar aspect of right foot  Has been states follows with podiatry  Appears boggy/edematous, no drainage Check x-ray right foot  Empiric antibiotic therapy for potential underlying wound  2/3 at [facility] ED diagnosed with right leg cellulitis and initiated on doxycycline no evidence of cellulitis remains  UTI (urinary tract infection)    Severe sepsis (HCC)  Secondary to Influenza A and possible UTI  Patient completed Sepsis protocol on admission  Continue ceftriaxone  Tamiflu  Monitor fluid status closely  Blood cultures negative  Urine Culture mixed contaminants   Facial contusion, initial encounter  S/p fall  Presented with left-sided facial contusion  Trauma scans unremarkable for acute traumatic injury  PT/OT/case management  Pain control  Left ankle pain  Podiatry consulted - Given minimal displacement of the fracture, current open diabetic foot ulceration, and elevated hemoglobin A1c of 7.7 patient would benefit from weightbearing for transfers in cam boot.  She should be nonweightbearing at all the times in this cam boot.  Xray showing a minimally displaced oblique fibular fracture.   Disposition: Critical care    ICU Core Measures     A: Assess, Prevent, and Manage Pain Has pain been assessed? NA  Need for changes to pain regimen? NA   B: Both SAT/SAT  N/A   C: Choice of Sedation RASS Goal: 0 Alert and Calm or N/A patient not on sedation  Need for changes to sedation or analgesia regimen? NA   D: Delirium CAM-ICU: Negative   E:  Early Mobility  Plan for early mobility? Yes   F: Family Engagement Plan for family engagement today? NA       Antibiotic Review: Continue broad spectrum secondary to severity of illness.       Prophylaxis:  VTE VTE covered by:  enoxaparin, Subcutaneous, 40 mg at 02/13/25 0807       Stress Ulcer  covered byfamotidine (PEPCID) 40 MG tablet [[id number]] (Long-Term Med), famotidine (PEPCID) tablet 20 mg [[id number]]         24 Hour Events : Patient with acute respiratory distress with hypertension, tachycardia, and crackles throughout lung fields. Transferred to ICU for BIPAp and lasix. High probability for intubation  Subjective   Review of Systems: See HPI for Review of Systems    Objective :                   Vitals I/O      Most Recent Min/Max in 24hrs   Temp (!) 97.3 °F (36.3 °C) Temp  Min: 97.3 °F (36.3 °C)  Max: 97.5 °F (36.4 °C)   Pulse (!) 5 Pulse  Min: 5  Max: 62   Resp (!) 28 Resp  Min: 17  Max: 28   /74 BP  Min: 144/81  Max: 157/88   O2 Sat 96 % SpO2  Min: 84 %  Max: 100 %      Intake/Output Summary (Last 24 hours) at 2/14/2025 0427  Last data filed at 2/14/2025 0301  Gross per 24 hour   Intake 1000 ml   Output 600 ml   Net 400 ml       Diet Roman/CHO Controlled; Consistent Carbohydrate Diet Level 2 (5 carb servings/75 grams CHO/meal)    Invasive Monitoring           Physical Exam   Physical Exam  Vitals reviewed.   Eyes:      Extraocular Movements: Extraocular movements intact.      Pupils: Pupils are equal, round, and reactive to light.   Skin:     General: Skin is warm and dry.      Coloration: Skin is pale.   HENT:      Head: Normocephalic.   Cardiovascular:      Rate and Rhythm: Normal rate and regular rhythm.      Heart sounds: Normal heart sounds.   Abdominal: General: Bowel sounds are normal. There is no distension.      Palpations: Abdomen is soft.      Tenderness: There is no abdominal tenderness.   Constitutional:       General: She is in acute distress.      Appearance: She is ill-appearing.    Pulmonary:      Effort: Tachypnea, accessory muscle usage and accessory muscle usage present.      Breath sounds: Rhonchi and rales present.   Neurological:      General: No focal deficit present.      Mental Status: She is disoriented to situation.      Motor: Strength full and intact in all extremities.   Genitourinary/Anorectal:  external catheter present.        Diagnostic Studies        Lab Results: I have reviewed the following results:     Medications:  Scheduled PRN   atorvastatin, 80 mg, Daily With Dinner  [Transfer Hold] calcium carbonate, 1 tablet, Daily With Breakfast  [Transfer Hold] carvedilol, 6.25 mg, BID With Meals  [Transfer Hold] cefTRIAXone, 1,000 mg, Q24H  cyanocobalamin, 1,000 mcg, Daily  [Transfer Hold] Diclofenac Sodium, 2 g, 4x Daily  enoxaparin, 40 mg, Daily  ezetimibe, 10 mg, Daily  famotidine, 20 mg, HS  fluticasone-vilanterol, 1 puff, Daily  [Transfer Hold] Hydrocod Jermaine-Chlorphe Jermaine ER, 5 mL, Once  [Transfer Hold] insulin glargine, 15 Units, HS  [Transfer Hold] insulin lispro, 1-6 Units, TID AC  [Transfer Hold] insulin lispro, 1-6 Units, HS  levothyroxine, 112 mcg, Early Morning  [Transfer Hold] lidocaine, 1 patch, Daily  [Held by provider] losartan, 50 mg, BID  oseltamivir, 30 mg, Q12H KASSI  polyethylene glycol, 17 g, Daily  pramipexole, 1 mg, BID  sertraline, 100 mg, HS  [Held by provider] spironolactone, 25 mg, Daily  [Held by provider] torsemide, 20 mg, Daily  [Transfer Hold] vancomycin, 750 mg, Q24H      [Transfer Hold] acetaminophen, 650 mg, Q6H PRN  albuterol, 2.5 mg, Q6H PRN  [Transfer Hold] cyclobenzaprine, 10 mg, TID PRN  [Transfer Hold] dextromethorphan-guaiFENesin, 10 mL, Q4H PRN  [Transfer Hold] HYDROmorphone, 0.2 mg, Q4H PRN  labetalol, 20 mg, Q4H PRN  [Transfer Hold] melatonin, 3 mg, HS PRN  [Transfer Hold] ondansetron, 4 mg, Q8H PRN  [Transfer Hold] oxyCODONE, 5 mg, Q4H PRN       Continuous          Labs:   CBC    Recent Labs     02/13/25  0443 02/14/25  0230   WBC  4.64 6.57   HGB 8.7* 10.4*   HCT 28.8* 34.8   * 156     BMP    Recent Labs     02/13/25  0443 02/14/25  0230   SODIUM 137 136   K 4.6 5.3   * 109*   CO2 19* 19*   AGAP 7 8   BUN 36* 38*   CREATININE 1.19 1.30   CALCIUM 7.9* 8.8       Coags    No recent results     Additional Electrolytes  Recent Labs     02/13/25  0443 02/14/25  0230   MG 1.9 2.2          Blood Gas    No recent results  Recent Labs     02/14/25  0259   PHVEN 7.268*   [id number] 35.6*   PO2VEN 46.8*   BHO4BDM 15.9*   BEVEN -10.1   [id number] 77.2    LFTs  Recent Labs     02/14/25  0230   ALT 77*   *   ALKPHOS 226*   ALB 3.8   TBILI 0.87       Infectious  Recent Labs     02/12/25  0520   PROCALCITONI 6.93*     Glucose  Recent Labs     02/12/25  0520 02/13/25  0443 02/14/25  0230   GLUC 92 63* 85        Administrative Statements   I have spent a total time of 55 minutes in caring for this patient on the day of the visit/encounter including Diagnostic results, Risks and benefits of tx options, Instructions for management, Documenting in the medical record, Reviewing / ordering tests, medicine, procedures  , Obtaining or reviewing history  , and Communicating with other healthcare professionals .

## 2025-02-14 NOTE — ASSESSMENT & PLAN NOTE
Patient transferred to ICU after developing increasing shortness of breath, crackles throughout lung fields, hypertension and tachycardia.   Patient presented to [facility] after a mechanical fall and lethargy which was believed to be due to sepsis from a UTI. Patient was being treated with ceftriaxone.   Repeat Influenza A positive    Plan:  Lasix 40 mg x1  BIPAP  Zofran for nausea  High probability of intubation

## 2025-02-14 NOTE — PHYSICAL THERAPY NOTE
PHYSICAL THERAPY NOTE          Patient Name: Zohra Barriga  Today's Date: 2/14/2025 02/14/25 1443   PT Last Visit   PT Visit Date 02/14/25   Note Type   Note type Cancelled Session   Cancel Reasons Medical status   Additional Comments Pt transferred to ICU early in a.m today 2* to respiratory distress. Will defer PT tx today. Noted WBS & camboot orders. Will continue to follow as appropriate.     Lars Jackson

## 2025-02-14 NOTE — PLAN OF CARE
Problem: Potential for Falls  Goal: Patient will remain free of falls  Description: INTERVENTIONS:  - Educate patient/family on patient safety including physical limitations  - Instruct patient to call for assistance with activity   - Consult OT/PT to assist with strengthening/mobility   - Keep Call bell within reach  - Keep bed low and locked with side rails adjusted as appropriate  - Keep care items and personal belongings within reach  - Initiate and maintain comfort rounds  - Make Fall Risk Sign visible to staff  - Offer Toileting every 2 Hours, in advance of need  - Initiate/Maintain bed alarm  - Obtain necessary fall risk management equipment:    - Apply yellow socks and bracelet for high fall risk patients  - Consider moving patient to room near nurses station  Outcome: Progressing     Problem: PAIN - ADULT  Goal: Verbalizes/displays adequate comfort level or baseline comfort level  Description: Interventions:  - Encourage patient to monitor pain and request assistance  - Assess pain using appropriate pain scale  - Administer analgesics based on type and severity of pain and evaluate response  - Implement non-pharmacological measures as appropriate and evaluate response  - Consider cultural and social influences on pain and pain management  - Notify physician/advanced practitioner if interventions unsuccessful or patient reports new pain  Outcome: Progressing

## 2025-02-14 NOTE — CONSULTS
Vancomycin therapy has been discontinued. Pharmacy will sign off. Thank you for this consult. Please do not hesitate to call us with questions or re-consult us if the need arises.    Betty Yoon, PharmD, [facility]  Critical Care and Internal Medicine Clinical Pharmacist  [phone] or via Secure Chat

## 2025-02-14 NOTE — RESPIRATORY THERAPY NOTE
RT called to room for a prn neb tx due to wheezing. After the tx, the pt's oxygen saturation decreased to the low 80's. RT placed pt on home cpap machine, w 8L oxygen bled through. RN made RT aware that the pt vomited earlier. Pt's oxygen sat still was in the 80's. Slim was called. RT placed pt on vapotherm 30L and 50%. Pt's oxygen increased. Critical care was made aware. Upon arrival to ICU pt got restless. RT placed pt on bipap 16/6, rate 12 and 85%.

## 2025-02-14 NOTE — RESTORATIVE TECHNICIAN NOTE
Restorative Technician Note      Patient Name: Zohra Barriga     Restorative Tech Visit Date: 02/14/25  Note Type: Mobility  Patient Position Upon Consult: Bedside chair  Mobility / Activity Provided: Assisted PTA with walking pt  Activity Performed: Ambulated; Range of motion  Assistive Device: Roller walker  Patient Position at End of Consult: All needs within reach; Bedside chair

## 2025-02-14 NOTE — QUICK NOTE
"Notified by RN patient having difficulty breathing and \"vomited a small amount.\" Noted with mucous and brown liquid on her chest; was given chocolate pudding by spouse last night for hypoglycemia.    Zofran given. RT started albuterol neb    Patient seen in bed, somnolent, arousable to verbal stimuli, +wheezing and rhonchi  Uses CPAP at night but unable to apply due to concern for emesis. She is currently too weak and unable to protect her airway. Suspect emesis likely brought on by cough  Was noted with hypoxia yesterday, CXR done and given IV lasix 20mg    Stat CXR ordered; will repeat additional dose of IV Lasix 20mg  RT applying Vapotherm  Check: VBG, CBC, CMP, lactate, Mg  Repeat Flu/RSV/Covid swab  Antitussives added  Will ask ICU stat RN to evaluate for transfer  Pulmonary consult  Update level care SD2    Patient more alert, AAOx3, discussed code status, she confirmed she would want CPR and intubation and is \"not ready to die.\"    ADDENDUM: Influenza A+.  Start Tamiflu  "

## 2025-02-14 NOTE — ASSESSMENT & PLAN NOTE
Lab Results   Component Value Date    HGBA1C 7.7 (H) 01/17/2025       Recent Labs     02/13/25  1540 02/13/25  2139 02/13/25  2213 02/13/25  2333   POCGLU 184* 56* 87 139       Blood Sugar Average: Last 72 hrs:  (P) 119    Managed by endocrinology in the outpatient setting  Insulin pump removed and sent home with   Utilize basal Lantus at bedtime, consider transitioning to SSI due to decline in condition  SSI with Accu-Cheks  Hypoglycemia protocol

## 2025-02-14 NOTE — ASSESSMENT & PLAN NOTE
Wt Readings from Last 3 Encounters:   02/13/25 76.1 kg (167 lb 12.3 oz)   12/27/24 79.4 kg (175 lb)   09/20/24 77.1 kg (170 lb)       Cardiomyopathy of unknown cause diagnosed in January during recent hospitalization with newly reduced EF 35 to 39%  PTA GDMT carvedilol 6.25 mg twice daily, losartan 50 mg twice daily, spironolactone 25 mg daily, torsemide 20 mg daily - continue once able to take PO. Will hold due to respiratory status.  Lasix 40 mg x 1

## 2025-02-14 NOTE — ASSESSMENT & PLAN NOTE
Patient transferred to ICU after developing increasing shortness of breath, crackles throughout lung fields, hypertension and tachycardia.   Patient presented to [facility] after a mechanical fall and lethargy which was believed to be due to sepsis from a UTI. Patient was being treated with ceftriaxone.   VBG on admission: pH 7.268, pCO2 35.6 bicarb low 15.9 low lactic acid 1.9  Repeat Influenza A positive  BNP 3,210   Lasix x 3  Overnight patient required ventilatory support otherwise on nasal cannula  Patient continues to have crackles biltarlly on exam  Assessment: Sepsis secondary to Influenza A and component of Volume overload     Plan:  Wean off Nasal canula as tolerated  Respiratory protocol   Continuing Lasix 40 mg IV BID. Reassess tomorrow.   Daily BMP to monitor Creatinine while on Lasix    16-May-2019 14:16:52

## 2025-02-14 NOTE — ASSESSMENT & PLAN NOTE
Slightly elevated from baseline  Continue to monitor urine output  Avoid nephrotoxins  Limit IV fluids due to heart failure and decreased EF

## 2025-02-14 NOTE — ASSESSMENT & PLAN NOTE
Lab Results   Component Value Date    HGBA1C 7.7 (H) 01/17/2025       Recent Labs     02/13/25  1540 02/13/25  2139 02/13/25  2213 02/13/25  2333   POCGLU 184* 56* 87 139       Blood Sugar Average: Last 72 hrs:  (P) 119    Known ulceration plantar aspect of right foot  Has been states follows with podiatry  Appears boggy/edematous, no drainage Check x-ray right foot  Empiric antibiotic therapy for potential underlying wound  2/3 at OSH ED diagnosed with right leg cellulitis and initiated on doxycycline no evidence of cellulitis remains

## 2025-02-14 NOTE — PHYSICAL THERAPY NOTE
Physical Therapy Cancellation Note      Pt.'s WBing status changed to WBAT in CAM boot for transfers only and otherwise NWB per Podiatry note on 02/13/25 at 4:30 p.m. and Slim at 1657

## 2025-02-14 NOTE — RESTORATIVE TECHNICIAN NOTE
Restorative Technician Note      Patient Name: Zohra Barriga     Restorative Tech Visit Date: 02/14/25  Note Type: Bracing, Follow-up fitting  Patient Position Upon Consult: Supine  Mobility / Activity Provided: Assisted PTA with walking pt  Activity Performed: Repositioned  Assistive Device: Roller walker  Brace Applied: Cam Walker Boot Standard  Additional Brace Ordered: No  Patient Position When Brace Applied: Supine  Bracing Recommendations: None  Patient Position at End of Consult: All needs within reach; Supine

## 2025-02-15 PROBLEM — A41.9 SEVERE SEPSIS (HCC): Status: RESOLVED | Noted: 2025-02-11 | Resolved: 2025-02-15

## 2025-02-15 PROBLEM — R65.20 SEVERE SEPSIS (HCC): Status: RESOLVED | Noted: 2025-02-11 | Resolved: 2025-02-15

## 2025-02-15 PROBLEM — G93.41 ACUTE METABOLIC ENCEPHALOPATHY: Status: RESOLVED | Noted: 2025-02-10 | Resolved: 2025-02-15

## 2025-02-15 LAB
BACTERIA BLD CULT: NORMAL
BACTERIA BLD CULT: NORMAL
GLUCOSE SERPL-MCNC: 101 MG/DL (ref 65–140)
GLUCOSE SERPL-MCNC: 126 MG/DL (ref 65–140)
GLUCOSE SERPL-MCNC: 146 MG/DL (ref 65–140)
GLUCOSE SERPL-MCNC: 161 MG/DL (ref 65–140)

## 2025-02-15 PROCEDURE — 94660 CPAP INITIATION&MGMT: CPT

## 2025-02-15 PROCEDURE — 82948 REAGENT STRIP/BLOOD GLUCOSE: CPT

## 2025-02-15 PROCEDURE — 92610 EVALUATE SWALLOWING FUNCTION: CPT | Performed by: SPEECH-LANGUAGE PATHOLOGIST

## 2025-02-15 PROCEDURE — 99232 SBSQ HOSP IP/OBS MODERATE 35: CPT | Performed by: INTERNAL MEDICINE

## 2025-02-15 PROCEDURE — NC001 PR NO CHARGE: Performed by: INTERNAL MEDICINE

## 2025-02-15 RX ORDER — FUROSEMIDE 10 MG/ML
40 INJECTION INTRAMUSCULAR; INTRAVENOUS
Status: DISCONTINUED | OUTPATIENT
Start: 2025-02-15 | End: 2025-02-19

## 2025-02-15 RX ADMIN — OXYCODONE 5 MG: 5 TABLET ORAL at 08:10

## 2025-02-15 RX ADMIN — SENNOSIDES AND DOCUSATE SODIUM 1 TABLET: 50; 8.6 TABLET ORAL at 21:48

## 2025-02-15 RX ADMIN — EZETIMIBE 10 MG: 10 TABLET ORAL at 08:10

## 2025-02-15 RX ADMIN — CARVEDILOL 6.25 MG: 6.25 TABLET, FILM COATED ORAL at 08:10

## 2025-02-15 RX ADMIN — POLYETHYLENE GLYCOL 3350 17 G: 17 POWDER, FOR SOLUTION ORAL at 08:10

## 2025-02-15 RX ADMIN — CYCLOBENZAPRINE 10 MG: 10 TABLET, FILM COATED ORAL at 08:10

## 2025-02-15 RX ADMIN — CYANOCOBALAMIN TAB 500 MCG 1000 MCG: 500 TAB at 08:10

## 2025-02-15 RX ADMIN — OSELTAMIVIR PHOSPHATE 30 MG: 30 CAPSULE ORAL at 08:10

## 2025-02-15 RX ADMIN — PRAMIPEXOLE DIHYDROCHLORIDE 1 MG: 1 TABLET ORAL at 08:12

## 2025-02-15 RX ADMIN — OXYCODONE 5 MG: 5 TABLET ORAL at 01:23

## 2025-02-15 RX ADMIN — ENOXAPARIN SODIUM 40 MG: 40 INJECTION SUBCUTANEOUS at 08:10

## 2025-02-15 RX ADMIN — TRIMETHOBENZAMIDE HYDROCHLORIDE 200 MG: 100 INJECTION INTRAMUSCULAR at 08:36

## 2025-02-15 RX ADMIN — GUAIFENESIN AND DEXTROMETHORPHAN 10 ML: 100; 10 SYRUP ORAL at 08:13

## 2025-02-15 RX ADMIN — INSULIN LISPRO 1 UNITS: 100 INJECTION, SOLUTION INTRAVENOUS; SUBCUTANEOUS at 22:04

## 2025-02-15 RX ADMIN — DICLOFENAC SODIUM 2 G: 10 GEL TOPICAL at 11:48

## 2025-02-15 RX ADMIN — CALCIUM 1 TABLET: 500 TABLET ORAL at 08:10

## 2025-02-15 RX ADMIN — OSELTAMIVIR PHOSPHATE 30 MG: 30 CAPSULE ORAL at 21:48

## 2025-02-15 RX ADMIN — DICLOFENAC SODIUM 2 G: 10 GEL TOPICAL at 18:30

## 2025-02-15 RX ADMIN — LIDOCAINE 5% 1 PATCH: 700 PATCH TOPICAL at 08:10

## 2025-02-15 RX ADMIN — LEVOTHYROXINE SODIUM 112 MCG: 112 TABLET ORAL at 06:20

## 2025-02-15 RX ADMIN — DICLOFENAC SODIUM 2 G: 10 GEL TOPICAL at 08:12

## 2025-02-15 RX ADMIN — FUROSEMIDE 40 MG: 10 INJECTION, SOLUTION INTRAVENOUS at 16:15

## 2025-02-15 RX ADMIN — FAMOTIDINE 20 MG: 20 TABLET, FILM COATED ORAL at 21:48

## 2025-02-15 RX ADMIN — FUROSEMIDE 40 MG: 10 INJECTION, SOLUTION INTRAVENOUS at 10:58

## 2025-02-15 RX ADMIN — INSULIN GLARGINE 15 UNITS: 100 INJECTION, SOLUTION SUBCUTANEOUS at 21:55

## 2025-02-15 RX ADMIN — DICLOFENAC SODIUM 2 G: 10 GEL TOPICAL at 21:55

## 2025-02-15 RX ADMIN — SERTRALINE HYDROCHLORIDE 100 MG: 100 TABLET ORAL at 21:49

## 2025-02-15 RX ADMIN — CARVEDILOL 6.25 MG: 6.25 TABLET, FILM COATED ORAL at 17:23

## 2025-02-15 NOTE — PROGRESS NOTES
Progress Note - Critical Care/ICU   Name: Zohra Barriga 69 y.o. female I MRN: [id number]  Unit/Bed#: ICU 09 I Date of Admission: 2/10/2025   Date of Service: 2/15/2025 I Hospital Day: 4       Critical Care Interval Transfer Note:    Brief Hospital Summary: Josh Abdi! Patient ICU 9 is stable for downgrade.  Patient is a 69-year-old female with a history of CHF with ejection fraction of 30 to 35%, Hypothyroidism, STU on CPAP, type II diabetes on insulin pump complicated by Diabetic ulcer of the right midfoot on insulin pump, spinal stimulator who presented to [place] post fall. She was admitted to [facility] for workup of altered mental status. Yesterday, patient had shortness of breath, hypoxic SpO2 of 84, and had vomited. She had noteable wheezing and rhonchi found to be Influenza A positive, BNP of >3000, and chest xray findings of new infiltrates. Patient became was admitted to the ICU for acute hypoxic respiratory failure secondary to influenza A as well as a component of acute on chronic systolic heart failure.  Patient required BiPAP. Her hypoxia and mental status improved after compliance with BiPAP and 3 doses of IV Lasix. Started on Tamiflu. Today patient is alert and oriented x 3 saturating well on nasal cannula has been able to tolerate a regular diet. No SIRS crteria. Mild Transaminiits Please let me know if patient is acceptable for downgrade to [facility]. Thank you in advance!    Barriers to discharge:   Assessing Fluid status  Mild JULIANNE-holding home medication losartan, aldactone, torsemide. Daily Creatinine check  Weaning off NC/home oxygen evaluation   PT/OT reassessment     Consults: IP CONSULT TO PHARMACY  IP CONSULT TO PODIATRY    Recommended to review admission imaging for incidental findings and document in discharge navigator: Chart reviewed, no known incidental findings noted at this time.      Discharge Plan: Anticipate discharge in 48-72 hrs to home.       Patient seen and  evaluated by Critical Care today and deemed to be appropriate for transfer to Med Surg. Spoke to Dr. Abdi from Hospitalist Service  to accept transfer. Critical care can be contacted via SecureChat with any questions or concerns. Please use the Critical Care AP Role in Secure Chat for any provider inquires until the patient is transferred out of the ICU or until tomorrow at 0600.

## 2025-02-15 NOTE — PROGRESS NOTES
Progress Note - Critical Care/ICU   Name: Zohra Barriga 69 y.o. female I MRN: [id number]  Unit/Bed#: ICU 09 I Date of Admission: 2/10/2025   Date of Service: 2/15/2025 I Hospital Day: 4      Assessment & Plan  Acute respiratory failure (HCC)  Patient transferred to ICU after developing increasing shortness of breath, crackles throughout lung fields, hypertension and tachycardia.   Patient presented to [facility] after a mechanical fall and lethargy which was believed to be due to sepsis from a UTI. Patient was being treated with ceftriaxone.   VBG on admission: pH 7.268, pCO2 35.6 bicarb low 15.9 low lactic acid 1.9  Repeat Influenza A positive  BNP 3,210   Lasix x 3  Overnight patient required ventilatory support otherwise on nasal cannula  Patient continues to have crackles biltarlly on exam  Assessment: Sepsis secondary to Influenza A and component of Volume overload     Plan:  Wean off Nasal canula as tolerated  Respiratory protocol   Continuing Lasix 40 mg IV BID. Reassess tomorrow.   Daily BMP to monitor Creatinine while on Lasix   Essential hypertension    Home medication: carvediol COREG 6.25 mg BID, losartan 50 mg BID, torsemide 40 mg daily  Blood pressure MAP 91 132/63 this morning   Plan   Continue COREG 6.25 mg BID   Holding losartan 50 mg BID due to JULIANNE   Torsemide 20 mg daily on hold due to JULIANNE   Continuing Lasix 40 mg IV BID  Daily BMP to monitor Creatinine while on Lasix   Labetalol prn for blood pressure >180  Acute on Chronic Systolic CHF (HCC)  Wt Readings from Last 3 Encounters:   02/15/25 84 kg (185 lb 3 oz)   12/27/24 79.4 kg (175 lb)   09/20/24 77.1 kg (170 lb)       Cardiomyopathy of unknown cause diagnosed in January during recent hospitalization with newly reduced EF 35 to 39%  Lasix 40 mg x 3     I/O: Intake 2.22 liters output 374 net positive 1.853   Bilateral Crackles on exam     Plan   Holding losartan 50 mg BID due to JULIANNE   Holding Torsemide 20 mg daily due to JULIANNE   Starting Lasix 40 mg IV  BID from today   Daily BMP to monitor Creatinine while on Lasix     Type 2 diabetes mellitus, with long-term current use of insulin (HCC)  Lab Results   Component Value Date    HGBA1C 7.7 (H) 01/17/2025       Recent Labs     02/14/25  1623 02/14/25  1939 02/14/25  2054 02/15/25  0723   POCGLU 104 124 92 101       Blood Sugar Average: Last 72 hrs:  (P) 100.5566942977546765    Managed by endocrinology in the outpatient setting  Insulin pump removed and sent home with   Utilize basal Lantus at bedtime,   Hypoglycemic episodes overnight not symptomatic except for some nausea that resolved with Tigan  Patient is tolerating   Plan   Consider transitioning to SSI due to decline in condition  SSI with Accu-Cheks  Hypoglycemia protocol    JULIANNE (acute kidney injury) ([facility])  Slightly elevated from baseline  Cr 1.30 (baseline .87 to 1.17)  Plan   Continue to monitor urine output  Avoid nephrotoxins  Limit IV fluids due to heart failure and decreased EF  Acute metabolic encephalopathy (Resolved: 2/15/2025)  In the setting of sepsis due to UTI and influenza  Improved since admission although now slightly confused due to respiratory status  On exam patient opens eyes to name call, squeezes hands and wiggles toes.  Cough reflex present.     Patient seems to understand conversations in the room nods head yes and no  Urea continues to be in the low to high 30s today its at 39  Plan   Continue to monitor mental status  Fall  Patient presented post fall   Has had PT OT   Plan   May need PT OT reevaluation post ICU needs  Facial contusion, initial encounter  S/p fall  Presented with left-sided facial contusion  Trauma scans unremarkable for acute traumatic injury  PT/OT/case management  Pain control  Diabetic ulcer of right midfoot associated with type 2 diabetes mellitus (HCC)  Lab Results   Component Value Date    HGBA1C 7.7 (H) 01/17/2025       Recent Labs     02/14/25  1623 02/14/25  1939 02/14/25  2054 02/15/25  0723   POCGLU  104 124 92 101       Blood Sugar Average: Last 72 hrs:  (P) 100.5541513007810737    Known ulceration plantar aspect of right foot  Has been states follows with podiatry  Appears boggy/edematous, no drainage Check x-ray right foot  Empiric antibiotic therapy for potential underlying wound  2/3 at OSH ED diagnosed with right leg cellulitis and initiated on doxycycline no evidence of cellulitis remains  Severe sepsis (HCC) (Resolved: 2/15/2025)  Secondary to Influenza A and possible UTI  Patient completed Sepsis protocol on admission  Blood cultures negative  Urine Culture mixed contaminants   Lactic Acid 1.9   Plan   Tamiflu day 2   Monitor fluid status closely  Left ankle pain  Podiatry consulted - Given minimal displacement of the fracture, current open diabetic foot ulceration, and elevated hemoglobin A1c of 7.7 patient would benefit from weightbearing for transfers in cam boot.  She should be nonweightbearing at all the times in this cam boot.  Xray showing a minimally displaced oblique fibular fracture.   UTI (urinary tract infection)  Presented with Urine findings of 10-20 no bacteria   Completed 5 days of Rocephin for sever sepsis   No suprapubic tenderness or reports of dysuria   Plan   Monitor   Likely resolved   Disposition: Critical care    ICU Core Measures     A: Assess, Prevent, and Manage Pain Has pain been assessed? NA  Need for changes to pain regimen? NA   B: Both SAT/SAT  N/A   C: Choice of Sedation RASS Goal: 0 Alert and Calm or N/A patient not on sedation  Need for changes to sedation or analgesia regimen? NA   D: Delirium CAM-ICU: Negative   E: Early Mobility  Plan for early mobility? Yes   F: Family Engagement Plan for family engagement today? NA       Antibiotic Review: Continue broad spectrum secondary to severity of illness.       Prophylaxis:  VTE VTE covered by:  enoxaparin, Subcutaneous, 40 mg at 02/15/25 0810       Stress Ulcer  covered byfamotidine (PEPCID) 40 MG tablet [[id number]]  (Long-Term Med), famotidine (PEPCID) tablet 20 mg [[id number]]         24 Hour Events : Overnight  asides from needing Bipap for an episode no events     Subjective    was in the room had some nausea after morning meds. Was trying to eat muffin no dysphagia deneis SOB chest pain abdominal pain She heard herself wheeze in the morning    Reports leg pain due to chronic venous stasis     Review of Systems: See HPI for Review of Systems    Objective :                   Vitals I/O      Most Recent Min/Max in 24hrs   Temp 98.3 °F (36.8 °C) Temp  Min: 98 °F (36.7 °C)  Max: 98.8 °F (37.1 °C)   Pulse 60 Pulse  Min: 60  Max: 67   Resp 19 Resp  Min: 15  Max: 31   /63 BP  Min: 89/59  Max: 143/61   O2 Sat 98 % SpO2  Min: 93 %  Max: 100 %      Intake/Output Summary (Last 24 hours) at 2/15/2025 0813  Last data filed at 2/15/2025 0610  Gross per 24 hour   Intake 180 ml   Output 1000 ml   Net -820 ml       Diet Roman/CHO Controlled; Consistent Carbohydrate Diet Level 2 (5 carb servings/75 grams CHO/meal)    Invasive Monitoring           Physical Exam   Physical Exam  Vitals reviewed.   Eyes:      General: Vision grossly intact. Neglect     Extraocular Movements: Extraocular movements intact.      Conjunctiva/sclera: Conjunctivae normal.      Pupils: Pupils are equal, round, and reactive to light.   Skin:     General: Skin is warm and dry.      Coloration: Skin is pale.      Findings: Wound present.   HENT:      Head: Normocephalic and atraumatic.      Nose: No congestion, rhinorrhea or epistaxis.      Mouth/Throat:      Mouth: Mucous membranes are moist.   Cardiovascular:      Rate and Rhythm: Normal rate and regular rhythm.      Heart sounds: Normal heart sounds.   Musculoskeletal:         General: Tenderness and deformity present. No swelling.      Right lower leg: No edema.      Left lower leg: No edema.      Comments: Chronic Venous Stasis   Bandaged Foot ulcer  Tenderness on palpation   Abdominal: General: Bowel  sounds are normal. There is no distension.      Palpations: Abdomen is soft.      Tenderness: There is no abdominal tenderness.   Constitutional:       General: She is not in acute distress.     Appearance: She is ill-appearing.   Pulmonary:      Effort: No accessory muscle usage or accessory muscle usage.      Breath sounds: Rhonchi and rales present. No wheezing.      Comments: 3 liters nasal canula  Psychiatric:         Speech: Speech is not no receptive aphasia or no expressive aphasia.   Neurological:      General: No focal deficit present.      Mental Status: She is alert and oriented to person, place and time. Mental status is at baseline.      Cranial Nerves: No dysarthria or facial asymmetry.      Sensory: No sensory deficit.      Motor: gross motor function is at baseline for patient. Strength full and intact in all extremities. No pronator drift.      Comments: Motor strength 4/5 upper and lower 3/5          Diagnostic Studies        Lab Results: I have reviewed the following results:     Medications:  Scheduled PRN   atorvastatin, 80 mg, Daily With Dinner  calcium carbonate, 1 tablet, Daily With Breakfast  carvedilol, 6.25 mg, BID With Meals  cyanocobalamin, 1,000 mcg, Daily  Diclofenac Sodium, 2 g, 4x Daily  enoxaparin, 40 mg, Daily  ezetimibe, 10 mg, Daily  famotidine, 20 mg, HS  fluticasone-vilanterol, 1 puff, Daily  Hydrocod Jermaine-Chlorphe Jermaine ER, 5 mL, Once  [Held by provider] insulin glargine, 15 Units, HS  insulin lispro, 1-6 Units, TID AC  insulin lispro, 1-6 Units, HS  levothyroxine, 112 mcg, Early Morning  lidocaine, 1 patch, Daily  [Held by provider] losartan, 50 mg, BID  oseltamivir, 30 mg, Q12H KASSI  polyethylene glycol, 17 g, Daily  pramipexole, 1 mg, BID  senna-docusate sodium, 1 tablet, HS  sertraline, 100 mg, HS  [Held by provider] spironolactone, 25 mg, Daily  [Held by provider] torsemide, 20 mg, Daily      albuterol, 2.5 mg, Q6H PRN  cyclobenzaprine, 10 mg, TID  PRN  dextromethorphan-guaiFENesin, 10 mL, Q4H PRN  labetalol, 20 mg, Q4H PRN  melatonin, 3 mg, HS PRN  oxyCODONE, 5 mg, Q4H PRN  trimethobenzamide, 200 mg, Q6H PRN       Continuous          Labs:   CBC    Recent Labs     02/14/25  0230   WBC 6.57   HGB 10.4*   HCT 34.8        BMP    Recent Labs     02/14/25  0230 02/14/25  1645   SODIUM 136 136   K 5.3 4.5   * 108   CO2 19* 19*   AGAP 8 9   BUN 38* 39*   CREATININE 1.30 1.30   CALCIUM 8.8 7.9*       Coags    No recent results     Additional Electrolytes  Recent Labs     02/14/25  0230   MG 2.2          Blood Gas    No recent results  Recent Labs     02/14/25  0259   PHVEN 7.268*   [id number] 35.6*   PO2VEN 46.8*   HXM1JPV 15.9*   BEVEN -10.1   E2IOACE 77.2    LFTs  Recent Labs     02/14/25  0230   ALT 77*   *   ALKPHOS 226*   ALB 3.8   TBILI 0.87       Infectious  No recent results    Glucose  Recent Labs     02/14/25  0230 02/14/25  1645   GLUC 85 96        Administrative Statements   I have spent a total time of 55 minutes in caring for this patient on the day of the visit/encounter including Diagnostic results, Risks and benefits of tx options, Instructions for management, Documenting in the medical record, Reviewing / ordering tests, medicine, procedures  , Obtaining or reviewing history  , and Communicating with other healthcare professionals .

## 2025-02-15 NOTE — ASSESSMENT & PLAN NOTE
Presented with Urine findings of 10-20 no bacteria   Completed 5 days of Rocephin for sever sepsis   No suprapubic tenderness or reports of dysuria   Plan   Monitor   Likely resolved

## 2025-02-15 NOTE — ASSESSMENT & PLAN NOTE
Slightly elevated from baseline  Cr 1.30 (baseline .87 to 1.17)  Plan   Continue to monitor urine output  Avoid nephrotoxins  Limit IV fluids due to heart failure and decreased EF

## 2025-02-15 NOTE — PLAN OF CARE
Problem: Potential for Falls  Goal: Patient will remain free of falls  Description: INTERVENTIONS:  - Educate patient/family on patient safety including physical limitations  - Instruct patient to call for assistance with activity   - Consult OT/PT to assist with strengthening/mobility   - Keep Call bell within reach  - Keep bed low and locked with side rails adjusted as appropriate  - Keep care items and personal belongings within reach  - Initiate and maintain comfort rounds  - Make Fall Risk Sign visible to staff  - Offer Toileting every 2 Hours, in advance of need  - Initiate/Maintain bed alarm  - Obtain necessary fall risk management equipment:    - Apply yellow socks and bracelet for high fall risk patients  - Consider moving patient to room near nurses station  Outcome: Progressing     Problem: PAIN - ADULT  Goal: Verbalizes/displays adequate comfort level or baseline comfort level  Description: Interventions:  - Encourage patient to monitor pain and request assistance  - Assess pain using appropriate pain scale  - Administer analgesics based on type and severity of pain and evaluate response  - Implement non-pharmacological measures as appropriate and evaluate response  - Consider cultural and social influences on pain and pain management  - Notify physician/advanced practitioner if interventions unsuccessful or patient reports new pain  Outcome: Progressing     Problem: INFECTION - ADULT  Goal: Absence or prevention of progression during hospitalization  Description: INTERVENTIONS:  - Assess and monitor for signs and symptoms of infection  - Monitor lab/diagnostic results  - Monitor all insertion sites, i.e. indwelling lines, tubes, and drains  - Monitor endotracheal if appropriate and nasal secretions for changes in amount and color  - Hondo appropriate cooling/warming therapies per order  - Administer medications as ordered  - Instruct and encourage patient and family to use good hand hygiene  technique  - Identify and instruct in appropriate isolation precautions for identified infection/condition  Outcome: Progressing  Goal: Absence of fever/infection during neutropenic period  Description: INTERVENTIONS:  - Monitor WBC    Outcome: Progressing     Problem: SAFETY ADULT  Goal: Patient will remain free of falls  Description: INTERVENTIONS:  - Educate patient/family on patient safety including physical limitations  - Instruct patient to call for assistance with activity   - Consult OT/PT to assist with strengthening/mobility   - Keep Call bell within reach  - Keep bed low and locked with side rails adjusted as appropriate  - Keep care items and personal belongings within reach  - Initiate and maintain comfort rounds  - Make Fall Risk Sign visible to staff  - Offer Toileting every 2 Hours, in advance of need  - Initiate/Maintain bed alarm  - Obtain necessary fall risk management equipment:    - Apply yellow socks and bracelet for high fall risk patients  - Consider moving patient to room near nurses station  Outcome: Progressing  Goal: Maintain or return to baseline ADL function  Description: INTERVENTIONS:  -  Assess patient's ability to carry out ADLs; assess patient's baseline for ADL function and identify physical deficits which impact ability to perform ADLs (bathing, care of mouth/teeth, toileting, grooming, dressing, etc.)  - Assess/evaluate cause of self-care deficits   - Assess range of motion  - Assess patient's mobility; develop plan if impaired  - Assess patient's need for assistive devices and provide as appropriate  - Encourage maximum independence but intervene and supervise when necessary  - Involve family in performance of ADLs  - Assess for home care needs following discharge   - Consider OT consult to assist with ADL evaluation and planning for discharge  - Provide patient education as appropriate  Outcome: Progressing  Goal: Maintains/Returns to pre admission functional  level  Description: INTERVENTIONS:  - Perform AM-PAC 6 Click Basic Mobility/ Daily Activity assessment daily.  - Set and communicate daily mobility goal to care team and patient/family/caregiver.   - Collaborate with rehabilitation services on mobility goals if consulted  - Perform Range of Motion 3 times a day.  - Reposition patient every 2 hours.  - Dangle patient 3 times a day  - Stand patient 3 times a day  - Ambulate patient 3 times a day  - Out of bed to chair 3 times a day   - Out of bed for meals 3 times a day  - Out of bed for toileting  - Record patient progress and toleration of activity level   Outcome: Progressing     Problem: DISCHARGE PLANNING  Goal: Discharge to home or other facility with appropriate resources  Description: INTERVENTIONS:  - Identify barriers to discharge w/patient and caregiver  - Arrange for needed discharge resources and transportation as appropriate  - Identify discharge learning needs (meds, wound care, etc.)  - Arrange for interpretive services to assist at discharge as needed  - Refer to Case Management Department for coordinating discharge planning if the patient needs post-hospital services based on physician/advanced practitioner order or complex needs related to functional status, cognitive ability, or social support system  Outcome: Progressing     Problem: Knowledge Deficit  Goal: Patient/family/caregiver demonstrates understanding of disease process, treatment plan, medications, and discharge instructions  Description: Complete learning assessment and assess knowledge base.  Interventions:  - Provide teaching at level of understanding  - Provide teaching via preferred learning methods  Outcome: Progressing     Problem: Prexisting or High Potential for Compromised Skin Integrity  Goal: Skin integrity is maintained or improved  Description: INTERVENTIONS:  - Identify patients at risk for skin breakdown  - Assess and monitor skin integrity  - Assess and monitor nutrition  and hydration status  - Monitor labs   - Assess for incontinence   - Turn and reposition patient  - Assist with mobility/ambulation  - Relieve pressure over bony prominences  - Avoid friction and shearing  - Provide appropriate hygiene as needed including keeping skin clean and dry  - Evaluate need for skin moisturizer/barrier cream  - Collaborate with interdisciplinary team   - Patient/family teaching  - Consider wound care consult   Outcome: Progressing

## 2025-02-15 NOTE — ASSESSMENT & PLAN NOTE
Lab Results   Component Value Date    HGBA1C 7.7 (H) 01/17/2025       Recent Labs     02/14/25  1623 02/14/25  1939 02/14/25  2054 02/15/25  0723   POCGLU 104 124 92 101       Blood Sugar Average: Last 72 hrs:  (P) [id number]    Managed by endocrinology in the outpatient setting  Insulin pump removed and sent home with   Utilize basal Lantus at bedtime,   Hypoglycemic episodes overnight not symptomatic except for some nausea that resolved with Tigan  Patient is tolerating   Plan   Consider transitioning to SSI due to decline in condition  SSI with Accu-Cheks  Hypoglycemia protocol

## 2025-02-15 NOTE — ASSESSMENT & PLAN NOTE
Secondary to Influenza A and possible UTI  Patient completed Sepsis protocol on admission  Blood cultures negative  Urine Culture mixed contaminants   Lactic Acid 1.9   Plan   Tamiflu day 2   Monitor fluid status closely

## 2025-02-15 NOTE — ASSESSMENT & PLAN NOTE
Home medication: carvediol COREG 6.25 mg BID, losartan 50 mg BID, torsemide 40 mg daily  Blood pressure MAP 91 132/63 this morning   Plan   Continue COREG 6.25 mg BID   Holding losartan 50 mg BID due to JULIANNE   Torsemide 20 mg daily on hold due to JULIANNE   Continuing Lasix 40 mg IV BID  Daily BMP to monitor Creatinine while on Lasix   Labetalol prn for blood pressure >180

## 2025-02-15 NOTE — ASSESSMENT & PLAN NOTE
In the setting of sepsis due to UTI and influenza  Improved since admission although now slightly confused due to respiratory status  On exam patient opens eyes to name call, squeezes hands and wiggles toes.  Cough reflex present.     Patient seems to understand conversations in the room nods head yes and no  Urea continues to be in the low to high 30s today its at 39  Plan   Continue to monitor mental status

## 2025-02-15 NOTE — ASSESSMENT & PLAN NOTE
Lab Results   Component Value Date    HGBA1C 7.7 (H) 01/17/2025       Recent Labs     02/14/25  1623 02/14/25  1939 02/14/25  2054 02/15/25  0723   POCGLU 104 124 92 101       Blood Sugar Average: Last 72 hrs:  (P) [id number]    Known ulceration plantar aspect of right foot  Has been states follows with podiatry  Appears boggy/edematous, no drainage Check x-ray right foot  Empiric antibiotic therapy for potential underlying wound  2/3 at [facility] ED diagnosed with right leg cellulitis and initiated on doxycycline no evidence of cellulitis remains

## 2025-02-15 NOTE — ASSESSMENT & PLAN NOTE
Wt Readings from Last 3 Encounters:   02/15/25 84 kg (185 lb 3 oz)   12/27/24 79.4 kg (175 lb)   09/20/24 77.1 kg (170 lb)       Cardiomyopathy of unknown cause diagnosed in January during recent hospitalization with newly reduced EF 35 to 39%  Lasix 40 mg x 3     I/O: Intake 2.22 liters output 374 net positive 1.853   Bilateral Crackles on exam     Plan   Holding losartan 50 mg BID due to JULIANNE   Holding Torsemide 20 mg daily due to JULIANNE   Starting Lasix 40 mg IV BID from today   Daily BMP to monitor Creatinine while on Lasix

## 2025-02-15 NOTE — SPEECH THERAPY NOTE
Speech-Language Pathology Bedside Swallow Evaluation        Patient Name: Zohra Barriga    Today's Date: 2/15/2025     Problem List  Patient Active Problem List   Diagnosis    Anemia of chronic disease    Anxiety and depression    Other B-complex deficiencies    Chronic bilateral thoracic back pain    Chronic myofascial pain    Pain in right leg    Chronic pain of left knee    Chronic pain of right knee    Essential hypertension    Gait disorder    Gastroparesis    Gastroesophageal reflux disease without esophagitis    Sarcoidosis    Insomnia due to medical condition    Insulin pump status    Pain syndrome, chronic    Type 2 diabetes mellitus with microalbuminuria, with long-term current use of insulin ([facility])    Irritable bowel syndrome with constipation    Lymphadenopathy    Mixed hyperlipidemia    Mixed urge and stress incontinence    Moderate persistent asthma with acute exacerbation    Normocytic anemia    Polyneuropathy associated with underlying disease ([facility])    Post-menopausal    Primary hyperparathyroidism ([facility])    Type 2 diabetes mellitus, with long-term current use of insulin ([facility])    Vitamin D deficiency    Complex regional pain syndrome type 1 of left lower extremity    Primary hypothyroidism    Encounter for fitting and adjustment of spinal cord stimulator    S/P insertion of spinal cord stimulator    Chronic diastolic CHF (congestive heart failure) ([facility])    Coronary artery disease involving native coronary artery    Closed fracture of thoracic vertebra ([facility])    Deep venous thrombosis ([facility])    Localized, primary osteoarthritis    Chronic bilateral low back pain without sciatica    Lumbar radiculopathy    Lumbosacral spondylosis without myelopathy    Osteoarthritis of knee    Age related osteoporosis    Thoracic radiculopathy    History of TIA (transient ischemic attack)    Microcytic anemia    Chronic scapular pain    Malfunction of spinal cord stimulator ([facility])    Moderate persistent asthma  "without complication    Hypersomnia    BBB (bundle branch block)    Hyperlipidemia associated with type 2 diabetes mellitus  (HCC)    Uncontrolled type 2 diabetes mellitus with hyperglycemia ([facility])    Achalasia    PONV (postoperative nausea and vomiting)    Class 2 obesity in adult    Pacemaker    Medical marijuana use    Urinary retention    JULIANNE (acute kidney injury) ([facility])    Epigastric pain    Spinal stenosis of lumbar region with neurogenic claudication    COVID-19 virus infection    Chronic diastolic (congestive) heart failure (HCC)    Acute respiratory failure ([facility])    Abnormal CT of the chest    Complete heart block ([facility])    Diabetic peripheral neuropathy associated with type 2 diabetes mellitus ([facility])    OAB (overactive bladder)    Polyarthralgia    Restless leg syndrome    Anemia of chronic renal failure, stage 3 (moderate)  ([facility])    Chronic kidney disease (CKD) stage G3b/A1, moderately decreased glomerular filtration rate (GFR) between 30-44 mL/min/1.73 square meter and albuminuria creatinine ratio less than 30 mg/g ([facility])    Stage 3a chronic kidney disease ([facility])    Coronary artery disease involving native coronary artery of native heart without angina pectoris    Bifascicular block    Chronic pain disorder    Myelopathy ([facility])    Thoracic compression fracture ([facility])    Compression fx, lumbar spine, sequela    Osteopenia    Fall    Closed fracture of one rib of right side    Acute on Chronic Systolic CHF ([facility])    Diabetic ulcer of right midfoot associated with type 2 diabetes mellitus ([facility])    UTI (urinary tract infection)    Facial contusion, initial encounter    Left ankle pain       Past Medical History  Past Medical History:   Diagnosis Date    Anxiety     Asthma     controlled    Back complaints     Cancer ([facility])     nose    Cellulitis of left lower leg     \"not now\"    CHF (congestive heart failure) ([facility]) 02/2021    Chronic bilateral thoracic back pain     Chronic kidney disease     sees nephrologist " "regularly\" stage 3\"    Chronic myofascial pain     Chronic pain of both lower extremities     Chronic pain of left knee     \"both knees\"    Chronic pain syndrome     bilat legs and knees/neuropathy pain    COVID 12/2021    CPAP (continuous positive airway pressure) dependence     no currently uses    Depression     Diabetes mellitus ([facility])     insulin pump    Difficulty walking 2018    Disease of thyroid gland     Does use hearing aid     bilat and will wear DOS    DVT (deep venous thrombosis) ([facility]) 2013    left leg    Gait disorder     Gastroparesis     GERD (gastroesophageal reflux disease)     Head injury     Headache, tension-type     History of angina     History of MRSA infection     History of transfusion     Many years ago    HL (hearing loss) 2018    Hyperlipidemia     Hypertension     Hypoglycemic reaction     \"occas low blood sugar\"    Insulin pump in place     pt reports saw endocrinologist 4/28 and will bring copy of instructions DOS    Irritable bowel syndrome     Kidney disease     Memory loss 2021    Movement disorder 2021    Neuropathy in diabetes ([facility]) 2021    Constant pain legs and feet    Nose fracture     Pacemaker 2019    Pneumonia     Polyneuropathy associated with underlying disease ([facility])     PONV (postoperative nausea and vomiting)     Risk for falls     S/P insertion of spinal cord stimulator 06/08/2018    Sarcoidosis     Shortness of breath     \"exertion and not new\"    Sleep apnea     Stroke ([facility])     Thoracic vertebral fracture ([facility])     TIA (transient ischemic attack)     Use of cane as ambulatory aid     Uses walker     Wears dentures     permanent upper/and some missing teeth/partial lower       Past Surgical History  Past Surgical History:   Procedure Laterality Date    ABDOMINAL ADHESION SURGERY N/A 05/03/2021    Procedure: laparoscopic LYSIS ADHESIONS;  Surgeon: Jill Bentley MD;  [facility];  Service: Thoracic    BACK SURGERY  06/13/2023    [facility] at [facility], Dr. Levy    " "BREAST SURGERY      BREAST SURGERY      reduction    CARDIAC PACEMAKER PLACEMENT      pacemaker     SECTION      COLONOSCOPY      DILATION AND CURETTAGE OF UTERUS      \"D&E\"    HERNIA REPAIR      HYSTERECTOMY      JOINT REPLACEMENT Left     LTKR    NECK SURGERY      fused 4 discs with 4 screws implanted    NISSEN FUNDOPLICATION LAPAROSCOPIC WITH ROBOTICS N/A 2021    Procedure: ROBOTIC HELLER MYOTOMY WITH BERNARDO FUNDIPLICATION ;  Surgeon: Jill Bentley MD;  [facility];  Service: Thoracic    NOSE SURGERY  2024    closed reduction    OK ESOPHAGOGASTRODUODENOSCOPY TRANSORAL DIAGNOSTIC N/A 2021    Procedure: ESOPHAGOGASTRODUODENOSCOPY (EGD);  Surgeon: Jill Bentley MD;  [facility];  Service: Thoracic    OK ESOPHAGOGASTRODUODENOSCOPY TRANSORAL DIAGNOSTIC N/A 2022    Procedure: ESOPHAGOGASTRODUODENOSCOPY (EGD),;  Surgeon: Jill Bentley MD;  [facility];  Service: Thoracic    OK ESOPHAGOGASTRODUODENOSCOPY TRANSORAL DIAGNOSTIC N/A 2022    Procedure: ESOPHAGOGASTRODUODENOSCOPY (EGD);  Surgeon: Jill Bentley MD;  [facility];  Service: Thoracic    OK ESOPHAGOGASTRODUODENOSCOPY TRANSORAL DIAGNOSTIC N/A 2022    Procedure: ESOPHAGOGASTRODUODENOSCOPY (EGD); PYLORIC DILATION; GE JUNCTION DILATION;  Surgeon: Jill Bentley MD;  [facility];  Service: Thoracic    OK ESOPHAGOGASTRODUODENOSCOPY TRANSORAL DIAGNOSTIC N/A 2022    Procedure: ESOPHAGOGASTRODUODENOSCOPY (EGD);  Surgeon: Varinder Steiner MD;  [facility];  Service: Thoracic    OK ESOPHAGOGASTRODUODENOSCOPY TRANSORAL DIAGNOSTIC N/A 2023    Procedure: ESOPHAGOGASTRODUODENOSCOPY (EGD);  Surgeon: Jill Bentley MD;  [facility];  Service: Thoracic    OK ESOPHAGOGASTRODUODENOSCOPY TRANSORAL DIAGNOSTIC N/A 2024    Procedure: ESOPHAGOGASTRODUODENOSCOPY (EGD);  Surgeon: Jill Bentley MD;  [facility];  " Service: Thoracic    SD ESOPHAGOSCOPY FLEX BALLOON DILAT <30 MM DIAM N/A 01/12/2022    Procedure: DILATATION ESOPHAGEAL;  Surgeon: Jill Bentley MD;  [facility];  Service: Thoracic    SD ESOPHAGOSCOPY FLEX BALLOON DILAT <30 MM DIAM N/A 02/16/2022    Procedure: DILATATION ESOPHAGEAL;  Surgeon: Jill Bentley MD;  [facility];  Service: Thoracic    SD ESOPHAGOSCOPY FLEX BALLOON DILAT <30 MM DIAM N/A 11/29/2022    Procedure: DILATATION ESOPHAGEAL esophageal and pyloric dilation;  Surgeon: Varinder Steiner MD;  [facility];  Service: Thoracic    SD ESOPHAGOSCOPY FLEX BALLOON DILAT <30 MM DIAM N/A 01/24/2023    Procedure: esophageal dilation dilated up to 54  with pylorus dilation dilated up to 18;  Surgeon: Jill Bentley MD;  [facility];  Service: Thoracic    SD ESOPHAGOSCOPY FLEX BALLOON DILAT <30 MM DIAM N/A 05/01/2024    Procedure: DILATATION ESOPHAGEAL;  Surgeon: Jill Bentley MD;  [facility];  Service: Thoracic    SD RIBEIRO IMPLTJ NSTIM ELTRDS PLATE/PADDLE EDRL Left 04/23/2018    Procedure: Insertion of thoracic spinal cord stimulator electrode via laminotomy and placement of left buttock IPG;  Surgeon: Shahab Bullock MD;  [facility];  Service: Neurosurgery    REPLACEMENT TOTAL KNEE      ROTATOR CUFF REPAIR      SPINAL CORD STIMULATOR REMOVAL Bilateral 07/02/2024    Procedure: REOPENING OF THORACIC AND LEFT BUTTOCK INCISION FOR REMOVAL OF SPINAL CORD STIMULATOR SYSTEM;  Surgeon: Shahab Bullock MD;  [facility];  Service: Neurosurgery    SPINAL STIMULATOR PLACEMENT Left 10/03/2018    Procedure: BUTTOCK RE-OPENING INCISION FOR REPOSITIONING OF IMPLANTABLE PULSE GENERATOR;  Surgeon: Shahab Bullock MD;  [facility];  Service: Neurosurgery    TONSILLECTOMY      UPPER GASTROINTESTINAL ENDOSCOPY      VASCULAR SURGERY      WISDOM TOOTH EXTRACTION           Current Medical Status  Pt is a 69 y.o. female who presented to [facility] initially on 2/10 s/p fall with head strike.  She was admitted to [facility] for workup of altered mental status. Yesterday, patient had shortness of breath, hypoxic SpO2 of 84, and had vomited. She had noteable wheezing and rhonchi found to be Influenza A positive, BNP of >3000, and chest xray findings of new infiltrates. Patient became was admitted to the ICU for acute hypoxic respiratory failure secondary to influenza A as well as a component of acute on chronic systolic heart failure.  Patient required BiPAP. Her hypoxia and mental status improved after compliance with BiPAP and 3 doses of IV Lasix. Started on Tamiflu. Today patient is alert and oriented x 3 saturating well on nasal cannula     SLP consult requested to further assess the swallow due to hx of dysphagia, episode of emesis yesterday and hypoxia with ?RLL infiltrate.    Patient is known to this department from prior OP VBS with minimal oropharyngeal dysphagia noted suspected to be largely due to xerostomia. She was also noted to have esophageal dysphagia with CP prominence and esophageal dysmotility. Upon my arrival the RN reports she has been tolerating regular diet but not eating much.  At bedside the patient admits to poor appetite/intake. She also admits to multiple prior EGDs with dilation- often requiring every three months but admits it has been a year since the last one and feels as though she needs one again. The patient and her  also report prolonged mastication recently which she admits is due to fear of globus/regurgitation.     Manometry completed in Jan of last year- generally unremarkable. Most recent EGD at this hospital was 2020 with non obstructing ring at 35cm from incisors + liquid retained in the stomach suggestive of gastroparesis.          Past medical history:   Please see H&P for details      Special Studies:  XR chest portable  Narrative: XR CHEST PORTABLE    INDICATION: worsening  hypoxia.    COMPARISON: Compared to 02/13/25    FINDINGS: Left chest wall pacemaker with intact leads.    Poor inspiration. Prominent vascular distention markings. Patchy Panacryl opacities in the right lower lung possibly unchanged suggesting infiltrates. No pneumothorax or pleural effusion.    Normal cardiomediastinal silhouette.    Bones are unremarkable for age.    Normal upper abdomen.  Impression: Moderate congestive changes. Possible right lower lobe infiltrates.    Workstation performed: [id number]      6/29/20 EGD: Possible nonobstructing ring at 35 cm from incisors-biopsies taken  Random biopsies taken from distal and proximal esophagus rule out eosinophilic esophagitis  Retained liquid in stomach may be suggestive of gastroparesis  Normal stomach on direct and retroflexed views-random biopsies taken to rule out H pylori  Normal 1st and 2nd portions of duodenum-random biopsies taken to rule out celiac disease    Social/Education/Vocational Hx:  Pt lives with family      Swallow Information   Current Risks for Dysphagia & Aspiration: known history of dysphagia and change in respiratory status     Current Symptoms/Concerns: change in respiratory status    Current Diet: regular diet and thin liquids      Baseline Diet: regular diet and thin liquids      Baseline Assessment   Behavior/Cognition: alert    Speech/Language Status: able to participate in conversation and able to follow commands    Patient Positioning: upright in bed    Swallow Mechanism Exam   Facial: masked facies  Labial: bilateral decreased ROM  Lingual: WFL  Velum: symmetrical  Mandible: adequate ROM  Dentition:  some missing  Vocal quality: mildly dysphonic ( pt denies any recent changes)    Volitional Cough: strong/productive   Resp: 3L NC    Consistencies Assessed and Performance   Consistencies Administered: thin liquids, puree, soft solids, and mixed consistency  Specific materials administered included: applesauce, chicken noodle soup and  thin liquids    Oral Stage:   Mastication was adequate bust significantly prolonged with the materials administered today- subsequent expectoration of chicken from soup. Bolus formation and transfer were functional with no significant oral residue noted.  No overt s/s reduced oral control.    Pharyngeal Stage:   Swallowing initiation appeared prompt.  Laryngeal rise was palpated and judged to be within functional limits.  No coughing, throat clearing, change in vocal quality or respiratory status noted today.     Esophageal Concerns:  known h/o esophageal dysphagia- see above    Summary   Pts oropharyngeal swallow function appears c/w baseline at this time with the materials administered today. s/s suggestive of known esophageal dysphagia- at least mild risk for retrograde aspiration. Discussed this risk and diet options with patient pending next dilation and she is agreeable to try modified diet. Patient would like to try ground to start ( did not like puree).     Recommendations: mechanically altered/level 2 diet and thin liquids     Recommended Form of Meds:  per pt: small ok whole- large cut      Aspiration precautions and compensatory swallowing strategies: upright posture, only feed when fully alert, slow rate of feeding, small bites/sips, and smaller more frequent meals    Results Reviewed with: patient, RN, CRNP, and family     Dysphagia Goals: pt will tolerate dysphagia 3 with thin liquids without s/s of aspiration x3    Plan  Will f/u    Dora Gatica M.S., CCC-SLP  Speech Language Pathologist   Available via Secure Chat  [place] #[id number]  [place] #[id number]

## 2025-02-16 ENCOUNTER — APPOINTMENT (INPATIENT)
Dept: ULTRASOUND IMAGING | Facility: HOSPITAL | Age: 70
DRG: 871 | End: 2025-02-16
Payer: MEDICARE

## 2025-02-16 PROBLEM — R74.01 TRANSAMINITIS: Status: ACTIVE | Noted: 2025-02-16

## 2025-02-16 PROBLEM — S82.892D CLOSED FRACTURE OF LEFT ANKLE WITH ROUTINE HEALING: Status: ACTIVE | Noted: 2025-02-16

## 2025-02-16 LAB
ALBUMIN SERPL BCG-MCNC: 2.9 G/DL (ref 3.5–5)
ALBUMIN SERPL BCG-MCNC: 3.4 G/DL (ref 3.5–5)
ALP SERPL-CCNC: 184 U/L (ref 34–104)
ALP SERPL-CCNC: 224 U/L (ref 34–104)
ALT SERPL W P-5'-P-CCNC: 602 U/L (ref 7–52)
ALT SERPL W P-5'-P-CCNC: 782 U/L (ref 7–52)
ANION GAP SERPL CALCULATED.3IONS-SCNC: 10 MMOL/L (ref 4–13)
ANION GAP SERPL CALCULATED.3IONS-SCNC: 5 MMOL/L (ref 4–13)
ANISOCYTOSIS BLD QL SMEAR: PRESENT
APAP SERPL-MCNC: <2 UG/ML (ref 10–20)
AST SERPL W P-5'-P-CCNC: 1464 U/L (ref 13–39)
AST SERPL W P-5'-P-CCNC: 913 U/L (ref 13–39)
BASOPHILS # BLD MANUAL: 0 THOUSAND/UL (ref 0–0.1)
BASOPHILS NFR MAR MANUAL: 0 % (ref 0–1)
BILIRUB SERPL-MCNC: 1.6 MG/DL (ref 0.2–1)
BILIRUB SERPL-MCNC: 1.77 MG/DL (ref 0.2–1)
BUN SERPL-MCNC: 47 MG/DL (ref 5–25)
BUN SERPL-MCNC: 49 MG/DL (ref 5–25)
CA-I BLD-SCNC: 1.05 MMOL/L (ref 1.12–1.32)
CALCIUM ALBUM COR SERPL-MCNC: 9 MG/DL (ref 8.3–10.1)
CALCIUM ALBUM COR SERPL-MCNC: 9.1 MG/DL (ref 8.3–10.1)
CALCIUM SERPL-MCNC: 8.1 MG/DL (ref 8.4–10.2)
CALCIUM SERPL-MCNC: 8.6 MG/DL (ref 8.4–10.2)
CHLORIDE SERPL-SCNC: 108 MMOL/L (ref 96–108)
CHLORIDE SERPL-SCNC: 108 MMOL/L (ref 96–108)
CO2 SERPL-SCNC: 19 MMOL/L (ref 21–32)
CO2 SERPL-SCNC: 25 MMOL/L (ref 21–32)
CREAT SERPL-MCNC: 1.34 MG/DL (ref 0.6–1.3)
CREAT SERPL-MCNC: 1.4 MG/DL (ref 0.6–1.3)
EOSINOPHIL # BLD MANUAL: 0 THOUSAND/UL (ref 0–0.4)
EOSINOPHIL NFR BLD MANUAL: 0 % (ref 0–6)
ERYTHROCYTE [DISTWIDTH] IN BLOOD BY AUTOMATED COUNT: 17.2 % (ref 11.6–15.1)
GFR SERPL CREATININE-BSD FRML MDRD: 38 ML/MIN/1.73SQ M
GFR SERPL CREATININE-BSD FRML MDRD: 40 ML/MIN/1.73SQ M
GLUCOSE SERPL-MCNC: 128 MG/DL (ref 65–140)
GLUCOSE SERPL-MCNC: 131 MG/DL (ref 65–140)
GLUCOSE SERPL-MCNC: 138 MG/DL (ref 65–140)
GLUCOSE SERPL-MCNC: 140 MG/DL (ref 65–140)
GLUCOSE SERPL-MCNC: 147 MG/DL (ref 65–140)
GLUCOSE SERPL-MCNC: 150 MG/DL (ref 65–140)
GLUCOSE SERPL-MCNC: 154 MG/DL (ref 65–140)
GLUCOSE SERPL-MCNC: 168 MG/DL (ref 65–140)
HCT VFR BLD AUTO: 28.8 % (ref 34.8–46.1)
HGB BLD-MCNC: 8.9 G/DL (ref 11.5–15.4)
INR PPP: 1.83 (ref 0.85–1.19)
LACTATE SERPL-SCNC: 0.8 MMOL/L (ref 0.5–2)
LYMPHOCYTES # BLD AUTO: 0.63 THOUSAND/UL (ref 0.6–4.47)
LYMPHOCYTES # BLD AUTO: 15 % (ref 14–44)
MCH RBC QN AUTO: 28.5 PG (ref 26.8–34.3)
MCHC RBC AUTO-ENTMCNC: 30.9 G/DL (ref 31.4–37.4)
MCV RBC AUTO: 92 FL (ref 82–98)
MONOCYTES # BLD AUTO: 0.16 THOUSAND/UL (ref 0–1.22)
MONOCYTES NFR BLD: 4 % (ref 4–12)
NEUTROPHILS # BLD MANUAL: 3.13 THOUSAND/UL (ref 1.85–7.62)
NEUTS BAND NFR BLD MANUAL: 4 % (ref 0–8)
NEUTS SEG NFR BLD AUTO: 76 % (ref 43–75)
OVALOCYTES BLD QL SMEAR: PRESENT
PLATELET # BLD AUTO: 163 THOUSANDS/UL (ref 149–390)
PLATELET BLD QL SMEAR: ADEQUATE
PMV BLD AUTO: 11.7 FL (ref 8.9–12.7)
POLYCHROMASIA BLD QL SMEAR: PRESENT
POTASSIUM SERPL-SCNC: 4.5 MMOL/L (ref 3.5–5.3)
POTASSIUM SERPL-SCNC: 4.8 MMOL/L (ref 3.5–5.3)
PROT SERPL-MCNC: 5.8 G/DL (ref 6.4–8.4)
PROT SERPL-MCNC: 7 G/DL (ref 6.4–8.4)
PROTHROMBIN TIME: 21.1 SECONDS (ref 12.3–15)
RBC # BLD AUTO: 3.12 MILLION/UL (ref 3.81–5.12)
RBC MORPH BLD: PRESENT
SODIUM SERPL-SCNC: 137 MMOL/L (ref 135–147)
SODIUM SERPL-SCNC: 138 MMOL/L (ref 135–147)
TARGETS BLD QL SMEAR: PRESENT
VARIANT LYMPHS # BLD AUTO: 1 %
WBC # BLD AUTO: 3.91 THOUSAND/UL (ref 4.31–10.16)

## 2025-02-16 PROCEDURE — 94660 CPAP INITIATION&MGMT: CPT

## 2025-02-16 PROCEDURE — 92526 ORAL FUNCTION THERAPY: CPT

## 2025-02-16 PROCEDURE — 85610 PROTHROMBIN TIME: CPT

## 2025-02-16 PROCEDURE — 83605 ASSAY OF LACTIC ACID: CPT | Performed by: STUDENT IN AN ORGANIZED HEALTH CARE EDUCATION/TRAINING PROGRAM

## 2025-02-16 PROCEDURE — 99233 SBSQ HOSP IP/OBS HIGH 50: CPT

## 2025-02-16 PROCEDURE — 85007 BL SMEAR W/DIFF WBC COUNT: CPT

## 2025-02-16 PROCEDURE — 80053 COMPREHEN METABOLIC PANEL: CPT | Performed by: STUDENT IN AN ORGANIZED HEALTH CARE EDUCATION/TRAINING PROGRAM

## 2025-02-16 PROCEDURE — 82330 ASSAY OF CALCIUM: CPT

## 2025-02-16 PROCEDURE — 82948 REAGENT STRIP/BLOOD GLUCOSE: CPT

## 2025-02-16 PROCEDURE — 80053 COMPREHEN METABOLIC PANEL: CPT

## 2025-02-16 PROCEDURE — 85027 COMPLETE CBC AUTOMATED: CPT

## 2025-02-16 PROCEDURE — 80074 ACUTE HEPATITIS PANEL: CPT

## 2025-02-16 PROCEDURE — 76705 ECHO EXAM OF ABDOMEN: CPT

## 2025-02-16 PROCEDURE — 94760 N-INVAS EAR/PLS OXIMETRY 1: CPT

## 2025-02-16 PROCEDURE — 99222 1ST HOSP IP/OBS MODERATE 55: CPT | Performed by: STUDENT IN AN ORGANIZED HEALTH CARE EDUCATION/TRAINING PROGRAM

## 2025-02-16 PROCEDURE — 80143 DRUG ASSAY ACETAMINOPHEN: CPT

## 2025-02-16 RX ORDER — SODIUM CHLORIDE, SODIUM GLUCONATE, SODIUM ACETATE, POTASSIUM CHLORIDE, MAGNESIUM CHLORIDE, SODIUM PHOSPHATE, DIBASIC, AND POTASSIUM PHOSPHATE .53; .5; .37; .037; .03; .012; .00082 G/100ML; G/100ML; G/100ML; G/100ML; G/100ML; G/100ML; G/100ML
100 INJECTION, SOLUTION INTRAVENOUS CONTINUOUS
Status: DISCONTINUED | OUTPATIENT
Start: 2025-02-16 | End: 2025-02-16

## 2025-02-16 RX ADMIN — PRAMIPEXOLE DIHYDROCHLORIDE 1 MG: 1 TABLET ORAL at 17:04

## 2025-02-16 RX ADMIN — DICLOFENAC SODIUM 2 G: 10 GEL TOPICAL at 12:15

## 2025-02-16 RX ADMIN — LEVOTHYROXINE SODIUM 112 MCG: 112 TABLET ORAL at 05:35

## 2025-02-16 RX ADMIN — SERTRALINE HYDROCHLORIDE 100 MG: 100 TABLET ORAL at 21:26

## 2025-02-16 RX ADMIN — OXYCODONE 5 MG: 5 TABLET ORAL at 05:35

## 2025-02-16 RX ADMIN — DICLOFENAC SODIUM 2 G: 10 GEL TOPICAL at 08:56

## 2025-02-16 RX ADMIN — INSULIN GLARGINE 15 UNITS: 100 INJECTION, SOLUTION SUBCUTANEOUS at 21:32

## 2025-02-16 RX ADMIN — PRAMIPEXOLE DIHYDROCHLORIDE 1 MG: 1 TABLET ORAL at 08:49

## 2025-02-16 RX ADMIN — FLUTICASONE FUROATE AND VILANTEROL TRIFENATATE 1 PUFF: 200; 25 POWDER RESPIRATORY (INHALATION) at 12:08

## 2025-02-16 RX ADMIN — ENOXAPARIN SODIUM 40 MG: 40 INJECTION SUBCUTANEOUS at 08:48

## 2025-02-16 RX ADMIN — CALCIUM 1 TABLET: 500 TABLET ORAL at 08:50

## 2025-02-16 RX ADMIN — DICLOFENAC SODIUM 2 G: 10 GEL TOPICAL at 21:33

## 2025-02-16 RX ADMIN — SENNOSIDES AND DOCUSATE SODIUM 1 TABLET: 50; 8.6 TABLET ORAL at 21:26

## 2025-02-16 RX ADMIN — FAMOTIDINE 20 MG: 20 TABLET, FILM COATED ORAL at 21:26

## 2025-02-16 RX ADMIN — EZETIMIBE 10 MG: 10 TABLET ORAL at 08:49

## 2025-02-16 RX ADMIN — FUROSEMIDE 40 MG: 10 INJECTION, SOLUTION INTRAVENOUS at 08:53

## 2025-02-16 RX ADMIN — TRIMETHOBENZAMIDE HYDROCHLORIDE 200 MG: 100 INJECTION INTRAMUSCULAR at 00:22

## 2025-02-16 RX ADMIN — CYANOCOBALAMIN TAB 500 MCG 1000 MCG: 500 TAB at 08:49

## 2025-02-16 RX ADMIN — LIDOCAINE 5% 1 PATCH: 700 PATCH TOPICAL at 08:58

## 2025-02-16 RX ADMIN — OSELTAMIVIR PHOSPHATE 30 MG: 30 CAPSULE ORAL at 21:26

## 2025-02-16 RX ADMIN — SODIUM CHLORIDE, SODIUM GLUCONATE, SODIUM ACETATE, POTASSIUM CHLORIDE, MAGNESIUM CHLORIDE, SODIUM PHOSPHATE, DIBASIC, AND POTASSIUM PHOSPHATE 100 ML/HR: .53; .5; .37; .037; .03; .012; .00082 INJECTION, SOLUTION INTRAVENOUS at 11:05

## 2025-02-16 RX ADMIN — CARVEDILOL 6.25 MG: 6.25 TABLET, FILM COATED ORAL at 17:05

## 2025-02-16 RX ADMIN — DICLOFENAC SODIUM 2 G: 10 GEL TOPICAL at 17:07

## 2025-02-16 RX ADMIN — POLYETHYLENE GLYCOL 3350 17 G: 17 POWDER, FOR SOLUTION ORAL at 08:48

## 2025-02-16 RX ADMIN — OSELTAMIVIR PHOSPHATE 30 MG: 30 CAPSULE ORAL at 08:52

## 2025-02-16 RX ADMIN — CARVEDILOL 6.25 MG: 6.25 TABLET, FILM COATED ORAL at 08:49

## 2025-02-16 NOTE — ASSESSMENT & PLAN NOTE
Patient's creatinine increased to 1.17>1.36>1.3> 1.4 (baseline noted to be 0.9-1.1)  Suspect pre-renal with hypotensive episodes within 24 hours of admission  Additional AST to 1400s, ALT to 700s , T bili elevation - suspicion of ischemic liver injury  Continue to hold Losartan, Torsemide and spironolactone, Lasix  Mild hydration with 100cc/hr x 5 hours.   Cardiology consulted  Monitor I&Os, daily weights  Avoid nephrotoxins

## 2025-02-16 NOTE — PLAN OF CARE
Problem: Potential for Falls  Goal: Patient will remain free of falls  Description: INTERVENTIONS:  - Educate patient/family on patient safety including physical limitations  - Instruct patient to call for assistance with activity   - Consult OT/PT to assist with strengthening/mobility   - Keep Call bell within reach  - Keep bed low and locked with side rails adjusted as appropriate  - Keep care items and personal belongings within reach  - Initiate and maintain comfort rounds  - Make Fall Risk Sign visible to staff  - Offer Toileting every 2 Hours, in advance of need  - Initiate/Maintain bed alarm  - Obtain necessary fall risk management equipment:    - Apply yellow socks and bracelet for high fall risk patients  - Consider moving patient to room near nurses station  Outcome: Progressing     Problem: PAIN - ADULT  Goal: Verbalizes/displays adequate comfort level or baseline comfort level  Description: Interventions:  - Encourage patient to monitor pain and request assistance  - Assess pain using appropriate pain scale  - Administer analgesics based on type and severity of pain and evaluate response  - Implement non-pharmacological measures as appropriate and evaluate response  - Consider cultural and social influences on pain and pain management  - Notify physician/advanced practitioner if interventions unsuccessful or patient reports new pain  Outcome: Progressing     Problem: INFECTION - ADULT  Goal: Absence or prevention of progression during hospitalization  Description: INTERVENTIONS:  - Assess and monitor for signs and symptoms of infection  - Monitor lab/diagnostic results  - Monitor all insertion sites, i.e. indwelling lines, tubes, and drains  - Monitor endotracheal if appropriate and nasal secretions for changes in amount and color  - [place] appropriate cooling/warming therapies per order  - Administer medications as ordered  - Instruct and encourage patient and family to use good hand hygiene  technique  - Identify and instruct in appropriate isolation precautions for identified infection/condition  Outcome: Progressing  Goal: Absence of fever/infection during neutropenic period  Description: INTERVENTIONS:  - Monitor WBC    Outcome: Progressing     Problem: SAFETY ADULT  Goal: Patient will remain free of falls  Description: INTERVENTIONS:  - Educate patient/family on patient safety including physical limitations  - Instruct patient to call for assistance with activity   - Consult OT/PT to assist with strengthening/mobility   - Keep Call bell within reach  - Keep bed low and locked with side rails adjusted as appropriate  - Keep care items and personal belongings within reach  - Initiate and maintain comfort rounds  - Make Fall Risk Sign visible to staff  - Offer Toileting every 2 Hours, in advance of need  - Initiate/Maintain bed alarm  - Obtain necessary fall risk management equipment:    - Apply yellow socks and bracelet for high fall risk patients  - Consider moving patient to room near nurses station  Outcome: Progressing  Goal: Maintain or return to baseline ADL function  Description: INTERVENTIONS:  -  Assess patient's ability to carry out ADLs; assess patient's baseline for ADL function and identify physical deficits which impact ability to perform ADLs (bathing, care of mouth/teeth, toileting, grooming, dressing, etc.)  - Assess/evaluate cause of self-care deficits   - Assess range of motion  - Assess patient's mobility; develop plan if impaired  - Assess patient's need for assistive devices and provide as appropriate  - Encourage maximum independence but intervene and supervise when necessary  - Involve family in performance of ADLs  - Assess for home care needs following discharge   - Consider OT consult to assist with ADL evaluation and planning for discharge  - Provide patient education as appropriate  Outcome: Progressing  Goal: Maintains/Returns to pre admission functional  level  Description: INTERVENTIONS:  - Perform AM-PAC 6 Click Basic Mobility/ Daily Activity assessment daily.  - Set and communicate daily mobility goal to care team and patient/family/caregiver.   - Collaborate with rehabilitation services on mobility goals if consulted  - Perform Range of Motion 3 times a day.  - Reposition patient every 2 hours.  - Dangle patient 3 times a day  - Stand patient 3 times a day  - Ambulate patient 3 times a day  - Out of bed to chair 3 times a day   - Out of bed for meals 3 times a day  - Out of bed for toileting  - Record patient progress and toleration of activity level   Outcome: Progressing     Problem: DISCHARGE PLANNING  Goal: Discharge to home or other facility with appropriate resources  Description: INTERVENTIONS:  - Identify barriers to discharge w/patient and caregiver  - Arrange for needed discharge resources and transportation as appropriate  - Identify discharge learning needs (meds, wound care, etc.)  - Arrange for interpretive services to assist at discharge as needed  - Refer to Case Management Department for coordinating discharge planning if the patient needs post-hospital services based on physician/advanced practitioner order or complex needs related to functional status, cognitive ability, or social support system  Outcome: Progressing     Problem: Knowledge Deficit  Goal: Patient/family/caregiver demonstrates understanding of disease process, treatment plan, medications, and discharge instructions  Description: Complete learning assessment and assess knowledge base.  Interventions:  - Provide teaching at level of understanding  - Provide teaching via preferred learning methods  Outcome: Progressing     Problem: Prexisting or High Potential for Compromised Skin Integrity  Goal: Skin integrity is maintained or improved  Description: INTERVENTIONS:  - Identify patients at risk for skin breakdown  - Assess and monitor skin integrity  - Assess and monitor nutrition  and hydration status  - Monitor labs   - Assess for incontinence   - Turn and reposition patient  - Assist with mobility/ambulation  - Relieve pressure over bony prominences  - Avoid friction and shearing  - Provide appropriate hygiene as needed including keeping skin clean and dry  - Evaluate need for skin moisturizer/barrier cream  - Collaborate with interdisciplinary team   - Patient/family teaching  - Consider wound care consult   Outcome: Progressing

## 2025-02-16 NOTE — ASSESSMENT & PLAN NOTE
Significant LFT elevations over past 48 hours   AST/ALT/ALP 1464 / 782 / 224 (2/16) from 737 / 77 / 226 (2/14). Tbili 1.77 (2/16) from 0.8 (2/14)  R factor 10, Hepatocellular  No prior hx of liver disease. No alcohol use.  CHF exacerbation, she was diuresed in ICU and had one episode of hypotension to 89/59 2 days ago, now VSS.  216: On exam, VSS, AO x 2.5, no RUQ pain; lower abdominal gas pain per patient.   Her  finds her more confused for -6 days now since PTA but not significantly different from 2 days ago.    Plan:    Most likely ischemic liver, less likely DILI but did have exposure to CTX. Oseltamivir less likely to cause DILI. Would rule out obstruction, Budd chiari, acute hepatitis, tylenol toxicity. Expect lag T bili elevation after transaminases.   Check INR, acute hep, RUQ doppler, tylenol level  Will give gentle fluids 100cc/hr x 5 in setting of heart failure and recent hypoxia requiring ICU. Repeat CMP after fluids.  RUQ liver doppler 2/16: normal size, smooth contour, minmial coarsened pancrechyma, no mass. Portal vein and primary branches patent, hepatic veins patent, main hepatic artery normal size and patent. CBD 3mm, no choledocholithiasis, no GB findings. No ascites.   INR elevated to 1.8.   Monitor CMP, INR daily - if not trending down, consider GI consult

## 2025-02-16 NOTE — ASSESSMENT & PLAN NOTE
Noted to desaturate to the 80s requiring 2L O2 via nasal cannula to maintain sats >90%, currently on 4L  Obtain CXR  Titrate O2 and wean to room air  Patient was in ICU due to continous BiPAP - required diuresis on lasix 40 mg IV BID  Hold lasix in setting of JULIANNE and presumed ischemic liver injury  Cardiology consulted

## 2025-02-16 NOTE — SPEECH THERAPY NOTE
"Speech Language/Pathology    Speech/Language Pathology Progress Note    Patient Name: Zohra Barriga  Today's Date: 2/16/2025     Problem List  Principal Problem:    Acute respiratory failure ([facility])  Active Problems:    Essential hypertension    Type 2 diabetes mellitus, with long-term current use of insulin ([facility])    JULIANNE (acute kidney injury) ([facility])    Fall    Acute on Chronic Systolic CHF ([facility])    Diabetic ulcer of right midfoot associated with type 2 diabetes mellitus ([facility])    UTI (urinary tract infection)    Facial contusion, initial encounter    Left ankle pain    Closed fracture of left ankle with routine healing       Past Medical History  Past Medical History:   Diagnosis Date    Anxiety     Asthma     controlled    Back complaints     Cancer ([facility])     nose    Cellulitis of left lower leg     \"not now\"    CHF (congestive heart failure) ([facility]) 02/2021    Chronic bilateral thoracic back pain     Chronic kidney disease     sees nephrologist regularly\" stage 3\"    Chronic myofascial pain     Chronic pain of both lower extremities     Chronic pain of left knee     \"both knees\"    Chronic pain syndrome     bilat legs and knees/neuropathy pain    COVID 12/2021    CPAP (continuous positive airway pressure) dependence     no currently uses    Depression     Diabetes mellitus ([facility])     insulin pump    Difficulty walking 2018    Disease of thyroid gland     Does use hearing aid     bilat and will wear DOS    DVT (deep venous thrombosis) ([facility]) 2013    left leg    Gait disorder     Gastroparesis     GERD (gastroesophageal reflux disease)     Head injury     Headache, tension-type     History of angina     History of MRSA infection     History of transfusion     Many years ago    HL (hearing loss) 2018    Hyperlipidemia     Hypertension     Hypoglycemic reaction     \"occas low blood sugar\"    Insulin pump in place     pt reports saw endocrinologist 4/28 and will bring copy of instructions DOS    Irritable bowel syndrome     " "Kidney disease     Memory loss     Movement disorder     Neuropathy in diabetes ([facility]CC)     Constant pain legs and feet    Nose fracture     Pacemaker 2019    Pneumonia     Polyneuropathy associated with underlying disease (HCC)     PONV (postoperative nausea and vomiting)     Risk for falls     S/P insertion of spinal cord stimulator 2018    Sarcoidosis     Shortness of breath     \"exertion and not new\"    Sleep apnea     Stroke (HCC)     Thoracic vertebral fracture (HCC)     TIA (transient ischemic attack)     Use of cane as ambulatory aid     Uses walker     Wears dentures     permanent upper/and some missing teeth/partial lower        Past Surgical History  Past Surgical History:   Procedure Laterality Date    ABDOMINAL ADHESION SURGERY N/A 2021    Procedure: laparoscopic LYSIS ADHESIONS;  Surgeon: Jill Bentley MD;  [facility];  Service: Thoracic    [facility]    BREAST SURGERY      BREAST SURGERY      reduction    CARDIAC PACEMAKER PLACEMENT      pacemaker     SECTION      COLONOSCOPY      DILATION AND CURETTAGE OF UTERUS      \"D&E\"    HERNIA REPAIR      HYSTERECTOMY      JOINT REPLACEMENT Left     LTKR    NECK SURGERY      fused 4 discs with 4 screws implanted    NISSEN FUNDOPLICATION LAPAROSCOPIC WITH ROBOTICS N/A 2021    Procedure: ROBOTIC HELLER MYOTOMY WITH BERNARDO FUNDIPLICATION ;  Surgeon: Jill Bentley MD;  [facility];  Service: Thoracic    NOSE SURGERY  2024    closed reduction    MS ESOPHAGOGASTRODUODENOSCOPY TRANSORAL DIAGNOSTIC N/A 2021    Procedure: ESOPHAGOGASTRODUODENOSCOPY (EGD);  Surgeon: Jill Bentley MD;  [facility];  Service: Thoracic    MS ESOPHAGOGASTRODUODENOSCOPY TRANSORAL DIAGNOSTIC N/A 2022    Procedure: ESOPHAGOGASTRODUODENOSCOPY (EGD),;  Surgeon: Jill Bentley MD;  [facility];  Service: Thoracic    MS ESOPHAGOGASTRODUODENOSCOPY " TRANSORAL DIAGNOSTIC N/A 02/16/2022    Procedure: ESOPHAGOGASTRODUODENOSCOPY (EGD);  Surgeon: Jill Bentley MD;  [facility];  Service: Thoracic    AZ ESOPHAGOGASTRODUODENOSCOPY TRANSORAL DIAGNOSTIC N/A 07/12/2022    Procedure: ESOPHAGOGASTRODUODENOSCOPY (EGD); PYLORIC DILATION; GE JUNCTION DILATION;  Surgeon: Jill Bentley MD;  [facility];  Service: Thoracic    AZ ESOPHAGOGASTRODUODENOSCOPY TRANSORAL DIAGNOSTIC N/A 11/29/2022    Procedure: ESOPHAGOGASTRODUODENOSCOPY (EGD);  Surgeon: Varinder Steiner MD;  [facility];  Service: Thoracic    AZ ESOPHAGOGASTRODUODENOSCOPY TRANSORAL DIAGNOSTIC N/A 01/24/2023    Procedure: ESOPHAGOGASTRODUODENOSCOPY (EGD);  Surgeon: Jill Bentley MD;  [facility];  Service: Thoracic    AZ ESOPHAGOGASTRODUODENOSCOPY TRANSORAL DIAGNOSTIC N/A 05/01/2024    Procedure: ESOPHAGOGASTRODUODENOSCOPY (EGD);  Surgeon: Jill Bentley MD;  [facility];  Service: Thoracic    AZ ESOPHAGOSCOPY FLEX BALLOON DILAT <30 MM DIAM N/A 01/12/2022    Procedure: DILATATION ESOPHAGEAL;  Surgeon: Jill Bentley MD;  [facility];  Service: Thoracic    AZ ESOPHAGOSCOPY FLEX BALLOON DILAT <30 MM DIAM N/A 02/16/2022    Procedure: DILATATION ESOPHAGEAL;  Surgeon: Jill Bentley MD;  [facility];  Service: Thoracic    AZ ESOPHAGOSCOPY FLEX BALLOON DILAT <30 MM DIAM N/A 11/29/2022    Procedure: DILATATION ESOPHAGEAL esophageal and pyloric dilation;  Surgeon: Varinder Steiner MD;  [facility];  Service: Thoracic    AZ ESOPHAGOSCOPY FLEX BALLOON DILAT <30 MM DIAM N/A 01/24/2023    Procedure: esophageal dilation dilated up to 54  with pylorus dilation dilated up to 18;  Surgeon: Jill Bentley MD;  [facility];  Service: Thoracic    AZ ESOPHAGOSCOPY FLEX BALLOON DILAT <30 MM DIAM N/A 05/01/2024    Procedure: DILATATION ESOPHAGEAL;  Surgeon: Jill Bentley MD;  [facility];   Service: Thoracic    ND RIBEIRO IMPLTJ NSTIM ELTRDS PLATE/PADDLE EDRL Left 04/23/2018    Procedure: Insertion of thoracic spinal cord stimulator electrode via laminotomy and placement of left buttock IPG;  Surgeon: Shahab Bullock MD;  [facility];  Service: Neurosurgery    REPLACEMENT TOTAL KNEE      ROTATOR CUFF REPAIR      SPINAL CORD STIMULATOR REMOVAL Bilateral 07/02/2024    Procedure: REOPENING OF THORACIC AND LEFT BUTTOCK INCISION FOR REMOVAL OF SPINAL CORD STIMULATOR SYSTEM;  Surgeon: Shahab Bullock MD;  [facility];  Service: Neurosurgery    SPINAL STIMULATOR PLACEMENT Left 10/03/2018    Procedure: BUTTOCK RE-OPENING INCISION FOR REPOSITIONING OF IMPLANTABLE PULSE GENERATOR;  Surgeon: Shahab Bullock MD;  [facility];  Service: Neurosurgery    TONSILLECTOMY      UPPER GASTROINTESTINAL ENDOSCOPY      VASCULAR SURGERY      WISDOM TOOTH EXTRACTION           Subjective:  Pt alert but appeared fatigued. responded to questions out of context at times.  reported she is confused sometimes. He stated she was coughing a lot throughout the day yesterday. Not today.   Objective:  Pt seen for f/u dysphagia tx. Dislikes the gravy here. Had only a very small amt of the ground meat loaf. Prolonged and unnecessary munch like mastication, (seemed more so due to lack of desire to eat). Offered her other items. Requested cherry ice. Ordered. No cough or wet vocal quality w/ sips of juice, ice cream, and min amt of the ground meat. Denied any difficulty swallowing or regurge.   Assessment:  Reduced intake. Ate mostly the ice cream. No overt s/s aspiration.   Plan/Recommendations:  Continue NDD2 [facility] soft for now. Will f/u for tolerance and upgrade potential.

## 2025-02-16 NOTE — CONSULTS
Consult - Cardiology   Zohra Barriga 69 y.o. female MRN: [id number]  Unit/Bed#: E5 -01 Encounter: [id number]    Reason For Consult:  Acute CHF, JULIANNE            Assessment:  Acute on chronic combined systolic and diastolic heart failure, HFrEF, LVEF 35% (Jan 2025), NYHA Class III, AHA Stage C   - Abnormal Lexiscan nuclear stress test with fixed defect of inferior wall (infarct vs artifact), 2/6/2025  T2 diabetes mellitus (HgbA1c 7.7 in Jan 2025)  Anemia  Primary HTN  Left distal tib fib fracture From the fall at home  [place] [employer] Dual chamber Pacemaker implanted, 2019  Non-obstructive CAD on 2011 cardiac catheterization  HLD  STU on CPAP QHS  H/o TIA  H/o DVT, 2015  Obesity  Hypothyroidism  Asthma  Chronic pain  Transaminitis    Plan:  She has a plan for cardiac CTA as OP to f/u on abnormal Stress test 2/6/25  Presently she is still in Acute CHF with volume overload. SCAI ~B  Prior dry weight was 164#. Daily weights as able.  Continue home Coreg with BP hold parameters  Home Losartan & spironolactone on hold.  Was getting intermittent IV Lasix dosing with good UOP. S Cr increasing. SLIM gave IVFs today.  Hold evening Lasix. Consider resuming based on tomorrow AM BMP result.  If she needs midodrine to support BP during diuresis, start with 2.5 mg TID and uptitrate  Consider IV Lasix gtt if bolus dosing causes fluctuating BP measurements    History Of Present Illness:  Zohra Barriga is a 69 year old who presented to [facility] ED on 2/10/25 from home complaining of confusion, lethargy, weakness and a fall. Dx with Urosepsis. Home CHF GDMT were held due to hypotension on admission. Received sepsis protocol IVFs. Slow to add back CHF GDMT due to JULIANNE. Overnight on Friday patient had acute respiratory failure requiring BiPAP. This was due to New Influenza A pneumonia and Acute CHF exacerbation with fluid overload. IVFs were stopped, she was given IV Lasix injections and her O2 requirements lessened. Today patient  reports she still has gargled breathing.  She is very tired.   at the bedside reports she was very sick on admission, improved with treatment for urosepsis and then got sick again on Friday night with CHF and flu now her functional status is low again.  He reports she is very tired, sleeping throughout the day, poor appetite, she has not been out of bed due to broken ankle on the left.  He reports she has some confusion this hospitalization as well.  Patient denies peripheral edema.  Denies chest pain, pressure, tightness or pleurisy.    Rhythm History: 1st degree AV block, Bifasicular block 2019, NSR, paced rhythm    [facility] Cardi[facility]    ROS:  Review of Systems   Constitutional:  Positive for activity change and appetite change. Negative for diaphoresis.   HENT:  Positive for sinus pressure. Negative for congestion and sneezing.    Respiratory:  Positive for cough.         + gurgling breathing per pt   Cardiovascular:  Negative for chest pain, palpitations and leg swelling.   Gastrointestinal:  Negative for abdominal pain and vomiting.   Genitourinary:         She has good uop today clear yellow urine   Musculoskeletal:         + left leg pain from broken ankle and muscle cramping from immobility   Skin:         + right foot wound chronic   Neurological:  Negative for dizziness, light-headedness and headaches.        + generalized weakness   Psychiatric/Behavioral:  Positive for confusion. Negative for agitation.       I personally reviewed Lindsay's PMH, surgical hx, social hx, Fhx, meds & allergies.    Vitals:    02/16/25 0306 02/16/25 0600 02/16/25 0724 02/16/25 1517   BP:   137/68 138/61   BP Location:   Right arm Left arm   Pulse:   62 60   Resp:   14 15   Temp:   98.1 °F (36.7 °C) 97.9 °F (36.6 °C)   TempSrc:   Oral Oral   SpO2: 92%  93% 94%   Weight:  83.3 kg (183 lb 10.3 oz)     Height:         Exam:  Physical Exam  Vitals and nursing note reviewed.    Constitutional:       General: She is not in acute distress.  HENT:      Head: Normocephalic and atraumatic.      Nose: No congestion.      Comments: Wearing supplemental o2 via nc     Mouth/Throat:      Mouth: Mucous membranes are moist.   Neck:      Comments: + mild JVD  Cardiovascular:      Rate and Rhythm: Normal rate and regular rhythm.      Heart sounds: S1 normal and S2 normal.      Comments: Heart sounds difficult to appreciate due to such coarse breath sounds  Pulmonary:      Effort: No tachypnea or respiratory distress.      Comments: + rhonchi & rales  Chest:      Comments: Pacer non tender  Abdominal:      Palpations: Abdomen is soft.   Musculoskeletal:         General: No swelling.      Right lower leg: No edema.      Left lower leg: No edema.   Skin:     Comments: Hyperpigmentation of pvd    Right foot wound is dressed I did not inspect it    Deformity left leg from old surgery    Extremities are warm and pink   Neurological:      Mental Status: She is alert.      Comments: She is somnolent but oriented when we speak   Psychiatric:         Behavior: Behavior normal.     DATA:     Imaging: CXR from 2/13 and 2/14 with pulmonary edema and vascular congestion.  Radiology results from trauma imaging on admission 2/10/2025 also reviewed  Other: January 2025 echo report from [facility]-LVEF 35-39% global hypokinesis, RV size and function normal  2/6/2025 Lexiscan nuclear stress test negative for myocardial ischemia.  Moderate fixed perfusion defect of the inferior wall consider prior infarct versus tissue attenuation artifact.  Dilated LV with LVEF 45%.  Possible inferior wall hypokinesis.  Nov 2024 device check report reviewed  EKGs on admission was V paced rhythm with HR 76 bpm.    Maya Kelley PA-C

## 2025-02-16 NOTE — QUICK NOTE
Interval Internal Medicine Note:    Labs f/u :  Lactate 0.8  CMP with slight improved Cr 1.4 to 1.34  LFTs trending down AST/ALT 1400/780 > 913/602 ; ALP/T bili 224/1.77> 184/1.6    Plan:   Patient s/p 500cc of isolyte today for gentle rehydration in setting of JULIANNE.   Patient appears to be perfusing with normal lactate, suspect kidney and liver injury from prior hypotension/ischemia  Con't hold diuresis for today, will follow up liver/kidney function labs tomorrow

## 2025-02-16 NOTE — PLAN OF CARE
Problem: Potential for Falls  Goal: Patient will remain free of falls  Description: INTERVENTIONS:  - Educate patient/family on patient safety including physical limitations  - Instruct patient to call for assistance with activity   - Consult OT/PT to assist with strengthening/mobility   - Keep Call bell within reach  - Keep bed low and locked with side rails adjusted as appropriate  - Keep care items and personal belongings within reach  - Initiate and maintain comfort rounds  - Make Fall Risk Sign visible to staff  - Offer Toileting every 2 Hours, in advance of need  - Initiate/Maintain bed alarm  - Obtain necessary fall risk management equipment:    - Apply yellow socks and bracelet for high fall risk patients  - Consider moving patient to room near nurses station  Outcome: Progressing     Problem: PAIN - ADULT  Goal: Verbalizes/displays adequate comfort level or baseline comfort level  Description: Interventions:  - Encourage patient to monitor pain and request assistance  - Assess pain using appropriate pain scale  - Administer analgesics based on type and severity of pain and evaluate response  - Implement non-pharmacological measures as appropriate and evaluate response  - Consider cultural and social influences on pain and pain management  - Notify physician/advanced practitioner if interventions unsuccessful or patient reports new pain  Outcome: Progressing     Problem: INFECTION - ADULT  Goal: Absence or prevention of progression during hospitalization  Description: INTERVENTIONS:  - Assess and monitor for signs and symptoms of infection  - Monitor lab/diagnostic results  - Monitor all insertion sites, i.e. indwelling lines, tubes, and drains  - Monitor endotracheal if appropriate and nasal secretions for changes in amount and color  - Champion appropriate cooling/warming therapies per order  - Administer medications as ordered  - Instruct and encourage patient and family to use good hand hygiene  technique  - Identify and instruct in appropriate isolation precautions for identified infection/condition  Outcome: Progressing  Goal: Absence of fever/infection during neutropenic period  Description: INTERVENTIONS:  - Monitor WBC    Outcome: Progressing     Problem: SAFETY ADULT  Goal: Patient will remain free of falls  Description: INTERVENTIONS:  - Educate patient/family on patient safety including physical limitations  - Instruct patient to call for assistance with activity   - Consult OT/PT to assist with strengthening/mobility   - Keep Call bell within reach  - Keep bed low and locked with side rails adjusted as appropriate  - Keep care items and personal belongings within reach  - Initiate and maintain comfort rounds  - Make Fall Risk Sign visible to staff  - Offer Toileting every 2 Hours, in advance of need  - Initiate/Maintain bed alarm  - Obtain necessary fall risk management equipment:    - Apply yellow socks and bracelet for high fall risk patients  - Consider moving patient to room near nurses station  Outcome: Progressing  Goal: Maintain or return to baseline ADL function  Description: INTERVENTIONS:  -  Assess patient's ability to carry out ADLs; assess patient's baseline for ADL function and identify physical deficits which impact ability to perform ADLs (bathing, care of mouth/teeth, toileting, grooming, dressing, etc.)  - Assess/evaluate cause of self-care deficits   - Assess range of motion  - Assess patient's mobility; develop plan if impaired  - Assess patient's need for assistive devices and provide as appropriate  - Encourage maximum independence but intervene and supervise when necessary  - Involve family in performance of ADLs  - Assess for home care needs following discharge   - Consider OT consult to assist with ADL evaluation and planning for discharge  - Provide patient education as appropriate  Outcome: Progressing  Goal: Maintains/Returns to pre admission functional  level  Description: INTERVENTIONS:  - Perform AM-PAC 6 Click Basic Mobility/ Daily Activity assessment daily.  - Set and communicate daily mobility goal to care team and patient/family/caregiver.   - Collaborate with rehabilitation services on mobility goals if consulted  - Perform Range of Motion 3 times a day.  - Reposition patient every 2 hours.  - Dangle patient 3 times a day  - Stand patient 3 times a day  - Ambulate patient 3 times a day  - Out of bed to chair 3 times a day   - Out of bed for meals 3 times a day  - Out of bed for toileting  - Record patient progress and toleration of activity level   Outcome: Progressing     Problem: DISCHARGE PLANNING  Goal: Discharge to home or other facility with appropriate resources  Description: INTERVENTIONS:  - Identify barriers to discharge w/patient and caregiver  - Arrange for needed discharge resources and transportation as appropriate  - Identify discharge learning needs (meds, wound care, etc.)  - Arrange for interpretive services to assist at discharge as needed  - Refer to Case Management Department for coordinating discharge planning if the patient needs post-hospital services based on physician/advanced practitioner order or complex needs related to functional status, cognitive ability, or social support system  Outcome: Progressing     Problem: Knowledge Deficit  Goal: Patient/family/caregiver demonstrates understanding of disease process, treatment plan, medications, and discharge instructions  Description: Complete learning assessment and assess knowledge base.  Interventions:  - Provide teaching at level of understanding  - Provide teaching via preferred learning methods  Outcome: Progressing     Problem: Prexisting or High Potential for Compromised Skin Integrity  Goal: Skin integrity is maintained or improved  Description: INTERVENTIONS:  - Identify patients at risk for skin breakdown  - Assess and monitor skin integrity  - Assess and monitor nutrition  and hydration status  - Monitor labs   - Assess for incontinence   - Turn and reposition patient  - Assist with mobility/ambulation  - Relieve pressure over bony prominences  - Avoid friction and shearing  - Provide appropriate hygiene as needed including keeping skin clean and dry  - Evaluate need for skin moisturizer/barrier cream  - Collaborate with interdisciplinary team   - Patient/family teaching  - Consider wound care consult   Outcome: Progressing

## 2025-02-16 NOTE — ASSESSMENT & PLAN NOTE
Lab Results   Component Value Date    HGBA1C 7.7 (H) 01/17/2025       Recent Labs     02/15/25  2115 02/16/25  0346 02/16/25  0726 02/16/25  1116   POCGLU 161* 131 128 168*       Blood Sugar Average: Last 72 hrs:  (P) [id number]    Managed by endocrinology in the outpatient setting  Insulin pump removed and sent home with   Utilize basal Lantus at bedtime  SSI with Accu-Cheks  Hypoglycemia protocol

## 2025-02-16 NOTE — ASSESSMENT & PLAN NOTE
Wt Readings from Last 3 Encounters:   02/16/25 83.3 kg (183 lb 10.3 oz)   12/27/24 79.4 kg (175 lb)   09/20/24 77.1 kg (170 lb)     Cardiomyopathy of unknown cause diagnosed in January during recent hospitalization with newly reduced EF 35 to 39%  PTA GDMT: carvedilol 6.25 mg twice daily, losartan 50 mg twice daily, spironolactone 25 mg daily, torsemide 20 mg daily  BP improved, resume carvedilol, continue to hold losartan, spironolactone in light of JULIANNE  Per , patient had only been taking her diuretics prn at home  Hold lasix 40 mg IV BID started due to acute HF exacerbation in ICU with JULIANNE and presumed liver ischemia   Cardiology consulted for comanagement of diuresis in setting of JULIANNE/hypotension/transaminitis  2 g sodium diet  Daily weights, I&Os

## 2025-02-16 NOTE — PROGRESS NOTES
Progress Note - Hospitalist   Name: Zohra Barriga 69 y.o. female I MRN: [id number]  Unit/Bed#: E5 -01 I Date of Admission: 2/10/2025   Date of Service: 2/16/2025 I Hospital Day: 5    Assessment & Plan  Type 2 diabetes mellitus, with long-term current use of insulin (HCC)  Lab Results   Component Value Date    HGBA1C 7.7 (H) 01/17/2025       Recent Labs     02/15/25  2115 02/16/25  0346 02/16/25  0726 02/16/25  1116   POCGLU 161* 131 128 168*       Blood Sugar Average: Last 72 hrs:  (P) [id number]    Managed by endocrinology in the outpatient setting  Insulin pump removed and sent home with   Utilize basal Lantus at bedtime  SSI with Accu-Cheks  Hypoglycemia protocol  Acute on Chronic Systolic CHF (HCC)  Wt Readings from Last 3 Encounters:   02/16/25 83.3 kg (183 lb 10.3 oz)   12/27/24 79.4 kg (175 lb)   09/20/24 77.1 kg (170 lb)     Cardiomyopathy of unknown cause diagnosed in January during recent hospitalization with newly reduced EF 35 to 39%  PTA GDMT: carvedilol 6.25 mg twice daily, losartan 50 mg twice daily, spironolactone 25 mg daily, torsemide 20 mg daily  BP improved, resume carvedilol, continue to hold losartan, spironolactone in light of JULIANNE  Per , patient had only been taking her diuretics prn at home  Hold lasix 40 mg IV BID started due to acute HF exacerbation in ICU with JULIANNE and presumed liver ischemia   Cardiology consulted for comanagement of diuresis in setting of JULIANNE/hypotension/transaminitis  2 g sodium diet  Daily weights, I&Os  Diabetic ulcer of right midfoot associated with type 2 diabetes mellitus (HCC)  Known ulceration plantar aspect of right foot  Follows with podiatry and recently completed a course of po antibiotics (last week)  Xray right foot with no radiographic evidence of osteomyelitis  Podiatry following, at this time no evidence of infection, continue wound care  UTI (urinary tract infection)  Catheterized urine specimen turbid in appearance with  pyuria  Patient with associated encephalopathy  Urine culture growing >100,000 CFU mixed contaminants  Continue empiric ceftriaxone to complete 5-day course  Facial contusion, initial encounter  S/p fall  Presented with left-sided facial contusion  Trauma scans unremarkable for acute traumatic injury  PT/OT/case management  Pain control  Left ankle pain  Presented with left ankle pain post fall  No acute fracture or subluxation on x-ray  Pain control  PT/OT/case management  Fall  Patient presents following mechanical fall after inability to maneuver wheelchair in the ladies room  Fell flat on her face and stomach  Trauma scans negative, will obtain XR tib fib bilateral legs given severe pain over both  Multimodal pain regimen with cautious use of opioids given hypotensive episodes  PT and OT recommending level I, rehab  Essential hypertension  Patient maintained on Coreg and Losartan in the outpatient setting  Had episodes of hypotension day following admission in setting of sepsis  PTA medications were held  BP now improved, will resume Coreg however will continue to hold Losartan, Torsemide and Spironolactone given JULIANNE  Will have to add another agent if BP continues to be high, patient will Amlodipine allergy, can trial hydralazine  JULIANNE (acute kidney injury) (HCC)  Patient's creatinine increased to 1.17>1.36>1.3> 1.4 (baseline noted to be 0.9-1.1)  Suspect pre-renal with hypotensive episodes within 24 hours of admission  Additional AST to 1400s, ALT to 700s , T bili elevation - suspicion of ischemic liver injury  Continue to hold Losartan, Torsemide and spironolactone, Lasix  Mild hydration with 100cc/hr x 5 hours.   Cardiology consulted  Monitor I&Os, daily weights  Avoid nephrotoxins  Acute respiratory failure (HCC)  Noted to desaturate to the 80s requiring 2L O2 via nasal cannula to maintain sats >90%, currently on 4L  Obtain CXR  Titrate O2 and wean to room air  Patient was in ICU due to continous BiPAP -  required diuresis on lasix 40 mg IV BID  Hold lasix in setting of JULIANNE and presumed ischemic liver injury  Cardiology consulted  Closed fracture of left ankle with routine healing    Transaminitis  Significant LFT elevations over past 48 hours   AST/ALT/ALP 1464 / 782 / 224 (2/16) from 737 / 77 / 226 (2/14). Tbili 1.77 (2/16) from 0.8 (2/14)  R factor 10, Hepatocellular  No prior hx of liver disease. No alcohol use.  CHF exacerbation, she was diuresed in ICU and had one episode of hypotension to 89/59 2 days ago, now VSS.  216: On exam, VSS, AO x 2.5, no RUQ pain; lower abdominal gas pain per patient.   Her  finds her more confused for -6 days now since PTA but not significantly different from 2 days ago.    Plan:    Most likely ischemic liver, less likely DILI but did have exposure to CTX. Oseltamivir less likely to cause DILI. Would rule out obstruction, Budd chiari, acute hepatitis, tylenol toxicity. Expect lag T bili elevation after transaminases.   Check INR, acute hep, RUQ doppler, tylenol level  Will give gentle fluids 100cc/hr x 5 in setting of heart failure and recent hypoxia requiring ICU. Repeat CMP after fluids.  RUQ liver doppler 2/16: normal size, smooth contour, minmial coarsened pancrechyma, no mass. Portal vein and primary branches patent, hepatic veins patent, main hepatic artery normal size and patent. CBD 3mm, no choledocholithiasis, no GB findings. No ascites.   INR elevated to 1.8.   Monitor CMP, INR daily - if not trending down, consider GI consult    VTE Pharmacologic Prophylaxis: VTE Score: 5 High Risk (Score >/= 5) - Pharmacological DVT Prophylaxis Ordered: enoxaparin (Lovenox). Sequential Compression Devices Ordered.    Mobility:   Basic Mobility Inpatient Raw Score: 13  JH-HLM Goal: 4: Move to chair/commode  JH-HLM Achieved: 4: Move to chair/commode  JH-HLM Goal achieved. Continue to encourage appropriate mobility.    Patient Centered Rounds: I performed bedside rounds with nursing  staff today.   Discussions with Specialists or Other Care Team Provider: Nursing    Education and Discussions with Family / Patient: Updated  () at bedside.    Current Length of Stay: 5 day(s)  Current Patient Status: Inpatient   Certification Statement: The patient will continue to require additional inpatient hospital stay due to LFT elevation  Discharge Plan: Anticipate discharge in >72 hrs to discharge location to be determined pending rehab evaluations.    Code Status: Level 1 - Full Code    Subjective   This morning found to have LFT elevation to AST 1400, ALT 700s, Tbili 1.77.    Patient endorses feeling tired since prior to admission. Not more sleepy than at time of admission.   finds her more confused since prior to admission but not significantly different from 2 days ago. No known history of liver disease. Patient alert and oriented x 2.5.     Patient denies abdominal pain and denies any tenderness at RUQ. She does get occasional gas pains in lower abdomen. No BM, blood per rectum.     Objective :  Temp:  [97.7 °F (36.5 °C)-100.6 °F (38.1 °C)] 97.9 °F (36.6 °C)  HR:  [60-79] 60  BP: (117-163)/(61-80) 138/61  Resp:  [14-41] 15  SpO2:  [90 %-98 %] 94 %  O2 Device: Nasal cannula  Nasal Cannula O2 Flow Rate (L/min):  [2 L/min-3 L/min] 2 L/min    Body mass index is 37.09 kg/m².     Input and Output Summary (last 24 hours):     Intake/Output Summary (Last 24 hours) at 2/16/2025 1525  Last data filed at 2/16/2025 1258  Gross per 24 hour   Intake 180 ml   Output 750 ml   Net -570 ml       Physical Exam  Vitals and nursing note reviewed.   Constitutional:       General: She is not in acute distress.     Appearance: She is well-developed.   HENT:      Head: Normocephalic and atraumatic.   Eyes:      Conjunctiva/sclera: Conjunctivae normal.   Cardiovascular:      Rate and Rhythm: Normal rate and regular rhythm.      Heart sounds: No murmur heard.  Pulmonary:      Effort: Pulmonary effort  is normal. No respiratory distress.      Breath sounds: Normal breath sounds.   Abdominal:      Palpations: Abdomen is soft.      Tenderness: There is no abdominal tenderness.      Comments: No abd distension  No tenderness to RUQ palpation     Musculoskeletal:         General: No swelling.      Cervical back: Neck supple.   Skin:     General: Skin is warm and dry.      Capillary Refill: Capillary refill takes less than 2 seconds.      Coloration: Skin is not jaundiced.      Comments: No skin jaundice   Neurological:      Mental Status: She is alert.   Psychiatric:         Mood and Affect: Mood normal.           Lines/Drains:  Lines/Drains/Airways       Active Status       Name Placement date Placement time Site Days    External Urinary Catheter 02/16/25  1100  -- less than 1                            Lab Results: I have reviewed the following results:   Results from last 7 days   Lab Units 02/16/25  0514 02/14/25  0230   WBC Thousand/uL 3.91* 6.57   HEMOGLOBIN g/dL 8.9* 10.4*   HEMATOCRIT % 28.8* 34.8   PLATELETS Thousands/uL 163 156   BANDS PCT % 4  --    SEGS PCT %  --  81*   LYMPHO PCT % 15 11*   MONO PCT % 4 6   EOS PCT % 0 1     Results from last 7 days   Lab Units 02/16/25  0514   SODIUM mmol/L 137   POTASSIUM mmol/L 4.8   CHLORIDE mmol/L 108   CO2 mmol/L 19*   BUN mg/dL 49*   CREATININE mg/dL 1.40*   ANION GAP mmol/L 10   CALCIUM mg/dL 8.6   ALBUMIN g/dL 3.4*   TOTAL BILIRUBIN mg/dL 1.77*   ALK PHOS U/L 224*   ALT U/L 782*   AST U/L 1,464*   GLUCOSE RANDOM mg/dL 140     Results from last 7 days   Lab Units 02/16/25  1422   INR  1.83*     Results from last 7 days   Lab Units 02/16/25  1116 02/16/25  0726 02/16/25  0346 02/15/25  2115 02/15/25  1623 02/15/25  1119 02/15/25  0723 02/14/25  2054 02/14/25  1939 02/14/25  1623 02/14/25  1108 02/14/25  0833   POC GLUCOSE mg/dl 168* 128 131 161* 146* 126 101 92 124 104 73 81         Results from last 7 days   Lab Units 02/14/25  0230 02/12/25  0520 02/11/25  0533  02/11/25  0141 02/10/25  1708   LACTIC ACID mmol/L 1.9  --   --  0.8 1.5   PROCALCITONIN ng/ml  --  6.93* 6.35*  --  0.11       Recent Cultures (last 7 days):   Results from last 7 days   Lab Units 02/10/25  1757 02/10/25  1726 02/10/25  1719   BLOOD CULTURE  No Growth After 5 Days.  --  No Growth After 5 Days.   URINE CULTURE   --  >100,000 cfu/ml  --        Imaging Results Review: I reviewed radiology reports from this admission including: chest xray and Ultrasound(s).  Other Study Results Review: No additional pertinent studies reviewed.    Last 24 Hours Medication List:     Current Facility-Administered Medications:     albuterol inhalation solution 2.5 mg, Q6H PRN    [Held by provider] atorvastatin (LIPITOR) tablet 80 mg, Daily With Dinner    calcium carbonate (OYSTER SHELL,OSCAL) 500 mg tablet 1 tablet, Daily With Breakfast    carvedilol (COREG) tablet 6.25 mg, BID With Meals    cyanocobalamin (VITAMIN B-12) tablet 1,000 mcg, Daily    cyclobenzaprine (FLEXERIL) tablet 10 mg, TID PRN    dextromethorphan-guaiFENesin (ROBITUSSIN DM) oral syrup 10 mL, Q4H PRN    Diclofenac Sodium (VOLTAREN) 1 % topical gel 2 g, 4x Daily    enoxaparin (LOVENOX) subcutaneous injection 40 mg, Daily    ezetimibe (ZETIA) tablet 10 mg, Daily    famotidine (PEPCID) tablet 20 mg, HS    fluticasone-vilanterol 200-25 mcg/actuation 1 puff, Daily    [Held by provider] furosemide (LASIX) injection 40 mg, BID (diuretic)    Hydrocod Jermaine-Chlorphe Jermaine ER (TUSSIONEX) ER suspension 5 mL, Once    insulin glargine (LANTUS) subcutaneous injection 15 Units 0.15 mL, HS    insulin lispro (HumALOG/ADMELOG) 100 units/mL subcutaneous injection 1-6 Units, TID AC **AND** Fingerstick Glucose (POCT), TID AC    insulin lispro (HumALOG/ADMELOG) 100 units/mL subcutaneous injection 1-6 Units, HS    levothyroxine tablet 112 mcg, Early Morning    lidocaine (LIDODERM) 5 % patch 1 patch, Daily    [Held by provider] losartan (COZAAR) tablet 50 mg, BID    melatonin  tablet 3 mg, HS PRN    multi-electrolyte (PLASMALYTE-A/ISOLYTE-S PH 7.4) IV solution, Continuous, Last Rate: 100 mL/hr (02/16/25 1105)    oseltamivir (TAMIFLU) capsule 30 mg, Q12H KASSI    oxyCODONE (ROXICODONE) IR tablet 5 mg, Q4H PRN    polyethylene glycol (MIRALAX) packet 17 g, Daily    pramipexole (MIRAPEX) tablet 1 mg, BID    senna-docusate sodium (SENOKOT S) 8.6-50 mg per tablet 1 tablet, HS    sertraline (ZOLOFT) tablet 100 mg, HS    [Held by provider] spironolactone (ALDACTONE) tablet 25 mg, Daily    [Held by provider] torsemide (DEMADEX) tablet 20 mg, Daily    trimethobenzamide (TIGAN) IM injection 200 mg, Q6H PRN    Administrative Statements   Today, Patient Was Seen By: Emilie Soyeon Kim, MD      **Please Note: This note may have been constructed using a voice recognition system.**

## 2025-02-17 PROBLEM — S00.83XD FACIAL CONTUSION, SUBSEQUENT ENCOUNTER: Status: ACTIVE | Noted: 2025-02-11

## 2025-02-17 LAB
ALBUMIN SERPL BCG-MCNC: 2.9 G/DL (ref 3.5–5)
ALP SERPL-CCNC: 165 U/L (ref 34–104)
ALT SERPL W P-5'-P-CCNC: 477 U/L (ref 7–52)
ANION GAP SERPL CALCULATED.3IONS-SCNC: 7 MMOL/L (ref 4–13)
AST SERPL W P-5'-P-CCNC: 644 U/L (ref 13–39)
BASOPHILS # BLD AUTO: 0.02 THOUSANDS/ΜL (ref 0–0.1)
BASOPHILS NFR BLD AUTO: 1 % (ref 0–1)
BILIRUB SERPL-MCNC: 1.66 MG/DL (ref 0.2–1)
BUN SERPL-MCNC: 44 MG/DL (ref 5–25)
CALCIUM ALBUM COR SERPL-MCNC: 9 MG/DL (ref 8.3–10.1)
CALCIUM SERPL-MCNC: 8.1 MG/DL (ref 8.4–10.2)
CHLORIDE SERPL-SCNC: 108 MMOL/L (ref 96–108)
CO2 SERPL-SCNC: 23 MMOL/L (ref 21–32)
CREAT SERPL-MCNC: 1.05 MG/DL (ref 0.6–1.3)
EOSINOPHIL # BLD AUTO: 0.03 THOUSAND/ΜL (ref 0–0.61)
EOSINOPHIL NFR BLD AUTO: 1 % (ref 0–6)
ERYTHROCYTE [DISTWIDTH] IN BLOOD BY AUTOMATED COUNT: 17.4 % (ref 11.6–15.1)
GFR SERPL CREATININE-BSD FRML MDRD: 54 ML/MIN/1.73SQ M
GLUCOSE SERPL-MCNC: 116 MG/DL (ref 65–140)
GLUCOSE SERPL-MCNC: 146 MG/DL (ref 65–140)
GLUCOSE SERPL-MCNC: 172 MG/DL (ref 65–140)
GLUCOSE SERPL-MCNC: 93 MG/DL (ref 65–140)
GLUCOSE SERPL-MCNC: 98 MG/DL (ref 65–140)
HAV IGM SER QL: NORMAL
HBV CORE IGM SER QL: NORMAL
HBV SURFACE AG SER QL: NORMAL
HCT VFR BLD AUTO: 26.3 % (ref 34.8–46.1)
HCV AB SER QL: NORMAL
HGB BLD-MCNC: 8 G/DL (ref 11.5–15.4)
IMM GRANULOCYTES # BLD AUTO: 0.18 THOUSAND/UL (ref 0–0.2)
IMM GRANULOCYTES NFR BLD AUTO: 6 % (ref 0–2)
INR PPP: 1.56 (ref 0.85–1.19)
LYMPHOCYTES # BLD AUTO: 0.52 THOUSANDS/ΜL (ref 0.6–4.47)
LYMPHOCYTES NFR BLD AUTO: 18 % (ref 14–44)
MCH RBC QN AUTO: 29.1 PG (ref 26.8–34.3)
MCHC RBC AUTO-ENTMCNC: 30.4 G/DL (ref 31.4–37.4)
MCV RBC AUTO: 96 FL (ref 82–98)
MONOCYTES # BLD AUTO: 0.25 THOUSAND/ΜL (ref 0.17–1.22)
MONOCYTES NFR BLD AUTO: 9 % (ref 4–12)
NEUTROPHILS # BLD AUTO: 1.92 THOUSANDS/ΜL (ref 1.85–7.62)
NEUTS SEG NFR BLD AUTO: 65 % (ref 43–75)
NRBC BLD AUTO-RTO: 0 /100 WBCS
PLATELET # BLD AUTO: 155 THOUSANDS/UL (ref 149–390)
PMV BLD AUTO: 11.4 FL (ref 8.9–12.7)
POTASSIUM SERPL-SCNC: 4.3 MMOL/L (ref 3.5–5.3)
PROT SERPL-MCNC: 6 G/DL (ref 6.4–8.4)
PROTHROMBIN TIME: 18.7 SECONDS (ref 12.3–15)
RBC # BLD AUTO: 2.75 MILLION/UL (ref 3.81–5.12)
SODIUM SERPL-SCNC: 138 MMOL/L (ref 135–147)
WBC # BLD AUTO: 2.92 THOUSAND/UL (ref 4.31–10.16)

## 2025-02-17 PROCEDURE — 82948 REAGENT STRIP/BLOOD GLUCOSE: CPT

## 2025-02-17 PROCEDURE — 80053 COMPREHEN METABOLIC PANEL: CPT | Performed by: STUDENT IN AN ORGANIZED HEALTH CARE EDUCATION/TRAINING PROGRAM

## 2025-02-17 PROCEDURE — 85027 COMPLETE CBC AUTOMATED: CPT | Performed by: STUDENT IN AN ORGANIZED HEALTH CARE EDUCATION/TRAINING PROGRAM

## 2025-02-17 PROCEDURE — 97110 THERAPEUTIC EXERCISES: CPT

## 2025-02-17 PROCEDURE — 94660 CPAP INITIATION&MGMT: CPT

## 2025-02-17 PROCEDURE — 99232 SBSQ HOSP IP/OBS MODERATE 35: CPT | Performed by: STUDENT IN AN ORGANIZED HEALTH CARE EDUCATION/TRAINING PROGRAM

## 2025-02-17 PROCEDURE — 85610 PROTHROMBIN TIME: CPT

## 2025-02-17 PROCEDURE — 97530 THERAPEUTIC ACTIVITIES: CPT

## 2025-02-17 PROCEDURE — 99232 SBSQ HOSP IP/OBS MODERATE 35: CPT | Performed by: INTERNAL MEDICINE

## 2025-02-17 PROCEDURE — 97164 PT RE-EVAL EST PLAN CARE: CPT

## 2025-02-17 RX ADMIN — OXYCODONE 5 MG: 5 TABLET ORAL at 11:37

## 2025-02-17 RX ADMIN — CARVEDILOL 6.25 MG: 6.25 TABLET, FILM COATED ORAL at 07:52

## 2025-02-17 RX ADMIN — ENOXAPARIN SODIUM 40 MG: 40 INJECTION SUBCUTANEOUS at 07:56

## 2025-02-17 RX ADMIN — FLUTICASONE FUROATE AND VILANTEROL TRIFENATATE 1 PUFF: 200; 25 POWDER RESPIRATORY (INHALATION) at 07:55

## 2025-02-17 RX ADMIN — MELATONIN 3 MG: 3 TAB ORAL at 20:57

## 2025-02-17 RX ADMIN — POLYETHYLENE GLYCOL 3350 17 G: 17 POWDER, FOR SOLUTION ORAL at 07:50

## 2025-02-17 RX ADMIN — EZETIMIBE 10 MG: 10 TABLET ORAL at 08:00

## 2025-02-17 RX ADMIN — LEVOTHYROXINE SODIUM 112 MCG: 112 TABLET ORAL at 05:01

## 2025-02-17 RX ADMIN — DICLOFENAC SODIUM 2 G: 10 GEL TOPICAL at 20:57

## 2025-02-17 RX ADMIN — OSELTAMIVIR PHOSPHATE 30 MG: 30 CAPSULE ORAL at 20:56

## 2025-02-17 RX ADMIN — FAMOTIDINE 20 MG: 20 TABLET, FILM COATED ORAL at 20:56

## 2025-02-17 RX ADMIN — OSELTAMIVIR PHOSPHATE 30 MG: 30 CAPSULE ORAL at 08:05

## 2025-02-17 RX ADMIN — DICLOFENAC SODIUM 2 G: 10 GEL TOPICAL at 08:00

## 2025-02-17 RX ADMIN — PRAMIPEXOLE DIHYDROCHLORIDE 1 MG: 1 TABLET ORAL at 17:32

## 2025-02-17 RX ADMIN — CALCIUM 1 TABLET: 500 TABLET ORAL at 07:51

## 2025-02-17 RX ADMIN — INSULIN LISPRO 1 UNITS: 100 INJECTION, SOLUTION INTRAVENOUS; SUBCUTANEOUS at 20:56

## 2025-02-17 RX ADMIN — OXYCODONE 5 MG: 5 TABLET ORAL at 20:56

## 2025-02-17 RX ADMIN — DICLOFENAC SODIUM 2 G: 10 GEL TOPICAL at 11:39

## 2025-02-17 RX ADMIN — INSULIN GLARGINE 15 UNITS: 100 INJECTION, SOLUTION SUBCUTANEOUS at 20:55

## 2025-02-17 RX ADMIN — PRAMIPEXOLE DIHYDROCHLORIDE 1 MG: 1 TABLET ORAL at 07:52

## 2025-02-17 RX ADMIN — SENNOSIDES AND DOCUSATE SODIUM 1 TABLET: 50; 8.6 TABLET ORAL at 20:56

## 2025-02-17 RX ADMIN — LIDOCAINE 5% 1 PATCH: 700 PATCH TOPICAL at 08:00

## 2025-02-17 RX ADMIN — CYANOCOBALAMIN TAB 500 MCG 1000 MCG: 500 TAB at 07:51

## 2025-02-17 RX ADMIN — SERTRALINE HYDROCHLORIDE 100 MG: 100 TABLET ORAL at 20:57

## 2025-02-17 RX ADMIN — CARVEDILOL 6.25 MG: 6.25 TABLET, FILM COATED ORAL at 17:31

## 2025-02-17 NOTE — ASSESSMENT & PLAN NOTE
Due to cardiorenal syndrome, hypotension  Hold diuretics and losartan and repeat BMP in a.m.  Avoid  further hypotension and potential nephrotoxic agents

## 2025-02-17 NOTE — ASSESSMENT & PLAN NOTE
Likely due to congestive heart failure, diminished liver perfusion, systemic viral illness  Recent CT at [facility] last month showed fatty infiltration without focal lesion  Right upper quadrant ultrasound done yesterday showed normal-sized liver with minimally coarsened parenchyma with no liver mass identified.  LFTs are trending down  Repeat CMP in a.m.

## 2025-02-17 NOTE — ASSESSMENT & PLAN NOTE
Wt Readings from Last 3 Encounters:   02/17/25 79.5 kg (175 lb 4.3 oz)   12/27/24 79.4 kg (175 lb)   09/20/24 77.1 kg (170 lb)     Known history of CHF with acute exacerbation due to influenza  Continue carvedilol 6.25 mg twice daily with hold parameter  Hold Lasix and losartan due to JULIANNE  Cardiology follow-up

## 2025-02-17 NOTE — PROGRESS NOTES
Progress Note - Hospitalist   Name: Zohra Barriga 69 y.o. female I MRN: [id number]  Unit/Bed#: E5 -01 I Date of Admission: 2/10/2025   Date of Service: 2/17/2025 I Hospital Day: 6    Assessment & Plan  Acute respiratory failure (HCC)  Secondary to influenza on top of acute and chronic systolic CHF  Clinically improved  Wean off oxygen as tolerated  Acute on Chronic Systolic CHF (HCC)  Wt Readings from Last 3 Encounters:   02/17/25 79.5 kg (175 lb 4.3 oz)   12/27/24 79.4 kg (175 lb)   09/20/24 77.1 kg (170 lb)     Known history of CHF with acute exacerbation due to influenza  Continue carvedilol 6.25 mg twice daily with hold parameter  Hold Lasix and losartan due to JULIANNE  Cardiology follow-up  Influenza A  Stable.  Continue Tamiflu for 4 more doses to complete treatment  JULIANNE (acute kidney injury) (HCC)  Due to cardiorenal syndrome, hypotension  Hold diuretics and losartan and repeat BMP in a.m.  Avoid  further hypotension and potential nephrotoxic agents  Type 2 diabetes mellitus, with long-term current use of insulin (HCC)  Lab Results   Component Value Date    HGBA1C 7.7 (H) 01/17/2025     A1c  is close to goal.  Patient blood sugars are controlled  Blood sugars are currently controlled with Lantus 15 units at bedtime, sliding scale insulin and diabetic diet  Transaminitis  Likely due to congestive heart failure, diminished liver perfusion, systemic viral illness  Recent CT at [facility] last month showed fatty infiltration without focal lesion  Right upper quadrant ultrasound done yesterday showed normal-sized liver with minimally coarsened parenchyma with no liver mass identified.  LFTs are trending down  Repeat CMP in a.m.  Diabetic ulcer of right midfoot associated with type 2 diabetes mellitus (HCC)  Podiatry follow-up  UTI (urinary tract infection)  Antibiotic course completed  Essential hypertension  Continue Coreg with hold parameters  Avoid hypotension  Facial contusion, subsequent  encounter  Supportive care  Closed fracture of left ankle with routine healing  Podiatry evaluation and recommendations reviewed  Continue wearing cam boot  Podiatry follow-up  Fall  Multifactorial.  Anticipate transfer to acute rehab when medically stable    VTE Pharmacologic Prophylaxis: VTE Score: 5 Lovenox     Patient Centered Rounds: Discussed with her nurse  Discussions with Specialists or Other Care Team Provider: Chart reviewed in detail  Education and Discussions with Family / Patient: Updated  () at bedside.    Current Length of Stay: 6 day(s)  Current Patient Status: Inpatient   Certification Statement: The patient will continue to require additional inpatient hospital stay due to respiratory failure  Discharge Plan: Anticipate discharge in 48 hrs to rehab facility.    Code Status: Level 1 - Full Code    Subjective   Seen and examined.  Overall she feels better.  According to her , she has improved significantly over the past 2 days compared to last week.  Oxygen requirement down to 2 L    Objective :  Temp:  [97.1 °F (36.2 °C)-97.6 °F (36.4 °C)] 97.1 °F (36.2 °C)  HR:  [60-62] 62  BP: (135-153)/(65-76) 150/67  SpO2:  [94 %-98 %] 94 %  O2 Device: Nasal cannula  Nasal Cannula O2 Flow Rate (L/min):  [2 L/min] 2 L/min  FiO2 (%):  [35] 35    Body mass index is 35.4 kg/m².     Input and Output Summary (last 24 hours):     Intake/Output Summary (Last 24 hours) at 2/17/2025 1715  Last data filed at 2/17/2025 1341  Gross per 24 hour   Intake 720 ml   Output 200 ml   Net 520 ml       Physical Exam  Vitals reviewed.   HENT:      Head: Normocephalic and atraumatic.      Nose: No congestion or rhinorrhea.   Eyes:      General: No scleral icterus.  Cardiovascular:      Rate and Rhythm: Normal rate and regular rhythm.   Pulmonary:      Breath sounds: No wheezing or rhonchi.   Abdominal:      Palpations: Abdomen is soft.      Tenderness: There is no abdominal tenderness. There is no guarding.    Skin:     General: Skin is warm and dry.      Findings: Bruising present.   Neurological:      Mental Status: She is oriented to person, place, and time.   Psychiatric:         Mood and Affect: Mood normal.         Behavior: Behavior normal.             Lab Results: I have reviewed the following results:   Results from last 7 days   Lab Units 02/17/25  0606 02/16/25  0514   WBC Thousand/uL 2.92* 3.91*   HEMOGLOBIN g/dL 8.0* 8.9*   HEMATOCRIT % 26.3* 28.8*   PLATELETS Thousands/uL 155 163   BANDS PCT %  --  4   SEGS PCT % 65  --    LYMPHO PCT % 18 15   MONO PCT % 9 4   EOS PCT % 1 0     Results from last 7 days   Lab Units 02/17/25  0606   SODIUM mmol/L 138   POTASSIUM mmol/L 4.3   CHLORIDE mmol/L 108   CO2 mmol/L 23   BUN mg/dL 44*   CREATININE mg/dL 1.05   ANION GAP mmol/L 7   CALCIUM mg/dL 8.1*   ALBUMIN g/dL 2.9*   TOTAL BILIRUBIN mg/dL 1.66*   ALK PHOS U/L 165*   ALT U/L 477*   AST U/L 644*   GLUCOSE RANDOM mg/dL 93     Results from last 7 days   Lab Units 02/17/25  0606   INR  1.56*     Results from last 7 days   Lab Units 02/17/25  1555 02/17/25  1125 02/17/25  0723 02/16/25  2126 02/16/25  2116 02/16/25  1545 02/16/25  1116 02/16/25  0726 02/16/25  0346 02/15/25  2115 02/15/25  1623 02/15/25  1119   POC GLUCOSE mg/dl 146* 116 98 147* 150* 154* 168* 128 131 161* 146* 126         Results from last 7 days   Lab Units 02/16/25  1634 02/14/25  0230 02/12/25  0520 02/11/25  0533 02/11/25  0141   LACTIC ACID mmol/L 0.8 1.9  --   --  0.8   PROCALCITONIN ng/ml  --   --  6.93* 6.35*  --        Recent Cultures (last 7 days):   Results from last 7 days   Lab Units 02/10/25  1757 02/10/25  1726 02/10/25  1719   BLOOD CULTURE  No Growth After 5 Days.  --  No Growth After 5 Days.   URINE CULTURE   --  >100,000 cfu/ml  --        Imaging Results Review: I reviewed radiology reports from this admission including: Ultrasound(s).      Last 24 Hours Medication List:     Current Facility-Administered Medications:     albuterol  inhalation solution 2.5 mg, Q6H PRN    calcium carbonate (OYSTER SHELL,OSCAL) 500 mg tablet 1 tablet, Daily With Breakfast    carvedilol (COREG) tablet 6.25 mg, BID With Meals    cyanocobalamin (VITAMIN B-12) tablet 1,000 mcg, Daily    cyclobenzaprine (FLEXERIL) tablet 10 mg, TID PRN    dextromethorphan-guaiFENesin (ROBITUSSIN DM) oral syrup 10 mL, Q4H PRN    Diclofenac Sodium (VOLTAREN) 1 % topical gel 2 g, 4x Daily    enoxaparin (LOVENOX) subcutaneous injection 40 mg, Daily    ezetimibe (ZETIA) tablet 10 mg, Daily    famotidine (PEPCID) tablet 20 mg, HS    fluticasone-vilanterol 200-25 mcg/actuation 1 puff, Daily    [Held by provider] furosemide (LASIX) injection 40 mg, BID (diuretic)    Hydrocod Jermaine-Chlorphe Jermaine ER (TUSSIONEX) ER suspension 5 mL, Once    insulin glargine (LANTUS) subcutaneous injection 15 Units 0.15 mL, HS    insulin lispro (HumALOG/ADMELOG) 100 units/mL subcutaneous injection 1-6 Units, TID AC **AND** Fingerstick Glucose (POCT), TID AC    insulin lispro (HumALOG/ADMELOG) 100 units/mL subcutaneous injection 1-6 Units, HS    levothyroxine tablet 112 mcg, Early Morning    lidocaine (LIDODERM) 5 % patch 1 patch, Daily    melatonin tablet 3 mg, HS PRN    oseltamivir (TAMIFLU) capsule 30 mg, Q12H KASSI    oxyCODONE (ROXICODONE) IR tablet 5 mg, Q4H PRN    polyethylene glycol (MIRALAX) packet 17 g, Daily    pramipexole (MIRAPEX) tablet 1 mg, BID    senna-docusate sodium (SENOKOT S) 8.6-50 mg per tablet 1 tablet, HS    sertraline (ZOLOFT) tablet 100 mg, HS    trimethobenzamide (TIGAN) IM injection 200 mg, Q6H PRN    Administrative Statements   Today, Patient Was Seen By: Jorge Aburto MD      **Please Note: This note may have been constructed using a voice recognition system.**

## 2025-02-17 NOTE — PLAN OF CARE
"  Problem: PHYSICAL THERAPY ADULT  Goal: Performs mobility at highest level of function for planned discharge setting.  See evaluation for individualized goals.  Description: Treatment/Interventions: Functional transfer training, LE strengthening/ROM, Elevations, Therapeutic exercise, Endurance training, Patient/family training, Bed mobility, Gait training, Spoke to nursing, OT          See flowsheet documentation for full assessment, interventions and recommendations.  Note: Prognosis: Fair  Problem List: Decreased strength, Decreased endurance, Impaired balance, Decreased mobility, Decreased cognition, Impaired judgement, Decreased safety awareness, Obesity, Pain, Orthopedic restrictions, Decreased skin integrity  Assessment: As of 2/13, pt found to have \"Acute fracture of the distal fibula\" as per xray. WBS & camboot orders received on 2/13. Camboot fitted & issued on 2/14. Per orders, PWB to LLE for transfers only & Camboot at all times. Pt transferred to ICU on 2/14 2* to acute respiratory failure & sepsis 2* to influenza A. Pt now in E5 & currently on 2L O2 NC. Pt seen for PT per POC.  Noted decline in function compared to previous PT session. Pt now require maxAx2 for bed mobility & transfers w/ RW + cues for techniques & safety. Pt unsafe transfers w/ RW + require inc time to complete task, seated rests in between transfer attempts & ?compliance w/ WBS despite max cues. Will recommend quick move device for bed<>chair transfers. Pt tolerated above mentioned thera.ex well. Pt require cues for proper exercise techniques and performance.  Please see flowsheet above for objective findings & levels of assistance required for all functional tasks during this tx session. Nsg staff most recent vital signs as follows: /67   Pulse 62   Temp (!) 97.1 °F (36.2 °C)   Resp 15   Ht 4' 11\" (1.499 m)   Wt 79.5 kg (175 lb 4.3 oz)   SpO2 94%   BMI 35.40 kg/m² . Will continue PT per POC. At end of session, pt OOB in " chair in stable condition, call bell & phone in reach, chair alarm activated, all lines intact. Fall precautions reinforced w/ good understanding. The patient's AM-PAC Basic Mobility Inpatient Short Form Raw Score is 7. A Raw score of less than or equal to 16 suggests the patient may benefit from discharge to post-acute rehabilitation services. Please also refer to the recommendation of the Physical Therapist for safe discharge planning. From PT standpoint, will continue to recommend Level I (maximun resource intensity) rehab services vs Level II (moderate resource intensity) rehab services at D/C. CM to follow. Nsg staff to continue to mobilized pt (OOB in chair for all meals) as tolerated to prevent decline in function. Nsg notified.    Barriers to Discharge: None     Rehab Resource Intensity Level, PT: I (Maximum Resource Intensity) (vs II, pending progress)    See flowsheet documentation for full assessment.

## 2025-02-17 NOTE — PROGRESS NOTES
Cardiology Progress Note - Zohra Barriga 69 y.o. female MRN: [id number]    Unit/Bed#: E5 -01 Encounter: [id number]      Assessment & Plan:    Acute respiratory failure (HCC)  -Initially required high flow nasal cannula and then BiPAP, brought to ICU for management  - Currently on 2 L nasal cannula  - Likely multifactorial in setting of fluids received for sepsis causing CHF exacerbation and influenza infection    Sepsis  - Initially presented with sepsis secondary to UTI  - Tested positive for influenza A on 2/14/2025  - Sepsis now improved    JULIANNE (acute kidney injury) (HCC)  - Baseline creatinine around 0.9-1.2  - Creatinine elevated to 1.36 on admission, peaked at 1.4, now back down to baseline    Fall  -Fall prior to admission due to weakness  - Resulted in left ankle fracture and facial contusion    Acute on chronic systolic CHF (HCC)  -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  - TTE 1/17/2025 at [facility] showed EF 35 to 39%, no significant valvular disease noted  -TTE 2/22/2023 showed EF 54%, grade 1 DD, mild MR, estimated PA pressure 26 mmHg  -Nuclear pharmacologic stress test 2/6/2025 at [facility] showed moderate fixed perfusion defect in the inferior wall, no evidence of distal ischemia, LVEF 45%  - BNP 3210 on admission  - Chest x-ray 2/14/2025 showed moderate congestive changes, possible right lower lobe infiltrate  - Outpatient diuretic Rx torsemide 40 mg daily-> patient reports only taking 20 mg as needed  - Inpatient diuretic Rx furosemide 40 mg IV twice daily-> currently holding due to JULIANNE    Essential hypertension  -Outpatient Rx Coreg 3.125 mg twice daily, losartan 50 mg twice daily and spironolactone 25 mg daily  - Currently on Coreg 6.25 mg twice daily    Hyperlipidemia  - Outpatient Zetia 10 mg daily and atorvastatin 80 mg daily    Influenza  - Tested positive for influenza A on 2/14/2025  - Continue with supportive care    Type 2 diabetes mellitus, with long-term current use of  "insulin (HCC)    Facial contusion, initial encounter    Closed fracture of left ankle with routine healing    Diabetic ulcer of right midfoot associated with type 2 diabetes mellitus (HCC)    UTI (urinary tract infection)     Summary:  -JULIANNE and transaminitis likely due to hypovolemia/shock, now improving  - Appears euvolemic on exam  -Continue to hold further IV fluids, diuretics and losartan for today  - Tentatively plan to resume oral diuretics in 24 to 48 hours      Subjective:   No significant events overnight.  She reports feeling tired and weak today, otherwise is okay.  Denies chest pain, abdominal pain, nausea, vomiting, fever, chills, headache, dizziness or palpitations.    Objective:     Vitals: Blood pressure 153/76, pulse 61, temperature 97.6 °F (36.4 °C), resp. rate 15, height 4' 11\" (1.499 m), weight 79.5 kg (175 lb 4.3 oz), SpO2 98%., Body mass index is 35.4 kg/m².,   Orthostatic Blood Pressures      Flowsheet Row Most Recent Value   Blood Pressure 153/76 filed at 02/17/2025 0721   Patient Position - Orthostatic VS Lying filed at 02/16/2025 1517              Intake/Output Summary (Last 24 hours) at 2/17/2025 1114  Last data filed at 2/17/2025 0430  Gross per 24 hour   Intake 1145 ml   Output 850 ml   Net 295 ml           Physical Exam:    GEN: Zohra Barriga appears well, alert and oriented x 3, pleasant and cooperative   HEENT: Mucous membranes moist, no scleral icterus, no conjunctival pallor  NECK: No elevated JVD  HEART: Regular rate and rhythm, normal S1 and S2, no significant audible murmur  LUNGS: Trace scattered rhonchi bilaterally, otherwise clear to auscultation  ABDOMEN: normal bowel sounds, soft, no tenderness, no distention  EXTREMITIES: peripheral pulses normal; no lower extremity edema   NEURO: no focal findings   SKIN: No lesions or rashes on exposed skin        Current Facility-Administered Medications:     albuterol inhalation solution 2.5 mg, 2.5 mg, Nebulization, Q6H PRN, Josephine " MD [place], 2.5 mg at 02/14/25 2055    calcium carbonate (OYSTER SHELL,OSCAL) 500 mg tablet 1 tablet, 1 tablet, Oral, Daily With Breakfast, Josephine Lazaro MD, 1 tablet at 02/17/25 0751    carvedilol (COREG) tablet 6.25 mg, 6.25 mg, Oral, BID With Meals, Josephine Lazaro MD, 6.25 mg at 02/17/25 0752    cyanocobalamin (VITAMIN B-12) tablet 1,000 mcg, 1,000 mcg, Oral, Daily, Josephine Lazaro MD, 1,000 mcg at 02/17/25 0751    cyclobenzaprine (FLEXERIL) tablet 10 mg, 10 mg, Oral, TID PRN, Josephine Lazaro MD, 10 mg at 02/15/25 0810    dextromethorphan-guaiFENesin (ROBITUSSIN DM) oral syrup 10 mL, 10 mL, Oral, Q4H PRN, Josephine Lazaro MD, 10 mL at 02/15/25 0813    Diclofenac Sodium (VOLTAREN) 1 % topical gel 2 g, 2 g, Topical, 4x Daily, Josephine Lazaro MD, 2 g at 02/17/25 0800    enoxaparin (LOVENOX) subcutaneous injection 40 mg, 40 mg, Subcutaneous, Daily, Josephine Lazaro MD, 40 mg at 02/17/25 0756    ezetimibe (ZETIA) tablet 10 mg, 10 mg, Oral, Daily, Josephine Lazaro MD, 10 mg at 02/17/25 0800    famotidine (PEPCID) tablet 20 mg, 20 mg, Oral, HS, Josephine Lazaro MD, 20 mg at 02/16/25 2126    fluticasone-vilanterol 200-25 mcg/actuation 1 puff, 1 puff, Inhalation, Daily, Josephine Lazaro MD, 1 puff at 02/17/25 0755    [Held by provider] furosemide (LASIX) injection 40 mg, 40 mg, Intravenous, BID (diuretic), Josephine Lazaro MD, 40 mg at 02/16/25 0853    Hydrocod Jermaine-Chlorphe Jermaine ER (TUSSIONEX) ER suspension 5 mL, 5 mL, Oral, Once, Josephine Lazaro MD    insulin glargine (LANTUS) subcutaneous injection 15 Units 0.15 mL, 15 Units, Subcutaneous, HS, Josephine Lazaro MD, 15 Units at 02/16/25 2132    insulin lispro (HumALOG/ADMELOG) 100 units/mL subcutaneous injection 1-6 Units, 1-6 Units, Subcutaneous, TID AC, 1 Units at 02/13/25 1557 **AND** Fingerstick Glucose (POCT), , , TID AC, Josephine Lazaro MD    insulin lispro (HumALOG/ADMELOG) 100 units/mL subcutaneous injection 1-6 Units, 1-6 Units, Subcutaneous, HS, Josephine Lazaro MD, 1 Units at  "02/15/25 2204    levothyroxine tablet 112 mcg, 112 mcg, Oral, Early Morning, Josephine Lazaro MD, 112 mcg at 02/17/25 0501    lidocaine (LIDODERM) 5 % patch 1 patch, 1 patch, Topical, Daily, Josephine Lazaro MD, 1 patch at 02/17/25 0800    melatonin tablet 3 mg, 3 mg, Oral, HS PRN, Josephine Lazaro MD, 3 mg at 02/12/25 0003    oseltamivir (TAMIFLU) capsule 30 mg, 30 mg, Oral, Q12H KASSI, Josephine Lazaro MD, 30 mg at 02/17/25 0805    oxyCODONE (ROXICODONE) IR tablet 5 mg, 5 mg, Oral, Q4H PRN, Josephine Lazaro MD, 5 mg at 02/16/25 0535    polyethylene glycol (MIRALAX) packet 17 g, 17 g, Oral, Daily, Josephine Lazaro MD, 17 g at 02/17/25 0750    pramipexole (MIRAPEX) tablet 1 mg, 1 mg, Oral, BID, Josephine Lazaro MD, 1 mg at 02/17/25 0752    senna-docusate sodium (SENOKOT S) 8.6-50 mg per tablet 1 tablet, 1 tablet, Oral, HS, Josephine Lazaro MD, 1 tablet at 02/16/25 2126    sertraline (ZOLOFT) tablet 100 mg, 100 mg, Oral, HS, Josephine Lazaro MD, 100 mg at 02/16/25 2126    trimethobenzamide (TIGAN) IM injection 200 mg, 200 mg, Intramuscular, Q6H PRN, Josephine Lazaro MD, 200 mg at 02/16/25 0022    Labs & Results:    Lab Results   Component Value Date    CKTOTAL 401 (H) 07/17/2020    CKMB 20.5 (H) 07/17/2020    CKMBINDEX 5.1 (H) 07/17/2020    TROPONINI <0.02 01/31/2021    TROPONINI <0.02 01/31/2021    TROPONINI <0.02 01/31/2021       Lab Results   Component Value Date    GLUCOSE 197 (H) 04/23/2018    CALCIUM 8.1 (L) 02/17/2025    K 4.3 02/17/2025    CO2 23 02/17/2025     02/17/2025    BUN 44 (H) 02/17/2025    CREATININE 1.05 02/17/2025       Lab Results   Component Value Date    WBC 2.92 (L) 02/17/2025    HGB 8.0 (L) 02/17/2025    HCT 26.3 (L) 02/17/2025    MCV 96 02/17/2025     02/17/2025     Results from last 7 days   Lab Units 02/17/25  0606   INR  1.56*       No results found for: \"CHOL\"  No results found for: \"HDL\"  No results found for: \"LDLCALC\"  No results found for: \"TRIG\"    Lab Results   Component Value Date    ALT " 477 (H) 02/17/2025     (H) 02/17/2025    ALKPHOS 165 (H) 02/17/2025         EKG personally reviewed by )Arnie Morales MD. No acute changes

## 2025-02-17 NOTE — PROGRESS NOTES
Podiatry - Progress Note  Patient: Zohra Barriga 69 y.o. female   MRN: [id number]  PCP: Celestino Santiago DO  Unit/Bed#: E5 -01 Encounter: [id number]  Date Of Visit: 02/17/25    ASSESSMENT:    Zohra Barriga is a 69 y.o. female with:    Left Fibular fracture, closed, non-displaced  Left anterior leg wound, traumatic  T2DM with most recent hemoglobin A1c of 7.7.  History of DVT.      PLAN:    Patient evaluated at bedside.  Of significance the right plantar forefoot wound is completely healed.  This is likely secondary to her being nonambulatory during her ICU stay.  The left anterior leg wound has significantly decreased in size.  There are no acute clinical signs of infection.  Will plan for continue local wound care to the left anterior leg.  In regards to her ankle fracture patient was not wearing her cam boot today.  She states that she has not put it on since it was ordered.  I discussed with her the importance of wearing the cam boot at all times.  I explained to her that she is a much higher risk for complications with nonadherence.  If she is unable to tolerate the cam boot we could consider a cast, however with her anterior leg wound I would like to avoid this.  In the meantime I have ordered a repeat set of x-rays for 1 week after her initial left ankle x-rays.  Will utilize these to evaluate and make sure the ankle fracture has not shifted.  In the meantime we will continue with local wound care to the left anterior ankle as well as conservative care for the left ankle fracture.  Elevation and offloading on green foam wedges or pillows when non-ambulatory.  Rest of care per primary team.     Weightbearing status: Partial weightbearing for transfers with a cam boot, left lower extremity    SUBJECTIVE:     The patient was seen, evaluated, and assessed at bedside today. The patient was awake, alert, and in no acute distress. No acute events overnight. The patient reports she still having some moderate to  "severe pain to the left ankle.  He states that she put some weight on her left ankle without the cam boot on.  She states that she has not been wearing the cam boot.  When asked why she is unsure.  She understands that it was ordered to help protect her ankle.. Patient denies N/V/F/chills/SOB/CP.      OBJECTIVE:     Vitals:   /65   Pulse 60   Temp 97.5 °F (36.4 °C)   Resp 15   Ht 4' 11\" (1.499 m)   Wt 83.3 kg (183 lb 10.3 oz)   SpO2 98%   BMI 37.09 kg/m²     Temp (24hrs), Av.8 °F (36.6 °C), Min:97.5 °F (36.4 °C), Max:98.1 °F (36.7 °C)      Physical Exam:     Lungs: Non labored breathing  Abdomen: Soft, non-tender.  Lower Extremity:  Cardiovascular status at baseline from admission.  Neurological status at baseline from admission.  Musculoskeletal status at baseline from admission. No calf tenderness noted.     Wound #: 1  Location: left anterior ankle  Length 2cm: Width 0.3cm: Depth 0.1cm:   Deepest Tissue Noted in Base: subcutaneous   Probe to Bone: No  Peripheral Skin Description: Attached  Granulation: 50% Fibrotic Tissue: 50% Necrotic Tissue: 0%   Drainage Amount: minimal  Signs of Infection: No        RLE: There is a noted hypertrophic tissue noted over the right plantar foot.  There is no fluctuance crepitus or acute clinical signs of infection.  No erythema.  At the time the wound is fully healed            Additional Data:     Labs:    Results from last 7 days   Lab Units 25  0514 25  0230   WBC Thousand/uL 3.91* 6.57   HEMOGLOBIN g/dL 8.9* 10.4*   HEMATOCRIT % 28.8* 34.8   PLATELETS Thousands/uL 163 156   SEGS PCT %  --  81*   LYMPHO PCT % 15 11*   MONO PCT % 4 6   EOS PCT % 0 1     Results from last 7 days   Lab Units 25  1634   POTASSIUM mmol/L 4.5   CHLORIDE mmol/L 108   CO2 mmol/L 25   BUN mg/dL 47*   CREATININE mg/dL 1.34*   CALCIUM mg/dL 8.1*   ALK PHOS U/L 184*   ALT U/L 602*   AST U/L 913*     Results from last 7 days   Lab Units 25  1422   INR  1.83*       * " "I Have Reviewed All Lab Data Listed Above.    Recent Cultures (last 7 days):     Results from last 7 days   Lab Units 02/10/25  1757 02/10/25  1726 02/10/25  1719   BLOOD CULTURE  No Growth After 5 Days.  --  No Growth After 5 Days.   URINE CULTURE   --  >100,000 cfu/ml  --            Imaging: I have personally reviewed pertinent films in PACS  EKG, Pathology, and Other Studies: I have personally reviewed pertinent reports.    ** Please Note: Portions of the record may have been created with voice recognition software. Occasional wrong word or \"sound a like\" substitutions may have occurred due to the inherent limitations of voice recognition software. Read the chart carefully and recognize, using context, where substitutions have occurred. **      "

## 2025-02-17 NOTE — ASSESSMENT & PLAN NOTE
Secondary to influenza on top of acute and chronic systolic CHF  Clinically improved  Wean off oxygen as tolerated

## 2025-02-17 NOTE — PLAN OF CARE
Problem: Potential for Falls  Goal: Patient will remain free of falls  Description: INTERVENTIONS:  - Educate patient/family on patient safety including physical limitations  - Instruct patient to call for assistance with activity   - Consult OT/PT to assist with strengthening/mobility   - Keep Call bell within reach  - Keep bed low and locked with side rails adjusted as appropriate  - Keep care items and personal belongings within reach  - Initiate and maintain comfort rounds  - Make Fall Risk Sign visible to staff  - Offer Toileting every 2 Hours, in advance of need  - Initiate/Maintain bed alarm  - Obtain necessary fall risk management equipment:    - Apply yellow socks and bracelet for high fall risk patients  - Consider moving patient to room near nurses station  Outcome: Progressing     Problem: PAIN - ADULT  Goal: Verbalizes/displays adequate comfort level or baseline comfort level  Description: Interventions:  - Encourage patient to monitor pain and request assistance  - Assess pain using appropriate pain scale  - Administer analgesics based on type and severity of pain and evaluate response  - Implement non-pharmacological measures as appropriate and evaluate response  - Consider cultural and social influences on pain and pain management  - Notify physician/advanced practitioner if interventions unsuccessful or patient reports new pain  Outcome: Progressing     Problem: INFECTION - ADULT  Goal: Absence or prevention of progression during hospitalization  Description: INTERVENTIONS:  - Assess and monitor for signs and symptoms of infection  - Monitor lab/diagnostic results  - Monitor all insertion sites, i.e. indwelling lines, tubes, and drains  - Monitor endotracheal if appropriate and nasal secretions for changes in amount and color  - [place] appropriate cooling/warming therapies per order  - Administer medications as ordered  - Instruct and encourage patient and family to use good hand hygiene  technique  - Identify and instruct in appropriate isolation precautions for identified infection/condition  Outcome: Progressing  Goal: Absence of fever/infection during neutropenic period  Description: INTERVENTIONS:  - Monitor WBC    Outcome: Progressing     Problem: SAFETY ADULT  Goal: Patient will remain free of falls  Description: INTERVENTIONS:  - Educate patient/family on patient safety including physical limitations  - Instruct patient to call for assistance with activity   - Consult OT/PT to assist with strengthening/mobility   - Keep Call bell within reach  - Keep bed low and locked with side rails adjusted as appropriate  - Keep care items and personal belongings within reach  - Initiate and maintain comfort rounds  - Make Fall Risk Sign visible to staff  - Offer Toileting every 2 Hours, in advance of need  - Initiate/Maintain bed alarm  - Obtain necessary fall risk management equipment:    - Apply yellow socks and bracelet for high fall risk patients  - Consider moving patient to room near nurses station  Outcome: Progressing  Goal: Maintain or return to baseline ADL function  Description: INTERVENTIONS:  -  Assess patient's ability to carry out ADLs; assess patient's baseline for ADL function and identify physical deficits which impact ability to perform ADLs (bathing, care of mouth/teeth, toileting, grooming, dressing, etc.)  - Assess/evaluate cause of self-care deficits   - Assess range of motion  - Assess patient's mobility; develop plan if impaired  - Assess patient's need for assistive devices and provide as appropriate  - Encourage maximum independence but intervene and supervise when necessary  - Involve family in performance of ADLs  - Assess for home care needs following discharge   - Consider OT consult to assist with ADL evaluation and planning for discharge  - Provide patient education as appropriate  Outcome: Progressing  Goal: Maintains/Returns to pre admission functional  level  Description: INTERVENTIONS:  - Perform AM-PAC 6 Click Basic Mobility/ Daily Activity assessment daily.  - Set and communicate daily mobility goal to care team and patient/family/caregiver.   - Collaborate with rehabilitation services on mobility goals if consulted  - Perform Range of Motion 3 times a day.  - Reposition patient every 2 hours.  - Dangle patient 3 times a day  - Stand patient 3 times a day  - Ambulate patient 3 times a day  - Out of bed to chair 3 times a day   - Out of bed for meals 3 times a day  - Out of bed for toileting  - Record patient progress and toleration of activity level   Outcome: Progressing     Problem: DISCHARGE PLANNING  Goal: Discharge to home or other facility with appropriate resources  Description: INTERVENTIONS:  - Identify barriers to discharge w/patient and caregiver  - Arrange for needed discharge resources and transportation as appropriate  - Identify discharge learning needs (meds, wound care, etc.)  - Arrange for interpretive services to assist at discharge as needed  - Refer to Case Management Department for coordinating discharge planning if the patient needs post-hospital services based on physician/advanced practitioner order or complex needs related to functional status, cognitive ability, or social support system  Outcome: Progressing     Problem: Knowledge Deficit  Goal: Patient/family/caregiver demonstrates understanding of disease process, treatment plan, medications, and discharge instructions  Description: Complete learning assessment and assess knowledge base.  Interventions:  - Provide teaching at level of understanding  - Provide teaching via preferred learning methods  Outcome: Progressing     Problem: Prexisting or High Potential for Compromised Skin Integrity  Goal: Skin integrity is maintained or improved  Description: INTERVENTIONS:  - Identify patients at risk for skin breakdown  - Assess and monitor skin integrity  - Assess and monitor nutrition  and hydration status  - Monitor labs   - Assess for incontinence   - Turn and reposition patient  - Assist with mobility/ambulation  - Relieve pressure over bony prominences  - Avoid friction and shearing  - Provide appropriate hygiene as needed including keeping skin clean and dry  - Evaluate need for skin moisturizer/barrier cream  - Collaborate with interdisciplinary team   - Patient/family teaching  - Consider wound care consult   Outcome: Progressing

## 2025-02-17 NOTE — RESTORATIVE TECHNICIAN NOTE
Restorative Technician Note      Patient Name: Zohra Barriga     Restorative Tech Visit Date: 02/17/25  Note Type: Mobility  Patient Position Upon Consult: Supine  Activity Performed: Ambulated  Assistive Device: Roller walker  Patient Position at End of Consult: Bedside chair; All needs within reach; Bed/Chair alarm activated            ns

## 2025-02-17 NOTE — PHYSICAL THERAPY NOTE
PT RE-EVALUATION & PROGRESS NOTE    Name: Zohra Barriga  AGE: 69 y.o.  MRN: [id number]  LENGTH OF STAY: 6    Admitting Dx:  UTI (urinary tract infection) [N39.0]  Sepsis (HCC) [A41.9]  Ambulatory dysfunction [R26.2]      Patient Active Problem List   Diagnosis    Anemia of chronic disease    Anxiety and depression    Other B-complex deficiencies    Chronic bilateral thoracic back pain    Chronic myofascial pain    Pain in right leg    Chronic pain of left knee    Chronic pain of right knee    Essential hypertension    Gait disorder    Gastroparesis    Gastroesophageal reflux disease without esophagitis    Sarcoidosis    Insomnia due to medical condition    Insulin pump status    Pain syndrome, chronic    Type 2 diabetes mellitus with microalbuminuria, with long-term current use of insulin ([facility])    Irritable bowel syndrome with constipation    Lymphadenopathy    Mixed hyperlipidemia    Mixed urge and stress incontinence    Moderate persistent asthma with acute exacerbation    Normocytic anemia    Polyneuropathy associated with underlying disease ([facility])    Post-menopausal    Primary hyperparathyroidism ([facility])    Type 2 diabetes mellitus, with long-term current use of insulin ([facility])    Vitamin D deficiency    Complex regional pain syndrome type 1 of left lower extremity    Primary hypothyroidism    Encounter for fitting and adjustment of spinal cord stimulator    S/P insertion of spinal cord stimulator    Chronic diastolic CHF (congestive heart failure) ([facility])    Coronary artery disease involving native coronary artery    Closed fracture of thoracic vertebra ([facility])    Deep venous thrombosis ([facility])    Localized, primary osteoarthritis    Chronic bilateral low back pain without sciatica    Lumbar radiculopathy    Lumbosacral spondylosis without myelopathy    Osteoarthritis of knee    Age related osteoporosis    Thoracic radiculopathy    History of TIA (transient ischemic attack)    Microcytic anemia    Chronic scapular pain     Malfunction of spinal cord stimulator (HCC)    Moderate persistent asthma without complication    Hypersomnia    BBB (bundle branch block)    Hyperlipidemia associated with type 2 diabetes mellitus  (HCC)    Uncontrolled type 2 diabetes mellitus with hyperglycemia (HCC)    Achalasia    PONV (postoperative nausea and vomiting)    Class 2 obesity in adult    Pacemaker    Medical marijuana use    Urinary retention    JULIANNE (acute kidney injury) ([facility])    Epigastric pain    Spinal stenosis of lumbar region with neurogenic claudication    COVID-19 virus infection    Chronic diastolic (congestive) heart failure (HCC)    Acute respiratory failure (HCC)    Abnormal CT of the chest    Complete heart block (HCC)    Diabetic peripheral neuropathy associated with type 2 diabetes mellitus ([facility])    OAB (overactive bladder)    Polyarthralgia    Restless leg syndrome    Anemia of chronic renal failure, stage 3 (moderate)  ([facility])    Chronic kidney disease (CKD) stage G3b/A1, moderately decreased glomerular filtration rate (GFR) between 30-44 mL/min/1.73 square meter and albuminuria creatinine ratio less than 30 mg/g ([facility])    Stage 3a chronic kidney disease ([facility])    Coronary artery disease involving native coronary artery of native heart without angina pectoris    Bifascicular block    Chronic pain disorder    Myelopathy ([facility])    Thoracic compression fracture (HCC)    Compression fx, lumbar spine, sequela    Osteopenia    Fall    Closed fracture of one rib of right side    Acute on Chronic Systolic CHF (HCC)    Diabetic ulcer of right midfoot associated with type 2 diabetes mellitus ([facility])    UTI (urinary tract infection)    Facial contusion, initial encounter    Left ankle pain    Closed fracture of left ankle with routine healing    Transaminitis               02/17/25 1523   PT Last Visit   PT Visit Date 02/17/25   Note Type   Note Type Treatment   Pain Assessment   Pain Score 8   Pain Location/Orientation Orientation:  Left;Location: Leg;Other (Comment)  (ankle)   Hospital Pain Intervention(s) Repositioned;Ambulation/increased activity;Emotional support;Rest   Restrictions/Precautions   RLE Weight Bearing Per Order WBAT   LLE Weight Bearing Per Order PWB  (Per Podaitry, PWB for transfers with a cam boot, left lower extremity)   Other Precautions Cognitive;Chair Alarm;Bed Alarm;Droplet precautions;Contact/isolation;Fall Risk;Pain;O2  (2L O2 NC)   General   Chart Reviewed Yes   Response to Previous Treatment Patient with no complaints from previous session.   Family/Caregiver Present No   Cognition   Overall Cognitive Status Impaired   Arousal/Participation Alert;Cooperative   Attention Attends with cues to redirect   Orientation Level Oriented to person;Oriented to place;Oriented to time   Following Commands Follows one step commands with increased time or repetition   Comments cooperative   Subjective   Subjective Pt agreeable to PT tx.   Bed Mobility   Supine to Sit 2  Maximal assistance   Additional items Assist x 2;HOB elevated;Bedrails;Increased time required;Verbal cues;LE management   Additional Comments cues for techniques & safety; require inc time to complete tasks   Transfers   Sit to Stand 2  Maximal assistance   Additional items Assist x 2;Increased time required;Verbal cues;Bedrails   Stand to Sit 2  Maximal assistance   Additional items Assist x 2;Increased time required;Verbal cues;Armrests   Additional Comments w/ RW; cues for techniques & safety; require inc time to complete tasks; unable to stand pivot w/ RW   Ambulation/Elevation   Gait pattern Improper Weight shift;R Foot drag;Forward Flexion;Wide FILIBERTO;Decreased foot clearance;Decreased L stance;Excessively slow;Decreased heel strike;Decreased toe off;Poor UE support   Gait Assistance 2  Maximal assist   Additional items Assist x 2;Verbal cues;Tactile cues   Assistive Device Rolling walker   Distance 2'x2   Ambulation/Elevation Additional Comments difficulty  "advancing RLE 2* to impaired wt shifting; require cues for gait sequencing, upright posture & proper RW management; require seated rest in between transfer trial; require inc time to complete tasks   Balance   Static Sitting Fair -   Dynamic Sitting Poor +   Static Standing Poor -   Dynamic Standing Poor -   Ambulatory Poor -   Endurance Deficit   Endurance Deficit Yes   Endurance Deficit Description weakness; fatigue; pain   Activity Tolerance   Activity Tolerance Patient limited by fatigue;Patient limited by pain;Treatment limited secondary to medical complications (Comment)   Nurse Made Aware yes   Exercises   Heelslides 10 reps;AAROM;Bilateral  (long sitting)   Hip Flexion Sitting;10 reps;AAROM;Bilateral   Hip Abduction 10 reps;AAROM;Bilateral  (long sitting)   Hip Adduction 10 reps;AAROM;Bilateral  (long sitting)   Knee AROM Long Arc Quad Sitting;10 reps;AROM;Bilateral   Ankle Pumps 10 reps;AROM;Right  (long sitting)   Assessment   Prognosis Fair   Problem List Decreased strength;Decreased endurance;Impaired balance;Decreased mobility;Decreased cognition;Impaired judgement;Decreased safety awareness;Obesity;Pain;Orthopedic restrictions;Decreased skin integrity   Assessment As of 2/13, pt found to have \"Acute fracture of the distal fibula\" as per xray. WBS & camboot orders received on 2/13. Camboot fitted & issued on 2/14. Per orders, PWB to LLE for transfers only & Camboot at all times. Pt transferred to ICU on 2/14 2* to acute respiratory failure & sepsis 2* to influenza A. Pt now in E5 & currently on 2L O2 NC. Pt seen for PT per POC.  Noted decline in function compared to previous PT session. Pt now require maxAx2 for bed mobility & transfers w/ RW + cues for techniques & safety. Pt unsafe transfers w/ RW + require inc time to complete task, seated rests in between transfer attempts & ?compliance w/ WBS despite max cues. Will recommend quick move device for bed<>chair transfers. Pt tolerated above mentioned " "thera.ex well. Pt require cues for proper exercise techniques and performance.  Please see flowsheet above for objective findings & levels of assistance required for all functional tasks during this tx session. Nsg staff most recent vital signs as follows: /67   Pulse 62   Temp (!) 97.1 °F (36.2 °C)   Resp 15   Ht 4' 11\" (1.499 m)   Wt 79.5 kg (175 lb 4.3 oz)   SpO2 94%   BMI 35.40 kg/m² . Will continue PT per POC. At end of session, pt OOB in chair in stable condition, call bell & phone in reach, chair alarm activated, all lines intact. Fall precautions reinforced w/ good understanding. The patient's AM-PAC Basic Mobility Inpatient Short Form Raw Score is 7. A Raw score of less than or equal to 16 suggests the patient may benefit from discharge to post-acute rehabilitation services. Please also refer to the recommendation of the Physical Therapist for safe discharge planning. From PT standpoint, will continue to recommend Level I (maximun resource intensity) rehab services vs Level II (moderate resource intensity) rehab services at D/C. CM to follow. Nsg staff to continue to mobilized pt (OOB in chair for all meals) as tolerated to prevent decline in function. Nsg notified.   Goals   Patient Goals to get better   [facility] Expiration Date 03/03/25   Short Term Goal #1 Updated PT goals to be met in 14 days; pt will be able to: 1) inc strength & balance by 1/2 grade to improve overall functional mobility & dec fall risk; 2) inc bed mobility to minAx1 for pt to be able to get in/OOB safely w/ proper techniques 100% of the time, to dec caregiver burden & safely function at home; 3) inc transfers to minAx1 for pt to transition safely from one surface to another w/o % of the time, to dec caregiver burden & safely function at home; 4) to maintain % of the time; 5) S for w/c mobility & management on level surface approx. >150'; 6) pt/caregiver ed   PT Treatment Day 2   Plan   Treatment/Interventions " Functional transfer training;LE strengthening/ROM;Therapeutic exercise;Endurance training;Patient/family training;Bed mobility;Spoke to nursing   Progress Slow progress, medical status limitations   PT Frequency 3-5x/wk   Discharge Recommendation   Rehab Resource Intensity Level, PT I (Maximum Resource Intensity)  (vs II, pending progress)   Equipment Recommended Walker  (pt has)   Additional Comments [facility] staff may use quick move device for bed<>chair transfers   AM-PAC Basic Mobility Inpatient   Turning in Flat Bed Without Bedrails 2   Lying on Back to Sitting on Edge of Flat Bed Without Bedrails 1   Moving Bed to Chair 1   Standing Up From Chair Using Arms 1   Walk in Room 1   Climb 3-5 Stairs With Railing 1   Basic Mobility Inpatient Raw Score 7   Turning Head Towards Sound 3   Follow Simple Instructions 2   Low Function Basic Mobility Raw Score  12   Low Function Basic Mobility Standardized Score  18.33   [facility] Highest Level Of Mobility   -HL Goal 2: Bed activities/Dependent transfer   -HLM Achieved 4: Move to chair/commode   Education   Education Provided Mobility training;Home exercise program;Assistive device   Patient Reinforcement needed   End of Consult   Patient Position at End of Consult Bedside chair;Bed/Chair alarm activated;All needs within reach   End of Consult Comments Pt in stable condition. All needs in reach. Chair alarm activated.   Lars Jackson

## 2025-02-17 NOTE — ASSESSMENT & PLAN NOTE
Lab Results   Component Value Date    HGBA1C 7.7 (H) 01/17/2025     A1c  is close to goal.  Patient blood sugars are controlled  Blood sugars are currently controlled with Lantus 15 units at bedtime, sliding scale insulin and diabetic diet

## 2025-02-17 NOTE — RESTORATIVE TECHNICIAN NOTE
Restorative Technician Note      Patient Name: Zohra Barriga     Restorative Tech Visit Date: 02/17/25  Note Type: Mobility  Patient Position Upon Consult: Supine  Activity Performed: Repositioned  Patient Position at End of Consult: All needs within reach; Supine; Bed/Chair alarm activated          ns

## 2025-02-18 LAB
ALBUMIN SERPL BCG-MCNC: 3 G/DL (ref 3.5–5)
ALP SERPL-CCNC: 161 U/L (ref 34–104)
ALT SERPL W P-5'-P-CCNC: 353 U/L (ref 7–52)
ANION GAP SERPL CALCULATED.3IONS-SCNC: 5 MMOL/L (ref 4–13)
ANISOCYTOSIS BLD QL SMEAR: PRESENT
AST SERPL W P-5'-P-CCNC: 342 U/L (ref 13–39)
BASOPHILS # BLD MANUAL: 0 THOUSAND/UL (ref 0–0.1)
BASOPHILS NFR MAR MANUAL: 0 % (ref 0–1)
BILIRUB SERPL-MCNC: 1.32 MG/DL (ref 0.2–1)
BUN SERPL-MCNC: 36 MG/DL (ref 5–25)
BURR CELLS BLD QL SMEAR: PRESENT
CALCIUM ALBUM COR SERPL-MCNC: 9.2 MG/DL (ref 8.3–10.1)
CALCIUM SERPL-MCNC: 8.4 MG/DL (ref 8.4–10.2)
CHLORIDE SERPL-SCNC: 107 MMOL/L (ref 96–108)
CO2 SERPL-SCNC: 26 MMOL/L (ref 21–32)
CREAT SERPL-MCNC: 0.97 MG/DL (ref 0.6–1.3)
EOSINOPHIL # BLD MANUAL: 0 THOUSAND/UL (ref 0–0.4)
EOSINOPHIL NFR BLD MANUAL: 0 % (ref 0–6)
ERYTHROCYTE [DISTWIDTH] IN BLOOD BY AUTOMATED COUNT: 17.6 % (ref 11.6–15.1)
GFR SERPL CREATININE-BSD FRML MDRD: 60 ML/MIN/1.73SQ M
GIANT PLATELETS BLD QL SMEAR: PRESENT
GLUCOSE SERPL-MCNC: 130 MG/DL (ref 65–140)
GLUCOSE SERPL-MCNC: 175 MG/DL (ref 65–140)
GLUCOSE SERPL-MCNC: 233 MG/DL (ref 65–140)
GLUCOSE SERPL-MCNC: 79 MG/DL (ref 65–140)
GLUCOSE SERPL-MCNC: 89 MG/DL (ref 65–140)
HCT VFR BLD AUTO: 27.1 % (ref 34.8–46.1)
HGB BLD-MCNC: 8 G/DL (ref 11.5–15.4)
INR PPP: 1.46 (ref 0.85–1.19)
LYMPHOCYTES # BLD AUTO: 0.56 THOUSAND/UL (ref 0.6–4.47)
LYMPHOCYTES # BLD AUTO: 19 % (ref 14–44)
MACROCYTES BLD QL AUTO: PRESENT
MCH RBC QN AUTO: 28.6 PG (ref 26.8–34.3)
MCHC RBC AUTO-ENTMCNC: 29.5 G/DL (ref 31.4–37.4)
MCV RBC AUTO: 97 FL (ref 82–98)
METAMYELOCYTE ABSOLUTE CT: 0.03 THOUSAND/UL (ref 0–0.1)
METAMYELOCYTES NFR BLD MANUAL: 1 % (ref 0–1)
MONOCYTES # BLD AUTO: 0.25 THOUSAND/UL (ref 0–1.22)
MONOCYTES NFR BLD: 9 % (ref 4–12)
MYELOCYTE ABSOLUTE CT: 0.11 THOUSAND/UL (ref 0–0.1)
MYELOCYTES NFR BLD MANUAL: 4 % (ref 0–1)
NEUTROPHILS # BLD MANUAL: 1.85 THOUSAND/UL (ref 1.85–7.62)
NEUTS BAND NFR BLD MANUAL: 2 % (ref 0–8)
NEUTS SEG NFR BLD AUTO: 64 % (ref 43–75)
NRBC BLD AUTO-RTO: 2 /100 WBC (ref 0–2)
OVALOCYTES BLD QL SMEAR: PRESENT
PLATELET # BLD AUTO: 162 THOUSANDS/UL (ref 149–390)
PLATELET BLD QL SMEAR: ADEQUATE
PLATELET CLUMP BLD QL SMEAR: PRESENT
PMV BLD AUTO: 10.7 FL (ref 8.9–12.7)
POTASSIUM SERPL-SCNC: 4.4 MMOL/L (ref 3.5–5.3)
PROT SERPL-MCNC: 6.4 G/DL (ref 6.4–8.4)
PROTHROMBIN TIME: 17.8 SECONDS (ref 12.3–15)
RBC # BLD AUTO: 2.8 MILLION/UL (ref 3.81–5.12)
RBC MORPH BLD: PRESENT
SODIUM SERPL-SCNC: 138 MMOL/L (ref 135–147)
VARIANT LYMPHS # BLD AUTO: 1 %
WBC # BLD AUTO: 2.81 THOUSAND/UL (ref 4.31–10.16)

## 2025-02-18 PROCEDURE — 85610 PROTHROMBIN TIME: CPT

## 2025-02-18 PROCEDURE — 85027 COMPLETE CBC AUTOMATED: CPT | Performed by: INTERNAL MEDICINE

## 2025-02-18 PROCEDURE — 99232 SBSQ HOSP IP/OBS MODERATE 35: CPT | Performed by: INTERNAL MEDICINE

## 2025-02-18 PROCEDURE — 80053 COMPREHEN METABOLIC PANEL: CPT | Performed by: INTERNAL MEDICINE

## 2025-02-18 PROCEDURE — 82948 REAGENT STRIP/BLOOD GLUCOSE: CPT

## 2025-02-18 PROCEDURE — 92526 ORAL FUNCTION THERAPY: CPT

## 2025-02-18 PROCEDURE — 94660 CPAP INITIATION&MGMT: CPT

## 2025-02-18 PROCEDURE — 85007 BL SMEAR W/DIFF WBC COUNT: CPT | Performed by: INTERNAL MEDICINE

## 2025-02-18 RX ORDER — BISACODYL 10 MG
10 SUPPOSITORY, RECTAL RECTAL DAILY PRN
Status: DISCONTINUED | OUTPATIENT
Start: 2025-02-18 | End: 2025-02-19 | Stop reason: HOSPADM

## 2025-02-18 RX ADMIN — EZETIMIBE 10 MG: 10 TABLET ORAL at 09:06

## 2025-02-18 RX ADMIN — FLUTICASONE FUROATE AND VILANTEROL TRIFENATATE 1 PUFF: 200; 25 POWDER RESPIRATORY (INHALATION) at 09:08

## 2025-02-18 RX ADMIN — CARVEDILOL 6.25 MG: 6.25 TABLET, FILM COATED ORAL at 16:18

## 2025-02-18 RX ADMIN — POLYETHYLENE GLYCOL 3350 17 G: 17 POWDER, FOR SOLUTION ORAL at 09:07

## 2025-02-18 RX ADMIN — DICLOFENAC SODIUM 2 G: 10 GEL TOPICAL at 12:10

## 2025-02-18 RX ADMIN — INSULIN GLARGINE 15 UNITS: 100 INJECTION, SOLUTION SUBCUTANEOUS at 21:54

## 2025-02-18 RX ADMIN — DICLOFENAC SODIUM 2 G: 10 GEL TOPICAL at 21:54

## 2025-02-18 RX ADMIN — OSELTAMIVIR PHOSPHATE 30 MG: 30 CAPSULE ORAL at 09:06

## 2025-02-18 RX ADMIN — BISACODYL 10 MG: 10 SUPPOSITORY RECTAL at 18:14

## 2025-02-18 RX ADMIN — FUROSEMIDE 40 MG: 10 INJECTION, SOLUTION INTRAVENOUS at 16:18

## 2025-02-18 RX ADMIN — CARVEDILOL 6.25 MG: 6.25 TABLET, FILM COATED ORAL at 09:06

## 2025-02-18 RX ADMIN — FAMOTIDINE 20 MG: 20 TABLET, FILM COATED ORAL at 21:55

## 2025-02-18 RX ADMIN — INSULIN LISPRO 1 UNITS: 100 INJECTION, SOLUTION INTRAVENOUS; SUBCUTANEOUS at 21:54

## 2025-02-18 RX ADMIN — INSULIN LISPRO 3 UNITS: 100 INJECTION, SOLUTION INTRAVENOUS; SUBCUTANEOUS at 16:20

## 2025-02-18 RX ADMIN — ENOXAPARIN SODIUM 40 MG: 40 INJECTION SUBCUTANEOUS at 09:07

## 2025-02-18 RX ADMIN — DICLOFENAC SODIUM 2 G: 10 GEL TOPICAL at 17:09

## 2025-02-18 RX ADMIN — CYANOCOBALAMIN TAB 500 MCG 1000 MCG: 500 TAB at 09:06

## 2025-02-18 RX ADMIN — PRAMIPEXOLE DIHYDROCHLORIDE 1 MG: 1 TABLET ORAL at 09:06

## 2025-02-18 RX ADMIN — LEVOTHYROXINE SODIUM 112 MCG: 112 TABLET ORAL at 05:44

## 2025-02-18 RX ADMIN — CALCIUM 1 TABLET: 500 TABLET ORAL at 09:06

## 2025-02-18 RX ADMIN — PRAMIPEXOLE DIHYDROCHLORIDE 1 MG: 1 TABLET ORAL at 17:09

## 2025-02-18 RX ADMIN — SERTRALINE HYDROCHLORIDE 100 MG: 100 TABLET ORAL at 21:55

## 2025-02-18 RX ADMIN — DICLOFENAC SODIUM 2 G: 10 GEL TOPICAL at 09:08

## 2025-02-18 RX ADMIN — SENNOSIDES AND DOCUSATE SODIUM 1 TABLET: 50; 8.6 TABLET ORAL at 21:55

## 2025-02-18 NOTE — ASSESSMENT & PLAN NOTE
Likely due to congestive heart failure, diminished liver perfusion, systemic viral illness  Recent CT at [facility] last month showed fatty infiltration without focal lesion  Right upper quadrant ultrasound done yesterday showed normal-sized liver with minimally coarsened parenchyma with no liver mass identified.  LFTs are trending down  Repeat CMP in a.m.

## 2025-02-18 NOTE — ASSESSMENT & PLAN NOTE
Wt Readings from Last 3 Encounters:   02/18/25 79 kg (174 lb 2.6 oz)   12/27/24 79.4 kg (175 lb)   09/20/24 77.1 kg (170 lb)     Known history of CHF with acute exacerbation due to influenza  Continue carvedilol 6.25 mg twice daily with hold parameter  Recheck labs in AM.  cardiology evaluation and recommendations reviewed   If stable, resume losartan and diuretics

## 2025-02-18 NOTE — PROGRESS NOTES
Progress Note - Hospitalist   Name: Zohra Barriga 69 y.o. female I MRN: [id number]  Unit/Bed#: E5 -01 I Date of Admission: 2/10/2025   Date of Service: 2/18/2025 I Hospital Day: 7    Assessment & Plan  Acute respiratory failure (HCC)  Secondary to influenza on top of acute and chronic systolic CHF  Clinically improved  Wean off oxygen as tolerated  Acute on Chronic Systolic CHF (HCC)  Wt Readings from Last 3 Encounters:   02/18/25 79 kg (174 lb 2.6 oz)   12/27/24 79.4 kg (175 lb)   09/20/24 77.1 kg (170 lb)     Known history of CHF with acute exacerbation due to influenza  Continue carvedilol 6.25 mg twice daily with hold parameter  Recheck labs in AM.  cardiology evaluation and recommendations reviewed   If stable, resume losartan and diuretics  Influenza A  Stable.  Continue Tamiflu for 2 more doses to complete treatment  JULIANNE (acute kidney injury) (HCC)  Due to cardiorenal syndrome, hypotension  Lasix and losartan  were held due to JULIANNE and hypotension  BMP in a.m.  Type 2 diabetes mellitus, with long-term current use of insulin (HCC)  Lab Results   Component Value Date    HGBA1C 7.7 (H) 01/17/2025     A1c  is close to goal.  Patient blood sugars are controlled  Blood sugars are currently controlled with Lantus 15 units at bedtime, sliding scale insulin and diabetic diet  Transaminitis  Likely due to congestive heart failure, diminished liver perfusion, systemic viral illness  Recent CT at [facility] last month showed fatty infiltration without focal lesion  Right upper quadrant ultrasound done yesterday showed normal-sized liver with minimally coarsened parenchyma with no liver mass identified.  LFTs are trending down  Repeat CMP in a.m.  Diabetic ulcer of right midfoot associated with type 2 diabetes mellitus (HCC)  Podiatry follow-up  UTI (urinary tract infection)  Antibiotic course completed  Essential hypertension  Continue Coreg with hold parameters  Avoid hypotension  Facial contusion,  subsequent encounter  Supportive care  Closed fracture of left ankle with routine healing  Podiatry evaluation and recommendations reviewed  Continue wearing cam boot  Podiatry follow-up  Fall  Multifactorial.  Anticipate transfer to [facility] in 24 to 48 hours    VTE Pharmacologic Prophylaxis: VTE Score: 5 High Risk (Score >/= 5) - Pharmacological DVT Prophylaxis Ordered: heparin. Sequential Compression Devices Ordered.     Discussions with Specialists or Other Care Team Provider: Case management  Education and Discussions with Family / Patient: Attempted to update  () via phone. Left voicemail.     Current Length of Stay: 7 day(s)  Current Patient Status: Inpatient   Certification Statement: The patient will continue to require additional inpatient hospital stay due to placement  Discharge Plan: Anticipate discharge in 24-48 hrs to rehab facility.    Code Status: Level 1 - Full Code    Subjective   She feels better  She was happy when I told her her lfts are improving and she can probably go to [facility] soon    Objective :  Temp:  [97.4 °F (36.3 °C)-97.7 °F (36.5 °C)] 97.4 °F (36.3 °C)  HR:  [60-65] 60  BP: (141-159)/(66-79) 141/66  Resp:  [18] 18  SpO2:  [87 %-99 %] 95 %  O2 Device: Nasal cannula  Nasal Cannula O2 Flow Rate (L/min):  [1 L/min-2 L/min] 2 L/min    Body mass index is 35.18 kg/m².     Input and Output Summary (last 24 hours):     Intake/Output Summary (Last 24 hours) at 2/18/2025 1734  Last data filed at 2/18/2025 1300  Gross per 24 hour   Intake 240 ml   Output --   Net 240 ml       Physical Exam  Vitals reviewed.   HENT:      Head: Normocephalic and atraumatic.      Nose: No congestion or rhinorrhea.   Eyes:      General: No scleral icterus.  Cardiovascular:      Rate and Rhythm: Normal rate and regular rhythm.   Pulmonary:      Breath sounds: No wheezing or rhonchi.   Abdominal:      Palpations: Abdomen is soft.      Tenderness: There is no abdominal tenderness. There  is no guarding.   Musculoskeletal:      Right lower leg: No edema.      Left lower leg: No edema.   Skin:     Findings: Bruising present.   Neurological:      Comments: Awake alert cooperative   Psychiatric:         Mood and Affect: Mood normal.         Behavior: Behavior normal.       Lab Results: I have reviewed the following results:   Results from last 7 days   Lab Units 02/18/25  0512 02/17/25  0606   WBC Thousand/uL 2.81* 2.92*   HEMOGLOBIN g/dL 8.0* 8.0*   HEMATOCRIT % 27.1* 26.3*   PLATELETS Thousands/uL 162 155   BANDS PCT % 2  --    SEGS PCT %  --  65   LYMPHO PCT % 19 18   MONO PCT % 9 9   EOS PCT % 0 1     Results from last 7 days   Lab Units 02/18/25  0512   SODIUM mmol/L 138   POTASSIUM mmol/L 4.4   CHLORIDE mmol/L 107   CO2 mmol/L 26   BUN mg/dL 36*   CREATININE mg/dL 0.97   ANION GAP mmol/L 5   CALCIUM mg/dL 8.4   ALBUMIN g/dL 3.0*   TOTAL BILIRUBIN mg/dL 1.32*   ALK PHOS U/L 161*   ALT U/L 353*   AST U/L 342*   GLUCOSE RANDOM mg/dL 79     Results from last 7 days   Lab Units 02/18/25  1213   INR  1.46*     Results from last 7 days   Lab Units 02/18/25  1620 02/18/25  1118 02/18/25  0756 02/17/25  2041 02/17/25  1555 02/17/25  1125 02/17/25  0723 02/16/25  2126 02/16/25  2116 02/16/25  1545 02/16/25  1116 02/16/25  0726   POC GLUCOSE mg/dl 233* 130 89 172* 146* 116 98 147* 150* 154* 168* 128         Results from last 7 days   Lab Units 02/16/25  1634 02/14/25  0230 02/12/25  0520   LACTIC ACID mmol/L 0.8 1.9  --    PROCALCITONIN ng/ml  --   --  6.93*       Recent Cultures (last 7 days):               Last 24 Hours Medication List:     Current Facility-Administered Medications:     albuterol inhalation solution 2.5 mg, Q6H PRN    bisacodyl (DULCOLAX) rectal suppository 10 mg, Daily PRN    calcium carbonate (OYSTER SHELL,OSCAL) 500 mg tablet 1 tablet, Daily With Breakfast    carvedilol (COREG) tablet 6.25 mg, BID With Meals    cyanocobalamin (VITAMIN B-12) tablet 1,000 mcg, Daily    cyclobenzaprine  (FLEXERIL) tablet 10 mg, TID PRN    dextromethorphan-guaiFENesin (ROBITUSSIN DM) oral syrup 10 mL, Q4H PRN    Diclofenac Sodium (VOLTAREN) 1 % topical gel 2 g, 4x Daily    enoxaparin (LOVENOX) subcutaneous injection 40 mg, Daily    ezetimibe (ZETIA) tablet 10 mg, Daily    famotidine (PEPCID) tablet 20 mg, HS    fluticasone-vilanterol 200-25 mcg/actuation 1 puff, Daily    furosemide (LASIX) injection 40 mg, BID (diuretic)    Hydrocod Jermaine-Chlorphe Jermaine ER (TUSSIONEX) ER suspension 5 mL, Once    insulin glargine (LANTUS) subcutaneous injection 15 Units 0.15 mL, HS    insulin lispro (HumALOG/ADMELOG) 100 units/mL subcutaneous injection 1-6 Units, TID AC **AND** Fingerstick Glucose (POCT), TID AC    insulin lispro (HumALOG/ADMELOG) 100 units/mL subcutaneous injection 1-6 Units, HS    levothyroxine tablet 112 mcg, Early Morning    lidocaine (LIDODERM) 5 % patch 1 patch, Daily    melatonin tablet 3 mg, HS PRN    oseltamivir (TAMIFLU) capsule 30 mg, Q12H KASSI    oxyCODONE (ROXICODONE) IR tablet 5 mg, Q4H PRN    polyethylene glycol (MIRALAX) packet 17 g, Daily    pramipexole (MIRAPEX) tablet 1 mg, BID    senna-docusate sodium (SENOKOT S) 8.6-50 mg per tablet 1 tablet, HS    sertraline (ZOLOFT) tablet 100 mg, HS    trimethobenzamide (TIGAN) IM injection 200 mg, Q6H PRN    Administrative Statements   Today, Patient Was Seen By: Jorge Aburto MD      **Please Note: This note may have been constructed using a voice recognition system.**

## 2025-02-18 NOTE — PLAN OF CARE
Problem: Potential for Falls  Goal: Patient will remain free of falls  Description: INTERVENTIONS:  - Educate patient/family on patient safety including physical limitations  - Instruct patient to call for assistance with activity   - Consult OT/PT to assist with strengthening/mobility   - Keep Call bell within reach  - Keep bed low and locked with side rails adjusted as appropriate  - Keep care items and personal belongings within reach  - Initiate and maintain comfort rounds  - Make Fall Risk Sign visible to staff  - Offer Toileting every 2 Hours, in advance of need  - Initiate/Maintain bed alarm  - Obtain necessary fall risk management equipment:    - Apply yellow socks and bracelet for high fall risk patients  - Consider moving patient to room near nurses station  Outcome: Progressing     Problem: PAIN - ADULT  Goal: Verbalizes/displays adequate comfort level or baseline comfort level  Description: Interventions:  - Encourage patient to monitor pain and request assistance  - Assess pain using appropriate pain scale  - Administer analgesics based on type and severity of pain and evaluate response  - Implement non-pharmacological measures as appropriate and evaluate response  - Consider cultural and social influences on pain and pain management  - Notify physician/advanced practitioner if interventions unsuccessful or patient reports new pain  Outcome: Progressing     Problem: INFECTION - ADULT  Goal: Absence or prevention of progression during hospitalization  Description: INTERVENTIONS:  - Assess and monitor for signs and symptoms of infection  - Monitor lab/diagnostic results  - Monitor all insertion sites, i.e. indwelling lines, tubes, and drains  - Monitor endotracheal if appropriate and nasal secretions for changes in amount and color  - [place] appropriate cooling/warming therapies per order  - Administer medications as ordered  - Instruct and encourage patient and family to use good hand hygiene  technique  - Identify and instruct in appropriate isolation precautions for identified infection/condition  Outcome: Progressing  Goal: Absence of fever/infection during neutropenic period  Description: INTERVENTIONS:  - Monitor WBC    Outcome: Progressing     Problem: SAFETY ADULT  Goal: Patient will remain free of falls  Description: INTERVENTIONS:  - Educate patient/family on patient safety including physical limitations  - Instruct patient to call for assistance with activity   - Consult OT/PT to assist with strengthening/mobility   - Keep Call bell within reach  - Keep bed low and locked with side rails adjusted as appropriate  - Keep care items and personal belongings within reach  - Initiate and maintain comfort rounds  - Make Fall Risk Sign visible to staff  - Offer Toileting every 2 Hours, in advance of need  - Initiate/Maintain bed alarm  - Obtain necessary fall risk management equipment:    - Apply yellow socks and bracelet for high fall risk patients  - Consider moving patient to room near nurses station  Outcome: Progressing  Goal: Maintain or return to baseline ADL function  Description: INTERVENTIONS:  -  Assess patient's ability to carry out ADLs; assess patient's baseline for ADL function and identify physical deficits which impact ability to perform ADLs (bathing, care of mouth/teeth, toileting, grooming, dressing, etc.)  - Assess/evaluate cause of self-care deficits   - Assess range of motion  - Assess patient's mobility; develop plan if impaired  - Assess patient's need for assistive devices and provide as appropriate  - Encourage maximum independence but intervene and supervise when necessary  - Involve family in performance of ADLs  - Assess for home care needs following discharge   - Consider OT consult to assist with ADL evaluation and planning for discharge  - Provide patient education as appropriate  Outcome: Progressing  Goal: Maintains/Returns to pre admission functional  level  Description: INTERVENTIONS:  - Perform AM-PAC 6 Click Basic Mobility/ Daily Activity assessment daily.  - Set and communicate daily mobility goal to care team and patient/family/caregiver.   - Collaborate with rehabilitation services on mobility goals if consulted  - Perform Range of Motion 3 times a day.  - Reposition patient every 2 hours.  - Dangle patient 3 times a day  - Stand patient 3 times a day  - Ambulate patient 3 times a day  - Out of bed to chair 3 times a day   - Out of bed for meals 3 times a day  - Out of bed for toileting  - Record patient progress and toleration of activity level   Outcome: Progressing     Problem: DISCHARGE PLANNING  Goal: Discharge to home or other facility with appropriate resources  Description: INTERVENTIONS:  - Identify barriers to discharge w/patient and caregiver  - Arrange for needed discharge resources and transportation as appropriate  - Identify discharge learning needs (meds, wound care, etc.)  - Arrange for interpretive services to assist at discharge as needed  - Refer to Case Management Department for coordinating discharge planning if the patient needs post-hospital services based on physician/advanced practitioner order or complex needs related to functional status, cognitive ability, or social support system  Outcome: Progressing     Problem: Knowledge Deficit  Goal: Patient/family/caregiver demonstrates understanding of disease process, treatment plan, medications, and discharge instructions  Description: Complete learning assessment and assess knowledge base.  Interventions:  - Provide teaching at level of understanding  - Provide teaching via preferred learning methods  Outcome: Progressing     Problem: Prexisting or High Potential for Compromised Skin Integrity  Goal: Skin integrity is maintained or improved  Description: INTERVENTIONS:  - Identify patients at risk for skin breakdown  - Assess and monitor skin integrity  - Assess and monitor nutrition  and hydration status  - Monitor labs   - Assess for incontinence   - Turn and reposition patient  - Assist with mobility/ambulation  - Relieve pressure over bony prominences  - Avoid friction and shearing  - Provide appropriate hygiene as needed including keeping skin clean and dry  - Evaluate need for skin moisturizer/barrier cream  - Collaborate with interdisciplinary team   - Patient/family teaching  - Consider wound care consult   Outcome: Progressing     Problem: Nutrition/Hydration-ADULT  Goal: Nutrient/Hydration intake appropriate for improving, restoring or maintaining nutritional needs  Description: Monitor and assess patient's nutrition/hydration status for malnutrition. Collaborate with interdisciplinary team and initiate plan and interventions as ordered.  Monitor patient's weight and dietary intake as ordered or per policy. Utilize nutrition screening tool and intervene as necessary. Determine patient's food preferences and provide high-protein, high-caloric foods as appropriate.     INTERVENTIONS:  - Monitor oral intake, urinary output, labs, and treatment plans  - Assess nutrition and hydration status and recommend course of action  - Evaluate amount of meals eaten  - Assist patient with eating if necessary   - Allow adequate time for meals  - Recommend/ encourage appropriate diets, oral nutritional supplements, and vitamin/mineral supplements  - Order, calculate, and assess calorie counts as needed  - Recommend, monitor, and adjust tube feedings and TPN/PPN based on assessed needs  - Assess need for intravenous fluids  - Provide specific nutrition/hydration education as appropriate  - Include patient/family/caregiver in decisions related to nutrition  Outcome: Progressing

## 2025-02-18 NOTE — PLAN OF CARE
Problem: PAIN - ADULT  Goal: Verbalizes/displays adequate comfort level or baseline comfort level  Description: Interventions:  - Encourage patient to monitor pain and request assistance  - Assess pain using appropriate pain scale  - Administer analgesics based on type and severity of pain and evaluate response  - Implement non-pharmacological measures as appropriate and evaluate response  - Consider cultural and social influences on pain and pain management  - Notify physician/advanced practitioner if interventions unsuccessful or patient reports new pain  Outcome: Progressing      Niacinamide Pregnancy And Lactation Text: These medications are considered safe during pregnancy.

## 2025-02-18 NOTE — SPEECH THERAPY NOTE
"Speech Language/Pathology    Speech/Language Pathology Progress Note    Patient Name: Zohra Barriga  Today's Date: 2/18/2025     Problem List  Principal Problem:    Acute respiratory failure (formerly [facility])  Active Problems:    Essential hypertension    Type 2 diabetes mellitus, with long-term current use of insulin (formerly [facility])    JULIANNE (acute kidney injury) (formerly [facility])    Fall    Acute on Chronic Systolic CHF (formerly [facility])    Diabetic ulcer of right midfoot associated with type 2 diabetes mellitus (formerly [facility])    UTI (urinary tract infection)    Facial contusion, subsequent encounter    Influenza A    Closed fracture of left ankle with routine healing    Transaminitis       Past Medical History  Past Medical History:   Diagnosis Date    Anxiety     Asthma     controlled    Back complaints     Cancer (formerly [facility])     nose    Cellulitis of left lower leg     \"not now\"    CHF (congestive heart failure) (formerly [facility]) 02/2021    Chronic bilateral thoracic back pain     Chronic kidney disease     sees nephrologist regularly\" stage 3\"    Chronic myofascial pain     Chronic pain of both lower extremities     Chronic pain of left knee     \"both knees\"    Chronic pain syndrome     bilat legs and knees/neuropathy pain    COVID 12/2021    CPAP (continuous positive airway pressure) dependence     no currently uses    Depression     Diabetes mellitus (formerly [facility])     insulin pump    Difficulty walking 2018    Disease of thyroid gland     Does use hearing aid     bilat and will wear DOS    DVT (deep venous thrombosis) (formerly [facility]) 2013    left leg    Gait disorder     Gastroparesis     GERD (gastroesophageal reflux disease)     Head injury     Headache, tension-type     History of angina     History of MRSA infection     History of transfusion     Many years ago    HL (hearing loss) 2018    Hyperlipidemia     Hypertension     Hypoglycemic reaction     \"occas low blood sugar\"    Insulin pump in place     pt reports saw endocrinologist 4/28 and will bring copy of instructions DOS    Irritable bowel " "syndrome     Kidney disease     Memory loss     Movement disorder     Neuropathy in diabetes (HCC)     Constant pain legs and feet    Nose fracture     Pacemaker 2019    Pneumonia     Polyneuropathy associated with underlying disease (HCC)     PONV (postoperative nausea and vomiting)     Risk for falls     S/P insertion of spinal cord stimulator 2018    Sarcoidosis     Shortness of breath     \"exertion and not new\"    Sleep apnea     Stroke (HCC)     Thoracic vertebral fracture (HCC)     TIA (transient ischemic attack)     Use of cane as ambulatory aid     Uses walker     Wears dentures     permanent upper/and some missing teeth/partial lower        Past Surgical History  Past Surgical History:   Procedure Laterality Date    ABDOMINAL ADHESION SURGERY N/A 2021    Procedure: laparoscopic LYSIS ADHESIONS;  Surgeon: Jill Bentley MD;  [facility];  Service: Thoracic    BACK SURGERY  2023    [facility] at [facility], Dr. Levy    BREAST SURGERY      BREAST SURGERY      reduction    CARDIAC PACEMAKER PLACEMENT      pacemaker     SECTION      COLONOSCOPY      DILATION AND CURETTAGE OF UTERUS      \"D&E\"    HERNIA REPAIR      HYSTERECTOMY      JOINT REPLACEMENT Left     LTKR    NECK SURGERY      fused 4 discs with 4 screws implanted    NISSEN FUNDOPLICATION LAPAROSCOPIC WITH ROBOTICS N/A 2021    Procedure: ROBOTIC HELLER MYOTOMY WITH BERNARDO FUNDIPLICATION ;  Surgeon: Jill Bentley MD;  [facility];  Service: Thoracic    NOSE SURGERY  2024    closed reduction    LA ESOPHAGOGASTRODUODENOSCOPY TRANSORAL DIAGNOSTIC N/A 2021    Procedure: ESOPHAGOGASTRODUODENOSCOPY (EGD);  Surgeon: Jill Bentley MD;  [facility];  Service: Thoracic    LA ESOPHAGOGASTRODUODENOSCOPY TRANSORAL DIAGNOSTIC N/A 2022    Procedure: ESOPHAGOGASTRODUODENOSCOPY (EGD),;  Surgeon: Jill Bentley MD;  [facility];  Service: Thoracic    LA " ESOPHAGOGASTRODUODENOSCOPY TRANSORAL DIAGNOSTIC N/A 02/16/2022    Procedure: ESOPHAGOGASTRODUODENOSCOPY (EGD);  Surgeon: Jill Bentley MD;  [facility];  Service: Thoracic    MD ESOPHAGOGASTRODUODENOSCOPY TRANSORAL DIAGNOSTIC N/A 07/12/2022    Procedure: ESOPHAGOGASTRODUODENOSCOPY (EGD); PYLORIC DILATION; GE JUNCTION DILATION;  Surgeon: Jill Bentley MD;  [facility];  Service: Thoracic    MD ESOPHAGOGASTRODUODENOSCOPY TRANSORAL DIAGNOSTIC N/A 11/29/2022    Procedure: ESOPHAGOGASTRODUODENOSCOPY (EGD);  Surgeon: Varinder Steiner MD;  [facility];  Service: Thoracic    MD ESOPHAGOGASTRODUODENOSCOPY TRANSORAL DIAGNOSTIC N/A 01/24/2023    Procedure: ESOPHAGOGASTRODUODENOSCOPY (EGD);  Surgeon: Jill Bentley MD;  [facility];  Service: Thoracic    MD ESOPHAGOGASTRODUODENOSCOPY TRANSORAL DIAGNOSTIC N/A 05/01/2024    Procedure: ESOPHAGOGASTRODUODENOSCOPY (EGD);  Surgeon: Jill Bentley MD;  [facility];  Service: Thoracic    MD ESOPHAGOSCOPY FLEX BALLOON DILAT <30 MM DIAM N/A 01/12/2022    Procedure: DILATATION ESOPHAGEAL;  Surgeon: Jill Bentley MD;  [facility];  Service: Thoracic    MD ESOPHAGOSCOPY FLEX BALLOON DILAT <30 MM DIAM N/A 02/16/2022    Procedure: DILATATION ESOPHAGEAL;  Surgeon: Jill Bentley MD;  [facility];  Service: Thoracic    MD ESOPHAGOSCOPY FLEX BALLOON DILAT <30 MM DIAM N/A 11/29/2022    Procedure: DILATATION ESOPHAGEAL esophageal and pyloric dilation;  Surgeon: Varinder Steiner MD;  [facility];  Service: Thoracic    MD ESOPHAGOSCOPY FLEX BALLOON DILAT <30 MM DIAM N/A 01/24/2023    Procedure: esophageal dilation dilated up to 54  with pylorus dilation dilated up to 18;  Surgeon: Jill Bentley MD;  [facility];  Service: Thoracic    MD ESOPHAGOSCOPY FLEX BALLOON DILAT <30 MM DIAM N/A 05/01/2024    Procedure: DILATATION ESOPHAGEAL;  Surgeon: Jill Bentley MD;   "[facility];  Service: Thoracic    SD RIBEIRO IMPLTJ NSTIM ELTRDS PLATE/PADDLE EDRL Left 04/23/2018    Procedure: Insertion of thoracic spinal cord stimulator electrode via laminotomy and placement of left buttock IPG;  Surgeon: Shahab Bullock MD;  [facility];  Service: Neurosurgery    REPLACEMENT TOTAL KNEE      ROTATOR CUFF REPAIR      SPINAL CORD STIMULATOR REMOVAL Bilateral 07/02/2024    Procedure: REOPENING OF THORACIC AND LEFT BUTTOCK INCISION FOR REMOVAL OF SPINAL CORD STIMULATOR SYSTEM;  Surgeon: Shahab Bullock MD;  [facility];  Service: Neurosurgery    SPINAL STIMULATOR PLACEMENT Left 10/03/2018    Procedure: BUTTOCK RE-OPENING INCISION FOR REPOSITIONING OF IMPLANTABLE PULSE GENERATOR;  Surgeon: Shahab Bullock MD;  [facility];  Service: Neurosurgery    TONSILLECTOMY      UPPER GASTROINTESTINAL ENDOSCOPY      VASCULAR SURGERY      WISDOM TOOTH EXTRACTION           Subjective:  Pt more alert and verbal today since last seen. \"I want real food\"  Objective:  Seen briefly at breakfast. Fed self oatmeal, slowly. Tolerated wfl. Oral stage slow but functional. Prompt swallow. No pharyngeal s/s . Refused the pureed items that come on the [facility] soft breakfast. (pureed pear and pureed french toast). Requested vanilla pudding and jello. Ordered.   Assessment:  Improved alertness, verbalizations, appetite.   Plan/Recommendations:  Advance diet to NDD3 dental soft w/ thin. Softer selections as needed. Will f/u.      "

## 2025-02-18 NOTE — ASSESSMENT & PLAN NOTE
Due to cardiorenal syndrome, hypotension  Lasix and losartan  were held due to JULIANNE and hypotension  BMP in a.m.

## 2025-02-18 NOTE — NUTRITION
02/17/25 2131   Biochemical Data,Medical Tests, and Procedures   Biochemical Data/Medical Tests/Procedures Lab values reviewed;Meds reviewed   Labs (Comment) 2/17 , 172, , , alk phosphatase 165, H&H 8.0/26.3   Meds (Comment) insulin, furosemide   Speech Therapy Recommendations (Comment) 2/16 Speech: continue diet/dysphagia level 2/thin liquids. Will f/u for tolerance and upgrade potential.   Nutrition-Focused Physical Exam   Nutrition-Focused Physical Exam Findings RN skin assessment reviewed   Nutrition-Focused Physical Exam Findings Edema: b/l LE non-pitting, skin: no pressure injuries noted   Medical-Related Concerns Acute respiratory failure, PMHx:  Diabetic ulcer of right midfoot associated with type 2 diabetes mellitus acute respiratory failure, DM2, JULIANNE, CHF, s/p mechanical fall/L ankle fx/facial contusion prior to this admission   Adequacy of Intake   Nutrition Modality PO   Feeding Route   PO With assistance  (PT can feed herself, tray set up)   Current PO Intake   Current Diet Order Dysphagia level 2, thin liquids   Nutrition Supplements Ensure Plus High Protein  (d/c'd)   Current Meal Intake 25-50%   Estimated Calorie Intake 25-50%   Estimated Protein Intake  25-50%   Estimated Fluid Intake 25-50%   Estimated calorie intake compared to estimated need  states poor po intake, consuming ~<30% of meals, Suggest provide Glucerna/vanilla/chocolate BID @B&D meals   PES Statement   Problem Intake   Oral or Nutritional Support Intake (2) Inadequate oral intake NI-2.1   Related to Decreased appetite   As evidenced by: Per patient/family interview;Intake < estimated needs   Recommendations/Interventions   Malnutrition/BMI Present No  (PT does not meet criteria currently)   Interventions/Recommendations Adjust diet order   Intervention Comments Suggest add CCD1, 4gNa to current diet order once PT's intake improves, will provide Glucerna BID, chocolate/vanilla flavors @B&D meals    Education Assessment   Education Notes Discussed supplements   Patient Nutrition Goals   Goal Adequate hydration;Increase kcal/PRO intake   Goal Status Initiated   Timeframe to complete goal by next f/u   Nutrition Complexity Risk   Nutrition complexity level Moderate risk   Follow up date 02/23/25     MD please see order pending. Thank you!

## 2025-02-19 ENCOUNTER — APPOINTMENT (INPATIENT)
Dept: RADIOLOGY | Facility: HOSPITAL | Age: 70
DRG: 871 | End: 2025-02-19
Payer: MEDICARE

## 2025-02-19 VITALS
RESPIRATION RATE: 20 BRPM | HEART RATE: 64 BPM | TEMPERATURE: 97.3 F | WEIGHT: 171.08 LBS | HEIGHT: 59 IN | BODY MASS INDEX: 34.49 KG/M2 | OXYGEN SATURATION: 94 % | SYSTOLIC BLOOD PRESSURE: 160 MMHG | DIASTOLIC BLOOD PRESSURE: 91 MMHG

## 2025-02-19 LAB
ALBUMIN SERPL BCG-MCNC: 3.3 G/DL (ref 3.5–5)
ALP SERPL-CCNC: 162 U/L (ref 34–104)
ALT SERPL W P-5'-P-CCNC: 267 U/L (ref 7–52)
ANION GAP SERPL CALCULATED.3IONS-SCNC: 8 MMOL/L (ref 4–13)
ANISOCYTOSIS BLD QL SMEAR: PRESENT
AST SERPL W P-5'-P-CCNC: 182 U/L (ref 13–39)
BASOPHILS # BLD MANUAL: 0 THOUSAND/UL (ref 0–0.1)
BASOPHILS NFR MAR MANUAL: 0 % (ref 0–1)
BILIRUB SERPL-MCNC: 1.2 MG/DL (ref 0.2–1)
BUN SERPL-MCNC: 33 MG/DL (ref 5–25)
CALCIUM ALBUM COR SERPL-MCNC: 9.4 MG/DL (ref 8.3–10.1)
CALCIUM SERPL-MCNC: 8.8 MG/DL (ref 8.4–10.2)
CHLORIDE SERPL-SCNC: 105 MMOL/L (ref 96–108)
CO2 SERPL-SCNC: 26 MMOL/L (ref 21–32)
CREAT SERPL-MCNC: 0.83 MG/DL (ref 0.6–1.3)
EOSINOPHIL # BLD MANUAL: 0.04 THOUSAND/UL (ref 0–0.4)
EOSINOPHIL NFR BLD MANUAL: 1 % (ref 0–6)
ERYTHROCYTE [DISTWIDTH] IN BLOOD BY AUTOMATED COUNT: 17.7 % (ref 11.6–15.1)
GFR SERPL CREATININE-BSD FRML MDRD: 72 ML/MIN/1.73SQ M
GIANT PLATELETS BLD QL SMEAR: PRESENT
GLUCOSE SERPL-MCNC: 160 MG/DL (ref 65–140)
GLUCOSE SERPL-MCNC: 169 MG/DL (ref 65–140)
GLUCOSE SERPL-MCNC: 81 MG/DL (ref 65–140)
GLUCOSE SERPL-MCNC: 88 MG/DL (ref 65–140)
HCT VFR BLD AUTO: 28.3 % (ref 34.8–46.1)
HGB BLD-MCNC: 8.6 G/DL (ref 11.5–15.4)
HYPERCHROMIA BLD QL SMEAR: PRESENT
INR PPP: 1.31 (ref 0.85–1.19)
LYMPHOCYTES # BLD AUTO: 0.81 THOUSAND/UL (ref 0.6–4.47)
LYMPHOCYTES # BLD AUTO: 19 % (ref 14–44)
MCH RBC QN AUTO: 29.1 PG (ref 26.8–34.3)
MCHC RBC AUTO-ENTMCNC: 30.4 G/DL (ref 31.4–37.4)
MCV RBC AUTO: 96 FL (ref 82–98)
METAMYELOCYTE ABSOLUTE CT: 0.13 THOUSAND/UL (ref 0–0.1)
METAMYELOCYTES NFR BLD MANUAL: 3 % (ref 0–1)
MONOCYTES # BLD AUTO: 0.21 THOUSAND/UL (ref 0–1.22)
MONOCYTES NFR BLD: 5 % (ref 4–12)
MYELOCYTE ABSOLUTE CT: 0.26 THOUSAND/UL (ref 0–0.1)
MYELOCYTES NFR BLD MANUAL: 6 % (ref 0–1)
NEUTROPHILS # BLD MANUAL: 2.81 THOUSAND/UL (ref 1.85–7.62)
NEUTS BAND NFR BLD MANUAL: 11 % (ref 0–8)
NEUTS SEG NFR BLD AUTO: 55 % (ref 43–75)
NRBC BLD AUTO-RTO: 1 /100 WBC (ref 0–2)
OVALOCYTES BLD QL SMEAR: PRESENT
PLATELET # BLD AUTO: 177 THOUSANDS/UL (ref 149–390)
PLATELET BLD QL SMEAR: ADEQUATE
PMV BLD AUTO: 10.6 FL (ref 8.9–12.7)
POLYCHROMASIA BLD QL SMEAR: PRESENT
POTASSIUM SERPL-SCNC: 4.3 MMOL/L (ref 3.5–5.3)
PROT SERPL-MCNC: 6.9 G/DL (ref 6.4–8.4)
PROTHROMBIN TIME: 16.4 SECONDS (ref 12.3–15)
RBC # BLD AUTO: 2.96 MILLION/UL (ref 3.81–5.12)
RBC MORPH BLD: PRESENT
SODIUM SERPL-SCNC: 139 MMOL/L (ref 135–147)
WBC # BLD AUTO: 4.25 THOUSAND/UL (ref 4.31–10.16)

## 2025-02-19 PROCEDURE — 82948 REAGENT STRIP/BLOOD GLUCOSE: CPT

## 2025-02-19 PROCEDURE — 85007 BL SMEAR W/DIFF WBC COUNT: CPT | Performed by: INTERNAL MEDICINE

## 2025-02-19 PROCEDURE — 80053 COMPREHEN METABOLIC PANEL: CPT | Performed by: INTERNAL MEDICINE

## 2025-02-19 PROCEDURE — 97542 WHEELCHAIR MNGMENT TRAINING: CPT

## 2025-02-19 PROCEDURE — 85027 COMPLETE CBC AUTOMATED: CPT | Performed by: INTERNAL MEDICINE

## 2025-02-19 PROCEDURE — 73610 X-RAY EXAM OF ANKLE: CPT

## 2025-02-19 PROCEDURE — 99239 HOSP IP/OBS DSCHRG MGMT >30: CPT | Performed by: INTERNAL MEDICINE

## 2025-02-19 PROCEDURE — 97530 THERAPEUTIC ACTIVITIES: CPT

## 2025-02-19 PROCEDURE — 99232 SBSQ HOSP IP/OBS MODERATE 35: CPT | Performed by: INTERNAL MEDICINE

## 2025-02-19 PROCEDURE — 85610 PROTHROMBIN TIME: CPT

## 2025-02-19 PROCEDURE — 97535 SELF CARE MNGMENT TRAINING: CPT

## 2025-02-19 RX ORDER — TORSEMIDE 20 MG/1
40 TABLET ORAL DAILY
Status: DISCONTINUED | OUTPATIENT
Start: 2025-02-19 | End: 2025-02-19 | Stop reason: HOSPADM

## 2025-02-19 RX ORDER — CARVEDILOL 6.25 MG/1
6.25 TABLET ORAL 2 TIMES DAILY WITH MEALS
Start: 2025-02-19

## 2025-02-19 RX ORDER — INSULIN LISPRO 100 [IU]/ML
1-6 INJECTION, SOLUTION INTRAVENOUS; SUBCUTANEOUS
Start: 2025-02-19

## 2025-02-19 RX ORDER — OSELTAMIVIR PHOSPHATE 30 MG/1
30 CAPSULE ORAL EVERY 12 HOURS SCHEDULED
Start: 2025-02-19 | End: 2025-02-20

## 2025-02-19 RX ORDER — TORSEMIDE 20 MG/1
20 TABLET ORAL DAILY
Start: 2025-02-19

## 2025-02-19 RX ORDER — INSULIN GLARGINE 100 [IU]/ML
15 INJECTION, SOLUTION SUBCUTANEOUS
Start: 2025-02-19

## 2025-02-19 RX ORDER — OXYCODONE HYDROCHLORIDE 5 MG/1
5 TABLET ORAL 2 TIMES DAILY PRN
Qty: 6 TABLET | Refills: 0 | Status: SHIPPED | OUTPATIENT
Start: 2025-02-19 | End: 2025-02-22

## 2025-02-19 RX ORDER — OXYCODONE HYDROCHLORIDE 5 MG/1
5 TABLET ORAL EVERY 6 HOURS PRN
Qty: 6 TABLET | Refills: 0 | Status: SHIPPED | OUTPATIENT
Start: 2025-02-19 | End: 2025-02-19

## 2025-02-19 RX ADMIN — OXYCODONE 5 MG: 5 TABLET ORAL at 10:11

## 2025-02-19 RX ADMIN — PRAMIPEXOLE DIHYDROCHLORIDE 1 MG: 1 TABLET ORAL at 10:10

## 2025-02-19 RX ADMIN — EZETIMIBE 10 MG: 10 TABLET ORAL at 10:11

## 2025-02-19 RX ADMIN — CARVEDILOL 6.25 MG: 6.25 TABLET, FILM COATED ORAL at 10:10

## 2025-02-19 RX ADMIN — FLUTICASONE FUROATE AND VILANTEROL TRIFENATATE 1 PUFF: 200; 25 POWDER RESPIRATORY (INHALATION) at 10:11

## 2025-02-19 RX ADMIN — CYANOCOBALAMIN TAB 500 MCG 1000 MCG: 500 TAB at 10:10

## 2025-02-19 RX ADMIN — LEVOTHYROXINE SODIUM 112 MCG: 112 TABLET ORAL at 05:02

## 2025-02-19 RX ADMIN — TORSEMIDE 40 MG: 20 TABLET ORAL at 11:27

## 2025-02-19 RX ADMIN — POLYETHYLENE GLYCOL 3350 17 G: 17 POWDER, FOR SOLUTION ORAL at 10:08

## 2025-02-19 RX ADMIN — CALCIUM 1 TABLET: 500 TABLET ORAL at 10:10

## 2025-02-19 RX ADMIN — ENOXAPARIN SODIUM 40 MG: 40 INJECTION SUBCUTANEOUS at 10:09

## 2025-02-19 RX ADMIN — OSELTAMIVIR PHOSPHATE 30 MG: 30 CAPSULE ORAL at 10:09

## 2025-02-19 RX ADMIN — INSULIN LISPRO 1 UNITS: 100 INJECTION, SOLUTION INTRAVENOUS; SUBCUTANEOUS at 13:02

## 2025-02-19 NOTE — PLAN OF CARE
Problem: Potential for Falls  Goal: Patient will remain free of falls  Description: INTERVENTIONS:  - Educate patient/family on patient safety including physical limitations  - Instruct patient to call for assistance with activity   - Consult OT/PT to assist with strengthening/mobility   - Keep Call bell within reach  - Keep bed low and locked with side rails adjusted as appropriate  - Keep care items and personal belongings within reach  - Initiate and maintain comfort rounds  - Make Fall Risk Sign visible to staff  - Offer Toileting every 2 Hours, in advance of need  - Initiate/Maintain bed alarm  - Obtain necessary fall risk management equipment:    - Apply yellow socks and bracelet for high fall risk patients  - Consider moving patient to room near nurses station  Outcome: Progressing     Problem: PAIN - ADULT  Goal: Verbalizes/displays adequate comfort level or baseline comfort level  Description: Interventions:  - Encourage patient to monitor pain and request assistance  - Assess pain using appropriate pain scale  - Administer analgesics based on type and severity of pain and evaluate response  - Implement non-pharmacological measures as appropriate and evaluate response  - Consider cultural and social influences on pain and pain management  - Notify physician/advanced practitioner if interventions unsuccessful or patient reports new pain  Outcome: Progressing     Problem: INFECTION - ADULT  Goal: Absence or prevention of progression during hospitalization  Description: INTERVENTIONS:  - Assess and monitor for signs and symptoms of infection  - Monitor lab/diagnostic results  - Monitor all insertion sites, i.e. indwelling lines, tubes, and drains  - Monitor endotracheal if appropriate and nasal secretions for changes in amount and color  - [place] appropriate cooling/warming therapies per order  - Administer medications as ordered  - Instruct and encourage patient and family to use good hand hygiene  technique  - Identify and instruct in appropriate isolation precautions for identified infection/condition  Outcome: Progressing  Goal: Absence of fever/infection during neutropenic period  Description: INTERVENTIONS:  - Monitor WBC    Outcome: Progressing     Problem: SAFETY ADULT  Goal: Patient will remain free of falls  Description: INTERVENTIONS:  - Educate patient/family on patient safety including physical limitations  - Instruct patient to call for assistance with activity   - Consult OT/PT to assist with strengthening/mobility   - Keep Call bell within reach  - Keep bed low and locked with side rails adjusted as appropriate  - Keep care items and personal belongings within reach  - Initiate and maintain comfort rounds  - Make Fall Risk Sign visible to staff  - Offer Toileting every 2 Hours, in advance of need  - Initiate/Maintain bed alarm  - Obtain necessary fall risk management equipment:    - Apply yellow socks and bracelet for high fall risk patients  - Consider moving patient to room near nurses station  Outcome: Progressing  Goal: Maintain or return to baseline ADL function  Description: INTERVENTIONS:  -  Assess patient's ability to carry out ADLs; assess patient's baseline for ADL function and identify physical deficits which impact ability to perform ADLs (bathing, care of mouth/teeth, toileting, grooming, dressing, etc.)  - Assess/evaluate cause of self-care deficits   - Assess range of motion  - Assess patient's mobility; develop plan if impaired  - Assess patient's need for assistive devices and provide as appropriate  - Encourage maximum independence but intervene and supervise when necessary  - Involve family in performance of ADLs  - Assess for home care needs following discharge   - Consider OT consult to assist with ADL evaluation and planning for discharge  - Provide patient education as appropriate  Outcome: Progressing  Goal: Maintains/Returns to pre admission functional  level  Description: INTERVENTIONS:  - Perform AM-PAC 6 Click Basic Mobility/ Daily Activity assessment daily.  - Set and communicate daily mobility goal to care team and patient/family/caregiver.   - Collaborate with rehabilitation services on mobility goals if consulted  - Perform Range of Motion 3 times a day.  - Reposition patient every 2 hours.  - Dangle patient 3 times a day  - Stand patient 3 times a day  - Ambulate patient 3 times a day  - Out of bed to chair 3 times a day   - Out of bed for meals 3 times a day  - Out of bed for toileting  - Record patient progress and toleration of activity level   Outcome: Progressing     Problem: DISCHARGE PLANNING  Goal: Discharge to home or other facility with appropriate resources  Description: INTERVENTIONS:  - Identify barriers to discharge w/patient and caregiver  - Arrange for needed discharge resources and transportation as appropriate  - Identify discharge learning needs (meds, wound care, etc.)  - Arrange for interpretive services to assist at discharge as needed  - Refer to Case Management Department for coordinating discharge planning if the patient needs post-hospital services based on physician/advanced practitioner order or complex needs related to functional status, cognitive ability, or social support system  Outcome: Progressing     Problem: Knowledge Deficit  Goal: Patient/family/caregiver demonstrates understanding of disease process, treatment plan, medications, and discharge instructions  Description: Complete learning assessment and assess knowledge base.  Interventions:  - Provide teaching at level of understanding  - Provide teaching via preferred learning methods  Outcome: Progressing     Problem: Prexisting or High Potential for Compromised Skin Integrity  Goal: Skin integrity is maintained or improved  Description: INTERVENTIONS:  - Identify patients at risk for skin breakdown  - Assess and monitor skin integrity  - Assess and monitor nutrition  and hydration status  - Monitor labs   - Assess for incontinence   - Turn and reposition patient  - Assist with mobility/ambulation  - Relieve pressure over bony prominences  - Avoid friction and shearing  - Provide appropriate hygiene as needed including keeping skin clean and dry  - Evaluate need for skin moisturizer/barrier cream  - Collaborate with interdisciplinary team   - Patient/family teaching  - Consider wound care consult   Outcome: Progressing     Problem: Nutrition/Hydration-ADULT  Goal: Nutrient/Hydration intake appropriate for improving, restoring or maintaining nutritional needs  Description: Monitor and assess patient's nutrition/hydration status for malnutrition. Collaborate with interdisciplinary team and initiate plan and interventions as ordered.  Monitor patient's weight and dietary intake as ordered or per policy. Utilize nutrition screening tool and intervene as necessary. Determine patient's food preferences and provide high-protein, high-caloric foods as appropriate.     INTERVENTIONS:  - Monitor oral intake, urinary output, labs, and treatment plans  - Assess nutrition and hydration status and recommend course of action  - Evaluate amount of meals eaten  - Assist patient with eating if necessary   - Allow adequate time for meals  - Recommend/ encourage appropriate diets, oral nutritional supplements, and vitamin/mineral supplements  - Order, calculate, and assess calorie counts as needed  - Recommend, monitor, and adjust tube feedings and TPN/PPN based on assessed needs  - Assess need for intravenous fluids  - Provide specific nutrition/hydration education as appropriate  - Include patient/family/caregiver in decisions related to nutrition  Outcome: Progressing

## 2025-02-19 NOTE — ASSESSMENT & PLAN NOTE
Due to cardiorenal syndrome, hypotension  Resolved  Blood pressure stable with no hypotension  Resume losartan and torsemide on discharge

## 2025-02-19 NOTE — ASSESSMENT & PLAN NOTE
Likely due to congestive heart failure, diminished liver perfusion, systemic viral illness  Recent CT at [facility] last month showed fatty infiltration without focal lesion  Right upper quadrant ultrasound done yesterday showed normal-sized liver with minimally coarsened parenchyma with no liver mass identified.  LFTs continue to trend down  Repeat CMP  in 3 days at [facility]

## 2025-02-19 NOTE — CASE MANAGEMENT
Case Management Discharge Planning Note    Patient name Zohra Barriga  Location [facility] /[facility] -* MRN [id number]  : 1955 Date 2025       Current Admission Date: 2/10/2025  Current Admission Diagnosis:Acute respiratory failure (HCC)   Patient Active Problem List    Diagnosis Date Noted Date Diagnosed    Closed fracture of left ankle with routine healing 2025     Transaminitis 2025     Influenza A 2025     Acute on Chronic Systolic CHF (HCC) 2025     Diabetic ulcer of right midfoot associated with type 2 diabetes mellitus (HCC) 2025     UTI (urinary tract infection) 2025     Facial contusion, subsequent encounter 2025     Fall 2024     Closed fracture of one rib of right side 2024     Osteopenia 2024     Compression fx, lumbar spine, sequela 2024     Myelopathy ([facility]) 2024     Thoracic compression fracture ([facility]) 2024     Complete heart block ([facility]) 2023     Abnormal CT of the chest 2023     Acute respiratory failure (HCC) 2023     Chronic diastolic (congestive) heart failure ([facility]) 2023     COVID-19 virus infection 2022     Anemia of chronic renal failure, stage 3 (moderate)  ([facility]) 2022     OAB (overactive bladder) 2022     Stage 3a chronic kidney disease ([facility]) 2021     Spinal stenosis of lumbar region with neurogenic claudication      Chronic kidney disease (CKD) stage G3b/A1, moderately decreased glomerular filtration rate (GFR) between 30-44 mL/min/1.73 square meter and albuminuria creatinine ratio less than 30 mg/g ([facility]) 2021     Epigastric pain 2021     Urinary retention 2021     JULIANNE (acute kidney injury) ([facility]) 2021     PONV (postoperative nausea and vomiting) 2021     Class 2 obesity in adult 2021     Pacemaker 2021     Medical marijuana use 2021     Achalasia 2021     Polyarthralgia 2020     Diabetic  peripheral neuropathy associated with type 2 diabetes mellitus ([facility]) 03/23/2020     Restless leg syndrome 10/24/2019     Uncontrolled type 2 diabetes mellitus with hyperglycemia ([facility]) 12/31/2018     Hypersomnia 10/19/2018     BBB (bundle branch block) 09/18/2018     Bifascicular block 09/18/2018     Malfunction of spinal cord stimulator ([facility]) 08/02/2018     Closed fracture of thoracic vertebra ([facility]) 07/06/2018     Deep venous thrombosis ([facility]) 07/06/2018     Localized, primary osteoarthritis 07/06/2018     Chronic bilateral low back pain without sciatica 07/06/2018     Lumbosacral spondylosis without myelopathy 07/06/2018     Osteoarthritis of knee 07/06/2018     Age related osteoporosis 07/06/2018     Chronic scapular pain 07/06/2018     Microcytic anemia 07/04/2018     Chronic diastolic CHF (congestive heart failure) ([facility]) 07/03/2018     Coronary artery disease involving native coronary artery 07/03/2018     History of TIA (transient ischemic attack) 07/03/2018     Coronary artery disease involving native coronary artery of native heart without angina pectoris 07/03/2018     Encounter for fitting and adjustment of spinal cord stimulator 06/08/2018     S/P insertion of spinal cord stimulator 06/08/2018     Lumbar radiculopathy 04/16/2018     Thoracic radiculopathy 04/16/2018     Complex regional pain syndrome type 1 of left lower extremity 03/09/2018     Type 2 diabetes mellitus, with long-term current use of insulin ([facility]) 01/11/2018     Chronic bilateral thoracic back pain 11/17/2017     Chronic myofascial pain 11/17/2017     Vitamin D deficiency 11/03/2017     Post-menopausal 10/11/2017     Primary hyperparathyroidism ([facility]) 10/11/2017     Pain in right leg 07/25/2017     Chronic pain of left knee 07/25/2017     Gait disorder 07/25/2017     Polyneuropathy associated with underlying disease ([facility]) 07/25/2017     Essential hypertension 06/29/2017     Mixed hyperlipidemia 06/29/2017     Primary hypothyroidism  06/29/2017     Chronic pain of right knee 06/06/2017     Pain syndrome, chronic 02/21/2017     Chronic pain disorder 02/21/2017     Moderate persistent asthma with acute exacerbation 01/06/2017     Anemia of chronic disease 07/27/2016     Anxiety and depression 07/27/2016     Lymphadenopathy 06/27/2016     Normocytic anemia 10/04/2015     Insomnia due to medical condition 09/24/2015     Type 2 diabetes mellitus with microalbuminuria, with long-term current use of insulin (HCC) 07/13/2015     Gastroparesis 05/28/2015     Moderate persistent asthma without complication 04/10/2015     Gastroesophageal reflux disease without esophagitis 02/18/2015     Mixed urge and stress incontinence 11/14/2014     Other B-complex deficiencies 05/31/2013     Insulin pump status 02/27/2012     Hyperlipidemia associated with type 2 diabetes mellitus  (HCC) 04/04/2011     Sarcoidosis 02/09/2009     Irritable bowel syndrome with constipation 02/09/2009       LOS (days): 8  Geometric Mean LOS (GMLOS) (days): 4.9  Days to GMLOS:-3.1     OBJECTIVE:  Risk of Unplanned Readmission Score: 23.12      Current admission status: Inpatient   Preferred Pharmacy:   [facility] #[id number] - [place] - [address] [place] [address]  Phone: [phone] Fax: [phone]    [facility] [place] [address] [place] [address]  Phone: [phone] Fax: [phone]    [facility] [address] [place] [address]  Phone: [phone] Fax: [phone]    [facility] [place] [place] [facility] [address]  [address]  [place] [address]  Phone: [phone] Fax: [phone]    [facility]acy #[id number] - [place] - [address] [address]  Phone: [phone] Fax: [phone]    Primary Care Provider: Celestino Santiago DO    Primary Insurance: MEDICARE  Secondary Insurance: [facility]    DISCHARGE DETAILS:    Discharge planning  discussed with:: pt and spouse  Freedom of Choice: Yes     CM contacted family/caregiver?: Yes  Were Treatment Team discharge recommendations reviewed with patient/caregiver?: Yes  Did patient/caregiver verbalize understanding of patient care needs?: N/A- going to facility  Were patient/caregiver advised of the risks associated with not following Treatment Team discharge recommendations?: Yes    Contacts  Patient Contacts: gabi Calvert  Relationship to Patient:: Family  Contact Method: Phone  Reason/Outcome: Discharge Planning, Emergency Contact    Requested Home Health Care         Is the patient interested in HHC at discharge?: No    DME Referral Provided  Referral made for DME?: No    Other Referral/Resources/Interventions Provided:  Interventions: Acute Rehab    Treatment Team Recommendation: Acute Rehab  Discharge Destination Plan:: Acute Rehab  Transport at Discharge : [facility]     Number/Name of Dispatcher: [facility]  Transported by (Company and Unit #): TBD  ETA of Transport (Date): 02/19/25  ETA of Transport (Time): 1400     IMM Given (Date):: 02/19/25  IMM Given to:: Family  Family notified:: Reviewed with pt and spouse. They are in agreemetn with yusef  Additional Comments: Pt will dc [facility] today. She will go to room 413. The phone # for report is [phone] and report should be called before pt leaves [facility]. The fax # is [phone]. Cm left a message with [facility] Radiology Reading Room requesting a disc with head imaging by 2 pm to go with transport. Waiting to hear back. Will need to  to [facility] otherwise. Transport requested for 2pm via [facility] on [facility]. Waiting vendor to  ride. Pt, bedside, RN, family, and [facility] admissions aware of  time requested. Medical Necessity given to RN.

## 2025-02-19 NOTE — RESTORATIVE TECHNICIAN NOTE
Restorative Technician Note      Patient Name: Zohra Barriga     Restorative Tech Visit Date: 02/19/25  Note Type: Mobility  Patient Position Upon Consult: Supine  Activity Performed: Ambulated  Assistive Device: Roller walker  Patient Position at End of Consult: Bedside chair; All needs within reach; Bed/Chair alarm activated            ns

## 2025-02-19 NOTE — PROGRESS NOTES
Cardiology Progress Note   MD Arnie Webber MD, FACC  Manuel Jolly DO, [facility]  MD Ulises Cruz DO, [facility]  Nj Carroll DO, [facility]  ----------------------------------------------------------------  [address] [address]    Zohra Barriga 69 y.o. female MRN: [id number]  Unit/Bed#: E5 -01 Encounter: [id number]      ASSESSMENT:   Acute on chronic HFrEF  LVEF 55-60%, mild LVH, AV sclerosis, trace MR/TR, February 2022  Acute hypoxic respiratory failure  Sepsis secondary to influenza, possibly secondary to urinary source - improved  CAD  s/p cath w/ 50% mLAD, 2011  Pharmacologic nuclear stress test negative for myocardial ischemia, June 2022  Acute kidney injury  Fall secondary to weakness with left ankle fracture and facial contusion  Hypertension  Dyslipidemia  Type 2 diabetes mellitus  Abnormal ECG w/ 1st degree AV block, bifascicular block  History of TIA  CKD stage III  Obesity  Hypothyroid  History of CVT  STU  History of back spinal stimulator    PLAN:  Patient still has some mild oxygen requirements likely secondary to influenza infection  At this time, reasonable to restart oral torsemide 40 mg daily  Strict I's/O's and daily weights  Keep potassium greater than 4 and magnesium greater than 2  If she gains greater than 3 pounds in a day or 5 pounds in 1 to 2 weeks, recommend calling the office for further titration of diuretic medication  Salt restriction to less than 1500 mg/day  Continue carvedilol, Zetia  Outpatient follow-up within 72 hours of discharge    Signed: Manuel Jolly DO, [facility], MARCELO, KRISTEN, FACP      History of Present Illness:  Patient seen and examined.  Denies chest pain, pressure, tightness or squeezing.  Denies lightheadedness, dizziness or palpitations.  Denies lower extremity swelling orthopnea or paroxysmal nocturnal dyspnea.  Sitting up in bed.      Review of Systems:  Review of Systems  "  Constitutional: Negative for decreased appetite, fever, weight gain and weight loss.   HENT:  Negative for congestion and sore throat.    Eyes:  Negative for visual disturbance.   Cardiovascular:  Negative for chest pain, dyspnea on exertion, leg swelling, near-syncope and palpitations.   Respiratory:  Negative for cough and shortness of breath.    Hematologic/Lymphatic: Negative for bleeding problem.   Skin:  Negative for rash.   Musculoskeletal:  Negative for myalgias and neck pain.   Gastrointestinal:  Negative for abdominal pain and nausea.   Neurological:  Negative for light-headedness and weakness.   Psychiatric/Behavioral:  Negative for depression.        Allergies   Allergen Reactions    Bactrim [Sulfamethoxazole-Trimethoprim] Hives    Sucralfate Hives     Facial swelling    Topamax [Topiramate]      disorentation    Azithromycin Itching    Pregabalin Confusion and Other (See Comments)     altered mental status    Ciprofloxacin Drowsiness     Other reaction(s): Other (See Comments)  \"drowsiness\"    Norvasc [Amlodipine] Swelling    Baclofen      \"That knocks me out.\"    Bupropion Fatigue    Methotrexate Nausea Only       No current facility-administered medications on file prior to encounter.     Current Outpatient Medications on File Prior to Encounter   Medication Sig    albuterol (PROVENTIL HFA,VENTOLIN HFA) 90 mcg/act inhaler Inhale 2 puffs every 6 (six) hours as needed for wheezing    carvedilol (COREG) 3.125 mg tablet Take 6.25 mg by mouth 2 (two) times a day with meals    Cholecalciferol 1.25 MG (93520 UT) capsule Take 50,000 Units by mouth once a week    cyanocobalamin (VITAMIN B-12) 1000 MCG tablet Take 1,000 mcg by mouth daily    cyclobenzaprine (FLEXERIL) 10 mg tablet     ezetimibe (ZETIA) 10 mg tablet Take 10 mg by mouth daily    famotidine (PEPCID) 40 MG tablet Take 1 tablet (40 mg total) by mouth daily at bedtime    gabapentin (NEURONTIN) 300 mg capsule Take 300 mg by mouth daily at bedtime    " glucagon (GLUCAGEN) 1 mg injection 1 mg once Inject 1 mg under the skin as needed for low blood sugar    glucose blood test strip Testing six times daily  E11.65 on insulin pump    insulin glargine (LANTUS) 100 units/mL subcutaneous injection Inject 24 Units under the skin if needed Inject 24 units under the skin as needed (for pump failure)    Insulin Infusion Pump (MINIMED INSULIN PUMP) PRINCESS Use    levothyroxine 112 mcg tablet Take 1 tablet by mouth in the morning    losartan (COZAAR) 50 mg tablet Take 50 mg by mouth 2 (two) times a day    NovoLOG 100 UNIT/ML injection INJECT UP  UNITS DAILY VIA INSULIN PUMP. E11.65    ondansetron (ZOFRAN) 4 mg tablet 8 mg    oxyCODONE (ROXICODONE) 5 immediate release tablet Take 5 mg by mouth every 4 (four) hours as needed for severe pain    polyethylene glycol (MIRALAX) 17 g packet Take 17 g by mouth daily    pramipexole (MIRAPEX) 1 mg tablet Take 1 mg by mouth 2 (two) times a day      sertraline (ZOLOFT) 100 mg tablet Take 100 mg by mouth daily at bedtime    spironolactone (ALDACTONE) 25 mg tablet Take 25 mg by mouth daily    torsemide (DEMADEX) 20 mg tablet Take 2 tablets (40 mg total) by mouth daily    zinc gluconate 50 mg tablet Take 50 mg by mouth daily    acetaminophen (TYLENOL) 500 mg tablet Take 500 mg by mouth every 4 (four) hours as needed for mild pain Take 2 tablets (1000 mg)by mouth every 4 hours as needed, for mild pain & moderate pain    albuterol (ACCUNEB) 1.25 MG/3ML nebulizer solution Take 2.5 mg by nebulization every 6 (six) hours as needed for wheezing    atorvastatin (LIPITOR) 80 mg tablet Take 80 mg by mouth daily at bedtime      calcium carbonate (OS-KARIS) 1250 (500 Ca) MG chewable tablet Chew 1 tablet daily    CRANBERRY FRUIT CONCENTRATE PO Take 450 mg by mouth in the morning Every morning for supplement    Darbepoetin Mike (ARANESP, ALBUMIN FREE, IJ) Inject as directed every 14 (fourteen) days    Fluticasone-Salmeterol (Advair) 250-50 mcg/dose  inhaler Inhale 1 puff 2 (two) times a day    glucose 4 g chewable tablet Chew 16 g as needed for low blood sugar Take 15 g by mouth as needed (blood sugar less than 70 if able to safely swallow)    lactulose (CEPHULAC) 20 g packet Start taking once daily for 1 week, then can increase to twice daily with goal of having bowel movements daily or every other day.    tolterodine (Detrol LA) 4 mg 24 hr capsule Take 4 mg by mouth daily        Current Facility-Administered Medications   Medication Dose Route Frequency Provider Last Rate    albuterol  2.5 mg Nebulization Q6H PRN Josephine Lazaro MD      bisacodyl  10 mg Rectal Daily PRN Jorge Aburto MD      calcium carbonate  1 tablet Oral Daily With Breakfast Josephine Lazaro MD      carvedilol  6.25 mg Oral BID With Meals Josephine Lazaro MD      cyanocobalamin  1,000 mcg Oral Daily Josephine Laazro MD      cyclobenzaprine  10 mg Oral TID PRN Josephine Lazaro MD      dextromethorphan-guaiFENesin  10 mL Oral Q4H PRN Josephine Lazaro MD      Diclofenac Sodium  2 g Topical 4x Daily Josephine Lazaro MD      enoxaparin  40 mg Subcutaneous Daily Josephine Lazaro MD      ezetimibe  10 mg Oral Daily Josephine Lazaro MD      famotidine  20 mg Oral HS Josephine Lazaro MD      fluticasone-vilanterol  1 puff Inhalation Daily Josephine Lazaro MD      furosemide  40 mg Intravenous BID (diuretic) Josephine Lazaro MD      Hydrocod Jermaine-Chlorphe Jermaine ER  5 mL Oral Once Josephine Lazaro MD      insulin glargine  15 Units Subcutaneous HS Josephine Lazaro MD      insulin lispro  1-6 Units Subcutaneous TID AC Josephine Lazaro MD      insulin lispro  1-6 Units Subcutaneous HS Josephine Lazaro MD      levothyroxine  112 mcg Oral Early Morning Josephine Lazaro MD      lidocaine  1 patch Topical Daily Josephine Lazaro MD      melatonin  3 mg Oral HS PRN Josephine Lazaro MD      oseltamivir  30 mg Oral Q12H KASSI Josephine Lazaro MD      oxyCODONE  5 mg Oral Q4H PRN Josephine Lazaro MD      polyethylene glycol  17 g Oral  "Daily Josephine Lazaro MD      pramipexole  1 mg Oral BID Josephine Lazaro MD      senna-docusate sodium  1 tablet Oral HS Josephine Lazaro MD      sertraline  100 mg Oral HS Josephine Lazaro MD      trimethobenzamide  200 mg Intramuscular Q6H PRN Josephine Lazaro MD              Vitals:    02/19/25 0545 02/19/25 0726 02/19/25 0848 02/19/25 1003   BP:  169/77 (!) 176/84 160/91   BP Location:  Right arm     Pulse:  67 63 64   Resp:  20     Temp:  (!) 97.3 °F (36.3 °C)     TempSrc:  Oral     SpO2:  96% 96% 94%   Weight: 77.6 kg (171 lb 1.2 oz)      Height:         Body mass index is 34.55 kg/m².      Intake/Output Summary (Last 24 hours) at 2/19/2025 1017  Last data filed at 2/18/2025 1801  Gross per 24 hour   Intake 120 ml   Output 150 ml   Net -30 ml       Weight change: -1.4 kg (-3 lb 1.4 oz)    PHYSICAL EXAMINATION:  Gen: Awake, Alert, NAD  Head/eyes: AT/NC, pupils equal and round, Anicteric  ENT: mmm  Neck: Supple, No elevated JVP, trachea midline  Resp: Decreased breath sounds bilaterally  CV: RRR +S1, S2, No m/r/g  Abd: Soft, NT/ND + BS  Ext: no LE edema bilaterally  Neuro: Follows commands, moves all extermities  Psych: Appropriate affect, normal mood, pleasant attitude, non-combative  Skin: warm; no rash, erythema or venous stasis changes on exposed skin    Lab Results:  Results from last 7 days   Lab Units 02/19/25  0458   WBC Thousand/uL 4.25*   HEMOGLOBIN g/dL 8.6*   HEMATOCRIT % 28.3*   PLATELETS Thousands/uL 177     Results from last 7 days   Lab Units 02/19/25  0458   POTASSIUM mmol/L 4.3   CHLORIDE mmol/L 105   CO2 mmol/L 26   BUN mg/dL 33*   CREATININE mg/dL 0.83   CALCIUM mg/dL 8.8   ALK PHOS U/L 162*   ALT U/L 267*   AST U/L 182*     No results found for: \"TROPONINT\"          Results from last 7 days   Lab Units 02/19/25  0603   INR  1.31*       This note was completed in part utilizing [employer] Software.  Grammatical errors, random word insertions, spelling mistakes, and incomplete sentences " may be an occasional consequence of this system secondary to software limitations, ambient noise, and hardware issues.  If you have any questions or concerns about the content, text, or information contained within the body of this dictation, please contact the provider for clarification.

## 2025-02-19 NOTE — ASSESSMENT & PLAN NOTE
Podiatry evaluation and recommendations reviewed  Continue wearing cam boot  Podiatry follow-up   Anesthesia Volume In Cc: 4.1

## 2025-02-19 NOTE — PLAN OF CARE
Problem: PHYSICAL THERAPY ADULT  Goal: Performs mobility at highest level of function for planned discharge setting.  See evaluation for individualized goals.  Description: Treatment/Interventions: Functional transfer training, LE strengthening/ROM, Elevations, Therapeutic exercise, Endurance training, Patient/family training, Bed mobility, Gait training, Spoke to nursing, OT          See flowsheet documentation for full assessment, interventions and recommendations.  Outcome: Progressing  Note: Prognosis: Fair  Problem List: Decreased strength, Decreased endurance, Impaired balance, Decreased mobility, Decreased cognition, Impaired judgement, Decreased safety awareness, Decreased skin integrity, Pain, Orthopedic restrictions  Assessment: Pt seen for PT treatment session this date with interventions consisting of bed mobility, transfer training, gait training, and w/c mobility, and education provided as needed for safety and direction to improve functional mobility, safety awareness, and activity tolerance. Pt agreeable to PT treatment session upon arrival, pt found seated EOB upon arrival. At end of session, pt left seated out of bed in recliner with all needs in reach. In comparison to previous session, pt with improvement in activity tolerance, endurance, standing balance, ambulatory balance, AM- pac score, and functional mobility. Pt  is showing progress with improvements as noted.  Pt continues to require assist x2 due to decreased balance, safety, overall strength, endurance and mobility. Pt  is functioning at mod assist x2 for sit to stand, stand to sit and stand pivot transfers.  Pt  requires verbal cues and assistance for hand placement, transfer techniques and cues to maintain PWB to L le at all times, ? Compliance with WBS to L le despite verbal cues.  Pt performs w/c mobility with supervision to mod assist with u turns.  Pt propels w.c with b/l ue's total distance of 50' with frequent rest breaks due  to ue fatigue and generalized fatigue with maximal verbal cues for steering of w/c due to pt frequently veering to the left.  Decreased push noted with L ue vs R ue causing w/c to veer L.  Pt  is able to lock and unlock w/c with verbal cues.  Increased time to perform and complete all mobility.    Continue to recommend  level I maximal rehab resource intensity  at time of d/c in order to maximize pt's functional independence and safety w/ mobility. Pt continues to be functioning below baseline level. PT will continue to see pt while here in order to address the deficits listed above and provide interventions consistent w/ POC in effort to achieve STGs.    The patient's AM-PAC Basic Mobility Inpatient Short Form Raw Score is 8. A raw score less than 16 suggests the patient may benefit from discharge to post-acute rehabilitation services. Please also refer to the recommendation of the Physical Therapist for safe discharge planning.  Barriers to Discharge: None     Rehab Resource Intensity Level, PT: I (Maximum Resource Intensity)    See flowsheet documentation for full assessment.

## 2025-02-19 NOTE — PLAN OF CARE
Problem: OCCUPATIONAL THERAPY ADULT  Goal: Performs self-care activities at highest level of function for planned discharge setting.  See evaluation for individualized goals.  Description: Treatment Interventions: ADL retraining, Functional transfer training, UE strengthening/ROM, Endurance training, Cognitive reorientation, Patient/family training, Equipment evaluation/education, Neuromuscular reeducation, Compensatory technique education, Continued evaluation          See flowsheet documentation for full assessment, interventions and recommendations.   Outcome: Progressing  Note: Limitation: Decreased ADL status, Decreased UE strength, Decreased Safe judgement during ADL, Decreased cognition, Decreased endurance, Decreased high-level ADLs  Prognosis: Fair  Assessment: Pt seen for 53min tx session with focus on functional balance, functional mobility, ADL status, transfer safety, b/l UE ROM, cognition, and education. Pt able to tolerate OOB mobility; sitting balance=f/f-(R sided lean which pt states as normal); standing balance=p+/p. Pt required heavy assistance with all functional mobility tasks. Pt demonstrating need for assistance with her UE and LE ADLs. Pt with limited awareness of L LE precautions(i.e.PWB status), but aware of the need for camboot at all times. Pt able to demonstrate functional(i.e.shr flex=80-90*) b/l UE AROM. Cognitive deficits noted--problem-solving, orientation(i.e.Ox4, but unaware that she's been in the hospital for 9 days). Pt continues to demonstrate appropriateness for inpt rehab to improve her overall level of independence. Pt pleasant and cooperative with tx session. The patient's raw score on the AM-PAC Daily Activity Inpatient Short Form is 13. A raw score of less than 19 suggests the patient may benefit from discharge to post-acute rehabilitation services. Please refer to the recommendation of the Occupational Therapist for safe discharge planning.     Rehab Resource Intensity  Level, OT: II (Moderate Resource Intensity)

## 2025-02-19 NOTE — OCCUPATIONAL THERAPY NOTE
Occupational Therapy Progress Note     Patient Name: Zohra Barriga  Today's Date: 2/19/2025  Problem List  Principal Problem:    Acute respiratory failure (HCC)  Active Problems:    Essential hypertension    Type 2 diabetes mellitus, with long-term current use of insulin (HCC)    JULIANNE (acute kidney injury) (HCC)    Fall    Acute on Chronic Systolic CHF (HCC)    Diabetic ulcer of right midfoot associated with type 2 diabetes mellitus (HCC)    UTI (urinary tract infection)    Facial contusion, subsequent encounter    Influenza A    Closed fracture of left ankle with routine healing    Transaminitis            02/19/25 0831   Note Type   Note Type Treatment   Pain Assessment   Pain Assessment Tool 0-10   Pain Score 7   Pain Location/Orientation Orientation: Bilateral;Location: Foot  (L>R)   Pain Rating: FLACC (Rest) - Face 0   Pain Rating: FLACC (Rest) - Legs 0   Pain Rating: FLACC (Rest) - Activity 0   Pain Rating: FLACC (Rest) - Cry 1   Pain Rating: FLACC (Rest) - Consolability 0   Score: FLACC (Rest) 1   Restrictions/Precautions   Weight Bearing Precautions Per Order Yes   LLE Weight Bearing Per Order PWB   Braces or Orthoses CAM Boot   Other Precautions Contact/isolation;Droplet precautions;Fall Risk;Pain;O2;Cognitive;Chair Alarm;Bed Alarm   ADL   Where Assessed Edge of bed   Eating Assistance 5  Supervision/Setup   Grooming Assistance 4  Minimal Assistance   Grooming Deficit Brushing hair   UB Bathing Assistance 4  Minimal Assistance   UB Bathing Deficit Increased time to complete   LB Bathing Assistance 2  Maximal Assistance   UB Dressing Assistance 4  Minimal Assistance   LB Dressing Assistance 2  Maximal Assistance   Toileting Assistance  1  Total Assistance   Functional Standing Tolerance   Time 20-30secs   Bed Mobility   Rolling R 3  Moderate assistance   Additional items Assist x 1;Increased time required;Verbal cues;LE management   Rolling L 3  Moderate assistance   Additional items Assist x 1;Increased  "time required;Verbal cues;LE management   Supine to Sit 2  Maximal assistance   Additional items Assist x 1;Increased time required;Verbal cues;LE management   Transfers   Sit to Stand 3  Moderate assistance   Additional items Assist x 2;Increased time required;Verbal cues   Stand to Sit 3  Moderate assistance   Additional items Assist x 2;Increased time required;Verbal cues   Additional Comments bp's=176/84(EOB); SPO2=96% on 2liters of O2   Functional Mobility   Functional Mobility 3  Moderate assistance   Additional Comments x2   Additional items Rolling walker   Therapeutic Exercise - ROM   UE-ROM Yes   Subjective   Subjective \"I've been in this building for 5 hours.\"   Cognition   Overall Cognitive Status Impaired   Arousal/Participation Alert   Attention Attends with cues to redirect   Orientation Level Oriented X4   Memory Decreased short term memory;Decreased recall of precautions   Following Commands Follows one step commands with increased time or repetition   Comments  states pt's baseline cognition is w/o deficits   Activity Tolerance   Activity Tolerance Patient limited by fatigue;Patient limited by pain;Other (Comment)  (cognition)   Medical Staff Made Aware nsg, P.T.   Assessment   Assessment Pt seen for 53min tx session with focus on functional balance, functional mobility, ADL status, transfer safety, b/l UE ROM, cognition, and education. Pt able to tolerate OOB mobility; sitting balance=f/f-(R sided lean which pt states as normal); standing balance=p+/p. Pt required heavy assistance with all functional mobility tasks. Pt demonstrating need for assistance with her UE and LE ADLs. Pt with limited awareness of L LE precautions(i.e.PWB status), but aware of the need for camboot at all times. Pt able to demonstrate functional(i.e.shr flex=80-90*) b/l UE AROM. Cognitive deficits noted--problem-solving, orientation(i.e.Ox4, but unaware that she's been in the hospital for 9 days). Pt continues to " demonstrate appropriateness for inpt rehab to improve her overall level of independence. Pt pleasant and cooperative with tx session. The patient's raw score on the AM-PAC Daily Activity Inpatient Short Form is 13. A raw score of less than 19 suggests the patient may benefit from discharge to post-acute rehabilitation services. Please refer to the recommendation of the Occupational Therapist for safe discharge planning.   Plan   Treatment Interventions ADL retraining;Functional transfer training;UE strengthening/ROM;Endurance training;Cognitive reorientation;Patient/family training;Equipment evaluation/education;Neuromuscular reeducation;Compensatory technique education;Continued evaluation   Goal Expiration Date 02/26/25   OT Treatment Day 1   OT Frequency   (2-5xwk/1-2wks)   Discharge Recommendation   Rehab Resource Intensity Level, OT II (Moderate Resource Intensity)   AM-PAC Daily Activity Inpatient   Lower Body Dressing 2   Bathing 2   Toileting 1   Upper Body Dressing 2   Grooming 3   Eating 3   Daily Activity Raw Score 13   Daily Activity Standardized Score (Calc for Raw Score >=11) 32.03   AM-PAC Applied Cognition Inpatient   Following a Speech/Presentation 3   Understanding Ordinary Conversation 3   Taking Medications 2   Remembering Where Things Are Placed or Put Away 2   Remembering List of 4-5 Errands 2   Taking Care of Complicated Tasks 2   Applied Cognition Raw Score 14   Applied Cognition Standardized Score 32.02   Danis Emery

## 2025-02-19 NOTE — DISCHARGE SUMMARY
Discharge Summary - Hospitalist   Name: Zohra Barriga 69 y.o. female I MRN: [id number]  Unit/Bed#: E5 -01 I Date of Admission: 2/10/2025   Date of Service: 2/19/2025 I Hospital Day: 8     Assessment & Plan  Acute respiratory failure (HCC)  Secondary to influenza on top of acute and chronic systolic CHF  Clinically improved  Wean off oxygen as tolerated  Acute on Chronic Systolic CHF (HCC)  Wt Readings from Last 3 Encounters:   02/19/25 77.6 kg (171 lb 1.2 oz)   12/27/24 79.4 kg (175 lb)   09/20/24 77.1 kg (170 lb)     Known history of CHF with acute exacerbation due to influenza  Continue carvedilol 6.25 mg twice daily with hold parameter  Cardiology evaluation and recommendations reviewed   Resume oral torsemide 20 milligram daily  Weight and renal function improved  Stable for DC to [facility]  Influenza A  Stable.  Continue Tamiflu for 1 more doses to complete treatment  JULIANNE (acute kidney injury) ([facility])  Due to cardiorenal syndrome, hypotension  Resolved  Blood pressure stable with no hypotension  Resume losartan and torsemide on discharge  Type 2 diabetes mellitus, with long-term current use of insulin (Formerly [facility])  Lab Results   Component Value Date    HGBA1C 7.7 (H) 01/17/2025     A1c  is close to goal.  Patient blood sugars are controlled  Blood sugars are currently controlled with Lantus 15 units at bedtime, sliding scale insulin and diabetic diet  Transaminitis  Likely due to congestive heart failure, diminished liver perfusion, systemic viral illness  Recent CT at [facility] last month showed fatty infiltration without focal lesion  Right upper quadrant ultrasound done yesterday showed normal-sized liver with minimally coarsened parenchyma with no liver mass identified.  LFTs continue to trend down  Repeat CMP  in 3 days at [facility]  Diabetic ulcer of right midfoot associated with type 2 diabetes mellitus (HCC)  Podiatry follow-up  UTI (urinary tract infection)  Antibiotic course  completed  Essential hypertension  Continue Coreg    resume losartan  Avoid hypotension  Facial contusion, subsequent encounter  Supportive care  Closed fracture of left ankle with routine healing  Podiatry evaluation and recommendations reviewed  Continue wearing cam boot  Podiatry follow-up  Fall  Multifactorial.   DC to [facility]     Medical Problems       Resolved Problems  Date Reviewed: 12/27/2024          Resolved    Obstructive sleep apnea 2/14/2025     Resolved by  ROMAINE Muhammad    Acute anemia 2/14/2025     Resolved by  ROMAINE Muhammad    Acute metabolic encephalopathy 2/15/2025     Resolved by  Josephine Lazaro MD    Severe sepsis (HCC) 2/15/2025     Resolved by  Josephine Lazaro MD        Discharging Physician / Practitioner: Jorge Aburto MD  PCP: Celestino Santiago DO  Admission Date:   Admission Orders (From admission, onward)       Ordered        02/11/25 1342  INPATIENT ADMISSION  Once            02/10/25 2108  Place in Observation  Once                          Discharge Date: 02/19/25    Consultations During Hospital Stay:  Cardiology  Podiatry    Procedures Performed:   none    Significant Findings / Test Results:   XR ankle 3+ vw left  Result Date: 2/19/2025  Impression: Limited study. No significant interval change in the configuration of the pre-existing distal fibular fracture compared with the tib-fib study from 2/13/2025      US right upper quadrant with liver dopplers  Result Date: 2/16/2025  Impression: Pancreas not well seen due to overlying bowel gas. Otherwise normal right upper quadrant ultrasound. Decreased penetration of the liver with ultrasound that may be secondary to its minimally coarse echotexture. Further evaluation of the liver with MRI may be considered.      XR chest portable  Result Date: 2/14/2025  Narrative: XR CHEST PORTABLE INDICATION: worsening hypoxia. COMPARISON:   Impression: Moderate congestive changes. Possible right lower lobe infiltrates.      XR tibia fibula 2 vw left  Result Date: 2/13/2025  Impression: Acute fracture of the distal fibula. Mild smooth cortical thickening adjacent to the stem of the prosthesis of the tibia without periprosthetic lucency. No obvious loosening is identified however if there is chronic pain bone scan may be useful.     XR chest portable  Result Date: 2/13/2025  Impression: New/worsening multifocal opacifications may represent multifocal pneumonia and/or volume overload.     CT chest abdomen pelvis w contrast  Result Date: 2/10/2025  Impression: No evidence of acute trauma in the chest, abdomen or pelvis.     CT cervical spine without contrast  Result Date: 2/10/2025  Impression: No evidence of acute cervical spine fracture or traumatic malalignment.      CT head wo contrast  Result Date: 2/10/2025  Impression: No acute intracranial abnormality.     Incidental Findings:   See above     Test Results Pending at Discharge (will require follow up):   none     Outpatient Tests Requested:  Repeat CMP in 3 days    Complications:  none    Reason for Admission: Confusion, lethargy and fall    Hospital Course:   Zohra Barriga is a 69 y.o. female patient who originally presented to the hospital on 2/10/2025 due to confusion, lethargy and fall.  According to her , she fell while in the bathroom. She has occasional confusion at baseline.    CT of the head, neck, abdomen and pelvis showed no acute injury.    Aside from her encephalopathy on admission, she met sepsis criteria so she was admitted and started on IV fluids and empiric antibiotics.  On her workup she tested positive for the flu which was felt to be the source of her sepsis.  Antibiotics were discontinued and she was started on Tamiflu with good response.  She has completed 9 doses so far and will need 1 more dose to complete treatment.    Her hospitalization was complicated by acute on chronic systolic congestive heart failure precipitated by the flu.  She is seen  "by cardiology and was treated with IV diuretics.  Responded well and was transition to her oral torsemide 40 mg daily prior to discharge.    Due to her acute on chronic congestive heart failure she developed elevated LFTs.  These were monitored closely and were trending down.  In the interim, her statin was held.  She did repeat CMP in a few days and statin can be restarted when the LFTs are back to normal.    Podiatry saw her for the  distal fibula fracture and leg wounds.  She was placed on a cam boot.  Podiatry instructions were placed on her AVS and she should follow-up with Dr. Harvey on discharge.    She will be [facility] to [facility]    Please see above list of diagnoses and related plan for additional information.     Condition at Discharge: good    Discharge Day Visit / Exam:   Subjective: Improved shortness of breath.  Occasional  cough.  Vitals: Blood Pressure: 160/91 (02/19/25 1003)  Pulse: 64 (02/19/25 1003)  Temperature: (!) 97.3 °F (36.3 °C) (02/19/25 0726)  Temp Source: Oral (02/19/25 0726)  Respirations: 20 (02/19/25 0726)  Height: 4' 11\" (149.9 cm) (02/10/25 2330)  Weight - Scale: 77.6 kg (171 lb 1.2 oz) (02/19/25 0545)  SpO2: 94 % (02/19/25 1003)  Physical Exam  Vitals reviewed.   HENT:      Head: Normocephalic and atraumatic.      Nose: No congestion or rhinorrhea.   Eyes:      General: No scleral icterus.  Cardiovascular:      Rate and Rhythm: Normal rate.   Pulmonary:      Breath sounds: No wheezing or rhonchi.   Abdominal:      Tenderness: There is no abdominal tenderness. There is no guarding.   Musculoskeletal:      Right lower leg: No edema.      Comments: Left leg Cam boot in place   Skin:     Comments: Improving facial ecchymosis   Psychiatric:         Mood and Affect: Mood normal.         Behavior: Behavior normal.          Discussion with Family: Updated  () at bedside.    Discharge instructions/Information to patient and family:   See after visit summary for " information provided to patient and family.      Provisions for Follow-Up Care:  See after visit summary for information related to follow-up care and any pertinent home health orders.       Disposition:   Acute Rehab at [facility]    Planned Readmission: no    Discharge Medications:  See after visit summary for reconciled discharge medications provided to patient and/or family.      Administrative Statements   Discharge Statement:  I have spent a total time of >30 minutes in caring for this patient on the day of the visit/encounter. .    **Please Note: This note may have been constructed using a voice recognition system**

## 2025-02-19 NOTE — PROGRESS NOTES
Cardiology Progress Note   MD Arnie Webber MD, FACC  Manuel Jolly DO, [facility]  MD Ulises Cruz DO, [facility]  Nj Carroll DO, [facility]  ----------------------------------------------------------------  [address] [address]    Zohra Barriga 69 y.o. female MRN: [id number]  Unit/Bed#: E5 -01 Encounter: [id number]      ASSESSMENT:   Acute on chronic HFrEF  LVEF 55-60%, mild LVH, AV sclerosis, trace MR/TR, February 2022  Acute hypoxic respiratory failure  Sepsis secondary to influenza, possibly secondary to urinary source - improved  CAD  s/p cath w/ 50% mLAD, 2011  Pharmacologic nuclear stress test negative for myocardial ischemia, June 2022  Acute kidney injury  Fall secondary to weakness with left ankle fracture and facial contusion  Hypertension  Dyslipidemia  Type 2 diabetes mellitus  Abnormal ECG w/ 1st degree AV block, bifascicular block  History of TIA  CKD stage III  Obesity  Hypothyroid  History of CVT  STU  History of back spinal stimulator    PLAN:  Patient had acute kidney injury with transaminitis likely secondary hypertension with shock from combination of sepsis and hypovolemia  Transaminases are improving and  Renal dysfunction has resolved weights are currently stable  Continue Lasix 40 mg IV twice daily  Strict I's/O's and daily weights  Keep potassium greater than 4 magnesium greater than 2  Continue carvedilol, Zetia  Reinstitute statin once transaminases have normalized  Plan to reinstitute oral diuretic tomorrow and if weights are stable over 24 hours, reasonable for discharge from CV perspective    Signed: Manuel Jolly DO, FACBARNEY, MARCELO, KRISTEN, FACP      History of Present Illness:  Patient seen and examined.  Denies chest pain, pressure, tightness or squeezing.  Denies lightheadedness, dizziness or palpitations.  Denies lower extremity swelling orthopnea or paroxysmal nocturnal dyspnea.      Review of  "Systems:  Review of Systems   Constitutional: Negative for decreased appetite, fever, weight gain and weight loss.   HENT:  Negative for congestion and sore throat.    Eyes:  Negative for visual disturbance.   Cardiovascular:  Negative for chest pain, dyspnea on exertion, leg swelling, near-syncope and palpitations.   Respiratory:  Negative for cough and shortness of breath.    Hematologic/Lymphatic: Negative for bleeding problem.   Skin:  Negative for rash.   Musculoskeletal:  Negative for myalgias and neck pain.   Gastrointestinal:  Negative for abdominal pain and nausea.   Neurological:  Negative for light-headedness and weakness.   Psychiatric/Behavioral:  Negative for depression.        Allergies   Allergen Reactions    Bactrim [Sulfamethoxazole-Trimethoprim] Hives    Sucralfate Hives     Facial swelling    Topamax [Topiramate]      disorentation    Azithromycin Itching    Pregabalin Confusion and Other (See Comments)     altered mental status    Ciprofloxacin Drowsiness     Other reaction(s): Other (See Comments)  \"drowsiness\"    Norvasc [Amlodipine] Swelling    Baclofen      \"That knocks me out.\"    Bupropion Fatigue    Methotrexate Nausea Only       No current facility-administered medications on file prior to encounter.     Current Outpatient Medications on File Prior to Encounter   Medication Sig    albuterol (PROVENTIL HFA,VENTOLIN HFA) 90 mcg/act inhaler Inhale 2 puffs every 6 (six) hours as needed for wheezing    carvedilol (COREG) 3.125 mg tablet Take 6.25 mg by mouth 2 (two) times a day with meals    Cholecalciferol 1.25 MG (70945 UT) capsule Take 50,000 Units by mouth once a week    cyanocobalamin (VITAMIN B-12) 1000 MCG tablet Take 1,000 mcg by mouth daily    cyclobenzaprine (FLEXERIL) 10 mg tablet     ezetimibe (ZETIA) 10 mg tablet Take 10 mg by mouth daily    famotidine (PEPCID) 40 MG tablet Take 1 tablet (40 mg total) by mouth daily at bedtime    gabapentin (NEURONTIN) 300 mg capsule Take 300 mg " by mouth daily at bedtime    glucagon (GLUCAGEN) 1 mg injection 1 mg once Inject 1 mg under the skin as needed for low blood sugar    glucose blood test strip Testing six times daily  E11.65 on insulin pump    insulin glargine (LANTUS) 100 units/mL subcutaneous injection Inject 24 Units under the skin if needed Inject 24 units under the skin as needed (for pump failure)    Insulin Infusion Pump (MINIMED INSULIN PUMP) PRINCESS Use    levothyroxine 112 mcg tablet Take 1 tablet by mouth in the morning    losartan (COZAAR) 50 mg tablet Take 50 mg by mouth 2 (two) times a day    NovoLOG 100 UNIT/ML injection INJECT UP  UNITS DAILY VIA INSULIN PUMP. E11.65    ondansetron (ZOFRAN) 4 mg tablet 8 mg    oxyCODONE (ROXICODONE) 5 immediate release tablet Take 5 mg by mouth every 4 (four) hours as needed for severe pain    polyethylene glycol (MIRALAX) 17 g packet Take 17 g by mouth daily    pramipexole (MIRAPEX) 1 mg tablet Take 1 mg by mouth 2 (two) times a day      sertraline (ZOLOFT) 100 mg tablet Take 100 mg by mouth daily at bedtime    spironolactone (ALDACTONE) 25 mg tablet Take 25 mg by mouth daily    torsemide (DEMADEX) 20 mg tablet Take 2 tablets (40 mg total) by mouth daily    zinc gluconate 50 mg tablet Take 50 mg by mouth daily    acetaminophen (TYLENOL) 500 mg tablet Take 500 mg by mouth every 4 (four) hours as needed for mild pain Take 2 tablets (1000 mg)by mouth every 4 hours as needed, for mild pain & moderate pain    albuterol (ACCUNEB) 1.25 MG/3ML nebulizer solution Take 2.5 mg by nebulization every 6 (six) hours as needed for wheezing    atorvastatin (LIPITOR) 80 mg tablet Take 80 mg by mouth daily at bedtime      calcium carbonate (OS-KARIS) 1250 (500 Ca) MG chewable tablet Chew 1 tablet daily    CRANBERRY FRUIT CONCENTRATE PO Take 450 mg by mouth in the morning Every morning for supplement    Darbepoetin Mike (ARANESP, ALBUMIN FREE, IJ) Inject as directed every 14 (fourteen) days    Fluticasone-Salmeterol  (Advair) 250-50 mcg/dose inhaler Inhale 1 puff 2 (two) times a day    glucose 4 g chewable tablet Chew 16 g as needed for low blood sugar Take 15 g by mouth as needed (blood sugar less than 70 if able to safely swallow)    lactulose (CEPHULAC) 20 g packet Start taking once daily for 1 week, then can increase to twice daily with goal of having bowel movements daily or every other day.    tolterodine (Detrol LA) 4 mg 24 hr capsule Take 4 mg by mouth daily        Current Facility-Administered Medications   Medication Dose Route Frequency Provider Last Rate    albuterol  2.5 mg Nebulization Q6H PRN Josephine Lazaro MD      bisacodyl  10 mg Rectal Daily PRN Jorge Aburto MD      calcium carbonate  1 tablet Oral Daily With Breakfast Josephine Lazaro MD      carvedilol  6.25 mg Oral BID With Meals Josephine Lazaro MD      cyanocobalamin  1,000 mcg Oral Daily Josephine Lazaro MD      cyclobenzaprine  10 mg Oral TID PRN Josephine Lazaro MD      dextromethorphan-guaiFENesin  10 mL Oral Q4H PRN Josephine Lazaro MD      Diclofenac Sodium  2 g Topical 4x Daily Josephine Lazaro MD      enoxaparin  40 mg Subcutaneous Daily Josephine Lazaro MD      ezetimibe  10 mg Oral Daily Josephine Lazaro MD      famotidine  20 mg Oral HS Josephine Lazaro MD      fluticasone-vilanterol  1 puff Inhalation Daily Josephine Lazaro MD      furosemide  40 mg Intravenous BID (diuretic) Josephine Lazaro MD      Hydrocod Jermaine-Chlorphe Jermaine ER  5 mL Oral Once Josephine Lazaro MD      insulin glargine  15 Units Subcutaneous HS Josephine Lazaro MD      insulin lispro  1-6 Units Subcutaneous TID AC Josephine Lazaro MD      insulin lispro  1-6 Units Subcutaneous HS Josephine Lazaro MD      levothyroxine  112 mcg Oral Early Morning Josephine Lazaro MD      lidocaine  1 patch Topical Daily Josephine Lazaro MD      melatonin  3 mg Oral HS PRN Josephine Lazaro MD      oseltamivir  30 mg Oral Q12H KASSI Josephine Lazaro MD      oxyCODONE  5 mg Oral Q4H PRN Josephine Lazaro MD       "polyethylene glycol  17 g Oral Daily Josephine Lazaro MD      pramipexole  1 mg Oral BID Josephine Lazaro MD      senna-docusate sodium  1 tablet Oral HS Josephine Lazaro MD      sertraline  100 mg Oral HS Josephine Lazaro MD      trimethobenzamide  200 mg Intramuscular Q6H PRN Josephine Lazaro MD              Vitals:    02/18/25 0319 02/18/25 0600 02/18/25 0753 02/18/25 1535   BP:   159/79 141/66   BP Location:    Left arm   Pulse:   60 60   Resp:   18 18   Temp:   (!) 97.4 °F (36.3 °C) (!) 97.4 °F (36.3 °C)   TempSrc:    Oral   SpO2: 96%  97% 95%   Weight:  79 kg (174 lb 2.6 oz)     Height:         Body mass index is 35.18 kg/m².      Intake/Output Summary (Last 24 hours) at 2/18/2025 2248  Last data filed at 2/18/2025 1801  Gross per 24 hour   Intake 240 ml   Output 150 ml   Net 90 ml       Weight change: -0.5 kg (-1 lb 1.6 oz)    PHYSICAL EXAMINATION:  Gen: Awake, Alert, NAD  Head/eyes: AT/NC, pupils equal and round, Anicteric  ENT: mmm  Neck: Supple, No elevated JVP, trachea midline  Resp: CTA bilaterally no w/r/r  CV: RRR +S1, S2, No m/r/g  Abd: Soft, NT/ND + BS  Ext: no LE edema bilaterally  Neuro: Follows commands, moves all extermities  Psych: Appropriate affect, normal mood, pleasant attitude, non-combative  Skin: warm; no rash, erythema or venous stasis changes on exposed skin    Lab Results:  Results from last 7 days   Lab Units 02/18/25  0512   WBC Thousand/uL 2.81*   HEMOGLOBIN g/dL 8.0*   HEMATOCRIT % 27.1*   PLATELETS Thousands/uL 162     Results from last 7 days   Lab Units 02/18/25  0512   POTASSIUM mmol/L 4.4   CHLORIDE mmol/L 107   CO2 mmol/L 26   BUN mg/dL 36*   CREATININE mg/dL 0.97   CALCIUM mg/dL 8.4   ALK PHOS U/L 161*   ALT U/L 353*   AST U/L 342*     No results found for: \"TROPONINT\"          Results from last 7 days   Lab Units 02/18/25  1213   INR  1.46*       This note was completed in part utilizing [employer] Direct Software.  Grammatical errors, random word insertions, spelling mistakes, " and incomplete sentences may be an occasional consequence of this system secondary to software limitations, ambient noise, and hardware issues.  If you have any questions or concerns about the content, text, or information contained within the body of this dictation, please contact the provider for clarification.

## 2025-02-19 NOTE — PHYSICAL THERAPY NOTE
PHYSICAL THERAPY NOTE          Patient Name: Zohra Barriga  Today's Date: 2/19/2025 02/19/25 0953   Note Type   Note Type Treatment   Pain Assessment   Pain Assessment Tool 0-10   Pain Score 7   Pain Location/Orientation Orientation: Bilateral;Location: Foot  (L> R)   Hospital Pain Intervention(s) Repositioned;Ambulation/increased activity;Emotional support;Rest;Elevated   Restrictions/Precautions   RLE Weight Bearing Per Order WBAT   LLE Weight Bearing Per Order PWB  (with cam walker boot)   Braces or Orthoses CAM Boot   Other Precautions Contact/isolation;Droplet precautions;Bed Alarm;Chair Alarm;WBS;Fall Risk;O2;Pain;Cognitive   General   Chart Reviewed Yes   Family/Caregiver Present Yes  ()   Cognition   Arousal/Participation Cooperative   Attention Attends with cues to redirect   Orientation Level Oriented X4   Memory Decreased recall of precautions;Decreased short term memory   Following Commands Follows one step commands with increased time or repetition   Subjective   Subjective You are sunshine to my day.   Bed Mobility   Additional Comments pt  seated at EOB upon arrival.   Transfers   Sit to Stand 3  Moderate assistance   Additional items Assist x 2;Increased time required;Verbal cues;Bedrails;Armrests   Stand to Sit 3  Moderate assistance   Additional items Assist x 2;Armrests;Increased time required;Verbal cues   Stand pivot 3  Moderate assistance   Additional items Assist x 2;Increased time required;Armrests;Bedrails;Verbal cues   Additional Comments verbal cues fdor technique cues and assistance for hand placement   Ambulation/Elevation   Gait pattern Improper Weight shift;Forward Flexion;Decreased L stance;Short stride;Step to;Excessively slow;Decreased toe off;Decreased heel strike;Poor UE support   Gait Assistance 3  Moderate assist   Additional items Assist x 2;Verbal cues;Tactile cues   Assistive Device  Rolling walker   Distance 2' x2   Ambulation/Elevation Additional Comments verbal cues for le sequencing, cues for upright posture, cues to extend b/l knees.   Wheelchair Activities   Wheelchair Parts Management Yes   Left Leg Rest Level of Assistance Dependent   Right Leg Rest Level of Assistance Dependent   Left Brakes Level of Assistance Maximum verbal cues;Close supervision   Right Brakes Level of Assistance Close supervision;Maximum verbal cues   Propulsion Yes   Propulsion Type 1 Manual   Level 1 Level tile   Method 1 Right upper extremity;Left upper extremity   Level of Assistance 1 Maximum verbal cues  (supervison to mod assist x1)   Description/ Details 1 Pt performs w/c mobility with supervision to mod assist with u turns.  Pt propels w.c with b/l ue's total distance of 50' with frequent rest breaks due to ue fatigue and generalized fatigue with maximal verbal cues for steering of w/c due to pt frequently veering to the left.  Decreased push noted with L ue vs R ue causing w/c to veer L.  Pt  is able to lock and unlock w/c with verbal cues.   Balance   Static Sitting Fair -   Dynamic Sitting Poor +   Static Standing Poor   Dynamic Standing Poor   Ambulatory Poor   Endurance Deficit   Endurance Deficit Description weakness, fatigue, pain   Activity Tolerance   Activity Tolerance Patient limited by fatigue;Patient limited by pain   Medical Staff Made Aware GISEL, Ricardo weiss   Nurse Made Aware yes   Assessment   Prognosis Fair   Problem List Decreased strength;Decreased endurance;Impaired balance;Decreased mobility;Decreased cognition;Impaired judgement;Decreased safety awareness;Decreased skin integrity;Pain;Orthopedic restrictions   Assessment Pt seen for PT treatment session this date with interventions consisting of bed mobility, transfer training, gait training, and w/c mobility, and education provided as needed for safety and direction to improve functional mobility, safety awareness, and activity  tolerance. Pt agreeable to PT treatment session upon arrival, pt found seated EOB upon arrival. At end of session, pt left seated out of bed in recliner with all needs in reach. In comparison to previous session, pt with improvement in activity tolerance, endurance, standing balance, ambulatory balance, AM- pac score, and functional mobility. Pt  is showing progress with improvements as noted.  Pt continues to require assist x2 due to decreased balance, safety, overall strength, endurance and mobility. Pt  is functioning at mod assist x2 for sit to stand, stand to sit and stand pivot transfers.  Pt  requires verbal cues and assistance for hand placement, transfer techniques and cues to maintain PWB to L le at all times, ? Compliance with WBS to L le despite verbal cues.  Pt performs w/c mobility with supervision to mod assist with u turns.  Pt propels w.c with b/l ue's total distance of 50' with frequent rest breaks due to ue fatigue and generalized fatigue with maximal verbal cues for steering of w/c due to pt frequently veering to the left.  Decreased push noted with L ue vs R ue causing w/c to veer L.  Pt  is able to lock and unlock w/c with verbal cues.  Increased time to perform and complete all mobility.    Continue to recommend  level I maximal rehab resource intensity  at time of d/c in order to maximize pt's functional independence and safety w/ mobility. Pt continues to be functioning below baseline level. PT will continue to see pt while here in order to address the deficits listed above and provide interventions consistent w/ POC in effort to achieve STGs.    The patient's AM-PAC Basic Mobility Inpatient Short Form Raw Score is 8. A raw score less than 16 suggests the patient may benefit from discharge to post-acute rehabilitation services. Please also refer to the recommendation of the Physical Therapist for safe discharge planning.   Goals   Patient Goals to get better   STG Expiration Date 03/03/25    PT Treatment Day 3   Plan   Treatment/Interventions Functional transfer training;Therapeutic exercise;Endurance training;Patient/family training;Equipment eval/education;Bed mobility;Gait training;Spoke to nursing;OT;Family   Progress Slow progress, decreased activity tolerance   PT Frequency 3-5x/wk   Discharge Recommendation   Rehab Resource Intensity Level, PT I (Maximum Resource Intensity)   AM-PAC Basic Mobility Inpatient   Turning in Flat Bed Without Bedrails 2   Lying on Back to Sitting on Edge of Flat Bed Without Bedrails 2   Moving Bed to Chair 1   Standing Up From Chair Using Arms 1   Walk in Room 1   Climb 3-5 Stairs With Railing 1   Basic Mobility Inpatient Raw Score 8   Turning Head Towards Sound 3   Follow Simple Instructions 3   Low Function Basic Mobility Raw Score  14   Low Function Basic Mobility Standardized Score  22.01   [facility] Highest Level Of Mobility   -[facility] Goal 3: Sit at edge of bed   [facility] Achieved 4: Move to chair/commode   Education   Education Provided Mobility training;Assistive device   Patient Reinforcement needed   End of Consult   Patient Position at End of Consult Bedside chair;Bed/Chair alarm activated;All needs within reach   End of Consult Comments pt in stable condition      02/19/25 0953   Note Type   Note Type Treatment   Pain Assessment   Pain Assessment Tool 0-10   Pain Score 7   Pain Location/Orientation Orientation: Bilateral;Location: Foot  (L> R)   Hospital Pain Intervention(s) Repositioned;Ambulation/increased activity;Emotional support;Rest;Elevated   Restrictions/Precautions   RLE Weight Bearing Per Order WBAT   LLE Weight Bearing Per Order PWB  (with cam walker boot)   Braces or Orthoses CAM Boot   Other Precautions Contact/isolation;Droplet precautions;Bed Alarm;Chair Alarm;WBS;Fall Risk;O2;Pain;Cognitive   General   Chart Reviewed Yes   Family/Caregiver Present Yes  ()   Cognition   Arousal/Participation Cooperative   Attention Attends with cues  to redirect   Orientation Level Oriented X4   Memory Decreased recall of precautions;Decreased short term memory   Following Commands Follows one step commands with increased time or repetition   Subjective   Subjective You are sunshine to my day.   Bed Mobility   Additional Comments pt  seated at EOB upon arrival.   Transfers   Sit to Stand 3  Moderate assistance   Additional items Assist x 2;Increased time required;Verbal cues;Bedrails;Armrests   Stand to Sit 3  Moderate assistance   Additional items Assist x 2;Armrests;Increased time required;Verbal cues   Stand pivot 3  Moderate assistance   Additional items Assist x 2;Increased time required;Armrests;Bedrails;Verbal cues   Additional Comments verbal cues fdor technique cues and assistance for hand placement   Ambulation/Elevation   Gait pattern Improper Weight shift;Forward Flexion;Decreased L stance;Short stride;Step to;Excessively slow;Decreased toe off;Decreased heel strike;Poor UE support   Gait Assistance 3  Moderate assist   Additional items Assist x 2;Verbal cues;Tactile cues   Assistive Device Rolling walker   Distance 2' x2   Ambulation/Elevation Additional Comments verbal cues for le sequencing, cues for upright posture, cues to extend b/l knees.   Wheelchair Activities   Wheelchair Parts Management Yes   Left Leg Rest Level of Assistance Dependent   Right Leg Rest Level of Assistance Dependent   Left Brakes Level of Assistance Maximum verbal cues;Close supervision   Right Brakes Level of Assistance Close supervision;Maximum verbal cues   Propulsion Yes   Propulsion Type 1 Manual   Level 1 Level tile   Method 1 Right upper extremity;Left upper extremity   Level of Assistance 1 Maximum verbal cues  (supervison to mod assist x1)   Description/ Details 1 Pt performs w/c mobility with supervision to mod assist with u turns.  Pt propels w.c with b/l ue's total distance of 50' with frequent rest breaks due to ue fatigue and generalized fatigue with maximal  verbal cues for steering of w/c due to pt frequently veering to the left.  Decreased push noted with L ue vs R ue causing w/c to veer L.  Pt  is able to lock and unlock w/c with verbal cues.   Balance   Static Sitting Fair -   Dynamic Sitting Poor +   Static Standing Poor   Dynamic Standing Poor   Ambulatory Poor   Endurance Deficit   Endurance Deficit Description weakness, fatigue, pain   Activity Tolerance   Activity Tolerance Patient limited by fatigue;Patient limited by pain   Medical Staff Made Aware isela BATRES Nsg   Nurse Made Aware yes   Assessment   Prognosis Fair   Problem List Decreased strength;Decreased endurance;Impaired balance;Decreased mobility;Decreased cognition;Impaired judgement;Decreased safety awareness;Decreased skin integrity;Pain;Orthopedic restrictions   Assessment Pt seen for PT treatment session this date with interventions consisting of bed mobility, transfer training, gait training, and w/c mobility, and education provided as needed for safety and direction to improve functional mobility, safety awareness, and activity tolerance. Pt agreeable to PT treatment session upon arrival, pt found seated EOB upon arrival. At end of session, pt left seated out of bed in recliner with all needs in reach. In comparison to previous session, pt with improvement in activity tolerance, endurance, standing balance, ambulatory balance, AM- pac score, and functional mobility. Pt  is showing progress with improvements as noted.  Pt continues to require assist x2 due to decreased balance, safety, overall strength, endurance and mobility. Pt  is functioning at mod assist x2 for sit to stand, stand to sit and stand pivot transfers.  Pt  requires verbal cues and assistance for hand placement, transfer techniques and cues to maintain PWB to L le at all times, ? Compliance with WBS to L le despite verbal cues.  Pt performs w/c mobility with supervision to mod assist with u turns.  Pt propels w.c with b/l ue's  total distance of 50' with frequent rest breaks due to ue fatigue and generalized fatigue with maximal verbal cues for steering of w/c due to pt frequently veering to the left.  Decreased push noted with L ue vs R ue causing w/c to veer L.  Pt  is able to lock and unlock w/c with verbal cues.  Increased time to perform and complete all mobility.    Continue to recommend  level I maximal rehab resource intensity  at time of d/c in order to maximize pt's functional independence and safety w/ mobility. Pt continues to be functioning below baseline level. PT will continue to see pt while here in order to address the deficits listed above and provide interventions consistent w/ POC in effort to achieve STGs.    The patient's [facility] Basic Mobility Inpatient Short Form Raw Score is 8. A raw score less than 16 suggests the patient may benefit from discharge to post-acute rehabilitation services. Please also refer to the recommendation of the Physical Therapist for safe discharge planning.   Goals   Patient Goals to get better   STG Expiration Date 03/03/25   PT Treatment Day 3   Plan   Treatment/Interventions Functional transfer training;Therapeutic exercise;Endurance training;Patient/family training;Equipment eval/education;Bed mobility;Gait training;Spoke to nursing;OT;Family   Progress Slow progress, decreased activity tolerance   PT Frequency 3-5x/wk   Discharge Recommendation   Rehab Resource Intensity Level, PT I (Maximum Resource Intensity)   AM-PAC Basic Mobility Inpatient   Turning in Flat Bed Without Bedrails 2   Lying on Back to Sitting on Edge of Flat Bed Without Bedrails 2   Moving Bed to Chair 1   Standing Up From Chair Using Arms 1   Walk in Room 1   Climb 3-5 Stairs With Railing 1   Basic Mobility Inpatient Raw Score 8   Turning Head Towards Sound 3   Follow Simple Instructions 3   Low Function Basic Mobility Raw Score  14   Low Function Basic Mobility Standardized Score  22.01   [facility] Level  Of Mobility   -M Goal 3: Sit at edge of bed   -[facility] Achieved 4: Move to chair/commode   Education   Education Provided Mobility training;Assistive device   Patient Reinforcement needed   End of Consult   Patient Position at End of Consult Bedside chair;Bed/Chair alarm activated;All needs within reach   End of Consult Comments pt in stable condition   Tess Turner, PTA

## 2025-02-19 NOTE — CASE MANAGEMENT
Case Management Progress Note    Patient name Zohra Barriga  Location [facility] 5 /E5 -* MRN [id number]  : 1955 Date 2025       LOS (days): 8  Geometric Mean LOS (GMLOS) (days): 4.9  Days to GMLOS:-3.1        OBJECTIVE:        Current admission status: Inpatient  Preferred Pharmacy:   [facility]/pharmacy #[id number] - [place], [place] - [address] [address]  Phone: [phone] Fax: [phone]    [facility]  [address] [address]  [place] [address]  Phone: [phone] Fax: [phone]    [facility] [address] [place], [place] [id number]  Phone: [phone] Fax: [phone]    [facility]  [place]  [address]  [address]  [place] [address]  Phone: [phone] Fax: [phone]    [facility]/pharmacy #[id number] - [place] - [address] [place] [address]  Phone: [phone] Fax: [phone]    Primary Care Provider: Celestino Santiago DO    Primary Insurance: MEDICARE  Secondary Insurance: AARP    PROGRESS NOTE:    CM Dept called Radiology Reading Room at 3:30 pm, no answer, no option to leave message came up. Cm Dept called again at 3:40 pm, no answer, message left. CD of head imaging will need to be sent via  to [facility].

## 2025-02-19 NOTE — ASSESSMENT & PLAN NOTE
Wt Readings from Last 3 Encounters:   02/19/25 77.6 kg (171 lb 1.2 oz)   12/27/24 79.4 kg (175 lb)   09/20/24 77.1 kg (170 lb)     Known history of CHF with acute exacerbation due to influenza  Continue carvedilol 6.25 mg twice daily with hold parameter  Cardiology evaluation and recommendations reviewed   Resume oral torsemide 20 milligram daily  Weight and renal function improved  Stable for DC to good Fine

## 2025-02-26 ENCOUNTER — TELEPHONE (OUTPATIENT)
Age: 70
End: 2025-02-26

## 2025-02-26 NOTE — TELEPHONE ENCOUNTER
Hello,    Please advise if a forced appointment can be accommodated for the patient:    Call back [phone]    Insurance: Medicare    Reason for appointment: Obliquely oriented distal tibial fracture left     Requested doctor and/or location: Dr diop /[place]titia       Thank you.

## 2025-02-28 ENCOUNTER — TELEPHONE (OUTPATIENT)
Age: 70
End: 2025-02-28

## 2025-02-28 NOTE — TELEPHONE ENCOUNTER
Caller: Enrike Barriga    Doctor: Dr. Cristobal    Reason for call: Enrike is calling for his wife Lindsay Barriga.  She has a fractured ankle.  Is there some way she can be forced on to [place] this coming Wed?  Thank you.  Can someone please reach out to Enrike?     Call back#: [phone]

## 2025-03-04 ENCOUNTER — HOME HEALTH ADMISSION (OUTPATIENT)
Dept: HOME HEALTH SERVICES | Facility: HOME HEALTHCARE | Age: 70
End: 2025-03-04
Payer: MEDICARE

## 2025-03-05 ENCOUNTER — HOME CARE VISIT (OUTPATIENT)
Dept: HOME HEALTH SERVICES | Facility: HOME HEALTHCARE | Age: 70
End: 2025-03-05

## 2025-03-06 ENCOUNTER — HOME CARE VISIT (OUTPATIENT)
Dept: HOME HEALTH SERVICES | Facility: HOME HEALTHCARE | Age: 70
End: 2025-03-06
Payer: MEDICARE

## 2025-03-06 VITALS
OXYGEN SATURATION: 99 % | SYSTOLIC BLOOD PRESSURE: 146 MMHG | RESPIRATION RATE: 14 BRPM | HEART RATE: 62 BPM | DIASTOLIC BLOOD PRESSURE: 66 MMHG | TEMPERATURE: 98.5 F

## 2025-03-06 PROCEDURE — 10330081 VN NO-PAY CLAIM PROCEDURE

## 2025-03-06 PROCEDURE — 400013 VN SOC

## 2025-03-06 PROCEDURE — G0299 HHS/HOSPICE OF RN EA 15 MIN: HCPCS

## 2025-03-06 NOTE — CASE COMMUNICATION
Back office please fax to ordering MD Swapnil Haro MD Fax: [phone] and  PCP Celestino Santiago DO Fax: [phone]   Patient Zohra Barriga  1955    Medication discrepancies or Major drug interactions:    OK to continue the following?  Taking the following not on [employer]S: torsemide 20 mg daily, Vitamin D2 50,000 units on Sundays, Zinc 50 mg daily, Cranberry 4200 mg daily, famotidine 40 mg daily, Flexeril 10 mg TID, a nd sertraline 150 mg daily.  Patient is not taking  the following: methocarbamol, prochlorperazine, and calcium carbonate.     Abnormal clinical findings:   MSW to assist with advanced directives.   Patient is drowsy,  reports this is chronic. Decreased appetite and poor oral intake.    Response needed, please respond via Phone [phone]  [facility] has Admitted your patient to Home Health service with the following disc iplines: SN and PT  Patient stated goals of care:  Walk better and better balance.  Potential barriers to goal achievement: drowsiness  Primary focus of home health care:Cardiac Circulatory  Anticipated visit pattern and next visit date:  1w1 2w2   Thank you for allowing us to participate in the care of your patient.      Gama Pastor RN

## 2025-03-07 ENCOUNTER — TELEPHONE (OUTPATIENT)
Dept: LAB | Facility: HOSPITAL | Age: 70
End: 2025-03-07

## 2025-03-07 NOTE — CASE COMMUNICATION
Addendum to admission note  Back office please fax to ordering MD Swapnil Haro MD Fax: [phone] and PCP Celestino Santiago DO Fax: [phone]   Patient Zohra Barriga  1955  Primary fosus of home health is Musculoskeletal.

## 2025-03-09 ENCOUNTER — HOME CARE VISIT (OUTPATIENT)
Dept: HOME HEALTH SERVICES | Facility: HOME HEALTHCARE | Age: 70
End: 2025-03-09
Payer: MEDICARE

## 2025-03-10 ENCOUNTER — HOME CARE VISIT (OUTPATIENT)
Dept: HOME HEALTH SERVICES | Facility: HOME HEALTHCARE | Age: 70
End: 2025-03-10
Payer: MEDICARE

## 2025-03-10 VITALS
SYSTOLIC BLOOD PRESSURE: 128 MMHG | DIASTOLIC BLOOD PRESSURE: 70 MMHG | HEART RATE: 59 BPM | HEIGHT: 60 IN | WEIGHT: 164 LBS | RESPIRATION RATE: 18 BRPM | BODY MASS INDEX: 32.2 KG/M2 | OXYGEN SATURATION: 100 % | TEMPERATURE: 97.4 F

## 2025-03-10 PROCEDURE — G0155 HHCP-SVS OF CSW,EA 15 MIN: HCPCS

## 2025-03-10 PROCEDURE — G0299 HHS/HOSPICE OF RN EA 15 MIN: HCPCS

## 2025-03-10 NOTE — TELEPHONE ENCOUNTER
Waiting on a drs script, offered mobile service for 3/11, I explained to patient that with out a script were unable to go to her home.

## 2025-03-11 ENCOUNTER — HOME CARE VISIT (OUTPATIENT)
Dept: HOME HEALTH SERVICES | Facility: HOME HEALTHCARE | Age: 70
End: 2025-03-11
Payer: MEDICARE

## 2025-03-11 ENCOUNTER — TELEPHONE (OUTPATIENT)
Dept: LAB | Facility: HOSPITAL | Age: 70
End: 2025-03-11

## 2025-03-11 VITALS — HEART RATE: 60 BPM | OXYGEN SATURATION: 97 %

## 2025-03-11 PROCEDURE — G0151 HHCP-SERV OF PT,EA 15 MIN: HCPCS

## 2025-03-11 NOTE — TELEPHONE ENCOUNTER
Unable to service patient, scripts were never faxed. She was offered a last minute appt yesterday for today if scripts were in by this morning.   Offered 3/18 waiting on scripts

## 2025-03-13 ENCOUNTER — PREP FOR PROCEDURE (OUTPATIENT)
Dept: CARDIAC SURGERY | Facility: CLINIC | Age: 70
End: 2025-03-13

## 2025-03-13 ENCOUNTER — OFFICE VISIT (OUTPATIENT)
Dept: CARDIAC SURGERY | Facility: CLINIC | Age: 70
End: 2025-03-13
Payer: MEDICARE

## 2025-03-13 VITALS
HEART RATE: 61 BPM | TEMPERATURE: 97.5 F | BODY MASS INDEX: 33.12 KG/M2 | SYSTOLIC BLOOD PRESSURE: 125 MMHG | HEIGHT: 59 IN | DIASTOLIC BLOOD PRESSURE: 77 MMHG | RESPIRATION RATE: 15 BRPM | OXYGEN SATURATION: 99 %

## 2025-03-13 DIAGNOSIS — K22.0 ACHALASIA: Primary | ICD-10-CM

## 2025-03-13 PROBLEM — N39.0 UTI (URINARY TRACT INFECTION): Status: RESOLVED | Noted: 2025-02-11 | Resolved: 2025-03-13

## 2025-03-13 PROCEDURE — 99214 OFFICE O/P EST MOD 30 MIN: CPT | Performed by: THORACIC SURGERY (CARDIOTHORACIC VASCULAR SURGERY)

## 2025-03-13 RX ORDER — NITROFURANTOIN 25; 75 MG/1; MG/1
1 CAPSULE ORAL 2 TIMES DAILY
COMMUNITY
Start: 2025-01-10

## 2025-03-13 RX ORDER — LUBIPROSTONE 8 UG/1
CAPSULE ORAL
COMMUNITY

## 2025-03-13 RX ORDER — TIOTROPIUM BROMIDE 18 UG/1
CAPSULE ORAL; RESPIRATORY (INHALATION)
COMMUNITY

## 2025-03-13 RX ORDER — SPIRONOLACTONE 25 MG/1
TABLET ORAL
COMMUNITY

## 2025-03-13 RX ORDER — METHOCARBAMOL 500 MG/1
TABLET, FILM COATED ORAL
COMMUNITY
Start: 2025-03-04

## 2025-03-13 RX ORDER — BRIMONIDINE TARTRATE 2 MG/ML
SOLUTION/ DROPS OPHTHALMIC
COMMUNITY
End: 2025-03-24 | Stop reason: CLARIF

## 2025-03-13 NOTE — H&P (VIEW-ONLY)
Name: Zohra Barriga      : 1955      MRN: [id number]  Encounter Provider: Jill Bentley MD  Encounter Date: 3/13/2025   Encounter department: [facility] [place]  :  Assessment & Plan  Achalasia  Today in the office, we discussed proceeding with an EGD with dilation.  We will plan to proceed on 3/26/2025.  She just underwent some preoperative lab work recently so she does not need anything updated.  She is not on any blood thinners.  Consent was obtained today.  The specific risks that were discussed included injury to esophagus and failure to work    Orders:    Case request operating room: ESOPHAGOGASTRODUODENOSCOPY (EGD), dilation; Standing        Thoracic History   Diagnosis: Achalasia  Procedure: Robotic heller myotomy, Kareem fundoplication, laparoscopic lysis of adhesions 5/3/21      No problems updated.     History of Present Illness   HPI  Zohra Barriga is a 69 y.o. female who is well-known to me.  She underwent a robotic Heller myotomy with Kareem fundoplication in May 2021.  She presents today because some of her symptoms of dysphagia have returned.  She has undergone dilations in the past which have helped her significantly.  She did have a recent stay in the hospital but she has recovered from this without incident.  This was secondary to an influenza pneumonia.  This was approximately 1 month ago.    Review of Systems   Constitutional:  Negative for chills, fatigue, fever and unexpected weight change.   HENT: Negative.     Eyes: Negative.  Negative for visual disturbance.   Respiratory:  Negative for cough, shortness of breath and stridor.    Cardiovascular:  Negative for chest pain.   Gastrointestinal: Negative.    Endocrine: Negative.    Genitourinary: Negative.    Musculoskeletal:  Positive for gait problem.   Skin: Negative.    Neurological:  Negative for dizziness, light-headedness and headaches.   Hematological:  Negative for adenopathy.  "  Psychiatric/Behavioral: Negative.        Medical History Reviewed by provider this encounter:     .  Past Medical History   Past Medical History:   Diagnosis Date    Anxiety     Asthma     controlled    Back complaints     Cancer ([facility])     nose    Cellulitis of left lower leg     \"not now\"    CHF (congestive heart failure) ([facility]) 02/2021    Chronic bilateral thoracic back pain     Chronic kidney disease     sees nephrologist regularly\" stage 3\"    Chronic myofascial pain     Chronic pain of both lower extremities     Chronic pain of left knee     \"both knees\"    Chronic pain syndrome     bilat legs and knees/neuropathy pain    COVID 12/2021    CPAP (continuous positive airway pressure) dependence     no currently uses    Depression     Diabetes mellitus ([facility])     insulin pump    Difficulty walking 2018    Disease of thyroid gland     Does use hearing aid     bilat and will wear DOS    DVT (deep venous thrombosis) ([facility]) 2013    left leg    Gait disorder     Gastroparesis     GERD (gastroesophageal reflux disease)     Head injury     Headache, tension-type     History of angina     History of MRSA infection     History of transfusion     Many years ago    HL (hearing loss) 2018    Hyperlipidemia     Hypertension     Hypoglycemic reaction     \"occas low blood sugar\"    Insulin pump in place     pt reports saw endocrinologist 4/28 and will bring copy of instructions DOS    Irritable bowel syndrome     Kidney disease     Memory loss 2021    Movement disorder 2021    Neuropathy in diabetes ([facility]) 2021    Constant pain legs and feet    Nose fracture     Pacemaker 2019    Pneumonia     Polyneuropathy associated with underlying disease ([facility])     PONV (postoperative nausea and vomiting)     Risk for falls     S/P insertion of spinal cord stimulator 06/08/2018    Sarcoidosis     Shortness of breath     \"exertion and not new\"    Sleep apnea     Stroke ([facility])     Thoracic vertebral fracture ([facility])     TIA (transient ischemic " "attack)     Use of cane as ambulatory aid     Uses walker     Wears dentures     permanent upper/and some missing teeth/partial lower     Past Surgical History:   Procedure Laterality Date    ABDOMINAL ADHESION SURGERY N/A 2021    Procedure: laparoscopic LYSIS ADHESIONS;  Surgeon: Jill Bentley MD;  [facility];  Service: Thoracic    BACK SURGERY  2023    TLIF at [facility], Dr. Levy    BREAST SURGERY      BREAST SURGERY      reduction    CARDIAC PACEMAKER PLACEMENT      pacemaker     SECTION      COLONOSCOPY      DILATION AND CURETTAGE OF UTERUS      \"D&E\"    HERNIA REPAIR      HYSTERECTOMY      JOINT REPLACEMENT Left     LTKR    NECK SURGERY      fused 4 discs with 4 screws implanted    NISSEN FUNDOPLICATION LAPAROSCOPIC WITH ROBOTICS N/A 2021    Procedure: ROBOTIC HELLER MYOTOMY WITH BERNARDO FUNDIPLICATION ;  Surgeon: Jill Bentley MD;  [facility];  Service: Thoracic    NOSE SURGERY  2024    closed reduction    WI ESOPHAGOGASTRODUODENOSCOPY TRANSORAL DIAGNOSTIC N/A 2021    Procedure: ESOPHAGOGASTRODUODENOSCOPY (EGD);  Surgeon: Jill Bentley MD;  [facility];  Service: Thoracic    WI ESOPHAGOGASTRODUODENOSCOPY TRANSORAL DIAGNOSTIC N/A 2022    Procedure: ESOPHAGOGASTRODUODENOSCOPY (EGD),;  Surgeon: Jill Bentley MD;  [facility];  Service: Thoracic    WI ESOPHAGOGASTRODUODENOSCOPY TRANSORAL DIAGNOSTIC N/A 2022    Procedure: ESOPHAGOGASTRODUODENOSCOPY (EGD);  Surgeon: Jill Bentley MD;  [facility];  Service: Thoracic    WI ESOPHAGOGASTRODUODENOSCOPY TRANSORAL DIAGNOSTIC N/A 2022    Procedure: ESOPHAGOGASTRODUODENOSCOPY (EGD); PYLORIC DILATION; GE JUNCTION DILATION;  Surgeon: Jlil Bentley MD;  [facility];  Service: Thoracic    WI ESOPHAGOGASTRODUODENOSCOPY TRANSORAL DIAGNOSTIC N/A 2022    Procedure: ESOPHAGOGASTRODUODENOSCOPY (EGD);  Surgeon: Varinder Steiner MD;  " [facility];  Service: Thoracic    IA ESOPHAGOGASTRODUODENOSCOPY TRANSORAL DIAGNOSTIC N/A 01/24/2023    Procedure: ESOPHAGOGASTRODUODENOSCOPY (EGD);  Surgeon: Jill Bentley MD;  [facility];  Service: Thoracic    IA ESOPHAGOGASTRODUODENOSCOPY TRANSORAL DIAGNOSTIC N/A 05/01/2024    Procedure: ESOPHAGOGASTRODUODENOSCOPY (EGD);  Surgeon: Jill Bentley MD;  [facility];  Service: Thoracic    IA ESOPHAGOSCOPY FLEX BALLOON DILAT <30 MM DIAM N/A 01/12/2022    Procedure: DILATATION ESOPHAGEAL;  Surgeon: Jill Bentley MD;  [facility];  Service: Thoracic    IA ESOPHAGOSCOPY FLEX BALLOON DILAT <30 MM DIAM N/A 02/16/2022    Procedure: DILATATION ESOPHAGEAL;  Surgeon: Jill Bentley MD;  [facility];  Service: Thoracic    IA ESOPHAGOSCOPY FLEX BALLOON DILAT <30 MM DIAM N/A 11/29/2022    Procedure: DILATATION ESOPHAGEAL esophageal and pyloric dilation;  Surgeon: Varinder Steiner MD;  [facility];  Service: Thoracic    IA ESOPHAGOSCOPY FLEX BALLOON DILAT <30 MM DIAM N/A 01/24/2023    Procedure: esophageal dilation dilated up to 54  with pylorus dilation dilated up to 18;  Surgeon: Jill Bentley MD;  [facility];  Service: Thoracic    IA ESOPHAGOSCOPY FLEX BALLOON DILAT <30 MM DIAM N/A 05/01/2024    Procedure: DILATATION ESOPHAGEAL;  Surgeon: Jill Bentley MD;  [facility];  Service: Thoracic    IA RIBEIRO IMPLTJ NSTIM ELTRDS PLATE/PADDLE EDRL Left 04/23/2018    Procedure: Insertion of thoracic spinal cord stimulator electrode via laminotomy and placement of left buttock IPG;  Surgeon: Shahab Bullock MD;  [facility];  Service: Neurosurgery    REPLACEMENT TOTAL KNEE      ROTATOR CUFF REPAIR      SPINAL CORD STIMULATOR REMOVAL Bilateral 07/02/2024    Procedure: REOPENING OF THORACIC AND LEFT BUTTOCK INCISION FOR REMOVAL OF SPINAL CORD STIMULATOR SYSTEM;  Surgeon: Shahab Bullock MD;  [facility];  Service:  Neurosurgery    SPINAL STIMULATOR PLACEMENT Left 10/03/2018    Procedure: BUTTOCK RE-OPENING INCISION FOR REPOSITIONING OF IMPLANTABLE PULSE GENERATOR;  Surgeon: Shahab Bullock MD;  [facility];  Service: Neurosurgery    TONSILLECTOMY      UPPER GASTROINTESTINAL ENDOSCOPY      VASCULAR SURGERY      WISDOM TOOTH EXTRACTION       Family History   Problem Relation Age of Onset    Diabetes Family     Heart disease Family     Hypertension Family     Neuropathy Family     No Known Problems Mother     Heart disease Father     Diabetes Father     Neuropathy Father     Stroke Father     No Known Problems Maternal Grandmother     No Known Problems Maternal Grandfather     No Known Problems Paternal Grandmother     No Known Problems Paternal Grandfather     No Known Problems Brother     No Known Problems Daughter     Cancer Son       reports that she has never smoked. She has never used smokeless tobacco. She reports that she does not currently use drugs after having used the following drugs: Marijuana. Frequency: 8.00 times per week. She reports that she does not drink alcohol.  Current Outpatient Medications   Medication Instructions    acetaminophen (TYLENOL) 975 mg, Every 6 hours PRN    albuterol (ACCUNEB) 2.5 mg, Every 6 hours PRN    brimonidine tartrate 0.2 % ophthalmic solution ADMINISTER 1 DROP INTO THE LEFT EYE 2 (TWO) TIMES A DAY FOR 25 DAYS.    carvedilol (COREG) 6.25 mg, Oral, 2 times daily with meals    Cholecalciferol 50,000 Units, Weekly    CRANBERRY FRUIT CONCENTRATE PO 4,200 mg, Daily    cyanocobalamin (VITAMIN B-12) 1,000 mcg, Daily    Darbepoetin Mike (ARANESP, ALBUMIN FREE, IJ) 1 Dose, Every 30 days    docusate sodium (COLACE) 100 mg, 2 times daily    ezetimibe (ZETIA) 10 mg, Daily    famotidine (PEPCID) 40 mg, Oral, Daily at bedtime    Fluticasone-Salmeterol (Advair) 250-50 mcg/dose inhaler 1 puff, 2 times daily    gabapentin (NEURONTIN) 300 mg, Daily at bedtime    glucose blood test strip Testing  "six times daily  E11.65 on insulin pump    insulin glargine (LANTUS) 15 Units, Subcutaneous, Daily at bedtime    Insulin Infusion Pump (MINIMED INSULIN PUMP) PRINCESS Inject under the skin Humalog insulin basil 13.175 units per hour max bolus 2 units per hour PRN for blood sugar over 160.     insulin lispro (HUMALOG/ADMELOG) 1-6 Units, Subcutaneous, 3 times daily before meals    insulin lispro (HUMALOG/ADMELOG) 1-6 Units, Subcutaneous, Daily at bedtime    lactulose (CEPHULAC) 20 g packet Start taking once daily for 1 week, then can increase to twice daily with goal of having bowel movements daily or every other day.    levothyroxine 112 mcg tablet 1 tablet, Daily    lubiprostone (Amitiza) 8 mcg capsule Take by mouth    Magnesium Oxide (DIASENSE MAGNESIUM PO) Take by mouth    melatonin 3 mg, Daily at bedtime PRN    methocarbamol (ROBAXIN) 500 mg tablet 1 TAB ORAL TWICE DAILY X7 DAYS AS NEEDED FOR SPASM    nitrofurantoin (MACROBID) 100 mg capsule 1 capsule, 2 times daily    oxyCODONE HCl 5 mg, Every 4 hours PRN    polyethylene glycol (MIRALAX) 17 g, Oral, Daily    pramipexole (MIRAPEX) 1 mg, 2 times daily    senna (SENOKOT) 8.6 mg 2 tablets, 2 times daily    sertraline (ZOLOFT) 150 mg, Daily    spironolactone (ALDACTONE) 25 mg tablet TAKE 1 TABLET (25 MG TOTAL) BY MOUTH DAILY.    tiotropium (Spiriva HandiHaler) 18 mcg inhalation capsule Place into inhaler and inhale    tolterodine (DETROL LA) 4 mg, Daily    torsemide (DEMADEX) 20 mg, Oral, Daily    zinc gluconate 50 mg, Daily     Allergies   Allergen Reactions    Bactrim [Sulfamethoxazole-Trimethoprim] Hives    Sucralfate Hives     Facial swelling    Topamax [Topiramate]      disorentation    Azithromycin Itching    Pregabalin Confusion and Other (See Comments)     altered mental status    Ciprofloxacin Drowsiness     Other reaction(s): Other (See Comments)  \"drowsiness\"    Norvasc [Amlodipine] Swelling    Baclofen      \"That knocks me out.\"    Bupropion Fatigue    " Methotrexate Nausea Only      Current Outpatient Medications on File Prior to Visit   Medication Sig Dispense Refill    acetaminophen (TYLENOL) 500 mg tablet Take 975 mg by mouth every 6 (six) hours as needed for mild pain      albuterol (ACCUNEB) 1.25 MG/3ML nebulizer solution Take 2.5 mg by nebulization every 6 (six) hours as needed for wheezing      brimonidine tartrate 0.2 % ophthalmic solution ADMINISTER 1 DROP INTO THE LEFT EYE 2 (TWO) TIMES A DAY FOR 25 DAYS.      carvedilol (COREG) 6.25 mg tablet Take 1 tablet (6.25 mg total) by mouth 2 (two) times a day with meals      Cholecalciferol 1.25 MG (31972 UT) capsule Take 50,000 Units by mouth once a week sundays      CRANBERRY FRUIT CONCENTRATE PO Take 4,200 mg by mouth in the morning      cyanocobalamin (VITAMIN B-12) 1000 MCG tablet Take 1,000 mcg by mouth daily      Darbepoetin Mike (ARANESP, ALBUMIN FREE, IJ) 1 Dose by Intramuscular (deltoid only) route every 30 (thirty) days      docusate sodium (Colace) 100 mg capsule Take 100 mg by mouth 2 (two) times a day.      ezetimibe (ZETIA) 10 mg tablet Take 10 mg by mouth daily      famotidine (PEPCID) 40 MG tablet Take 1 tablet (40 mg total) by mouth daily at bedtime 30 tablet 4    Fluticasone-Salmeterol (Advair) 250-50 mcg/dose inhaler Inhale 1 puff 2 (two) times a day      gabapentin (NEURONTIN) 300 mg capsule Take 300 mg by mouth daily at bedtime      glucose blood test strip Testing six times daily  E11.65 on insulin pump      Insulin Infusion Pump (MINIMED INSULIN PUMP) PRINCESS Inject under the skin Humalog insulin basil 13.175 units per hour max bolus 2 units per hour PRN for blood sugar over 160.       levothyroxine 112 mcg tablet Take 1 tablet by mouth in the morning      lubiprostone (Amitiza) 8 mcg capsule Take by mouth      Magnesium Oxide (DIASENSE MAGNESIUM PO) Take by mouth      melatonin 3 mg Take 3 mg by mouth daily at bedtime as needed (sleep).      methocarbamol (ROBAXIN) 500 mg tablet 1 TAB ORAL  TWICE DAILY X7 DAYS AS NEEDED FOR SPASM      nitrofurantoin (MACROBID) 100 mg capsule Take 1 capsule by mouth 2 (two) times a day      oxyCODONE HCl 5 MG TABA Take 5 mg by mouth every 4 (four) hours as needed (severe pain).      pramipexole (MIRAPEX) 1 mg tablet Take 1 mg by mouth 2 (two) times a day        senna (SENOKOT) 8.6 mg Take 2 tablets by mouth 2 (two) times a day.      sertraline (ZOLOFT) 100 mg tablet Take 150 mg by mouth daily      spironolactone (ALDACTONE) 25 mg tablet TAKE 1 TABLET (25 MG TOTAL) BY MOUTH DAILY.      tiotropium (Spiriva HandiHaler) 18 mcg inhalation capsule Place into inhaler and inhale      tolterodine (Detrol LA) 4 mg 24 hr capsule Take 4 mg by mouth daily      torsemide (DEMADEX) 20 mg tablet Take 1 tablet (20 mg total) by mouth daily      zinc gluconate 50 mg tablet Take 50 mg by mouth daily      insulin glargine (LANTUS) 100 units/mL subcutaneous injection Inject 15 Units under the skin daily at bedtime (Patient not taking: Reported on 3/6/2025)      insulin lispro (HumALOG/ADMELOG) 100 units/mL injection Inject 1-6 Units under the skin 3 (three) times a day before meals (Patient not taking: Reported on 3/6/2025)      insulin lispro (HumALOG/ADMELOG) 100 units/mL injection Inject 1-6 Units under the skin daily at bedtime (Patient not taking: Reported on 3/6/2025)      lactulose (CEPHULAC) 20 g packet Start taking once daily for 1 week, then can increase to twice daily with goal of having bowel movements daily or every other day. (Patient not taking: Reported on 3/6/2025) 60 each 3    polyethylene glycol (MIRALAX) 17 g packet Take 17 g by mouth daily (Patient not taking: Reported on 3/6/2025) 90 each 3     No current facility-administered medications on file prior to visit.      Social History     Tobacco Use    Smoking status: Never    Smokeless tobacco: Never   Vaping Use    Vaping status: Never Used   Substance and Sexual Activity    Alcohol use: No    Drug use: Not Currently     " Frequency: 8.0 times per week     Types: Marijuana     Comment: Medical Marijuana/vape pen once per week    Sexual activity: Yes     Partners: Male     Birth control/protection: None        Objective   /77 (BP Location: Left arm, Patient Position: Sitting, Cuff Size: Large)   Pulse 61   Temp 97.5 °F (36.4 °C) (Temporal)   Resp 15   Ht 4' 11\" (1.499 m)   SpO2 99%   BMI 33.12 kg/m²     Pain Screening:  Pain Score: 0-No pain  ECOG    Physical Exam  Vitals and nursing note reviewed.   Constitutional:       General: She is not in acute distress.     Appearance: Normal appearance. She is well-developed. She is not diaphoretic.   HENT:      Head: Normocephalic and atraumatic.      Nose: Nose normal. No congestion or rhinorrhea.      Mouth/Throat:      Mouth: Mucous membranes are moist.      Pharynx: Oropharynx is clear. No oropharyngeal exudate.   Eyes:      General: No scleral icterus.     Pupils: Pupils are equal, round, and reactive to light.   Neck:      Trachea: No tracheal deviation.   Cardiovascular:      Rate and Rhythm: Normal rate and regular rhythm.      Pulses: Normal pulses.      Heart sounds: Normal heart sounds. No murmur heard.  Pulmonary:      Effort: Pulmonary effort is normal. No respiratory distress.      Breath sounds: Normal breath sounds. No stridor. No wheezing or rales.   Chest:      Chest wall: No tenderness.   Abdominal:      General: Bowel sounds are normal. There is no distension.      Palpations: Abdomen is soft.      Tenderness: There is no abdominal tenderness. There is no rebound.   Musculoskeletal:         General: Normal range of motion.      Cervical back: Normal range of motion and neck supple. No muscular tenderness.      Comments: Boot on left ankle   Lymphadenopathy:      Cervical: No cervical adenopathy.   Skin:     General: Skin is warm and dry.      Coloration: Skin is not jaundiced or pale.      Findings: No erythema or rash.   Neurological:      General: No focal " deficit present.      Mental Status: She is alert and oriented to person, place, and time.   Psychiatric:         Mood and Affect: Mood normal.         Behavior: Behavior normal.         Thought Content: Thought content normal.         Judgment: Judgment normal.         Labs:   Results for orders placed or performed during the hospital encounter of 02/10/25   Blood culture #1    Specimen: Arm, Right; Blood   Result Value Ref Range    Blood Culture No Growth After 5 Days.    Blood culture #2    Specimen: Arm, Left; Blood   Result Value Ref Range    Blood Culture No Growth After 5 Days.     Specimen: Urine, Clean Catch   Result Value Ref Range    Urine Culture >100,000 cfu/ml    FLU/RSV/COVID - if FLU/RSV clinically relevant (2hr TAT)    Specimen: Nose; Nares   Result Value Ref Range    SARS-CoV-2 Negative Negative    INFLUENZA A PCR Negative Negative    INFLUENZA B PCR Negative Negative    RSV PCR Negative Negative   FLU/RSV/COVID - if FLU/RSV clinically relevant    Specimen: Nasopharyngeal Swab; Nares   Result Value Ref Range    SARS-CoV-2 Negative Negative    INFLUENZA A PCR Positive (A) Negative    INFLUENZA B PCR Negative Negative    RSV PCR Negative Negative   CBC and differential   Result Value Ref Range    WBC 6.51 4.31 - 10.16 Thousand/uL    RBC 4.05 3.81 - 5.12 Million/uL    Hemoglobin 11.3 (L) 11.5 - 15.4 g/dL    Hematocrit 36.6 34.8 - 46.1 %    MCV 90 82 - 98 fL    MCH 27.9 26.8 - 34.3 pg    MCHC 30.9 (L) 31.4 - 37.4 g/dL    RDW 16.1 (H) 11.6 - 15.1 %    MPV 10.5 8.9 - 12.7 fL    Platelets 174 149 - 390 Thousands/uL    nRBC 0 /100 WBCs    Segmented % 79 (H) 43 - 75 %    Immature Grans % 1 0 - 2 %    Lymphocytes % 10 (L) 14 - 44 %    Monocytes % 10 4 - 12 %    Eosinophils Relative 0 0 - 6 %    Basophils Relative 0 0 - 1 %    Absolute Neutrophils 5.18 1.85 - 7.62 Thousands/µL    Absolute Immature Grans 0.05 0.00 - 0.20 Thousand/uL    Absolute Lymphocytes 0.62 0.60 - 4.47 Thousands/µL    Absolute Monocytes  0.63 0.17 - 1.22 Thousand/µL    Eosinophils Absolute 0.01 0.00 - 0.61 Thousand/µL    Basophils Absolute 0.02 0.00 - 0.10 Thousands/µL   Comprehensive metabolic panel   Result Value Ref Range    Sodium 138 135 - 147 mmol/L    Potassium 4.9 3.5 - 5.3 mmol/L    Chloride 103 96 - 108 mmol/L    CO2 26 21 - 32 mmol/L    ANION GAP 9 4 - 13 mmol/L    BUN 30 (H) 5 - 25 mg/dL    Creatinine 1.04 0.60 - 1.30 mg/dL    Glucose 237 (H) 65 - 140 mg/dL    Calcium 9.3 8.4 - 10.2 mg/dL    AST 28 13 - 39 U/L    ALT 16 7 - 52 U/L    Alkaline Phosphatase 109 (H) 34 - 104 U/L    Total Protein 7.7 6.4 - 8.4 g/dL    Albumin 4.1 3.5 - 5.0 g/dL    Total Bilirubin 0.96 0.20 - 1.00 mg/dL    eGFR 55 ml/min/1.73sq m   Result Value Ref Range    LACTIC ACID 1.5 0.5 - 2.0 mmol/L   Result Value Ref Range    Procalcitonin 0.11 <=0.25 ng/ml   Protime-INR   Result Value Ref Range    Protime 15.8 (H) 12.3 - 15.0 seconds    INR 1.25 (H) 0.85 - 1.19   APTT   Result Value Ref Range    PTT 35 (H) 23 - 34 seconds   UA w Reflex to Microscopic w Reflex to Culture    Specimen: Urine, Clean Catch   Result Value Ref Range    Color, UA Yellow     Clarity, UA Turbid     Specific Gravity, UA 1.020 1.003 - 1.030    pH, UA 6.0 4.5, 5.0, 5.5, 6.0, 6.5, 7.0, 7.5, 8.0    Leukocytes, UA Small (A) Negative    Nitrite, UA Negative Negative    Protein,  (2+) (A) Negative mg/dl    Glucose, UA Negative Negative mg/dl    Ketones, UA Negative Negative mg/dl    Urobilinogen, UA <2.0 <2.0 mg/dl mg/dl    Bilirubin, UA Negative Negative    Occult Blood, UA Trace (A) Negative   Urine Microscopic   Result Value Ref Range    RBC, UA 2-4 (A) None Seen, 1-2 /hpf    WBC, UA 10-20 (A) None Seen, 1-2 /hpf    Epithelial Cells Occasional None Seen, Occasional /hpf    Bacteria, UA Occasional None Seen, Occasional /hpf    Hyaline Casts, UA 5-10 (A) None Seen /lpf    Amorphous Crystals, UA Occasional    Lactic acid, plasma (w/reflex if result > 2.0)   Result Value Ref Range    LACTIC ACID  0.8 0.5 - 2.0 mmol/L   Procalcitonin, Next Day AM Collection   Result Value Ref Range    Procalcitonin 6.35 (H) <=0.25 ng/ml   Comprehensive metabolic panel   Result Value Ref Range    Sodium 139 135 - 147 mmol/L    Potassium 4.4 3.5 - 5.3 mmol/L    Chloride 110 (H) 96 - 108 mmol/L    CO2 23 21 - 32 mmol/L    ANION GAP 6 4 - 13 mmol/L    BUN 32 (H) 5 - 25 mg/dL    Creatinine 1.17 0.60 - 1.30 mg/dL    Glucose 153 (H) 65 - 140 mg/dL    Calcium 7.3 (L) 8.4 - 10.2 mg/dL    Corrected Calcium 7.9 (L) 8.3 - 10.1 mg/dL    AST 18 13 - 39 U/L    ALT 10 7 - 52 U/L    Alkaline Phosphatase 66 34 - 104 U/L    Total Protein 5.7 (L) 6.4 - 8.4 g/dL    Albumin 3.3 (L) 3.5 - 5.0 g/dL    Total Bilirubin 0.72 0.20 - 1.00 mg/dL    eGFR 47 ml/min/1.73sq m   Result Value Ref Range    Magnesium 1.6 (L) 1.9 - 2.7 mg/dL   Result Value Ref Range    Phosphorus 4.9 (H) 2.3 - 4.1 mg/dL   CBC and differential   Result Value Ref Range    WBC 5.78 4.31 - 10.16 Thousand/uL    RBC 2.78 (L) 3.81 - 5.12 Million/uL    Hemoglobin 8.0 (L) 11.5 - 15.4 g/dL    Hematocrit 26.2 (L) 34.8 - 46.1 %    MCV 94 82 - 98 fL    MCH 28.8 26.8 - 34.3 pg    MCHC 30.5 (L) 31.4 - 37.4 g/dL    RDW 16.2 (H) 11.6 - 15.1 %    MPV 10.4 8.9 - 12.7 fL    Platelets 104 (L) 149 - 390 Thousands/uL    nRBC 0 /100 WBCs    Segmented % 77 (H) 43 - 75 %    Immature Grans % 1 0 - 2 %    Lymphocytes % 11 (L) 14 - 44 %    Monocytes % 11 4 - 12 %    Eosinophils Relative 0 0 - 6 %    Basophils Relative 0 0 - 1 %    Absolute Neutrophils 4.42 1.85 - 7.62 Thousands/µL    Absolute Immature Grans 0.05 0.00 - 0.20 Thousand/uL    Absolute Lymphocytes 0.65 0.60 - 4.47 Thousands/µL    Absolute Monocytes 0.64 0.17 - 1.22 Thousand/µL    Eosinophils Absolute 0.00 0.00 - 0.61 Thousand/µL    Basophils Absolute 0.02 0.00 - 0.10 Thousands/µL   TSH, 3rd generation with Free T4 reflex   Result Value Ref Range    TSH 3RD GENERATON 7.216 (H) 0.450 - 4.500 uIU/mL   T4, free   Result Value Ref Range    Free T4 1.33  (H) 0.61 - 1.12 ng/dL   Basic metabolic panel   Result Value Ref Range    Sodium 136 135 - 147 mmol/L    Potassium 4.9 3.5 - 5.3 mmol/L    Chloride 110 (H) 96 - 108 mmol/L    CO2 18 (L) 21 - 32 mmol/L    ANION GAP 8 4 - 13 mmol/L    BUN 38 (H) 5 - 25 mg/dL    Creatinine 1.36 (H) 0.60 - 1.30 mg/dL    Glucose 92 65 - 140 mg/dL    Calcium 7.5 (L) 8.4 - 10.2 mg/dL    eGFR 40 ml/min/1.73sq m   CBC and differential   Result Value Ref Range    WBC 4.09 (L) 4.31 - 10.16 Thousand/uL    RBC 2.90 (L) 3.81 - 5.12 Million/uL    Hemoglobin 8.4 (L) 11.5 - 15.4 g/dL    Hematocrit 28.1 (L) 34.8 - 46.1 %    MCV 97 82 - 98 fL    MCH 29.0 26.8 - 34.3 pg    MCHC 29.9 (L) 31.4 - 37.4 g/dL    RDW 16.6 (H) 11.6 - 15.1 %    MPV 11.0 8.9 - 12.7 fL    Platelets 105 (L) 149 - 390 Thousands/uL    nRBC 0 /100 WBCs    Segmented % 67 43 - 75 %    Immature Grans % 1 0 - 2 %    Lymphocytes % 21 14 - 44 %    Monocytes % 10 4 - 12 %    Eosinophils Relative 1 0 - 6 %    Basophils Relative 0 0 - 1 %    Absolute Neutrophils 2.72 1.85 - 7.62 Thousands/µL    Absolute Immature Grans 0.02 0.00 - 0.20 Thousand/uL    Absolute Lymphocytes 0.87 0.60 - 4.47 Thousands/µL    Absolute Monocytes 0.42 0.17 - 1.22 Thousand/µL    Eosinophils Absolute 0.05 0.00 - 0.61 Thousand/µL    Basophils Absolute 0.01 0.00 - 0.10 Thousands/µL   Result Value Ref Range    Magnesium 1.9 1.9 - 2.7 mg/dL   Result Value Ref Range    Procalcitonin 6.93 (H) <=0.25 ng/ml   Vancomycin, random   Result Value Ref Range    Vancomycin Rm 18.2 10.0 - 20.0 ug/mL   CBC and differential   Result Value Ref Range    WBC 4.64 4.31 - 10.16 Thousand/uL    RBC 3.07 (L) 3.81 - 5.12 Million/uL    Hemoglobin 8.7 (L) 11.5 - 15.4 g/dL    Hematocrit 28.8 (L) 34.8 - 46.1 %    MCV 94 82 - 98 fL    MCH 28.3 26.8 - 34.3 pg    MCHC 30.2 (L) 31.4 - 37.4 g/dL    RDW 16.5 (H) 11.6 - 15.1 %    MPV 11.0 8.9 - 12.7 fL    Platelets 125 (L) 149 - 390 Thousands/uL    nRBC 1 /100 WBCs    Segmented % 71 43 - 75 %    Immature  Grans % 1 0 - 2 %    Lymphocytes % 18 14 - 44 %    Monocytes % 9 4 - 12 %    Eosinophils Relative 1 0 - 6 %    Basophils Relative 0 0 - 1 %    Absolute Neutrophils 3.25 1.85 - 7.62 Thousands/µL    Absolute Immature Grans 0.03 0.00 - 0.20 Thousand/uL    Absolute Lymphocytes 0.85 0.60 - 4.47 Thousands/µL    Absolute Monocytes 0.43 0.17 - 1.22 Thousand/µL    Eosinophils Absolute 0.06 0.00 - 0.61 Thousand/µL    Basophils Absolute 0.02 0.00 - 0.10 Thousands/µL   Result Value Ref Range    Magnesium 1.9 1.9 - 2.7 mg/dL   Basic metabolic panel   Result Value Ref Range    Sodium 137 135 - 147 mmol/L    Potassium 4.6 3.5 - 5.3 mmol/L    Chloride 111 (H) 96 - 108 mmol/L    CO2 19 (L) 21 - 32 mmol/L    ANION GAP 7 4 - 13 mmol/L    BUN 36 (H) 5 - 25 mg/dL    Creatinine 1.19 0.60 - 1.30 mg/dL    Glucose 63 (L) 65 - 140 mg/dL    Calcium 7.9 (L) 8.4 - 10.2 mg/dL    eGFR 47 ml/min/1.73sq m   Lactic acid, plasma (w/reflex if result > 2.0)   Result Value Ref Range    LACTIC ACID 1.9 0.5 - 2.0 mmol/L   CBC and differential   Result Value Ref Range    WBC 6.57 4.31 - 10.16 Thousand/uL    RBC 3.68 (L) 3.81 - 5.12 Million/uL    Hemoglobin 10.4 (L) 11.5 - 15.4 g/dL    Hematocrit 34.8 34.8 - 46.1 %    MCV 95 82 - 98 fL    MCH 28.3 26.8 - 34.3 pg    MCHC 29.9 (L) 31.4 - 37.4 g/dL    RDW 16.2 (H) 11.6 - 15.1 %    MPV 11.1 8.9 - 12.7 fL    Platelets 156 149 - 390 Thousands/uL    nRBC 0 /100 WBCs    Segmented % 81 (H) 43 - 75 %    Immature Grans % 1 0 - 2 %    Lymphocytes % 11 (L) 14 - 44 %    Monocytes % 6 4 - 12 %    Eosinophils Relative 1 0 - 6 %    Basophils Relative 0 0 - 1 %    Absolute Neutrophils 5.28 1.85 - 7.62 Thousands/µL    Absolute Immature Grans 0.09 0.00 - 0.20 Thousand/uL    Absolute Lymphocytes 0.74 0.60 - 4.47 Thousands/µL    Absolute Monocytes 0.40 0.17 - 1.22 Thousand/µL    Eosinophils Absolute 0.04 0.00 - 0.61 Thousand/µL    Basophils Absolute 0.02 0.00 - 0.10 Thousands/µL   Comprehensive metabolic panel   Result Value Ref  Range    Sodium 136 135 - 147 mmol/L    Potassium 5.3 3.5 - 5.3 mmol/L    Chloride 109 (H) 96 - 108 mmol/L    CO2 19 (L) 21 - 32 mmol/L    ANION GAP 8 4 - 13 mmol/L    BUN 38 (H) 5 - 25 mg/dL    Creatinine 1.30 0.60 - 1.30 mg/dL    Glucose 85 65 - 140 mg/dL    Calcium 8.8 8.4 - 10.2 mg/dL     (H) 13 - 39 U/L    ALT 77 (H) 7 - 52 U/L    Alkaline Phosphatase 226 (H) 34 - 104 U/L    Total Protein 7.3 6.4 - 8.4 g/dL    Albumin 3.8 3.5 - 5.0 g/dL    Total Bilirubin 0.87 0.20 - 1.00 mg/dL    eGFR 42 ml/min/1.73sq m   Result Value Ref Range    Magnesium 2.2 1.9 - 2.7 mg/dL   Blood gas, venous   Result Value Ref Range    pH, Con 7.268 (L) 7.300 - 7.400    pCO2, Con 35.6 (L) 42.0 - 50.0 mm Hg    pO2, Con 46.8 (H) 35.0 - 45.0 mm Hg    HCO3, Con 15.9 (L) 24 - 30 mmol/L    Base Excess, Con -10.1 mmol/L    O2 Content, Con 12.9 ml/dL    O2 HGB, VENOUS 77.2 60.0 - 80.0 %   Vancomycin, random   Result Value Ref Range    Vancomycin Rm 19.8 10.0 - 20.0 ug/mL   B-Type Natriuretic Peptide(BNP)   Result Value Ref Range    BNP 3,210 (H) 0 - 100 pg/mL   Basic metabolic panel   Result Value Ref Range    Sodium 136 135 - 147 mmol/L    Potassium 4.5 3.5 - 5.3 mmol/L    Chloride 108 96 - 108 mmol/L    CO2 19 (L) 21 - 32 mmol/L    ANION GAP 9 4 - 13 mmol/L    BUN 39 (H) 5 - 25 mg/dL    Creatinine 1.30 0.60 - 1.30 mg/dL    Glucose 96 65 - 140 mg/dL    Calcium 7.9 (L) 8.4 - 10.2 mg/dL    eGFR 42 ml/min/1.73sq m   Comprehensive metabolic panel   Result Value Ref Range    Sodium 137 135 - 147 mmol/L    Potassium 4.8 3.5 - 5.3 mmol/L    Chloride 108 96 - 108 mmol/L    CO2 19 (L) 21 - 32 mmol/L    ANION GAP 10 4 - 13 mmol/L    BUN 49 (H) 5 - 25 mg/dL    Creatinine 1.40 (H) 0.60 - 1.30 mg/dL    Glucose 140 65 - 140 mg/dL    Calcium 8.6 8.4 - 10.2 mg/dL    Corrected Calcium 9.1 8.3 - 10.1 mg/dL    AST 1,464 (H) 13 - 39 U/L     (H) 7 - 52 U/L    Alkaline Phosphatase 224 (H) 34 - 104 U/L    Total Protein 7.0 6.4 - 8.4 g/dL    Albumin 3.4  "(L) 3.5 - 5.0 g/dL    Total Bilirubin 1.77 (H) 0.20 - 1.00 mg/dL    eGFR 38 ml/min/1.73sq m   CBC and differential   Result Value Ref Range    WBC 3.91 (L) 4.31 - 10.16 Thousand/uL    RBC 3.12 (L) 3.81 - 5.12 Million/uL    Hemoglobin 8.9 (L) 11.5 - 15.4 g/dL    Hematocrit 28.8 (L) 34.8 - 46.1 %    MCV 92 82 - 98 fL    MCH 28.5 26.8 - 34.3 pg    MCHC 30.9 (L) 31.4 - 37.4 g/dL    RDW 17.2 (H) 11.6 - 15.1 %    MPV 11.7 8.9 - 12.7 fL    Platelets 163 149 - 390 Thousands/uL   Result Value Ref Range    Calcium, Ionized 1.05 (L) 1.12 - 1.32 mmol/L   Hepatitis panel, acute   Result Value Ref Range    Hepatitis B Surface Ag Non-reactive Non-Reactive    Hep A IgM Non-reactive Non-Reactive    Hepatitis C Ab Non-reactive Non-Reactive    Hep B C IgM Non-reactive Non-Reactive   Acetaminophen level-\"If concentration is detectable, please discuss with medical  on call.\"   Result Value Ref Range    Acetaminophen Level <2 (L) 10 - 20 ug/mL   Protime-INR   Result Value Ref Range    Protime 21.1 (H) 12.3 - 15.0 seconds    INR 1.83 (H) 0.85 - 1.19   Comprehensive metabolic panel   Result Value Ref Range    Sodium 138 135 - 147 mmol/L    Potassium 4.5 3.5 - 5.3 mmol/L    Chloride 108 96 - 108 mmol/L    CO2 25 21 - 32 mmol/L    ANION GAP 5 4 - 13 mmol/L    BUN 47 (H) 5 - 25 mg/dL    Creatinine 1.34 (H) 0.60 - 1.30 mg/dL    Glucose 138 65 - 140 mg/dL    Calcium 8.1 (L) 8.4 - 10.2 mg/dL    Corrected Calcium 9.0 8.3 - 10.1 mg/dL     (H) 13 - 39 U/L     (H) 7 - 52 U/L    Alkaline Phosphatase 184 (H) 34 - 104 U/L    Total Protein 5.8 (L) 6.4 - 8.4 g/dL    Albumin 2.9 (L) 3.5 - 5.0 g/dL    Total Bilirubin 1.60 (H) 0.20 - 1.00 mg/dL    eGFR 40 ml/min/1.73sq m   Lactic acid, plasma (w/reflex if result > 2.0)   Result Value Ref Range    LACTIC ACID 0.8 0.5 - 2.0 mmol/L   Protime-INR   Result Value Ref Range    Protime 18.7 (H) 12.3 - 15.0 seconds    INR 1.56 (H) 0.85 - 1.19   Comprehensive metabolic panel   Result Value " Ref Range    Sodium 138 135 - 147 mmol/L    Potassium 4.3 3.5 - 5.3 mmol/L    Chloride 108 96 - 108 mmol/L    CO2 23 21 - 32 mmol/L    ANION GAP 7 4 - 13 mmol/L    BUN 44 (H) 5 - 25 mg/dL    Creatinine 1.05 0.60 - 1.30 mg/dL    Glucose 93 65 - 140 mg/dL    Calcium 8.1 (L) 8.4 - 10.2 mg/dL    Corrected Calcium 9.0 8.3 - 10.1 mg/dL     (H) 13 - 39 U/L     (H) 7 - 52 U/L    Alkaline Phosphatase 165 (H) 34 - 104 U/L    Total Protein 6.0 (L) 6.4 - 8.4 g/dL    Albumin 2.9 (L) 3.5 - 5.0 g/dL    Total Bilirubin 1.66 (H) 0.20 - 1.00 mg/dL    eGFR 54 ml/min/1.73sq m   CBC and differential   Result Value Ref Range    WBC 2.92 (L) 4.31 - 10.16 Thousand/uL    RBC 2.75 (L) 3.81 - 5.12 Million/uL    Hemoglobin 8.0 (L) 11.5 - 15.4 g/dL    Hematocrit 26.3 (L) 34.8 - 46.1 %    MCV 96 82 - 98 fL    MCH 29.1 26.8 - 34.3 pg    MCHC 30.4 (L) 31.4 - 37.4 g/dL    RDW 17.4 (H) 11.6 - 15.1 %    MPV 11.4 8.9 - 12.7 fL    Platelets 155 149 - 390 Thousands/uL    nRBC 0 /100 WBCs    Segmented % 65 43 - 75 %    Immature Grans % 6 (H) 0 - 2 %    Lymphocytes % 18 14 - 44 %    Monocytes % 9 4 - 12 %    Eosinophils Relative 1 0 - 6 %    Basophils Relative 1 0 - 1 %    Absolute Neutrophils 1.92 1.85 - 7.62 Thousands/µL    Absolute Immature Grans 0.18 0.00 - 0.20 Thousand/uL    Absolute Lymphocytes 0.52 (L) 0.60 - 4.47 Thousands/µL    Absolute Monocytes 0.25 0.17 - 1.22 Thousand/µL    Eosinophils Absolute 0.03 0.00 - 0.61 Thousand/µL    Basophils Absolute 0.02 0.00 - 0.10 Thousands/µL   CBC and differential   Result Value Ref Range    WBC 2.81 (L) 4.31 - 10.16 Thousand/uL    RBC 2.80 (L) 3.81 - 5.12 Million/uL    Hemoglobin 8.0 (L) 11.5 - 15.4 g/dL    Hematocrit 27.1 (L) 34.8 - 46.1 %    MCV 97 82 - 98 fL    MCH 28.6 26.8 - 34.3 pg    MCHC 29.5 (L) 31.4 - 37.4 g/dL    RDW 17.6 (H) 11.6 - 15.1 %    MPV 10.7 8.9 - 12.7 fL    Platelets 162 149 - 390 Thousands/uL   Comprehensive metabolic panel   Result Value Ref Range    Sodium 138 135 - 147  mmol/L    Potassium 4.4 3.5 - 5.3 mmol/L    Chloride 107 96 - 108 mmol/L    CO2 26 21 - 32 mmol/L    ANION GAP 5 4 - 13 mmol/L    BUN 36 (H) 5 - 25 mg/dL    Creatinine 0.97 0.60 - 1.30 mg/dL    Glucose 79 65 - 140 mg/dL    Calcium 8.4 8.4 - 10.2 mg/dL    Corrected Calcium 9.2 8.3 - 10.1 mg/dL     (H) 13 - 39 U/L     (H) 7 - 52 U/L    Alkaline Phosphatase 161 (H) 34 - 104 U/L    Total Protein 6.4 6.4 - 8.4 g/dL    Albumin 3.0 (L) 3.5 - 5.0 g/dL    Total Bilirubin 1.32 (H) 0.20 - 1.00 mg/dL    eGFR 60 ml/min/1.73sq m   Protime-INR   Result Value Ref Range    Protime 17.8 (H) 12.3 - 15.0 seconds    INR 1.46 (H) 0.85 - 1.19   Comprehensive metabolic panel   Result Value Ref Range    Sodium 139 135 - 147 mmol/L    Potassium 4.3 3.5 - 5.3 mmol/L    Chloride 105 96 - 108 mmol/L    CO2 26 21 - 32 mmol/L    ANION GAP 8 4 - 13 mmol/L    BUN 33 (H) 5 - 25 mg/dL    Creatinine 0.83 0.60 - 1.30 mg/dL    Glucose 81 65 - 140 mg/dL    Calcium 8.8 8.4 - 10.2 mg/dL    Corrected Calcium 9.4 8.3 - 10.1 mg/dL     (H) 13 - 39 U/L     (H) 7 - 52 U/L    Alkaline Phosphatase 162 (H) 34 - 104 U/L    Total Protein 6.9 6.4 - 8.4 g/dL    Albumin 3.3 (L) 3.5 - 5.0 g/dL    Total Bilirubin 1.20 (H) 0.20 - 1.00 mg/dL    eGFR 72 ml/min/1.73sq m   CBC and differential   Result Value Ref Range    WBC 4.25 (L) 4.31 - 10.16 Thousand/uL    RBC 2.96 (L) 3.81 - 5.12 Million/uL    Hemoglobin 8.6 (L) 11.5 - 15.4 g/dL    Hematocrit 28.3 (L) 34.8 - 46.1 %    MCV 96 82 - 98 fL    MCH 29.1 26.8 - 34.3 pg    MCHC 30.4 (L) 31.4 - 37.4 g/dL    RDW 17.7 (H) 11.6 - 15.1 %    MPV 10.6 8.9 - 12.7 fL    Platelets 177 149 - 390 Thousands/uL   Protime-INR   Result Value Ref Range    Protime 16.4 (H) 12.3 - 15.0 seconds    INR 1.31 (H) 0.85 - 1.19   Fingerstick Glucose (POCT)   Result Value Ref Range    POC Glucose 181 (H) 65 - 140 mg/dl   Urine Macroscopic, POC   Result Value Ref Range    Color, UA Yellow     Clarity, UA Clear     pH, UA 6.0 4.5  - 8.0    Leukocytes, UA Trace (A) Negative    Nitrite, UA Negative Negative    Protein, UA >=300 (A) Negative mg/dl    Glucose, UA Negative Negative mg/dl    Ketones, UA Negative Negative mg/dl    Urobilinogen, UA 0.2 0.2, 1.0 E.U./dl E.U./dl    Bilirubin, UA Negative Negative    Occult Blood, UA Trace (A) Negative    Specific Gravity, UA >=1.030 1.003 - 1.030   Fingerstick Glucose (POCT)   Result Value Ref Range    POC Glucose 135 65 - 140 mg/dl   Fingerstick Glucose (POCT)   Result Value Ref Range    POC Glucose 136 65 - 140 mg/dl   Fingerstick Glucose (POCT)   Result Value Ref Range    POC Glucose 155 (H) 65 - 140 mg/dl   Fingerstick Glucose (POCT)   Result Value Ref Range    POC Glucose 117 65 - 140 mg/dl   Fingerstick Glucose (POCT)   Result Value Ref Range    POC Glucose 90 65 - 140 mg/dl   Fingerstick Glucose (POCT)   Result Value Ref Range    POC Glucose 119 65 - 140 mg/dl   Fingerstick Glucose (POCT)   Result Value Ref Range    POC Glucose 113 65 - 140 mg/dl   Fingerstick Glucose (POCT)   Result Value Ref Range    POC Glucose 137 65 - 140 mg/dl   Fingerstick Glucose (POCT)   Result Value Ref Range    POC Glucose 127 65 - 140 mg/dl   Fingerstick Glucose (POCT)   Result Value Ref Range    POC Glucose 63 (L) 65 - 140 mg/dl   Fingerstick Glucose (POCT)   Result Value Ref Range    POC Glucose 94 65 - 140 mg/dl   Fingerstick Glucose (POCT)   Result Value Ref Range    POC Glucose 128 65 - 140 mg/dl   Fingerstick Glucose (POCT)   Result Value Ref Range    POC Glucose 143 (H) 65 - 140 mg/dl   Fingerstick Glucose (POCT)   Result Value Ref Range    POC Glucose 184 (H) 65 - 140 mg/dl   Fingerstick Glucose (POCT)   Result Value Ref Range    POC Glucose 56 (L) 65 - 140 mg/dl   Fingerstick Glucose (POCT)   Result Value Ref Range    POC Glucose 87 65 - 140 mg/dl   Fingerstick Glucose (POCT)   Result Value Ref Range    POC Glucose 139 65 - 140 mg/dl   Fingerstick Glucose (POCT)   Result Value Ref Range    POC Glucose 31  (LL) 65 - 140 mg/dl   Fingerstick Glucose (POCT)   Result Value Ref Range    POC Glucose 34 (LL) 65 - 140 mg/dl   Fingerstick Glucose (POCT)   Result Value Ref Range    POC Glucose 81 65 - 140 mg/dl   Fingerstick Glucose (POCT)   Result Value Ref Range    POC Glucose 73 65 - 140 mg/dl   Fingerstick Glucose (POCT)   Result Value Ref Range    POC Glucose 104 65 - 140 mg/dl   Fingerstick Glucose (POCT)   Result Value Ref Range    POC Glucose 124 65 - 140 mg/dl   Fingerstick Glucose (POCT)   Result Value Ref Range    POC Glucose 92 65 - 140 mg/dl   Fingerstick Glucose (POCT)   Result Value Ref Range    POC Glucose 101 65 - 140 mg/dl   Fingerstick Glucose (POCT)   Result Value Ref Range    POC Glucose 126 65 - 140 mg/dl   Fingerstick Glucose (POCT)   Result Value Ref Range    POC Glucose 146 (H) 65 - 140 mg/dl   Fingerstick Glucose (POCT)   Result Value Ref Range    POC Glucose 161 (H) 65 - 140 mg/dl   Fingerstick Glucose (POCT)   Result Value Ref Range    POC Glucose 131 65 - 140 mg/dl   Fingerstick Glucose (POCT)   Result Value Ref Range    POC Glucose 128 65 - 140 mg/dl   Fingerstick Glucose (POCT)   Result Value Ref Range    POC Glucose 168 (H) 65 - 140 mg/dl   Fingerstick Glucose (POCT)   Result Value Ref Range    POC Glucose 154 (H) 65 - 140 mg/dl   Fingerstick Glucose (POCT)   Result Value Ref Range    POC Glucose 150 (H) 65 - 140 mg/dl   Fingerstick Glucose (POCT)   Result Value Ref Range    POC Glucose 147 (H) 65 - 140 mg/dl   Fingerstick Glucose (POCT)   Result Value Ref Range    POC Glucose 98 65 - 140 mg/dl   Fingerstick Glucose (POCT)   Result Value Ref Range    POC Glucose 116 65 - 140 mg/dl   Fingerstick Glucose (POCT)   Result Value Ref Range    POC Glucose 146 (H) 65 - 140 mg/dl   Fingerstick Glucose (POCT)   Result Value Ref Range    POC Glucose 172 (H) 65 - 140 mg/dl   Fingerstick Glucose (POCT)   Result Value Ref Range    POC Glucose 89 65 - 140 mg/dl   Fingerstick Glucose (POCT)   Result Value Ref  Range    POC Glucose 130 65 - 140 mg/dl   Fingerstick Glucose (POCT)   Result Value Ref Range    POC Glucose 233 (H) 65 - 140 mg/dl   Fingerstick Glucose (POCT)   Result Value Ref Range    POC Glucose 175 (H) 65 - 140 mg/dl   Fingerstick Glucose (POCT)   Result Value Ref Range    POC Glucose 88 65 - 140 mg/dl   Fingerstick Glucose (POCT)   Result Value Ref Range    POC Glucose 160 (H) 65 - 140 mg/dl   Fingerstick Glucose (POCT)   Result Value Ref Range    POC Glucose 169 (H) 65 - 140 mg/dl   Manual Differential(PHLEBS Do Not Order)   Result Value Ref Range    Segmented % 76 (H) 43 - 75 %    Bands % 4 0 - 8 %    Lymphocytes % 15 14 - 44 %    Monocytes % 4 4 - 12 %    Eosinophils % 0 0 - 6 %    Basophils % 0 0 - 1 %    Atypical Lymphocytes % 1 (H) <=0 %    Absolute Neutrophils 3.13 1.85 - 7.62 Thousand/uL    Absolute Lymphocytes 0.63 0.60 - 4.47 Thousand/uL    Absolute Monocytes 0.16 0.00 - 1.22 Thousand/uL    Absolute Eosinophils 0.00 0.00 - 0.40 Thousand/uL    Absolute Basophils 0.00 0.00 - 0.10 Thousand/uL    Total Counted      RBC Morphology Present     Platelet Estimate Adequate Adequate    Anisocytosis Present     Ovalocytes Present     Polychromasia Present     Target Cells Present    Manual Differential(PHLEBS Do Not Order)   Result Value Ref Range    Segmented % 64 43 - 75 %    Bands % 2 0 - 8 %    Lymphocytes % 19 14 - 44 %    Monocytes % 9 4 - 12 %    Eosinophils % 0 0 - 6 %    Basophils % 0 0 - 1 %    Metamyelocytes % 1 0 - 1 %    Myelocytes % 4 (H) 0 - 1 %    Atypical Lymphocytes % 1 (H) <=0 %    Absolute Neutrophils 1.85 1.85 - 7.62 Thousand/uL    Absolute Lymphocytes 0.56 (L) 0.60 - 4.47 Thousand/uL    Absolute Monocytes 0.25 0.00 - 1.22 Thousand/uL    Absolute Eosinophils 0.00 0.00 - 0.40 Thousand/uL    Absolute Basophils 0.00 0.00 - 0.10 Thousand/uL    Absolute Metamyelocytes 0.03 0.00 - 0.10 Thousand/uL    Absolute Myelocytes 0.11 (H) 0.00 - 0.10 Thousand/uL    Total Counted      nRBC 2 0 - 2 /100  WBC    RBC Morphology Present     Platelet Estimate Adequate Adequate    Clumped Platelets Present     Giant PLTs Present     Anisocytosis Present     Maria Dolores Cells Present     Macrocytes Present     Ovalocytes Present    Manual Differential(PHLEBS Do Not Order)   Result Value Ref Range    Segmented % 55 43 - 75 %    Bands % 11 (H) 0 - 8 %    Lymphocytes % 19 14 - 44 %    Monocytes % 5 4 - 12 %    Eosinophils % 1 0 - 6 %    Basophils % 0 0 - 1 %    Metamyelocytes % 3 (H) 0 - 1 %    Myelocytes % 6 (H) 0 - 1 %    Absolute Neutrophils 2.81 1.85 - 7.62 Thousand/uL    Absolute Lymphocytes 0.81 0.60 - 4.47 Thousand/uL    Absolute Monocytes 0.21 0.00 - 1.22 Thousand/uL    Absolute Eosinophils 0.04 0.00 - 0.40 Thousand/uL    Absolute Basophils 0.00 0.00 - 0.10 Thousand/uL    Absolute Metamyelocytes 0.13 (H) 0.00 - 0.10 Thousand/uL    Absolute Myelocytes 0.26 (H) 0.00 - 0.10 Thousand/uL    Total Counted      nRBC 1 0 - 2 /100 WBC    RBC Morphology Present     Platelet Estimate Adequate Adequate    Giant PLTs Present     Anisocytosis Present     Hypochromia Present     Ovalocytes Present     Polychromasia Present      *Note: Due to a large number of results and/or encounters for the requested time period, some results have not been displayed. A complete set of results can be found in Results Review.

## 2025-03-13 NOTE — ASSESSMENT & PLAN NOTE
Today in the office, we discussed proceeding with an EGD with dilation.  We will plan to proceed on 3/26/2025.  She just underwent some preoperative lab work recently so she does not need anything updated.  She is not on any blood thinners.  Consent was obtained today.  The specific risks that were discussed included injury to esophagus and failure to work    Orders:    Case request operating room: ESOPHAGOGASTRODUODENOSCOPY (EGD), dilation; Standing

## 2025-03-13 NOTE — PROGRESS NOTES
Name: Zohra Barriga      : 1955      MRN: [id number]  Encounter Provider: Jill Bentley MD  Encounter Date: 3/13/2025   Encounter department: [facility] [place]  :  Assessment & Plan  Achalasia  Today in the office, we discussed proceeding with an EGD with dilation.  We will plan to proceed on 3/26/2025.  She just underwent some preoperative lab work recently so she does not need anything updated.  She is not on any blood thinners.  Consent was obtained today.  The specific risks that were discussed included injury to esophagus and failure to work    Orders:    Case request operating room: ESOPHAGOGASTRODUODENOSCOPY (EGD), dilation; Standing        Thoracic History   Diagnosis: Achalasia  Procedure: Robotic heller myotomy, Kareem fundoplication, laparoscopic lysis of adhesions 5/3/21      No problems updated.     History of Present Illness   HPI  Zohra Barriga is a 69 y.o. female who is well-known to me.  She underwent a robotic Heller myotomy with Kareem fundoplication in May 2021.  She presents today because some of her symptoms of dysphagia have returned.  She has undergone dilations in the past which have helped her significantly.  She did have a recent stay in the hospital but she has recovered from this without incident.  This was secondary to an influenza pneumonia.  This was approximately 1 month ago.    Review of Systems   Constitutional:  Negative for chills, fatigue, fever and unexpected weight change.   HENT: Negative.     Eyes: Negative.  Negative for visual disturbance.   Respiratory:  Negative for cough, shortness of breath and stridor.    Cardiovascular:  Negative for chest pain.   Gastrointestinal: Negative.    Endocrine: Negative.    Genitourinary: Negative.    Musculoskeletal:  Positive for gait problem.   Skin: Negative.    Neurological:  Negative for dizziness, light-headedness and headaches.   Hematological:  Negative for adenopathy.  "  Psychiatric/Behavioral: Negative.        Medical History Reviewed by provider this encounter:     .  Past Medical History   Past Medical History:   Diagnosis Date    Anxiety     Asthma     controlled    Back complaints     Cancer (Formerly [facility])     nose    Cellulitis of left lower leg     \"not now\"    CHF (congestive heart failure) (Formerly [facility]) 02/2021    Chronic bilateral thoracic back pain     Chronic kidney disease     sees nephrologist regularly\" stage 3\"    Chronic myofascial pain     Chronic pain of both lower extremities     Chronic pain of left knee     \"both knees\"    Chronic pain syndrome     bilat legs and knees/neuropathy pain    COVID 12/2021    CPAP (continuous positive airway pressure) dependence     no currently uses    Depression     Diabetes mellitus (Formerly [facility])     insulin pump    Difficulty walking 2018    Disease of thyroid gland     Does use hearing aid     bilat and will wear DOS    DVT (deep venous thrombosis) (Formerly [facility]) 2013    left leg    Gait disorder     Gastroparesis     GERD (gastroesophageal reflux disease)     Head injury     Headache, tension-type     History of angina     History of MRSA infection     History of transfusion     Many years ago    HL (hearing loss) 2018    Hyperlipidemia     Hypertension     Hypoglycemic reaction     \"occas low blood sugar\"    Insulin pump in place     pt reports saw endocrinologist 4/28 and will bring copy of instructions DOS    Irritable bowel syndrome     Kidney disease     Memory loss 2021    Movement disorder 2021    Neuropathy in diabetes ([facility]) 2021    Constant pain legs and feet    Nose fracture     Pacemaker 2019    Pneumonia     Polyneuropathy associated with underlying disease ([facility]ly [facility])     PONV (postoperative nausea and vomiting)     Risk for falls     S/P insertion of spinal cord stimulator 06/08/2018    Sarcoidosis     Shortness of breath     \"exertion and not new\"    Sleep apnea     Stroke ([facility])     Thoracic vertebral fracture (Formerly [facility])     TIA (transient ischemic " "attack)     Use of cane as ambulatory aid     Uses walker     Wears dentures     permanent upper/and some missing teeth/partial lower     Past Surgical History:   Procedure Laterality Date    ABDOMINAL ADHESION SURGERY N/A 2021    Procedure: laparoscopic LYSIS ADHESIONS;  Surgeon: Jill Bentley MD;  [facility];  Service: Thoracic    BACK SURGERY  2023    TL[facility] at [facility], Dr. Levy    BREAST SURGERY      BREAST SURGERY      reduction    CARDIAC PACEMAKER PLACEMENT      pacemaker     SECTION      COLONOSCOPY      DILATION AND CURETTAGE OF UTERUS      \"D&E\"    HERNIA REPAIR      HYSTERECTOMY      JOINT REPLACEMENT Left     LTKR    NECK SURGERY      fused 4 discs with 4 screws implanted    NISSEN FUNDOPLICATION LAPAROSCOPIC WITH ROBOTICS N/A 2021    Procedure: ROBOTIC HELLER MYOTOMY WITH BERNARDO FUNDIPLICATION ;  Surgeon: Jill Bentley MD;  [facility];  Service: Thoracic    NOSE SURGERY  2024    closed reduction    MT ESOPHAGOGASTRODUODENOSCOPY TRANSORAL DIAGNOSTIC N/A 2021    Procedure: ESOPHAGOGASTRODUODENOSCOPY (EGD);  Surgeon: Jill Bentley MD;  [facility];  Service: Thoracic    MT ESOPHAGOGASTRODUODENOSCOPY TRANSORAL DIAGNOSTIC N/A 2022    Procedure: ESOPHAGOGASTRODUODENOSCOPY (EGD),;  Surgeon: Jill Bentley MD;  [facility];  Service: Thoracic    MT ESOPHAGOGASTRODUODENOSCOPY TRANSORAL DIAGNOSTIC N/A 2022    Procedure: ESOPHAGOGASTRODUODENOSCOPY (EGD);  Surgeon: Jill Bentley MD;  [facility];  Service: Thoracic    MT ESOPHAGOGASTRODUODENOSCOPY TRANSORAL DIAGNOSTIC N/A 2022    Procedure: ESOPHAGOGASTRODUODENOSCOPY (EGD); PYLORIC DILATION; GE JUNCTION DILATION;  Surgeon: Jill Bentley MD;  [facility];  Service: Thoracic    MT ESOPHAGOGASTRODUODENOSCOPY TRANSORAL DIAGNOSTIC N/A 2022    Procedure: ESOPHAGOGASTRODUODENOSCOPY (EGD);  Surgeon: Varinder Steiner MD;  " [facility];  Service: Thoracic    CO ESOPHAGOGASTRODUODENOSCOPY TRANSORAL DIAGNOSTIC N/A 01/24/2023    Procedure: ESOPHAGOGASTRODUODENOSCOPY (EGD);  Surgeon: Jill Bentley MD;  [facility];  Service: Thoracic    CO ESOPHAGOGASTRODUODENOSCOPY TRANSORAL DIAGNOSTIC N/A 05/01/2024    Procedure: ESOPHAGOGASTRODUODENOSCOPY (EGD);  Surgeon: Jill Bentley MD;  [facility];  Service: Thoracic    CO ESOPHAGOSCOPY FLEX BALLOON DILAT <30 MM DIAM N/A 01/12/2022    Procedure: DILATATION ESOPHAGEAL;  Surgeon: Jill Bentley MD;  [facility];  Service: Thoracic    CO ESOPHAGOSCOPY FLEX BALLOON DILAT <30 MM DIAM N/A 02/16/2022    Procedure: DILATATION ESOPHAGEAL;  Surgeon: Jill Bentley MD;  [facility];  Service: Thoracic    CO ESOPHAGOSCOPY FLEX BALLOON DILAT <30 MM DIAM N/A 11/29/2022    Procedure: DILATATION ESOPHAGEAL esophageal and pyloric dilation;  Surgeon: Varinder Steiner MD;  [facility];  Service: Thoracic    CO ESOPHAGOSCOPY FLEX BALLOON DILAT <30 MM DIAM N/A 01/24/2023    Procedure: esophageal dilation dilated up to 54  with pylorus dilation dilated up to 18;  Surgeon: Jill Bentley MD;  [facility];  Service: Thoracic    CO ESOPHAGOSCOPY FLEX BALLOON DILAT <30 MM DIAM N/A 05/01/2024    Procedure: DILATATION ESOPHAGEAL;  Surgeon: Jill Bentley MD;  [facility];  Service: Thoracic    CO RIBEIRO IMPLTJ NSTIM ELTRDS PLATE/PADDLE EDRL Left 04/23/2018    Procedure: Insertion of thoracic spinal cord stimulator electrode via laminotomy and placement of left buttock IPG;  Surgeon: Shahab Bullock MD;  [facility];  Service: Neurosurgery    REPLACEMENT TOTAL KNEE      ROTATOR CUFF REPAIR      SPINAL CORD STIMULATOR REMOVAL Bilateral 07/02/2024    Procedure: REOPENING OF THORACIC AND LEFT BUTTOCK INCISION FOR REMOVAL OF SPINAL CORD STIMULATOR SYSTEM;  Surgeon: Shahab Bullock MD;  [facility];  Service:  Neurosurgery    SPINAL STIMULATOR PLACEMENT Left 10/03/2018    Procedure: BUTTOCK RE-OPENING INCISION FOR REPOSITIONING OF IMPLANTABLE PULSE GENERATOR;  Surgeon: Shahab Bullock MD;  [facility];  Service: Neurosurgery    TONSILLECTOMY      UPPER GASTROINTESTINAL ENDOSCOPY      VASCULAR SURGERY      WISDOM TOOTH EXTRACTION       Family History   Problem Relation Age of Onset    Diabetes Family     Heart disease Family     Hypertension Family     Neuropathy Family     No Known Problems Mother     Heart disease Father     Diabetes Father     Neuropathy Father     Stroke Father     No Known Problems Maternal Grandmother     No Known Problems Maternal Grandfather     No Known Problems Paternal Grandmother     No Known Problems Paternal Grandfather     No Known Problems Brother     No Known Problems Daughter     Cancer Son       reports that she has never smoked. She has never used smokeless tobacco. She reports that she does not currently use drugs after having used the following drugs: Marijuana. Frequency: 8.00 times per week. She reports that she does not drink alcohol.  Current Outpatient Medications   Medication Instructions    acetaminophen (TYLENOL) 975 mg, Every 6 hours PRN    albuterol (ACCUNEB) 2.5 mg, Every 6 hours PRN    brimonidine tartrate 0.2 % ophthalmic solution ADMINISTER 1 DROP INTO THE LEFT EYE 2 (TWO) TIMES A DAY FOR 25 DAYS.    carvedilol (COREG) 6.25 mg, Oral, 2 times daily with meals    Cholecalciferol 50,000 Units, Weekly    CRANBERRY FRUIT CONCENTRATE PO 4,200 mg, Daily    cyanocobalamin (VITAMIN B-12) 1,000 mcg, Daily    Darbepoetin Mike (ARANESP, ALBUMIN FREE, IJ) 1 Dose, Every 30 days    docusate sodium (COLACE) 100 mg, 2 times daily    ezetimibe (ZETIA) 10 mg, Daily    famotidine (PEPCID) 40 mg, Oral, Daily at bedtime    Fluticasone-Salmeterol (Advair) 250-50 mcg/dose inhaler 1 puff, 2 times daily    gabapentin (NEURONTIN) 300 mg, Daily at bedtime    glucose blood test strip Testing  "six times daily  E11.65 on insulin pump    insulin glargine (LANTUS) 15 Units, Subcutaneous, Daily at bedtime    Insulin Infusion Pump (MINIMED INSULIN PUMP) PRINCESS Inject under the skin Humalog insulin basil 13.175 units per hour max bolus 2 units per hour PRN for blood sugar over 160.     insulin lispro (HUMALOG/ADMELOG) 1-6 Units, Subcutaneous, 3 times daily before meals    insulin lispro (HUMALOG/ADMELOG) 1-6 Units, Subcutaneous, Daily at bedtime    lactulose (CEPHULAC) 20 g packet Start taking once daily for 1 week, then can increase to twice daily with goal of having bowel movements daily or every other day.    levothyroxine 112 mcg tablet 1 tablet, Daily    lubiprostone (Amitiza) 8 mcg capsule Take by mouth    Magnesium Oxide (DIASENSE MAGNESIUM PO) Take by mouth    melatonin 3 mg, Daily at bedtime PRN    methocarbamol (ROBAXIN) 500 mg tablet 1 TAB ORAL TWICE DAILY X7 DAYS AS NEEDED FOR SPASM    nitrofurantoin (MACROBID) 100 mg capsule 1 capsule, 2 times daily    oxyCODONE HCl 5 mg, Every 4 hours PRN    polyethylene glycol (MIRALAX) 17 g, Oral, Daily    pramipexole (MIRAPEX) 1 mg, 2 times daily    senna (SENOKOT) 8.6 mg 2 tablets, 2 times daily    sertraline (ZOLOFT) 150 mg, Daily    spironolactone (ALDACTONE) 25 mg tablet TAKE 1 TABLET (25 MG TOTAL) BY MOUTH DAILY.    tiotropium (Spiriva HandiHaler) 18 mcg inhalation capsule Place into inhaler and inhale    tolterodine (DETROL LA) 4 mg, Daily    torsemide (DEMADEX) 20 mg, Oral, Daily    zinc gluconate 50 mg, Daily     Allergies   Allergen Reactions    Bactrim [Sulfamethoxazole-Trimethoprim] Hives    Sucralfate Hives     Facial swelling    Topamax [Topiramate]      disorentation    Azithromycin Itching    Pregabalin Confusion and Other (See Comments)     altered mental status    Ciprofloxacin Drowsiness     Other reaction(s): Other (See Comments)  \"drowsiness\"    Norvasc [Amlodipine] Swelling    Baclofen      \"That knocks me out.\"    Bupropion Fatigue    " Methotrexate Nausea Only      Current Outpatient Medications on File Prior to Visit   Medication Sig Dispense Refill    acetaminophen (TYLENOL) 500 mg tablet Take 975 mg by mouth every 6 (six) hours as needed for mild pain      albuterol (ACCUNEB) 1.25 MG/3ML nebulizer solution Take 2.5 mg by nebulization every 6 (six) hours as needed for wheezing      brimonidine tartrate 0.2 % ophthalmic solution ADMINISTER 1 DROP INTO THE LEFT EYE 2 (TWO) TIMES A DAY FOR 25 DAYS.      carvedilol (COREG) 6.25 mg tablet Take 1 tablet (6.25 mg total) by mouth 2 (two) times a day with meals      Cholecalciferol 1.25 MG (33721 UT) capsule Take 50,000 Units by mouth once a week sundays      CRANBERRY FRUIT CONCENTRATE PO Take 4,200 mg by mouth in the morning      cyanocobalamin (VITAMIN B-12) 1000 MCG tablet Take 1,000 mcg by mouth daily      Darbepoetin Mike (ARANESP, ALBUMIN FREE, IJ) 1 Dose by Intramuscular (deltoid only) route every 30 (thirty) days      docusate sodium (Colace) 100 mg capsule Take 100 mg by mouth 2 (two) times a day.      ezetimibe (ZETIA) 10 mg tablet Take 10 mg by mouth daily      famotidine (PEPCID) 40 MG tablet Take 1 tablet (40 mg total) by mouth daily at bedtime 30 tablet 4    Fluticasone-Salmeterol (Advair) 250-50 mcg/dose inhaler Inhale 1 puff 2 (two) times a day      gabapentin (NEURONTIN) 300 mg capsule Take 300 mg by mouth daily at bedtime      glucose blood test strip Testing six times daily  E11.65 on insulin pump      Insulin Infusion Pump (MINIMED INSULIN PUMP) PRINCESS Inject under the skin Humalog insulin basil 13.175 units per hour max bolus 2 units per hour PRN for blood sugar over 160.       levothyroxine 112 mcg tablet Take 1 tablet by mouth in the morning      lubiprostone (Amitiza) 8 mcg capsule Take by mouth      Magnesium Oxide (DIASENSE MAGNESIUM PO) Take by mouth      melatonin 3 mg Take 3 mg by mouth daily at bedtime as needed (sleep).      methocarbamol (ROBAXIN) 500 mg tablet 1 TAB ORAL  TWICE DAILY X7 DAYS AS NEEDED FOR SPASM      nitrofurantoin (MACROBID) 100 mg capsule Take 1 capsule by mouth 2 (two) times a day      oxyCODONE HCl 5 MG TABA Take 5 mg by mouth every 4 (four) hours as needed (severe pain).      pramipexole (MIRAPEX) 1 mg tablet Take 1 mg by mouth 2 (two) times a day        senna (SENOKOT) 8.6 mg Take 2 tablets by mouth 2 (two) times a day.      sertraline (ZOLOFT) 100 mg tablet Take 150 mg by mouth daily      spironolactone (ALDACTONE) 25 mg tablet TAKE 1 TABLET (25 MG TOTAL) BY MOUTH DAILY.      tiotropium (Spiriva HandiHaler) 18 mcg inhalation capsule Place into inhaler and inhale      tolterodine (Detrol LA) 4 mg 24 hr capsule Take 4 mg by mouth daily      torsemide (DEMADEX) 20 mg tablet Take 1 tablet (20 mg total) by mouth daily      zinc gluconate 50 mg tablet Take 50 mg by mouth daily      insulin glargine (LANTUS) 100 units/mL subcutaneous injection Inject 15 Units under the skin daily at bedtime (Patient not taking: Reported on 3/6/2025)      insulin lispro (HumALOG/ADMELOG) 100 units/mL injection Inject 1-6 Units under the skin 3 (three) times a day before meals (Patient not taking: Reported on 3/6/2025)      insulin lispro (HumALOG/ADMELOG) 100 units/mL injection Inject 1-6 Units under the skin daily at bedtime (Patient not taking: Reported on 3/6/2025)      lactulose (CEPHULAC) 20 g packet Start taking once daily for 1 week, then can increase to twice daily with goal of having bowel movements daily or every other day. (Patient not taking: Reported on 3/6/2025) 60 each 3    polyethylene glycol (MIRALAX) 17 g packet Take 17 g by mouth daily (Patient not taking: Reported on 3/6/2025) 90 each 3     No current facility-administered medications on file prior to visit.      Social History     Tobacco Use    Smoking status: Never    Smokeless tobacco: Never   Vaping Use    Vaping status: Never Used   Substance and Sexual Activity    Alcohol use: No    Drug use: Not Currently     " Frequency: 8.0 times per week     Types: Marijuana     Comment: Medical Marijuana/vape pen once per week    Sexual activity: Yes     Partners: Male     Birth control/protection: None        Objective   /77 (BP Location: Left arm, Patient Position: Sitting, Cuff Size: Large)   Pulse 61   Temp 97.5 °F (36.4 °C) (Temporal)   Resp 15   Ht 4' 11\" (1.499 m)   SpO2 99%   BMI 33.12 kg/m²     Pain Screening:  Pain Score: 0-No pain  ECOG    Physical Exam  Vitals and nursing note reviewed.   Constitutional:       General: She is not in acute distress.     Appearance: Normal appearance. She is well-developed. She is not diaphoretic.   HENT:      Head: Normocephalic and atraumatic.      Nose: Nose normal. No congestion or rhinorrhea.      Mouth/Throat:      Mouth: Mucous membranes are moist.      Pharynx: Oropharynx is clear. No oropharyngeal exudate.   Eyes:      General: No scleral icterus.     Pupils: Pupils are equal, round, and reactive to light.   Neck:      Trachea: No tracheal deviation.   Cardiovascular:      Rate and Rhythm: Normal rate and regular rhythm.      Pulses: Normal pulses.      Heart sounds: Normal heart sounds. No murmur heard.  Pulmonary:      Effort: Pulmonary effort is normal. No respiratory distress.      Breath sounds: Normal breath sounds. No stridor. No wheezing or rales.   Chest:      Chest wall: No tenderness.   Abdominal:      General: Bowel sounds are normal. There is no distension.      Palpations: Abdomen is soft.      Tenderness: There is no abdominal tenderness. There is no rebound.   Musculoskeletal:         General: Normal range of motion.      Cervical back: Normal range of motion and neck supple. No muscular tenderness.      Comments: Boot on left ankle   Lymphadenopathy:      Cervical: No cervical adenopathy.   Skin:     General: Skin is warm and dry.      Coloration: Skin is not jaundiced or pale.      Findings: No erythema or rash.   Neurological:      General: No focal " deficit present.      Mental Status: She is alert and oriented to person, place, and time.   Psychiatric:         Mood and Affect: Mood normal.         Behavior: Behavior normal.         Thought Content: Thought content normal.         Judgment: Judgment normal.         Labs:   Results for orders placed or performed during the hospital encounter of 02/10/25   Blood culture #1    Specimen: Arm, Right; Blood   Result Value Ref Range    Blood Culture No Growth After 5 Days.    Blood culture #2    Specimen: Arm, Left; Blood   Result Value Ref Range    Blood Culture No Growth After 5 Days.     Specimen: Urine, Clean Catch   Result Value Ref Range    Urine Culture >100,000 cfu/ml    FLU/RSV/COVID - if FLU/RSV clinically relevant (2hr TAT)    Specimen: Nose; Nares   Result Value Ref Range    SARS-CoV-2 Negative Negative    INFLUENZA A PCR Negative Negative    INFLUENZA B PCR Negative Negative    RSV PCR Negative Negative   FLU/RSV/COVID - if FLU/RSV clinically relevant    Specimen: Nasopharyngeal Swab; Nares   Result Value Ref Range    SARS-CoV-2 Negative Negative    INFLUENZA A PCR Positive (A) Negative    INFLUENZA B PCR Negative Negative    RSV PCR Negative Negative   CBC and differential   Result Value Ref Range    WBC 6.51 4.31 - 10.16 Thousand/uL    RBC 4.05 3.81 - 5.12 Million/uL    Hemoglobin 11.3 (L) 11.5 - 15.4 g/dL    Hematocrit 36.6 34.8 - 46.1 %    MCV 90 82 - 98 fL    MCH 27.9 26.8 - 34.3 pg    MCHC 30.9 (L) 31.4 - 37.4 g/dL    RDW 16.1 (H) 11.6 - 15.1 %    MPV 10.5 8.9 - 12.7 fL    Platelets 174 149 - 390 Thousands/uL    nRBC 0 /100 WBCs    Segmented % 79 (H) 43 - 75 %    Immature Grans % 1 0 - 2 %    Lymphocytes % 10 (L) 14 - 44 %    Monocytes % 10 4 - 12 %    Eosinophils Relative 0 0 - 6 %    Basophils Relative 0 0 - 1 %    Absolute Neutrophils 5.18 1.85 - 7.62 Thousands/µL    Absolute Immature Grans 0.05 0.00 - 0.20 Thousand/uL    Absolute Lymphocytes 0.62 0.60 - 4.47 Thousands/µL    Absolute Monocytes  0.63 0.17 - 1.22 Thousand/µL    Eosinophils Absolute 0.01 0.00 - 0.61 Thousand/µL    Basophils Absolute 0.02 0.00 - 0.10 Thousands/µL   Comprehensive metabolic panel   Result Value Ref Range    Sodium 138 135 - 147 mmol/L    Potassium 4.9 3.5 - 5.3 mmol/L    Chloride 103 96 - 108 mmol/L    CO2 26 21 - 32 mmol/L    ANION GAP 9 4 - 13 mmol/L    BUN 30 (H) 5 - 25 mg/dL    Creatinine 1.04 0.60 - 1.30 mg/dL    Glucose 237 (H) 65 - 140 mg/dL    Calcium 9.3 8.4 - 10.2 mg/dL    AST 28 13 - 39 U/L    ALT 16 7 - 52 U/L    Alkaline Phosphatase 109 (H) 34 - 104 U/L    Total Protein 7.7 6.4 - 8.4 g/dL    Albumin 4.1 3.5 - 5.0 g/dL    Total Bilirubin 0.96 0.20 - 1.00 mg/dL    eGFR 55 ml/min/1.73sq m   Result Value Ref Range    LACTIC ACID 1.5 0.5 - 2.0 mmol/L   Result Value Ref Range    Procalcitonin 0.11 <=0.25 ng/ml   Protime-INR   Result Value Ref Range    Protime 15.8 (H) 12.3 - 15.0 seconds    INR 1.25 (H) 0.85 - 1.19   APTT   Result Value Ref Range    PTT 35 (H) 23 - 34 seconds   UA w Reflex to Microscopic w Reflex to Culture    Specimen: Urine, Clean Catch   Result Value Ref Range    Color, UA Yellow     Clarity, UA Turbid     Specific Gravity, UA 1.020 1.003 - 1.030    pH, UA 6.0 4.5, 5.0, 5.5, 6.0, 6.5, 7.0, 7.5, 8.0    Leukocytes, UA Small (A) Negative    Nitrite, UA Negative Negative    Protein,  (2+) (A) Negative mg/dl    Glucose, UA Negative Negative mg/dl    Ketones, UA Negative Negative mg/dl    Urobilinogen, UA <2.0 <2.0 mg/dl mg/dl    Bilirubin, UA Negative Negative    Occult Blood, UA Trace (A) Negative   Urine Microscopic   Result Value Ref Range    RBC, UA 2-4 (A) None Seen, 1-2 /hpf    WBC, UA 10-20 (A) None Seen, 1-2 /hpf    Epithelial Cells Occasional None Seen, Occasional /hpf    Bacteria, UA Occasional None Seen, Occasional /hpf    Hyaline Casts, UA 5-10 (A) None Seen /lpf    Amorphous Crystals, UA Occasional    Lactic acid, plasma (w/reflex if result > 2.0)   Result Value Ref Range    LACTIC ACID  0.8 0.5 - 2.0 mmol/L   Procalcitonin, Next Day AM Collection   Result Value Ref Range    Procalcitonin 6.35 (H) <=0.25 ng/ml   Comprehensive metabolic panel   Result Value Ref Range    Sodium 139 135 - 147 mmol/L    Potassium 4.4 3.5 - 5.3 mmol/L    Chloride 110 (H) 96 - 108 mmol/L    CO2 23 21 - 32 mmol/L    ANION GAP 6 4 - 13 mmol/L    BUN 32 (H) 5 - 25 mg/dL    Creatinine 1.17 0.60 - 1.30 mg/dL    Glucose 153 (H) 65 - 140 mg/dL    Calcium 7.3 (L) 8.4 - 10.2 mg/dL    Corrected Calcium 7.9 (L) 8.3 - 10.1 mg/dL    AST 18 13 - 39 U/L    ALT 10 7 - 52 U/L    Alkaline Phosphatase 66 34 - 104 U/L    Total Protein 5.7 (L) 6.4 - 8.4 g/dL    Albumin 3.3 (L) 3.5 - 5.0 g/dL    Total Bilirubin 0.72 0.20 - 1.00 mg/dL    eGFR 47 ml/min/1.73sq m   Result Value Ref Range    Magnesium 1.6 (L) 1.9 - 2.7 mg/dL   Result Value Ref Range    Phosphorus 4.9 (H) 2.3 - 4.1 mg/dL   CBC and differential   Result Value Ref Range    WBC 5.78 4.31 - 10.16 Thousand/uL    RBC 2.78 (L) 3.81 - 5.12 Million/uL    Hemoglobin 8.0 (L) 11.5 - 15.4 g/dL    Hematocrit 26.2 (L) 34.8 - 46.1 %    MCV 94 82 - 98 fL    MCH 28.8 26.8 - 34.3 pg    MCHC 30.5 (L) 31.4 - 37.4 g/dL    RDW 16.2 (H) 11.6 - 15.1 %    MPV 10.4 8.9 - 12.7 fL    Platelets 104 (L) 149 - 390 Thousands/uL    nRBC 0 /100 WBCs    Segmented % 77 (H) 43 - 75 %    Immature Grans % 1 0 - 2 %    Lymphocytes % 11 (L) 14 - 44 %    Monocytes % 11 4 - 12 %    Eosinophils Relative 0 0 - 6 %    Basophils Relative 0 0 - 1 %    Absolute Neutrophils 4.42 1.85 - 7.62 Thousands/µL    Absolute Immature Grans 0.05 0.00 - 0.20 Thousand/uL    Absolute Lymphocytes 0.65 0.60 - 4.47 Thousands/µL    Absolute Monocytes 0.64 0.17 - 1.22 Thousand/µL    Eosinophils Absolute 0.00 0.00 - 0.61 Thousand/µL    Basophils Absolute 0.02 0.00 - 0.10 Thousands/µL   TSH, 3rd generation with Free T4 reflex   Result Value Ref Range    TSH 3RD GENERATON 7.216 (H) 0.450 - 4.500 uIU/mL   T4, free   Result Value Ref Range    Free T4 1.33  (H) 0.61 - 1.12 ng/dL   Basic metabolic panel   Result Value Ref Range    Sodium 136 135 - 147 mmol/L    Potassium 4.9 3.5 - 5.3 mmol/L    Chloride 110 (H) 96 - 108 mmol/L    CO2 18 (L) 21 - 32 mmol/L    ANION GAP 8 4 - 13 mmol/L    BUN 38 (H) 5 - 25 mg/dL    Creatinine 1.36 (H) 0.60 - 1.30 mg/dL    Glucose 92 65 - 140 mg/dL    Calcium 7.5 (L) 8.4 - 10.2 mg/dL    eGFR 40 ml/min/1.73sq m   CBC and differential   Result Value Ref Range    WBC 4.09 (L) 4.31 - 10.16 Thousand/uL    RBC 2.90 (L) 3.81 - 5.12 Million/uL    Hemoglobin 8.4 (L) 11.5 - 15.4 g/dL    Hematocrit 28.1 (L) 34.8 - 46.1 %    MCV 97 82 - 98 fL    MCH 29.0 26.8 - 34.3 pg    MCHC 29.9 (L) 31.4 - 37.4 g/dL    RDW 16.6 (H) 11.6 - 15.1 %    MPV 11.0 8.9 - 12.7 fL    Platelets 105 (L) 149 - 390 Thousands/uL    nRBC 0 /100 WBCs    Segmented % 67 43 - 75 %    Immature Grans % 1 0 - 2 %    Lymphocytes % 21 14 - 44 %    Monocytes % 10 4 - 12 %    Eosinophils Relative 1 0 - 6 %    Basophils Relative 0 0 - 1 %    Absolute Neutrophils 2.72 1.85 - 7.62 Thousands/µL    Absolute Immature Grans 0.02 0.00 - 0.20 Thousand/uL    Absolute Lymphocytes 0.87 0.60 - 4.47 Thousands/µL    Absolute Monocytes 0.42 0.17 - 1.22 Thousand/µL    Eosinophils Absolute 0.05 0.00 - 0.61 Thousand/µL    Basophils Absolute 0.01 0.00 - 0.10 Thousands/µL   Result Value Ref Range    Magnesium 1.9 1.9 - 2.7 mg/dL   Result Value Ref Range    Procalcitonin 6.93 (H) <=0.25 ng/ml   Vancomycin, random   Result Value Ref Range    Vancomycin Rm 18.2 10.0 - 20.0 ug/mL   CBC and differential   Result Value Ref Range    WBC 4.64 4.31 - 10.16 Thousand/uL    RBC 3.07 (L) 3.81 - 5.12 Million/uL    Hemoglobin 8.7 (L) 11.5 - 15.4 g/dL    Hematocrit 28.8 (L) 34.8 - 46.1 %    MCV 94 82 - 98 fL    MCH 28.3 26.8 - 34.3 pg    MCHC 30.2 (L) 31.4 - 37.4 g/dL    RDW 16.5 (H) 11.6 - 15.1 %    MPV 11.0 8.9 - 12.7 fL    Platelets 125 (L) 149 - 390 Thousands/uL    nRBC 1 /100 WBCs    Segmented % 71 43 - 75 %    Immature  Grans % 1 0 - 2 %    Lymphocytes % 18 14 - 44 %    Monocytes % 9 4 - 12 %    Eosinophils Relative 1 0 - 6 %    Basophils Relative 0 0 - 1 %    Absolute Neutrophils 3.25 1.85 - 7.62 Thousands/µL    Absolute Immature Grans 0.03 0.00 - 0.20 Thousand/uL    Absolute Lymphocytes 0.85 0.60 - 4.47 Thousands/µL    Absolute Monocytes 0.43 0.17 - 1.22 Thousand/µL    Eosinophils Absolute 0.06 0.00 - 0.61 Thousand/µL    Basophils Absolute 0.02 0.00 - 0.10 Thousands/µL   Result Value Ref Range    Magnesium 1.9 1.9 - 2.7 mg/dL   Basic metabolic panel   Result Value Ref Range    Sodium 137 135 - 147 mmol/L    Potassium 4.6 3.5 - 5.3 mmol/L    Chloride 111 (H) 96 - 108 mmol/L    CO2 19 (L) 21 - 32 mmol/L    ANION GAP 7 4 - 13 mmol/L    BUN 36 (H) 5 - 25 mg/dL    Creatinine 1.19 0.60 - 1.30 mg/dL    Glucose 63 (L) 65 - 140 mg/dL    Calcium 7.9 (L) 8.4 - 10.2 mg/dL    eGFR 47 ml/min/1.73sq m   Lactic acid, plasma (w/reflex if result > 2.0)   Result Value Ref Range    LACTIC ACID 1.9 0.5 - 2.0 mmol/L   CBC and differential   Result Value Ref Range    WBC 6.57 4.31 - 10.16 Thousand/uL    RBC 3.68 (L) 3.81 - 5.12 Million/uL    Hemoglobin 10.4 (L) 11.5 - 15.4 g/dL    Hematocrit 34.8 34.8 - 46.1 %    MCV 95 82 - 98 fL    MCH 28.3 26.8 - 34.3 pg    MCHC 29.9 (L) 31.4 - 37.4 g/dL    RDW 16.2 (H) 11.6 - 15.1 %    MPV 11.1 8.9 - 12.7 fL    Platelets 156 149 - 390 Thousands/uL    nRBC 0 /100 WBCs    Segmented % 81 (H) 43 - 75 %    Immature Grans % 1 0 - 2 %    Lymphocytes % 11 (L) 14 - 44 %    Monocytes % 6 4 - 12 %    Eosinophils Relative 1 0 - 6 %    Basophils Relative 0 0 - 1 %    Absolute Neutrophils 5.28 1.85 - 7.62 Thousands/µL    Absolute Immature Grans 0.09 0.00 - 0.20 Thousand/uL    Absolute Lymphocytes 0.74 0.60 - 4.47 Thousands/µL    Absolute Monocytes 0.40 0.17 - 1.22 Thousand/µL    Eosinophils Absolute 0.04 0.00 - 0.61 Thousand/µL    Basophils Absolute 0.02 0.00 - 0.10 Thousands/µL   Comprehensive metabolic panel   Result Value Ref  Range    Sodium 136 135 - 147 mmol/L    Potassium 5.3 3.5 - 5.3 mmol/L    Chloride 109 (H) 96 - 108 mmol/L    CO2 19 (L) 21 - 32 mmol/L    ANION GAP 8 4 - 13 mmol/L    BUN 38 (H) 5 - 25 mg/dL    Creatinine 1.30 0.60 - 1.30 mg/dL    Glucose 85 65 - 140 mg/dL    Calcium 8.8 8.4 - 10.2 mg/dL     (H) 13 - 39 U/L    ALT 77 (H) 7 - 52 U/L    Alkaline Phosphatase 226 (H) 34 - 104 U/L    Total Protein 7.3 6.4 - 8.4 g/dL    Albumin 3.8 3.5 - 5.0 g/dL    Total Bilirubin 0.87 0.20 - 1.00 mg/dL    eGFR 42 ml/min/1.73sq m   Result Value Ref Range    Magnesium 2.2 1.9 - 2.7 mg/dL   Blood gas, venous   Result Value Ref Range    pH, Con 7.268 (L) 7.300 - 7.400    pCO2, Con 35.6 (L) 42.0 - 50.0 mm Hg    pO2, Con 46.8 (H) 35.0 - 45.0 mm Hg    HCO3, Con 15.9 (L) 24 - 30 mmol/L    Base Excess, Con -10.1 mmol/L    O2 Content, Con 12.9 ml/dL    O2 HGB, VENOUS 77.2 60.0 - 80.0 %   Vancomycin, random   Result Value Ref Range    Vancomycin Rm 19.8 10.0 - 20.0 ug/mL   B-Type Natriuretic Peptide(BNP)   Result Value Ref Range    BNP 3,210 (H) 0 - 100 pg/mL   Basic metabolic panel   Result Value Ref Range    Sodium 136 135 - 147 mmol/L    Potassium 4.5 3.5 - 5.3 mmol/L    Chloride 108 96 - 108 mmol/L    CO2 19 (L) 21 - 32 mmol/L    ANION GAP 9 4 - 13 mmol/L    BUN 39 (H) 5 - 25 mg/dL    Creatinine 1.30 0.60 - 1.30 mg/dL    Glucose 96 65 - 140 mg/dL    Calcium 7.9 (L) 8.4 - 10.2 mg/dL    eGFR 42 ml/min/1.73sq m   Comprehensive metabolic panel   Result Value Ref Range    Sodium 137 135 - 147 mmol/L    Potassium 4.8 3.5 - 5.3 mmol/L    Chloride 108 96 - 108 mmol/L    CO2 19 (L) 21 - 32 mmol/L    ANION GAP 10 4 - 13 mmol/L    BUN 49 (H) 5 - 25 mg/dL    Creatinine 1.40 (H) 0.60 - 1.30 mg/dL    Glucose 140 65 - 140 mg/dL    Calcium 8.6 8.4 - 10.2 mg/dL    Corrected Calcium 9.1 8.3 - 10.1 mg/dL    AST 1,464 (H) 13 - 39 U/L     (H) 7 - 52 U/L    Alkaline Phosphatase 224 (H) 34 - 104 U/L    Total Protein 7.0 6.4 - 8.4 g/dL    Albumin 3.4  "(L) 3.5 - 5.0 g/dL    Total Bilirubin 1.77 (H) 0.20 - 1.00 mg/dL    eGFR 38 ml/min/1.73sq m   CBC and differential   Result Value Ref Range    WBC 3.91 (L) 4.31 - 10.16 Thousand/uL    RBC 3.12 (L) 3.81 - 5.12 Million/uL    Hemoglobin 8.9 (L) 11.5 - 15.4 g/dL    Hematocrit 28.8 (L) 34.8 - 46.1 %    MCV 92 82 - 98 fL    MCH 28.5 26.8 - 34.3 pg    MCHC 30.9 (L) 31.4 - 37.4 g/dL    RDW 17.2 (H) 11.6 - 15.1 %    MPV 11.7 8.9 - 12.7 fL    Platelets 163 149 - 390 Thousands/uL   Result Value Ref Range    Calcium, Ionized 1.05 (L) 1.12 - 1.32 mmol/L   Hepatitis panel, acute   Result Value Ref Range    Hepatitis B Surface Ag Non-reactive Non-Reactive    Hep A IgM Non-reactive Non-Reactive    Hepatitis C Ab Non-reactive Non-Reactive    Hep B C IgM Non-reactive Non-Reactive   Acetaminophen level-\"If concentration is detectable, please discuss with medical  on call.\"   Result Value Ref Range    Acetaminophen Level <2 (L) 10 - 20 ug/mL   Protime-INR   Result Value Ref Range    Protime 21.1 (H) 12.3 - 15.0 seconds    INR 1.83 (H) 0.85 - 1.19   Comprehensive metabolic panel   Result Value Ref Range    Sodium 138 135 - 147 mmol/L    Potassium 4.5 3.5 - 5.3 mmol/L    Chloride 108 96 - 108 mmol/L    CO2 25 21 - 32 mmol/L    ANION GAP 5 4 - 13 mmol/L    BUN 47 (H) 5 - 25 mg/dL    Creatinine 1.34 (H) 0.60 - 1.30 mg/dL    Glucose 138 65 - 140 mg/dL    Calcium 8.1 (L) 8.4 - 10.2 mg/dL    Corrected Calcium 9.0 8.3 - 10.1 mg/dL     (H) 13 - 39 U/L     (H) 7 - 52 U/L    Alkaline Phosphatase 184 (H) 34 - 104 U/L    Total Protein 5.8 (L) 6.4 - 8.4 g/dL    Albumin 2.9 (L) 3.5 - 5.0 g/dL    Total Bilirubin 1.60 (H) 0.20 - 1.00 mg/dL    eGFR 40 ml/min/1.73sq m   Lactic acid, plasma (w/reflex if result > 2.0)   Result Value Ref Range    LACTIC ACID 0.8 0.5 - 2.0 mmol/L   Protime-INR   Result Value Ref Range    Protime 18.7 (H) 12.3 - 15.0 seconds    INR 1.56 (H) 0.85 - 1.19   Comprehensive metabolic panel   Result Value " Ref Range    Sodium 138 135 - 147 mmol/L    Potassium 4.3 3.5 - 5.3 mmol/L    Chloride 108 96 - 108 mmol/L    CO2 23 21 - 32 mmol/L    ANION GAP 7 4 - 13 mmol/L    BUN 44 (H) 5 - 25 mg/dL    Creatinine 1.05 0.60 - 1.30 mg/dL    Glucose 93 65 - 140 mg/dL    Calcium 8.1 (L) 8.4 - 10.2 mg/dL    Corrected Calcium 9.0 8.3 - 10.1 mg/dL     (H) 13 - 39 U/L     (H) 7 - 52 U/L    Alkaline Phosphatase 165 (H) 34 - 104 U/L    Total Protein 6.0 (L) 6.4 - 8.4 g/dL    Albumin 2.9 (L) 3.5 - 5.0 g/dL    Total Bilirubin 1.66 (H) 0.20 - 1.00 mg/dL    eGFR 54 ml/min/1.73sq m   CBC and differential   Result Value Ref Range    WBC 2.92 (L) 4.31 - 10.16 Thousand/uL    RBC 2.75 (L) 3.81 - 5.12 Million/uL    Hemoglobin 8.0 (L) 11.5 - 15.4 g/dL    Hematocrit 26.3 (L) 34.8 - 46.1 %    MCV 96 82 - 98 fL    MCH 29.1 26.8 - 34.3 pg    MCHC 30.4 (L) 31.4 - 37.4 g/dL    RDW 17.4 (H) 11.6 - 15.1 %    MPV 11.4 8.9 - 12.7 fL    Platelets 155 149 - 390 Thousands/uL    nRBC 0 /100 WBCs    Segmented % 65 43 - 75 %    Immature Grans % 6 (H) 0 - 2 %    Lymphocytes % 18 14 - 44 %    Monocytes % 9 4 - 12 %    Eosinophils Relative 1 0 - 6 %    Basophils Relative 1 0 - 1 %    Absolute Neutrophils 1.92 1.85 - 7.62 Thousands/µL    Absolute Immature Grans 0.18 0.00 - 0.20 Thousand/uL    Absolute Lymphocytes 0.52 (L) 0.60 - 4.47 Thousands/µL    Absolute Monocytes 0.25 0.17 - 1.22 Thousand/µL    Eosinophils Absolute 0.03 0.00 - 0.61 Thousand/µL    Basophils Absolute 0.02 0.00 - 0.10 Thousands/µL   CBC and differential   Result Value Ref Range    WBC 2.81 (L) 4.31 - 10.16 Thousand/uL    RBC 2.80 (L) 3.81 - 5.12 Million/uL    Hemoglobin 8.0 (L) 11.5 - 15.4 g/dL    Hematocrit 27.1 (L) 34.8 - 46.1 %    MCV 97 82 - 98 fL    MCH 28.6 26.8 - 34.3 pg    MCHC 29.5 (L) 31.4 - 37.4 g/dL    RDW 17.6 (H) 11.6 - 15.1 %    MPV 10.7 8.9 - 12.7 fL    Platelets 162 149 - 390 Thousands/uL   Comprehensive metabolic panel   Result Value Ref Range    Sodium 138 135 - 147  mmol/L    Potassium 4.4 3.5 - 5.3 mmol/L    Chloride 107 96 - 108 mmol/L    CO2 26 21 - 32 mmol/L    ANION GAP 5 4 - 13 mmol/L    BUN 36 (H) 5 - 25 mg/dL    Creatinine 0.97 0.60 - 1.30 mg/dL    Glucose 79 65 - 140 mg/dL    Calcium 8.4 8.4 - 10.2 mg/dL    Corrected Calcium 9.2 8.3 - 10.1 mg/dL     (H) 13 - 39 U/L     (H) 7 - 52 U/L    Alkaline Phosphatase 161 (H) 34 - 104 U/L    Total Protein 6.4 6.4 - 8.4 g/dL    Albumin 3.0 (L) 3.5 - 5.0 g/dL    Total Bilirubin 1.32 (H) 0.20 - 1.00 mg/dL    eGFR 60 ml/min/1.73sq m   Protime-INR   Result Value Ref Range    Protime 17.8 (H) 12.3 - 15.0 seconds    INR 1.46 (H) 0.85 - 1.19   Comprehensive metabolic panel   Result Value Ref Range    Sodium 139 135 - 147 mmol/L    Potassium 4.3 3.5 - 5.3 mmol/L    Chloride 105 96 - 108 mmol/L    CO2 26 21 - 32 mmol/L    ANION GAP 8 4 - 13 mmol/L    BUN 33 (H) 5 - 25 mg/dL    Creatinine 0.83 0.60 - 1.30 mg/dL    Glucose 81 65 - 140 mg/dL    Calcium 8.8 8.4 - 10.2 mg/dL    Corrected Calcium 9.4 8.3 - 10.1 mg/dL     (H) 13 - 39 U/L     (H) 7 - 52 U/L    Alkaline Phosphatase 162 (H) 34 - 104 U/L    Total Protein 6.9 6.4 - 8.4 g/dL    Albumin 3.3 (L) 3.5 - 5.0 g/dL    Total Bilirubin 1.20 (H) 0.20 - 1.00 mg/dL    eGFR 72 ml/min/1.73sq m   CBC and differential   Result Value Ref Range    WBC 4.25 (L) 4.31 - 10.16 Thousand/uL    RBC 2.96 (L) 3.81 - 5.12 Million/uL    Hemoglobin 8.6 (L) 11.5 - 15.4 g/dL    Hematocrit 28.3 (L) 34.8 - 46.1 %    MCV 96 82 - 98 fL    MCH 29.1 26.8 - 34.3 pg    MCHC 30.4 (L) 31.4 - 37.4 g/dL    RDW 17.7 (H) 11.6 - 15.1 %    MPV 10.6 8.9 - 12.7 fL    Platelets 177 149 - 390 Thousands/uL   Protime-INR   Result Value Ref Range    Protime 16.4 (H) 12.3 - 15.0 seconds    INR 1.31 (H) 0.85 - 1.19   Fingerstick Glucose (POCT)   Result Value Ref Range    POC Glucose 181 (H) 65 - 140 mg/dl   Urine Macroscopic, POC   Result Value Ref Range    Color, UA Yellow     Clarity, UA Clear     pH, UA 6.0 4.5  - 8.0    Leukocytes, UA Trace (A) Negative    Nitrite, UA Negative Negative    Protein, UA >=300 (A) Negative mg/dl    Glucose, UA Negative Negative mg/dl    Ketones, UA Negative Negative mg/dl    Urobilinogen, UA 0.2 0.2, 1.0 E.U./dl E.U./dl    Bilirubin, UA Negative Negative    Occult Blood, UA Trace (A) Negative    Specific Gravity, UA >=1.030 1.003 - 1.030   Fingerstick Glucose (POCT)   Result Value Ref Range    POC Glucose 135 65 - 140 mg/dl   Fingerstick Glucose (POCT)   Result Value Ref Range    POC Glucose 136 65 - 140 mg/dl   Fingerstick Glucose (POCT)   Result Value Ref Range    POC Glucose 155 (H) 65 - 140 mg/dl   Fingerstick Glucose (POCT)   Result Value Ref Range    POC Glucose 117 65 - 140 mg/dl   Fingerstick Glucose (POCT)   Result Value Ref Range    POC Glucose 90 65 - 140 mg/dl   Fingerstick Glucose (POCT)   Result Value Ref Range    POC Glucose 119 65 - 140 mg/dl   Fingerstick Glucose (POCT)   Result Value Ref Range    POC Glucose 113 65 - 140 mg/dl   Fingerstick Glucose (POCT)   Result Value Ref Range    POC Glucose 137 65 - 140 mg/dl   Fingerstick Glucose (POCT)   Result Value Ref Range    POC Glucose 127 65 - 140 mg/dl   Fingerstick Glucose (POCT)   Result Value Ref Range    POC Glucose 63 (L) 65 - 140 mg/dl   Fingerstick Glucose (POCT)   Result Value Ref Range    POC Glucose 94 65 - 140 mg/dl   Fingerstick Glucose (POCT)   Result Value Ref Range    POC Glucose 128 65 - 140 mg/dl   Fingerstick Glucose (POCT)   Result Value Ref Range    POC Glucose 143 (H) 65 - 140 mg/dl   Fingerstick Glucose (POCT)   Result Value Ref Range    POC Glucose 184 (H) 65 - 140 mg/dl   Fingerstick Glucose (POCT)   Result Value Ref Range    POC Glucose 56 (L) 65 - 140 mg/dl   Fingerstick Glucose (POCT)   Result Value Ref Range    POC Glucose 87 65 - 140 mg/dl   Fingerstick Glucose (POCT)   Result Value Ref Range    POC Glucose 139 65 - 140 mg/dl   Fingerstick Glucose (POCT)   Result Value Ref Range    POC Glucose 31  (LL) 65 - 140 mg/dl   Fingerstick Glucose (POCT)   Result Value Ref Range    POC Glucose 34 (LL) 65 - 140 mg/dl   Fingerstick Glucose (POCT)   Result Value Ref Range    POC Glucose 81 65 - 140 mg/dl   Fingerstick Glucose (POCT)   Result Value Ref Range    POC Glucose 73 65 - 140 mg/dl   Fingerstick Glucose (POCT)   Result Value Ref Range    POC Glucose 104 65 - 140 mg/dl   Fingerstick Glucose (POCT)   Result Value Ref Range    POC Glucose 124 65 - 140 mg/dl   Fingerstick Glucose (POCT)   Result Value Ref Range    POC Glucose 92 65 - 140 mg/dl   Fingerstick Glucose (POCT)   Result Value Ref Range    POC Glucose 101 65 - 140 mg/dl   Fingerstick Glucose (POCT)   Result Value Ref Range    POC Glucose 126 65 - 140 mg/dl   Fingerstick Glucose (POCT)   Result Value Ref Range    POC Glucose 146 (H) 65 - 140 mg/dl   Fingerstick Glucose (POCT)   Result Value Ref Range    POC Glucose 161 (H) 65 - 140 mg/dl   Fingerstick Glucose (POCT)   Result Value Ref Range    POC Glucose 131 65 - 140 mg/dl   Fingerstick Glucose (POCT)   Result Value Ref Range    POC Glucose 128 65 - 140 mg/dl   Fingerstick Glucose (POCT)   Result Value Ref Range    POC Glucose 168 (H) 65 - 140 mg/dl   Fingerstick Glucose (POCT)   Result Value Ref Range    POC Glucose 154 (H) 65 - 140 mg/dl   Fingerstick Glucose (POCT)   Result Value Ref Range    POC Glucose 150 (H) 65 - 140 mg/dl   Fingerstick Glucose (POCT)   Result Value Ref Range    POC Glucose 147 (H) 65 - 140 mg/dl   Fingerstick Glucose (POCT)   Result Value Ref Range    POC Glucose 98 65 - 140 mg/dl   Fingerstick Glucose (POCT)   Result Value Ref Range    POC Glucose 116 65 - 140 mg/dl   Fingerstick Glucose (POCT)   Result Value Ref Range    POC Glucose 146 (H) 65 - 140 mg/dl   Fingerstick Glucose (POCT)   Result Value Ref Range    POC Glucose 172 (H) 65 - 140 mg/dl   Fingerstick Glucose (POCT)   Result Value Ref Range    POC Glucose 89 65 - 140 mg/dl   Fingerstick Glucose (POCT)   Result Value Ref  Range    POC Glucose 130 65 - 140 mg/dl   Fingerstick Glucose (POCT)   Result Value Ref Range    POC Glucose 233 (H) 65 - 140 mg/dl   Fingerstick Glucose (POCT)   Result Value Ref Range    POC Glucose 175 (H) 65 - 140 mg/dl   Fingerstick Glucose (POCT)   Result Value Ref Range    POC Glucose 88 65 - 140 mg/dl   Fingerstick Glucose (POCT)   Result Value Ref Range    POC Glucose 160 (H) 65 - 140 mg/dl   Fingerstick Glucose (POCT)   Result Value Ref Range    POC Glucose 169 (H) 65 - 140 mg/dl   Manual Differential(PHLEBS Do Not Order)   Result Value Ref Range    Segmented % 76 (H) 43 - 75 %    Bands % 4 0 - 8 %    Lymphocytes % 15 14 - 44 %    Monocytes % 4 4 - 12 %    Eosinophils % 0 0 - 6 %    Basophils % 0 0 - 1 %    Atypical Lymphocytes % 1 (H) <=0 %    Absolute Neutrophils 3.13 1.85 - 7.62 Thousand/uL    Absolute Lymphocytes 0.63 0.60 - 4.47 Thousand/uL    Absolute Monocytes 0.16 0.00 - 1.22 Thousand/uL    Absolute Eosinophils 0.00 0.00 - 0.40 Thousand/uL    Absolute Basophils 0.00 0.00 - 0.10 Thousand/uL    Total Counted      RBC Morphology Present     Platelet Estimate Adequate Adequate    Anisocytosis Present     Ovalocytes Present     Polychromasia Present     Target Cells Present    Manual Differential(PHLEBS Do Not Order)   Result Value Ref Range    Segmented % 64 43 - 75 %    Bands % 2 0 - 8 %    Lymphocytes % 19 14 - 44 %    Monocytes % 9 4 - 12 %    Eosinophils % 0 0 - 6 %    Basophils % 0 0 - 1 %    Metamyelocytes % 1 0 - 1 %    Myelocytes % 4 (H) 0 - 1 %    Atypical Lymphocytes % 1 (H) <=0 %    Absolute Neutrophils 1.85 1.85 - 7.62 Thousand/uL    Absolute Lymphocytes 0.56 (L) 0.60 - 4.47 Thousand/uL    Absolute Monocytes 0.25 0.00 - 1.22 Thousand/uL    Absolute Eosinophils 0.00 0.00 - 0.40 Thousand/uL    Absolute Basophils 0.00 0.00 - 0.10 Thousand/uL    Absolute Metamyelocytes 0.03 0.00 - 0.10 Thousand/uL    Absolute Myelocytes 0.11 (H) 0.00 - 0.10 Thousand/uL    Total Counted      nRBC 2 0 - 2 /100  WBC    RBC Morphology Present     Platelet Estimate Adequate Adequate    Clumped Platelets Present     Giant PLTs Present     Anisocytosis Present     Maria Dolores Cells Present     Macrocytes Present     Ovalocytes Present    Manual Differential(PHLEBS Do Not Order)   Result Value Ref Range    Segmented % 55 43 - 75 %    Bands % 11 (H) 0 - 8 %    Lymphocytes % 19 14 - 44 %    Monocytes % 5 4 - 12 %    Eosinophils % 1 0 - 6 %    Basophils % 0 0 - 1 %    Metamyelocytes % 3 (H) 0 - 1 %    Myelocytes % 6 (H) 0 - 1 %    Absolute Neutrophils 2.81 1.85 - 7.62 Thousand/uL    Absolute Lymphocytes 0.81 0.60 - 4.47 Thousand/uL    Absolute Monocytes 0.21 0.00 - 1.22 Thousand/uL    Absolute Eosinophils 0.04 0.00 - 0.40 Thousand/uL    Absolute Basophils 0.00 0.00 - 0.10 Thousand/uL    Absolute Metamyelocytes 0.13 (H) 0.00 - 0.10 Thousand/uL    Absolute Myelocytes 0.26 (H) 0.00 - 0.10 Thousand/uL    Total Counted      nRBC 1 0 - 2 /100 WBC    RBC Morphology Present     Platelet Estimate Adequate Adequate    Giant PLTs Present     Anisocytosis Present     Hypochromia Present     Ovalocytes Present     Polychromasia Present      *Note: Due to a large number of results and/or encounters for the requested time period, some results have not been displayed. A complete set of results can be found in Results Review.

## 2025-03-14 ENCOUNTER — HOME CARE VISIT (OUTPATIENT)
Dept: HOME HEALTH SERVICES | Facility: HOME HEALTHCARE | Age: 70
End: 2025-03-14
Payer: MEDICARE

## 2025-03-14 VITALS
BODY MASS INDEX: 33.12 KG/M2 | SYSTOLIC BLOOD PRESSURE: 136 MMHG | WEIGHT: 164 LBS | RESPIRATION RATE: 18 BRPM | OXYGEN SATURATION: 100 % | TEMPERATURE: 97.6 F | DIASTOLIC BLOOD PRESSURE: 68 MMHG | HEART RATE: 66 BPM

## 2025-03-14 PROCEDURE — G0299 HHS/HOSPICE OF RN EA 15 MIN: HCPCS

## 2025-03-17 ENCOUNTER — HOME CARE VISIT (OUTPATIENT)
Dept: HOME HEALTH SERVICES | Facility: HOME HEALTHCARE | Age: 70
End: 2025-03-17
Payer: MEDICARE

## 2025-03-17 ENCOUNTER — TELEPHONE (OUTPATIENT)
Dept: LAB | Facility: HOSPITAL | Age: 70
End: 2025-03-17

## 2025-03-17 VITALS
RESPIRATION RATE: 18 BRPM | OXYGEN SATURATION: 100 % | BODY MASS INDEX: 32.2 KG/M2 | HEIGHT: 60 IN | TEMPERATURE: 97.6 F | DIASTOLIC BLOOD PRESSURE: 70 MMHG | WEIGHT: 164 LBS | SYSTOLIC BLOOD PRESSURE: 124 MMHG | HEART RATE: 68 BPM

## 2025-03-17 PROCEDURE — G0299 HHS/HOSPICE OF RN EA 15 MIN: HCPCS

## 2025-03-18 NOTE — TELEPHONE ENCOUNTER
3/18 - pt said they will contact Dr because they are unsure of why no orders are placed, will wait to schedule appt

## 2025-03-19 PROBLEM — J10.1 INFLUENZA A: Status: RESOLVED | Noted: 2025-02-14 | Resolved: 2025-03-19

## 2025-03-20 ENCOUNTER — HOME CARE VISIT (OUTPATIENT)
Dept: HOME HEALTH SERVICES | Facility: HOME HEALTHCARE | Age: 70
End: 2025-03-20
Payer: MEDICARE

## 2025-03-20 VITALS
HEART RATE: 61 BPM | RESPIRATION RATE: 18 BRPM | OXYGEN SATURATION: 100 % | BODY MASS INDEX: 32.03 KG/M2 | DIASTOLIC BLOOD PRESSURE: 68 MMHG | WEIGHT: 164 LBS | TEMPERATURE: 97.7 F | SYSTOLIC BLOOD PRESSURE: 124 MMHG

## 2025-03-20 PROCEDURE — G0299 HHS/HOSPICE OF RN EA 15 MIN: HCPCS

## 2025-03-21 ENCOUNTER — TELEPHONE (OUTPATIENT)
Dept: LAB | Facility: HOSPITAL | Age: 70
End: 2025-03-21

## 2025-03-22 ENCOUNTER — ANESTHESIA EVENT (OUTPATIENT)
Dept: PERIOP | Facility: HOSPITAL | Age: 70
End: 2025-03-22
Payer: MEDICARE

## 2025-03-24 NOTE — PRE-PROCEDURE INSTRUCTIONS
Pre-Surgery Instructions:   Medication Instructions    acetaminophen (TYLENOL) 500 mg tablet Uses PRN- OK to take day of surgery    albuterol (ACCUNEB) 1.25 MG/3ML nebulizer solution Uses PRN- OK to take day of surgery    carvedilol (COREG) 6.25 mg tablet Take day of surgery.    Cholecalciferol 1.25 MG (72844 UT) capsule Hold day of surgery.    CRANBERRY FRUIT CONCENTRATE PO Stop taking 3 days prior to surgery.    cyanocobalamin (VITAMIN B-12) 1000 MCG tablet Hold day of surgery.    docusate sodium (Colace) 100 mg capsule Hold day of surgery.    ezetimibe (ZETIA) 10 mg tablet Take night before surgery    famotidine (PEPCID) 40 MG tablet Take day of surgery.    Fluticasone-Salmeterol (Advair) 250-50 mcg/dose inhaler Uses PRN- OK to take day of surgery    gabapentin (NEURONTIN) 300 mg capsule Take day of surgery.    levothyroxine 112 mcg tablet Take day of surgery.    Magnesium Oxide (DIASENSE MAGNESIUM PO) Hold day of surgery.    melatonin 3 mg Take night before surgery    methocarbamol (ROBAXIN) 500 mg tablet Hold day of surgery.    oxyCODONE HCl 5 MG TABA Uses PRN- OK to take day of surgery    polyethylene glycol (MIRALAX) 17 g packet Hold day of surgery.    pramipexole (MIRAPEX) 1 mg tablet Take day of surgery.    senna (SENOKOT) 8.6 mg Hold day of surgery.    sertraline (ZOLOFT) 100 mg tablet Take day of surgery.    tiotropium (Spiriva HandiHaler) 18 mcg inhalation capsule Uses PRN- OK to take day of surgery    torsemide (DEMADEX) 20 mg tablet Hold day of surgery.      Set insulin pump to basal rate.  Medication instructions for day of surgery reviewed. Please take all instructed medications with only a sip of water.       You will receive a call one business day prior to surgery with an arrival time and hospital directions. If your surgery is scheduled on a Monday, the hospital will be calling you on the Friday prior to your surgery. If you have not heard from anyone by 8pm, please call the hospital supervisor  through the hospital  at [phone]. ([place] [phone] or [place] [phone]).    Do not eat or drink anything after midnight the night before your surgery, including candy, mints, lifesavers, or chewing gum. Do not drink alcohol 24hrs before your surgery. Try not to smoke at least 24hrs before your surgery.       Follow the pre surgery showering instructions as listed in the “My Surgical Experience Booklet” or otherwise provided by your surgeon's office. Do not use a blade to shave the surgical area 1 week before surgery. It is okay to use a clean electric clippers up to 24 hours before surgery. Do not apply any lotions, creams, including makeup, cologne, deodorant, or perfumes after showering on the day of your surgery. Do not use dry shampoo, hair spray, hair gel, or any type of hair products.     No contact lenses, eye make-up, or artificial eyelashes. Remove nail polish, including gel polish, and any artificial, gel, or acrylic nails if possible. Remove all jewelry including rings and body piercing jewelry.     Wear causal clothing that is easy to take on and off. Consider your type of surgery.    Keep any valuables, jewelry, piercings at home. Please bring any specially ordered equipment (sling, braces) if indicated.    Arrange for a responsible person to drive you to and from the hospital on the day of your surgery. Please confirm the visitor policy for the day of your procedure when you receive your phone call with an arrival time.     Call the surgeon's office with any new illnesses, exposures, or additional questions prior to surgery.    Please reference your “My Surgical Experience Booklet” for additional information to prepare for your upcoming surgery.

## 2025-03-25 NOTE — ANESTHESIA PREPROCEDURE EVALUATION
Procedure:  ESOPHAGOGASTRODUODENOSCOPY (EGD), dilation (Esophagus)  DILATATION ESOPHAGEAL (Esophagus)    Relevant Problems   ANESTHESIA   (+) PONV (postoperative nausea and vomiting)      CARDIO   (+) Acute on Chronic Systolic CHF (HCC)   (+) BBB (bundle branch block)   (+) Bifascicular block   (+) Complete heart block (HCC)   (+) Coronary artery disease involving native coronary artery of native heart without angina pectoris   (+) Deep venous thrombosis (HCC)   (+) Essential hypertension   (+) Pacemaker      ENDO   (+) Primary hyperparathyroidism (HCC)   (+) Primary hypothyroidism   (+) Type 2 diabetes mellitus with microalbuminuria, with long-term current use of insulin ([facility]) (ON Insulin Pump, .Will reduce regimen to 1/2dose periop.)      GI/HEPATIC   (+) Gastroesophageal reflux disease without esophagitis      /RENAL   (+) Chronic kidney disease (CKD) stage G3b/A1, moderately decreased glomerular filtration rate (GFR) between 30-44 mL/min/1.73 square meter and albuminuria creatinine ratio less than 30 mg/g ([facility])   (+) Stage 3a chronic kidney disease (HCC)      HEMATOLOGY   (+) Anemia of chronic disease   (+) Microcytic anemia   (+) Normocytic anemia      MUSCULOSKELETAL   (+) Chronic bilateral low back pain without sciatica   (+) Chronic bilateral thoracic back pain      NEURO/PSYCH   (+) Anxiety and depression   (+) Chronic bilateral low back pain without sciatica   (+) Chronic bilateral thoracic back pain   (+) Complex regional pain syndrome type 1 of left lower extremity   (+) Diabetic peripheral neuropathy associated with type 2 diabetes mellitus (HCC)   (+) Pain syndrome, chronic      PULMONARY   (+) Moderate persistent asthma without complication      Neurology/Sleep   (+) S/P insertion of spinal cord stimulator (Removed)      FEN/Gastrointestinal   (+) Achalasia   (+) Gastroparesis   1/25  EF-35-39% with Global Hypokinesis   Left Ankle Fracture  Physical Exam    Airway    Mallampati score: II  TM  Distance: >3 FB  Neck ROM: full     Dental    lower dentures    Cardiovascular      Pulmonary   Breath sounds clear to auscultation    Other Findings  post-pubertal.      Lab Results   Component Value Date    GLUC 102 (H) 03/07/2025    GLUF 136 (H) 09/21/2024    ALT 22 03/07/2025    AST 33 03/07/2025    BUN 36 (H) 03/07/2025    CALCIUM 8.8 03/07/2025     03/07/2025    CO2 25 03/07/2025    CREATININE 1.1 03/07/2025    INR 1.31 (H) 02/19/2025    HCT 28.3 (L) 02/19/2025    HGB 8.6 (L) 02/19/2025    HGBA1C 7.7 (H) 01/17/2025    MG 2.2 02/14/2025    PHOS 4.9 (H) 02/11/2025     02/19/2025    K 4.6 03/07/2025    WBC 4.25 (L) 02/19/2025       Anesthesia Plan  ASA Score- 3     Anesthesia Type- general with ASA Monitors.         Additional Monitors:     Airway Plan: ETT.           Plan Factors-Exercise tolerance (METS): <4 METS.    Chart reviewed. EKG reviewed. Imaging results reviewed. Existing labs reviewed. Patient summary reviewed.    Patient is not a current smoker.              Induction- intravenous.    Postoperative Plan-         Informed Consent- Anesthetic plan and risks discussed with patient.  I personally reviewed this patient with the CRNA. Discussed and agreed on the Anesthesia Plan with the CRNA..      NPO Status:  No vitals data found for the desired time range.

## 2025-03-26 ENCOUNTER — HOSPITAL ENCOUNTER (OUTPATIENT)
Facility: HOSPITAL | Age: 70
Setting detail: OUTPATIENT SURGERY
Discharge: HOME | End: 2025-03-26
Attending: THORACIC SURGERY (CARDIOTHORACIC VASCULAR SURGERY) | Admitting: THORACIC SURGERY (CARDIOTHORACIC VASCULAR SURGERY)
Payer: MEDICARE

## 2025-03-26 ENCOUNTER — APPOINTMENT (OUTPATIENT)
Dept: RADIOLOGY | Facility: HOSPITAL | Age: 70
End: 2025-03-26
Payer: MEDICARE

## 2025-03-26 ENCOUNTER — ANESTHESIA (OUTPATIENT)
Dept: PERIOP | Facility: HOSPITAL | Age: 70
End: 2025-03-26
Payer: MEDICARE

## 2025-03-26 VITALS
OXYGEN SATURATION: 97 % | HEIGHT: 60 IN | RESPIRATION RATE: 20 BRPM | SYSTOLIC BLOOD PRESSURE: 100 MMHG | BODY MASS INDEX: 32.2 KG/M2 | WEIGHT: 164 LBS | DIASTOLIC BLOOD PRESSURE: 72 MMHG | HEART RATE: 64 BPM | TEMPERATURE: 97.5 F

## 2025-03-26 LAB
GLUCOSE SERPL-MCNC: 144 MG/DL (ref 65–140)
GLUCOSE SERPL-MCNC: 154 MG/DL (ref 65–140)

## 2025-03-26 PROCEDURE — C1769 GUIDE WIRE: HCPCS | Performed by: THORACIC SURGERY (CARDIOTHORACIC VASCULAR SURGERY)

## 2025-03-26 PROCEDURE — 43245 EGD DILATE STRICTURE: CPT | Performed by: THORACIC SURGERY (CARDIOTHORACIC VASCULAR SURGERY)

## 2025-03-26 PROCEDURE — 43248 EGD GUIDE WIRE INSERTION: CPT | Performed by: THORACIC SURGERY (CARDIOTHORACIC VASCULAR SURGERY)

## 2025-03-26 PROCEDURE — C1726 CATH, BAL DIL, NON-VASCULAR: HCPCS | Performed by: THORACIC SURGERY (CARDIOTHORACIC VASCULAR SURGERY)

## 2025-03-26 PROCEDURE — 74360 X-RAY GUIDE GI DILATION: CPT | Performed by: THORACIC SURGERY (CARDIOTHORACIC VASCULAR SURGERY)

## 2025-03-26 PROCEDURE — 82948 REAGENT STRIP/BLOOD GLUCOSE: CPT

## 2025-03-26 PROCEDURE — 71045 X-RAY EXAM CHEST 1 VIEW: CPT

## 2025-03-26 RX ORDER — PROPOFOL 10 MG/ML
INJECTION, EMULSION INTRAVENOUS AS NEEDED
Status: DISCONTINUED | OUTPATIENT
Start: 2025-03-26 | End: 2025-03-26

## 2025-03-26 RX ORDER — FENTANYL CITRATE/PF 50 MCG/ML
25 SYRINGE (ML) INJECTION
Refills: 0 | Status: DISCONTINUED | OUTPATIENT
Start: 2025-03-26 | End: 2025-03-26 | Stop reason: HOSPADM

## 2025-03-26 RX ORDER — FENTANYL CITRATE 50 UG/ML
INJECTION, SOLUTION INTRAMUSCULAR; INTRAVENOUS AS NEEDED
Status: DISCONTINUED | OUTPATIENT
Start: 2025-03-26 | End: 2025-03-26

## 2025-03-26 RX ORDER — SUCCINYLCHOLINE/SOD CL,ISO/PF 100 MG/5ML
SYRINGE (ML) INTRAVENOUS AS NEEDED
Status: DISCONTINUED | OUTPATIENT
Start: 2025-03-26 | End: 2025-03-26

## 2025-03-26 RX ORDER — ONDANSETRON 2 MG/ML
INJECTION INTRAMUSCULAR; INTRAVENOUS AS NEEDED
Status: DISCONTINUED | OUTPATIENT
Start: 2025-03-26 | End: 2025-03-26

## 2025-03-26 RX ORDER — SODIUM CHLORIDE, SODIUM LACTATE, POTASSIUM CHLORIDE, CALCIUM CHLORIDE 600; 310; 30; 20 MG/100ML; MG/100ML; MG/100ML; MG/100ML
125 INJECTION, SOLUTION INTRAVENOUS CONTINUOUS
Status: DISCONTINUED | OUTPATIENT
Start: 2025-03-26 | End: 2025-03-26 | Stop reason: HOSPADM

## 2025-03-26 RX ORDER — LIDOCAINE HYDROCHLORIDE 10 MG/ML
0.5 INJECTION, SOLUTION EPIDURAL; INFILTRATION; INTRACAUDAL; PERINEURAL ONCE AS NEEDED
Status: DISCONTINUED | OUTPATIENT
Start: 2025-03-26 | End: 2025-03-26 | Stop reason: HOSPADM

## 2025-03-26 RX ORDER — ONDANSETRON 2 MG/ML
4 INJECTION INTRAMUSCULAR; INTRAVENOUS ONCE AS NEEDED
Status: DISCONTINUED | OUTPATIENT
Start: 2025-03-26 | End: 2025-03-26 | Stop reason: HOSPADM

## 2025-03-26 RX ORDER — DEXAMETHASONE SODIUM PHOSPHATE 10 MG/ML
INJECTION, SOLUTION INTRAMUSCULAR; INTRAVENOUS AS NEEDED
Status: DISCONTINUED | OUTPATIENT
Start: 2025-03-26 | End: 2025-03-26

## 2025-03-26 RX ORDER — METOCLOPRAMIDE HYDROCHLORIDE 5 MG/ML
10 INJECTION INTRAMUSCULAR; INTRAVENOUS ONCE AS NEEDED
Status: DISCONTINUED | OUTPATIENT
Start: 2025-03-26 | End: 2025-03-26 | Stop reason: HOSPADM

## 2025-03-26 RX ORDER — ONDANSETRON 4 MG/1
4 TABLET, FILM COATED ORAL EVERY 8 HOURS PRN
COMMUNITY

## 2025-03-26 RX ORDER — LIDOCAINE HYDROCHLORIDE 20 MG/ML
INJECTION, SOLUTION EPIDURAL; INFILTRATION; INTRACAUDAL; PERINEURAL AS NEEDED
Status: DISCONTINUED | OUTPATIENT
Start: 2025-03-26 | End: 2025-03-26

## 2025-03-26 RX ADMIN — PROPOFOL 50 MCG/KG/MIN: 10 INJECTION, EMULSION INTRAVENOUS at 08:36

## 2025-03-26 RX ADMIN — DEXAMETHASONE SODIUM PHOSPHATE 10 MG: 10 INJECTION, SOLUTION INTRAMUSCULAR; INTRAVENOUS at 08:35

## 2025-03-26 RX ADMIN — Medication 100 MG: at 08:35

## 2025-03-26 RX ADMIN — ONDANSETRON 4 MG: 2 INJECTION, SOLUTION INTRAMUSCULAR; INTRAVENOUS at 08:50

## 2025-03-26 RX ADMIN — PROPOFOL 40 MG: 10 INJECTION, EMULSION INTRAVENOUS at 08:52

## 2025-03-26 RX ADMIN — LIDOCAINE HYDROCHLORIDE 100 MG: 20 INJECTION, SOLUTION EPIDURAL; INFILTRATION; INTRACAUDAL; PERINEURAL at 08:35

## 2025-03-26 RX ADMIN — NOREPINEPHRINE BITARTRATE 2 MCG/MIN: 1 INJECTION, SOLUTION, CONCENTRATE INTRAVENOUS at 08:39

## 2025-03-26 RX ADMIN — PROPOFOL 150 MG: 10 INJECTION, EMULSION INTRAVENOUS at 08:35

## 2025-03-26 RX ADMIN — FENTANYL CITRATE 50 MCG: 50 INJECTION INTRAMUSCULAR; INTRAVENOUS at 08:53

## 2025-03-26 RX ADMIN — FENTANYL CITRATE 50 MCG: 50 INJECTION INTRAMUSCULAR; INTRAVENOUS at 08:35

## 2025-03-26 RX ADMIN — SODIUM CHLORIDE, SODIUM LACTATE, POTASSIUM CHLORIDE, AND CALCIUM CHLORIDE: .6; .31; .03; .02 INJECTION, SOLUTION INTRAVENOUS at 08:30

## 2025-03-26 NOTE — INTERVAL H&P NOTE
H&P reviewed. After examining the patient I find no changes in the patients condition since the H&P had been written.    Vitals:    03/26/25 0735   BP: 165/87   Pulse: 70   Resp: 18   Temp: (!) 97.1 °F (36.2 °C)   SpO2: 98%     Safe to proceed. EGD with dilation    Jill Bentley MD  Thoracic Surgery  Office: [phone]

## 2025-03-26 NOTE — DISCHARGE INSTR - AVS FIRST PAGE
Regular diet as tolerated. It is normal to cough up a small amount of blood after your procedure.    Stay upright at least 30 minutes after eating/drinking.    Follow up with Dr. Bentley as needed.

## 2025-03-26 NOTE — ANESTHESIA POSTPROCEDURE EVALUATION
Post-Op Assessment Note    CV Status:  Stable    Pain management: adequate       Mental Status:  Alert and awake   Hydration Status:  Euvolemic   PONV Controlled:  Controlled   Airway Patency:  Patent     Post Op Vitals Reviewed: Yes    No anethesia notable event occurred.    Staff: Anesthesiologist           Last Filed PACU Vitals:  Vitals Value Taken Time   Temp 97.5 °F (36.4 °C) 03/26/25 0955   Pulse 64 03/26/25 0955   /72 03/26/25 0955   Resp 20 03/26/25 0955   SpO2 97 % 03/26/25 0955       Modified Denia:     Vitals Value Taken Time   Activity 2 03/26/25 0945   Respiration 2 03/26/25 0945   Circulation 2 03/26/25 0945   Consciousness 2 03/26/25 0945   Oxygen Saturation 1 03/26/25 0945     Modified Denia Score: 9

## 2025-03-26 NOTE — ANESTHESIA POSTPROCEDURE EVALUATION
Post-Op Assessment Note    CV Status:  Stable    Pain management: adequate       Mental Status:  Sleepy and arousable   Hydration Status:  Euvolemic   PONV Controlled:  Controlled   Airway Patency:  Patent     Post Op Vitals Reviewed: Yes    No anethesia notable event occurred.    Staff: CRNA           Last Filed PACU Vitals:  Vitals Value Taken Time   Temp 97.5    Pulse 69 03/26/25 0922   /74 03/26/25 0922   Resp     SpO2 100 % 03/26/25 0922   Vitals shown include unfiled device data.

## 2025-03-26 NOTE — OP NOTE
OPERATIVE REPORT  PATIENT NAME: Zohra Barriga    :  1955  MRN: [id number]  Pt Location:  OR ROOM 08    SURGERY DATE: 3/26/2025    Surgeons and Role:     * Jill Bentley MD - Primary     * Redd Rodriguez MD - Assisting    Preop Diagnosis:  Achalasia [K22.0]    Post-Op Diagnosis Codes:     * Achalasia [K22.0]    Procedure(s):  ESOPHAGOGASTRODUODENOSCOPY (EGD)  DILATATION ESOPHAGEAL/PYLORUS    Specimen(s):  * No specimens in log *    Estimated Blood Loss:   Minimal    Drains:  * No LDAs found *    Anesthesia Type:   General    Operative Indications:  Achalasia [K22.0]    Operative Findings:  Zohra Barriga was brought to the operating room and placed in the supine position.  After institution of adequate general anesthesia, fiberoptic endoscopy is performed.  The scope was advanced through the oropharynx and into the esophagus.  The esophagus appeared normal without lesions.  The GE junction was approached and the scope was easily passed through.  The pylorus was identified.  The scope was able to be passed through the pylorus.  An esophageal CRE balloon was then passed through the scope.  The balloon was placed through the pylorus.  Under direct visualization, the pyloric balloon was dilated to 20 mm.  The balloon was then removed.  The balloon was fully intact.  At this time, a Jagwire was then placed through the endoscope and into the stomach.  The scope was removed over the wire.  Under fluoroscopy, the esophagus/GE junction was dilated starting with a 39 Tajik dilator up to 51 Tajik dilator.  The scope was then replaced.  Repeat endoscopy demonstrated a small amount of mucosal bleeding at the pylorus.  No signs of any esophageal or gastric injury.  .  The excess air was suctioned from the GI tract and removed.     The patient was extubated and brought to the recovery in stable condition having tolerated the procedure well.            Complications:   None      Patient Disposition:  PACU  and  extubated and stable             SIGNATURE: Jill Bentley MD  DATE: March 26, 2025  TIME: 9:12 AM

## 2025-03-27 NOTE — TELEPHONE ENCOUNTER
Left patient voicemail asking if she was able to get lab scripts from  And if so to feel free to call us back and schedule for mobile appt if needed

## 2025-04-08 ENCOUNTER — TELEPHONE (OUTPATIENT)
Age: 70
End: 2025-04-08

## 2025-04-08 NOTE — TELEPHONE ENCOUNTER
Received a phone call from patient. Patient stated that she begins to cough after swallowing anything and the coughing turns into a near choking episode.  Patient stated that this started a week after surgery.  Patient stated that she has been eating very little, as she doesn't want to start coughing.  Please call patient with any further recommendations.

## 2025-04-08 NOTE — TELEPHONE ENCOUNTER
Amylyn Mortimer, PA spoke with patient to discuss symptoms further and sent follow up to Dr. Bentley.

## 2025-04-09 ENCOUNTER — OFFICE VISIT (OUTPATIENT)
Dept: GASTROENTEROLOGY | Facility: CLINIC | Age: 70
End: 2025-04-09
Payer: MEDICARE

## 2025-04-09 VITALS
BODY MASS INDEX: 32.85 KG/M2 | SYSTOLIC BLOOD PRESSURE: 112 MMHG | WEIGHT: 168.2 LBS | DIASTOLIC BLOOD PRESSURE: 64 MMHG | TEMPERATURE: 99.7 F

## 2025-04-09 DIAGNOSIS — Z86.0109 PERSONAL HISTORY OF OTHER COLON POLYPS: ICD-10-CM

## 2025-04-09 DIAGNOSIS — K22.0 ACHALASIA: ICD-10-CM

## 2025-04-09 DIAGNOSIS — K21.9 GASTROESOPHAGEAL REFLUX DISEASE WITHOUT ESOPHAGITIS: ICD-10-CM

## 2025-04-09 DIAGNOSIS — K31.84 GASTROPARESIS: ICD-10-CM

## 2025-04-09 DIAGNOSIS — K58.1 IRRITABLE BOWEL SYNDROME WITH CONSTIPATION: Primary | ICD-10-CM

## 2025-04-09 PROCEDURE — G2211 COMPLEX E/M VISIT ADD ON: HCPCS | Performed by: PHYSICIAN ASSISTANT

## 2025-04-09 PROCEDURE — 99214 OFFICE O/P EST MOD 30 MIN: CPT | Performed by: PHYSICIAN ASSISTANT

## 2025-04-09 RX ORDER — LISINOPRIL 2.5 MG/1
2.5 TABLET ORAL DAILY
COMMUNITY
Start: 2025-04-03 | End: 2026-04-03

## 2025-04-09 RX ORDER — TOLTERODINE 4 MG/1
4 CAPSULE, EXTENDED RELEASE ORAL DAILY
COMMUNITY

## 2025-04-09 NOTE — PROGRESS NOTES
Name: Zohra Barriga      : 1955      MRN: [id number]  Encounter Provider: Valerie Gamboa PA-C  Encounter Date: 2025   Encounter department: [facility] [place]  :  Assessment & Plan  Irritable bowel syndrome with constipation  Currently well-controlled. Continue Colace daily, Senokot daily, and suppositories as needed.       Gastroparesis  Stable. Continue Zofran as needed. Continue small frequent meals and low-fat diet.       Gastroesophageal reflux disease without esophagitis  Stable. Continue Pepcid 40 mg daily at bedtime.       Achalasia  History of achalasia status post Heller myotomy with Kareem fundoplication in May 2021. Follows with thoracic surgery. She underwent EGD with dilation in 2025.       Personal history of other colon polyps  Colonoscopy 2022 with findings of 1 TA and diverticulosis. Due for next colonoscopy in 7 years.       Follow-up in 6 months    History of Present Illness   HPI  Zohra Barriga is a 69 y.o. female with achalasia status post Heller myotomy with Kareem fundoplication in May 2021, chronic HFpEF status post pacemaker, hypertension, dyslipidemia, CKD stage III, diabetes mellitus type 2 with insulin pump, asthma, hypothyroidism, gastroparesis, GERD, IBS-C, chronic pain on opioids who presents for follow-up.    History obtained from: patient and patient's Significant Other    She has been doing well. She is currently taking senna and Colace daily and having regular bowel movements. She uses suppositories only as needed. She denies abdominal pain. She denies nausea and vomiting. She is only using Zofran as needed. Appetite is okay. She is following with thoracic surgery for history of achalasia and recently had a dilation of the esophagus.       Objective   /64 (BP Location: Right arm, Patient Position: Sitting, Cuff Size: Standard)   Temp 99.7 °F (37.6 °C) (Tympanic)   Wt 76.3 kg (168 lb 3.2 oz)   BMI 32.85 kg/m²       Physical Exam  Vitals and nursing note reviewed.   Constitutional:       General: She is not in acute distress.     Appearance: She is well-developed. She is ill-appearing.   HENT:      Head: Normocephalic and atraumatic.   Eyes:      Conjunctiva/sclera: Conjunctivae normal.   Cardiovascular:      Rate and Rhythm: Normal rate and regular rhythm.      Heart sounds: No murmur heard.  Pulmonary:      Effort: Pulmonary effort is normal. No respiratory distress.      Breath sounds: Normal breath sounds.   Abdominal:      Palpations: Abdomen is soft.      Tenderness: There is no abdominal tenderness.   Musculoskeletal:         General: No swelling.      Cervical back: Neck supple.      Comments: Boot on the left lower leg   Skin:     General: Skin is warm and dry.   Neurological:      Mental Status: She is alert.   Psychiatric:         Mood and Affect: Mood normal.

## 2025-04-09 NOTE — ASSESSMENT & PLAN NOTE
Currently well-controlled. Continue Colace daily, Senokot daily, and suppositories as needed.

## 2025-04-09 NOTE — ASSESSMENT & PLAN NOTE
History of achalasia status post Heller myotomy with Kareem fundoplication in May 2021. Follows with thoracic surgery. She underwent EGD with dilation in March 2025.

## 2025-04-11 ENCOUNTER — TELEPHONE (OUTPATIENT)
Dept: CARDIAC SURGERY | Facility: CLINIC | Age: 70
End: 2025-04-11

## 2025-04-11 NOTE — TELEPHONE ENCOUNTER
I review pt's symptoms with Dr. Bentley who recommends diet modification with soft foods and full liquids. Discussed avoiding eating prior to bed and keep head of bed elevated. She will call us with any concerns.    Amylyn Mortimer, PA-C  Thoracic Surgery

## 2025-05-05 DIAGNOSIS — K58.1 IRRITABLE BOWEL SYNDROME WITH CONSTIPATION: Primary | ICD-10-CM

## 2025-05-05 RX ORDER — DOCUSATE SODIUM 100 MG/1
100 CAPSULE, LIQUID FILLED ORAL 2 TIMES DAILY PRN
Qty: 180 CAPSULE | Refills: 3 | Status: SHIPPED | OUTPATIENT
Start: 2025-05-05

## 2025-05-06 NOTE — INTERVAL H&P NOTE
H&P reviewed  After examining the patient I find no changes in the patients condition since the H&P had been written  Vitals:    02/16/22 0643   BP: 149/53   Pulse: 67   Resp: 16   Temp: (!) 97 3 °F (36 3 °C)   SpO2: 100%     Safe to proceed   EGD dilation    Compa Hammonds MD  Thoracic Surgery  (Available on [employer])  Office: [phone]
(4) no impairment

## 2025-05-16 DIAGNOSIS — K21.9 GASTROESOPHAGEAL REFLUX DISEASE WITHOUT ESOPHAGITIS: ICD-10-CM

## 2025-05-16 RX ORDER — FAMOTIDINE 40 MG/1
40 TABLET, FILM COATED ORAL
Qty: 30 TABLET | Refills: 5 | Status: SHIPPED | OUTPATIENT
Start: 2025-05-16

## 2025-06-17 ENCOUNTER — NURSE TRIAGE (OUTPATIENT)
Age: 70
End: 2025-06-17

## 2025-06-17 DIAGNOSIS — K58.1 IRRITABLE BOWEL SYNDROME WITH CONSTIPATION: Primary | ICD-10-CM

## 2025-06-17 RX ORDER — POLYETHYLENE GLYCOL 3350 17 G/17G
POWDER, FOR SOLUTION ORAL
Qty: 238 G | Refills: 0 | Status: SHIPPED | OUTPATIENT
Start: 2025-06-17

## 2025-06-17 NOTE — TELEPHONE ENCOUNTER
"  REASON FOR CONVERSATION: Constipation    SYMPTOMS: Last BM 5 days ago, 6/10 abd pain     OTHER HEALTH INFORMATION: Not passing gas, no n/v  take colace daily, senokot daily, enema, and milk of mag with no relief   PROTOCOL DISPOSITION: See Within 2 Weeks in Office    CARE ADVICE PROVIDED: Defer to provider as she has failed miralax     PRACTICE FOLLOW-UP: Please advise/ Looks like pt had lactulose in the past but does not have any meds at home.         Reason for Disposition   Constipation is a recurrent ongoing problem (i.e., < 3 BMs / week or straining > 25% of the time)    Answer Assessment - Initial Assessment Questions  1. STOOL PATTERN OR FREQUENCY: \"How often do you have a bowel movement (BM)?\"  (Normal range: 3 times a day to every 3 days)  \"When was your last BM?\"        Usually every day but last BM 5 days ago   2. STRAINING: \"Do you have to strain to have a BM?\"       N/a   3. ONSET: \"When did the constipation begin?\"      5 days ago   4. RECTAL PAIN: \"Does your rectum hurt when the stool comes out?\" If Yes, ask: \"Do you have hemorrhoids? How bad is the pain?\"  (Scale 1-10; or mild, moderate, severe)      No   5. BM COMPOSITION: \"Are the stools hard?\"       Not having BM  6. BLOOD ON STOOLS: \"Has there been any blood on the toilet tissue or on the surface of the BM?\" If Yes, ask: \"When was the last time?\"        7. CHRONIC CONSTIPATION: \"Is this a new problem for you?\"  If No, ask: \"How long have you had this problem?\" (days, weeks, months)       yes  8. CHANGES IN DIET OR HYDRATION: \"Have there been any recent changes in your diet?\" \"How much fluids are you drinking on a daily basis?\"  \"How much have you had to drink today?\"        9. MEDICINES: \"Have you been taking any new medicines?\" \"Are you taking any narcotic pain medicines?\" (e.g., Dilaudid, morphine, Percocet, Vicodin)      Not on med list   10. LAXATIVES: \"Have you been using any stool softeners, laxatives, or enemas?\"  If Yes, ask \"What, how " "often, and when was the last time?\"        Colace, senokot, milk of mag and enemas     12. CAUSE: \"What do you think is causing the constipation?\"         Chronic     14. OTHER SYMPTOMS: \"Do you have any other symptoms?\" (e.g., abdomen pain, bloating, fever, vomiting)        Abdominal pain 6/10    Protocols used: Constipation-Adult-[place]    "

## 2025-06-22 NOTE — ASSESSMENT & PLAN NOTE
New incomplete bladder emptying  Longstanding overactive bladder    Stop anticholinergic ditropan rx  Continue bowel regimen to treat constipation  Trial of low dose alpha blocker hytrin 1mg daily to promote bladder neck relaxation and improved emptying  She is cautioned on hypotension lightheadedness with this rx  Monitor after dose  Creatinine at her baseline 1 4  Continue urinary retention protocol  If truly unable to void over the next 24 hrs and multiple straight catheterizations please follow protocol for villareal catheter insertion  She should followup with her urologist who manages her voiding dysfunction after discharge  Cast placed by orthopedics.  Neurovascularly intact, cleared for discharge home.  Arron OMER Attending

## 2025-06-27 ENCOUNTER — TELEPHONE (OUTPATIENT)
Age: 70
End: 2025-06-27

## 2025-06-27 ENCOUNTER — TELEPHONE (OUTPATIENT)
Dept: CARDIAC SURGERY | Facility: CLINIC | Age: 70
End: 2025-06-27

## 2025-06-27 NOTE — TELEPHONE ENCOUNTER
Spoke with patient and scheduled her follow up appt with Dr. Bentley on Thursday, 7/3/25 at 8:40am to consent her for an EGD with dilation procedure. Tentative date we have on hold is Wednesday, 7/9/2025. Will know more after her office visit. Patient verbalized her understanding on next steps.

## 2025-06-27 NOTE — TELEPHONE ENCOUNTER
I returned call to patient and spoke with her. She advised that she had a recent hospitalization and now is following with Speech therapy. However, she reports trouble with swallowing and is requesting an EGD/Dilation and states she has had this done with Dr. Bentley previously. I verbalized understanding and advised before we move forward that we do have to confirm with our clinical team of the next steps of how to proceed and schedule appropriately. She voiced understanding and appreciative of this.

## 2025-07-03 ENCOUNTER — OFFICE VISIT (OUTPATIENT)
Dept: CARDIAC SURGERY | Facility: CLINIC | Age: 70
End: 2025-07-03
Payer: MEDICARE

## 2025-07-03 VITALS
OXYGEN SATURATION: 99 % | TEMPERATURE: 97.7 F | DIASTOLIC BLOOD PRESSURE: 70 MMHG | WEIGHT: 155.2 LBS | HEART RATE: 75 BPM | SYSTOLIC BLOOD PRESSURE: 142 MMHG | BODY MASS INDEX: 30.31 KG/M2

## 2025-07-03 DIAGNOSIS — K22.0 ACHALASIA: Primary | ICD-10-CM

## 2025-07-03 PROCEDURE — 99213 OFFICE O/P EST LOW 20 MIN: CPT | Performed by: THORACIC SURGERY (CARDIOTHORACIC VASCULAR SURGERY)

## 2025-07-03 RX ORDER — TRAMADOL HYDROCHLORIDE 25 MG/1
TABLET, COATED ORAL
COMMUNITY
Start: 2025-06-10

## 2025-07-03 RX ORDER — CALCIUM CARBONATE 500 MG/1
500 TABLET, CHEWABLE ORAL 4 TIMES DAILY PRN
COMMUNITY
Start: 2025-06-10 | End: 2026-06-10

## 2025-07-03 RX ORDER — IBUPROFEN 200 MG
TABLET ORAL
COMMUNITY

## 2025-07-03 RX ORDER — CARVEDILOL 3.12 MG/1
3.12 TABLET ORAL 2 TIMES DAILY WITH MEALS
COMMUNITY
Start: 2025-06-10

## 2025-07-03 RX ORDER — CLOPIDOGREL BISULFATE 75 MG/1
75 TABLET ORAL DAILY
COMMUNITY
Start: 2025-05-30

## 2025-07-03 RX ORDER — UBIDECARENONE 75 MG
CAPSULE ORAL
COMMUNITY

## 2025-07-03 NOTE — PROGRESS NOTES
Name: Zohra Barriga      : 1955      MRN: [id number]  Encounter Provider: Thoracic Oncology [facility]  Encounter Date: 7/3/2025   Encounter department: [facility] [place]  :  Assessment & Plan  Achalasia  Patient presents today for follow-up.  She has a history of robotic Heller myotomy and Kareem fundoplication in .  She has undergone periodic endoscopies with dilation.  Her most recent one was on .  She is here today reporting occasional food sticking and regurgitation.  After further discussion this occurs with foods like raw vegetables, meats and bread products.  She is able to tolerate the majority of her foods without issues.  I had a very long discussion with her and her  today regarding diet choices with achalasia.  She should be sticking to foods that are soft in consistency and chewing foods very thoroughly.  She also can be supplementing with protein shakes as needed.  As she is not having daily issues I would not recommend endoscopy with dilation at this time.  Especially with her recently undergoing a cardiac intervention and starting on anticoagulation.  She will follow-up with us on an as-needed basis.  She is aware to reach out to us if she is having food sticking and regurgitation with soft foods. Pt agreed with plan.       Thoracic History     No problems updated.     History of Present Illness     HPI Zohra Barriga is a 69 y.o. female who presents today for follow-up.  She has a history of robotic Heller myotomy and Kareem fundoplication in  for achalasia.  She underwent most recent EGD with dilation 3/26.    She presents today for follow up.  Of note since last patient was last seen she underwent  PCI with drug-eluting stent on .  She is now on Plavix and aspirin 81 mg.     She reports that some foods are getting stuck with eating.  After further discussion it seems that majority of foods are passing through without issues.  She  reports dysphagia with meats, raw carrots, breads.  She a tasty cake on the way here today without issues and tacos for dinner last night without any issues.    Review of Systems   Constitutional:  Negative for chills, diaphoresis and unexpected weight change.   HENT: Negative.     Respiratory:  Negative for cough, shortness of breath and wheezing.    Cardiovascular:  Negative for chest pain and leg swelling.   Gastrointestinal:  Negative for abdominal pain, diarrhea, nausea and vomiting.   Skin: Negative.    All other systems reviewed and are negative.     Medical History Reviewed by provider this encounter:     .  Past Medical History   Past Medical History[1]  Past Surgical History[2]  Family History[3]   reports that she has never smoked. She has never used smokeless tobacco. She reports that she does not currently use drugs after having used the following drugs: Marijuana. Frequency: 8.00 times per week. She reports that she does not drink alcohol.  Current Outpatient Medications   Medication Instructions    acetaminophen (TYLENOL) 975 mg, Every 6 hours PRN    albuterol (ACCUNEB) 2.5 mg, Every 6 hours PRN    BABY ASPIRIN PO Baby Aspirin    calcium carbonate (TUMS) 500 mg, Oral, 4 times daily PRN    carvedilol (COREG) 6.25 mg, Oral, 2 times daily with meals    carvedilol (COREG) 3.125 mg, Oral, 2 times daily with meals    Cholecalciferol 50,000 Units, Oral, Weekly, sundays    clopidogrel (PLAVIX) 75 mg, Oral, Daily    Cranberry 4200 MG CAPS Oral    CRANBERRY FRUIT CONCENTRATE PO 4,200 mg, Daily    cyanocobalamin (VITAMIN B-12) 100 mcg tablet Oral    cyanocobalamin (VITAMIN B-12) 1,000 mcg, Oral, Daily    Darbepoetin Mike (ARANESP, ALBUMIN FREE, IJ) 1 Dose, Every 30 days    docusate sodium (COLACE) 100 mg, Oral, 2 times daily PRN    ezetimibe (ZETIA) 10 mg, Every morning    famotidine (PEPCID) 40 mg, Oral, Daily at bedtime    Fluticasone-Salmeterol (Advair) 250-50 mcg/dose inhaler 1 puff, Inhalation, 2 times  "daily    gabapentin (NEURONTIN) 300 mg, Oral, Daily at bedtime    glucose blood test strip     insulin lispro (HUMALOG/ADMELOG) 1-6 Units, Subcutaneous, 3 times daily before meals    insulin lispro (HUMALOG/ADMELOG) 1-6 Units, Subcutaneous, Daily at bedtime    INSULIN SYRINGE .5CC/29G (B-D INSULIN SYRINGE) 29G X 1/2\" 0.5 ML MISC USE TO INJECT INSULIN 3 TIMES DAILY BEFORE MEAL IN CASE OF PUMP FAILURE    levothyroxine 112 mcg tablet 1 tablet, Oral, Daily    lisinopril (ZESTRIL) 2.5 mg, Oral, Daily    melatonin 3 mg, Oral, Daily at bedtime PRN    methocarbamol (ROBAXIN) 500 mg tablet     ondansetron (ZOFRAN) 4 mg, Oral, Every 8 hours PRN    oxyCODONE HCl 5 mg, Oral, Every 4 hours PRN    polyethylene glycol (GLYCOLAX) 17 GM/SCOOP powder Take as directed by the office.    polyethylene glycol (MIRALAX) 17 g, Oral, Daily    pramipexole (MIRAPEX) 1 mg, 2 times daily    senna (SENOKOT) 8.6 mg 2 tablets, Oral, 2 times daily    sertraline (ZOLOFT) 150 mg, Oral, Every morning    tiotropium (Spiriva HandiHaler) 18 mcg inhalation capsule Place into inhaler and inhale    tolterodine (DETROL LA) 4 mg, Oral, Daily    torsemide (DEMADEX) 20 mg, Oral, Daily    traMADol HCl 25 MG TABS PLEASE SEE ATTACHED FOR DETAILED DIRECTIONS    zinc gluconate 50 mg, Daily   Allergies[4]   Medications Ordered Prior to Encounter[5]   Social History[6]     Objective   /70 (BP Location: Right arm, Patient Position: Sitting, Cuff Size: Standard)   Pulse 75   Temp 97.7 °F (36.5 °C) (Temporal)   Wt 70.4 kg (155 lb 3.3 oz)   SpO2 99%   BMI 30.31 kg/m²     Pain Screening:  Pain Score:   9 (feet)  ECOG    Physical Exam  Vitals reviewed.   Constitutional:       General: She is not in acute distress.     Appearance: Normal appearance. She is not ill-appearing.   HENT:      Head: Normocephalic.      Nose: Nose normal.      Mouth/Throat:      Mouth: Mucous membranes are moist.     Cardiovascular:      Rate and Rhythm: Normal rate.   Pulmonary:      " "Effort: Pulmonary effort is normal.      Breath sounds: Normal breath sounds.   Abdominal:      Palpations: Abdomen is soft.      Tenderness: There is no abdominal tenderness.     Skin:     General: Skin is warm.     Neurological:      Mental Status: She is alert and oriented to person, place, and time.     Psychiatric:         Mood and Affect: Mood normal.              [1]   Past Medical History:  Diagnosis Date    Anxiety     Asthma     controlled    Back complaints     Cancer ([facility])     nose    Cellulitis of left lower leg     \"not now\"    CHF (congestive heart failure) ([facility]) 02/2021    Chronic bilateral thoracic back pain     Chronic kidney disease     sees nephrologist regularly\" stage 3\"    Chronic myofascial pain     Chronic pain of both lower extremities     Chronic pain of left knee     \"both knees\"    Chronic pain syndrome     bilat legs and knees/neuropathy pain    COVID 12/2021    CPAP (continuous positive airway pressure) dependence     no currently uses    Depression     Diabetes mellitus ([facility])     insulin pump    Difficulty walking 2018    Disease of thyroid gland     Does use hearing aid     bilat and will wear DOS    DVT (deep venous thrombosis) ([facility]) 2013    left leg    Gait disorder     Gastroparesis     GERD (gastroesophageal reflux disease)     Head injury     Headache, tension-type     History of angina     History of MRSA infection     History of transfusion     Many years ago    HL (hearing loss) 2018    Hyperlipidemia     Hypertension     Hypoglycemic reaction     \"occas low blood sugar\"    Insulin pump in place     pt reports saw endocrinologist 4/28 and will bring copy of instructions DOS    Irritable bowel syndrome     Kidney disease     Memory loss 2021    Movement disorder 2021    Neuropathy in diabetes ([facility]) 2021    Constant pain legs and feet    Nose fracture     Pacemaker 2019    Pneumonia     Polyneuropathy associated with underlying disease ([facility])     PONV (postoperative nausea and " "vomiting)     Risk for falls     S/P insertion of spinal cord stimulator 2018    Sarcoidosis     Shortness of breath     \"exertion and not new\"    Sleep apnea     Stroke ([facility])     Thoracic vertebral fracture ([facility])     TIA (transient ischemic attack)     Use of cane as ambulatory aid     Uses walker     Wears dentures     permanent upper/and some missing teeth/partial lower   [2]   Past Surgical History:  Procedure Laterality Date    ABDOMINAL ADHESION SURGERY N/A 2021    Procedure: laparoscopic LYSIS ADHESIONS;  Surgeon: Jill Bentley MD;  [facility];  Service: Thoracic    BACK SURGERY  2023    TLIF at [facility], Dr. Levy    BREAST SURGERY      BREAST SURGERY      reduction    CARDIAC PACEMAKER PLACEMENT      pacemaker     SECTION      COLONOSCOPY      DILATION AND CURETTAGE OF UTERUS      \"D&E\"    HERNIA REPAIR      HYSTERECTOMY      JOINT REPLACEMENT Left     LTKR    NECK SURGERY      fused 4 discs with 4 screws implanted    NISSEN FUNDOPLICATION LAPAROSCOPIC WITH ROBOTICS N/A 2021    Procedure: ROBOTIC HELLER MYOTOMY WITH BERNARDO FUNDIPLICATION ;  Surgeon: Jill Bentley MD;  [facility];  Service: Thoracic    NOSE SURGERY  2024    closed reduction    OR ESOPHAGOGASTRODUODENOSCOPY TRANSORAL DIAGNOSTIC N/A 2021    Procedure: ESOPHAGOGASTRODUODENOSCOPY (EGD);  Surgeon: Jill Bentley MD;  [facility];  Service: Thoracic    OR ESOPHAGOGASTRODUODENOSCOPY TRANSORAL DIAGNOSTIC N/A 2022    Procedure: ESOPHAGOGASTRODUODENOSCOPY (EGD),;  Surgeon: Jill Bentley MD;  [facility];  Service: Thoracic    OR ESOPHAGOGASTRODUODENOSCOPY TRANSORAL DIAGNOSTIC N/A 2022    Procedure: ESOPHAGOGASTRODUODENOSCOPY (EGD);  Surgeon: Jill Bentley MD;  [facility];  Service: Thoracic    OR ESOPHAGOGASTRODUODENOSCOPY TRANSORAL DIAGNOSTIC N/A 2022    Procedure: ESOPHAGOGASTRODUODENOSCOPY (EGD); PYLORIC DILATION; " GE JUNCTION DILATION;  Surgeon: Jill Bentley MD;  [facility];  Service: Thoracic    DE ESOPHAGOGASTRODUODENOSCOPY TRANSORAL DIAGNOSTIC N/A 11/29/2022    Procedure: ESOPHAGOGASTRODUODENOSCOPY (EGD);  Surgeon: Varinder Steiner MD;  [facility];  Service: Thoracic    DE ESOPHAGOGASTRODUODENOSCOPY TRANSORAL DIAGNOSTIC N/A 01/24/2023    Procedure: ESOPHAGOGASTRODUODENOSCOPY (EGD);  Surgeon: Jill Bentley MD;  [facility];  Service: Thoracic    DE ESOPHAGOGASTRODUODENOSCOPY TRANSORAL DIAGNOSTIC N/A 05/01/2024    Procedure: ESOPHAGOGASTRODUODENOSCOPY (EGD);  Surgeon: Jill Bentley MD;  [facility];  Service: Thoracic    DE ESOPHAGOGASTRODUODENOSCOPY TRANSORAL DIAGNOSTIC N/A 3/26/2025    Procedure: ESOPHAGOGASTRODUODENOSCOPY (EGD);  Surgeon: Jill Bentley MD;  [facility];  Service: Thoracic    DE ESOPHAGOSCOPY FLEX BALLOON DILAT <30 MM DIAM N/A 01/12/2022    Procedure: DILATATION ESOPHAGEAL;  Surgeon: Jill Bentley MD;  [facility];  Service: Thoracic    DE ESOPHAGOSCOPY FLEX BALLOON DILAT <30 MM DIAM N/A 02/16/2022    Procedure: DILATATION ESOPHAGEAL;  Surgeon: Jill Bentley MD;  [facility];  Service: Thoracic    DE ESOPHAGOSCOPY FLEX BALLOON DILAT <30 MM DIAM N/A 11/29/2022    Procedure: DILATATION ESOPHAGEAL esophageal and pyloric dilation;  Surgeon: Varinder Steiner MD;  [facility];  Service: Thoracic    DE ESOPHAGOSCOPY FLEX BALLOON DILAT <30 MM DIAM N/A 01/24/2023    Procedure: esophageal dilation dilated up to 54  with pylorus dilation dilated up to 18;  Surgeon: Jill Bentley MD;  [facility];  Service: Thoracic    DE ESOPHAGOSCOPY FLEX BALLOON DILAT <30 MM DIAM N/A 05/01/2024    Procedure: DILATATION ESOPHAGEAL;  Surgeon: Jill Bentley MD;  [facility];  Service: Thoracic    DE ESOPHAGOSCOPY FLEX BALLOON DILAT <30 MM DIAM N/A 3/26/2025    Procedure: DILATATION  "ESOPHAGEAL/PYLORUS;  Surgeon: Jill Bentley MD;  [facility];  Service: Thoracic    IN RIBEIRO IMPLTJ NSTIM ELTRDS PLATE/PADDLE EDRL Left 04/23/2018    Procedure: Insertion of thoracic spinal cord stimulator electrode via laminotomy and placement of left buttock IPG;  Surgeon: Shahab Bullock MD;  [facility];  Service: Neurosurgery    REPLACEMENT TOTAL KNEE      ROTATOR CUFF REPAIR      SPINAL CORD STIMULATOR REMOVAL Bilateral 07/02/2024    Procedure: REOPENING OF THORACIC AND LEFT BUTTOCK INCISION FOR REMOVAL OF SPINAL CORD STIMULATOR SYSTEM;  Surgeon: Shahab Bullock MD;  [facility];  Service: Neurosurgery    SPINAL STIMULATOR PLACEMENT Left 10/03/2018    Procedure: BUTTOCK RE-OPENING INCISION FOR REPOSITIONING OF IMPLANTABLE PULSE GENERATOR;  Surgeon: Shahab Bullock MD;  [facility];  Service: Neurosurgery    TONSILLECTOMY      UPPER GASTROINTESTINAL ENDOSCOPY      VASCULAR SURGERY      WISDOM TOOTH EXTRACTION     [3]   Family History  Problem Relation Name Age of Onset    Diabetes Family      Heart disease Family      Hypertension Family      Neuropathy Family      No Known Problems Mother      Heart disease Father YENIFER MEJIA     Diabetes Father YENIFER MEJIA     Neuropathy Father YENIFER MEJIA     Stroke Father YENIFER MJEIA     No Known Problems Maternal Grandmother      No Known Problems Maternal Grandfather      No Known Problems Paternal Grandmother      No Known Problems Paternal Grandfather      No Known Problems Brother      No Known Problems Daughter      Cancer Son     [4]   Allergies  Allergen Reactions    Bactrim [Sulfamethoxazole-Trimethoprim] Hives    Sucralfate Hives     Facial swelling    Topamax [Topiramate]      disorentation    Azithromycin Itching    Pregabalin Confusion and Other (See Comments)     altered mental status    Ciprofloxacin Drowsiness and Other (See Comments)     Other reaction(s): Other (See Comments)    \"drowsiness\"    Erythromycin Nausea " "Only    Norvasc [Amlodipine] Swelling    Baclofen Other (See Comments)     \"That knocks me out.\"    Bupropion Fatigue and Other (See Comments)    Methotrexate Nausea Only and Vomiting   [5]   Current Outpatient Medications on File Prior to Visit   Medication Sig Dispense Refill    acetaminophen (TYLENOL) 500 mg tablet Take 975 mg by mouth every 6 (six) hours as needed for mild pain      albuterol (ACCUNEB) 1.25 MG/3ML nebulizer solution Take 2.5 mg by nebulization every 6 (six) hours as needed for wheezing      BABY ASPIRIN PO Baby Aspirin      calcium carbonate (TUMS) 500 mg chewable tablet Chew 500 mg 4 (four) times a day as needed      carvedilol (COREG) 3.125 mg tablet Take 3.125 mg by mouth 2 (two) times a day with meals      Cholecalciferol 1.25 MG (36578 UT) capsule Take 50,000 Units by mouth once a week sundays      clopidogrel (PLAVIX) 75 mg tablet Take 75 mg by mouth daily      Cranberry 4200 MG CAPS Take by mouth      CRANBERRY FRUIT CONCENTRATE PO Take 4,200 mg by mouth in the morning      cyanocobalamin (VITAMIN B-12) 100 mcg tablet Take by mouth      cyanocobalamin (VITAMIN B-12) 1000 MCG tablet Take 1,000 mcg by mouth in the morning.      docusate sodium (Colace) 100 mg capsule Take 1 capsule (100 mg total) by mouth 2 (two) times a day as needed for constipation 180 capsule 3    ezetimibe (ZETIA) 10 mg tablet Take 10 mg by mouth every morning      famotidine (PEPCID) 40 MG tablet TAKE 1 TABLET BY MOUTH DAILY AT BEDTIME 30 tablet 5    Fluticasone-Salmeterol (Advair) 250-50 mcg/dose inhaler Inhale 1 puff in the morning and 1 puff in the evening.      gabapentin (NEURONTIN) 300 mg capsule Take 300 mg by mouth daily at bedtime      glucose blood test strip       insulin lispro (HumALOG/ADMELOG) 100 units/mL injection Inject 1-6 Units under the skin 3 (three) times a day before meals      insulin lispro (HumALOG/ADMELOG) 100 units/mL injection Inject 1-6 Units under the skin daily at bedtime      INSULIN " "SYRINGE .5CC/29G (B-D INSULIN SYRINGE) 29G X 1/2\" 0.5 ML MISC USE TO INJECT INSULIN 3 TIMES DAILY BEFORE MEAL IN CASE OF PUMP FAILURE      levothyroxine 112 mcg tablet Take 1 tablet by mouth in the morning      lisinopril (ZESTRIL) 2.5 mg tablet Take 2.5 mg by mouth in the morning.      melatonin 3 mg Take 3 mg by mouth daily at bedtime as needed (sleep)      methocarbamol (ROBAXIN) 500 mg tablet       ondansetron (ZOFRAN) 4 mg tablet Take 4 mg by mouth every 8 (eight) hours as needed for nausea or vomiting      oxyCODONE HCl 5 MG TABA Take 5 mg by mouth every 4 (four) hours as needed (severe pain)      polyethylene glycol (GLYCOLAX) 17 GM/SCOOP powder Take as directed by the office. 238 g 0    polyethylene glycol (MIRALAX) 17 g packet Take 17 g by mouth daily 90 each 3    pramipexole (MIRAPEX) 1 mg tablet Take 1 mg by mouth in the morning and 1 mg in the evening.      senna (SENOKOT) 8.6 mg Take 2 tablets by mouth in the morning and 2 tablets before bedtime.      sertraline (ZOLOFT) 100 mg tablet Take 150 mg by mouth every morning      tolterodine (DETROL LA) 4 mg 24 hr capsule Take 4 mg by mouth in the morning.      torsemide (DEMADEX) 20 mg tablet Take 1 tablet (20 mg total) by mouth daily      traMADol HCl 25 MG TABS PLEASE SEE ATTACHED FOR DETAILED DIRECTIONS      zinc gluconate 50 mg tablet Take 50 mg by mouth in the morning.      carvedilol (COREG) 6.25 mg tablet Take 1 tablet (6.25 mg total) by mouth 2 (two) times a day with meals (Patient not taking: Reported on 7/3/2025)      Darbepoetin Mike (ARANESP, ALBUMIN FREE, IJ) 1 Dose by Intramuscular (deltoid only) route every 30 (thirty) days (Patient not taking: Reported on 3/24/2025)      tiotropium (Spiriva HandiHaler) 18 mcg inhalation capsule Place into inhaler and inhale (Patient not taking: Reported on 4/9/2025)       No current facility-administered medications on file prior to visit.   [6]   Social History  Tobacco Use    Smoking status: Never    " Smokeless tobacco: Never   Vaping Use    Vaping status: Never Used   Substance and Sexual Activity    Alcohol use: No    Drug use: Not Currently     Frequency: 8.0 times per week     Types: Marijuana     Comment: Medical Marijuana/vape pen once per week    Sexual activity: Yes     Partners: Male     Birth control/protection: None

## 2025-07-03 NOTE — ASSESSMENT & PLAN NOTE
Patient presents today for follow-up.  She has a history of robotic Heller myotomy and Kareem fundoplication in 2021.  She has undergone periodic endoscopies with dilation.  Her most recent one was on March 26.  She is here today reporting occasional food sticking and regurgitation.  After further discussion this occurs with foods like raw vegetables, meats and bread products.  She is able to tolerate the majority of her foods without issues.  I had a very long discussion with her and her  today regarding diet choices with achalasia.  She should be sticking to foods that are soft in consistency and chewing foods very thoroughly.  She also can be supplementing with protein shakes as needed.  As she is not having daily issues I would not recommend endoscopy with dilation at this time.  Especially with her recently undergoing a cardiac intervention and starting on anticoagulation.  She will follow-up with us on an as-needed basis.  She is aware to reach out to us if she is having food sticking and regurgitation with soft foods. Pt agreed with plan.

## 2025-07-18 ENCOUNTER — NURSE TRIAGE (OUTPATIENT)
Age: 70
End: 2025-07-18

## 2025-07-18 ENCOUNTER — APPOINTMENT (OUTPATIENT)
Dept: RADIOLOGY | Facility: CLINIC | Age: 70
End: 2025-07-18
Attending: NURSE PRACTITIONER
Payer: MEDICARE

## 2025-07-18 DIAGNOSIS — R19.7 DIARRHEA, UNSPECIFIED TYPE: Primary | ICD-10-CM

## 2025-07-18 DIAGNOSIS — R19.7 DIARRHEA, UNSPECIFIED TYPE: ICD-10-CM

## 2025-07-18 PROCEDURE — 74018 RADEX ABDOMEN 1 VIEW: CPT

## 2025-07-18 NOTE — TELEPHONE ENCOUNTER
Pt passing stool every time she sits down to urinate, does not feel the stool come out. Denies diarrhea, constipation, denies fecal incontinence

## 2025-07-18 NOTE — TELEPHONE ENCOUNTER
Regarding: stomach pain w the urge to have bowel movement  ----- Message from Nilda SMILEY sent at 7/18/2025  8:28 AM EDT -----  Patient called in with stomach pain and rectal pain she stated she is having an issue when urinating you have the urge to have a bowel movement

## 2025-07-21 DIAGNOSIS — K59.04 CHRONIC IDIOPATHIC CONSTIPATION: Primary | ICD-10-CM

## 2025-07-21 RX ORDER — POLYETHYLENE GLYCOL 3350 17 G/17G
POWDER, FOR SOLUTION ORAL
Qty: 238 G | Refills: 0 | Status: SHIPPED | OUTPATIENT
Start: 2025-07-21

## 2025-07-24 ENCOUNTER — TELEPHONE (OUTPATIENT)
Dept: GASTROENTEROLOGY | Facility: CLINIC | Age: 70
End: 2025-07-24

## 2025-07-24 NOTE — TELEPHONE ENCOUNTER
I called and left a message for the patient to call back to reschedule office visit from 11/12 with Valerie due to a change in her schedule.

## 2025-07-29 ENCOUNTER — TELEPHONE (OUTPATIENT)
Dept: GASTROENTEROLOGY | Facility: CLINIC | Age: 70
End: 2025-07-29

## (undated) DEVICE — Device

## (undated) DEVICE — BETHLEHEM UNIVERSAL SPINE, KIT: Brand: CARDINAL HEALTH

## (undated) DEVICE — 60 ML SYRINGE,REGULAR TIP: Brand: MONOJECT

## (undated) DEVICE — TIP-UP FENESTRATED GRASPER: Brand: ENDOWRIST

## (undated) DEVICE — INTENDED FOR TISSUE SEPARATION, AND OTHER PROCEDURES THAT REQUIRE A SHARP SURGICAL BLADE TO PUNCTURE OR CUT.: Brand: BARD-PARKER SAFETY BLADES SIZE 10, STERILE

## (undated) DEVICE — HIGH PERFORMANCE GUIDEWIRE: Brand: JAGWIRE

## (undated) DEVICE — FIRST STEP BEDSIDE KIT - STAND-UP POUCH, ENDOSCOPIC CLEANING PAD - 1 POUCH: Brand: FIRST STEP BEDSIDE KIT - STAND-UP POUCH, ENDOSCOPIC CLEANING PAD

## (undated) DEVICE — GLOVE SRG BIOGEL ECLIPSE 6.5

## (undated) DEVICE — TRAY FOLEY 16FR URIMETER SILICONE SURESTEP

## (undated) DEVICE — INTENDED FOR TISSUE SEPARATION, AND OTHER PROCEDURES THAT REQUIRE A SHARP SURGICAL BLADE TO PUNCTURE OR CUT.: Brand: BARD-PARKER ® CARBON RIB-BACK BLADES

## (undated) DEVICE — SUT MONOCRYL 4-0 PS-2 18 IN Y496G

## (undated) DEVICE — GLOVE INDICATOR PI UNDERGLOVE SZ 7 BLUE

## (undated) DEVICE — GAUZE SPONGES,16 PLY: Brand: CURITY

## (undated) DEVICE — AIR AND WATER TUBING/CAP SET FOR OLYMPUS® SCOPES: Brand: ERBE

## (undated) DEVICE — MARYLAND BIPOLAR FORCEPS: Brand: ENDOWRIST

## (undated) DEVICE — HEAVY DUTY TABLE COVER: Brand: CONVERTORS

## (undated) DEVICE — TUBING SUCTION 5MM X 12 FT

## (undated) DEVICE — STRL PENROSE DRAIN 18" X 3/4": Brand: CARDINAL HEALTH

## (undated) DEVICE — GAUZE ROLL KITTNER

## (undated) DEVICE — 3M™ IOBAN™ 2 ANTIMICROBIAL INCISE DRAPE 6650EZ: Brand: IOBAN™ 2

## (undated) DEVICE — SPONGE PVP SCRUB WING STERILE

## (undated) DEVICE — LUBRICANT INST ELECTROLUBE ANTISTK WO PAD

## (undated) DEVICE — EXOFIN PRECISION PEN HIGH VISCOSITY TOPICAL SKIN ADHESIVE: Brand: EXOFIN PRECISION PEN, 1G

## (undated) DEVICE — UTILITY MARKER,BLACK WITH LABELS: Brand: DEVON

## (undated) DEVICE — BITE BLOCK 60FR LF W/STRAP BLOX

## (undated) DEVICE — KIT ENDO BUTTON

## (undated) DEVICE — DRAPE C-ARM X-RAY

## (undated) DEVICE — PREP SURGICAL PURPREP 26ML

## (undated) DEVICE — DEFENDO AIR WATER SUCTION AND BIOPSY VALVE KIT FOR  OLYMPUS: Brand: DEFENDO AIR/WATER/SUCTION AND BIOPSY VALVE

## (undated) DEVICE — BITE BLOCK MAXI 60FR LF STRAP

## (undated) DEVICE — SUT VICRYL 3-0 SH 27 IN J416H

## (undated) DEVICE — SUT SILK 0 SH 30 IN K834H

## (undated) DEVICE — NEEDLE HYPO 22G X 1-1/2 IN

## (undated) DEVICE — ESOPHAGEAL WIREGUIDED BALLOON DILATATION CATHETER: Brand: CRE WIREGUIDED

## (undated) DEVICE — Device: Brand: OMNICLOSE TROCAR SITE CLOSURE DEVICE

## (undated) DEVICE — DRAPE EQUIPMENT RF WAND

## (undated) DEVICE — STERILE EMESIS BASIN                 070: Brand: CARDINAL HEALTH

## (undated) DEVICE — ARM DRAPE

## (undated) DEVICE — LIGHT HANDLE COVER SLEEVE DISP BLUE STELLAR

## (undated) DEVICE — SUT VICRYL 0 UR-6 27 IN J603H

## (undated) DEVICE — INTENDED FOR TISSUE SEPARATION, AND OTHER PROCEDURES THAT REQUIRE A SHARP SURGICAL BLADE TO PUNCTURE OR CUT.: Brand: BARD-PARKER SAFETY BLADES SIZE 11, STERILE

## (undated) DEVICE — SINGLE-USE SYRINGE/GAUGE ASSEMBLY: Brand: ALLIANCE II

## (undated) DEVICE — GLOVE INDICATOR PI UNDERGLOVE SZ 7.5 BLUE

## (undated) DEVICE — HEMOSTATIC MATRIX SURGIFLO 8ML W/THROMBIN

## (undated) DEVICE — CANNULA SEAL

## (undated) DEVICE — TIBURON SPLIT SHEET: Brand: CONVERTORS

## (undated) DEVICE — LEAD NEUROSTIM PENTA 3MM X 60CM: Type: IMPLANTABLE DEVICE | Site: SPINE THORACIC | Status: NON-FUNCTIONAL

## (undated) DEVICE — Device: Brand: DEFENDO AIR/WATER/SUCTION AND BIOPSY VALVE

## (undated) DEVICE — FLOSEAL MATRIX IS INDICATED IN SURGICAL PROCEDURES (OTHER THAN IN OPHTHALMIC) AS AN ADJUNCT TO HEMOSTASIS WHEN CONTROL OF BLEEDING BY LIGATURE OR CONVENTIONAL PROCEDURES IS INEFFECTIVE OR IMPRACTICAL.: Brand: FLOSEAL HEMOSTATIC MATRIX

## (undated) DEVICE — SUT SILK 2-0 18 IN A185H

## (undated) DEVICE — DRESSING MEPILEX AG BORDER 4 X 4 IN

## (undated) DEVICE — ANTIBACTERIAL VIOLET BRAIDED (POLYGLACTIN 910), SYNTHETIC ABSORBABLE SUTURE: Brand: COATED VICRYL

## (undated) DEVICE — ENDOPATH PNEUMONEEDLE INSUFFLATION NEEDLES WITH LUER LOCK CONNECTORS 120MM: Brand: ENDOPATH

## (undated) DEVICE — KIT, BETHLEHEM THORACIC ROBOT: Brand: CARDINAL HEALTH

## (undated) DEVICE — SUT ETHIBOND 0 SH 30 IN X834H

## (undated) DEVICE — ENDOPATH 5MM CURVED SCISSORS WITH MONOPOLAR CAUTERY: Brand: ENDOPATH

## (undated) DEVICE — TROCAR: Brand: KII FIOS FIRST ENTRY

## (undated) DEVICE — SKN PRP WNG SPNGE PVP SCRB STR: Brand: MEDLINE INDUSTRIES, INC.

## (undated) DEVICE — ELECTRODE BLADE MOD E-Z CLEAN 2.5IN 6.4CM -0012M

## (undated) DEVICE — COLUMN DRAPE

## (undated) DEVICE — GLOVE SRG BIOGEL 8

## (undated) DEVICE — NEUROSTIM MULTILEAD TRIAL CABLE

## (undated) DEVICE — GLOVE SRG BIOGEL 7.5

## (undated) DEVICE — CADIERE FORCEPS: Brand: ENDOWRIST

## (undated) DEVICE — SUT ETHIBOND 2-0 SH/SH 36 IN X523H

## (undated) DEVICE — VISUALIZATION SYSTEM: Brand: CLEARIFY

## (undated) DEVICE — BLADELESS OBTURATOR: Brand: WECK VISTA

## (undated) DEVICE — CHLORAPREP HI-LITE 26ML ORANGE

## (undated) DEVICE — SUT VICRYL 0 REEL 54 IN J287G

## (undated) DEVICE — 2000CC GUARDIAN II: Brand: GUARDIAN

## (undated) DEVICE — ULTRACLEAN ACCESSORY ELECTRODE 1" (2.54 CM) COATED BLADE: Brand: ULTRACLEAN

## (undated) DEVICE — INTENDED FOR TISSUE SEPARATION, AND OTHER PROCEDURES THAT REQUIRE A SHARP SURGICAL BLADE TO PUNCTURE OR CUT.: Brand: BARD-PARKER SAFETY BLADES SIZE 15, STERILE

## (undated) DEVICE — TOOL 14MH30 LEGEND 14CM 3MM: Brand: MIDAS REX ™

## (undated) DEVICE — PROXIMATE PLUS MD MULTI-DIRECTIONAL RELEASE SKIN STAPLERS CONTAINS 35 STAINLESS STEEL STAPLES APPROXIMATE CLOSED DIMENSIONS: 6.9MM X 3.9MM WIDE: Brand: PROXIMATE

## (undated) DEVICE — UNIVERSAL SPINE,KIT: Brand: CARDINAL HEALTH

## (undated) DEVICE — ADHESIVE SKIN HIGH VISCOSITY EXOFIN 1ML

## (undated) DEVICE — TUNNELING TOOL 20IN

## (undated) DEVICE — SNAP KOVER: Brand: UNBRANDED

## (undated) DEVICE — ALCOHOL ISOPROPYL BLUE

## (undated) DEVICE — SYRINGE 10ML LL

## (undated) DEVICE — SUT MONOCRYL PLUS 4-0 PS-2 18 IN MCP496G

## (undated) DEVICE — DRAPE TOWEL: Brand: CONVERTORS

## (undated) DEVICE — PROGRAMMER PATIENT PROCLAIM

## (undated) DEVICE — HYDROGEN PEROXIDE 3 PCT 4OZ

## (undated) DEVICE — MEGA SUTURECUT ND: Brand: ENDOWRIST

## (undated) DEVICE — DRAPE SHEET X-LG

## (undated) DEVICE — PENCIL ELECTROSURG E-Z CLEAN -0035H

## (undated) DEVICE — GLOVE INDICATOR PI UNDERGLOVE SZ 8 BLUE

## (undated) DEVICE — BUTTON SWITCH PENCIL HOLSTER: Brand: VALLEYLAB